# Patient Record
Sex: MALE | Race: WHITE | NOT HISPANIC OR LATINO | Employment: OTHER | URBAN - METROPOLITAN AREA
[De-identification: names, ages, dates, MRNs, and addresses within clinical notes are randomized per-mention and may not be internally consistent; named-entity substitution may affect disease eponyms.]

---

## 2017-01-04 DIAGNOSIS — E03.4 HYPOTHYROIDISM DUE TO ACQUIRED ATROPHY OF THYROID: ICD-10-CM

## 2017-01-04 DIAGNOSIS — E78.5 LIPIDEMIA: ICD-10-CM

## 2017-01-04 DIAGNOSIS — I10 ESSENTIAL HYPERTENSION: ICD-10-CM

## 2017-01-04 DIAGNOSIS — Z00.00 ROUTINE GENERAL MEDICAL EXAMINATION AT A HEALTH CARE FACILITY: ICD-10-CM

## 2017-01-04 RX ORDER — LEVOTHYROXINE SODIUM 300 UG/1
TABLET ORAL
Qty: 30 TABLET | Refills: 0 | Status: SHIPPED | OUTPATIENT
Start: 2017-01-04 | End: 2017-03-22 | Stop reason: SDUPTHER

## 2017-03-22 DIAGNOSIS — Z00.00 ROUTINE GENERAL MEDICAL EXAMINATION AT A HEALTH CARE FACILITY: ICD-10-CM

## 2017-03-22 DIAGNOSIS — E78.5 LIPIDEMIA: ICD-10-CM

## 2017-03-22 DIAGNOSIS — E03.4 HYPOTHYROIDISM DUE TO ACQUIRED ATROPHY OF THYROID: ICD-10-CM

## 2017-03-22 DIAGNOSIS — I10 ESSENTIAL HYPERTENSION: ICD-10-CM

## 2017-03-22 RX ORDER — LEVOTHYROXINE SODIUM 300 UG/1
TABLET ORAL
Qty: 30 TABLET | Refills: 5 | Status: SHIPPED | OUTPATIENT
Start: 2017-03-22 | End: 2017-04-05 | Stop reason: SDUPTHER

## 2017-04-05 DIAGNOSIS — Z00.00 ROUTINE GENERAL MEDICAL EXAMINATION AT A HEALTH CARE FACILITY: ICD-10-CM

## 2017-04-05 DIAGNOSIS — E03.4 HYPOTHYROIDISM DUE TO ACQUIRED ATROPHY OF THYROID: ICD-10-CM

## 2017-04-05 DIAGNOSIS — E78.5 HYPERLIPIDEMIA, UNSPECIFIED HYPERLIPIDEMIA TYPE: ICD-10-CM

## 2017-04-05 DIAGNOSIS — I10 ESSENTIAL HYPERTENSION: ICD-10-CM

## 2017-04-05 RX ORDER — AMLODIPINE BESYLATE 10 MG/1
10 TABLET ORAL DAILY
Qty: 30 TABLET | Refills: 1 | Status: SHIPPED | OUTPATIENT
Start: 2017-04-05 | End: 2017-06-14 | Stop reason: SDUPTHER

## 2017-04-05 RX ORDER — ATORVASTATIN CALCIUM 10 MG/1
10 TABLET, FILM COATED ORAL DAILY
Qty: 30 TABLET | Refills: 1 | Status: SHIPPED | OUTPATIENT
Start: 2017-04-05 | End: 2017-06-14 | Stop reason: SDUPTHER

## 2017-04-05 RX ORDER — LEVOTHYROXINE SODIUM 300 UG/1
300 TABLET ORAL DAILY
Qty: 30 TABLET | Refills: 1 | Status: SHIPPED | OUTPATIENT
Start: 2017-04-05 | End: 2017-06-14 | Stop reason: SDUPTHER

## 2017-04-05 NOTE — TELEPHONE ENCOUNTER
----- Message from Joelle Wade sent at 4/5/2017 10:11 AM CDT -----  Patient is requesting a medication refill. Patient says he just lost his wife & would like ONLY a 30 day supply until he able to make an appt.     RX name: Atorvastatin calcium tab  Strength: 10 mg   Quantity:   Directions: 1xdaily    RX name: Amlodipine besylate  Strength: 10 mg  Quantity:   Directions: 1xdaily    RX name: Levothyroxin  Strength: 300mg   Quantity:   Directions: 1xdaily    Pharmacy name: [facility]

## 2017-04-06 NOTE — TELEPHONE ENCOUNTER
No answer - i mailed the pt a letter advising him to schedule an appt. with dr jackson in the near future

## 2017-06-05 ENCOUNTER — TELEPHONE (OUTPATIENT)
Dept: FAMILY MEDICINE | Facility: CLINIC | Age: 72
End: 2017-06-05

## 2017-06-05 DIAGNOSIS — E03.4 HYPOTHYROIDISM DUE TO ACQUIRED ATROPHY OF THYROID: ICD-10-CM

## 2017-06-05 DIAGNOSIS — E78.5 HYPERLIPIDEMIA, UNSPECIFIED HYPERLIPIDEMIA TYPE: ICD-10-CM

## 2017-06-05 DIAGNOSIS — Z00.00 ROUTINE GENERAL MEDICAL EXAMINATION AT A HEALTH CARE FACILITY: Primary | ICD-10-CM

## 2017-06-05 DIAGNOSIS — R73.9 HYPERGLYCEMIA: ICD-10-CM

## 2017-06-05 DIAGNOSIS — I10 ESSENTIAL HYPERTENSION: ICD-10-CM

## 2017-06-05 NOTE — TELEPHONE ENCOUNTER
----- Message from Veronica Hernandez MA sent at 6/5/2017  8:21 AM CDT -----  Contact: Self / [phone]  Patient requesting to have lab work done at [facility]. Advised patient that it has been almost 2 years since his last visit. Patient is aware and will make an appointment when his schedule clears up. Please advise.

## 2017-06-06 NOTE — TELEPHONE ENCOUNTER
i notified the pt and he will do labs tomorrow and call back to schedule office visit with dr jackson in the near future

## 2017-06-08 LAB
ALBUMIN SERPL-MCNC: 4.3 G/DL (ref 3.6–5.1)
ALBUMIN/GLOB SERPL: 1.7 (CALC) (ref 1–2.5)
ALP SERPL-CCNC: 56 U/L (ref 40–115)
ALT SERPL-CCNC: 13 U/L (ref 9–46)
AST SERPL-CCNC: 14 U/L (ref 10–35)
BASOPHILS # BLD AUTO: 16 CELLS/UL (ref 0–200)
BASOPHILS NFR BLD AUTO: 0.3 %
BILIRUB SERPL-MCNC: 1.1 MG/DL (ref 0.2–1.2)
BUN SERPL-MCNC: 20 MG/DL (ref 7–25)
BUN/CREAT SERPL: ABNORMAL (CALC) (ref 6–22)
CALCIUM SERPL-MCNC: 9.7 MG/DL (ref 8.6–10.3)
CHLORIDE SERPL-SCNC: 101 MMOL/L (ref 98–110)
CHOLEST SERPL-MCNC: 124 MG/DL (ref 125–200)
CHOLEST/HDLC SERPL: 3.1 (CALC)
CO2 SERPL-SCNC: 31 MMOL/L (ref 20–31)
CREAT SERPL-MCNC: 1.1 MG/DL (ref 0.7–1.18)
EOSINOPHIL # BLD AUTO: 88 CELLS/UL (ref 15–500)
EOSINOPHIL NFR BLD AUTO: 1.7 %
ERYTHROCYTE [DISTWIDTH] IN BLOOD BY AUTOMATED COUNT: 14.5 % (ref 11–15)
GFR SERPL CREATININE-BSD FRML MDRD: 66 ML/MIN/1.73M2
GLOBULIN SER CALC-MCNC: 2.6 G/DL (CALC) (ref 1.9–3.7)
GLUCOSE SERPL-MCNC: 126 MG/DL (ref 65–99)
HCT VFR BLD AUTO: 48.1 % (ref 38.5–50)
HDLC SERPL-MCNC: 40 MG/DL
HGB BLD-MCNC: 16.3 G/DL (ref 13.2–17.1)
LDLC SERPL CALC-MCNC: 69 MG/DL (CALC)
LYMPHOCYTES # BLD AUTO: 1071 CELLS/UL (ref 850–3900)
LYMPHOCYTES NFR BLD AUTO: 20.6 %
MCH RBC QN AUTO: 30.4 PG (ref 27–33)
MCHC RBC AUTO-ENTMCNC: 33.8 G/DL (ref 32–36)
MCV RBC AUTO: 90.1 FL (ref 80–100)
MONOCYTES # BLD AUTO: 333 CELLS/UL (ref 200–950)
MONOCYTES NFR BLD AUTO: 6.4 %
NEUTROPHILS # BLD AUTO: 3692 CELLS/UL (ref 1500–7800)
NEUTROPHILS NFR BLD AUTO: 71 %
NONHDLC SERPL-MCNC: 84 MG/DL (CALC)
PLATELET # BLD AUTO: 194 THOUSAND/UL (ref 140–400)
PMV BLD REES-ECKER: 9.7 FL (ref 7.5–12.5)
POTASSIUM SERPL-SCNC: 4 MMOL/L (ref 3.5–5.3)
PROT SERPL-MCNC: 6.9 G/DL (ref 6.1–8.1)
RBC # BLD AUTO: 5.34 MILLION/UL (ref 4.2–5.8)
SODIUM SERPL-SCNC: 141 MMOL/L (ref 135–146)
T4 FREE SERPL-MCNC: 1.4 NG/DL (ref 0.8–1.8)
TRIGL SERPL-MCNC: 77 MG/DL
TSH SERPL-ACNC: 3.69 MIU/L (ref 0.4–4.5)
WBC # BLD AUTO: 5.2 THOUSAND/UL (ref 3.8–10.8)

## 2017-06-09 ENCOUNTER — TELEPHONE (OUTPATIENT)
Dept: FAMILY MEDICINE | Facility: CLINIC | Age: 72
End: 2017-06-09

## 2017-06-09 DIAGNOSIS — R73.9 HYPERGLYCEMIA: Primary | ICD-10-CM

## 2017-06-09 NOTE — TELEPHONE ENCOUNTER
Lab work is great however glucose is still elevated I would like to draw a hemoglobin A1c    You do not have to fast

## 2017-06-14 DIAGNOSIS — E78.5 HYPERLIPIDEMIA, UNSPECIFIED HYPERLIPIDEMIA TYPE: ICD-10-CM

## 2017-06-14 DIAGNOSIS — I10 ESSENTIAL HYPERTENSION: ICD-10-CM

## 2017-06-14 DIAGNOSIS — Z00.00 ROUTINE GENERAL MEDICAL EXAMINATION AT A HEALTH CARE FACILITY: ICD-10-CM

## 2017-06-14 DIAGNOSIS — E03.4 HYPOTHYROIDISM DUE TO ACQUIRED ATROPHY OF THYROID: ICD-10-CM

## 2017-06-14 RX ORDER — HYDROCHLOROTHIAZIDE 25 MG/1
25 TABLET ORAL DAILY
Qty: 90 TABLET | Refills: 3 | Status: SHIPPED | OUTPATIENT
Start: 2017-06-14 | End: 2018-10-17 | Stop reason: SDUPTHER

## 2017-06-14 RX ORDER — ATORVASTATIN CALCIUM 10 MG/1
10 TABLET, FILM COATED ORAL DAILY
Qty: 90 TABLET | Refills: 3 | Status: SHIPPED | OUTPATIENT
Start: 2017-06-14 | End: 2018-10-17 | Stop reason: SDUPTHER

## 2017-06-14 RX ORDER — LEVOTHYROXINE SODIUM 300 UG/1
300 TABLET ORAL DAILY
Qty: 90 TABLET | Refills: 3 | Status: SHIPPED | OUTPATIENT
Start: 2017-06-14 | End: 2018-10-17 | Stop reason: SDUPTHER

## 2017-06-14 RX ORDER — AMLODIPINE BESYLATE 10 MG/1
10 TABLET ORAL DAILY
Qty: 90 TABLET | Refills: 3 | Status: SHIPPED | OUTPATIENT
Start: 2017-06-14 | End: 2018-10-17 | Stop reason: SDUPTHER

## 2017-06-14 NOTE — TELEPHONE ENCOUNTER
----- Message from Joelle Wade sent at 6/14/2017  8:55 AM CDT -----  Patient is requesting a medication refill. Patient has appointment scheduled for next week    RX name: amlodipine (NORVASC) 10 MG tablet  Strength:   Quantity:   Directions: Take 1 tablet (10 mg total) by mouth once daily. - Oral    RX name: atorvastatin (LIPITOR) 10 MG tablet   Strength:   Quantity:   Directions: Take 1 tablet (10 mg total) by mouth once daily. - Oral    RX name: hydrochlorothiazide (HYDRODIURIL) 25 MG tablet  Strength:   Quantity:   Directions: TAKE ONE TABLET BY MOUTH ONCE DAILY     RX name: levothyroxine (SYNTHROID) 300 MCG Tab  Strength:   Quantity:   Directions:  Take 1 tablet (300 mcg total) by mouth once daily. - Oral    Pharmacy name: [facility]      Phone number where patient can be reached:

## 2017-06-17 LAB — HBA1C MFR BLD: 6.1 % OF TOTAL HGB

## 2017-06-19 ENCOUNTER — TELEPHONE (OUTPATIENT)
Dept: INTERNAL MEDICINE | Facility: CLINIC | Age: 72
End: 2017-06-19

## 2017-06-22 ENCOUNTER — OFFICE VISIT (OUTPATIENT)
Dept: FAMILY MEDICINE | Facility: CLINIC | Age: 72
End: 2017-06-22
Payer: MEDICARE

## 2017-06-22 VITALS
BODY MASS INDEX: 34.68 KG/M2 | WEIGHT: 234.13 LBS | SYSTOLIC BLOOD PRESSURE: 126 MMHG | OXYGEN SATURATION: 97 % | HEART RATE: 70 BPM | HEIGHT: 69 IN | DIASTOLIC BLOOD PRESSURE: 80 MMHG | TEMPERATURE: 99 F

## 2017-06-22 DIAGNOSIS — E03.4 HYPOTHYROIDISM DUE TO ACQUIRED ATROPHY OF THYROID: ICD-10-CM

## 2017-06-22 DIAGNOSIS — R73.9 HYPERGLYCEMIA: ICD-10-CM

## 2017-06-22 DIAGNOSIS — Z00.00 ROUTINE HEALTH MAINTENANCE: Primary | ICD-10-CM

## 2017-06-22 DIAGNOSIS — I10 ESSENTIAL HYPERTENSION: ICD-10-CM

## 2017-06-22 DIAGNOSIS — E78.5 HYPERLIPIDEMIA, UNSPECIFIED HYPERLIPIDEMIA TYPE: ICD-10-CM

## 2017-06-22 PROCEDURE — 99499 UNLISTED E&M SERVICE: CPT | Mod: S$GLB,,, | Performed by: FAMILY MEDICINE

## 2017-06-22 PROCEDURE — 99397 PER PM REEVAL EST PAT 65+ YR: CPT | Mod: S$GLB,,, | Performed by: FAMILY MEDICINE

## 2017-06-22 NOTE — PROGRESS NOTES
Patient ID: Bean Salas is a 72 y.o. male.    Chief Complaint: Follow-up (check-up and review bloodwork)    HPI      Bean Salas is a 72 y.o. male with no current complaints follow for hypertension, hyperlipidemia, hypothyroidism.  All of which are stable at this time.  No problems with current medication            Review of Symptoms    Constitutional  Neg activity change, No chills /fever   Resp  Neg hemoptysis, stridor, choking  CVS  Neg chest pain, palpitations  Other review of the 14 systems negative except for history of present illness      Physical Exam    Constitutional:  Oriented to person, place, and time.appears well-developed and well-nourished.  No distress.      HENT  Head: Normocephalic and atraumatic  Right Ear: External ear normal.   Left Ear: External ear normal.   Nose: External nose normal.   Mouth:  Moist mucus membranes.    Eyes:  Conjunctivae are normal. Right eye exhibits no discharge.  Left eye exhibits no discharge. No scleral icterus.  No periorbital edema    Cardiovascular:  Regular rate, Regular rhythm     No extrasystole  Normal S1-S2      Pulmonary/Chest:   Normal effort and breath sounds.  No wheezing, rhonchi or rales.    Musculoskeletal:  No edema. No obvious deformity No waisting       Neurological:  Alert and oriented to person, place, and time.   Coordination normal.     Skin:   Skin is warm and dry.  No diaphoresis.   No rash noted.     Psychiatric: Normal mood and affect. Behavior is normal.  Judgment and thought content normal.       Assessment / Plan:      ICD-10-CM ICD-9-CM   1. Routine health maintenance Z00.00 V70.0   2. Essential hypertension I10 401.9   3. Hyperlipidemia, unspecified hyperlipidemia type E78.5 272.4   4. Hypothyroidism due to acquired atrophy of thyroid E03.4 244.8     246.8   5. Hyperglycemia R73.9 790.29     Routine health maintenance    Essential hypertension    Hyperlipidemia, unspecified hyperlipidemia type    Hypothyroidism due to acquired  atrophy of thyroid    Hyperglycemia  -     Hemoglobin A1c; Future    decline colonoscopy, injections  Discusses aaa  Life screen option

## 2017-06-25 PROBLEM — I10 ESSENTIAL HYPERTENSION: Status: ACTIVE | Noted: 2017-06-25

## 2017-06-25 PROBLEM — E78.5 HYPERLIPIDEMIA: Status: ACTIVE | Noted: 2017-06-25

## 2017-06-25 PROBLEM — E03.4 HYPOTHYROIDISM DUE TO ACQUIRED ATROPHY OF THYROID: Status: ACTIVE | Noted: 2017-06-25

## 2017-06-25 PROBLEM — R73.9 HYPERGLYCEMIA: Status: ACTIVE | Noted: 2017-06-25

## 2018-02-01 LAB — HBA1C MFR BLD: 6 % OF TOTAL HGB

## 2018-02-12 ENCOUNTER — PES CALL (OUTPATIENT)
Dept: ADMINISTRATIVE | Facility: CLINIC | Age: 73
End: 2018-02-12

## 2018-02-22 ENCOUNTER — CLINICAL SUPPORT (OUTPATIENT)
Dept: FAMILY MEDICINE | Facility: CLINIC | Age: 73
End: 2018-02-22

## 2018-02-22 DIAGNOSIS — Z00.00 ROUTINE GENERAL MEDICAL EXAMINATION AT A HEALTH CARE FACILITY: Primary | ICD-10-CM

## 2018-02-22 LAB
BILIRUB SERPL-MCNC: NORMAL MG/DL
BLOOD URINE, POC: NORMAL
COLOR, POC UA: NORMAL
GLUCOSE UR QL STRIP: NORMAL
KETONES UR QL STRIP: NORMAL
LEUKOCYTE ESTERASE URINE, POC: NORMAL
NITRITE, POC UA: NORMAL
PH, POC UA: NORMAL
PROTEIN, POC: NORMAL
SPECIFIC GRAVITY, POC UA: NORMAL
UROBILINOGEN, POC UA: NORMAL

## 2018-02-22 PROCEDURE — 99201 PR OFFICE/OUTPT VISIT,NEW,LEVL I: CPT | Mod: S$GLB,,, | Performed by: FAMILY MEDICINE

## 2018-02-22 PROCEDURE — 81002 URINALYSIS NONAUTO W/O SCOPE: CPT | Mod: S$GLB,,, | Performed by: FAMILY MEDICINE

## 2018-05-04 DIAGNOSIS — Z12.11 COLON CANCER SCREENING: ICD-10-CM

## 2018-10-04 ENCOUNTER — TELEPHONE (OUTPATIENT)
Dept: FAMILY MEDICINE | Facility: CLINIC | Age: 73
End: 2018-10-04

## 2018-10-04 DIAGNOSIS — E78.5 HYPERLIPIDEMIA, UNSPECIFIED HYPERLIPIDEMIA TYPE: ICD-10-CM

## 2018-10-04 DIAGNOSIS — I10 ESSENTIAL HYPERTENSION: ICD-10-CM

## 2018-10-04 DIAGNOSIS — E03.4 HYPOTHYROIDISM DUE TO ACQUIRED ATROPHY OF THYROID: ICD-10-CM

## 2018-10-04 DIAGNOSIS — R73.9 HYPERGLYCEMIA: ICD-10-CM

## 2018-10-04 DIAGNOSIS — Z00.00 ROUTINE GENERAL MEDICAL EXAMINATION AT A HEALTH CARE FACILITY: Primary | ICD-10-CM

## 2018-10-04 NOTE — TELEPHONE ENCOUNTER
----- Message from Obdulia Minaya sent at 10/4/2018  9:59 AM CDT -----  Contact: [phone]/ self   Patient is requesting orders for lab work and he would like them sent to [facility]. Please advise.

## 2018-10-13 LAB
ALBUMIN SERPL-MCNC: 4.1 G/DL (ref 3.6–5.1)
ALBUMIN/GLOB SERPL: 1.5 (CALC) (ref 1–2.5)
ALP SERPL-CCNC: 62 U/L (ref 40–115)
ALT SERPL-CCNC: 17 U/L (ref 9–46)
AST SERPL-CCNC: 16 U/L (ref 10–35)
BASOPHILS # BLD AUTO: 33 CELLS/UL (ref 0–200)
BASOPHILS NFR BLD AUTO: 0.5 %
BILIRUB SERPL-MCNC: 2.2 MG/DL (ref 0.2–1.2)
BUN SERPL-MCNC: 11 MG/DL (ref 7–25)
BUN/CREAT SERPL: ABNORMAL (CALC) (ref 6–22)
CALCIUM SERPL-MCNC: 9.5 MG/DL (ref 8.6–10.3)
CHLORIDE SERPL-SCNC: 103 MMOL/L (ref 98–110)
CHOLEST SERPL-MCNC: 126 MG/DL
CHOLEST/HDLC SERPL: 3.2 (CALC)
CO2 SERPL-SCNC: 29 MMOL/L (ref 20–32)
CREAT SERPL-MCNC: 1.03 MG/DL (ref 0.7–1.18)
EOSINOPHIL # BLD AUTO: 98 CELLS/UL (ref 15–500)
EOSINOPHIL NFR BLD AUTO: 1.5 %
ERYTHROCYTE [DISTWIDTH] IN BLOOD BY AUTOMATED COUNT: 14.1 % (ref 11–15)
GFR SERPL CREATININE-BSD FRML MDRD: 71 ML/MIN/1.73M2
GLOBULIN SER CALC-MCNC: 2.8 G/DL (CALC) (ref 1.9–3.7)
GLUCOSE SERPL-MCNC: 139 MG/DL (ref 65–99)
HBA1C MFR BLD: 6.1 % OF TOTAL HGB
HCT VFR BLD AUTO: 48.8 % (ref 38.5–50)
HDLC SERPL-MCNC: 39 MG/DL
HGB BLD-MCNC: 16.6 G/DL (ref 13.2–17.1)
LDLC SERPL CALC-MCNC: 70 MG/DL (CALC)
LYMPHOCYTES # BLD AUTO: 1326 CELLS/UL (ref 850–3900)
LYMPHOCYTES NFR BLD AUTO: 20.4 %
MCH RBC QN AUTO: 30.6 PG (ref 27–33)
MCHC RBC AUTO-ENTMCNC: 34 G/DL (ref 32–36)
MCV RBC AUTO: 89.9 FL (ref 80–100)
MONOCYTES # BLD AUTO: 442 CELLS/UL (ref 200–950)
MONOCYTES NFR BLD AUTO: 6.8 %
NEUTROPHILS # BLD AUTO: 4602 CELLS/UL (ref 1500–7800)
NEUTROPHILS NFR BLD AUTO: 70.8 %
NONHDLC SERPL-MCNC: 87 MG/DL (CALC)
PLATELET # BLD AUTO: 247 THOUSAND/UL (ref 140–400)
PMV BLD REES-ECKER: 11.7 FL (ref 7.5–12.5)
POTASSIUM SERPL-SCNC: 3.8 MMOL/L (ref 3.5–5.3)
PROT SERPL-MCNC: 6.9 G/DL (ref 6.1–8.1)
RBC # BLD AUTO: 5.43 MILLION/UL (ref 4.2–5.8)
SODIUM SERPL-SCNC: 141 MMOL/L (ref 135–146)
TRIGL SERPL-MCNC: 89 MG/DL
TSH SERPL-ACNC: 6.12 MIU/L (ref 0.4–4.5)
WBC # BLD AUTO: 6.5 THOUSAND/UL (ref 3.8–10.8)

## 2018-10-17 DIAGNOSIS — E78.5 HYPERLIPIDEMIA, UNSPECIFIED HYPERLIPIDEMIA TYPE: ICD-10-CM

## 2018-10-17 DIAGNOSIS — Z00.00 ROUTINE GENERAL MEDICAL EXAMINATION AT A HEALTH CARE FACILITY: ICD-10-CM

## 2018-10-17 DIAGNOSIS — E03.4 HYPOTHYROIDISM DUE TO ACQUIRED ATROPHY OF THYROID: ICD-10-CM

## 2018-10-17 DIAGNOSIS — I10 ESSENTIAL HYPERTENSION: ICD-10-CM

## 2018-10-17 RX ORDER — ATORVASTATIN CALCIUM 10 MG/1
10 TABLET, FILM COATED ORAL DAILY
Qty: 90 TABLET | Refills: 3 | Status: SHIPPED | OUTPATIENT
Start: 2018-10-17 | End: 2020-03-05

## 2018-10-17 RX ORDER — LEVOTHYROXINE SODIUM 300 UG/1
300 TABLET ORAL DAILY
Qty: 90 TABLET | Refills: 3 | Status: SHIPPED | OUTPATIENT
Start: 2018-10-17 | End: 2018-10-17 | Stop reason: SDUPTHER

## 2018-10-17 RX ORDER — HYDROCHLOROTHIAZIDE 25 MG/1
25 TABLET ORAL DAILY
Qty: 90 TABLET | Refills: 3 | Status: SHIPPED | OUTPATIENT
Start: 2018-10-17 | End: 2020-03-05

## 2018-10-17 RX ORDER — HYDROCHLOROTHIAZIDE 25 MG/1
25 TABLET ORAL DAILY
Qty: 90 TABLET | Refills: 3 | Status: SHIPPED | OUTPATIENT
Start: 2018-10-17 | End: 2018-10-17 | Stop reason: SDUPTHER

## 2018-10-17 RX ORDER — AMLODIPINE BESYLATE 10 MG/1
10 TABLET ORAL DAILY
Qty: 90 TABLET | Refills: 3 | Status: SHIPPED | OUTPATIENT
Start: 2018-10-17 | End: 2020-03-05

## 2018-10-17 RX ORDER — ATORVASTATIN CALCIUM 10 MG/1
10 TABLET, FILM COATED ORAL DAILY
Qty: 90 TABLET | Refills: 3 | Status: SHIPPED | OUTPATIENT
Start: 2018-10-17 | End: 2018-10-17 | Stop reason: SDUPTHER

## 2018-10-17 RX ORDER — LEVOTHYROXINE SODIUM 300 UG/1
300 TABLET ORAL DAILY
Qty: 90 TABLET | Refills: 3 | Status: SHIPPED | OUTPATIENT
Start: 2018-10-17 | End: 2020-03-05

## 2018-10-17 NOTE — TELEPHONE ENCOUNTER
Your lab work if very good  NO changes needed at this time    Please come in for a wellness visit sometime soon   defiantly before the end of the year

## 2018-10-17 NOTE — TELEPHONE ENCOUNTER
----- Message from Lisa Verdugo sent at 10/17/2018  9:54 AM CDT -----  No. [phone]    Patient needs a 30 day script on his medications.   [facility] in [place]     Atorvastatin 10mg, 1 daily, Amlodipine 10mg, 1 daily, Hydrochlorothiazide 25mg, 1 daily, and   Levothyroxine 300mg, 1 daily.

## 2018-10-17 NOTE — TELEPHONE ENCOUNTER
I spoke with the pt   He will call back to schedule he is leaving to go out of town  He does need refills with 90 day supply to [facility]

## 2018-11-15 ENCOUNTER — PES CALL (OUTPATIENT)
Dept: ADMINISTRATIVE | Facility: CLINIC | Age: 73
End: 2018-11-15

## 2019-02-06 ENCOUNTER — PES CALL (OUTPATIENT)
Dept: ADMINISTRATIVE | Facility: CLINIC | Age: 74
End: 2019-02-06

## 2019-05-17 DIAGNOSIS — Z12.11 COLON CANCER SCREENING: ICD-10-CM

## 2019-06-26 ENCOUNTER — TELEPHONE (OUTPATIENT)
Dept: FAMILY MEDICINE | Facility: CLINIC | Age: 74
End: 2019-06-26

## 2019-06-26 DIAGNOSIS — R73.9 HYPERGLYCEMIA: ICD-10-CM

## 2019-06-26 DIAGNOSIS — E03.4 HYPOTHYROIDISM DUE TO ACQUIRED ATROPHY OF THYROID: ICD-10-CM

## 2019-06-26 DIAGNOSIS — I10 ESSENTIAL HYPERTENSION: Primary | ICD-10-CM

## 2019-06-26 DIAGNOSIS — E78.5 HYPERLIPIDEMIA, UNSPECIFIED HYPERLIPIDEMIA TYPE: ICD-10-CM

## 2019-06-26 NOTE — TELEPHONE ENCOUNTER
Tried contacting patient no answer on telephone number provided; will try back later. Lab orders have been placed     ----- Message from Leatha Marin sent at 6/26/2019  2:35 PM CDT -----  Contact: pt  Type:  Needs Medical Advice    Who Called: pt  Symptoms (please be specific):  How long has patient had these symptoms:   Pharmacy name and phone #  Would the patient rather a call back or a response via [facility]?  Best Call Back Number:[phone]  Additional Information:  Pt called in need orders for lab

## 2019-07-02 ENCOUNTER — LAB VISIT (OUTPATIENT)
Dept: LAB | Facility: HOSPITAL | Age: 74
End: 2019-07-02
Attending: FAMILY MEDICINE
Payer: MEDICARE

## 2019-07-02 DIAGNOSIS — R73.9 HYPERGLYCEMIA: ICD-10-CM

## 2019-07-02 DIAGNOSIS — E03.4 HYPOTHYROIDISM DUE TO ACQUIRED ATROPHY OF THYROID: ICD-10-CM

## 2019-07-02 DIAGNOSIS — E78.5 HYPERLIPIDEMIA, UNSPECIFIED HYPERLIPIDEMIA TYPE: ICD-10-CM

## 2019-07-02 DIAGNOSIS — I10 ESSENTIAL HYPERTENSION: ICD-10-CM

## 2019-07-02 LAB
ALBUMIN SERPL BCP-MCNC: 3.8 G/DL (ref 3.5–5.2)
ALP SERPL-CCNC: 62 U/L (ref 38–126)
ALT SERPL W/O P-5'-P-CCNC: 21 U/L (ref 10–44)
ANION GAP SERPL CALC-SCNC: 6 MMOL/L (ref 8–16)
AST SERPL-CCNC: 24 U/L (ref 15–46)
BASOPHILS # BLD AUTO: 0.01 K/UL (ref 0–0.2)
BASOPHILS NFR BLD: 0.2 % (ref 0–1.9)
BILIRUB SERPL-MCNC: 1.7 MG/DL (ref 0.1–1)
BUN SERPL-MCNC: 13 MG/DL (ref 2–20)
CALCIUM SERPL-MCNC: 9.3 MG/DL (ref 8.7–10.5)
CHLORIDE SERPL-SCNC: 103 MMOL/L (ref 95–110)
CHOLEST SERPL-MCNC: 119 MG/DL (ref 120–199)
CHOLEST/HDLC SERPL: 3.3 {RATIO} (ref 2–5)
CO2 SERPL-SCNC: 32 MMOL/L (ref 23–29)
CREAT SERPL-MCNC: 0.95 MG/DL (ref 0.5–1.4)
DIFFERENTIAL METHOD: ABNORMAL
EOSINOPHIL # BLD AUTO: 0.1 K/UL (ref 0–0.5)
EOSINOPHIL NFR BLD: 1.5 % (ref 0–8)
ERYTHROCYTE [DISTWIDTH] IN BLOOD BY AUTOMATED COUNT: 14.6 % (ref 11.5–14.5)
EST. GFR  (AFRICAN AMERICAN): >60 ML/MIN/1.73 M^2
EST. GFR  (NON AFRICAN AMERICAN): >60 ML/MIN/1.73 M^2
ESTIMATED AVG GLUCOSE: 143 MG/DL (ref 68–131)
GLUCOSE SERPL-MCNC: 150 MG/DL (ref 70–110)
HBA1C MFR BLD HPLC: 6.6 % (ref 4–5.6)
HCT VFR BLD AUTO: 47.9 % (ref 40–54)
HDLC SERPL-MCNC: 36 MG/DL (ref 40–75)
HDLC SERPL: 30.3 % (ref 20–50)
HGB BLD-MCNC: 15.9 G/DL (ref 14–18)
LDLC SERPL CALC-MCNC: 66.4 MG/DL (ref 63–159)
LYMPHOCYTES # BLD AUTO: 1.1 K/UL (ref 1–4.8)
LYMPHOCYTES NFR BLD: 15.9 % (ref 18–48)
MCH RBC QN AUTO: 30.8 PG (ref 27–31)
MCHC RBC AUTO-ENTMCNC: 33.2 G/DL (ref 32–36)
MCV RBC AUTO: 93 FL (ref 82–98)
MONOCYTES # BLD AUTO: 0.6 K/UL (ref 0.3–1)
MONOCYTES NFR BLD: 9.2 % (ref 4–15)
NEUTROPHILS # BLD AUTO: 4.9 K/UL (ref 1.8–7.7)
NEUTROPHILS NFR BLD: 73.2 % (ref 38–73)
NONHDLC SERPL-MCNC: 83 MG/DL
PLATELET # BLD AUTO: 227 K/UL (ref 150–350)
PMV BLD AUTO: 11.8 FL (ref 9.2–12.9)
POTASSIUM SERPL-SCNC: 4 MMOL/L (ref 3.5–5.1)
PROT SERPL-MCNC: 7.1 G/DL (ref 6–8.4)
RBC # BLD AUTO: 5.16 M/UL (ref 4.6–6.2)
SODIUM SERPL-SCNC: 141 MMOL/L (ref 136–145)
TRIGL SERPL-MCNC: 83 MG/DL (ref 30–150)
TSH SERPL DL<=0.005 MIU/L-ACNC: 0.9 UIU/ML (ref 0.4–4)
WBC # BLD AUTO: 6.65 K/UL (ref 3.9–12.7)

## 2019-07-02 PROCEDURE — 85025 COMPLETE CBC W/AUTO DIFF WBC: CPT | Mod: HCNC,PO

## 2019-07-02 PROCEDURE — 83036 HEMOGLOBIN GLYCOSYLATED A1C: CPT | Mod: HCNC

## 2019-07-02 PROCEDURE — 80061 LIPID PANEL: CPT | Mod: HCNC

## 2019-07-02 PROCEDURE — 80053 COMPREHEN METABOLIC PANEL: CPT | Mod: HCNC,PO

## 2019-07-02 PROCEDURE — 84443 ASSAY THYROID STIM HORMONE: CPT | Mod: HCNC,PO

## 2019-07-02 PROCEDURE — 36415 COLL VENOUS BLD VENIPUNCTURE: CPT | Mod: HCNC,PO

## 2019-07-15 ENCOUNTER — PES CALL (OUTPATIENT)
Dept: ADMINISTRATIVE | Facility: CLINIC | Age: 74
End: 2019-07-15

## 2020-02-12 ENCOUNTER — TELEPHONE (OUTPATIENT)
Dept: FAMILY MEDICINE | Facility: CLINIC | Age: 75
End: 2020-02-12

## 2020-02-12 DIAGNOSIS — E03.4 HYPOTHYROIDISM DUE TO ACQUIRED ATROPHY OF THYROID: ICD-10-CM

## 2020-02-12 DIAGNOSIS — R73.9 HYPERGLYCEMIA: ICD-10-CM

## 2020-02-12 DIAGNOSIS — E78.5 HYPERLIPIDEMIA, UNSPECIFIED HYPERLIPIDEMIA TYPE: ICD-10-CM

## 2020-02-12 DIAGNOSIS — I10 ESSENTIAL HYPERTENSION: Primary | ICD-10-CM

## 2020-02-12 NOTE — TELEPHONE ENCOUNTER
----- Message from Rakesh Minaya sent at 2/12/2020  2:37 PM CST -----  Contact: patient  Patient would like orders placed in the system for routine testing. (labwork)     Please call [phone] to advise when done.

## 2020-02-12 NOTE — TELEPHONE ENCOUNTER
I called and LM with details  for the pt advising lab orders were sent to [facility]   I advised the pt to rtn call with any questions

## 2020-02-21 ENCOUNTER — LAB VISIT (OUTPATIENT)
Dept: LAB | Facility: HOSPITAL | Age: 75
End: 2020-02-21
Attending: FAMILY MEDICINE
Payer: MEDICARE

## 2020-02-21 DIAGNOSIS — I10 ESSENTIAL HYPERTENSION: ICD-10-CM

## 2020-02-21 DIAGNOSIS — E78.5 HYPERLIPIDEMIA, UNSPECIFIED HYPERLIPIDEMIA TYPE: ICD-10-CM

## 2020-02-21 DIAGNOSIS — E03.4 HYPOTHYROIDISM DUE TO ACQUIRED ATROPHY OF THYROID: ICD-10-CM

## 2020-02-21 DIAGNOSIS — R73.9 HYPERGLYCEMIA: ICD-10-CM

## 2020-02-21 LAB
ALBUMIN SERPL BCP-MCNC: 4.2 G/DL (ref 3.5–5.2)
ALP SERPL-CCNC: 70 U/L (ref 38–126)
ALT SERPL W/O P-5'-P-CCNC: 25 U/L (ref 10–44)
ANION GAP SERPL CALC-SCNC: 8 MMOL/L (ref 8–16)
AST SERPL-CCNC: 56 U/L (ref 15–46)
BASOPHILS # BLD AUTO: 0.02 K/UL (ref 0–0.2)
BASOPHILS NFR BLD: 0.3 % (ref 0–1.9)
BILIRUB SERPL-MCNC: 1.4 MG/DL (ref 0.1–1)
BUN SERPL-MCNC: 21 MG/DL (ref 2–20)
CALCIUM SERPL-MCNC: 9.4 MG/DL (ref 8.7–10.5)
CHLORIDE SERPL-SCNC: 99 MMOL/L (ref 95–110)
CHOLEST SERPL-MCNC: 138 MG/DL (ref 120–199)
CHOLEST/HDLC SERPL: 3.5 {RATIO} (ref 2–5)
CO2 SERPL-SCNC: 34 MMOL/L (ref 23–29)
CREAT SERPL-MCNC: 0.96 MG/DL (ref 0.5–1.4)
DIFFERENTIAL METHOD: NORMAL
EOSINOPHIL # BLD AUTO: 0.1 K/UL (ref 0–0.5)
EOSINOPHIL NFR BLD: 1.7 % (ref 0–8)
ERYTHROCYTE [DISTWIDTH] IN BLOOD BY AUTOMATED COUNT: 13.4 % (ref 11.5–14.5)
EST. GFR  (AFRICAN AMERICAN): >60 ML/MIN/1.73 M^2
EST. GFR  (NON AFRICAN AMERICAN): >60 ML/MIN/1.73 M^2
ESTIMATED AVG GLUCOSE: 157 MG/DL (ref 68–131)
GLUCOSE SERPL-MCNC: 167 MG/DL (ref 70–110)
HBA1C MFR BLD HPLC: 7.1 % (ref 4–5.6)
HCT VFR BLD AUTO: 49 % (ref 40–54)
HDLC SERPL-MCNC: 40 MG/DL (ref 40–75)
HDLC SERPL: 29 % (ref 20–50)
HGB BLD-MCNC: 16.5 G/DL (ref 14–18)
IMM GRANULOCYTES # BLD AUTO: 0.02 K/UL (ref 0–0.04)
IMM GRANULOCYTES NFR BLD AUTO: 0.3 % (ref 0–0.5)
LDLC SERPL CALC-MCNC: 78.8 MG/DL (ref 63–159)
LYMPHOCYTES # BLD AUTO: 1.2 K/UL (ref 1–4.8)
LYMPHOCYTES NFR BLD: 19.6 % (ref 18–48)
MCH RBC QN AUTO: 30.8 PG (ref 27–31)
MCHC RBC AUTO-ENTMCNC: 33.7 G/DL (ref 32–36)
MCV RBC AUTO: 92 FL (ref 82–98)
MONOCYTES # BLD AUTO: 0.5 K/UL (ref 0.3–1)
MONOCYTES NFR BLD: 7.6 % (ref 4–15)
NEUTROPHILS # BLD AUTO: 4.2 K/UL (ref 1.8–7.7)
NEUTROPHILS NFR BLD: 70.5 % (ref 38–73)
NONHDLC SERPL-MCNC: 98 MG/DL
NRBC BLD-RTO: 0 /100 WBC
PLATELET # BLD AUTO: 231 K/UL (ref 150–350)
PMV BLD AUTO: 11.8 FL (ref 9.2–12.9)
POTASSIUM SERPL-SCNC: 3.5 MMOL/L (ref 3.5–5.1)
PROT SERPL-MCNC: 7.4 G/DL (ref 6–8.4)
RBC # BLD AUTO: 5.35 M/UL (ref 4.6–6.2)
SODIUM SERPL-SCNC: 141 MMOL/L (ref 136–145)
T4 FREE SERPL-MCNC: 1.41 NG/DL (ref 0.71–1.51)
TRIGL SERPL-MCNC: 96 MG/DL (ref 30–150)
TSH SERPL DL<=0.005 MIU/L-ACNC: 0.59 UIU/ML (ref 0.4–4)
WBC # BLD AUTO: 6.02 K/UL (ref 3.9–12.7)

## 2020-02-21 PROCEDURE — 80061 LIPID PANEL: CPT | Mod: HCNC

## 2020-02-21 PROCEDURE — 84443 ASSAY THYROID STIM HORMONE: CPT | Mod: HCNC,PO

## 2020-02-21 PROCEDURE — 84439 ASSAY OF FREE THYROXINE: CPT | Mod: HCNC

## 2020-02-21 PROCEDURE — 83036 HEMOGLOBIN GLYCOSYLATED A1C: CPT | Mod: HCNC

## 2020-02-21 PROCEDURE — 85025 COMPLETE CBC W/AUTO DIFF WBC: CPT | Mod: HCNC,PO

## 2020-02-21 PROCEDURE — 80053 COMPREHEN METABOLIC PANEL: CPT | Mod: HCNC,PO

## 2020-02-21 PROCEDURE — 36415 COLL VENOUS BLD VENIPUNCTURE: CPT | Mod: HCNC,PO

## 2020-03-05 DIAGNOSIS — I10 ESSENTIAL HYPERTENSION: ICD-10-CM

## 2020-03-05 DIAGNOSIS — E03.4 HYPOTHYROIDISM DUE TO ACQUIRED ATROPHY OF THYROID: ICD-10-CM

## 2020-03-05 DIAGNOSIS — Z00.00 ROUTINE GENERAL MEDICAL EXAMINATION AT A HEALTH CARE FACILITY: ICD-10-CM

## 2020-03-05 DIAGNOSIS — E78.5 HYPERLIPIDEMIA, UNSPECIFIED HYPERLIPIDEMIA TYPE: ICD-10-CM

## 2020-03-05 RX ORDER — AMLODIPINE BESYLATE 10 MG/1
TABLET ORAL
Qty: 90 TABLET | Refills: 3 | Status: SHIPPED | OUTPATIENT
Start: 2020-03-05 | End: 2021-06-02

## 2020-03-05 RX ORDER — HYDROCHLOROTHIAZIDE 25 MG/1
TABLET ORAL
Qty: 90 TABLET | Refills: 3 | Status: SHIPPED | OUTPATIENT
Start: 2020-03-05 | End: 2021-06-02

## 2020-03-05 RX ORDER — LEVOTHYROXINE SODIUM 300 UG/1
TABLET ORAL
Qty: 90 TABLET | Refills: 3 | Status: SHIPPED | OUTPATIENT
Start: 2020-03-05 | End: 2021-06-02

## 2020-03-05 RX ORDER — ATORVASTATIN CALCIUM 10 MG/1
TABLET, FILM COATED ORAL
Qty: 90 TABLET | Refills: 3 | Status: SHIPPED | OUTPATIENT
Start: 2020-03-05 | End: 2021-06-02

## 2020-06-19 ENCOUNTER — PATIENT OUTREACH (OUTPATIENT)
Dept: ADMINISTRATIVE | Facility: HOSPITAL | Age: 75
End: 2020-06-19

## 2021-05-05 ENCOUNTER — HOSPITAL ENCOUNTER (EMERGENCY)
Facility: HOSPITAL | Age: 76
Discharge: HOME | End: 2021-05-05
Attending: EMERGENCY MEDICINE
Payer: MEDICARE

## 2021-05-05 VITALS
TEMPERATURE: 98 F | DIASTOLIC BLOOD PRESSURE: 87 MMHG | WEIGHT: 230 LBS | RESPIRATION RATE: 16 BRPM | HEART RATE: 46 BPM | BODY MASS INDEX: 32.2 KG/M2 | HEIGHT: 71 IN | OXYGEN SATURATION: 96 % | SYSTOLIC BLOOD PRESSURE: 187 MMHG

## 2021-05-05 DIAGNOSIS — S42.201A CLOSED FRACTURE OF PROXIMAL END OF RIGHT HUMERUS, UNSPECIFIED FRACTURE MORPHOLOGY, INITIAL ENCOUNTER: Primary | ICD-10-CM
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
## 2021-05-05 PROCEDURE — 99283 EMERGENCY DEPT VISIT LOW MDM: CPT | Mod: 25,ER

## 2021-05-26 ENCOUNTER — TELEPHONE (OUTPATIENT)
Dept: FAMILY MEDICINE | Facility: CLINIC | Age: 76
End: 2021-05-26

## 2021-05-26 DIAGNOSIS — R73.9 HYPERGLYCEMIA: ICD-10-CM

## 2021-05-26 DIAGNOSIS — E78.5 HYPERLIPIDEMIA, UNSPECIFIED HYPERLIPIDEMIA TYPE: ICD-10-CM

## 2021-05-26 DIAGNOSIS — E03.4 HYPOTHYROIDISM DUE TO ACQUIRED ATROPHY OF THYROID: ICD-10-CM

## 2021-05-26 DIAGNOSIS — I10 ESSENTIAL HYPERTENSION: Primary | ICD-10-CM

## 2021-05-27 ENCOUNTER — LAB VISIT (OUTPATIENT)
Dept: LAB | Facility: HOSPITAL | Age: 76
End: 2021-05-27
Attending: FAMILY MEDICINE
Payer: MEDICARE

## 2021-05-27 ENCOUNTER — TELEPHONE (OUTPATIENT)
Dept: FAMILY MEDICINE | Facility: CLINIC | Age: 76
End: 2021-05-27

## 2021-05-27 DIAGNOSIS — E03.4 HYPOTHYROIDISM DUE TO ACQUIRED ATROPHY OF THYROID: ICD-10-CM

## 2021-05-27 DIAGNOSIS — R73.9 HYPERGLYCEMIA: Primary | ICD-10-CM

## 2021-05-27 DIAGNOSIS — I10 ESSENTIAL HYPERTENSION: ICD-10-CM

## 2021-05-27 DIAGNOSIS — E78.5 HYPERLIPIDEMIA, UNSPECIFIED HYPERLIPIDEMIA TYPE: ICD-10-CM

## 2021-05-27 DIAGNOSIS — R73.9 HYPERGLYCEMIA: ICD-10-CM

## 2021-05-27 LAB
ALBUMIN SERPL BCP-MCNC: 4.2 G/DL (ref 3.5–5.2)
ALP SERPL-CCNC: 85 U/L (ref 38–126)
ALT SERPL W/O P-5'-P-CCNC: 27 U/L (ref 10–44)
ANION GAP SERPL CALC-SCNC: 7 MMOL/L (ref 8–16)
AST SERPL-CCNC: 42 U/L (ref 15–46)
BASOPHILS # BLD AUTO: 0.02 K/UL (ref 0–0.2)
BASOPHILS NFR BLD: 0.4 % (ref 0–1.9)
BILIRUB SERPL-MCNC: 1.8 MG/DL (ref 0.1–1)
CALCIUM SERPL-MCNC: 9.6 MG/DL (ref 8.7–10.5)
CHLORIDE SERPL-SCNC: 98 MMOL/L (ref 95–110)
CHOLEST SERPL-MCNC: 123 MG/DL (ref 120–199)
CHOLEST/HDLC SERPL: 3.7 {RATIO} (ref 2–5)
CO2 SERPL-SCNC: 35 MMOL/L (ref 23–29)
CREAT SERPL-MCNC: 1.04 MG/DL (ref 0.5–1.4)
DIFFERENTIAL METHOD: ABNORMAL
EOSINOPHIL # BLD AUTO: 0.1 K/UL (ref 0–0.5)
EOSINOPHIL NFR BLD: 1.6 % (ref 0–8)
ERYTHROCYTE [DISTWIDTH] IN BLOOD BY AUTOMATED COUNT: 13.4 % (ref 11.5–14.5)
EST. GFR  (AFRICAN AMERICAN): >60 ML/MIN/1.73 M^2
EST. GFR  (NON AFRICAN AMERICAN): >60 ML/MIN/1.73 M^2
ESTIMATED AVG GLUCOSE: 143 MG/DL (ref 68–131)
GLUCOSE SERPL-MCNC: 151 MG/DL (ref 70–110)
HBA1C MFR BLD: 6.6 % (ref 4–5.6)
HCT VFR BLD AUTO: 47.6 % (ref 40–54)
HDLC SERPL-MCNC: 33 MG/DL (ref 40–75)
HDLC SERPL: 26.8 % (ref 20–50)
HGB BLD-MCNC: 16.3 G/DL (ref 14–18)
IMM GRANULOCYTES # BLD AUTO: 0.02 K/UL (ref 0–0.04)
IMM GRANULOCYTES NFR BLD AUTO: 0.4 % (ref 0–0.5)
LDLC SERPL CALC-MCNC: 70.4 MG/DL (ref 63–159)
LYMPHOCYTES # BLD AUTO: 1.3 K/UL (ref 1–4.8)
LYMPHOCYTES NFR BLD: 23.3 % (ref 18–48)
MCH RBC QN AUTO: 32.2 PG (ref 27–31)
MCHC RBC AUTO-ENTMCNC: 34.2 G/DL (ref 32–36)
MCV RBC AUTO: 94 FL (ref 82–98)
MONOCYTES # BLD AUTO: 0.5 K/UL (ref 0.3–1)
MONOCYTES NFR BLD: 8.9 % (ref 4–15)
NEUTROPHILS # BLD AUTO: 3.7 K/UL (ref 1.8–7.7)
NEUTROPHILS NFR BLD: 65.4 % (ref 38–73)
NONHDLC SERPL-MCNC: 90 MG/DL
NRBC BLD-RTO: 0 /100 WBC
PLATELET # BLD AUTO: 174 K/UL (ref 150–450)
PMV BLD AUTO: 12.4 FL (ref 9.2–12.9)
POTASSIUM SERPL-SCNC: 3.6 MMOL/L (ref 3.5–5.1)
PROT SERPL-MCNC: 7.4 G/DL (ref 6–8.4)
RBC # BLD AUTO: 5.06 M/UL (ref 4.6–6.2)
SODIUM SERPL-SCNC: 140 MMOL/L (ref 136–145)
T4 FREE SERPL-MCNC: 1.23 NG/DL (ref 0.71–1.51)
TRIGL SERPL-MCNC: 98 MG/DL (ref 30–150)
TSH SERPL DL<=0.005 MIU/L-ACNC: 5.35 UIU/ML (ref 0.4–4)
UUN UR-MCNC: 17 MG/DL (ref 2–20)
WBC # BLD AUTO: 5.59 K/UL (ref 3.9–12.7)

## 2021-05-27 PROCEDURE — 80061 LIPID PANEL: CPT | Performed by: FAMILY MEDICINE

## 2021-05-27 PROCEDURE — 84439 ASSAY OF FREE THYROXINE: CPT | Performed by: FAMILY MEDICINE

## 2021-05-27 PROCEDURE — 84443 ASSAY THYROID STIM HORMONE: CPT | Mod: PO | Performed by: FAMILY MEDICINE

## 2021-05-27 PROCEDURE — 36415 COLL VENOUS BLD VENIPUNCTURE: CPT | Mod: PO | Performed by: FAMILY MEDICINE

## 2021-05-27 PROCEDURE — 85025 COMPLETE CBC W/AUTO DIFF WBC: CPT | Mod: PO | Performed by: FAMILY MEDICINE

## 2021-05-27 PROCEDURE — 80053 COMPREHEN METABOLIC PANEL: CPT | Mod: PO | Performed by: FAMILY MEDICINE

## 2021-05-27 PROCEDURE — 83036 HEMOGLOBIN GLYCOSYLATED A1C: CPT | Performed by: FAMILY MEDICINE

## 2021-06-02 DIAGNOSIS — Z00.00 ROUTINE GENERAL MEDICAL EXAMINATION AT A HEALTH CARE FACILITY: ICD-10-CM

## 2021-06-02 DIAGNOSIS — I10 ESSENTIAL HYPERTENSION: ICD-10-CM

## 2021-06-02 DIAGNOSIS — E78.5 HYPERLIPIDEMIA, UNSPECIFIED HYPERLIPIDEMIA TYPE: ICD-10-CM

## 2021-06-02 DIAGNOSIS — E03.4 HYPOTHYROIDISM DUE TO ACQUIRED ATROPHY OF THYROID: ICD-10-CM

## 2021-06-02 RX ORDER — HYDROCHLOROTHIAZIDE 25 MG/1
TABLET ORAL
Qty: 90 TABLET | Refills: 0 | Status: SHIPPED | OUTPATIENT
Start: 2021-06-02 | End: 2021-08-06

## 2021-06-02 RX ORDER — AMLODIPINE BESYLATE 10 MG/1
TABLET ORAL
Qty: 90 TABLET | Refills: 0 | Status: SHIPPED | OUTPATIENT
Start: 2021-06-02 | End: 2021-06-21

## 2021-06-02 RX ORDER — LEVOTHYROXINE SODIUM 300 UG/1
TABLET ORAL
Qty: 90 TABLET | Refills: 0 | Status: SHIPPED | OUTPATIENT
Start: 2021-06-02 | End: 2021-12-03 | Stop reason: SDUPTHER

## 2021-06-02 RX ORDER — ATORVASTATIN CALCIUM 10 MG/1
TABLET, FILM COATED ORAL
Qty: 90 TABLET | Refills: 0 | Status: SHIPPED | OUTPATIENT
Start: 2021-06-02 | End: 2021-12-03 | Stop reason: SDUPTHER

## 2021-06-09 ENCOUNTER — PATIENT OUTREACH (OUTPATIENT)
Dept: ADMINISTRATIVE | Facility: HOSPITAL | Age: 76
End: 2021-06-09

## 2021-06-21 ENCOUNTER — OFFICE VISIT (OUTPATIENT)
Dept: CARDIOLOGY | Facility: CLINIC | Age: 76
End: 2021-06-21
Payer: MEDICARE

## 2021-06-21 ENCOUNTER — OFFICE VISIT (OUTPATIENT)
Dept: FAMILY MEDICINE | Facility: CLINIC | Age: 76
End: 2021-06-21
Payer: MEDICARE

## 2021-06-21 VITALS
WEIGHT: 233.88 LBS | BODY MASS INDEX: 32.62 KG/M2 | SYSTOLIC BLOOD PRESSURE: 166 MMHG | HEART RATE: 84 BPM | DIASTOLIC BLOOD PRESSURE: 72 MMHG

## 2021-06-21 VITALS
WEIGHT: 235.25 LBS | TEMPERATURE: 98 F | HEIGHT: 71 IN | HEART RATE: 44 BPM | DIASTOLIC BLOOD PRESSURE: 60 MMHG | BODY MASS INDEX: 32.94 KG/M2 | OXYGEN SATURATION: 94 % | SYSTOLIC BLOOD PRESSURE: 144 MMHG

## 2021-06-21 DIAGNOSIS — R00.1 BRADYCARDIA: ICD-10-CM

## 2021-06-21 DIAGNOSIS — I10 ESSENTIAL HYPERTENSION: ICD-10-CM

## 2021-06-21 DIAGNOSIS — R73.9 HYPERGLYCEMIA: ICD-10-CM

## 2021-06-21 DIAGNOSIS — I10 ESSENTIAL HYPERTENSION: Primary | ICD-10-CM

## 2021-06-21 DIAGNOSIS — Z87.898 HISTORY OF BRADYCARDIA: ICD-10-CM

## 2021-06-21 DIAGNOSIS — I49.8 ATRIAL BIGEMINY: ICD-10-CM

## 2021-06-21 DIAGNOSIS — E78.2 MIXED HYPERLIPIDEMIA: ICD-10-CM

## 2021-06-21 PROCEDURE — 99204 OFFICE O/P NEW MOD 45 MIN: CPT | Mod: S$GLB,,, | Performed by: INTERNAL MEDICINE

## 2021-06-21 PROCEDURE — 99204 PR OFFICE/OUTPT VISIT, NEW, LEVL IV, 45-59 MIN: ICD-10-PCS | Mod: S$GLB,,, | Performed by: FAMILY MEDICINE

## 2021-06-21 PROCEDURE — 99999 PR PBB SHADOW E&M-EST. PATIENT-LVL III: ICD-10-PCS | Mod: PBBFAC,,, | Performed by: INTERNAL MEDICINE

## 2021-06-21 PROCEDURE — 99204 OFFICE O/P NEW MOD 45 MIN: CPT | Mod: S$GLB,,, | Performed by: FAMILY MEDICINE

## 2021-06-21 PROCEDURE — 99204 PR OFFICE/OUTPT VISIT, NEW, LEVL IV, 45-59 MIN: ICD-10-PCS | Mod: S$GLB,,, | Performed by: INTERNAL MEDICINE

## 2021-06-21 PROCEDURE — 99999 PR PBB SHADOW E&M-EST. PATIENT-LVL III: CPT | Mod: PBBFAC,,, | Performed by: INTERNAL MEDICINE

## 2021-06-21 PROCEDURE — 1126F PR PAIN SEVERITY QUANTIFIED, NO PAIN PRESENT: ICD-10-PCS | Mod: S$GLB,,, | Performed by: FAMILY MEDICINE

## 2021-06-21 PROCEDURE — 1159F PR MEDICATION LIST DOCUMENTED IN MEDICAL RECORD: ICD-10-PCS | Mod: S$GLB,,, | Performed by: INTERNAL MEDICINE

## 2021-06-21 PROCEDURE — 93010 EKG 12-LEAD: ICD-10-PCS | Mod: S$GLB,,, | Performed by: INTERNAL MEDICINE

## 2021-06-21 PROCEDURE — 1159F MED LIST DOCD IN RCRD: CPT | Mod: S$GLB,,, | Performed by: INTERNAL MEDICINE

## 2021-06-21 PROCEDURE — 99499 UNLISTED E&M SERVICE: CPT | Mod: S$GLB,,, | Performed by: INTERNAL MEDICINE

## 2021-06-21 PROCEDURE — 99499 RISK ADDL DX/OHS AUDIT: ICD-10-PCS | Mod: S$GLB,,, | Performed by: INTERNAL MEDICINE

## 2021-06-21 PROCEDURE — 3288F PR FALLS RISK ASSESSMENT DOCUMENTED: ICD-10-PCS | Mod: CPTII,S$GLB,, | Performed by: FAMILY MEDICINE

## 2021-06-21 PROCEDURE — 1126F AMNT PAIN NOTED NONE PRSNT: CPT | Mod: S$GLB,,, | Performed by: INTERNAL MEDICINE

## 2021-06-21 PROCEDURE — 93005 EKG 12-LEAD: ICD-10-PCS | Mod: S$GLB,,, | Performed by: FAMILY MEDICINE

## 2021-06-21 PROCEDURE — 3078F PR MOST RECENT DIASTOLIC BLOOD PRESSURE < 80 MM HG: ICD-10-PCS | Mod: CPTII,S$GLB,, | Performed by: INTERNAL MEDICINE

## 2021-06-21 PROCEDURE — 93005 ELECTROCARDIOGRAM TRACING: CPT | Mod: S$GLB,,, | Performed by: FAMILY MEDICINE

## 2021-06-21 PROCEDURE — 3078F DIAST BP <80 MM HG: CPT | Mod: CPTII,S$GLB,, | Performed by: INTERNAL MEDICINE

## 2021-06-21 PROCEDURE — 1126F AMNT PAIN NOTED NONE PRSNT: CPT | Mod: S$GLB,,, | Performed by: FAMILY MEDICINE

## 2021-06-21 PROCEDURE — 3077F SYST BP >= 140 MM HG: CPT | Mod: CPTII,S$GLB,, | Performed by: FAMILY MEDICINE

## 2021-06-21 PROCEDURE — 1159F PR MEDICATION LIST DOCUMENTED IN MEDICAL RECORD: ICD-10-PCS | Mod: S$GLB,,, | Performed by: FAMILY MEDICINE

## 2021-06-21 PROCEDURE — 1101F PT FALLS ASSESS-DOCD LE1/YR: CPT | Mod: CPTII,S$GLB,, | Performed by: FAMILY MEDICINE

## 2021-06-21 PROCEDURE — 3288F FALL RISK ASSESSMENT DOCD: CPT | Mod: CPTII,S$GLB,, | Performed by: FAMILY MEDICINE

## 2021-06-21 PROCEDURE — 3077F PR MOST RECENT SYSTOLIC BLOOD PRESSURE >= 140 MM HG: ICD-10-PCS | Mod: CPTII,S$GLB,, | Performed by: INTERNAL MEDICINE

## 2021-06-21 PROCEDURE — 1126F PR PAIN SEVERITY QUANTIFIED, NO PAIN PRESENT: ICD-10-PCS | Mod: S$GLB,,, | Performed by: INTERNAL MEDICINE

## 2021-06-21 PROCEDURE — 3077F SYST BP >= 140 MM HG: CPT | Mod: CPTII,S$GLB,, | Performed by: INTERNAL MEDICINE

## 2021-06-21 PROCEDURE — 3078F PR MOST RECENT DIASTOLIC BLOOD PRESSURE < 80 MM HG: ICD-10-PCS | Mod: CPTII,S$GLB,, | Performed by: FAMILY MEDICINE

## 2021-06-21 PROCEDURE — 1101F PR PT FALLS ASSESS DOC 0-1 FALLS W/OUT INJ PAST YR: ICD-10-PCS | Mod: CPTII,S$GLB,, | Performed by: FAMILY MEDICINE

## 2021-06-21 PROCEDURE — 1159F MED LIST DOCD IN RCRD: CPT | Mod: S$GLB,,, | Performed by: FAMILY MEDICINE

## 2021-06-21 PROCEDURE — 93010 ELECTROCARDIOGRAM REPORT: CPT | Mod: S$GLB,,, | Performed by: INTERNAL MEDICINE

## 2021-06-21 PROCEDURE — 3078F DIAST BP <80 MM HG: CPT | Mod: CPTII,S$GLB,, | Performed by: FAMILY MEDICINE

## 2021-06-21 PROCEDURE — 3077F PR MOST RECENT SYSTOLIC BLOOD PRESSURE >= 140 MM HG: ICD-10-PCS | Mod: CPTII,S$GLB,, | Performed by: FAMILY MEDICINE

## 2021-06-21 RX ORDER — AMLODIPINE BESYLATE 5 MG/1
5 TABLET ORAL DAILY
Qty: 30 TABLET | Refills: 11 | Status: CANCELLED | OUTPATIENT
Start: 2021-06-21 | End: 2022-06-21

## 2021-06-21 RX ORDER — LOSARTAN POTASSIUM 50 MG/1
50 TABLET ORAL DAILY
Qty: 90 TABLET | Refills: 4 | Status: SHIPPED | OUTPATIENT
Start: 2021-06-21 | End: 2021-08-06

## 2021-06-21 RX ORDER — VALSARTAN 80 MG/1
80 TABLET ORAL DAILY
Qty: 90 TABLET | Refills: 3 | Status: CANCELLED | OUTPATIENT
Start: 2021-06-21 | End: 2022-06-21

## 2021-06-29 ENCOUNTER — HOSPITAL ENCOUNTER (OUTPATIENT)
Dept: CARDIOLOGY | Facility: HOSPITAL | Age: 76
Discharge: HOME | End: 2021-06-29
Attending: INTERNAL MEDICINE
Payer: MEDICARE

## 2021-06-29 VITALS — HEIGHT: 71 IN | WEIGHT: 233 LBS | BODY MASS INDEX: 32.62 KG/M2

## 2021-06-29 DIAGNOSIS — I10 ESSENTIAL HYPERTENSION: ICD-10-CM

## 2021-06-29 DIAGNOSIS — R00.1 BRADYCARDIA: ICD-10-CM

## 2021-06-29 LAB
AORTIC ROOT ANNULUS: 3.24 CM
AV INDEX (PROSTH): 0.65
AV MEAN GRADIENT: 6 MMHG
AV PEAK GRADIENT: 10 MMHG
AV VALVE AREA: 3.53 CM2
AV VELOCITY RATIO: 0.71
BSA FOR ECHO PROCEDURE: 2.3 M2
CV ECHO LV RWT: 0.34 CM
DOP CALC AO PEAK VEL: 1.56 M/S
DOP CALC AO VTI: 30.7 CM
DOP CALC LVOT AREA: 5.4 CM2
DOP CALC LVOT DIAMETER: 2.63 CM
DOP CALC LVOT PEAK VEL: 1.11 M/S
DOP CALC LVOT STROKE VOLUME: 108.27 CM3
DOP CALCLVOT PEAK VEL VTI: 19.94 CM
ECHO LV POSTERIOR WALL: 1.14 CM (ref 0.6–1.1)
EJECTION FRACTION: 45 %
FRACTIONAL SHORTENING: 26 % (ref 28–44)
INTERVENTRICULAR SEPTUM: 0.93 CM (ref 0.6–1.1)
IVRT: 92.27 MSEC
LA MAJOR: 6.87 CM
LA MINOR: 6.9 CM
LA WIDTH: 5.42 CM
LEFT ATRIUM VOLUME INDEX MOD: 60.2 ML/M2
LEFT ATRIUM VOLUME MOD: 135.4 CM3
LEFT INTERNAL DIMENSION IN SYSTOLE: 5.01 CM (ref 2.1–4)
LEFT VENTRICLE DIASTOLIC VOLUME INDEX: 105.05 ML/M2
LEFT VENTRICLE DIASTOLIC VOLUME: 236.37 ML
LEFT VENTRICLE MASS INDEX: 141 G/M2
LEFT VENTRICLE SYSTOLIC VOLUME INDEX: 52.9 ML/M2
LEFT VENTRICLE SYSTOLIC VOLUME: 118.97 ML
LEFT VENTRICULAR INTERNAL DIMENSION IN DIASTOLE: 6.76 CM (ref 3.5–6)
LEFT VENTRICULAR MASS: 316.39 G
PISA MRMAX VEL: 0.06 M/S
PULM VEIN S/D RATIO: 0.9
PV PEAK D VEL: 0.31 M/S
PV PEAK S VEL: 0.28 M/S
PV PEAK VELOCITY: 0.93 CM/S
RA MAJOR: 5.25 CM
RA PRESSURE: 3 MMHG
RA WIDTH: 3.4 CM
SINUS: 2.73 CM

## 2021-06-29 PROCEDURE — 93227 HOLTER MONITOR - 48 HOUR (CUPID ONLY): ICD-10-PCS | Mod: ,,, | Performed by: INTERNAL MEDICINE

## 2021-06-29 PROCEDURE — 93227 XTRNL ECG REC<48 HR R&I: CPT | Mod: ,,, | Performed by: INTERNAL MEDICINE

## 2021-06-29 PROCEDURE — 93306 TTE W/DOPPLER COMPLETE: CPT | Mod: PO

## 2021-06-29 PROCEDURE — 93306 TTE W/DOPPLER COMPLETE: CPT | Mod: 26,,, | Performed by: INTERNAL MEDICINE

## 2021-06-29 PROCEDURE — 93306 ECHO (CUPID ONLY): ICD-10-PCS | Mod: 26,,, | Performed by: INTERNAL MEDICINE

## 2021-06-29 PROCEDURE — 93226 XTRNL ECG REC<48 HR SCAN A/R: CPT | Mod: PO

## 2021-07-02 ENCOUNTER — TELEPHONE (OUTPATIENT)
Dept: CARDIOLOGY | Facility: CLINIC | Age: 76
End: 2021-07-02

## 2021-07-14 LAB
OHS CV EVENT MONITOR DAY: 0
OHS CV HOLTER LENGTH DECIMAL HOURS: 47.98
OHS CV HOLTER LENGTH HOURS: 47
OHS CV HOLTER LENGTH MINUTES: 59

## 2021-07-16 ENCOUNTER — OFFICE VISIT (OUTPATIENT)
Dept: CARDIOLOGY | Facility: CLINIC | Age: 76
End: 2021-07-16
Payer: MEDICARE

## 2021-07-16 VITALS
HEIGHT: 71 IN | OXYGEN SATURATION: 96 % | WEIGHT: 233.38 LBS | HEART RATE: 45 BPM | BODY MASS INDEX: 32.67 KG/M2 | SYSTOLIC BLOOD PRESSURE: 158 MMHG | DIASTOLIC BLOOD PRESSURE: 97 MMHG

## 2021-07-16 DIAGNOSIS — I10 ESSENTIAL HYPERTENSION: ICD-10-CM

## 2021-07-16 DIAGNOSIS — I25.110 ATHEROSCLEROSIS OF NATIVE CORONARY ARTERY OF NATIVE HEART WITH UNSTABLE ANGINA PECTORIS: Primary | ICD-10-CM

## 2021-07-16 DIAGNOSIS — Z87.898 HISTORY OF BRADYCARDIA: ICD-10-CM

## 2021-07-16 DIAGNOSIS — I20.9 ANGINA PECTORIS: ICD-10-CM

## 2021-07-16 DIAGNOSIS — E78.2 MIXED HYPERLIPIDEMIA: ICD-10-CM

## 2021-07-16 DIAGNOSIS — I49.8 ATRIAL BIGEMINY: ICD-10-CM

## 2021-07-16 PROCEDURE — 1126F AMNT PAIN NOTED NONE PRSNT: CPT | Mod: S$GLB,,, | Performed by: INTERNAL MEDICINE

## 2021-07-16 PROCEDURE — 1126F PR PAIN SEVERITY QUANTIFIED, NO PAIN PRESENT: ICD-10-PCS | Mod: S$GLB,,, | Performed by: INTERNAL MEDICINE

## 2021-07-16 PROCEDURE — 99214 OFFICE O/P EST MOD 30 MIN: CPT | Mod: S$GLB,,, | Performed by: INTERNAL MEDICINE

## 2021-07-16 PROCEDURE — 3080F PR MOST RECENT DIASTOLIC BLOOD PRESSURE >= 90 MM HG: ICD-10-PCS | Mod: CPTII,S$GLB,, | Performed by: INTERNAL MEDICINE

## 2021-07-16 PROCEDURE — 93000 EKG 12-LEAD: ICD-10-PCS | Mod: S$GLB,,, | Performed by: INTERNAL MEDICINE

## 2021-07-16 PROCEDURE — 93000 ELECTROCARDIOGRAM COMPLETE: CPT | Mod: S$GLB,,, | Performed by: INTERNAL MEDICINE

## 2021-07-16 PROCEDURE — 1159F MED LIST DOCD IN RCRD: CPT | Mod: S$GLB,,, | Performed by: INTERNAL MEDICINE

## 2021-07-16 PROCEDURE — 99999 PR PBB SHADOW E&M-EST. PATIENT-LVL IV: ICD-10-PCS | Mod: PBBFAC,,, | Performed by: INTERNAL MEDICINE

## 2021-07-16 PROCEDURE — 3077F SYST BP >= 140 MM HG: CPT | Mod: CPTII,S$GLB,, | Performed by: INTERNAL MEDICINE

## 2021-07-16 PROCEDURE — 3077F PR MOST RECENT SYSTOLIC BLOOD PRESSURE >= 140 MM HG: ICD-10-PCS | Mod: CPTII,S$GLB,, | Performed by: INTERNAL MEDICINE

## 2021-07-16 PROCEDURE — 99999 PR PBB SHADOW E&M-EST. PATIENT-LVL IV: CPT | Mod: PBBFAC,,, | Performed by: INTERNAL MEDICINE

## 2021-07-16 PROCEDURE — 1159F PR MEDICATION LIST DOCUMENTED IN MEDICAL RECORD: ICD-10-PCS | Mod: S$GLB,,, | Performed by: INTERNAL MEDICINE

## 2021-07-16 PROCEDURE — 3080F DIAST BP >= 90 MM HG: CPT | Mod: CPTII,S$GLB,, | Performed by: INTERNAL MEDICINE

## 2021-07-16 PROCEDURE — 99214 PR OFFICE/OUTPT VISIT, EST, LEVL IV, 30-39 MIN: ICD-10-PCS | Mod: S$GLB,,, | Performed by: INTERNAL MEDICINE

## 2021-07-16 RX ORDER — NITROGLYCERIN 0.4 MG/1
0.4 TABLET SUBLINGUAL EVERY 5 MIN PRN
Qty: 20 TABLET | Refills: 2 | Status: SHIPPED | OUTPATIENT
Start: 2021-07-16 | End: 2021-09-23

## 2021-07-16 RX ORDER — ASPIRIN 81 MG/1
81 TABLET ORAL DAILY
Qty: 81 TABLET | Status: ON HOLD
Start: 2021-07-16 | End: 2021-09-07 | Stop reason: HOSPADM

## 2021-07-20 ENCOUNTER — LAB VISIT (OUTPATIENT)
Dept: FAMILY MEDICINE | Facility: CLINIC | Age: 76
End: 2021-07-20
Payer: MEDICARE

## 2021-07-20 DIAGNOSIS — Z01.810 PRE-OPERATIVE CARDIOVASCULAR EXAMINATION: ICD-10-CM

## 2021-07-20 PROCEDURE — U0005 INFEC AGEN DETEC AMPLI PROBE: HCPCS | Performed by: INTERNAL MEDICINE

## 2021-07-20 PROCEDURE — U0003 INFECTIOUS AGENT DETECTION BY NUCLEIC ACID (DNA OR RNA); SEVERE ACUTE RESPIRATORY SYNDROME CORONAVIRUS 2 (SARS-COV-2) (CORONAVIRUS DISEASE [COVID-19]), AMPLIFIED PROBE TECHNIQUE, MAKING USE OF HIGH THROUGHPUT TECHNOLOGIES AS DESCRIBED BY CMS-2020-01-R: HCPCS | Performed by: INTERNAL MEDICINE

## 2021-07-21 ENCOUNTER — LAB VISIT (OUTPATIENT)
Dept: LAB | Facility: HOSPITAL | Age: 76
End: 2021-07-21
Attending: INTERNAL MEDICINE
Payer: MEDICARE

## 2021-07-21 DIAGNOSIS — Z01.810 PRE-OPERATIVE CARDIOVASCULAR EXAMINATION: ICD-10-CM

## 2021-07-21 DIAGNOSIS — I10 ESSENTIAL HYPERTENSION: Primary | ICD-10-CM

## 2021-07-21 LAB
ANION GAP SERPL CALC-SCNC: 7 MMOL/L (ref 8–16)
BASOPHILS # BLD AUTO: 0.02 K/UL (ref 0–0.2)
BASOPHILS NFR BLD: 0.4 % (ref 0–1.9)
CALCIUM SERPL-MCNC: 9.8 MG/DL (ref 8.7–10.5)
CHLORIDE SERPL-SCNC: 96 MMOL/L (ref 95–110)
CO2 SERPL-SCNC: 35 MMOL/L (ref 23–29)
CREAT SERPL-MCNC: 1.16 MG/DL (ref 0.5–1.4)
DIFFERENTIAL METHOD: ABNORMAL
EOSINOPHIL # BLD AUTO: 0.1 K/UL (ref 0–0.5)
EOSINOPHIL NFR BLD: 1.3 % (ref 0–8)
ERYTHROCYTE [DISTWIDTH] IN BLOOD BY AUTOMATED COUNT: 13.4 % (ref 11.5–14.5)
EST. GFR  (AFRICAN AMERICAN): >60 ML/MIN/1.73 M^2
EST. GFR  (NON AFRICAN AMERICAN): >60 ML/MIN/1.73 M^2
GLUCOSE SERPL-MCNC: 139 MG/DL (ref 70–110)
HCT VFR BLD AUTO: 46.5 % (ref 40–54)
HGB BLD-MCNC: 15.7 G/DL (ref 14–18)
IMM GRANULOCYTES # BLD AUTO: 0.02 K/UL (ref 0–0.04)
IMM GRANULOCYTES NFR BLD AUTO: 0.4 % (ref 0–0.5)
LYMPHOCYTES # BLD AUTO: 1.3 K/UL (ref 1–4.8)
LYMPHOCYTES NFR BLD: 25.3 % (ref 18–48)
MCH RBC QN AUTO: 32 PG (ref 27–31)
MCHC RBC AUTO-ENTMCNC: 33.8 G/DL (ref 32–36)
MCV RBC AUTO: 95 FL (ref 82–98)
MONOCYTES # BLD AUTO: 0.5 K/UL (ref 0.3–1)
MONOCYTES NFR BLD: 8.8 % (ref 4–15)
NEUTROPHILS # BLD AUTO: 3.3 K/UL (ref 1.8–7.7)
NEUTROPHILS NFR BLD: 63.8 % (ref 38–73)
NRBC BLD-RTO: 0 /100 WBC
PLATELET # BLD AUTO: 189 K/UL (ref 150–450)
PMV BLD AUTO: 11.3 FL (ref 9.2–12.9)
POTASSIUM SERPL-SCNC: 4 MMOL/L (ref 3.5–5.1)
RBC # BLD AUTO: 4.91 M/UL (ref 4.6–6.2)
SARS-COV-2 RNA RESP QL NAA+PROBE: NOT DETECTED
SARS-COV-2- CYCLE NUMBER: -1
SODIUM SERPL-SCNC: 138 MMOL/L (ref 136–145)
UUN UR-MCNC: 17 MG/DL (ref 2–20)
WBC # BLD AUTO: 5.21 K/UL (ref 3.9–12.7)

## 2021-07-21 PROCEDURE — 85025 COMPLETE CBC W/AUTO DIFF WBC: CPT | Mod: PO | Performed by: INTERNAL MEDICINE

## 2021-07-21 PROCEDURE — 36415 COLL VENOUS BLD VENIPUNCTURE: CPT | Mod: PO | Performed by: INTERNAL MEDICINE

## 2021-07-21 PROCEDURE — 80048 BASIC METABOLIC PNL TOTAL CA: CPT | Mod: PO | Performed by: INTERNAL MEDICINE

## 2021-07-22 ENCOUNTER — HOSPITAL ENCOUNTER (OUTPATIENT)
Facility: HOSPITAL | Age: 76
Discharge: HOME | End: 2021-07-22
Attending: INTERNAL MEDICINE | Admitting: INTERNAL MEDICINE
Payer: MEDICARE

## 2021-07-22 VITALS
DIASTOLIC BLOOD PRESSURE: 83 MMHG | OXYGEN SATURATION: 95 % | BODY MASS INDEX: 32.48 KG/M2 | SYSTOLIC BLOOD PRESSURE: 173 MMHG | WEIGHT: 232 LBS | TEMPERATURE: 98 F | RESPIRATION RATE: 20 BRPM | HEART RATE: 73 BPM | HEIGHT: 71 IN

## 2021-07-22 DIAGNOSIS — I25.110 ATHEROSCLEROSIS OF NATIVE CORONARY ARTERY OF NATIVE HEART WITH UNSTABLE ANGINA PECTORIS: ICD-10-CM

## 2021-07-22 DIAGNOSIS — I10 ESSENTIAL HYPERTENSION: ICD-10-CM

## 2021-07-22 DIAGNOSIS — E03.4 HYPOTHYROIDISM DUE TO ACQUIRED ATROPHY OF THYROID: ICD-10-CM

## 2021-07-22 DIAGNOSIS — Z01.810 PRE-OPERATIVE CARDIOVASCULAR EXAMINATION: Primary | ICD-10-CM

## 2021-07-22 DIAGNOSIS — E78.5 HYPERLIPIDEMIA, UNSPECIFIED HYPERLIPIDEMIA TYPE: ICD-10-CM

## 2021-07-22 DIAGNOSIS — Z00.00 ROUTINE GENERAL MEDICAL EXAMINATION AT A HEALTH CARE FACILITY: ICD-10-CM

## 2021-07-22 LAB
ANION GAP SERPL CALC-SCNC: 9 MMOL/L (ref 8–16)
BUN SERPL-MCNC: 13 MG/DL (ref 8–23)
CALCIUM SERPL-MCNC: 9.9 MG/DL (ref 8.7–10.5)
CATH EF ESTIMATED: 53 %
CHLORIDE SERPL-SCNC: 104 MMOL/L (ref 95–110)
CO2 SERPL-SCNC: 26 MMOL/L (ref 23–29)
CREAT SERPL-MCNC: 0.9 MG/DL (ref 0.5–1.4)
EST. GFR  (AFRICAN AMERICAN): >60 ML/MIN/1.73 M^2
EST. GFR  (NON AFRICAN AMERICAN): >60 ML/MIN/1.73 M^2
GLUCOSE SERPL-MCNC: 126 MG/DL (ref 70–110)
POTASSIUM SERPL-SCNC: 4.2 MMOL/L (ref 3.5–5.1)
SODIUM SERPL-SCNC: 139 MMOL/L (ref 136–145)

## 2021-07-22 PROCEDURE — 80048 BASIC METABOLIC PNL TOTAL CA: CPT | Performed by: INTERNAL MEDICINE

## 2021-07-22 PROCEDURE — C1769 GUIDE WIRE: HCPCS | Performed by: INTERNAL MEDICINE

## 2021-07-22 PROCEDURE — 93458 L HRT ARTERY/VENTRICLE ANGIO: CPT | Performed by: INTERNAL MEDICINE

## 2021-07-22 PROCEDURE — 99152 PR MOD CONSCIOUS SEDATION, SAME PHYS, 5+ YRS, FIRST 15 MIN: ICD-10-PCS | Mod: ,,, | Performed by: INTERNAL MEDICINE

## 2021-07-22 PROCEDURE — 99153 MOD SED SAME PHYS/QHP EA: CPT | Performed by: INTERNAL MEDICINE

## 2021-07-22 PROCEDURE — 63600175 PHARM REV CODE 636 W HCPCS: Performed by: INTERNAL MEDICINE

## 2021-07-22 PROCEDURE — 93010 ELECTROCARDIOGRAM REPORT: CPT | Mod: ,,, | Performed by: INTERNAL MEDICINE

## 2021-07-22 PROCEDURE — 25500020 PHARM REV CODE 255: Performed by: INTERNAL MEDICINE

## 2021-07-22 PROCEDURE — 93010 EKG 12-LEAD: ICD-10-PCS | Mod: ,,, | Performed by: INTERNAL MEDICINE

## 2021-07-22 PROCEDURE — C1887 CATHETER, GUIDING: HCPCS | Performed by: INTERNAL MEDICINE

## 2021-07-22 PROCEDURE — 99152 MOD SED SAME PHYS/QHP 5/>YRS: CPT | Performed by: INTERNAL MEDICINE

## 2021-07-22 PROCEDURE — C1894 INTRO/SHEATH, NON-LASER: HCPCS | Performed by: INTERNAL MEDICINE

## 2021-07-22 PROCEDURE — 99152 MOD SED SAME PHYS/QHP 5/>YRS: CPT | Mod: ,,, | Performed by: INTERNAL MEDICINE

## 2021-07-22 PROCEDURE — 93458 PR CATH PLACE/CORON ANGIO, IMG SUPER/INTERP,W LEFT HEART VENTRICULOGRAPHY: ICD-10-PCS | Mod: 26,,, | Performed by: INTERNAL MEDICINE

## 2021-07-22 PROCEDURE — 36415 COLL VENOUS BLD VENIPUNCTURE: CPT | Performed by: INTERNAL MEDICINE

## 2021-07-22 PROCEDURE — 93005 ELECTROCARDIOGRAM TRACING: CPT

## 2021-07-22 PROCEDURE — 25000003 PHARM REV CODE 250: Performed by: INTERNAL MEDICINE

## 2021-07-22 PROCEDURE — 93458 L HRT ARTERY/VENTRICLE ANGIO: CPT | Mod: 26,,, | Performed by: INTERNAL MEDICINE

## 2021-07-22 RX ORDER — VERAPAMIL HYDROCHLORIDE 2.5 MG/ML
INJECTION, SOLUTION INTRAVENOUS
Status: DISCONTINUED | OUTPATIENT
Start: 2021-07-22 | End: 2021-07-22 | Stop reason: HOSPADM

## 2021-07-22 RX ORDER — MIDAZOLAM HYDROCHLORIDE 1 MG/ML
INJECTION, SOLUTION INTRAMUSCULAR; INTRAVENOUS
Status: DISCONTINUED | OUTPATIENT
Start: 2021-07-22 | End: 2021-07-22 | Stop reason: HOSPADM

## 2021-07-22 RX ORDER — FENTANYL CITRATE 50 UG/ML
INJECTION, SOLUTION INTRAMUSCULAR; INTRAVENOUS
Status: DISCONTINUED | OUTPATIENT
Start: 2021-07-22 | End: 2021-07-22 | Stop reason: HOSPADM

## 2021-07-22 RX ORDER — HEPARIN SODIUM 200 [USP'U]/100ML
INJECTION, SOLUTION INTRAVENOUS
Status: DISCONTINUED | OUTPATIENT
Start: 2021-07-22 | End: 2021-07-22 | Stop reason: HOSPADM

## 2021-07-22 RX ORDER — ACETAMINOPHEN 325 MG/1
650 TABLET ORAL EVERY 4 HOURS PRN
Status: CANCELLED | OUTPATIENT
Start: 2021-07-22

## 2021-07-22 RX ORDER — ONDANSETRON 4 MG/1
8 TABLET, ORALLY DISINTEGRATING ORAL EVERY 8 HOURS PRN
Status: CANCELLED | OUTPATIENT
Start: 2021-07-22

## 2021-07-22 RX ORDER — SODIUM CHLORIDE 9 MG/ML
INJECTION, SOLUTION INTRAVENOUS
Status: DISCONTINUED | OUTPATIENT
Start: 2021-07-22 | End: 2021-07-22 | Stop reason: HOSPADM

## 2021-07-22 RX ORDER — LIDOCAINE HYDROCHLORIDE 10 MG/ML
INJECTION INFILTRATION; PERINEURAL
Status: DISCONTINUED | OUTPATIENT
Start: 2021-07-22 | End: 2021-07-22 | Stop reason: HOSPADM

## 2021-07-22 RX ORDER — HEPARIN SODIUM 1000 [USP'U]/ML
INJECTION, SOLUTION INTRAVENOUS; SUBCUTANEOUS
Status: DISCONTINUED | OUTPATIENT
Start: 2021-07-22 | End: 2021-07-22 | Stop reason: HOSPADM

## 2021-07-23 ENCOUNTER — TELEPHONE (OUTPATIENT)
Dept: CARDIOLOGY | Facility: CLINIC | Age: 76
End: 2021-07-23

## 2021-08-06 ENCOUNTER — OFFICE VISIT (OUTPATIENT)
Dept: CARDIOLOGY | Facility: CLINIC | Age: 76
End: 2021-08-06
Payer: MEDICARE

## 2021-08-06 VITALS
HEIGHT: 71 IN | DIASTOLIC BLOOD PRESSURE: 74 MMHG | SYSTOLIC BLOOD PRESSURE: 140 MMHG | BODY MASS INDEX: 32.51 KG/M2 | WEIGHT: 232.19 LBS | OXYGEN SATURATION: 95 % | HEART RATE: 60 BPM

## 2021-08-06 DIAGNOSIS — R00.1 BRADYCARDIA: ICD-10-CM

## 2021-08-06 DIAGNOSIS — R06.02 SOB (SHORTNESS OF BREATH): Primary | ICD-10-CM

## 2021-08-06 DIAGNOSIS — I25.118 CORONARY ARTERY DISEASE OF NATIVE ARTERY OF NATIVE HEART WITH STABLE ANGINA PECTORIS: ICD-10-CM

## 2021-08-06 DIAGNOSIS — I49.8 ATRIAL BIGEMINY: ICD-10-CM

## 2021-08-06 DIAGNOSIS — E78.2 MIXED HYPERLIPIDEMIA: ICD-10-CM

## 2021-08-06 DIAGNOSIS — I10 ESSENTIAL HYPERTENSION: ICD-10-CM

## 2021-08-06 PROBLEM — Z87.898 HISTORY OF BRADYCARDIA: Status: RESOLVED | Noted: 2021-06-21 | Resolved: 2021-08-06

## 2021-08-06 PROCEDURE — 1159F MED LIST DOCD IN RCRD: CPT | Mod: CPTII,S$GLB,, | Performed by: INTERNAL MEDICINE

## 2021-08-06 PROCEDURE — 3078F DIAST BP <80 MM HG: CPT | Mod: CPTII,S$GLB,, | Performed by: INTERNAL MEDICINE

## 2021-08-06 PROCEDURE — 1160F RVW MEDS BY RX/DR IN RCRD: CPT | Mod: CPTII,S$GLB,, | Performed by: INTERNAL MEDICINE

## 2021-08-06 PROCEDURE — 1101F PT FALLS ASSESS-DOCD LE1/YR: CPT | Mod: CPTII,S$GLB,, | Performed by: INTERNAL MEDICINE

## 2021-08-06 PROCEDURE — 1126F AMNT PAIN NOTED NONE PRSNT: CPT | Mod: CPTII,S$GLB,, | Performed by: INTERNAL MEDICINE

## 2021-08-06 PROCEDURE — 99999 PR PBB SHADOW E&M-EST. PATIENT-LVL IV: CPT | Mod: PBBFAC,,, | Performed by: INTERNAL MEDICINE

## 2021-08-06 PROCEDURE — 3288F PR FALLS RISK ASSESSMENT DOCUMENTED: ICD-10-PCS | Mod: CPTII,S$GLB,, | Performed by: INTERNAL MEDICINE

## 2021-08-06 PROCEDURE — 1126F PR PAIN SEVERITY QUANTIFIED, NO PAIN PRESENT: ICD-10-PCS | Mod: CPTII,S$GLB,, | Performed by: INTERNAL MEDICINE

## 2021-08-06 PROCEDURE — 3078F PR MOST RECENT DIASTOLIC BLOOD PRESSURE < 80 MM HG: ICD-10-PCS | Mod: CPTII,S$GLB,, | Performed by: INTERNAL MEDICINE

## 2021-08-06 PROCEDURE — 3077F PR MOST RECENT SYSTOLIC BLOOD PRESSURE >= 140 MM HG: ICD-10-PCS | Mod: CPTII,S$GLB,, | Performed by: INTERNAL MEDICINE

## 2021-08-06 PROCEDURE — 1160F PR REVIEW ALL MEDS BY PRESCRIBER/CLIN PHARMACIST DOCUMENTED: ICD-10-PCS | Mod: CPTII,S$GLB,, | Performed by: INTERNAL MEDICINE

## 2021-08-06 PROCEDURE — 99214 PR OFFICE/OUTPT VISIT, EST, LEVL IV, 30-39 MIN: ICD-10-PCS | Mod: S$GLB,,, | Performed by: INTERNAL MEDICINE

## 2021-08-06 PROCEDURE — 99499 RISK ADDL DX/OHS AUDIT: ICD-10-PCS | Mod: HCNC,S$GLB,, | Performed by: INTERNAL MEDICINE

## 2021-08-06 PROCEDURE — 3077F SYST BP >= 140 MM HG: CPT | Mod: CPTII,S$GLB,, | Performed by: INTERNAL MEDICINE

## 2021-08-06 PROCEDURE — 3288F FALL RISK ASSESSMENT DOCD: CPT | Mod: CPTII,S$GLB,, | Performed by: INTERNAL MEDICINE

## 2021-08-06 PROCEDURE — 99499 UNLISTED E&M SERVICE: CPT | Mod: HCNC,S$GLB,, | Performed by: INTERNAL MEDICINE

## 2021-08-06 PROCEDURE — 99214 OFFICE O/P EST MOD 30 MIN: CPT | Mod: S$GLB,,, | Performed by: INTERNAL MEDICINE

## 2021-08-06 PROCEDURE — 99999 PR PBB SHADOW E&M-EST. PATIENT-LVL IV: ICD-10-PCS | Mod: PBBFAC,,, | Performed by: INTERNAL MEDICINE

## 2021-08-06 PROCEDURE — 1101F PR PT FALLS ASSESS DOC 0-1 FALLS W/OUT INJ PAST YR: ICD-10-PCS | Mod: CPTII,S$GLB,, | Performed by: INTERNAL MEDICINE

## 2021-08-06 PROCEDURE — 1159F PR MEDICATION LIST DOCUMENTED IN MEDICAL RECORD: ICD-10-PCS | Mod: CPTII,S$GLB,, | Performed by: INTERNAL MEDICINE

## 2021-08-06 RX ORDER — LOSARTAN POTASSIUM 100 MG/1
100 TABLET ORAL DAILY
Qty: 90 TABLET | Refills: 4 | Status: SHIPPED | OUTPATIENT
Start: 2021-08-06 | End: 2021-12-03 | Stop reason: SDUPTHER

## 2021-08-06 RX ORDER — AMLODIPINE BESYLATE 2.5 MG/1
2.5 TABLET ORAL DAILY
Qty: 90 TABLET | Refills: 4 | Status: SHIPPED | OUTPATIENT
Start: 2021-08-06 | End: 2021-12-03

## 2021-08-06 RX ORDER — SPIRONOLACTONE 50 MG/1
50 TABLET, FILM COATED ORAL DAILY
Qty: 90 TABLET | Refills: 4 | Status: SHIPPED | OUTPATIENT
Start: 2021-08-06 | End: 2021-09-23

## 2021-08-09 ENCOUNTER — HOSPITAL ENCOUNTER (OUTPATIENT)
Dept: RADIOLOGY | Facility: HOSPITAL | Age: 76
Discharge: HOME | End: 2021-08-09
Attending: INTERNAL MEDICINE
Payer: MEDICARE

## 2021-08-09 ENCOUNTER — TELEPHONE (OUTPATIENT)
Dept: CARDIOLOGY | Facility: CLINIC | Age: 76
End: 2021-08-09

## 2021-08-09 ENCOUNTER — TELEPHONE (OUTPATIENT)
Dept: FAMILY MEDICINE | Facility: CLINIC | Age: 76
End: 2021-08-09

## 2021-08-09 DIAGNOSIS — R93.89 ABNORMAL X-RAY: ICD-10-CM

## 2021-08-09 DIAGNOSIS — Z23 NEED FOR PROPHYLACTIC VACCINATION AGAINST STREPTOCOCCUS PNEUMONIAE (PNEUMOCOCCUS): ICD-10-CM

## 2021-08-09 DIAGNOSIS — I10 ESSENTIAL HYPERTENSION: Primary | ICD-10-CM

## 2021-08-09 DIAGNOSIS — R06.02 SOB (SHORTNESS OF BREATH): ICD-10-CM

## 2021-08-09 DIAGNOSIS — J18.9 COMMUNITY ACQUIRED PNEUMONIA, UNSPECIFIED LATERALITY: ICD-10-CM

## 2021-08-09 DIAGNOSIS — R06.02 SHORTNESS OF BREATH: ICD-10-CM

## 2021-08-09 PROCEDURE — 71046 X-RAY EXAM CHEST 2 VIEWS: CPT | Mod: TC,FY,PO

## 2021-08-09 RX ORDER — AMOXICILLIN AND CLAVULANATE POTASSIUM 875; 125 MG/1; MG/1
1 TABLET, FILM COATED ORAL 2 TIMES DAILY
Qty: 14 TABLET | Refills: 0 | Status: SHIPPED | OUTPATIENT
Start: 2021-08-09 | End: 2021-09-23 | Stop reason: ALTCHOICE

## 2021-08-09 RX ORDER — AZITHROMYCIN 250 MG/1
TABLET, FILM COATED ORAL
Qty: 6 TABLET | Refills: 0 | Status: SHIPPED | OUTPATIENT
Start: 2021-08-09 | End: 2021-08-14

## 2021-08-23 ENCOUNTER — HOSPITAL ENCOUNTER (OUTPATIENT)
Dept: RADIOLOGY | Facility: HOSPITAL | Age: 76
Discharge: HOME | End: 2021-08-23
Attending: FAMILY MEDICINE
Payer: MEDICARE

## 2021-08-23 ENCOUNTER — TELEPHONE (OUTPATIENT)
Dept: FAMILY MEDICINE | Facility: CLINIC | Age: 76
End: 2021-08-23

## 2021-08-23 DIAGNOSIS — R93.89 ABNORMAL X-RAY: ICD-10-CM

## 2021-08-23 DIAGNOSIS — J18.9 PERSISTENT PNEUMONIA: Primary | ICD-10-CM

## 2021-08-23 DIAGNOSIS — J18.9 COMMUNITY ACQUIRED PNEUMONIA, UNSPECIFIED LATERALITY: ICD-10-CM

## 2021-08-23 DIAGNOSIS — R05.9 COUGH: ICD-10-CM

## 2021-08-23 DIAGNOSIS — J18.9 PNEUMONIA, UNSPECIFIED ORGANISM: ICD-10-CM

## 2021-08-23 DIAGNOSIS — R06.02 SHORTNESS OF BREATH: ICD-10-CM

## 2021-08-23 DIAGNOSIS — R06.00 DYSPNEA, UNSPECIFIED TYPE: ICD-10-CM

## 2021-08-23 PROCEDURE — 71046 X-RAY EXAM CHEST 2 VIEWS: CPT | Mod: TC,FY,PO

## 2021-08-27 ENCOUNTER — HOSPITAL ENCOUNTER (OUTPATIENT)
Dept: RADIOLOGY | Facility: HOSPITAL | Age: 76
Discharge: HOME | End: 2021-08-27
Attending: FAMILY MEDICINE
Payer: MEDICARE

## 2021-08-27 DIAGNOSIS — R05.9 COUGH: ICD-10-CM

## 2021-08-27 DIAGNOSIS — J18.9 PNEUMONIA, UNSPECIFIED ORGANISM: ICD-10-CM

## 2021-08-27 DIAGNOSIS — R06.02 SHORTNESS OF BREATH: ICD-10-CM

## 2021-08-27 DIAGNOSIS — R06.00 DYSPNEA, UNSPECIFIED TYPE: ICD-10-CM

## 2021-08-27 DIAGNOSIS — J18.9 PERSISTENT PNEUMONIA: ICD-10-CM

## 2021-08-27 DIAGNOSIS — J18.9 COMMUNITY ACQUIRED PNEUMONIA, UNSPECIFIED LATERALITY: ICD-10-CM

## 2021-08-27 PROCEDURE — 71250 CT THORAX DX C-: CPT | Mod: TC,PO
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
## 2021-09-03 PROBLEM — S72.141A DISPLACED INTERTROCHANTERIC FRACTURE OF RIGHT FEMUR, INITIAL ENCOUNTER FOR CLOSED FRACTURE: Status: ACTIVE | Noted: 2021-09-03

## 2021-09-03 PROBLEM — S72.001A CLOSED FRACTURE OF RIGHT HIP: Status: ACTIVE | Noted: 2021-09-03

## 2021-09-08 ENCOUNTER — TELEPHONE (OUTPATIENT)
Dept: CARDIOLOGY | Facility: CLINIC | Age: 76
End: 2021-09-08

## 2021-09-09 ENCOUNTER — TELEPHONE (OUTPATIENT)
Dept: ORTHOPEDICS | Facility: CLINIC | Age: 76
End: 2021-09-09

## 2021-09-13 DIAGNOSIS — S72.141A DISPLACED INTERTROCHANTERIC FRACTURE OF RIGHT FEMUR, INITIAL ENCOUNTER FOR CLOSED FRACTURE: Primary | ICD-10-CM

## 2021-09-16 ENCOUNTER — TELEPHONE (OUTPATIENT)
Dept: FAMILY MEDICINE | Facility: CLINIC | Age: 76
End: 2021-09-16

## 2021-09-16 DIAGNOSIS — R00.1 BRADYCARDIA: Primary | ICD-10-CM

## 2021-09-17 ENCOUNTER — TELEPHONE (OUTPATIENT)
Dept: FAMILY MEDICINE | Facility: CLINIC | Age: 76
End: 2021-09-17

## 2021-09-20 ENCOUNTER — TELEPHONE (OUTPATIENT)
Dept: ORTHOPEDICS | Facility: CLINIC | Age: 76
End: 2021-09-20

## 2021-09-20 ENCOUNTER — TELEPHONE (OUTPATIENT)
Dept: FAMILY MEDICINE | Facility: CLINIC | Age: 76
End: 2021-09-20

## 2021-09-20 DIAGNOSIS — R60.0 LEG EDEMA, RIGHT: Primary | ICD-10-CM

## 2021-09-20 DIAGNOSIS — J18.9 PNEUMONIA DUE TO INFECTIOUS ORGANISM, UNSPECIFIED LATERALITY, UNSPECIFIED PART OF LUNG: Primary | ICD-10-CM

## 2021-09-21 ENCOUNTER — TELEPHONE (OUTPATIENT)
Dept: ELECTROPHYSIOLOGY | Facility: CLINIC | Age: 76
End: 2021-09-21

## 2021-09-21 ENCOUNTER — HOSPITAL ENCOUNTER (OUTPATIENT)
Dept: RADIOLOGY | Facility: HOSPITAL | Age: 76
Discharge: HOME | End: 2021-09-21
Attending: ORTHOPAEDIC SURGERY
Payer: MEDICARE

## 2021-09-21 ENCOUNTER — TELEPHONE (OUTPATIENT)
Dept: FAMILY MEDICINE | Facility: CLINIC | Age: 76
End: 2021-09-21

## 2021-09-21 ENCOUNTER — OFFICE VISIT (OUTPATIENT)
Dept: ORTHOPEDICS | Facility: CLINIC | Age: 76
End: 2021-09-21
Payer: MEDICARE

## 2021-09-21 VITALS — HEIGHT: 71 IN | WEIGHT: 227 LBS | BODY MASS INDEX: 31.78 KG/M2

## 2021-09-21 DIAGNOSIS — S72.141A DISPLACED INTERTROCHANTERIC FRACTURE OF RIGHT FEMUR, INITIAL ENCOUNTER FOR CLOSED FRACTURE: Primary | ICD-10-CM

## 2021-09-21 DIAGNOSIS — S72.141A DISPLACED INTERTROCHANTERIC FRACTURE OF RIGHT FEMUR, INITIAL ENCOUNTER FOR CLOSED FRACTURE: ICD-10-CM

## 2021-09-21 PROCEDURE — 1101F PT FALLS ASSESS-DOCD LE1/YR: CPT | Mod: HCNC,CPTII,S$GLB, | Performed by: ORTHOPAEDIC SURGERY

## 2021-09-21 PROCEDURE — 99024 PR POST-OP FOLLOW-UP VISIT: ICD-10-PCS | Mod: HCNC,S$GLB,, | Performed by: ORTHOPAEDIC SURGERY

## 2021-09-21 PROCEDURE — 1125F AMNT PAIN NOTED PAIN PRSNT: CPT | Mod: HCNC,CPTII,S$GLB, | Performed by: ORTHOPAEDIC SURGERY

## 2021-09-21 PROCEDURE — 1157F PR ADVANCE CARE PLAN OR EQUIV PRESENT IN MEDICAL RECORD: ICD-10-PCS | Mod: HCNC,CPTII,S$GLB, | Performed by: ORTHOPAEDIC SURGERY

## 2021-09-21 PROCEDURE — 99024 POSTOP FOLLOW-UP VISIT: CPT | Mod: HCNC,S$GLB,, | Performed by: ORTHOPAEDIC SURGERY

## 2021-09-21 PROCEDURE — 1101F PR PT FALLS ASSESS DOC 0-1 FALLS W/OUT INJ PAST YR: ICD-10-PCS | Mod: HCNC,CPTII,S$GLB, | Performed by: ORTHOPAEDIC SURGERY

## 2021-09-21 PROCEDURE — 1160F PR REVIEW ALL MEDS BY PRESCRIBER/CLIN PHARMACIST DOCUMENTED: ICD-10-PCS | Mod: HCNC,CPTII,S$GLB, | Performed by: ORTHOPAEDIC SURGERY

## 2021-09-21 PROCEDURE — 1125F PR PAIN SEVERITY QUANTIFIED, PAIN PRESENT: ICD-10-PCS | Mod: HCNC,CPTII,S$GLB, | Performed by: ORTHOPAEDIC SURGERY

## 2021-09-21 PROCEDURE — 3288F FALL RISK ASSESSMENT DOCD: CPT | Mod: HCNC,CPTII,S$GLB, | Performed by: ORTHOPAEDIC SURGERY

## 2021-09-21 PROCEDURE — 1157F ADVNC CARE PLAN IN RCRD: CPT | Mod: HCNC,CPTII,S$GLB, | Performed by: ORTHOPAEDIC SURGERY

## 2021-09-21 PROCEDURE — 99999 PR PBB SHADOW E&M-EST. PATIENT-LVL III: CPT | Mod: PBBFAC,HCNC,, | Performed by: ORTHOPAEDIC SURGERY

## 2021-09-21 PROCEDURE — 99999 PR PBB SHADOW E&M-EST. PATIENT-LVL III: ICD-10-PCS | Mod: PBBFAC,HCNC,, | Performed by: ORTHOPAEDIC SURGERY

## 2021-09-21 PROCEDURE — 73552 XR FEMUR 2 VIEW RIGHT: ICD-10-PCS | Mod: 26,HCNC,RT, | Performed by: RADIOLOGY

## 2021-09-21 PROCEDURE — 1160F RVW MEDS BY RX/DR IN RCRD: CPT | Mod: HCNC,CPTII,S$GLB, | Performed by: ORTHOPAEDIC SURGERY

## 2021-09-21 PROCEDURE — 3288F PR FALLS RISK ASSESSMENT DOCUMENTED: ICD-10-PCS | Mod: HCNC,CPTII,S$GLB, | Performed by: ORTHOPAEDIC SURGERY

## 2021-09-21 PROCEDURE — 1159F PR MEDICATION LIST DOCUMENTED IN MEDICAL RECORD: ICD-10-PCS | Mod: HCNC,CPTII,S$GLB, | Performed by: ORTHOPAEDIC SURGERY

## 2021-09-21 PROCEDURE — 1159F MED LIST DOCD IN RCRD: CPT | Mod: HCNC,CPTII,S$GLB, | Performed by: ORTHOPAEDIC SURGERY

## 2021-09-21 PROCEDURE — 73552 X-RAY EXAM OF FEMUR 2/>: CPT | Mod: TC,HCNC,PO,RT

## 2021-09-21 PROCEDURE — 73552 X-RAY EXAM OF FEMUR 2/>: CPT | Mod: 26,HCNC,RT, | Performed by: RADIOLOGY

## 2021-09-21 RX ORDER — METHOCARBAMOL 750 MG/1
750 TABLET, FILM COATED ORAL 4 TIMES DAILY PRN
Qty: 44 TABLET | Refills: 0 | Status: SHIPPED | OUTPATIENT
Start: 2021-09-21 | End: 2021-09-23

## 2021-09-21 RX ORDER — OXYCODONE HYDROCHLORIDE 5 MG/1
5 TABLET ORAL EVERY 4 HOURS PRN
Qty: 33 TABLET | Refills: 0 | Status: SHIPPED | OUTPATIENT
Start: 2021-09-21 | End: 2021-09-25

## 2021-09-23 ENCOUNTER — OFFICE VISIT (OUTPATIENT)
Dept: PULMONOLOGY | Facility: CLINIC | Age: 76
End: 2021-09-23
Payer: MEDICARE

## 2021-09-23 ENCOUNTER — OFFICE VISIT (OUTPATIENT)
Dept: FAMILY MEDICINE | Facility: CLINIC | Age: 76
End: 2021-09-23
Payer: MEDICARE

## 2021-09-23 ENCOUNTER — OFFICE VISIT (OUTPATIENT)
Dept: CARDIOLOGY | Facility: CLINIC | Age: 76
End: 2021-09-23
Payer: MEDICARE

## 2021-09-23 VITALS
SYSTOLIC BLOOD PRESSURE: 136 MMHG | DIASTOLIC BLOOD PRESSURE: 78 MMHG | BODY MASS INDEX: 31.5 KG/M2 | HEART RATE: 49 BPM | WEIGHT: 225 LBS | OXYGEN SATURATION: 98 % | HEIGHT: 71 IN

## 2021-09-23 VITALS
WEIGHT: 220 LBS | HEART RATE: 69 BPM | DIASTOLIC BLOOD PRESSURE: 80 MMHG | TEMPERATURE: 99 F | SYSTOLIC BLOOD PRESSURE: 160 MMHG | BODY MASS INDEX: 30.8 KG/M2 | HEIGHT: 71 IN | OXYGEN SATURATION: 95 %

## 2021-09-23 VITALS
HEART RATE: 90 BPM | BODY MASS INDEX: 30.86 KG/M2 | HEIGHT: 71 IN | SYSTOLIC BLOOD PRESSURE: 181 MMHG | DIASTOLIC BLOOD PRESSURE: 67 MMHG | WEIGHT: 220.44 LBS

## 2021-09-23 DIAGNOSIS — I49.3 PVC (PREMATURE VENTRICULAR CONTRACTION): ICD-10-CM

## 2021-09-23 DIAGNOSIS — G47.8 OTHER SLEEP DISORDERS: ICD-10-CM

## 2021-09-23 DIAGNOSIS — J18.9 COMMUNITY ACQUIRED PNEUMONIA, UNSPECIFIED LATERALITY: ICD-10-CM

## 2021-09-23 DIAGNOSIS — R06.02 SHORTNESS OF BREATH: ICD-10-CM

## 2021-09-23 DIAGNOSIS — R06.83 SNORING: ICD-10-CM

## 2021-09-23 DIAGNOSIS — R53.83 FATIGUE, UNSPECIFIED TYPE: Primary | ICD-10-CM

## 2021-09-23 DIAGNOSIS — R93.89 ABNORMAL X-RAY: ICD-10-CM

## 2021-09-23 DIAGNOSIS — I25.118 CORONARY ARTERY DISEASE OF NATIVE ARTERY OF NATIVE HEART WITH STABLE ANGINA PECTORIS: Primary | ICD-10-CM

## 2021-09-23 DIAGNOSIS — J44.1 COPD EXACERBATION: Primary | ICD-10-CM

## 2021-09-23 DIAGNOSIS — I10 ESSENTIAL HYPERTENSION: ICD-10-CM

## 2021-09-23 DIAGNOSIS — R00.1 BRADYCARDIA: ICD-10-CM

## 2021-09-23 DIAGNOSIS — R06.81 WITNESSED APNEIC SPELLS: ICD-10-CM

## 2021-09-23 PROCEDURE — 1111F DSCHRG MED/CURRENT MED MERGE: CPT | Mod: CPTII,S$GLB,, | Performed by: FAMILY MEDICINE

## 2021-09-23 PROCEDURE — 1101F PR PT FALLS ASSESS DOC 0-1 FALLS W/OUT INJ PAST YR: ICD-10-PCS | Mod: HCNC,CPTII,S$GLB, | Performed by: INTERNAL MEDICINE

## 2021-09-23 PROCEDURE — 1160F PR REVIEW ALL MEDS BY PRESCRIBER/CLIN PHARMACIST DOCUMENTED: ICD-10-PCS | Mod: HCNC,CPTII,S$GLB, | Performed by: INTERNAL MEDICINE

## 2021-09-23 PROCEDURE — 3078F PR MOST RECENT DIASTOLIC BLOOD PRESSURE < 80 MM HG: ICD-10-PCS | Mod: HCNC,CPTII,S$GLB, | Performed by: INTERNAL MEDICINE

## 2021-09-23 PROCEDURE — 1159F PR MEDICATION LIST DOCUMENTED IN MEDICAL RECORD: ICD-10-PCS | Mod: HCNC,CPTII,S$GLB, | Performed by: INTERNAL MEDICINE

## 2021-09-23 PROCEDURE — 1101F PR PT FALLS ASSESS DOC 0-1 FALLS W/OUT INJ PAST YR: ICD-10-PCS | Mod: CPTII,S$GLB,, | Performed by: FAMILY MEDICINE

## 2021-09-23 PROCEDURE — 1101F PT FALLS ASSESS-DOCD LE1/YR: CPT | Mod: HCNC,CPTII,S$GLB, | Performed by: INTERNAL MEDICINE

## 2021-09-23 PROCEDURE — 99213 PR OFFICE/OUTPT VISIT, EST, LEVL III, 20-29 MIN: ICD-10-PCS | Mod: S$GLB,,, | Performed by: FAMILY MEDICINE

## 2021-09-23 PROCEDURE — 3075F SYST BP GE 130 - 139MM HG: CPT | Mod: HCNC,CPTII,S$GLB, | Performed by: INTERNAL MEDICINE

## 2021-09-23 PROCEDURE — 99204 PR OFFICE/OUTPT VISIT, NEW, LEVL IV, 45-59 MIN: ICD-10-PCS | Mod: HCNC,S$GLB,, | Performed by: INTERNAL MEDICINE

## 2021-09-23 PROCEDURE — 3288F PR FALLS RISK ASSESSMENT DOCUMENTED: ICD-10-PCS | Mod: CPTII,S$GLB,, | Performed by: FAMILY MEDICINE

## 2021-09-23 PROCEDURE — 1125F AMNT PAIN NOTED PAIN PRSNT: CPT | Mod: HCNC,CPTII,S$GLB, | Performed by: INTERNAL MEDICINE

## 2021-09-23 PROCEDURE — 99213 OFFICE O/P EST LOW 20 MIN: CPT | Mod: S$GLB,,, | Performed by: FAMILY MEDICINE

## 2021-09-23 PROCEDURE — 1111F PR DISCHARGE MEDS RECONCILED W/ CURRENT OUTPATIENT MED LIST: ICD-10-PCS | Mod: HCNC,CPTII,S$GLB, | Performed by: INTERNAL MEDICINE

## 2021-09-23 PROCEDURE — 3288F PR FALLS RISK ASSESSMENT DOCUMENTED: ICD-10-PCS | Mod: HCNC,CPTII,S$GLB, | Performed by: INTERNAL MEDICINE

## 2021-09-23 PROCEDURE — 3077F SYST BP >= 140 MM HG: CPT | Mod: CPTII,S$GLB,, | Performed by: FAMILY MEDICINE

## 2021-09-23 PROCEDURE — 1157F PR ADVANCE CARE PLAN OR EQUIV PRESENT IN MEDICAL RECORD: ICD-10-PCS | Mod: HCNC,CPTII,S$GLB, | Performed by: INTERNAL MEDICINE

## 2021-09-23 PROCEDURE — 1125F AMNT PAIN NOTED PAIN PRSNT: CPT | Mod: CPTII,S$GLB,, | Performed by: FAMILY MEDICINE

## 2021-09-23 PROCEDURE — 1157F ADVNC CARE PLAN IN RCRD: CPT | Mod: CPTII,S$GLB,, | Performed by: FAMILY MEDICINE

## 2021-09-23 PROCEDURE — 1160F RVW MEDS BY RX/DR IN RCRD: CPT | Mod: HCNC,CPTII,S$GLB, | Performed by: INTERNAL MEDICINE

## 2021-09-23 PROCEDURE — 1157F PR ADVANCE CARE PLAN OR EQUIV PRESENT IN MEDICAL RECORD: ICD-10-PCS | Mod: CPTII,S$GLB,, | Performed by: FAMILY MEDICINE

## 2021-09-23 PROCEDURE — 3078F DIAST BP <80 MM HG: CPT | Mod: HCNC,CPTII,S$GLB, | Performed by: INTERNAL MEDICINE

## 2021-09-23 PROCEDURE — 1111F DSCHRG MED/CURRENT MED MERGE: CPT | Mod: HCNC,CPTII,S$GLB, | Performed by: INTERNAL MEDICINE

## 2021-09-23 PROCEDURE — 99204 OFFICE O/P NEW MOD 45 MIN: CPT | Mod: HCNC,S$GLB,, | Performed by: INTERNAL MEDICINE

## 2021-09-23 PROCEDURE — 99999 PR PBB SHADOW E&M-EST. PATIENT-LVL IV: ICD-10-PCS | Mod: PBBFAC,HCNC,, | Performed by: INTERNAL MEDICINE

## 2021-09-23 PROCEDURE — 99999 PR PBB SHADOW E&M-EST. PATIENT-LVL IV: CPT | Mod: PBBFAC,HCNC,, | Performed by: INTERNAL MEDICINE

## 2021-09-23 PROCEDURE — 1157F ADVNC CARE PLAN IN RCRD: CPT | Mod: HCNC,CPTII,S$GLB, | Performed by: INTERNAL MEDICINE

## 2021-09-23 PROCEDURE — 1159F MED LIST DOCD IN RCRD: CPT | Mod: HCNC,CPTII,S$GLB, | Performed by: INTERNAL MEDICINE

## 2021-09-23 PROCEDURE — 3288F FALL RISK ASSESSMENT DOCD: CPT | Mod: HCNC,CPTII,S$GLB, | Performed by: INTERNAL MEDICINE

## 2021-09-23 PROCEDURE — 1125F PR PAIN SEVERITY QUANTIFIED, PAIN PRESENT: ICD-10-PCS | Mod: HCNC,CPTII,S$GLB, | Performed by: INTERNAL MEDICINE

## 2021-09-23 PROCEDURE — 1111F PR DISCHARGE MEDS RECONCILED W/ CURRENT OUTPATIENT MED LIST: ICD-10-PCS | Mod: CPTII,S$GLB,, | Performed by: FAMILY MEDICINE

## 2021-09-23 PROCEDURE — 99499 UNLISTED E&M SERVICE: CPT | Mod: HCNC,S$GLB,, | Performed by: INTERNAL MEDICINE

## 2021-09-23 PROCEDURE — 3288F FALL RISK ASSESSMENT DOCD: CPT | Mod: CPTII,S$GLB,, | Performed by: FAMILY MEDICINE

## 2021-09-23 PROCEDURE — 99205 OFFICE O/P NEW HI 60 MIN: CPT | Mod: HCNC,S$GLB,, | Performed by: INTERNAL MEDICINE

## 2021-09-23 PROCEDURE — 1125F PR PAIN SEVERITY QUANTIFIED, PAIN PRESENT: ICD-10-PCS | Mod: CPTII,S$GLB,, | Performed by: FAMILY MEDICINE

## 2021-09-23 PROCEDURE — 99499 RISK ADDL DX/OHS AUDIT: ICD-10-PCS | Mod: HCNC,S$GLB,, | Performed by: INTERNAL MEDICINE

## 2021-09-23 PROCEDURE — 3079F PR MOST RECENT DIASTOLIC BLOOD PRESSURE 80-89 MM HG: ICD-10-PCS | Mod: CPTII,S$GLB,, | Performed by: FAMILY MEDICINE

## 2021-09-23 PROCEDURE — 3075F PR MOST RECENT SYSTOLIC BLOOD PRESS GE 130-139MM HG: ICD-10-PCS | Mod: HCNC,CPTII,S$GLB, | Performed by: INTERNAL MEDICINE

## 2021-09-23 PROCEDURE — 3077F SYST BP >= 140 MM HG: CPT | Mod: HCNC,CPTII,S$GLB, | Performed by: INTERNAL MEDICINE

## 2021-09-23 PROCEDURE — 99205 PR OFFICE/OUTPT VISIT, NEW, LEVL V, 60-74 MIN: ICD-10-PCS | Mod: HCNC,S$GLB,, | Performed by: INTERNAL MEDICINE

## 2021-09-23 PROCEDURE — 3079F DIAST BP 80-89 MM HG: CPT | Mod: CPTII,S$GLB,, | Performed by: FAMILY MEDICINE

## 2021-09-23 PROCEDURE — 1101F PT FALLS ASSESS-DOCD LE1/YR: CPT | Mod: CPTII,S$GLB,, | Performed by: FAMILY MEDICINE

## 2021-09-23 PROCEDURE — 3077F PR MOST RECENT SYSTOLIC BLOOD PRESSURE >= 140 MM HG: ICD-10-PCS | Mod: CPTII,S$GLB,, | Performed by: FAMILY MEDICINE

## 2021-09-23 PROCEDURE — 3077F PR MOST RECENT SYSTOLIC BLOOD PRESSURE >= 140 MM HG: ICD-10-PCS | Mod: HCNC,CPTII,S$GLB, | Performed by: INTERNAL MEDICINE

## 2021-09-23 RX ORDER — TIZANIDINE 2 MG/1
TABLET ORAL
Qty: 25 TABLET | Refills: 0 | Status: SHIPPED | OUTPATIENT
Start: 2021-09-23 | End: 2021-09-23 | Stop reason: SDUPTHER

## 2021-09-23 RX ORDER — METOPROLOL SUCCINATE 50 MG/1
50 TABLET, EXTENDED RELEASE ORAL DAILY
Qty: 90 TABLET | Refills: 3 | Status: SHIPPED | OUTPATIENT
Start: 2021-09-23 | End: 2021-12-03 | Stop reason: SDUPTHER

## 2021-09-23 RX ORDER — TIZANIDINE 2 MG/1
TABLET ORAL
Qty: 25 TABLET | Refills: 0 | Status: SHIPPED | OUTPATIENT
Start: 2021-09-23 | End: 2021-09-25 | Stop reason: CLARIF

## 2021-09-25 PROBLEM — I50.41 ACUTE COMBINED SYSTOLIC AND DIASTOLIC CONGESTIVE HEART FAILURE: Status: ACTIVE | Noted: 2021-09-25

## 2021-09-25 PROBLEM — R79.89 ELEVATED LFTS: Status: ACTIVE | Noted: 2021-09-25

## 2021-09-25 PROBLEM — Z71.89 ADVANCE CARE PLANNING: Status: ACTIVE | Noted: 2021-09-25

## 2021-09-25 PROBLEM — I50.9 EDEMA DUE TO CONGESTIVE HEART FAILURE: Status: ACTIVE | Noted: 2021-09-25

## 2021-09-25 PROBLEM — I50.9 CHF (CONGESTIVE HEART FAILURE): Status: ACTIVE | Noted: 2021-09-25

## 2021-09-25 PROBLEM — I25.10 CORONARY ARTERY DISEASE INVOLVING NATIVE CORONARY ARTERY: Status: ACTIVE | Noted: 2021-09-25

## 2021-09-26 PROBLEM — I25.10 CORONARY ARTERY DISEASE INVOLVING NATIVE CORONARY ARTERY: Status: RESOLVED | Noted: 2021-09-25 | Resolved: 2021-09-26

## 2021-09-26 PROBLEM — I50.9 EDEMA DUE TO CONGESTIVE HEART FAILURE: Status: RESOLVED | Noted: 2021-09-25 | Resolved: 2021-09-26

## 2021-10-19 ENCOUNTER — PATIENT OUTREACH (OUTPATIENT)
Dept: ADMINISTRATIVE | Facility: OTHER | Age: 76
End: 2021-10-19

## 2021-11-17 DIAGNOSIS — S72.141A DISPLACED INTERTROCHANTERIC FRACTURE OF RIGHT FEMUR, INITIAL ENCOUNTER FOR CLOSED FRACTURE: Primary | ICD-10-CM

## 2021-12-01 ENCOUNTER — TELEPHONE (OUTPATIENT)
Dept: FAMILY MEDICINE | Facility: CLINIC | Age: 76
End: 2021-12-01
Payer: MEDICARE

## 2021-12-03 ENCOUNTER — OFFICE VISIT (OUTPATIENT)
Dept: FAMILY MEDICINE | Facility: CLINIC | Age: 76
End: 2021-12-03
Payer: MEDICARE

## 2021-12-03 VITALS
OXYGEN SATURATION: 97 % | HEART RATE: 58 BPM | DIASTOLIC BLOOD PRESSURE: 60 MMHG | BODY MASS INDEX: 31.87 KG/M2 | WEIGHT: 227.63 LBS | SYSTOLIC BLOOD PRESSURE: 110 MMHG | HEIGHT: 71 IN | TEMPERATURE: 98 F

## 2021-12-03 DIAGNOSIS — I50.9 CONGESTIVE HEART FAILURE, UNSPECIFIED HF CHRONICITY, UNSPECIFIED HEART FAILURE TYPE: ICD-10-CM

## 2021-12-03 DIAGNOSIS — I50.41 ACUTE COMBINED SYSTOLIC AND DIASTOLIC CONGESTIVE HEART FAILURE: Primary | ICD-10-CM

## 2021-12-03 DIAGNOSIS — E78.5 HYPERLIPIDEMIA, UNSPECIFIED HYPERLIPIDEMIA TYPE: ICD-10-CM

## 2021-12-03 DIAGNOSIS — Z00.00 ROUTINE GENERAL MEDICAL EXAMINATION AT A HEALTH CARE FACILITY: ICD-10-CM

## 2021-12-03 DIAGNOSIS — I25.10 CORONARY ARTERY DISEASE INVOLVING NATIVE CORONARY ARTERY OF NATIVE HEART WITHOUT ANGINA PECTORIS: ICD-10-CM

## 2021-12-03 DIAGNOSIS — E03.4 HYPOTHYROIDISM DUE TO ACQUIRED ATROPHY OF THYROID: ICD-10-CM

## 2021-12-03 DIAGNOSIS — I10 ESSENTIAL HYPERTENSION: ICD-10-CM

## 2021-12-03 DIAGNOSIS — E78.2 MIXED HYPERLIPIDEMIA: ICD-10-CM

## 2021-12-03 PROCEDURE — 99214 OFFICE O/P EST MOD 30 MIN: CPT | Mod: S$GLB,,, | Performed by: FAMILY MEDICINE

## 2021-12-03 PROCEDURE — 1157F PR ADVANCE CARE PLAN OR EQUIV PRESENT IN MEDICAL RECORD: ICD-10-PCS | Mod: CPTII,S$GLB,, | Performed by: FAMILY MEDICINE

## 2021-12-03 PROCEDURE — 99499 UNLISTED E&M SERVICE: CPT | Mod: S$GLB,,, | Performed by: FAMILY MEDICINE

## 2021-12-03 PROCEDURE — 99214 PR OFFICE/OUTPT VISIT, EST, LEVL IV, 30-39 MIN: ICD-10-PCS | Mod: S$GLB,,, | Performed by: FAMILY MEDICINE

## 2021-12-03 PROCEDURE — 1157F ADVNC CARE PLAN IN RCRD: CPT | Mod: CPTII,S$GLB,, | Performed by: FAMILY MEDICINE

## 2021-12-03 PROCEDURE — 99499 RISK ADDL DX/OHS AUDIT: ICD-10-PCS | Mod: S$GLB,,, | Performed by: FAMILY MEDICINE

## 2021-12-03 RX ORDER — LEVOTHYROXINE SODIUM 300 UG/1
300 TABLET ORAL DAILY
Qty: 90 TABLET | Refills: 4 | Status: SHIPPED | OUTPATIENT
Start: 2021-12-03 | End: 2022-12-20

## 2021-12-03 RX ORDER — METOPROLOL SUCCINATE 50 MG/1
50 TABLET, EXTENDED RELEASE ORAL DAILY
Qty: 90 TABLET | Refills: 3 | Status: SHIPPED | OUTPATIENT
Start: 2021-12-03 | End: 2022-12-20

## 2021-12-03 RX ORDER — ATORVASTATIN CALCIUM 10 MG/1
10 TABLET, FILM COATED ORAL DAILY
Qty: 90 TABLET | Refills: 4 | Status: SHIPPED | OUTPATIENT
Start: 2021-12-03 | End: 2022-12-20

## 2021-12-03 RX ORDER — METHOCARBAMOL 750 MG/1
500 TABLET, FILM COATED ORAL 4 TIMES DAILY
COMMUNITY
End: 2021-12-03 | Stop reason: SDUPTHER

## 2021-12-03 RX ORDER — LOSARTAN POTASSIUM 100 MG/1
100 TABLET ORAL DAILY
Qty: 90 TABLET | Refills: 4 | Status: SHIPPED | OUTPATIENT
Start: 2021-12-03 | End: 2022-12-20

## 2021-12-03 RX ORDER — METHOCARBAMOL 500 MG/1
500 TABLET, FILM COATED ORAL 4 TIMES DAILY
Qty: 30 TABLET | Refills: 1 | Status: SHIPPED | OUTPATIENT
Start: 2021-12-03 | End: 2022-03-24

## 2021-12-03 RX ORDER — SPIRONOLACTONE 50 MG/1
50 TABLET, FILM COATED ORAL DAILY
COMMUNITY
End: 2021-12-03

## 2021-12-04 PROBLEM — I25.118 CORONARY ARTERY DISEASE OF NATIVE ARTERY WITH STABLE ANGINA PECTORIS: Status: RESOLVED | Noted: 2021-08-06 | Resolved: 2021-12-04

## 2022-02-08 RX ORDER — FUROSEMIDE 40 MG/1
40 TABLET ORAL DAILY
Qty: 60 TABLET | Refills: 2 | Status: SHIPPED | OUTPATIENT
Start: 2022-02-08 | End: 2022-04-18

## 2022-02-08 NOTE — TELEPHONE ENCOUNTER
----- Message from Manju Mohr sent at 2/8/2022 10:23 AM CST -----  Contact: pt  Type:  RX Refill Request    Who Called: Pt   Refill or New Rx: refill   RX Name and Strength:furosemide (LASIX) 40 MG tablet  How is the patient currently taking it? (ex. 1XDay):1  Is this a 30 day or 90 day RX:60  Preferred Pharmacy with phone number:[facility] [address] [place] [address] [facility]  Local or Mail Order:Local   Ordering Provider:Dr Levine   Would the patient rather a call back or a response via [facility]? call  Best Call Back Number:[phone]  Additional Information:          Pt wants daughter to translate

## 2022-02-08 NOTE — TELEPHONE ENCOUNTER
No new care gaps identified.  Powered by [employer]. Reference number: [id number].   2/08/2022 10:46:05 AM CST

## 2022-03-24 RX ORDER — METHOCARBAMOL 500 MG/1
TABLET, FILM COATED ORAL
Qty: 30 TABLET | Refills: 0 | Status: SHIPPED | OUTPATIENT
Start: 2022-03-24 | End: 2022-09-12

## 2022-03-24 NOTE — TELEPHONE ENCOUNTER
No new care gaps identified.  Powered by [employer]. Reference number: [id number].   3/24/2022 11:45:10 AM CDT

## 2022-04-14 ENCOUNTER — PATIENT OUTREACH (OUTPATIENT)
Dept: ADMINISTRATIVE | Facility: OTHER | Age: 77
End: 2022-04-14
Payer: MEDICARE

## 2022-04-14 NOTE — PROGRESS NOTES
Health Maintenance Due   Topic Date Due    Foot Exam  Never done    Eye Exam  Never done    TETANUS VACCINE  Never done    Pneumococcal Vaccines (Age 65+) (1 - PCV) Never done    Influenza Vaccine (1) Never done    Hemoglobin A1c  03/03/2022    COVID-19 Vaccine (3 - Booster for Pfizer series) 03/15/2022    Diabetes Urine Screening  05/27/2022     Updates were requested from care everywhere.  Chart was reviewed for overdue [facility]active [facility] Encounters ([facility]) topics (CRS, Breast Cancer Screening, Eye exam)  Health Maintenance has been updated.  LINKS immunization registry triggered.  Immunizations were reconciled.

## 2022-04-18 ENCOUNTER — OFFICE VISIT (OUTPATIENT)
Dept: CARDIOLOGY | Facility: CLINIC | Age: 77
End: 2022-04-18
Payer: MEDICARE

## 2022-04-18 VITALS
WEIGHT: 218.13 LBS | DIASTOLIC BLOOD PRESSURE: 67 MMHG | BODY MASS INDEX: 30.54 KG/M2 | HEIGHT: 71 IN | SYSTOLIC BLOOD PRESSURE: 124 MMHG | HEART RATE: 61 BPM

## 2022-04-18 DIAGNOSIS — L97.211 VENOUS STASIS ULCER OF RIGHT CALF LIMITED TO BREAKDOWN OF SKIN WITHOUT VARICOSE VEINS: ICD-10-CM

## 2022-04-18 DIAGNOSIS — I10 ESSENTIAL HYPERTENSION: ICD-10-CM

## 2022-04-18 DIAGNOSIS — I49.8 ATRIAL BIGEMINY: ICD-10-CM

## 2022-04-18 DIAGNOSIS — E78.2 MIXED HYPERLIPIDEMIA: ICD-10-CM

## 2022-04-18 DIAGNOSIS — I50.20 SYSTOLIC CONGESTIVE HEART FAILURE, UNSPECIFIED HF CHRONICITY: ICD-10-CM

## 2022-04-18 DIAGNOSIS — I25.10 CORONARY ARTERY DISEASE INVOLVING NATIVE CORONARY ARTERY OF NATIVE HEART WITHOUT ANGINA PECTORIS: ICD-10-CM

## 2022-04-18 DIAGNOSIS — I87.2 VENOUS STASIS ULCER OF RIGHT CALF LIMITED TO BREAKDOWN OF SKIN WITHOUT VARICOSE VEINS: ICD-10-CM

## 2022-04-18 DIAGNOSIS — R06.02 SOB (SHORTNESS OF BREATH): Primary | ICD-10-CM

## 2022-04-18 PROBLEM — L97.219 VENOUS STASIS ULCER OF RIGHT CALF WITHOUT VARICOSE VEINS: Status: ACTIVE | Noted: 2022-04-18

## 2022-04-18 PROCEDURE — 1160F RVW MEDS BY RX/DR IN RCRD: CPT | Mod: CPTII,S$GLB,, | Performed by: INTERNAL MEDICINE

## 2022-04-18 PROCEDURE — 1100F PTFALLS ASSESS-DOCD GE2>/YR: CPT | Mod: CPTII,S$GLB,, | Performed by: INTERNAL MEDICINE

## 2022-04-18 PROCEDURE — 3288F PR FALLS RISK ASSESSMENT DOCUMENTED: ICD-10-PCS | Mod: CPTII,S$GLB,, | Performed by: INTERNAL MEDICINE

## 2022-04-18 PROCEDURE — 3078F PR MOST RECENT DIASTOLIC BLOOD PRESSURE < 80 MM HG: ICD-10-PCS | Mod: CPTII,S$GLB,, | Performed by: INTERNAL MEDICINE

## 2022-04-18 PROCEDURE — 1159F MED LIST DOCD IN RCRD: CPT | Mod: CPTII,S$GLB,, | Performed by: INTERNAL MEDICINE

## 2022-04-18 PROCEDURE — 99999 PR PBB SHADOW E&M-EST. PATIENT-LVL III: CPT | Mod: PBBFAC,,, | Performed by: INTERNAL MEDICINE

## 2022-04-18 PROCEDURE — 1100F PR PT FALLS ASSESS DOC 2+ FALLS/FALL W/INJURY/YR: ICD-10-PCS | Mod: CPTII,S$GLB,, | Performed by: INTERNAL MEDICINE

## 2022-04-18 PROCEDURE — 3078F DIAST BP <80 MM HG: CPT | Mod: CPTII,S$GLB,, | Performed by: INTERNAL MEDICINE

## 2022-04-18 PROCEDURE — 1126F PR PAIN SEVERITY QUANTIFIED, NO PAIN PRESENT: ICD-10-PCS | Mod: CPTII,S$GLB,, | Performed by: INTERNAL MEDICINE

## 2022-04-18 PROCEDURE — 99214 PR OFFICE/OUTPT VISIT, EST, LEVL IV, 30-39 MIN: ICD-10-PCS | Mod: S$GLB,,, | Performed by: INTERNAL MEDICINE

## 2022-04-18 PROCEDURE — 99214 OFFICE O/P EST MOD 30 MIN: CPT | Mod: S$GLB,,, | Performed by: INTERNAL MEDICINE

## 2022-04-18 PROCEDURE — 3074F PR MOST RECENT SYSTOLIC BLOOD PRESSURE < 130 MM HG: ICD-10-PCS | Mod: CPTII,S$GLB,, | Performed by: INTERNAL MEDICINE

## 2022-04-18 PROCEDURE — 99499 UNLISTED E&M SERVICE: CPT | Mod: S$GLB,,, | Performed by: INTERNAL MEDICINE

## 2022-04-18 PROCEDURE — 1157F PR ADVANCE CARE PLAN OR EQUIV PRESENT IN MEDICAL RECORD: ICD-10-PCS | Mod: CPTII,S$GLB,, | Performed by: INTERNAL MEDICINE

## 2022-04-18 PROCEDURE — 99999 PR PBB SHADOW E&M-EST. PATIENT-LVL III: ICD-10-PCS | Mod: PBBFAC,,, | Performed by: INTERNAL MEDICINE

## 2022-04-18 PROCEDURE — 99499 RISK ADDL DX/OHS AUDIT: ICD-10-PCS | Mod: S$GLB,,, | Performed by: INTERNAL MEDICINE

## 2022-04-18 PROCEDURE — 1160F PR REVIEW ALL MEDS BY PRESCRIBER/CLIN PHARMACIST DOCUMENTED: ICD-10-PCS | Mod: CPTII,S$GLB,, | Performed by: INTERNAL MEDICINE

## 2022-04-18 PROCEDURE — 1159F PR MEDICATION LIST DOCUMENTED IN MEDICAL RECORD: ICD-10-PCS | Mod: CPTII,S$GLB,, | Performed by: INTERNAL MEDICINE

## 2022-04-18 PROCEDURE — 1126F AMNT PAIN NOTED NONE PRSNT: CPT | Mod: CPTII,S$GLB,, | Performed by: INTERNAL MEDICINE

## 2022-04-18 PROCEDURE — 3288F FALL RISK ASSESSMENT DOCD: CPT | Mod: CPTII,S$GLB,, | Performed by: INTERNAL MEDICINE

## 2022-04-18 PROCEDURE — 3074F SYST BP LT 130 MM HG: CPT | Mod: CPTII,S$GLB,, | Performed by: INTERNAL MEDICINE

## 2022-04-18 PROCEDURE — 1157F ADVNC CARE PLAN IN RCRD: CPT | Mod: CPTII,S$GLB,, | Performed by: INTERNAL MEDICINE

## 2022-04-18 RX ORDER — FUROSEMIDE 40 MG/1
40 TABLET ORAL
Qty: 60 TABLET | Refills: 2
Start: 2022-04-18 | End: 2022-06-22 | Stop reason: SDUPTHER

## 2022-04-18 NOTE — PROGRESS NOTES
[facility] Cardiology     Subjective:    Patient ID:  Bean Salas is a 77 y.o. male who presents for follow-up of Hypertension, Hyperlipidemia, Carotid Artery Disease, and Palpitations    Review of patient's allergies indicates:  No Known Allergies   The patient is having problems with right leg swelling.  Last fall he was started on furosemide.  He had right femur fracture requiring surgery.  He was discharged on oxygen and furosemide 40 mg per day.  His echo was read as 40%.  Last year he had a heart catheterization showing EF 50-55%.  I read his echo as 45-50% last year.  He is still using nocturnal oxygen.  His son thinks he has sleep apnea but he is not interested in going for a sleep study.    The patient does have coronary disease.  He has no active angina.  His right leg swelling was worked up in September with negative ultrasound for DVT.  He does have venous stasis ulcers right leg.  Some of his skin trauma was related to getting out of his car, he has an open wound on the posterior aspect of his right calf.  He is on aspirin.  His blood pressures have been stable.  He has not felt dizziness and has not had awareness of palpitations.  He has a history of PVCs.  He was placed on metoprolol.  It seems to have helped him.  He has not had syncope or near syncope.      Review of Systems   Constitutional: Negative for chills, decreased appetite, diaphoresis, fever, malaise/fatigue, night sweats, weight gain and weight loss.   HENT: Negative for congestion, ear discharge, ear pain, hearing loss, hoarse voice, nosebleeds, odynophagia, sore throat, stridor and tinnitus.    Eyes: Negative for blurred vision, discharge, double vision, pain, photophobia, redness, vision loss in left eye, vision loss in right eye, visual disturbance and visual halos.   Cardiovascular: Positive for leg swelling. Negative for chest pain, claudication, cyanosis,  dyspnea on exertion, irregular heartbeat, near-syncope, orthopnea, palpitations, paroxysmal nocturnal dyspnea and syncope.   Respiratory: Negative for cough, hemoptysis, shortness of breath, sleep disturbances due to breathing, snoring, sputum production and wheezing.         He uses nocturnal oxygen since September 2021.    Endocrine: Negative for cold intolerance, heat intolerance, polydipsia, polyphagia and polyuria.   Hematologic/Lymphatic: Negative for adenopathy and bleeding problem. Does not bruise/bleed easily.   Skin: Negative for color change, dry skin, flushing, itching, nail changes, poor wound healing, rash, skin cancer, suspicious lesions and unusual hair distribution.   Musculoskeletal: Negative for arthritis, back pain, falls, gout, joint pain, joint swelling, muscle cramps, muscle weakness, myalgias, neck pain and stiffness.   Gastrointestinal: Negative for bloating, abdominal pain, anorexia, change in bowel habit, bowel incontinence, constipation, diarrhea, dysphagia, excessive appetite, flatus, heartburn, hematemesis, hematochezia, hemorrhoids, jaundice, melena, nausea and vomiting.   Genitourinary: Negative for bladder incontinence, decreased libido, dysuria, flank pain, frequency, genital sores, hematuria, hesitancy, incomplete emptying, nocturia and urgency.   Neurological: Negative for aphonia, brief paralysis, difficulty with concentration, disturbances in coordination, excessive daytime sleepiness, dizziness, focal weakness, headaches, light-headedness, loss of balance, numbness, paresthesias, seizures, sensory change, tremors, vertigo and weakness.   Psychiatric/Behavioral: Negative for altered mental status, depression, hallucinations, memory loss, substance abuse, suicidal ideas and thoughts of violence. The patient does not have insomnia and is not nervous/anxious.    Allergic/Immunologic: Negative for hives and persistent infections.        Objective:       Vitals:    04/18/22 1039  "  BP: 124/67   BP Location: Right arm   Patient Position: Sitting   BP Method: Large (Automatic)   Pulse: 61   Weight: 98.9 kg (218 lb 1.6 oz)   Height: 5' 11" (1.803 m)    Physical Exam  Constitutional:       General: He is not in acute distress.     Appearance: He is well-developed. He is not diaphoretic.   HENT:      Head: Normocephalic and atraumatic.      Nose: Nose normal.   Eyes:      General: No scleral icterus.        Right eye: No discharge.      Conjunctiva/sclera: Conjunctivae normal.      Pupils: Pupils are equal, round, and reactive to light.   Neck:      Thyroid: No thyromegaly.      Vascular: No JVD.      Trachea: No tracheal deviation.   Cardiovascular:      Rate and Rhythm: Normal rate and regular rhythm.      Pulses:           Carotid pulses are 2+ on the right side and 2+ on the left side.       Radial pulses are 2+ on the right side and 2+ on the left side.        Dorsalis pedis pulses are 2+ on the right side and 2+ on the left side.        Posterior tibial pulses are 2+ on the right side and 2+ on the left side.      Heart sounds: Normal heart sounds. No murmur heard.    No friction rub. No gallop.   Pulmonary:      Effort: Pulmonary effort is normal. No respiratory distress.      Breath sounds: Normal breath sounds. No stridor. No wheezing or rales.   Chest:      Chest wall: No tenderness.   Abdominal:      General: Bowel sounds are normal. There is no distension.      Palpations: Abdomen is soft. There is no mass.      Tenderness: There is no abdominal tenderness. There is no guarding or rebound.   Musculoskeletal:         General: No tenderness. Normal range of motion.      Cervical back: Normal range of motion and neck supple.      Right lower leg: Edema present.   Lymphadenopathy:      Cervical: No cervical adenopathy.   Skin:     General: Skin is warm and dry.      Coloration: Skin is not pale.      Findings: No erythema or rash.      Comments: Several skin breaks/venous ulcers, they do " not appear infected.   Neurological:      Mental Status: He is alert and oriented to person, place, and time.      Cranial Nerves: No cranial nerve deficit.      Coordination: Coordination normal.   Psychiatric:         Behavior: Behavior normal.         Thought Content: Thought content normal.         Judgment: Judgment normal.           Assessment:       1. SOB (shortness of breath)    2. Coronary artery disease involving native coronary artery of native heart without angina pectoris    3. Essential hypertension    4. Mixed hyperlipidemia    5. Systolic congestive heart failure, unspecified HF chronicity    6. Atrial bigeminy    7. Venous stasis ulcer of right calf limited to breakdown of skin without varicose veins      Results for orders placed or performed during the hospital encounter of 09/25/21   CBC auto differential   Result Value Ref Range    WBC 3.72 (L) 3.90 - 12.70 K/uL    RBC 4.31 (L) 4.60 - 6.20 M/uL    Hemoglobin 13.5 (L) 14.0 - 18.0 g/dL    Hematocrit 40.9 40.0 - 54.0 %    MCV 95 82 - 98 fL    MCH 31.3 (H) 27.0 - 31.0 pg    MCHC 33.0 32.0 - 36.0 g/dL    RDW 14.7 (H) 11.5 - 14.5 %    Platelets 248 150 - 450 K/uL    MPV 10.8 9.2 - 12.9 fL    Immature Granulocytes 0.3 0.0 - 0.5 %    Gran # (ANC) 2.6 1.8 - 7.7 K/uL    Immature Grans (Abs) 0.01 0.00 - 0.04 K/uL    Lymph # 0.7 (L) 1.0 - 4.8 K/uL    Mono # 0.3 0.3 - 1.0 K/uL    Eos # 0.0 0.0 - 0.5 K/uL    Baso # 0.02 0.00 - 0.20 K/uL    nRBC 0 0 /100 WBC    Gran % 71.0 38.0 - 73.0 %    Lymph % 19.6 18.0 - 48.0 %    Mono % 7.8 4.0 - 15.0 %    Eosinophil % 0.8 0.0 - 8.0 %    Basophil % 0.5 0.0 - 1.9 %    Differential Method Automated    Comprehensive metabolic panel (CMP)   Result Value Ref Range    Sodium 138 136 - 145 mmol/L    Potassium 4.1 3.5 - 5.1 mmol/L    Chloride 100 95 - 110 mmol/L    CO2 32 (H) 22 - 31 mmol/L    Glucose 141 (H) 70 - 110 mg/dL    BUN 15 9 - 21 mg/dL    Creatinine 1.01 0.50 - 1.40 mg/dL    Calcium 9.6 8.4 - 10.2 mg/dL    Total  Protein 7.0 6.0 - 8.4 g/dL    Albumin 3.9 3.5 - 5.2 g/dL    Total Bilirubin 1.7 (H) 0.2 - 1.3 mg/dL    Alkaline Phosphatase 247 (H) 38 - 145 U/L     (H) 17 - 59 U/L     (H) 0 - 50 U/L    Anion Gap 6 (L) 8 - 16 mmol/L    eGFR if African American >60 >60 mL/min/1.73 m^2    eGFR if non African American >60 >60 mL/min/1.73 m^2   Troponin   Result Value Ref Range    Troponin I 0.025 0.012 - 0.034 ng/mL   COVID-19 Rapid Screening   Result Value Ref Range    SARS-CoV-2 RNA, Amplification, Qual Negative Negative   NT-Pro Natriuretic Peptide   Result Value Ref Range    NT-proBNP 3260 (H) 5 - 1800 pg/mL   Troponin I   Result Value Ref Range    Troponin I 0.039 (H) 0.012 - 0.034 ng/mL   Procalcitonin   Result Value Ref Range    Procalcitonin 0.12 <0.25 ng/mL   D-Dimer, Quantitative   Result Value Ref Range    D-Dimer 1.51 (H) <0.50 ug/mL FEU   Magnesium - if not done in ED   Result Value Ref Range    Magnesium 2.3 1.6 - 2.6 mg/dL   Lipid panel - fasting   Result Value Ref Range    Cholesterol 100 (L) 120 - 199 mg/dL    Triglycerides 101 30 - 150 mg/dL    HDL 38 (L) 40 - 75 mg/dL    LDL Cholesterol 41.8 (L) 63.0 - 159.0 mg/dL    HDL/Cholesterol Ratio 38.0 20.0 - 50.0 %    Total Cholesterol/HDL Ratio 2.6 2.0 - 5.0    Non-HDL Cholesterol 62 mg/dL   TSH   Result Value Ref Range    TSH 5.010 (H) 0.400 - 4.000 uIU/mL   CBC Auto Differential   Result Value Ref Range    WBC 5.52 3.90 - 12.70 K/uL    RBC 4.15 (L) 4.60 - 6.20 M/uL    Hemoglobin 13.0 (L) 14.0 - 18.0 g/dL    Hematocrit 38.8 (L) 40.0 - 54.0 %    MCV 94 82 - 98 fL    MCH 31.3 (H) 27.0 - 31.0 pg    MCHC 33.5 32.0 - 36.0 g/dL    RDW 15.0 (H) 11.5 - 14.5 %    Platelets 223 150 - 450 K/uL    MPV 11.0 9.2 - 12.9 fL    Immature Granulocytes 0.2 0.0 - 0.5 %    Gran # (ANC) 4.1 1.8 - 7.7 K/uL    Immature Grans (Abs) 0.01 0.00 - 0.04 K/uL    Lymph # 0.9 (L) 1.0 - 4.8 K/uL    Mono # 0.4 0.3 - 1.0 K/uL    Eos # 0.1 0.0 - 0.5 K/uL    Baso # 0.02 0.00 - 0.20 K/uL    nRBC 0  0 /100 WBC    Gran % 74.9 (H) 38.0 - 73.0 %    Lymph % 15.4 (L) 18.0 - 48.0 %    Mono % 7.8 4.0 - 15.0 %    Eosinophil % 1.3 0.0 - 8.0 %    Basophil % 0.4 0.0 - 1.9 %    Differential Method Automated    Comprehensive Metabolic Panel   Result Value Ref Range    Sodium 138 136 - 145 mmol/L    Potassium 3.5 3.5 - 5.1 mmol/L    Chloride 100 95 - 110 mmol/L    CO2 33 (H) 22 - 31 mmol/L    Glucose 102 70 - 110 mg/dL    BUN 14 9 - 21 mg/dL    Creatinine 0.92 0.50 - 1.40 mg/dL    Calcium 9.1 8.4 - 10.2 mg/dL    Total Protein 6.6 6.0 - 8.4 g/dL    Albumin 3.7 3.5 - 5.2 g/dL    Total Bilirubin 1.6 (H) 0.2 - 1.3 mg/dL    Alkaline Phosphatase 247 (H) 38 - 145 U/L     (H) 17 - 59 U/L     (H) 0 - 50 U/L    Anion Gap 5 (L) 8 - 16 mmol/L    eGFR if African American >60 >60 mL/min/1.73 m^2    eGFR if non African American >60 >60 mL/min/1.73 m^2   Hepatitis panel, acute   Result Value Ref Range    Hepatitis B Surface Ag Negative     Hep B C IgM Negative     Hep A IgM Negative     Hepatitis C Ab Negative    Troponin I   Result Value Ref Range    Troponin I 0.046 (H) 0.012 - 0.034 ng/mL   Echo   Result Value Ref Range    LVIDs 3.82 2.1 - 4.0 cm    STJ 2.93 cm    AV mean gradient 3 mmHg    Ao peak hari 1.28 m/s    Ao VTI 22.3 cm    IVS 1.69 (A) 0.6 - 1.1 cm    LA size 4.3 cm    LVIDd 5.29 3.5 - 6.0 cm    LVOT diameter 2.2 cm    LVOT peak VTI 20.00 cm    Posterior Wall 1.71 (A) 0.6 - 1.1 cm    MV Peak A Hari 0.31 m/s    E wave deceleration time 158 msec    RA Major Axis 6.43 cm    RA Width 2.95 cm    TR Max Hari 2.21 m/s    RVDD 2.80 cm    TDI LATERAL 0.08 m/s    TDI SEPTAL 0.07 m/s    LA WIDTH 4.64 cm    PV PEAK VELOCITY 85.5 cm/s    LVOT peak hari 111.00 m/s    BSA 2.26 m2    LV LATERAL E/E' RATIO 9.75 m/s    LV SEPTAL E/E' RATIO 11.14 m/s    FS 28 28 - 44 %    LA volume 133.30 cm3    LV mass 423.42 g    Left Ventricle Relative Wall Thickness 0.65 cm    AV valve area 3.41 cm2    AV Velocity Ratio 86.72     AV index  (prosthetic) 0.90     E/A ratio 2.52     Mean e' 0.08 m/s    LVOT area 3.8 cm2    LVOT stroke volume 75.99 cm3    AV peak gradient 7 mmHg    E/E' ratio 10.40 m/s    MV Peak E Hari 0.78 m/s    LA Volume Index 60.6 mL/m2    LV Mass Index 192 g/m2    Left Atrium Minor Axis 7.67 cm    Left Atrium Major Axis 8.06 cm    Triscuspid Valve Regurgitation Peak Gradient 20 mmHg    Right Atrial Pressure (from IVC) 3 mmHg    EF 40 %    TV rest pulmonary artery pressure 23 mmHg         Current Outpatient Medications:     acetaminophen (TYLENOL) 325 MG tablet, Take 2 tablets (650 mg total) by mouth every 6 (six) hours as needed for Pain., Disp: , Rfl: 0    aspirin 325 MG tablet, Take 1 tablet (325 mg total) by mouth once daily., Disp: 30 tablet, Rfl: 0    atorvastatin (LIPITOR) 10 MG tablet, Take 1 tablet (10 mg total) by mouth once daily., Disp: 90 tablet, Rfl: 4    levothyroxine (SYNTHROID) 300 MCG Tab, Take 1 tablet (300 mcg total) by mouth once daily., Disp: 90 tablet, Rfl: 4    losartan (COZAAR) 100 MG tablet, Take 1 tablet (100 mg total) by mouth once daily., Disp: 90 tablet, Rfl: 4    methocarbamoL (ROBAXIN) 500 MG Tab, Take 1 tablet by mouth 4 times daily, Disp: 30 tablet, Rfl: 0    metoprolol succinate (TOPROL-XL) 50 MG 24 hr tablet, Take 1 tablet (50 mg total) by mouth once daily., Disp: 90 tablet, Rfl: 3    multivit-min-FA-lycopen-lutein (CENTRUM SILVER) 0.4-300-250 mg-mcg-mcg Tab, Take 1 tablet by mouth once daily., Disp: , Rfl:     furosemide (LASIX) 40 MG tablet, Take 1 tablet (40 mg total) by mouth as needed (edema). Take daily for next 3 days, then use as needed. Weigh daily. Afterwards, f weight increases 2 lbs/24h, take dose of lasix daily  until weight is back to baseline, Disp: 60 tablet, Rfl: 2    magnesium hydroxide 400 mg/5 ml (MILK OF MAGNESIA) 400 mg/5 mL Susp, Take 30 mLs by mouth daily as needed (constipation)., Disp: , Rfl:     traMADoL (ULTRAM) 50 mg tablet, Take 1 tablet (50 mg total) by  mouth every 4 (four) hours as needed for Pain. (Patient not taking: Reported on 4/18/2022), Disp: 44 tablet, Rfl: 0     Lab Results   Component Value Date    WBC 5.52 09/26/2021    RBC 4.15 (L) 09/26/2021    HGB 13.0 (L) 09/26/2021    HCT 38.8 (L) 09/26/2021    MCV 94 09/26/2021    MCH 31.3 (H) 09/26/2021    MCHC 33.5 09/26/2021    RDW 15.0 (H) 09/26/2021     09/26/2021    MPV 11.0 09/26/2021    GRAN 4.1 09/26/2021    GRAN 74.9 (H) 09/26/2021    LYMPH 0.9 (L) 09/26/2021    LYMPH 15.4 (L) 09/26/2021    MONO 0.4 09/26/2021    MONO 7.8 09/26/2021    EOS 0.1 09/26/2021    BASO 0.02 09/26/2021    EOSINOPHIL 1.3 09/26/2021    BASOPHIL 0.4 09/26/2021    MG 2.3 09/26/2021        CMP  Lab Results   Component Value Date     09/26/2021    K 3.5 09/26/2021     09/26/2021    CO2 33 (H) 09/26/2021     09/26/2021    BUN 14 09/26/2021    CREATININE 0.92 09/26/2021    CALCIUM 9.1 09/26/2021    PROT 6.6 09/26/2021    ALBUMIN 3.7 09/26/2021    BILITOT 1.6 (H) 09/26/2021    ALKPHOS 247 (H) 09/26/2021     (H) 09/26/2021     (H) 09/26/2021    ANIONGAP 5 (L) 09/26/2021    ESTGFRAFRICA >60 09/26/2021    EGFRNONAA >60 09/26/2021        No results found for: LABBLOO, LABURIN, RESPIRATORYC, GSRESP         Results for orders placed or performed during the hospital encounter of 09/25/21   EKG 12-lead    Collection Time: 09/26/21  8:38 AM    Narrative    Test Reason : R00.0,    Vent. Rate : 082 BPM     Atrial Rate : 082 BPM     P-R Int : 080 ms          QRS Dur : 130 ms      QT Int : 290 ms       P-R-T Axes : -05 -18 178 degrees     QTc Int : 338 ms    Sinus rhythm with short KS with Premature atrial complexes with Aberrant  conduction  Nonspecific intraventricular block  Minimal voltage criteria for LVH, may be normal variant ( R in aVL )  Nonspecific T wave abnormality  Abnormal ECG  When compared with ECG of 25-SEP-2021 18:20,  Sinus rhythm has replaced Junctional rhythm  Nonspecific intraventricular  block has replaced Incomplete right bundle  branch block  QT has shortened  Confirmed by Derrick RODRIGUEZ, Ronald ([id number]) on 9/27/2021 9:19:09 AM    Referred By: [email or web address]   SELF           Confirmed By:Ronald Meza MD                  Plan:       Problem List Items Addressed This Visit        Pulmonary    SOB (shortness of breath) - Primary    Relevant Medications    furosemide (LASIX) 40 MG tablet       Cardiac/Vascular    Essential hypertension     Condition controlled.  Blood pressure today 124/67 mm Hg.  He is tolerating losartan/metoprolol well.           Mixed hyperlipidemia     He is on atorvastatin 10 mg. Most recent LDL 42 mg%, HDL 38, triglycerides 101 labs September 2021.            Atrial bigeminy     There are some ectopic beats on exam today.  Metoprolol has stabilized his degree of ectopy.  He is free of palpitations.           CHF (congestive heart failure)       ·Most recent echo read as 40%.  I am not convinced that his ejection fraction is that low.  This is based on his previous evaluations last year.  I am advising the patient to change his furosemide 40 mg dosing to p.r.n..           Coronary artery disease involving native coronary artery of native heart without angina pectoris     It sounds like he is active at his residence.  Condition stable.  He never had classic anginal complaints but there was shortness of breath complaints.              Orthopedic    Venous stasis ulcer of right calf without varicose veins     He is willing to proceed with compression stocking usage.  If there is worsening swelling of his right leg off furosemide he is advised to resume it.      He never had leg swelling until after his femur fracture right leg September 2021.                       A four-month follow-up was advised.           Hank Barry MD  04/18/2022   11:13 AM

## 2022-04-18 NOTE — ASSESSMENT & PLAN NOTE
Condition controlled.  Blood pressure today 124/67 mm Hg.  He is tolerating losartan/metoprolol well.

## 2022-04-18 NOTE — ASSESSMENT & PLAN NOTE
It sounds like he is active at his residence.  Condition stable.  He never had classic anginal complaints but there was shortness of breath complaints.

## 2022-04-18 NOTE — ASSESSMENT & PLAN NOTE
·Most recent echo read as 40%.  I am not convinced that his ejection fraction is that low.  This is based on his previous evaluations last year.  I am advising the patient to change his furosemide 40 mg dosing to p.r.n..

## 2022-04-18 NOTE — ASSESSMENT & PLAN NOTE
He is willing to proceed with compression stocking usage.  If there is worsening swelling of his right leg off furosemide he is advised to resume it.      He never had leg swelling until after his femur fracture right leg September 2021.

## 2022-04-18 NOTE — ASSESSMENT & PLAN NOTE
There are some ectopic beats on exam today.  Metoprolol has stabilized his degree of ectopy.  He is free of palpitations.

## 2022-04-26 ENCOUNTER — TELEPHONE (OUTPATIENT)
Dept: FAMILY MEDICINE | Facility: CLINIC | Age: 77
End: 2022-04-26
Payer: MEDICARE

## 2022-04-26 DIAGNOSIS — M79.606 PAIN OF LOWER EXTREMITY, UNSPECIFIED LATERALITY: Primary | ICD-10-CM

## 2022-04-26 NOTE — TELEPHONE ENCOUNTER
----- Message from Gabriela Kaiser sent at 4/26/2022  1:50 PM CDT -----  Needs advice from nurse:      Who Called:pt  Regarding:patient needs referral for tomorrow's appt with ortho  Would the patient rather a call back or VIA [facility]?  Best Call Back number:[phone]  Additional Info:    Dx right leg/problems after surgery/2nd opinion

## 2022-04-27 ENCOUNTER — TELEPHONE (OUTPATIENT)
Dept: ORTHOPEDICS | Facility: CLINIC | Age: 77
End: 2022-04-27
Payer: MEDICARE

## 2022-04-27 ENCOUNTER — HOSPITAL ENCOUNTER (OUTPATIENT)
Dept: RADIOLOGY | Facility: HOSPITAL | Age: 77
Discharge: HOME | End: 2022-04-27
Attending: ORTHOPAEDIC SURGERY
Payer: MEDICARE

## 2022-04-27 ENCOUNTER — OFFICE VISIT (OUTPATIENT)
Dept: ORTHOPEDICS | Facility: CLINIC | Age: 77
End: 2022-04-27
Payer: MEDICARE

## 2022-04-27 VITALS — WEIGHT: 218.81 LBS | HEART RATE: 43 BPM | HEIGHT: 71 IN | BODY MASS INDEX: 30.63 KG/M2

## 2022-04-27 DIAGNOSIS — M79.604 LEG PAIN, ANTERIOR, RIGHT: ICD-10-CM

## 2022-04-27 DIAGNOSIS — M25.519 SHOULDER PAIN, UNSPECIFIED CHRONICITY, UNSPECIFIED LATERALITY: ICD-10-CM

## 2022-04-27 DIAGNOSIS — M79.606 PAIN OF LOWER EXTREMITY, UNSPECIFIED LATERALITY: ICD-10-CM

## 2022-04-27 DIAGNOSIS — M75.41 ROTATOR CUFF IMPINGEMENT SYNDROME OF RIGHT SHOULDER: ICD-10-CM

## 2022-04-27 DIAGNOSIS — S72.141A DISPLACED INTERTROCHANTERIC FRACTURE OF RIGHT FEMUR, INITIAL ENCOUNTER FOR CLOSED FRACTURE: Primary | ICD-10-CM

## 2022-04-27 DIAGNOSIS — S72.001D AFTERCARE FOR HEALING TRAUMATIC CLOSED FRACTURE OF HIP, RIGHT: ICD-10-CM

## 2022-04-27 DIAGNOSIS — M25.519 SHOULDER PAIN, UNSPECIFIED CHRONICITY, UNSPECIFIED LATERALITY: Primary | ICD-10-CM

## 2022-04-27 PROCEDURE — 1157F PR ADVANCE CARE PLAN OR EQUIV PRESENT IN MEDICAL RECORD: ICD-10-PCS | Mod: CPTII,S$GLB,, | Performed by: ORTHOPAEDIC SURGERY

## 2022-04-27 PROCEDURE — 73552 X-RAY EXAM OF FEMUR 2/>: CPT | Mod: 26,RT,, | Performed by: RADIOLOGY

## 2022-04-27 PROCEDURE — 99999 PR PBB SHADOW E&M-EST. PATIENT-LVL IV: CPT | Mod: PBBFAC,,, | Performed by: ORTHOPAEDIC SURGERY

## 2022-04-27 PROCEDURE — 99214 PR OFFICE/OUTPT VISIT, EST, LEVL IV, 30-39 MIN: ICD-10-PCS | Mod: S$GLB,,, | Performed by: ORTHOPAEDIC SURGERY

## 2022-04-27 PROCEDURE — 1159F MED LIST DOCD IN RCRD: CPT | Mod: CPTII,S$GLB,, | Performed by: ORTHOPAEDIC SURGERY

## 2022-04-27 PROCEDURE — 3288F PR FALLS RISK ASSESSMENT DOCUMENTED: ICD-10-PCS | Mod: CPTII,S$GLB,, | Performed by: ORTHOPAEDIC SURGERY

## 2022-04-27 PROCEDURE — 73030 X-RAY EXAM OF SHOULDER: CPT | Mod: TC,FY,RT

## 2022-04-27 PROCEDURE — 1159F PR MEDICATION LIST DOCUMENTED IN MEDICAL RECORD: ICD-10-PCS | Mod: CPTII,S$GLB,, | Performed by: ORTHOPAEDIC SURGERY

## 2022-04-27 PROCEDURE — 99999 PR PBB SHADOW E&M-EST. PATIENT-LVL IV: ICD-10-PCS | Mod: PBBFAC,,, | Performed by: ORTHOPAEDIC SURGERY

## 2022-04-27 PROCEDURE — 73030 XR SHOULDER TRAUMA 3 VIEW RIGHT: ICD-10-PCS | Mod: 26,RT,, | Performed by: RADIOLOGY

## 2022-04-27 PROCEDURE — 1125F AMNT PAIN NOTED PAIN PRSNT: CPT | Mod: CPTII,S$GLB,, | Performed by: ORTHOPAEDIC SURGERY

## 2022-04-27 PROCEDURE — 1100F PTFALLS ASSESS-DOCD GE2>/YR: CPT | Mod: CPTII,S$GLB,, | Performed by: ORTHOPAEDIC SURGERY

## 2022-04-27 PROCEDURE — 73552 XR FEMUR 2 VIEW RIGHT: ICD-10-PCS | Mod: 26,RT,, | Performed by: RADIOLOGY

## 2022-04-27 PROCEDURE — 3288F FALL RISK ASSESSMENT DOCD: CPT | Mod: CPTII,S$GLB,, | Performed by: ORTHOPAEDIC SURGERY

## 2022-04-27 PROCEDURE — 1157F ADVNC CARE PLAN IN RCRD: CPT | Mod: CPTII,S$GLB,, | Performed by: ORTHOPAEDIC SURGERY

## 2022-04-27 PROCEDURE — 99214 OFFICE O/P EST MOD 30 MIN: CPT | Mod: S$GLB,,, | Performed by: ORTHOPAEDIC SURGERY

## 2022-04-27 PROCEDURE — 1100F PR PT FALLS ASSESS DOC 2+ FALLS/FALL W/INJURY/YR: ICD-10-PCS | Mod: CPTII,S$GLB,, | Performed by: ORTHOPAEDIC SURGERY

## 2022-04-27 PROCEDURE — 73552 X-RAY EXAM OF FEMUR 2/>: CPT | Mod: TC,FY,RT

## 2022-04-27 PROCEDURE — 1125F PR PAIN SEVERITY QUANTIFIED, PAIN PRESENT: ICD-10-PCS | Mod: CPTII,S$GLB,, | Performed by: ORTHOPAEDIC SURGERY

## 2022-04-27 PROCEDURE — 73030 X-RAY EXAM OF SHOULDER: CPT | Mod: 26,RT,, | Performed by: RADIOLOGY

## 2022-04-27 NOTE — PROGRESS NOTES
[facility], Orthopedics and Sports Medicine  [facility]    New Patient Office Visit  04/27/2022     Subjective:      Bean Salas is a 77 y.o. male referred by Aaareferral Self for evaluation and treatment of right shoulder and right hip pain.  The patient complains of right hip pain since surgery 9/2021, right hip IMN s/p IT fx.  He has a normal postoperative course.  Surgery performed by another surgeon.  He experiences a clicking/shifting sensation at the lateral hip when he rolls on his right side; he has pain when lying on the right hip.  He also has pain at the posterior right hip in the gluteal region.  Of note, is many years s/p bilateral TKA and no residual issues with the knees.    The patient also complains of right shoulder pain for the last 4 months.  No prior shoulder problems.  No shoulder injections or treatment.  Pain worst at night and mild pain with overhead motion.  No radicular pain in the hand or neurologic complaints below elbow.     Outside reports reviewed: historical medical records, office notes and operative reports.    Past Medical History:   Diagnosis Date    Hyperlipidemia     Hypertension     Thyroid disease     hypo    Ventricular tachycardia     frequent PVCs       Patient Active Problem List   Diagnosis    Essential hypertension    Mixed hyperlipidemia    Hypothyroidism due to acquired atrophy of thyroid    Hyperglycemia    Atrial bigeminy    Angina pectoris    Bradycardia    Fall    Displaced intertrochanteric fracture of right femur, initial encounter for closed fracture    Closed fracture of right hip    Acute combined systolic and diastolic congestive heart failure    Elevated LFTs    Advance care planning    CHF (congestive heart failure)    Coronary artery disease involving native coronary artery of native heart without angina pectoris    SOB (shortness of breath)    Venous stasis ulcer of right calf without varicose veins     Aftercare for healing traumatic closed fracture of hip, right    Rotator cuff impingement syndrome of right shoulder       Past Surgical History:   Procedure Laterality Date    COLONOSCOPY  1/1/11    CORONARY ANGIOGRAPHY N/A 7/22/2021    Procedure: ANGIOGRAM, CORONARY ARTERY;  Surgeon: Hank Barry MD;  Location: [facility] CATH LAB/EP;  Service: Cardiology;  Laterality: N/A;    HERNIA REPAIR      HIP SURGERY Right 08/2021    INTRAMEDULLARY RODDING OF TROCHANTER OF FEMUR Right 9/3/2021    Procedure: INSERTION, INTRAMEDULLARY RACQUEL, FEMUR, TROCHANTER;  Surgeon: Darryn Hall MD;  Location: [facility] OR;  Service: Orthopedics;  Laterality: Right;    JOINT REPLACEMENT Bilateral     knee    LEFT HEART CATHETERIZATION Right 7/22/2021    Procedure: Left heart cath;  Surgeon: Hank Barry MD;  Location: [facility] CATH LAB/EP;  Service: Cardiology;  Laterality: Right;        Current Outpatient Medications   Medication Instructions    acetaminophen (TYLENOL) 650 mg, Oral, Every 6 hours PRN    aspirin 325 mg, Oral, Daily    atorvastatin (LIPITOR) 10 mg, Oral, Daily    furosemide (LASIX) 40 mg, Oral, As needed (PRN), Take daily for next 3 days, then use as needed. Weigh daily. Afterwards, f weight increases 2 lbs/24h, take dose of lasix daily  until weight is back to baseline    levothyroxine (SYNTHROID) 300 mcg, Oral, Daily    losartan (COZAAR) 100 mg, Oral, Daily    magnesium hydroxide 400 mg/5 ml (MILK OF MAGNESIA) 400 mg/5 mL Susp 30 mLs, Oral, Daily PRN    methocarbamoL (ROBAXIN) 500 MG Tab Take 1 tablet by mouth 4 times daily    metoprolol succinate (TOPROL-XL) 50 mg, Oral, Daily    multivit-min-FA-lycopen-lutein (CENTRUM SILVER) 0.4-300-250 mg-mcg-mcg Tab 1 tablet, Oral, Daily    traMADoL (ULTRAM) 50 mg, Oral, Every 4 hours PRN        Review of patient's allergies indicates:  No Known Allergies    Social History     Socioeconomic History    Marital status:    Tobacco Use    Smoking status: Former  Smoker     Packs/day: 1.00     Years: 35.00     Pack years: 35.00     Types: Cigarettes     Quit date: 2000     Years since quittin.3    Smokeless tobacco: Never Used   Substance and Sexual Activity    Alcohol use: No       No family history on file.      Review of Systems   Constitutional: Negative for chills and fever.   HENT: Negative for hearing loss.    Eyes: Negative for blurred vision.   Cardiovascular: Negative for chest pain.   Respiratory: Negative for shortness of breath.    Gastrointestinal: Negative for abdominal pain.   Neurological: Negative for light-headedness.          Objective:      General    Nursing note and vitals reviewed.  Constitutional: He is oriented to person, place, and time. He appears well-developed and well-nourished.   HENT:   Head: Normocephalic and atraumatic.   Eyes: EOM are normal. Pupils are equal, round, and reactive to light.   Cardiovascular: Normal rate and regular rhythm.    Pulmonary/Chest: Effort normal.   Abdominal: Soft.   Neurological: He is alert and oriented to person, place, and time.   Psychiatric: He has a normal mood and affect. His behavior is normal.     General Musculoskeletal Exam   Gait: antalgic       Right Knee Exam     Inspection   Erythema: absent  Scars: present  Swelling: absent  Effusion: absent    Tenderness   The patient is experiencing no tenderness.     Range of Motion   Extension: 0   Flexion: 110     Other   Sensation: normal    Left Knee Exam     Inspection   Erythema: absent  Scars: present  Swelling: absent  Effusion: absent    Tenderness   The patient is experiencing no tenderness.     Range of Motion   Extension: 0   Flexion: 110     Other   Sensation: normal    Right Hip Exam     Inspection   Scars: present  Bruising: absent  No deformity of hip.  Quadriceps Atrophy:  Positive  Erythema: absent    Tenderness   The patient tender to palpation of the trochanteric bursa.    Range of Motion   Extension: 0   Flexion: 80     Tests    Pain w/ forced internal rotation (MAIKEL): absent  Pain w/ forced external rotation (FADIR): absent  Snapping Hip (internal): positive    Other   Sensation: normal  Left Hip Exam     Inspection   No deformity of hip.  Quadriceps Atrophy:  negative    Range of Motion   Extension: 0   Flexion: 90     Tests   Pain w/ forced internal rotation (MAIKEL): absent  Pain w/ forced external rotation (FADIR): absent    Other   Sensation: normal          Muscle Strength   Right Lower Extremity   Hip Flexion: 5/5   Quadriceps:  5/5   Hamstrin/5   Ankle Dorsiflexion:  5/5   Left Lower Extremity   Hip Flexion: 5/5   Quadriceps:  5/5   Hamstrin/5   Ankle Dorsiflexion:  5/5     Vascular Exam     Right Pulses  Dorsalis Pedis:      2+  Posterior Tibial:      2+        Left Pulses  Dorsalis Pedis:      2+  Posterior Tibial:      2+        Edema  Right Upper Leg: absent  Left Upper Leg: absent      Imaging:  Radiographs of the right shoulder taken 2022 were personally reviewed from the [facility].  Multiple views of the shoulder are available today for review, including an AP, scapular Y, axillary view.  The glenohumeral joint demonstrates moderate degenerative changes.  The acromioclavicular joint demonstrates severe degenerative changes.  The glenohumeral joint is concentrically reduced.  There is no acute fracture or dislocation.  There is a prominent subacromial spur noted with     Radiographs of the right femur taken 2022 were personally reviewed from the [facility]. Multiple views are available today for review, including an AP and lateral view.  These demonstrate intact intramedullary hardware with no new fracture or dislocation.  Prior intertrochanteric fracture is healed with some heterotopic bone at the femoral neck.  The lag screw is prominent at the lateral femoral cortex, which is increased compared to intraoperative imaging, but otherwise in acceptable position with no femoral head cutout.   Remainder of implant in acceptable position.      Procedures        Assessment:       Bean Salas is a 77 y.o. male seen in the office today. The primary encounter diagnosis was Displaced intertrochanteric fracture of right femur, initial encounter for closed fracture. Diagnoses of Pain of lower extremity, unspecified laterality, Aftercare for healing traumatic closed fracture of hip, right, and Rotator cuff impingement syndrome of right shoulder were also pertinent to this visit.  Non-operative treatment is recommended at this time. I offered CSI for the right shoulder problem, but patient declined stating preference for therapy alone and will consider shot in future if required.  Lateral hip pain likely related to hardware prominence due to mild fracture collapse which is not uncommon.  His symptoms are likely exacerbated by his antalgic gait, so will likely improve with physical therapy.  Additional treatment for this problem is based on symptoms, and if therapy fails to improve hip symptoms will consider hardware removal after minimum of 1 year post op depending on imaging demonstrating complete fracture healing. The natural history and expected course discussed with patient. Various treatment options were discussed, including their risks and benefits. All of the patient's questions were answered.     Plan:      1. Physical therapy and rehabilitation treatment.  2. Tylenol 650mg TID, PRN pain.  3. Follow up in 3 months.          Kristofer Brown IV, MD   of Clinical Orthopedics  Department of Orthopedic Surgery  [facility]  Office: [phone]  Website: [email or web address]      Orders Placed This Encounter    Ambulatory referral/consult to Physical/Occupational Therapy

## 2022-05-06 ENCOUNTER — CLINICAL SUPPORT (OUTPATIENT)
Dept: REHABILITATION | Facility: HOSPITAL | Age: 77
End: 2022-05-06
Attending: ORTHOPAEDIC SURGERY
Payer: MEDICARE

## 2022-05-06 DIAGNOSIS — R53.1 WEAKNESS: ICD-10-CM

## 2022-05-06 DIAGNOSIS — M75.41 ROTATOR CUFF IMPINGEMENT SYNDROME OF RIGHT SHOULDER: ICD-10-CM

## 2022-05-06 DIAGNOSIS — M25.651 IMPAIRED RANGE OF MOTION OF RIGHT HIP: ICD-10-CM

## 2022-05-06 DIAGNOSIS — S72.001D AFTERCARE FOR HEALING TRAUMATIC CLOSED FRACTURE OF HIP, RIGHT: ICD-10-CM

## 2022-05-06 DIAGNOSIS — S72.141A DISPLACED INTERTROCHANTERIC FRACTURE OF RIGHT FEMUR, INITIAL ENCOUNTER FOR CLOSED FRACTURE: ICD-10-CM

## 2022-05-06 DIAGNOSIS — Z78.9 IMPAIRED ACTIVITIES OF DAILY LIVING: ICD-10-CM

## 2022-05-06 DIAGNOSIS — M25.611 DECREASED RIGHT SHOULDER RANGE OF MOTION: ICD-10-CM

## 2022-05-06 PROCEDURE — 97162 PT EVAL MOD COMPLEX 30 MIN: CPT | Mod: PO | Performed by: PHYSICAL THERAPIST

## 2022-05-06 NOTE — PATIENT INSTRUCTIONS
"Access Code: [email or web address]  URL: [email or web address]  Date: 05/06/2022  Prepared by: Vadim Jung    Exercises  Sit to Stand - 1-2 x daily - 5-7 x weekly - 3 sets - 10 reps - 3" hold  Standing Heel Raises - 1-2 x daily - 5-7 x weekly - 3 sets - 10 reps - 3" hold  Standing Hip Abduction with Resistance at Ankles and Counter Support - 1-2 x daily - 5-7 x weekly - 3 sets - 10 reps - 3" hold  Seated Thoracic Lumbar Extension with Pectoralis Stretch - 1-2 x daily - 5-7 x weekly - 3 sets - 10 reps - 3" hold  Shoulder External Rotation and Scapular Retraction with Resistance - 1-2 x daily - 5-7 x weekly - 3 sets - 10 reps - 3" hold    "

## 2022-05-09 PROBLEM — M25.651 IMPAIRED RANGE OF MOTION OF RIGHT HIP: Status: ACTIVE | Noted: 2022-05-09

## 2022-05-09 PROBLEM — Z78.9 IMPAIRED ACTIVITIES OF DAILY LIVING: Status: ACTIVE | Noted: 2022-05-09

## 2022-05-09 PROBLEM — R53.1 WEAKNESS: Status: ACTIVE | Noted: 2022-05-09

## 2022-05-09 PROBLEM — M25.611 DECREASED RIGHT SHOULDER RANGE OF MOTION: Status: ACTIVE | Noted: 2022-05-09

## 2022-05-09 NOTE — PLAN OF CARE
[facility]  Physical Therapy Initial Evaluation    Name: Bean Salas  [facility] Number: [id number]    Therapy Diagnosis:   Encounter Diagnoses   Name Primary?    Displaced intertrochanteric fracture of right femur, initial encounter for closed fracture     Aftercare for healing traumatic closed fracture of hip, right     Rotator cuff impingement syndrome of right shoulder     Impaired range of motion of right hip     Decreased right shoulder range of motion     Weakness     Impaired activities of daily living      Physician: Kristofer Brown IV, MD    Physician Orders: PT Eval and Treat   Hip and knee range of motion; hip, thigh, glute, core strengthening; balance training; gait training; physical modalities for pain relief  Patient is 7 months after hip fracture IM nail right hip; WBAT     Right shoulder rotator cuff impingement protocol  Medical Diagnosis from Referral: : Displaced intertrochanteric fracture of right femur, initial encounter for closed fracture [S72.141A], Aftercare for healing traumatic closed fracture of hip, right [S72.001D], Rotator cuff impingement syndrome of right shoulder [M75.41]  Evaluation Date: 5/6/2022  Authorization Period Expiration: 4/27/23  Plan of Care Expiration: 8/6/22  Visit # / Visits authorized: 1/ 1    Time In: 10:15  Time Out: 10:58  Total Billable Time: 43 minutes    Precautions: Standard    Subjective   Date of onset: September  History of current condition - Bean reports: he would like to strengthen his R leg so he can walk better. He has a bone spur on the R shoulder and would like to try to manage conservatively. Slight pain in the hip area, every now and then slight pain in the knee. He had knee replacements over 15 years ago. He fractured his hip in 9/21. He would also like to work on balance. He denies any recent falls since he is careful, he doesn't use an AD although he does have a cane. He has pain in the shoulder with some  overhead movement and some pain at night when his arm is bent. It feels better if he straightens his arm out.     Medical History:   Past Medical History:   Diagnosis Date    Hyperlipidemia     Hypertension     Thyroid disease     hypo    Ventricular tachycardia     frequent PVCs       Surgical History:   Bean Salas  has a past surgical history that includes Hernia repair; Colonoscopy (1/1/11); Joint replacement (Bilateral); Left heart catheterization (Right, 7/22/2021); Coronary angiography (N/A, 7/22/2021); Intramedullary rodding of trochanter of femur (Right, 9/3/2021); and Hip surgery (Right, 08/2021).    Medications:   Bean has a current medication list which includes the following prescription(s): acetaminophen, aspirin, atorvastatin, furosemide, levothyroxine, losartan, magnesium hydroxide 400 mg/5 ml, methocarbamol, metoprolol succinate, centrum silver, and tramadol.    Allergies:   Review of patient's allergies indicates:  No Known Allergies     Imaging, Xray- shoulder: FINDINGS:  The glenohumeral joint appears normal.     Normal humeral head contour.     Mild osteophyte at the acromioclavicular joint     No fracture, no osseous lesions.     The soft tissues appear normal.     The right upper thoracic region appears normal.    X-ray- hip- Status post open reduction and internal fixation of a femoral neck fracture with a long intramedullary grupreet and a femoral neck screw, it is in good alignment.  No periprosthetic acute fracture seen.  Small dystrophic calcification project inferior to the hip joint possibly of fragment of the avulsed lesser trochanter.  Postoperative changes of total right knee replacement.    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 Was able to do more repairs  before.  Current Level of Function: Not much. He doesn't want to go up ladder. Careful going up/down stairs, can't walk far. Can't run but doesn't want to run. Trouble standing from a low sofa due to quad weakness.    Pain:  Current 0/10, worst 2/10, best 0/10   Location: right hip and shoulder  Description: Dull  Aggravating Factors: Pressure on hardware, certain shoulder positions  Easing Factors: Changing positions. Tylenol as needed    Pts goals: Walk better, walk further distance and walk faster    Objective     Observation:     Posture:     Gait: Decreased stance time on R    Hip Range of Motion (Active / Passive):   Left  Right    Flexion 120 80   Abduction 30 20   Adduction     Extension     ER @ 0     ER @ 90 40 40   IR @ 0     IR @ 90 15 (20) 0     Shoulder range of motion   Flexion : L 125, R 120  Abduction: L 110, R 100  IR: L T7, R L4  ER: L: C7, R: lateral neck    Lower Extremity Strength   Left Right   Knee extension: 4/5 4/5   Knee flexion: 4+/5 4/5   Hip flexion: 4/5 2+/5   Hip internal rotation 4/5 3+/5   Hip external rotation 4/5 4/5   Hip extension:  2/5 2/5   Hip abduction: 3+/5 3/5   Hip adduction: NT/5 NT/5         Flexibility: NT   Ely's test: R =  degrees ; L =  degrees   Popliteal Angle: R =  degrees ; L =  degrees   Alyssia's test: R =  ; L =    Donald test: R =  ; L =     Joint Mobility: Hypomobility noted in hip joint. Significant hypomobility noted in thoracic spine    Palpation: No significant tenderness noted    Sensation: WNL    Edema: None noted    Function:    30s chair stand- 6 reps with use of arms    2MWT- 249 ft, slight-moderate shortness of breath             TREATMENT     Total Treatment time separate from Evaluation: 0 minutes    Bean received therapeutic exercises to develop strength, endurance, ROM, flexibility and posture for 0 minutes including:    Plan for next visit    Nustep    Gait training for endurance- 2 minute intervals    SKTC  Piriformis stretch  Supine hip  flexor stretch    Pulley flexion  Seated thoracic extension mobilization    Sit to stand  Standing hip abduction  Heel raises    Bean received the following manual therapy techniques: Joint mobilizations were applied to the: shoulder, thoracic spine, hip for 0 minutes, including:    Plan for next visit    Seated thoracic mobilization  Shoulder posterior/inferior mobilization  Hip mobilization    Home Exercises and Patient Education Provided    Education provided:   - Role of PT, PT POC    Written Home Exercises Provided: yes.  Exercises were reviewed and Bean was able to demonstrate them prior to the end of the session.  Bean demonstrated good  understanding of the education provided.     See EMR under Patient Instructions for exercises provided 5/6/2022.    Assessment   Bean is a 77 y.o. male referred to outpatient Physical Therapy with a medical diagnosis of s/p R intramedullar gurpreet placement in femur and R shoulder impingement syndrome. Patient noted to have limitations in functional mobility, impaired hip/shoulder strength, impaired shoulder/hip/thoracic joint mobility, impaired hip/shoulder ROM  upon clinical examination. This pt is a good candidate for skilled PT tx and stands to benefit from a combination of manual therapy, therapeutic exercise/activities, neuromuscular re-education, and modalities Prn. The pt has been educated on their dx/POC and consents to further PT tx.      Pt prognosis is Good.   Pt will benefit from skilled outpatient Physical Therapy to address the deficits stated above and in the chart below, provide pt/family education, and to maximize pt's level of independence.     Plan of care discussed with patient: Yes  Pt's spiritual, cultural and educational needs considered and patient is agreeable to the plan of care and goals as stated below:     Anticipated Barriers for therapy: None    Medical Necessity is demonstrated by the following  History  Co-morbidities and personal factors  that may impact the plan of care Co-morbidities:   Past Medical History:   Diagnosis Date    Hyperlipidemia     Hypertension     Thyroid disease     hypo    Ventricular tachycardia     frequent PVCs         Personal Factors:   no deficits     moderate   Examination  Body Structures and Functions, activity limitations and participation restrictions that may impact the plan of care Body Regions:   lower extremities  upper extremities  trunk    Body Systems:    ROM  strength  balance  gait  transfers    Participation Restrictions:   Daily tasks    Activity limitations:   Learning and applying knowledge  no deficits    General Tasks and Commands  no deficits    Communication  no deficits    Mobility  lifting and carrying objects  walking    Self care  toileting    Domestic Life  shopping  cooking  doing house work (cleaning house, washing dishes, laundry)  assisting others    Interactions/Relationships  no deficits    Life Areas  Home care    Community and Social Life  community life  recreation and leisure         moderate   Clinical Presentation evolving clinical presentation with changing clinical characteristics moderate   Decision Making/ Complexity Score: moderate     Goals:  Short-Term Goals: 4 weeks  - The patient will be independent with initial home exercise program.  - The patient will increase strength to at least 3+/5 in muscles tested to indicate improvements in functional strength.  - The patient will increase range of motion grossly by 10 degrees to perform sit to stand without use of arms  - Patient will improve 2 MWT performs to 300 feet to demonstrate improved community ambulation  - Patient will improve 30s chair stand performance to 10 times while using arms to demonstrate reduction in fall risk    Long-Term Goals: 12 weeks  - Pt to achieve <30% limitation on hip and shoulder survey as measured by the FOTO to demonstrate decreased disability.  - The patient will increase strength to at least 4/5  to perform functional mobility including sit to stand, gait  - The patient will increase hip ROM to 100 degrees flexion, 15 degrees internal rotation to enable him to perform recreational tasks without pain.  - Patient will improve shoulder range of motion to equal bilaterally to show reduction in symptom irritability and enable him to sleep without pain  - Patient will improve 2 MWT performs to 500 feet to demonstrate improved community ambulation and get closer to age-group norm of 535 ft  - Patient will improve 30s chair stand performance to 10 times without using arms to demonstrate reduction in fall risk          Plan   Plan of care Certification: 5/6/2022 to 8/6/22.    Outpatient Physical Therapy 2 times weekly for 12 weeks to include the following interventions: Electrical Stimulation PRN, Gait Training, Manual Therapy, Moist Heat/ Ice, Neuromuscular Re-ed, Patient Education, Therapeutic Activities and Therapeutic Exercise.     Constantin Casey, PT

## 2022-05-13 ENCOUNTER — CLINICAL SUPPORT (OUTPATIENT)
Dept: REHABILITATION | Facility: HOSPITAL | Age: 77
End: 2022-05-13
Attending: ORTHOPAEDIC SURGERY
Payer: MEDICARE

## 2022-05-13 DIAGNOSIS — Z78.9 IMPAIRED ACTIVITIES OF DAILY LIVING: ICD-10-CM

## 2022-05-13 DIAGNOSIS — M25.651 IMPAIRED RANGE OF MOTION OF RIGHT HIP: Primary | ICD-10-CM

## 2022-05-13 DIAGNOSIS — R53.1 WEAKNESS: ICD-10-CM

## 2022-05-13 DIAGNOSIS — M25.611 DECREASED RIGHT SHOULDER RANGE OF MOTION: ICD-10-CM

## 2022-05-13 PROCEDURE — 97140 MANUAL THERAPY 1/> REGIONS: CPT | Mod: PO | Performed by: PHYSICAL THERAPIST

## 2022-05-13 PROCEDURE — 97110 THERAPEUTIC EXERCISES: CPT | Mod: PO | Performed by: PHYSICAL THERAPIST

## 2022-05-13 NOTE — PROGRESS NOTES
Physical Therapy Daily Treatment Note     Name: Bean Salas  Clinic Number: [id number]    Therapy Diagnosis:   Encounter Diagnoses   Name Primary?    Impaired range of motion of right hip Yes    Decreased right shoulder range of motion     Weakness     Impaired activities of daily living      Physician: Kristofer Brown IV, MD    Visit Date: 5/13/2022  Physician Orders: PT Eval and Treat   Hip and knee range of motion; hip, thigh, glute, core strengthening; balance training; gait training; physical modalities for pain relief  Patient is 7 months after hip fracture IM nail right hip; WBAT     Right shoulder rotator cuff impingement protocol  Medical Diagnosis from Referral: : Displaced intertrochanteric fracture of right femur, initial encounter for closed fracture [S72.141A], Aftercare for healing traumatic closed fracture of hip, right [S72.001D], Rotator cuff impingement syndrome of right shoulder [M75.41]  Evaluation Date: 5/6/2022  Authorization Period Expiration: 4/27/23  Plan of Care Expiration: 8/6/22  Visit # / Visits authorized: 1/ 25      Progress due: 6/5/22    Time In: 11:00  Time Out: 11:48  Total Billable Time: 48 minutes    Precautions: Standard    Subjective     Pt reports: His knees were a little sore after last time due to the testing that he did. He feels like he would have felt better if we would have warmed up first. Patient would like to work on balance next time.  He was compliant with home exercise program.  Response to previous treatment: Soreness  Functional change: No change    Pain: Not asked/10  Location: right Hip and shoulder     Objective     Primary Impairments: Hip flexion/IR range of motion, hip strength, shoulder range of motion, shoulder mobility, thoracic mobility    FOTO:  -Intake: Complete  -Status: incomplete  -Discharge: incomplete      Treatment     Bean received therapeutic exercises to develop strength, endurance, ROM, flexibility and posture for 20 minutes  "including:   * indicates that exercise was performed, not billed today since patient was not under direct one-on-one supervision     Nustep- L5 x 5 min     Gait training for endurance- 2 minute intervals- NT     SKTC- 10s holds x 10  Piriformis stretch  Supine hip flexor stretch- 30s x 3    Bridge - 2 x 10     Pulley flexion  Seated thoracic extension mobilization     Sit to stand- 2 x 10 from 24" box*  Standing hip abduction- 2 x 10 ea*  Standing hip march- 2 x 10 ea*  Heel raises    Bean participated in neuromuscular re-education activities to improve: Balance for 0 minutes. The following activities were included:    Tandem gait  Slow marching in place for single leg stability  Lyndsey stepping  Rhomberg stance       Bean received the following manual therapy techniques: Joint mobilizations were applied to the: shoulder, thoracic spine, hip for 15 minutes, including:     Seated thoracic mobilization  Shoulder posterior/inferior mobilization  Hip mobilization- lateral and inferior with belt      Home Exercises Provided and Patient Education Provided     Education provided:   - Educated regarding pathoanatomy of his shoulder pain    Written Home Exercises Provided: Patient instructed to cont prior HEP.  Exercises were reviewed and Bean was able to demonstrate them prior to the end of the session.  Bean demonstrated good  understanding of the education provided.     See EMR under Patient Instructions for exercises provided prior visit.    Assessment     Patient had improved hip range of motion with today's treatment. He continues to have significant limitation in hip IR range of motion and had anterior thigh pain while working on hip mobilizations. Some lateral shoulder symptoms with shoulder mobilizations. Overall tolerated exercise program well today.    Bean Is progressing well towards his goals.   Pt prognosis is Good.     Pt will continue to benefit from skilled outpatient physical therapy to address " the deficits listed in the problem list box on initial evaluation, provide pt/family education and to maximize pt's level of independence in the home and community environment.     Pt's spiritual, cultural and educational needs considered and pt agreeable to plan of care and goals.    Anticipated barriers to physical therapy: None    Goals:  Short-Term Goals: 4 weeks  - The patient will be independent with initial home exercise program.  - The patient will increase strength to at least 3+/5 in muscles tested to indicate improvements in functional strength.  - The patient will increase range of motion grossly by 10 degrees to perform sit to stand without use of arms  - Patient will improve 2 MWT performs to 300 feet to demonstrate improved community ambulation  - Patient will improve 30s chair stand performance to 10 times while using arms to demonstrate reduction in fall risk     Long-Term Goals: 12 weeks  - Pt to achieve <30% limitation on hip and shoulder survey as measured by the FOTO to demonstrate decreased disability.  - The patient will increase strength to at least 4/5 to perform functional mobility including sit to stand, gait  - The patient will increase hip ROM to 100 degrees flexion, 15 degrees internal rotation to enable him to perform recreational tasks without pain.  - Patient will improve shoulder range of motion to equal bilaterally to show reduction in symptom irritability and enable him to sleep without pain  - Patient will improve 2 MWT performs to 500 feet to demonstrate improved community ambulation and get closer to age-group norm of 535 ft  - Patient will improve 30s chair stand performance to 10 times without using arms to demonstrate reduction in fall risk         Plan       Add balance exercises  Continue to address primary impairments      Constantin Casey, PT

## 2022-05-20 ENCOUNTER — CLINICAL SUPPORT (OUTPATIENT)
Dept: REHABILITATION | Facility: HOSPITAL | Age: 77
End: 2022-05-20
Attending: ORTHOPAEDIC SURGERY
Payer: MEDICARE

## 2022-05-20 DIAGNOSIS — R53.1 WEAKNESS: ICD-10-CM

## 2022-05-20 DIAGNOSIS — M25.611 DECREASED RIGHT SHOULDER RANGE OF MOTION: ICD-10-CM

## 2022-05-20 DIAGNOSIS — M25.651 IMPAIRED RANGE OF MOTION OF RIGHT HIP: Primary | ICD-10-CM

## 2022-05-20 DIAGNOSIS — Z78.9 IMPAIRED ACTIVITIES OF DAILY LIVING: ICD-10-CM

## 2022-05-20 PROCEDURE — 97112 NEUROMUSCULAR REEDUCATION: CPT | Mod: PO | Performed by: PHYSICAL THERAPIST

## 2022-05-20 PROCEDURE — 97110 THERAPEUTIC EXERCISES: CPT | Mod: PO | Performed by: PHYSICAL THERAPIST

## 2022-05-20 NOTE — PROGRESS NOTES
Physical Therapy Daily Treatment Note     Name: Bean Salas  [facility] Number: [id number]    Therapy Diagnosis:   Encounter Diagnoses   Name Primary?    Impaired range of motion of right hip Yes    Decreased right shoulder range of motion     Weakness     Impaired activities of daily living      Physician: Kristofer Brown IV, MD    Visit Date: 5/20/2022  Physician Orders: PT Eval and Treat   Hip and knee range of motion; hip, thigh, glute, core strengthening; balance training; gait training; physical modalities for pain relief  Patient is 7 months after hip fracture IM nail right hip; WBAT     Right shoulder rotator cuff impingement protocol  Medical Diagnosis from Referral: : Displaced intertrochanteric fracture of right femur, initial encounter for closed fracture [S72.141A], Aftercare for healing traumatic closed fracture of hip, right [S72.001D], Rotator cuff impingement syndrome of right shoulder [M75.41]  Evaluation Date: 5/6/2022  Authorization Period Expiration: 12/31/22  Plan of Care Expiration: 8/6/22  Visit # / Visits authorized: 2/ 25      Progress due: 6/5/22    Time In: 11:00  Time Out: 11:50  Total Billable Time: 50 minutes    Precautions: Standard    Subjective     Pt reports: He felt a little bit of stiffness after last time  He was compliant with home exercise program.  Response to previous treatment: Stiffness  Functional change: No change    Pain: Not asked/10  Location: right Hip and shoulder     Objective     Primary Impairments: Hip flexion/IR range of motion, hip strength, shoulder range of motion, shoulder mobility, thoracic mobility    FOTO:  -Intake: Complete  -Status: incomplete  -Discharge: incomplete      Treatment     Bean received therapeutic exercises to develop strength, endurance, ROM, flexibility and posture for 20 minutes including:   * indicates that exercise was performed, not billed today since patient was not under direct one-on-one supervision     Nustep- L5 x 5 min    "  Gait training for endurance- 2 minute intervals- NT     Supine hip flexor stretch- 30s x 3    Bridge - 2 x 10     Pulley flexion- 2 min  Seated thoracic extension mobilization- 20x     Sit to stand- 2 x 10 from 24" box*  Standing hip abduction- 2 x 10 ea*  Standing hip march- 2 x 10 ea*  Heel raises    Step ups/Stairs- Next    Bean participated in neuromuscular re-education activities to improve: Balance for 10 minutes. The following activities were included:    Tandem gait- 5 laps  Slow marching in place for single leg stability- 2 min  Lyndsey stepping  Rhomberg stance       Bean received the following manual therapy techniques: Joint mobilizations were applied to the: shoulder, thoracic spine, hip for 0 minutes, including:     Seated thoracic mobilization  Shoulder posterior/inferior mobilization  Hip mobilization- lateral and inferior with belt      Home Exercises Provided and Patient Education Provided     Education provided:   -Educated patient regarding balance exercises to work on at home    Written Home Exercises Provided: Patient instructed to cont prior HEP.  Exercises were reviewed and Bean was able to demonstrate them prior to the end of the session.  Bean demonstrated good  understanding of the education provided.     See EMR under Patient Instructions for exercises provided prior visit.    Assessment     Patient tolerated today's treatment well, noted to have impaired balance with narrow TORREY such as tandem stance and single leg stance. Patient would benefit from continued work on LE functional strength. Patient had some shoulder symptom exacerbation with pulleys today, continue to work on improving upper quarter mobility.    Bean Is progressing well towards his goals.   Pt prognosis is Good.     Pt will continue to benefit from skilled outpatient physical therapy to address the deficits listed in the problem list box on initial evaluation, provide pt/family education and to maximize pt's " level of independence in the home and community environment.     Pt's spiritual, cultural and educational needs considered and pt agreeable to plan of care and goals.    Anticipated barriers to physical therapy: None    Goals:  Short-Term Goals: 4 weeks  - The patient will be independent with initial home exercise program.  - The patient will increase strength to at least 3+/5 in muscles tested to indicate improvements in functional strength.  - The patient will increase range of motion grossly by 10 degrees to perform sit to stand without use of arms  - Patient will improve 2 MWT performs to 300 feet to demonstrate improved community ambulation  - Patient will improve 30s chair stand performance to 10 times while using arms to demonstrate reduction in fall risk     Long-Term Goals: 12 weeks  - Pt to achieve <30% limitation on hip and shoulder survey as measured by the FOTO to demonstrate decreased disability.  - The patient will increase strength to at least 4/5 to perform functional mobility including sit to stand, gait  - The patient will increase hip ROM to 100 degrees flexion, 15 degrees internal rotation to enable him to perform recreational tasks without pain.  - Patient will improve shoulder range of motion to equal bilaterally to show reduction in symptom irritability and enable him to sleep without pain  - Patient will improve 2 MWT performs to 500 feet to demonstrate improved community ambulation and get closer to age-group norm of 535 ft  - Patient will improve 30s chair stand performance to 10 times without using arms to demonstrate reduction in fall risk         Plan       Continue to work on upper quarter mobility    Add steps/stairs next visit      Constantin Casey, PT

## 2022-05-24 ENCOUNTER — CLINICAL SUPPORT (OUTPATIENT)
Dept: REHABILITATION | Facility: HOSPITAL | Age: 77
End: 2022-05-24
Attending: ORTHOPAEDIC SURGERY
Payer: MEDICARE

## 2022-05-24 DIAGNOSIS — R53.1 WEAKNESS: ICD-10-CM

## 2022-05-24 DIAGNOSIS — M25.651 IMPAIRED RANGE OF MOTION OF RIGHT HIP: Primary | ICD-10-CM

## 2022-05-24 DIAGNOSIS — M25.611 DECREASED RIGHT SHOULDER RANGE OF MOTION: ICD-10-CM

## 2022-05-24 DIAGNOSIS — Z78.9 IMPAIRED ACTIVITIES OF DAILY LIVING: ICD-10-CM

## 2022-05-24 PROCEDURE — 97110 THERAPEUTIC EXERCISES: CPT | Mod: PO,CQ

## 2022-05-24 PROCEDURE — 97112 NEUROMUSCULAR REEDUCATION: CPT | Mod: PO,CQ

## 2022-05-24 NOTE — PROGRESS NOTES
Physical Therapy Daily Treatment Note     Name: Bean Salas  Clinic Number: [id number]    Therapy Diagnosis:   Encounter Diagnoses   Name Primary?    Impaired range of motion of right hip Yes    Decreased right shoulder range of motion     Weakness     Impaired activities of daily living      Physician: Kristofer Brown IV, MD    Visit Date: 5/24/2022  Physician Orders: PT Eval and Treat   Hip and knee range of motion; hip, thigh, glute, core strengthening; balance training; gait training; physical modalities for pain relief  Patient is 7 months after hip fracture IM nail right hip; WBAT     Right shoulder rotator cuff impingement protocol  Medical Diagnosis from Referral: : Displaced intertrochanteric fracture of right femur, initial encounter for closed fracture [S72.141A], Aftercare for healing traumatic closed fracture of hip, right [S72.001D], Rotator cuff impingement syndrome of right shoulder [M75.41]  Evaluation Date: 5/6/2022  Authorization Period Expiration: 12/31/22  Plan of Care Expiration: 8/6/22  Visit # / Visits authorized: 3/ 25      Progress due: 6/5/22    Time In: 11:50  Time Out: 11:30  Total Billable Time: 40 minutes    Precautions: Standard    Subjective     Pt reports: feel fine, the weather has me moving slow today   He was compliant with home exercise program.  Response to previous treatment: Stiffness  Functional change: No change    Pain: Not asked/10  Location: right Hip and shoulder     Objective     Primary Impairments: Hip flexion/IR range of motion, hip strength, shoulder range of motion, shoulder mobility, thoracic mobility    FOTO:  -Intake: Complete  -Status: incomplete  -Discharge: incomplete      Treatment     Bean received therapeutic exercises to develop strength, endurance, ROM, flexibility and posture for 20 minutes including:   * indicates that exercise was performed, not billed today since patient was not under direct one-on-one supervision     Nustep- L5 x 5 min    "  Gait training for endurance- 2 minute intervals- NT     Supine hip flexor stretch- 30s x 3    Bridge - 2 x 10     Pulley flexion/scaption- 2 min  Seated thoracic extension mobilization- 20x     Sit to stand- 2 x 10 from 24" box  Standing hip abduction- 2 x 10 ea  Standing hip march- 1 min x 2   Heel raises    Step ups/Stairs- 10x B stairs     Bean participated in neuromuscular re-education activities to improve: Balance for 10 minutes. The following activities were included:    Tandem gait- 5 laps  Slow marching in place for single leg stability- 2 min  Lyndsey stepping- 3 rounds by ballet bar   Rhomberg stance       Bean received the following manual therapy techniques: Joint mobilizations were applied to the: shoulder, thoracic spine, hip for 0 minutes, including:     Seated thoracic mobilization  Shoulder posterior/inferior mobilization  Hip mobilization- lateral and inferior with belt      Home Exercises Provided and Patient Education Provided     Education provided:   -Educated patient regarding balance exercises to work on at home    Written Home Exercises Provided: Patient instructed to cont prior HEP.  Exercises were reviewed and Bean was able to demonstrate them prior to the end of the session.  Bean demonstrated good  understanding of the education provided.     See EMR under Patient Instructions for exercises provided prior visit.    Assessment     Patient arrived to session stating he has stiffness in the shoulder and hip. Patient was able to progress through session with no increases in discomfort but note he needs rest breaks in between exercises. Patient able to progress through balance exercises with no LOB. Patient ended session stating he felt fine.    Bean Is progressing well towards his goals.   Pt prognosis is Good.     Pt will continue to benefit from skilled outpatient physical therapy to address the deficits listed in the problem list box on initial evaluation, provide pt/family " education and to maximize pt's level of independence in the home and community environment.     Pt's spiritual, cultural and educational needs considered and pt agreeable to plan of care and goals.    Anticipated barriers to physical therapy: None    Goals:  Short-Term Goals: 4 weeks  - The patient will be independent with initial home exercise program.  - The patient will increase strength to at least 3+/5 in muscles tested to indicate improvements in functional strength.  - The patient will increase range of motion grossly by 10 degrees to perform sit to stand without use of arms  - Patient will improve 2 MWT performs to 300 feet to demonstrate improved community ambulation  - Patient will improve 30s chair stand performance to 10 times while using arms to demonstrate reduction in fall risk     Long-Term Goals: 12 weeks  - Pt to achieve <30% limitation on hip and shoulder survey as measured by the FOTO to demonstrate decreased disability.  - The patient will increase strength to at least 4/5 to perform functional mobility including sit to stand, gait  - The patient will increase hip ROM to 100 degrees flexion, 15 degrees internal rotation to enable him to perform recreational tasks without pain.  - Patient will improve shoulder range of motion to equal bilaterally to show reduction in symptom irritability and enable him to sleep without pain  - Patient will improve 2 MWT performs to 500 feet to demonstrate improved community ambulation and get closer to age-group norm of 535 ft  - Patient will improve 30s chair stand performance to 10 times without using arms to demonstrate reduction in fall risk         Plan       Continue to work on upper quarter mobility    Add steps/stairs next visit      Jesus Pryor, PTA

## 2022-05-31 ENCOUNTER — CLINICAL SUPPORT (OUTPATIENT)
Dept: REHABILITATION | Facility: HOSPITAL | Age: 77
End: 2022-05-31
Attending: ORTHOPAEDIC SURGERY
Payer: MEDICARE

## 2022-05-31 DIAGNOSIS — R53.1 WEAKNESS: ICD-10-CM

## 2022-05-31 DIAGNOSIS — M25.611 DECREASED RIGHT SHOULDER RANGE OF MOTION: ICD-10-CM

## 2022-05-31 DIAGNOSIS — Z78.9 IMPAIRED ACTIVITIES OF DAILY LIVING: ICD-10-CM

## 2022-05-31 DIAGNOSIS — M25.651 IMPAIRED RANGE OF MOTION OF RIGHT HIP: Primary | ICD-10-CM

## 2022-05-31 PROCEDURE — 97110 THERAPEUTIC EXERCISES: CPT | Mod: PO,CQ

## 2022-05-31 NOTE — PROGRESS NOTES
Physical Therapy Daily Treatment Note     Name: Bean Salas  Clinic Number: [id number]    Therapy Diagnosis:   Encounter Diagnoses   Name Primary?    Impaired range of motion of right hip Yes    Decreased right shoulder range of motion     Weakness     Impaired activities of daily living      Physician: Kristofer Brown IV, MD    Visit Date: 5/31/2022  Physician Orders: PT Eval and Treat   Hip and knee range of motion; hip, thigh, glute, core strengthening; balance training; gait training; physical modalities for pain relief  Patient is 7 months after hip fracture IM nail right hip; WBAT     Right shoulder rotator cuff impingement protocol  Medical Diagnosis from Referral: : Displaced intertrochanteric fracture of right femur, initial encounter for closed fracture [S72.141A], Aftercare for healing traumatic closed fracture of hip, right [S72.001D], Rotator cuff impingement syndrome of right shoulder [M75.41]  Evaluation Date: 5/6/2022  Authorization Period Expiration: 12/31/22  Plan of Care Expiration: 8/6/22  Visit # / Visits authorized: 4/ 25      Progress due: 6/5/22    Time In: 11:00  Time Out: 11:35  Total Billable Time: 35 minutes    Precautions: Standard    Subjective     Pt reports: felt fine after last time but the pain did come back   He was compliant with home exercise program.  Response to previous treatment: Stiffness  Functional change: No change    Pain: Not asked/10  Location: right Hip and shoulder     Objective     Primary Impairments: Hip flexion/IR range of motion, hip strength, shoulder range of motion, shoulder mobility, thoracic mobility    FOTO:  -Intake: Complete  -Status: incomplete  -Discharge: incomplete      Treatment     Bean received therapeutic exercises to develop strength, endurance, ROM, flexibility and posture for 30 minutes including:   * indicates that exercise was performed, not billed today since patient was not under direct one-on-one supervision     Nustep- L5 x 5  "min     Gait training for endurance- 2 minute intervals- NT     Supine hip flexor stretch- 30s x 3    Bridge - 2 x 10     Pulley flexion/scaption- 2 min  Seated thoracic extension mobilization- 20x     Sit to stand- 2 x 10 from 24" box  Standing hip abduction- 2 x 10 ea  Standing hip extension- 2 x 10 ea   Standing hip march- 1 min x 2   Heel raises- 2 x 10     Step ups/Stairs- 10x B stairs     Bean participated in neuromuscular re-education activities to improve: Balance for 0 minutes. The following activities were included:    Tandem gait- 5 laps  Slow marching in place for single leg stability- 2 min  Lyndsey stepping- 3 rounds by [employer]   Rhomberg stance       Bean received the following manual therapy techniques: Joint mobilizations were applied to the: shoulder, thoracic spine, hip for 0 minutes, including:     Seated thoracic mobilization  Shoulder posterior/inferior mobilization  Hip mobilization- lateral and inferior with belt      Home Exercises Provided and Patient Education Provided     Education provided:   -Educated patient regarding balance exercises to work on at home    Written Home Exercises Provided: Patient instructed to cont prior HEP.  Exercises were reviewed and Bean was able to demonstrate them prior to the end of the session.  Bean demonstrated good  understanding of the education provided.     See EMR under Patient Instructions for exercises provided prior visit.    Assessment     Patient arrived to session stating he felt better at the end of last session but a few days later overall discomfort returned. Patient was able to progress through exercises. Patient had issues catching his breath and requested to have oxygen saturation tested, levels were at 95%. Patient needed frequent rest breaks in between exercises. Patient requested session to end early, patient stated he is dealing with side effects of medication and needed to return home. Patient ended session stating he felt " fine but that he just wanted to rest at home.      Bean Is progressing well towards his goals.   Pt prognosis is Good.     Pt will continue to benefit from skilled outpatient physical therapy to address the deficits listed in the problem list box on initial evaluation, provide pt/family education and to maximize pt's level of independence in the home and community environment.     Pt's spiritual, cultural and educational needs considered and pt agreeable to plan of care and goals.    Anticipated barriers to physical therapy: None    Goals:  Short-Term Goals: 4 weeks  - The patient will be independent with initial home exercise program.  - The patient will increase strength to at least 3+/5 in muscles tested to indicate improvements in functional strength.  - The patient will increase range of motion grossly by 10 degrees to perform sit to stand without use of arms  - Patient will improve 2 MWT performs to 300 feet to demonstrate improved community ambulation  - Patient will improve 30s chair stand performance to 10 times while using arms to demonstrate reduction in fall risk     Long-Term Goals: 12 weeks  - Pt to achieve <30% limitation on hip and shoulder survey as measured by the FOTO to demonstrate decreased disability.  - The patient will increase strength to at least 4/5 to perform functional mobility including sit to stand, gait  - The patient will increase hip ROM to 100 degrees flexion, 15 degrees internal rotation to enable him to perform recreational tasks without pain.  - Patient will improve shoulder range of motion to equal bilaterally to show reduction in symptom irritability and enable him to sleep without pain  - Patient will improve 2 MWT performs to 500 feet to demonstrate improved community ambulation and get closer to age-group norm of 535 ft  - Patient will improve 30s chair stand performance to 10 times without using arms to demonstrate reduction in fall risk         Plan       Continue to  work on upper quarter mobility    Add steps/stairs next visit      Jesus Pryor, PTA

## 2022-06-01 ENCOUNTER — PATIENT MESSAGE (OUTPATIENT)
Dept: ADMINISTRATIVE | Facility: HOSPITAL | Age: 77
End: 2022-06-01
Payer: MEDICARE

## 2022-06-03 ENCOUNTER — CLINICAL SUPPORT (OUTPATIENT)
Dept: REHABILITATION | Facility: HOSPITAL | Age: 77
End: 2022-06-03
Attending: ORTHOPAEDIC SURGERY
Payer: MEDICARE

## 2022-06-03 DIAGNOSIS — R53.1 WEAKNESS: ICD-10-CM

## 2022-06-03 DIAGNOSIS — M25.611 DECREASED RIGHT SHOULDER RANGE OF MOTION: ICD-10-CM

## 2022-06-03 DIAGNOSIS — Z78.9 IMPAIRED ACTIVITIES OF DAILY LIVING: ICD-10-CM

## 2022-06-03 DIAGNOSIS — M25.651 IMPAIRED RANGE OF MOTION OF RIGHT HIP: Primary | ICD-10-CM

## 2022-06-03 PROCEDURE — 97110 THERAPEUTIC EXERCISES: CPT | Mod: PO | Performed by: PHYSICAL THERAPIST

## 2022-06-03 NOTE — PROGRESS NOTES
Physical Therapy Daily Treatment Note     Name: Bean Salas  Clinic Number: [id number]    Therapy Diagnosis:   Encounter Diagnoses   Name Primary?    Impaired range of motion of right hip Yes    Decreased right shoulder range of motion     Weakness     Impaired activities of daily living      Physician: Kristofer Brown IV, MD    Visit Date: 6/3/2022  Physician Orders: PT Eval and Treat   Hip and knee range of motion; hip, thigh, glute, core strengthening; balance training; gait training; physical modalities for pain relief  Patient is 7 months after hip fracture IM nail right hip; WBAT     Right shoulder rotator cuff impingement protocol  Medical Diagnosis from Referral: : Displaced intertrochanteric fracture of right femur, initial encounter for closed fracture [S72.141A], Aftercare for healing traumatic closed fracture of hip, right [S72.001D], Rotator cuff impingement syndrome of right shoulder [M75.41]  Evaluation Date: 5/6/2022   Authorization Period Expiration: 12/31/22  Plan of Care Expiration: 8/6/22  Visit # / Visits authorized: 4/ 25      Progress due: 6/5/22    Time In: 11:00  Time Out: 11:50  Total Billable Time: 50 minutes    Precautions: Standard    Subjective     Pt reports: he has been feeling good but still feels like he is having pain related to the screw in his hip. He doesn't notice as much pain in the shoulder at night.  He was compliant with home exercise program.  Response to previous treatment: Stiffness  Functional change: No change    Pain: 3/10 (hip),   Location: right Hip and shoulder     Objective     Primary Impairments: Hip flexion/IR range of motion, hip strength, shoulder range of motion, shoulder mobility, thoracic mobility    FOTO:  -Intake: Complete  -Status: incomplete  -Discharge: incomplete      Treatment     Bean received therapeutic exercises to develop strength, endurance, ROM, flexibility and posture for 40 minutes including:     Nustep- L5 x 6 min       Pulley  "flexion/scaption- 2 min ea  Seated thoracic extension mobilization- 20x    TB shoulder extension: GTB 2 x 10  TB shoulder ER: GTB   TB shoulder IR: GTB 2 x 10    Sit to stand- 2 x 10 from 24" box  Standing hip abduction- 2 x 10 ea  Standing hip extension- 2 x 10 ea   Standing hip march- 1 min x 2     Not performed today  Supine hip flexor stretch- 30s x 3  Bridge - 2 x 10  Heel raises- 2 x 10   Step ups/Stairs- 10x B stairs       Bean participated in neuromuscular re-education activities to improve: Balance for 0 minutes (Not performed today). The following activities were included:    Tandem gait- 5 laps  Slow marching in place for single leg stability- 2 min  Lyndsey stepping- 3 rounds by [employer]   Rhomberg stance          Home Exercises Provided and Patient Education Provided     Education provided:   -Educated patient regarding conservative management strategy with his hip condition    Written Home Exercises Provided: Patient instructed to cont prior HEP.  Exercises were reviewed and Bean was able to demonstrate them prior to the end of the session.  Bean demonstrated good  understanding of the education provided.     See EMR under Patient Instructions for exercises provided prior visit.    Assessment     Patient tolerated exercise program well today. He continues to have functional impairments secondary to weakness and impairments in range of motion, but his self reported symptoms have improve. Continue to address impairments and work toward improving function.      Bean Is progressing well towards his goals.   Pt prognosis is Good.     Pt will continue to benefit from skilled outpatient physical therapy to address the deficits listed in the problem list box on initial evaluation, provide pt/family education and to maximize pt's level of independence in the home and community environment.     Pt's spiritual, cultural and educational needs considered and pt agreeable to plan of care and " goals.    Anticipated barriers to physical therapy: None    Goals:  Short-Term Goals: 4 weeks  - The patient will be independent with initial home exercise program.  - The patient will increase strength to at least 3+/5 in muscles tested to indicate improvements in functional strength.  - The patient will increase range of motion grossly by 10 degrees to perform sit to stand without use of arms  - Patient will improve 2 MWT performs to 300 feet to demonstrate improved community ambulation  - Patient will improve 30s chair stand performance to 10 times while using arms to demonstrate reduction in fall risk     Long-Term Goals: 12 weeks  - Pt to achieve <30% limitation on hip and shoulder survey as measured by the FOTO to demonstrate decreased disability.  - The patient will increase strength to at least 4/5 to perform functional mobility including sit to stand, gait  - The patient will increase hip ROM to 100 degrees flexion, 15 degrees internal rotation to enable him to perform recreational tasks without pain.  - Patient will improve shoulder range of motion to equal bilaterally to show reduction in symptom irritability and enable him to sleep without pain  - Patient will improve 2 MWT performs to 500 feet to demonstrate improved community ambulation and get closer to age-group norm of 535 ft  - Patient will improve 30s chair stand performance to 10 times without using arms to demonstrate reduction in fall risk         Plan       Progress next visit      Constantin Casey, PT

## 2022-06-06 ENCOUNTER — CLINICAL SUPPORT (OUTPATIENT)
Dept: REHABILITATION | Facility: HOSPITAL | Age: 77
End: 2022-06-06
Attending: ORTHOPAEDIC SURGERY
Payer: MEDICARE

## 2022-06-06 DIAGNOSIS — Z78.9 IMPAIRED ACTIVITIES OF DAILY LIVING: ICD-10-CM

## 2022-06-06 DIAGNOSIS — M25.611 DECREASED RIGHT SHOULDER RANGE OF MOTION: ICD-10-CM

## 2022-06-06 DIAGNOSIS — R53.1 WEAKNESS: ICD-10-CM

## 2022-06-06 DIAGNOSIS — M25.651 IMPAIRED RANGE OF MOTION OF RIGHT HIP: Primary | ICD-10-CM

## 2022-06-06 PROCEDURE — 97112 NEUROMUSCULAR REEDUCATION: CPT | Mod: PO | Performed by: PHYSICAL THERAPIST

## 2022-06-06 PROCEDURE — 97110 THERAPEUTIC EXERCISES: CPT | Mod: PO | Performed by: PHYSICAL THERAPIST

## 2022-06-06 NOTE — PROGRESS NOTES
Physical Therapy Daily Treatment Note     Name: Bean Salas  [facility] Number: [id number]    Therapy Diagnosis:   Encounter Diagnoses   Name Primary?    Impaired range of motion of right hip Yes    Decreased right shoulder range of motion     Weakness     Impaired activities of daily living      Physician: Kristofer Brown IV, MD    Visit Date: 6/6/2022  Physician Orders: PT Eval and Treat   Hip and knee range of motion; hip, thigh, glute, core strengthening; balance training; gait training; physical modalities for pain relief  Patient is 7 months after hip fracture IM nail right hip; WBAT     Right shoulder rotator cuff impingement protocol  Medical Diagnosis from Referral: : Displaced intertrochanteric fracture of right femur, initial encounter for closed fracture [S72.141A], Aftercare for healing traumatic closed fracture of hip, right [S72.001D], Rotator cuff impingement syndrome of right shoulder [M75.41]  Evaluation Date: 5/6/2022   Authorization Period Expiration: 12/31/22  Plan of Care Expiration: 8/6/22  Visit # / Visits authorized: 6/ 25      Progress due: 6/5/22    Time In: 8:58  Time Out: 9:55  Total Billable Time: 57 minutes    Precautions: Standard    Subjective     Pt reports: He is feeling fine today. A little soreness after last visit. He took a pain pill before coming today.  He was compliant with home exercise program.  Response to previous treatment: A little soreness  Functional change: No change    Pain: 3/10 (hip),   Location: right Hip and shoulder     Objective     Primary Impairments: Hip flexion/IR range of motion, hip strength, shoulder range of motion, shoulder mobility, thoracic mobility    FOTO:  -Intake: Complete  -Status: Complete  -Discharge: incomplete       Gait: Decreased stance time on R     Hip Range of Motion (Active / Passive):    Left  Right    Flexion 120 95   Abduction 30 20   Adduction       Extension       ER @ 0       ER @ 90 40 40   IR @ 0       IR @ 90 15 (20) 0  "     Shoulder range of motion   Flexion : L 125, R 120  Abduction: L 165, R 130  IR: L T9, R L1  ER: L: C7, R: Occiput     Lower Extremity Strength    Left Right   Knee extension: 4+/5 4/5   Knee flexion: 4+/5 4/5   Hip flexion: 4/5 2+/5   Hip internal rotation 4/5 3+/5   Hip external rotation 4/5 4/5   Hip extension:  Unable Unable   Hip abduction: 3+/5 3+/5   Hip adduction: NT/5 NT/5   Shoulder Flexion 4+/5 4/5   Shoulder ER 4+/5 4/5   Shoulder IR 4+/5 4/5   Shoulder abduction 4+/5 4/5             Joint Mobility: Hypomobility noted in hip joint. Significant hypomobility noted in thoracic spine        Function:     30s chair stand- NT today, past: 6 reps with use of arms     2MWT- NT today past: 249 ft, slight-moderate shortness of breath          Treatment     Bean received therapeutic exercises to develop strength, endurance, ROM, flexibility and posture for 45 minutes including:     Progress evaluation x 25 minutes including strength and range of motion assessment.     Nustep- L5 x 6 min     Supine hip flexor stretch- 30s x 3  Bridge - 2 x 10  Supine knee to chest hip flexion stretch- 10 x 10s    Pulley flexion/scaption- 2 min ea    Sit to stand- 2 x 10 from 24" box  Standing hip abduction- 2 x 10 ea    Not performed today  Heel raises- 2 x 10   Step ups/Stairs- 10x B stairs   Seated thoracic extension mobilization- 20x    TB shoulder extension: GTB 2 x 10  TB shoulder ER: GTB   TB shoulder IR: GTB 2 x 10      Standing hip extension- 2 x 10 ea   Standing hip march- 1 min x 2     Bean participated in neuromuscular re-education activities to improve: Balance for 8 minutes. The following activities were included:    Tandem stance- 3 min ea  Slow marching in place for single leg stability- 2 min    Not performed today  Tandem gait- 5 laps  Lyndsey stepping- 3 rounds by ballet bar   Tandem stance    Home Exercises Provided and Patient Education Provided     Education provided:   -Educated patient regarding balance " progression  -    Written Home Exercises Provided: Patient instructed to cont prior HEP.  Exercises were reviewed and Bean was able to demonstrate them prior to the end of the session.  Bean demonstrated good  understanding of the education provided.     See EMR under Patient Instructions for exercises provided prior visit.    Assessment     Patient noted to have some improvement in hip and shoulder range of motion compared to initial evaluation, but he continues to have functional limitations with gait. Night-time shoulder symptoms have improved. Continue to work on addressing patient's deficits to improve symptoms further and maximize function.    Bean Is progressing well towards his goals.   Pt prognosis is Good.     Pt will continue to benefit from skilled outpatient physical therapy to address the deficits listed in the problem list box on initial evaluation, provide pt/family education and to maximize pt's level of independence in the home and community environment.     Pt's spiritual, cultural and educational needs considered and pt agreeable to plan of care and goals.    Anticipated barriers to physical therapy: None    Goals:  Short-Term Goals: 4 weeks  - The patient will be independent with initial home exercise program. In progress  - The patient will increase strength to at least 3+/5 in muscles tested to indicate improvements in functional strength. In progress  - The patient will increase range of motion grossly by 10 degrees to perform sit to stand without use of arms  In progress  - Patient will improve 2 MWT performs to 300 feet to demonstrate improved community ambulation In progress  - Patient will improve 30s chair stand performance to 10 times while using arms to demonstrate reduction in fall risk In progress     Long-Term Goals: 12 weeks - all In progress  - Pt to achieve <30% limitation on hip and shoulder survey as measured by the FOTO to demonstrate decreased disability.  - The patient  will increase strength to at least 4/5 to perform functional mobility including sit to stand, gait  - The patient will increase hip ROM to 100 degrees flexion, 15 degrees internal rotation to enable him to perform recreational tasks without pain.  - Patient will improve shoulder range of motion to equal bilaterally to show reduction in symptom irritability and enable him to sleep without pain  - Patient will improve 2 MWT performs to 500 feet to demonstrate improved community ambulation and get closer to age-group norm of 535 ft  - Patient will improve 30s chair stand performance to 10 times without using arms to demonstrate reduction in fall risk         Plan       Re-test 2 minute walk test and 30s chair stand test next visit      Contsantin Casey, PT

## 2022-06-13 ENCOUNTER — OFFICE VISIT (OUTPATIENT)
Dept: CARDIOLOGY | Facility: CLINIC | Age: 77
End: 2022-06-13
Payer: MEDICARE

## 2022-06-13 VITALS
HEART RATE: 74 BPM | BODY MASS INDEX: 31.12 KG/M2 | HEIGHT: 71 IN | SYSTOLIC BLOOD PRESSURE: 150 MMHG | DIASTOLIC BLOOD PRESSURE: 70 MMHG | OXYGEN SATURATION: 98 % | WEIGHT: 222.31 LBS

## 2022-06-13 DIAGNOSIS — I10 PRIMARY HYPERTENSION: ICD-10-CM

## 2022-06-13 DIAGNOSIS — R06.02 SOB (SHORTNESS OF BREATH): ICD-10-CM

## 2022-06-13 DIAGNOSIS — I10 ESSENTIAL HYPERTENSION: ICD-10-CM

## 2022-06-13 DIAGNOSIS — I25.10 CORONARY ARTERY DISEASE INVOLVING NATIVE CORONARY ARTERY OF NATIVE HEART WITHOUT ANGINA PECTORIS: ICD-10-CM

## 2022-06-13 DIAGNOSIS — I87.2 VENOUS INSUFFICIENCY: Primary | ICD-10-CM

## 2022-06-13 DIAGNOSIS — E78.2 MIXED HYPERLIPIDEMIA: ICD-10-CM

## 2022-06-13 DIAGNOSIS — I49.3 PVC'S (PREMATURE VENTRICULAR CONTRACTIONS): ICD-10-CM

## 2022-06-13 PROCEDURE — 99499 UNLISTED E&M SERVICE: CPT | Mod: S$GLB,,, | Performed by: INTERNAL MEDICINE

## 2022-06-13 PROCEDURE — 1160F PR REVIEW ALL MEDS BY PRESCRIBER/CLIN PHARMACIST DOCUMENTED: ICD-10-PCS | Mod: CPTII,S$GLB,, | Performed by: INTERNAL MEDICINE

## 2022-06-13 PROCEDURE — 99499 RISK ADDL DX/OHS AUDIT: ICD-10-PCS | Mod: S$GLB,,, | Performed by: INTERNAL MEDICINE

## 2022-06-13 PROCEDURE — 99999 PR PBB SHADOW E&M-EST. PATIENT-LVL IV: ICD-10-PCS | Mod: PBBFAC,,, | Performed by: INTERNAL MEDICINE

## 2022-06-13 PROCEDURE — 1126F AMNT PAIN NOTED NONE PRSNT: CPT | Mod: CPTII,S$GLB,, | Performed by: INTERNAL MEDICINE

## 2022-06-13 PROCEDURE — 3077F PR MOST RECENT SYSTOLIC BLOOD PRESSURE >= 140 MM HG: ICD-10-PCS | Mod: CPTII,S$GLB,, | Performed by: INTERNAL MEDICINE

## 2022-06-13 PROCEDURE — 1160F RVW MEDS BY RX/DR IN RCRD: CPT | Mod: CPTII,S$GLB,, | Performed by: INTERNAL MEDICINE

## 2022-06-13 PROCEDURE — 1126F PR PAIN SEVERITY QUANTIFIED, NO PAIN PRESENT: ICD-10-PCS | Mod: CPTII,S$GLB,, | Performed by: INTERNAL MEDICINE

## 2022-06-13 PROCEDURE — 1157F PR ADVANCE CARE PLAN OR EQUIV PRESENT IN MEDICAL RECORD: ICD-10-PCS | Mod: CPTII,S$GLB,, | Performed by: INTERNAL MEDICINE

## 2022-06-13 PROCEDURE — 1157F ADVNC CARE PLAN IN RCRD: CPT | Mod: CPTII,S$GLB,, | Performed by: INTERNAL MEDICINE

## 2022-06-13 PROCEDURE — 1159F MED LIST DOCD IN RCRD: CPT | Mod: CPTII,S$GLB,, | Performed by: INTERNAL MEDICINE

## 2022-06-13 PROCEDURE — 3077F SYST BP >= 140 MM HG: CPT | Mod: CPTII,S$GLB,, | Performed by: INTERNAL MEDICINE

## 2022-06-13 PROCEDURE — 3078F DIAST BP <80 MM HG: CPT | Mod: CPTII,S$GLB,, | Performed by: INTERNAL MEDICINE

## 2022-06-13 PROCEDURE — 1159F PR MEDICATION LIST DOCUMENTED IN MEDICAL RECORD: ICD-10-PCS | Mod: CPTII,S$GLB,, | Performed by: INTERNAL MEDICINE

## 2022-06-13 PROCEDURE — 99214 PR OFFICE/OUTPT VISIT, EST, LEVL IV, 30-39 MIN: ICD-10-PCS | Mod: S$GLB,,, | Performed by: INTERNAL MEDICINE

## 2022-06-13 PROCEDURE — 3078F PR MOST RECENT DIASTOLIC BLOOD PRESSURE < 80 MM HG: ICD-10-PCS | Mod: CPTII,S$GLB,, | Performed by: INTERNAL MEDICINE

## 2022-06-13 PROCEDURE — 99214 OFFICE O/P EST MOD 30 MIN: CPT | Mod: S$GLB,,, | Performed by: INTERNAL MEDICINE

## 2022-06-13 PROCEDURE — 99999 PR PBB SHADOW E&M-EST. PATIENT-LVL IV: CPT | Mod: PBBFAC,,, | Performed by: INTERNAL MEDICINE

## 2022-06-13 RX ORDER — SPIRONOLACTONE 25 MG/1
25 TABLET ORAL DAILY
Qty: 90 TABLET | Refills: 4 | Status: SHIPPED | OUTPATIENT
Start: 2022-06-13 | End: 2023-06-27

## 2022-06-13 RX ORDER — ASPIRIN 81 MG/1
81 TABLET ORAL DAILY
Start: 2022-06-13

## 2022-06-13 NOTE — ASSESSMENT & PLAN NOTE
The only time he is having shortness of breath currently is during night hours.  Sleep apnea evaluation ordered.    Given furosemide usage on a daily basis as well as shortness of breath complaints at times, spironolactone 25 mg resumed.  He has been on Aldactone in the past prescribed by myself.

## 2022-06-13 NOTE — ASSESSMENT & PLAN NOTE
He is on metoprolol.  Intermittently he has bigeminy.  Condition unchanged.  He does feel palpitations on a regular basis.

## 2022-06-13 NOTE — PROGRESS NOTES
[facility] Cardiology     Subjective:    Patient ID:  Bean Salas is a 77 y.o. male who presents for follow-up of Hypertension, Hyperlipidemia, Coronary Artery Disease, and Leg Swelling    Review of patient's allergies indicates:  No Known Allergies   He is followed for coronary artery disease hypertension and hyperlipidemia.  He has developed worsening venous stasis ulcers and dermatitis changes of the lower extremity bilaterally.  He is on furosemide 40 mg per day.  Initially it was right leg only and I agreed for 3 days of Lasix with as needed usage in March.  He has been using it every day.  He does use compression stockings but left them off so that I can see his wounds.    His blood pressure today is 150/70.  He does have PVCs and bigeminy at times.  His measured pulse is 74 beats per minute.  He is on low-dose metoprolol.  He is still using oxygen at night.  He has been advised to have a sleep study but he states it has never been scheduled.  He does have shortness of breath when lying flat and sometimes wakes up out of sleep gasping.  He has been using home oxygen at night on an as-needed basis for over a year.    His hypertension has been 130 systolic range.  He measures on a regular basis.  He is on losartan and metoprolol.  He has had 1 episode of heart failure after hip surgery.  His BNP was elevated and his estimated EF by echo at the time was 40%.  His troponin was mildly elevated.  His previous heart catheterization in 2020 showed mild coronary disease.  He has not been having any chest pain.  During the day he is not short of breath.  He exercises at the gym.  He is on atorvastatin 10 mg , LDL most recent 41.8, HDL 38, triglycerides 101 mg%.      Review of Systems   Constitutional: Negative for chills, decreased appetite, diaphoresis, fever, malaise/fatigue, night sweats, weight gain and weight loss.   HENT: Negative for  congestion, ear discharge, ear pain, hearing loss, hoarse voice, nosebleeds, odynophagia, sore throat, stridor and tinnitus.    Eyes: Negative for blurred vision, discharge, double vision, pain, photophobia, redness, vision loss in left eye, vision loss in right eye, visual disturbance and visual halos.   Cardiovascular: Positive for leg swelling. Negative for chest pain, claudication, cyanosis, dyspnea on exertion, irregular heartbeat, near-syncope, orthopnea, palpitations, paroxysmal nocturnal dyspnea and syncope.   Respiratory: Positive for sleep disturbances due to breathing. Negative for cough, hemoptysis, shortness of breath, snoring, sputum production and wheezing.    Endocrine: Negative for cold intolerance, heat intolerance, polydipsia, polyphagia and polyuria.   Hematologic/Lymphatic: Negative for adenopathy and bleeding problem. Does not bruise/bleed easily.   Skin: Positive for poor wound healing. Negative for color change, dry skin, flushing, itching, nail changes, rash, skin cancer, suspicious lesions and unusual hair distribution.        Venous stasis ulcers and dermatitis.  Active water bleb formation noted on exam today   Musculoskeletal: Positive for joint pain. Negative for arthritis, back pain, falls, gout, joint swelling, muscle cramps, muscle weakness, myalgias, neck pain and stiffness.   Gastrointestinal: Negative for bloating, abdominal pain, anorexia, change in bowel habit, bowel incontinence, constipation, diarrhea, dysphagia, excessive appetite, flatus, heartburn, hematemesis, hematochezia, hemorrhoids, jaundice, melena, nausea and vomiting.   Genitourinary: Negative for bladder incontinence, decreased libido, dysuria, flank pain, frequency, genital sores, hematuria, hesitancy, incomplete emptying, nocturia and urgency.   Neurological: Negative for aphonia, brief paralysis, difficulty with concentration, disturbances in coordination, excessive daytime sleepiness, dizziness, focal weakness,  "headaches, light-headedness, loss of balance, numbness, paresthesias, seizures, sensory change, tremors, vertigo and weakness.   Psychiatric/Behavioral: Negative for altered mental status, depression, hallucinations, memory loss, substance abuse, suicidal ideas and thoughts of violence. The patient does not have insomnia and is not nervous/anxious.    Allergic/Immunologic: Negative for hives and persistent infections.        Objective:       Vitals:    06/13/22 1411   BP: (!) 150/70   Pulse: 74   SpO2: 98%   Weight: 100.8 kg (222 lb 4.8 oz)   Height: 5' 11" (1.803 m)    Physical Exam  Constitutional:       General: He is not in acute distress.     Appearance: He is well-developed. He is not diaphoretic.   HENT:      Head: Normocephalic and atraumatic.      Nose: Nose normal.   Eyes:      General: No scleral icterus.        Right eye: No discharge.      Conjunctiva/sclera: Conjunctivae normal.      Pupils: Pupils are equal, round, and reactive to light.   Neck:      Thyroid: No thyromegaly.      Vascular: No JVD.      Trachea: No tracheal deviation.   Cardiovascular:      Rate and Rhythm: Normal rate and regular rhythm.      Pulses:           Carotid pulses are 2+ on the right side and 2+ on the left side.       Radial pulses are 2+ on the right side and 2+ on the left side.        Dorsalis pedis pulses are 2+ on the right side and 2+ on the left side.        Posterior tibial pulses are 2+ on the right side and 2+ on the left side.      Heart sounds: Normal heart sounds. No murmur heard.    No friction rub. No gallop.   Pulmonary:      Effort: Pulmonary effort is normal. No respiratory distress.      Breath sounds: Normal breath sounds. No stridor. No wheezing or rales.   Chest:      Chest wall: No tenderness.   Abdominal:      General: Bowel sounds are normal. There is no distension.      Palpations: Abdomen is soft. There is no mass.      Tenderness: There is no abdominal tenderness. There is no guarding or " rebound.   Musculoskeletal:         General: No tenderness. Normal range of motion.      Cervical back: Normal range of motion and neck supple.      Right lower leg: Edema present.      Left lower leg: Edema present.   Lymphadenopathy:      Cervical: No cervical adenopathy.   Skin:     General: Skin is warm and dry.      Coloration: Skin is not pale.      Findings: No erythema or rash.   Neurological:      Mental Status: He is alert and oriented to person, place, and time.      Cranial Nerves: No cranial nerve deficit.      Coordination: Coordination normal.   Psychiatric:         Behavior: Behavior normal.         Thought Content: Thought content normal.         Judgment: Judgment normal.           Assessment:       1. Venous insufficiency    2. Primary hypertension    3. Essential hypertension    4. Coronary artery disease involving native coronary artery of native heart without angina pectoris    5. SOB (shortness of breath)    6. Mixed hyperlipidemia    7. PVC's (premature ventricular contractions)      Results for orders placed or performed during the hospital encounter of 09/25/21   CBC auto differential   Result Value Ref Range    WBC 3.72 (L) 3.90 - 12.70 K/uL    RBC 4.31 (L) 4.60 - 6.20 M/uL    Hemoglobin 13.5 (L) 14.0 - 18.0 g/dL    Hematocrit 40.9 40.0 - 54.0 %    MCV 95 82 - 98 fL    MCH 31.3 (H) 27.0 - 31.0 pg    MCHC 33.0 32.0 - 36.0 g/dL    RDW 14.7 (H) 11.5 - 14.5 %    Platelets 248 150 - 450 K/uL    MPV 10.8 9.2 - 12.9 fL    Immature Granulocytes 0.3 0.0 - 0.5 %    Gran # (ANC) 2.6 1.8 - 7.7 K/uL    Immature Grans (Abs) 0.01 0.00 - 0.04 K/uL    Lymph # 0.7 (L) 1.0 - 4.8 K/uL    Mono # 0.3 0.3 - 1.0 K/uL    Eos # 0.0 0.0 - 0.5 K/uL    Baso # 0.02 0.00 - 0.20 K/uL    nRBC 0 0 /100 WBC    Gran % 71.0 38.0 - 73.0 %    Lymph % 19.6 18.0 - 48.0 %    Mono % 7.8 4.0 - 15.0 %    Eosinophil % 0.8 0.0 - 8.0 %    Basophil % 0.5 0.0 - 1.9 %    Differential Method Automated    Comprehensive metabolic panel (CMP)    Result Value Ref Range    Sodium 138 136 - 145 mmol/L    Potassium 4.1 3.5 - 5.1 mmol/L    Chloride 100 95 - 110 mmol/L    CO2 32 (H) 22 - 31 mmol/L    Glucose 141 (H) 70 - 110 mg/dL    BUN 15 9 - 21 mg/dL    Creatinine 1.01 0.50 - 1.40 mg/dL    Calcium 9.6 8.4 - 10.2 mg/dL    Total Protein 7.0 6.0 - 8.4 g/dL    Albumin 3.9 3.5 - 5.2 g/dL    Total Bilirubin 1.7 (H) 0.2 - 1.3 mg/dL    Alkaline Phosphatase 247 (H) 38 - 145 U/L     (H) 17 - 59 U/L     (H) 0 - 50 U/L    Anion Gap 6 (L) 8 - 16 mmol/L    eGFR if African American >60 >60 mL/min/1.73 m^2    eGFR if non African American >60 >60 mL/min/1.73 m^2   Troponin   Result Value Ref Range    Troponin I 0.025 0.012 - 0.034 ng/mL   COVID-19 Rapid Screening   Result Value Ref Range    SARS-CoV-2 RNA, Amplification, Qual Negative Negative   NT-Pro Natriuretic Peptide   Result Value Ref Range    NT-proBNP 3260 (H) 5 - 1800 pg/mL   Troponin I   Result Value Ref Range    Troponin I 0.039 (H) 0.012 - 0.034 ng/mL   Procalcitonin   Result Value Ref Range    Procalcitonin 0.12 <0.25 ng/mL   D-Dimer, Quantitative   Result Value Ref Range    D-Dimer 1.51 (H) <0.50 ug/mL FEU   Magnesium - if not done in ED   Result Value Ref Range    Magnesium 2.3 1.6 - 2.6 mg/dL   Lipid panel - fasting   Result Value Ref Range    Cholesterol 100 (L) 120 - 199 mg/dL    Triglycerides 101 30 - 150 mg/dL    HDL 38 (L) 40 - 75 mg/dL    LDL Cholesterol 41.8 (L) 63.0 - 159.0 mg/dL    HDL/Cholesterol Ratio 38.0 20.0 - 50.0 %    Total Cholesterol/HDL Ratio 2.6 2.0 - 5.0    Non-HDL Cholesterol 62 mg/dL   TSH   Result Value Ref Range    TSH 5.010 (H) 0.400 - 4.000 uIU/mL   CBC Auto Differential   Result Value Ref Range    WBC 5.52 3.90 - 12.70 K/uL    RBC 4.15 (L) 4.60 - 6.20 M/uL    Hemoglobin 13.0 (L) 14.0 - 18.0 g/dL    Hematocrit 38.8 (L) 40.0 - 54.0 %    MCV 94 82 - 98 fL    MCH 31.3 (H) 27.0 - 31.0 pg    MCHC 33.5 32.0 - 36.0 g/dL    RDW 15.0 (H) 11.5 - 14.5 %    Platelets 223 150 - 450  K/uL    MPV 11.0 9.2 - 12.9 fL    Immature Granulocytes 0.2 0.0 - 0.5 %    Gran # (ANC) 4.1 1.8 - 7.7 K/uL    Immature Grans (Abs) 0.01 0.00 - 0.04 K/uL    Lymph # 0.9 (L) 1.0 - 4.8 K/uL    Mono # 0.4 0.3 - 1.0 K/uL    Eos # 0.1 0.0 - 0.5 K/uL    Baso # 0.02 0.00 - 0.20 K/uL    nRBC 0 0 /100 WBC    Gran % 74.9 (H) 38.0 - 73.0 %    Lymph % 15.4 (L) 18.0 - 48.0 %    Mono % 7.8 4.0 - 15.0 %    Eosinophil % 1.3 0.0 - 8.0 %    Basophil % 0.4 0.0 - 1.9 %    Differential Method Automated    Comprehensive Metabolic Panel   Result Value Ref Range    Sodium 138 136 - 145 mmol/L    Potassium 3.5 3.5 - 5.1 mmol/L    Chloride 100 95 - 110 mmol/L    CO2 33 (H) 22 - 31 mmol/L    Glucose 102 70 - 110 mg/dL    BUN 14 9 - 21 mg/dL    Creatinine 0.92 0.50 - 1.40 mg/dL    Calcium 9.1 8.4 - 10.2 mg/dL    Total Protein 6.6 6.0 - 8.4 g/dL    Albumin 3.7 3.5 - 5.2 g/dL    Total Bilirubin 1.6 (H) 0.2 - 1.3 mg/dL    Alkaline Phosphatase 247 (H) 38 - 145 U/L     (H) 17 - 59 U/L     (H) 0 - 50 U/L    Anion Gap 5 (L) 8 - 16 mmol/L    eGFR if African American >60 >60 mL/min/1.73 m^2    eGFR if non African American >60 >60 mL/min/1.73 m^2   Hepatitis panel, acute   Result Value Ref Range    Hepatitis B Surface Ag Negative     Hep B C IgM Negative     Hep A IgM Negative     Hepatitis C Ab Negative    Troponin I   Result Value Ref Range    Troponin I 0.046 (H) 0.012 - 0.034 ng/mL   Echo   Result Value Ref Range    LVIDs 3.82 2.1 - 4.0 cm    STJ 2.93 cm    AV mean gradient 3 mmHg    Ao peak hari 1.28 m/s    Ao VTI 22.3 cm    IVS 1.69 (A) 0.6 - 1.1 cm    LA size 4.3 cm    LVIDd 5.29 3.5 - 6.0 cm    LVOT diameter 2.2 cm    LVOT peak VTI 20.00 cm    Posterior Wall 1.71 (A) 0.6 - 1.1 cm    MV Peak A Hari 0.31 m/s    E wave deceleration time 158 msec    RA Major Axis 6.43 cm    RA Width 2.95 cm    TR Max Hari 2.21 m/s    RVDD 2.80 cm    TDI LATERAL 0.08 m/s    TDI SEPTAL 0.07 m/s    LA WIDTH 4.64 cm    PV PEAK VELOCITY 85.5 cm/s    LVOT peak  hari 111.00 m/s    BSA 2.26 m2    LV LATERAL E/E' RATIO 9.75 m/s    LV SEPTAL E/E' RATIO 11.14 m/s    FS 28 28 - 44 %    LA volume 133.30 cm3    LV mass 423.42 g    Left Ventricle Relative Wall Thickness 0.65 cm    AV valve area 3.41 cm2    AV Velocity Ratio 86.72     AV index (prosthetic) 0.90     E/A ratio 2.52     Mean e' 0.08 m/s    LVOT area 3.8 cm2    LVOT stroke volume 75.99 cm3    AV peak gradient 7 mmHg    E/E' ratio 10.40 m/s    MV Peak E Hari 0.78 m/s    LA Volume Index 60.6 mL/m2    LV Mass Index 192 g/m2    Left Atrium Minor Axis 7.67 cm    Left Atrium Major Axis 8.06 cm    Triscuspid Valve Regurgitation Peak Gradient 20 mmHg    Right Atrial Pressure (from IVC) 3 mmHg    EF 40 %    TV rest pulmonary artery pressure 23 mmHg         Current Outpatient Medications:     acetaminophen (TYLENOL) 325 MG tablet, Take 2 tablets (650 mg total) by mouth every 6 (six) hours as needed for Pain., Disp: , Rfl: 0    aspirin (ECOTRIN) 81 MG EC tablet, Take 1 tablet (81 mg total) by mouth once daily., Disp: , Rfl:     atorvastatin (LIPITOR) 10 MG tablet, Take 1 tablet (10 mg total) by mouth once daily., Disp: 90 tablet, Rfl: 4    furosemide (LASIX) 40 MG tablet, Take 1 tablet (40 mg total) by mouth as needed (edema). Take daily for next 3 days, then use as needed. Weigh daily. Afterwards, f weight increases 2 lbs/24h, take dose of lasix daily  until weight is back to baseline, Disp: 60 tablet, Rfl: 2    levothyroxine (SYNTHROID) 300 MCG Tab, Take 1 tablet (300 mcg total) by mouth once daily., Disp: 90 tablet, Rfl: 4    losartan (COZAAR) 100 MG tablet, Take 1 tablet (100 mg total) by mouth once daily., Disp: 90 tablet, Rfl: 4    magnesium hydroxide 400 mg/5 ml (MILK OF MAGNESIA) 400 mg/5 mL Susp, Take 30 mLs by mouth daily as needed (constipation)., Disp: , Rfl:     methocarbamoL (ROBAXIN) 500 MG Tab, Take 1 tablet by mouth 4 times daily, Disp: 30 tablet, Rfl: 0    metoprolol succinate (TOPROL-XL) 50 MG 24 hr  tablet, Take 1 tablet (50 mg total) by mouth once daily., Disp: 90 tablet, Rfl: 3    multivit-min-FA-lycopen-lutein (CENTRUM SILVER) 0.4-300-250 mg-mcg-mcg Tab, Take 1 tablet by mouth once daily., Disp: , Rfl:     spironolactone (ALDACTONE) 25 MG tablet, Take 1 tablet (25 mg total) by mouth once daily., Disp: 90 tablet, Rfl: 4    traMADoL (ULTRAM) 50 mg tablet, Take 1 tablet (50 mg total) by mouth every 4 (four) hours as needed for Pain., Disp: 44 tablet, Rfl: 0     Lab Results   Component Value Date    WBC 5.52 09/26/2021    RBC 4.15 (L) 09/26/2021    HGB 13.0 (L) 09/26/2021    HCT 38.8 (L) 09/26/2021    MCV 94 09/26/2021    MCH 31.3 (H) 09/26/2021    MCHC 33.5 09/26/2021    RDW 15.0 (H) 09/26/2021     09/26/2021    MPV 11.0 09/26/2021    GRAN 4.1 09/26/2021    GRAN 74.9 (H) 09/26/2021    LYMPH 0.9 (L) 09/26/2021    LYMPH 15.4 (L) 09/26/2021    MONO 0.4 09/26/2021    MONO 7.8 09/26/2021    EOS 0.1 09/26/2021    BASO 0.02 09/26/2021    EOSINOPHIL 1.3 09/26/2021    BASOPHIL 0.4 09/26/2021    MG 2.3 09/26/2021        CMP  Lab Results   Component Value Date     09/26/2021    K 3.5 09/26/2021     09/26/2021    CO2 33 (H) 09/26/2021     09/26/2021    BUN 14 09/26/2021    CREATININE 0.92 09/26/2021    CALCIUM 9.1 09/26/2021    PROT 6.6 09/26/2021    ALBUMIN 3.7 09/26/2021    BILITOT 1.6 (H) 09/26/2021    ALKPHOS 247 (H) 09/26/2021     (H) 09/26/2021     (H) 09/26/2021    ANIONGAP 5 (L) 09/26/2021    ESTGFRAFRICA >60 09/26/2021    EGFRNONAA >60 09/26/2021        No results found for: LABBLOO, LABURIN, RESPIRATORYC, GSRESP         Results for orders placed or performed during the hospital encounter of 09/25/21   EKG 12-lead    Collection Time: 09/26/21  8:38 AM    Narrative    Test Reason : R00.0,    Vent. Rate : 082 BPM     Atrial Rate : 082 BPM     P-R Int : 080 ms          QRS Dur : 130 ms      QT Int : 290 ms       P-R-T Axes : -05 -18 178 degrees     QTc Int : 338 ms    Sinus  rhythm with short IA with Premature atrial complexes with Aberrant  conduction  Nonspecific intraventricular block  Minimal voltage criteria for LVH, may be normal variant ( R in aVL )  Nonspecific T wave abnormality  Abnormal ECG  When compared with ECG of 25-SEP-2021 18:20,  Sinus rhythm has replaced Junctional rhythm  Nonspecific intraventricular block has replaced Incomplete right bundle  branch block  QT has shortened  Confirmed by Ronald Meza MD ([id number]) on 9/27/2021 9:19:09 AM    Referred By: [email or web address]   SELF           Confirmed By:Ronald Meza MD                  Plan:       Problem List Items Addressed This Visit        Pulmonary    SOB (shortness of breath)     The only time he is having shortness of breath currently is during night hours.  Sleep apnea evaluation ordered.    Given furosemide usage on a daily basis as well as shortness of breath complaints at times, spironolactone 25 mg resumed.  He has been on Aldactone in the past prescribed by myself.              Cardiac/Vascular    Essential hypertension     Aldactone 25 mg added for  better blood pressure control.  Blood pressure today 150/70 mm Hg.  He reports blood pressure is 130 systolic at home when he measures.           Mixed hyperlipidemia     LDL well controlled.  No changes advised.           Coronary artery disease involving native coronary artery of native heart without angina pectoris     No active angina.  He will continue aspirin, 81 mg advised.  He is on beta-blockers.           Relevant Medications    aspirin (ECOTRIN) 81 MG EC tablet    PVC's (premature ventricular contractions)     He is on metoprolol.  Intermittently he has bigeminy.  Condition unchanged.  He does feel palpitations on a regular basis.             Other Visit Diagnoses     Venous insufficiency    -  Primary    Relevant Orders    US Lower Extremity Veins Bilateral Insuf    Primary hypertension        Relevant Medications    spironolactone (ALDACTONE) 25 MG tablet     Other Relevant Orders    Ambulatory referral/consult to Sleep Disorders                     Sleep consultation for evaluation of sleep apnea ordered.  Aldactone added for better blood pressure control along with his other BP agents.  Given degree of venous disease of the lower extremities a venous insufficiency ultrasound evaluation ordered.      Hank Barry MD  06/13/2022   2:51 PM

## 2022-06-13 NOTE — ASSESSMENT & PLAN NOTE
Aldactone 25 mg added for  better blood pressure control.  Blood pressure today 150/70 mm Hg.  He reports blood pressure is 130 systolic at home when he measures.

## 2022-06-16 ENCOUNTER — HOSPITAL ENCOUNTER (OUTPATIENT)
Dept: RADIOLOGY | Facility: HOSPITAL | Age: 77
Discharge: HOME | End: 2022-06-16
Attending: INTERNAL MEDICINE
Payer: MEDICARE

## 2022-06-16 DIAGNOSIS — I87.2 VENOUS INSUFFICIENCY: ICD-10-CM

## 2022-06-16 PROCEDURE — 93970 EXTREMITY STUDY: CPT | Mod: TC,PO

## 2022-06-17 DIAGNOSIS — G47.30 SLEEP APNEA, UNSPECIFIED TYPE: Primary | ICD-10-CM

## 2022-06-20 NOTE — PROGRESS NOTES
Physical Therapy Daily Treatment Note     Name: Bean Salas  Clinic Number: [id number]    Therapy Diagnosis:   Encounter Diagnoses   Name Primary?    Impaired range of motion of right hip Yes    Decreased right shoulder range of motion     Weakness     Impaired activities of daily living      Physician: Kristofer Brown IV, MD    Visit Date: 6/21/2022  Physician Orders: PT Eval and Treat   Hip and knee range of motion; hip, thigh, glute, core strengthening; balance training; gait training; physical modalities for pain relief  Patient is 7 months after hip fracture IM nail right hip; WBAT     Right shoulder rotator cuff impingement protocol  Medical Diagnosis from Referral: : Displaced intertrochanteric fracture of right femur, initial encounter for closed fracture [S72.141A], Aftercare for healing traumatic closed fracture of hip, right [S72.001D], Rotator cuff impingement syndrome of right shoulder [M75.41]  Evaluation Date: 5/6/2022   Authorization Period Expiration: 12/31/22  Plan of Care Expiration: 8/6/22  Visit # / Visits authorized: 7/ 25      Progress due: 6/5/22    Time In: 10:00  Time Out: 11:00  Total Billable Time: 57 minutes    Precautions: Standard    Subjective     Pt reports: Missing last week hurt my endurance   He was compliant with home exercise program.  Response to previous treatment: A little soreness  Functional change: No change    Pain: 3/10 (hip),   Location: right Hip and shoulder     Objective     Primary Impairments: Hip flexion/IR range of motion, hip strength, shoulder range of motion, shoulder mobility, thoracic mobility    FOTO:  -Intake: Complete  -Status: Complete  -Discharge: incomplete       Gait: Decreased stance time on R     Hip Range of Motion (Active / Passive):    Left  Right    Flexion 120 95   Abduction 30 20   Adduction       Extension       ER @ 0       ER @ 90 40 40   IR @ 0       IR @ 90 15 (20) 0      Shoulder range of motion   Flexion : L 125, R 120  Abduction:  "L 165, R 130  IR: L T9, R L1  ER: L: C7, R: Occiput     Lower Extremity Strength    Left Right   Knee extension: 4+/5 4/5   Knee flexion: 4+/5 4/5   Hip flexion: 4/5 2+/5   Hip internal rotation 4/5 3+/5   Hip external rotation 4/5 4/5   Hip extension:  Unable Unable   Hip abduction: 3+/5 3+/5   Hip adduction: NT/5 NT/5   Shoulder Flexion 4+/5 4/5   Shoulder ER 4+/5 4/5   Shoulder IR 4+/5 4/5   Shoulder abduction 4+/5 4/5             Joint Mobility: Hypomobility noted in hip joint. Significant hypomobility noted in thoracic spine        Function:     30s chair stand- NT today, past: 6 reps with use of arms     2MWT- NT today past: 249 ft, slight-moderate shortness of breath          Treatment     Bean received therapeutic exercises to develop strength, endurance, ROM, flexibility and posture for 50 minutes including:     Progress evaluation x 25 minutes including strength and range of motion assessment.     Nustep- L5 x 6 min     Supine hip flexor stretch- 30s x 3  Bridge - 2 x 10  Supine knee to chest hip flexion stretch- 10 x 10s    Pulley flexion/scaption- 2 min ea    Sit to stand- 2 x 10 from 24" box  Standing hip abduction- 2 x 10 ea    Heel raises- 2 x 10   Step ups/Stairs- 10x B stairs     Standing hip extension- 2 x 10 ea   Standing hip march- 1 min x 2     Not performed today  Seated thoracic extension mobilization- 20x  TB shoulder extension: GTB 2 x 10  TB shoulder ER: GTB   TB shoulder IR: GTB 2 x 10          Bean participated in neuromuscular re-education activities to improve: Balance for 10 minutes. The following activities were included:    Tandem stance- 3 min ea  Slow marching in place for single leg stability- 2 min  Tandem stance w front foot on step- w/ball raises 10x B     Not performed today  Tandem gait- 5 laps  Lyndsey stepping- 3 rounds by ballet bar       Home Exercises Provided and Patient Education Provided     Education provided:   -Educated patient regarding balance " progression  -    Written Home Exercises Provided: Patient instructed to cont prior HEP.  Exercises were reviewed and Bean was able to demonstrate them prior to the end of the session.  Bean demonstrated good  understanding of the education provided.     See EMR under Patient Instructions for exercises provided prior visit.    Assessment     Patient stated missing last week had an affect on his endurance and requested not to increase too much this session. Patient noted to therapist of exudate in B calves. Patient stated he is seeing an MD tomorrow for this and that he is fine to perform therapy today, exudate negative for blood. Patient able to progress through session with no increases in pain but needed rest breaks. Patient had no LOB during balance exercises but was wobbly. Patient ended session stating he felt fine.     Bean Is progressing well towards his goals.   Pt prognosis is Good.     Pt will continue to benefit from skilled outpatient physical therapy to address the deficits listed in the problem list box on initial evaluation, provide pt/family education and to maximize pt's level of independence in the home and community environment.     Pt's spiritual, cultural and educational needs considered and pt agreeable to plan of care and goals.    Anticipated barriers to physical therapy: None    Goals:  Short-Term Goals: 4 weeks  - The patient will be independent with initial home exercise program. In progress  - The patient will increase strength to at least 3+/5 in muscles tested to indicate improvements in functional strength. In progress  - The patient will increase range of motion grossly by 10 degrees to perform sit to stand without use of arms  In progress  - Patient will improve 2 MWT performs to 300 feet to demonstrate improved community ambulation In progress  - Patient will improve 30s chair stand performance to 10 times while using arms to demonstrate reduction in fall risk In progress      Long-Term Goals: 12 weeks - all In progress  - Pt to achieve <30% limitation on hip and shoulder survey as measured by the FOTO to demonstrate decreased disability.  - The patient will increase strength to at least 4/5 to perform functional mobility including sit to stand, gait  - The patient will increase hip ROM to 100 degrees flexion, 15 degrees internal rotation to enable him to perform recreational tasks without pain.  - Patient will improve shoulder range of motion to equal bilaterally to show reduction in symptom irritability and enable him to sleep without pain  - Patient will improve 2 MWT performs to 500 feet to demonstrate improved community ambulation and get closer to age-group norm of 535 ft  - Patient will improve 30s chair stand performance to 10 times without using arms to demonstrate reduction in fall risk         Plan       Re-test 2 minute walk test and 30s chair stand test next visit      Jesus Pryor, PTA

## 2022-06-21 ENCOUNTER — CLINICAL SUPPORT (OUTPATIENT)
Dept: REHABILITATION | Facility: HOSPITAL | Age: 77
End: 2022-06-21
Attending: ORTHOPAEDIC SURGERY
Payer: MEDICARE

## 2022-06-21 DIAGNOSIS — M25.611 DECREASED RIGHT SHOULDER RANGE OF MOTION: ICD-10-CM

## 2022-06-21 DIAGNOSIS — M25.651 IMPAIRED RANGE OF MOTION OF RIGHT HIP: Primary | ICD-10-CM

## 2022-06-21 DIAGNOSIS — R53.1 WEAKNESS: ICD-10-CM

## 2022-06-21 DIAGNOSIS — Z78.9 IMPAIRED ACTIVITIES OF DAILY LIVING: ICD-10-CM

## 2022-06-21 PROCEDURE — 97110 THERAPEUTIC EXERCISES: CPT | Mod: PO,CQ

## 2022-06-21 NOTE — PROGRESS NOTES
Physical Therapy Daily Treatment Note     Name: Bean Salas  Clinic Number: [id number]    Therapy Diagnosis:   Encounter Diagnoses   Name Primary?    Impaired range of motion of right hip Yes    Decreased right shoulder range of motion     Weakness     Impaired activities of daily living      Physician: Kristofer Brown IV, MD    Visit Date: 6/23/2022  Physician Orders: PT Eval and Treat   Hip and knee range of motion; hip, thigh, glute, core strengthening; balance training; gait training; physical modalities for pain relief  Patient is 7 months after hip fracture IM nail right hip; WBAT     Right shoulder rotator cuff impingement protocol  Medical Diagnosis from Referral: : Displaced intertrochanteric fracture of right femur, initial encounter for closed fracture [S72.141A], Aftercare for healing traumatic closed fracture of hip, right [S72.001D], Rotator cuff impingement syndrome of right shoulder [M75.41]  Evaluation Date: 5/6/2022   Authorization Period Expiration: 12/31/22  Plan of Care Expiration: 8/6/22  Visit # / Visits authorized: 8/ 25      Progress due: 6/5/22    Time In: 10:00  Time Out: 11:00  Total Billable Time: 45 minutes    Precautions: Standard    Subjective     Pt reports: still have trouble raising the arm all the way   He was compliant with home exercise program.  Response to previous treatment: A little soreness  Functional change: No change    Pain: 3/10 (hip),   Location: right Hip and shoulder     Objective     Primary Impairments: Hip flexion/IR range of motion, hip strength, shoulder range of motion, shoulder mobility, thoracic mobility    FOTO:  -Intake: Complete  -Status: Complete  -Discharge: incomplete       Gait: Decreased stance time on R     Hip Range of Motion (Active / Passive):    Left  Right    Flexion 120 95   Abduction 30 20   Adduction       Extension       ER @ 0       ER @ 90 40 40   IR @ 0       IR @ 90 15 (20) 0      Shoulder range of motion   Flexion : L 125, R  "120  Abduction: L 165, R 130  IR: L T9, R L1  ER: L: C7, R: Occiput     Lower Extremity Strength    Left Right   Knee extension: 4+/5 4/5   Knee flexion: 4+/5 4/5   Hip flexion: 4/5 2+/5   Hip internal rotation 4/5 3+/5   Hip external rotation 4/5 4/5   Hip extension:  Unable Unable   Hip abduction: 3+/5 3+/5   Hip adduction: NT/5 NT/5   Shoulder Flexion 4+/5 4/5   Shoulder ER 4+/5 4/5   Shoulder IR 4+/5 4/5   Shoulder abduction 4+/5 4/5             Joint Mobility: Hypomobility noted in hip joint. Significant hypomobility noted in thoracic spine        Function:     30s chair stand- NT today, past: 6 reps with use of arms     2MWT- NT today past: 249 ft, slight-moderate shortness of breath          Treatment     Bean received therapeutic exercises to develop strength, endurance, ROM, flexibility and posture for 50 minutes including:     Progress evaluation x 25 minutes including strength and range of motion assessment.     Nustep- L5 x 6 min     Supine hip flexor stretch- 30s x 3  Bridge - 2 x 10  Supine knee to chest hip flexion stretch- 10 x 10s    Pulley flexion/scaption- 3 min e    TB shoulder extension: GTB 2 x 10  TB shoulder ER: GTB   TB shoulder IR: GTB 2 x 10  Brueggars 2 x 10 RTB   Doorway stretch 3 x 30"     Not performed today  Seated thoracic extension mobilization- 20x  Sit to stand- 2 x 10 from 24" box  Standing hip abduction- 2 x 10 ea    Heel raises- 2 x 10   Step ups/Stairs- 10x B stairs     Standing hip extension- 2 x 10 ea   Standing hip march- 1 min x 2       Bean participated in neuromuscular re-education activities to improve: Balance for 0 minutes. The following activities were included:    Tandem stance- 3 min ea  Slow marching in place for single leg stability- 2 min  Tandem stance w front foot on step- w/ball raises 10x B     Not performed today  Tandem gait- 5 laps  Lyndsey stepping- 3 rounds by ballet bar       Home Exercises Provided and Patient Education Provided     Education " provided:   -Educated patient regarding balance progression  -    Written Home Exercises Provided: Patient instructed to cont prior HEP.  Exercises were reviewed and Bean was able to demonstrate them prior to the end of the session.  Bean demonstrated good  understanding of the education provided.     See EMR under Patient Instructions for exercises provided prior visit.    Assessment     Patient stated he feels better after last session, patient wanted to focus on shoulder this session. Patient able to progress through exercises but needs VCs for postural correction and to stay within pain free range of AROM for the shoulder.    Bean Is progressing well towards his goals.   Pt prognosis is Good.     Pt will continue to benefit from skilled outpatient physical therapy to address the deficits listed in the problem list box on initial evaluation, provide pt/family education and to maximize pt's level of independence in the home and community environment.     Pt's spiritual, cultural and educational needs considered and pt agreeable to plan of care and goals.    Anticipated barriers to physical therapy: None    Goals:  Short-Term Goals: 4 weeks  - The patient will be independent with initial home exercise program. In progress  - The patient will increase strength to at least 3+/5 in muscles tested to indicate improvements in functional strength. In progress  - The patient will increase range of motion grossly by 10 degrees to perform sit to stand without use of arms  In progress  - Patient will improve 2 MWT performs to 300 feet to demonstrate improved community ambulation In progress  - Patient will improve 30s chair stand performance to 10 times while using arms to demonstrate reduction in fall risk In progress     Long-Term Goals: 12 weeks - all In progress  - Pt to achieve <30% limitation on hip and shoulder survey as measured by the FOTO to demonstrate decreased disability.  - The patient will increase  strength to at least 4/5 to perform functional mobility including sit to stand, gait  - The patient will increase hip ROM to 100 degrees flexion, 15 degrees internal rotation to enable him to perform recreational tasks without pain.  - Patient will improve shoulder range of motion to equal bilaterally to show reduction in symptom irritability and enable him to sleep without pain  - Patient will improve 2 MWT performs to 500 feet to demonstrate improved community ambulation and get closer to age-group norm of 535 ft  - Patient will improve 30s chair stand performance to 10 times without using arms to demonstrate reduction in fall risk         Plan       Re-test 2 minute walk test and 30s chair stand test next visit      Jesus Pryor, PTA

## 2022-06-22 ENCOUNTER — TELEPHONE (OUTPATIENT)
Dept: ADMINISTRATIVE | Facility: OTHER | Age: 77
End: 2022-06-22
Payer: MEDICARE

## 2022-06-22 ENCOUNTER — TELEPHONE (OUTPATIENT)
Dept: WOUND CARE | Facility: HOSPITAL | Age: 77
End: 2022-06-22
Payer: MEDICARE

## 2022-06-22 ENCOUNTER — OFFICE VISIT (OUTPATIENT)
Dept: CARDIOLOGY | Facility: CLINIC | Age: 77
End: 2022-06-22
Payer: MEDICARE

## 2022-06-22 VITALS
BODY MASS INDEX: 31.22 KG/M2 | WEIGHT: 223 LBS | DIASTOLIC BLOOD PRESSURE: 72 MMHG | HEART RATE: 50 BPM | SYSTOLIC BLOOD PRESSURE: 142 MMHG | HEIGHT: 71 IN

## 2022-06-22 DIAGNOSIS — I49.8 ATRIAL BIGEMINY: ICD-10-CM

## 2022-06-22 DIAGNOSIS — R06.02 SOB (SHORTNESS OF BREATH): ICD-10-CM

## 2022-06-22 DIAGNOSIS — I50.20 SYSTOLIC CONGESTIVE HEART FAILURE, UNSPECIFIED HF CHRONICITY: ICD-10-CM

## 2022-06-22 DIAGNOSIS — I50.41 ACUTE COMBINED SYSTOLIC AND DIASTOLIC CONGESTIVE HEART FAILURE: ICD-10-CM

## 2022-06-22 DIAGNOSIS — E78.2 MIXED HYPERLIPIDEMIA: ICD-10-CM

## 2022-06-22 DIAGNOSIS — I87.2 VENOUS INSUFFICIENCY OF BOTH LOWER EXTREMITIES: Primary | ICD-10-CM

## 2022-06-22 DIAGNOSIS — I25.10 CORONARY ARTERY DISEASE INVOLVING NATIVE CORONARY ARTERY OF NATIVE HEART WITHOUT ANGINA PECTORIS: ICD-10-CM

## 2022-06-22 DIAGNOSIS — I10 ESSENTIAL HYPERTENSION: ICD-10-CM

## 2022-06-22 DIAGNOSIS — G47.33 OSA (OBSTRUCTIVE SLEEP APNEA): ICD-10-CM

## 2022-06-22 DIAGNOSIS — Z78.9 IMPAIRED ACTIVITIES OF DAILY LIVING: ICD-10-CM

## 2022-06-22 PROCEDURE — 1101F PT FALLS ASSESS-DOCD LE1/YR: CPT | Mod: CPTII,S$GLB,, | Performed by: INTERNAL MEDICINE

## 2022-06-22 PROCEDURE — 1157F PR ADVANCE CARE PLAN OR EQUIV PRESENT IN MEDICAL RECORD: ICD-10-PCS | Mod: CPTII,S$GLB,, | Performed by: INTERNAL MEDICINE

## 2022-06-22 PROCEDURE — 3078F PR MOST RECENT DIASTOLIC BLOOD PRESSURE < 80 MM HG: ICD-10-PCS | Mod: CPTII,S$GLB,, | Performed by: INTERNAL MEDICINE

## 2022-06-22 PROCEDURE — 99999 PR PBB SHADOW E&M-EST. PATIENT-LVL IV: ICD-10-PCS | Mod: PBBFAC,,, | Performed by: INTERNAL MEDICINE

## 2022-06-22 PROCEDURE — 1101F PR PT FALLS ASSESS DOC 0-1 FALLS W/OUT INJ PAST YR: ICD-10-PCS | Mod: CPTII,S$GLB,, | Performed by: INTERNAL MEDICINE

## 2022-06-22 PROCEDURE — 1126F PR PAIN SEVERITY QUANTIFIED, NO PAIN PRESENT: ICD-10-PCS | Mod: CPTII,S$GLB,, | Performed by: INTERNAL MEDICINE

## 2022-06-22 PROCEDURE — 3077F PR MOST RECENT SYSTOLIC BLOOD PRESSURE >= 140 MM HG: ICD-10-PCS | Mod: CPTII,S$GLB,, | Performed by: INTERNAL MEDICINE

## 2022-06-22 PROCEDURE — 3288F FALL RISK ASSESSMENT DOCD: CPT | Mod: CPTII,S$GLB,, | Performed by: INTERNAL MEDICINE

## 2022-06-22 PROCEDURE — 3078F DIAST BP <80 MM HG: CPT | Mod: CPTII,S$GLB,, | Performed by: INTERNAL MEDICINE

## 2022-06-22 PROCEDURE — 3077F SYST BP >= 140 MM HG: CPT | Mod: CPTII,S$GLB,, | Performed by: INTERNAL MEDICINE

## 2022-06-22 PROCEDURE — 1157F ADVNC CARE PLAN IN RCRD: CPT | Mod: CPTII,S$GLB,, | Performed by: INTERNAL MEDICINE

## 2022-06-22 PROCEDURE — 1159F PR MEDICATION LIST DOCUMENTED IN MEDICAL RECORD: ICD-10-PCS | Mod: CPTII,S$GLB,, | Performed by: INTERNAL MEDICINE

## 2022-06-22 PROCEDURE — 3288F PR FALLS RISK ASSESSMENT DOCUMENTED: ICD-10-PCS | Mod: CPTII,S$GLB,, | Performed by: INTERNAL MEDICINE

## 2022-06-22 PROCEDURE — 1126F AMNT PAIN NOTED NONE PRSNT: CPT | Mod: CPTII,S$GLB,, | Performed by: INTERNAL MEDICINE

## 2022-06-22 PROCEDURE — 1159F MED LIST DOCD IN RCRD: CPT | Mod: CPTII,S$GLB,, | Performed by: INTERNAL MEDICINE

## 2022-06-22 PROCEDURE — 99999 PR PBB SHADOW E&M-EST. PATIENT-LVL IV: CPT | Mod: PBBFAC,,, | Performed by: INTERNAL MEDICINE

## 2022-06-22 PROCEDURE — 99215 OFFICE O/P EST HI 40 MIN: CPT | Mod: S$GLB,,, | Performed by: INTERNAL MEDICINE

## 2022-06-22 PROCEDURE — 99215 PR OFFICE/OUTPT VISIT, EST, LEVL V, 40-54 MIN: ICD-10-PCS | Mod: S$GLB,,, | Performed by: INTERNAL MEDICINE

## 2022-06-22 RX ORDER — FUROSEMIDE 40 MG/1
40 TABLET ORAL 2 TIMES DAILY
Qty: 180 TABLET | Refills: 3 | Status: SHIPPED | OUTPATIENT
Start: 2022-06-22 | End: 2023-12-04 | Stop reason: SDUPTHER

## 2022-06-22 NOTE — PROGRESS NOTES
Subjective:   Patient ID:  Bean Salas is a 77 y.o. male who presents for management of Chronic Venous Insufficiency w/ Ulceration, Congestive Heart Failure, Shortness of Breath, Hyperlipidemia, and Valvular Heart Disease      HPI:     He is here by recommendation of his care team for management of lower extremity edema with ulceration and weeping.  He has a past medical history of hypertension, hyperlipidemia, congestive heart failure with ejection fraction 45 50% and diastolic dysfunction severe left atrial enlargement, moderate right atrial enlargement, mild-to-moderate mitral regurgitation, obesity, nonobstructive coronary disease, PVCs, and right hip fracture.      He has edema started after hip fracture.  Both legs are involved.  No previous history of deep venous thrombosis.  He is complaining of shortness of breath and PND.  Wakes up at least once a night because of dyspnea.  Currently taking Aldactone 25 mg daily and Lasix 40 mg daily.  Previous echocardiogram revealed at the most moderate mitral regurgitation however on physical exam he has a systolic murmur concerning for worse mitral regurgitation than detected thus far      CEAP 6  VCSS 12      6/2021     EF 45-50%   DD    Mild RV enlargement   Normal RV fn   Severe LAE   Mod NANI       LHC 7/2021     EF 50-55% by visual estimate.   LVEDP 25 mmHg   Mild LAD and RCA disease   Prox LCX < 50%          9/2021     EF 40%    Grade III DD   Severe LAE + NANI   Mild AR/MR/TR   PASP 23 mmHg                   Patient Active Problem List    Diagnosis Date Noted    PVC's (premature ventricular contractions) 06/13/2022    Impaired range of motion of right hip 05/09/2022    Decreased right shoulder range of motion 05/09/2022    Weakness 05/09/2022    Impaired activities of daily living 05/09/2022    Aftercare for healing traumatic closed fracture of hip, right 04/27/2022    Rotator cuff impingement syndrome of right shoulder 04/27/2022    Venous  insufficiency of both lower extremities 2022    CHF (congestive heart failure)     Coronary artery disease involving native coronary artery of native heart without angina pectoris      · The ejection fraction was 50-55% by visual estimate.  · The post-procedure left ventricular end diastolic pressure was 25.  · The Prox Cx to Dist Cx lesion was 50% stenosed.  · The estimated blood loss was <50 mL.  · Mild LAD, RCA Disease  · No focal lesions to explain angina          SOB (shortness of breath)     Acute combined systolic and diastolic congestive heart failure 2021    Elevated LFTs 2021    Advance care planning 2021    Displaced intertrochanteric fracture of right femur, initial encounter for closed fracture 2021    Closed fracture of right hip 2021    Fall 2021    Bradycardia 2021     Some of the bradycardic rhythms he has experienced by pulse check are related to atrial bigeminy and pseudo bradycardia.  His Holter did show a single pause of greater than 2 seconds.  Electrophysiologic consult ordered to assess benefits of pacemaker for the patient.  He is still having marked fatigue with activity and reduced exercise tolerance and he is very concerned that it is arrhythmia related.     He has not had syncope.  His heart rate today is 60 by pulse evaluation.  There are still ectopic beats on exam.  His Holter also showed at least one ventricular triplet.            Relevant Orders     Ambulatory referral/consult to Cardiac Electrophysiology           Angina pectoris 2021    Atrial bigeminy 2021    Essential hypertension 2017    Mixed hyperlipidemia 2017    Hypothyroidism due to acquired atrophy of thyroid 2017    Hyperglycemia 2017           Right Arm BP - Sittin/78  Left Arm BP - Sittin/72        LABS    LAST HbA1c  Lab Results   Component Value Date    HGBA1C 6.6 (H) 2021       Lipid panel  Lab  Results   Component Value Date    CHOL 100 (L) 09/26/2021    CHOL 123 05/27/2021    CHOL 138 02/21/2020     Lab Results   Component Value Date    HDL 38 (L) 09/26/2021    HDL 33 (L) 05/27/2021    HDL 40 02/21/2020     Lab Results   Component Value Date    LDLCALC 41.8 (L) 09/26/2021    LDLCALC 70.4 05/27/2021    LDLCALC 78.8 02/21/2020     Lab Results   Component Value Date    TRIG 101 09/26/2021    TRIG 98 05/27/2021    TRIG 96 02/21/2020     Lab Results   Component Value Date    CHOLHDL 38.0 09/26/2021    CHOLHDL 26.8 05/27/2021    CHOLHDL 29.0 02/21/2020            Review of Systems   Constitutional: Negative for diaphoresis, night sweats, weight gain and weight loss.   HENT: Negative for congestion.    Eyes: Negative for blurred vision, discharge and double vision.   Cardiovascular: Positive for dyspnea on exertion and leg swelling. Negative for chest pain, claudication, cyanosis, irregular heartbeat, near-syncope, orthopnea, palpitations, paroxysmal nocturnal dyspnea and syncope.   Respiratory: Positive for shortness of breath and sleep disturbances due to breathing. Negative for cough and wheezing.    Endocrine: Negative for cold intolerance, heat intolerance and polyphagia.   Hematologic/Lymphatic: Negative for adenopathy and bleeding problem. Does not bruise/bleed easily.   Skin: Positive for poor wound healing. Negative for dry skin and nail changes.   Musculoskeletal: Negative for arthritis, back pain, falls, joint pain, myalgias and neck pain.   Gastrointestinal: Negative for bloating, abdominal pain, change in bowel habit and constipation.   Genitourinary: Negative for bladder incontinence, dysuria, flank pain, genital sores and missed menses.   Neurological: Negative for aphonia, brief paralysis, difficulty with concentration, dizziness and weakness.   Psychiatric/Behavioral: Negative for altered mental status and memory loss. The patient does not have insomnia.    Allergic/Immunologic: Negative for  environmental allergies.       Objective:   Physical Exam  Constitutional:       Appearance: He is well-developed.      Interventions: He is not intubated.  HENT:      Head: Normocephalic and atraumatic.      Right Ear: External ear normal.      Left Ear: External ear normal.   Eyes:      General: No scleral icterus.        Right eye: No discharge.         Left eye: No discharge.      Conjunctiva/sclera: Conjunctivae normal.      Pupils: Pupils are equal, round, and reactive to light.   Neck:      Thyroid: No thyromegaly.      Vascular: Normal carotid pulses. No carotid bruit, hepatojugular reflux or JVD.      Trachea: No tracheal deviation.   Cardiovascular:      Rate and Rhythm: Normal rate and regular rhythm. Occasional extrasystoles are present.     Chest Wall: PMI is not displaced.      Pulses:           Carotid pulses are 2+ on the right side and 2+ on the left side.       Radial pulses are 2+ on the right side and 2+ on the left side.        Femoral pulses are 2+ on the right side and 2+ on the left side.       Popliteal pulses are 2+ on the right side and 2+ on the left side.        Dorsalis pedis pulses are 1+ on the right side and 1+ on the left side.        Posterior tibial pulses are 1+ on the right side and 1+ on the left side.      Heart sounds: S1 normal and S2 normal. Heart sounds not distant. No midsystolic click. Murmur heard.   High-pitched blowing holosystolic murmur is present with a grade of 3/6 at the apex.    No friction rub. No gallop. No S3 sounds.      Comments:     Triphasic bilateral DP and PT doppler signals         Pulmonary:      Effort: Pulmonary effort is normal. No tachypnea, bradypnea, accessory muscle usage or respiratory distress. He is not intubated.      Breath sounds: Normal breath sounds. No stridor. No decreased breath sounds, wheezing or rales.   Chest:      Chest wall: No tenderness.   Abdominal:      General: There is no distension or abdominal bruit.      Palpations:  There is no mass or pulsatile mass.      Tenderness: There is no abdominal tenderness. There is no guarding or rebound.   Musculoskeletal:         General: No tenderness. Normal range of motion.      Cervical back: Normal range of motion and neck supple.   Lymphadenopathy:      Cervical: No cervical adenopathy.      Comments:   3+ edema of both ankles to knees       Skin:     General: Skin is warm.      Coloration: Skin is not pale.      Findings: No erythema or rash.      Comments:     Ulcers on both shin and calves        Neurological:      Mental Status: He is alert and oriented to person, place, and time.      Cranial Nerves: No cranial nerve deficit.      Coordination: Coordination normal.      Deep Tendon Reflexes: Reflexes are normal and symmetric.   Psychiatric:         Behavior: Behavior normal.         Thought Content: Thought content normal.         Judgment: Judgment normal.           6/2022                      Assessment:     1. Venous insufficiency of both lower extremities    2. SOB (shortness of breath)    3. Coronary artery disease involving native coronary artery of native heart without angina pectoris    4. Systolic congestive heart failure, unspecified HF chronicity    5. Acute combined systolic and diastolic congestive heart failure    6. Essential hypertension    7. Mixed hyperlipidemia    8. Atrial bigeminy    9. Impaired activities of daily living        Plan:       Ablation R GSV  Then return for L GSV  If no improvement he will have deep venous evaluation      Low salt diet  Compression stockings 20-30 mmHg  Wound care referral  Sleep disorder referral to [facility]          Consider DEMETRICE because of MR murmur concerning for MR than noted on 2 echocardiograms          Continue with current medical plan and lifestyle changes.  Return sooner for concerns or questions. If symptoms persist go to the ED  I have reviewed all pertinent data on this patient       I have reviewed the patient's medical  history in detail and updated the computerized patient record.    Orders Placed This Encounter   Procedures    COMPRESSION STOCKINGS     Zipper option  Knee high     Order Specific Question:   Pressure amount:     Answer:   20-30 mmHg    Ambulatory referral/consult to Wound Clinic     Standing Status:   Future     Standing Expiration Date:   7/22/2023     Referral Priority:   Routine     Referral Type:   Consultation     Referral Reason:   Specialty Services Required     Requested Specialty:   Wound Care     Number of Visits Requested:   1    Case Request-Cath Lab: Ablation     Standing Status:   Standing     Number of Occurrences:   1     Order Specific Question:   CPT Code:     Answer:   MD CHEMICAL VEIN ABLATION [[id number]]     Order Specific Question:   Medical Necessity:     Answer:   Medically Urgent [101]     Order Specific Question:   Is an on-site pathologist required for this procedure?     Answer:   N/A       Follow up as scheduled. Return sooner for concerns or questions            He expressed verbal understanding and agreed with the plan        -In today's visit, at least 4 established conditions that pose a risk to life or bodily function have been addressed and the conditions are severe.    -In today's visit, monitoring for drug toxicity was accomplished.          Patient's Medications   New Prescriptions    No medications on file   Previous Medications    ACETAMINOPHEN (TYLENOL) 325 MG TABLET    Take 2 tablets (650 mg total) by mouth every 6 (six) hours as needed for Pain.    ASPIRIN (ECOTRIN) 81 MG EC TABLET    Take 1 tablet (81 mg total) by mouth once daily.    ATORVASTATIN (LIPITOR) 10 MG TABLET    Take 1 tablet (10 mg total) by mouth once daily.    LEVOTHYROXINE (SYNTHROID) 300 MCG TAB    Take 1 tablet (300 mcg total) by mouth once daily.    LOSARTAN (COZAAR) 100 MG TABLET    Take 1 tablet (100 mg total) by mouth once daily.    MAGNESIUM HYDROXIDE 400 MG/5 ML (MILK OF MAGNESIA) 400 MG/5 ML  SUSP    Take 30 mLs by mouth daily as needed (constipation).    METHOCARBAMOL (ROBAXIN) 500 MG TAB    Take 1 tablet by mouth 4 times daily    METOPROLOL SUCCINATE (TOPROL-XL) 50 MG 24 HR TABLET    Take 1 tablet (50 mg total) by mouth once daily.    MULTIVIT-MIN-FA-LYCOPEN-LUTEIN (CENTRUM SILVER) 0.4-300-250 MG-MCG-MCG TAB    Take 1 tablet by mouth once daily.    SPIRONOLACTONE (ALDACTONE) 25 MG TABLET    Take 1 tablet (25 mg total) by mouth once daily.    TRAMADOL (ULTRAM) 50 MG TABLET    Take 1 tablet (50 mg total) by mouth every 4 (four) hours as needed for Pain.   Modified Medications    Modified Medication Previous Medication    FUROSEMIDE (LASIX) 40 MG TABLET furosemide (LASIX) 40 MG tablet       Take 1 tablet (40 mg total) by mouth 2 (two) times a day. Take daily for next 3 days, then use as needed. Weigh daily. Afterwards, f weight increases 2 lbs/24h, take dose of lasix daily  until weight is back to baseline    Take 1 tablet (40 mg total) by mouth as needed (edema). Take daily for next 3 days, then use as needed. Weigh daily. Afterwards, f weight increases 2 lbs/24h, take dose of lasix daily  until weight is back to baseline   Discontinued Medications    No medications on file

## 2022-06-22 NOTE — TELEPHONE ENCOUNTER
Called primary phone number. No answer. Was able to leave voicemail to have patient call [facility] back to be scheduled for new patient wound care appointment.

## 2022-06-23 ENCOUNTER — CLINICAL SUPPORT (OUTPATIENT)
Dept: REHABILITATION | Facility: HOSPITAL | Age: 77
End: 2022-06-23
Attending: ORTHOPAEDIC SURGERY
Payer: MEDICARE

## 2022-06-23 DIAGNOSIS — M25.651 IMPAIRED RANGE OF MOTION OF RIGHT HIP: Primary | ICD-10-CM

## 2022-06-23 DIAGNOSIS — Z78.9 IMPAIRED ACTIVITIES OF DAILY LIVING: ICD-10-CM

## 2022-06-23 DIAGNOSIS — R53.1 WEAKNESS: ICD-10-CM

## 2022-06-23 DIAGNOSIS — M25.611 DECREASED RIGHT SHOULDER RANGE OF MOTION: ICD-10-CM

## 2022-06-23 PROCEDURE — 97110 THERAPEUTIC EXERCISES: CPT | Mod: PO,CQ

## 2022-06-24 ENCOUNTER — TELEPHONE (OUTPATIENT)
Dept: FAMILY MEDICINE | Facility: CLINIC | Age: 77
End: 2022-06-24
Payer: MEDICARE

## 2022-06-25 NOTE — TELEPHONE ENCOUNTER
----- Message from Leno Urias MD sent at 6/22/2022 11:11 AM CDT -----        Phillip Guzmán  I saw him today   He needs a temporary handicap sticker      Thanks      ZN

## 2022-06-27 ENCOUNTER — CLINICAL SUPPORT (OUTPATIENT)
Dept: REHABILITATION | Facility: HOSPITAL | Age: 77
End: 2022-06-27
Attending: ORTHOPAEDIC SURGERY
Payer: MEDICARE

## 2022-06-27 DIAGNOSIS — R53.1 WEAKNESS: ICD-10-CM

## 2022-06-27 DIAGNOSIS — M25.651 IMPAIRED RANGE OF MOTION OF RIGHT HIP: Primary | ICD-10-CM

## 2022-06-27 DIAGNOSIS — M25.611 DECREASED RIGHT SHOULDER RANGE OF MOTION: ICD-10-CM

## 2022-06-27 DIAGNOSIS — Z78.9 IMPAIRED ACTIVITIES OF DAILY LIVING: ICD-10-CM

## 2022-06-27 PROCEDURE — 97112 NEUROMUSCULAR REEDUCATION: CPT | Mod: PO | Performed by: PHYSICAL THERAPIST

## 2022-06-27 NOTE — PROGRESS NOTES
Physical Therapy Daily Treatment Note     Name: Bean Salas  Clinic Number: [id number]    Therapy Diagnosis:   Encounter Diagnoses   Name Primary?    Impaired range of motion of right hip Yes    Decreased right shoulder range of motion     Weakness     Impaired activities of daily living      Physician: Kristofer Brown IV, MD    Visit Date: 6/27/2022  Physician Orders: PT Eval and Treat   Hip and knee range of motion; hip, thigh, glute, core strengthening; balance training; gait training; physical modalities for pain relief  Patient is 7 months after hip fracture IM nail right hip; WBAT     Right shoulder rotator cuff impingement protocol  Medical Diagnosis from Referral: : Displaced intertrochanteric fracture of right femur, initial encounter for closed fracture [S72.141A], Aftercare for healing traumatic closed fracture of hip, right [S72.001D], Rotator cuff impingement syndrome of right shoulder [M75.41]  Evaluation Date: 5/6/2022   Authorization Period Expiration: 12/31/22  Plan of Care Expiration: 8/6/22  Visit # / Visits authorized: 9/ 25      Progress due: 6/5/22    Time In: 10:00  Time Out: 10:50  Total Billable Time: 50 minutes    Precautions: Standard    Subjective     Pt reports: He recently found out that his veins aren't working right which is why he has the blisters on his legs. His doctor told him to stop using the compression sleeves. Shoulder feels like it's moving better, hasn't been having much pain  He was compliant with home exercise program.  Response to previous treatment: A little soreness  Functional change: No change    Pain: 3/10 (hip),   Location: right Hip and shoulder     Objective     Primary Impairments: Hip flexion/IR range of motion, hip strength, shoulder range of motion, shoulder mobility, thoracic mobility    FOTO:  -Intake: Complete  -Status: Complete  -Discharge: incomplete       Treatment     Bean received therapeutic exercises to develop strength, endurance, ROM,  "flexibility and posture for 4 minutes including:   * indicates that exercise was performed, not billed today since patient was not under direct one-on-one supervision      Nustep- L5 x 6 min*     Pulley flexion/scaption- 3 min each *    TB shoulder extension: GTB 2 x 10*  TB shoulder ER: GTB *  TB shoulder IR: GTB 2 x 10*  Brueggars 2 x 10 RTB *    Standing hip abduction- 2 x 10 ea*  Standing hip extension- 2 x 10 ea *  Sit to stand- 2 x 10 from 24" box    Not performed today  Supine hip flexor stretch- 30s x 3  Bridge - 2 x 10  Supine knee to chest hip flexion stretch- 10 x 10s  Seated thoracic extension mobilization- 20x      Doorway stretch 3 x 30" *  Heel raises- 2 x 10   Step ups/Stairs- 10x B stairs       Bean participated in neuromuscular re-education activities to improve: Balance for 15 minutes. The following activities were included:    Slow marching in place for single leg stability- 2 min  Modified SLS - front foot on step    Not performed today  Tandem gait- 5 laps  Lyndsey stepping- 3 rounds by ballet bar   Tandem stance- 3 min ea      Home Exercises Provided and Patient Education Provided     Education provided:   -Educated patient regarding balance progression    Written Home Exercises Provided: Patient instructed to cont prior HEP.  Exercises were reviewed and Bean was able to demonstrate them prior to the end of the session.  Bean demonstrated good  understanding of the education provided.     See EMR under Patient Instructions for exercises provided prior visit.    Assessment     Patient did well with balance exercises today, he was challenge with stability in narrow TORREY but it was an appropriate challenge for him. No issues with upper or lower body exercises today. Continues to have deficits in balance, strength, range of motion and function.    Bean Is progressing well towards his goals.   Pt prognosis is Good.     Pt will continue to benefit from skilled outpatient physical therapy to " address the deficits listed in the problem list box on initial evaluation, provide pt/family education and to maximize pt's level of independence in the home and community environment.     Pt's spiritual, cultural and educational needs considered and pt agreeable to plan of care and goals.    Anticipated barriers to physical therapy: None    Goals:  Short-Term Goals: 4 weeks  - The patient will be independent with initial home exercise program. In progress  - The patient will increase strength to at least 3+/5 in muscles tested to indicate improvements in functional strength. In progress  - The patient will increase range of motion grossly by 10 degrees to perform sit to stand without use of arms  In progress  - Patient will improve 2 MWT performs to 300 feet to demonstrate improved community ambulation In progress  - Patient will improve 30s chair stand performance to 10 times while using arms to demonstrate reduction in fall risk In progress     Long-Term Goals: 12 weeks - all In progress  - Pt to achieve <30% limitation on hip and shoulder survey as measured by the FOTO to demonstrate decreased disability.  - The patient will increase strength to at least 4/5 to perform functional mobility including sit to stand, gait  - The patient will increase hip ROM to 100 degrees flexion, 15 degrees internal rotation to enable him to perform recreational tasks without pain.  - Patient will improve shoulder range of motion to equal bilaterally to show reduction in symptom irritability and enable him to sleep without pain  - Patient will improve 2 MWT performs to 500 feet to demonstrate improved community ambulation and get closer to age-group norm of 535 ft  - Patient will improve 30s chair stand performance to 10 times without using arms to demonstrate reduction in fall risk         Plan     Continue to address deficits      Constantin Casey, PT

## 2022-06-28 NOTE — PROGRESS NOTES
Physical Therapy Daily Treatment Note     Name: Bean Salas  [facility] Number: [id number]    Therapy Diagnosis:   Encounter Diagnoses   Name Primary?    Impaired range of motion of right hip Yes    Decreased right shoulder range of motion     Weakness     Impaired activities of daily living      Physician: Kristofer Brown IV, MD    Visit Date: 6/30/2022  Physician Orders: PT Eval and Treat   Hip and knee range of motion; hip, thigh, glute, core strengthening; balance training; gait training; physical modalities for pain relief  Patient is 7 months after hip fracture IM nail right hip; WBAT     Right shoulder rotator cuff impingement protocol  Medical Diagnosis from Referral: : Displaced intertrochanteric fracture of right femur, initial encounter for closed fracture [S72.141A], Aftercare for healing traumatic closed fracture of hip, right [S72.001D], Rotator cuff impingement syndrome of right shoulder [M75.41]  Evaluation Date: 5/6/2022   Authorization Period Expiration: 12/31/22  Plan of Care Expiration: 8/6/22  Visit # / Visits authorized: 10/ 25      Progress due: 6/5/22    Time In: 10:00  Time Out: 11:00  Total Billable Time: 30 minutes    Precautions: Standard    Subjective     Pt reports: feel alright   He was compliant with home exercise program.  Response to previous treatment: A little soreness  Functional change: No change    Pain: 3/10 (hip),   Location: right Hip and shoulder     Objective     Primary Impairments: Hip flexion/IR range of motion, hip strength, shoulder range of motion, shoulder mobility, thoracic mobility    FOTO:  -Intake: Complete  -Status: Complete  -Discharge: incomplete       Treatment     Bean received therapeutic exercises to develop strength, endurance, ROM, flexibility and posture for 40 minutes including:   * indicates that exercise was performed, not billed today since patient was not under direct one-on-one supervision      Nustep- L5 x 6 min     Pulley flexion/scaption-  "3 min each     TB shoulder extension: GTB 2 x 10*  TB shoulder ER: GTB *  TB shoulder IR: GTB 2 x 10*  Brueggars 2 x 10 RTB *    Standing hip abduction- 2 x 10 ea  Standing hip extension- 2 x 10 ea   Sit to stand- 2 x 10 from 24" box  Heel raises 3 x 10   Toe Raises 3 x 10     Not performed today  Supine hip flexor stretch- 30s x 3  Bridge - 2 x 10  Supine knee to chest hip flexion stretch- 10 x 10s  Seated thoracic extension mobilization- 20x      Doorway stretch 3 x 30" *  Heel raises- 2 x 10   Step ups/Stairs- 10x B stairs       Bean participated in neuromuscular re-education activities to improve: Balance for 15 minutes. The following activities were included:    Slow marching in place for single leg stability- 2 min  Modified SLS - front foot on step  Tandem stance- 3 min ea, DC R knee 1:30       Not performed today  Tandem gait- 5 laps  Lyndsey stepping- 3 rounds by ballet bar     Home Exercises Provided and Patient Education Provided     Education provided:   -Educated patient regarding balance progression    Written Home Exercises Provided: Patient instructed to cont prior HEP.  Exercises were reviewed and Bean was able to demonstrate them prior to the end of the session.  Bean demonstrated good  understanding of the education provided.     See EMR under Patient Instructions for exercises provided prior visit.    Assessment     Patient able to perform balance exercises although he requested to end exercises due to R knee pain. Patient stated heel/toe raises helped to decrease pain. Patient able to perform shoulder exercises which he notes is improving. Patient ended session stating he felt fine.     Bean Is progressing well towards his goals.   Pt prognosis is Good.     Pt will continue to benefit from skilled outpatient physical therapy to address the deficits listed in the problem list box on initial evaluation, provide pt/family education and to maximize pt's level of independence in the home and " community environment.     Pt's spiritual, cultural and educational needs considered and pt agreeable to plan of care and goals.    Anticipated barriers to physical therapy: None    Goals:  Short-Term Goals: 4 weeks  - The patient will be independent with initial home exercise program. In progress  - The patient will increase strength to at least 3+/5 in muscles tested to indicate improvements in functional strength. In progress  - The patient will increase range of motion grossly by 10 degrees to perform sit to stand without use of arms  In progress  - Patient will improve 2 MWT performs to 300 feet to demonstrate improved community ambulation In progress  - Patient will improve 30s chair stand performance to 10 times while using arms to demonstrate reduction in fall risk In progress     Long-Term Goals: 12 weeks - all In progress  - Pt to achieve <30% limitation on hip and shoulder survey as measured by the FOTO to demonstrate decreased disability.  - The patient will increase strength to at least 4/5 to perform functional mobility including sit to stand, gait  - The patient will increase hip ROM to 100 degrees flexion, 15 degrees internal rotation to enable him to perform recreational tasks without pain.  - Patient will improve shoulder range of motion to equal bilaterally to show reduction in symptom irritability and enable him to sleep without pain  - Patient will improve 2 MWT performs to 500 feet to demonstrate improved community ambulation and get closer to age-group norm of 535 ft  - Patient will improve 30s chair stand performance to 10 times without using arms to demonstrate reduction in fall risk         Plan     Continue to address deficits      Jesus Pryor, PTA

## 2022-06-30 ENCOUNTER — CLINICAL SUPPORT (OUTPATIENT)
Dept: REHABILITATION | Facility: HOSPITAL | Age: 77
End: 2022-06-30
Attending: ORTHOPAEDIC SURGERY
Payer: MEDICARE

## 2022-06-30 DIAGNOSIS — M25.651 IMPAIRED RANGE OF MOTION OF RIGHT HIP: Primary | ICD-10-CM

## 2022-06-30 DIAGNOSIS — R53.1 WEAKNESS: ICD-10-CM

## 2022-06-30 DIAGNOSIS — Z78.9 IMPAIRED ACTIVITIES OF DAILY LIVING: ICD-10-CM

## 2022-06-30 DIAGNOSIS — M25.611 DECREASED RIGHT SHOULDER RANGE OF MOTION: ICD-10-CM

## 2022-06-30 PROCEDURE — 97110 THERAPEUTIC EXERCISES: CPT | Mod: PO,CQ

## 2022-07-05 NOTE — PROGRESS NOTES
Physical Therapy Daily Treatment Note     Name: Bean Salas  Clinic Number: [id number]    Therapy Diagnosis:   Encounter Diagnoses   Name Primary?    Impaired range of motion of right hip Yes    Decreased right shoulder range of motion     Weakness     Impaired activities of daily living      Physician: Kristofer Brown IV, MD    Visit Date: 7/6/2022  Physician Orders: PT Eval and Treat   Hip and knee range of motion; hip, thigh, glute, core strengthening; balance training; gait training; physical modalities for pain relief  Patient is 7 months after hip fracture IM nail right hip; WBAT     Right shoulder rotator cuff impingement protocol  Medical Diagnosis from Referral: : Displaced intertrochanteric fracture of right femur, initial encounter for closed fracture [S72.141A], Aftercare for healing traumatic closed fracture of hip, right [S72.001D], Rotator cuff impingement syndrome of right shoulder [M75.41]  Evaluation Date: 5/6/2022   Authorization Period Expiration: 12/31/22  Plan of Care Expiration: 8/6/22  Visit # / Visits authorized: 11/ 25      Progress due: 6/5/22    Time In: 10:00  Time Out: 11:00  Total Billable Time: 50 minutes    Precautions: Standard    Subjective     Pt reports: feel alright just my R knee hurts   He was compliant with home exercise program.  Response to previous treatment: A little soreness  Functional change: No change    Pain: 3/10 (hip),   Location: right Hip and shoulder     Objective     Primary Impairments: Hip flexion/IR range of motion, hip strength, shoulder range of motion, shoulder mobility, thoracic mobility    FOTO:  -Intake: Complete  -Status: Complete  -Discharge: incomplete       Treatment     Bean received therapeutic exercises to develop strength, endurance, ROM, flexibility and posture for 40 minutes including:    indicates that exercise was performed, not billed today since patient was not under direct one-on-one supervision      Nustep- L5 x 6 min     Pulley  "flexion/scaption- 3 min each     TB shoulder extension: GTB 2 x 10  TB shoulder ER: GTB   TB shoulder IR: GTB 2 x 10  Brueggars 2 x 10 RTB     Standing hip abduction- 2 x 10 ea  Standing hip extension- 2 x 10 ea   Sit to stand- 2 x 10 from 24" box  Heel raises 3 x 10   Toe Raises 3 x 10     Not performed today  Supine hip flexor stretch- 30s x 3  Bridge - 2 x 10  Supine knee to chest hip flexion stretch- 10 x 10s  Seated thoracic extension mobilization- 20x      Doorway stretch 3 x 30" *  Heel raises- 2 x 10   Step ups/Stairs- 10x B stairs       Bean participated in neuromuscular re-education activities to improve: Balance for 15 minutes. The following activities were included:    Slow marching in place for single leg stability- 2 min  Modified SLS - front foot on step w ball lifts 10x B   Tandem stance- 3 min ea  Cone Taps- 5 rounds B 3 cones   Lateral steps 3 x 12 ft         Not performed today  Tandem gait- 5 laps  Lyndsey stepping- 3 rounds by ballet bar     Home Exercises Provided and Patient Education Provided     Education provided:   -Educated patient regarding balance progression    Written Home Exercises Provided: Patient instructed to cont prior HEP.  Exercises were reviewed and Bean was able to demonstrate them prior to the end of the session.  Bean demonstrated good  understanding of the education provided.     See EMR under Patient Instructions for exercises provided prior visit.    Assessment     Patient arrives with chief complaints of R knee pain today. Patient able to perform balance exercises which he noted felt better than last session. Patient stated heel/toe raises helped to decrease pain in R knee. Patient able to perform shoulder exercises which he notes is improving. Patient ended session stating he felt fine.     Bean Is progressing well towards his goals.   Pt prognosis is Good.     Pt will continue to benefit from skilled outpatient physical therapy to address the deficits listed " in the problem list box on initial evaluation, provide pt/family education and to maximize pt's level of independence in the home and community environment.     Pt's spiritual, cultural and educational needs considered and pt agreeable to plan of care and goals.    Anticipated barriers to physical therapy: None    Goals:  Short-Term Goals: 4 weeks  - The patient will be independent with initial home exercise program. In progress  - The patient will increase strength to at least 3+/5 in muscles tested to indicate improvements in functional strength. In progress  - The patient will increase range of motion grossly by 10 degrees to perform sit to stand without use of arms  In progress  - Patient will improve 2 MWT performs to 300 feet to demonstrate improved community ambulation In progress  - Patient will improve 30s chair stand performance to 10 times while using arms to demonstrate reduction in fall risk In progress     Long-Term Goals: 12 weeks - all In progress  - Pt to achieve <30% limitation on hip and shoulder survey as measured by the FOTO to demonstrate decreased disability.  - The patient will increase strength to at least 4/5 to perform functional mobility including sit to stand, gait  - The patient will increase hip ROM to 100 degrees flexion, 15 degrees internal rotation to enable him to perform recreational tasks without pain.  - Patient will improve shoulder range of motion to equal bilaterally to show reduction in symptom irritability and enable him to sleep without pain  - Patient will improve 2 MWT performs to 500 feet to demonstrate improved community ambulation and get closer to age-group norm of 535 ft  - Patient will improve 30s chair stand performance to 10 times without using arms to demonstrate reduction in fall risk         Plan     Continue to address deficits      Jesus Pryor, PTA

## 2022-07-06 ENCOUNTER — CLINICAL SUPPORT (OUTPATIENT)
Dept: REHABILITATION | Facility: HOSPITAL | Age: 77
End: 2022-07-06
Attending: ORTHOPAEDIC SURGERY
Payer: MEDICARE

## 2022-07-06 DIAGNOSIS — M25.651 IMPAIRED RANGE OF MOTION OF RIGHT HIP: Primary | ICD-10-CM

## 2022-07-06 DIAGNOSIS — R53.1 WEAKNESS: ICD-10-CM

## 2022-07-06 DIAGNOSIS — M25.611 DECREASED RIGHT SHOULDER RANGE OF MOTION: ICD-10-CM

## 2022-07-06 DIAGNOSIS — Z78.9 IMPAIRED ACTIVITIES OF DAILY LIVING: ICD-10-CM

## 2022-07-06 PROCEDURE — 97110 THERAPEUTIC EXERCISES: CPT | Mod: PO,CQ

## 2022-07-08 NOTE — PROGRESS NOTES
Physical Therapy Daily Treatment Note     Name: Bean Salas  Clinic Number: [id number]    Therapy Diagnosis:   Encounter Diagnoses   Name Primary?    Impaired range of motion of right hip Yes    Decreased right shoulder range of motion     Weakness     Impaired activities of daily living      Physician: Kristofer Brown IV, MD    Visit Date: 7/11/2022  Physician Orders: PT Eval and Treat   Hip and knee range of motion; hip, thigh, glute, core strengthening; balance training; gait training; physical modalities for pain relief  Patient is 7 months after hip fracture IM nail right hip; WBAT     Right shoulder rotator cuff impingement protocol  Medical Diagnosis from Referral: : Displaced intertrochanteric fracture of right femur, initial encounter for closed fracture [S72.141A], Aftercare for healing traumatic closed fracture of hip, right [S72.001D], Rotator cuff impingement syndrome of right shoulder [M75.41]  Evaluation Date: 5/6/2022   Authorization Period Expiration: 12/31/22  Plan of Care Expiration: 8/6/22  Visit # / Visits authorized: 12/ 25      Progress due: 6/5/22    Time In: 10:00  Time Out: 11:00  Total Billable Time: 50 minutes    Precautions: Standard    Subjective     Pt reports: feel alright just my R knee hurts   He was compliant with home exercise program.  Response to previous treatment: A little soreness  Functional change: No change    Pain: 3/10 (hip),   Location: right Hip and shoulder     Objective     Primary Impairments: Hip flexion/IR range of motion, hip strength, shoulder range of motion, shoulder mobility, thoracic mobility    FOTO:  -Intake: Complete  -Status: Complete  -Discharge: incomplete       Treatment     Bean received therapeutic exercises to develop strength, endurance, ROM, flexibility and posture for 40 minutes including:    indicates that exercise was performed, not billed today since patient was not under direct one-on-one supervision      Nustep- L5 x 6 min    "  Pulley flexion/scaption- 3 min each     TB shoulder extension: GTB 2 x 10  TB shoulder ER: GTB   TB shoulder IR: GTB 2 x 10  Brueggars 2 x 10 RTB     Standing hip abduction- 2 x 10 ea  Standing hip extension- 2 x 10 ea   Sit to stand- 2 x 10 from 24" box  Heel raises 3 x 10   Toe Raises 3 x 10     Not performed today  Supine hip flexor stretch- 30s x 3  Bridge - 2 x 10  Supine knee to chest hip flexion stretch- 10 x 10s  Seated thoracic extension mobilization- 20x      Doorway stretch 3 x 30" *  Heel raises- 2 x 10   Step ups/Stairs- 10x B stairs       Bean participated in neuromuscular re-education activities to improve: Balance for 15 minutes. The following activities were included:    Slow marching in place for single leg stability w/L3- 2 min  Modified SLS - front foot on step w ball lifts 10x B   Tandem stance- 3 min ea  Cone Taps- 5 rounds B 3 cones   Lateral steps 3 x 12 ft         Not performed today  Tandem gait- 5 laps  Lyndsey stepping- 3 rounds by ballet bar     Home Exercises Provided and Patient Education Provided     Education provided:   -Educated patient regarding balance progression    Written Home Exercises Provided: Patient instructed to cont prior HEP.  Exercises were reviewed and Bean was able to demonstrate them prior to the end of the session.  Bean demonstrated good  understanding of the education provided.     See EMR under Patient Instructions for exercises provided prior visit.    Assessment     Patient arrives with no change in status. Patient was able to increase balance exercises with no pain to R knee but has trouble with toe taps without UE support. Patient states he feels as if he doesn't  his legs enough when he walks. Patient ended session stating he felt fine.     Bean Is progressing well towards his goals.   Pt prognosis is Good.     Pt will continue to benefit from skilled outpatient physical therapy to address the deficits listed in the problem list box on " initial evaluation, provide pt/family education and to maximize pt's level of independence in the home and community environment.     Pt's spiritual, cultural and educational needs considered and pt agreeable to plan of care and goals.    Anticipated barriers to physical therapy: None    Goals:  Short-Term Goals: 4 weeks  - The patient will be independent with initial home exercise program. In progress  - The patient will increase strength to at least 3+/5 in muscles tested to indicate improvements in functional strength. In progress  - The patient will increase range of motion grossly by 10 degrees to perform sit to stand without use of arms  In progress  - Patient will improve 2 MWT performs to 300 feet to demonstrate improved community ambulation In progress  - Patient will improve 30s chair stand performance to 10 times while using arms to demonstrate reduction in fall risk In progress     Long-Term Goals: 12 weeks - all In progress  - Pt to achieve <30% limitation on hip and shoulder survey as measured by the FOTO to demonstrate decreased disability.  - The patient will increase strength to at least 4/5 to perform functional mobility including sit to stand, gait  - The patient will increase hip ROM to 100 degrees flexion, 15 degrees internal rotation to enable him to perform recreational tasks without pain.  - Patient will improve shoulder range of motion to equal bilaterally to show reduction in symptom irritability and enable him to sleep without pain  - Patient will improve 2 MWT performs to 500 feet to demonstrate improved community ambulation and get closer to age-group norm of 535 ft  - Patient will improve 30s chair stand performance to 10 times without using arms to demonstrate reduction in fall risk         Plan     Continue to address deficits      Jesus Pryor, PTA

## 2022-07-11 ENCOUNTER — CLINICAL SUPPORT (OUTPATIENT)
Dept: REHABILITATION | Facility: HOSPITAL | Age: 77
End: 2022-07-11
Attending: ORTHOPAEDIC SURGERY
Payer: MEDICARE

## 2022-07-11 DIAGNOSIS — Z78.9 IMPAIRED ACTIVITIES OF DAILY LIVING: ICD-10-CM

## 2022-07-11 DIAGNOSIS — M25.651 IMPAIRED RANGE OF MOTION OF RIGHT HIP: Primary | ICD-10-CM

## 2022-07-11 DIAGNOSIS — R53.1 WEAKNESS: ICD-10-CM

## 2022-07-11 DIAGNOSIS — M25.611 DECREASED RIGHT SHOULDER RANGE OF MOTION: ICD-10-CM

## 2022-07-11 PROCEDURE — 97110 THERAPEUTIC EXERCISES: CPT | Mod: PO,CQ

## 2022-07-12 NOTE — PROGRESS NOTES
Physical Therapy Daily Treatment Note     Name: Bean Salas  Clinic Number: [id number]    Therapy Diagnosis:   Encounter Diagnoses   Name Primary?    Impaired range of motion of right hip Yes    Decreased right shoulder range of motion     Weakness     Impaired activities of daily living      Physician: Kristofer Brown IV, MD    Visit Date: 7/13/2022  Physician Orders: PT Eval and Treat   Hip and knee range of motion; hip, thigh, glute, core strengthening; balance training; gait training; physical modalities for pain relief  Patient is 7 months after hip fracture IM nail right hip; WBAT     Right shoulder rotator cuff impingement protocol  Medical Diagnosis from Referral: : Displaced intertrochanteric fracture of right femur, initial encounter for closed fracture [S72.141A], Aftercare for healing traumatic closed fracture of hip, right [S72.001D], Rotator cuff impingement syndrome of right shoulder [M75.41]  Evaluation Date: 5/6/2022   Authorization Period Expiration: 12/31/22  Plan of Care Expiration: 8/6/22  Visit # / Visits authorized: 13/ 25      Progress due: 6/5/22    Time In: 11:00  Time Out: 12:00  Total Billable Time: 55 minutes    Precautions: Standard    Subjective     Pt reports: feel alright just my R knee hurts   He was compliant with home exercise program.  Response to previous treatment: A little soreness  Functional change: No change    Pain: 3/10 (hip),   Location: right Hip and shoulder     Objective     Primary Impairments: Hip flexion/IR range of motion, hip strength, shoulder range of motion, shoulder mobility, thoracic mobility    FOTO:  -Intake: Complete  -Status: Complete  -Discharge: incomplete       Treatment     Bean received therapeutic exercises to develop strength, endurance, ROM, flexibility and posture for 40 minutes including:    indicates that exercise was performed, not billed today since patient was not under direct one-on-one supervision      Nustep- L5 x 6 min    "  Pulley flexion/scaption- 3 min each     TB Rows BTB 2 x 10   TB shoulder extension: BTB 2 x 10  TB shoulder ER: GTB   TB shoulder IR: GTB 2 x 10  Brueggars 2 x 10 RTB     Standing hip abduction- 2 x 10 ea  Standing hip extension- 2 x 10 ea   Sit to stand- 2 x 10 from 24" box  Heel raises 3 x 10   Toe Raises 3 x 10     Not performed today  Supine hip flexor stretch- 30s x 3  Bridge - 2 x 10  Supine knee to chest hip flexion stretch- 10 x 10s  Seated thoracic extension mobilization- 20x      Doorway stretch 3 x 30" *  Heel raises- 2 x 10   Step ups/Stairs- 10x B stairs       Bean participated in neuromuscular re-education activities to improve: Balance for 15 minutes. The following activities were included:    Slow marching in place for single leg stability w/L3- 2 min  Modified SLS - front foot on step w ball lifts 10x B   Tandem stance- 3 min ea  Cone Taps- 5 rounds B 3 cones   Lateral steps 3 x 12 ft         Not performed today  Tandem gait- 5 laps  Lyndsey stepping- 3 rounds by ballet bar     Home Exercises Provided and Patient Education Provided     Education provided:   -Educated patient regarding balance progression    Written Home Exercises Provided: Patient instructed to cont prior HEP.  Exercises were reviewed and Bean was able to demonstrate them prior to the end of the session.  Bean demonstrated good  understanding of the education provided.     See EMR under Patient Instructions for exercises provided prior visit.    Assessment     Patient arrives with no change in status just soreness in the R knee from last session. Patient was able to progress through his exercises with no increases in pain. Patient ended session stating he felt fine.     Bean Is progressing well towards his goals.   Pt prognosis is Good.     Pt will continue to benefit from skilled outpatient physical therapy to address the deficits listed in the problem list box on initial evaluation, provide pt/family education and to " maximize pt's level of independence in the home and community environment.     Pt's spiritual, cultural and educational needs considered and pt agreeable to plan of care and goals.    Anticipated barriers to physical therapy: None    Goals:  Short-Term Goals: 4 weeks  - The patient will be independent with initial home exercise program. In progress  - The patient will increase strength to at least 3+/5 in muscles tested to indicate improvements in functional strength. In progress  - The patient will increase range of motion grossly by 10 degrees to perform sit to stand without use of arms  In progress  - Patient will improve 2 MWT performs to 300 feet to demonstrate improved community ambulation In progress  - Patient will improve 30s chair stand performance to 10 times while using arms to demonstrate reduction in fall risk In progress     Long-Term Goals: 12 weeks - all In progress  - Pt to achieve <30% limitation on hip and shoulder survey as measured by the FOTO to demonstrate decreased disability.  - The patient will increase strength to at least 4/5 to perform functional mobility including sit to stand, gait  - The patient will increase hip ROM to 100 degrees flexion, 15 degrees internal rotation to enable him to perform recreational tasks without pain.  - Patient will improve shoulder range of motion to equal bilaterally to show reduction in symptom irritability and enable him to sleep without pain  - Patient will improve 2 MWT performs to 500 feet to demonstrate improved community ambulation and get closer to age-group norm of 535 ft  - Patient will improve 30s chair stand performance to 10 times without using arms to demonstrate reduction in fall risk         Plan     Continue to address deficits      Jesus Pryor, PTA

## 2022-07-13 ENCOUNTER — CLINICAL SUPPORT (OUTPATIENT)
Dept: REHABILITATION | Facility: HOSPITAL | Age: 77
End: 2022-07-13
Attending: ORTHOPAEDIC SURGERY
Payer: MEDICARE

## 2022-07-13 DIAGNOSIS — R53.1 WEAKNESS: ICD-10-CM

## 2022-07-13 DIAGNOSIS — M25.611 DECREASED RIGHT SHOULDER RANGE OF MOTION: ICD-10-CM

## 2022-07-13 DIAGNOSIS — M25.651 IMPAIRED RANGE OF MOTION OF RIGHT HIP: Primary | ICD-10-CM

## 2022-07-13 DIAGNOSIS — Z78.9 IMPAIRED ACTIVITIES OF DAILY LIVING: ICD-10-CM

## 2022-07-13 PROCEDURE — 97110 THERAPEUTIC EXERCISES: CPT | Mod: PO,CQ

## 2022-07-15 ENCOUNTER — TELEPHONE (OUTPATIENT)
Dept: WOUND CARE | Facility: HOSPITAL | Age: 77
End: 2022-07-15
Payer: MEDICARE

## 2022-07-15 NOTE — TELEPHONE ENCOUNTER
"2nd attempt to call patient to get him scheduled for a wound care appointment. Patient answered, appointment was offered to patient, Patient stated "I do not need the appointment, I've been taking care of myself."  He also stated, "ya'll took too long to call me." Informed patient that he was called on 6/22 and a  Voicemail was left to return call to wound care clinic for scheduling. Patient was rude and said he is changing doctors and proceeded to hang up.  "

## 2022-07-18 ENCOUNTER — CLINICAL SUPPORT (OUTPATIENT)
Dept: REHABILITATION | Facility: HOSPITAL | Age: 77
End: 2022-07-18
Attending: ORTHOPAEDIC SURGERY
Payer: MEDICARE

## 2022-07-18 DIAGNOSIS — M25.651 IMPAIRED RANGE OF MOTION OF RIGHT HIP: Primary | ICD-10-CM

## 2022-07-18 DIAGNOSIS — M25.611 DECREASED RIGHT SHOULDER RANGE OF MOTION: ICD-10-CM

## 2022-07-18 DIAGNOSIS — R53.1 WEAKNESS: ICD-10-CM

## 2022-07-18 DIAGNOSIS — Z78.9 IMPAIRED ACTIVITIES OF DAILY LIVING: ICD-10-CM

## 2022-07-18 PROCEDURE — 97110 THERAPEUTIC EXERCISES: CPT | Mod: PO,CQ

## 2022-07-18 NOTE — PROGRESS NOTES
Physical Therapy Daily Treatment Note     Name: Bean Salas  Clinic Number: [id number]    Therapy Diagnosis:   Encounter Diagnoses   Name Primary?    Impaired range of motion of right hip Yes    Decreased right shoulder range of motion     Weakness     Impaired activities of daily living      Physician: Kristofer Brown IV, MD    Visit Date: 7/18/2022  Physician Orders: PT Eval and Treat   Hip and knee range of motion; hip, thigh, glute, core strengthening; balance training; gait training; physical modalities for pain relief  Patient is 7 months after hip fracture IM nail right hip; WBAT     Right shoulder rotator cuff impingement protocol  Medical Diagnosis from Referral: : Displaced intertrochanteric fracture of right femur, initial encounter for closed fracture [S72.141A], Aftercare for healing traumatic closed fracture of hip, right [S72.001D], Rotator cuff impingement syndrome of right shoulder [M75.41]  Evaluation Date: 5/6/2022   Authorization Period Expiration: 12/31/22  Plan of Care Expiration: 8/6/22  Visit # / Visits authorized: 13/ 25      Progress due: 6/5/22    Time In: 9:00  Time Out: 9:45  Total Billable Time: 45 minutes    Precautions: Standard    Subjective     Pt reports: feel alright just my R knee hurts   He was compliant with home exercise program.  Response to previous treatment: A little soreness  Functional change: No change    Pain: 3/10 (hip),   Location: right Hip and shoulder     Objective     Primary Impairments: Hip flexion/IR range of motion, hip strength, shoulder range of motion, shoulder mobility, thoracic mobility    FOTO:  -Intake: Complete  -Status: Complete  -Discharge: incomplete       Treatment     Bean received therapeutic exercises to develop strength, endurance, ROM, flexibility and posture for 40 minutes including:    indicates that exercise was performed, not billed today since patient was not under direct one-on-one supervision      Nustep- L5 x 6 min     Pulley  "flexion/scaption- 3 min each     TB Rows BTB 2 x 10   TB shoulder extension: BTB 2 x 10  TB shoulder ER: GTB   TB shoulder IR: GTB 2 x 10  Brueggars 2 x 10 RTB     Standing hip abduction- 2 x 10 ea  Standing hip extension- 2 x 10 ea   Sit to stand- 2 x 10 from 24" box  LAQ  2 x 10 RTB   HS curl 2 x 10 RTB   Heel raises 3 x 10   Toe Raises 3 x 10     Not performed today  Supine hip flexor stretch- 30s x 3  Bridge - 2 x 10  Supine knee to chest hip flexion stretch- 10 x 10s  Seated thoracic extension mobilization- 20x      Doorway stretch 3 x 30"   Heel raises- 2 x 10   Step ups/Stairs- 10x B stairs       Bean participated in neuromuscular re-education activities to improve: Balance for 15 minutes. The following activities were included:    Slow marching in place for single leg stability w/L3- 2 min  Modified SLS - front foot on step w ball lifts 10x B   Tandem stance- 3 min ea  Cone Taps- 5 rounds B 3 cones   Lateral steps 3 x 12 ft         Not performed today  Tandem gait- 5 laps  Lyndsey stepping- 3 rounds by ballet bar     Home Exercises Provided and Patient Education Provided     Education provided:   -Educated patient regarding balance progression    Written Home Exercises Provided: Patient instructed to cont prior HEP.  Exercises were reviewed and Bean was able to demonstrate them prior to the end of the session.  Bean demonstrated good  understanding of the education provided.     See EMR under Patient Instructions for exercises provided prior visit.    Assessment     Patient arrives with no change in status just soreness in the R knee from last session. Patient was able to progress through his exercises with no increases in pain. Patient ended session stating he felt fine.   Patient requested session end early due to feeling unwell.    Bean Is progressing well towards his goals.   Pt prognosis is Good.     Pt will continue to benefit from skilled outpatient physical therapy to address the deficits " listed in the problem list box on initial evaluation, provide pt/family education and to maximize pt's level of independence in the home and community environment.     Pt's spiritual, cultural and educational needs considered and pt agreeable to plan of care and goals.    Anticipated barriers to physical therapy: None    Goals:  Short-Term Goals: 4 weeks  - The patient will be independent with initial home exercise program. In progress  - The patient will increase strength to at least 3+/5 in muscles tested to indicate improvements in functional strength. In progress  - The patient will increase range of motion grossly by 10 degrees to perform sit to stand without use of arms  In progress  - Patient will improve 2 MWT performs to 300 feet to demonstrate improved community ambulation In progress  - Patient will improve 30s chair stand performance to 10 times while using arms to demonstrate reduction in fall risk In progress     Long-Term Goals: 12 weeks - all In progress  - Pt to achieve <30% limitation on hip and shoulder survey as measured by the FOTO to demonstrate decreased disability.  - The patient will increase strength to at least 4/5 to perform functional mobility including sit to stand, gait  - The patient will increase hip ROM to 100 degrees flexion, 15 degrees internal rotation to enable him to perform recreational tasks without pain.  - Patient will improve shoulder range of motion to equal bilaterally to show reduction in symptom irritability and enable him to sleep without pain  - Patient will improve 2 MWT performs to 500 feet to demonstrate improved community ambulation and get closer to age-group norm of 535 ft  - Patient will improve 30s chair stand performance to 10 times without using arms to demonstrate reduction in fall risk         Plan     Continue to address deficits      Jesus Pryor, PTA

## 2022-07-19 NOTE — PROGRESS NOTES
Physical Therapy Daily Treatment Note     Name: Bean Salas  Clinic Number: [id number]    Therapy Diagnosis:   No diagnosis found.  Physician: Kristofer Brown IV, MD    Visit Date: 7/21/2022  Physician Orders: PT Eval and Treat   Hip and knee range of motion; hip, thigh, glute, core strengthening; balance training; gait training; physical modalities for pain relief  Patient is 7 months after hip fracture IM nail right hip; WBAT     Right shoulder rotator cuff impingement protocol  Medical Diagnosis from Referral: : Displaced intertrochanteric fracture of right femur, initial encounter for closed fracture [S72.141A], Aftercare for healing traumatic closed fracture of hip, right [S72.001D], Rotator cuff impingement syndrome of right shoulder [M75.41]  Evaluation Date: 5/6/2022   Authorization Period Expiration: 12/31/22  Plan of Care Expiration: 8/6/22  Visit # / Visits authorized: 13/ 25      Progress due: 6/5/22    Time In: 9:00  Time Out: 9:45  Total Billable Time: 45 minutes    Precautions: Standard    Subjective     Pt reports: feel alright just my R knee hurts   He was compliant with home exercise program.  Response to previous treatment: A little soreness  Functional change: No change    Pain: 3/10 (hip),   Location: right Hip and shoulder     Objective     Primary Impairments: Hip flexion/IR range of motion, hip strength, shoulder range of motion, shoulder mobility, thoracic mobility    FOTO:  -Intake: Complete  -Status: Complete  -Discharge: incomplete       Treatment     Bean received therapeutic exercises to develop strength, endurance, ROM, flexibility and posture for 40 minutes including:    indicates that exercise was performed, not billed today since patient was not under direct one-on-one supervision      Nustep- L5 x 6 min     Pulley flexion/scaption- 3 min each     TB Rows BTB 2 x 10   TB shoulder extension: BTB 2 x 10  TB shoulder ER: GTB   TB shoulder IR: GTB 2 x 10  Brueggars 2 x 10 RTB  "    Standing hip abduction- 2 x 10 ea  Standing hip extension- 2 x 10 ea   Sit to stand- 2 x 10 from 24" box  LAQ  2 x 10 RTB   HS curl 2 x 10 RTB   Heel raises 3 x 10   Toe Raises 3 x 10     Not performed today  Supine hip flexor stretch- 30s x 3  Bridge - 2 x 10  Supine knee to chest hip flexion stretch- 10 x 10s  Seated thoracic extension mobilization- 20x      Doorway stretch 3 x 30"   Heel raises- 2 x 10   Step ups/Stairs- 10x B stairs       Bean participated in neuromuscular re-education activities to improve: Balance for 15 minutes. The following activities were included:    Slow marching in place for single leg stability w/L3- 2 min  Modified SLS - front foot on step w ball lifts 10x B   Tandem stance- 3 min ea  Cone Taps- 5 rounds B 3 cones   Lateral steps 3 x 12 ft         Not performed today  Tandem gait- 5 laps  Lyndsey stepping- 3 rounds by ballet bar     Home Exercises Provided and Patient Education Provided     Education provided:   -Educated patient regarding balance progression    Written Home Exercises Provided: Patient instructed to cont prior HEP.  Exercises were reviewed and Bean was able to demonstrate them prior to the end of the session.  Bean demonstrated good  understanding of the education provided.     See EMR under Patient Instructions for exercises provided prior visit.    Assessment     Patient arrives with no change in status just soreness in the R knee from last session. Patient was able to progress through his exercises with no increases in pain. Patient ended session stating he felt fine.   Patient requested session end early due to feeling unwell.    Bean Is progressing well towards his goals.   Pt prognosis is Good.     Pt will continue to benefit from skilled outpatient physical therapy to address the deficits listed in the problem list box on initial evaluation, provide pt/family education and to maximize pt's level of independence in the home and community environment. "     Pt's spiritual, cultural and educational needs considered and pt agreeable to plan of care and goals.    Anticipated barriers to physical therapy: None    Goals:  Short-Term Goals: 4 weeks  - The patient will be independent with initial home exercise program. In progress  - The patient will increase strength to at least 3+/5 in muscles tested to indicate improvements in functional strength. In progress  - The patient will increase range of motion grossly by 10 degrees to perform sit to stand without use of arms  In progress  - Patient will improve 2 MWT performs to 300 feet to demonstrate improved community ambulation In progress  - Patient will improve 30s chair stand performance to 10 times while using arms to demonstrate reduction in fall risk In progress     Long-Term Goals: 12 weeks - all In progress  - Pt to achieve <30% limitation on hip and shoulder survey as measured by the FOTO to demonstrate decreased disability.  - The patient will increase strength to at least 4/5 to perform functional mobility including sit to stand, gait  - The patient will increase hip ROM to 100 degrees flexion, 15 degrees internal rotation to enable him to perform recreational tasks without pain.  - Patient will improve shoulder range of motion to equal bilaterally to show reduction in symptom irritability and enable him to sleep without pain  - Patient will improve 2 MWT performs to 500 feet to demonstrate improved community ambulation and get closer to age-group norm of 535 ft  - Patient will improve 30s chair stand performance to 10 times without using arms to demonstrate reduction in fall risk         Plan     Continue to address deficits      Jesus Pryor, PTA

## 2022-07-21 ENCOUNTER — CLINICAL SUPPORT (OUTPATIENT)
Dept: REHABILITATION | Facility: HOSPITAL | Age: 77
End: 2022-07-21
Attending: ORTHOPAEDIC SURGERY
Payer: MEDICARE

## 2022-07-21 DIAGNOSIS — M25.611 DECREASED RIGHT SHOULDER RANGE OF MOTION: ICD-10-CM

## 2022-07-21 DIAGNOSIS — Z78.9 IMPAIRED ACTIVITIES OF DAILY LIVING: ICD-10-CM

## 2022-07-21 DIAGNOSIS — M25.651 IMPAIRED RANGE OF MOTION OF RIGHT HIP: Primary | ICD-10-CM

## 2022-07-21 DIAGNOSIS — R53.1 WEAKNESS: ICD-10-CM

## 2022-07-21 PROCEDURE — 97110 THERAPEUTIC EXERCISES: CPT | Mod: PO

## 2022-07-21 NOTE — PROGRESS NOTES
Physical Therapy Daily Treatment Note     Name: eBan Salas  [facility] Number: [id number]    Therapy Diagnosis:   Encounter Diagnoses   Name Primary?    Impaired range of motion of right hip Yes    Decreased right shoulder range of motion     Weakness     Impaired activities of daily living      Physician: Kristofer Brown IV, MD    Visit Date: 7/21/2022  Physician Orders: PT Eval and Treat   Hip and knee range of motion; hip, thigh, glute, core strengthening; balance training; gait training; physical modalities for pain relief  Patient is 7 months after hip fracture IM nail right hip; WBAT     Right shoulder rotator cuff impingement protocol  Medical Diagnosis from Referral: : Displaced intertrochanteric fracture of right femur, initial encounter for closed fracture [S72.141A], Aftercare for healing traumatic closed fracture of hip, right [S72.001D], Rotator cuff impingement syndrome of right shoulder [M75.41]  Evaluation Date: 5/6/2022   Authorization Period Expiration: 12/31/22  Plan of Care Expiration: 8/6/22  Visit # / Visits authorized: 14/ 25      Progress due: 6/5/22    Time In: 11:00  Time Out: 12:00  Total Billable Time: 60 minutes total, 30 billable mins as one on one care not provided the entire session    Precautions: Standard    Subjective     Pt reports: feels alright today  He was compliant with home exercise program.  Response to previous treatment: A little soreness  Functional change: No change    Pain: 3/10 (hip),   Location: right Hip and shoulder     Objective     Primary Impairments: Hip flexion/IR range of motion, hip strength, shoulder range of motion, shoulder mobility, thoracic mobility    FOTO:  -Intake: Complete  -Status: Complete  -Discharge: incomplete       Treatment     Bean received therapeutic exercises to develop strength, endurance, ROM, flexibility and posture for 40 minutes including:    indicates that exercise was performed, not billed today since patient was not under  "direct one-on-one supervision      Nustep- L5 x 6 min     Pulley flexion/scaption- 3 min each       Standing hip abduction- 2 x 10 ea  Standing hip extension- 2 x 10 ea   Sit to stand- 2 x 10 from 24" box  LAQ  2 x 10 RTB   HS curl 2 x 10 RTB   Heel raises 3 x 10   Toe Raises 3 x 10   Doorway stretch 3 x 30"   Step ups/Stairs- 20x B stairs     Not performed today  Supine hip flexor stretch- 30s x 3  Bridge - 2 x 10  Supine knee to chest hip flexion stretch- 10 x 10s  Seated thoracic extension mobilization- 20x  TB Rows BTB 2 x 10   TB shoulder extension: BTB 2 x 10  TB shoulder ER: GTB   TB shoulder IR: GTB 2 x 10  Brueggars 2 x 10 RTB  Toe Raises 3 x 10       Bean participated in neuromuscular re-education activities to improve: Balance for 0 minutes. The following activities were included:    Slow marching in place for single leg stability w/L3- 2 min  Modified SLS - front foot on step w ball lifts 10x B   Tandem stance- 3 min ea  Cone Taps- 5 rounds B 3 cones   Lateral steps 3 x 12 ft         Not performed today  Tandem gait- 5 laps  Lyndsey stepping- 3 rounds by ballet bar     Home Exercises Provided and Patient Education Provided     Education provided:   -Educated patient regarding balance progression    Written Home Exercises Provided: Patient instructed to cont prior HEP.  Exercises were reviewed and Bean was able to demonstrate them prior to the end of the session.  Bean demonstrated good  understanding of the education provided.     See EMR under Patient Instructions for exercises provided prior visit.    Assessment     Patient arrives to therapy reporting feeling ok. He tolerated all exercises well with no reports of increased symptoms at the end of today's session. Focused primarily on therapeutic activities like stairs/step ups today. Was noted to have decreased quad strength to pull himself up on the R LE, and decreased hip abductor strength limiting his ability to get his L foot up onto the step. "     Bean Is progressing well towards his goals.   Pt prognosis is Good.     Pt will continue to benefit from skilled outpatient physical therapy to address the deficits listed in the problem list box on initial evaluation, provide pt/family education and to maximize pt's level of independence in the home and community environment.     Pt's spiritual, cultural and educational needs considered and pt agreeable to plan of care and goals.    Anticipated barriers to physical therapy: None    Goals:  Short-Term Goals: 4 weeks  - The patient will be independent with initial home exercise program. In progress  - The patient will increase strength to at least 3+/5 in muscles tested to indicate improvements in functional strength. In progress  - The patient will increase range of motion grossly by 10 degrees to perform sit to stand without use of arms  In progress  - Patient will improve 2 MWT performs to 300 feet to demonstrate improved community ambulation In progress  - Patient will improve 30s chair stand performance to 10 times while using arms to demonstrate reduction in fall risk In progress     Long-Term Goals: 12 weeks - all In progress  - Pt to achieve <30% limitation on hip and shoulder survey as measured by the FOTO to demonstrate decreased disability.  - The patient will increase strength to at least 4/5 to perform functional mobility including sit to stand, gait  - The patient will increase hip ROM to 100 degrees flexion, 15 degrees internal rotation to enable him to perform recreational tasks without pain.  - Patient will improve shoulder range of motion to equal bilaterally to show reduction in symptom irritability and enable him to sleep without pain  - Patient will improve 2 MWT performs to 500 feet to demonstrate improved community ambulation and get closer to age-group norm of 535 ft  - Patient will improve 30s chair stand performance to 10 times without using arms to demonstrate reduction in fall risk          Plan     Continue to address deficits      Ruchi Bennett, PT

## 2022-07-25 ENCOUNTER — TELEPHONE (OUTPATIENT)
Dept: ORTHOPEDICS | Facility: CLINIC | Age: 77
End: 2022-07-25
Payer: MEDICARE

## 2022-07-25 ENCOUNTER — CLINICAL SUPPORT (OUTPATIENT)
Dept: REHABILITATION | Facility: HOSPITAL | Age: 77
End: 2022-07-25
Attending: ORTHOPAEDIC SURGERY
Payer: MEDICARE

## 2022-07-25 DIAGNOSIS — M25.559 HIP PAIN: Primary | ICD-10-CM

## 2022-07-25 DIAGNOSIS — M25.651 IMPAIRED RANGE OF MOTION OF RIGHT HIP: Primary | ICD-10-CM

## 2022-07-25 DIAGNOSIS — Z78.9 IMPAIRED ACTIVITIES OF DAILY LIVING: ICD-10-CM

## 2022-07-25 DIAGNOSIS — R53.1 WEAKNESS: ICD-10-CM

## 2022-07-25 DIAGNOSIS — M25.611 DECREASED RIGHT SHOULDER RANGE OF MOTION: ICD-10-CM

## 2022-07-25 PROCEDURE — 97110 THERAPEUTIC EXERCISES: CPT | Mod: PO | Performed by: PHYSICAL THERAPIST

## 2022-07-25 NOTE — PROGRESS NOTES
Physical Therapy Daily Treatment Note     Name: Bean Salas  [facility] Number: [id number]    Therapy Diagnosis:   Encounter Diagnoses   Name Primary?    Impaired range of motion of right hip Yes    Decreased right shoulder range of motion     Weakness     Impaired activities of daily living      Physician: Kristofer Brown IV, MD    Visit Date: 7/25/2022  Physician Orders: PT Eval and Treat   Hip and knee range of motion; hip, thigh, glute, core strengthening; balance training; gait training; physical modalities for pain relief  Patient is 7 months after hip fracture IM nail right hip; WBAT     Right shoulder rotator cuff impingement protocol  Medical Diagnosis from Referral: : Displaced intertrochanteric fracture of right femur, initial encounter for closed fracture [S72.141A], Aftercare for healing traumatic closed fracture of hip, right [S72.001D], Rotator cuff impingement syndrome of right shoulder [M75.41]  Evaluation Date: 5/6/2022   Authorization Period Expiration: 12/31/22  Plan of Care Expiration: 8/6/22  Visit # / Visits authorized: 16/ 25      Progress due: 6/5/22    Time In: 9:00  Time Out: 10:02  Total Billable Time: 45 minutes    Precautions: Standard    Subjective     Pt reports: Feels like his is making slow progress. He continues to have some issues at night and can only sleep on the R side, but a lot of this is from shortness of breath.  He was compliant with home exercise program.  Response to previous treatment: A little soreness  Functional change: No change    Pain: 3/10 (hip),   Location: right Hip and shoulder     Objective     Primary Impairments: Hip flexion/IR range of motion, hip strength, shoulder range of motion, shoulder mobility, thoracic mobility    FOTO:  -Intake: Complete  -Status: Complete  -Discharge: incomplete    Gait: Decreased stance time on R     Hip Range of Motion (Active / Passive):    Left  Right    Flexion 120 95   Abduction 30 25   Adduction       Extension      "  ER @ 0       ER @ 90 40 40   IR @ 0       IR @ 90 15 (20) -5      Shoulder range of motion - active (passive)  Flexion : L 125, R 120 (135)  Abduction: L 165, R 130 (120) - falls out of frontal plane with AROM  IR: L T9, R L3  ER: L: C7, R: Occiput     Lower Extremity Strength    Left Right   Knee extension: 4+/5 4/5   Knee flexion: 4+/5 4/5   Hip flexion: 4/5 2+/5   Hip internal rotation 4/5 3+/5   Hip external rotation 4/5 4/5   Hip extension:  Unable Unable   Hip abduction: 4/5 3+/5   Shoulder Flexion 4+/5 4/5 pain   Shoulder ER 4+/5 4+/5   Shoulder IR 4+/5 4+/5   Shoulder abduction 4+/5 4/5 pain             Joint Mobility: Hypomobility noted in hip joint. Significant hypomobility noted in thoracic spine        Function:     30s chair stand- NT today, past: 6 reps with use of arms     2MWT-  268 ft       Treatment     Bean received therapeutic exercises to develop strength, endurance, ROM, flexibility and posture for 25 minutes including:   * indicates that exercise was performed, not billed today since patient was not under direct one-on-one supervision    Strength, range of motion, and functional testing x 25 minutes    Nustep- L5 x 6 min*     Pulley flexion/scaption- 3 min each *    TB Rows BTB 2 x 10 *  TB shoulder extension: BTB 2 x 10*  TB shoulder ER: GTB *  TB shoulder IR: GTB 2 x 10*  Brueggars 2 x 10 RTB *    Standing hip abduction- 2 x 10 ea*  Standing hip extension- 2 x 10 ea *      Not performed today  Sit to stand- 2 x 10 from 24" box  LAQ  2 x 10 RTB   HS curl 2 x 10 RTB   Heel raises 3 x 10   Toe Raises 3 x 10   Supine hip flexor stretch- 30s x 3  Bridge - 2 x 10  Supine knee to chest hip flexion stretch- 10 x 10s  Seated thoracic extension mobilization- 20x      Doorway stretch 3 x 30"   Heel raises- 2 x 10   Step ups/Stairs- 10x B stairs       Bean participated in neuromuscular re-education activities to improve: Balance for 0 minutes. The following activities were included: Not performed " today    Slow marching in place for single leg stability w/L3- 2 min  Modified SLS - front foot on step w ball lifts 10x B   Tandem stance- 3 min ea  Cone Taps- 5 rounds B 3 cones   Lateral steps 3 x 12 ft         Not performed today  Tandem gait- 5 laps  Lyndsey stepping- 3 rounds by ballet bar     Home Exercises Provided and Patient Education Provided     Education provided:   -Educated regarding long term management and prognosis    Written Home Exercises Provided: Patient instructed to cont prior HEP.  Exercises were reviewed and Bean was able to demonstrate them prior to the end of the session.  Bean demonstrated good  understanding of the education provided.     See EMR under Patient Instructions for exercises provided prior visit.    Assessment     Patient demonstrates improved performance on 2 minute walk test. Part of this is likley due to improve fluid management with change in medication. Patient continues that have active and passive range of motion deficits in the R shoulder and hip which limited functional use of these extremities. He also demonstrates R LE weakness.     Bean Is progressing well towards his goals.   Pt prognosis is Good.     Pt will continue to benefit from skilled outpatient physical therapy to address the deficits listed in the problem list box on initial evaluation, provide pt/family education and to maximize pt's level of independence in the home and community environment.     Pt's spiritual, cultural and educational needs considered and pt agreeable to plan of care and goals.    Anticipated barriers to physical therapy: None    Goals:  Short-Term Goals: 4 weeks  - The patient will be independent with initial home exercise program. In progress  - The patient will increase strength to at least 3+/5 in muscles tested to indicate improvements in functional strength. In progress  - The patient will increase range of motion grossly by 10 degrees to perform sit to stand without use  of arms  In progress  - Patient will improve 2 MWT performs to 300 feet to demonstrate improved community ambulation In progress  - Patient will improve 30s chair stand performance to 10 times while using arms to demonstrate reduction in fall risk In progress     Long-Term Goals: 12 weeks - all In progress  - Pt to achieve <30% limitation on hip and shoulder survey as measured by the FOTO to demonstrate decreased disability.  - The patient will increase strength to at least 4/5 to perform functional mobility including sit to stand, gait  - The patient will increase hip ROM to 100 degrees flexion, 15 degrees internal rotation to enable him to perform recreational tasks without pain.  - Patient will improve shoulder range of motion to equal bilaterally to show reduction in symptom irritability and enable him to sleep without pain  - Patient will improve 2 MWT performs to 500 feet to demonstrate improved community ambulation and get closer to age-group norm of 535 ft  - Patient will improve 30s chair stand performance to 10 times without using arms to demonstrate reduction in fall risk         Plan     Continue to emphasis shoulder and hip range of motion. Work on functional strength as well as hip abductor and extensor strength.      Constantin Casey, PT

## 2022-07-27 ENCOUNTER — HOSPITAL ENCOUNTER (OUTPATIENT)
Dept: RADIOLOGY | Facility: HOSPITAL | Age: 77
Discharge: HOME | End: 2022-07-27
Attending: ORTHOPAEDIC SURGERY
Payer: MEDICARE

## 2022-07-27 ENCOUNTER — OFFICE VISIT (OUTPATIENT)
Dept: ORTHOPEDICS | Facility: CLINIC | Age: 77
End: 2022-07-27
Payer: MEDICARE

## 2022-07-27 VITALS
BODY MASS INDEX: 29.71 KG/M2 | HEIGHT: 71 IN | HEART RATE: 53 BPM | DIASTOLIC BLOOD PRESSURE: 63 MMHG | SYSTOLIC BLOOD PRESSURE: 137 MMHG | WEIGHT: 212.19 LBS

## 2022-07-27 DIAGNOSIS — T84.84XA PAINFUL ORTHOPAEDIC HARDWARE: Primary | ICD-10-CM

## 2022-07-27 DIAGNOSIS — S72.001D AFTERCARE FOR HEALING TRAUMATIC CLOSED FRACTURE OF HIP, RIGHT: ICD-10-CM

## 2022-07-27 DIAGNOSIS — M25.559 HIP PAIN: ICD-10-CM

## 2022-07-27 PROCEDURE — 3288F PR FALLS RISK ASSESSMENT DOCUMENTED: ICD-10-PCS | Mod: CPTII,S$GLB,, | Performed by: ORTHOPAEDIC SURGERY

## 2022-07-27 PROCEDURE — 1101F PR PT FALLS ASSESS DOC 0-1 FALLS W/OUT INJ PAST YR: ICD-10-PCS | Mod: CPTII,S$GLB,, | Performed by: ORTHOPAEDIC SURGERY

## 2022-07-27 PROCEDURE — 3288F FALL RISK ASSESSMENT DOCD: CPT | Mod: CPTII,S$GLB,, | Performed by: ORTHOPAEDIC SURGERY

## 2022-07-27 PROCEDURE — 1157F PR ADVANCE CARE PLAN OR EQUIV PRESENT IN MEDICAL RECORD: ICD-10-PCS | Mod: CPTII,S$GLB,, | Performed by: ORTHOPAEDIC SURGERY

## 2022-07-27 PROCEDURE — 99213 PR OFFICE/OUTPT VISIT, EST, LEVL III, 20-29 MIN: ICD-10-PCS | Mod: S$GLB,,, | Performed by: ORTHOPAEDIC SURGERY

## 2022-07-27 PROCEDURE — 99999 PR PBB SHADOW E&M-EST. PATIENT-LVL III: ICD-10-PCS | Mod: PBBFAC,,, | Performed by: ORTHOPAEDIC SURGERY

## 2022-07-27 PROCEDURE — 3078F PR MOST RECENT DIASTOLIC BLOOD PRESSURE < 80 MM HG: ICD-10-PCS | Mod: CPTII,S$GLB,, | Performed by: ORTHOPAEDIC SURGERY

## 2022-07-27 PROCEDURE — 73552 XR FEMUR 2 VIEW RIGHT: ICD-10-PCS | Mod: 26,RT,, | Performed by: RADIOLOGY

## 2022-07-27 PROCEDURE — 1101F PT FALLS ASSESS-DOCD LE1/YR: CPT | Mod: CPTII,S$GLB,, | Performed by: ORTHOPAEDIC SURGERY

## 2022-07-27 PROCEDURE — 73552 X-RAY EXAM OF FEMUR 2/>: CPT | Mod: TC,FY,RT

## 2022-07-27 PROCEDURE — 73552 X-RAY EXAM OF FEMUR 2/>: CPT | Mod: 26,RT,, | Performed by: RADIOLOGY

## 2022-07-27 PROCEDURE — 99999 PR PBB SHADOW E&M-EST. PATIENT-LVL III: CPT | Mod: PBBFAC,,, | Performed by: ORTHOPAEDIC SURGERY

## 2022-07-27 PROCEDURE — 3075F SYST BP GE 130 - 139MM HG: CPT | Mod: CPTII,S$GLB,, | Performed by: ORTHOPAEDIC SURGERY

## 2022-07-27 PROCEDURE — 3078F DIAST BP <80 MM HG: CPT | Mod: CPTII,S$GLB,, | Performed by: ORTHOPAEDIC SURGERY

## 2022-07-27 PROCEDURE — 1159F PR MEDICATION LIST DOCUMENTED IN MEDICAL RECORD: ICD-10-PCS | Mod: CPTII,S$GLB,, | Performed by: ORTHOPAEDIC SURGERY

## 2022-07-27 PROCEDURE — 3075F PR MOST RECENT SYSTOLIC BLOOD PRESS GE 130-139MM HG: ICD-10-PCS | Mod: CPTII,S$GLB,, | Performed by: ORTHOPAEDIC SURGERY

## 2022-07-27 PROCEDURE — 1159F MED LIST DOCD IN RCRD: CPT | Mod: CPTII,S$GLB,, | Performed by: ORTHOPAEDIC SURGERY

## 2022-07-27 PROCEDURE — 1157F ADVNC CARE PLAN IN RCRD: CPT | Mod: CPTII,S$GLB,, | Performed by: ORTHOPAEDIC SURGERY

## 2022-07-27 PROCEDURE — 99213 OFFICE O/P EST LOW 20 MIN: CPT | Mod: S$GLB,,, | Performed by: ORTHOPAEDIC SURGERY

## 2022-07-27 RX ORDER — AMLODIPINE BESYLATE 2.5 MG/1
2.5 TABLET ORAL DAILY
COMMUNITY
Start: 2022-06-29 | End: 2022-09-12

## 2022-07-27 NOTE — PROGRESS NOTES
[facility], Orthopedics and Sports Medicine  [facility]    Established Patient Office Visit  07/27/2022     Diagnosis:  Status post right hip fracture  Painful orthopedic hardware     Procedure:   (9/2021), right hip IMN s/p IT fx    Date of Injury:   09/2021    Subjective:      Bean Salas is a 77 y.o. male who presents for follow up treatment of the above mentioned diagnosis.  Overall the patient's symptoms are worsening in the R hip and improving in the R shoulder.  The patient is currently in physical therapy, which is not improving the overall condition in the R hip.    PT for R shoulder has somewhat improved R shoulder pain. R hip still with pain on lateral hip when he places pressure on lateral side when laying down and when sitting in chair and car. Taking tylenol 2 tablets, PRN, q3-4H for R hip pain. Tylenol helps somewhat with R hip pain however states it is progressively worsening.    Outside reports reviewed: office notes and radiology reports.    Past Medical History:   Diagnosis Date    Hyperlipidemia     Hypertension     Thyroid disease     hypo    Ventricular tachycardia     frequent PVCs       Patient Active Problem List   Diagnosis    Essential hypertension    Mixed hyperlipidemia    Hypothyroidism due to acquired atrophy of thyroid    Hyperglycemia    Atrial bigeminy    Angina pectoris    Bradycardia    Fall    Displaced intertrochanteric fracture of right femur, initial encounter for closed fracture    Closed fracture of right hip    Acute combined systolic and diastolic congestive heart failure    Elevated LFTs    Advance care planning    CHF (congestive heart failure)    Coronary artery disease involving native coronary artery of native heart without angina pectoris    SOB (shortness of breath)    Venous insufficiency of both lower extremities    Aftercare for healing traumatic closed fracture of hip, right    Rotator cuff impingement syndrome  of right shoulder    Impaired range of motion of right hip    Decreased right shoulder range of motion    Weakness    Impaired activities of daily living    PVC's (premature ventricular contractions)    Painful orthopaedic hardware       Past Surgical History:   Procedure Laterality Date    COLONOSCOPY  1/1/11    CORONARY ANGIOGRAPHY N/A 7/22/2021    Procedure: ANGIOGRAM, CORONARY ARTERY;  Surgeon: Hank Barry MD;  Location: [facility] CATH LAB/EP;  Service: Cardiology;  Laterality: N/A;    HERNIA REPAIR      HIP SURGERY Right 08/2021    INTRAMEDULLARY RODDING OF TROCHANTER OF FEMUR Right 9/3/2021    Procedure: INSERTION, INTRAMEDULLARY RACQUEL, FEMUR, TROCHANTER;  Surgeon: Darryn Hall MD;  Location: [facility] OR;  Service: Orthopedics;  Laterality: Right;    JOINT REPLACEMENT Bilateral     knee    LEFT HEART CATHETERIZATION Right 7/22/2021    Procedure: Left heart cath;  Surgeon: Hank Barry MD;  Location: [facility] CATH LAB/EP;  Service: Cardiology;  Laterality: Right;        Current Outpatient Medications   Medication Instructions    acetaminophen (TYLENOL) 650 mg, Oral, Every 6 hours PRN    amLODIPine (NORVASC) 2.5 mg, Oral, Daily    aspirin (ECOTRIN) 81 mg, Oral, Daily    atorvastatin (LIPITOR) 10 mg, Oral, Daily    furosemide (LASIX) 40 mg, Oral, 2 times daily, Take daily for next 3 days, then use as needed. Weigh daily. Afterwards, f weight increases 2 lbs/24h, take dose of lasix daily  until weight is back to baseline    levothyroxine (SYNTHROID) 300 mcg, Oral, Daily    losartan (COZAAR) 100 mg, Oral, Daily    magnesium hydroxide 400 mg/5 ml (MILK OF MAGNESIA) 400 mg/5 mL Susp 30 mLs, Oral, Daily PRN    methocarbamoL (ROBAXIN) 500 MG Tab Take 1 tablet by mouth 4 times daily    metoprolol succinate (TOPROL-XL) 50 mg, Oral, Daily    multivit-min-FA-lycopen-lutein (CENTRUM SILVER) 0.4-300-250 mg-mcg-mcg Tab 1 tablet, Oral, Daily    spironolactone (ALDACTONE) 25 mg, Oral, Daily    traMADoL  (ULTRAM) 50 mg, Oral, Every 4 hours PRN        Review of patient's allergies indicates:  No Known Allergies    Social History     Socioeconomic History    Marital status:    Tobacco Use    Smoking status: Former Smoker     Packs/day: 1.00     Years: 35.00     Pack years: 35.00     Types: Cigarettes     Quit date: 2000     Years since quittin.5    Smokeless tobacco: Never Used   Substance and Sexual Activity    Alcohol use: No       No family history on file.      Review of Systems   Constitutional: Negative for chills and fever.   HENT: Negative for congestion.    Cardiovascular: Negative for chest pain.   Respiratory: Negative for cough and shortness of breath.    Gastrointestinal: Negative for nausea and vomiting.   Neurological: Negative for numbness.          Objective:          General Musculoskeletal Exam   Gait: normal         Right Hip Exam     Inspection   Scars: present  Bruising: absent  No deformity of hip.    Tenderness   The patient tender to palpation of the trochanteric bursa.    Range of Motion   Flexion: normal     Comments:  Tenderness to palpation of R lateral hip.      Muscle Strength   Right Lower Extremity   Hip Flexion: 5/5       Imaging:  Radiographs of the right femur taken 2022 were personally reviewed from the [facility] EMR.  These include AP and lateral femur.  These images demonstrate mild to moderate degenerative changes.  The IM hardware remains intact with no fractures or dislocation seen. The lag screw remains prominent at the lateral femoral cortex, but in stable position compared to previous imaging from 2022. The intramedullary device is stable in the femur.       Assessment:       Bean Salas is a 77 y.o. male seen in the office today. The primary encounter diagnosis was Painful orthopaedic hardware. A diagnosis of Aftercare for healing traumatic closed fracture of hip, right was also pertinent to this visit.  Further work-up is recommended  at this time with CT R hip without contrast to assess the status of healing of the R IT fracture of hip and to assess timeline of possible removal of lag screw.  Typical time frame to remove hardware from hip based on complete healing of bone on CT scan, or approximately 1 year if imaging amenable.  The natural history and expected course discussed with patient. Various treatment options were discussed, including their risks and benefits. All of the patient's questions were answered.     Plan:      1. Continue physical therapy as currently ordered.   2. Tylenol 650mg TID, PRN pain.  3. Follow up in 2 weeks.  4. CT R hip w/out contrast ordered today       Kristofer Brown IV, MD   of Clinical Orthopedics  Department of Orthopedic Surgery  [facility]  Office: [phone]  Website: [email or web address]    ---------------------------------------  Orders Placed This Encounter    CT Hip Without Contrast Right

## 2022-07-29 ENCOUNTER — HOSPITAL ENCOUNTER (OUTPATIENT)
Dept: RADIOLOGY | Facility: HOSPITAL | Age: 77
Discharge: HOME | End: 2022-07-29
Attending: STUDENT IN AN ORGANIZED HEALTH CARE EDUCATION/TRAINING PROGRAM
Payer: MEDICARE

## 2022-07-29 DIAGNOSIS — S72.001D AFTERCARE FOR HEALING TRAUMATIC CLOSED FRACTURE OF HIP, RIGHT: ICD-10-CM

## 2022-07-29 PROCEDURE — 73700 CT LOWER EXTREMITY W/O DYE: CPT | Mod: TC,PO,RT

## 2022-08-04 ENCOUNTER — OFFICE VISIT (OUTPATIENT)
Dept: ORTHOPEDICS | Facility: CLINIC | Age: 77
End: 2022-08-04
Payer: MEDICARE

## 2022-08-04 ENCOUNTER — TELEPHONE (OUTPATIENT)
Dept: FAMILY MEDICINE | Facility: CLINIC | Age: 77
End: 2022-08-04
Payer: MEDICARE

## 2022-08-04 DIAGNOSIS — S72.001D AFTERCARE FOR HEALING TRAUMATIC CLOSED FRACTURE OF HIP, RIGHT: ICD-10-CM

## 2022-08-04 DIAGNOSIS — Z12.5 SCREENING PSA (PROSTATE SPECIFIC ANTIGEN): Primary | ICD-10-CM

## 2022-08-04 DIAGNOSIS — T84.84XA PAINFUL ORTHOPAEDIC HARDWARE: Primary | ICD-10-CM

## 2022-08-04 DIAGNOSIS — Q64.79 STRUCTURAL ABNORMALITY OF BLADDER: ICD-10-CM

## 2022-08-04 PROCEDURE — 99442 PR PHYSICIAN TELEPHONE EVALUATION 11-20 MIN: ICD-10-PCS | Mod: 95,,, | Performed by: ORTHOPAEDIC SURGERY

## 2022-08-04 PROCEDURE — 1157F ADVNC CARE PLAN IN RCRD: CPT | Mod: CPTII,95,, | Performed by: ORTHOPAEDIC SURGERY

## 2022-08-04 PROCEDURE — 99442 PR PHYSICIAN TELEPHONE EVALUATION 11-20 MIN: CPT | Mod: 95,,, | Performed by: ORTHOPAEDIC SURGERY

## 2022-08-04 PROCEDURE — 1157F PR ADVANCE CARE PLAN OR EQUIV PRESENT IN MEDICAL RECORD: ICD-10-PCS | Mod: CPTII,95,, | Performed by: ORTHOPAEDIC SURGERY

## 2022-08-04 NOTE — PROGRESS NOTES
[facility], Orthopedics and Sports Medicine  [facility]    Audio Only Telehealth Visit  08/04/2022     Diagnosis: right hip painful orthopedic hardware    The patient location is: [facility]   The chief complaint leading to consultation is: right hip pain   Visit type: Virtual visit with audio only (telephone)  Total time spent with patient: 18 minutes     The reason for the audio only service rather than synchronous audio and video virtual visit was related to technical difficulties or patient preference/necessity.     Each patient to whom I provide medical services by telemedicine is:  (1) informed of the relationship between the physician and patient and the respective role of any other health care provider with respect to management of the patient; and (2) notified that they may decline to receive medical services by telemedicine and may withdraw from such care at any time. Patient verbally consented to receive this service via voice-only telephone call.    Subjective:      Bean Salas is a 77 y.o. male who follows up for right hip pain.  He continues with the lateral sided right hip pain directly at the site of the lag screw from his CMN placed nearly a year ago for intertrochanteric hip fracture.  He continues to have lateral sided hip pain related to the implant.  He otherwise has no complaints.        Objective:      Imaging:  CT of the right hip taken taken 7/29/2022 was personally reviewed from the [facility] EMR.  Multiple T1 and T2 sequences including axial, coronal, and sagittal views were reviewed.  No acute fractures or dislocations are noted in these images.  There is a healed fracture at the intertrochanteric region.  Hardware intact noted in proximal femur.         Assessment:       Bean Salas is a 77 y.o. male seen today via a telemedicine visit. The primary encounter diagnosis was Painful orthopaedic hardware. A diagnosis of Aftercare for healing traumatic  closed fracture of hip, right was also pertinent to this visit.  I offered a removal of hardware of the prominent screw, but also discussed the 10-20% chance of femoral neck fracture after lag screw removal.  This would necessitate further surgery should this occur.  The patient will discuss with his family and call us with his decision to proceed or to defer removal of hardware.  All of the patient's questions were answered.     Plan:      1. Tylenol 650mg TID, PRN pain.  2. Follow up as needed if symptoms worsen.         Kristofer Brown IV, MD   of Clinical Orthopedics  Department of Orthopedic Surgery  [facility]  Office: [phone]  Website: [email or web address]    ---------------------------------------  No orders of the defined types were placed in this encounter.       This service was not originating from a related E/M service provided within the previous 7 days nor will  to an E/M service or procedure within the next 24 hours or my soonest available appointment.  Prevailing standard of care was able to be met in this audio-only visit.

## 2022-08-05 NOTE — TELEPHONE ENCOUNTER
Pt notified of CT results. Scheduled pt for PSA lab and US for Monday. Informed pt based on those results will determine if MD will refer pt to urology

## 2022-08-05 NOTE — TELEPHONE ENCOUNTER
Your CT scan of your hip show that there may be an abnormality within your bladder in your prostate is enlarged.  The orthopedic surgeon who read the report alerted me to this.    I suggest doing a PSA as well as an ultrasound of your bladder and kidneys.    Then from there we will see if you may need to see the urologist.

## 2022-08-08 ENCOUNTER — HOSPITAL ENCOUNTER (OUTPATIENT)
Dept: RADIOLOGY | Facility: HOSPITAL | Age: 77
Discharge: HOME | End: 2022-08-08
Attending: FAMILY MEDICINE
Payer: MEDICARE

## 2022-08-08 DIAGNOSIS — Q64.79 STRUCTURAL ABNORMALITY OF BLADDER: ICD-10-CM

## 2022-08-08 PROCEDURE — 76770 US EXAM ABDO BACK WALL COMP: CPT | Mod: TC,PO

## 2022-08-11 ENCOUNTER — TELEPHONE (OUTPATIENT)
Dept: SLEEP MEDICINE | Facility: CLINIC | Age: 77
End: 2022-08-11
Payer: MEDICARE

## 2022-08-12 ENCOUNTER — OFFICE VISIT (OUTPATIENT)
Dept: SLEEP MEDICINE | Facility: CLINIC | Age: 77
End: 2022-08-12
Payer: MEDICARE

## 2022-08-12 VITALS
HEART RATE: 51 BPM | DIASTOLIC BLOOD PRESSURE: 75 MMHG | BODY MASS INDEX: 28.73 KG/M2 | WEIGHT: 206 LBS | SYSTOLIC BLOOD PRESSURE: 167 MMHG

## 2022-08-12 DIAGNOSIS — I10 PRIMARY HYPERTENSION: ICD-10-CM

## 2022-08-12 DIAGNOSIS — I50.40 COMBINED SYSTOLIC AND DIASTOLIC CONGESTIVE HEART FAILURE, UNSPECIFIED HF CHRONICITY: ICD-10-CM

## 2022-08-12 DIAGNOSIS — G47.30 SLEEP APNEA, UNSPECIFIED TYPE: ICD-10-CM

## 2022-08-12 DIAGNOSIS — G47.33 OSA (OBSTRUCTIVE SLEEP APNEA): ICD-10-CM

## 2022-08-12 DIAGNOSIS — I10 ESSENTIAL HYPERTENSION: Primary | ICD-10-CM

## 2022-08-12 PROCEDURE — 3077F PR MOST RECENT SYSTOLIC BLOOD PRESSURE >= 140 MM HG: ICD-10-PCS | Mod: CPTII,S$GLB,, | Performed by: NURSE PRACTITIONER

## 2022-08-12 PROCEDURE — 1157F PR ADVANCE CARE PLAN OR EQUIV PRESENT IN MEDICAL RECORD: ICD-10-PCS | Mod: CPTII,S$GLB,, | Performed by: NURSE PRACTITIONER

## 2022-08-12 PROCEDURE — 1159F MED LIST DOCD IN RCRD: CPT | Mod: CPTII,S$GLB,, | Performed by: NURSE PRACTITIONER

## 2022-08-12 PROCEDURE — 99204 OFFICE O/P NEW MOD 45 MIN: CPT | Mod: S$GLB,,, | Performed by: NURSE PRACTITIONER

## 2022-08-12 PROCEDURE — 99999 PR PBB SHADOW E&M-EST. PATIENT-LVL III: CPT | Mod: PBBFAC,,, | Performed by: NURSE PRACTITIONER

## 2022-08-12 PROCEDURE — 3078F PR MOST RECENT DIASTOLIC BLOOD PRESSURE < 80 MM HG: ICD-10-PCS | Mod: CPTII,S$GLB,, | Performed by: NURSE PRACTITIONER

## 2022-08-12 PROCEDURE — 99999 PR PBB SHADOW E&M-EST. PATIENT-LVL III: ICD-10-PCS | Mod: PBBFAC,,, | Performed by: NURSE PRACTITIONER

## 2022-08-12 PROCEDURE — 3077F SYST BP >= 140 MM HG: CPT | Mod: CPTII,S$GLB,, | Performed by: NURSE PRACTITIONER

## 2022-08-12 PROCEDURE — 1159F PR MEDICATION LIST DOCUMENTED IN MEDICAL RECORD: ICD-10-PCS | Mod: CPTII,S$GLB,, | Performed by: NURSE PRACTITIONER

## 2022-08-12 PROCEDURE — 3288F FALL RISK ASSESSMENT DOCD: CPT | Mod: CPTII,S$GLB,, | Performed by: NURSE PRACTITIONER

## 2022-08-12 PROCEDURE — 99204 PR OFFICE/OUTPT VISIT, NEW, LEVL IV, 45-59 MIN: ICD-10-PCS | Mod: S$GLB,,, | Performed by: NURSE PRACTITIONER

## 2022-08-12 PROCEDURE — 1100F PTFALLS ASSESS-DOCD GE2>/YR: CPT | Mod: CPTII,S$GLB,, | Performed by: NURSE PRACTITIONER

## 2022-08-12 PROCEDURE — 1126F AMNT PAIN NOTED NONE PRSNT: CPT | Mod: CPTII,S$GLB,, | Performed by: NURSE PRACTITIONER

## 2022-08-12 PROCEDURE — 1126F PR PAIN SEVERITY QUANTIFIED, NO PAIN PRESENT: ICD-10-PCS | Mod: CPTII,S$GLB,, | Performed by: NURSE PRACTITIONER

## 2022-08-12 PROCEDURE — 3078F DIAST BP <80 MM HG: CPT | Mod: CPTII,S$GLB,, | Performed by: NURSE PRACTITIONER

## 2022-08-12 PROCEDURE — 3288F PR FALLS RISK ASSESSMENT DOCUMENTED: ICD-10-PCS | Mod: CPTII,S$GLB,, | Performed by: NURSE PRACTITIONER

## 2022-08-12 PROCEDURE — 1100F PR PT FALLS ASSESS DOC 2+ FALLS/FALL W/INJURY/YR: ICD-10-PCS | Mod: CPTII,S$GLB,, | Performed by: NURSE PRACTITIONER

## 2022-08-12 PROCEDURE — 1157F ADVNC CARE PLAN IN RCRD: CPT | Mod: CPTII,S$GLB,, | Performed by: NURSE PRACTITIONER

## 2022-08-12 NOTE — PROGRESS NOTES
"Referred by Dr. Levine  CHIEF COMPLAINT: Snoring, tired    HISTORY OF PRESENT ILLNESS: He has never had a sleep study. Wife used to tell him he snored and stopped breathing while asleep. He is  5yrs. Having shortness of breath when lying down to go to sleep ongoing ~ 1-1.5h yrs. Hard to stay asleep, frequent disruptions. Takes 2nd dose lasix before 6pm. He is "always tired". Denies driving sleepy.  Denies am headache  Sinus drip, takes qd sudafed  Denies symptoms of restless legs  During admission for R femur/hip surgery 9/ 2021 he was dc'd home on nocturnal prn O2. He uses this currently  Echo 9/2021 EF 40%    On todays [place] Sleepiness Scale the patient scores a 9/24.     FAMILY HISTORY: No known sleep disorders.   SOCIAL HISTORY:       PHYSICAL EXAM:   BP (!) 167/75   Pulse (!) 51   Wt 93.4 kg (206 lb)   BMI 28.73 kg/m²       ASSESSMENT:   Unspecified Sleep Apnea, with symptoms of previously reported snoring, witnessed apneic pauses, un-refreshing disrupted sleep and daytime sleepiness, with medical comorbidities of combined CHF, hypertension. Warrants further investigation for untreated sleep apnea.     PLAN:   1. Polysomnogram, discussed plan of care (off O2)  2. Discussed etiology of AKHIL and potential ramifications of untreated AKHIL, including stroke, heart disease, HTN.  We discussed potential treatment options, which could include continuous positive airway pressure (CPAP-definitive)  3.see cards as advised /continue meds             Thank you for allowing me the opportunity to participate in the care of your patient      "

## 2022-08-17 ENCOUNTER — TELEPHONE (OUTPATIENT)
Dept: CARDIOLOGY | Facility: CLINIC | Age: 77
End: 2022-08-17
Payer: MEDICARE

## 2022-08-17 NOTE — TELEPHONE ENCOUNTER
Received call from Zeina with the Pre/Post Team [facility] who reports per Dr Urias the pt has cancelled the vein ablation procedure, the swelling has improved since taking Lasix.    He has f/u appt arranged with Dr Barry on 8/22.

## 2022-08-18 ENCOUNTER — TELEPHONE (OUTPATIENT)
Dept: SLEEP MEDICINE | Facility: OTHER | Age: 77
End: 2022-08-18
Payer: MEDICARE

## 2022-08-20 ENCOUNTER — TELEPHONE (OUTPATIENT)
Dept: FAMILY MEDICINE | Facility: CLINIC | Age: 77
End: 2022-08-20
Payer: MEDICARE

## 2022-08-31 ENCOUNTER — PATIENT MESSAGE (OUTPATIENT)
Dept: ADMINISTRATIVE | Facility: HOSPITAL | Age: 77
End: 2022-08-31
Payer: MEDICARE

## 2022-09-01 ENCOUNTER — TELEPHONE (OUTPATIENT)
Dept: SLEEP MEDICINE | Facility: OTHER | Age: 77
End: 2022-09-01
Payer: MEDICARE

## 2022-09-19 ENCOUNTER — OFFICE VISIT (OUTPATIENT)
Dept: CARDIOLOGY | Facility: CLINIC | Age: 77
End: 2022-09-19
Payer: MEDICARE

## 2022-09-19 VITALS
BODY MASS INDEX: 29.57 KG/M2 | SYSTOLIC BLOOD PRESSURE: 140 MMHG | HEART RATE: 73 BPM | OXYGEN SATURATION: 94 % | DIASTOLIC BLOOD PRESSURE: 78 MMHG | WEIGHT: 212 LBS

## 2022-09-19 DIAGNOSIS — I10 ESSENTIAL HYPERTENSION: ICD-10-CM

## 2022-09-19 DIAGNOSIS — E78.2 MIXED HYPERLIPIDEMIA: ICD-10-CM

## 2022-09-19 DIAGNOSIS — I25.10 CORONARY ARTERY DISEASE INVOLVING NATIVE CORONARY ARTERY OF NATIVE HEART WITHOUT ANGINA PECTORIS: ICD-10-CM

## 2022-09-19 DIAGNOSIS — I87.2 VENOUS INSUFFICIENCY OF BOTH LOWER EXTREMITIES: ICD-10-CM

## 2022-09-19 DIAGNOSIS — I49.3 PVC'S (PREMATURE VENTRICULAR CONTRACTIONS): ICD-10-CM

## 2022-09-19 DIAGNOSIS — I50.41 ACUTE COMBINED SYSTOLIC AND DIASTOLIC CONGESTIVE HEART FAILURE: ICD-10-CM

## 2022-09-19 PROCEDURE — 99999 PR PBB SHADOW E&M-EST. PATIENT-LVL III: CPT | Mod: PBBFAC,,, | Performed by: INTERNAL MEDICINE

## 2022-09-19 PROCEDURE — 1126F AMNT PAIN NOTED NONE PRSNT: CPT | Mod: CPTII,S$GLB,, | Performed by: INTERNAL MEDICINE

## 2022-09-19 PROCEDURE — 99214 OFFICE O/P EST MOD 30 MIN: CPT | Mod: S$GLB,,, | Performed by: INTERNAL MEDICINE

## 2022-09-19 PROCEDURE — 3078F PR MOST RECENT DIASTOLIC BLOOD PRESSURE < 80 MM HG: ICD-10-PCS | Mod: CPTII,S$GLB,, | Performed by: INTERNAL MEDICINE

## 2022-09-19 PROCEDURE — 1157F ADVNC CARE PLAN IN RCRD: CPT | Mod: CPTII,S$GLB,, | Performed by: INTERNAL MEDICINE

## 2022-09-19 PROCEDURE — 99214 PR OFFICE/OUTPT VISIT, EST, LEVL IV, 30-39 MIN: ICD-10-PCS | Mod: S$GLB,,, | Performed by: INTERNAL MEDICINE

## 2022-09-19 PROCEDURE — 3078F DIAST BP <80 MM HG: CPT | Mod: CPTII,S$GLB,, | Performed by: INTERNAL MEDICINE

## 2022-09-19 PROCEDURE — 1101F PR PT FALLS ASSESS DOC 0-1 FALLS W/OUT INJ PAST YR: ICD-10-PCS | Mod: CPTII,S$GLB,, | Performed by: INTERNAL MEDICINE

## 2022-09-19 PROCEDURE — 3077F PR MOST RECENT SYSTOLIC BLOOD PRESSURE >= 140 MM HG: ICD-10-PCS | Mod: CPTII,S$GLB,, | Performed by: INTERNAL MEDICINE

## 2022-09-19 PROCEDURE — 1101F PT FALLS ASSESS-DOCD LE1/YR: CPT | Mod: CPTII,S$GLB,, | Performed by: INTERNAL MEDICINE

## 2022-09-19 PROCEDURE — 1126F PR PAIN SEVERITY QUANTIFIED, NO PAIN PRESENT: ICD-10-PCS | Mod: CPTII,S$GLB,, | Performed by: INTERNAL MEDICINE

## 2022-09-19 PROCEDURE — 3288F PR FALLS RISK ASSESSMENT DOCUMENTED: ICD-10-PCS | Mod: CPTII,S$GLB,, | Performed by: INTERNAL MEDICINE

## 2022-09-19 PROCEDURE — 3077F SYST BP >= 140 MM HG: CPT | Mod: CPTII,S$GLB,, | Performed by: INTERNAL MEDICINE

## 2022-09-19 PROCEDURE — 3288F FALL RISK ASSESSMENT DOCD: CPT | Mod: CPTII,S$GLB,, | Performed by: INTERNAL MEDICINE

## 2022-09-19 PROCEDURE — 1160F PR REVIEW ALL MEDS BY PRESCRIBER/CLIN PHARMACIST DOCUMENTED: ICD-10-PCS | Mod: CPTII,S$GLB,, | Performed by: INTERNAL MEDICINE

## 2022-09-19 PROCEDURE — 1159F PR MEDICATION LIST DOCUMENTED IN MEDICAL RECORD: ICD-10-PCS | Mod: CPTII,S$GLB,, | Performed by: INTERNAL MEDICINE

## 2022-09-19 PROCEDURE — 1159F MED LIST DOCD IN RCRD: CPT | Mod: CPTII,S$GLB,, | Performed by: INTERNAL MEDICINE

## 2022-09-19 PROCEDURE — 99999 PR PBB SHADOW E&M-EST. PATIENT-LVL III: ICD-10-PCS | Mod: PBBFAC,,, | Performed by: INTERNAL MEDICINE

## 2022-09-19 PROCEDURE — 1160F RVW MEDS BY RX/DR IN RCRD: CPT | Mod: CPTII,S$GLB,, | Performed by: INTERNAL MEDICINE

## 2022-09-19 PROCEDURE — 1157F PR ADVANCE CARE PLAN OR EQUIV PRESENT IN MEDICAL RECORD: ICD-10-PCS | Mod: CPTII,S$GLB,, | Performed by: INTERNAL MEDICINE

## 2022-09-19 NOTE — ASSESSMENT & PLAN NOTE
I believe the patient has ejection fraction 50%, lower limits normal.  Some of his echoes have been dictated at 40% or 45%.  He has no heart failure symptoms.

## 2022-09-19 NOTE — PROGRESS NOTES
[facility]     Subjective:    Patient ID:  Bean Salas is a 77 y.o. male who presents for follow-up of Hypertension, Leg Swelling, Hyperlipidemia, and Coronary Artery Disease    Review of patient's allergies indicates:  No Known Allergies   He is still planning for right leg venous ablation therapy.  He states that his leg swelling has improved significantly on 40 mg of Lasix 2 times daily as well as Aldactone 25 mg daily.  His blood pressures he states have been okay.  Blood pressure today 140/78 mm Hg.  He does have a skin break on his right leg with some oozing.  There is mild leg swelling.  He has not been dizzy.  He does have frequent PVCs in bigeminal pattern.  He is on statin therapy with LDL 40s.  He has mild coronary disease, history of angina in the past.  He is on aspirin.      Review of Systems   Constitutional: Negative for chills, decreased appetite, diaphoresis, fever, malaise/fatigue, night sweats, weight gain and weight loss.   HENT:  Positive for congestion. Negative for ear discharge, ear pain, hearing loss, hoarse voice, nosebleeds, odynophagia, sore throat, stridor and tinnitus.    Eyes:  Negative for blurred vision, discharge, double vision, pain, photophobia, redness, vision loss in left eye, vision loss in right eye, visual disturbance and visual halos.   Cardiovascular:  Positive for leg swelling. Negative for chest pain, claudication, cyanosis, dyspnea on exertion, irregular heartbeat, near-syncope, orthopnea, palpitations, paroxysmal nocturnal dyspnea and syncope.   Respiratory:  Positive for sleep disturbances due to breathing. Negative for cough, hemoptysis, shortness of breath, snoring, sputum production and wheezing.    Endocrine: Negative for cold intolerance, heat intolerance, polydipsia, polyphagia and polyuria.   Hematologic/Lymphatic: Negative for adenopathy and bleeding problem. Does not  bruise/bleed easily.   Skin:  Negative for color change, dry skin, flushing, itching, nail changes, poor wound healing, rash, skin cancer, suspicious lesions and unusual hair distribution.   Musculoskeletal:  Positive for joint pain. Negative for arthritis, back pain, falls, gout, joint swelling, muscle cramps, muscle weakness, myalgias, neck pain and stiffness.   Gastrointestinal:  Negative for bloating, abdominal pain, anorexia, change in bowel habit, bowel incontinence, constipation, diarrhea, dysphagia, excessive appetite, flatus, heartburn, hematemesis, hematochezia, hemorrhoids, jaundice, melena, nausea and vomiting.   Genitourinary:  Negative for bladder incontinence, decreased libido, dysuria, flank pain, frequency, genital sores, hematuria, hesitancy, incomplete emptying, nocturia and urgency.   Neurological:  Negative for aphonia, brief paralysis, difficulty with concentration, disturbances in coordination, excessive daytime sleepiness, dizziness, focal weakness, headaches, light-headedness, loss of balance, numbness, paresthesias, seizures, sensory change, tremors, vertigo and weakness.   Psychiatric/Behavioral:  Negative for altered mental status, depression, hallucinations, memory loss, substance abuse, suicidal ideas and thoughts of violence. The patient does not have insomnia and is not nervous/anxious.    Allergic/Immunologic: Negative for hives and persistent infections.      Objective:       Vitals:    09/19/22 0959   BP: (!) 140/78   Pulse: 73   SpO2: (!) 94%   Weight: 96.2 kg (212 lb)    Physical Exam  Constitutional:       General: He is not in acute distress.     Appearance: He is well-developed. He is not diaphoretic.   HENT:      Head: Normocephalic and atraumatic.      Nose: Nose normal.   Eyes:      General: No scleral icterus.        Right eye: No discharge.      Conjunctiva/sclera: Conjunctivae normal.      Pupils: Pupils are equal, round, and reactive to light.   Neck:      Thyroid: No  thyromegaly.      Vascular: No JVD.      Trachea: No tracheal deviation.   Cardiovascular:      Rate and Rhythm: Normal rate and regular rhythm.      Pulses:           Carotid pulses are 2+ on the right side and 2+ on the left side.       Radial pulses are 2+ on the right side and 2+ on the left side.        Dorsalis pedis pulses are 2+ on the right side and 2+ on the left side.        Posterior tibial pulses are 2+ on the right side and 2+ on the left side.      Heart sounds: Normal heart sounds. No murmur heard.    No friction rub. No gallop.   Pulmonary:      Effort: Pulmonary effort is normal. No respiratory distress.      Breath sounds: Normal breath sounds. No stridor. No wheezing or rales.   Chest:      Chest wall: No tenderness.   Abdominal:      General: Bowel sounds are normal. There is no distension.      Palpations: Abdomen is soft. There is no mass.      Tenderness: There is no abdominal tenderness. There is no guarding or rebound.   Musculoskeletal:         General: No tenderness. Normal range of motion.      Cervical back: Normal range of motion and neck supple.      Right lower leg: Edema present.      Left lower leg: Edema present.   Lymphadenopathy:      Cervical: No cervical adenopathy.   Skin:     General: Skin is warm and dry.      Coloration: Skin is not pale.      Findings: No erythema or rash.   Neurological:      Mental Status: He is alert and oriented to person, place, and time.      Cranial Nerves: No cranial nerve deficit.      Coordination: Coordination normal.   Psychiatric:         Behavior: Behavior normal.         Thought Content: Thought content normal.         Judgment: Judgment normal.         Assessment:       1. PVC's (premature ventricular contractions)    2. Venous insufficiency of both lower extremities    3. Essential hypertension    4. Mixed hyperlipidemia    5. Coronary artery disease involving native coronary artery of native heart without angina pectoris    6. Acute  combined systolic and diastolic congestive heart failure      Results for orders placed or performed in visit on 08/08/22   PSA, Screening   Result Value Ref Range    PSA, Screen 0.83 0.00 - 4.00 ng/mL         Current Outpatient Medications:     acetaminophen (TYLENOL) 325 MG tablet, Take 2 tablets (650 mg total) by mouth every 6 (six) hours as needed for Pain., Disp: , Rfl: 0    amLODIPine (NORVASC) 2.5 MG tablet, Take 1 tablet by mouth once daily, Disp: 90 tablet, Rfl: 0    aspirin (ECOTRIN) 81 MG EC tablet, Take 1 tablet (81 mg total) by mouth once daily., Disp: , Rfl:     atorvastatin (LIPITOR) 10 MG tablet, Take 1 tablet (10 mg total) by mouth once daily., Disp: 90 tablet, Rfl: 4    furosemide (LASIX) 40 MG tablet, Take 1 tablet (40 mg total) by mouth 2 (two) times a day. Take daily for next 3 days, then use as needed. Weigh daily. Afterwards, f weight increases 2 lbs/24h, take dose of lasix daily  until weight is back to baseline, Disp: 180 tablet, Rfl: 3    levothyroxine (SYNTHROID) 300 MCG Tab, Take 1 tablet (300 mcg total) by mouth once daily., Disp: 90 tablet, Rfl: 4    losartan (COZAAR) 100 MG tablet, Take 1 tablet (100 mg total) by mouth once daily., Disp: 90 tablet, Rfl: 4    magnesium hydroxide 400 mg/5 ml (MILK OF MAGNESIA) 400 mg/5 mL Susp, Take 30 mLs by mouth daily as needed (constipation)., Disp: , Rfl:     methocarbamoL (ROBAXIN) 500 MG Tab, Take 1 tablet by mouth 4 times daily, Disp: 30 tablet, Rfl: 3    metoprolol succinate (TOPROL-XL) 50 MG 24 hr tablet, Take 1 tablet (50 mg total) by mouth once daily., Disp: 90 tablet, Rfl: 3    multivit-min-FA-lycopen-lutein (CENTRUM SILVER) 0.4-300-250 mg-mcg-mcg Tab, Take 1 tablet by mouth once daily., Disp: , Rfl:     spironolactone (ALDACTONE) 25 MG tablet, Take 1 tablet (25 mg total) by mouth once daily., Disp: 90 tablet, Rfl: 4    traMADoL (ULTRAM) 50 mg tablet, Take 1 tablet (50 mg total) by mouth every 4 (four) hours as needed for Pain., Disp: 44  tablet, Rfl: 0     Lab Results   Component Value Date    WBC 5.52 09/26/2021    RBC 4.15 (L) 09/26/2021    HGB 13.0 (L) 09/26/2021    HCT 38.8 (L) 09/26/2021    MCV 94 09/26/2021    MCH 31.3 (H) 09/26/2021    MCHC 33.5 09/26/2021    RDW 15.0 (H) 09/26/2021     09/26/2021    MPV 11.0 09/26/2021    GRAN 4.1 09/26/2021    GRAN 74.9 (H) 09/26/2021    LYMPH 0.9 (L) 09/26/2021    LYMPH 15.4 (L) 09/26/2021    MONO 0.4 09/26/2021    MONO 7.8 09/26/2021    EOS 0.1 09/26/2021    BASO 0.02 09/26/2021    EOSINOPHIL 1.3 09/26/2021    BASOPHIL 0.4 09/26/2021    MG 2.3 09/26/2021        CMP  Lab Results   Component Value Date     09/26/2021    K 3.5 09/26/2021     09/26/2021    CO2 33 (H) 09/26/2021     09/26/2021    BUN 14 09/26/2021    CREATININE 0.92 09/26/2021    CALCIUM 9.1 09/26/2021    PROT 6.6 09/26/2021    ALBUMIN 3.7 09/26/2021    BILITOT 1.6 (H) 09/26/2021    ALKPHOS 247 (H) 09/26/2021     (H) 09/26/2021     (H) 09/26/2021    ANIONGAP 5 (L) 09/26/2021    ESTGFRAFRICA >60 09/26/2021    EGFRNONAA >60 09/26/2021        No results found for: LABBLOO, LABURIN, RESPIRATORYC, GSRESP         Results for orders placed or performed during the hospital encounter of 09/25/21   EKG 12-lead    Collection Time: 09/26/21  8:38 AM    Narrative    Test Reason : R00.0,    Vent. Rate : 082 BPM     Atrial Rate : 082 BPM     P-R Int : 080 ms          QRS Dur : 130 ms      QT Int : 290 ms       P-R-T Axes : -05 -18 178 degrees     QTc Int : 338 ms    Sinus rhythm with short DC with Premature atrial complexes with Aberrant  conduction  Nonspecific intraventricular block  Minimal voltage criteria for LVH, may be normal variant ( R in aVL )  Nonspecific T wave abnormality  Abnormal ECG  When compared with ECG of 25-SEP-2021 18:20,  Sinus rhythm has replaced Junctional rhythm  Nonspecific intraventricular block has replaced Incomplete right bundle  branch block  QT has shortened  Confirmed by Derrick RODRIGUEZ,  Ronald ([id number]) on 9/27/2021 9:19:09 AM    Referred By: IVAN   SELF           Confirmed By:Ronald Meza MD                  Plan:       Problem List Items Addressed This Visit          Cardiac/Vascular    Essential hypertension     His current therapy will continue.  Blood pressure today 140/78 mm Hg.         Mixed hyperlipidemia     Condition well controlled with atorvastatin.  LDL 42 mg%.         Acute combined systolic and diastolic congestive heart failure     I believe the patient has ejection fraction 50%, lower limits normal.  Some of his echoes have been dictated at 40% or 45%.  He has no heart failure symptoms.             Coronary artery disease involving native coronary artery of native heart without angina pectoris     No active angina.  He has had chest pain in the past.  Current therapy to continue.         Venous insufficiency of both lower extremities     Pending venous ablation therapy.  Loop diuretics to continue.         PVC's (premature ventricular contractions)     Noted on exam today.  He is asymptomatic.  Metoprolol to continue.  He is still organizing his sleep evaluation.  Electrophysiology thought that his PVCs would improve likely if he had control of sleep apnea.  His workup is about to initiate for sleep apnea.             The patient will be seen in 4 months.  I do not advise change in management today.  I did urge him to follow through with ablation therapy when scheduled.  Looks like it had to be rescheduled.  Utilizing his high-dose diuretics is controlling his venous insufficiency condition but I told him it is not the best way to handle it long-term.               Hank Barry MD  09/19/2022   10:44 AM

## 2022-09-19 NOTE — ASSESSMENT & PLAN NOTE
Noted on exam today.  He is asymptomatic.  Metoprolol to continue.  He is still organizing his sleep evaluation.  Electrophysiology thought that his PVCs would improve likely if he had control of sleep apnea.  His workup is about to initiate for sleep apnea.

## 2022-10-04 ENCOUNTER — HOSPITAL ENCOUNTER (OUTPATIENT)
Dept: SLEEP MEDICINE | Facility: HOSPITAL | Age: 77
Discharge: HOME | End: 2022-10-04
Attending: FAMILY MEDICINE
Payer: MEDICARE

## 2022-10-04 ENCOUNTER — TELEPHONE (OUTPATIENT)
Dept: SLEEP MEDICINE | Facility: OTHER | Age: 77
End: 2022-10-04
Payer: MEDICARE

## 2022-10-04 DIAGNOSIS — G47.30 SLEEP APNEA, UNSPECIFIED TYPE: ICD-10-CM

## 2022-10-04 DIAGNOSIS — G47.33 OSA (OBSTRUCTIVE SLEEP APNEA): Primary | ICD-10-CM

## 2022-10-04 PROCEDURE — 95810 PR POLYSOMNOGRAPHY, 4 OR MORE: ICD-10-PCS | Mod: 26,,, | Performed by: INTERNAL MEDICINE

## 2022-10-04 PROCEDURE — 95810 POLYSOM 6/> YRS 4/> PARAM: CPT | Mod: 26,,, | Performed by: INTERNAL MEDICINE

## 2022-10-04 PROCEDURE — 95810 POLYSOM 6/> YRS 4/> PARAM: CPT

## 2022-10-04 NOTE — LETTER
[facility]  [address] [facility] [place] [address]  Phone: [phone]  Fax: [phone] October 19, 2022     Monica Lazaro, ACACIA  [address]  [facility] [address]    Patient: Bean Salas   MR Number: [id number]   YOB: 1945   Date of Visit: 10/4/2022       Dear Dr. Lazaro:    Thank you for referring Bean Salas to me for evaluation.    Polysomnogram completed and interpreted.  Report is available under media tab.    If you have questions, please do not hesitate to call me. I look forward to following Bean along with you.    Sincerely,        Glenn Merino MD

## 2022-10-05 PROBLEM — G47.30 SLEEP APNEA: Status: ACTIVE | Noted: 2022-10-05

## 2022-10-05 NOTE — PROGRESS NOTES
Patient was educated about the purpose of the wires and what data was being collected.  Patient was informed that the technician may need to enter the room during the night to fix leads or make adjustments to equipment.

## 2022-10-19 ENCOUNTER — PATIENT MESSAGE (OUTPATIENT)
Dept: SLEEP MEDICINE | Facility: CLINIC | Age: 77
End: 2022-10-19
Payer: MEDICARE

## 2022-10-19 DIAGNOSIS — G47.33 OSA (OBSTRUCTIVE SLEEP APNEA): Primary | ICD-10-CM

## 2022-10-20 DIAGNOSIS — G47.33 OSA (OBSTRUCTIVE SLEEP APNEA): Primary | ICD-10-CM

## 2022-10-21 ENCOUNTER — PES CALL (OUTPATIENT)
Dept: ADMINISTRATIVE | Facility: CLINIC | Age: 77
End: 2022-10-21
Payer: MEDICARE

## 2022-11-02 ENCOUNTER — TELEPHONE (OUTPATIENT)
Dept: ADMINISTRATIVE | Facility: CLINIC | Age: 77
End: 2022-11-02
Payer: MEDICARE

## 2022-11-02 NOTE — TELEPHONE ENCOUNTER
Called pt, informed pt I was calling to remind pt of his in office EAWV on 11/4/22; clinic location provided to patient; pt confirmed appointment

## 2022-11-04 ENCOUNTER — OFFICE VISIT (OUTPATIENT)
Dept: INTERNAL MEDICINE | Facility: CLINIC | Age: 77
End: 2022-11-04
Payer: MEDICARE

## 2022-11-04 VITALS
SYSTOLIC BLOOD PRESSURE: 161 MMHG | DIASTOLIC BLOOD PRESSURE: 67 MMHG | HEART RATE: 46 BPM | WEIGHT: 213.88 LBS | RESPIRATION RATE: 18 BRPM | HEIGHT: 71 IN | BODY MASS INDEX: 29.94 KG/M2

## 2022-11-04 DIAGNOSIS — R00.1 BRADYCARDIA: ICD-10-CM

## 2022-11-04 DIAGNOSIS — I25.10 CORONARY ARTERY DISEASE INVOLVING NATIVE CORONARY ARTERY OF NATIVE HEART WITHOUT ANGINA PECTORIS: ICD-10-CM

## 2022-11-04 DIAGNOSIS — I49.8 ATRIAL BIGEMINY: ICD-10-CM

## 2022-11-04 DIAGNOSIS — E78.2 MIXED HYPERLIPIDEMIA: ICD-10-CM

## 2022-11-04 DIAGNOSIS — I20.9 ANGINA PECTORIS: ICD-10-CM

## 2022-11-04 DIAGNOSIS — I50.41 ACUTE COMBINED SYSTOLIC AND DIASTOLIC CONGESTIVE HEART FAILURE: ICD-10-CM

## 2022-11-04 DIAGNOSIS — E03.4 HYPOTHYROIDISM DUE TO ACQUIRED ATROPHY OF THYROID: ICD-10-CM

## 2022-11-04 DIAGNOSIS — I50.40 COMBINED SYSTOLIC AND DIASTOLIC CONGESTIVE HEART FAILURE, UNSPECIFIED HF CHRONICITY: ICD-10-CM

## 2022-11-04 DIAGNOSIS — I49.3 PVC'S (PREMATURE VENTRICULAR CONTRACTIONS): ICD-10-CM

## 2022-11-04 DIAGNOSIS — M25.611 DECREASED RIGHT SHOULDER RANGE OF MOTION: ICD-10-CM

## 2022-11-04 DIAGNOSIS — R53.1 WEAKNESS: ICD-10-CM

## 2022-11-04 DIAGNOSIS — M75.41 ROTATOR CUFF IMPINGEMENT SYNDROME OF RIGHT SHOULDER: ICD-10-CM

## 2022-11-04 DIAGNOSIS — T84.84XA PAINFUL ORTHOPAEDIC HARDWARE: ICD-10-CM

## 2022-11-04 DIAGNOSIS — Z00.00 ENCOUNTER FOR PREVENTIVE HEALTH EXAMINATION: Primary | ICD-10-CM

## 2022-11-04 DIAGNOSIS — I87.2 VENOUS INSUFFICIENCY OF BOTH LOWER EXTREMITIES: ICD-10-CM

## 2022-11-04 DIAGNOSIS — I10 ESSENTIAL HYPERTENSION: ICD-10-CM

## 2022-11-04 DIAGNOSIS — G47.33 OSA (OBSTRUCTIVE SLEEP APNEA): ICD-10-CM

## 2022-11-04 DIAGNOSIS — Z78.9 IMPAIRED ACTIVITIES OF DAILY LIVING: ICD-10-CM

## 2022-11-04 DIAGNOSIS — M25.651 IMPAIRED RANGE OF MOTION OF RIGHT HIP: ICD-10-CM

## 2022-11-04 PROCEDURE — G0439 PR MEDICARE ANNUAL WELLNESS SUBSEQUENT VISIT: ICD-10-PCS | Mod: S$GLB,,, | Performed by: NURSE PRACTITIONER

## 2022-11-04 PROCEDURE — 1101F PR PT FALLS ASSESS DOC 0-1 FALLS W/OUT INJ PAST YR: ICD-10-PCS | Mod: CPTII,S$GLB,, | Performed by: NURSE PRACTITIONER

## 2022-11-04 PROCEDURE — 1159F PR MEDICATION LIST DOCUMENTED IN MEDICAL RECORD: ICD-10-PCS | Mod: CPTII,S$GLB,, | Performed by: NURSE PRACTITIONER

## 2022-11-04 PROCEDURE — 3288F FALL RISK ASSESSMENT DOCD: CPT | Mod: CPTII,S$GLB,, | Performed by: NURSE PRACTITIONER

## 2022-11-04 PROCEDURE — 1157F ADVNC CARE PLAN IN RCRD: CPT | Mod: CPTII,S$GLB,, | Performed by: NURSE PRACTITIONER

## 2022-11-04 PROCEDURE — 3078F DIAST BP <80 MM HG: CPT | Mod: CPTII,S$GLB,, | Performed by: NURSE PRACTITIONER

## 2022-11-04 PROCEDURE — G0439 PPPS, SUBSEQ VISIT: HCPCS | Mod: S$GLB,,, | Performed by: NURSE PRACTITIONER

## 2022-11-04 PROCEDURE — 1157F PR ADVANCE CARE PLAN OR EQUIV PRESENT IN MEDICAL RECORD: ICD-10-PCS | Mod: CPTII,S$GLB,, | Performed by: NURSE PRACTITIONER

## 2022-11-04 PROCEDURE — 1160F RVW MEDS BY RX/DR IN RCRD: CPT | Mod: CPTII,S$GLB,, | Performed by: NURSE PRACTITIONER

## 2022-11-04 PROCEDURE — 99499 UNLISTED E&M SERVICE: CPT | Mod: HCNC,S$GLB,, | Performed by: NURSE PRACTITIONER

## 2022-11-04 PROCEDURE — 3077F SYST BP >= 140 MM HG: CPT | Mod: CPTII,S$GLB,, | Performed by: NURSE PRACTITIONER

## 2022-11-04 PROCEDURE — 1160F PR REVIEW ALL MEDS BY PRESCRIBER/CLIN PHARMACIST DOCUMENTED: ICD-10-PCS | Mod: CPTII,S$GLB,, | Performed by: NURSE PRACTITIONER

## 2022-11-04 PROCEDURE — 3078F PR MOST RECENT DIASTOLIC BLOOD PRESSURE < 80 MM HG: ICD-10-PCS | Mod: CPTII,S$GLB,, | Performed by: NURSE PRACTITIONER

## 2022-11-04 PROCEDURE — 99999 PR PBB SHADOW E&M-EST. PATIENT-LVL IV: ICD-10-PCS | Mod: PBBFAC,,, | Performed by: NURSE PRACTITIONER

## 2022-11-04 PROCEDURE — 3288F PR FALLS RISK ASSESSMENT DOCUMENTED: ICD-10-PCS | Mod: CPTII,S$GLB,, | Performed by: NURSE PRACTITIONER

## 2022-11-04 PROCEDURE — 1101F PT FALLS ASSESS-DOCD LE1/YR: CPT | Mod: CPTII,S$GLB,, | Performed by: NURSE PRACTITIONER

## 2022-11-04 PROCEDURE — 99999 PR PBB SHADOW E&M-EST. PATIENT-LVL IV: CPT | Mod: PBBFAC,,, | Performed by: NURSE PRACTITIONER

## 2022-11-04 PROCEDURE — 1159F MED LIST DOCD IN RCRD: CPT | Mod: CPTII,S$GLB,, | Performed by: NURSE PRACTITIONER

## 2022-11-04 PROCEDURE — 3077F PR MOST RECENT SYSTOLIC BLOOD PRESSURE >= 140 MM HG: ICD-10-PCS | Mod: CPTII,S$GLB,, | Performed by: NURSE PRACTITIONER

## 2022-11-04 NOTE — PROGRESS NOTES
"  Bean Salas presented for a  Medicare AWV and comprehensive Health Risk Assessment today. The following components were reviewed and updated:    Medical history  Family History  Social history  Allergies and Current Medications  Health Risk Assessment  Health Maintenance  Care Team         ** See Completed Assessments for Annual Wellness Visit within the encounter summary.**         The following assessments were completed:  Living Situation  CAGE  Depression Screening  Timed Get Up and Go  Whisper Test  Cognitive Function Screening  Nutrition Screening  ADL Screening  PAQ Screening        Vitals:    11/04/22 1354   BP: (!) 161/67   BP Location: Right arm   Patient Position: Sitting   BP Method: X-Large (Automatic)   Pulse: (!) 46   Resp: 18   Weight: 97 kg (213 lb 13.5 oz)   Height: 5' 11" (1.803 m)     Body mass index is 29.83 kg/m².  Physical Exam  Vitals and nursing note reviewed.   Constitutional:       General: He is not in acute distress.     Appearance: He is well-developed. He is not ill-appearing.   HENT:      Head: Normocephalic and atraumatic.   Eyes:      Pupils: Pupils are equal, round, and reactive to light.   Cardiovascular:      Rate and Rhythm: Bradycardia present. Rhythm irregular.      Heart sounds: Normal heart sounds.   Pulmonary:      Effort: Pulmonary effort is normal. No respiratory distress.      Breath sounds: Normal breath sounds.   Musculoskeletal:      Cervical back: Normal range of motion.   Skin:     General: Skin is warm and dry.   Neurological:      Mental Status: He is alert and oriented to person, place, and time.      Coordination: Coordination normal.   Psychiatric:         Mood and Affect: Mood normal.         Behavior: Behavior normal.         Thought Content: Thought content normal.         Judgment: Judgment normal.           Diagnoses and health risks identified today and associated recommendations/orders:    1. Encounter for preventive health examination    2. Acute " combined systolic and diastolic congestive heart failure  Chronic; stable. Continue current treatment plan as previously prescribed by Cardiology.    3. Combined systolic and diastolic congestive heart failure, unspecified HF chronicity  Chronic; stable. Continue current treatment plan as previously prescribed by Cardiology.    4. Angina pectoris  Chronic; stable. Continue current treatment plan as previously prescribed by Cardiology.    5. Atrial bigeminy  Chronic; stable. Continue current treatment plan as previously prescribed by Cardiology.    6. PVC's (premature ventricular contractions)  Chronic; stable. Continue current treatment plan as previously prescribed by Cardiology.    7. Bradycardia  Chronic; stable. Continue current treatment plan as previously prescribed by Cardiology.    8. Essential hypertension  Chronic; stable. Continue current treatment plan as previously prescribed by PCP.     9. Mixed hyperlipidemia  Chronic; stable. Continue current treatment plan as previously prescribed by PCP.     10. Coronary artery disease involving native coronary artery of native heart without angina pectoris  Chronic; stable. Continue current treatment plan as previously prescribed by Cardiology.    11. Venous insufficiency of both lower extremities  Chronic; stable. Continue current treatment plan as previously prescribed by Cardiology.    12. Painful orthopaedic hardware  Chronic; stable. Continue current treatment plan as previously prescribed by Ortho (Dr. Hall).     13. Impaired range of motion of right hip  Chronic; stable. Continue current treatment plan as previously prescribed by Ortho.    14. Rotator cuff impingement syndrome of right shoulder  Chronic; stable. Continue current treatment plan as previously prescribed by PCP.     15. Decreased right shoulder range of motion  Chronic; stable. Continue current treatment plan as previously prescribed by PCP.     16. Weakness  Chronic; stable. Continue  current treatment plan as previously prescribed by PCP.     17. Impaired activities of daily living  Chronic; stable. Continue current treatment plan as previously prescribed by PCP.     18. Hypothyroidism due to acquired atrophy of thyroid  Chronic; stable. Continue current treatment plan as previously prescribed by PCP.     19. AKHIL (obstructive sleep apnea)  Chronic; stable. Continue current treatment plan as previously prescribed by PCP.         Provided Bean with a 5-10 year written screening schedule and personal prevention plan. Recommendations were developed using the USPSTF age appropriate recommendations. Education, counseling, and referrals were provided as needed. After Visit Summary printed and given to patient which includes a list of additional screenings\tests needed.    Review for Opioid Screening: Patient does not have rx for Opioids.  Review for Substance Use Disorders: Patient does not use substance.    Follow up for HRA visit in 1 year.    Eric Lamb NP    I offered to discuss advanced care planning, including how to pick a person who would make decisions for you if you were unable to make them for yourself, called a health care power of , and what kind of decisions you might make such as use of life sustaining treatments such as ventilators and tube feeding when faced with a life limiting illness recorded on a living will that they will need to know. (How you want to be cared for as you near the end of your natural life)     X Patient is interested in learning more about how to make advanced directives.  I provided them paperwork and offered to discuss this with them.

## 2022-11-04 NOTE — PATIENT INSTRUCTIONS
Counseling and Referral of Other Preventative  (Italic type indicates deductible and co-insurance are waived)    Patient Name: Bean Salas  Today's Date: 11/4/2022    Health Maintenance       Date Due Completion Date    Pneumococcal Vaccines (Age 65+) (1 - PCV) Never done ---    Eye Exam Never done ---    TETANUS VACCINE Never done ---    Shingles Vaccine (1 of 2) Never done ---    COVID-19 Vaccine (3 - Booster for Pfizer series) 12/10/2021 10/15/2021    Hemoglobin A1c 03/03/2022 9/3/2021    Diabetes Urine Screening 05/27/2022 5/27/2021    Influenza Vaccine (1) Never done ---    Lipid Panel 09/26/2022 9/26/2021    Aspirin/Antiplatelet Therapy 07/27/2023 7/27/2022        No orders of the defined types were placed in this encounter.      The following information is provided to all patients.  This information is to help you find resources for any of the problems found today that may be affecting your health:                Living healthy guide: www.[facility].[place].gov      Understanding Diabetes: www.diabetes.org      Eating healthy: www.cdc.gov/healthyweight      CDC home safety checklist: www.cdc.gov/steadi/patient.html      Agency on Aging: www.goea.[place].gov      Alcoholics anonymous (AA): www.aa.org      Physical Activity: www.abilio.nih.gov/mx8mfaw      Tobacco use: www.quitwithusla.org

## 2022-11-29 ENCOUNTER — TELEPHONE (OUTPATIENT)
Dept: SLEEP MEDICINE | Facility: CLINIC | Age: 77
End: 2022-11-29
Payer: MEDICARE

## 2022-11-29 NOTE — TELEPHONE ENCOUNTER
----- Message from Shruti Watson sent at 11/29/2022  1:31 PM CST -----  Regarding: Update  Contact: BEAN RUBIO [[id number]]  Name of Who is Calling: Bean Rubio            What is the request in detail:  Pt states he hasn't received a call back or f/u for the next steps , I reached out to DME and advised pt that they have the order for CPAP as of 10/19 and they advised they will reach out before end of day to get him schedule to come in .    Please advise           Can the clinic reply by [facility]: Call back           What Number to Call Back if not in [facility]: [phone]

## 2022-11-29 NOTE — TELEPHONE ENCOUNTER
Spoke to patient in regards to the message we received. He stated he is waiting for a call from [facility] about his machine. I told him that the order was put in 10/19 so he should be hearing from them soon. I provided the number for him to contact them to check on the status

## 2022-11-30 ENCOUNTER — PATIENT MESSAGE (OUTPATIENT)
Dept: ADMINISTRATIVE | Facility: HOSPITAL | Age: 77
End: 2022-11-30
Payer: MEDICARE

## 2022-12-15 ENCOUNTER — PATIENT OUTREACH (OUTPATIENT)
Dept: ADMINISTRATIVE | Facility: HOSPITAL | Age: 77
End: 2022-12-15
Payer: MEDICARE

## 2022-12-20 DIAGNOSIS — E78.5 HYPERLIPIDEMIA, UNSPECIFIED HYPERLIPIDEMIA TYPE: ICD-10-CM

## 2022-12-20 DIAGNOSIS — I10 ESSENTIAL HYPERTENSION: Primary | ICD-10-CM

## 2022-12-20 DIAGNOSIS — I25.10 CORONARY ARTERY DISEASE INVOLVING NATIVE CORONARY ARTERY OF NATIVE HEART WITHOUT ANGINA PECTORIS: ICD-10-CM

## 2022-12-20 DIAGNOSIS — E03.4 HYPOTHYROIDISM DUE TO ACQUIRED ATROPHY OF THYROID: ICD-10-CM

## 2022-12-20 RX ORDER — LOSARTAN POTASSIUM 100 MG/1
TABLET ORAL
Qty: 90 TABLET | Refills: 0 | Status: SHIPPED | OUTPATIENT
Start: 2022-12-20 | End: 2023-04-03

## 2022-12-20 RX ORDER — METOPROLOL SUCCINATE 50 MG/1
TABLET, EXTENDED RELEASE ORAL
Qty: 90 TABLET | Refills: 0 | Status: SHIPPED | OUTPATIENT
Start: 2022-12-20 | End: 2023-04-03

## 2022-12-20 RX ORDER — LEVOTHYROXINE SODIUM 300 UG/1
TABLET ORAL
Qty: 90 TABLET | Refills: 0 | Status: SHIPPED | OUTPATIENT
Start: 2022-12-20 | End: 2023-02-07

## 2022-12-20 RX ORDER — ATORVASTATIN CALCIUM 10 MG/1
TABLET, FILM COATED ORAL
Qty: 90 TABLET | Refills: 0 | Status: SHIPPED | OUTPATIENT
Start: 2022-12-20 | End: 2023-04-03

## 2022-12-20 NOTE — TELEPHONE ENCOUNTER
Refill Routing Note   Medication(s) are not appropriate for processing by [facility] for the following reason(s):      - Required laboratory values are outdated  - Required vitals are abnormal    [facility] action(s):  Defer Medication-related problems identified:   Requires labs  Requires appointment        Medication reconciliation completed: No     Appointments  past 12m or future 3m with PCP    Date Provider   Last Visit   12/3/2021 Ramirez Levine MD   Next Visit   Visit date not found Ramirez Levine MD   ED visits in past 90 days: 0        Note composed:5:36 PM 12/20/2022

## 2022-12-20 NOTE — TELEPHONE ENCOUNTER
Sent in prescriptions-needs lab work and visit with MD.  Also blood pressure check.  What has been blood pressure readings?

## 2022-12-20 NOTE — TELEPHONE ENCOUNTER
Care Due:                  Date            Visit Type   Department     Provider  --------------------------------------------------------------------------------                                EP -                              PRIMARY      [facility]  Last Visit: 12-      CARE ([facility])   MEDICINE       Ramirez Levine  Next Visit: None Scheduled  None         None Found                                                            Last  Test          Frequency    Reason                     Performed    Due Date  --------------------------------------------------------------------------------    Office Visit  12 months..  atorvastatin,              12- 11-                             levothyroxine, losartan,                             metoprolol...............    CMP.........  12 months..  atorvastatin, losartan...  09- 09-    Lipid Panel.  12 months..  atorvastatin.............  09- 09-    TSH.........  12 months..  levothyroxine............  09- 09-    [facility] Embedded Care Gaps. Reference number: [id number]. 12/20/2022   12:07:34 PM CST

## 2022-12-21 VITALS — SYSTOLIC BLOOD PRESSURE: 133 MMHG | DIASTOLIC BLOOD PRESSURE: 75 MMHG

## 2022-12-21 NOTE — TELEPHONE ENCOUNTER
I scheduled the pt to see dr jackson feb 7  He will do lab work next week - please put in lab orders  ( he will go - no appt needs to be scheduled - just close the message after    130'/70's - bp updated

## 2023-02-02 ENCOUNTER — LAB VISIT (OUTPATIENT)
Dept: LAB | Facility: HOSPITAL | Age: 78
End: 2023-02-02
Attending: FAMILY MEDICINE
Payer: MEDICARE

## 2023-02-02 DIAGNOSIS — E78.5 HYPERLIPIDEMIA, UNSPECIFIED HYPERLIPIDEMIA TYPE: ICD-10-CM

## 2023-02-02 DIAGNOSIS — E03.4 HYPOTHYROIDISM DUE TO ACQUIRED ATROPHY OF THYROID: ICD-10-CM

## 2023-02-02 DIAGNOSIS — I25.10 CORONARY ARTERY DISEASE INVOLVING NATIVE CORONARY ARTERY OF NATIVE HEART WITHOUT ANGINA PECTORIS: ICD-10-CM

## 2023-02-02 DIAGNOSIS — I10 ESSENTIAL HYPERTENSION: ICD-10-CM

## 2023-02-02 LAB
ALBUMIN SERPL BCP-MCNC: 4.5 G/DL (ref 3.5–5.2)
ALP SERPL-CCNC: 73 U/L (ref 38–126)
ALT SERPL W/O P-5'-P-CCNC: 21 U/L (ref 10–44)
ANION GAP SERPL CALC-SCNC: 7 MMOL/L (ref 8–16)
AST SERPL-CCNC: 28 U/L (ref 15–46)
BASOPHILS # BLD AUTO: 0.04 K/UL (ref 0–0.2)
BASOPHILS NFR BLD: 0.8 % (ref 0–1.9)
BILIRUB SERPL-MCNC: 2.1 MG/DL (ref 0.1–1)
CALCIUM SERPL-MCNC: 9.6 MG/DL (ref 8.7–10.5)
CHLORIDE SERPL-SCNC: 104 MMOL/L (ref 95–110)
CHOLEST SERPL-MCNC: 117 MG/DL (ref 120–199)
CHOLEST/HDLC SERPL: 2.9 {RATIO} (ref 2–5)
CO2 SERPL-SCNC: 31 MMOL/L (ref 23–29)
CREAT SERPL-MCNC: 0.96 MG/DL (ref 0.5–1.4)
DIFFERENTIAL METHOD: ABNORMAL
EOSINOPHIL # BLD AUTO: 0.1 K/UL (ref 0–0.5)
EOSINOPHIL NFR BLD: 1.4 % (ref 0–8)
ERYTHROCYTE [DISTWIDTH] IN BLOOD BY AUTOMATED COUNT: 13.3 % (ref 11.5–14.5)
EST. GFR  (NO RACE VARIABLE): >60 ML/MIN/1.73 M^2
GLUCOSE SERPL-MCNC: 139 MG/DL (ref 70–110)
HCT VFR BLD AUTO: 44 % (ref 40–54)
HDLC SERPL-MCNC: 41 MG/DL (ref 40–75)
HDLC SERPL: 35 % (ref 20–50)
HGB BLD-MCNC: 14.4 G/DL (ref 14–18)
IMM GRANULOCYTES # BLD AUTO: 0.01 K/UL (ref 0–0.04)
IMM GRANULOCYTES NFR BLD AUTO: 0.2 % (ref 0–0.5)
LDLC SERPL CALC-MCNC: 61.4 MG/DL (ref 63–159)
LYMPHOCYTES # BLD AUTO: 1.2 K/UL (ref 1–4.8)
LYMPHOCYTES NFR BLD: 23 % (ref 18–48)
MCH RBC QN AUTO: 31.8 PG (ref 27–31)
MCHC RBC AUTO-ENTMCNC: 32.7 G/DL (ref 32–36)
MCV RBC AUTO: 97 FL (ref 82–98)
MONOCYTES # BLD AUTO: 0.4 K/UL (ref 0.3–1)
MONOCYTES NFR BLD: 7.9 % (ref 4–15)
NEUTROPHILS # BLD AUTO: 3.4 K/UL (ref 1.8–7.7)
NEUTROPHILS NFR BLD: 66.7 % (ref 38–73)
NONHDLC SERPL-MCNC: 76 MG/DL
NRBC BLD-RTO: 0 /100 WBC
PLATELET # BLD AUTO: 204 K/UL (ref 150–450)
PMV BLD AUTO: 12.1 FL (ref 9.2–12.9)
POTASSIUM SERPL-SCNC: 4.3 MMOL/L (ref 3.5–5.1)
PROT SERPL-MCNC: 7.6 G/DL (ref 6–8.4)
RBC # BLD AUTO: 4.53 M/UL (ref 4.6–6.2)
SODIUM SERPL-SCNC: 142 MMOL/L (ref 136–145)
T4 FREE SERPL-MCNC: 1.55 NG/DL (ref 0.71–1.51)
TRIGL SERPL-MCNC: 73 MG/DL (ref 30–150)
TSH SERPL DL<=0.005 MIU/L-ACNC: <0.026 UIU/ML (ref 0.4–4)
UUN UR-MCNC: 18 MG/DL (ref 2–20)
WBC # BLD AUTO: 5.08 K/UL (ref 3.9–12.7)

## 2023-02-02 PROCEDURE — 80061 LIPID PANEL: CPT | Mod: HCNC | Performed by: FAMILY MEDICINE

## 2023-02-02 PROCEDURE — 36415 COLL VENOUS BLD VENIPUNCTURE: CPT | Mod: HCNC,PO | Performed by: FAMILY MEDICINE

## 2023-02-02 PROCEDURE — 84443 ASSAY THYROID STIM HORMONE: CPT | Mod: HCNC,PO | Performed by: FAMILY MEDICINE

## 2023-02-02 PROCEDURE — 84439 ASSAY OF FREE THYROXINE: CPT | Mod: HCNC | Performed by: FAMILY MEDICINE

## 2023-02-02 PROCEDURE — 80053 COMPREHEN METABOLIC PANEL: CPT | Mod: HCNC,PO | Performed by: FAMILY MEDICINE

## 2023-02-02 PROCEDURE — 85025 COMPLETE CBC W/AUTO DIFF WBC: CPT | Mod: HCNC,PO | Performed by: FAMILY MEDICINE

## 2023-02-07 ENCOUNTER — OFFICE VISIT (OUTPATIENT)
Dept: FAMILY MEDICINE | Facility: CLINIC | Age: 78
End: 2023-02-07
Payer: MEDICARE

## 2023-02-07 VITALS
SYSTOLIC BLOOD PRESSURE: 125 MMHG | HEART RATE: 75 BPM | OXYGEN SATURATION: 98 % | WEIGHT: 221.44 LBS | DIASTOLIC BLOOD PRESSURE: 71 MMHG | TEMPERATURE: 98 F | HEIGHT: 71 IN | BODY MASS INDEX: 31 KG/M2

## 2023-02-07 DIAGNOSIS — R73.9 HYPERGLYCEMIA: ICD-10-CM

## 2023-02-07 DIAGNOSIS — I10 ESSENTIAL HYPERTENSION: ICD-10-CM

## 2023-02-07 DIAGNOSIS — Z00.00 ENCOUNTER FOR MEDICARE ANNUAL WELLNESS EXAM: ICD-10-CM

## 2023-02-07 DIAGNOSIS — E03.4 HYPOTHYROIDISM DUE TO ACQUIRED ATROPHY OF THYROID: Primary | ICD-10-CM

## 2023-02-07 PROCEDURE — 3074F SYST BP LT 130 MM HG: CPT | Mod: CPTII,S$GLB,, | Performed by: FAMILY MEDICINE

## 2023-02-07 PROCEDURE — 3288F PR FALLS RISK ASSESSMENT DOCUMENTED: ICD-10-PCS | Mod: CPTII,S$GLB,, | Performed by: FAMILY MEDICINE

## 2023-02-07 PROCEDURE — 3074F PR MOST RECENT SYSTOLIC BLOOD PRESSURE < 130 MM HG: ICD-10-PCS | Mod: CPTII,S$GLB,, | Performed by: FAMILY MEDICINE

## 2023-02-07 PROCEDURE — 3078F PR MOST RECENT DIASTOLIC BLOOD PRESSURE < 80 MM HG: ICD-10-PCS | Mod: CPTII,S$GLB,, | Performed by: FAMILY MEDICINE

## 2023-02-07 PROCEDURE — 1157F PR ADVANCE CARE PLAN OR EQUIV PRESENT IN MEDICAL RECORD: ICD-10-PCS | Mod: CPTII,S$GLB,, | Performed by: FAMILY MEDICINE

## 2023-02-07 PROCEDURE — 1101F PR PT FALLS ASSESS DOC 0-1 FALLS W/OUT INJ PAST YR: ICD-10-PCS | Mod: CPTII,S$GLB,, | Performed by: FAMILY MEDICINE

## 2023-02-07 PROCEDURE — 3288F FALL RISK ASSESSMENT DOCD: CPT | Mod: CPTII,S$GLB,, | Performed by: FAMILY MEDICINE

## 2023-02-07 PROCEDURE — 99213 OFFICE O/P EST LOW 20 MIN: CPT | Mod: S$GLB,,, | Performed by: FAMILY MEDICINE

## 2023-02-07 PROCEDURE — 1125F PR PAIN SEVERITY QUANTIFIED, PAIN PRESENT: ICD-10-PCS | Mod: CPTII,S$GLB,, | Performed by: FAMILY MEDICINE

## 2023-02-07 PROCEDURE — 1159F PR MEDICATION LIST DOCUMENTED IN MEDICAL RECORD: ICD-10-PCS | Mod: CPTII,S$GLB,, | Performed by: FAMILY MEDICINE

## 2023-02-07 PROCEDURE — 1159F MED LIST DOCD IN RCRD: CPT | Mod: CPTII,S$GLB,, | Performed by: FAMILY MEDICINE

## 2023-02-07 PROCEDURE — 3078F DIAST BP <80 MM HG: CPT | Mod: CPTII,S$GLB,, | Performed by: FAMILY MEDICINE

## 2023-02-07 PROCEDURE — 1157F ADVNC CARE PLAN IN RCRD: CPT | Mod: CPTII,S$GLB,, | Performed by: FAMILY MEDICINE

## 2023-02-07 PROCEDURE — 99213 PR OFFICE/OUTPT VISIT, EST, LEVL III, 20-29 MIN: ICD-10-PCS | Mod: S$GLB,,, | Performed by: FAMILY MEDICINE

## 2023-02-07 PROCEDURE — 1101F PT FALLS ASSESS-DOCD LE1/YR: CPT | Mod: CPTII,S$GLB,, | Performed by: FAMILY MEDICINE

## 2023-02-07 PROCEDURE — 1125F AMNT PAIN NOTED PAIN PRSNT: CPT | Mod: CPTII,S$GLB,, | Performed by: FAMILY MEDICINE

## 2023-02-07 RX ORDER — LEVOTHYROXINE SODIUM 200 UG/1
200 TABLET ORAL DAILY
Qty: 90 TABLET | Refills: 3 | Status: SHIPPED | OUTPATIENT
Start: 2023-02-07 | End: 2024-03-12

## 2023-02-09 DIAGNOSIS — Z00.00 ENCOUNTER FOR MEDICARE ANNUAL WELLNESS EXAM: ICD-10-CM

## 2023-02-13 NOTE — PROGRESS NOTES
" Patient ID: Bean Salas is a 78 y.o. male.    Chief Complaint: Annual Exam    HPI      Bean Salas is a 78 y.o. male with history of have hypertension hypothyroidism hyperglycemia.  Patient also with obstructive sleep apnea.  No chest pain or palpitations.  No shortness of breath.  No increased fluid of his lower extremity.    Vitals:    02/07/23 1522 02/07/23 1546   BP: (!) 160/82 125/71   Pulse: 75    Temp: 98.2 °F (36.8 °C)    SpO2: 98%    Weight: 100.4 kg (221 lb 7.2 oz)    Height: 5' 11" (1.803 m)             Review of Symptoms      Physical Exam    Constitutional:  Oriented to person, place, and time.appears well-developed and well-nourished.  No distress.      HENT  Head: Normocephalic and atraumatic  Right Ear: External ear normal.   Left Ear: External ear normal.   Nose: External nose normal.   Mouth:  Moist mucus membranes.    Eyes:  Conjunctivae are normal. Right eye exhibits no discharge.  Left eye exhibits no discharge. No scleral icterus.  No periorbital edema    Cardiovascular:  Regular rate and rhythm with normal S1 and S2     Pulmonary/Chest:   Clear to auscultation bilaterally without wheezes, rhonchi or rales      Musculoskeletal:  No edema. No obvious deformity No wasting       Neurological:  Alert and oriented to person, place, and time.   Coordination normal.     Skin:   Skin is warm and dry.  No diaphoresis.   No rash noted.     Psychiatric: Normal mood and affect. Behavior is normal.  Judgment and thought content normal.     Complete Blood Count  Lab Results   Component Value Date    RBC 4.53 (L) 02/02/2023    HGB 14.4 02/02/2023    HCT 44.0 02/02/2023    MCV 97 02/02/2023    MCH 31.8 (H) 02/02/2023    MCHC 32.7 02/02/2023    RDW 13.3 02/02/2023     02/02/2023    MPV 12.1 02/02/2023    GRAN 3.4 02/02/2023    GRAN 66.7 02/02/2023    LYMPH 1.2 02/02/2023    LYMPH 23.0 02/02/2023    MONO 0.4 02/02/2023    MONO 7.9 02/02/2023    EOS 0.1 02/02/2023    BASO 0.04 02/02/2023    " EOSINOPHIL 1.4 02/02/2023    BASOPHIL 0.8 02/02/2023    DIFFMETHOD Automated 02/02/2023       Comprehensive Metabolic Panel  Lab Results   Component Value Date     (H) 02/02/2023    BUN 18 02/02/2023    CREATININE 0.96 02/02/2023     02/02/2023    K 4.3 02/02/2023     02/02/2023    PROT 7.6 02/02/2023    ALBUMIN 4.5 02/02/2023    BILITOT 2.1 (H) 02/02/2023    AST 28 02/02/2023    ALKPHOS 73 02/02/2023    CO2 31 (H) 02/02/2023    ALT 21 02/02/2023    ANIONGAP 7 (L) 02/02/2023       TSH  Lab Results   Component Value Date    TSH <0.026 (L) 02/02/2023       Assessment / Plan:      ICD-10-CM ICD-9-CM   1. Hypothyroidism due to acquired atrophy of thyroid  E03.4 244.8     246.8   2. Essential hypertension  I10 401.9   3. Hyperglycemia  R73.9 790.29     Hypothyroidism due to acquired atrophy of thyroid  -     Hemoglobin A1C; Standing  -     TSH; Standing  -     T4, Free; Standing    Essential hypertension  -     Hemoglobin A1C; Standing  -     TSH; Standing  -     T4, Free; Standing    Hyperglycemia  -     Hemoglobin A1C; Standing  -     TSH; Standing  -     T4, Free; Standing  -     Microalbumin/Creatinine Ratio, Urine; Future; Expected date: 02/07/2023    Other orders  -     levothyroxine (SYNTHROID) 200 MCG tablet; Take 1 tablet (200 mcg total) by mouth once daily.  Dispense: 90 tablet; Refill: 3

## 2023-02-14 ENCOUNTER — TELEPHONE (OUTPATIENT)
Dept: FAMILY MEDICINE | Facility: CLINIC | Age: 78
End: 2023-02-14
Payer: MEDICARE

## 2023-02-14 NOTE — TELEPHONE ENCOUNTER
----- Message from Ramirez Levine MD sent at 2/4/2023 10:11 AM CST -----  Results are normal -except your thyroid hormone levels are little out of balance.  We can discuss this at our upcoming visit.  
Pt inform that Thyroid testing is of balance and dr will discuss in upcomung appt.Pt agrees verbalize and [id number].  
No

## 2023-02-16 ENCOUNTER — LAB VISIT (OUTPATIENT)
Dept: LAB | Facility: HOSPITAL | Age: 78
End: 2023-02-16
Attending: FAMILY MEDICINE
Payer: MEDICARE

## 2023-02-16 DIAGNOSIS — R73.9 HYPERGLYCEMIA: ICD-10-CM

## 2023-02-16 DIAGNOSIS — I10 ESSENTIAL HYPERTENSION: ICD-10-CM

## 2023-02-16 DIAGNOSIS — E03.4 HYPOTHYROIDISM DUE TO ACQUIRED ATROPHY OF THYROID: ICD-10-CM

## 2023-02-16 LAB
ESTIMATED AVG GLUCOSE: 131 MG/DL (ref 68–131)
HBA1C MFR BLD: 6.2 % (ref 4–5.6)
T4 FREE SERPL-MCNC: 1.26 NG/DL (ref 0.71–1.51)
TSH SERPL DL<=0.005 MIU/L-ACNC: 0.03 UIU/ML (ref 0.4–4)

## 2023-02-16 PROCEDURE — 84443 ASSAY THYROID STIM HORMONE: CPT | Mod: HCNC,PO | Performed by: FAMILY MEDICINE

## 2023-02-16 PROCEDURE — 84439 ASSAY OF FREE THYROXINE: CPT | Mod: HCNC | Performed by: FAMILY MEDICINE

## 2023-02-16 PROCEDURE — 83036 HEMOGLOBIN GLYCOSYLATED A1C: CPT | Mod: HCNC | Performed by: FAMILY MEDICINE

## 2023-02-16 PROCEDURE — 36415 COLL VENOUS BLD VENIPUNCTURE: CPT | Mod: HCNC,PO | Performed by: FAMILY MEDICINE

## 2023-05-03 ENCOUNTER — PES CALL (OUTPATIENT)
Dept: ADMINISTRATIVE | Facility: CLINIC | Age: 78
End: 2023-05-03
Payer: MEDICARE

## 2023-05-08 ENCOUNTER — LAB VISIT (OUTPATIENT)
Dept: LAB | Facility: HOSPITAL | Age: 78
End: 2023-05-08
Attending: FAMILY MEDICINE
Payer: MEDICARE

## 2023-05-08 DIAGNOSIS — E03.4 HYPOTHYROIDISM DUE TO ACQUIRED ATROPHY OF THYROID: ICD-10-CM

## 2023-05-08 DIAGNOSIS — R73.9 HYPERGLYCEMIA: ICD-10-CM

## 2023-05-08 DIAGNOSIS — I10 ESSENTIAL HYPERTENSION: ICD-10-CM

## 2023-05-08 LAB
T4 FREE SERPL-MCNC: 1.26 NG/DL (ref 0.71–1.51)
TSH SERPL DL<=0.005 MIU/L-ACNC: 0.29 UIU/ML (ref 0.4–4)

## 2023-05-08 PROCEDURE — 84439 ASSAY OF FREE THYROXINE: CPT | Mod: HCNC | Performed by: FAMILY MEDICINE

## 2023-05-08 PROCEDURE — 84443 ASSAY THYROID STIM HORMONE: CPT | Mod: HCNC,PO | Performed by: FAMILY MEDICINE

## 2023-05-08 PROCEDURE — 36415 COLL VENOUS BLD VENIPUNCTURE: CPT | Mod: HCNC,PO | Performed by: FAMILY MEDICINE

## 2023-06-12 ENCOUNTER — HOSPITAL ENCOUNTER (EMERGENCY)
Facility: HOSPITAL | Age: 78
Discharge: HOME | End: 2023-06-12
Attending: EMERGENCY MEDICINE
Payer: MEDICARE

## 2023-06-12 VITALS
DIASTOLIC BLOOD PRESSURE: 84 MMHG | SYSTOLIC BLOOD PRESSURE: 169 MMHG | WEIGHT: 220 LBS | BODY MASS INDEX: 30.8 KG/M2 | HEART RATE: 59 BPM | HEIGHT: 71 IN | TEMPERATURE: 98 F | OXYGEN SATURATION: 96 % | RESPIRATION RATE: 20 BRPM
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
## 2023-06-12 DIAGNOSIS — M54.50 ACUTE LEFT-SIDED LOW BACK PAIN WITHOUT SCIATICA: Primary | ICD-10-CM

## 2023-06-12 PROCEDURE — 99284 EMERGENCY DEPT VISIT MOD MDM: CPT | Mod: ER

## 2023-06-12 RX ORDER — PREDNISONE 20 MG/1
40 TABLET ORAL DAILY
Qty: 6 TABLET | Refills: 0 | Status: SHIPPED | OUTPATIENT
Start: 2023-06-12 | End: 2023-06-15

## 2023-06-12 RX ORDER — TRAMADOL HYDROCHLORIDE 50 MG/1
50 TABLET ORAL EVERY 6 HOURS PRN
Qty: 12 TABLET | Refills: 0 | Status: SHIPPED | OUTPATIENT
Start: 2023-06-12 | End: 2023-09-06

## 2023-06-12 RX ORDER — LIDOCAINE 50 MG/G
1 PATCH TOPICAL DAILY
Qty: 15 PATCH | Refills: 0 | Status: SHIPPED | OUTPATIENT
Start: 2023-06-12 | End: 2023-09-06

## 2023-06-12 NOTE — DISCHARGE INSTRUCTIONS
You have been prescribed  Tramadol (Ultram) . Please do not take this medication while working, drinking alcohol, swimming, or while driving/operating heavy machinery. This medication may cause drowsiness, dizziness, impair judgment, and reduce physical capabilities.You should not drive, operate heavy machinery, or make life changing decisions while taking this medication.

## 2023-06-12 NOTE — FIRST PROVIDER EVALUATION
Emergency Department TeleTriage Encounter Note      CHIEF COMPLAINT    Chief Complaint   Patient presents with    Fall     PT reports in the last week, his left knee gave out on him and he fell on his butt x 2. Pt denies LOC. PT reports lower back pain.       VITAL SIGNS   Initial Vitals [06/12/23 1015]   BP Pulse Resp Temp SpO2   (!) 180/88 (!) 59 20 98.2 °F (36.8 °C) 96 %      MAP       --            ALLERGIES    Review of patient's allergies indicates:  No Known Allergies    PROVIDER TRIAGE NOTE  Patient presents with pain in low back after 2 falls onto buttocks after knee gave out.       ORDERS  Labs Reviewed - No data to display    ED Orders (720h ago, onward)      None              Virtual Visit Note: The provider triage portion of this emergency department evaluation and documentation was performed via [employer], a HIPAA-compliant telemedicine application, in concert with a tele-presenter in the room. A face to face patient evaluation with one of my colleagues will occur once the patient is placed in an emergency department room.      DISCLAIMER: This note was prepared with [employer] voice recognition transcription software. Garbled syntax, mangled pronouns, and other bizarre constructions may be attributed to that software system.

## 2023-06-12 NOTE — ED PROVIDER NOTES
Encounter Date: 6/12/2023       History     Chief Complaint   Patient presents with    Fall     PT reports in the last week, his left knee gave out on him and he fell on his butt x 2. Pt denies LOC. PT reports lower back pain.     HPI: Bean Salas, a 78 y.o. male  has a past medical history of Hyperlipidemia, Hypertension, Thyroid disease, and Ventricular tachycardia.     He presents to the ED for evaluation of right sided lower back pain after fall x 2 after knee gave out first time 3 weeks ago, second time one week ago.  States that he landed to buttocks.   States that the pain is only with walking and is described as electricity.  Attests to improvement of pain with tylenol. No loss of bowel or bladder.        The history is provided by the patient.   Review of patient's allergies indicates:  No Known Allergies  Past Medical History:   Diagnosis Date    Hyperlipidemia     Hypertension     Thyroid disease     hypo    Ventricular tachycardia     frequent PVCs     Past Surgical History:   Procedure Laterality Date    COLONOSCOPY  01/01/2011    CORONARY ANGIOGRAPHY N/A 07/22/2021    Procedure: ANGIOGRAM, CORONARY ARTERY;  Surgeon: Hank Barry MD;  Location: [facility] CATH LAB/EP;  Service: Cardiology;  Laterality: N/A;    EYE SURGERY Bilateral     cataracts extraction    FRACTURE SURGERY Right 09/2021    femur    HERNIA REPAIR      HIP SURGERY Right 08/2021    INTRAMEDULLARY RODDING OF TROCHANTER OF FEMUR Right 09/03/2021    Procedure: INSERTION, INTRAMEDULLARY RACQUEL, FEMUR, TROCHANTER;  Surgeon: Darryn Hall MD;  Location: [facility] OR;  Service: Orthopedics;  Laterality: Right;    JOINT REPLACEMENT Bilateral     knee    LEFT HEART CATHETERIZATION Right 07/22/2021    Procedure: Left heart cath;  Surgeon: Hank Barry MD;  Location: [facility] CATH LAB/EP;  Service: Cardiology;  Laterality: Right;    VASECTOMY       Family History   Problem Relation Age of Onset    Crohn's disease Mother     Dementia Mother     Heart  attack Father     No Known Problems Sister     No Known Problems Brother     No Known Problems Son      Social History     Tobacco Use    Smoking status: Former     Packs/day: 1.00     Years: 35.00     Pack years: 35.00     Types: Cigarettes     Quit date: 2000     Years since quittin.4    Smokeless tobacco: Never   Substance Use Topics    Alcohol use: No    Drug use: Never     Review of Systems   Constitutional:  Negative for fever.   Gastrointestinal:  Negative for nausea and vomiting.   Genitourinary:  Negative for difficulty urinating.   Musculoskeletal:  Positive for back pain.   Skin:  Negative for color change.   Allergic/Immunologic: Negative for immunocompromised state.   Neurological:  Negative for weakness.   Hematological:  Negative for adenopathy.   Psychiatric/Behavioral:  Negative for agitation and confusion.    All other systems reviewed and are negative.    Physical Exam     Initial Vitals [23 1015]   BP Pulse Resp Temp SpO2   (!) 180/88 (!) 59 20 98.2 °F (36.8 °C) 96 %      MAP       --         Physical Exam    Nursing note and vitals reviewed.  Constitutional: He appears well-developed and well-nourished. He is not diaphoretic. No distress.   HENT:   Head: Normocephalic and atraumatic.   Right Ear: External ear normal.   Left Ear: External ear normal.   Eyes: Conjunctivae are normal.   Cardiovascular:  Normal rate and regular rhythm.           Pulmonary/Chest: No respiratory distress.   Musculoskeletal:         General: Normal range of motion.      Cervical back: Normal.      Thoracic back: Normal.      Lumbar back: Normal.     Neurological: He is alert and oriented to person, place, and time.   Skin: Skin is warm. Capillary refill takes less than 2 seconds.   Psychiatric: He has a normal mood and affect. Thought content normal.       ED Course   Procedures  Labs Reviewed - No data to display       Imaging Results              X-Ray Lumbar Spine Ap And Lateral (Final result)  Result  time 06/12/23 11:31:28      Final result by PAVAN Virk Sr., MD (06/12/23 11:31:28)                   Impression:      1. There are moderate degenerative changes between T11 and L5.  2. There is partial visualization of surgical hardware in the right femur.  3. There is a moderate amount of atherosclerosis.      Electronically signed by: Remington Virk MD  Date:    06/12/2023  Time:    11:31               Narrative:    EXAMINATION:  XR LUMBAR SPINE AP AND LATERAL    CLINICAL HISTORY:  low back pain;    COMPARISON:  None    FINDINGS:  There are 5 lumbar type vertebral bodies.  There is no acute fracture visualized.  There is no spondylolisthesis or scoliosis. There is normal lumbar lordosis.  There are moderate degenerative changes between T11 and L5.  There is a moderate amount of atherosclerosis.  There is partial visualization of surgical hardware in the right femur.                                       Medications - No data to display  Medical Decision Making:   Initial Assessment:   Lower right sided back pain after fall x 2   Differential Diagnosis:   Fracture, contusion, sprain   Clinical Tests:   Radiological Study: Ordered and Reviewed  ED Management:  Pt presents to ED for evaluation of lower back pain after fall.  No spinous process tenderness or bruising.  X-rays shows There are 5 lumbar type vertebral bodies.  There is no acute fracture visualized.  There is no spondylolisthesis or scoliosis. Pt will be given a course of steroids as well a lidoderm patch with a short course of tramadol.  Encouraged f/u with PCP if no improvement.                          Clinical Impression:   Final diagnoses:  [M54.50] Acute left-sided low back pain without sciatica (Primary)  [W19.XXXA] Fall, initial encounter        ED Disposition Condition    Discharge Stable          ED Prescriptions       Medication Sig Dispense Start Date End Date Auth. Provider    LIDOcaine (LIDODERM) 5 % Place 1 patch onto the skin once  daily. Remove & Discard patch within 12 hours or as directed by MD 15 patch 6/12/2023 -- Anel Clemente PA-C    predniSONE (DELTASONE) 20 MG tablet Take 2 tablets (40 mg total) by mouth once daily. for 3 days 6 tablet 6/12/2023 6/15/2023 Anel Clemente PA-C    traMADoL (ULTRAM) 50 mg tablet Take 1 tablet (50 mg total) by mouth every 6 (six) hours as needed for Pain. 12 tablet 6/12/2023 -- Anel Clemente PA-C          Follow-up Information       Follow up With Specialties Details Why Contact Info    Ramirez Levine MD Family Medicine   [address] [place] [id number] [phone]               Anel Clemente PA-C  06/12/23 8016

## 2023-06-13 RX ORDER — AMLODIPINE BESYLATE 2.5 MG/1
TABLET ORAL
Qty: 90 TABLET | Refills: 0 | Status: SHIPPED | OUTPATIENT
Start: 2023-06-13 | End: 2023-10-04

## 2023-06-27 DIAGNOSIS — I10 PRIMARY HYPERTENSION: ICD-10-CM

## 2023-06-27 RX ORDER — SPIRONOLACTONE 25 MG/1
TABLET ORAL
Qty: 90 TABLET | Refills: 0 | Status: SHIPPED | OUTPATIENT
Start: 2023-06-27 | End: 2023-09-06

## 2023-07-13 ENCOUNTER — PES CALL (OUTPATIENT)
Dept: ADMINISTRATIVE | Facility: CLINIC | Age: 78
End: 2023-07-13
Payer: MEDICARE

## 2023-07-28 ENCOUNTER — PATIENT MESSAGE (OUTPATIENT)
Dept: CARDIOLOGY | Facility: CLINIC | Age: 78
End: 2023-07-28
Payer: MEDICARE

## 2023-08-09 ENCOUNTER — TELEPHONE (OUTPATIENT)
Dept: FAMILY MEDICINE | Facility: CLINIC | Age: 78
End: 2023-08-09
Payer: MEDICARE

## 2023-08-09 DIAGNOSIS — Z12.5 SCREENING PSA (PROSTATE SPECIFIC ANTIGEN): ICD-10-CM

## 2023-08-09 DIAGNOSIS — R73.9 HYPERGLYCEMIA: ICD-10-CM

## 2023-08-09 DIAGNOSIS — E03.4 HYPOTHYROIDISM DUE TO ACQUIRED ATROPHY OF THYROID: Primary | ICD-10-CM

## 2023-08-09 DIAGNOSIS — I10 ESSENTIAL HYPERTENSION: ICD-10-CM

## 2023-08-13 NOTE — TELEPHONE ENCOUNTER
Ordered labs including general chemistry profile-CBC-thyroid test-PSA-cholesterol profile-hemoglobin A1c-use for glucose averages.

## 2023-08-15 ENCOUNTER — LAB VISIT (OUTPATIENT)
Dept: LAB | Facility: HOSPITAL | Age: 78
End: 2023-08-15
Attending: FAMILY MEDICINE
Payer: MEDICARE

## 2023-08-15 DIAGNOSIS — R73.9 HYPERGLYCEMIA: ICD-10-CM

## 2023-08-15 DIAGNOSIS — I10 ESSENTIAL HYPERTENSION: ICD-10-CM

## 2023-08-15 DIAGNOSIS — E03.4 HYPOTHYROIDISM DUE TO ACQUIRED ATROPHY OF THYROID: ICD-10-CM

## 2023-08-15 DIAGNOSIS — Z12.5 SCREENING PSA (PROSTATE SPECIFIC ANTIGEN): ICD-10-CM

## 2023-08-15 LAB
ALBUMIN SERPL BCP-MCNC: 4.2 G/DL (ref 3.5–5.2)
ALP SERPL-CCNC: 58 U/L (ref 38–126)
ALT SERPL W/O P-5'-P-CCNC: 17 U/L (ref 10–44)
ANION GAP SERPL CALC-SCNC: 10 MMOL/L (ref 8–16)
AST SERPL-CCNC: 26 U/L (ref 15–46)
BASOPHILS # BLD AUTO: 0.03 K/UL (ref 0–0.2)
BASOPHILS NFR BLD: 0.5 % (ref 0–1.9)
BILIRUB SERPL-MCNC: 1.5 MG/DL (ref 0.1–1)
CALCIUM SERPL-MCNC: 9.4 MG/DL (ref 8.7–10.5)
CHLORIDE SERPL-SCNC: 104 MMOL/L (ref 95–110)
CHOLEST SERPL-MCNC: 114 MG/DL (ref 120–199)
CHOLEST/HDLC SERPL: 2.7 {RATIO} (ref 2–5)
CO2 SERPL-SCNC: 29 MMOL/L (ref 23–29)
COMPLEXED PSA SERPL-MCNC: 0.51 NG/ML (ref 0–4)
CREAT SERPL-MCNC: 1.04 MG/DL (ref 0.5–1.4)
DIFFERENTIAL METHOD: ABNORMAL
EOSINOPHIL # BLD AUTO: 0.1 K/UL (ref 0–0.5)
EOSINOPHIL NFR BLD: 1.6 % (ref 0–8)
ERYTHROCYTE [DISTWIDTH] IN BLOOD BY AUTOMATED COUNT: 14.1 % (ref 11.5–14.5)
EST. GFR  (NO RACE VARIABLE): >60 ML/MIN/1.73 M^2
ESTIMATED AVG GLUCOSE: 131 MG/DL (ref 68–131)
GLUCOSE SERPL-MCNC: 127 MG/DL (ref 70–110)
HBA1C MFR BLD: 6.2 % (ref 4–5.6)
HCT VFR BLD AUTO: 42.8 % (ref 40–54)
HDLC SERPL-MCNC: 42 MG/DL (ref 40–75)
HDLC SERPL: 36.8 % (ref 20–50)
HGB BLD-MCNC: 14.1 G/DL (ref 14–18)
IMM GRANULOCYTES # BLD AUTO: 0.01 K/UL (ref 0–0.04)
IMM GRANULOCYTES NFR BLD AUTO: 0.2 % (ref 0–0.5)
LDLC SERPL CALC-MCNC: 58.8 MG/DL (ref 63–159)
LYMPHOCYTES # BLD AUTO: 1.3 K/UL (ref 1–4.8)
LYMPHOCYTES NFR BLD: 24.2 % (ref 18–48)
MCH RBC QN AUTO: 32.8 PG (ref 27–31)
MCHC RBC AUTO-ENTMCNC: 32.9 G/DL (ref 32–36)
MCV RBC AUTO: 100 FL (ref 82–98)
MONOCYTES # BLD AUTO: 0.4 K/UL (ref 0.3–1)
MONOCYTES NFR BLD: 8.1 % (ref 4–15)
NEUTROPHILS # BLD AUTO: 3.6 K/UL (ref 1.8–7.7)
NEUTROPHILS NFR BLD: 65.4 % (ref 38–73)
NONHDLC SERPL-MCNC: 72 MG/DL
NRBC BLD-RTO: 0 /100 WBC
PLATELET # BLD AUTO: 171 K/UL (ref 150–450)
PMV BLD AUTO: 12.2 FL (ref 9.2–12.9)
POTASSIUM SERPL-SCNC: 4 MMOL/L (ref 3.5–5.1)
PROT SERPL-MCNC: 7.6 G/DL (ref 6–8.4)
RBC # BLD AUTO: 4.3 M/UL (ref 4.6–6.2)
SODIUM SERPL-SCNC: 143 MMOL/L (ref 136–145)
T4 FREE SERPL-MCNC: 1.32 NG/DL (ref 0.71–1.51)
TRIGL SERPL-MCNC: 66 MG/DL (ref 30–150)
TSH SERPL DL<=0.005 MIU/L-ACNC: 0.41 UIU/ML (ref 0.4–4)
UUN UR-MCNC: 18 MG/DL (ref 2–20)
WBC # BLD AUTO: 5.46 K/UL (ref 3.9–12.7)

## 2023-08-15 PROCEDURE — 84443 ASSAY THYROID STIM HORMONE: CPT | Mod: HCNC,PO | Performed by: FAMILY MEDICINE

## 2023-08-15 PROCEDURE — 84439 ASSAY OF FREE THYROXINE: CPT | Mod: HCNC | Performed by: FAMILY MEDICINE

## 2023-08-15 PROCEDURE — 80061 LIPID PANEL: CPT | Mod: HCNC | Performed by: FAMILY MEDICINE

## 2023-08-15 PROCEDURE — 84153 ASSAY OF PSA TOTAL: CPT | Mod: HCNC | Performed by: FAMILY MEDICINE

## 2023-08-15 PROCEDURE — 83036 HEMOGLOBIN GLYCOSYLATED A1C: CPT | Mod: HCNC | Performed by: FAMILY MEDICINE

## 2023-08-15 PROCEDURE — 80053 COMPREHEN METABOLIC PANEL: CPT | Mod: HCNC,PO | Performed by: FAMILY MEDICINE

## 2023-08-15 PROCEDURE — 36415 COLL VENOUS BLD VENIPUNCTURE: CPT | Mod: HCNC,PO | Performed by: FAMILY MEDICINE

## 2023-08-15 PROCEDURE — 85025 COMPLETE CBC W/AUTO DIFF WBC: CPT | Mod: HCNC,PO | Performed by: FAMILY MEDICINE

## 2023-08-25 ENCOUNTER — HOSPITAL ENCOUNTER (EMERGENCY)
Facility: HOSPITAL | Age: 78
Discharge: HOME | End: 2023-08-25
Attending: FAMILY MEDICINE
Payer: MEDICARE

## 2023-08-25 VITALS
TEMPERATURE: 98 F | BODY MASS INDEX: 30.8 KG/M2 | OXYGEN SATURATION: 94 % | SYSTOLIC BLOOD PRESSURE: 198 MMHG | HEIGHT: 71 IN | WEIGHT: 220 LBS | RESPIRATION RATE: 19 BRPM | DIASTOLIC BLOOD PRESSURE: 88 MMHG | HEART RATE: 80 BPM

## 2023-08-25 DIAGNOSIS — I10 PRIMARY HYPERTENSION: ICD-10-CM

## 2023-08-25 DIAGNOSIS — S42.032A CLOSED DISPLACED FRACTURE OF ACROMIAL END OF LEFT CLAVICLE, INITIAL ENCOUNTER: Primary | ICD-10-CM

## 2023-08-25 PROCEDURE — 99283 EMERGENCY DEPT VISIT LOW MDM: CPT | Mod: HCNC,ER

## 2023-08-25 PROCEDURE — 25000003 PHARM REV CODE 250: Mod: HCNC,ER | Performed by: FAMILY MEDICINE

## 2023-08-25 RX ORDER — HYDROCODONE BITARTRATE AND ACETAMINOPHEN 5; 325 MG/1; MG/1
1 TABLET ORAL EVERY 8 HOURS PRN
Qty: 9 TABLET | Refills: 0 | Status: SHIPPED | OUTPATIENT
Start: 2023-08-25 | End: 2023-08-29

## 2023-08-25 RX ORDER — HYDROCODONE BITARTRATE AND ACETAMINOPHEN 5; 325 MG/1; MG/1
1 TABLET ORAL EVERY 8 HOURS PRN
Qty: 9 TABLET | Refills: 0 | Status: SHIPPED | OUTPATIENT
Start: 2023-08-25 | End: 2023-08-25 | Stop reason: SDUPTHER

## 2023-08-25 RX ORDER — HYDROCODONE BITARTRATE AND ACETAMINOPHEN 10; 325 MG/1; MG/1
1 TABLET ORAL
Status: COMPLETED | OUTPATIENT
Start: 2023-08-25 | End: 2023-08-25

## 2023-08-25 RX ADMIN — HYDROCODONE BITARTRATE AND ACETAMINOPHEN 1 TABLET: 10; 325 TABLET ORAL at 04:08

## 2023-08-25 NOTE — ED PROVIDER NOTES
Encounter Date: 8/25/2023       History     Chief Complaint   Patient presents with    Fall     Patient reports he tripped on a curb while trying to get into a vehicle. He reports left shoulder pain. No LOC and no blood thinners.      78-year-old male slipped and tripped on curbside with injury to his left shoulder and left chest wall.  Pain and tenderness with movement of his left shoulder and left chest wall.  No respiratory distress.  He took aspirin at home.  History of hypertension, hyperlipidemia, VT.  Hypothyroidism.    The history is provided by the patient.     Review of patient's allergies indicates:  No Known Allergies  Past Medical History:   Diagnosis Date    Hyperlipidemia     Hypertension     Thyroid disease     hypo    Ventricular tachycardia     frequent PVCs     Past Surgical History:   Procedure Laterality Date    COLONOSCOPY  01/01/2011    CORONARY ANGIOGRAPHY N/A 07/22/2021    Procedure: ANGIOGRAM, CORONARY ARTERY;  Surgeon: Hank Barry MD;  Location: [facility] CATH LAB/EP;  Service: Cardiology;  Laterality: N/A;    EYE SURGERY Bilateral     cataracts extraction    FRACTURE SURGERY Right 09/2021    femur    HERNIA REPAIR      HIP SURGERY Right 08/2021    INTRAMEDULLARY RODDING OF TROCHANTER OF FEMUR Right 09/03/2021    Procedure: INSERTION, INTRAMEDULLARY RACQUEL, FEMUR, TROCHANTER;  Surgeon: Darryn Hall MD;  Location: [facility] OR;  Service: Orthopedics;  Laterality: Right;    JOINT REPLACEMENT Bilateral     knee    LEFT HEART CATHETERIZATION Right 07/22/2021    Procedure: Left heart cath;  Surgeon: Hank Barry MD;  Location: [facility] CATH LAB/EP;  Service: Cardiology;  Laterality: Right;    VASECTOMY       Family History   Problem Relation Age of Onset    Crohn's disease Mother     Dementia Mother     Heart attack Father     No Known Problems Sister     No Known Problems Brother     No Known Problems Son      Social History     Tobacco Use    Smoking status: Former     Current packs/day: 0.00      Average packs/day: 1 pack/day for 35.0 years (35.0 ttl pk-yrs)     Types: Cigarettes     Start date: 1965     Quit date: 2000     Years since quittin.6    Smokeless tobacco: Never   Substance Use Topics    Alcohol use: No    Drug use: Never     Review of Systems   Constitutional:  Negative for fever.   HENT:  Negative for sore throat.    Respiratory:  Negative for shortness of breath.    Cardiovascular:  Negative for chest pain.   Gastrointestinal:  Negative for nausea.   Genitourinary:  Negative for dysuria.   Musculoskeletal:  Negative for back pain.   Skin:  Negative for rash.   Neurological:  Negative for weakness.   Hematological:  Does not bruise/bleed easily.   All other systems reviewed and are negative.      Physical Exam     Initial Vitals [23 1603]   BP Pulse Resp Temp SpO2   128/78 62 20 98.1 °F (36.7 °C) 95 %      MAP       --         Physical Exam    Nursing note and vitals reviewed.  Constitutional: Vital signs are normal. He appears well-developed and well-nourished. He is active. No distress.   HENT:   Head: Normocephalic.   Nose: Nose normal.   Mouth/Throat: Oropharynx is clear and moist and mucous membranes are normal.   Eyes: Conjunctivae, EOM and lids are normal.   Neck: Neck supple.   Normal range of motion.  Cardiovascular:  Normal rate, regular rhythm, S1 normal, S2 normal and normal heart sounds.           Pulmonary/Chest: Breath sounds normal. No respiratory distress. He has no wheezes. He has no rhonchi. He has no rales.   Abdominal: Abdomen is soft. Bowel sounds are normal. He exhibits no distension and no mass. There is no abdominal tenderness. There is no rebound and no guarding.   Musculoskeletal:         General: Edema present.      Left shoulder: Swelling and tenderness present. Decreased range of motion. Normal strength. Normal pulse.      Right upper arm: Normal.      Left upper arm: Normal.      Cervical back: Normal range of motion and neck supple.      Right  lower leg: Normal.      Left lower leg: Normal.     Neurological: He is alert and oriented to person, place, and time. He has normal strength. GCS score is 15. GCS eye subscore is 4. GCS verbal subscore is 5. GCS motor subscore is 6.   Skin: Skin is warm. Capillary refill takes less than 2 seconds.   Psychiatric: He has a normal mood and affect. His speech is normal and behavior is normal. Thought content normal. Cognition and memory are normal.         ED Course   Procedures  Labs Reviewed - No data to display       Imaging Results              X-Ray Shoulder 2 or More Views Left (Final result)  Result time 08/25/23 16:51:01      Final result by Amado Rebolledoformerly [facility]), MD (08/25/23 16:51:01)                   Impression:      Acute oblique fracture of the distal left clavicle.      Electronically signed by: Amado Rebolledo MD  Date:    08/25/2023  Time:    16:51               Narrative:    EXAMINATION:  XR SHOULDER COMPLETE 2 OR MORE VIEWS LEFT    CLINICAL HISTORY:  injury;    TECHNIQUE:  Standard radiography performed.  Three views    COMPARISON:  None    FINDINGS:  There is a acute oblique fracture of the distal left clavicle.  No definite extension to the acromioclavicular joint.  Moderate degenerative changes of the acromioclavicular joint are present.  Glenohumeral joint is intact.  Proximal humerus is intact.                                       Medications   HYDROcodone-acetaminophen  mg per tablet 1 tablet (1 tablet Oral Given 8/25/23 1647)     Medical Decision Making  Slip and fall with pain and tenderness to left shoulder and left chest wall.  Differential diagnosis includes left shoulder contusion, fracture, dislocation.  Left rib fracture, contusion, pneumothorax.  Patient is given [facility] in ED along with prescription and sling.  Advised to follow-up Orthopedics.  ED with any worsening symptoms.    Amount and/or Complexity of Data Reviewed  Radiology: ordered and independent interpretation  performed.     Details: Distal clavicular fracture.  No rib fractures identified.  No pneumothorax.    Risk  Prescription drug management.                               Clinical Impression:   Final diagnoses:  [S42.032A] Closed displaced fracture of acromial end of left clavicle, initial encounter (Primary)        ED Disposition Condition    Discharge Stable          ED Prescriptions       Medication Sig Dispense Start Date End Date Auth. Provider    HYDROcodone-acetaminophen (NORCO) 5-325 mg per tablet Take 1 tablet by mouth every 8 (eight) hours as needed for Pain. 9 tablet 8/25/2023 -- Joby Syed MD          Follow-up Information       Follow up With Specialties Details Why Contact Info    Ramirez Levine MD Family Medicine Schedule an appointment as soon as possible for a visit in 2 days For  re-check [address] [place] [address] [id number] [phone]               Joby Syed MD  08/25/23 1102

## 2023-08-28 ENCOUNTER — TELEPHONE (OUTPATIENT)
Dept: FAMILY MEDICINE | Facility: CLINIC | Age: 78
End: 2023-08-28
Payer: MEDICARE

## 2023-08-28 NOTE — TELEPHONE ENCOUNTER
----- Message from Yodit Johnson sent at 8/28/2023  2:15 PM CDT -----  Type:  Same Day Appointment Request    Caller is requesting a same day appointment.  Caller declined first available appointment listed below.    Name of Caller:Pt   When is the first available appointment?Nov  Symptoms:pain in collar bone and minor depression   Best Call Back Number:[phone]  Additional Information:

## 2023-08-28 NOTE — TELEPHONE ENCOUNTER
Patient states he does not interact with many people, procrastinates a lot and stays indoors by himself most days, son checks on him once per week. Feels he may have mild depression but is not sure. Does not want to be referred, wants to speak to you about it. Possibly be prescribed something to help.     Patient was prescribed HYDROcodone-acetaminophen (NORCO) 5-325 mg per tablet by ER,pt has 2 left. Wants to know if you can send in prescription for this medication because it works well for him.     Pt states if he cannot get Norco 5-325 he will take the pain medication you prescribed in the past to him.     Pt reports pain and bruising in ribs, requests you look at the xray in chart.

## 2023-08-29 RX ORDER — HYDROCODONE BITARTRATE AND ACETAMINOPHEN 5; 325 MG/1; MG/1
1 TABLET ORAL EVERY 6 HOURS PRN
Qty: 20 TABLET | Refills: 0 | Status: SHIPPED | OUTPATIENT
Start: 2023-08-29 | End: 2024-01-22

## 2023-09-06 ENCOUNTER — PATIENT MESSAGE (OUTPATIENT)
Dept: FAMILY MEDICINE | Facility: CLINIC | Age: 78
End: 2023-09-06

## 2023-09-06 ENCOUNTER — OFFICE VISIT (OUTPATIENT)
Dept: FAMILY MEDICINE | Facility: CLINIC | Age: 78
End: 2023-09-06
Payer: MEDICARE

## 2023-09-06 VITALS
TEMPERATURE: 98 F | OXYGEN SATURATION: 96 % | WEIGHT: 221 LBS | HEART RATE: 60 BPM | HEIGHT: 71 IN | SYSTOLIC BLOOD PRESSURE: 138 MMHG | BODY MASS INDEX: 30.94 KG/M2 | DIASTOLIC BLOOD PRESSURE: 64 MMHG

## 2023-09-06 DIAGNOSIS — I70.0 AORTIC ATHEROSCLEROSIS: ICD-10-CM

## 2023-09-06 DIAGNOSIS — I48.91 ATRIAL FIBRILLATION, UNSPECIFIED TYPE: Primary | ICD-10-CM

## 2023-09-06 DIAGNOSIS — F32.A DEPRESSION, UNSPECIFIED DEPRESSION TYPE: ICD-10-CM

## 2023-09-06 DIAGNOSIS — I87.2 VENOUS STASIS ULCER OF RIGHT CALF LIMITED TO BREAKDOWN OF SKIN WITHOUT VARICOSE VEINS: ICD-10-CM

## 2023-09-06 DIAGNOSIS — L97.211 VENOUS STASIS ULCER OF RIGHT CALF LIMITED TO BREAKDOWN OF SKIN WITHOUT VARICOSE VEINS: ICD-10-CM

## 2023-09-06 DIAGNOSIS — J44.1 COPD EXACERBATION: ICD-10-CM

## 2023-09-06 PROBLEM — I50.41 ACUTE COMBINED SYSTOLIC AND DIASTOLIC CONGESTIVE HEART FAILURE: Status: RESOLVED | Noted: 2021-09-25 | Resolved: 2023-09-06

## 2023-09-06 PROBLEM — I20.9 ANGINA PECTORIS: Status: RESOLVED | Noted: 2021-07-16 | Resolved: 2023-09-06

## 2023-09-06 PROCEDURE — 1160F RVW MEDS BY RX/DR IN RCRD: CPT | Mod: CPTII,S$GLB,, | Performed by: FAMILY MEDICINE

## 2023-09-06 PROCEDURE — 99214 OFFICE O/P EST MOD 30 MIN: CPT | Mod: S$GLB,,, | Performed by: FAMILY MEDICINE

## 2023-09-06 PROCEDURE — 3078F PR MOST RECENT DIASTOLIC BLOOD PRESSURE < 80 MM HG: ICD-10-PCS | Mod: CPTII,S$GLB,, | Performed by: FAMILY MEDICINE

## 2023-09-06 PROCEDURE — 3075F SYST BP GE 130 - 139MM HG: CPT | Mod: CPTII,S$GLB,, | Performed by: FAMILY MEDICINE

## 2023-09-06 PROCEDURE — 1157F PR ADVANCE CARE PLAN OR EQUIV PRESENT IN MEDICAL RECORD: ICD-10-PCS | Mod: CPTII,S$GLB,, | Performed by: FAMILY MEDICINE

## 2023-09-06 PROCEDURE — 3288F PR FALLS RISK ASSESSMENT DOCUMENTED: ICD-10-PCS | Mod: CPTII,S$GLB,, | Performed by: FAMILY MEDICINE

## 2023-09-06 PROCEDURE — 1126F AMNT PAIN NOTED NONE PRSNT: CPT | Mod: CPTII,S$GLB,, | Performed by: FAMILY MEDICINE

## 2023-09-06 PROCEDURE — 1159F MED LIST DOCD IN RCRD: CPT | Mod: CPTII,S$GLB,, | Performed by: FAMILY MEDICINE

## 2023-09-06 PROCEDURE — 3288F FALL RISK ASSESSMENT DOCD: CPT | Mod: CPTII,S$GLB,, | Performed by: FAMILY MEDICINE

## 2023-09-06 PROCEDURE — 1100F PTFALLS ASSESS-DOCD GE2>/YR: CPT | Mod: CPTII,S$GLB,, | Performed by: FAMILY MEDICINE

## 2023-09-06 PROCEDURE — 1160F PR REVIEW ALL MEDS BY PRESCRIBER/CLIN PHARMACIST DOCUMENTED: ICD-10-PCS | Mod: CPTII,S$GLB,, | Performed by: FAMILY MEDICINE

## 2023-09-06 PROCEDURE — 99214 PR OFFICE/OUTPT VISIT, EST, LEVL IV, 30-39 MIN: ICD-10-PCS | Mod: S$GLB,,, | Performed by: FAMILY MEDICINE

## 2023-09-06 PROCEDURE — 1159F PR MEDICATION LIST DOCUMENTED IN MEDICAL RECORD: ICD-10-PCS | Mod: CPTII,S$GLB,, | Performed by: FAMILY MEDICINE

## 2023-09-06 PROCEDURE — 1157F ADVNC CARE PLAN IN RCRD: CPT | Mod: CPTII,S$GLB,, | Performed by: FAMILY MEDICINE

## 2023-09-06 PROCEDURE — 1100F PR PT FALLS ASSESS DOC 2+ FALLS/FALL W/INJURY/YR: ICD-10-PCS | Mod: CPTII,S$GLB,, | Performed by: FAMILY MEDICINE

## 2023-09-06 PROCEDURE — 3075F PR MOST RECENT SYSTOLIC BLOOD PRESS GE 130-139MM HG: ICD-10-PCS | Mod: CPTII,S$GLB,, | Performed by: FAMILY MEDICINE

## 2023-09-06 PROCEDURE — 3078F DIAST BP <80 MM HG: CPT | Mod: CPTII,S$GLB,, | Performed by: FAMILY MEDICINE

## 2023-09-06 PROCEDURE — 1126F PR PAIN SEVERITY QUANTIFIED, NO PAIN PRESENT: ICD-10-PCS | Mod: CPTII,S$GLB,, | Performed by: FAMILY MEDICINE

## 2023-09-06 RX ORDER — DOXYCYCLINE HYCLATE 100 MG
100 TABLET ORAL 2 TIMES DAILY
Qty: 30 TABLET | Refills: 0 | Status: SHIPPED | OUTPATIENT
Start: 2023-09-06 | End: 2023-10-13

## 2023-09-06 RX ORDER — FLUCONAZOLE 100 MG/1
100 TABLET ORAL DAILY
Qty: 14 TABLET | Refills: 0 | Status: SHIPPED | OUTPATIENT
Start: 2023-09-06 | End: 2023-10-06

## 2023-09-06 RX ORDER — FLUOXETINE HYDROCHLORIDE 20 MG/1
20 CAPSULE ORAL DAILY
Qty: 90 CAPSULE | Refills: 3 | Status: SHIPPED | OUTPATIENT
Start: 2023-09-06 | End: 2024-01-22

## 2023-09-06 NOTE — PATIENT INSTRUCTIONS
Continue to take your fluid medication 2 times a day.  Continue to weigh yourself.  If your weight goes up 3 pounds within a day or two days you should increase the food pills by increasing your a.m. dose.  Take two tablets instead of one tablet for 3-5 days to get your weight back to baseline.    Regarding your lower extremity edema-remember to eat a 2 gram/2000 milligram sodium diet or less    I sent in antibiotics and a pill for what I believe is a fungal infection.  Sent to [facility].      For your depression I sent in a medication called Prozac fluoxetine.  20 milligram dosing.  You may be slightly nauseated the 1st few days you take this medication.  It should go away.  If not please let me know.  It may take 3 weeks or so for the medication to make a significant difference.      Regarding lower extremity.  Please elevate your legs whenever possible.  Use withdrawals but pace/zinc oxide then wrap with the Setopress bandage-use the least compressive square.  Change every three days.

## 2023-09-06 NOTE — PROGRESS NOTES
" Patient ID: Bean Salas is a 78 y.o. male.    Chief Complaint: Depression, Leg Swelling, and Scar    HPI      Bean Salas is a 78 y.o. male here today because of edema and wounds of his bilateral lower extremities and depression.  Patient with bilateral lower leg edema wounds breaking out not using any specific creams to help.  Taking diuretics b.i.d. on a daily basis however still has edema.  Eating a lot of frozen foods because he lives by himself.  This is one reason why he is depressed.  Living in his home by himself not having a lot of interaction with anyone else.  Family members live of the nor sure.  CHF no complaints of shortness of breath PND orthopnea.    Chronic lung disease-stable this time no new problems.  Venous stasis ulcers-has ulcers on his lower leg which will have to address.    Atrial fibrillation no increased heart rate stable at this time.        Vitals:    09/06/23 1605   BP: 138/64   BP Location: Right arm   Patient Position: Sitting   Pulse: 60   Temp: 98.1 °F (36.7 °C)   TempSrc: Oral   SpO2: 96%   Weight: 100.3 kg (221 lb 0.2 oz)   Height: 5' 11" (1.803 m)            Review of Symptoms      Physical Exam    Constitutional:  Oriented to person, place, and time.appears well-developed and well-nourished.  No distress.      HENT  Head: Normocephalic and atraumatic  Right Ear: External ear normal.   Left Ear: External ear normal.   Nose: External nose normal.   Mouth:  Moist mucus membranes.    Eyes:  Conjunctivae are normal. Right eye exhibits no discharge.  Left eye exhibits no discharge. No scleral icterus.  No periorbital edema    Cardiovascular:  Regular rate and rhythm with normal S1 and S2     Pulmonary/Chest:   Clear to auscultation bilaterally without wheezes, rhonchi or rales      Musculoskeletal:  No edema. No obvious deformity No wasting       Neurological:  Alert and oriented to person, place, and time.   Coordination normal.     Skin:   Skin is warm and dry.  No " diaphoresis.   No rash noted.     Psychiatric: Normal mood and affect. Behavior is normal.  Judgment and thought content normal.     Complete Blood Count  Lab Results   Component Value Date    RBC 4.30 (L) 08/15/2023    HGB 14.1 08/15/2023    HCT 42.8 08/15/2023     (H) 08/15/2023    MCH 32.8 (H) 08/15/2023    MCHC 32.9 08/15/2023    RDW 14.1 08/15/2023     08/15/2023    MPV 12.2 08/15/2023    GRAN 3.6 08/15/2023    GRAN 65.4 08/15/2023    LYMPH 1.3 08/15/2023    LYMPH 24.2 08/15/2023    MONO 0.4 08/15/2023    MONO 8.1 08/15/2023    EOS 0.1 08/15/2023    BASO 0.03 08/15/2023    EOSINOPHIL 1.6 08/15/2023    BASOPHIL 0.5 08/15/2023    DIFFMETHOD Automated 08/15/2023       Comprehensive Metabolic Panel  Lab Results   Component Value Date     (H) 08/15/2023    BUN 18 08/15/2023    CREATININE 1.04 08/15/2023     08/15/2023    K 4.0 08/15/2023     08/15/2023    PROT 7.6 08/15/2023    ALBUMIN 4.2 08/15/2023    BILITOT 1.5 (H) 08/15/2023    AST 26 08/15/2023    ALKPHOS 58 08/15/2023    CO2 29 08/15/2023    ALT 17 08/15/2023    ANIONGAP 10 08/15/2023       TSH  Lab Results   Component Value Date    TSH 0.409 08/15/2023       Assessment / Plan:      ICD-10-CM ICD-9-CM   1. Atrial fibrillation, unspecified type  I48.91 427.31   2. Aortic atherosclerosis  I70.0 440.0   3. COPD exacerbation  J44.1 491.21   4. Venous stasis ulcer of right calf limited to breakdown of skin without varicose veins  I87.2 459.81    L97.211 707.12     Atrial fibrillation, unspecified type    Aortic atherosclerosis    COPD exacerbation    Venous stasis ulcer of right calf limited to breakdown of skin without varicose veins    Other orders  -     FLUoxetine (PROZAC) 20 MG capsule; Take 1 capsule (20 mg total) by mouth once daily.  Dispense: 90 capsule; Refill: 3  -     fluconazole (DIFLUCAN) 100 MG tablet; Take 1 tablet (100 mg total) by mouth once daily.  Dispense: 14 tablet; Refill: 0  -     doxycycline (VIBRA-TABS)  100 MG tablet; Take 1 tablet (100 mg total) by mouth 2 (two) times daily. Any generic brand capsule or tablet  Dispense: 30 tablet; Refill: 0    Patient without suicidal ideation homicidal ideation-mild depression-will use Prozac on a daily basis   Lower extremity with ulcerations edema excoriations and what appeared may fungal infection-treat with see compression wraps wraps and Stephie Butt paste  Also will treat with doxycycline for infection and fluconazole what appears to be yeast infection on the periphery.

## 2023-10-03 DIAGNOSIS — I10 PRIMARY HYPERTENSION: ICD-10-CM

## 2023-10-04 ENCOUNTER — HOSPITAL ENCOUNTER (OUTPATIENT)
Facility: HOSPITAL | Age: 78
Discharge: HOME | End: 2023-10-05
Attending: STUDENT IN AN ORGANIZED HEALTH CARE EDUCATION/TRAINING PROGRAM | Admitting: HOSPITALIST
Payer: MEDICARE

## 2023-10-04 DIAGNOSIS — I50.9 ACUTE ON CHRONIC CONGESTIVE HEART FAILURE, UNSPECIFIED HEART FAILURE TYPE: Primary | ICD-10-CM

## 2023-10-04 DIAGNOSIS — I50.33 ACUTE ON CHRONIC DIASTOLIC HEART FAILURE: ICD-10-CM

## 2023-10-04 DIAGNOSIS — S49.91XA SHOULDER INJURY, RIGHT, INITIAL ENCOUNTER: ICD-10-CM
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
## 2023-10-04 DIAGNOSIS — G47.33 OSA (OBSTRUCTIVE SLEEP APNEA): ICD-10-CM

## 2023-10-04 DIAGNOSIS — I10 ESSENTIAL HYPERTENSION: ICD-10-CM

## 2023-10-04 DIAGNOSIS — S79.911A HIP INJURY, RIGHT, INITIAL ENCOUNTER: ICD-10-CM

## 2023-10-04 DIAGNOSIS — L97.211 VENOUS STASIS ULCER OF RIGHT CALF LIMITED TO BREAKDOWN OF SKIN WITHOUT VARICOSE VEINS: ICD-10-CM

## 2023-10-04 DIAGNOSIS — I87.2 VENOUS STASIS ULCER OF RIGHT CALF LIMITED TO BREAKDOWN OF SKIN WITHOUT VARICOSE VEINS: ICD-10-CM

## 2023-10-04 DIAGNOSIS — R00.1 BRADYCARDIA: ICD-10-CM

## 2023-10-04 LAB
ALBUMIN SERPL BCP-MCNC: 4.2 G/DL (ref 3.5–5.2)
ALP SERPL-CCNC: 90 U/L (ref 38–126)
ALT SERPL W/O P-5'-P-CCNC: 28 U/L (ref 10–44)
ANION GAP SERPL CALC-SCNC: 11 MMOL/L (ref 8–16)
AST SERPL-CCNC: 44 U/L (ref 15–46)
BASOPHILS # BLD AUTO: 0.02 K/UL (ref 0–0.2)
BASOPHILS NFR BLD: 0.2 % (ref 0–1.9)
BILIRUB SERPL-MCNC: 2 MG/DL (ref 0.1–1)
BILIRUB UR QL STRIP: NEGATIVE
CALCIUM SERPL-MCNC: 9.7 MG/DL (ref 8.7–10.5)
CHLORIDE SERPL-SCNC: 103 MMOL/L (ref 95–110)
CLARITY UR REFRACT.AUTO: CLEAR
CO2 SERPL-SCNC: 25 MMOL/L (ref 23–29)
COLOR UR AUTO: YELLOW
CREAT SERPL-MCNC: 0.92 MG/DL (ref 0.5–1.4)
DIFFERENTIAL METHOD: ABNORMAL
EOSINOPHIL # BLD AUTO: 0 K/UL (ref 0–0.5)
EOSINOPHIL NFR BLD: 0 % (ref 0–8)
ERYTHROCYTE [DISTWIDTH] IN BLOOD BY AUTOMATED COUNT: 13.6 % (ref 11.5–14.5)
EST. GFR  (NO RACE VARIABLE): >60 ML/MIN/1.73 M^2
GLUCOSE SERPL-MCNC: 149 MG/DL (ref 70–110)
GLUCOSE UR QL STRIP: NEGATIVE
HCT VFR BLD AUTO: 42 % (ref 40–54)
HGB BLD-MCNC: 13.8 G/DL (ref 14–18)
HGB UR QL STRIP: NEGATIVE
IMM GRANULOCYTES # BLD AUTO: 0.04 K/UL (ref 0–0.04)
IMM GRANULOCYTES NFR BLD AUTO: 0.4 % (ref 0–0.5)
KETONES UR QL STRIP: ABNORMAL
LEUKOCYTE ESTERASE UR QL STRIP: NEGATIVE
LYMPHOCYTES # BLD AUTO: 0.8 K/UL (ref 1–4.8)
LYMPHOCYTES NFR BLD: 7 % (ref 18–48)
MCH RBC QN AUTO: 32.5 PG (ref 27–31)
MCHC RBC AUTO-ENTMCNC: 32.9 G/DL (ref 32–36)
MCV RBC AUTO: 99 FL (ref 82–98)
MONOCYTES # BLD AUTO: 0.6 K/UL (ref 0.3–1)
MONOCYTES NFR BLD: 5.9 % (ref 4–15)
NEUTROPHILS # BLD AUTO: 9.2 K/UL (ref 1.8–7.7)
NEUTROPHILS NFR BLD: 86.5 % (ref 38–73)
NITRITE UR QL STRIP: NEGATIVE
NRBC BLD-RTO: 0 /100 WBC
NT-PROBNP SERPL-MCNC: 2670 PG/ML (ref 5–1800)
PH UR STRIP: 7 [PH] (ref 5–8)
PLATELET # BLD AUTO: 180 K/UL (ref 150–450)
PMV BLD AUTO: 11.6 FL (ref 9.2–12.9)
POTASSIUM SERPL-SCNC: 4.1 MMOL/L (ref 3.5–5.1)
PROT SERPL-MCNC: 7.5 G/DL (ref 6–8.4)
PROT UR QL STRIP: ABNORMAL
RBC # BLD AUTO: 4.24 M/UL (ref 4.6–6.2)
SODIUM SERPL-SCNC: 139 MMOL/L (ref 136–145)
SP GR UR STRIP: 1.02 (ref 1–1.03)
TROPONIN I SERPL-MCNC: 0.03 NG/ML (ref 0.01–0.03)
URN SPEC COLLECT METH UR: ABNORMAL
UROBILINOGEN UR STRIP-ACNC: NEGATIVE EU/DL
UUN UR-MCNC: 18 MG/DL (ref 2–20)
WBC # BLD AUTO: 10.64 K/UL (ref 3.9–12.7)

## 2023-10-04 PROCEDURE — 84484 ASSAY OF TROPONIN QUANT: CPT | Mod: HCNC,ER

## 2023-10-04 PROCEDURE — G0378 HOSPITAL OBSERVATION PER HR: HCPCS | Mod: HCNC

## 2023-10-04 PROCEDURE — 93010 EKG 12-LEAD: ICD-10-PCS | Mod: HCNC,,, | Performed by: INTERNAL MEDICINE

## 2023-10-04 PROCEDURE — 63600175 PHARM REV CODE 636 W HCPCS: Mod: HCNC

## 2023-10-04 PROCEDURE — 80053 COMPREHEN METABOLIC PANEL: CPT | Mod: HCNC,ER

## 2023-10-04 PROCEDURE — 99285 EMERGENCY DEPT VISIT HI MDM: CPT | Mod: 25,HCNC,ER

## 2023-10-04 PROCEDURE — 25000003 PHARM REV CODE 250: Mod: HCNC

## 2023-10-04 PROCEDURE — 99900035 HC TECH TIME PER 15 MIN (STAT): Mod: HCNC,ER

## 2023-10-04 PROCEDURE — 85025 COMPLETE CBC W/AUTO DIFF WBC: CPT | Mod: HCNC,ER

## 2023-10-04 PROCEDURE — 81003 URINALYSIS AUTO W/O SCOPE: CPT | Mod: HCNC,ER

## 2023-10-04 PROCEDURE — 93010 ELECTROCARDIOGRAM REPORT: CPT | Mod: HCNC,,, | Performed by: INTERNAL MEDICINE

## 2023-10-04 PROCEDURE — 96372 THER/PROPH/DIAG INJ SC/IM: CPT

## 2023-10-04 PROCEDURE — 96374 THER/PROPH/DIAG INJ IV PUSH: CPT

## 2023-10-04 PROCEDURE — 93005 ELECTROCARDIOGRAM TRACING: CPT | Mod: HCNC,ER

## 2023-10-04 PROCEDURE — 25000003 PHARM REV CODE 250: Mod: HCNC,ER

## 2023-10-04 PROCEDURE — 83880 ASSAY OF NATRIURETIC PEPTIDE: CPT | Mod: HCNC,ER

## 2023-10-04 RX ORDER — AMLODIPINE BESYLATE 2.5 MG/1
TABLET ORAL
Qty: 90 TABLET | Refills: 3 | Status: SHIPPED | OUTPATIENT
Start: 2023-10-04

## 2023-10-04 RX ORDER — ATORVASTATIN CALCIUM 10 MG/1
10 TABLET, FILM COATED ORAL DAILY
Status: DISCONTINUED | OUTPATIENT
Start: 2023-10-05 | End: 2023-10-05 | Stop reason: HOSPADM

## 2023-10-04 RX ORDER — FLUOXETINE HYDROCHLORIDE 20 MG/1
20 CAPSULE ORAL DAILY
Status: DISCONTINUED | OUTPATIENT
Start: 2023-10-05 | End: 2023-10-05 | Stop reason: HOSPADM

## 2023-10-04 RX ORDER — ACETAMINOPHEN 325 MG/1
650 TABLET ORAL EVERY 8 HOURS PRN
Status: DISCONTINUED | OUTPATIENT
Start: 2023-10-04 | End: 2023-10-04

## 2023-10-04 RX ORDER — SPIRONOLACTONE 25 MG/1
25 TABLET ORAL DAILY
Status: DISCONTINUED | OUTPATIENT
Start: 2023-10-04 | End: 2023-10-05 | Stop reason: HOSPADM

## 2023-10-04 RX ORDER — TALC
9 POWDER (GRAM) TOPICAL NIGHTLY PRN
Status: DISCONTINUED | OUTPATIENT
Start: 2023-10-04 | End: 2023-10-05 | Stop reason: HOSPADM

## 2023-10-04 RX ORDER — ACETAMINOPHEN 500 MG
1000 TABLET ORAL
Status: COMPLETED | OUTPATIENT
Start: 2023-10-04 | End: 2023-10-04

## 2023-10-04 RX ORDER — LOSARTAN POTASSIUM 50 MG/1
100 TABLET ORAL DAILY
Status: DISCONTINUED | OUTPATIENT
Start: 2023-10-04 | End: 2023-10-05 | Stop reason: HOSPADM

## 2023-10-04 RX ORDER — NALOXONE HCL 0.4 MG/ML
0.2 VIAL (ML) INJECTION
Status: DISCONTINUED | OUTPATIENT
Start: 2023-10-04 | End: 2023-10-05 | Stop reason: HOSPADM

## 2023-10-04 RX ORDER — ENOXAPARIN SODIUM 100 MG/ML
40 INJECTION SUBCUTANEOUS EVERY 24 HOURS
Status: DISCONTINUED | OUTPATIENT
Start: 2023-10-04 | End: 2023-10-05 | Stop reason: HOSPADM

## 2023-10-04 RX ORDER — ONDANSETRON 2 MG/ML
4 INJECTION INTRAMUSCULAR; INTRAVENOUS EVERY 8 HOURS PRN
Status: DISCONTINUED | OUTPATIENT
Start: 2023-10-04 | End: 2023-10-05 | Stop reason: HOSPADM

## 2023-10-04 RX ORDER — FUROSEMIDE 10 MG/ML
40 INJECTION INTRAMUSCULAR; INTRAVENOUS ONCE
Status: COMPLETED | OUTPATIENT
Start: 2023-10-04 | End: 2023-10-04

## 2023-10-04 RX ORDER — LEVOTHYROXINE SODIUM 100 UG/1
200 TABLET ORAL DAILY
Status: DISCONTINUED | OUTPATIENT
Start: 2023-10-05 | End: 2023-10-05 | Stop reason: HOSPADM

## 2023-10-04 RX ORDER — HYDROCODONE BITARTRATE AND ACETAMINOPHEN 5; 325 MG/1; MG/1
1 TABLET ORAL EVERY 8 HOURS PRN
Status: DISCONTINUED | OUTPATIENT
Start: 2023-10-04 | End: 2023-10-05 | Stop reason: HOSPADM

## 2023-10-04 RX ORDER — HYDRALAZINE HYDROCHLORIDE 20 MG/ML
10 INJECTION INTRAMUSCULAR; INTRAVENOUS EVERY 6 HOURS PRN
Status: DISCONTINUED | OUTPATIENT
Start: 2023-10-04 | End: 2023-10-05 | Stop reason: HOSPADM

## 2023-10-04 RX ORDER — SODIUM CHLORIDE 0.9 % (FLUSH) 0.9 %
5 SYRINGE (ML) INJECTION
Status: DISCONTINUED | OUTPATIENT
Start: 2023-10-04 | End: 2023-10-05 | Stop reason: HOSPADM

## 2023-10-04 RX ORDER — ASPIRIN 81 MG/1
81 TABLET ORAL DAILY
Status: DISCONTINUED | OUTPATIENT
Start: 2023-10-05 | End: 2023-10-05 | Stop reason: HOSPADM

## 2023-10-04 RX ORDER — SPIRONOLACTONE 25 MG/1
25 TABLET ORAL
Qty: 90 TABLET | Refills: 0 | Status: SHIPPED | OUTPATIENT
Start: 2023-10-04 | End: 2023-12-05

## 2023-10-04 RX ORDER — ACETAMINOPHEN 325 MG/1
650 TABLET ORAL EVERY 8 HOURS PRN
Status: DISCONTINUED | OUTPATIENT
Start: 2023-10-04 | End: 2023-10-05 | Stop reason: HOSPADM

## 2023-10-04 RX ORDER — AMOXICILLIN 250 MG
1 CAPSULE ORAL 2 TIMES DAILY PRN
Status: DISCONTINUED | OUTPATIENT
Start: 2023-10-04 | End: 2023-10-05 | Stop reason: HOSPADM

## 2023-10-04 RX ORDER — AMLODIPINE BESYLATE 2.5 MG/1
2.5 TABLET ORAL DAILY
Status: DISCONTINUED | OUTPATIENT
Start: 2023-10-04 | End: 2023-10-05 | Stop reason: HOSPADM

## 2023-10-04 RX ADMIN — SPIRONOLACTONE 25 MG: 25 TABLET ORAL at 08:10

## 2023-10-04 RX ADMIN — FUROSEMIDE 40 MG: 10 INJECTION, SOLUTION INTRAMUSCULAR; INTRAVENOUS at 08:10

## 2023-10-04 RX ADMIN — LOSARTAN POTASSIUM 100 MG: 50 TABLET, FILM COATED ORAL at 08:10

## 2023-10-04 RX ADMIN — ACETAMINOPHEN 1000 MG: 500 TABLET ORAL at 03:10

## 2023-10-04 RX ADMIN — ENOXAPARIN SODIUM 40 MG: 40 INJECTION SUBCUTANEOUS at 08:10

## 2023-10-04 RX ADMIN — AMLODIPINE BESYLATE 2.5 MG: 2.5 TABLET ORAL at 08:10

## 2023-10-04 NOTE — ED NOTES
Pt left via [facility] stretcher AAOx3 denies complaints. valuables with pt. No change in status. Pain 4/10 to rt shoulder and hip.

## 2023-10-04 NOTE — ED PROVIDER NOTES
Encounter Date: 10/4/2023       History     Chief Complaint   Patient presents with    Fall     PT reports dizziness while walking to the restroom. Pt denies hitting head or LOC. Pt now reports pain to right shoulder and right lower back. Pt has skin tear to left elbow.      Patient is a 78-year-old male who presents via EMS for evaluation after a fall that occurred just PTA.  Patient has a PMH of atrial fibrillation, COPD, hyperlipidemia, hypertension, hypothyroidism, frequent PVCs, bradycardia. Patient states that he has not been seen by a cardiologist in since 2021.      Patient states that he got up from bed and on his way to the bathroom he fell.  Patient is unsure why he fell.  The patient lives alone in the fall was unwitnessed.  Patient complains of right shoulder pain and right hip pain. States that he had a right hip replacement 2 years ago. He denies head injury.  He denies loss of consciousness.  Patient states that he takes a daily aspirin, he denies use of any other blood thinners.    Patient also complains of shortness of breath and bilateral lower leg edema.  Patient reports that he takes Lasix b.i.d., which mildly improves his lower extremity edema.  Patient reports chronic wounds on his lower extremities. Patient was seen by his PCP on 9/6/23, and was given doxycycline and fluconazole for the wounds on his legs. Patient reports compliance with these medications.     He denies chest pain, nausea, vomiting, diarrhea, abdominal pain, urinary frequency, urgency, or any other complaints at this time.     The history is provided by the patient.     Review of patient's allergies indicates:  No Known Allergies  Past Medical History:   Diagnosis Date    Atrial fibrillation, unspecified type 9/6/2023    COPD exacerbation 9/6/2023    Hyperlipidemia     Hypertension     Thyroid disease     hypo    Ventricular tachycardia     frequent PVCs     Past Surgical History:   Procedure Laterality Date    COLONOSCOPY   2011    CORONARY ANGIOGRAPHY N/A 2021    Procedure: ANGIOGRAM, CORONARY ARTERY;  Surgeon: Hank Barry MD;  Location: [facility] CATH LAB/EP;  Service: Cardiology;  Laterality: N/A;    EYE SURGERY Bilateral     cataracts extraction    FRACTURE SURGERY Right 2021    femur    HERNIA REPAIR      HIP SURGERY Right 2021    INTRAMEDULLARY RODDING OF TROCHANTER OF FEMUR Right 2021    Procedure: INSERTION, INTRAMEDULLARY RACQUEL, FEMUR, TROCHANTER;  Surgeon: Darryn Hall MD;  Location: [facility] OR;  Service: Orthopedics;  Laterality: Right;    JOINT REPLACEMENT Bilateral     knee    LEFT HEART CATHETERIZATION Right 2021    Procedure: Left heart cath;  Surgeon: Hank Barry MD;  Location: [facility] CATH LAB/EP;  Service: Cardiology;  Laterality: Right;    VASECTOMY       Family History   Problem Relation Age of Onset    Crohn's disease Mother     Dementia Mother     Heart attack Father     No Known Problems Sister     No Known Problems Brother     No Known Problems Son      Social History     Tobacco Use    Smoking status: Former     Current packs/day: 0.00     Average packs/day: 1 pack/day for 35.0 years (35.0 ttl pk-yrs)     Types: Cigarettes     Start date: 1965     Quit date: 2000     Years since quittin.7     Passive exposure: Past    Smokeless tobacco: Never   Substance Use Topics    Alcohol use: No    Drug use: Never     Review of Systems   Constitutional:  Negative for chills and fever.   HENT:  Negative for congestion and sore throat.    Respiratory:  Positive for shortness of breath. Negative for cough and wheezing.    Cardiovascular:  Positive for palpitations and leg swelling. Negative for chest pain.   Gastrointestinal:  Positive for abdominal distention. Negative for abdominal pain, constipation, diarrhea, nausea and vomiting.   Genitourinary: Negative.  Negative for dysuria.   Musculoskeletal:  Positive for arthralgias (right hip, right shoulder). Negative for back pain, neck  pain and neck stiffness.   Skin:  Positive for wound (wounds noted to bilateral lower extremities. Skin tear noted to right elbow). Negative for rash.   Neurological:  Positive for syncope. Negative for dizziness, facial asymmetry, speech difficulty, weakness, light-headedness, numbness and headaches.   Hematological:  Does not bruise/bleed easily.   Psychiatric/Behavioral:  Negative for confusion.    All other systems reviewed and are negative.      Physical Exam     Initial Vitals [10/04/23 1007]   BP Pulse Resp Temp SpO2   136/84 64 18 98.3 °F (36.8 °C) (!) 93 %      MAP       --         Physical Exam    Constitutional: He appears well-developed and well-nourished.   HENT:   Head: Normocephalic and atraumatic.   Eyes: EOM are normal. Pupils are equal, round, and reactive to light.   Neck:   Normal range of motion.  Cardiovascular:  Normal rate, regular rhythm and normal heart sounds.           Pulmonary/Chest: Breath sounds normal. No respiratory distress.   Abdominal: He exhibits distension. There is no abdominal tenderness. There is no rebound and no guarding.   Musculoskeletal:      Left shoulder: Tenderness present. No swelling or deformity. Decreased range of motion.      Cervical back: Normal and normal range of motion.      Thoracic back: Normal.      Lumbar back: Normal.      Right hip: Normal.      Left hip: Tenderness present. No deformity. Normal range of motion.        Legs:      Neurological: He is alert and oriented to person, place, and time. GCS eye subscore is 4. GCS verbal subscore is 5. GCS motor subscore is 6.   Skin:   Several wounds and skin tears noted to the BLE with flaky skin. See photos attached.                ED Course   Procedures  Labs Reviewed   CBC W/ AUTO DIFFERENTIAL - Abnormal; Notable for the following components:       Result Value    RBC 4.24 (*)     Hemoglobin 13.8 (*)     MCV 99 (*)     MCH 32.5 (*)     Gran # (ANC) 9.2 (*)     Lymph # 0.8 (*)     Gran % 86.5 (*)     Lymph  % 7.0 (*)     All other components within normal limits   TROPONIN I   URINALYSIS, REFLEX TO URINE CULTURE   COMPREHENSIVE METABOLIC PANEL   NT-PRO NATRIURETIC PEPTIDE          Imaging Results              CT Cervical Spine Without Contrast (Final result)  Result time 10/04/23 11:10:28      Final result by Amado RebolledoSheldon), MD (10/04/23 11:10:28)                   Impression:      No fractures    All CT scans at this facility use dose modulation, iterative reconstructions, and/or weight base dosing when appropriate to reduce radiation dose to as low as reasonably achievable.      Electronically signed by: Amado Rebolledo MD  Date:    10/04/2023  Time:    11:10               Narrative:    EXAMINATION:  CT CERVICAL SPINE WITHOUT CONTRAST    CLINICAL HISTORY:  Neck trauma (Age >= 65y);    TECHNIQUE:  Thin section axial images were acquired. Reformatted images were generated in the sagittal and coronal planes.    COMPARISON:  None    FINDINGS:  No fracture, precervical soft tissue swelling, or subluxation identified.  Multilevel cervical spondylotic changes are present.  Multilevel facet arthropathy.  No CT evidence of central canal stenosis or traumatic disc herniation.                                       CT Head Without Contrast (Final result)  Result time 10/04/23 10:58:00      Final result by Manuel Snow MD (10/04/23 10:58:00)                   Impression:      No acute intracranial abnormalities.    All CT scans at this facility use dose modulation, iterative reconstruction, and/or weight based dosing when appropriate to reduce radiation dose to as low as reasonable achievable.      Electronically signed by: Manuel Snow MD  Date:    10/04/2023  Time:    10:58               Narrative:    EXAMINATION:  CT HEAD WITHOUT CONTRAST    CLINICAL HISTORY:  Head trauma, minor (Age >= 65y);    TECHNIQUE:  Low dose axial CT images obtained throughout the head without intravenous contrast. Sagittal and coronal  reconstructions were performed.    COMPARISON:  None.    FINDINGS:  Intracranial compartment: Motion limited    The brain parenchyma demonstrates areas of decreased attenuation with mild to moderate periventricular white matter consistent with chronic microvascular ischemic changes..  No parenchymal mass, hemorrhage, edema or major vascular distribution infarct.  Vascular calcifications are noted.    Moderate prominence of the sulci and ventricles are consistent with age-related involutional changes.    No extra-axial blood or fluid collections.    Skull/extracranial contents (limited evaluation): No fracture. Mastoid air cells and paranasal sinuses are essentially clear.                                       X-Ray Shoulder Complete 2 View Right (Final result)  Result time 10/04/23 11:03:55      Final result by Amado Rebolledo MD (Timothy) (10/04/23 11:03:55)                   Impression:      Degenerative spurring of the right AC joint.  No fractures.      Electronically signed by: Amado Rebolledo MD  Date:    10/04/2023  Time:    11:03               Narrative:    EXAMINATION:  XR SHOULDER COMPLETE 2 OR MORE VIEWS RIGHT    CLINICAL HISTORY:  Unspecified injury of right shoulder and upper arm, initial encounter    TECHNIQUE:  Standard radiography performed.  Two views    COMPARISON:  None    FINDINGS:  No fractures or dislocations.  There is mild degenerative spurring of the right AC joint.                                       X-Ray Hip 2 or 3 views Right (with Pelvis when performed) (Final result)  Result time 10/04/23 11:05:26      Final result by Amado Rebolledo MD (Timothy) (10/04/23 11:05:26)                   Impression:      No acute fracture or dislocation.      Electronically signed by: Amado Rebolledo MD  Date:    10/04/2023  Time:    11:05               Narrative:    EXAMINATION:  XR HIP WITH PELVIS WHEN PERFORMED, 2 OR 3  VIEWS RIGHT    CLINICAL HISTORY:  Unspecified injury of right hip, initial  encounter    TECHNIQUE:  Standard radiography performed.  Three views.    COMPARISON:  09/02/2021.    FINDINGS:  Interval ORIF of right femoral neck fracture.  No acute fracture or dislocation.                                       X-Ray Chest 1 View (Final result)  Result time 10/04/23 11:02:48   Procedure changed from X-Ray Chest PA And Lateral     Final result by Amado Rebolledo MD (Timothy) (10/04/23 11:02:48)                   Impression:      No acute findings.      Electronically signed by: Amado Rebolledo MD  Date:    10/04/2023  Time:    11:02               Narrative:    EXAMINATION:  XR CHEST 1 VIEW    CLINICAL HISTORY:  Chest pain status post fall, fall;    COMPARISON:  Chest, 09/25/2021.    FINDINGS:  Stable cardiomegaly.  Lungs appear clear.  There appears to be a chronic/subacute fracture of the distal left clavicle.                                       Medications - No data to display  Medical Decision Making  Patient is an afebrile, nontoxic appearing 78 y.o. male who presents for evaluation following an witnessed fall. The patient's GCS is 15. There are no focal neurological deficits, there was not no nausea/vomiting after the incident. There is not occipital, parietal or temporal scalp hematoma; there was not LOC; Patient acting appropriately. There was not was not a severe mechanism or fall from height greater than 3 feet. There is not a palpable skull fracture. There are are not signs of AMS, patient is without agitation, somnolence, repetitive questioning, or slow response to verbal communication. Patient is on blood thinners. Patient does not have a history of bleed disorders.     Differential Diagnosis includes but not limited to:  Scalp contusion, concussion, skull fracture, intracranial hemorrhage such as subdural hematoma, subarachnoid hemorrhage or epidural hematoma, soft tissue injury, paraspinal or spinal injury, arrhythmia, CHF exacerbation, syncope    CT head fails to demonstrate  evidence of closed head injury. CT cervical spine without any bony abnormalities. Chest xray with enlarged cardiac silhouette from most recent chest CT.  Patient received xrays to of the shoulder and hip evaluate for dislocation/fracture/other injuries. Xrays showed no acute abnormalities.     Patient in afib throughout ED stay, with frequent PVC and PACs. Troponins WNL    After review of the patients physical exam, ED testing, and history/symptoms, the patient will be admitted for further evaluation and cardiac workup.[facility] was called. Dr. Chandler will accept the admission to the Hospital Medicine Service with recommendations for cardiac workup.  Admit orders will be completed. The diagnosis, treatment and plan for admission were discussed with the patient and understanding was verbalized. All questions or concerns have been addressed.     This case was discussed with and the patient has been examined by Dr. Foster who is in agreement with my assessment and plan.     Amount and/or Complexity of Data Reviewed  Labs: ordered. Decision-making details documented in ED Course.  Radiology: ordered and independent interpretation performed. Decision-making details documented in ED Course.  ECG/medicine tests: ordered and independent interpretation performed. Decision-making details documented in ED Course.               ED Course as of 10/04/23 1353   Wed Oct 04, 2023   1010 Patient examined and assessed. Reports that patient fell just PTA. Complains of right shoulder and right hip pain. Patient takes aspirin. Patient answering questions appropriately, speaking in complete sentences, respirations are even and unlabored.  AAO x 4.  [OB]   1107 Xray shoulder reviewed: Degenerative spurring of the right AC joint.  No fractures. [OB]   1108 Chest xray reviewed: Stable cardiomegaly.  Lungs appear clear.  There appears to be a chronic/subacute fracture of the distal left clavicle.     Impression:      No acute findings. [OB]   1110 Hip xray reviewed: No acute fracture or dislocation. [OB]   1110 CT head reviewed: No acute intracranial abnormalities. [OB]   1116 CT cervical spine reviewed: No fracture, precervical soft tissue swelling, or subluxation identified. [OB]   1122 EKG 12-lead  Atrial fibrillation at a rate of 73.  Occasional PVCs.  No ST elevation or depression.   [HL]   1128 WBC: 10.64 [OB]   1128 Hemoglobin(!): 13.8 [OB]   1128 Hematocrit: 42.0 [OB]   1157 NT-proBNP(!): 2670 [OB]   1220 Troponin I: 0.030 [OB]   1300 Spoke with Dr. Foster, recommends admission for CHF exacerbation. Discussed admission with patient, who is agreeable.  [OB]   1333 Spoke with [facility], who accepts admission. Admit orders placed.  [OB]   1353 Patient assessed together with Dr. Foster. Agrees with current plan.  [OB]      ED Course User Index  [HL] Maral Foster,   [OB] Suad Kim PA-C                    Clinical Impression:   Final diagnoses:  [S49.91XA] Shoulder injury, right, initial encounter  [S79.911A] Hip injury, right, initial encounter  [W19.XXXA] Fall               Suad Kim PA-C  10/04/23 1806

## 2023-10-04 NOTE — NURSING
Patient arrived to unit from [facility] via [facility].   Patient in room 510.  Transferred into bed with minimal assist.   Telephone report given.  Charge nurse advised of patient arrival.   VS currently stable.   Tele monitor applied.   Patient oriented to room, unit, and call bell.   Bed in lowest position, call light in reach.  Encouraged to notify of all needs.   Will continue to monitor.

## 2023-10-04 NOTE — PROGRESS NOTES
10/04/23 1836   Admission   Initial VN Admission Questions Complete   Communication Issues? None   Shift   Virtual Nurse - Rounding Complete   Virtual Nurse - Patient Verbalized Approval Of Camera Use;VN Rounding   Type of Frequent Check   Type Patient Rounds   Safety/Activity   Patient Rounds bed in low position;call light in patient/parent reach;visualized patient   Positioning   Body Position supine   Head of Bed (HOB) Positioning HOB elevated   Positioning/Transfer Devices pillows;in use   Pain/Comfort/Sleep   Preferred Pain Scale number (Numeric Rating Pain Scale)   Pain Rating (0-10): Rest 8

## 2023-10-04 NOTE — HPI
78-year-old man with PMHx of HTN, Afib (not on AC), hypothyroidism (on Synthroid), HLD, AKHIL, bradycardia presents via EMS to [facility] ER for evaluation after a fall that occurred just PTA. Patient reports that he got up from bed and on his way to the bathroom he fell.  Patient is unsure why he fell but thinks the towel got caught in his walker. He scraped his left arm on the way down trying to break his fall. The patient lives alone and the fall was unwitnessed. Patient complains of right shoulder pain and right hip pain. States that he had a right hip replacement 2 years ago at [facility]. He denies head injury.  He denies loss of consciousness.  Patient states that he takes a daily aspirin, no other blood thinners. Patient also complains of SOB and bilateral lower leg edema.  Patient reports that he takes Lasix BID, which mildly improves his lower extremity edema.  Patient also reports chronic wounds on his lower extremities and follows wound care. Patient was seen by his PCP Dr. Cloud on 9/6/23, and was given doxycycline and fluconazole for the wounds on his legs. Patient states that he has not been seen by a cardiologist in several years. He denies chest pain,  nausea, vomiting, diarrhea, abdominal pain, urinary frequency, urgency, or any other complaints at this time.    In the ED, patient's vitals were Temp 98.3, HR 64, /84. Labs were significant for BNP 2670, Trop 0.030. CT head and neck were unremarkable. XR Interval ORIF of right femoral neck fracture. No acute fracture or dislocation. CXR Stable cardiomegaly. No other acute findings. Patient was admitted to [facility] for CHF exacerbation.

## 2023-10-04 NOTE — ED NOTES
Pt had BM x 1 on bedside commode without difficulty. Pt transferred himself from bed to commode and back to bed.

## 2023-10-04 NOTE — PHARMACY MED REC
"    [facility]           Pharmacy  Admission Medication History     The home medication history was taken by Kath Lanier.      Medication history obtained from Medications listed below were obtained from: mth sense software- iSSimple. Unable to assess patient     Based on information gathered for medication list, you may go to "Admission" then "Reconcile Home Medications" tabs to review and/or act upon those items.     The home medication list has been updated by the Pharmacy department.   Please read ALL comments highlighted in yellow.   Please address this information as you see fit.    Feel free to contact us if you have any questions or require assistance.        No current facility-administered medications on file prior to encounter.     Current Outpatient Medications on File Prior to Encounter   Medication Sig Dispense Refill    amLODIPine (NORVASC) 2.5 MG tablet Take 1 tablet by mouth once daily (Patient taking differently: Take 2.5 mg by mouth once daily.) 90 tablet 3    atorvastatin (LIPITOR) 10 MG tablet Take 1 tablet (10 mg total) by mouth once daily. 90 tablet 3    FLUoxetine (PROZAC) 20 MG capsule Take 1 capsule (20 mg total) by mouth once daily. 90 capsule 3    levothyroxine (SYNTHROID) 200 MCG tablet Take 1 tablet (200 mcg total) by mouth once daily. 90 tablet 3    losartan (COZAAR) 100 MG tablet Take 1 tablet (100 mg total) by mouth once daily. 90 tablet 3    metoprolol succinate (TOPROL-XL) 50 MG 24 hr tablet Take 1 tablet (50 mg total) by mouth once daily. 90 tablet 3    spironolactone (ALDACTONE) 25 MG tablet Take 1 tablet by mouth once daily 90 tablet 0    acetaminophen (TYLENOL) 325 MG tablet Take 2 tablets (650 mg total) by mouth every 6 (six) hours as needed for Pain.  0    aspirin (ECOTRIN) 81 MG EC tablet Take 1 tablet (81 mg total) by mouth once daily.      doxycycline (VIBRA-TABS) 100 MG tablet Take 1 tablet (100 mg total) by mouth 2 (two) times daily. Any generic " brand capsule or tablet 30 tablet 0    fluconazole (DIFLUCAN) 100 MG tablet Take 1 tablet (100 mg total) by mouth once daily. 14 tablet 0    furosemide (LASIX) 40 MG tablet Take 1 tablet (40 mg total) by mouth 2 (two) times a day. Take daily for next 3 days, then use as needed. Weigh daily. Afterwards, f weight increases 2 lbs/24h, take dose of lasix daily  until weight is back to baseline 180 tablet 3    HYDROcodone-acetaminophen (NORCO) 5-325 mg per tablet Take 1 tablet by mouth every 6 (six) hours as needed for Pain. 20 tablet 0    magnesium hydroxide 400 mg/5 ml (MILK OF MAGNESIA) 400 mg/5 mL Susp Take 30 mLs by mouth daily as needed (constipation).      methocarbamoL (ROBAXIN) 500 MG Tab Take 1 tablet by mouth 4 times daily 30 tablet 3    multivit-min-FA-lycopen-lutein (CENTRUM SILVER) 0.4-300-250 mg-mcg-mcg Tab Take 1 tablet by mouth once daily.         Please address this information as you see fit.  Feel free to contact us if you have any questions or require assistance.    Kath Lanier  [phone]              .

## 2023-10-05 ENCOUNTER — TELEPHONE (OUTPATIENT)
Dept: FAMILY MEDICINE | Facility: CLINIC | Age: 78
End: 2023-10-05
Payer: MEDICARE

## 2023-10-05 VITALS
WEIGHT: 215 LBS | OXYGEN SATURATION: 95 % | DIASTOLIC BLOOD PRESSURE: 82 MMHG | SYSTOLIC BLOOD PRESSURE: 146 MMHG | BODY MASS INDEX: 30.1 KG/M2 | HEART RATE: 68 BPM | TEMPERATURE: 98 F | RESPIRATION RATE: 18 BRPM | HEIGHT: 71 IN

## 2023-10-05 LAB
ALBUMIN SERPL BCP-MCNC: 3.7 G/DL (ref 3.5–5.2)
ALP SERPL-CCNC: 69 U/L (ref 55–135)
ALT SERPL W/O P-5'-P-CCNC: 20 U/L (ref 10–44)
ANION GAP SERPL CALC-SCNC: 10 MMOL/L (ref 8–16)
ASCENDING AORTA: 3.49 CM
AST SERPL-CCNC: 29 U/L (ref 10–40)
AV INDEX (PROSTH): 0.48
AV MEAN GRADIENT: 6 MMHG
AV PEAK GRADIENT: 11 MMHG
AV VALVE AREA BY VELOCITY RATIO: 1.79 CM²
AV VALVE AREA: 1.87 CM²
AV VELOCITY RATIO: 0.46
BASOPHILS # BLD AUTO: 0.02 K/UL (ref 0–0.2)
BASOPHILS NFR BLD: 0.3 % (ref 0–1.9)
BILIRUB SERPL-MCNC: 2.8 MG/DL (ref 0.1–1)
BNP SERPL-MCNC: 918 PG/ML (ref 0–99)
BSA FOR ECHO PROCEDURE: 2.21 M2
BUN SERPL-MCNC: 16 MG/DL (ref 8–23)
CALCIUM SERPL-MCNC: 9.7 MG/DL (ref 8.7–10.5)
CHLORIDE SERPL-SCNC: 101 MMOL/L (ref 95–110)
CO2 SERPL-SCNC: 29 MMOL/L (ref 23–29)
CREAT SERPL-MCNC: 1 MG/DL (ref 0.5–1.4)
CV ECHO LV RWT: 0.48 CM
DIFFERENTIAL METHOD: ABNORMAL
DOP CALC AO PEAK VEL: 1.66 M/S
DOP CALC AO VTI: 34.3 CM
DOP CALC LVOT AREA: 3.9 CM2
DOP CALC LVOT DIAMETER: 2.23 CM
DOP CALC LVOT PEAK VEL: 0.76 M/S
DOP CALC LVOT STROKE VOLUME: 64.02 CM3
DOP CALC MV VTI: 23.2 CM
DOP CALCLVOT PEAK VEL VTI: 16.4 CM
E WAVE DECELERATION TIME: 185.72 MSEC
E/A RATIO: 1.71
E/E' RATIO: 6.09 M/S
ECHO LV POSTERIOR WALL: 1.2 CM (ref 0.6–1.1)
EOSINOPHIL # BLD AUTO: 0 K/UL (ref 0–0.5)
EOSINOPHIL NFR BLD: 0.1 % (ref 0–8)
ERYTHROCYTE [DISTWIDTH] IN BLOOD BY AUTOMATED COUNT: 13.8 % (ref 11.5–14.5)
EST. GFR  (NO RACE VARIABLE): >60 ML/MIN/1.73 M^2
ESTIMATED AVG GLUCOSE: 128 MG/DL (ref 68–131)
FRACTIONAL SHORTENING: 28 % (ref 28–44)
GLUCOSE SERPL-MCNC: 124 MG/DL (ref 70–110)
HBA1C MFR BLD: 6.1 % (ref 4–5.6)
HCT VFR BLD AUTO: 42.3 % (ref 40–54)
HGB BLD-MCNC: 14.1 G/DL (ref 14–18)
IMM GRANULOCYTES # BLD AUTO: 0.02 K/UL (ref 0–0.04)
IMM GRANULOCYTES NFR BLD AUTO: 0.3 % (ref 0–0.5)
INTERVENTRICULAR SEPTUM: 1.34 CM (ref 0.6–1.1)
IVC DIAMETER: 1.4 CM
LA MAJOR: 7.44 CM
LA WIDTH: 5.6 CM
LEFT ATRIUM SIZE: 4.73 CM
LEFT ATRIUM VOLUME INDEX MOD: 58.7 ML/M2
LEFT ATRIUM VOLUME MOD: 127.29 CM3
LEFT INTERNAL DIMENSION IN SYSTOLE: 3.57 CM (ref 2.1–4)
LEFT VENTRICLE DIASTOLIC VOLUME INDEX: 53.46 ML/M2
LEFT VENTRICLE DIASTOLIC VOLUME: 116 ML
LEFT VENTRICLE MASS INDEX: 115 G/M2
LEFT VENTRICLE SYSTOLIC VOLUME INDEX: 24.6 ML/M2
LEFT VENTRICLE SYSTOLIC VOLUME: 53.47 ML
LEFT VENTRICULAR INTERNAL DIMENSION IN DIASTOLE: 4.96 CM (ref 3.5–6)
LEFT VENTRICULAR MASS: 250.08 G
LV LATERAL E/E' RATIO: 4.67 M/S
LV SEPTAL E/E' RATIO: 8.75 M/S
LVOT MG: 1.16 MMHG
LVOT MV: 0.51 CM/S
LYMPHOCYTES # BLD AUTO: 1.2 K/UL (ref 1–4.8)
LYMPHOCYTES NFR BLD: 15.9 % (ref 18–48)
MAGNESIUM SERPL-MCNC: 2.1 MG/DL (ref 1.6–2.6)
MCH RBC QN AUTO: 32.7 PG (ref 27–31)
MCHC RBC AUTO-ENTMCNC: 33.3 G/DL (ref 32–36)
MCV RBC AUTO: 98 FL (ref 82–98)
MONOCYTES # BLD AUTO: 0.7 K/UL (ref 0.3–1)
MONOCYTES NFR BLD: 9 % (ref 4–15)
MV MEAN GRADIENT: 1 MMHG
MV PEAK A VEL: 0.41 M/S
MV PEAK E VEL: 0.7 M/S
MV PEAK GRADIENT: 2 MMHG
MV STENOSIS PRESSURE HALF TIME: 53.86 MS
MV VALVE AREA BY CONTINUITY EQUATION: 2.76 CM2
MV VALVE AREA P 1/2 METHOD: 4.08 CM2
NEUTROPHILS # BLD AUTO: 5.4 K/UL (ref 1.8–7.7)
NEUTROPHILS NFR BLD: 74.4 % (ref 38–73)
NRBC BLD-RTO: 0 /100 WBC
OHS LV EJECTION FRACTION SIMPSONS BIPLANE MOD: 52 %
PHOSPHATE SERPL-MCNC: 2.8 MG/DL (ref 2.7–4.5)
PISA MRMAX VEL: 5.77 M/S
PISA TR MAX VEL: 3.21 M/S
PLATELET # BLD AUTO: 175 K/UL (ref 150–450)
PMV BLD AUTO: 11.5 FL (ref 9.2–12.9)
POTASSIUM SERPL-SCNC: 4 MMOL/L (ref 3.5–5.1)
PROT SERPL-MCNC: 7 G/DL (ref 6–8.4)
PULM VEIN S/D RATIO: 1.31
PV MV: 0.6 M/S
PV PEAK D VEL: 0.49 M/S
PV PEAK GRADIENT: 3 MMHG
PV PEAK S VEL: 0.64 M/S
PV PEAK VELOCITY: 0.81 M/S
RA MAJOR: 6.8 CM
RA PRESSURE ESTIMATED: 3 MMHG
RA WIDTH: 3.92 CM
RBC # BLD AUTO: 4.31 M/UL (ref 4.6–6.2)
RIGHT VENTRICULAR END-DIASTOLIC DIMENSION: 3.96 CM
RV TB RVSP: 6 MMHG
RV TISSUE DOPPLER FREE WALL SYSTOLIC VELOCITY 1 (APICAL 4 CHAMBER VIEW): 11.85 CM/S
SINUS: 3.6 CM
SODIUM SERPL-SCNC: 140 MMOL/L (ref 136–145)
STJ: 3.1 CM
TDI LATERAL: 0.15 M/S
TDI SEPTAL: 0.08 M/S
TDI: 0.12 M/S
TR MAX PG: 41 MMHG
TRICUSPID ANNULAR PLANE SYSTOLIC EXCURSION: 1.74 CM
TSH SERPL DL<=0.005 MIU/L-ACNC: 1.58 UIU/ML (ref 0.4–4)
TV REST PULMONARY ARTERY PRESSURE: 44 MMHG
WBC # BLD AUTO: 7.25 K/UL (ref 3.9–12.7)
Z-SCORE OF LEFT VENTRICULAR DIMENSION IN END DIASTOLE: -3.7
Z-SCORE OF LEFT VENTRICULAR DIMENSION IN END SYSTOLE: -1.6

## 2023-10-05 PROCEDURE — 83880 ASSAY OF NATRIURETIC PEPTIDE: CPT | Mod: HCNC

## 2023-10-05 PROCEDURE — 97165 OT EVAL LOW COMPLEX 30 MIN: CPT | Mod: HCNC

## 2023-10-05 PROCEDURE — 97530 THERAPEUTIC ACTIVITIES: CPT | Mod: HCNC

## 2023-10-05 PROCEDURE — 25000003 PHARM REV CODE 250: Mod: HCNC

## 2023-10-05 PROCEDURE — 36415 COLL VENOUS BLD VENIPUNCTURE: CPT | Mod: HCNC

## 2023-10-05 PROCEDURE — 97116 GAIT TRAINING THERAPY: CPT | Mod: HCNC

## 2023-10-05 PROCEDURE — 93010 EKG 12-LEAD: ICD-10-PCS | Mod: HCNC,,, | Performed by: INTERNAL MEDICINE

## 2023-10-05 PROCEDURE — 96375 TX/PRO/DX INJ NEW DRUG ADDON: CPT

## 2023-10-05 PROCEDURE — 83735 ASSAY OF MAGNESIUM: CPT | Mod: HCNC

## 2023-10-05 PROCEDURE — 80053 COMPREHEN METABOLIC PANEL: CPT | Mod: HCNC

## 2023-10-05 PROCEDURE — 63600175 PHARM REV CODE 636 W HCPCS: Mod: HCNC

## 2023-10-05 PROCEDURE — 99900035 HC TECH TIME PER 15 MIN (STAT): Mod: HCNC

## 2023-10-05 PROCEDURE — 84100 ASSAY OF PHOSPHORUS: CPT | Mod: HCNC

## 2023-10-05 PROCEDURE — 93010 ELECTROCARDIOGRAM REPORT: CPT | Mod: HCNC,,, | Performed by: INTERNAL MEDICINE

## 2023-10-05 PROCEDURE — 97161 PT EVAL LOW COMPLEX 20 MIN: CPT | Mod: HCNC

## 2023-10-05 PROCEDURE — 85025 COMPLETE CBC W/AUTO DIFF WBC: CPT | Mod: HCNC

## 2023-10-05 PROCEDURE — 83036 HEMOGLOBIN GLYCOSYLATED A1C: CPT | Mod: HCNC

## 2023-10-05 PROCEDURE — G0378 HOSPITAL OBSERVATION PER HR: HCPCS | Mod: HCNC

## 2023-10-05 PROCEDURE — 93005 ELECTROCARDIOGRAM TRACING: CPT | Mod: HCNC

## 2023-10-05 PROCEDURE — 84443 ASSAY THYROID STIM HORMONE: CPT | Mod: HCNC

## 2023-10-05 RX ORDER — FUROSEMIDE 40 MG/1
40 TABLET ORAL 2 TIMES DAILY
Status: DISCONTINUED | OUTPATIENT
Start: 2023-10-05 | End: 2023-10-05 | Stop reason: HOSPADM

## 2023-10-05 RX ADMIN — LOSARTAN POTASSIUM 100 MG: 50 TABLET, FILM COATED ORAL at 09:10

## 2023-10-05 RX ADMIN — ACETAMINOPHEN 650 MG: 325 TABLET ORAL at 02:10

## 2023-10-05 RX ADMIN — HYDRALAZINE HYDROCHLORIDE 10 MG: 20 INJECTION, SOLUTION INTRAMUSCULAR; INTRAVENOUS at 01:10

## 2023-10-05 RX ADMIN — LEVOTHYROXINE SODIUM 200 MCG: 100 TABLET ORAL at 09:10

## 2023-10-05 RX ADMIN — AMLODIPINE BESYLATE 2.5 MG: 2.5 TABLET ORAL at 09:10

## 2023-10-05 RX ADMIN — FUROSEMIDE 40 MG: 40 TABLET ORAL at 11:10

## 2023-10-05 RX ADMIN — FLUOXETINE 20 MG: 20 CAPSULE ORAL at 09:10

## 2023-10-05 RX ADMIN — ASPIRIN 81 MG: 81 TABLET, COATED ORAL at 09:10

## 2023-10-05 RX ADMIN — ATORVASTATIN CALCIUM 10 MG: 10 TABLET, FILM COATED ORAL at 09:10

## 2023-10-05 RX ADMIN — SPIRONOLACTONE 25 MG: 25 TABLET ORAL at 09:10

## 2023-10-05 NOTE — PLAN OF CARE
SW met with pt at bedside to complete assessment. Pt is AxOx3 and able to verbally answer assessment questions. Pt confirmed demographics. Pt reports to live alone after losing his wife of 49yrs Misty 7yrs ago. Pt was able to talk about his late spouse without tears but with pleasant memories. Pt reports while at home being safe and feeling supported. Pt reports independent of ADL's. Pt confirmed DME to be shower bench in his walk in shower, raised toilet seat, and what pt described as a roberta walker from past surgery. Pt reports able to drive and attend follow ups. Pt emergency contact is his adult son who lives in [place]. Pt son to transport home at time of discharge. Pt reports not wanting to stay overnight, pt reports walking with therapy and having no pain. SW to request home health for further home support. SW updated whiteboard with [facility] name and contact information. SW confirmed pt understanding of Observation unit and expected discharge plan. SW will continue to follow pt throughout care and assist with any discharge needs.         10/05/23 1201   Discharge Planning   Assessment Type Discharge Planning Brief Assessment   Resource/Environmental Concerns none   Support Systems Family members;Children   Equipment Currently Used at Home raised toilet;walker, rolling;shower chair;walker, roberta   Current Living Arrangements home   Patient/Family Anticipates Transition to home   Patient/Family Anticipated Services at Transition home health care   DME Needed Upon Discharge  none   Discharge Plan A Home     No future appointments.    [facility] Case Management  [phone]

## 2023-10-05 NOTE — PLAN OF CARE
PT/ot Notes entered no recs as pt has declined outpatient therapy.      SW requested PCP follow up with Dr. Levine. Pt confirmed no cardiologist established for dx of CHF. SW requested cards follow up. Pt reports open to returning to [facility] for cards follow up if needed.        10/05/23 1219   Post-Acute Status   Post-Acute Authorization Home Health;Other   Home Health Status Pending medical clearance/testing   Other Status Awaiting f/u Appts   Discharge Delays None known at this time   Discharge Plan   Discharge Plan A Home;Home Health     No future appointments.    [facility] Case Management  [phone]

## 2023-10-05 NOTE — PLAN OF CARE
D/C recs noted. Pt to have cariology and PCP follow up. Pharmacist will go over home medications and reasons for medications. VN and bedside nurse to reiterate final discharge instructions.       At time of discharge pt will be transported home by family.    DME at discharge: none new -already owned at home  Home Health: none -pt declined outpatient therapy.     Pt has follow up appointments added to AVS.         10/05/23 1504   Final Note   Assessment Type Final Discharge Note   Anticipated Discharge Disposition Home   What phone number can be called within the next 1-3 days to see how you are doing after discharge? [phone]   Hospital Resources/Appts/Education Provided Appointments scheduled and added to AVS   Post-Acute Status   Home Health Status Patient declined/refused   Discharge Delays None known at this time       Future Appointments   Date Time Provider Department Center   10/13/2023  9:40 AM Ramirez Levine MD [facility] MED [place] [facility]   11/14/2023 10:20 AM Hank Barry MD [facility] CARDIO [facility] Case Management  [phone]

## 2023-10-05 NOTE — PLAN OF CARE
AAOx4.1500mL fluid restriction. Medications administered per MAR. Tele monitored. BP monitored. Safety maintained. Call bell within reach.

## 2023-10-05 NOTE — PLAN OF CARE
"OT venkat performed this date with PT, pt agreeable to therapy. Pt reports being at his baseline at this time apart from being "sore" from fall. Pt performing functional mobility with SBA & use of RW. Pt reports he has all necessary DME at this time. Will recommend OP PT for higher level balance ax. No further acute OT needs at this time, d/c OT.     Problem: Occupational Therapy  Goal: Occupational Therapy Goal  Description:     Outcome: Adequate for Care Transition     "

## 2023-10-05 NOTE — PLAN OF CARE
VN Note: Notes, orders, labs, care plan review will continue to monitor and be available.   Problem: Adult Inpatient Plan of Care  Goal: Plan of Care Review  Outcome: Ongoing, Progressing

## 2023-10-05 NOTE — PT/OT/SLP EVAL
Physical Therapy Evaluation and Treatment    Patient Name:  Bean Salas   MRN:  [id number]    Recommendations:     Discharge Recommendations:  (low intensity therapy)   Discharge Equipment Recommendations: none   Barriers to discharge: Decreased caregiver support    Assessment:     Bean Salas is a 78 y.o. male admitted with a medical diagnosis of Acute on chronic congestive heart failure.  He presents with the following impairments/functional limitations: weakness, gait instability, impaired balance, impaired self care skills, impaired functional mobility, decreased lower extremity function, decreased ROM, edema, impaired skin .Pt ambulated ~180 ft with RW and SBA. No LOB noted. Pt reports soreness in R hip s/p fall. Discussed benefits of low intensity therapy such as OP PT to address balance - pt declined. Pt owns recommended DME.     Rehab Prognosis: Good; patient would benefit from acute skilled PT services to address these deficits and reach maximum level of function.    Recent Surgery: * No surgery found *      Plan:     During this hospitalization, patient to be seen 3 x/week to address the identified rehab impairments via gait training, therapeutic activities, therapeutic exercises, neuromuscular re-education and progress toward the following goals:    Plan of Care Expires:  11/05/23    Subjective     Chief Complaint: soreness R hip  Patient/Family Comments/goals: return home  Pain/Comfort:  Pain Rating 1: 0/10    Patients cultural, spiritual, Episcopal conflicts given the current situation: no    Living Environment:  Pt lives alone in a H, 0 ABDI, and WIS with bench and BIS  Prior to admission, patients level of function was Mod I with use of RW majority of the time; pt drives and reports 1 recent fall but has had other falls >8 months ago.  Equipment used at home: bedside commode, walker, rolling, bath bench, raised toilet.  Upon discharge, patient will have assistance from limited, son lives in  [place].    Objective:     Communicated with nsg prior to session.  Patient found HOB elevated with oxygen  upon PT entry to room.    General Precautions: Standard, fall  Orthopedic Precautions:N/A   Braces: N/A  Respiratory Status: Nasal cannula, flow 3 L/min    Exams:  Cognitive Exam:  Patient is oriented to Person, Place, Time, and Situation  Postural Exam:  Patient presented with the following abnormalities:    -       Rounded shoulders  -       Forward head  -       Kyphosis  Sensation:    -       Intact  light/touch BLE  Skin Integrity/Edema:      -       Skin integrity: venous stasis ulcers BLE and R elbow skin tear  -       Edema: Moderate BLE  RLE ROM: WFL except R hip limited by past hip surgeries per pt  RLE Strength: WFL except R hip grossly 2+/5  LLE ROM: WFL  LLE Strength: WFL except hip flexion 4/5    Functional Mobility:  Bed Mobility:     Scooting: stand by assistance  Supine to Sit: stand by assistance  Sit to Supine: stand by assistance  Transfers:     Sit to Stand:  stand by assistance with rolling walker  Gait: Pt ambulated ~180 ft with RW and SBA. No LOB noted. Pt demonstrating increased forward flexion and decreased proximity to RW - VC's to maintain within TORREY of RW and for upright posture      AM-PAC 6 CLICK MOBILITY  Total Score:19       Treatment & Education:  Pt educated on role of PT/POC and benefits of OP PT for balance training due to hx of falls  Pt ambulated as reported above   Pt declined sitting up in chair - request return to bed  Ice packs provided for R shoulder and hip soreness and educated on use and continued AROM    Patient left HOB elevated with all lines intact, call button in reach, bed alarm on, and nsg notified.    GOALS:   Multidisciplinary Problems       Physical Therapy Goals          Problem: Physical Therapy    Goal Priority Disciplines Outcome Goal Variances Interventions   Physical Therapy Goal     PT, PT/OT Ongoing, Progressing     Description: Goals to be met  by: 23     Patient will increase functional independence with mobility by performin. Sit to stand transfer with Modified Reeves  2. Bed to chair transfer with Modified Reeves using Rolling Walker  3. Gait  x 300 feet with Modified Reeves using Rolling Walker.                          History:     Past Medical History:   Diagnosis Date    Atrial fibrillation, unspecified type 2023    COPD exacerbation 2023    Hyperlipidemia     Hypertension     Thyroid disease     hypo    Ventricular tachycardia     frequent PVCs       Past Surgical History:   Procedure Laterality Date    COLONOSCOPY  2011    CORONARY ANGIOGRAPHY N/A 2021    Procedure: ANGIOGRAM, CORONARY ARTERY;  Surgeon: Hank Barry MD;  Location: [facility] CATH LAB/EP;  Service: Cardiology;  Laterality: N/A;    EYE SURGERY Bilateral     cataracts extraction    FRACTURE SURGERY Right 2021    femur    HERNIA REPAIR      HIP SURGERY Right 2021    INTRAMEDULLARY RODDING OF TROCHANTER OF FEMUR Right 2021    Procedure: INSERTION, INTRAMEDULLARY RACQUEL, FEMUR, TROCHANTER;  Surgeon: Darryn Hall MD;  Location: [facility];  Service: Orthopedics;  Laterality: Right;    JOINT REPLACEMENT Bilateral     knee    LEFT HEART CATHETERIZATION Right 2021    Procedure: Left heart cath;  Surgeon: Hank Barry MD;  Location: [facility] CATH LAB/EP;  Service: Cardiology;  Laterality: Right;    VASECTOMY         Time Tracking:     PT Received On: 10/05/23  PT Start Time: 1112     PT Stop Time: 1137  PT Total Time (min): 25 min cotx with OT    Billable Minutes: Evaluation 10 and Gait Training 15      10/05/2023

## 2023-10-05 NOTE — ASSESSMENT & PLAN NOTE
Afib on EKG on admit, unsure why patient not on AC (possibly due to age?)  Per chart review has had afib since 2021

## 2023-10-05 NOTE — PT/OT/SLP EVAL
"Occupational Therapy   Evaluation & D/C    Name: Bean Salas  MRN: [id number]  Admitting Diagnosis: Acute on chronic congestive heart failure  Recent Surgery: * No surgery found *      Recommendations:     Discharge Recommendations: other (see comments), outpatient PT (low intensity therapy) See PT notes for ongoing recommendations  Discharge Equipment Recommendations:  none  Barriers to discharge:  None    Assessment:     Bean Salas is a 78 y.o. male with a medical diagnosis of Acute on chronic congestive heart failure.  He presents with The primary encounter diagnosis was Acute on chronic congestive heart failure, unspecified heart failure type. Diagnoses of Shoulder injury, right, initial encounter, Hip injury, right, initial encounter, Fall, Bradycardia, and Acute on chronic diastolic heart failure were also pertinent to this visit. Performance deficits affecting function: pain, gait instability.      OT eval performed this date with PT, pt agreeable to therapy. Pt reports being at his baseline at this time apart from being "sore" from fall. Pt performing functional mobility with SBA & use of RW. Pt reports he has all necessary DME at this time. Will recommend OP PT for higher level balance ax. No further acute OT needs at this time, d/c OT.     Plan:     Plan of Care Expires: 10/05/23  Plan of Care Reviewed with: patient    Subjective     Chief Complaint: Soreness after fall  Patient/Family Comments/goals: Return home    Occupational Profile:  Living Environment: Pt lives alone, SSH, 0STE, WIS w/SC  Previous level of function: Mayi with use of RW PRN  Roles and Routines: Drives, endorses no other recent falls in the past ~8 months  Equipment Used at Home: bedside commode, walker, rolling, bath bench  Assistance upon Discharge: Son who lives in [facility]    Pain/Comfort:  Pain Rating 1: 0/10    Patients cultural, spiritual, Scientologist conflicts given the current situation: no    Objective: " "    Communicated with: carissa prior to session.  Patient found HOB elevated with oxygen upon OT entry to room.    General Precautions: Standard, fall  Orthopedic Precautions: N/A  Braces: N/A    Occupational Performance:    Bed Mobility:    Patient completed Scooting/Bridging with stand by assistance  Patient completed Supine to Sit with stand by assistance  Patient completed Sit to Supine with stand by assistance    Functional Mobility/Transfers:  Patient completed Sit <> Stand Transfer with stand by assistance  with  rolling walker   Functional Mobility: Pt performing functional mobility in room/hallway with SBA & use of RW.     Activities of Daily Living:  Lower Body Dressing: stand by assistance to aga B socks EOB    Cognitive/Visual Perceptual:  Cognitive/Psychosocial Skills:     -       Oriented to: Person, Place, Time, and Situation   -       Follows Commands/attention:Follows multistep  commands  -       Memory: No Deficits noted  -       Mood/Affect/Coping skills/emotional control: Cooperative and Pleasant    Physical Exam:  Sensation:    -       Intact  Upper Extremity Range of Motion:     -       Right Upper Extremity: Deficits: shoulder flex ~130 degrees  -       Left Upper Extremity: Deficits: shoulder flex ~130 degrees  Upper Extremity Strength:    -       Right Upper Extremity: 3+ to 4/5  -       Left Upper Extremity: 3+ to 4/5   Strength:    -       Right Upper Extremity: WFL  -       Left Upper Extremity: WFL    AMPAC 6 Click ADL:  AMPAC Total Score: 23    Treatment & Education:  OT venkat performed this date with PT, pt agreeable to therapy.   Pt reports being at his baseline at this time apart from being "sore" from fall.   Pt performing functional mobility with SBA & use of RW.   Pt reports he has all necessary DME at this time.   Will recommend OP PT for higher level balance ax.   No further acute OT needs at this time, d/c OT.     At rest: 98%, 53 HR  EOB: 98% 52 HR  During mobility: 95% 68 " HR    Patient left HOB elevated with all lines intact, call button in reach, bed alarm on, and nsg notified    GOALS:   Multidisciplinary Problems       Occupational Therapy Goals          Problem: Occupational Therapy    Goal Priority Disciplines Outcome Interventions   Occupational Therapy Goal     OT, PT/OT Adequate for Care Transition    Description:                          History:     Past Medical History:   Diagnosis Date    Atrial fibrillation, unspecified type 9/6/2023    COPD exacerbation 9/6/2023    Hyperlipidemia     Hypertension     Thyroid disease     hypo    Ventricular tachycardia     frequent PVCs         Past Surgical History:   Procedure Laterality Date    COLONOSCOPY  01/01/2011    CORONARY ANGIOGRAPHY N/A 07/22/2021    Procedure: ANGIOGRAM, CORONARY ARTERY;  Surgeon: Hank Barry MD;  Location: [facility] CATH LAB/EP;  Service: Cardiology;  Laterality: N/A;    EYE SURGERY Bilateral     cataracts extraction    FRACTURE SURGERY Right 09/2021    femur    HERNIA REPAIR      HIP SURGERY Right 08/2021    INTRAMEDULLARY RODDING OF TROCHANTER OF FEMUR Right 09/03/2021    Procedure: INSERTION, INTRAMEDULLARY RACQUEL, FEMUR, TROCHANTER;  Surgeon: Darryn Hall MD;  Location: [facility];  Service: Orthopedics;  Laterality: Right;    JOINT REPLACEMENT Bilateral     knee    LEFT HEART CATHETERIZATION Right 07/22/2021    Procedure: Left heart cath;  Surgeon: Hank Barry MD;  Location: [facility] CATH LAB/EP;  Service: Cardiology;  Laterality: Right;    VASECTOMY         Time Tracking:     OT Date of Treatment: 10/05/23  OT Start Time: 1111  OT Stop Time: 1136  OT Total Time (min): 25 min    Billable Minutes:Evaluation 10  Therapeutic Activity 15    10/5/2023

## 2023-10-05 NOTE — H&P
[facility] Medicine  History & Physical    Patient Name: Bean Salas  MRN: [id number]  Patient Class: OP- Observation  Admission Date: 10/4/2023  Attending Physician: Era Durand MD   Primary Care Provider: Ramirez Levine MD         Patient information was obtained from patient, past medical records and ER records.     Subjective:     Principal Problem:Acute on chronic congestive heart failure    Chief Complaint:   Chief Complaint   Patient presents with    Fall     PT reports dizziness while walking to the restroom. Pt denies hitting head or LOC. Pt now reports pain to right shoulder and right lower back. Pt has skin tear to left elbow.         HPI: 78-year-old man with PMHx of HTN, Afib (not on AC), hypothyroidism (on Synthroid), HLD, COPD, bradycardia presents via EMS to [facility] ER for evaluation after a fall that occurred just PTA. Patient reports that he got up from bed and on his way to the bathroom he fell.  Patient is unsure why he fell but thinks the towel got caught in his walker. He scraped his left arm on the way down trying to break his fall. The patient lives alone and the fall was unwitnessed. Patient complains of right shoulder pain and right hip pain. States that he had a right hip replacement 2 years ago at [facility]. He denies head injury.  He denies loss of consciousness.  Patient states that he takes a daily aspirin, no other blood thinners. Patient also complains of SOB and bilateral lower leg edema.  Patient reports that he takes Lasix BID, which mildly improves his lower extremity edema.  Patient also reports chronic wounds on his lower extremities and follows wound care. Patient was seen by his PCP Dr. Cloud on 9/6/23, and was given doxycycline and fluconazole for the wounds on his legs. Patient states that he has not been seen by a cardiologist in several years. He denies chest pain,  nausea, vomiting, diarrhea, abdominal pain, urinary  frequency, urgency, or any other complaints at this time.    In the ED, patient's vitals were Temp 98.3, HR 64, /84. Labs were significant for BNP 2670, Trop 0.030. CT head and neck were unremarkable. XR Interval ORIF of right femoral neck fracture. No acute fracture or dislocation. CXR Stable cardiomegaly. No other acute findings. Patient was admitted to [facility] for CHF exacerbation.      Past Medical History:   Diagnosis Date    Atrial fibrillation, unspecified type 9/6/2023    COPD exacerbation 9/6/2023    Hyperlipidemia     Hypertension     Thyroid disease     hypo    Ventricular tachycardia     frequent PVCs       Past Surgical History:   Procedure Laterality Date    COLONOSCOPY  01/01/2011    CORONARY ANGIOGRAPHY N/A 07/22/2021    Procedure: ANGIOGRAM, CORONARY ARTERY;  Surgeon: Hank Barry MD;  Location: [facility] CATH LAB/EP;  Service: Cardiology;  Laterality: N/A;    EYE SURGERY Bilateral     cataracts extraction    FRACTURE SURGERY Right 09/2021    femur    HERNIA REPAIR      HIP SURGERY Right 08/2021    INTRAMEDULLARY RODDING OF TROCHANTER OF FEMUR Right 09/03/2021    Procedure: INSERTION, INTRAMEDULLARY RACQUEL, FEMUR, TROCHANTER;  Surgeon: Darryn Hall MD;  Location: [facility] OR;  Service: Orthopedics;  Laterality: Right;    JOINT REPLACEMENT Bilateral     knee    LEFT HEART CATHETERIZATION Right 07/22/2021    Procedure: Left heart cath;  Surgeon: Hank Barry MD;  Location: [facility] CATH LAB/EP;  Service: Cardiology;  Laterality: Right;    VASECTOMY         Review of patient's allergies indicates:  No Known Allergies    No current facility-administered medications on file prior to encounter.     Current Outpatient Medications on File Prior to Encounter   Medication Sig    amLODIPine (NORVASC) 2.5 MG tablet Take 1 tablet by mouth once daily (Patient taking differently: Take 2.5 mg by mouth once daily.)    atorvastatin (LIPITOR) 10 MG tablet Take 1 tablet (10 mg total) by mouth  once daily.    FLUoxetine (PROZAC) 20 MG capsule Take 1 capsule (20 mg total) by mouth once daily.    levothyroxine (SYNTHROID) 200 MCG tablet Take 1 tablet (200 mcg total) by mouth once daily.    losartan (COZAAR) 100 MG tablet Take 1 tablet (100 mg total) by mouth once daily.    metoprolol succinate (TOPROL-XL) 50 MG 24 hr tablet Take 1 tablet (50 mg total) by mouth once daily.    spironolactone (ALDACTONE) 25 MG tablet Take 1 tablet by mouth once daily    acetaminophen (TYLENOL) 325 MG tablet Take 2 tablets (650 mg total) by mouth every 6 (six) hours as needed for Pain.    aspirin (ECOTRIN) 81 MG EC tablet Take 1 tablet (81 mg total) by mouth once daily.    doxycycline (VIBRA-TABS) 100 MG tablet Take 1 tablet (100 mg total) by mouth 2 (two) times daily. Any generic brand capsule or tablet    fluconazole (DIFLUCAN) 100 MG tablet Take 1 tablet (100 mg total) by mouth once daily.    furosemide (LASIX) 40 MG tablet Take 1 tablet (40 mg total) by mouth 2 (two) times a day. Take daily for next 3 days, then use as needed. Weigh daily. Afterwards, f weight increases 2 lbs/24h, take dose of lasix daily  until weight is back to baseline    HYDROcodone-acetaminophen (NORCO) 5-325 mg per tablet Take 1 tablet by mouth every 6 (six) hours as needed for Pain.    magnesium hydroxide 400 mg/5 ml (MILK OF MAGNESIA) 400 mg/5 mL Susp Take 30 mLs by mouth daily as needed (constipation).    methocarbamoL (ROBAXIN) 500 MG Tab Take 1 tablet by mouth 4 times daily    multivit-min-FA-lycopen-lutein (CENTRUM SILVER) 0.4-300-250 mg-mcg-mcg Tab Take 1 tablet by mouth once daily.    [DISCONTINUED] amLODIPine (NORVASC) 2.5 MG tablet Take 1 tablet by mouth once daily     Family History       Problem Relation (Age of Onset)    Crohn's disease Mother    Dementia Mother    Heart attack Father    No Known Problems Sister, Brother, Son          Tobacco Use    Smoking status: Former     Current packs/day: 0.00     Average packs/day: 1  pack/day for 35.0 years (35.0 ttl pk-yrs)     Types: Cigarettes     Start date: 1965     Quit date: 2000     Years since quittin.7     Passive exposure: Past    Smokeless tobacco: Never   Substance and Sexual Activity    Alcohol use: No    Drug use: Never    Sexual activity: Not Currently     Review of Systems   Constitutional:  Negative for activity change, appetite change, chills, diaphoresis and fever.   Respiratory:  Negative for shortness of breath.    Cardiovascular:  Negative for chest pain, palpitations and leg swelling.   Gastrointestinal:  Negative for abdominal distention, abdominal pain, constipation, diarrhea, nausea and vomiting.   Genitourinary:  Negative for difficulty urinating and dysuria.   Musculoskeletal:  Positive for myalgias.        R hip pain   Neurological:  Negative for dizziness, syncope, weakness, light-headedness and headaches.     Objective:     Vital Signs (Most Recent):  Temp: 98 °F (36.7 °C) (10/04/23 1826)  Pulse: (!) 43 (10/04/23 1826)  Resp: 18 (10/04/23 1826)  BP: (!) 183/86 (10/04/23 1826)  SpO2: (!) 94 % (10/04/23 1826) Vital Signs (24h Range):  Temp:  [98 °F (36.7 °C)-98.3 °F (36.8 °C)] 98 °F (36.7 °C)  Pulse:  [38-64] 43  Resp:  [13-21] 18  SpO2:  [93 %-96 %] 94 %  BP: (136-183)/(70-90) 183/86     Weight: 97.5 kg (215 lb)  Body mass index is 29.99 kg/m².     Physical Exam  Vitals reviewed.   Constitutional:       General: He is not in acute distress.     Appearance: He is not toxic-appearing or diaphoretic.   Eyes:      Extraocular Movements: Extraocular movements intact.   Cardiovascular:      Rate and Rhythm: Bradycardia present. Rhythm irregular.   Pulmonary:      Effort: Pulmonary effort is normal. No respiratory distress.      Breath sounds: No wheezing, rhonchi or rales.   Abdominal:      General: Abdomen is flat.      Palpations: Abdomen is soft.   Musculoskeletal:         General: Normal range of motion.      Cervical back: Normal range of motion.     "  Right lower leg: Edema present.      Left lower leg: Edema present.      Comments: Venous statis dermatitis bilaterally  Trace pitting edema bilaterally   Skin:     General: Skin is warm.   Neurological:      Mental Status: He is alert. Mental status is at baseline.   Psychiatric:         Mood and Affect: Mood normal.         Behavior: Behavior normal.         Thought Content: Thought content normal.                Significant Labs: All pertinent labs within the past 24 hours have been reviewed.    Significant Imaging: I have reviewed all pertinent imaging results/findings within the past 24 hours.              Assessment/Plan:     * Acute on chronic congestive heart failure  proBNP 2670 (3260 2 years ago)  CXR stable cardiomegaly   No significant signs of fluid overload on admit  09/2021: EF 40%, grade III diastolic dysfunction    Plan:  Will give IV Lasix 40 mg  Likely can resume home dose of PO Lasix 40 BID  Strict I/O  Monitor UOP  Repeat ECHO ordered    Atrial fibrillation, unspecified type  Afib on EKG on admit, unsure why patient not on AC (possibly due to age?)  Per chart review has had afib since 2021    AKHIL (obstructive sleep apnea)  CPAP QHS       Venous stasis ulcer of right calf limited to breakdown of skin without varicose veins  Consult Wound Care. Appreciate recs      Fall  Here s/p fall on 10/4    C/o R hip pain, has hx of rods and screws 2 years ago  Imaging unremarkable     Plan:  Patient declined PT/OT  [place] and Tylenol PRN for pain      Bradycardia  Has hx of atrial bigeminy and "pseudobradycardia" for chart review  Saw Cardiac Electrophysiology in the past    Hypothyroidism due to acquired atrophy of thyroid  Continue home Synthroid 200 mcg    Mixed hyperlipidemia  Continue Atorvastatin 10 mg    Essential hypertension  Continue home Amlodipine 2.5 mg, spironolactone 25 mg, and losartan 100 mg  Hydralazine PRN for SBP >180      VTE Risk Mitigation (From admission, onward)         Ordered     " enoxaparin injection 40 mg  Daily         10/04/23 1501     IP VTE HIGH RISK PATIENT  Once         10/04/23 1501     Place sequential compression device  Until discontinued         10/04/23 1501                       On 10/04/2023, patient should be placed in hospital observation services under my care in collaboration with Dr. Durand.    Jazmin Huntley MD  [facility], PGY-2  10/04/2023

## 2023-10-05 NOTE — SUBJECTIVE & OBJECTIVE
Past Medical History:   Diagnosis Date    Atrial fibrillation, unspecified type 9/6/2023    COPD exacerbation 9/6/2023    Hyperlipidemia     Hypertension     Thyroid disease     hypo    Ventricular tachycardia     frequent PVCs       Past Surgical History:   Procedure Laterality Date    COLONOSCOPY  01/01/2011    CORONARY ANGIOGRAPHY N/A 07/22/2021    Procedure: ANGIOGRAM, CORONARY ARTERY;  Surgeon: Hank Barry MD;  Location: [facility] CATH LAB/EP;  Service: Cardiology;  Laterality: N/A;    EYE SURGERY Bilateral     cataracts extraction    FRACTURE SURGERY Right 09/2021    femur    HERNIA REPAIR      HIP SURGERY Right 08/2021    INTRAMEDULLARY RODDING OF TROCHANTER OF FEMUR Right 09/03/2021    Procedure: INSERTION, INTRAMEDULLARY RACQUEL, FEMUR, TROCHANTER;  Surgeon: Darryn Hall MD;  Location: [facility] OR;  Service: Orthopedics;  Laterality: Right;    JOINT REPLACEMENT Bilateral     knee    LEFT HEART CATHETERIZATION Right 07/22/2021    Procedure: Left heart cath;  Surgeon: Hank Barry MD;  Location: [facility] CATH LAB/EP;  Service: Cardiology;  Laterality: Right;    VASECTOMY         Review of patient's allergies indicates:  No Known Allergies    No current facility-administered medications on file prior to encounter.     Current Outpatient Medications on File Prior to Encounter   Medication Sig    amLODIPine (NORVASC) 2.5 MG tablet Take 1 tablet by mouth once daily (Patient taking differently: Take 2.5 mg by mouth once daily.)    atorvastatin (LIPITOR) 10 MG tablet Take 1 tablet (10 mg total) by mouth once daily.    FLUoxetine (PROZAC) 20 MG capsule Take 1 capsule (20 mg total) by mouth once daily.    levothyroxine (SYNTHROID) 200 MCG tablet Take 1 tablet (200 mcg total) by mouth once daily.    losartan (COZAAR) 100 MG tablet Take 1 tablet (100 mg total) by mouth once daily.    metoprolol succinate (TOPROL-XL) 50 MG 24 hr tablet Take 1 tablet (50 mg total) by mouth once daily.    spironolactone (ALDACTONE) 25 MG  tablet Take 1 tablet by mouth once daily    acetaminophen (TYLENOL) 325 MG tablet Take 2 tablets (650 mg total) by mouth every 6 (six) hours as needed for Pain.    aspirin (ECOTRIN) 81 MG EC tablet Take 1 tablet (81 mg total) by mouth once daily.    doxycycline (VIBRA-TABS) 100 MG tablet Take 1 tablet (100 mg total) by mouth 2 (two) times daily. Any generic brand capsule or tablet    fluconazole (DIFLUCAN) 100 MG tablet Take 1 tablet (100 mg total) by mouth once daily.    furosemide (LASIX) 40 MG tablet Take 1 tablet (40 mg total) by mouth 2 (two) times a day. Take daily for next 3 days, then use as needed. Weigh daily. Afterwards, f weight increases 2 lbs/24h, take dose of lasix daily  until weight is back to baseline    HYDROcodone-acetaminophen (NORCO) 5-325 mg per tablet Take 1 tablet by mouth every 6 (six) hours as needed for Pain.    magnesium hydroxide 400 mg/5 ml (MILK OF MAGNESIA) 400 mg/5 mL Susp Take 30 mLs by mouth daily as needed (constipation).    methocarbamoL (ROBAXIN) 500 MG Tab Take 1 tablet by mouth 4 times daily    multivit-min-FA-lycopen-lutein (CENTRUM SILVER) 0.4-300-250 mg-mcg-mcg Tab Take 1 tablet by mouth once daily.    [DISCONTINUED] amLODIPine (NORVASC) 2.5 MG tablet Take 1 tablet by mouth once daily     Family History       Problem Relation (Age of Onset)    Crohn's disease Mother    Dementia Mother    Heart attack Father    No Known Problems Sister, Brother, Son          Tobacco Use    Smoking status: Former     Current packs/day: 0.00     Average packs/day: 1 pack/day for 35.0 years (35.0 ttl pk-yrs)     Types: Cigarettes     Start date: 1965     Quit date: 2000     Years since quittin.7     Passive exposure: Past    Smokeless tobacco: Never   Substance and Sexual Activity    Alcohol use: No    Drug use: Never    Sexual activity: Not Currently     Review of Systems   Constitutional:  Negative for activity change, appetite change, chills, diaphoresis and fever.    Respiratory:  Negative for shortness of breath.    Cardiovascular:  Negative for chest pain, palpitations and leg swelling.   Gastrointestinal:  Negative for abdominal distention, abdominal pain, constipation, diarrhea, nausea and vomiting.   Genitourinary:  Negative for difficulty urinating and dysuria.   Musculoskeletal:  Positive for myalgias.        R hip pain   Neurological:  Negative for dizziness, syncope, weakness, light-headedness and headaches.     Objective:     Vital Signs (Most Recent):  Temp: 98 °F (36.7 °C) (10/04/23 1826)  Pulse: (!) 43 (10/04/23 1826)  Resp: 18 (10/04/23 1826)  BP: (!) 183/86 (10/04/23 1826)  SpO2: (!) 94 % (10/04/23 1826) Vital Signs (24h Range):  Temp:  [98 °F (36.7 °C)-98.3 °F (36.8 °C)] 98 °F (36.7 °C)  Pulse:  [38-64] 43  Resp:  [13-21] 18  SpO2:  [93 %-96 %] 94 %  BP: (136-183)/(70-90) 183/86     Weight: 97.5 kg (215 lb)  Body mass index is 29.99 kg/m².     Physical Exam  Vitals reviewed.   Constitutional:       General: He is not in acute distress.     Appearance: He is not toxic-appearing or diaphoretic.   Eyes:      Extraocular Movements: Extraocular movements intact.   Cardiovascular:      Rate and Rhythm: Bradycardia present. Rhythm irregular.   Pulmonary:      Effort: Pulmonary effort is normal. No respiratory distress.      Breath sounds: No wheezing, rhonchi or rales.   Abdominal:      General: Abdomen is flat.      Palpations: Abdomen is soft.   Musculoskeletal:         General: Normal range of motion.      Cervical back: Normal range of motion.      Right lower leg: Edema present.      Left lower leg: Edema present.      Comments: Venous statis dermatitis bilaterally  Trace pitting edema bilaterally   Skin:     General: Skin is warm.   Neurological:      Mental Status: He is alert. Mental status is at baseline.   Psychiatric:         Mood and Affect: Mood normal.         Behavior: Behavior normal.         Thought Content: Thought content normal.                 Significant Labs: All pertinent labs within the past 24 hours have been reviewed.    Significant Imaging: I have reviewed all pertinent imaging results/findings within the past 24 hours.

## 2023-10-05 NOTE — TELEPHONE ENCOUNTER
----- Message from Brook Alexis sent at 10/5/2023  1:18 PM CDT -----  Regarding: HFU  Patient is being discharged from [facility] and is requiring a hospital follow up appointment with their Primary Care Provider in 7 days. Patient is established. I am unable to schedule an appointment in that time frame. Please schedule patient a sooner appointment and message me back so Discharge Nurse can advise patient prior to discharge.    DX:Acute on chronic congestive heart failure      Thank you, Safia  Physician Referral Specialist/Discharge

## 2023-10-05 NOTE — ASSESSMENT & PLAN NOTE
Here s/p fall on 10/4    C/o R hip pain, has hx of rods and screws 2 years ago  Imaging unremarkable     Plan:  Patient declined PT/OT  [place] and Tylenol PRN for pain     Closure 3 Information: This tab is for additional flaps and grafts above and beyond our usual structured repairs.  Please note if you enter information here it will not currently bill and you will need to add the billing information manually.

## 2023-10-05 NOTE — ASSESSMENT & PLAN NOTE
Continue home Amlodipine 2.5 mg, spironolactone 25 mg, and losartan 100 mg  Hydralazine PRN for SBP >180

## 2023-10-05 NOTE — ASSESSMENT & PLAN NOTE
proBNP 2670 (3260 2 years ago)  CXR stable cardiomegaly   No significant signs of fluid overload on admit  09/2021: EF 40%, grade III diastolic dysfunction    Plan:  Will give IV Lasix 40 mg  Likely can resume home dose of PO Lasix 40 BID  Strict I/O  Monitor UOP  Repeat ECHO ordered

## 2023-10-05 NOTE — PLAN OF CARE
Problem: Physical Therapy  Goal: Physical Therapy Goal  Description: Goals to be met by: 23     Patient will increase functional independence with mobility by performin. Sit to stand transfer with Modified [place]  2. Bed to chair transfer with Modified [place] using Rolling Walker  3. Gait  x 300 feet with Modified [place] using Rolling Walker.     Outcome: Ongoing, Progressing    PT Eval completed, note to follow. Pt ambulated ~180 ft with RW and SBA. No LOB noted. Pt reports soreness in R hip s/p fall. Discussed benefits of low intensity therapy such as OP PT to address balance - pt declined. Pt owns recommended DME.

## 2023-10-05 NOTE — DISCHARGE SUMMARY
[facility] Medicine  Discharge Summary      Patient Name: Bean Salas  MRN: [id number]  LIVAN: [id number]  Patient Class: OP- Observation  Admission Date: 10/4/2023  Hospital Length of Stay: 0 days  Discharge Date and Time:  10/05/2023 2:46 PM  Attending Physician: Era Durand MD   Discharging Provider: Jazmin Huntley MD  Primary Care Provider: Ramirez Levine MD    Primary Care Team: Networked reference to record PCT     HPI:   78-year-old man with PMHx of HTN, Afib (not on AC), hypothyroidism (on Synthroid), HLD, AKHIL, bradycardia presents via EMS to [facility] ER for evaluation after a fall that occurred just PTA. Patient reports that he got up from bed and on his way to the bathroom he fell.  Patient is unsure why he fell but thinks the towel got caught in his walker. He scraped his left arm on the way down trying to break his fall. The patient lives alone and the fall was unwitnessed. Patient complains of right shoulder pain and right hip pain. States that he had a right hip replacement 2 years ago at [facility]. He denies head injury.  He denies loss of consciousness.  Patient states that he takes a daily aspirin, no other blood thinners. Patient also complains of SOB and bilateral lower leg edema.  Patient reports that he takes Lasix BID, which mildly improves his lower extremity edema.  Patient also reports chronic wounds on his lower extremities and follows wound care. Patient was seen by his PCP Dr. Cloud on 9/6/23, and was given doxycycline and fluconazole for the wounds on his legs. Patient states that he has not been seen by a cardiologist in several years. He denies chest pain,  nausea, vomiting, diarrhea, abdominal pain, urinary frequency, urgency, or any other complaints at this time.    In the ED, patient's vitals were Temp 98.3, HR 64, /84. Labs were significant for BNP 2670, Trop 0.030. CT head and neck were unremarkable. XR Interval ORIF of right  femoral neck fracture. No acute fracture or dislocation. CXR Stable cardiomegaly. No other acute findings. Patient was admitted to [facility] for CHF exacerbation.      * No surgery found *      Hospital Course:   Patient given IV Lasix while inpatient for CHF exacerbation. Patient remained stable on room air during entire admission. ECHO ordered which showed EF 52% and indeterminate diastolic dysfunction due to atrial fibrillation. His R hip pain was controlled with Tylenol. PT/OT consulted and patient was able to ambulate without any pain or difficulty. Patient tolerating PO intake. Patient declined wanting Home Health at this time. Patient requesting to be discharged as he is back to his baseline. Patient to follow up with PCP and Cardiology on discharge for on-going management of his afib and heart failure. Patient medically stable for discharge at this time. Return precautions discussed. All questions answered. Patient verbalized understanding.     Physical Exam  Vitals reviewed.   Constitutional:       General: He is not in acute distress.     Appearance: He is not toxic-appearing or diaphoretic.   Eyes:      Extraocular Movements: Extraocular movements intact.   Cardiovascular:      Rate and Rhythm: Bradycardia present. Rhythm irregular.   Pulmonary:      Effort: Pulmonary effort is normal. No respiratory distress.      Breath sounds: No wheezing, rhonchi or rales.   Abdominal:      General: Abdomen is flat.      Palpations: Abdomen is soft.   Musculoskeletal:         General: Normal range of motion.      Cervical back: Normal range of motion.      Right lower leg: Edema present.      Left lower leg: Edema present.      Comments: Venous statis dermatitis bilaterally  Skin:     General: Skin is warm.   Neurological:      Mental Status: He is alert. Mental status is at baseline.   Psychiatric:         Mood and Affect: Mood normal.         Behavior: Behavior normal.         Thought Content: Thought content  normal.        Goals of Care Treatment Preferences:  Code Status: DNR       LaPOST: Yes           Consults:     No new Assessment & Plan notes have been filed under this hospital service since the last note was generated.  Service: Hospital Medicine    Final Active Diagnoses:    Diagnosis Date Noted POA    PRINCIPAL PROBLEM:  Acute on chronic congestive heart failure [I50.9]  Unknown    Atrial fibrillation, unspecified type [I48.91] 09/06/2023 Yes    AKHIL (obstructive sleep apnea) [G47.33] 10/05/2022 Yes    Venous stasis ulcer of right calf limited to breakdown of skin without varicose veins [I87.2, L97.211]  Yes    Fall [W19.XXXA] 09/02/2021 Yes    Bradycardia [R00.1] 08/06/2021 Yes    Essential hypertension [I10] 06/25/2017 Yes    Mixed hyperlipidemia [E78.2] 06/25/2017 Yes    Hypothyroidism due to acquired atrophy of thyroid [E03.4] 06/25/2017 Yes      Problems Resolved During this Admission:       Discharged Condition: stable    Disposition: Home or Self Care    Follow Up:    Patient Instructions:      Ambulatory referral/consult to Cardiology   Standing Status: Future   Referral Priority: Routine Referral Type: Consultation   Referral Reason: Specialty Services Required   Requested Specialty: Cardiology   Number of Visits Requested: 1     Diet Cardiac     Diet diabetic     Notify your health care provider if you experience any of the following:  temperature >100.4     Notify your health care provider if you experience any of the following:  persistent nausea and vomiting or diarrhea     Notify your health care provider if you experience any of the following:  difficulty breathing or increased cough     Notify your health care provider if you experience any of the following:  persistent dizziness, light-headedness, or visual disturbances     Notify your health care provider if you experience any of the following:  increased confusion or weakness     Activity as tolerated       Significant Diagnostic  Studies: N/A    Pending Diagnostic Studies:     None         Medications:  Reconciled Home Medications:      Medication List      CHANGE how you take these medications    amLODIPine 2.5 MG tablet  Commonly known as: NORVASC  Take 1 tablet by mouth once daily  What changed: when to take this        CONTINUE taking these medications    acetaminophen 325 MG tablet  Commonly known as: TYLENOL  Take 2 tablets (650 mg total) by mouth every 6 (six) hours as needed for Pain.     aspirin 81 MG EC tablet  Commonly known as: ECOTRIN  Take 1 tablet (81 mg total) by mouth once daily.     atorvastatin 10 MG tablet  Commonly known as: LIPITOR  Take 1 tablet (10 mg total) by mouth once daily.     CENTRUM SILVER 0.4 mg-300 mcg- 250 mcg Tab  Generic drug: multivit-min-FA-lycopen-lutein  Take 1 tablet by mouth once daily.     doxycycline 100 MG tablet  Commonly known as: VIBRA-TABS  Take 1 tablet (100 mg total) by mouth 2 (two) times daily. Any generic brand capsule or tablet     fluconazole 100 MG tablet  Commonly known as: DIFLUCAN  Take 1 tablet (100 mg total) by mouth once daily.     FLUoxetine 20 MG capsule  Commonly known as: PROzac  Take 1 capsule (20 mg total) by mouth once daily.     furosemide 40 MG tablet  Commonly known as: LASIX  Take 1 tablet (40 mg total) by mouth 2 (two) times a day. Take daily for next 3 days, then use as needed. Weigh daily. Afterwards, f weight increases 2 lbs/24h, take dose of lasix daily  until weight is back to baseline     HYDROcodone-acetaminophen 5-325 mg per tablet  Commonly known as: NORCO  Take 1 tablet by mouth every 6 (six) hours as needed for Pain.     levothyroxine 200 MCG tablet  Commonly known as: SYNTHROID  Take 1 tablet (200 mcg total) by mouth once daily.     losartan 100 MG tablet  Commonly known as: COZAAR  Take 1 tablet (100 mg total) by mouth once daily.     magnesium hydroxide 400 mg/5 ml 400 mg/5 mL Susp  Commonly known as: MILK OF MAGNESIA  Take 30 mLs by mouth daily as  needed (constipation).     methocarbamoL 500 MG Tab  Commonly known as: ROBAXIN  Take 1 tablet by mouth 4 times daily     metoprolol succinate 50 MG 24 hr tablet  Commonly known as: TOPROL-XL  Take 1 tablet (50 mg total) by mouth once daily.     spironolactone 25 MG tablet  Commonly known as: ALDACTONE  Take 1 tablet by mouth once daily            Indwelling Lines/Drains at time of discharge:   Lines/Drains/Airways     None                 Time spent on the discharge of patient: 30 minutes    Jazmin Huntley MD  [facility], PGY-2  10/05/2023

## 2023-10-05 NOTE — PROGRESS NOTES
Virtual Nurse:Discharge orders noted; additional clinical references attached.  and pharmacy tech notified.  Patient's discharge instruction packet given by bedside RN.    Cued into patient's room.  Permission received per patient to turn camera to view patient.  Introduced as VN that will be instructing on discharge instructions.  Son at bedside.  Educated patient on reason for admission; medications to hold, continue, and start, appointment to follow-up with doctor, and when to return to ED. Teach back method used. Verbalized understanding  Number given for 24/7 Nurse Line. Opportunity given for questions and questions answered.  Bedside nurse updated. Transport to [facility] requested.        10/05/23 1602   Shift   Virtual Nurse - Rounding Complete   Virtual Nurse - Patient Verbalized Approval Of Camera Use;VN Rounding   Type of Frequent Check   Type Patient Rounds   Safety/Activity   Patient Rounds visualized patient   Safety Promotion/Fall Prevention Fall Risk reviewed with patient/family

## 2023-10-05 NOTE — HOSPITAL COURSE
Patient given IV Lasix while inpatient for CHF exacerbation. Patient remained stable on room air during entire admission. ECHO ordered which showed EF 52% and indeterminate diastolic dysfunction due to atrial fibrillation. His R hip pain was controlled with Tylenol. PT/OT consulted and patient was able to ambulate without any pain or difficulty. Patient tolerating PO intake. Patient declined wanting Home Health at this time. Patient requesting to be discharged as he is back to his baseline. Patient to follow up with PCP and Cardiology on discharge for on-going management of his afib and heart failure. Patient medically stable for discharge at this time. Return precautions discussed. All questions answered. Patient verbalized understanding.     Physical Exam  Vitals reviewed.   Constitutional:       General: He is not in acute distress.     Appearance: He is not toxic-appearing or diaphoretic.   Eyes:      Extraocular Movements: Extraocular movements intact.   Cardiovascular:      Rate and Rhythm: Bradycardia present. Rhythm irregular.   Pulmonary:      Effort: Pulmonary effort is normal. No respiratory distress.      Breath sounds: No wheezing, rhonchi or rales.   Abdominal:      General: Abdomen is flat.      Palpations: Abdomen is soft.   Musculoskeletal:         General: Normal range of motion.      Cervical back: Normal range of motion.      Right lower leg: Edema present.      Left lower leg: Edema present.      Comments: Venous statis dermatitis bilaterally  Skin:     General: Skin is warm.   Neurological:      Mental Status: He is alert. Mental status is at baseline.   Psychiatric:         Mood and Affect: Mood normal.         Behavior: Behavior normal.         Thought Content: Thought content normal.

## 2023-10-05 NOTE — CONSULTS
[facility] Surg  Wound Care    Patient Name:  Bean Salas   MRN:  [id number]  Date: 10/5/2023  Diagnosis: Acute on chronic congestive heart failure    History:     Past Medical History:   Diagnosis Date    Atrial fibrillation, unspecified type 2023    COPD exacerbation 2023    Hyperlipidemia     Hypertension     Thyroid disease     hypo    Ventricular tachycardia     frequent PVCs       Social History     Socioeconomic History    Marital status:    Tobacco Use    Smoking status: Former     Current packs/day: 0.00     Average packs/day: 1 pack/day for 35.0 years (35.0 ttl pk-yrs)     Types: Cigarettes     Start date: 1965     Quit date: 2000     Years since quittin.7     Passive exposure: Past    Smokeless tobacco: Never   Substance and Sexual Activity    Alcohol use: No    Drug use: Never    Sexual activity: Not Currently     Social Determinants of Health     Financial Resource Strain: Low Risk  (2022)    Overall Financial Resource Strain (CARDIA)     Difficulty of Paying Living Expenses: Not hard at all   Food Insecurity: No Food Insecurity (2022)    Hunger Vital Sign     Worried About Running Out of Food in the Last Year: Never true     Ran Out of Food in the Last Year: Never true   Transportation Needs: No Transportation Needs (2022)    PRAPARE - Transportation     Lack of Transportation (Medical): No     Lack of Transportation (Non-Medical): No   Physical Activity: Inactive (2022)    Exercise Vital Sign     Days of Exercise per Week: 0 days     Minutes of Exercise per Session: 0 min   Stress: No Stress Concern Present (2022)    [place] [employer] - Occupational Stress Questionnaire     Feeling of Stress : Not at all   Social Connections: Socially Isolated (2022)    Social Connection and Isolation Panel [NHANES]     Frequency of Communication with Friends and Family: Once a week     Frequency of Social Gatherings with Friends and  Family: Once a week     Attends Latter day Services: Never     Active Member of Clubs or Organizations: No     Attends Club or Organization Meetings: Never     Marital Status:    Housing Stability: Low Risk  (11/4/2022)    Housing Stability Vital Sign     Unable to Pay for Housing in the Last Year: No     Number of Places Lived in the Last Year: 1     Unstable Housing in the Last Year: No       Precautions:     Allergies as of 10/04/2023    (No Known Allergies)       WOC Assessment Details/Treatment     L lower arm- partial thickness skin tear        LLE- thickened intact scab- dry skin  RLE- scattered dried scabs with healing partial thickness ulceration medially with scant clear drainage- dry skin- no erythema      Recommendation discussed with pt, nurse, and Dr. Huntley:  - Pressure injury prevention interventions   - Mepilex border - silicone foam to L lower arm and RLE- change 2x/week until healed  - Betadine to dry scabs to BLE daily- open to air  - Moisturizer to BLE    10/05/2023

## 2023-10-05 NOTE — ASSESSMENT & PLAN NOTE
"Has hx of atrial bigeminy and "pseudobradycardia" for chart review  Saw Cardiac Electrophysiology in the past  "

## 2023-10-13 ENCOUNTER — OFFICE VISIT (OUTPATIENT)
Dept: FAMILY MEDICINE | Facility: CLINIC | Age: 78
End: 2023-10-13
Payer: MEDICARE

## 2023-10-13 VITALS
BODY MASS INDEX: 29.58 KG/M2 | DIASTOLIC BLOOD PRESSURE: 68 MMHG | SYSTOLIC BLOOD PRESSURE: 132 MMHG | TEMPERATURE: 98 F | HEART RATE: 48 BPM | OXYGEN SATURATION: 97 % | WEIGHT: 211.31 LBS | HEIGHT: 71 IN

## 2023-10-13 DIAGNOSIS — E03.4 HYPOTHYROIDISM DUE TO ACQUIRED ATROPHY OF THYROID: ICD-10-CM

## 2023-10-13 DIAGNOSIS — I10 ESSENTIAL HYPERTENSION: Primary | ICD-10-CM

## 2023-10-13 DIAGNOSIS — I48.91 ATRIAL FIBRILLATION, UNSPECIFIED TYPE: ICD-10-CM

## 2023-10-13 DIAGNOSIS — R73.9 HYPERGLYCEMIA: ICD-10-CM

## 2023-10-13 PROCEDURE — 3288F PR FALLS RISK ASSESSMENT DOCUMENTED: ICD-10-PCS | Mod: CPTII,S$GLB,, | Performed by: FAMILY MEDICINE

## 2023-10-13 PROCEDURE — 3075F PR MOST RECENT SYSTOLIC BLOOD PRESS GE 130-139MM HG: ICD-10-PCS | Mod: CPTII,S$GLB,, | Performed by: FAMILY MEDICINE

## 2023-10-13 PROCEDURE — 1126F PR PAIN SEVERITY QUANTIFIED, NO PAIN PRESENT: ICD-10-PCS | Mod: CPTII,S$GLB,, | Performed by: FAMILY MEDICINE

## 2023-10-13 PROCEDURE — 1159F MED LIST DOCD IN RCRD: CPT | Mod: CPTII,S$GLB,, | Performed by: FAMILY MEDICINE

## 2023-10-13 PROCEDURE — 1126F AMNT PAIN NOTED NONE PRSNT: CPT | Mod: CPTII,S$GLB,, | Performed by: FAMILY MEDICINE

## 2023-10-13 PROCEDURE — 3078F DIAST BP <80 MM HG: CPT | Mod: CPTII,S$GLB,, | Performed by: FAMILY MEDICINE

## 2023-10-13 PROCEDURE — 3075F SYST BP GE 130 - 139MM HG: CPT | Mod: CPTII,S$GLB,, | Performed by: FAMILY MEDICINE

## 2023-10-13 PROCEDURE — 1160F RVW MEDS BY RX/DR IN RCRD: CPT | Mod: CPTII,S$GLB,, | Performed by: FAMILY MEDICINE

## 2023-10-13 PROCEDURE — 1101F PT FALLS ASSESS-DOCD LE1/YR: CPT | Mod: CPTII,S$GLB,, | Performed by: FAMILY MEDICINE

## 2023-10-13 PROCEDURE — 99214 OFFICE O/P EST MOD 30 MIN: CPT | Mod: S$GLB,,, | Performed by: FAMILY MEDICINE

## 2023-10-13 PROCEDURE — 1101F PR PT FALLS ASSESS DOC 0-1 FALLS W/OUT INJ PAST YR: ICD-10-PCS | Mod: CPTII,S$GLB,, | Performed by: FAMILY MEDICINE

## 2023-10-13 PROCEDURE — 3288F FALL RISK ASSESSMENT DOCD: CPT | Mod: CPTII,S$GLB,, | Performed by: FAMILY MEDICINE

## 2023-10-13 PROCEDURE — 1157F PR ADVANCE CARE PLAN OR EQUIV PRESENT IN MEDICAL RECORD: ICD-10-PCS | Mod: CPTII,S$GLB,, | Performed by: FAMILY MEDICINE

## 2023-10-13 PROCEDURE — 99214 PR OFFICE/OUTPT VISIT, EST, LEVL IV, 30-39 MIN: ICD-10-PCS | Mod: S$GLB,,, | Performed by: FAMILY MEDICINE

## 2023-10-13 PROCEDURE — 1160F PR REVIEW ALL MEDS BY PRESCRIBER/CLIN PHARMACIST DOCUMENTED: ICD-10-PCS | Mod: CPTII,S$GLB,, | Performed by: FAMILY MEDICINE

## 2023-10-13 PROCEDURE — 1159F PR MEDICATION LIST DOCUMENTED IN MEDICAL RECORD: ICD-10-PCS | Mod: CPTII,S$GLB,, | Performed by: FAMILY MEDICINE

## 2023-10-13 PROCEDURE — 3078F PR MOST RECENT DIASTOLIC BLOOD PRESSURE < 80 MM HG: ICD-10-PCS | Mod: CPTII,S$GLB,, | Performed by: FAMILY MEDICINE

## 2023-10-13 PROCEDURE — 1157F ADVNC CARE PLAN IN RCRD: CPT | Mod: CPTII,S$GLB,, | Performed by: FAMILY MEDICINE

## 2023-10-13 RX ORDER — DOXYCYCLINE HYCLATE 100 MG
100 TABLET ORAL 2 TIMES DAILY
Qty: 28 TABLET | Refills: 0 | Status: SHIPPED | OUTPATIENT
Start: 2023-10-13 | End: 2024-01-22

## 2023-10-22 NOTE — PROGRESS NOTES
" Patient ID: Bean Salas is a 78 y.o. male.    Chief Complaint: Hospital Follow Up    HPI      Bean Salas is a 78 y.o. male patient follow-up for acute exacerbation heart failure.  Patient with acute shortness of breath and fluid buildup.  When in the hospital was given medication diuresis Lasix.  Feeling much better at this time.  Maintaining current weight and fluid level.  No complaints of PND orthopnea or increased dyspnea on exertion.    Vitals:    10/13/23 0927   BP: 132/68   BP Location: Left arm   Patient Position: Sitting   Pulse: (!) 48   Temp: 98.4 °F (36.9 °C)   TempSrc: Oral   SpO2: 97%   Weight: 95.8 kg (211 lb 5 oz)   Height: 5' 11" (1.803 m)            Review of Symptoms      Physical Exam    Constitutional:  Oriented to person, place, and time.appears well-developed and well-nourished.  No distress.      HENT  Head: Normocephalic and atraumatic  Right Ear: External ear normal.   Left Ear: External ear normal.   Nose: External nose normal.   Mouth:  Moist mucus membranes.    Eyes:  Conjunctivae are normal. Right eye exhibits no discharge.  Left eye exhibits no discharge. No scleral icterus.  No periorbital edema    Cardiovascular:  Regular rate and rhythm with normal S1 and S2     Pulmonary/Chest:   Clear to auscultation bilaterally without wheezes, rhonchi or rales      Musculoskeletal:  No edema. No obvious deformity No wasting       Neurological:  Alert and oriented to person, place, and time.   Coordination normal.     Skin:   Healing lower extremity cellulitis with dry flaky skin and healing ulcerations.    Psychiatric: Normal mood and affect. Behavior is normal.  Judgment and thought content normal.     Complete Blood Count  Lab Results   Component Value Date    RBC 4.31 (L) 10/05/2023    HGB 14.1 10/05/2023    HCT 42.3 10/05/2023    MCV 98 10/05/2023    MCH 32.7 (H) 10/05/2023    MCHC 33.3 10/05/2023    RDW 13.8 10/05/2023     10/05/2023    MPV 11.5 10/05/2023    GRAN 5.4 " 10/05/2023    GRAN 74.4 (H) 10/05/2023    LYMPH 1.2 10/05/2023    LYMPH 15.9 (L) 10/05/2023    MONO 0.7 10/05/2023    MONO 9.0 10/05/2023    EOS 0.0 10/05/2023    BASO 0.02 10/05/2023    EOSINOPHIL 0.1 10/05/2023    BASOPHIL 0.3 10/05/2023    DIFFMETHOD Automated 10/05/2023       Comprehensive Metabolic Panel  Lab Results   Component Value Date     (H) 10/05/2023    BUN 16 10/05/2023    CREATININE 1.0 10/05/2023     10/05/2023    K 4.0 10/05/2023     10/05/2023    PROT 7.0 10/05/2023    ALBUMIN 3.7 10/05/2023    BILITOT 2.8 (H) 10/05/2023    AST 29 10/05/2023    ALKPHOS 69 10/05/2023    CO2 29 10/05/2023    ALT 20 10/05/2023    ANIONGAP 10 10/05/2023       TSH  Lab Results   Component Value Date    TSH 1.584 10/05/2023       Assessment / Plan:      ICD-10-CM ICD-9-CM   1. Essential hypertension  I10 401.9   2. Atrial fibrillation, unspecified type  I48.91 427.31   3. Hypothyroidism due to acquired atrophy of thyroid  E03.4 244.8     246.8   4. Hyperglycemia  R73.9 790.29     Essential hypertension  -     Comprehensive Metabolic Panel; Future  -     CBC Auto Differential; Future  -     Lipid Panel; Future  -     TSH; Future  -     NT-PRO BNP, [place]; Future; Expected date: 10/13/2023  -     T4, Free; Future; Expected date: 10/13/2023    Atrial fibrillation, unspecified type    Hypothyroidism due to acquired atrophy of thyroid  -     Comprehensive Metabolic Panel; Future  -     CBC Auto Differential; Future  -     Lipid Panel; Future  -     TSH; Future  -     NT-PRO BNP, [place]; Future; Expected date: 10/13/2023  -     T4, Free; Future; Expected date: 10/13/2023    Hyperglycemia  -     Comprehensive Metabolic Panel; Future  -     CBC Auto Differential; Future  -     Lipid Panel; Future  -     TSH; Future  -     NT-PRO BNP, [place]; Future; Expected date: 10/13/2023  -     T4, Free; Future; Expected date: 10/13/2023    Other orders  -     doxycycline (VIBRA-TABS) 100 MG tablet; Take 1 tablet (100 mg total)  by mouth 2 (two) times daily. Any generic brand capsule or tablet  Dispense: 28 tablet; Refill: 0    Lower limb ulcerations-continues have some erythema-additional two weeks antibiotics

## 2023-11-02 ENCOUNTER — PATIENT MESSAGE (OUTPATIENT)
Dept: FAMILY MEDICINE | Facility: CLINIC | Age: 78
End: 2023-11-02
Payer: MEDICARE

## 2023-11-14 ENCOUNTER — OFFICE VISIT (OUTPATIENT)
Dept: CARDIOLOGY | Facility: CLINIC | Age: 78
End: 2023-11-14
Payer: MEDICARE

## 2023-11-14 VITALS — OXYGEN SATURATION: 98 % | BODY MASS INDEX: 30.43 KG/M2 | HEART RATE: 47 BPM | HEIGHT: 71 IN | WEIGHT: 217.38 LBS

## 2023-11-14 DIAGNOSIS — E78.49 OTHER HYPERLIPIDEMIA: ICD-10-CM

## 2023-11-14 DIAGNOSIS — I50.9 ACUTE ON CHRONIC CONGESTIVE HEART FAILURE, UNSPECIFIED HEART FAILURE TYPE: ICD-10-CM

## 2023-11-14 DIAGNOSIS — I25.10 CORONARY ARTERY DISEASE INVOLVING NATIVE CORONARY ARTERY OF NATIVE HEART WITHOUT ANGINA PECTORIS: ICD-10-CM

## 2023-11-14 DIAGNOSIS — I27.20 PULMONARY HYPERTENSION: ICD-10-CM

## 2023-11-14 DIAGNOSIS — I49.3 PVC'S (PREMATURE VENTRICULAR CONTRACTIONS): ICD-10-CM

## 2023-11-14 DIAGNOSIS — I49.8 ATRIAL BIGEMINY: ICD-10-CM

## 2023-11-14 DIAGNOSIS — I70.0 AORTIC ATHEROSCLEROSIS: ICD-10-CM

## 2023-11-14 DIAGNOSIS — J44.1 COPD EXACERBATION: ICD-10-CM

## 2023-11-14 DIAGNOSIS — I50.33 ACUTE ON CHRONIC DIASTOLIC CONGESTIVE HEART FAILURE: ICD-10-CM

## 2023-11-14 DIAGNOSIS — I10 ESSENTIAL HYPERTENSION: ICD-10-CM

## 2023-11-14 DIAGNOSIS — I48.91 ATRIAL FIBRILLATION, UNSPECIFIED TYPE: Primary | ICD-10-CM

## 2023-11-14 PROCEDURE — 1157F PR ADVANCE CARE PLAN OR EQUIV PRESENT IN MEDICAL RECORD: ICD-10-PCS | Mod: HCNC,CPTII,S$GLB, | Performed by: INTERNAL MEDICINE

## 2023-11-14 PROCEDURE — 1160F RVW MEDS BY RX/DR IN RCRD: CPT | Mod: HCNC,CPTII,S$GLB, | Performed by: INTERNAL MEDICINE

## 2023-11-14 PROCEDURE — 1126F PR PAIN SEVERITY QUANTIFIED, NO PAIN PRESENT: ICD-10-PCS | Mod: HCNC,CPTII,S$GLB, | Performed by: INTERNAL MEDICINE

## 2023-11-14 PROCEDURE — 1159F PR MEDICATION LIST DOCUMENTED IN MEDICAL RECORD: ICD-10-PCS | Mod: HCNC,CPTII,S$GLB, | Performed by: INTERNAL MEDICINE

## 2023-11-14 PROCEDURE — 1101F PR PT FALLS ASSESS DOC 0-1 FALLS W/OUT INJ PAST YR: ICD-10-PCS | Mod: HCNC,CPTII,S$GLB, | Performed by: INTERNAL MEDICINE

## 2023-11-14 PROCEDURE — 1157F ADVNC CARE PLAN IN RCRD: CPT | Mod: HCNC,CPTII,S$GLB, | Performed by: INTERNAL MEDICINE

## 2023-11-14 PROCEDURE — 99214 OFFICE O/P EST MOD 30 MIN: CPT | Mod: HCNC,S$GLB,, | Performed by: INTERNAL MEDICINE

## 2023-11-14 PROCEDURE — 99999 PR PBB SHADOW E&M-EST. PATIENT-LVL IV: ICD-10-PCS | Mod: PBBFAC,HCNC,, | Performed by: INTERNAL MEDICINE

## 2023-11-14 PROCEDURE — 3288F PR FALLS RISK ASSESSMENT DOCUMENTED: ICD-10-PCS | Mod: HCNC,CPTII,S$GLB, | Performed by: INTERNAL MEDICINE

## 2023-11-14 PROCEDURE — 1126F AMNT PAIN NOTED NONE PRSNT: CPT | Mod: HCNC,CPTII,S$GLB, | Performed by: INTERNAL MEDICINE

## 2023-11-14 PROCEDURE — 1159F MED LIST DOCD IN RCRD: CPT | Mod: HCNC,CPTII,S$GLB, | Performed by: INTERNAL MEDICINE

## 2023-11-14 PROCEDURE — 99214 PR OFFICE/OUTPT VISIT, EST, LEVL IV, 30-39 MIN: ICD-10-PCS | Mod: HCNC,S$GLB,, | Performed by: INTERNAL MEDICINE

## 2023-11-14 PROCEDURE — 99999 PR PBB SHADOW E&M-EST. PATIENT-LVL IV: CPT | Mod: PBBFAC,HCNC,, | Performed by: INTERNAL MEDICINE

## 2023-11-14 PROCEDURE — 1101F PT FALLS ASSESS-DOCD LE1/YR: CPT | Mod: HCNC,CPTII,S$GLB, | Performed by: INTERNAL MEDICINE

## 2023-11-14 PROCEDURE — 3288F FALL RISK ASSESSMENT DOCD: CPT | Mod: HCNC,CPTII,S$GLB, | Performed by: INTERNAL MEDICINE

## 2023-11-14 PROCEDURE — 1160F PR REVIEW ALL MEDS BY PRESCRIBER/CLIN PHARMACIST DOCUMENTED: ICD-10-PCS | Mod: HCNC,CPTII,S$GLB, | Performed by: INTERNAL MEDICINE

## 2023-11-14 NOTE — PROGRESS NOTES
[facility] Cardiology     Subjective:    Patient ID:  Bean Salas is a 78 y.o. male who presents for follow-up of Bradycardia, PVCs, Hypertension, Coronary Artery Disease, Hyperlipidemia, and Irregular Heart Beat    Review of patient's allergies indicates:  No Known Allergies   The patient has a history of frequent PVCs, hypertension and hyperlipidemia.  In 2021 he had a heart catheterization showing 50% circumflex disease, mild RCA, left anterior descending artery disease.  He has normal ejection fraction.  He states his blood pressures have been stable.  He monitors on a regular basis.  He does not get chest pains.  He walks with a walker now.  He is self functioning.  He lives alone.  He still drives.    He went to the [facility] ED in October because of a fall.  He ultimately was admitted at [facility].  The patient's telemetry was interpreted as AFib.  There are 2 Electrocardiograms in the chart 1 sinus in the following Electrocardiogram shows AFib.  He has a history of irregular heartbeats due to frequent PVCs.  My interpretation of the Electrocardiogram is is that he has sinus rhythm with PACs and PVCs.  He was not started on anticoagulation.  He has no history of AFib in the past.  He thinks he had a fall not related to syncope.  He went there for orthopedic complaints.  He thought he was going to be released.    Since hospital discharge he has not had any new problems.  He denies palpitations.  He lives in the [facility].  His LDL is well controlled with atorvastatin.        Review of Systems   Constitutional: Negative for chills, decreased appetite, diaphoresis, fever, malaise/fatigue, night sweats, weight gain and weight loss.   HENT:  Negative for congestion, ear discharge, ear pain, hearing loss, hoarse voice, nosebleeds, odynophagia, sore throat, stridor and tinnitus.    Eyes:  Negative for blurred vision, discharge, double  vision, pain, photophobia, redness, vision loss in left eye, vision loss in right eye, visual disturbance and visual halos.   Cardiovascular:  Positive for dyspnea on exertion and leg swelling. Negative for chest pain, claudication, cyanosis, irregular heartbeat, near-syncope, orthopnea, palpitations, paroxysmal nocturnal dyspnea and syncope.   Respiratory:  Negative for cough, hemoptysis, shortness of breath, sleep disturbances due to breathing, snoring, sputum production and wheezing.    Endocrine: Negative for cold intolerance, heat intolerance, polydipsia, polyphagia and polyuria.   Hematologic/Lymphatic: Negative for adenopathy and bleeding problem. Does not bruise/bleed easily.   Skin:  Negative for color change, dry skin, flushing, itching, nail changes, poor wound healing, rash, skin cancer, suspicious lesions and unusual hair distribution.   Musculoskeletal:  Positive for joint pain. Negative for arthritis, back pain, falls, gout, joint swelling, muscle cramps, muscle weakness, myalgias, neck pain and stiffness.   Gastrointestinal:  Negative for bloating, abdominal pain, anorexia, change in bowel habit, bowel incontinence, constipation, diarrhea, dysphagia, excessive appetite, flatus, heartburn, hematemesis, hematochezia, hemorrhoids, jaundice, melena, nausea and vomiting.   Genitourinary:  Negative for bladder incontinence, decreased libido, dysuria, flank pain, frequency, genital sores, hematuria, hesitancy, incomplete emptying, nocturia and urgency.   Neurological:  Negative for aphonia, brief paralysis, difficulty with concentration, disturbances in coordination, excessive daytime sleepiness, dizziness, focal weakness, headaches, light-headedness, loss of balance, numbness, paresthesias, seizures, sensory change, tremors, vertigo and weakness.   Psychiatric/Behavioral:  Negative for altered mental status, depression, hallucinations, memory loss, substance abuse, suicidal ideas and thoughts of violence.  "The patient does not have insomnia and is not nervous/anxious.    Allergic/Immunologic: Negative for hives and persistent infections.        Objective:       Vitals:    11/14/23 1021   BP: (!) (P) 153/85   Pulse: (!) 47   SpO2: 98%   Weight: 98.6 kg (217 lb 6 oz)   Height: 5' 11" (1.803 m)    Physical Exam  Constitutional:       General: He is not in acute distress.     Appearance: He is well-developed. He is not diaphoretic.   HENT:      Head: Normocephalic and atraumatic.      Nose: Nose normal.   Eyes:      General: No scleral icterus.        Right eye: No discharge.      Conjunctiva/sclera: Conjunctivae normal.      Pupils: Pupils are equal, round, and reactive to light.   Neck:      Thyroid: No thyromegaly.      Vascular: No JVD.      Trachea: No tracheal deviation.   Cardiovascular:      Rate and Rhythm: Regular rhythm. Bradycardia present.      Pulses:           Carotid pulses are 2+ on the right side and 2+ on the left side.       Radial pulses are 2+ on the right side and 2+ on the left side.        Dorsalis pedis pulses are 2+ on the right side and 2+ on the left side.        Posterior tibial pulses are 2+ on the right side and 2+ on the left side.      Heart sounds: Normal heart sounds. No murmur heard.     No friction rub. No gallop.   Pulmonary:      Effort: Pulmonary effort is normal. No respiratory distress.      Breath sounds: Normal breath sounds. No stridor. No wheezing or rales.   Chest:      Chest wall: No tenderness.   Abdominal:      General: Bowel sounds are normal. There is no distension.      Palpations: Abdomen is soft. There is no mass.      Tenderness: There is no abdominal tenderness. There is no guarding or rebound.   Musculoskeletal:         General: No tenderness. Normal range of motion.      Cervical back: Normal range of motion and neck supple.   Lymphadenopathy:      Cervical: No cervical adenopathy.   Skin:     General: Skin is warm and dry.      Coloration: Skin is not pale.      " Findings: No erythema or rash.   Neurological:      Mental Status: He is alert and oriented to person, place, and time.      Cranial Nerves: No cranial nerve deficit.      Coordination: Coordination normal.   Psychiatric:         Behavior: Behavior normal.         Thought Content: Thought content normal.         Judgment: Judgment normal.           Assessment:       1. Atrial fibrillation, unspecified type    2. Acute on chronic congestive heart failure, unspecified heart failure type    3. Atrial bigeminy    4. Aortic atherosclerosis    5. Coronary artery disease involving native coronary artery of native heart without angina pectoris    6. Essential hypertension    7. Other hyperlipidemia    8. PVC's (premature ventricular contractions)    9. COPD exacerbation    10. Acute on chronic diastolic congestive heart failure    11. Pulmonary hypertension      Results for orders placed or performed during the hospital encounter of 10/04/23   Urinalysis, Reflex to Urine Culture Urine, Clean Catch    Specimen: Urine   Result Value Ref Range    Specimen UA Urine, Clean Catch     Color, UA Yellow Yellow, Straw, Nikia    Appearance, UA Clear Clear    pH, UA 7.0 5.0 - 8.0    Specific Gravity, UA 1.020 1.005 - 1.030    Protein, UA Trace (A) Negative    Glucose, UA Negative Negative    Ketones, UA Trace (A) Negative    Bilirubin (UA) Negative Negative    Occult Blood UA Negative Negative    Nitrite, UA Negative Negative    Urobilinogen, UA Negative <2.0 EU/dL    Leukocytes, UA Negative Negative   CBC auto differential   Result Value Ref Range    WBC 10.64 3.90 - 12.70 K/uL    RBC 4.24 (L) 4.60 - 6.20 M/uL    Hemoglobin 13.8 (L) 14.0 - 18.0 g/dL    Hematocrit 42.0 40.0 - 54.0 %    MCV 99 (H) 82 - 98 fL    MCH 32.5 (H) 27.0 - 31.0 pg    MCHC 32.9 32.0 - 36.0 g/dL    RDW 13.6 11.5 - 14.5 %    Platelets 180 150 - 450 K/uL    MPV 11.6 9.2 - 12.9 fL    Immature Granulocytes 0.4 0.0 - 0.5 %    Gran # (ANC) 9.2 (H) 1.8 - 7.7 K/uL     Immature Grans (Abs) 0.04 0.00 - 0.04 K/uL    Lymph # 0.8 (L) 1.0 - 4.8 K/uL    Mono # 0.6 0.3 - 1.0 K/uL    Eos # 0.0 0.0 - 0.5 K/uL    Baso # 0.02 0.00 - 0.20 K/uL    nRBC 0 0 /100 WBC    Gran % 86.5 (H) 38.0 - 73.0 %    Lymph % 7.0 (L) 18.0 - 48.0 %    Mono % 5.9 4.0 - 15.0 %    Eosinophil % 0.0 0.0 - 8.0 %    Basophil % 0.2 0.0 - 1.9 %    Differential Method Automated    Comprehensive metabolic panel   Result Value Ref Range    Sodium 139 136 - 145 mmol/L    Potassium 4.1 3.5 - 5.1 mmol/L    Chloride 103 95 - 110 mmol/L    CO2 25 23 - 29 mmol/L    Glucose 149 (H) 70 - 110 mg/dL    BUN 18 2 - 20 mg/dL    Creatinine 0.92 0.50 - 1.40 mg/dL    Calcium 9.7 8.7 - 10.5 mg/dL    Total Protein 7.5 6.0 - 8.4 g/dL    Albumin 4.2 3.5 - 5.2 g/dL    Total Bilirubin 2.0 (H) 0.1 - 1.0 mg/dL    Alkaline Phosphatase 90 38 - 126 U/L    AST 44 15 - 46 U/L    ALT 28 10 - 44 U/L    Anion Gap 11 8 - 16 mmol/L    eGFR >60.0 >60 mL/min/1.73 m^2   NT-Pro Natriuretic Peptide   Result Value Ref Range    NT-proBNP 2670 (H) 5 - 1800 pg/mL   Troponin I   Result Value Ref Range    Troponin I 0.030 0.012 - 0.034 ng/mL   Comprehensive Metabolic Panel (CMP)   Result Value Ref Range    Sodium 140 136 - 145 mmol/L    Potassium 4.0 3.5 - 5.1 mmol/L    Chloride 101 95 - 110 mmol/L    CO2 29 23 - 29 mmol/L    Glucose 124 (H) 70 - 110 mg/dL    BUN 16 8 - 23 mg/dL    Creatinine 1.0 0.5 - 1.4 mg/dL    Calcium 9.7 8.7 - 10.5 mg/dL    Total Protein 7.0 6.0 - 8.4 g/dL    Albumin 3.7 3.5 - 5.2 g/dL    Total Bilirubin 2.8 (H) 0.1 - 1.0 mg/dL    Alkaline Phosphatase 69 55 - 135 U/L    AST 29 10 - 40 U/L    ALT 20 10 - 44 U/L    eGFR >60 >60 mL/min/1.73 m^2    Anion Gap 10 8 - 16 mmol/L   Magnesium   Result Value Ref Range    Magnesium 2.1 1.6 - 2.6 mg/dL   Phosphorus   Result Value Ref Range    Phosphorus 2.8 2.7 - 4.5 mg/dL   CBC with Automated Differential   Result Value Ref Range    WBC 7.25 3.90 - 12.70 K/uL    RBC 4.31 (L) 4.60 - 6.20 M/uL    Hemoglobin  14.1 14.0 - 18.0 g/dL    Hematocrit 42.3 40.0 - 54.0 %    MCV 98 82 - 98 fL    MCH 32.7 (H) 27.0 - 31.0 pg    MCHC 33.3 32.0 - 36.0 g/dL    RDW 13.8 11.5 - 14.5 %    Platelets 175 150 - 450 K/uL    MPV 11.5 9.2 - 12.9 fL    Immature Granulocytes 0.3 0.0 - 0.5 %    Gran # (ANC) 5.4 1.8 - 7.7 K/uL    Immature Grans (Abs) 0.02 0.00 - 0.04 K/uL    Lymph # 1.2 1.0 - 4.8 K/uL    Mono # 0.7 0.3 - 1.0 K/uL    Eos # 0.0 0.0 - 0.5 K/uL    Baso # 0.02 0.00 - 0.20 K/uL    nRBC 0 0 /100 WBC    Gran % 74.4 (H) 38.0 - 73.0 %    Lymph % 15.9 (L) 18.0 - 48.0 %    Mono % 9.0 4.0 - 15.0 %    Eosinophil % 0.1 0.0 - 8.0 %    Basophil % 0.3 0.0 - 1.9 %    Differential Method Automated    Brain natriuretic peptide   Result Value Ref Range     (H) 0 - 99 pg/mL   TSH   Result Value Ref Range    TSH 1.584 0.400 - 4.000 uIU/mL   Hemoglobin A1c   Result Value Ref Range    Hemoglobin A1C 6.1 (H) 4.0 - 5.6 %    Estimated Avg Glucose 128 68 - 131 mg/dL   Echo Saline Bubble? No   Result Value Ref Range    LA volume (mod) 127.29 cm3    TDI LATERAL 0.15 m/s    Left Ventricular Outflow Tract Mean Gradient 1.16 mmHg    Left Ventricular Outflow Tract Mean Velocity 0.51 cm/s    MV peak gradient 2 mmHg    MV mean gradient 1 mmHg    MV stenosis pressure 1/2 time 53.86 ms    Mr max hari 5.77 m/s    MV VTI 23.2 cm    TR Max Hari 3.21 m/s    Ao peak hari 1.66 m/s    Posterior Wall 1.20 (A) 0.6 - 1.1 cm    LVOT diameter 2.23 cm    LVOT peak hari 0.76 m/s    LVOT peak VTI 16.40 cm    E wave deceleration time 185.72 msec    MV Peak A Hari 0.41 m/s    MV Peak E Hari 0.70 m/s    PV Peak D Hari 0.49 m/s    PV Peak S Hari 0.64 m/s    RA Major Axis 6.80 cm    RA Width 3.92 cm    TAPSE 1.74 cm    IVS 1.34 (A) 0.6 - 1.1 cm    Ao VTI 34.30 cm    AV mean gradient 6 mmHg    LVIDd 4.96 3.5 - 6.0 cm    LVIDs 3.57 2.1 - 4.0 cm    Left Atrium Major Axis 7.44 cm    PV PEAK VELOCITY 0.81 m/s    Pulmonary Valve Mean Velocity 0.60 m/s    STJ 3.10 cm    Ascending aorta 3.49 cm     Sinus 3.60 cm    LV Systolic Volume 53.47 mL    LV Diastolic Volume 116.00 mL    TDI SEPTAL 0.08 m/s    RVDD 3.96 cm    LA size 4.73 cm    RV S' 11.85 cm/s    IVC diameter 1.40 cm    Pedroza's Biplane MOD Ejection Fraction 52 %    LV LATERAL E/E' RATIO 4.67 m/s    LV SEPTAL E/E' RATIO 8.75 m/s    FS 28 28 - 44 %    LV mass 250.08 g    ZLVIDD -3.70     ZLVIDS -1.60     Left Ventricle Relative Wall Thickness 0.48 cm    AV valve area 1.87 cm²    AV Velocity Ratio 0.46     AV index (prosthetic) 0.48     MV valve area p 1/2 method 4.08 cm2    MV valve area by continuity eq 2.76 cm2    PV peak gradient 3 mmHg    E/A ratio 1.71     Mean e' 0.12 m/s    Pulm vein S/D ratio 1.31     LVOT area 3.9 cm2    LVOT stroke volume 64.02 cm3    AV peak gradient 11 mmHg    E/E' ratio 6.09 m/s    LV Systolic Volume Index 24.6 mL/m2    LV Diastolic Volume Index 53.46 mL/m2    LV Mass Index 115 g/m2    Triscuspid Valve Regurgitation Peak Gradient 41 mmHg    LA Volume Index (Mod) 58.7 mL/m2    BRAYAN by Velocity Ratio 1.79 cm²    BSA 2.21 m2    LA WIDTH 5.6 cm    TV resting pulmonary artery pressure 44 mmHg    RV TB RVSP 6 mmHg    Est. RA pres 3 mmHg         Current Outpatient Medications:     acetaminophen (TYLENOL) 325 MG tablet, Take 2 tablets (650 mg total) by mouth every 6 (six) hours as needed for Pain., Disp: , Rfl: 0    amLODIPine (NORVASC) 2.5 MG tablet, Take 1 tablet by mouth once daily (Patient taking differently: Take 2.5 mg by mouth once daily.), Disp: 90 tablet, Rfl: 3    aspirin (ECOTRIN) 81 MG EC tablet, Take 1 tablet (81 mg total) by mouth once daily., Disp: , Rfl:     atorvastatin (LIPITOR) 10 MG tablet, Take 1 tablet (10 mg total) by mouth once daily., Disp: 90 tablet, Rfl: 3    doxycycline (VIBRA-TABS) 100 MG tablet, Take 1 tablet (100 mg total) by mouth 2 (two) times daily. Any generic brand capsule or tablet, Disp: 28 tablet, Rfl: 0    FLUoxetine (PROZAC) 20 MG capsule, Take 1 capsule (20 mg total) by mouth once daily.,  Disp: 90 capsule, Rfl: 3    furosemide (LASIX) 40 MG tablet, Take 1 tablet (40 mg total) by mouth 2 (two) times a day. Take daily for next 3 days, then use as needed. Weigh daily. Afterwards, f weight increases 2 lbs/24h, take dose of lasix daily  until weight is back to baseline, Disp: 180 tablet, Rfl: 3    HYDROcodone-acetaminophen (NORCO) 5-325 mg per tablet, Take 1 tablet by mouth every 6 (six) hours as needed for Pain., Disp: 20 tablet, Rfl: 0    levothyroxine (SYNTHROID) 200 MCG tablet, Take 1 tablet (200 mcg total) by mouth once daily., Disp: 90 tablet, Rfl: 3    losartan (COZAAR) 100 MG tablet, Take 1 tablet (100 mg total) by mouth once daily., Disp: 90 tablet, Rfl: 3    methocarbamoL (ROBAXIN) 500 MG Tab, Take 1 tablet by mouth 4 times daily, Disp: 30 tablet, Rfl: 3    metoprolol succinate (TOPROL-XL) 50 MG 24 hr tablet, Take 1 tablet (50 mg total) by mouth once daily., Disp: 90 tablet, Rfl: 3    multivit-min-FA-lycopen-lutein (CENTRUM SILVER) 0.4-300-250 mg-mcg-mcg Tab, Take 1 tablet by mouth once daily., Disp: , Rfl:     spironolactone (ALDACTONE) 25 MG tablet, Take 1 tablet by mouth once daily, Disp: 90 tablet, Rfl: 0     Lab Results   Component Value Date    WBC 7.25 10/05/2023    RBC 4.31 (L) 10/05/2023    HGB 14.1 10/05/2023    HCT 42.3 10/05/2023    MCV 98 10/05/2023    MCH 32.7 (H) 10/05/2023    MCHC 33.3 10/05/2023    RDW 13.8 10/05/2023     10/05/2023    MPV 11.5 10/05/2023    GRAN 5.4 10/05/2023    GRAN 74.4 (H) 10/05/2023    LYMPH 1.2 10/05/2023    LYMPH 15.9 (L) 10/05/2023    MONO 0.7 10/05/2023    MONO 9.0 10/05/2023    EOS 0.0 10/05/2023    BASO 0.02 10/05/2023    EOSINOPHIL 0.1 10/05/2023    BASOPHIL 0.3 10/05/2023    MG 2.1 10/05/2023        CMP  Lab Results   Component Value Date     10/05/2023    K 4.0 10/05/2023     10/05/2023    CO2 29 10/05/2023     (H) 10/05/2023    BUN 16 10/05/2023    CREATININE 1.0 10/05/2023    CALCIUM 9.7 10/05/2023    PROT 7.0  "10/05/2023    ALBUMIN 3.7 10/05/2023    BILITOT 2.8 (H) 10/05/2023    ALKPHOS 69 10/05/2023    AST 29 10/05/2023    ALT 20 10/05/2023    ANIONGAP 10 10/05/2023    ESTGFRAFRICA >60 09/26/2021    EGFRNONAA >60 09/26/2021        No results found for: "LABBLOO", "LABURIN", "RESPIRATORYC", "GSRESP"         Results for orders placed or performed during the hospital encounter of 10/04/23   EKG 12-lead    Collection Time: 10/05/23  5:44 AM    Narrative    Test Reason : W19.XXXA,    Vent. Rate : 051 BPM     Atrial Rate : 051 BPM     P-R Int : 210 ms          QRS Dur : 120 ms      QT Int : 500 ms       P-R-T Axes : 004 003 029 degrees     QTc Int : 460 ms    Sinus bradycardia with 1st degree A-V block  Nonspecific intraventricular conduction delay  Nonspecific ST and T wave abnormality  Abnormal ECG  When compared with ECG of 04-OCT-2023 11:02,  Sinus rhythm is now present  PVC's no longer present   Confirmed by Maximo Littlejohn MD ([id number]) on 10/9/2023 7:47:37 PM    Referred By: [email or web address]   SELF           Confirmed By:Maximo Littlejohn MD        Echo Saline Bubble? No 10/05/2023 [id number] Final   CT Cervical Spine Without Contrast 10/04/2023 [id number] Final   CT Head Without Contrast 10/04/2023 [id number] Final            Plan:       Problem List Items Addressed This Visit          Pulmonary    COPD exacerbation     Condition stable.            Cardiac/Vascular    Essential hypertension     He is on losartan, metoprolol, Aldactone.  It will be continued.  Condition controlled.         Other hyperlipidemia     He is taking atorvastatin 10 mg daily.  Current LDL 58 mg% by labs 08/2023.  Condition controlled.         Atrial bigeminy     He has had frequent PACs in the past.         Acute on chronic diastolic congestive heart failure     He is on Aldactone.  He does have reduced exercise tolerance.  Treatment changes not advise today.  He is on furosemide 40 mg daily.           Coronary artery disease involving native coronary " artery of native heart without angina pectoris     Coronary angiogram no intervention carried out.  He had 50% disease with mild RCA, left anterior descending artery disease only.  Nonobstructive coronary disease confirmed.         PVC's (premature ventricular contractions)     Noted on Holter monitor.  He has no awareness.         Atrial fibrillation, unspecified type - Primary     No confirmation definitively by electrocardiograms on most recent hospitalization.  One of 2 Electrocardiogram was read as AFib but I suspect sinus rhythm.    Anticoagulation not initiated.  Clearly, the patient is in sinus rhythm today.         Aortic atherosclerosis     Condition stable.         Pulmonary hypertension     Mild elevation by recent echo with PA systolic pressure 44 mm Hg.  Furosemide to continue.               I advised a four-month follow-up.  He is not interested in Holter monitoring.  My conclusion is that he had sinus rhythm rather than AFib on his most recent hospitalization.  Anticoagulation not initiated.  He has regular rhythm on exam today.             Hank Barry MD  11/15/2023   11:12 AM

## 2023-11-15 PROBLEM — E78.49 OTHER HYPERLIPIDEMIA: Status: ACTIVE | Noted: 2017-06-25

## 2023-11-15 PROBLEM — I27.20 PULMONARY HYPERTENSION: Status: ACTIVE | Noted: 2023-11-15

## 2023-11-15 NOTE — ASSESSMENT & PLAN NOTE
Coronary angiogram no intervention carried out.  He had 50% disease with mild RCA, left anterior descending artery disease only.  Nonobstructive coronary disease confirmed.

## 2023-11-15 NOTE — ASSESSMENT & PLAN NOTE
He is on Aldactone.  He does have reduced exercise tolerance.  Treatment changes not advise today.  He is on furosemide 40 mg daily.

## 2023-11-15 NOTE — ASSESSMENT & PLAN NOTE
No confirmation definitively by electrocardiograms on most recent hospitalization.  One of 2 Electrocardiogram was read as AFib but I suspect sinus rhythm.    Anticoagulation not initiated.  Clearly, the patient is in sinus rhythm today.

## 2023-11-15 NOTE — ASSESSMENT & PLAN NOTE
He is taking atorvastatin 10 mg daily.  Current LDL 58 mg% by labs 08/2023.  Condition controlled.

## 2023-12-04 DIAGNOSIS — I10 PRIMARY HYPERTENSION: ICD-10-CM

## 2023-12-04 DIAGNOSIS — R06.02 SOB (SHORTNESS OF BREATH): ICD-10-CM

## 2023-12-04 RX ORDER — FUROSEMIDE 40 MG/1
40 TABLET ORAL 2 TIMES DAILY
Qty: 180 TABLET | Refills: 3 | Status: SHIPPED | OUTPATIENT
Start: 2023-12-04

## 2023-12-04 NOTE — TELEPHONE ENCOUNTER
No care due was identified.  [facility] Embedded Care Due Messages. Reference number: [id number].   12/04/2023 10:32:55 AM CST

## 2023-12-04 NOTE — TELEPHONE ENCOUNTER
----- Message from Angelita Arambula sent at 12/4/2023 10:22 AM CST -----  Type:  RX Refill Request    Who Called: pt  Refill or New Rx:refill  RX Name and Strength:furosemide (LASIX) 40 MG tablet  How is the patient currently taking it? (ex. 1XDay):Route: Take 1 tablet (40 mg total) by mouth 2 (two) times a day. Take daily for next 3 days, then use as needed. Weigh  Is this a 30 day or 90 day RX:180  Preferred Pharmacy with phone number:[facility] [address] [place] [address] [address] [facility]   Phone: [phone]  Fax: [phone]  Local or Mail Order:local  Ordering Provider: Leno Urias MD  Would the patient rather a call back or a response via [facility]?   Best Call Back Number:  Additional Information:

## 2023-12-05 RX ORDER — SPIRONOLACTONE 25 MG/1
25 TABLET ORAL
Qty: 90 TABLET | Refills: 3 | Status: SHIPPED | OUTPATIENT
Start: 2023-12-05

## 2023-12-13 ENCOUNTER — TELEPHONE (OUTPATIENT)
Dept: FAMILY MEDICINE | Facility: CLINIC | Age: 78
End: 2023-12-13
Payer: MEDICARE

## 2023-12-13 NOTE — TELEPHONE ENCOUNTER
When weaning this medication take every other day for three doses then every two days for three doses then discontinue.

## 2023-12-13 NOTE — TELEPHONE ENCOUNTER
"Pt states he has been using FLUoxetine (PROZAC) 20 MG  for 3 monts.    Pt is complaining of Dry mouth, "grafic Dreams" and very fatigue all the time    Pt wants to stop taking medication but is worried about withdrawals.    What do you recommend?  "

## 2023-12-13 NOTE — TELEPHONE ENCOUNTER
----- Message from Bradley Engle sent at 12/13/2023  1:50 PM CST -----  Contact: Pt  .Type:  Needs Medical Advice    Who Called: pt    Would the patient rather a call back or a response via [facility]?  Call back  Best Call Back Number: [phone]  Additional Information: Pt. Is requesting a call back from the provider because he would like to stop taking his  FLUoxetine (PROZAC) 20 MG capsule he is experiencing to many side effects.

## 2024-01-16 ENCOUNTER — TELEPHONE (OUTPATIENT)
Dept: FAMILY MEDICINE | Facility: CLINIC | Age: 79
End: 2024-01-16
Payer: MEDICARE

## 2024-01-16 DIAGNOSIS — I48.91 ATRIAL FIBRILLATION, UNSPECIFIED TYPE: ICD-10-CM

## 2024-01-16 DIAGNOSIS — I10 ESSENTIAL HYPERTENSION: Primary | ICD-10-CM

## 2024-01-16 DIAGNOSIS — R73.9 HYPERGLYCEMIA: ICD-10-CM

## 2024-01-16 DIAGNOSIS — E03.4 HYPOTHYROIDISM DUE TO ACQUIRED ATROPHY OF THYROID: ICD-10-CM

## 2024-01-16 NOTE — TELEPHONE ENCOUNTER
----- Message from Trinidad Javed sent at 1/16/2024  2:12 PM CST -----  Caller is requesting to schedule their Lab appointment prior to annual appointment.  Order is not listed in EPIC.  Please enter order and contact patient to schedule.    Name of Caller: Bean Thornton    Preferred Date and Time of Labs: 1/17/24    Date of [facility] Appointment: 1/22/24    Where would they like the lab performed? [facility]    Would the patient rather a call back or a response via [email or web address]?  call    Best Call Back Number:  [phone]    Additional Information:  Pt is also requesting a thyroid test.

## 2024-01-18 ENCOUNTER — LAB VISIT (OUTPATIENT)
Dept: LAB | Facility: HOSPITAL | Age: 79
End: 2024-01-18
Attending: FAMILY MEDICINE
Payer: MEDICARE

## 2024-01-18 DIAGNOSIS — R73.9 HYPERGLYCEMIA: ICD-10-CM

## 2024-01-18 DIAGNOSIS — E03.4 HYPOTHYROIDISM DUE TO ACQUIRED ATROPHY OF THYROID: ICD-10-CM

## 2024-01-18 DIAGNOSIS — I10 ESSENTIAL HYPERTENSION: ICD-10-CM

## 2024-01-18 LAB
BASOPHILS # BLD AUTO: 0.03 K/UL (ref 0–0.2)
BASOPHILS NFR BLD: 0.5 % (ref 0–1.9)
CHOLEST SERPL-MCNC: 118 MG/DL (ref 120–199)
CHOLEST/HDLC SERPL: 3.2 {RATIO} (ref 2–5)
DIFFERENTIAL METHOD BLD: ABNORMAL
EOSINOPHIL # BLD AUTO: 0.1 K/UL (ref 0–0.5)
EOSINOPHIL NFR BLD: 1 % (ref 0–8)
ERYTHROCYTE [DISTWIDTH] IN BLOOD BY AUTOMATED COUNT: 13.1 % (ref 11.5–14.5)
HCT VFR BLD AUTO: 42.8 % (ref 40–54)
HDLC SERPL-MCNC: 37 MG/DL (ref 40–75)
HDLC SERPL: 31.4 % (ref 20–50)
HGB BLD-MCNC: 14.2 G/DL (ref 14–18)
IMM GRANULOCYTES # BLD AUTO: 0.01 K/UL (ref 0–0.04)
IMM GRANULOCYTES NFR BLD AUTO: 0.2 % (ref 0–0.5)
LDLC SERPL CALC-MCNC: 65 MG/DL (ref 63–159)
LYMPHOCYTES # BLD AUTO: 1.1 K/UL (ref 1–4.8)
LYMPHOCYTES NFR BLD: 18.6 % (ref 18–48)
MCH RBC QN AUTO: 33.5 PG (ref 27–31)
MCHC RBC AUTO-ENTMCNC: 33.2 G/DL (ref 32–36)
MCV RBC AUTO: 101 FL (ref 82–98)
MONOCYTES # BLD AUTO: 0.5 K/UL (ref 0.3–1)
MONOCYTES NFR BLD: 7.8 % (ref 4–15)
NEUTROPHILS # BLD AUTO: 4.3 K/UL (ref 1.8–7.7)
NEUTROPHILS NFR BLD: 71.9 % (ref 38–73)
NONHDLC SERPL-MCNC: 81 MG/DL
NRBC BLD-RTO: 0 /100 WBC
PLATELET # BLD AUTO: 174 K/UL (ref 150–450)
PMV BLD AUTO: 10.9 FL (ref 9.2–12.9)
RBC # BLD AUTO: 4.24 M/UL (ref 4.6–6.2)
TRIGL SERPL-MCNC: 80 MG/DL (ref 30–150)
TSH SERPL DL<=0.005 MIU/L-ACNC: 3.67 UIU/ML (ref 0.4–4)
WBC # BLD AUTO: 5.91 K/UL (ref 3.9–12.7)

## 2024-01-18 PROCEDURE — 36415 COLL VENOUS BLD VENIPUNCTURE: CPT | Mod: HCNC,PN | Performed by: FAMILY MEDICINE

## 2024-01-18 PROCEDURE — 84443 ASSAY THYROID STIM HORMONE: CPT | Mod: HCNC,PN | Performed by: FAMILY MEDICINE

## 2024-01-18 PROCEDURE — 85025 COMPLETE CBC W/AUTO DIFF WBC: CPT | Mod: HCNC,PN | Performed by: FAMILY MEDICINE

## 2024-01-18 PROCEDURE — 80061 LIPID PANEL: CPT | Mod: HCNC | Performed by: FAMILY MEDICINE

## 2024-01-22 ENCOUNTER — TELEPHONE (OUTPATIENT)
Dept: FAMILY MEDICINE | Facility: CLINIC | Age: 79
End: 2024-01-22

## 2024-01-22 ENCOUNTER — OFFICE VISIT (OUTPATIENT)
Dept: FAMILY MEDICINE | Facility: CLINIC | Age: 79
End: 2024-01-22
Payer: MEDICARE

## 2024-01-22 VITALS
HEIGHT: 71 IN | SYSTOLIC BLOOD PRESSURE: 152 MMHG | WEIGHT: 226.63 LBS | OXYGEN SATURATION: 94 % | BODY MASS INDEX: 31.73 KG/M2 | TEMPERATURE: 99 F | HEART RATE: 60 BPM | DIASTOLIC BLOOD PRESSURE: 74 MMHG

## 2024-01-22 DIAGNOSIS — I48.91 ATRIAL FIBRILLATION, UNSPECIFIED TYPE: ICD-10-CM

## 2024-01-22 DIAGNOSIS — I27.20 PULMONARY HYPERTENSION: ICD-10-CM

## 2024-01-22 DIAGNOSIS — I50.33 ACUTE ON CHRONIC DIASTOLIC CONGESTIVE HEART FAILURE: ICD-10-CM

## 2024-01-22 DIAGNOSIS — I87.2 VENOUS STASIS ULCER OF RIGHT CALF LIMITED TO BREAKDOWN OF SKIN WITHOUT VARICOSE VEINS: ICD-10-CM

## 2024-01-22 DIAGNOSIS — E03.4 HYPOTHYROIDISM DUE TO ACQUIRED ATROPHY OF THYROID: ICD-10-CM

## 2024-01-22 DIAGNOSIS — I70.0 AORTIC ATHEROSCLEROSIS: ICD-10-CM

## 2024-01-22 DIAGNOSIS — L97.211 VENOUS STASIS ULCER OF RIGHT CALF LIMITED TO BREAKDOWN OF SKIN WITHOUT VARICOSE VEINS: ICD-10-CM

## 2024-01-22 DIAGNOSIS — R73.9 HYPERGLYCEMIA: ICD-10-CM

## 2024-01-22 DIAGNOSIS — I10 ESSENTIAL HYPERTENSION: Primary | ICD-10-CM

## 2024-01-22 PROBLEM — J44.1 COPD EXACERBATION: Status: RESOLVED | Noted: 2023-09-06 | Resolved: 2024-01-22

## 2024-01-22 PROCEDURE — 3288F FALL RISK ASSESSMENT DOCD: CPT | Mod: CPTII,S$GLB,, | Performed by: FAMILY MEDICINE

## 2024-01-22 PROCEDURE — 3077F SYST BP >= 140 MM HG: CPT | Mod: CPTII,S$GLB,, | Performed by: FAMILY MEDICINE

## 2024-01-22 PROCEDURE — 99213 OFFICE O/P EST LOW 20 MIN: CPT | Mod: S$GLB,,, | Performed by: FAMILY MEDICINE

## 2024-01-22 PROCEDURE — 1101F PT FALLS ASSESS-DOCD LE1/YR: CPT | Mod: CPTII,S$GLB,, | Performed by: FAMILY MEDICINE

## 2024-01-22 PROCEDURE — 1157F ADVNC CARE PLAN IN RCRD: CPT | Mod: CPTII,S$GLB,, | Performed by: FAMILY MEDICINE

## 2024-01-22 PROCEDURE — 1125F AMNT PAIN NOTED PAIN PRSNT: CPT | Mod: CPTII,S$GLB,, | Performed by: FAMILY MEDICINE

## 2024-01-22 PROCEDURE — 3078F DIAST BP <80 MM HG: CPT | Mod: CPTII,S$GLB,, | Performed by: FAMILY MEDICINE

## 2024-01-22 PROCEDURE — 1159F MED LIST DOCD IN RCRD: CPT | Mod: CPTII,S$GLB,, | Performed by: FAMILY MEDICINE

## 2024-01-22 NOTE — PROGRESS NOTES
Pt will call in 2 weeks with bp readings, per Dr. Cloud.     Pt states he will walk into lab rather than schedule and appt

## 2024-01-22 NOTE — PROGRESS NOTES
" Patient ID: Bean Salas is a 79 y.o. male.    Chief Complaint: Follow-up and Knee Pain    HPI      Bean Salas is a 79 y.o. male here for follow-up.  Patient with hypertension atrial fibrillation areas versus hypothyroidism and history of venous stasis ulcer.  Doing well in regards to CHF fluid overload.  Okay to lay down only use one pillow.  No other complaints.  BP he does not take often at home.  Feet swelling has reduced significantly      Vitals:    01/22/24 1405 01/22/24 1431   BP: (!) 152/70 (!) 152/74   BP Location: Left arm Left arm   Patient Position: Sitting Sitting   Pulse: (!) 46 60   Temp: 98.5 °F (36.9 °C)    TempSrc: Oral    SpO2: (!) 94%    Weight: 102.8 kg (226 lb 10.1 oz)    Height: 5' 11" (1.803 m)             Review of Symptoms      Physical Exam    Constitutional:  Oriented to person, place, and time.appears well-developed and well-nourished.  No distress.      HENT  Head: Normocephalic and atraumatic  Right Ear: External ear normal.   Left Ear: External ear normal.   Nose: External nose normal.   Mouth:  Moist mucus membranes.    Eyes:  Conjunctivae are normal. Right eye exhibits no discharge.  Left eye exhibits no discharge. No scleral icterus.  No periorbital edema    Cardiovascular:    Irregular-chronic in nature    Pulmonary/Chest:   Clear to auscultation bilaterally without wheezes, rhonchi or rales      Musculoskeletal:  No edema. No obvious deformity No wasting       Neurological:  Alert and oriented to person, place, and time.   Coordination normal.     Skin:   Skin is warm and dry.  No diaphoresis.   No rash noted.     Psychiatric: Normal mood and affect. Behavior is normal.  Judgment and thought content normal.     Complete Blood Count  Lab Results   Component Value Date    RBC 4.24 (L) 01/18/2024    HGB 14.2 01/18/2024    HCT 42.8 01/18/2024     (H) 01/18/2024    MCH 33.5 (H) 01/18/2024    MCHC 33.2 01/18/2024    RDW 13.1 01/18/2024     01/18/2024    MPV 10.9 " 01/18/2024    GRAN 4.3 01/18/2024    GRAN 71.9 01/18/2024    LYMPH 1.1 01/18/2024    LYMPH 18.6 01/18/2024    MONO 0.5 01/18/2024    MONO 7.8 01/18/2024    EOS 0.1 01/18/2024    BASO 0.03 01/18/2024    EOSINOPHIL 1.0 01/18/2024    BASOPHIL 0.5 01/18/2024    DIFFMETHOD Automated 01/18/2024       Comprehensive Metabolic Panel  Lab Results   Component Value Date     (H) 10/05/2023    BUN 16 10/05/2023    CREATININE 1.0 10/05/2023     10/05/2023    K 4.0 10/05/2023     10/05/2023    PROT 7.0 10/05/2023    ALBUMIN 3.7 10/05/2023    BILITOT 2.8 (H) 10/05/2023    AST 29 10/05/2023    ALKPHOS 69 10/05/2023    CO2 29 10/05/2023    ALT 20 10/05/2023    ANIONGAP 10 10/05/2023       TSH  Lab Results   Component Value Date    TSH 3.670 01/18/2024       Assessment / Plan:      ICD-10-CM ICD-9-CM   1. Essential hypertension  I10 401.9   2. Hypothyroidism due to acquired atrophy of thyroid  E03.4 244.8     246.8   3. Hyperglycemia  R73.9 790.29   4. Acute on chronic diastolic congestive heart failure  I50.33 428.33     428.0   5. Atrial fibrillation, unspecified type  I48.91 427.31   6. Venous stasis ulcer of right calf limited to breakdown of skin without varicose veins  I87.2 459.81    L97.211 707.12   7. Pulmonary hypertension  I27.20 416.8   8. Aortic atherosclerosis  I70.0 440.0     Essential hypertension  -     TSH; Standing  -     T4, Free; Standing  -     Hemoglobin A1C; Standing  -     Comprehensive Metabolic Panel; Future; Expected date: 01/22/2024  -     CBC Auto Differential; Future; Expected date: 01/22/2024    Hypothyroidism due to acquired atrophy of thyroid  -     TSH; Standing  -     T4, Free; Standing  -     Hemoglobin A1C; Standing    Hyperglycemia  -     TSH; Standing  -     T4, Free; Standing  -     Hemoglobin A1C; Standing  -     Comprehensive Metabolic Panel; Future; Expected date: 01/22/2024  -     CBC Auto Differential; Future; Expected date: 01/22/2024    Acute on chronic diastolic  congestive heart failure    Atrial fibrillation, unspecified type    Venous stasis ulcer of right calf limited to breakdown of skin without varicose veins    Pulmonary hypertension  -     Comprehensive Metabolic Panel; Future; Expected date: 01/22/2024  -     CBC Auto Differential; Future; Expected date: 01/22/2024    Aortic atherosclerosis  -     Comprehensive Metabolic Panel; Future; Expected date: 01/22/2024  -     CBC Auto Differential; Future; Expected date: 01/22/2024    Continue current medication  Continue to observe fluid retention-diuretics if needed

## 2024-02-05 ENCOUNTER — TELEPHONE (OUTPATIENT)
Dept: FAMILY MEDICINE | Facility: CLINIC | Age: 79
End: 2024-02-05
Payer: MEDICARE

## 2024-02-06 VITALS — DIASTOLIC BLOOD PRESSURE: 69 MMHG | SYSTOLIC BLOOD PRESSURE: 132 MMHG

## 2024-02-06 NOTE — TELEPHONE ENCOUNTER
Call him an tell him - continue current treatment plan    Hank  Bp  02/02    135/70   02/04   132/69

## 2024-02-06 NOTE — TELEPHONE ENCOUNTER
----- Message from Yodit Johnson sent at 2/5/2024  2:24 PM CST -----  Type:  Patient Returning Call    Who Called:Pt   Would the patient rather a call back or a response via [facility]? Call back   Best Call Back Number:[phone]  Additional Information:     01/28   142/71  01/30    140/71  02/02    135/70  02/04   132/69

## 2024-02-19 ENCOUNTER — TELEPHONE (OUTPATIENT)
Dept: FAMILY MEDICINE | Facility: CLINIC | Age: 79
End: 2024-02-19
Payer: MEDICARE

## 2024-02-19 NOTE — TELEPHONE ENCOUNTER
----- Message from Rosa Isela Green sent at 2/19/2024  9:20 AM CST -----  Regarding: Call back  Contact: [phone]  Type:  Patient Returning Call    Who Called: PT   Who Left Message for Patient: Nurse   Does the patient know what this is regarding?: Yes   Would the patient rather a call back or a response via [facility]? Call Back   Best Call Back Number: [phone]   Additional Information:

## 2024-03-12 RX ORDER — LOSARTAN POTASSIUM 100 MG/1
100 TABLET ORAL
Qty: 90 TABLET | Refills: 2 | Status: SHIPPED | OUTPATIENT
Start: 2024-03-12

## 2024-03-12 RX ORDER — METOPROLOL SUCCINATE 50 MG/1
50 TABLET, EXTENDED RELEASE ORAL
Qty: 90 TABLET | Refills: 3 | Status: SHIPPED | OUTPATIENT
Start: 2024-03-12

## 2024-03-12 RX ORDER — LEVOTHYROXINE SODIUM 200 UG/1
200 TABLET ORAL
Qty: 90 TABLET | Refills: 3 | Status: SHIPPED | OUTPATIENT
Start: 2024-03-12

## 2024-03-12 NOTE — TELEPHONE ENCOUNTER
No care due was identified.  [facility] Embedded Care Due Messages. Reference number: [id number].   3/12/2024 12:53:15 PM CDT

## 2024-03-13 NOTE — TELEPHONE ENCOUNTER
Refill Decision Note   Bean Josue  is requesting a refill authorization.  Brief Assessment and Rationale for Refill:  Approve     Medication Therapy Plan:        Comments:     Note composed:7:58 PM 03/12/2024

## 2024-06-10 RX ORDER — ATORVASTATIN CALCIUM 10 MG/1
10 TABLET, FILM COATED ORAL
Qty: 90 TABLET | Refills: 1 | Status: SHIPPED | OUTPATIENT
Start: 2024-06-10

## 2024-06-10 NOTE — TELEPHONE ENCOUNTER
No care due was identified.  [facility] Embedded Care Due Messages. Reference number: [id number].   6/10/2024 9:46:17 AM CDT

## 2024-06-10 NOTE — TELEPHONE ENCOUNTER
Refill Decision Note   Bean Foyinn  is requesting a refill authorization.  Brief Assessment and Rationale for Refill:  Approve     Medication Therapy Plan:         Comments:     Note composed:1:47 PM 06/10/2024

## 2024-07-23 ENCOUNTER — OFFICE VISIT (OUTPATIENT)
Dept: FAMILY MEDICINE | Facility: CLINIC | Age: 79
End: 2024-07-23
Payer: MEDICARE

## 2024-07-23 VITALS
OXYGEN SATURATION: 98 % | SYSTOLIC BLOOD PRESSURE: 130 MMHG | HEART RATE: 60 BPM | WEIGHT: 225.06 LBS | BODY MASS INDEX: 31.51 KG/M2 | TEMPERATURE: 99 F | HEIGHT: 71 IN | DIASTOLIC BLOOD PRESSURE: 70 MMHG

## 2024-07-23 DIAGNOSIS — I48.91 ATRIAL FIBRILLATION, UNSPECIFIED TYPE: ICD-10-CM

## 2024-07-23 DIAGNOSIS — R73.9 HYPERGLYCEMIA: ICD-10-CM

## 2024-07-23 DIAGNOSIS — I87.2 VENOUS STASIS ULCER OF RIGHT CALF LIMITED TO BREAKDOWN OF SKIN WITHOUT VARICOSE VEINS: ICD-10-CM

## 2024-07-23 DIAGNOSIS — I70.0 AORTIC ATHEROSCLEROSIS: ICD-10-CM

## 2024-07-23 DIAGNOSIS — E03.4 HYPOTHYROIDISM DUE TO ACQUIRED ATROPHY OF THYROID: ICD-10-CM

## 2024-07-23 DIAGNOSIS — R53.81 PHYSICAL DECONDITIONING: ICD-10-CM

## 2024-07-23 DIAGNOSIS — I10 ESSENTIAL HYPERTENSION: Primary | ICD-10-CM

## 2024-07-23 DIAGNOSIS — L97.211 VENOUS STASIS ULCER OF RIGHT CALF LIMITED TO BREAKDOWN OF SKIN WITHOUT VARICOSE VEINS: ICD-10-CM

## 2024-07-23 DIAGNOSIS — I50.33 ACUTE ON CHRONIC DIASTOLIC CONGESTIVE HEART FAILURE: ICD-10-CM

## 2024-07-23 PROCEDURE — 1157F ADVNC CARE PLAN IN RCRD: CPT | Mod: CPTII,S$GLB,, | Performed by: FAMILY MEDICINE

## 2024-07-23 PROCEDURE — 99214 OFFICE O/P EST MOD 30 MIN: CPT | Mod: S$GLB,,, | Performed by: FAMILY MEDICINE

## 2024-07-23 PROCEDURE — 3075F SYST BP GE 130 - 139MM HG: CPT | Mod: CPTII,S$GLB,, | Performed by: FAMILY MEDICINE

## 2024-07-23 PROCEDURE — 1159F MED LIST DOCD IN RCRD: CPT | Mod: CPTII,S$GLB,, | Performed by: FAMILY MEDICINE

## 2024-07-23 PROCEDURE — 3078F DIAST BP <80 MM HG: CPT | Mod: CPTII,S$GLB,, | Performed by: FAMILY MEDICINE

## 2024-07-23 PROCEDURE — 1125F AMNT PAIN NOTED PAIN PRSNT: CPT | Mod: CPTII,S$GLB,, | Performed by: FAMILY MEDICINE

## 2024-07-23 PROCEDURE — 1160F RVW MEDS BY RX/DR IN RCRD: CPT | Mod: CPTII,S$GLB,, | Performed by: FAMILY MEDICINE

## 2024-07-23 PROCEDURE — 3288F FALL RISK ASSESSMENT DOCD: CPT | Mod: CPTII,S$GLB,, | Performed by: FAMILY MEDICINE

## 2024-07-23 PROCEDURE — 1101F PT FALLS ASSESS-DOCD LE1/YR: CPT | Mod: CPTII,S$GLB,, | Performed by: FAMILY MEDICINE

## 2024-07-23 RX ORDER — MIRTAZAPINE 7.5 MG/1
TABLET, FILM COATED ORAL
Qty: 30 TABLET | Refills: 3 | Status: SHIPPED | OUTPATIENT
Start: 2024-07-23

## 2024-07-23 NOTE — PROGRESS NOTES
" Patient ID: Bean Salas is a 79 y.o. male.    Chief Complaint: Follow-up, Insomnia, and Generalized Body Aches    HPI      Bean Salas is a 79 y.o. male  here for insomnia-not able to sleep very well would like medication help sleep-understands that is an older gentleman and there is a fear of falling with sedation.    Complains of generalized muscle aches and pains in lack of endurance.    History of CHF-no recent exacerbations or excessive fluid.  Using Lasix/diuretics on daily basis-gets up once or twice a night because of urination-however takes last fluid pill around 1:00 p.m..    History of hypothyroidism-not having any  weight gain weight loss tachycardia bradycardia no excessive fatigue    Vitals:    07/23/24 1027 07/23/24 1034   BP: (!) 146/70 130/70   BP Location: Left arm Right arm   Patient Position: Sitting    Pulse: 60    Temp: 98.8 °F (37.1 °C)    TempSrc: Oral    SpO2: 98%    Weight: 102.1 kg (225 lb 1.4 oz)    Height: 5' 11" (1.803 m)             Review of Symptoms      Physical Exam    Constitutional:  Oriented to person, place, and time.appears well-developed and well-nourished.  No distress.      HENT  Head: Normocephalic and atraumatic  Right Ear: External ear normal.   Left Ear: External ear normal.   Nose: External nose normal.   Mouth:  Moist mucus membranes.    Eyes:  Conjunctivae are normal. Right eye exhibits no discharge.  Left eye exhibits no discharge. No scleral icterus.  No periorbital edema    Cardiovascular:  Regular rate and rhythm with normal S1 and S2     Pulmonary/Chest:   Clear to auscultation bilaterally without wheezes, rhonchi or rales      Musculoskeletal:  No edema. No obvious deformity No wasting       Neurological:  Alert and oriented to person, place, and time.   Coordination normal.     Skin:   Skin is warm and dry.  No diaphoresis.   No rash noted.     Psychiatric: Normal mood and affect. Behavior is normal.  Judgment and thought content normal.     Complete " "Blood Count  Lab Results   Component Value Date    RBC 4.24 (L) 01/18/2024    HGB 14.2 01/18/2024    HCT 42.8 01/18/2024     (H) 01/18/2024    MCH 33.5 (H) 01/18/2024    MCHC 33.2 01/18/2024    RDW 13.1 01/18/2024     01/18/2024    MPV 10.9 01/18/2024    GRAN 4.3 01/18/2024    GRAN 71.9 01/18/2024    LYMPH 1.1 01/18/2024    LYMPH 18.6 01/18/2024    MONO 0.5 01/18/2024    MONO 7.8 01/18/2024    EOS 0.1 01/18/2024    BASO 0.03 01/18/2024    EOSINOPHIL 1.0 01/18/2024    BASOPHIL 0.5 01/18/2024    DIFFMETHOD Automated 01/18/2024       Comprehensive Metabolic Panel  No results found for: "GLU", "BUN", "CREATININE", "NA", "K", "CL", "PROT", "ALBUMIN", "BILITOT", "AST", "ALKPHOS", "CO2", "ALT", "ANIONGAP", "EGFRNONAA", "ESTGFRAFRICA"    TSH  No results found for: "TSH"    Assessment / Plan:      ICD-10-CM ICD-9-CM   1. Essential hypertension  I10 401.9   2. Hypothyroidism due to acquired atrophy of thyroid  E03.4 244.8     246.8   3. Hyperglycemia  R73.9 790.29   4. Acute on chronic diastolic congestive heart failure  I50.33 428.33     428.0   5. Venous stasis ulcer of right calf limited to breakdown of skin without varicose veins  I87.2 459.81    L97.211 707.12   6. Aortic atherosclerosis  I70.0 440.0   7. Atrial fibrillation, unspecified type  I48.91 427.31   8. Physical deconditioning  R53.81 799.3     Essential hypertension  -     Comprehensive Metabolic Panel; Future  -     CBC Auto Differential; Future  -     Lipid Panel; Future  -     TSH; Future  -     T4, Free; Future; Expected date: 07/23/2024  -     Hemoglobin A1C; Future; Expected date: 07/23/2024  -     Ambulatory referral/consult to Cardiology; Future; Expected date: 07/30/2024    Hypothyroidism due to acquired atrophy of thyroid  -     Comprehensive Metabolic Panel; Future  -     CBC Auto Differential; Future  -     Lipid Panel; Future  -     TSH; Future  -     T4, Free; Future; Expected date: 07/23/2024  -     Hemoglobin A1C; Future; Expected " date: 07/23/2024    Hyperglycemia  -     Comprehensive Metabolic Panel; Future  -     CBC Auto Differential; Future  -     Lipid Panel; Future  -     TSH; Future  -     T4, Free; Future; Expected date: 07/23/2024  -     Hemoglobin A1C; Future; Expected date: 07/23/2024    Acute on chronic diastolic congestive heart failure    Venous stasis ulcer of right calf limited to breakdown of skin without varicose veins    Aortic atherosclerosis    Atrial fibrillation, unspecified type  -     Ambulatory referral/consult to Cardiology; Future; Expected date: 07/30/2024    Physical deconditioning    Other orders  -     mirtazapine (REMERON) 7.5 MG Tab; One tablet po each night for sleep  Dispense: 30 tablet; Refill: 3     Insomnia-Remeron-discussed pros and cons   CHF chronic-continue diuretic see cardiologist   Venous stasis -ulceration has healed still has 3+ edema  in his lower extremity  Poor endurance-deconditioned-suggested walking program Pilates for  79-year-old/in chairs

## 2024-09-04 ENCOUNTER — TELEPHONE (OUTPATIENT)
Dept: CARDIOLOGY | Facility: CLINIC | Age: 79
End: 2024-09-04
Payer: MEDICARE

## 2024-09-04 NOTE — TELEPHONE ENCOUNTER
Spoke with patient and rescheduled 9/19 appointment due to the providers schedule changing. Offered 9/26 at 10:20 am. Patient accepted appointment.

## 2024-09-15 ENCOUNTER — HOSPITAL ENCOUNTER (INPATIENT)
Facility: HOSPITAL | Age: 79
LOS: 8 days | Discharge: NURSING, REHAB OR LONG-TERM CARE | DRG: 515 | End: 2024-09-24
Attending: EMERGENCY MEDICINE | Admitting: HOSPITALIST
Payer: MEDICARE

## 2024-09-15 DIAGNOSIS — M25.551 RIGHT HIP PAIN: ICD-10-CM

## 2024-09-15 DIAGNOSIS — R94.31 ST SEGMENT DEPRESSION: ICD-10-CM

## 2024-09-15 DIAGNOSIS — S32.810D CLOSED PELVIC RING FRACTURE WITH ROUTINE HEALING, SUBSEQUENT ENCOUNTER: Primary | ICD-10-CM
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
## 2024-09-15 DIAGNOSIS — I25.10 CAD (CORONARY ARTERY DISEASE): ICD-10-CM

## 2024-09-15 DIAGNOSIS — I50.43 ACUTE ON CHRONIC COMBINED SYSTOLIC AND DIASTOLIC CONGESTIVE HEART FAILURE: ICD-10-CM

## 2024-09-15 DIAGNOSIS — R79.89 ELEVATED TROPONIN: ICD-10-CM

## 2024-09-15 DIAGNOSIS — I26.99 PULMONARY EMBOLISM WITHOUT ACUTE COR PULMONALE: ICD-10-CM

## 2024-09-15 DIAGNOSIS — R07.9 CHEST PAIN: ICD-10-CM

## 2024-09-15 DIAGNOSIS — I48.91 ATRIAL FIBRILLATION: ICD-10-CM

## 2024-09-15 DIAGNOSIS — I48.92 PAROXYSMAL ATRIAL FLUTTER: ICD-10-CM

## 2024-09-15 DIAGNOSIS — I21.4 NSTEMI (NON-ST ELEVATED MYOCARDIAL INFARCTION): ICD-10-CM

## 2024-09-15 DIAGNOSIS — S32.810A CLOSED PELVIC RING FRACTURE, INITIAL ENCOUNTER: ICD-10-CM

## 2024-09-15 DIAGNOSIS — R79.89 TROPONIN LEVEL ELEVATED: ICD-10-CM

## 2024-09-15 DIAGNOSIS — I26.99 PULMONARY EMBOLISM, UNSPECIFIED CHRONICITY, UNSPECIFIED PULMONARY EMBOLISM TYPE, UNSPECIFIED WHETHER ACUTE COR PULMONALE PRESENT: ICD-10-CM

## 2024-09-15 PROCEDURE — 99285 EMERGENCY DEPT VISIT HI MDM: CPT | Mod: HCNC

## 2024-09-15 NOTE — Clinical Note
Diagnosis: Fall, initial encounter [[id number]]   Future Attending Provider: NETTIE EDMONDS [[id number]]   Reason for IP Medical Treatment  (Clinical interventions that can only be accomplished in the IP setting? ) :: Pubic rami fx & PE

## 2024-09-16 PROBLEM — I26.99 PULMONARY EMBOLISM WITHOUT ACUTE COR PULMONALE: Status: ACTIVE | Noted: 2024-09-16

## 2024-09-16 PROBLEM — R94.31 ABNORMAL EKG: Status: ACTIVE | Noted: 2024-09-16

## 2024-09-16 PROBLEM — K59.00 CONSTIPATION: Status: ACTIVE | Noted: 2024-09-16

## 2024-09-16 PROBLEM — R93.89 ABNORMAL VENTRICULAR WALL MOTION: Status: ACTIVE | Noted: 2024-09-16

## 2024-09-16 PROBLEM — R79.89 ELEVATED TROPONIN: Status: ACTIVE | Noted: 2024-09-16

## 2024-09-16 PROBLEM — J96.11 CHRONIC HYPOXEMIC RESPIRATORY FAILURE: Status: ACTIVE | Noted: 2024-09-16

## 2024-09-16 PROBLEM — S32.431A: Status: ACTIVE | Noted: 2024-09-16

## 2024-09-16 PROBLEM — I49.1 PAC (PREMATURE ATRIAL CONTRACTION): Status: ACTIVE | Noted: 2024-09-16

## 2024-09-16 PROBLEM — G47.00 INSOMNIA: Status: ACTIVE | Noted: 2024-09-16
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
## 2024-09-16 PROBLEM — S32.591A FRACTURE OF MULTIPLE PUBIC RAMI, RIGHT, CLOSED, INITIAL ENCOUNTER: Status: ACTIVE | Noted: 2024-09-16

## 2024-09-16 LAB
25(OH)D3+25(OH)D2 SERPL-MCNC: 36 NG/ML (ref 30–96)
ALBUMIN SERPL BCP-MCNC: 3.7 G/DL (ref 3.5–5.2)
ALP SERPL-CCNC: 49 U/L (ref 55–135)
ALT SERPL W/O P-5'-P-CCNC: 18 U/L (ref 10–44)
ANION GAP SERPL CALC-SCNC: 12 MMOL/L (ref 8–16)
APTT PPP: 29.1 SEC (ref 21–32)
APTT PPP: 29.1 SEC (ref 21–32)
APTT PPP: 59.2 SEC (ref 21–32)
APTT PPP: 90.5 SEC (ref 21–32)
AST SERPL-CCNC: 36 U/L (ref 10–40)
AV MEAN GRADIENT: 6 MMHG
AV PEAK GRADIENT: 13 MMHG
BASOPHILS # BLD AUTO: 0.01 K/UL (ref 0–0.2)
BASOPHILS # BLD AUTO: 0.02 K/UL (ref 0–0.2)
BASOPHILS NFR BLD: 0.1 % (ref 0–1.9)
BASOPHILS NFR BLD: 0.1 % (ref 0–1.9)
BILIRUB SERPL-MCNC: 3.3 MG/DL (ref 0.1–1)
BNP SERPL-MCNC: 949 PG/ML (ref 0–99)
BSA FOR ECHO PROCEDURE: 2.26 M2
BUN SERPL-MCNC: 20 MG/DL (ref 8–23)
CALCIUM SERPL-MCNC: 9.5 MG/DL (ref 8.7–10.5)
CHLORIDE SERPL-SCNC: 103 MMOL/L (ref 95–110)
CO2 SERPL-SCNC: 25 MMOL/L (ref 23–29)
CREAT SERPL-MCNC: 1.2 MG/DL (ref 0.5–1.4)
CV ECHO LV RWT: 0.37 CM
DIFFERENTIAL METHOD BLD: ABNORMAL
DIFFERENTIAL METHOD BLD: ABNORMAL
DOP CALC AO PEAK VEL: 1.8 M/S
DOP CALC AO VTI: 28.92 CM
DOP CALC LVOT AREA: 4.4 CM2
DOP CALC LVOT DIAMETER: 2.36 CM
E WAVE DECELERATION TIME: 128.65 MSEC
E/A RATIO: 2.06
E/E' RATIO: 13.6 M/S
ECHO LV POSTERIOR WALL: 1.03 CM (ref 0.6–1.1)
EOSINOPHIL # BLD AUTO: 0 K/UL (ref 0–0.5)
EOSINOPHIL # BLD AUTO: 0 K/UL (ref 0–0.5)
EOSINOPHIL NFR BLD: 0 % (ref 0–8)
EOSINOPHIL NFR BLD: 0.1 % (ref 0–8)
ERYTHROCYTE [DISTWIDTH] IN BLOOD BY AUTOMATED COUNT: 14.1 % (ref 11.5–14.5)
ERYTHROCYTE [DISTWIDTH] IN BLOOD BY AUTOMATED COUNT: 14.3 % (ref 11.5–14.5)
EST. GFR  (NO RACE VARIABLE): >60 ML/MIN/1.73 M^2
ESTIMATED AVG GLUCOSE: 134 MG/DL (ref 68–131)
FRACTIONAL SHORTENING: 12 % (ref 28–44)
GLUCOSE SERPL-MCNC: 168 MG/DL (ref 70–110)
HBA1C MFR BLD: 6.3 % (ref 4–5.6)
HCT VFR BLD AUTO: 40.1 % (ref 40–54)
HCT VFR BLD AUTO: 41.3 % (ref 40–54)
HGB BLD-MCNC: 13.2 G/DL (ref 14–18)
HGB BLD-MCNC: 13.5 G/DL (ref 14–18)
IMM GRANULOCYTES # BLD AUTO: 0.04 K/UL (ref 0–0.04)
IMM GRANULOCYTES # BLD AUTO: 0.05 K/UL (ref 0–0.04)
IMM GRANULOCYTES NFR BLD AUTO: 0.4 % (ref 0–0.5)
IMM GRANULOCYTES NFR BLD AUTO: 0.4 % (ref 0–0.5)
INR PPP: 1.1 (ref 0.8–1.2)
INTERVENTRICULAR SEPTUM: 1.02 CM (ref 0.6–1.1)
LA MAJOR: 7.85 CM
LA MINOR: 8.6 CM
LA WIDTH: 5.72 CM
LEFT ATRIUM SIZE: 6.94 CM
LEFT ATRIUM VOLUME INDEX MOD: 82.1 ML/M2
LEFT ATRIUM VOLUME INDEX: 124.8 ML/M2
LEFT ATRIUM VOLUME MOD: 182.24 CM3
LEFT ATRIUM VOLUME: 276.95 CM3
LEFT INTERNAL DIMENSION IN SYSTOLE: 4.93 CM (ref 2.1–4)
LEFT VENTRICLE DIASTOLIC VOLUME INDEX: 69.92 ML/M2
LEFT VENTRICLE DIASTOLIC VOLUME: 155.22 ML
LEFT VENTRICLE MASS INDEX: 103 G/M2
LEFT VENTRICLE SYSTOLIC VOLUME INDEX: 51.6 ML/M2
LEFT VENTRICLE SYSTOLIC VOLUME: 114.56 ML
LEFT VENTRICULAR INTERNAL DIMENSION IN DIASTOLE: 5.62 CM (ref 3.5–6)
LEFT VENTRICULAR MASS: 228.33 G
LV LATERAL E/E' RATIO: 17 M/S
LV SEPTAL E/E' RATIO: 11.33 M/S
LYMPHOCYTES # BLD AUTO: 0.8 K/UL (ref 1–4.8)
LYMPHOCYTES # BLD AUTO: 0.9 K/UL (ref 1–4.8)
LYMPHOCYTES NFR BLD: 5.9 % (ref 18–48)
LYMPHOCYTES NFR BLD: 7.9 % (ref 18–48)
MAGNESIUM SERPL-MCNC: 2.1 MG/DL (ref 1.6–2.6)
MCH RBC QN AUTO: 31.8 PG (ref 27–31)
MCH RBC QN AUTO: 32.4 PG (ref 27–31)
MCHC RBC AUTO-ENTMCNC: 32.7 G/DL (ref 32–36)
MCHC RBC AUTO-ENTMCNC: 32.9 G/DL (ref 32–36)
MCV RBC AUTO: 97 FL (ref 82–98)
MCV RBC AUTO: 98 FL (ref 82–98)
MONOCYTES # BLD AUTO: 0.7 K/UL (ref 0.3–1)
MONOCYTES # BLD AUTO: 0.8 K/UL (ref 0.3–1)
MONOCYTES NFR BLD: 4.8 % (ref 4–15)
MONOCYTES NFR BLD: 6.8 % (ref 4–15)
MV PEAK A VEL: 0.33 M/S
MV PEAK E VEL: 0.68 M/S
MV STENOSIS PRESSURE HALF TIME: 37.31 MS
MV VALVE AREA P 1/2 METHOD: 5.9 CM2
NEUTROPHILS # BLD AUTO: 12.6 K/UL (ref 1.8–7.7)
NEUTROPHILS # BLD AUTO: 9.5 K/UL (ref 1.8–7.7)
NEUTROPHILS NFR BLD: 84.8 % (ref 38–73)
NEUTROPHILS NFR BLD: 88.7 % (ref 38–73)
NRBC BLD-RTO: 0 /100 WBC
NRBC BLD-RTO: 0 /100 WBC
OHS QRS DURATION: 100 MS
OHS QRS DURATION: 110 MS
OHS QTC CALCULATION: 534 MS
OHS QTC CALCULATION: 623 MS
PISA MRMAX VEL: 0.04 M/S
PISA TR MAX VEL: 3.21 M/S
PLATELET # BLD AUTO: 154 K/UL (ref 150–450)
PLATELET # BLD AUTO: 159 K/UL (ref 150–450)
PMV BLD AUTO: 12 FL (ref 9.2–12.9)
PMV BLD AUTO: 12.4 FL (ref 9.2–12.9)
POTASSIUM SERPL-SCNC: 3.8 MMOL/L (ref 3.5–5.1)
PROT SERPL-MCNC: 6.9 G/DL (ref 6–8.4)
PROTHROMBIN TIME: 12.1 SEC (ref 9–12.5)
RA MAJOR: 7.55 CM
RA PRESSURE ESTIMATED: 8 MMHG
RA WIDTH: 4.41 CM
RBC # BLD AUTO: 4.08 M/UL (ref 4.6–6.2)
RBC # BLD AUTO: 4.25 M/UL (ref 4.6–6.2)
RIGHT VENTRICLE DIASTOLIC BASEL DIMENSION: 4.2 CM
RV TB RVSP: 11 MMHG
SINUS: 3.09 CM
SODIUM SERPL-SCNC: 140 MMOL/L (ref 136–145)
STJ: 3.17 CM
TDI LATERAL: 0.04 M/S
TDI SEPTAL: 0.06 M/S
TDI: 0.05 M/S
TR MAX PG: 41 MMHG
TRICUSPID ANNULAR PLANE SYSTOLIC EXCURSION: 1.49 CM
TROPONIN I SERPL DL<=0.01 NG/ML-MCNC: 0.81 NG/ML (ref 0–0.03)
TROPONIN I SERPL DL<=0.01 NG/ML-MCNC: 0.82 NG/ML (ref 0–0.03)
TROPONIN I SERPL DL<=0.01 NG/ML-MCNC: 0.84 NG/ML (ref 0–0.03)
TROPONIN I SERPL DL<=0.01 NG/ML-MCNC: 0.87 NG/ML (ref 0–0.03)
TROPONIN I SERPL DL<=0.01 NG/ML-MCNC: 1.04 NG/ML (ref 0–0.03)
TV REST PULMONARY ARTERY PRESSURE: 49 MMHG
WBC # BLD AUTO: 11.17 K/UL (ref 3.9–12.7)
WBC # BLD AUTO: 14.23 K/UL (ref 3.9–12.7)
Z-SCORE OF LEFT VENTRICULAR DIMENSION IN END DIASTOLE: -3.22
Z-SCORE OF LEFT VENTRICULAR DIMENSION IN END SYSTOLE: 0.37

## 2024-09-16 PROCEDURE — 84484 ASSAY OF TROPONIN QUANT: CPT | Mod: 91,HCNC | Performed by: HOSPITALIST

## 2024-09-16 PROCEDURE — 25500020 PHARM REV CODE 255: Mod: HCNC | Performed by: EMERGENCY MEDICINE

## 2024-09-16 PROCEDURE — 63600175 PHARM REV CODE 636 W HCPCS: Mod: HCNC

## 2024-09-16 PROCEDURE — 85730 THROMBOPLASTIN TIME PARTIAL: CPT | Mod: 91,HCNC | Performed by: HOSPITALIST

## 2024-09-16 PROCEDURE — 63600175 PHARM REV CODE 636 W HCPCS: Mod: HCNC | Performed by: EMERGENCY MEDICINE

## 2024-09-16 PROCEDURE — 25500020 PHARM REV CODE 255: Mod: HCNC | Performed by: HOSPITALIST

## 2024-09-16 PROCEDURE — 36415 COLL VENOUS BLD VENIPUNCTURE: CPT | Mod: HCNC | Performed by: HOSPITALIST

## 2024-09-16 PROCEDURE — 93005 ELECTROCARDIOGRAM TRACING: CPT | Mod: HCNC

## 2024-09-16 PROCEDURE — 30200315 PPD INTRADERMAL TEST REV CODE 302: Mod: HCNC | Performed by: HOSPITALIST

## 2024-09-16 PROCEDURE — 25000003 PHARM REV CODE 250: Mod: HCNC

## 2024-09-16 PROCEDURE — 25000003 PHARM REV CODE 250: Mod: HCNC | Performed by: HOSPITALIST

## 2024-09-16 PROCEDURE — 83036 HEMOGLOBIN GLYCOSYLATED A1C: CPT | Mod: HCNC

## 2024-09-16 PROCEDURE — 83880 ASSAY OF NATRIURETIC PEPTIDE: CPT | Mod: HCNC

## 2024-09-16 PROCEDURE — 83735 ASSAY OF MAGNESIUM: CPT | Mod: HCNC

## 2024-09-16 PROCEDURE — 80053 COMPREHEN METABOLIC PANEL: CPT | Mod: HCNC

## 2024-09-16 PROCEDURE — 93010 ELECTROCARDIOGRAM REPORT: CPT | Mod: HCNC,76,, | Performed by: INTERNAL MEDICINE

## 2024-09-16 PROCEDURE — 63600175 PHARM REV CODE 636 W HCPCS: Mod: HCNC | Performed by: HOSPITALIST

## 2024-09-16 PROCEDURE — 86580 TB INTRADERMAL TEST: CPT | Mod: HCNC | Performed by: HOSPITALIST

## 2024-09-16 PROCEDURE — 85730 THROMBOPLASTIN TIME PARTIAL: CPT | Mod: HCNC

## 2024-09-16 PROCEDURE — 85610 PROTHROMBIN TIME: CPT | Mod: HCNC

## 2024-09-16 PROCEDURE — 21400001 HC TELEMETRY ROOM: Mod: HCNC

## 2024-09-16 PROCEDURE — 36415 COLL VENOUS BLD VENIPUNCTURE: CPT | Mod: HCNC

## 2024-09-16 PROCEDURE — 84484 ASSAY OF TROPONIN QUANT: CPT | Mod: HCNC

## 2024-09-16 PROCEDURE — 99223 1ST HOSP IP/OBS HIGH 75: CPT | Mod: 25,HCNC,, | Performed by: INTERNAL MEDICINE

## 2024-09-16 PROCEDURE — 82306 VITAMIN D 25 HYDROXY: CPT | Mod: HCNC

## 2024-09-16 PROCEDURE — 85025 COMPLETE CBC W/AUTO DIFF WBC: CPT | Mod: 91,HCNC

## 2024-09-16 PROCEDURE — 84484 ASSAY OF TROPONIN QUANT: CPT | Mod: 91,HCNC

## 2024-09-16 PROCEDURE — 99283 EMERGENCY DEPT VISIT LOW MDM: CPT | Mod: HCNC,,, | Performed by: ORTHOPAEDIC SURGERY

## 2024-09-16 PROCEDURE — 93010 ELECTROCARDIOGRAM REPORT: CPT | Mod: HCNC,,, | Performed by: INTERNAL MEDICINE

## 2024-09-16 RX ORDER — POLYETHYLENE GLYCOL 3350 17 G/17G
17 POWDER, FOR SOLUTION ORAL 2 TIMES DAILY PRN
Status: DISCONTINUED | OUTPATIENT
Start: 2024-09-16 | End: 2024-09-16

## 2024-09-16 RX ORDER — HEPARIN SODIUM,PORCINE/D5W 25000/250
0-40 INTRAVENOUS SOLUTION INTRAVENOUS CONTINUOUS
Status: DISCONTINUED | OUTPATIENT
Start: 2024-09-16 | End: 2024-09-20

## 2024-09-16 RX ORDER — ACETAMINOPHEN 500 MG
1000 TABLET ORAL EVERY 8 HOURS PRN
Status: DISCONTINUED | OUTPATIENT
Start: 2024-09-16 | End: 2024-09-16

## 2024-09-16 RX ORDER — IBUPROFEN 200 MG
24 TABLET ORAL
Status: DISCONTINUED | OUTPATIENT
Start: 2024-09-16 | End: 2024-09-24 | Stop reason: HOSPADM

## 2024-09-16 RX ORDER — LOSARTAN POTASSIUM 50 MG/1
100 TABLET ORAL DAILY
Status: DISCONTINUED | OUTPATIENT
Start: 2024-09-16 | End: 2024-09-16

## 2024-09-16 RX ORDER — ASPIRIN 81 MG/1
81 TABLET ORAL DAILY
Status: DISCONTINUED | OUTPATIENT
Start: 2024-09-16 | End: 2024-09-24 | Stop reason: HOSPADM

## 2024-09-16 RX ORDER — IPRATROPIUM BROMIDE AND ALBUTEROL SULFATE 2.5; .5 MG/3ML; MG/3ML
3 SOLUTION RESPIRATORY (INHALATION) EVERY 4 HOURS PRN
Status: DISCONTINUED | OUTPATIENT
Start: 2024-09-16 | End: 2024-09-24 | Stop reason: HOSPADM

## 2024-09-16 RX ORDER — ONDANSETRON 4 MG/1
8 TABLET, ORALLY DISINTEGRATING ORAL EVERY 8 HOURS PRN
Status: DISCONTINUED | OUTPATIENT
Start: 2024-09-16 | End: 2024-09-24 | Stop reason: HOSPADM

## 2024-09-16 RX ORDER — MAGNESIUM SULFATE HEPTAHYDRATE 40 MG/ML
2 INJECTION, SOLUTION INTRAVENOUS
Status: COMPLETED | OUTPATIENT
Start: 2024-09-16 | End: 2024-09-16

## 2024-09-16 RX ORDER — MIRTAZAPINE 7.5 MG/1
7.5 TABLET, FILM COATED ORAL NIGHTLY
Status: DISCONTINUED | OUTPATIENT
Start: 2024-09-16 | End: 2024-09-24 | Stop reason: HOSPADM

## 2024-09-16 RX ORDER — GLUCAGON 1 MG
1 KIT INJECTION
Status: DISCONTINUED | OUTPATIENT
Start: 2024-09-16 | End: 2024-09-24 | Stop reason: HOSPADM

## 2024-09-16 RX ORDER — FUROSEMIDE 10 MG/ML
40 INJECTION INTRAMUSCULAR; INTRAVENOUS DAILY
Status: DISCONTINUED | OUTPATIENT
Start: 2024-09-17 | End: 2024-09-16

## 2024-09-16 RX ORDER — IBUPROFEN 200 MG
16 TABLET ORAL
Status: DISCONTINUED | OUTPATIENT
Start: 2024-09-16 | End: 2024-09-24 | Stop reason: HOSPADM

## 2024-09-16 RX ORDER — ACETAMINOPHEN 325 MG/1
650 TABLET ORAL EVERY 4 HOURS PRN
Status: DISCONTINUED | OUTPATIENT
Start: 2024-09-16 | End: 2024-09-16

## 2024-09-16 RX ORDER — POLYETHYLENE GLYCOL 3350 17 G/17G
17 POWDER, FOR SOLUTION ORAL DAILY
Status: DISCONTINUED | OUTPATIENT
Start: 2024-09-17 | End: 2024-09-24 | Stop reason: HOSPADM

## 2024-09-16 RX ORDER — BISACODYL 10 MG/1
10 SUPPOSITORY RECTAL DAILY PRN
Status: DISCONTINUED | OUTPATIENT
Start: 2024-09-16 | End: 2024-09-24 | Stop reason: HOSPADM

## 2024-09-16 RX ORDER — ACETAMINOPHEN 500 MG
1000 TABLET ORAL
Status: COMPLETED | OUTPATIENT
Start: 2024-09-16 | End: 2024-09-16

## 2024-09-16 RX ORDER — FUROSEMIDE 10 MG/ML
40 INJECTION INTRAMUSCULAR; INTRAVENOUS ONCE
Status: COMPLETED | OUTPATIENT
Start: 2024-09-16 | End: 2024-09-16

## 2024-09-16 RX ORDER — METHOCARBAMOL 500 MG/1
500 TABLET, FILM COATED ORAL 4 TIMES DAILY PRN
Status: DISCONTINUED | OUTPATIENT
Start: 2024-09-16 | End: 2024-09-19

## 2024-09-16 RX ORDER — SODIUM CHLORIDE 0.9 % (FLUSH) 0.9 %
5 SYRINGE (ML) INJECTION
Status: DISCONTINUED | OUTPATIENT
Start: 2024-09-16 | End: 2024-09-24 | Stop reason: HOSPADM

## 2024-09-16 RX ORDER — OXYCODONE HYDROCHLORIDE 5 MG/1
5 TABLET ORAL EVERY 6 HOURS PRN
Status: DISCONTINUED | OUTPATIENT
Start: 2024-09-16 | End: 2024-09-22

## 2024-09-16 RX ORDER — SPIRONOLACTONE 25 MG/1
25 TABLET ORAL DAILY
Status: DISCONTINUED | OUTPATIENT
Start: 2024-09-16 | End: 2024-09-24 | Stop reason: HOSPADM

## 2024-09-16 RX ORDER — ATORVASTATIN CALCIUM 10 MG/1
10 TABLET, FILM COATED ORAL DAILY
Status: DISCONTINUED | OUTPATIENT
Start: 2024-09-16 | End: 2024-09-24 | Stop reason: HOSPADM

## 2024-09-16 RX ORDER — ACETAMINOPHEN 500 MG
1000 TABLET ORAL EVERY 8 HOURS
Status: DISCONTINUED | OUTPATIENT
Start: 2024-09-16 | End: 2024-09-24 | Stop reason: HOSPADM

## 2024-09-16 RX ORDER — FUROSEMIDE 10 MG/ML
80 INJECTION INTRAMUSCULAR; INTRAVENOUS EVERY 12 HOURS
Status: DISCONTINUED | OUTPATIENT
Start: 2024-09-16 | End: 2024-09-19

## 2024-09-16 RX ORDER — LEVOTHYROXINE SODIUM 100 UG/1
200 TABLET ORAL
Status: DISCONTINUED | OUTPATIENT
Start: 2024-09-16 | End: 2024-09-24 | Stop reason: HOSPADM

## 2024-09-16 RX ORDER — AMOXICILLIN 250 MG
1 CAPSULE ORAL DAILY
Status: DISCONTINUED | OUTPATIENT
Start: 2024-09-16 | End: 2024-09-24 | Stop reason: HOSPADM

## 2024-09-16 RX ORDER — NALOXONE HCL 0.4 MG/ML
0.02 VIAL (ML) INJECTION
Status: DISCONTINUED | OUTPATIENT
Start: 2024-09-16 | End: 2024-09-24 | Stop reason: HOSPADM

## 2024-09-16 RX ORDER — TALC
6 POWDER (GRAM) TOPICAL NIGHTLY PRN
Status: DISCONTINUED | OUTPATIENT
Start: 2024-09-16 | End: 2024-09-24 | Stop reason: HOSPADM

## 2024-09-16 RX ORDER — POTASSIUM CHLORIDE 20 MEQ/1
20 TABLET, EXTENDED RELEASE ORAL ONCE
Status: COMPLETED | OUTPATIENT
Start: 2024-09-16 | End: 2024-09-16

## 2024-09-16 RX ORDER — AMLODIPINE BESYLATE 2.5 MG/1
2.5 TABLET ORAL DAILY
Status: DISCONTINUED | OUTPATIENT
Start: 2024-09-16 | End: 2024-09-16

## 2024-09-16 RX ADMIN — HEPARIN SODIUM AND DEXTROSE 18 UNITS/KG/HR: 10000; 5 INJECTION INTRAVENOUS at 05:09

## 2024-09-16 RX ADMIN — OXYCODONE 5 MG: 5 TABLET ORAL at 02:09

## 2024-09-16 RX ADMIN — ACETAMINOPHEN 1000 MG: 500 TABLET ORAL at 12:09

## 2024-09-16 RX ADMIN — FUROSEMIDE 80 MG: 10 INJECTION, SOLUTION INTRAVENOUS at 10:09

## 2024-09-16 RX ADMIN — LEVOTHYROXINE SODIUM 200 MCG: 125 TABLET ORAL at 10:09

## 2024-09-16 RX ADMIN — HUMAN ALBUMIN MICROSPHERES AND PERFLUTREN 0.11 MG: 10; .22 INJECTION, SOLUTION INTRAVENOUS at 12:09

## 2024-09-16 RX ADMIN — MIRTAZAPINE 7.5 MG: 7.5 TABLET, FILM COATED ORAL at 10:09

## 2024-09-16 RX ADMIN — ASPIRIN 81 MG: 81 TABLET, COATED ORAL at 10:09

## 2024-09-16 RX ADMIN — AMLODIPINE BESYLATE 2.5 MG: 2.5 TABLET ORAL at 10:09

## 2024-09-16 RX ADMIN — POTASSIUM CHLORIDE 20 MEQ: 1500 TABLET, EXTENDED RELEASE ORAL at 05:09

## 2024-09-16 RX ADMIN — MAGNESIUM SULFATE HEPTAHYDRATE 2 G: 40 INJECTION, SOLUTION INTRAVENOUS at 05:09

## 2024-09-16 RX ADMIN — SACUBITRIL AND VALSARTAN 1 TABLET: 49; 51 TABLET, FILM COATED ORAL at 10:09

## 2024-09-16 RX ADMIN — FUROSEMIDE 40 MG: 10 INJECTION, SOLUTION INTRAVENOUS at 05:09

## 2024-09-16 RX ADMIN — ACETAMINOPHEN 1000 MG: 500 TABLET ORAL at 02:09

## 2024-09-16 RX ADMIN — TUBERCULIN PURIFIED PROTEIN DERIVATIVE 5 UNITS: 5 INJECTION, SOLUTION INTRADERMAL at 02:09

## 2024-09-16 RX ADMIN — LOSARTAN POTASSIUM 100 MG: 50 TABLET, FILM COATED ORAL at 10:09

## 2024-09-16 RX ADMIN — ACETAMINOPHEN 1000 MG: 500 TABLET ORAL at 10:09

## 2024-09-16 RX ADMIN — SENNOSIDES AND DOCUSATE SODIUM 1 TABLET: 50; 8.6 TABLET ORAL at 10:09

## 2024-09-16 RX ADMIN — SPIRONOLACTONE 25 MG: 25 TABLET ORAL at 10:09

## 2024-09-16 RX ADMIN — HEPARIN SODIUM AND DEXTROSE 14.05 UNITS/KG/HR: 10000; 5 INJECTION INTRAVENOUS at 10:09

## 2024-09-16 RX ADMIN — ATORVASTATIN CALCIUM 10 MG: 10 TABLET, FILM COATED ORAL at 10:09

## 2024-09-16 RX ADMIN — IOHEXOL 100 ML: 350 INJECTION, SOLUTION INTRAVENOUS at 02:09

## 2024-09-16 RX ADMIN — POTASSIUM CHLORIDE 20 MEQ: 1500 TABLET, EXTENDED RELEASE ORAL at 02:09

## 2024-09-16 NOTE — SUBJECTIVE & OBJECTIVE
Past Medical History:   Diagnosis Date    Atrial fibrillation, unspecified type 09/06/2023    COPD exacerbation 09/06/2023    Thyroid disease     hypo       Past Surgical History:   Procedure Laterality Date    COLONOSCOPY  01/01/2011    CORONARY ANGIOGRAPHY N/A 07/22/2021    Procedure: ANGIOGRAM, CORONARY ARTERY;  Surgeon: Hank Barry MD;  Location: [facility] CATH LAB/EP;  Service: Cardiology;  Laterality: N/A;    EYE SURGERY Bilateral     cataracts extraction    FRACTURE SURGERY Right 09/2021    femur    HERNIA REPAIR      HIP SURGERY Right 08/2021    INTRAMEDULLARY RODDING OF TROCHANTER OF FEMUR Right 09/03/2021    Procedure: INSERTION, INTRAMEDULLARY RACQUEL, FEMUR, TROCHANTER;  Surgeon: Darryn Hall MD;  Location: [facility] OR;  Service: Orthopedics;  Laterality: Right;    JOINT REPLACEMENT Bilateral     knee    LEFT HEART CATHETERIZATION Right 07/22/2021    Procedure: Left heart cath;  Surgeon: Hank Barry MD;  Location: [facility] CATH LAB/EP;  Service: Cardiology;  Laterality: Right;    VASECTOMY         Review of patient's allergies indicates:  No Known Allergies    No current facility-administered medications on file prior to encounter.     Current Outpatient Medications on File Prior to Encounter   Medication Sig    acetaminophen (TYLENOL) 325 MG tablet Take 2 tablets (650 mg total) by mouth every 6 (six) hours as needed for Pain.    amLODIPine (NORVASC) 2.5 MG tablet Take 1 tablet by mouth once daily (Patient taking differently: Take 2.5 mg by mouth once daily.)    aspirin (ECOTRIN) 81 MG EC tablet Take 1 tablet (81 mg total) by mouth once daily.    atorvastatin (LIPITOR) 10 MG tablet Take 1 tablet by mouth once daily    furosemide (LASIX) 40 MG tablet Take 1 tablet (40 mg total) by mouth 2 (two) times a day. Take daily for next 3 days, then use as needed. Weigh daily. Afterwards, f weight increases 2 lbs/24h, take dose of lasix daily  until weight is back to baseline    levothyroxine (SYNTHROID) 200 MCG tablet  Take 1 tablet by mouth once daily    losartan (COZAAR) 100 MG tablet Take 1 tablet by mouth once daily    metoprolol succinate (TOPROL-XL) 50 MG 24 hr tablet Take 1 tablet by mouth once daily    mirtazapine (REMERON) 7.5 MG Tab One tablet po each night for sleep    multivit-min-FA-lycopen-lutein (CENTRUM SILVER) 0.4-300-250 mg-mcg-mcg Tab Take 1 tablet by mouth once daily.    spironolactone (ALDACTONE) 25 MG tablet Take 1 tablet by mouth once daily     Family History       Problem Relation (Age of Onset)    Crohn's disease Mother    Dementia Mother    Heart attack Father    No Known Problems Sister, Brother, Son          Tobacco Use    Smoking status: Former     Current packs/day: 0.00     Average packs/day: 1 pack/day for 35.0 years (35.0 ttl pk-yrs)     Types: Cigarettes     Start date: 1965     Quit date: 2000     Years since quittin.7     Passive exposure: Past    Smokeless tobacco: Never   Substance and Sexual Activity    Alcohol use: No    Drug use: Never    Sexual activity: Not Currently     Review of Systems   Constitutional:  Negative for chills and fever.   HENT:  Negative for trouble swallowing.    Eyes:  Negative for visual disturbance.   Respiratory:  Positive for shortness of breath (SLADE). Negative for cough.    Cardiovascular:  Positive for leg swelling. Negative for chest pain and palpitations.   Gastrointestinal:  Negative for abdominal pain, nausea and vomiting.   Genitourinary:  Negative for dysuria and frequency.   Musculoskeletal:  Positive for arthralgias and gait problem.   Skin:  Positive for rash.   Neurological:  Negative for light-headedness and headaches.   Psychiatric/Behavioral:  Negative for agitation and confusion.      Objective:     Vital Signs (Most Recent):  Temp: 98 °F (36.7 °C) (24)  Pulse: 72 (24)  Resp: (!) 22 (09/15/24 2051)  BP: 130/69 (243)  SpO2: (!) 93 % (24) Vital Signs (24h Range):  Temp:  [98 °F (36.7 °C)] 98 °F  (36.7 °C)  Pulse:  [47-72] 72  Resp:  [22-23] 22  SpO2:  [93 %-99 %] 93 %  BP: (110-160)/(44-69) 130/69     Weight: 102.4 kg (225 lb 12 oz)  Body mass index is 31.49 kg/m².     Physical Exam  Vitals and nursing note reviewed.   Constitutional:       General: He is not in acute distress.     Interventions: Nasal cannula in place.   HENT:      Head: Normocephalic and atraumatic.      Nose: Nose normal.      Mouth/Throat:      Pharynx: No oropharyngeal exudate.   Eyes:      Extraocular Movements: Extraocular movements intact.      Conjunctiva/sclera: Conjunctivae normal.   Cardiovascular:      Rate and Rhythm: Normal rate. Rhythm irregular.      Heart sounds: Normal heart sounds.   Pulmonary:      Effort: Pulmonary effort is normal. No respiratory distress.      Comments: Anterior lung sounds clear. Painful repositioning limits full lung ausculation   Saturating >94% on 2L NC  Abdominal:      General: Bowel sounds are normal.      Palpations: Abdomen is soft.      Tenderness: There is no abdominal tenderness.   Musculoskeletal:      Cervical back: Normal range of motion. No tenderness.      Right hip: No bony tenderness.      Left hip: No bony tenderness.      Right lower le+ Pitting Edema present.      Left lower le+ Pitting Edema present.      Right foot: Swelling present.      Left foot: Swelling present.   Skin:     General: Skin is warm and dry.      Comments: Multiple 1-2 mm erythematous non pruritic, non blanching lesions scattered over his arms, chest, abdomen, and legs. No pustules seen.   Chronic venous stasis dermatitis appearance to BLE    Neurological:      General: No focal deficit present.      Mental Status: He is alert and oriented to person, place, and time.   Psychiatric:         Mood and Affect: Mood normal.         Behavior: Behavior normal.         Thought Content: Thought content normal.         Judgment: Judgment normal.                Significant Labs: All pertinent labs within the past  24 hours have been reviewed.  CBC:   Recent Labs   Lab 09/16/24 0037 09/16/24 0423   WBC 14.23* 11.17   HGB 13.2* 13.5*   HCT 40.1 41.3    159     CMP:   Recent Labs   Lab 09/16/24 0037      K 3.8      CO2 25   *   BUN 20   CREATININE 1.2   CALCIUM 9.5   PROT 6.9   ALBUMIN 3.7   BILITOT 3.3*   ALKPHOS 49*   AST 36   ALT 18   ANIONGAP 12     Cardiac Markers:   Recent Labs   Lab 09/16/24 0037   *     Coagulation:   Recent Labs   Lab 09/16/24 0423   INR 1.1   APTT 29.1  29.1     Magnesium:   Recent Labs   Lab 09/16/24 0423   MG 2.1     Troponin:   Recent Labs   Lab 09/16/24 0037 09/16/24 0423   TROPONINI 0.811* 1.041*       Significant Imaging: I have reviewed all pertinent imaging results/findings within the past 24 hours.  CT Chest Abdomen Pelvis With IV Contrast (XPD) NO Oral Contrast  Narrative: EXAMINATION:  CT CHEST ABDOMEN PELVIS WITH IV CONTRAST (XPD)    CLINICAL HISTORY:  Polytrauma, blunt;Trauma;    TECHNIQUE:  Low dose axial images, sagittal and coronal reformations were obtained from the thoracic inlet to the pubic symphysis following the IV administration of 100 mL of Omnipaque 350.  Oral contrast was not administered.    COMPARISON:  X-ray 09/16/2024, CT 09/25/2021.    FINDINGS:  THORACIC SOFT TISSUES: Bilateral gynecomastia.  No axillary or subpectoral lymphadenopathy. The visualized thyroid gland is unremarkable.    HEART & MEDIASTINUM: Right-sided pulmonary embolism which is occlusive subsegmentally (axial series 2, image 80) and nonocclusive segmentally (axial series 2, image 69) and centrally (axial series 2, image 63).  Peripheral configuration of more proximal right-sided pulmonary emboli raise the question of chronic pulmonary embolus, though this is indeterminate in the absence of prior imaging.  No left-sided pulmonary emboli.  No saddle pulmonary embolus.  No right heart strain.  Cardiomegaly with left atrial enlargement.  Multivessel coronary arterial  calcific plaque.  No mediastinal or hilar lymphadenopathy.    PLEURA:  No pleural effusion or pneumothorax.    LUNGS & AIRWAYS:  Retained secretions in the trachea.  Bibasilar streak like opacities, subsegmental atelectasis and left basilar predominant reticulations.  Chronic subsegmental opacity in the right lower lobe as seen on prior abdominal CT in 2021.  No airspace consolidation.    HEPATOBILIARY: 1.8 cm simple cyst in the right hepatic lobe.  Few scattered subcentimeter hypodensities, too small to characterize.  No biliary ductal dilatation. Large gallstones in the gallbladder.  No overt pericholecystic inflammatory change.    SPLEEN:  No splenomegaly.    PANCREAS:  No focal masses or ductal dilatation.    ADRENALS:  Small nodules again noted in the right adrenal gland.  Left adrenal gland is unremarkable.    KIDNEYS/URETERS: Kidneys enhance symmetrically.  Bilateral simple renal cysts.  No hydronephrosis, stones or solid mass lesions. Ureters are unremarkable.    PELVIC ORGANS/BLADDER:  Unremarkable.    PERITONEUM / RETROPERITONEUM:  No free air. No free fluid.    LYMPH NODES:  No lymphadenopathy.    VESSELS:  Advanced aortoiliac atherosclerosis.    GI TRACT:  No distension or wall thickening.  Appendix is not definitively visualized however there is no right lower quadrant inflammation.    BONES AND ABDOMINAL SOFT TISSUES:  Soft tissues are unremarkable.  Few old rib fractures.  Right hip arthroplasty.  Acute fracture of the right superior and inferior pubic rami, more fully characterized on dedicated right hip CT performed same day.  Diffuse osseous demineralization.  Degenerative changes of the spine.  No aggressive osseous lesions.  Impression: Right-sided pulmonary embolism, detailed above, with nonocclusive thrombus in the main right pulmonary artery and right lower lobe branch vessels, and occlusive thrombus in a subsegmental branch of the right pulmonary artery.  Note comparison images are not  available to determine chronicity.  No right sided heart strain.    Acute fracture of the right superior and inferior pubic rami, more fully characterized on dedicated right hip CT performed same day.    Cholelithiasis.    Additional findings as above.    This report was flagged in Epic as abnormal.    Finding identified at approximately 02:49 on 09/16/2024 and relayed to Dr. Mac Beavers by Dr. Carbajal via phone at 02:55 on 09/16/2024.    Electronically signed by resident: Michael Carbajal  Date:    09/16/2024  Time:    02:49    Electronically signed by: Rony West MD  Date:    09/16/2024  Time:    03:41  CT 3D Rendering WO Independent Workstation  Narrative: EXAMINATION:  CT HIP WITHOUT CONTRAST RIGHT; CT 3D RENDERING WO INDEPENDENT WORKSTATION    CLINICAL HISTORY:  Fracture, hip;; Fracture, hip;trauma;    TECHNIQUE:  CT images of the right hip obtained without IV contrast.  Axial, coronal, and sagittal reconstructions were created from the source data.    3D reconstructed images were acquired by post processing on an independent workstation with concurrent supervision and archived for permanent record. 3D imaging of the right hip was obtained for further evaluation and for operative planning if needed.    COMPARISON:  Pelvic radiograph, 10/04/2023.    FINDINGS:  Mildly displaced acute fracture of the right superior pubic ramus with questionable minimal extension into the anterosuperior acetabulum.  Minimally displaced acute fracture of the right inferior acetabulum.  Operative changes of right proximal femur ORIF with intramedullary gurpreet.  No additional acute fracture or dislocation.  No sizeable pelvic hematoma in the field of view.  Small right inguinal hernia.  Mild soft tissue edema.  Atherosclerosis in the regional arteries.  Impression: Acute fractures of the right superior and inferior pubic rami with possible involvement of the anterosuperior acetabulum.    Additional findings discussed in the  body of the report.    Electronically signed by: Rony West MD  Date:    09/16/2024  Time:    03:29  CT Hip Without Contrast Right  Narrative: EXAMINATION:  CT HIP WITHOUT CONTRAST RIGHT; CT 3D RENDERING WO INDEPENDENT WORKSTATION    CLINICAL HISTORY:  Fracture, hip;; Fracture, hip;trauma;    TECHNIQUE:  CT images of the right hip obtained without IV contrast.  Axial, coronal, and sagittal reconstructions were created from the source data.    3D reconstructed images were acquired by post processing on an independent workstation with concurrent supervision and archived for permanent record. 3D imaging of the right hip was obtained for further evaluation and for operative planning if needed.    COMPARISON:  Pelvic radiograph, 10/04/2023.    FINDINGS:  Mildly displaced acute fracture of the right superior pubic ramus with questionable minimal extension into the anterosuperior acetabulum.  Minimally displaced acute fracture of the right inferior acetabulum.  Operative changes of right proximal femur ORIF with intramedullary gurpreet.  No additional acute fracture or dislocation.  No sizeable pelvic hematoma in the field of view.  Small right inguinal hernia.  Mild soft tissue edema.  Atherosclerosis in the regional arteries.  Impression: Acute fractures of the right superior and inferior pubic rami with possible involvement of the anterosuperior acetabulum.    Additional findings discussed in the body of the report.    Electronically signed by: Rony West MD  Date:    09/16/2024  Time:    03:29  CT Cervical Spine Without Contrast  Narrative: EXAMINATION:  CT HEAD WITHOUT CONTRAST; CT CERVICAL SPINE WITHOUT CONTRAST    CLINICAL HISTORY:  Trauma;    TECHNIQUE:  Low dose axial CT images obtained throughout the head without the use of intravenous contrast.  Axial, sagittal and coronal reconstructions were performed.    Low dose axial images, sagittal and coronal reformations were performed though the cervical spine.   Contrast was not administered.    COMPARISON:  CT 10/04/2023    FINDINGS:  INTRACRANIAL COMPARTMENT:    Prominence of the ventricles and sulci compatible with generalized cerebral volume loss.  No hydrocephalus.    The brain parenchyma appears within normal limits.  No parenchymal mass, hemorrhage, edema or major vascular distribution infarct.    No extra-axial blood or fluid collections.    SKULL/EXTRACRANIAL CONTENTS (limited evaluation):    No fracture. Mastoid air cells and paranasal sinuses are essentially clear.  Few scattered dental caries.    Skull Base and Craniocervical Junction (partially imaged): Normal.    CERVICAL SPINE:    Spinal Alignment: Straightening of the cervical lordosis.    Vertebrae: No fracture.  Multilevel degenerative endplate change most pronounced at C4-C5.    Discs: Disc height loss most pronounced C4-C5.    Degenerative findings: Multilevel facet arthropathy notable for significant neural foraminal narrowing at left C3-C4 and right C4-C5.  No severe spinal canal stenosis at any level.    Paraspinal muscles & soft tissues: Normal.  Impression: No acute intracranial abnormality.    No acute cervical fracture.    Electronically signed by resident: Michael Carbajal  Date:    09/16/2024  Time:    02:17    Electronically signed by: Rony West MD  Date:    09/16/2024  Time:    02:54  CT Head Without Contrast  Narrative: EXAMINATION:  CT HEAD WITHOUT CONTRAST; CT CERVICAL SPINE WITHOUT CONTRAST    CLINICAL HISTORY:  Trauma;    TECHNIQUE:  Low dose axial CT images obtained throughout the head without the use of intravenous contrast.  Axial, sagittal and coronal reconstructions were performed.    Low dose axial images, sagittal and coronal reformations were performed though the cervical spine.  Contrast was not administered.    COMPARISON:  CT 10/04/2023    FINDINGS:  INTRACRANIAL COMPARTMENT:    Prominence of the ventricles and sulci compatible with generalized cerebral volume loss.  No  hydrocephalus.    The brain parenchyma appears within normal limits.  No parenchymal mass, hemorrhage, edema or major vascular distribution infarct.    No extra-axial blood or fluid collections.    SKULL/EXTRACRANIAL CONTENTS (limited evaluation):    No fracture. Mastoid air cells and paranasal sinuses are essentially clear.  Few scattered dental caries.    Skull Base and Craniocervical Junction (partially imaged): Normal.    CERVICAL SPINE:    Spinal Alignment: Straightening of the cervical lordosis.    Vertebrae: No fracture.  Multilevel degenerative endplate change most pronounced at C4-C5.    Discs: Disc height loss most pronounced C4-C5.    Degenerative findings: Multilevel facet arthropathy notable for significant neural foraminal narrowing at left C3-C4 and right C4-C5.  No severe spinal canal stenosis at any level.    Paraspinal muscles & soft tissues: Normal.  Impression: No acute intracranial abnormality.    No acute cervical fracture.    Electronically signed by resident: Michael Carbajal  Date:    09/16/2024  Time:    02:17    Electronically signed by: Rony West MD  Date:    09/16/2024  Time:    02:54  X-Ray Pelvis Routine AP  Narrative: EXAMINATION:  XR PELVIS ROUTINE AP    CLINICAL HISTORY:  r/o bleeding or hemorrhage;    TECHNIQUE:  AP view of the pelvis was performed.    COMPARISON:  None.    FINDINGS:  Operative changes of ORIF involving the right proximal femur.  No obvious acute displaced fracture in the right femur allowing for absence of additional views.  There is a displaced acute fracture involving the right superior pubic ramus with possible involvement of the acetabulum.  Suspect nondisplaced fracture of the right inferior pubic ramus.  No additional acute displaced fracture.  Degenerative changes in both hips.  No unexpected radiopaque foreign body.  Impression: Probable acute fractures of the right superior and inferior pubic rami with possible involvement of the acetabulum.   "Consider follow-up CT pelvis as clinically warranted.    Operative changes in the right proximal femur.    Electronically signed by: Rony West MD  Date:    09/16/2024  Time:    01:01  X-Ray Chest 1 View  Narrative: EXAMINATION:  XR CHEST 1 VIEW    CLINICAL HISTORY:  Provided history is "r/o bleeding or hemorrhage;  ".    TECHNIQUE:  One view of the chest.    COMPARISON:  10/04/2023.    FINDINGS:  Patient is rotated.  Cardiomediastinal silhouette is enlarged and similar to the prior study.  Coarse interstitial lung markings and possible small right greater than left pleural effusions.  Evaluation of the right lung base limited by cardiac silhouette and soft tissue attenuation of the x-ray beam.  Consolidation or airspace disease in the right lung base not excluded.  Upper lungs are relatively clear.  No distinct pneumothorax.  Impression: Enlarged cardiac silhouette with increased density overlying the right lung base.  Airspace disease or consolidation not excluded in the right lung base.  Further evaluation/follow-up as warranted clinically in this patient with provided history of bleeding or hemorrhage.    Electronically signed by: Rony West MD  Date:    09/16/2024  Time:    00:59     "

## 2024-09-16 NOTE — HPI
Bean Salas is a 79 y.o. male with PMHx of  Bradycardia, PVCs, Hypertension, Coronary Artery Disease, Hyperlipidemia, HFpEF, Pulmonary HTN, AKHIL on CPAP, Chronic respiratory failure w/ home oxygen and hypothyroidism. He presents with right hip pain after ground level fall outside yesterday. He was doing some yard work and reaching for some piles of wood when a bunch of ants started crawling all over him and biting him. He tried to get away but forgot there was a step up from the grass to the concrete/carport next to him causing him to fall over onto his bottom/right side and then went more slowly back hitting his head as well.  Did not lose consciousness. He denies any headache or confusion. He did not have any pain right away. He had trouble pulling himself up off the ground and his cell phone was inside his house where he lives alone. He called out for a neighbor and a few of them were able to get him into a chair however when he tried to stand he had immediate sharp pain in his right hip and then called for an ambulance. He normally ambulates with a walker and is independent with ADLs. He does not have pain at rest currently.     He otherwise felt in his normal state of health prior to this. He denies chest pain. Denies acute SOB, fever, cough. However, reports somewhat chronic/gradually worsening SLADE and SOB after activities such as eating requiring him to sit and use a CPAP for 2-3 hours every afternoon and lower extremity swelling right > left not much improved with his home PO lasix and spironolactone. He also reports requiring 2-3L supplemental oxygen while sleeping. He has some little red marks all over his obody from the ant bites but is mostly un bothered by them. He denies any history of PE/DVT. While he is not super active he does go to the store for 2-3 hours weekly to shop and ambulates between 4 rooms in his house and completes work around the house. No recent surgery or prolonged immobilization.      ED course: Afebrile. RR 22-23. /44. HR . Saturating >94% on 2-3L NC. Labs notable for troponin 0.811>1.041. . XR/CT Imaging with acute fractures of the right superior and inferior pubic rami with possible involvement of the anterosuperior acetabulum. CT chest/abdomen/pelvis also notable for right-sided pulmonary embolism with nonocclusive thrombus in the main right pulmonary artery and right lower lobe branch vessels, and occlusive thrombus in a subsegmental branch of the right pulmonary artery, no right sided heart strain, unclear chronicity. CT head and cervical spine without acute findings.  Orthopedic surgery consulted for pelvic fracture. Started on heparin gtt for PE. Admitted to  for further manement.

## 2024-09-16 NOTE — ASSESSMENT & PLAN NOTE
79M presenting after ground level fall with right hip pain on standing. CT shows acute fractures of the right superior and inferior pubic rami with possible involvement of the anterosuperior acetabulum.   - Orthopedic surgery consulted by ED, appreciate recommendations  - PT/OT consult  - Pain control   - Fall precautions

## 2024-09-16 NOTE — ASSESSMENT & PLAN NOTE
Patient is identified as having Diastolic (HFpEF) heart failure that is Acute on chronic. CHF is currently uncontrolled due to Continued edema of extremities. Latest ECHO performed and demonstrates- Results for orders placed during the hospital encounter of 10/04/23    Echo Saline Bubble? No    Interpretation Summary    Left Ventricle: The left ventricle is normal in size. Normal wall thickness. There is concentric remodeling. Normal wall motion. There is normal systolic function. Biplane (2D) method of discs ejection fraction is 52%. Unable to assess diastolic function due to atrial fibrillation.    Left Atrium: Left atrium is severely dilated.    Right Ventricle: Normal right ventricular cavity size. Wall thickness is normal. Right ventricle wall motion  is normal. Systolic function is normal.    Right Atrium: Right atrium is severely dilated.    Aortic Valve: There is mild stenosis. Aortic valve area by VTI is 1.87 cm². Aortic valve peak velocity is 1.66 m/s. Mean gradient is 6 mmHg. The dimensionless index is 0.48.    Mitral Valve: There is mild regurgitation.    Tricuspid Valve: There is mild regurgitation.    Pulmonary Artery: The estimated pulmonary artery systolic pressure is 44 mmHg.    IVC/SVC: Normal venous pressure at 3 mmHg.  . Continue Beta Blocker, ACE/ARB, Furosemide, and Aldactone and monitor clinical status closely. Monitor on telemetry. Patient is off CHF pathway.  Monitor strict Is&Os and daily weights.  Place on fluid restriction of 1.5 L. Cardiology has not been consulted. Continue to stress to patient importance of self efficacy and  on diet for CHF. Last BNP reviewed- and noted below   Recent Labs   Lab 09/16/24  0037   *   He denies acute SOB but reports SLADE, SOB after activities such as eating requiring him to sit and use a CPAP for 2-3 hours, and lower extremity swelling right > left not much improved with his home PO lasix and spironolactone. He also reports requiring 2-3L  supplemental oxygen while sleeping as well. He also reports eating bags of salty chips to help with his leg swelling.,.  - Continue home beta blocker, losartan, spironolactone.   - IV lasix 40 mg daily for now  - Repeat TTE ordered insetting of PE of unclear chronicity and elevated troponin  - may benefit from continued dietary counseling as well

## 2024-09-16 NOTE — ASSESSMENT & PLAN NOTE
Bean Salas is a 79 y.o. male Bean Salas is a 79 y.o. male with PMH significant for CAD, CHF, pulmonary HTN, AKHIL on CPAP, AFib, and R IT fx s/p IMN 9/3/21 with Dr. Hall presenting with a nondisplaced fracture of the anterior, of the right acetabulum.  He was closed, NVI.  CT chest abdomen and pelvis demonstrated right-sided PE with nonocclusive thrombus in the right pulmonary artery with occlusive thrombus in his subsegmental branch of the right pulmonary artery.  DVT ultrasound negative.  Patient was started on heparin GTT by hospital medicine.  Given the relatively nondisplaced in nature of the patient's fracture and numerous medical comorbidities, we will plan to treat him nonoperatively with a trial of physical therapy.    Admitted to    Weightbearing as tolerated right lower extremity on rolling walker.   PT/OT  DVT ppx: heparin gtt per    Multimodal pain control   NPO midnight as precaution     Dispo:  Pending progress with PT

## 2024-09-16 NOTE — ED PROVIDER NOTES
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 heart strain on CT.  Patient was a mildly elevated troponin but no chest pain or dyspnea.  Plan for admission for troponin trend and PT/OT/orthopedic consultation in the setting of pubic rami fracture limiting ambulation.  Patient may require rehabilitation stay as well.  Will plan to initiate heparin once formal reads of CTs return [AH]   0427 EKGs shows AFib with slow ventricular response versus junctional escape rhythm.  PAC and PVC noted.  Morphology of PA-C beats as consistent with previous.  QT prolongation is unchanged from previous.  Significant IA interval increase is unchanged from previous. Inpatient order changed to telemetry [DS]      ED Course User Index  [AH] Piper Yap MD  [DS] Mac Beavers MD                             Clinical Impression:  Final diagnoses:  [W19.XXXA] Fall, initial encounter (Primary)  [M25.551] Right hip pain  [I25.10] CAD (coronary artery disease)          ED Disposition Condition    Admit                 Piper Yap MD  Resident  09/16/24 0603

## 2024-09-16 NOTE — PLAN OF CARE
Problem: Adult Inpatient Plan of Care  Goal: Plan of Care Review  Outcome: Progressing  Goal: Patient-Specific Goal (Individualized)  Outcome: Progressing  Goal: Absence of Hospital-Acquired Illness or Injury  Outcome: Progressing  Goal: Optimal Comfort and Wellbeing  Outcome: Progressing  Goal: Readiness for Transition of Care  Outcome: Progressing     Problem: Infection  Goal: Absence of Infection Signs and Symptoms  Outcome: Progressing     Problem: Wound  Goal: Optimal Coping  Outcome: Progressing  Goal: Optimal Functional Ability  Outcome: Progressing  Goal: Absence of Infection Signs and Symptoms  Outcome: Progressing  Goal: Improved Oral Intake  Outcome: Progressing  Goal: Optimal Pain Control and Function  Outcome: Progressing  Goal: Skin Health and Integrity  Outcome: Progressing  Goal: Optimal Wound Healing  Outcome: Progressing     Problem: Skin Injury Risk Increased  Goal: Skin Health and Integrity  Outcome: Progressing    Diet ordered and fluid restriction in progress, heparin drip infusing per orders, see mar , last PTT 90.5, next ptt due at 22:10, family at bedside , pt remains on tele/SB 52, pt cleaned and repositioned ,yellow urine draining per purewick , VSS , no distress, Dr here today and reviewing labs/tests and new orders written , IV sites wnl , pt npo after mn tonight, heparing hand off done

## 2024-09-16 NOTE — ASSESSMENT & PLAN NOTE
Hx of HFrEF, NICM with normalization of LVEF now with new depressed LVEF 20-25%, mod RV dysfunction, elevated PASP.     Recs.  - Patient continues to be hypervolemic on exam. Rec to increase his IV lasix to 80mg BID and aim for 1-2L net negative.   - He is currently on some GDMT and his home toprol was discontinued due to bradycardia. Recommend switching losartan to entresto mod-highest dose can be started tonight. Can add low dose toprol 12.5mg if patient does not have symptomatic bradycardia. Continue spironolactone and discontinue amlodipine as we will need to uptitrate his GDMT.   - Once euvolemic, if he continues to be in atrial flutter would need to plan for a DEMETRICE/DCCV and he will need to continue OAC as he has a CHADSVASC of 5.  - Will need an ischemic work up as an outpatient, recommend a cardiac PET stress test and lifevest at discharge.   - Will need close outpatient follow up with cardiology.  - Discussed the importance of a heart healthy no salt diet, unfortunately patient does not have any desire to stop his salt intake but this can be work up more as an outpatient.

## 2024-09-16 NOTE — SUBJECTIVE & OBJECTIVE
Past Medical History:   Diagnosis Date    Atrial fibrillation, unspecified type 09/06/2023    COPD exacerbation 09/06/2023    Thyroid disease     hypo       Past Surgical History:   Procedure Laterality Date    COLONOSCOPY  01/01/2011    CORONARY ANGIOGRAPHY N/A 07/22/2021    Procedure: ANGIOGRAM, CORONARY ARTERY;  Surgeon: Hank Barry MD;  Location: [facility] CATH LAB/EP;  Service: Cardiology;  Laterality: N/A;    EYE SURGERY Bilateral     cataracts extraction    FRACTURE SURGERY Right 09/2021    femur    HERNIA REPAIR      HIP SURGERY Right 08/2021    INTRAMEDULLARY RODDING OF TROCHANTER OF FEMUR Right 09/03/2021    Procedure: INSERTION, INTRAMEDULLARY RACQUEL, FEMUR, TROCHANTER;  Surgeon: Darryn Hall MD;  Location: [facility] OR;  Service: Orthopedics;  Laterality: Right;    JOINT REPLACEMENT Bilateral     knee    LEFT HEART CATHETERIZATION Right 07/22/2021    Procedure: Left heart cath;  Surgeon: Hank Barry MD;  Location: [facility] CATH LAB/EP;  Service: Cardiology;  Laterality: Right;    VASECTOMY         Review of patient's allergies indicates:  No Known Allergies    No current facility-administered medications on file prior to encounter.     Current Outpatient Medications on File Prior to Encounter   Medication Sig    acetaminophen (TYLENOL) 325 MG tablet Take 2 tablets (650 mg total) by mouth every 6 (six) hours as needed for Pain.    amLODIPine (NORVASC) 2.5 MG tablet Take 1 tablet by mouth once daily (Patient taking differently: Take 2.5 mg by mouth once daily.)    aspirin (ECOTRIN) 81 MG EC tablet Take 1 tablet (81 mg total) by mouth once daily.    atorvastatin (LIPITOR) 10 MG tablet Take 1 tablet by mouth once daily    furosemide (LASIX) 40 MG tablet Take 1 tablet (40 mg total) by mouth 2 (two) times a day. Take daily for next 3 days, then use as needed. Weigh daily. Afterwards, f weight increases 2 lbs/24h, take dose of lasix daily  until weight is back to baseline    levothyroxine (SYNTHROID) 200 MCG tablet  Take 1 tablet by mouth once daily    losartan (COZAAR) 100 MG tablet Take 1 tablet by mouth once daily    metoprolol succinate (TOPROL-XL) 50 MG 24 hr tablet Take 1 tablet by mouth once daily    mirtazapine (REMERON) 7.5 MG Tab One tablet po each night for sleep    multivit-min-FA-lycopen-lutein (CENTRUM SILVER) 0.4-300-250 mg-mcg-mcg Tab Take 1 tablet by mouth once daily.    spironolactone (ALDACTONE) 25 MG tablet Take 1 tablet by mouth once daily     Family History       Problem Relation (Age of Onset)    Crohn's disease Mother    Dementia Mother    Heart attack Father    No Known Problems Sister, Brother, Son          Tobacco Use    Smoking status: Former     Current packs/day: 0.00     Average packs/day: 1 pack/day for 35.0 years (35.0 ttl pk-yrs)     Types: Cigarettes     Start date: 1965     Quit date: 2000     Years since quittin.7     Passive exposure: Past    Smokeless tobacco: Never   Substance and Sexual Activity    Alcohol use: No    Drug use: Never    Sexual activity: Not Currently     Review of Systems   All other systems reviewed and are negative.    Objective:     Vital Signs (Most Recent):  Temp: 98.9 °F (37.2 °C) (24)  Pulse: (!) 50 (24)  Resp: 18 (24)  BP: (!) 146/63 (24)  SpO2: 97 % (24) Vital Signs (24h Range):  Temp:  [97.4 °F (36.3 °C)-98.9 °F (37.2 °C)] 98.9 °F (37.2 °C)  Pulse:  [40-80] 50  Resp:  [16-23] 18  SpO2:  [93 %-100 %] 97 %  BP: (110-184)/(44-78) 146/63     Weight: 102 kg (224 lb 13.9 oz)  Body mass index is 31.36 kg/m².    SpO2: 97 %       No intake or output data in the 24 hours ending 24 165    Lines/Drains/Airways       Drain  Duration             Male External Urinary Catheter 24 0425 <1 day              Peripheral Intravenous Line  Duration                  Peripheral IV - Single Lumen 24 0454 18 G Anterior;Distal;Left Forearm <1 day         Peripheral IV - Single Lumen 24 1112 20 G  No Anterior;Right Forearm <1 day                     Physical Exam  Vitals and nursing note reviewed.   Constitutional:       General: He is not in acute distress.     Appearance: He is obese. He is not ill-appearing.   HENT:      Head: Normocephalic and atraumatic.      Nose: No congestion.      Mouth/Throat:      Mouth: Mucous membranes are moist.      Pharynx: Oropharynx is clear.   Eyes:      General: No scleral icterus.     Extraocular Movements: Extraocular movements intact.   Neck:      Vascular: No carotid bruit.   Cardiovascular:      Rate and Rhythm: Bradycardia present. Rhythm regularly irregular. Frequent Extrasystoles are present.     Pulses:           Carotid pulses are 2+ on the right side and 2+ on the left side.       Radial pulses are 2+ on the right side and 2+ on the left side.        Dorsalis pedis pulses are 1+ on the right side and 1+ on the left side.        Posterior tibial pulses are 1+ on the right side and 1+ on the left side.      Heart sounds: Murmur heard.      High-pitched blowing holosystolic murmur is present at the apex.      Gallop present. S3 sounds present.   Pulmonary:      Effort: No respiratory distress.      Breath sounds: Rhonchi present.      Comments: On 2L NC  Abdominal:      General: There is distension.      Tenderness: There is no abdominal tenderness. There is no guarding or rebound.   Musculoskeletal:      Cervical back: Normal range of motion and neck supple.      Right lower leg: Edema present.      Left lower leg: Edema present.      Comments: +2 pitting edema   Skin:     Capillary Refill: Capillary refill takes less than 2 seconds.   Neurological:      General: No focal deficit present.      Mental Status: He is alert and oriented to person, place, and time.   Psychiatric:         Mood and Affect: Mood normal.         Thought Content: Thought content normal.          Significant Labs:   Recent Lab Results  (Last 5 results in the past 24 hours)         09/16/24  1316   09/16/24  1239   09/16/24  1148   09/16/24  1146   09/16/24  1015        Ao peak hari       1.80         Ao VTI       28.92         PTT 90.5  Comment: Refer to local heparin nomogram for intensity/dose specific   therapeutic   range.                 AV mean gradient       6         AV peak gradient       13         BSA       2.26         Left Ventricle Relative Wall Thickness       0.37         E/A ratio       2.06         E/E' ratio       13.60         E wave deceleration time       128.65         FS       12         IVSd       1.02         LA WIDTH       5.72         Left Atrium Major Axis       7.85         Left Atrium Minor Axis       8.60         LA size       6.94         LA volume       276.95                182.24         LA vol index       124.8         LA Volume Index (Mod)       82.1         LVOT area       4.4         LV LATERAL E/E' RATIO       17.00         LV SEPTAL E/E' RATIO       11.33         LV EDV BP       155.22         LV Diastolic Volume Index       69.92         LVIDd       5.62         LVIDs       4.93         LV mass       228.33         LV Mass Index       103         LVOT diameter       2.36         LV ESV BP       114.56         LV Systolic Volume Index       51.6         Mean e'       0.05         Mr max hari       0.04         MV valve area p 1/2 method       5.90         MV Peak A Hari       0.33         MV Peak E Hari       0.68         MV stenosis pressure 1/2 time       37.31         QRS Duration         110       OHS QTC Calculation         623       Posterior Wall       1.03         RA Major Axis       7.55         Est. RA pres       8         RA Width       4.41         RV TB RVSP       11         RV- wilson basal diam       4.2         Sinus       3.09         STJ       3.17         TAPSE       1.49         TDI SEPTAL       0.06         TDI LATERAL       0.04         Triscuspid Valve Regurgitation Peak Gradient       41         TR Max Hari       3.21         Troponin  I   0.817  Comment: The reference interval for Troponin I represents the 99th percentile   cutoff   for our facility and is consistent with 3rd generation assay   performance.     0.869  Comment: The reference interval for Troponin I represents the 99th percentile   cutoff   for our facility and is consistent with 3rd generation assay   performance.                  0.841  Comment: The reference interval for Troponin I represents the 99th percentile   cutoff   for our facility and is consistent with 3rd generation assay   performance.             TV resting pulmonary artery pressure       49         ZLVIDD       -3.22         ZLVIDS       0.37

## 2024-09-16 NOTE — PLAN OF CARE
Seen at bedside this morning as admitted overnight. Admitted for pelvic fracture, was doing work in yard when felll sustaining pelvic fracture. Pain around hip area mostly. While was no ground sustained ant bites on arms and extremities and seen on exam. (Benadryl cream ordered if needs if start itching). Came to ER with fx found. Ortho consulted who rec WBAT to RLE with RW. PT/OT consulted and attempted to work with him but stopped due to varying HRS 40s-80s with ectopy on tele with PACS, PVCs on telemetry and will retry later this afternoon. Multimodal pain control in interim. He reports severe pain going from stretcher to bed after his LE US this AM. Vit D 36    Other active medical issues going on:    Found to have PE on admit- with non occlusive PE in main PA< RLL vessels, and occlusive PE in subsegmental PA, no right heart strain on CT, and echo pending.  Trop plateau of 1.   LE US without DVT.   On heparin drip.    Patient has abnormal EKG with T wave inversions in II, III, V1-V3.  He denies chest pain. He had a LHC in 2021 with non obstrucive CAD. He had some dyspnea with PT/OT when session started briefly but resolved per nursing. He is on 2-3L baseline PRN and with sleep with known AKHIL. He really denied any resp symptoms prior to admit that were different than baseline.    Previous echo: Echo Saline Bubble? No    Result Date: 10/5/2023    Left Ventricle: The left ventricle is normal in size. Normal wall   thickness. There is concentric remodeling. Normal wall motion. There is   normal systolic function. Biplane (2D) method of discs ejection fraction   is 52%. Unable to assess diastolic function due to atrial fibrillation.    Left Atrium: Left atrium is severely dilated.    Right Ventricle: Normal right ventricular cavity size. Wall thickness   is normal. Right ventricle wall motion  is normal. Systolic function is   normal.    Right Atrium: Right atrium is severely dilated.    Aortic Valve: There is mild  stenosis. Aortic valve area by VTI is 1.87   cm². Aortic valve peak velocity is 1.66 m/s. Mean gradient is 6 mmHg. The   dimensionless index is 0.48.    Mitral Valve: There is mild regurgitation.    Tricuspid Valve: There is mild regurgitation.    Pulmonary Artery: The estimated pulmonary artery systolic pressure is   44 mmHg.    IVC/SVC: Normal venous pressure at 3 mmHg.       and given IV lasix 40 in ER with plan for daily now, does not appear floridly overloaded on exam.     Tele with pvcs, pacs seen.     Night team talked to cards who reported trop likely from PE. Unsure if EKG was discussed, not similar to old EKGS. Will talk to cards fellow on now given this + ectopy on telemetry  Goal K 4, Mag 2. Mag at goal today, K 3.8 and will give 20 meQ to get to goal today.  Bradycardia at times with HR 40s-80s so hold toprol.  Low Na diet ordered and good appetite.

## 2024-09-16 NOTE — HPI
Mr. Salas is a 80 y/o M with a PMHx of HFrEF w/ recovered EF (40%--9/2021), NICM, PAF, frequent PVCs, hypertension and hyperlipidemia, AKHIL not using CPAP, PVD, chronic respiratory failure w/ home oxygen and hypothyroidism who presented to [facility] on 9/15 after a GLF at home resulting in a nondisplaced fracture of the anterior right acetabulum. Patient also found to have a non occlusive PE by CT in main PA< RLL vessels, and occlusive PE in subsegmental PA, no right heart strain. LE US was negative for DVT. He was started on a heparin drip. ECG with AFL rate controlled with PVCs. Echo shows a severely depressed LVEF 20-25%, RV depressed systolic function, PASP 49 with an IVC 8. He has been started on IV Lasix for diuresis and GDMT for systolic CHF. Cardiology consulted to perform DEMETRICE/DCCV.     TTE 9/16/24    Technically difficult study    Left Ventricle: The left ventricle is normal in size. Normal wall thickness. Severe global hypokinesis present. Septal motion is abnormal. There is severely reduced systolic function with a visually estimated ejection fraction of 20 - 25%. Unable to assess diastolic function due to atrial flutter.    Right Ventricle: Mild right ventricular enlargement. Wall thickness is normal. Systolic function is moderately reduced.    Severe biatrial enlargement    Mitral Valve: There is mild regurgitation.    Pulmonary Artery: There is mild to moderate pulmonary hypertension. The estimated pulmonary artery systolic pressure is 49 mmHg.    IVC/SVC: Intermediate venous pressure at 8 mmHg.    Anticoagulant/antiplatelets: Heparin drip, high intensity   ECG: Atrial flutter with PVCs    Dysphagia or odynophagia:  No  Liver Disease, esophageal disease, or known varices:  No  Upper GI Bleeding: No  Snoring:  Yes  Sleep Apnea:  Yes  Prior neck surgery or radiation:  No  Able to move neck in all directions:  Yes  History of anesthetic difficulties:  No  Family history of anesthetic difficulties:  No  Last  oral intake: yesterday before midnight  GLP-1 use: None    Platelet count: 152k  INR: 1.1

## 2024-09-16 NOTE — ASSESSMENT & PLAN NOTE
Chronic, controlled. Latest blood pressure and vitals reviewed-     Temp:  [97.8 °F (36.6 °C)-98 °F (36.7 °C)]   Pulse:  [40-72]   Resp:  [18-23]   BP: (110-184)/(44-77)   SpO2:  [93 %-99 %] .   Home meds for hypertension were reviewed and noted below.   Hypertension Medications               amLODIPine (NORVASC) 2.5 MG tablet Take 1 tablet by mouth once daily    furosemide (LASIX) 40 MG tablet Take 1 tablet (40 mg total) by mouth 2 (two) times a day. Take daily for next 3 days, then use as needed. Weigh daily. Afterwards, f weight increases 2 lbs/24h, take dose of lasix daily  until weight is back to baseline    losartan (COZAAR) 100 MG tablet Take 1 tablet by mouth once daily    metoprolol succinate (TOPROL-XL) 50 MG 24 hr tablet Take 1 tablet by mouth once daily    spironolactone (ALDACTONE) 25 MG tablet Take 1 tablet by mouth once daily   While in the hospital, will manage blood pressure as follows; Continue home antihypertensive regimen  Will utilize p.r.n. blood pressure medication only if patient's blood pressure greater than 180/110 and he develops symptoms such as worsening chest pain or shortness of breath.

## 2024-09-16 NOTE — ASSESSMENT & PLAN NOTE
- Per chart review, his cardiologist Dr. Hank Barry feels EKGs that were read as afib previously are actually sinus rhythm and this patient is not on AC.  - Monitor telemtry

## 2024-09-16 NOTE — ED TRIAGE NOTES
Bean Salas, a 79 y.o. male presents to the ED w/ complaint of right hip pain after fall while working in backyard around 3pm. Pt denies LOC, hitting head, blood thinners. Distal pulses palpable. Arrives on 2L NC at baseline for hx of COPD.     Triage note:  Chief Complaint   Patient presents with    Fall     Patient fell while working in his backyard. -Blood thinners -LOC  Denies hitting his head. Complaints of R hip pain at this time.  Hx of  COPD on 2 L at baseline.     Review of patient's allergies indicates:  No Known Allergies  Past Medical History:   Diagnosis Date    Atrial fibrillation, unspecified type 09/06/2023    COPD exacerbation 09/06/2023    Thyroid disease     hypo

## 2024-09-16 NOTE — PT/OT/SLP PROGRESS
Occupational Therapy      Patient Name:  Bean Salas   MRN:  [id number]    Patient not seen today secondary to low HR 40s-80s and SOB at bed level when attempted prior to lunch (Team and Charge nurse aware). Post lunch attempt a nursing hold was place 2/2 cardiac issues.   Will follow-up when medically appropriate.    9/16/2024

## 2024-09-16 NOTE — ASSESSMENT & PLAN NOTE
Had a mechanical fall outside at home when trying to get away from ants that were biting him and found to have cute fractures of the right superior and inferior pubic rami. Normally ambulates with walker and had it with him outside prior to falling yesterday. No other trauma or pain currently. CT head and c spine no acute process.   - Fall precautions  - PT/OT consult pending ortho recs regarding fracture

## 2024-09-16 NOTE — H&P
[facility]  History & Physical    Patient Name: Bean Salas  MRN: [id number]  Patient Class: IP- Inpatient  Admission Date: 9/15/2024  Attending Physician: Monica Patterson MD   Primary Care Provider: Ramirez Levine MD         Patient information was obtained from patient, past medical records, and ER records.     Subjective:     Principal Problem:Fracture of multiple pubic rami, right, closed, initial encounter    Chief Complaint:   Chief Complaint   Patient presents with    Fall     Patient fell while working in his backyard. -Blood thinners -LOC  Denies hitting his head. Complaints of R hip pain at this time.  Hx of  COPD on 2 L at baseline.        HPI: Bean Salas is a 79 y.o. male with PMHx of  Bradycardia, PVCs, Hypertension, Coronary Artery Disease, Hyperlipidemia, HFpEF, Pulmonary HTN, AKHIL on CPAP, Chronic respiratory failure w/ home oxygen and hypothyroidism. He presents with right hip pain after ground level fall outside yesterday. He was doing some yard work and reaching for some piles of wood when a bunch of ants started crawling all over him and biting him. He tried to get away but forgot there was a step up from the grass to the concrete/carport next to him causing him to fall over onto his bottom/right side and then went more slowly back hitting his head as well.  Did not lose consciousness. He denies any headache or confusion. He did not have any pain right away. He had trouble pulling himself up off the ground and his cell phone was inside his house where he lives alone. He called out for a neighbor and a few of them were able to get him into a chair however when he tried to stand he had immediate sharp pain in his right hip and then called for an ambulance. He normally ambulates with a walker and is independent with ADLs. He does not have pain at rest currently.     He otherwise felt in his normal state of health prior to this. He denies chest pain.  Denies acute SOB, fever, cough. However, reports somewhat chronic/gradually worsening SLADE and SOB after activities such as eating requiring him to sit and use a CPAP for 2-3 hours every afternoon and lower extremity swelling right > left not much improved with his home PO lasix and spironolactone. He also reports requiring 2-3L supplemental oxygen while sleeping. He has some little red marks all over his obody from the ant bites but is mostly un bothered by them. He denies any history of PE/DVT. While he is not super active he does go to the store for 2-3 hours weekly to shop and ambulates between 4 rooms in his house and completes work around the house. No recent surgery or prolonged immobilization.     ED course: Afebrile. RR 22-23. /44. HR . Saturating >94% on 2-3L NC. Labs notable for troponin 0.811>1.041. . XR/CT Imaging with acute fractures of the right superior and inferior pubic rami with possible involvement of the anterosuperior acetabulum. CT chest/abdomen/pelvis also notable for right-sided pulmonary embolism with nonocclusive thrombus in the main right pulmonary artery and right lower lobe branch vessels, and occlusive thrombus in a subsegmental branch of the right pulmonary artery, no right sided heart strain, unclear chronicity. CT head and cervical spine without acute findings.  Orthopedic surgery consulted for pelvic fracture. Started on heparin gtt for PE. Admitted to  for further manement.     Past Medical History:   Diagnosis Date    Atrial fibrillation, unspecified type 09/06/2023    COPD exacerbation 09/06/2023    Thyroid disease     hypo       Past Surgical History:   Procedure Laterality Date    COLONOSCOPY  01/01/2011    CORONARY ANGIOGRAPHY N/A 07/22/2021    Procedure: ANGIOGRAM, CORONARY ARTERY;  Surgeon: Hank Barry MD;  Location: [facility] CATH LAB/EP;  Service: Cardiology;  Laterality: N/A;    EYE SURGERY Bilateral     cataracts extraction    FRACTURE SURGERY Right  09/2021    femur    HERNIA REPAIR      HIP SURGERY Right 08/2021    INTRAMEDULLARY RODDING OF TROCHANTER OF FEMUR Right 09/03/2021    Procedure: INSERTION, INTRAMEDULLARY RACQUEL, FEMUR, TROCHANTER;  Surgeon: Darryn Hall MD;  Location: [facility] OR;  Service: Orthopedics;  Laterality: Right;    JOINT REPLACEMENT Bilateral     knee    LEFT HEART CATHETERIZATION Right 07/22/2021    Procedure: Left heart cath;  Surgeon: Hank Barry MD;  Location: [facility] CATH LAB/EP;  Service: Cardiology;  Laterality: Right;    VASECTOMY         Review of patient's allergies indicates:  No Known Allergies    No current facility-administered medications on file prior to encounter.     Current Outpatient Medications on File Prior to Encounter   Medication Sig    acetaminophen (TYLENOL) 325 MG tablet Take 2 tablets (650 mg total) by mouth every 6 (six) hours as needed for Pain.    amLODIPine (NORVASC) 2.5 MG tablet Take 1 tablet by mouth once daily (Patient taking differently: Take 2.5 mg by mouth once daily.)    aspirin (ECOTRIN) 81 MG EC tablet Take 1 tablet (81 mg total) by mouth once daily.    atorvastatin (LIPITOR) 10 MG tablet Take 1 tablet by mouth once daily    furosemide (LASIX) 40 MG tablet Take 1 tablet (40 mg total) by mouth 2 (two) times a day. Take daily for next 3 days, then use as needed. Weigh daily. Afterwards, f weight increases 2 lbs/24h, take dose of lasix daily  until weight is back to baseline    levothyroxine (SYNTHROID) 200 MCG tablet Take 1 tablet by mouth once daily    losartan (COZAAR) 100 MG tablet Take 1 tablet by mouth once daily    metoprolol succinate (TOPROL-XL) 50 MG 24 hr tablet Take 1 tablet by mouth once daily    mirtazapine (REMERON) 7.5 MG Tab One tablet po each night for sleep    multivit-min-FA-lycopen-lutein (CENTRUM SILVER) 0.4-300-250 mg-mcg-mcg Tab Take 1 tablet by mouth once daily.    spironolactone (ALDACTONE) 25 MG tablet Take 1 tablet by mouth once daily     Family History       Problem  Relation (Age of Onset)    Crohn's disease Mother    Dementia Mother    Heart attack Father    No Known Problems Sister, Brother, Son          Tobacco Use    Smoking status: Former     Current packs/day: 0.00     Average packs/day: 1 pack/day for 35.0 years (35.0 ttl pk-yrs)     Types: Cigarettes     Start date: 1965     Quit date: 2000     Years since quittin.7     Passive exposure: Past    Smokeless tobacco: Never   Substance and Sexual Activity    Alcohol use: No    Drug use: Never    Sexual activity: Not Currently     Review of Systems   Constitutional:  Negative for chills and fever.   HENT:  Negative for trouble swallowing.    Eyes:  Negative for visual disturbance.   Respiratory:  Positive for shortness of breath (SLADE). Negative for cough.    Cardiovascular:  Positive for leg swelling. Negative for chest pain and palpitations.   Gastrointestinal:  Negative for abdominal pain, nausea and vomiting.   Genitourinary:  Negative for dysuria and frequency.   Musculoskeletal:  Positive for arthralgias and gait problem.   Skin:  Positive for rash.   Neurological:  Negative for light-headedness and headaches.   Psychiatric/Behavioral:  Negative for agitation and confusion.      Objective:     Vital Signs (Most Recent):  Temp: 98 °F (36.7 °C) (24 0424)  Pulse: 72 (24 0525)  Resp: (!) 22 (09/15/24 2051)  BP: 130/69 (24 0103)  SpO2: (!) 93 % (24 05) Vital Signs (24h Range):  Temp:  [98 °F (36.7 °C)] 98 °F (36.7 °C)  Pulse:  [47-72] 72  Resp:  [22-23] 22  SpO2:  [93 %-99 %] 93 %  BP: (110-160)/(44-69) 130/69     Weight: 102.4 kg (225 lb 12 oz)  Body mass index is 31.49 kg/m².     Physical Exam  Vitals and nursing note reviewed.   Constitutional:       General: He is not in acute distress.     Interventions: Nasal cannula in place.   HENT:      Head: Normocephalic and atraumatic.      Nose: Nose normal.      Mouth/Throat:      Pharynx: No oropharyngeal exudate.   Eyes:      Extraocular  Movements: Extraocular movements intact.      Conjunctiva/sclera: Conjunctivae normal.   Cardiovascular:      Rate and Rhythm: Normal rate. Rhythm irregular.      Heart sounds: Normal heart sounds.   Pulmonary:      Effort: Pulmonary effort is normal. No respiratory distress.      Comments: Anterior lung sounds clear. Painful repositioning limits full lung ausculation   Saturating >94% on 2L NC  Abdominal:      General: Bowel sounds are normal.      Palpations: Abdomen is soft.      Tenderness: There is no abdominal tenderness.   Musculoskeletal:      Cervical back: Normal range of motion. No tenderness.      Right hip: No bony tenderness.      Left hip: No bony tenderness.      Right lower le+ Pitting Edema present.      Left lower le+ Pitting Edema present.      Right foot: Swelling present.      Left foot: Swelling present.   Skin:     General: Skin is warm and dry.      Comments: Multiple 1-2 mm erythematous non pruritic, non blanching lesions scattered over his arms, chest, abdomen, and legs. No pustules seen.   Chronic venous stasis dermatitis appearance to BLE    Neurological:      General: No focal deficit present.      Mental Status: He is alert and oriented to person, place, and time.   Psychiatric:         Mood and Affect: Mood normal.         Behavior: Behavior normal.         Thought Content: Thought content normal.         Judgment: Judgment normal.                Significant Labs: All pertinent labs within the past 24 hours have been reviewed.  CBC:   Recent Labs   Lab 24   WBC 14.23* 11.17   HGB 13.2* 13.5*   HCT 40.1 41.3    159     CMP:   Recent Labs   Lab 24      K 3.8      CO2 25   *   BUN 20   CREATININE 1.2   CALCIUM 9.5   PROT 6.9   ALBUMIN 3.7   BILITOT 3.3*   ALKPHOS 49*   AST 36   ALT 18   ANIONGAP 12     Cardiac Markers:   Recent Labs   Lab 24   *     Coagulation:   Recent Labs   Lab 24    INR 1.1   APTT 29.1  29.1     Magnesium:   Recent Labs   Lab 09/16/24  0423   MG 2.1     Troponin:   Recent Labs   Lab 09/16/24  0037 09/16/24  0423   TROPONINI 0.811* 1.041*       Significant Imaging: I have reviewed all pertinent imaging results/findings within the past 24 hours.  CT Chest Abdomen Pelvis With IV Contrast (XPD) NO Oral Contrast  Narrative: EXAMINATION:  CT CHEST ABDOMEN PELVIS WITH IV CONTRAST (XPD)    CLINICAL HISTORY:  Polytrauma, blunt;Trauma;    TECHNIQUE:  Low dose axial images, sagittal and coronal reformations were obtained from the thoracic inlet to the pubic symphysis following the IV administration of 100 mL of Omnipaque 350.  Oral contrast was not administered.    COMPARISON:  X-ray 09/16/2024, CT 09/25/2021.    FINDINGS:  THORACIC SOFT TISSUES: Bilateral gynecomastia.  No axillary or subpectoral lymphadenopathy. The visualized thyroid gland is unremarkable.    HEART & MEDIASTINUM: Right-sided pulmonary embolism which is occlusive subsegmentally (axial series 2, image 80) and nonocclusive segmentally (axial series 2, image 69) and centrally (axial series 2, image 63).  Peripheral configuration of more proximal right-sided pulmonary emboli raise the question of chronic pulmonary embolus, though this is indeterminate in the absence of prior imaging.  No left-sided pulmonary emboli.  No saddle pulmonary embolus.  No right heart strain.  Cardiomegaly with left atrial enlargement.  Multivessel coronary arterial calcific plaque.  No mediastinal or hilar lymphadenopathy.    PLEURA:  No pleural effusion or pneumothorax.    LUNGS & AIRWAYS:  Retained secretions in the trachea.  Bibasilar streak like opacities, subsegmental atelectasis and left basilar predominant reticulations.  Chronic subsegmental opacity in the right lower lobe as seen on prior abdominal CT in 2021.  No airspace consolidation.    HEPATOBILIARY: 1.8 cm simple cyst in the right hepatic lobe.  Few scattered subcentimeter  hypodensities, too small to characterize.  No biliary ductal dilatation. Large gallstones in the gallbladder.  No overt pericholecystic inflammatory change.    SPLEEN:  No splenomegaly.    PANCREAS:  No focal masses or ductal dilatation.    ADRENALS:  Small nodules again noted in the right adrenal gland.  Left adrenal gland is unremarkable.    KIDNEYS/URETERS: Kidneys enhance symmetrically.  Bilateral simple renal cysts.  No hydronephrosis, stones or solid mass lesions. Ureters are unremarkable.    PELVIC ORGANS/BLADDER:  Unremarkable.    PERITONEUM / RETROPERITONEUM:  No free air. No free fluid.    LYMPH NODES:  No lymphadenopathy.    VESSELS:  Advanced aortoiliac atherosclerosis.    GI TRACT:  No distension or wall thickening.  Appendix is not definitively visualized however there is no right lower quadrant inflammation.    BONES AND ABDOMINAL SOFT TISSUES:  Soft tissues are unremarkable.  Few old rib fractures.  Right hip arthroplasty.  Acute fracture of the right superior and inferior pubic rami, more fully characterized on dedicated right hip CT performed same day.  Diffuse osseous demineralization.  Degenerative changes of the spine.  No aggressive osseous lesions.  Impression: Right-sided pulmonary embolism, detailed above, with nonocclusive thrombus in the main right pulmonary artery and right lower lobe branch vessels, and occlusive thrombus in a subsegmental branch of the right pulmonary artery.  Note comparison images are not available to determine chronicity.  No right sided heart strain.    Acute fracture of the right superior and inferior pubic rami, more fully characterized on dedicated right hip CT performed same day.    Cholelithiasis.    Additional findings as above.    This report was flagged in Epic as abnormal.    Finding identified at approximately 02:49 on 09/16/2024 and relayed to Dr. Mac Beavers by Dr. Carbajal via phone at 02:55 on 09/16/2024.    Electronically signed by resident:  Michael Carbajal  Date:    09/16/2024  Time:    02:49    Electronically signed by: Rony West MD  Date:    09/16/2024  Time:    03:41  CT 3D Rendering WO Independent Workstation  Narrative: EXAMINATION:  CT HIP WITHOUT CONTRAST RIGHT; CT 3D RENDERING WO INDEPENDENT WORKSTATION    CLINICAL HISTORY:  Fracture, hip;; Fracture, hip;trauma;    TECHNIQUE:  CT images of the right hip obtained without IV contrast.  Axial, coronal, and sagittal reconstructions were created from the source data.    3D reconstructed images were acquired by post processing on an independent workstation with concurrent supervision and archived for permanent record. 3D imaging of the right hip was obtained for further evaluation and for operative planning if needed.    COMPARISON:  Pelvic radiograph, 10/04/2023.    FINDINGS:  Mildly displaced acute fracture of the right superior pubic ramus with questionable minimal extension into the anterosuperior acetabulum.  Minimally displaced acute fracture of the right inferior acetabulum.  Operative changes of right proximal femur ORIF with intramedullary gurpreet.  No additional acute fracture or dislocation.  No sizeable pelvic hematoma in the field of view.  Small right inguinal hernia.  Mild soft tissue edema.  Atherosclerosis in the regional arteries.  Impression: Acute fractures of the right superior and inferior pubic rami with possible involvement of the anterosuperior acetabulum.    Additional findings discussed in the body of the report.    Electronically signed by: Rony West MD  Date:    09/16/2024  Time:    03:29  CT Hip Without Contrast Right  Narrative: EXAMINATION:  CT HIP WITHOUT CONTRAST RIGHT; CT 3D RENDERING WO INDEPENDENT WORKSTATION    CLINICAL HISTORY:  Fracture, hip;; Fracture, hip;trauma;    TECHNIQUE:  CT images of the right hip obtained without IV contrast.  Axial, coronal, and sagittal reconstructions were created from the source data.    3D reconstructed images were  acquired by post processing on an independent workstation with concurrent supervision and archived for permanent record. 3D imaging of the right hip was obtained for further evaluation and for operative planning if needed.    COMPARISON:  Pelvic radiograph, 10/04/2023.    FINDINGS:  Mildly displaced acute fracture of the right superior pubic ramus with questionable minimal extension into the anterosuperior acetabulum.  Minimally displaced acute fracture of the right inferior acetabulum.  Operative changes of right proximal femur ORIF with intramedullary gurpreet.  No additional acute fracture or dislocation.  No sizeable pelvic hematoma in the field of view.  Small right inguinal hernia.  Mild soft tissue edema.  Atherosclerosis in the regional arteries.  Impression: Acute fractures of the right superior and inferior pubic rami with possible involvement of the anterosuperior acetabulum.    Additional findings discussed in the body of the report.    Electronically signed by: Rony West MD  Date:    09/16/2024  Time:    03:29  CT Cervical Spine Without Contrast  Narrative: EXAMINATION:  CT HEAD WITHOUT CONTRAST; CT CERVICAL SPINE WITHOUT CONTRAST    CLINICAL HISTORY:  Trauma;    TECHNIQUE:  Low dose axial CT images obtained throughout the head without the use of intravenous contrast.  Axial, sagittal and coronal reconstructions were performed.    Low dose axial images, sagittal and coronal reformations were performed though the cervical spine.  Contrast was not administered.    COMPARISON:  CT 10/04/2023    FINDINGS:  INTRACRANIAL COMPARTMENT:    Prominence of the ventricles and sulci compatible with generalized cerebral volume loss.  No hydrocephalus.    The brain parenchyma appears within normal limits.  No parenchymal mass, hemorrhage, edema or major vascular distribution infarct.    No extra-axial blood or fluid collections.    SKULL/EXTRACRANIAL CONTENTS (limited evaluation):    No fracture. Mastoid air cells and  paranasal sinuses are essentially clear.  Few scattered dental caries.    Skull Base and Craniocervical Junction (partially imaged): Normal.    CERVICAL SPINE:    Spinal Alignment: Straightening of the cervical lordosis.    Vertebrae: No fracture.  Multilevel degenerative endplate change most pronounced at C4-C5.    Discs: Disc height loss most pronounced C4-C5.    Degenerative findings: Multilevel facet arthropathy notable for significant neural foraminal narrowing at left C3-C4 and right C4-C5.  No severe spinal canal stenosis at any level.    Paraspinal muscles & soft tissues: Normal.  Impression: No acute intracranial abnormality.    No acute cervical fracture.    Electronically signed by resident: Michael Carbajal  Date:    09/16/2024  Time:    02:17    Electronically signed by: Rony West MD  Date:    09/16/2024  Time:    02:54  CT Head Without Contrast  Narrative: EXAMINATION:  CT HEAD WITHOUT CONTRAST; CT CERVICAL SPINE WITHOUT CONTRAST    CLINICAL HISTORY:  Trauma;    TECHNIQUE:  Low dose axial CT images obtained throughout the head without the use of intravenous contrast.  Axial, sagittal and coronal reconstructions were performed.    Low dose axial images, sagittal and coronal reformations were performed though the cervical spine.  Contrast was not administered.    COMPARISON:  CT 10/04/2023    FINDINGS:  INTRACRANIAL COMPARTMENT:    Prominence of the ventricles and sulci compatible with generalized cerebral volume loss.  No hydrocephalus.    The brain parenchyma appears within normal limits.  No parenchymal mass, hemorrhage, edema or major vascular distribution infarct.    No extra-axial blood or fluid collections.    SKULL/EXTRACRANIAL CONTENTS (limited evaluation):    No fracture. Mastoid air cells and paranasal sinuses are essentially clear.  Few scattered dental caries.    Skull Base and Craniocervical Junction (partially imaged): Normal.    CERVICAL SPINE:    Spinal Alignment: Straightening of  "the cervical lordosis.    Vertebrae: No fracture.  Multilevel degenerative endplate change most pronounced at C4-C5.    Discs: Disc height loss most pronounced C4-C5.    Degenerative findings: Multilevel facet arthropathy notable for significant neural foraminal narrowing at left C3-C4 and right C4-C5.  No severe spinal canal stenosis at any level.    Paraspinal muscles & soft tissues: Normal.  Impression: No acute intracranial abnormality.    No acute cervical fracture.    Electronically signed by resident: Michael Carbajal  Date:    09/16/2024  Time:    02:17    Electronically signed by: Rony West MD  Date:    09/16/2024  Time:    02:54  X-Ray Pelvis Routine AP  Narrative: EXAMINATION:  XR PELVIS ROUTINE AP    CLINICAL HISTORY:  r/o bleeding or hemorrhage;    TECHNIQUE:  AP view of the pelvis was performed.    COMPARISON:  None.    FINDINGS:  Operative changes of ORIF involving the right proximal femur.  No obvious acute displaced fracture in the right femur allowing for absence of additional views.  There is a displaced acute fracture involving the right superior pubic ramus with possible involvement of the acetabulum.  Suspect nondisplaced fracture of the right inferior pubic ramus.  No additional acute displaced fracture.  Degenerative changes in both hips.  No unexpected radiopaque foreign body.  Impression: Probable acute fractures of the right superior and inferior pubic rami with possible involvement of the acetabulum.  Consider follow-up CT pelvis as clinically warranted.    Operative changes in the right proximal femur.    Electronically signed by: Rony West MD  Date:    09/16/2024  Time:    01:01  X-Ray Chest 1 View  Narrative: EXAMINATION:  XR CHEST 1 VIEW    CLINICAL HISTORY:  Provided history is "r/o bleeding or hemorrhage;  ".    TECHNIQUE:  One view of the chest.    COMPARISON:  10/04/2023.    FINDINGS:  Patient is rotated.  Cardiomediastinal silhouette is enlarged and similar to the " "prior study.  Coarse interstitial lung markings and possible small right greater than left pleural effusions.  Evaluation of the right lung base limited by cardiac silhouette and soft tissue attenuation of the x-ray beam.  Consolidation or airspace disease in the right lung base not excluded.  Upper lungs are relatively clear.  No distinct pneumothorax.  Impression: Enlarged cardiac silhouette with increased density overlying the right lung base.  Airspace disease or consolidation not excluded in the right lung base.  Further evaluation/follow-up as warranted clinically in this patient with provided history of bleeding or hemorrhage.    Electronically signed by: Rony West MD  Date:    09/16/2024  Time:    00:59     Assessment/Plan:     * Fracture of multiple pubic rami, right, closed, initial encounter  79M presenting after ground level fall with right hip pain on standing. CT shows acute fractures of the right superior and inferior pubic rami with possible involvement of the anterosuperior acetabulum.   - Orthopedic surgery consulted by ED, appreciate recommendations  - PT/OT consult  - Pain control   - Fall precautions    Pulmonary embolism without acute cor pulmonale  Elevated troponin   CT chest/abdomen/pelvis as part of trauma work up in ED showed "Right-sided pulmonary embolism, detailed above, with nonocclusive thrombus in the main right pulmonary artery and right lower lobe branch vessels, and occlusive thrombus in a subsegmental branch of the right pulmonary artery. Note comparison images are not available to determine chronicity. No right sided heart strain."   - Denies previous diagnosis or PE/DVT>   - Denies chest pain or SOB.  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 He requires 2-3L NC of oxygen at home/sleeping and uses a CPAP after meals (exertional to pt) and for naps.  - Currently stable on 2-3L NC oxygen. No hypotension.   - Labs with elevated troponin 0.811>1.041 and .   - Initialed on " heparin gtt for PE  - Checking TTE  - Monitor telemetry  - Discussed previously with on call cardiology. In setting of no acute chest pain and PE/hypervolemia suspect elevated troponin likely 2/2 demand ischemic rather than acute ACS currently, will continue trend troponin, EKGs    Bug bites  Reportedly bitten by multiple small ants outside yesterday  He has small red lesions as described above, no pustules, no pain or itching currently   Supportive care,  monitor     Insomnia  - Continue home mirtazapine     Pulmonary hypertension  - Noted. Continue lasix     Atrial fibrillation, unspecified type  - Per chart review, his cardiologist Dr. Hank Barry feels EKGs that were read as afib previously are actually sinus rhythm and this patient is not on AC.  - Monitor telemtry     AKHIL (obstructive sleep apnea)  - continue CPAP qhs    PVC's (premature ventricular contractions)  Noted history. Continue BB, cpap, keep Mag >2, K >4. Monitor telemetry    Coronary artery disease involving native coronary artery of native heart without angina pectoris  Patient with known CAD which is controlled Will continue ASA and Statin and monitor for S/Sx of angina/ACS. Continue to monitor on telemetry. Previous angiogram with nonobstructive CAD 2021.   - suspect trop elevated in setting of PE/hypervolemia rather than ACS but will monitor closely.   - trop trend  - tele  - daily and PRN EKG     Acute on chronic diastolic congestive heart failure  Patient is identified as having Diastolic (HFpEF) heart failure that is Acute on chronic. CHF is currently uncontrolled due to Continued edema of extremities. Latest ECHO performed and demonstrates- Results for orders placed during the hospital encounter of 10/04/23    Echo Saline Bubble? No    Interpretation Summary    Left Ventricle: The left ventricle is normal in size. Normal wall thickness. There is concentric remodeling. Normal wall motion. There is normal systolic function. Biplane (2D)  method of discs ejection fraction is 52%. Unable to assess diastolic function due to atrial fibrillation.    Left Atrium: Left atrium is severely dilated.    Right Ventricle: Normal right ventricular cavity size. Wall thickness is normal. Right ventricle wall motion  is normal. Systolic function is normal.    Right Atrium: Right atrium is severely dilated.    Aortic Valve: There is mild stenosis. Aortic valve area by VTI is 1.87 cm². Aortic valve peak velocity is 1.66 m/s. Mean gradient is 6 mmHg. The dimensionless index is 0.48.    Mitral Valve: There is mild regurgitation.    Tricuspid Valve: There is mild regurgitation.    Pulmonary Artery: The estimated pulmonary artery systolic pressure is 44 mmHg.    IVC/SVC: Normal venous pressure at 3 mmHg.  . Continue Beta Blocker, ACE/ARB, Furosemide, and Aldactone and monitor clinical status closely. Monitor on telemetry. Patient is off CHF pathway.  Monitor strict Is&Os and daily weights.  Place on fluid restriction of 1.5 L. Cardiology has not been consulted. Continue to stress to patient importance of self efficacy and  on diet for CHF. Last BNP reviewed- and noted below   Recent Labs   Lab 09/16/24  0037   *   He denies acute SOB but reports SLADE, SOB after activities such as eating requiring him to sit and use a CPAP for 2-3 hours, and lower extremity swelling right > left not much improved with his home PO lasix and spironolactone. He also reports requiring 2-3L supplemental oxygen while sleeping as well. He also reports eating bags of salty chips to help with his leg swelling.,.  - Continue home beta blocker, losartan, spironolactone.   - IV lasix 40 mg daily for now  - Repeat TTE ordered insetting of PE of unclear chronicity and elevated troponin  - may benefit from continued dietary counseling as well      Advance care planning  - [facility] on file reviewed, DNR order placed    Fall  Had a mechanical fall outside at home when trying to get away from  ants that were biting him and found to have cute fractures of the right superior and inferior pubic rami. Normally ambulates with walker and had it with him outside prior to falling yesterday. No other trauma or pain currently. CT head and c spine no acute process.   - Fall precautions  - PT/OT consult pending ortho recs regarding fracture    Bradycardia  History noted. Has history of atrial bigeminy and pseudo bradycardia per chart. Has followed with EP.  - monitor telemetry     Hypothyroidism due to acquired atrophy of thyroid  - Continue home synthroid     Other hyperlipidemia  - Continue home statin     Essential hypertension  Chronic, controlled. Latest blood pressure and vitals reviewed-     Temp:  [97.8 °F (36.6 °C)-98 °F (36.7 °C)]   Pulse:  [40-72]   Resp:  [18-23]   BP: (110-184)/(44-77)   SpO2:  [93 %-99 %] .   Home meds for hypertension were reviewed and noted below.   Hypertension Medications               amLODIPine (NORVASC) 2.5 MG tablet Take 1 tablet by mouth once daily    furosemide (LASIX) 40 MG tablet Take 1 tablet (40 mg total) by mouth 2 (two) times a day. Take daily for next 3 days, then use as needed. Weigh daily. Afterwards, f weight increases 2 lbs/24h, take dose of lasix daily  until weight is back to baseline    losartan (COZAAR) 100 MG tablet Take 1 tablet by mouth once daily    metoprolol succinate (TOPROL-XL) 50 MG 24 hr tablet Take 1 tablet by mouth once daily    spironolactone (ALDACTONE) 25 MG tablet Take 1 tablet by mouth once daily   While in the hospital, will manage blood pressure as follows; Continue home antihypertensive regimen  Will utilize p.r.n. blood pressure medication only if patient's blood pressure greater than 180/110 and he develops symptoms such as worsening chest pain or shortness of breath.      VTE Risk Mitigation (From admission, onward)           Ordered     heparin 25,000 units in dextrose 5% (100 units/ml) IV bolus from bag HIGH INTENSITY nomogram - [facility]  As  needed (PRN)        Question:  Heparin Infusion Adjustment (DO NOT MODIFY ANSWER)  Answer:  [email or web address]\epic\Images\Pharmacy\HeparinInfusions\heparin HIGH INTENSITY nomogram for [facility] [id number]    09/16/24 0351     heparin 25,000 units in dextrose 5% (100 units/ml) IV bolus from bag HIGH INTENSITY nomogram - [facility]  As needed (PRN)        Question:  Heparin Infusion Adjustment (DO NOT MODIFY ANSWER)  Answer:  [email or web address]\epic\Images\Pharmacy\HeparinInfusions\heparin HIGH INTENSITY nomogram for [facility] [id number].pdf    09/16/24 0351     Place sequential compression device  Until discontinued         09/16/24 0428     Reason for No Pharmacological VTE Prophylaxis  Once        Comments: Heparin gtt for PE   Question:  Reasons:  Answer:  Physician Provided (leave comment)    09/16/24 0428     IP VTE HIGH RISK PATIENT  Once         09/16/24 0428     heparin 25,000 units in dextrose 5% 250 mL (100 units/mL) infusion HIGH INTENSITY nomogram - [facility]  Continuous        Question:  Begin at (units/kg/hr)  Answer:  18    09/16/24 0351                                    Indira Darling PA-C  Department of Hospital Medicine  [facility] - Surgery

## 2024-09-16 NOTE — ASSESSMENT & PLAN NOTE
Reportedly bitten by multiple small ants outside yesterday  He has small red lesions as described above, no pustules, no pain or itching currently   Supportive care,  monitor

## 2024-09-16 NOTE — ASSESSMENT & PLAN NOTE
"Elevated troponin   CT chest/abdomen/pelvis as part of trauma work up in ED showed "Right-sided pulmonary embolism, detailed above, with nonocclusive thrombus in the main right pulmonary artery and right lower lobe branch vessels, and occlusive thrombus in a subsegmental branch of the right pulmonary artery. Note comparison images are not available to determine chronicity. No right sided heart strain."   - Denies previous diagnosis or PE/DVT>   - Denies chest pain or SOB.  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 He requires 2-3L NC of oxygen at home/sleeping and uses a CPAP after meals (exertional to pt) and for naps.  - Currently stable on 2-3L NC oxygen. No hypotension.   - Labs with elevated troponin 0.811>1.041 and .   - Initialed on heparin gtt for PE  - Checking TTE  - Monitor telemetry  - Discussed previously with on call cardiology. In setting of no acute chest pain and PE/hypervolemia suspect elevated troponin likely 2/2 demand ischemic rather than acute ACS currently, will continue trend troponin, EKGs  "

## 2024-09-16 NOTE — SUBJECTIVE & OBJECTIVE
Past Medical History:   Diagnosis Date    Atrial fibrillation, unspecified type 09/06/2023    COPD exacerbation 09/06/2023    Thyroid disease     hypo       Past Surgical History:   Procedure Laterality Date    COLONOSCOPY  01/01/2011    CORONARY ANGIOGRAPHY N/A 07/22/2021    Procedure: ANGIOGRAM, CORONARY ARTERY;  Surgeon: Hank Barry MD;  Location: [facility] CATH LAB/EP;  Service: Cardiology;  Laterality: N/A;    EYE SURGERY Bilateral     cataracts extraction    FRACTURE SURGERY Right 09/2021    femur    HERNIA REPAIR      HIP SURGERY Right 08/2021    INTRAMEDULLARY RODDING OF TROCHANTER OF FEMUR Right 09/03/2021    Procedure: INSERTION, INTRAMEDULLARY RACQUEL, FEMUR, TROCHANTER;  Surgeon: Darryn Hall MD;  Location: [facility] OR;  Service: Orthopedics;  Laterality: Right;    JOINT REPLACEMENT Bilateral     knee    LEFT HEART CATHETERIZATION Right 07/22/2021    Procedure: Left heart cath;  Surgeon: Hank Barry MD;  Location: [facility] CATH LAB/EP;  Service: Cardiology;  Laterality: Right;    VASECTOMY         Review of patient's allergies indicates:  No Known Allergies    Current Facility-Administered Medications   Medication    acetaminophen tablet 1,000 mg    albuterol-ipratropium 2.5 mg-0.5 mg/3 mL nebulizer solution 3 mL    amLODIPine tablet 2.5 mg    aspirin EC tablet 81 mg    atorvastatin tablet 10 mg    bisacodyL suppository 10 mg    dextrose 10% bolus 125 mL 125 mL    dextrose 10% bolus 250 mL 250 mL    [START ON 9/17/2024] furosemide injection 40 mg    glucagon (human recombinant) injection 1 mg    glucose chewable tablet 16 g    glucose chewable tablet 24 g    heparin 25,000 units in dextrose 5% (100 units/ml) IV bolus from bag HIGH INTENSITY nomogram - [facility]    heparin 25,000 units in dextrose 5% (100 units/ml) IV bolus from bag HIGH INTENSITY nomogram - [facility]    heparin 25,000 units in dextrose 5% 250 mL (100 units/mL) infusion HIGH INTENSITY nomogram - [facility]    levothyroxine tablet 200 mcg    losartan tablet 100  "mg    melatonin tablet 6 mg    methocarbamoL tablet 500 mg    metoprolol succinate (TOPROL-XL) 24 hr split tablet 12.5 mg    mirtazapine tablet 7.5 mg    naloxone 0.4 mg/mL injection 0.02 mg    ondansetron disintegrating tablet 8 mg    oxyCODONE immediate release tablet 5 mg    polyethylene glycol packet 17 g    senna-docusate 8.6-50 mg per tablet 1 tablet    sodium chloride 0.9% flush 5 mL    spironolactone tablet 25 mg     Family History       Problem Relation (Age of Onset)    Crohn's disease Mother    Dementia Mother    Heart attack Father    No Known Problems Sister, Brother, Son          Tobacco Use    Smoking status: Former     Current packs/day: 0.00     Average packs/day: 1 pack/day for 35.0 years (35.0 ttl pk-yrs)     Types: Cigarettes     Start date: 1965     Quit date: 2000     Years since quittin.7     Passive exposure: Past    Smokeless tobacco: Never   Substance and Sexual Activity    Alcohol use: No    Drug use: Never    Sexual activity: Not Currently     ROS  Constitutional: negative for fevers or chills  Eyes: negative visual changes or eye discharge  ENT: negative for ear pain or sore throat  Respiratory: negative for shortness of breath or cough  Cardiovascular: negative for chest pain or palpitations  Gastrointestinal: negative for abdominal pain, nausea, or vomiting  Genitourinary: negative for dysuria and flank pain  Neurological: negative for headaches or dizziness  Musculoskeletal: positive for right hip pain      Objective:     Vital Signs (Most Recent):  Temp: 97.8 °F (36.6 °C) (24)  Pulse: (!) 40 (24)  Resp: 18 (24)  BP: (!) 148/70 (24 06)  SpO2: (!) 94 % (24) Vital Signs (24h Range):  Temp:  [97.8 °F (36.6 °C)-98 °F (36.7 °C)] 97.8 °F (36.6 °C)  Pulse:  [40-72] 40  Resp:  [18-23] 18  SpO2:  [93 %-99 %] 94 %  BP: (110-184)/(44-77) 148/70     Weight: 102.4 kg (225 lb 12 oz)  Height: 5' 11" (180.3 cm)  Body mass index is 31.49 " kg/m².    No intake or output data in the 24 hours ending 09/16/24 0734     Ortho/SPM Exam  General:  no acute distress, appears stated age   Neuro: alert and oriented x3  Psych: normal mood  Head: normocephalic, atraumatic.  Eyes: no scleral icterus  Mouth: moist mucous membranes  CV: extremities warm and well perfused  Pulm: breathing comfortably, equal chest rise bilat  Skin: clean, dry, intact (any exceptions noted in below musculoskeletal exam)    MSK:    RUE:  - Skin intact throughout, no open wounds.  Multiple ant bites scattered along the forearm  - No swelling  - No ecchymosis, erythema, or signs of cellulitis  - NonTTP throughout  - AROM and PROM of the shoulder, elbow, wrist, and hand intact without pain  - Axillary/AIN/PIN/Radial/Median/Ulnar Nerves assessed in isolation without deficit  - SILT throughout  - Compartments soft  - Radial artery palpated   - Capillary Refill <3s    LUE:  - Skin intact throughout, no open wounds  - No swelling  - No ecchymosis, erythema, or signs of cellulitis  - NonTTP throughout  - AROM and PROM of the shoulder, elbow, wrist, and hand intact without pain  - Axillary/AIN/PIN/Radial/Median/Ulnar Nerves assessed in isolation without deficit  - SILT throughout  - Compartments soft  - Radial artery palpated   - Capillary Refill <3s    RLE:  - Venous stasis changes distal to the knee with scaly appearing rash  - Area of desquamation over the lateral, posterior aspect of the calcaneus  - Well healed surgical incision of the anterior knee from prior TKA  - 1+ pitting edema from the mid tibia to the foot  - No ecchymosis, erythema, or signs of cellulitis  - Minimally TTP of the hemipelvis with compression over the superior pubic ramus and ASIS.  Otherwise, nontender to palpation throughout  - minimal pain with PROM of the hip  - AROM and PROM of the knee, ankle, and foot intact without pain  - TA/EHL/Gastroc/FHL assessed in isolation without deficit  - SILT throughout  -  Compartments soft  - DP palpated  - Capillary Refill <3s  - Negative Log roll  - No pain with axial loading     LLE:  - Venous stasis changes distal to the knee with scaly appearing rash.  He also has a scaly appearing rash of the medial aspect of the thigh.  - Area of desquamation along the lateral aspect of the calcaneus  - Well healed surgical incision of the anterior knee from prior TKA  - 1+ pitting edema from the mid tibia to foot  - No ecchymosis, erythema, or signs of cellulitis  - NonTTP throughout  - AROM and PROM of the hip, knee, ankle, and foot intact without pain  - TA/EHL/Gastroc/FHL assessed in isolation without deficit  - SILT throughout  - Compartments soft  - DP palpated  - Capillary Refill <3s  - Negative Log roll  - No pain with axial loading     Spine/pelvis/axial body:  No tenderness to palpation of cervical, thoracic, or lumbar spine  No pain with palpation of the sacrum  Minimal pain with compression of the right hemipelvis over the superior pubic ramus and ASIS.  There is no tenderness to palpation of the left hemipelvis.       Significant Labs: A1C:   Recent Labs   Lab 09/16/24 0423   HGBA1C 6.3*     CBC:   Recent Labs   Lab 09/16/24 0037 09/16/24 0423   WBC 14.23* 11.17   HGB 13.2* 13.5*   HCT 40.1 41.3    159     CMP:   Recent Labs   Lab 09/16/24 0037      K 3.8      CO2 25   *   BUN 20   CREATININE 1.2   CALCIUM 9.5   PROT 6.9   ALBUMIN 3.7   BILITOT 3.3*   ALKPHOS 49*   AST 36   ALT 18   ANIONGAP 12     Coagulation:   Recent Labs   Lab 09/16/24 0423   LABPROT 12.1   INR 1.1   APTT 29.1  29.1     Troponin:   Recent Labs   Lab 09/16/24 0037 09/16/24 0423   TROPONINI 0.811* 1.041*     Vitamin-D: 36    All pertinent labs within the past 24 hours have been reviewed.    Significant Imaging: CT: I have reviewed all pertinent results/findings and my personal findings are:  CT pelvis demonstrates a minimally displaced anterior column fracture of the right  acetabulum.  There is also a nondisplaced fracture of the right inferior pubic ramus.  X-Ray: I have reviewed all pertinent results/findings and my personal findings are:  X-rays of the pelvis demonstrate right superior pubic ramus fracture of the level of the pubic root as well as nondisplaced right inferior pubic ramus fracture.    CT chest abdomen pelvis: Right-sided pulmonary embolism which is occlusive subsegmentally (axial series 2, image 80) and nonocclusive segmentally (axial series 2, image 69) and centrally (axial series 2, image 63). Peripheral configuration of more proximal right-sided pulmonary emboli raise the question of chronic pulmonary embolus, though this is indeterminate in the absence of prior imaging. No left-sided pulmonary emboli. No saddle pulmonary embolus. No right heart strain. Cardiomegaly with left atrial enlargement. Multivessel coronary arterial calcific plaque. No mediastinal or hilar lymphadenopathy.

## 2024-09-16 NOTE — PT/OT/SLP PROGRESS
Physical Therapy      Patient Name:  Baen Salas   MRN:  [id number]    Patient not seen today secondary to low HR 40s-80s when attempted this AM (Charge RN notified) and Nurse/ ALEKSANDR hold this PM due to cardiac issues. Will follow-up when medically appropriate.    9/16/2024

## 2024-09-16 NOTE — CONSULTS
"[facility] - Surgery  Cardiology  Consult Note    Patient Name: Bean Salas  MRN: [id number]  Admission Date: 9/15/2024  Hospital Length of Stay: 0 days  Code Status: DNR   Attending Provider: Monica Patterson MD   Consulting Provider: Chantal Rowell MD  Primary Care Physician: Ramirez Levine MD  Principal Problem:Closed fracture of anterior column of right acetabulum    Patient information was obtained from patient, ER records, and primary team.     Inpatient consult to Cardiology  Consult performed by: Chantal Fernández MD  Consult ordered by: Monica Patterson MD        Subjective:     Chief Complaint:  SOB    HPI:   Cardiology consult: PE with elevated troponin and BNP      Mr. Salas is a 80y/o M HfrEF w/ recovered EF (40%--9/2021), NICM, PAF, frequent PVCs, hypertension and hyperlipidemia, AKHIL not using CPAP, PVD, chronic respiratory failure w/ home oxygen and hypothyroidism who presented to [facility] on 9/15 after a GLF at home resulting in a nondisplaced fracture of the anterior right acetabulum. Patient also found to have a non occlusive PE by CT in main PA< RLL vessels, and occlusive PE in subsegmental PA, no right heart strain. LE US was negative for DVT. He was started on a heparin drip. ECG with AFL rate controlled with PVCs. Labs significant for a peaked trop 1.041 0.817, ---918 (11mo), A1c 6.3, BUN/Cr 20/1.2. Echo done today showed a severely depressed LVEF 20-25%, RV depressed systolic function, PASP 49 with an IVC 8.     Mr. Salas admits he is very sedentary at home as he gets sob easily. Denies recent travel or prior history of PE. No cancer hx that he is aware off. Patient denies any chest pain, PND, orthopnea, syncope, palpitations. Admits chronic LE BL edema which he takes lasix for and treats by himself with baby "butt paste". He loves to eat potato chips and does not follow a low salt diet or any heart healthy diet.        TTE 9/16/24:     Left Ventricle: The " left ventricle is normal in size. Normal wall thickness. Severe global hypokinesis present. Septal motion is abnormal. There is severely reduced systolic function with a visually estimated ejection fraction of 20 - 25%. Unable to assess diastolic function due to atrial flutter.    Right Ventricle: Mild right ventricular enlargement. Wall thickness is normal. Systolic function is moderately reduced.    Severe biatrial enlargement    Mitral Valve: There is mild regurgitation.    Pulmonary Artery: There is mild to moderate pulmonary hypertension. The estimated pulmonary artery systolic pressure is 49 mmHg.    IVC/SVC: Intermediate venous pressure at 8 mmHg.    Past Medical History:   Diagnosis Date    Atrial fibrillation, unspecified type 09/06/2023    COPD exacerbation 09/06/2023    Thyroid disease     hypo       Past Surgical History:   Procedure Laterality Date    COLONOSCOPY  01/01/2011    CORONARY ANGIOGRAPHY N/A 07/22/2021    Procedure: ANGIOGRAM, CORONARY ARTERY;  Surgeon: Hank Barry MD;  Location: [facility] CATH LAB/EP;  Service: Cardiology;  Laterality: N/A;    EYE SURGERY Bilateral     cataracts extraction    FRACTURE SURGERY Right 09/2021    femur    HERNIA REPAIR      HIP SURGERY Right 08/2021    INTRAMEDULLARY RODDING OF TROCHANTER OF FEMUR Right 09/03/2021    Procedure: INSERTION, INTRAMEDULLARY RACQUEL, FEMUR, TROCHANTER;  Surgeon: Darryn Hall MD;  Location: [facility] OR;  Service: Orthopedics;  Laterality: Right;    JOINT REPLACEMENT Bilateral     knee    LEFT HEART CATHETERIZATION Right 07/22/2021    Procedure: Left heart cath;  Surgeon: Hank Barry MD;  Location: [facility] CATH LAB/EP;  Service: Cardiology;  Laterality: Right;    VASECTOMY         Review of patient's allergies indicates:  No Known Allergies    No current facility-administered medications on file prior to encounter.     Current Outpatient Medications on File Prior to Encounter   Medication Sig    acetaminophen (TYLENOL) 325 MG tablet Take  2 tablets (650 mg total) by mouth every 6 (six) hours as needed for Pain.    amLODIPine (NORVASC) 2.5 MG tablet Take 1 tablet by mouth once daily (Patient taking differently: Take 2.5 mg by mouth once daily.)    aspirin (ECOTRIN) 81 MG EC tablet Take 1 tablet (81 mg total) by mouth once daily.    atorvastatin (LIPITOR) 10 MG tablet Take 1 tablet by mouth once daily    furosemide (LASIX) 40 MG tablet Take 1 tablet (40 mg total) by mouth 2 (two) times a day. Take daily for next 3 days, then use as needed. Weigh daily. Afterwards, f weight increases 2 lbs/24h, take dose of lasix daily  until weight is back to baseline    levothyroxine (SYNTHROID) 200 MCG tablet Take 1 tablet by mouth once daily    losartan (COZAAR) 100 MG tablet Take 1 tablet by mouth once daily    metoprolol succinate (TOPROL-XL) 50 MG 24 hr tablet Take 1 tablet by mouth once daily    mirtazapine (REMERON) 7.5 MG Tab One tablet po each night for sleep    multivit-min-FA-lycopen-lutein (CENTRUM SILVER) 0.4-300-250 mg-mcg-mcg Tab Take 1 tablet by mouth once daily.    spironolactone (ALDACTONE) 25 MG tablet Take 1 tablet by mouth once daily     Family History       Problem Relation (Age of Onset)    Crohn's disease Mother    Dementia Mother    Heart attack Father    No Known Problems Sister, Brother, Son          Tobacco Use    Smoking status: Former     Current packs/day: 0.00     Average packs/day: 1 pack/day for 35.0 years (35.0 ttl pk-yrs)     Types: Cigarettes     Start date: 1965     Quit date: 2000     Years since quittin.7     Passive exposure: Past    Smokeless tobacco: Never   Substance and Sexual Activity    Alcohol use: No    Drug use: Never    Sexual activity: Not Currently     Review of Systems   All other systems reviewed and are negative.    Objective:     Vital Signs (Most Recent):  Temp: 98.9 °F (37.2 °C) (24)  Pulse: (!) 50 (24)  Resp: 18 (24)  BP: (!) 146/63 (24)  SpO2: 97 %  (09/16/24 1649) Vital Signs (24h Range):  Temp:  [97.4 °F (36.3 °C)-98.9 °F (37.2 °C)] 98.9 °F (37.2 °C)  Pulse:  [40-80] 50  Resp:  [16-23] 18  SpO2:  [93 %-100 %] 97 %  BP: (110-184)/(44-78) 146/63     Weight: 102 kg (224 lb 13.9 oz)  Body mass index is 31.36 kg/m².    SpO2: 97 %       No intake or output data in the 24 hours ending 09/16/24 1655    Lines/Drains/Airways       Drain  Duration             Male External Urinary Catheter 09/16/24 0425 <1 day              Peripheral Intravenous Line  Duration                  Peripheral IV - Single Lumen 09/16/24 0454 18 G Anterior;Distal;Left Forearm <1 day         Peripheral IV - Single Lumen 09/16/24 1112 20 G No Anterior;Right Forearm <1 day                     Physical Exam  Vitals and nursing note reviewed.   Constitutional:       General: He is not in acute distress.     Appearance: He is obese. He is not ill-appearing.   HENT:      Head: Normocephalic and atraumatic.      Nose: No congestion.      Mouth/Throat:      Mouth: Mucous membranes are moist.      Pharynx: Oropharynx is clear.   Eyes:      General: No scleral icterus.     Extraocular Movements: Extraocular movements intact.   Neck:      Vascular: No carotid bruit.   Cardiovascular:      Rate and Rhythm: Bradycardia present. Rhythm regularly irregular. Frequent Extrasystoles are present.     Pulses:           Carotid pulses are 2+ on the right side and 2+ on the left side.       Radial pulses are 2+ on the right side and 2+ on the left side.        Dorsalis pedis pulses are 1+ on the right side and 1+ on the left side.        Posterior tibial pulses are 1+ on the right side and 1+ on the left side.      Heart sounds: Murmur heard.      High-pitched blowing holosystolic murmur is present at the apex.      Gallop present. S3 sounds present.   Pulmonary:      Effort: No respiratory distress.      Breath sounds: Rhonchi present.      Comments: On 2L NC  Abdominal:      General: There is distension.       Tenderness: There is no abdominal tenderness. There is no guarding or rebound.   Musculoskeletal:      Cervical back: Normal range of motion and neck supple.      Right lower leg: Edema present.      Left lower leg: Edema present.      Comments: +2 pitting edema   Skin:     Capillary Refill: Capillary refill takes less than 2 seconds.   Neurological:      General: No focal deficit present.      Mental Status: He is alert and oriented to person, place, and time.   Psychiatric:         Mood and Affect: Mood normal.         Thought Content: Thought content normal.          Significant Labs:   Recent Lab Results  (Last 5 results in the past 24 hours)        09/16/24  1316   09/16/24  1239   09/16/24  1148   09/16/24  1146   09/16/24  1015        Ao peak doc       1.80         Ao VTI       28.92         PTT 90.5  Comment: Refer to local heparin nomogram for intensity/dose specific   therapeutic   range.                 AV mean gradient       6         AV peak gradient       13         BSA       2.26         Left Ventricle Relative Wall Thickness       0.37         E/A ratio       2.06         E/E' ratio       13.60         E wave deceleration time       128.65         FS       12         IVSd       1.02         LA WIDTH       5.72         Left Atrium Major Axis       7.85         Left Atrium Minor Axis       8.60         LA size       6.94         LA volume       276.95                182.24         LA vol index       124.8         LA Volume Index (Mod)       82.1         LVOT area       4.4         LV LATERAL E/E' RATIO       17.00         LV SEPTAL E/E' RATIO       11.33         LV EDV BP       155.22         LV Diastolic Volume Index       69.92         LVIDd       5.62         LVIDs       4.93         LV mass       228.33         LV Mass Index       103         LVOT diameter       2.36         LV ESV BP       114.56         LV Systolic Volume Index       51.6         Mean e'       0.05         Mr max doc       0.04          MV valve area p 1/2 method       5.90         MV Peak A Hari       0.33         MV Peak E Hari       0.68         MV stenosis pressure 1/2 time       37.31         QRS Duration         110       [facility] QTC Calculation         623       Posterior Wall       1.03         RA Major Axis       7.55         Est. RA pres       8         RA Width       4.41         RV TB RVSP       11         RV- wilson basal diam       4.2         Sinus       3.09         STJ       3.17         TAPSE       1.49         TDI SEPTAL       0.06         TDI LATERAL       0.04         Triscuspid Valve Regurgitation Peak Gradient       41         TR Max Hari       3.21         Troponin I   0.817  Comment: The reference interval for Troponin I represents the 99th percentile   cutoff   for our facility and is consistent with 3rd generation assay   performance.     0.869  Comment: The reference interval for Troponin I represents the 99th percentile   cutoff   for our facility and is consistent with 3rd generation assay   performance.                  0.841  Comment: The reference interval for Troponin I represents the 99th percentile   cutoff   for our facility and is consistent with 3rd generation assay   performance.             TV resting pulmonary artery pressure       49         ZLVIDD       -3.22         ZLVIDS       0.37                                Assessment and Plan:     Pulmonary embolism without acute cor pulmonale  Mr. Salas is a 80y/o M HfrEF w/ recovered EF (40%--9/2021), NICM, PAF, frequent PVCs, hypertension and hyperlipidemia, AKHIL not using CPAP, PVD, chronic respiratory failure w/ home oxygen and hypothyroidism who presented to [facility] on 9/15 after a GLF at home resulting in a nondisplaced fracture of the anterior right acetabulum. Patient also found to have a non occlusive PE by CT in main PA< RLL vessels, and occlusive PE in subsegmental PA, no right heart strain. LE US was negative for DVT. He was started on a heparin drip. Labs  significant for a peaked trop 1.041 0.817, ---918 (11mo), Echo done today showed a severely depressed LVEF 20-25%, RV depressed systolic function, PASP 49 with an IVC 8. PESI score 110, Class IV high risk.     Recs:   - Elevated PESI score mostly driven by his history of HF and chronic long disease. Continue on high intensity heparin drip and can be later transitioned to eliquis. No  right heart strain on echo.   - will need hypercoagulable work up    Acute on chronic combined systolic and diastolic congestive heart failure  Hx of HFrEF, NICM with normalization of LVEF now with new depressed LVEF 20-25%, mod RV dysfunction, elevated PASP.     Recs.  - Patient continues to be hypervolemic on exam. Rec to increase his IV lasix to 80mg BID and aim for 1-2L net negative.   - He is currently on some GDMT and his home toprol was discontinued due to bradycardia. Recommend switching losartan to entresto mod-highest dose can be started tonight. Can add low dose toprol 12.5mg if patient does not have symptomatic bradycardia. Continue spironolactone and discontinue amlodipine as we will need to uptitrate his GDMT.   - Once euvolemic, if he continues to be in atrial flutter would need to plan for a DEMETRICE/DCCV and he will need to continue OAC as he has a CHADSVASC of 5.  - Will need an ischemic work up as an outpatient, recommend a cardiac PET stress test and lifevest at discharge.   - Will need close outpatient follow up with cardiology.  - Discussed the importance of a heart healthy no salt diet, unfortunately patient does not have any desire to stop his salt intake but this can be work up more as an outpatient.   - Patient can continue working with PT/OT.           VTE Risk Mitigation (From admission, onward)           Ordered     heparin 25,000 units in dextrose 5% (100 units/ml) IV bolus from bag HIGH INTENSITY nomogram - [facility]  As needed (PRN)        Question:  Heparin Infusion Adjustment (DO NOT MODIFY ANSWER)   Answer:  [email or web address]\Images\Pharmacy\HeparinInfusions\heparin HIGH INTENSITY nomogram for [facility] [id number]    09/16/24 0351     heparin 25,000 units in dextrose 5% (100 units/ml) IV bolus from bag HIGH INTENSITY nomogram - [facility]  As needed (PRN)        Question:  Heparin Infusion Adjustment (DO NOT MODIFY ANSWER)  Answer:  [email or web address]\Images\Pharmacy\HeparinInfusions\heparin HIGH INTENSITY nomogram for [facility] [id number]    09/16/24 0351     Place sequential compression device  Until discontinued         09/16/24 0428     Reason for No Pharmacological VTE Prophylaxis  Once        Comments: Heparin gtt for PE   Question:  Reasons:  Answer:  Physician Provided (leave comment)    09/16/24 0428     IP VTE HIGH RISK PATIENT  Once         09/16/24 0428     heparin 25,000 units in dextrose 5% 250 mL (100 units/mL) infusion HIGH INTENSITY nomogram - [facility]  Continuous        Question:  Begin at (units/kg/hr)  Answer:  18    09/16/24 0351                    Discussed with staff Dr. Sainz.   Thank you for your consult. I will follow-up with patient. Please contact us if you have any additional questions.    Chantal Rowell MD  Cardiology   [facility] - Surgery    I have personally taken the history and examined the patient and agree with the resident's note as stated above.Needs Diuresis, Entresto, Spironolactove, SGLT2I, and later DEMETRICE Cardioversion for the A Flutter and Eliqiuis for the PE and AFlutter

## 2024-09-16 NOTE — CONSULTS
[facility]  Orthopedics  Consult Note    Patient Name: Bean Salas  MRN: [id number]  Admission Date: 9/15/2024  Hospital Length of Stay: 0 days  Attending Provider: Monica Patterson MD  Primary Care Provider: Ramirez Levine MD    Inpatient consult to Orthopedic Surgery  Consult performed by: LATRICIA Walsh MD  Consult ordered by: Piper Yap MD        Subjective:     Principal Problem:Closed fracture of anterior column of right acetabulum    Chief Complaint:   Chief Complaint   Patient presents with    Fall     Patient fell while working in his backyard. -Blood thinners -LOC  Denies hitting his head. Complaints of R hip pain at this time.  Hx of  COPD on 2 L at baseline.        HPI: Bean Salas is a 79 y.o. male with PMH significant for CAD, CHF, pulmonary HTN, AKHIL on CPAP, AFib, and R IT fx s/p IMN 9/3/21 with Dr. Hall presenting with right hip pain after a ground level fall.  The patient reports that he was working in his yd yesterday when he suddenly was attacked by a bunch of ants.  In the process of trying to get away from the hands, he tripped and fell onto the right hip.  He did not have immediate pain, but later had progressive pain that caused him and inability to ambulate.. Patient denies any head trauma or LOC. The patient denies prior hx of falls. Patient denies numbness and tingling. Denies any other musculoskeletal pain or injuries.    Ambulates with a walker@ baseline   Tobacco Use:  35 pack year history.  Quit smoking January 1, 2000  Denies LAILA   Lives by himself in [facility].  He was help from his son who lives in [facility], as well as multiple neighbors.  There is a 4 inch step prior to entry of his home    Anticoagulation:  ASA 81mg Qd   Immunosuppressive medications:  None at baseline.  Occupation:  Retired.  No hx of MI, CVA   No hx of cancer, chemotherapy, or radiation      Past Medical History:   Diagnosis Date    Atrial fibrillation, unspecified type  09/06/2023    COPD exacerbation 09/06/2023    Thyroid disease     hypo       Past Surgical History:   Procedure Laterality Date    COLONOSCOPY  01/01/2011    CORONARY ANGIOGRAPHY N/A 07/22/2021    Procedure: ANGIOGRAM, CORONARY ARTERY;  Surgeon: Hank Barry MD;  Location: [facility] CATH LAB/EP;  Service: Cardiology;  Laterality: N/A;    EYE SURGERY Bilateral     cataracts extraction    FRACTURE SURGERY Right 09/2021    femur    HERNIA REPAIR      HIP SURGERY Right 08/2021    INTRAMEDULLARY RODDING OF TROCHANTER OF FEMUR Right 09/03/2021    Procedure: INSERTION, INTRAMEDULLARY RACQUEL, FEMUR, TROCHANTER;  Surgeon: Darryn Hall MD;  Location: [facility] OR;  Service: Orthopedics;  Laterality: Right;    JOINT REPLACEMENT Bilateral     knee    LEFT HEART CATHETERIZATION Right 07/22/2021    Procedure: Left heart cath;  Surgeon: Hank Barry MD;  Location: [facility] CATH LAB/EP;  Service: Cardiology;  Laterality: Right;    VASECTOMY         Review of patient's allergies indicates:  No Known Allergies    Current Facility-Administered Medications   Medication    acetaminophen tablet 1,000 mg    albuterol-ipratropium 2.5 mg-0.5 mg/3 mL nebulizer solution 3 mL    amLODIPine tablet 2.5 mg    aspirin EC tablet 81 mg    atorvastatin tablet 10 mg    bisacodyL suppository 10 mg    dextrose 10% bolus 125 mL 125 mL    dextrose 10% bolus 250 mL 250 mL    [START ON 9/17/2024] furosemide injection 40 mg    glucagon (human recombinant) injection 1 mg    glucose chewable tablet 16 g    glucose chewable tablet 24 g    heparin 25,000 units in dextrose 5% (100 units/ml) IV bolus from bag HIGH INTENSITY nomogram - [facility]    heparin 25,000 units in dextrose 5% (100 units/ml) IV bolus from bag HIGH INTENSITY nomogram - [facility]    heparin 25,000 units in dextrose 5% 250 mL (100 units/mL) infusion HIGH INTENSITY nomogram - [facility]    levothyroxine tablet 200 mcg    losartan tablet 100 mg    melatonin tablet 6 mg    methocarbamoL tablet 500 mg    metoprolol  "succinate (TOPROL-XL) 24 hr split tablet 12.5 mg    mirtazapine tablet 7.5 mg    naloxone 0.4 mg/mL injection 0.02 mg    ondansetron disintegrating tablet 8 mg    oxyCODONE immediate release tablet 5 mg    polyethylene glycol packet 17 g    senna-docusate 8.6-50 mg per tablet 1 tablet    sodium chloride 0.9% flush 5 mL    spironolactone tablet 25 mg     Family History       Problem Relation (Age of Onset)    Crohn's disease Mother    Dementia Mother    Heart attack Father    No Known Problems Sister, Brother, Son          Tobacco Use    Smoking status: Former     Current packs/day: 0.00     Average packs/day: 1 pack/day for 35.0 years (35.0 ttl pk-yrs)     Types: Cigarettes     Start date: 1965     Quit date: 2000     Years since quittin.7     Passive exposure: Past    Smokeless tobacco: Never   Substance and Sexual Activity    Alcohol use: No    Drug use: Never    Sexual activity: Not Currently     ROS  Constitutional: negative for fevers or chills  Eyes: negative visual changes or eye discharge  ENT: negative for ear pain or sore throat  Respiratory: negative for shortness of breath or cough  Cardiovascular: negative for chest pain or palpitations  Gastrointestinal: negative for abdominal pain, nausea, or vomiting  Genitourinary: negative for dysuria and flank pain  Neurological: negative for headaches or dizziness  Musculoskeletal: positive for right hip pain      Objective:     Vital Signs (Most Recent):  Temp: 97.8 °F (36.6 °C) (24 0559)  Pulse: (!) 40 (24)  Resp: 18 (24)  BP: (!) 148/70 (24 0604)  SpO2: (!) 94 % (24 05) Vital Signs (24h Range):  Temp:  [97.8 °F (36.6 °C)-98 °F (36.7 °C)] 97.8 °F (36.6 °C)  Pulse:  [40-72] 40  Resp:  [18-23] 18  SpO2:  [93 %-99 %] 94 %  BP: (110-184)/(44-77) 148/70     Weight: 102.4 kg (225 lb 12 oz)  Height: 5' 11" (180.3 cm)  Body mass index is 31.49 kg/m².    No intake or output data in the 24 hours ending 24 " 0734     Ortho/SPM Exam  General:  no acute distress, appears stated age   Neuro: alert and oriented x3  Psych: normal mood  Head: normocephalic, atraumatic.  Eyes: no scleral icterus  Mouth: moist mucous membranes  CV: extremities warm and well perfused  Pulm: breathing comfortably, equal chest rise bilat  Skin: clean, dry, intact (any exceptions noted in below musculoskeletal exam)    MSK:    RUE:  - Skin intact throughout, no open wounds.  Multiple ant bites scattered along the forearm  - No swelling  - No ecchymosis, erythema, or signs of cellulitis  - NonTTP throughout  - AROM and PROM of the shoulder, elbow, wrist, and hand intact without pain  - Axillary/AIN/PIN/Radial/Median/Ulnar Nerves assessed in isolation without deficit  - SILT throughout  - Compartments soft  - Radial artery palpated   - Capillary Refill <3s    LUE:  - Skin intact throughout, no open wounds  - No swelling  - No ecchymosis, erythema, or signs of cellulitis  - NonTTP throughout  - AROM and PROM of the shoulder, elbow, wrist, and hand intact without pain  - Axillary/AIN/PIN/Radial/Median/Ulnar Nerves assessed in isolation without deficit  - SILT throughout  - Compartments soft  - Radial artery palpated   - Capillary Refill <3s    RLE:  - Venous stasis changes distal to the knee with scaly appearing rash  - Area of desquamation over the lateral, posterior aspect of the calcaneus  - Well healed surgical incision of the anterior knee from prior TKA  - 1+ pitting edema from the mid tibia to the foot  - No ecchymosis, erythema, or signs of cellulitis  - Minimally TTP of the hemipelvis with compression over the superior pubic ramus and ASIS.  Otherwise, nontender to palpation throughout  - minimal pain with PROM of the hip  - AROM and PROM of the knee, ankle, and foot intact without pain  - TA/EHL/Gastroc/FHL assessed in isolation without deficit  - SILT throughout  - Compartments soft  - DP palpated  - Capillary Refill <3s  - Negative Log  roll  - No pain with axial loading     LLE:  - Venous stasis changes distal to the knee with scaly appearing rash.  He also has a scaly appearing rash of the medial aspect of the thigh.  - Area of desquamation along the lateral aspect of the calcaneus  - Well healed surgical incision of the anterior knee from prior TKA  - 1+ pitting edema from the mid tibia to foot  - No ecchymosis, erythema, or signs of cellulitis  - NonTTP throughout  - AROM and PROM of the hip, knee, ankle, and foot intact without pain  - TA/EHL/Gastroc/FHL assessed in isolation without deficit  - SILT throughout  - Compartments soft  - DP palpated  - Capillary Refill <3s  - Negative Log roll  - No pain with axial loading     Spine/pelvis/axial body:  No tenderness to palpation of cervical, thoracic, or lumbar spine  No pain with palpation of the sacrum  Minimal pain with compression of the right hemipelvis over the superior pubic ramus and ASIS.  There is no tenderness to palpation of the left hemipelvis.       Significant Labs: A1C:   Recent Labs   Lab 09/16/24 0423   HGBA1C 6.3*     CBC:   Recent Labs   Lab 09/16/24 0037 09/16/24 0423   WBC 14.23* 11.17   HGB 13.2* 13.5*   HCT 40.1 41.3    159     CMP:   Recent Labs   Lab 09/16/24 0037      K 3.8      CO2 25   *   BUN 20   CREATININE 1.2   CALCIUM 9.5   PROT 6.9   ALBUMIN 3.7   BILITOT 3.3*   ALKPHOS 49*   AST 36   ALT 18   ANIONGAP 12     Coagulation:   Recent Labs   Lab 09/16/24 0423   LABPROT 12.1   INR 1.1   APTT 29.1  29.1     Troponin:   Recent Labs   Lab 09/16/24 0037 09/16/24 0423   TROPONINI 0.811* 1.041*     Vitamin-D: 36    All pertinent labs within the past 24 hours have been reviewed.    Significant Imaging: CT: I have reviewed all pertinent results/findings and my personal findings are:  CT pelvis demonstrates a minimally displaced anterior column fracture of the right acetabulum.  There is also a nondisplaced fracture of the right inferior pubic  ramus.  X-Ray: I have reviewed all pertinent results/findings and my personal findings are:  X-rays of the pelvis demonstrate right superior pubic ramus fracture of the level of the pubic root as well as nondisplaced right inferior pubic ramus fracture.    CT chest abdomen pelvis: Right-sided pulmonary embolism which is occlusive subsegmentally (axial series 2, image 80) and nonocclusive segmentally (axial series 2, image 69) and centrally (axial series 2, image 63). Peripheral configuration of more proximal right-sided pulmonary emboli raise the question of chronic pulmonary embolus, though this is indeterminate in the absence of prior imaging. No left-sided pulmonary emboli. No saddle pulmonary embolus. No right heart strain. Cardiomegaly with left atrial enlargement. Multivessel coronary arterial calcific plaque. No mediastinal or hilar lymphadenopathy.  Assessment/Plan:     * Closed fracture of anterior column of right acetabulum  Bean Salas is a 79 y.o. male Bean Salas is a 79 y.o. male with PMH significant for CAD, CHF, pulmonary HTN, AKHIL on CPAP, AFib, and R IT fx s/p IMN 9/3/21 with Dr. Hall presenting with a nondisplaced fracture of the anterior, of the right acetabulum.  He was closed, NVI.  CT chest abdomen and pelvis demonstrated right-sided PE with nonocclusive thrombus in the right pulmonary artery with occlusive thrombus in his subsegmental branch of the right pulmonary artery.  DVT ultrasound negative.  Patient was started on heparin GTT by hospital medicine.  Given the relatively nondisplaced in nature of the patient's fracture and numerous medical comorbidities, we will plan to treat him nonoperatively with a trial of physical therapy.    Admitted to    Weightbearing as tolerated right lower extremity on rolling walker.   PT/OT  DVT ppx: heparin gtt per    Multimodal pain control   NPO midnight as precaution     Dispo:  Pending progress with PT      KIERSTEN Walsh  MD  Orthopedics  [facility]

## 2024-09-16 NOTE — ASSESSMENT & PLAN NOTE
Patient with known CAD which is controlled Will continue ASA and Statin and monitor for S/Sx of angina/ACS. Continue to monitor on telemetry. Previous angiogram with nonobstructive CAD 2021.   - suspect trop elevated in setting of PE/hypervolemia rather than ACS but will monitor closely.   - trop trend  - tele  - daily and PRN EKG

## 2024-09-16 NOTE — ED NOTES
Telemetry Verification   Patient placed on Telemetry Box  Verified with War Room  Box #    Monitor Tech War room   Rate 72   Rhythm afib

## 2024-09-16 NOTE — ASSESSMENT & PLAN NOTE
History noted. Has history of atrial bigeminy and pseudo bradycardia per chart. Has followed with EP.  - monitor telemetry

## 2024-09-16 NOTE — NURSING
Nurses Note -- 4 Eyes      9/16/2024   7:10 AM      Skin assessed during: Admit      [x] No Altered Skin Integrity Present    [x]Prevention Measures Documented      [] Yes- Altered Skin Integrity Present or Discovered   [] LDA Added if Not in Epic (Describe Wound)   [] New Altered Skin Integrity was Present on Admit and Documented in LDA   [] Wound Image Taken    Wound Care Consulted? No    Attending Nurse:  HUMPHREY Mullen    Second RN/Staff Member:  HUMPHREY Bowie

## 2024-09-16 NOTE — PLAN OF CARE
[facility]  Initial Discharge Assessment       Primary Care Provider: Ramirez Levine MD    Admission Diagnosis: CAD (coronary artery disease) [I25.10]  Elevated troponin [R79.89]  Right hip pain [M25.551]  Chest pain [R07.9]  Fall, initial encounter [W19.XXXA]  Pulmonary embolism without acute cor pulmonale [I26.99]    Admission Date: 9/15/2024  Expected Discharge Date: 9/19/2024         Payor: [employer] MEDICARE / Plan: [employer] HMO PPO SPECIAL NEEDS / Product Type: Medicare Advantage /     Extended Emergency Contact Information  Primary Emergency Contact: Efren Salas  Mobile Phone: [phone]  Relation: Son    Discharge Plan A: Skilled Nursing Facility         [facility] [place] [facility], [place] - [address] [facility]  [address] [facility] [address]  Phone: [phone] Fax: [phone]      Initial Assessment (most recent)       Adult Discharge Assessment - 09/16/24 1332          Discharge Assessment    Assessment Type Discharge Planning Assessment     Confirmed/corrected address, phone number and insurance Yes     Confirmed Demographics Correct on Facesheet     Source of Information patient     Does patient/caregiver understand observation status Yes     Communicated LATOSHA with patient/caregiver Yes     Reason For Admission Fall at home     People in Home alone     Facility Arrived From: Home     Do you expect to return to your current living situation? No     Do you have help at home or someone to help you manage your care at home? No     Prior to hospitilization cognitive status: Alert/Oriented     Current cognitive status: Alert/Oriented     Equipment Currently Used at Home walker, rolling;raised toilet     Readmission within 30 days? No     Patient currently being followed by outpatient case management? No     Do you currently have service(s) that help you manage your care at home? No     Do you take prescription medications? Yes     Do you have prescription coverage? Yes     Do  you have any problems affording any of your prescribed medications? No     Is the patient taking medications as prescribed? yes     Who is going to help you get home at discharge? SNF anticipated     How do you get to doctors appointments? family or friend will provide     Are you on dialysis? No     Do you take coumadin? No     Discharge Plan A Skilled Nursing Facility        Physical Activity    On average, how many days per week do you engage in moderate to strenuous exercise (like a brisk walk)? 0 days     On average, how many minutes do you engage in exercise at this level? 0 min        Financial Resource Strain    How hard is it for you to pay for the very basics like food, housing, medical care, and heating? Not hard at all        Housing Stability    In the last 12 months, was there a time when you were not able to pay the mortgage or rent on time? No     At any time in the past 12 months, were you homeless or living in a shelter (including now)? No        Transportation Needs    Has the lack of transportation kept you from medical appointments, meetings, work or from getting things needed for daily living? No        Food Insecurity    Within the past 12 months, you worried that your food would run out before you got the money to buy more. Never true     Within the past 12 months, the food you bought just didn't last and you didn't have money to get more. Never true        Stress    Do you feel stress - tense, restless, nervous, or anxious, or unable to sleep at night because your mind is troubled all the time - these days? Not at all        Social Isolation    How often do you feel lonely or isolated from those around you?  Never        Alcohol Use    Q1: How often do you have a drink containing alcohol? Never     Q2: How many drinks containing alcohol do you have on a typical day when you are drinking? Patient does not drink     Q3: How often do you have six or more drinks on one occasion? Never         Utilities    In the past 12 months has the electric, gas, oil, or water company threatened to shut off services in your home? No        Health Literacy    How often do you need to have someone help you when you read instructions, pamphlets, or other written material from your doctor or pharmacy? Never                   Spoke with patient at bedside to complete d/c planning assessment. Patient lives alone in a single story home without steps. Independent with ADL's. Home DME includes a walker and elevated toilet seat. Verified PCP, Pharmacy and health insurance. PT/OT eval pending. Anticipate SNF admission at d/c. Met with patient to review discharge recommendation of Skilled Nursing and is agreeable to plan    Patient/family provided list of facilities in-network with patient's payor plan. Providers that are owned, operated, or affiliated with [facility] are included on the list.     Notified that referral sent to below listed facilities from in-network list based on proximity to home/family support:   1.[facility]  2.[facility]  3.[facility]  4.[facility]  5.[facility]  6.[facility]  7 [facility]    Patient/family instructed to identify preference.    Preferred Facility: (if more than 1, listed in order of descending preference)  1.[facility]    If an additional preferred facility not listed above is identified, additional referral to be sent. If above facilities unable to accept, will send additional referrals to in-network providers. LATOSHA 9/19/24. Discharge Plan A and Plan B have been determined by review of patient's clinical status, future medical and therapeutic needs, and coverage/benefits for post-acute care in coordination with multidisciplinary team members.        Jolie Dick RNCM  Case Management  [facility]  [phone]

## 2024-09-16 NOTE — HPI
Bean Salas is a 79 y.o. male with PMH significant for CAD, CHF, pulmonary HTN, AKHIL on CPAP, AFib, and R IT fx s/p IMN 9/3/21 with Dr. Hall presenting with right hip pain after a ground level fall.  The patient reports that he was working in his yd yesterday when he suddenly was attacked by a bunch of ants.  In the process of trying to get away from the hands, he tripped and fell onto the right hip.  He did not have immediate pain, but later had progressive pain that caused him and inability to ambulate.. Patient denies any head trauma or LOC. The patient denies prior hx of falls. Patient denies numbness and tingling. Denies any other musculoskeletal pain or injuries.    Ambulates with a walker@ baseline   Tobacco Use:  35 pack year history.  Quit smoking January 1, 2000  Denies LAILA   Lives by himself in [facility].  He was help from his son who lives in [facility], as well as multiple neighbors.  There is a 4 inch step prior to entry of his home    Anticoagulation:  ASA 81mg Qd   Immunosuppressive medications:  None at baseline.  Occupation:  Retired.  No hx of MI, CVA   No hx of cancer, chemotherapy, or radiation

## 2024-09-16 NOTE — ASSESSMENT & PLAN NOTE
Mr. Salas is a 80y/o M HfrEF w/ recovered EF (40%--9/2021), NICM, PAF, frequent PVCs, hypertension and hyperlipidemia, AKHIL not using CPAP, PVD, chronic respiratory failure w/ home oxygen and hypothyroidism who presented to [facility] on 9/15 after a GLF at home resulting in a nondisplaced fracture of the anterior right acetabulum. Patient also found to have a non occlusive PE by CT in main PA< RLL vessels, and occlusive PE in subsegmental PA, no right heart strain. LE US was negative for DVT. He was started on a heparin drip. Labs significant for a peaked trop 1.041 0.817, ---918 (11mo), Echo done today showed a severely depressed LVEF 20-25%, RV depressed systolic function, PASP 49 with an IVC 8. PESI score 110, Class IV high risk.     Recs:   - Elevated PESI score mostly driven by his history of HF and chronic long disease. Continue on high intensity heparin drip and can be later transitioned to eliquis. No  right heart strain on echo.   - will need hypercoagulable work up

## 2024-09-16 NOTE — ED NOTES
Nurses Note -- 4 Eyes      9/16/2024   4:25 AM      Skin assessed during: Admit      [] No Altered Skin Integrity Present    []Prevention Measures Documented      [x] Yes- Altered Skin Integrity Present or Discovered   [x] LDA Added if Not in Epic (Describe Wound)   [x] New Altered Skin Integrity was Present on Admit and Documented in LDA   [x] Wound Image Taken    Wound Care Consulted? No    Attending Nurse:  Shereen Solorzano RN     Second RN/Staff Member:  bishop

## 2024-09-17 PROBLEM — I48.92 PAROXYSMAL ATRIAL FLUTTER: Status: ACTIVE | Noted: 2023-09-06

## 2024-09-17 LAB
ANION GAP SERPL CALC-SCNC: 12 MMOL/L (ref 8–16)
APTT PPP: 51.3 SEC (ref 21–32)
APTT PPP: 54.8 SEC (ref 21–32)
BASOPHILS # BLD AUTO: 0.03 K/UL (ref 0–0.2)
BASOPHILS NFR BLD: 0.3 % (ref 0–1.9)
BUN SERPL-MCNC: 25 MG/DL (ref 8–23)
CALCIUM SERPL-MCNC: 9.2 MG/DL (ref 8.7–10.5)
CHLORIDE SERPL-SCNC: 101 MMOL/L (ref 95–110)
CO2 SERPL-SCNC: 27 MMOL/L (ref 23–29)
CREAT SERPL-MCNC: 1 MG/DL (ref 0.5–1.4)
DIFFERENTIAL METHOD BLD: ABNORMAL
EOSINOPHIL # BLD AUTO: 0 K/UL (ref 0–0.5)
EOSINOPHIL NFR BLD: 0.4 % (ref 0–8)
ERYTHROCYTE [DISTWIDTH] IN BLOOD BY AUTOMATED COUNT: 14.6 % (ref 11.5–14.5)
EST. GFR  (NO RACE VARIABLE): >60 ML/MIN/1.73 M^2
GLUCOSE SERPL-MCNC: 138 MG/DL (ref 70–110)
HCT VFR BLD AUTO: 40 % (ref 40–54)
HGB BLD-MCNC: 12.8 G/DL (ref 14–18)
IMM GRANULOCYTES # BLD AUTO: 0.03 K/UL (ref 0–0.04)
IMM GRANULOCYTES NFR BLD AUTO: 0.3 % (ref 0–0.5)
LYMPHOCYTES # BLD AUTO: 1 K/UL (ref 1–4.8)
LYMPHOCYTES NFR BLD: 10.1 % (ref 18–48)
MAGNESIUM SERPL-MCNC: 2.4 MG/DL (ref 1.6–2.6)
MCH RBC QN AUTO: 32.1 PG (ref 27–31)
MCHC RBC AUTO-ENTMCNC: 32 G/DL (ref 32–36)
MCV RBC AUTO: 100 FL (ref 82–98)
MONOCYTES # BLD AUTO: 1 K/UL (ref 0.3–1)
MONOCYTES NFR BLD: 10.6 % (ref 4–15)
NEUTROPHILS # BLD AUTO: 7.6 K/UL (ref 1.8–7.7)
NEUTROPHILS NFR BLD: 78.3 % (ref 38–73)
NRBC BLD-RTO: 0 /100 WBC
OHS QRS DURATION: 120 MS
OHS QTC CALCULATION: 629 MS
PLATELET # BLD AUTO: 142 K/UL (ref 150–450)
PMV BLD AUTO: 12.4 FL (ref 9.2–12.9)
POTASSIUM SERPL-SCNC: 3.7 MMOL/L (ref 3.5–5.1)
RBC # BLD AUTO: 3.99 M/UL (ref 4.6–6.2)
SODIUM SERPL-SCNC: 140 MMOL/L (ref 136–145)
WBC # BLD AUTO: 9.75 K/UL (ref 3.9–12.7)

## 2024-09-17 PROCEDURE — 36415 COLL VENOUS BLD VENIPUNCTURE: CPT | Mod: HCNC | Performed by: HOSPITALIST

## 2024-09-17 PROCEDURE — 25000003 PHARM REV CODE 250: Mod: HCNC

## 2024-09-17 PROCEDURE — 85025 COMPLETE CBC W/AUTO DIFF WBC: CPT | Mod: HCNC | Performed by: HOSPITALIST

## 2024-09-17 PROCEDURE — 97161 PT EVAL LOW COMPLEX 20 MIN: CPT | Mod: HCNC

## 2024-09-17 PROCEDURE — 97165 OT EVAL LOW COMPLEX 30 MIN: CPT | Mod: HCNC

## 2024-09-17 PROCEDURE — 25000003 PHARM REV CODE 250: Mod: HCNC | Performed by: HOSPITALIST

## 2024-09-17 PROCEDURE — 63600175 PHARM REV CODE 636 W HCPCS: Mod: HCNC

## 2024-09-17 PROCEDURE — 83735 ASSAY OF MAGNESIUM: CPT | Mod: HCNC | Performed by: HOSPITALIST

## 2024-09-17 PROCEDURE — 93010 ELECTROCARDIOGRAM REPORT: CPT | Mod: HCNC,,, | Performed by: INTERNAL MEDICINE

## 2024-09-17 PROCEDURE — 97530 THERAPEUTIC ACTIVITIES: CPT | Mod: HCNC

## 2024-09-17 PROCEDURE — 97116 GAIT TRAINING THERAPY: CPT | Mod: HCNC

## 2024-09-17 PROCEDURE — 80048 BASIC METABOLIC PNL TOTAL CA: CPT | Mod: HCNC | Performed by: HOSPITALIST

## 2024-09-17 PROCEDURE — 99232 SBSQ HOSP IP/OBS MODERATE 35: CPT | Mod: HCNC,,, | Performed by: INTERNAL MEDICINE

## 2024-09-17 PROCEDURE — 21400001 HC TELEMETRY ROOM: Mod: HCNC

## 2024-09-17 PROCEDURE — 85730 THROMBOPLASTIN TIME PARTIAL: CPT | Mod: HCNC | Performed by: HOSPITALIST

## 2024-09-17 PROCEDURE — 63600175 PHARM REV CODE 636 W HCPCS: Mod: HCNC | Performed by: HOSPITALIST

## 2024-09-17 PROCEDURE — 93005 ELECTROCARDIOGRAM TRACING: CPT | Mod: HCNC

## 2024-09-17 RX ADMIN — SENNOSIDES AND DOCUSATE SODIUM 1 TABLET: 50; 8.6 TABLET ORAL at 10:09

## 2024-09-17 RX ADMIN — METOPROLOL SUCCINATE 12.5 MG: 25 TABLET, EXTENDED RELEASE ORAL at 10:09

## 2024-09-17 RX ADMIN — POLYETHYLENE GLYCOL 3350 17 G: 17 POWDER, FOR SOLUTION ORAL at 10:09

## 2024-09-17 RX ADMIN — FUROSEMIDE 80 MG: 10 INJECTION, SOLUTION INTRAVENOUS at 09:09

## 2024-09-17 RX ADMIN — MIRTAZAPINE 7.5 MG: 7.5 TABLET, FILM COATED ORAL at 10:09

## 2024-09-17 RX ADMIN — SPIRONOLACTONE 25 MG: 25 TABLET ORAL at 10:09

## 2024-09-17 RX ADMIN — FUROSEMIDE 80 MG: 10 INJECTION, SOLUTION INTRAVENOUS at 10:09

## 2024-09-17 RX ADMIN — HEPARIN SODIUM AND DEXTROSE 14.05 UNITS/KG/HR: 10000; 5 INJECTION INTRAVENOUS at 07:09

## 2024-09-17 RX ADMIN — ACETAMINOPHEN 1000 MG: 500 TABLET ORAL at 06:09

## 2024-09-17 RX ADMIN — LEVOTHYROXINE SODIUM 200 MCG: 125 TABLET ORAL at 06:09

## 2024-09-17 RX ADMIN — SACUBITRIL AND VALSARTAN 1 TABLET: 49; 51 TABLET, FILM COATED ORAL at 10:09

## 2024-09-17 RX ADMIN — ATORVASTATIN CALCIUM 10 MG: 10 TABLET, FILM COATED ORAL at 10:09

## 2024-09-17 RX ADMIN — ACETAMINOPHEN 1000 MG: 500 TABLET ORAL at 10:09

## 2024-09-17 RX ADMIN — ASPIRIN 81 MG: 81 TABLET, COATED ORAL at 10:09

## 2024-09-17 RX ADMIN — OXYCODONE 5 MG: 5 TABLET ORAL at 01:09

## 2024-09-17 RX ADMIN — SACUBITRIL AND VALSARTAN 1 TABLET: 97; 103 TABLET, FILM COATED ORAL at 10:09

## 2024-09-17 RX ADMIN — ACETAMINOPHEN 1000 MG: 500 TABLET ORAL at 01:09

## 2024-09-17 NOTE — ASSESSMENT & PLAN NOTE
History noted. Has history of atrial bigeminy and pseudo bradycardia per chart. Has followed with EP.  - monitor telemetry   -HR ranges 40s-80s at times. Hold metoprolol if sustains <60

## 2024-09-17 NOTE — ASSESSMENT & PLAN NOTE
Patient is identified as having Diastolic (HFpEF) heart failure that is Acute on chronic. CHF is currently uncontrolled due to Continued edema of extremities. Latest ECHO performed and demonstrates- Results for orders placed during the hospital encounter of 10/04/23    Echo    Result Date: 9/16/2024    Technically difficult study    Left Ventricle: The left ventricle is normal in size. Normal wall   thickness. Severe global hypokinesis present. Septal motion is abnormal.   There is severely reduced systolic function with a visually estimated   ejection fraction of 20 - 25%. Unable to assess diastolic function due to   atrial flutter.    Right Ventricle: Mild right ventricular enlargement. Wall thickness is   normal. Systolic function is moderately reduced.    Severe biatrial enlargement    Mitral Valve: There is mild regurgitation.    Pulmonary Artery: There is mild to moderate pulmonary hypertension. The   estimated pulmonary artery systolic pressure is 49 mmHg.    IVC/SVC: Intermediate venous pressure at 8 mmHg.        Echo Saline Bubble? No    Result Date: 10/5/2023    Left Ventricle: The left ventricle is normal in size. Normal wall   thickness. There is concentric remodeling. Normal wall motion. There is   normal systolic function. Biplane (2D) method of discs ejection fraction   is 52%. Unable to assess diastolic function due to atrial fibrillation.    Left Atrium: Left atrium is severely dilated.    Right Ventricle: Normal right ventricular cavity size. Wall thickness   is normal. Right ventricle wall motion  is normal. Systolic function is   normal.    Right Atrium: Right atrium is severely dilated.    Aortic Valve: There is mild stenosis. Aortic valve area by VTI is 1.87   cm². Aortic valve peak velocity is 1.66 m/s. Mean gradient is 6 mmHg. The   dimensionless index is 0.48.    Mitral Valve: There is mild regurgitation.    Tricuspid Valve: There is mild regurgitation.    Pulmonary Artery: The estimated  pulmonary artery systolic pressure is   44 mmHg.    IVC/SVC: Normal venous pressure at 3 mmHg.         Recent Labs   Lab 09/16/24  0037   *     He denies acute SOB but reports SLADE, SOB after activities such as eating requiring him to sit and use a CPAP for 2-3 hours, and lower extremity swelling right > left not much improved with his home PO lasix and spironolactone. He also reports requiring 2-3L supplemental oxygen while sleeping as well. He also reports eating bags of salty chips to help with his leg swelling.,.  - given new reduced EF with global wall motion and septal wall motion abnormaltities, cardiology saw patient for multipel things. They rec stopping losartan, continue aldactone, start entresto. Cont metoprolol if HR tolerates.  Will need lifevest on discharge. Will need cardiac pet stress as outpatient.  -rec inc lasix to 80 BID. May need DEMETRICE/CV once more euvolemic.  -cont 2 g/2L diet. Strict I/O

## 2024-09-17 NOTE — PT/OT/SLP EVAL
Physical Therapy Co-Evaluation and Treatment     OT present for coeval due to pt's multiple medical comorbidities and functional/cognition deficits requiring two skilled therapists to appropriately progress pt's musculoskeletal strength, neuromuscular control, and endurance while taking into consideration medical acuity and pt safety.    Patient Name:  Bean Salas   MRN:  [id number]    Recommendations:     Discharge Recommendations: Moderate Intensity Therapy   Discharge Equipment Recommendations: bedside commode, walker, rolling   Barriers to discharge:     Assessment:     Bean Salas is a 79 y.o. male admitted with a medical diagnosis of Closed fracture of anterior column of right acetabulum.  He presents with the following impairments/functional limitations: weakness, impaired endurance, impaired self care skills, impaired functional mobility, gait instability, impaired balance, decreased coordination, orthopedic precautions, decreased lower extremity function, impaired cardiopulmonary response to activity, pain     Pt receptive and tolerated PT co-eval with OT fairly well. Pt educated on WBAT: bilateral lower extremity precautions prior to start of treatment with pt verbalizing understanding. Pt needed max A of 2 persons to complete mobility this session. Pt able to take ~3 shuffling steps at EOB with RW and max A of 2 persons but c/o increased pain and demo increased fatigue and dyspnea with mobility. Vital signs listed below. Patient currently demonstrates a need for moderate intensity therapy on a daily basis post acute secondary to a decline in functional status due to injury     Rehab Prognosis: Good; patient would benefit from acute skilled PT services to address these deficits and reach maximum level of function.    Recent Surgery: * No surgery found *      Plan:     During this hospitalization, patient to be seen 4 x/week to address the identified rehab impairments via gait training, therapeutic  "activities, therapeutic exercises, neuromuscular re-education and progress toward the following goals:    Plan of Care Expires:  10/17/24    Subjective     Chief Complaint: R hip/pelvic pain with movement  Patient/Family Comments/goals: "I'm just a little nervous"  Pain/Comfort:  Pain Rating 1: 0/10 (at rest)  Location - Side 1: Right  Location - Orientation 1: generalized  Location 1: hip  Pain Addressed 1: Reposition, Distraction  Pain Rating Post-Intervention 1: 9/10    Patients cultural, spiritual, Yarsani conflicts given the current situation: no    Patient History:     Living Environment: Pt lives alone in [facility] with no ABDI. Bathroom: walk-in shower with bench   Prior Level of Function: Mod I using RW  DME owned: RW, toilet riser  Caregiver Assistance: Son available to assist but works during the day    Objective:     Communicated with RN prior to session.  Patient found HOB elevated with pulse ox (continuous), telemetry, oxygen, PureWick, peripheral IV  upon PT entry to room.    General Precautions: Standard, fall  Orthopedic Precautions:LLE weight bearing as tolerated, RLE weight bearing as tolerated   Braces: N/A  Respiratory Status: Nasal cannula, flow 2.5 L/min    Exams:  Gross Motor Coordination:  WFL  Sensation:    -       Impaired  Pt reported numbness to R hip area  RLE ROM: WFL  RLE Strength: grossly 3+/5 due to pain  LLE ROM: WFL  LLE Strength: grossly 4-/5    Functional Mobility:    Bed Mobility:   Supine > Sit: maximal assistance and of 2 persons  Sit > Supine: maximal assistance and of 2 persons    Transfers:   Sit <> Stand Transfer: maximal assistance and of 2 persons from EOB using rolling walker  x2 Trials    Balance:   Sitting balance: FAIR+: Maintains balance through MINIMAL excursions of active trunk motion  Standing balance:   POOR: Needs MODERATE assist to maintain  0: N/A                 Gait:  Distance: ~3 shuffling steps the R at EOB after 1st sit <> stand trial  Assistive Device: " RW  Assistance Level: maximal assistance and of 2 persons  Gait Assessment: Pt amb with small shuffling steps, decreased foot clearance, decreased RLE weight bearing with forward flexed neck and downward gaze  Verbal cues to increase WB through BUEs on walker when stepping with LLE  Gait distance limited due to increased fatigue and decreased SPO2 stats with amb    Vitals    HR: pt HR ranged from 39-64 bpm throughout the session   SpO2: pt oxygen averaged around 93%, dropping to 73% during gait trial prompted cessation of activity- pt able to recover to normal limits after ~1 min of sitting EOB         AM-PAC 6 CLICK MOBILITY  Total Score:9       Treatment & Education:  Pt educated on tip to reduce fall risk and safety with mobility and using call button for assistance from nursing staff with bed mobility.  All questions answered within the scope of PT.  White board updated accordingly.      Patient left HOB elevated with all lines intact, call button in reach, RN notified, and Cardiac Team present.    GOALS:   Multidisciplinary Problems       Physical Therapy Goals          Problem: Physical Therapy    Goal Priority Disciplines Outcome Goal Variances Interventions   Physical Therapy Goal     PT, PT/OT Progressing     Description: Goals to be met by: 10/17/24     Patient will increase functional independence with mobility by performin. Supine to sit with Contact Guard Assistance  2. Sit to stand transfer with Contact Guard Assistance  3. Bed to chair transfer with Minimal Assistance using Rolling Walker  4. Gait  x 20 feet with Minimal Assistance using Rolling Walker.   5. Stand for 5 minutes with Stand-by Assistance using Rolling Walker    Patient has a mobility limitation that significantly impairs their ability to participate in one or more mobility related activities of daily living, including toileting. This deficit can be resolved by using a bedside commode. Patient demonstrates mobility limitations  that will cause them to be confined to one room at home without bathroom access for up to 30 days. Using a bedside commode will greatly improve the patient's ability to participate in MRADLs.    Patient demonstrates a mobility limitation that significantly impairs their ability to participate in one or more mobility related activities of daily living. Patient's mobility limitation cannot be sufficiently resolved with the use of a cane, but can be sufficiently resolved with the use of a rolling walker.The use of a rolling walker will considerably improve their ability to participate in MRADLs. Patient will use the walker on a regular basis at home.                            History:     Past Medical History:   Diagnosis Date    Atrial fibrillation, unspecified type 09/06/2023    COPD exacerbation 09/06/2023    Thyroid disease     hypo       Past Surgical History:   Procedure Laterality Date    COLONOSCOPY  01/01/2011    CORONARY ANGIOGRAPHY N/A 07/22/2021    Procedure: ANGIOGRAM, CORONARY ARTERY;  Surgeon: Hank Barry MD;  Location: [facility] CATH LAB/EP;  Service: Cardiology;  Laterality: N/A;    EYE SURGERY Bilateral     cataracts extraction    FRACTURE SURGERY Right 09/2021    femur    HERNIA REPAIR      HIP SURGERY Right 08/2021    INTRAMEDULLARY RODDING OF TROCHANTER OF FEMUR Right 09/03/2021    Procedure: INSERTION, INTRAMEDULLARY RACQUEL, FEMUR, TROCHANTER;  Surgeon: Darryn Hall MD;  Location: [facility];  Service: Orthopedics;  Laterality: Right;    JOINT REPLACEMENT Bilateral     knee    LEFT HEART CATHETERIZATION Right 07/22/2021    Procedure: Left heart cath;  Surgeon: Hank Barry MD;  Location: [facility] CATH LAB/EP;  Service: Cardiology;  Laterality: Right;    VASECTOMY         Time Tracking:     PT Received On: 09/17/24  PT Start Time: 0856     PT Stop Time: 0924  PT Total Time (min): 28 min     Billable Minutes: Evaluation 12 and Gait Training 16      09/17/2024

## 2024-09-17 NOTE — PLAN OF CARE
Problem: Adult Inpatient Plan of Care  Goal: Plan of Care Review  Outcome: Progressing  Goal: Patient-Specific Goal (Individualized)  Outcome: Progressing  Goal: Absence of Hospital-Acquired Illness or Injury  Outcome: Progressing  Goal: Optimal Comfort and Wellbeing  Outcome: Progressing  Goal: Readiness for Transition of Care  Outcome: Progressing     Problem: Infection  Goal: Absence of Infection Signs and Symptoms  Outcome: Progressing     Problem: Wound  Goal: Optimal Coping  Outcome: Progressing  Goal: Optimal Functional Ability  Outcome: Progressing  Goal: Absence of Infection Signs and Symptoms  Outcome: Progressing  Goal: Improved Oral Intake  Outcome: Progressing  Goal: Optimal Pain Control and Function  Outcome: Progressing  Goal: Skin Health and Integrity  Outcome: Progressing  Goal: Optimal Wound Healing  Outcome: Progressing     Problem: Skin Injury Risk Increased  Goal: Skin Health and Integrity  Outcome: Progressing      Pt resting in bed, pt tolerating meds and meals ,  heparin drip infusing per orders, see mar , last PTT 51.3 next ptt due in am per nomogram, family at bedside , pt remains on tele/SB pt cleaned and repositioned ,yellow urine draining per purewick , VSS , no distress, Dr here today,IV sites wnl ,heparing hand off done at change of shift , HR 42 manually, Dr notified instructed for metoprolol to be held if HR<60, vss , no distress, see mar for pain med admin

## 2024-09-17 NOTE — ASSESSMENT & PLAN NOTE
79M presenting after ground level fall with right hip pain on standing. CT shows acute fractures of the right superior and inferior pubic rami with possible involvement of the anterosuperior acetabulum.   - Orthopedic surgery consulted by ED, WBAT. Per PT today: he took a Max of 3 shuffling step at EOB with increase pain and needing Max A of 2 people to complete and for safety. Pt demo increase fatigue and dyspnea with Mobility   - PT/OT consult- recs SNF  - Pain control   - Fall precautions  -given session was not amazing, will see ortho thoughts on if continue to pursue non op management. Not fully euvolemic optimized for OR as cards resc to diurese further when discussed 9/16 before OR. They report does not need the PET stress/ischemic work up prior to a pelvic fx surgery as that can be outpatient

## 2024-09-17 NOTE — PT/OT/SLP EVAL
Occupational Therapy   Co-Evaluation    Name: Bean Salas  MRN: [id number]  Admitting Diagnosis: Closed fracture of anterior column of right acetabulum  Recent Surgery: * No surgery found *      Recommendations:     Discharge Recommendations: Moderate Intensity Therapy   Discharge Equipment Recommendations:  bedside commode, walker, rolling   Barriers to discharge:  Other (Comment) increase (A) with fxl mob and ADLs     Assessment:     Bean Salas is a 79 y.o. male with a medical diagnosis of Closed fracture of anterior column of right acetabulum.  He presents with bradycardia throughout the session ranging between 39-64 with his SPO2 statting as low as 73 while standing at EOB, however pt. Stable and returned back to bed after completed ~2-3 shuffling steps. Performance deficits affecting function: impaired endurance, weakness, impaired functional mobility, impaired self care skills, gait instability, impaired balance, decreased lower extremity function, decreased safety awareness, pain, impaired cardiopulmonary response to activity, orthopedic precautions.      Rehab Prognosis: Good; patient would benefit from acute skilled OT services to address these deficits and reach maximum level of function.       Plan:     Patient to be seen 4 x/week to address the above listed problems via self-care/home management, neuromuscular re-education, therapeutic activities, therapeutic exercises  Plan of Care Expires: 10/18/24  Plan of Care Reviewed with: patient    Subjective     Chief Complaint: R hip pain   Patient/Family Comments/goals: Pt.report he is not in pain at rest    Occupational Profile:  Living Environment: Pt lives alone in [facility] with no ABDI. Bathroom: walk-in shower with bench   Previous level of function: Mod I using RW and Mod Ind- Ind in all ADLs   Equipment Used at Home: walker, rolling, raised toilet  Pt. Reports falling before~ 1 yr ago  Assistance upon Discharge: Limited assistance from son (due to work  schedule)    Pain/Comfort:  Pain Rating 1: 0/10 (at rest)  Location - Side 1: Right  Location - Orientation 1: generalized  Location 1: hip  Pain Addressed 1: Reposition, Distraction  Pain Rating Post-Intervention 1: 9/10    Patients cultural, spiritual, Muslim conflicts given the current situation: no    Objective:     Communicated with: Nurse prior to session.  Patient found HOB elevated with pulse ox (continuous), oxygen, telemetry, peripheral IV, PureWick upon OT entry to room.    General Precautions: Standard, fall  Orthopedic Precautions:  (WBAT BLE)  Braces: N/A  Respiratory Status: Room air    Occupational Performance:    Bed Mobility:    Patient completed Supine to Sit with maximal assistance and 2 persons  Patient completed Supine to Sit with maximal assistance and 2 persons    Functional Mobility/Transfers:  Sit <> Stand Transfer: maximal assistance and of 2 persons from EOB using rolling walker  x2 Trials    Functional Mobility: Pt demonstrated functional mobility training to simulate attempting ~2-3 shuffling steps  with RW with maximal assistance and of 2 persons and no LOB and fair visual search and navigation strategies.     Activities of Daily Living:  Lower Body Dressing: total assistance donning and doffing of sock at bed level    Cognitive/Visual Perceptual:  Cognitive/Psychosocial Skills:     -       Oriented to: Person, Place, and Situation   -       Follows Commands/attention:Follows multistep  commands  -       Communication: clear/fluent  -       Memory: No Deficits noted  -       Safety awareness/insight to disability: impaired   -       Mood/Affect/Coping skills/emotional control: Appropriate to situation    Physical Exam:  Balance:   Static Sitting:  SBA  Dynamic Sitting: CGA  Static Standing: Mod A  Upper Extremity Range of Motion:     -       Right Upper Extremity: WFL  -       Left Upper Extremity: WFL  Upper Extremity Strength:    -       Right Upper Extremity: WFL  -       Left  Upper Extremity: WFL   Strength:    -       Right Upper Extremity: WFL  -       Left Upper Extremity: WFL    AMPAC 6 Click ADL:  AMPAC Total Score: 16    Treatment & Education:  Pt educated on role of occupational therapy, POC, and safety during ADLs and functional mobility. Pt and OT discussed importance of safe, continued mobility to optimize daily living skills. Pt verbalized understanding. Pt given instruction to call for medical staff/nurse for assistance.   PT present for coeval due to pt's multiple medical comorbidities and functional/cognition deficits requiring two skilled therapists to appropriately progress pt's musculoskeletal strength, neuromuscular control, and endurance while taking into consideration medical acuity and pt safety.     Patient left HOB elevated with all lines intact and call button in reach    GOALS:   Multidisciplinary Problems       Occupational Therapy Goals          Problem: Occupational Therapy    Goal Priority Disciplines Outcome Interventions   Occupational Therapy Goal     OT, PT/OT Progressing    Description: Goals to be met by: 10/17/24     Patient will increase functional independence with ADLs by performing:    LE Dressing with Contact Guard Assistance.  Supine to sit with Ind  Grooming while standing at sink with Contact Guard Assistance.  Toileting from toilet with Contact Guard Assistance for hygiene and clothing management.   Toilet transfer to toilet with Contact Guard Assistance.                         History:     Past Medical History:   Diagnosis Date    Atrial fibrillation, unspecified type 09/06/2023    COPD exacerbation 09/06/2023    Thyroid disease     hypo         Past Surgical History:   Procedure Laterality Date    COLONOSCOPY  01/01/2011    CORONARY ANGIOGRAPHY N/A 07/22/2021    Procedure: ANGIOGRAM, CORONARY ARTERY;  Surgeon: Hank Barry MD;  Location: [facility] CATH LAB/EP;  Service: Cardiology;  Laterality: N/A;    EYE SURGERY Bilateral     cataracts  extraction    FRACTURE SURGERY Right 09/2021    femur    HERNIA REPAIR      HIP SURGERY Right 08/2021    INTRAMEDULLARY RODDING OF TROCHANTER OF FEMUR Right 09/03/2021    Procedure: INSERTION, INTRAMEDULLARY RACQUEL, FEMUR, TROCHANTER;  Surgeon: Darryn Hall MD;  Location: [facility];  Service: Orthopedics;  Laterality: Right;    JOINT REPLACEMENT Bilateral     knee    LEFT HEART CATHETERIZATION Right 07/22/2021    Procedure: Left heart cath;  Surgeon: Hank Barry MD;  Location: [facility] CATH LAB/EP;  Service: Cardiology;  Laterality: Right;    VASECTOMY         Time Tracking:     OT Date of Treatment: 09/17/24  OT Start Time: 0856  OT Stop Time: 0924  OT Total Time (min): 28 min    Billable Minutes:Evaluation 12  Therapeutic Activity 16    9/18/2024

## 2024-09-17 NOTE — ASSESSMENT & PLAN NOTE
Chronic, controlled. Latest blood pressure and vitals reviewed-     Temp:  [97 °F (36.1 °C)-98.9 °F (37.2 °C)]   Pulse:  [33-84]   Resp:  [16-18]   BP: (130-179)/(61-78)   SpO2:  [90 %-98 %] .   Home meds for hypertension were reviewed and noted below.   Hypertension Medications               amLODIPine (NORVASC) 2.5 MG tablet Take 1 tablet by mouth once daily    furosemide (LASIX) 40 MG tablet Take 1 tablet (40 mg total) by mouth 2 (two) times a day. Take daily for next 3 days, then use as needed. Weigh daily. Afterwards, f weight increases 2 lbs/24h, take dose of lasix daily  until weight is back to baseline    losartan (COZAAR) 100 MG tablet Take 1 tablet by mouth once daily    metoprolol succinate (TOPROL-XL) 50 MG 24 hr tablet Take 1 tablet by mouth once daily    spironolactone (ALDACTONE) 25 MG tablet Take 1 tablet by mouth once daily   While in the hospital, will manage blood pressure as follows; Continue home antihypertensive regimen  -stop norvasc and losartan and start  entrestro per cards recs. Continue metoprolol if HR tolerates (hold if under 60)

## 2024-09-17 NOTE — SUBJECTIVE & OBJECTIVE
Principal Problem:Closed fracture of anterior column of right acetabulum    Principal Orthopedic Problem: As above    Interval History: Patient seen and examined at bedside. Patient had some ectopty on telemetry with PVCs yesterday and was found to have atrial flutter. He continues to be fluid overload with decompensated HF. Cardiology following. Patient states that he has no pain while in bed but had pain while working with PT. He states he feels like he improved greatly with PT today compared to yesterday and would like to continue to try non-operative treatment. He wants to avoid surgery at all costs currently although states he may change his mind later in the week pending progress with PT.       Review of patient's allergies indicates:  No Known Allergies    Current Facility-Administered Medications   Medication    acetaminophen tablet 1,000 mg    albuterol-ipratropium 2.5 mg-0.5 mg/3 mL nebulizer solution 3 mL    aspirin EC tablet 81 mg    atorvastatin tablet 10 mg    bisacodyL suppository 10 mg    dextrose 10% bolus 125 mL 125 mL    dextrose 10% bolus 250 mL 250 mL    diphenhydrAMINE-zinc acetate 2-0.1% cream    furosemide injection 80 mg    glucagon (human recombinant) injection 1 mg    glucose chewable tablet 16 g    glucose chewable tablet 24 g    heparin 25,000 units in dextrose 5% (100 units/ml) IV bolus from bag HIGH INTENSITY nomogram - [facility]    heparin 25,000 units in dextrose 5% (100 units/ml) IV bolus from bag HIGH INTENSITY nomogram - [facility]    heparin 25,000 units in dextrose 5% 250 mL (100 units/mL) infusion HIGH INTENSITY nomogram - [facility]    levothyroxine tablet 200 mcg    melatonin tablet 6 mg    methocarbamoL tablet 500 mg    metoprolol succinate (TOPROL-XL) 24 hr split tablet 12.5 mg    mirtazapine tablet 7.5 mg    naloxone 0.4 mg/mL injection 0.02 mg    ondansetron disintegrating tablet 8 mg    oxyCODONE immediate release tablet 5 mg    polyethylene glycol packet 17 g    sacubitriL-valsartan   "mg per tablet 1 tablet    senna-docusate 8.6-50 mg per tablet 1 tablet    sodium chloride 0.9% flush 5 mL    spironolactone tablet 25 mg     Objective:     Vital Signs (Most Recent):  Temp: 98.1 °F (36.7 °C) (09/17/24 0800)  Pulse: 81 (09/17/24 1108)  Resp: 16 (09/17/24 1317)  BP: 130/61 (09/17/24 0800)  SpO2: 95 % (09/17/24 0800) Vital Signs (24h Range):  Temp:  [97 °F (36.1 °C)-98.9 °F (37.2 °C)] 98.1 °F (36.7 °C)  Pulse:  [33-84] 81  Resp:  [16-18] 16  SpO2:  [90 %-98 %] 95 %  BP: (130-146)/(61-78) 130/61     Weight: 102 kg (224 lb 13.9 oz)  Height: 5' 11" (180.3 cm)  Body mass index is 31.36 kg/m².      Intake/Output Summary (Last 24 hours) at 9/17/2024 1412  Last data filed at 9/17/2024 0521  Gross per 24 hour   Intake --   Output 1400 ml   Net -1400 ml        Ortho/SPM Exam  A&O x 3  Regular Rate  Non-Labored Respirations    RLE:   Fires Quad/TA/EHL/GSC  SILT  Compartments soft  Brisk cap refill  Swelling to dorsal foot    LLE:   Fires Quad/TA/EHL/GSC  SILT  Compartments soft  Brisk cap refill  Swelling dorsal foot         Significant Labs: CBC:   Recent Labs   Lab 09/16/24  0037 09/16/24  0423 09/17/24 0357   WBC 14.23* 11.17 9.75   HGB 13.2* 13.5* 12.8*   HCT 40.1 41.3 40.0    159 142*     CMP:   Recent Labs   Lab 09/16/24  0037 09/17/24 0357    140   K 3.8 3.7    101   CO2 25 27   * 138*   BUN 20 25*   CREATININE 1.2 1.0   CALCIUM 9.5 9.2   PROT 6.9  --    ALBUMIN 3.7  --    BILITOT 3.3*  --    ALKPHOS 49*  --    AST 36  --    ALT 18  --    ANIONGAP 12 12     All pertinent labs within the past 24 hours have been reviewed.    Significant Imaging: I have reviewed and interpreted all pertinent imaging results/findings.  "

## 2024-09-17 NOTE — PLAN OF CARE
Problem: Occupational Therapy  Goal: Occupational Therapy Goal  Description: Goals to be met by: 10/17/24     Patient will increase functional independence with ADLs by performing:    LE Dressing with Contact Guard Assistance.  Supine to sit with Ind  Grooming while standing at sink with Contact Guard Assistance.  Toileting from toilet with Contact Guard Assistance for hygiene and clothing management.   Toilet transfer to toilet with Contact Guard Assistance.    Outcome: Progressing

## 2024-09-17 NOTE — PROGRESS NOTES
[facility] Medicine  Progress Note    Patient Name: Bean Salas  MRN: [id number]  Patient Class: IP- Inpatient   Admission Date: 9/15/2024  Length of Stay: 1 days  Attending Physician: Monica Patterson MD  Primary Care Provider: Ramirez Levine MD        Subjective:     Principal Problem:Closed fracture of anterior column of right acetabulum        HPI:  Bean Salas is a 79 y.o. male with PMHx of  Bradycardia, PVCs, Hypertension, Coronary Artery Disease, Hyperlipidemia, HFpEF, Pulmonary HTN, AKHIL on CPAP, Chronic respiratory failure w/ home oxygen and hypothyroidism. He presents with right hip pain after ground level fall outside yesterday. He was doing some yard work and reaching for some piles of wood when a bunch of ants started crawling all over him and biting him. He tried to get away but forgot there was a step up from the grass to the concrete/carport next to him causing him to fall over onto his bottom/right side and then went more slowly back hitting his head as well.  Did not lose consciousness. He denies any headache or confusion. He did not have any pain right away. He had trouble pulling himself up off the ground and his cell phone was inside his house where he lives alone. He called out for a neighbor and a few of them were able to get him into a chair however when he tried to stand he had immediate sharp pain in his right hip and then called for an ambulance. He normally ambulates with a walker and is independent with ADLs. He does not have pain at rest currently.     He otherwise felt in his normal state of health prior to this. He denies chest pain. Denies acute SOB, fever, cough. However, reports somewhat chronic/gradually worsening SLADE and SOB after activities such as eating requiring him to sit and use a CPAP for 2-3 hours every afternoon and lower extremity swelling right > left not much improved with his home PO lasix and spironolactone. He also reports requiring  2-3L supplemental oxygen while sleeping. He has some little red marks all over his obody from the ant bites but is mostly un bothered by them. He denies any history of PE/DVT. While he is not super active he does go to the store for 2-3 hours weekly to shop and ambulates between 4 rooms in his house and completes work around the house. No recent surgery or prolonged immobilization.     ED course: Afebrile. RR 22-23. /44. HR . Saturating >94% on 2-3L NC. Labs notable for troponin 0.811>1.041. . XR/CT Imaging with acute fractures of the right superior and inferior pubic rami with possible involvement of the anterosuperior acetabulum. CT chest/abdomen/pelvis also notable for right-sided pulmonary embolism with nonocclusive thrombus in the main right pulmonary artery and right lower lobe branch vessels, and occlusive thrombus in a subsegmental branch of the right pulmonary artery, no right sided heart strain, unclear chronicity. CT head and cervical spine without acute findings.  Orthopedic surgery consulted for pelvic fracture. Started on heparin gtt for PE. Admitted to  for further manement.     Overview/Hospital Course:  No notes on file    Interval history- yesterday patient had some ectopy on telemetry with pvcs/ and found to have paroxymal atrial flutter by cardiology who was consulted, as in the past was felt to have PACS over atrial fibrillation per previous outpatient cardiology note. HR ranged from 40s-80s yesterday at times and has been variable. He has some dyspnea when turning to side of bed and PT/OT reported some when mobilizing and felt to still be in volume overload state given BNP 900s as well as with the PE of unclear chronicity and diuresis increased per cards recs to 80 BID yesterday with 1.4L output reported, has condom cath in place into Grid Net. Meds adjsuted per cards recs given now with decreased EF at 20-25% with septal and global WM abnormalities. Rec to stop norvasc,  start entresto (stop losartan). Metoprolol 12.5 if HR tolerate (caveat placed to hold if HR  <60). And may consider DEMETRICE/CV this admit if stays in atrial flutter. Keeping on heparin drip for now as ortho still has to determine if would need any IP intervention pending progress with therapy.  Will need outpatient ischemic work up with cardiac PET stress and lifevest on discharge. Updated CM on need for lifevest when closer to discharge.  Discussed above with him and nursing has naty as well as cards of the new developments which he was skeptical of at times. He denies chest pain.  His EKG had abnormalites including st depressions and t wave inversions but no signs of nstemi right now acutely, trop peak at 1 likely from PE. No LE DVT on US    Worked with therapy today who report he took a Max of 3 shuffling step at EOB with increase pain and needing Max A of 2 people to complete and for safety and rec SNF for post acute stay. Given minimal steps with therapy, will f/u ortho recs further to see if  for pelvic fx.  If no plans for surgery then will plan to change to eliquis 10 once more definitive plans made.      Review of patient's allergies indicates:  No Known Allergies    No current facility-administered medications on file prior to encounter.     Current Outpatient Medications on File Prior to Encounter   Medication Sig    acetaminophen (TYLENOL) 325 MG tablet Take 2 tablets (650 mg total) by mouth every 6 (six) hours as needed for Pain.    amLODIPine (NORVASC) 2.5 MG tablet Take 1 tablet by mouth once daily (Patient taking differently: Take 2.5 mg by mouth once daily.)    aspirin (ECOTRIN) 81 MG EC tablet Take 1 tablet (81 mg total) by mouth once daily.    atorvastatin (LIPITOR) 10 MG tablet Take 1 tablet by mouth once daily    furosemide (LASIX) 40 MG tablet Take 1 tablet (40 mg total) by mouth 2 (two) times a day. Take daily for next 3 days, then use as needed. Weigh daily. Afterwards, f weight  increases 2 lbs/24h, take dose of lasix daily  until weight is back to baseline    levothyroxine (SYNTHROID) 200 MCG tablet Take 1 tablet by mouth once daily    losartan (COZAAR) 100 MG tablet Take 1 tablet by mouth once daily    metoprolol succinate (TOPROL-XL) 50 MG 24 hr tablet Take 1 tablet by mouth once daily    mirtazapine (REMERON) 7.5 MG Tab One tablet po each night for sleep    multivit-min-FA-lycopen-lutein (CENTRUM SILVER) 0.4-300-250 mg-mcg-mcg Tab Take 1 tablet by mouth once daily.    spironolactone (ALDACTONE) 25 MG tablet Take 1 tablet by mouth once daily     Family History       Problem Relation (Age of Onset)    Crohn's disease Mother    Dementia Mother    Heart attack Father    No Known Problems Sister, Brother, Son          Tobacco Use    Smoking status: Former     Current packs/day: 0.00     Average packs/day: 1 pack/day for 35.0 years (35.0 ttl pk-yrs)     Types: Cigarettes     Start date: 1965     Quit date: 2000     Years since quittin.7     Passive exposure: Past    Smokeless tobacco: Never   Substance and Sexual Activity    Alcohol use: No    Drug use: Never    Sexual activity: Not Currently     Review of Systems   Constitutional:  Negative for chills and fever.   HENT:  Negative for trouble swallowing.    Eyes:  Negative for visual disturbance.   Respiratory:  Positive for shortness of breath (SLADE). Negative for cough.    Cardiovascular:  Positive for leg swelling. Negative for chest pain and palpitations.   Gastrointestinal:  Negative for abdominal pain, nausea and vomiting.   Genitourinary:  Negative for dysuria and frequency.   Musculoskeletal:  Positive for arthralgias and gait problem.   Skin:  Positive for rash.   Neurological:  Negative for light-headedness and headaches.   Psychiatric/Behavioral:  Negative for agitation and confusion.      Objective:     Vital Signs (Most Recent):  Temp: 98.1 °F (36.7 °C) (24 0800)  Pulse: 81 (24 1108)  Resp: 18 (24  0400)  BP: 130/61 (24 0800)  SpO2: 95 % (24 0800) Vital Signs (24h Range):  Temp:  [97 °F (36.1 °C)-98.9 °F (37.2 °C)] 98.1 °F (36.7 °C)  Pulse:  [33-84] 81  Resp:  [16-18] 18  SpO2:  [90 %-98 %] 95 %  BP: (130-179)/(61-78) 130/61     Weight: 102 kg (224 lb 13.9 oz)  Body mass index is 31.36 kg/m².     Physical Exam  Vitals and nursing note reviewed.   Constitutional:       General: He is not in acute distress.     Interventions: Nasal cannula in place.      Comments: On oxygen. Not in distress   HENT:      Head: Normocephalic and atraumatic.      Nose: Nose normal.      Mouth/Throat:      Pharynx: No oropharyngeal exudate.   Eyes:      Extraocular Movements: Extraocular movements intact.      Conjunctiva/sclera: Conjunctivae normal.   Cardiovascular:      Rate and Rhythm: Normal rate. Rhythm irregular.      Heart sounds: Normal heart sounds.   Pulmonary:      Effort: Pulmonary effort is normal. No respiratory distress.      Comments: Anterior lung sounds clear. Painful repositioning limits full lung ausculation   Saturating >94% on 2L NC  Abdominal:      General: Bowel sounds are normal.      Palpations: Abdomen is soft.      Tenderness: There is no abdominal tenderness.   Musculoskeletal:      Cervical back: Normal range of motion. No tenderness.      Right hip: No bony tenderness.      Left hip: No bony tenderness.      Right lower le+ Pitting Edema present.      Left lower le+ Pitting Edema present.      Right foot: Swelling present.      Left foot: Swelling present.   Skin:     General: Skin is warm and dry.      Comments: Multiple 1-2 mm erythematous non pruritic, non blanching lesions scattered over his arms, chest, abdomen, and legs. No pustules seen.   Chronic venous stasis dermatitis appearance to BLE    Neurological:      General: No focal deficit present.      Mental Status: He is alert and oriented to person, place, and time.   Psychiatric:         Mood and Affect: Mood normal.          Behavior: Behavior normal.         Thought Content: Thought content normal.         Judgment: Judgment normal.                Significant Labs: All pertinent labs within the past 24 hours have been reviewed.  CBC:   Recent Labs   Lab 09/16/24 0037 09/16/24 0423 09/17/24 0357   WBC 14.23* 11.17 9.75   HGB 13.2* 13.5* 12.8*   HCT 40.1 41.3 40.0    159 142*     CMP:   Recent Labs   Lab 09/16/24 0037 09/17/24 0357    140   K 3.8 3.7    101   CO2 25 27   * 138*   BUN 20 25*   CREATININE 1.2 1.0   CALCIUM 9.5 9.2   PROT 6.9  --    ALBUMIN 3.7  --    BILITOT 3.3*  --    ALKPHOS 49*  --    AST 36  --    ALT 18  --    ANIONGAP 12 12     Cardiac Markers:   Recent Labs   Lab 09/16/24 0037   *     Coagulation:   Recent Labs   Lab 09/16/24 0423 09/16/24  1316 09/17/24  0745   INR 1.1  --   --    APTT 29.1  29.1   < > 51.3*    < > = values in this interval not displayed.     Magnesium:   Recent Labs   Lab 09/16/24 0423 09/17/24  0357   MG 2.1 2.4     Troponin:   Recent Labs   Lab 09/16/24 0423 09/16/24  1148 09/16/24  1239   TROPONINI 1.041* 0.869*  0.841* 0.817*       Significant Imaging: I have reviewed all pertinent imaging results/findings within the past 24 hours.  X-Ray Chest AP Portable  Narrative: EXAMINATION:  XR CHEST AP PORTABLE    CLINICAL HISTORY:  pulmonary congestion;    TECHNIQUE:  Single frontal view of the chest was performed.    COMPARISON:  September 16, 2024    FINDINGS:  Cardiac size is enlarged as before.  Patient is rotated.  No large volume of pleural fluid is present.  No definite focal consolidation is visualized radiographically.  Mild prominence of interstitial markings similar to prior.  Impression: As above    Electronically signed by: Margo Scanlon MD  Date:    09/17/2024  Time:    09:20       Assessment/Plan:      * Closed fracture of anterior column of right acetabulum  79M presenting after ground level fall with right hip pain on standing. CT shows  acute fractures of the right superior and inferior pubic rami with possible involvement of the anterosuperior acetabulum.   - Orthopedic surgery consulted by ED, WBAT. Per PT today: he took a Max of 3 shuffling step at EOB with increase pain and needing Max A of 2 people to complete and for safety. Pt demo increase fatigue and dyspnea with Mobility   - PT/OT consult- recs SNF  - Pain control   - Fall precautions  -given session was not amazing, will see ortho thoughts on if continue to pursue non op management. Not fully euvolemic optimized for OR as cards resc to diurese further when discussed 9/16 before OR. They report does not need the PET stress/ischemic work up prior to a pelvic fx surgery as that can be outpatient    Constipation  Baseline, on bowel regimen. Wants to get to comomode as bedpan hindering BM he reports      Abnormal EKG  See wall motion      Chronic hypoxemic respiratory failure  Patient with Hypoxic Respiratory failure which is Chronic.  he is on home oxygen at 2-3 LPM. Supplemental oxygen was provided and noted-      .   Signs/symptoms of respiratory failure include- tachypnea and increased work of breathing. Contributing diagnoses includes - CHF Labs and images were reviewed. Patient Has not had a recent ABG. Will treat underlying causes and adjust management of respiratory failure as follows-     Abnormal ventricular wall motion  + abnormal EKG new, cards consulted, trop peak at 1. No active nstemi now. But needs outpatient ischemic work up with cardiac pet stress once out of hospital  Life vst consideration but he is DNR      Bug bites  Reportedly bitten by multiple small ants outside yesterday  He has small red lesions as described above, no pustules, no pain or itching currently . Benadryl cream prn but he says aren't itchy  Supportive care,  monitor     Insomnia  - Continue home mirtazapine     Pulmonary embolism without acute cor pulmonale  Elevated troponin   CT chest/abdomen/pelvis as  "part of trauma work up in ED showed "Right-sided pulmonary embolism, detailed above, with nonocclusive thrombus in the main right pulmonary artery and right lower lobe branch vessels, and occlusive thrombus in a subsegmental branch of the right pulmonary artery. Note comparison images are not available to determine chronicity. No right sided heart strain."   - Denies previous diagnosis or PE/DVT>   - Denies chest pain or SOB.  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 He requires 2-3L NC of oxygen at home/sleeping and uses a CPAP after meals (exertional to pt) and for naps.  - Currently stable on 2-3L NC oxygen. No hypotension.. has some dyspnea when mobilizing sideways in bed and with PE reported..   - Labs with elevated troponin 0.811>1.041 and .   - Initialed on heparin gtt for PE  - Ccards consulted, continue current therapies. No ijdication to activate PE response team at this time.   - no signs of right heart strain  No LE DVT on US  Plan for eliquis once ortho plans decided as unclear if would want to go to the OR if does poorly with further therapy sessions.    Pulmonary hypertension  - Noted. Continue lasix     Paroxysmal atrial flutter  - Per chart review, his cardiologist Dr. Hank Barry feels EKGs that were read as afib previously are actually sinus rhythm and this patient is not on AC.  - cards consulted here who report current rhythm is atrial flutter and report if does not self convert may need DEMETRICE/CV once euvolemic. On anticoag for PE, and would require this for a potential CV, but will be on either way for pppx pelvis + PE    AKHIL (obstructive sleep apnea)  - continue CPAP qhs    PVC's (premature ventricular contractions)  Noted history and seen on telemetry on floor. Continue BB, cpap, keep Mag >2, K >4. Monitor telemetry    Coronary artery disease involving native coronary artery of native heart without angina pectoris  Patient with known CAD which is controlled Will continue ASA and Statin and " monitor for S/Sx of angina/ACS. Continue to monitor on telemetry. Previous angiogram with nonobstructive CAD 2021.   -trop plateau of 1 likely from PE but EKG with ST depressions, T wave inversions that are new and cards consulted. Echo with new septal WM abnormaliteis and dec EF at 20-25%. Rec for outpatient PET cardiac stress and life vest on discharge for ischemic work up. He is DNR so will discuss with him further re life vest.    Acute on chronic combined systolic and diastolic congestive heart failure  Patient is identified as having Diastolic (HFpEF) heart failure that is Acute on chronic. CHF is currently uncontrolled due to Continued edema of extremities. Latest ECHO performed and demonstrates- Results for orders placed during the hospital encounter of 10/04/23    Echo    Result Date: 9/16/2024    Technically difficult study    Left Ventricle: The left ventricle is normal in size. Normal wall   thickness. Severe global hypokinesis present. Septal motion is abnormal.   There is severely reduced systolic function with a visually estimated   ejection fraction of 20 - 25%. Unable to assess diastolic function due to   atrial flutter.    Right Ventricle: Mild right ventricular enlargement. Wall thickness is   normal. Systolic function is moderately reduced.    Severe biatrial enlargement    Mitral Valve: There is mild regurgitation.    Pulmonary Artery: There is mild to moderate pulmonary hypertension. The   estimated pulmonary artery systolic pressure is 49 mmHg.    IVC/SVC: Intermediate venous pressure at 8 mmHg.        Echo Saline Bubble? No    Result Date: 10/5/2023    Left Ventricle: The left ventricle is normal in size. Normal wall   thickness. There is concentric remodeling. Normal wall motion. There is   normal systolic function. Biplane (2D) method of discs ejection fraction   is 52%. Unable to assess diastolic function due to atrial fibrillation.    Left Atrium: Left atrium is severely dilated.    Right  Ventricle: Normal right ventricular cavity size. Wall thickness   is normal. Right ventricle wall motion  is normal. Systolic function is   normal.    Right Atrium: Right atrium is severely dilated.    Aortic Valve: There is mild stenosis. Aortic valve area by VTI is 1.87   cm². Aortic valve peak velocity is 1.66 m/s. Mean gradient is 6 mmHg. The   dimensionless index is 0.48.    Mitral Valve: There is mild regurgitation.    Tricuspid Valve: There is mild regurgitation.    Pulmonary Artery: The estimated pulmonary artery systolic pressure is   44 mmHg.    IVC/SVC: Normal venous pressure at 3 mmHg.         Recent Labs   Lab 09/16/24  0037   *     He denies acute SOB but reports SLADE, SOB after activities such as eating requiring him to sit and use a CPAP for 2-3 hours, and lower extremity swelling right > left not much improved with his home PO lasix and spironolactone. He also reports requiring 2-3L supplemental oxygen while sleeping as well. He also reports eating bags of salty chips to help with his leg swelling.,.  - given new reduced EF with global wall motion and septal wall motion abnormaltities, cardiology saw patient for multipel things. They rec stopping losartan, continue aldactone, start entresto. Cont metoprolol if HR tolerates.  Will need lifevest on discharge. Will need cardiac pet stress as outpatient.  -rec inc lasix to 80 BID. May need DEMETRICE/CV once more euvolemic.  -cont 2 g/2L diet. Strict I/O      Advance care planning  - [facility] on file reviewed, DNR order placed    Fall  Had a mechanical fall outside at home when trying to get away from ants that were biting him and found to have cute fractures of the right superior and inferior pubic rami. Normally ambulates with walker and had it with him outside prior to falling yesterday. No other trauma or pain currently. CT head and c spine no acute process.   - Fall precautions  - PT/OT recs SNF    Bradycardia  History noted. Has history of atrial  bigeminy and pseudo bradycardia per chart. Has followed with EP.  - monitor telemetry   -HR ranges 40s-80s at times. Hold metoprolol if sustains <60    Hypothyroidism due to acquired atrophy of thyroid  - Continue home synthroid     Other hyperlipidemia  - Continue home statin     Essential hypertension  Chronic, controlled. Latest blood pressure and vitals reviewed-     Temp:  [97 °F (36.1 °C)-98.9 °F (37.2 °C)]   Pulse:  [33-84]   Resp:  [16-18]   BP: (130-179)/(61-78)   SpO2:  [90 %-98 %] .   Home meds for hypertension were reviewed and noted below.   Hypertension Medications               amLODIPine (NORVASC) 2.5 MG tablet Take 1 tablet by mouth once daily    furosemide (LASIX) 40 MG tablet Take 1 tablet (40 mg total) by mouth 2 (two) times a day. Take daily for next 3 days, then use as needed. Weigh daily. Afterwards, f weight increases 2 lbs/24h, take dose of lasix daily  until weight is back to baseline    losartan (COZAAR) 100 MG tablet Take 1 tablet by mouth once daily    metoprolol succinate (TOPROL-XL) 50 MG 24 hr tablet Take 1 tablet by mouth once daily    spironolactone (ALDACTONE) 25 MG tablet Take 1 tablet by mouth once daily   While in the hospital, will manage blood pressure as follows; Continue home antihypertensive regimen  -stop norvasc and losartan and start  entrestro per cards recs. Continue metoprolol if HR tolerates (hold if under 60)      VTE Risk Mitigation (From admission, onward)           Ordered     heparin 25,000 units in dextrose 5% (100 units/ml) IV bolus from bag HIGH INTENSITY nomogram - [facility]  As needed (PRN)        Question:  Heparin Infusion Adjustment (DO NOT MODIFY ANSWER)  Answer:  [email or web address]\Images\Pharmacy\HeparinInfusions\heparin HIGH INTENSITY nomogram for [facility] [id number]    09/16/24 0351     heparin 25,000 units in dextrose 5% (100 units/ml) IV bolus from bag HIGH INTENSITY nomogram - [facility]  As needed (PRN)        Question:  Heparin Infusion Adjustment (DO NOT MODIFY  ANSWER)  Answer:  [email or web address]\Images\Pharmacy\HeparinInfusions\heparin HIGH INTENSITY nomogram for [facility] [id number]    09/16/24 0351     Place sequential compression device  Until discontinued         09/16/24 0428     Reason for No Pharmacological VTE Prophylaxis  Once        Comments: Heparin gtt for PE   Question:  Reasons:  Answer:  Physician Provided (leave comment)    09/16/24 0428     IP VTE HIGH RISK PATIENT  Once         09/16/24 0428     heparin 25,000 units in dextrose 5% 250 mL (100 units/mL) infusion HIGH INTENSITY nomogram - [facility]  Continuous        Question:  Begin at (units/kg/hr)  Answer:  18    09/16/24 0351                    Discharge Planning   LATOSHA: 9/20/2024     Code Status: DNR   Is the patient medically ready for discharge?:     Reason for patient still in hospital (select all that apply): Patient trending condition  Discharge Plan A: Skilled Nursing Facility                  Monica Patterson MD  Department of Hospital Medicine   [facility] - Surgery

## 2024-09-17 NOTE — NURSING
Nurses Note -- 4 Eyes      9/16/2024  7:10 PM      Skin assessed during: Q Shift Change      [x] No Altered Skin Integrity Present    []Prevention Measures Documented      [] Yes- Altered Skin Integrity Present or Discovered   [] LDA Added if Not in Epic (Describe Wound)   [] New Altered Skin Integrity was Present on Admit and Documented in LDA   [] Wound Image Taken    Wound Care Consulted? No    Attending Nurse: Tatum MERCER     Second RN/Staff Member:  Alison YIN

## 2024-09-17 NOTE — ASSESSMENT & PLAN NOTE
"Elevated troponin   CT chest/abdomen/pelvis as part of trauma work up in ED showed "Right-sided pulmonary embolism, detailed above, with nonocclusive thrombus in the main right pulmonary artery and right lower lobe branch vessels, and occlusive thrombus in a subsegmental branch of the right pulmonary artery. Note comparison images are not available to determine chronicity. No right sided heart strain."   - Denies previous diagnosis or PE/DVT>   - Denies chest pain or SOB.  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 He requires 2-3L NC of oxygen at home/sleeping and uses a CPAP after meals (exertional to pt) and for naps.  - Currently stable on 2-3L NC oxygen. No hypotension.. has some dyspnea when mobilizing sideways in bed and with PE reported..   - Labs with elevated troponin 0.811>1.041 and .   - Initialed on heparin gtt for PE  - Ccards consulted, continue current therapies. No ijdication to activate PE response team at this time.   - no signs of right heart strain  No LE DVT on US  Plan for eliquis once ortho plans decided as unclear if would want to go to the OR if does poorly with further therapy sessions.  "

## 2024-09-17 NOTE — PROGRESS NOTES
[facility] - [facility]  Orthopedics  Progress Note    Patient Name: Bean Salas  MRN: [id number]  Admission Date: 9/15/2024  Hospital Length of Stay: 1 days  Attending Provider: Monica Patterson MD  Primary Care Provider: Ramirez Levine MD    Subjective:     Principal Problem:Closed fracture of anterior column of right acetabulum    Principal Orthopedic Problem: As above    Interval History: Patient seen and examined at bedside. Patient had some ectopty on telemetry with PVCs yesterday and was found to have atrial flutter. He continues to be fluid overload with decompensated HF. Cardiology following. Patient states that he has no pain while in bed but had pain while working with PT. He states he feels like he improved greatly with PT today compared to yesterday and would like to continue to try non-operative treatment. He wants to avoid surgery at all costs currently although states he may change his mind later in the week pending progress with PT.       Review of patient's allergies indicates:  No Known Allergies    Current Facility-Administered Medications   Medication    acetaminophen tablet 1,000 mg    albuterol-ipratropium 2.5 mg-0.5 mg/3 mL nebulizer solution 3 mL    aspirin EC tablet 81 mg    atorvastatin tablet 10 mg    bisacodyL suppository 10 mg    dextrose 10% bolus 125 mL 125 mL    dextrose 10% bolus 250 mL 250 mL    diphenhydrAMINE-zinc acetate 2-0.1% cream    furosemide injection 80 mg    glucagon (human recombinant) injection 1 mg    glucose chewable tablet 16 g    glucose chewable tablet 24 g    heparin 25,000 units in dextrose 5% (100 units/ml) IV bolus from bag HIGH INTENSITY nomogram - [facility]    heparin 25,000 units in dextrose 5% (100 units/ml) IV bolus from bag HIGH INTENSITY nomogram - [facility]    heparin 25,000 units in dextrose 5% 250 mL (100 units/mL) infusion HIGH INTENSITY nomogram - [facility]    levothyroxine tablet 200 mcg    melatonin tablet 6 mg    methocarbamoL tablet 500 mg    metoprolol  "succinate (TOPROL-XL) 24 hr split tablet 12.5 mg    mirtazapine tablet 7.5 mg    naloxone 0.4 mg/mL injection 0.02 mg    ondansetron disintegrating tablet 8 mg    oxyCODONE immediate release tablet 5 mg    polyethylene glycol packet 17 g    sacubitriL-valsartan  mg per tablet 1 tablet    senna-docusate 8.6-50 mg per tablet 1 tablet    sodium chloride 0.9% flush 5 mL    spironolactone tablet 25 mg     Objective:     Vital Signs (Most Recent):  Temp: 98.1 °F (36.7 °C) (09/17/24 0800)  Pulse: 81 (09/17/24 1108)  Resp: 16 (09/17/24 1317)  BP: 130/61 (09/17/24 0800)  SpO2: 95 % (09/17/24 0800) Vital Signs (24h Range):  Temp:  [97 °F (36.1 °C)-98.9 °F (37.2 °C)] 98.1 °F (36.7 °C)  Pulse:  [33-84] 81  Resp:  [16-18] 16  SpO2:  [90 %-98 %] 95 %  BP: (130-146)/(61-78) 130/61     Weight: 102 kg (224 lb 13.9 oz)  Height: 5' 11" (180.3 cm)  Body mass index is 31.36 kg/m².      Intake/Output Summary (Last 24 hours) at 9/17/2024 1412  Last data filed at 9/17/2024 0521  Gross per 24 hour   Intake --   Output 1400 ml   Net -1400 ml        Ortho/SPM Exam  A&O x 3  Regular Rate  Non-Labored Respirations    RLE:   Fires Quad/TA/EHL/GSC  SILT  Compartments soft  Brisk cap refill  Swelling to dorsal foot    LLE:   Fires Quad/TA/EHL/GSC  SILT  Compartments soft  Brisk cap refill  Swelling dorsal foot         Significant Labs: CBC:   Recent Labs   Lab 09/16/24  0037 09/16/24  0423 09/17/24  0357   WBC 14.23* 11.17 9.75   HGB 13.2* 13.5* 12.8*   HCT 40.1 41.3 40.0    159 142*     CMP:   Recent Labs   Lab 09/16/24  0037 09/17/24  0357    140   K 3.8 3.7    101   CO2 25 27   * 138*   BUN 20 25*   CREATININE 1.2 1.0   CALCIUM 9.5 9.2   PROT 6.9  --    ALBUMIN 3.7  --    BILITOT 3.3*  --    ALKPHOS 49*  --    AST 36  --    ALT 18  --    ANIONGAP 12 12     All pertinent labs within the past 24 hours have been reviewed.    Significant Imaging: I have reviewed and interpreted all pertinent imaging " results/findings.  Assessment/Plan:     * Closed fracture of anterior column of right acetabulum  Bean Salas is a 79 y.o. male Bean Salas is a 79 y.o. male with PMH significant for CAD, CHF, pulmonary HTN, AKHIL on CPAP, AFib, and R IT fx s/p IMN 9/3/21 with Dr. Hall presenting with a nondisplaced fracture of the anterior, of the right acetabulum.  He was closed, NVI.  CT chest abdomen and pelvis demonstrated right-sided PE with nonocclusive thrombus in the right pulmonary artery with occlusive thrombus in his subsegmental branch of the right pulmonary artery.  DVT ultrasound negative.  Patient was started on heparin GTT by hospital medicine.  Given the relatively nondisplaced in nature of the patient's fracture and numerous medical comorbidities, we will plan to treat him nonoperatively with a trial of physical therapy.    Admitted to    Weightbearing as tolerated right lower extremity on rolling walker.   PT/OT  DVT ppx: heparin gtt per    Multimodal pain control       Dispo:  Pending progress with PT          AYLEEN Hurd MD  Orthopedics  [facility] - Surgery

## 2024-09-17 NOTE — ASSESSMENT & PLAN NOTE
Had a mechanical fall outside at home when trying to get away from ants that were biting him and found to have cute fractures of the right superior and inferior pubic rami. Normally ambulates with walker and had it with him outside prior to falling yesterday. No other trauma or pain currently. CT head and c spine no acute process.   - Fall precautions  - PT/OT recs SNF

## 2024-09-17 NOTE — SUBJECTIVE & OBJECTIVE
Interval history- yesterday patient had some ectopy on telemetry with pvcs/ and found to have paroxymal atrial flutter by cardiology who was consulted, as in the past was felt to have PACS over atrial fibrillation per previous outpatient cardiology note. HR ranged from 40s-80s yesterday at times and has been variable. He has some dyspnea when turning to side of bed and PT/OT reported some when mobilizing and felt to still be in volume overload state given BNP 900s as well as with the PE of unclear chronicity and diuresis increased per cards recs to 80 BID yesterday with 1.4L output reported, has condom cath in place into [employer]. Meds adjsuted per cards recs given now with decreased EF at 20-25% with septal and global WM abnormalities. Rec to stop norvasc, start entresto (stop losartan). Metoprolol 12.5 if HR tolerate (caveat placed to hold if HR  <60). And may consider DEMETRICE/CV this admit if stays in atrial flutter. Keeping on heparin drip for now as ortho still has to determine if would need any IP intervention pending progress with therapy.  Will need outpatient ischemic work up with cardiac PET stress and lifevest on discharge. Updated CM on need for lifevest when closer to discharge.  Discussed above with him and nursing has naty as well as cards of the new developments which he was skeptical of at times. He denies chest pain.  His EKG had abnormalites including st depressions and t wave inversions but no signs of nstemi right now acutely, trop peak at 1 likely from PE. No LE DVT on US    Worked with therapy today who report he took a Max of 3 shuffling step at EOB with increase pain and needing Max A of 2 people to complete and for safety and rec SNF for post acute stay. Given minimal steps with therapy, will f/u ortho recs further to see if  for pelvic fx.  If no plans for surgery then will plan to change to eliquis 10 once more definitive plans made.      Review of patient's allergies  indicates:  No Known Allergies    No current facility-administered medications on file prior to encounter.     Current Outpatient Medications on File Prior to Encounter   Medication Sig    acetaminophen (TYLENOL) 325 MG tablet Take 2 tablets (650 mg total) by mouth every 6 (six) hours as needed for Pain.    amLODIPine (NORVASC) 2.5 MG tablet Take 1 tablet by mouth once daily (Patient taking differently: Take 2.5 mg by mouth once daily.)    aspirin (ECOTRIN) 81 MG EC tablet Take 1 tablet (81 mg total) by mouth once daily.    atorvastatin (LIPITOR) 10 MG tablet Take 1 tablet by mouth once daily    furosemide (LASIX) 40 MG tablet Take 1 tablet (40 mg total) by mouth 2 (two) times a day. Take daily for next 3 days, then use as needed. Weigh daily. Afterwards, f weight increases 2 lbs/24h, take dose of lasix daily  until weight is back to baseline    levothyroxine (SYNTHROID) 200 MCG tablet Take 1 tablet by mouth once daily    losartan (COZAAR) 100 MG tablet Take 1 tablet by mouth once daily    metoprolol succinate (TOPROL-XL) 50 MG 24 hr tablet Take 1 tablet by mouth once daily    mirtazapine (REMERON) 7.5 MG Tab One tablet po each night for sleep    multivit-min-FA-lycopen-lutein (CENTRUM SILVER) 0.4-300-250 mg-mcg-mcg Tab Take 1 tablet by mouth once daily.    spironolactone (ALDACTONE) 25 MG tablet Take 1 tablet by mouth once daily     Family History       Problem Relation (Age of Onset)    Crohn's disease Mother    Dementia Mother    Heart attack Father    No Known Problems Sister, Brother, Son          Tobacco Use    Smoking status: Former     Current packs/day: 0.00     Average packs/day: 1 pack/day for 35.0 years (35.0 ttl pk-yrs)     Types: Cigarettes     Start date: 1965     Quit date: 2000     Years since quittin.7     Passive exposure: Past    Smokeless tobacco: Never   Substance and Sexual Activity    Alcohol use: No    Drug use: Never    Sexual activity: Not Currently     Review of Systems    Constitutional:  Negative for chills and fever.   HENT:  Negative for trouble swallowing.    Eyes:  Negative for visual disturbance.   Respiratory:  Positive for shortness of breath (SLADE). Negative for cough.    Cardiovascular:  Positive for leg swelling. Negative for chest pain and palpitations.   Gastrointestinal:  Negative for abdominal pain, nausea and vomiting.   Genitourinary:  Negative for dysuria and frequency.   Musculoskeletal:  Positive for arthralgias and gait problem.   Skin:  Positive for rash.   Neurological:  Negative for light-headedness and headaches.   Psychiatric/Behavioral:  Negative for agitation and confusion.      Objective:     Vital Signs (Most Recent):  Temp: 98.1 °F (36.7 °C) (09/17/24 0800)  Pulse: 81 (09/17/24 1108)  Resp: 18 (09/17/24 0400)  BP: 130/61 (09/17/24 0800)  SpO2: 95 % (09/17/24 0800) Vital Signs (24h Range):  Temp:  [97 °F (36.1 °C)-98.9 °F (37.2 °C)] 98.1 °F (36.7 °C)  Pulse:  [33-84] 81  Resp:  [16-18] 18  SpO2:  [90 %-98 %] 95 %  BP: (130-179)/(61-78) 130/61     Weight: 102 kg (224 lb 13.9 oz)  Body mass index is 31.36 kg/m².     Physical Exam  Vitals and nursing note reviewed.   Constitutional:       General: He is not in acute distress.     Interventions: Nasal cannula in place.      Comments: On oxygen. Not in distress   HENT:      Head: Normocephalic and atraumatic.      Nose: Nose normal.      Mouth/Throat:      Pharynx: No oropharyngeal exudate.   Eyes:      Extraocular Movements: Extraocular movements intact.      Conjunctiva/sclera: Conjunctivae normal.   Cardiovascular:      Rate and Rhythm: Normal rate. Rhythm irregular.      Heart sounds: Normal heart sounds.   Pulmonary:      Effort: Pulmonary effort is normal. No respiratory distress.      Comments: Anterior lung sounds clear. Painful repositioning limits full lung ausculation   Saturating >94% on 2L NC  Abdominal:      General: Bowel sounds are normal.      Palpations: Abdomen is soft.      Tenderness:  There is no abdominal tenderness.   Musculoskeletal:      Cervical back: Normal range of motion. No tenderness.      Right hip: No bony tenderness.      Left hip: No bony tenderness.      Right lower le+ Pitting Edema present.      Left lower le+ Pitting Edema present.      Right foot: Swelling present.      Left foot: Swelling present.   Skin:     General: Skin is warm and dry.      Comments: Multiple 1-2 mm erythematous non pruritic, non blanching lesions scattered over his arms, chest, abdomen, and legs. No pustules seen.   Chronic venous stasis dermatitis appearance to BLE    Neurological:      General: No focal deficit present.      Mental Status: He is alert and oriented to person, place, and time.   Psychiatric:         Mood and Affect: Mood normal.         Behavior: Behavior normal.         Thought Content: Thought content normal.         Judgment: Judgment normal.                Significant Labs: All pertinent labs within the past 24 hours have been reviewed.  CBC:   Recent Labs   Lab 24  0357   WBC 14.23* 11.17 9.75   HGB 13.2* 13.5* 12.8*   HCT 40.1 41.3 40.0    159 142*     CMP:   Recent Labs   Lab 24  0357    140   K 3.8 3.7    101   CO2 25 27   * 138*   BUN 20 25*   CREATININE 1.2 1.0   CALCIUM 9.5 9.2   PROT 6.9  --    ALBUMIN 3.7  --    BILITOT 3.3*  --    ALKPHOS 49*  --    AST 36  --    ALT 18  --    ANIONGAP 12 12     Cardiac Markers:   Recent Labs   Lab 24   *     Coagulation:   Recent Labs   Lab 24  1316 24  0745   INR 1.1  --   --    APTT 29.1  29.1   < > 51.3*    < > = values in this interval not displayed.     Magnesium:   Recent Labs   Lab 24  035   MG 2.1 2.4     Troponin:   Recent Labs   Lab 24  1148 24  1239   TROPONINI 1.041* 0.869*  0.841* 0.817*       Significant Imaging: I have reviewed all pertinent  imaging results/findings within the past 24 hours.  X-Ray Chest AP Portable  Narrative: EXAMINATION:  XR CHEST AP PORTABLE    CLINICAL HISTORY:  pulmonary congestion;    TECHNIQUE:  Single frontal view of the chest was performed.    COMPARISON:  September 16, 2024    FINDINGS:  Cardiac size is enlarged as before.  Patient is rotated.  No large volume of pleural fluid is present.  No definite focal consolidation is visualized radiographically.  Mild prominence of interstitial markings similar to prior.  Impression: As above    Electronically signed by: Margo Scanlon MD  Date:    09/17/2024  Time:    09:20

## 2024-09-17 NOTE — SUBJECTIVE & OBJECTIVE
Interval History: No acute events overnight. Pt has no complaints this morning. Net negative 1.4L yesterday. HR still ranging from 33 to low/mid 80's.     ROS  Objective:     Vital Signs (Most Recent):  Temp: 98.1 °F (36.7 °C) (09/17/24 0800)  Pulse: 81 (09/17/24 1108)  Resp: 18 (09/17/24 0400)  BP: 130/61 (09/17/24 0800)  SpO2: 95 % (09/17/24 0800) Vital Signs (24h Range):  Temp:  [97 °F (36.1 °C)-98.9 °F (37.2 °C)] 98.1 °F (36.7 °C)  Pulse:  [33-84] 81  Resp:  [16-18] 18  SpO2:  [90 %-98 %] 95 %  BP: (130-146)/(61-78) 130/61     Weight: 102 kg (224 lb 13.9 oz)  Body mass index is 31.36 kg/m².     SpO2: 95 %         Intake/Output Summary (Last 24 hours) at 9/17/2024 1309  Last data filed at 9/17/2024 0521  Gross per 24 hour   Intake --   Output 1400 ml   Net -1400 ml       Lines/Drains/Airways       Drain  Duration             Male External Urinary Catheter 09/16/24 0425 1 day              Peripheral Intravenous Line  Duration                  Peripheral IV - Single Lumen 09/16/24 0454 18 G Anterior;Distal;Left Forearm 1 day         Peripheral IV - Single Lumen 09/16/24 1112 20 G No Anterior;Right Forearm 1 day                       Physical Exam  Vitals and nursing note reviewed.   Constitutional:       General: He is not in acute distress.     Appearance: He is obese. He is not ill-appearing.   HENT:      Head: Normocephalic and atraumatic.      Nose: No congestion.      Mouth/Throat:      Mouth: Mucous membranes are moist.      Pharynx: Oropharynx is clear.   Eyes:      General: No scleral icterus.     Extraocular Movements: Extraocular movements intact.   Neck:      Vascular: No carotid bruit.   Cardiovascular:      Rate and Rhythm: Normal rate. Rhythm irregular.   Pulmonary:      Effort: No respiratory distress.      Breath sounds: Rales present.      Comments: On 2L NC  Abdominal:      General: There is distension.      Tenderness: There is no abdominal tenderness. There is no guarding or rebound.    Musculoskeletal:      Cervical back: Normal range of motion and neck supple.      Right lower leg: Edema present.      Left lower leg: Edema present.      Comments: Venous stasis dermatitis bilaterally    Skin:     Capillary Refill: Capillary refill takes less than 2 seconds.   Neurological:      General: No focal deficit present.      Mental Status: He is alert and oriented to person, place, and time.   Psychiatric:         Mood and Affect: Mood normal.         Thought Content: Thought content normal.            Significant Labs: All pertinent lab results from the last 24 hours have been reviewed. and   Recent Lab Results  (Last 5 results in the past 24 hours)        09/17/24  0745   09/17/24  0357   09/17/24  0057   09/16/24  2232   09/16/24  1316        Anion Gap   12             PTT 51.3  Comment: Refer to local heparin nomogram for intensity/dose specific   therapeutic   range.     54.8  Comment: Refer to local heparin nomogram for intensity/dose specific   therapeutic   range.       59.2  Comment: Refer to local heparin nomogram for intensity/dose specific   therapeutic   range.     90.5  Comment: Refer to local heparin nomogram for intensity/dose specific   therapeutic   range.         Baso #   0.03             Basophil %   0.3             BUN   25             Calcium   9.2             Chloride   101             CO2   27             Creatinine   1.0             Differential Method   Automated             eGFR   >60.0             Eos #   0.0             Eos %   0.4             Glucose   138             Gran # (ANC)   7.6             Gran %   78.3             Hematocrit   40.0             Hemoglobin   12.8             Immature Grans (Abs)   0.03  Comment: Mild elevation in immature granulocytes is non specific and   can be seen in a variety of conditions including stress response,   acute inflammation, trauma and pregnancy. Correlation with other   laboratory and clinical findings is essential.                Immature Granulocytes   0.3             Lymph #   1.0             Lymph %   10.1             Magnesium    2.4             MCH   32.1             MCHC   32.0             MCV   100             Mono #   1.0             Mono %   10.6             MPV   12.4             nRBC   0             QRS Duration     120           [facility] QTC Calculation     629           Platelet Count   142             Potassium   3.7             RBC   3.99             RDW   14.6             Sodium   140             WBC   9.75

## 2024-09-17 NOTE — ASSESSMENT & PLAN NOTE
+ abnormal EKG new, cards consulted, trop peak at 1. No active nstemi now. But needs outpatient ischemic work up with cardiac pet stress once out of hospital  Life vst consideration but he is DNR

## 2024-09-17 NOTE — PLAN OF CARE
PT venkat completed- see note for details, goals and POC established.     Problem: Physical Therapy  Goal: Physical Therapy Goal  Description: Goals to be met by: 10/17/24     Patient will increase functional independence with mobility by performin. Supine to sit with Contact Guard Assistance  2. Sit to stand transfer with Contact Guard Assistance  3. Bed to chair transfer with Minimal Assistance using Rolling Walker  4. Gait  x 20 feet with Minimal Assistance using Rolling Walker.   5. Stand for 5 minutes with Stand-by Assistance using Rolling Walker    Patient has a mobility limitation that significantly impairs their ability to participate in one or more mobility related activities of daily living, including toileting. This deficit can be resolved by using a bedside commode. Patient demonstrates mobility limitations that will cause them to be confined to one room at home without bathroom access for up to 30 days. Using a bedside commode will greatly improve the patient's ability to participate in MRADLs.    Patient demonstrates a mobility limitation that significantly impairs their ability to participate in one or more mobility related activities of daily living. Patient's mobility limitation cannot be sufficiently resolved with the use of a cane, but can be sufficiently resolved with the use of a rolling walker.The use of a rolling walker will considerably improve their ability to participate in MRADLs. Patient will use the walker on a regular basis at home.     Outcome: Progressing   2024

## 2024-09-17 NOTE — CARE UPDATE
"RAPID RESPONSE NURSE CHART REVIEW        Chart Reviewed: 09/17/2024, 3:48 AM    MRN: [id number]  Bed: 524/524 A    Dx: Closed fracture of anterior column of right acetabulum    Bean Salas has a past medical history of Atrial fibrillation, unspecified type, COPD exacerbation, and Thyroid disease.    Last VS: /65 (Patient Position: Lying)   Pulse 73   Temp 97 °F (36.1 °C) (Oral)   Resp 18   Ht 5' 11" (1.803 m)   Wt 102 kg (224 lb 13.9 oz)   SpO2 95%   BMI 31.36 kg/m²     24H Vital Sign Range:  Temp:  [97 °F (36.1 °C)-98.9 °F (37.2 °C)]   Pulse:  [40-82]   Resp:  [16-18]   BP: (131-184)/(61-78)   SpO2:  [90 %-100 %]     Level of Consciousness (AVPU): alert    Recent Labs     09/16/24  0037 09/16/24  0423   WBC 14.23* 11.17   HGB 13.2* 13.5*   HCT 40.1 41.3    159       Recent Labs     09/16/24  0037 09/16/24  0423     --    K 3.8  --      --    CO2 25  --    BUN 20  --    CREATININE 1.2  --    *  --    MG  --  2.1        No results for input(s): "PH", "PCO2", "PO2", "HCO3", "POCSATURATED", "BE" in the last 72 hours.     OXYGEN:  Flow (L/min) (Oxygen Therapy): 2  Oxygen Concentration (%): 94       MEWS score: 1    Charge RNPaz. In a-fib, jaya at times with BP stable. Current PE with no right heart strain, EF 20-25% and on heparin gtt. SpO2 > 95% on 2L NC.  No additional concerns verbalized at this time. Instructed to call [facility] for further concerns or assistance.    Regina Jane RN       "

## 2024-09-17 NOTE — ASSESSMENT & PLAN NOTE
Hx of HFrEF, NICM with normalization of LVEF now with new depressed LVEF 20-25%, mod RV dysfunction, elevated PASP.     Recs.  - Patient continues to be hypervolemic on exam. Rec to increase his IV lasix to 80mg BID and aim for 1-2L net negative.   - He is currently on some GDMT and his home toprol was discontinued due to bradycardia. Recommend switching losartan. Can add low dose toprol 12.5mg if patient does not have symptomatic bradycardia. Continue spironolactone and discontinue amlodipine as we will need to uptitrate his GDMT.   - Given his BP levels, have room to move up on entresto. Can increase to full dose  - Once euvolemic, if he continues to be in atrial flutter would need to plan for a DEMETRICE/DCCV and he will need to continue OAC as he has a CHADSVASC of 5.  - Will need an ischemic work up as an outpatient, recommend a cardiac PET stress test and lifevest at discharge.   - Will need close outpatient follow up with cardiology.  - Discussed the importance of a heart healthy no salt diet, unfortunately patient does not have any desire to stop his salt intake but this can be work up more as an outpatient.

## 2024-09-17 NOTE — ASSESSMENT & PLAN NOTE
Patient with known CAD which is controlled Will continue ASA and Statin and monitor for S/Sx of angina/ACS. Continue to monitor on telemetry. Previous angiogram with nonobstructive CAD 2021.   -trop plateau of 1 likely from PE but EKG with ST depressions, T wave inversions that are new and cards consulted. Echo with new septal WM abnormaliteis and dec EF at 20-25%. Rec for outpatient PET cardiac stress and life vest on discharge for ischemic work up. He is DNR so will discuss with him further re life vest.

## 2024-09-17 NOTE — ASSESSMENT & PLAN NOTE
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  he is on home oxygen at 2-3 LPM. Supplemental oxygen was provided and noted-      .   Signs/symptoms of respiratory failure include- tachypnea and increased work of breathing. Contributing diagnoses includes - CHF Labs and images were reviewed. Patient Has not had a recent ABG. Will treat underlying causes and adjust management of respiratory failure as follows-

## 2024-09-17 NOTE — ASSESSMENT & PLAN NOTE
Noted history and seen on telemetry on floor. Continue BB, cpap, keep Mag >2, K >4. Monitor telemetry

## 2024-09-17 NOTE — PROGRESS NOTES
[facility]  Cardiology  Progress Note    Patient Name: Bean Salas  MRN: [id number]  Admission Date: 9/15/2024  Hospital Length of Stay: 1 days  Code Status: DNR   Attending Physician: Monica Patterson MD   Primary Care Physician: Ramirez Levine MD  Expected Discharge Date: 9/20/2024  Principal Problem:Closed fracture of anterior column of right acetabulum    Subjective:     Interval History: No acute events overnight. Pt has no complaints this morning. Net negative 1.4L yesterday. HR still ranging from 33 to low/mid 80's.     ROS  Objective:     Vital Signs (Most Recent):  Temp: 98.1 °F (36.7 °C) (09/17/24 0800)  Pulse: 81 (09/17/24 1108)  Resp: 18 (09/17/24 0400)  BP: 130/61 (09/17/24 0800)  SpO2: 95 % (09/17/24 0800) Vital Signs (24h Range):  Temp:  [97 °F (36.1 °C)-98.9 °F (37.2 °C)] 98.1 °F (36.7 °C)  Pulse:  [33-84] 81  Resp:  [16-18] 18  SpO2:  [90 %-98 %] 95 %  BP: (130-146)/(61-78) 130/61     Weight: 102 kg (224 lb 13.9 oz)  Body mass index is 31.36 kg/m².     SpO2: 95 %         Intake/Output Summary (Last 24 hours) at 9/17/2024 1309  Last data filed at 9/17/2024 0521  Gross per 24 hour   Intake --   Output 1400 ml   Net -1400 ml       Lines/Drains/Airways       Drain  Duration             Male External Urinary Catheter 09/16/24 0425 1 day              Peripheral Intravenous Line  Duration                  Peripheral IV - Single Lumen 09/16/24 0454 18 G Anterior;Distal;Left Forearm 1 day         Peripheral IV - Single Lumen 09/16/24 1112 20 G No Anterior;Right Forearm 1 day                       Physical Exam  Vitals and nursing note reviewed.   Constitutional:       General: He is not in acute distress.     Appearance: He is obese. He is not ill-appearing.   HENT:      Head: Normocephalic and atraumatic.      Nose: No congestion.      Mouth/Throat:      Mouth: Mucous membranes are moist.      Pharynx: Oropharynx is clear.   Eyes:      General: No scleral icterus.     Extraocular  Movements: Extraocular movements intact.   Neck:      Vascular: No carotid bruit.   Cardiovascular:      Rate and Rhythm: Normal rate. Rhythm irregular.   Pulmonary:      Effort: No respiratory distress.      Breath sounds: Rales present.      Comments: On 2L NC  Abdominal:      General: There is distension.      Tenderness: There is no abdominal tenderness. There is no guarding or rebound.   Musculoskeletal:      Cervical back: Normal range of motion and neck supple.      Right lower leg: Edema present.      Left lower leg: Edema present.      Comments: Venous stasis dermatitis bilaterally    Skin:     Capillary Refill: Capillary refill takes less than 2 seconds.   Neurological:      General: No focal deficit present.      Mental Status: He is alert and oriented to person, place, and time.   Psychiatric:         Mood and Affect: Mood normal.         Thought Content: Thought content normal.            Significant Labs: All pertinent lab results from the last 24 hours have been reviewed. and   Recent Lab Results  (Last 5 results in the past 24 hours)        09/17/24  0745   09/17/24  0357   09/17/24  0057   09/16/24  2232   09/16/24  1316        Anion Gap   12             PTT 51.3  Comment: Refer to local heparin nomogram for intensity/dose specific   therapeutic   range.     54.8  Comment: Refer to local heparin nomogram for intensity/dose specific   therapeutic   range.       59.2  Comment: Refer to local heparin nomogram for intensity/dose specific   therapeutic   range.     90.5  Comment: Refer to local heparin nomogram for intensity/dose specific   therapeutic   range.         Baso #   0.03             Basophil %   0.3             BUN   25             Calcium   9.2             Chloride   101             CO2   27             Creatinine   1.0             Differential Method   Automated             eGFR   >60.0             Eos #   0.0             Eos %   0.4             Glucose   138             Gran # (ANC)    7.6             Gran %   78.3             Hematocrit   40.0             Hemoglobin   12.8             Immature Grans (Abs)   0.03  Comment: Mild elevation in immature granulocytes is non specific and   can be seen in a variety of conditions including stress response,   acute inflammation, trauma and pregnancy. Correlation with other   laboratory and clinical findings is essential.               Immature Granulocytes   0.3             Lymph #   1.0             Lymph %   10.1             Magnesium    2.4             MCH   32.1             MCHC   32.0             MCV   100             Mono #   1.0             Mono %   10.6             MPV   12.4             nRBC   0             QRS Duration     120           [facility] QTC Calculation     629           Platelet Count   142             Potassium   3.7             RBC   3.99             RDW   14.6             Sodium   140             WBC   9.75                                    Assessment and Plan:     Pulmonary embolism without acute cor pulmonale  Mr. Salas is a 80y/o M HfrEF w/ recovered EF (40%--9/2021), NICM, PAF, frequent PVCs, hypertension and hyperlipidemia, AKHIL not using CPAP, PVD, chronic respiratory failure w/ home oxygen and hypothyroidism who presented to [facility] on 9/15 after a GLF at home resulting in a nondisplaced fracture of the anterior right acetabulum. Patient also found to have a non occlusive PE by CT in main PA< RLL vessels, and occlusive PE in subsegmental PA, no right heart strain. LE US was negative for DVT. He was started on a heparin drip. Labs significant for a peaked trop 1.041 0.817, ---918 (11mo), Echo done today showed a severely depressed LVEF 20-25%, RV depressed systolic function, PASP 49 with an IVC 8. PESI score 110, Class IV high risk.     Recs:   - Elevated PESI score mostly driven by his history of HF and chronic long disease. Continue on high intensity heparin drip and can be later transitioned to eliquis. No  right heart strain  on echo.   - will need hypercoagulable work up    Acute on chronic combined systolic and diastolic congestive heart failure  Hx of HFrEF, NICM with normalization of LVEF now with new depressed LVEF 20-25%, mod RV dysfunction, elevated PASP.     Recs.  - Patient continues to be hypervolemic on exam. Rec to increase his IV lasix to 80mg BID and aim for 1-2L net negative.   - He is currently on some GDMT and his home toprol was discontinued due to bradycardia. Recommend switching losartan. Can add low dose toprol 12.5mg if patient does not have symptomatic bradycardia. Continue spironolactone and discontinue amlodipine as we will need to uptitrate his GDMT.   - Given his BP levels, have room to move up on entresto. Can increase to full dose  - Once euvolemic, if he continues to be in atrial flutter would need to plan for a DEMETRICE/DCCV and he will need to continue OAC as he has a CHADSVASC of 5.  - Will need an ischemic work up as an outpatient, recommend a cardiac PET stress test and lifevest at discharge.   - Will need close outpatient follow up with cardiology.  - Discussed the importance of a heart healthy no salt diet, unfortunately patient does not have any desire to stop his salt intake but this can be work up more as an outpatient.           VTE Risk Mitigation (From admission, onward)           Ordered     heparin 25,000 units in dextrose 5% (100 units/ml) IV bolus from bag HIGH INTENSITY nomogram - [facility]  As needed (PRN)        Question:  Heparin Infusion Adjustment (DO NOT MODIFY ANSWER)  Answer:  [email or web address]\[email or web address] HIGH INTENSITY nomogram for [facility] [id number]    09/16/24 0351     heparin 25,000 units in dextrose 5% (100 units/ml) IV bolus from bag HIGH INTENSITY nomogram - [facility]  As needed (PRN)        Question:  Heparin Infusion Adjustment (DO NOT MODIFY ANSWER)  Answer:  \[email or web address]\[email or web address]\heparin HIGH INTENSITY nomogram for [facility] [id number]     09/16/24 0351     Place sequential compression device  Until discontinued         09/16/24 0428     Reason for No Pharmacological VTE Prophylaxis  Once        Comments: Heparin gtt for PE   Question:  Reasons:  Answer:  Physician Provided (leave comment)    09/16/24 0428     IP VTE HIGH RISK PATIENT  Once         09/16/24 0428     heparin 25,000 units in dextrose 5% 250 mL (100 units/mL) infusion HIGH INTENSITY nomogram - [facility]  Continuous        Question:  Begin at (units/kg/hr)  Answer:  18    09/16/24 0351                    Dl Méndez, DO  Internal Medicine Intern   Cardiology  [facility] - Surgery  As above -can increase Entresto to full strength

## 2024-09-17 NOTE — ASSESSMENT & PLAN NOTE
Reportedly bitten by multiple small ants outside yesterday  He has small red lesions as described above, no pustules, no pain or itching currently . Benadryl cream prn but he says aren't itchy  Supportive care,  monitor

## 2024-09-18 ENCOUNTER — ANESTHESIA EVENT (OUTPATIENT)
Dept: MEDSURG UNIT | Facility: HOSPITAL | Age: 79
End: 2024-09-18
Payer: MEDICARE

## 2024-09-18 PROBLEM — E87.6 HYPOKALEMIA: Status: ACTIVE | Noted: 2024-09-18

## 2024-09-18 PROBLEM — S32.810A CLOSED PELVIC RING FRACTURE: Status: ACTIVE | Noted: 2021-09-03

## 2024-09-18 PROBLEM — I49.1 PAC (PREMATURE ATRIAL CONTRACTION): Status: RESOLVED | Noted: 2024-09-16 | Resolved: 2024-09-18

## 2024-09-18 PROBLEM — S32.431A: Status: RESOLVED | Noted: 2024-09-16 | Resolved: 2024-09-18
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
## 2024-09-18 PROBLEM — S32.434D: Status: ACTIVE | Noted: 2021-09-03

## 2024-09-18 LAB
ANION GAP SERPL CALC-SCNC: 9 MMOL/L (ref 8–16)
APTT PPP: 43.9 SEC (ref 21–32)
BASOPHILS # BLD AUTO: 0.04 K/UL (ref 0–0.2)
BASOPHILS NFR BLD: 0.4 % (ref 0–1.9)
BUN SERPL-MCNC: 27 MG/DL (ref 8–23)
CALCIUM SERPL-MCNC: 8.8 MG/DL (ref 8.7–10.5)
CHLORIDE SERPL-SCNC: 100 MMOL/L (ref 95–110)
CO2 SERPL-SCNC: 27 MMOL/L (ref 23–29)
CREAT SERPL-MCNC: 1.1 MG/DL (ref 0.5–1.4)
DIFFERENTIAL METHOD BLD: ABNORMAL
EOSINOPHIL # BLD AUTO: 0.2 K/UL (ref 0–0.5)
EOSINOPHIL NFR BLD: 2.3 % (ref 0–8)
ERYTHROCYTE [DISTWIDTH] IN BLOOD BY AUTOMATED COUNT: 14.3 % (ref 11.5–14.5)
EST. GFR  (NO RACE VARIABLE): >60 ML/MIN/1.73 M^2
GLUCOSE SERPL-MCNC: 136 MG/DL (ref 70–110)
HCT VFR BLD AUTO: 42.2 % (ref 40–54)
HGB BLD-MCNC: 14 G/DL (ref 14–18)
IMM GRANULOCYTES # BLD AUTO: 0.03 K/UL (ref 0–0.04)
IMM GRANULOCYTES NFR BLD AUTO: 0.3 % (ref 0–0.5)
LYMPHOCYTES # BLD AUTO: 1.1 K/UL (ref 1–4.8)
LYMPHOCYTES NFR BLD: 11.6 % (ref 18–48)
MAGNESIUM SERPL-MCNC: 2.3 MG/DL (ref 1.6–2.6)
MCH RBC QN AUTO: 32.3 PG (ref 27–31)
MCHC RBC AUTO-ENTMCNC: 33.2 G/DL (ref 32–36)
MCV RBC AUTO: 97 FL (ref 82–98)
MONOCYTES # BLD AUTO: 0.9 K/UL (ref 0.3–1)
MONOCYTES NFR BLD: 9.7 % (ref 4–15)
NEUTROPHILS # BLD AUTO: 7.2 K/UL (ref 1.8–7.7)
NEUTROPHILS NFR BLD: 75.7 % (ref 38–73)
NRBC BLD-RTO: 0 /100 WBC
OHS QRS DURATION: 122 MS
OHS QTC CALCULATION: 572 MS
PLATELET # BLD AUTO: 148 K/UL (ref 150–450)
PMV BLD AUTO: 12.5 FL (ref 9.2–12.9)
POTASSIUM SERPL-SCNC: 3.5 MMOL/L (ref 3.5–5.1)
RBC # BLD AUTO: 4.34 M/UL (ref 4.6–6.2)
SODIUM SERPL-SCNC: 136 MMOL/L (ref 136–145)
TB INDURATION 48 - 72 HR READ: NORMAL
TB SKIN TEST 48 - 72 HR READ: NORMAL
WBC # BLD AUTO: 9.48 K/UL (ref 3.9–12.7)

## 2024-09-18 PROCEDURE — 85025 COMPLETE CBC W/AUTO DIFF WBC: CPT | Mod: HCNC | Performed by: HOSPITALIST

## 2024-09-18 PROCEDURE — 63600175 PHARM REV CODE 636 W HCPCS: Mod: HCNC | Performed by: HOSPITALIST

## 2024-09-18 PROCEDURE — 99232 SBSQ HOSP IP/OBS MODERATE 35: CPT | Mod: HCNC,,, | Performed by: INTERNAL MEDICINE

## 2024-09-18 PROCEDURE — 36415 COLL VENOUS BLD VENIPUNCTURE: CPT | Mod: HCNC | Performed by: HOSPITALIST

## 2024-09-18 PROCEDURE — 93010 ELECTROCARDIOGRAM REPORT: CPT | Mod: HCNC,,, | Performed by: INTERNAL MEDICINE

## 2024-09-18 PROCEDURE — 83735 ASSAY OF MAGNESIUM: CPT | Mod: HCNC | Performed by: HOSPITALIST

## 2024-09-18 PROCEDURE — 97530 THERAPEUTIC ACTIVITIES: CPT | Mod: HCNC,CO

## 2024-09-18 PROCEDURE — 93005 ELECTROCARDIOGRAM TRACING: CPT | Mod: HCNC

## 2024-09-18 PROCEDURE — 85730 THROMBOPLASTIN TIME PARTIAL: CPT | Mod: HCNC | Performed by: HOSPITALIST

## 2024-09-18 PROCEDURE — 25000003 PHARM REV CODE 250: Mod: HCNC | Performed by: HOSPITALIST

## 2024-09-18 PROCEDURE — 25000003 PHARM REV CODE 250: Mod: HCNC

## 2024-09-18 PROCEDURE — 97112 NEUROMUSCULAR REEDUCATION: CPT | Mod: HCNC

## 2024-09-18 PROCEDURE — 80048 BASIC METABOLIC PNL TOTAL CA: CPT | Mod: HCNC | Performed by: HOSPITALIST

## 2024-09-18 PROCEDURE — 21400001 HC TELEMETRY ROOM: Mod: HCNC

## 2024-09-18 RX ORDER — BISACODYL 10 MG/1
10 SUPPOSITORY RECTAL ONCE
Status: COMPLETED | OUTPATIENT
Start: 2024-09-18 | End: 2024-09-18

## 2024-09-18 RX ORDER — POTASSIUM CHLORIDE 750 MG/1
30 CAPSULE, EXTENDED RELEASE ORAL ONCE
Status: COMPLETED | OUTPATIENT
Start: 2024-09-18 | End: 2024-09-18

## 2024-09-18 RX ORDER — SYRING-NEEDL,DISP,INSUL,0.3 ML 29 G X1/2"
296 SYRINGE, EMPTY DISPOSABLE MISCELLANEOUS ONCE
Status: DISCONTINUED | OUTPATIENT
Start: 2024-09-18 | End: 2024-09-18

## 2024-09-18 RX ADMIN — FUROSEMIDE 80 MG: 10 INJECTION, SOLUTION INTRAVENOUS at 10:09

## 2024-09-18 RX ADMIN — METHOCARBAMOL 500 MG: 500 TABLET ORAL at 01:09

## 2024-09-18 RX ADMIN — SACUBITRIL AND VALSARTAN 1 TABLET: 97; 103 TABLET, FILM COATED ORAL at 10:09

## 2024-09-18 RX ADMIN — POLYETHYLENE GLYCOL 3350 17 G: 17 POWDER, FOR SOLUTION ORAL at 09:09

## 2024-09-18 RX ADMIN — METOPROLOL SUCCINATE 12.5 MG: 25 TABLET, EXTENDED RELEASE ORAL at 09:09

## 2024-09-18 RX ADMIN — MIRTAZAPINE 7.5 MG: 7.5 TABLET, FILM COATED ORAL at 10:09

## 2024-09-18 RX ADMIN — ACETAMINOPHEN 1000 MG: 500 TABLET ORAL at 10:09

## 2024-09-18 RX ADMIN — ACETAMINOPHEN 1000 MG: 500 TABLET ORAL at 06:09

## 2024-09-18 RX ADMIN — POTASSIUM CHLORIDE 30 MEQ: 750 CAPSULE, EXTENDED RELEASE ORAL at 09:09

## 2024-09-18 RX ADMIN — ATORVASTATIN CALCIUM 10 MG: 10 TABLET, FILM COATED ORAL at 09:09

## 2024-09-18 RX ADMIN — ASPIRIN 81 MG: 81 TABLET, COATED ORAL at 09:09

## 2024-09-18 RX ADMIN — BISACODYL 10 MG: 10 SUPPOSITORY RECTAL at 09:09

## 2024-09-18 RX ADMIN — ACETAMINOPHEN 1000 MG: 500 TABLET ORAL at 01:09

## 2024-09-18 RX ADMIN — SPIRONOLACTONE 25 MG: 25 TABLET ORAL at 09:09

## 2024-09-18 RX ADMIN — LEVOTHYROXINE SODIUM 200 MCG: 125 TABLET ORAL at 06:09

## 2024-09-18 RX ADMIN — SENNOSIDES AND DOCUSATE SODIUM 1 TABLET: 50; 8.6 TABLET ORAL at 09:09

## 2024-09-18 RX ADMIN — SACUBITRIL AND VALSARTAN 1 TABLET: 97; 103 TABLET, FILM COATED ORAL at 09:09

## 2024-09-18 RX ADMIN — FUROSEMIDE 80 MG: 10 INJECTION, SOLUTION INTRAVENOUS at 09:09

## 2024-09-18 NOTE — PROGRESS NOTES
[facility], An Acute Care Hospital Medicine  Progress Note    Patient Name: Bean Salas  MRN: [id number]  Patient Class: IP- Inpatient   Admission Date: 9/15/2024  Length of Stay: 2 days  Attending Physician: Monica Patterson MD  Primary Care Provider: Ramirez Levine MD        Subjective:     Principal Problem:Closed fracture of anterior column of right acetabulum        HPI:  Bean Salas is a 79 y.o. male with PMHx of  Bradycardia, PVCs, Hypertension, Coronary Artery Disease, Hyperlipidemia, HFpEF, Pulmonary HTN, AKHIL on CPAP, Chronic respiratory failure w/ home oxygen and hypothyroidism. He presents with right hip pain after ground level fall outside yesterday. He was doing some yard work and reaching for some piles of wood when a bunch of ants started crawling all over him and biting him. He tried to get away but forgot there was a step up from the grass to the concrete/carport next to him causing him to fall over onto his bottom/right side and then went more slowly back hitting his head as well.  Did not lose consciousness. He denies any headache or confusion. He did not have any pain right away. He had trouble pulling himself up off the ground and his cell phone was inside his house where he lives alone. He called out for a neighbor and a few of them were able to get him into a chair however when he tried to stand he had immediate sharp pain in his right hip and then called for an ambulance. He normally ambulates with a walker and is independent with ADLs. He does not have pain at rest currently.     He otherwise felt in his normal state of health prior to this. He denies chest pain. Denies acute SOB, fever, cough. However, reports somewhat chronic/gradually worsening SLADE and SOB after activities such as eating requiring him to sit and use a CPAP for 2-3 hours every afternoon and lower extremity swelling right > left not much improved with his home PO lasix and spironolactone. He also reports requiring  2-3L supplemental oxygen while sleeping. He has some little red marks all over his obody from the ant bites but is mostly un bothered by them. He denies any history of PE/DVT. While he is not super active he does go to the store for 2-3 hours weekly to shop and ambulates between 4 rooms in his house and completes work around the house. No recent surgery or prolonged immobilization.     ED course: Afebrile. RR 22-23. /44. HR . Saturating >94% on 2-3L NC. Labs notable for troponin 0.811>1.041. . XR/CT Imaging with acute fractures of the right superior and inferior pubic rami with possible involvement of the anterosuperior acetabulum. CT chest/abdomen/pelvis also notable for right-sided pulmonary embolism with nonocclusive thrombus in the main right pulmonary artery and right lower lobe branch vessels, and occlusive thrombus in a subsegmental branch of the right pulmonary artery, no right sided heart strain, unclear chronicity. CT head and cervical spine without acute findings.  Orthopedic surgery consulted for pelvic fracture. Started on heparin gtt for PE. Admitted to  for further manement.     Overview/Hospital Course:  No notes on file    Interval history- he reports feeling well so far this morning. Pain doing okay right now at rest but was increased limiting his thearpy session yseterday. He felt like having premedicatino before session would help a lot if could coordinate today and so messaged thearpy and nursing to try todo this before session for him today as well as seeing if can get him on commode after sesssion instead of chair and use suppository while on commode to promte BM as had been trying to have BM x 2 days without success, he reports feeling like BM is close and needs extra help and some time on the commode to try to promote it and that that would help if can put that in while he's on the commode so cristina try to coordinate above to do it the best way for success and apprecaite  nursing and therapy assistance. Bp 110s-120s on current regimen rec by cards as entresto increased yesterday on lasix 80 BID. He reports he still has dyspnea when moving but he feels liek its more from pain that's short winding him and not necessarily from his lungs so will see how it goes with breathign with pain control hopefuly better today. He is on oxygen at home and on here, not too dyspneic during exam and more comfortable at rest than he was in previous day. Hep drips till on as waiting to see ortho plans as if therapy sessions better than likely can avoid surgery so if ultimately no surg plans then will convert to eliquis 10 for PE and long term indefinitely for atrial flutter. May need hailey/cv this admit per cards if persists and cards following and appreciate recs. Long term will need pet stress outpatient. Recs for life vst also with dec EF on admit but pinky Is DNR so will need to ask him if that is part of his goals of care given the 2 are not necessarily congruent.      Review of patient's allergies indicates:  No Known Allergies    No current facility-administered medications on file prior to encounter.     Current Outpatient Medications on File Prior to Encounter   Medication Sig    acetaminophen (TYLENOL) 325 MG tablet Take 2 tablets (650 mg total) by mouth every 6 (six) hours as needed for Pain.    amLODIPine (NORVASC) 2.5 MG tablet Take 1 tablet by mouth once daily (Patient taking differently: Take 2.5 mg by mouth once daily.)    aspirin (ECOTRIN) 81 MG EC tablet Take 1 tablet (81 mg total) by mouth once daily.    atorvastatin (LIPITOR) 10 MG tablet Take 1 tablet by mouth once daily    furosemide (LASIX) 40 MG tablet Take 1 tablet (40 mg total) by mouth 2 (two) times a day. Take daily for next 3 days, then use as needed. Weigh daily. Afterwards, f weight increases 2 lbs/24h, take dose of lasix daily  until weight is back to baseline    levothyroxine (SYNTHROID) 200 MCG tablet Take 1 tablet by  mouth once daily    losartan (COZAAR) 100 MG tablet Take 1 tablet by mouth once daily    metoprolol succinate (TOPROL-XL) 50 MG 24 hr tablet Take 1 tablet by mouth once daily    mirtazapine (REMERON) 7.5 MG Tab One tablet po each night for sleep    multivit-min-FA-lycopen-lutein (CENTRUM SILVER) 0.4-300-250 mg-mcg-mcg Tab Take 1 tablet by mouth once daily.    spironolactone (ALDACTONE) 25 MG tablet Take 1 tablet by mouth once daily     Family History       Problem Relation (Age of Onset)    Crohn's disease Mother    Dementia Mother    Heart attack Father    No Known Problems Sister, Brother, Son          Tobacco Use    Smoking status: Former     Current packs/day: 0.00     Average packs/day: 1 pack/day for 35.0 years (35.0 ttl pk-yrs)     Types: Cigarettes     Start date: 1965     Quit date: 2000     Years since quittin.7     Passive exposure: Past    Smokeless tobacco: Never   Substance and Sexual Activity    Alcohol use: No    Drug use: Never    Sexual activity: Not Currently     Review of Systems   Constitutional:  Negative for chills and fever.   HENT:  Negative for trouble swallowing.    Eyes:  Negative for visual disturbance.   Respiratory:  Positive for shortness of breath (SLADE). Negative for cough.    Cardiovascular:  Positive for leg swelling. Negative for chest pain and palpitations.   Gastrointestinal:  Negative for abdominal pain, nausea and vomiting.   Genitourinary:  Negative for dysuria and frequency.   Musculoskeletal:  Positive for arthralgias and gait problem.   Skin:  Positive for rash.   Neurological:  Negative for light-headedness and headaches.   Psychiatric/Behavioral:  Negative for agitation and confusion.      Objective:     Vital Signs (Most Recent):  Temp: 97.4 °F (36.3 °C) (24)  Pulse: 60 (24)  Resp: 20 (24)  BP: (!) 123/57 (24)  SpO2: 95 % (24) Vital Signs (24h Range):  Temp:  [97.4 °F (36.3 °C)-97.9 °F (36.6 °C)] 97.4  °F (36.3 °C)  Pulse:  [] 60  Resp:  [16-20] 20  SpO2:  [93 %-96 %] 95 %  BP: (114-140)/(56-73) 123/57     Weight: 102 kg (224 lb 13.9 oz)  Body mass index is 31.36 kg/m².     Physical Exam  Vitals and nursing note reviewed.   Constitutional:       General: He is not in acute distress.     Interventions: Nasal cannula in place.      Comments: On oxygen. Not in distress   HENT:      Head: Normocephalic and atraumatic.      Nose: Nose normal.      Mouth/Throat:      Pharynx: No oropharyngeal exudate.   Eyes:      Extraocular Movements: Extraocular movements intact.      Conjunctiva/sclera: Conjunctivae normal.   Cardiovascular:      Rate and Rhythm: Normal rate. Rhythm irregular.      Heart sounds: Normal heart sounds.   Pulmonary:      Effort: Pulmonary effort is normal. No respiratory distress.      Comments: Anterior lung sounds clear. Painful repositioning limits full lung ausculation   Saturating >94% on 2L NC  Abdominal:      General: Bowel sounds are normal.      Palpations: Abdomen is soft.      Tenderness: There is no abdominal tenderness.   Musculoskeletal:      Cervical back: Normal range of motion. No tenderness.      Right hip: No bony tenderness.      Left hip: No bony tenderness.      Right lower le+ Pitting Edema present.      Left lower le+ Pitting Edema present.      Right foot: Swelling present.      Left foot: Swelling present.   Skin:     General: Skin is warm and dry.      Comments: Multiple 1-2 mm erythematous non pruritic, non blanching lesions scattered over his arms, chest, abdomen, and legs. No pustules seen.   Chronic venous stasis dermatitis appearance to BLE    Neurological:      General: No focal deficit present.      Mental Status: He is alert and oriented to person, place, and time.   Psychiatric:         Mood and Affect: Mood normal.         Behavior: Behavior normal.         Thought Content: Thought content normal.         Judgment: Judgment normal.               "  Significant Labs: All pertinent labs within the past 24 hours have been reviewed.  CBC:   Recent Labs   Lab 09/17/24  0357 09/18/24  0248   WBC 9.75 9.48   HGB 12.8* 14.0   HCT 40.0 42.2   * 148*     CMP:   Recent Labs   Lab 09/17/24  0357 09/18/24  0248    136   K 3.7 3.5    100   CO2 27 27   * 136*   BUN 25* 27*   CREATININE 1.0 1.1   CALCIUM 9.2 8.8   ANIONGAP 12 9     Cardiac Markers:   No results for input(s): "CKMB", "MYOGLOBIN", "BNP", "TROPISTAT" in the last 48 hours.    Coagulation:   Recent Labs   Lab 09/18/24  0248   APTT 43.9*     Magnesium:   Recent Labs   Lab 09/17/24  0357 09/18/24  0248   MG 2.4 2.3     Troponin:   Recent Labs   Lab 09/16/24  1148 09/16/24  1239   TROPONINI 0.869*  0.841* 0.817*       Significant Imaging: I have reviewed all pertinent imaging results/findings within the past 24 hours.  X-Ray Chest AP Portable  Narrative: EXAMINATION:  XR CHEST AP PORTABLE    CLINICAL HISTORY:  pulmonary congestion;    TECHNIQUE:  Single frontal view of the chest was performed.    COMPARISON:  September 16, 2024    FINDINGS:  Cardiac size is enlarged as before.  Patient is rotated.  No large volume of pleural fluid is present.  No definite focal consolidation is visualized radiographically.  Mild prominence of interstitial markings similar to prior.  Impression: As above    Electronically signed by: Margo Scanlon MD  Date:    09/17/2024  Time:    09:20       Assessment/Plan:      * Closed fracture of anterior column of right acetabulum  79M presenting after ground level fall with right hip pain on standing. CT shows acute fractures of the right superior and inferior pubic rami with possible involvement of the anterosuperior acetabulum.   - Orthopedic surgery consulted by ED, WBAT. Per PT today: he took a Max of 3 shuffling step at EOB with increase pain and needing Max A of 2 people to complete and for safety. Pt demo increase fatigue and dyspnea with Mobility   - PT/OT " consult- recs SNF  - Pain control   - Fall precautions  -given session was not amazing, will see ortho thoughts on if continue to pursue non op management. Not fully euvolemic optimized for OR as cards resc to diurese further when discussed 9/16 before OR. They report does not need the PET stress/ischemic work up prior to a pelvic fx surgery as that can be outpatient  -premedicate before session 9/18 per discsusion with him as he feels that that limited his ability to do session and nursing/pt aware and will coordinate today    Hypokalemia  Patient's most recent potassium results are listed below.   Recent Labs     09/16/24  0037 09/17/24  0357 09/18/24  0248   K 3.8 3.7 3.5     Plan  - Replete potassium per protocol  - Monitor potassium Daily  - Patient's hypokalemia is  mildly under baseline 9/18 with goal 4 with PVCs so will replace  -     Constipation  Baseline, on bowel regimen. Wants to get to comomode as bedpan hindering BM he reports and none yet 9/18 so nursing and therapy to coordinate today to give suppository and place on commdoe after thearpy session and let him sit for 15-20 min if possible to promote       Abnormal EKG  See wall motion      Chronic hypoxemic respiratory failure  Patient with Hypoxic Respiratory failure which is Chronic.  he is on home oxygen at 2-3 LPM. Supplemental oxygen was provided and noted-      .   Signs/symptoms of respiratory failure include- tachypnea and increased work of breathing. Contributing diagnoses includes - CHF Labs and images were reviewed. Patient Has not had a recent ABG. Will treat underlying causes and adjust management of respiratory failure as follows-     Abnormal ventricular wall motion  + abnormal EKG new, cards consulted, trop peak at 1. No active nstemi now. But needs outpatient ischemic work up with cardiac pet stress once out of hospital  Life vst consideration but he is DNR      Bug bites  Reportedly bitten by multiple small ants outside  "yesterday  He has small red lesions as described above, no pustules, no pain or itching currently . Benadryl cream prn but he says aren't itchy  Supportive care,  monitor     Insomnia  - Continue home mirtazapine     Pulmonary embolism without acute cor pulmonale  Elevated troponin   CT chest/abdomen/pelvis as part of trauma work up in ED showed "Right-sided pulmonary embolism, detailed above, with nonocclusive thrombus in the main right pulmonary artery and right lower lobe branch vessels, and occlusive thrombus in a subsegmental branch of the right pulmonary artery. Note comparison images are not available to determine chronicity. No right sided heart strain."   - Denies previous diagnosis or PE/DVT>   - Denies chest pain or SOB.  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 He requires 2-3L NC of oxygen at home/sleeping and uses a CPAP after meals (exertional to pt) and for naps.  - Currently stable on 2-3L NC oxygen. No hypotension.. has some dyspnea when mobilizing sideways in bed and with PE reported..   - Labs with elevated troponin 0.811>1.041 and .   - Initialed on heparin gtt for PE  - Ccards consulted, continue current therapies. No ijdication to activate PE response team at this time.   - no signs of right heart strain  No LE DVT on US  Plan for eliquis once ortho plans decided as unclear if would want to go to the OR if does poorly with further therapy sessions.    Pulmonary hypertension  - Noted. Continue lasix     Paroxysmal atrial flutter  - Per chart review, his cardiologist Dr. Hank Barry feels EKGs that were read as afib previously are actually sinus rhythm and this patient is not on AC.  - cards consulted here who report current rhythm is atrial flutter and report if does not self convert may need DEMETRICE/CV once euvolemic. On anticoag for PE, and would require this for a potential CV, but will be on either way for pppx pelvis + PE    AKHIL (obstructive sleep apnea)  - continue CPAP qhs    PVC's " (premature ventricular contractions)  Noted history and seen on telemetry on floor. Continue BB, cpap, keep Mag >2, K >4. Monitor telemetry    Coronary artery disease involving native coronary artery of native heart without angina pectoris  Patient with known CAD which is controlled Will continue ASA and Statin and monitor for S/Sx of angina/ACS. Continue to monitor on telemetry. Previous angiogram with nonobstructive CAD 2021.   -trop plateau of 1 likely from PE but EKG with ST depressions, T wave inversions that are new and cards consulted. Echo with new septal WM abnormaliteis and dec EF at 20-25%. Rec for outpatient PET cardiac stress and life vest on discharge for ischemic work up. He is DNR so will discuss with him further re life vest.    Acute on chronic combined systolic and diastolic congestive heart failure  Patient is identified as having Diastolic (HFpEF) heart failure that is Acute on chronic. CHF is currently uncontrolled due to Continued edema of extremities. Latest ECHO performed and demonstrates- Results for orders placed during the hospital encounter of 10/04/23    Echo    Result Date: 9/16/2024    Technically difficult study    Left Ventricle: The left ventricle is normal in size. Normal wall   thickness. Severe global hypokinesis present. Septal motion is abnormal.   There is severely reduced systolic function with a visually estimated   ejection fraction of 20 - 25%. Unable to assess diastolic function due to   atrial flutter.    Right Ventricle: Mild right ventricular enlargement. Wall thickness is   normal. Systolic function is moderately reduced.    Severe biatrial enlargement    Mitral Valve: There is mild regurgitation.    Pulmonary Artery: There is mild to moderate pulmonary hypertension. The   estimated pulmonary artery systolic pressure is 49 mmHg.    IVC/SVC: Intermediate venous pressure at 8 mmHg.        Echo Saline Bubble? No    Result Date: 10/5/2023    Left Ventricle: The left  ventricle is normal in size. Normal wall   thickness. There is concentric remodeling. Normal wall motion. There is   normal systolic function. Biplane (2D) method of discs ejection fraction   is 52%. Unable to assess diastolic function due to atrial fibrillation.    Left Atrium: Left atrium is severely dilated.    Right Ventricle: Normal right ventricular cavity size. Wall thickness   is normal. Right ventricle wall motion  is normal. Systolic function is   normal.    Right Atrium: Right atrium is severely dilated.    Aortic Valve: There is mild stenosis. Aortic valve area by VTI is 1.87   cm². Aortic valve peak velocity is 1.66 m/s. Mean gradient is 6 mmHg. The   dimensionless index is 0.48.    Mitral Valve: There is mild regurgitation.    Tricuspid Valve: There is mild regurgitation.    Pulmonary Artery: The estimated pulmonary artery systolic pressure is   44 mmHg.    IVC/SVC: Normal venous pressure at 3 mmHg.         Recent Labs   Lab 09/16/24  0037   *     He denies acute SOB but reports SLADE, SOB after activities such as eating requiring him to sit and use a CPAP for 2-3 hours, and lower extremity swelling right > left not much improved with his home PO lasix and spironolactone. He also reports requiring 2-3L supplemental oxygen while sleeping as well. He also reports eating bags of salty chips to help with his leg swelling.,.  - given new reduced EF with global wall motion and septal wall motion abnormaltities, cardiology saw patient for multipel things. They rec stopping losartan, continue aldactone, start entresto and titrated up 9/17. Cont metoprolol if HR tolerates.  Will need lifevest on discharge if patient wishes, as is DNR baseline. Will need cardiac pet stress as outpatient.  -rec inc lasix to 80 BID. May need DEMETRICE/CV once more euvolemic.  -cont 2 g/2L diet. Strict I/O      Advance care planning  - [facility]POP[facility] on file reviewed, DNR order placed  Will discuss further closer to dc if patient wants to  pursue life vest cards recs as not congruent with dnr status    Fall  Had a mechanical fall outside at home when trying to get away from ants that were biting him and found to have cute fractures of the right superior and inferior pubic rami. Normally ambulates with walker and had it with him outside prior to falling yesterday. No other trauma or pain currently. CT head and c spine no acute process.   - Fall precautions  - PT/OT recs SNF    Bradycardia  History noted. Has history of atrial bigeminy and pseudo bradycardia per chart. Has followed with EP.  - monitor telemetry   -HR ranges 40s-80s at times. Hold metoprolol if sustains <60    Hypothyroidism due to acquired atrophy of thyroid  - Continue home synthroid     Other hyperlipidemia  - Continue home statin     Essential hypertension  Chronic, controlled. Latest blood pressure and vitals reviewed-     Temp:  [97.4 °F (36.3 °C)-97.9 °F (36.6 °C)]   Pulse:  []   Resp:  [16-20]   BP: (114-140)/(56-73)   SpO2:  [93 %-96 %] .   Home meds for hypertension were reviewed and noted below.   Hypertension Medications               amLODIPine (NORVASC) 2.5 MG tablet Take 1 tablet by mouth once daily    furosemide (LASIX) 40 MG tablet Take 1 tablet (40 mg total) by mouth 2 (two) times a day. Take daily for next 3 days, then use as needed. Weigh daily. Afterwards, f weight increases 2 lbs/24h, take dose of lasix daily  until weight is back to baseline    losartan (COZAAR) 100 MG tablet Take 1 tablet by mouth once daily    metoprolol succinate (TOPROL-XL) 50 MG 24 hr tablet Take 1 tablet by mouth once daily    spironolactone (ALDACTONE) 25 MG tablet Take 1 tablet by mouth once daily   While in the hospital, will manage blood pressure as follows; Continue home antihypertensive regimen  -stop norvasc and losartan and start  entrestro per cards recs. Continue metoprolol if HR tolerates (hold if under 60). Entresto increased on 9/17 per cards recs and BP 110s-120s      VTE  Risk Mitigation (From admission, onward)           Ordered     heparin 25,000 units in dextrose 5% (100 units/ml) IV bolus from bag HIGH INTENSITY nomogram - [facility]  As needed (PRN)        Question:  Heparin Infusion Adjustment (DO NOT MODIFY ANSWER)  Answer:  [email or web address]\Images\Pharmacy\HeparinInfusions\heparin HIGH INTENSITY nomogram for [facility] [id number]    09/16/24 0351     heparin 25,000 units in dextrose 5% (100 units/ml) IV bolus from bag HIGH INTENSITY nomogram - [facility]  As needed (PRN)        Question:  Heparin Infusion Adjustment (DO NOT MODIFY ANSWER)  Answer:  [email or web address]\Images\Pharmacy\HeparinInfusions\heparin HIGH INTENSITY nomogram for [facility] [id number]    09/16/24 0351     Place sequential compression device  Until discontinued         09/16/24 0428     Reason for No Pharmacological VTE Prophylaxis  Once        Comments: Heparin gtt for PE   Question:  Reasons:  Answer:  Physician Provided (leave comment)    09/16/24 0428     IP VTE HIGH RISK PATIENT  Once         09/16/24 0428     heparin 25,000 units in dextrose 5% 250 mL (100 units/mL) infusion HIGH INTENSITY nomogram - [facility]  Continuous        Question:  Begin at (units/kg/hr)  Answer:  18    09/16/24 0351                    Discharge Planning   LATOSHA: 9/20/2024     Code Status: DNR   Is the patient medically ready for discharge?:     Reason for patient still in hospital (select all that apply): Patient trending condition  Discharge Plan A: Skilled Nursing Facility                  Monica Patterson MD  Department of [facility] Medicine   [facility] - Surgery

## 2024-09-18 NOTE — ASSESSMENT & PLAN NOTE
Baseline, on bowel regimen. Wants to get to comomode as bedpan hindering BM he reports and none yet 9/18 so nursing and therapy to coordinate today to give suppository and place on commdoe after thearpy session and let him sit for 15-20 min if possible to promote

## 2024-09-18 NOTE — ANESTHESIA PREPROCEDURE EVALUATION
[facility]  Anesthesia Pre-Operative Evaluation         Patient Name: Bean Salas  YOB: 1945  MRN: [id number]    SUBJECTIVE:     Pre-operative Evaluation for Procedure(s) (LRB):  Cardioversion or Defibrillation (N/A)  ECHOCARDIOGRAM, TRANSESOPHAGEAL (N/A)     09/18/2024    Bean Salas is a 79 y.o. male with a PMHx significant for Bradycardia, PVCs, Hypertension, Coronary Artery Disease, Hyperlipidemia, HFpEF, NICM, PAF, Pulmonary HTN, AKHIL on CPAP, Chronic respiratory failure w/ home oxygen and hypothyroidism now admitted for Right acetabulum fracture and subsegmental PE and acute on chronic heart failure.     Pt is currently A&Ox3 with L 20G PIV x1, on 2L NC.     Previous Airway: None documented     LDA:        Peripheral IV - Single Lumen 09/16/24 0454 18 G Anterior;Distal;Left Forearm (Active)   Site Assessment Clean;Dry;Intact 09/17/24 2202   Extremity Assessment Distal to IV No warmth;No swelling;No redness 09/17/24 2202   Line Status Flushed;Saline locked 09/17/24 2202   Dressing Status Clean;Intact;Dry 09/17/24 2202   Dressing Intervention Integrity maintained 09/17/24 2202   Number of days: 2            Peripheral IV - Single Lumen 09/16/24 1112 20 G No Anterior;Right Forearm (Active)   Site Assessment Clean;Dry;Intact 09/17/24 2202   Extremity Assessment Distal to IV No warmth;No swelling;No redness 09/17/24 2202   Line Status Infusing 09/17/24 2202   Dressing Status Clean 09/17/24 2202   Dressing Intervention Integrity maintained 09/17/24 2202   Number of days: 2       Male External Urinary Catheter 09/16/24 0425 (Active)   Number of days: 2       Drips:    heparin (porcine) in D5W  0-40 Units/kg/hr (Adjusted) Intravenous Continuous 12.1 mL/hr at 09/17/24 1922 14.05 Units/kg/hr at 09/17/24 1922       Patient Active Problem List   Diagnosis    Essential hypertension    Other hyperlipidemia    Hypothyroidism due to acquired atrophy of thyroid    Hyperglycemia    Atrial bigeminy     Bradycardia    Fall    Displaced intertrochanteric fracture of right femur, initial encounter for closed fracture    Closed fracture of right hip    Elevated LFTs    Advance care planning    Acute on chronic combined systolic and diastolic congestive heart failure    Coronary artery disease involving native coronary artery of native heart without angina pectoris    SOB (shortness of breath)    Venous stasis ulcer of right calf limited to breakdown of skin without varicose veins    Aftercare for healing traumatic closed fracture of hip, right    Rotator cuff impingement syndrome of right shoulder    Impaired range of motion of right hip    Decreased right shoulder range of motion    Weakness    Impaired activities of daily living    PVC's (premature ventricular contractions)    Painful orthopaedic hardware    AKHIL (obstructive sleep apnea)    Paroxysmal atrial flutter    Aortic atherosclerosis    Pulmonary hypertension    Pulmonary embolism without acute cor pulmonale    Elevated troponin    Closed fracture of anterior column of right acetabulum    Insomnia    Bug bites    Abnormal ventricular wall motion    PAC (premature atrial contraction)    Chronic hypoxemic respiratory failure    Abnormal EKG    Constipation    Hypokalemia       Review of patient's allergies indicates:  No Known Allergies    Current Inpatient Medications:   acetaminophen  1,000 mg Oral Q8H    aspirin  81 mg Oral Daily    atorvastatin  10 mg Oral Daily    furosemide (LASIX) injection  80 mg Intravenous Q12H    levothyroxine  200 mcg Oral Before breakfast    metoprolol succinate  12.5 mg Oral Daily    mirtazapine  7.5 mg Oral Nightly    polyethylene glycol  17 g Oral Daily    sacubitriL-valsartan  1 tablet Oral BID    senna-docusate 8.6-50 mg  1 tablet Oral Daily    spironolactone  25 mg Oral Daily       Past Surgical History:   Procedure Laterality Date    COLONOSCOPY  01/01/2011    CORONARY ANGIOGRAPHY N/A 07/22/2021    Procedure: ANGIOGRAM,  CORONARY ARTERY;  Surgeon: Hank Barry MD;  Location: [facility] CATH LAB/EP;  Service: Cardiology;  Laterality: N/A;    EYE SURGERY Bilateral     cataracts extraction    FRACTURE SURGERY Right 09/2021    femur    HERNIA REPAIR      HIP SURGERY Right 08/2021    INTRAMEDULLARY RODDING OF TROCHANTER OF FEMUR Right 09/03/2021    Procedure: INSERTION, INTRAMEDULLARY RACQUEL, FEMUR, TROCHANTER;  Surgeon: Darryn Hall MD;  Location: [facility] OR;  Service: Orthopedics;  Laterality: Right;    JOINT REPLACEMENT Bilateral     knee    LEFT HEART CATHETERIZATION Right 07/22/2021    Procedure: Left heart cath;  Surgeon: Hank Barry MD;  Location: [facility] CATH LAB/EP;  Service: Cardiology;  Laterality: Right;    VASECTOMY         Social History     Substance and Sexual Activity   Drug Use Never     Tobacco Use: Medium Risk (9/16/2024)    Patient History     Smoking Tobacco Use: Former     Smokeless Tobacco Use: Never     Passive Exposure: Past     Alcohol Use: Not At Risk (9/16/2024)    AUDIT-C     Frequency of Alcohol Consumption: Never     Average Number of Drinks: Patient does not drink     Frequency of Binge Drinking: Never       OBJECTIVE:     Vital Signs Range (Last 24H):  Temp:  [36.1 °C (97 °F)-36.8 °C (98.3 °F)]   Pulse:  []   Resp:  [17-20]   BP: ()/(51-73)   SpO2:  [93 %-96 %]       Significant Labs    Heme Profile  Lab Results   Component Value Date    WBC 9.48 09/18/2024    HGB 14.0 09/18/2024    HCT 42.2 09/18/2024     (L) 09/18/2024       Coagulation Studies  Lab Results   Component Value Date    LABPROT 12.1 09/16/2024    INR 1.1 09/16/2024    APTT 43.9 (H) 09/18/2024       Metabolic Profile  Lab Results   Component Value Date     09/18/2024    K 3.5 09/18/2024     09/18/2024    CO2 27 09/18/2024    BUN 27 (H) 09/18/2024    CREATININE 1.1 09/18/2024    MG 2.3 09/18/2024    PHOS 2.8 10/05/2023       Liver Function Tests  Lab Results   Component Value Date    AST 36 09/16/2024    ALT 18  09/16/2024    ALKPHOS 49 (L) 09/16/2024    BILITOT 3.3 (H) 09/16/2024    PROT 6.9 09/16/2024    ALBUMIN 3.7 09/16/2024       Lipid Profile  Lab Results   Component Value Date    CHOL 118 (L) 01/18/2024    HDL 37 (L) 01/18/2024    TRIG 80 01/18/2024       Endocrine Profile  Lab Results   Component Value Date    HGBA1C 6.3 (H) 09/16/2024    TSH 3.670 01/18/2024       Diagnostic Studies      EKG:   Results for orders placed or performed during the hospital encounter of 09/15/24   EKG 12-lead    Collection Time: 09/18/24  5:33 AM   Result Value Ref Range    QRS Duration 122 ms    [facility] QTC Calculation 572 ms    Narrative    Test Reason : R79.89,    Vent. Rate : 060 BPM     Atrial Rate : 060 BPM     P-R Int : 170 ms          QRS Dur : 122 ms      QT Int : 572 ms       P-R-T Axes : 000 -06 257 degrees     QTc Int : 572 ms    A flutter with PVCs  Nonspecific intraventricular conduction delay  ST and Marked T wave abnormality, consider anterolateral ischemia  Abnormal ECG  When compared with ECG of 17-SEP-2024 00:57,  QT has shortened  Confirmed by Zhao SCOTT MD ([id number]) on 9/18/2024 9:33:03 AM    Referred By: [email or web address]   SELF           Confirmed By:Zhao SCOTT MD       TTE   Results for orders placed during the hospital encounter of 09/15/24    Echo    Interpretation Summary    Technically difficult study    Left Ventricle: The left ventricle is normal in size. Normal wall thickness. Severe global hypokinesis present. Septal motion is abnormal. There is severely reduced systolic function with a visually estimated ejection fraction of 20 - 25%. Unable to assess diastolic function due to atrial flutter.    Right Ventricle: Mild right ventricular enlargement. Wall thickness is normal. Systolic function is moderately reduced.    Severe biatrial enlargement    Mitral Valve: There is mild regurgitation.    Pulmonary Artery: There is mild to moderate pulmonary hypertension. The estimated pulmonary artery systolic pressure is 49  mmHg.    IVC/SVC: Intermediate venous pressure at 8 mmHg.      Cardiac Catheterization   Results for orders placed during the hospital encounter of 07/22/21    Cardiac catheterization    Conclusion  · The ejection fraction was 50-55% by visual estimate.  · The post-procedure left ventricular end diastolic pressure was 25.  · The Prox Cx to Dist Cx lesion was 50% stenosed.  · The estimated blood loss was <50 mL.  · Mild LAD, RCA Disease  · No focal lesions to explain angina    The procedure log was documented by No documenter listed and verified by Hank Barry MD.    Date: 7/22/2021  Time: 1:04 PM      ASSESSMENT/PLAN:     Bean Salas is a 79 y.o. male with PMHx significant for Bradycardia, PVCs, Hypertension, Coronary Artery Disease, Hyperlipidemia, HFpEF, NICM, PAF, Pulmonary HTN, AKHIL on CPAP, Chronic respiratory failure w/ home oxygen and hypothyroidism now admitted for Right acetabulum fracture and subsegmental PE and acute on chronic heart failure    Pre-op Assessment    I have reviewed the Patient Summary Reports.     I have reviewed the Nursing Notes. I have reviewed the NPO Status.   I have reviewed the Medications.     Review of Systems  Anesthesia Hx:  No problems with previous Anesthesia   History of prior surgery of interest to airway management or planning:             Cardiovascular:     Hypertension   CAD       CHF                                 Pulmonary:   COPD     Sleep Apnea                    Physical Exam  General: Well nourished, Cooperative, Alert and Oriented    Airway:  Mallampati: III / III  Mouth Opening: Normal  TM Distance: Normal  Tongue: Normal  Neck ROM: Normal ROM    Dental:  Intact        Anesthesia Plan  Type of Anesthesia, risks & benefits discussed:    Anesthesia Type: Gen ETT, Gen Natural Airway, MAC  Intra-op Monitoring Plan: Standard ASA Monitors  Induction:  IV  Airway Plan: Direct and Video  Informed Consent: Informed consent signed with the Patient and all parties  understand the risks and agree with anesthesia plan.  All questions answered.   ASA Score: 4  Day of Surgery Review of History & Physical: H&P Update referred to the surgeon/provider.    Ready For Surgery From Anesthesia Perspective.     .

## 2024-09-18 NOTE — PLAN OF CARE
Problem: Adult Inpatient Plan of Care  Goal: Plan of Care Review  Outcome: Progressing  Goal: Patient-Specific Goal (Individualized)  Outcome: Progressing  Goal: Optimal Comfort and Wellbeing  Outcome: Progressing  Goal: Readiness for Transition of Care  Outcome: Progressing     Pt is progressing towards plan of care goals. Pt does not report any pain throughout shift; pain is only with movement. Heparin drip infusing. VSS, sinus jaya on tele monitor. Explained plan of care, pt verbalized understanding. No injury during shift, Side rails up x 3, call light by bedside.

## 2024-09-18 NOTE — SUBJECTIVE & OBJECTIVE
Principal Problem:Closed pelvic ring fracture    Principal Orthopedic Problem: As above    Interval History: Patient seen and examined at bedside. NAEON. Bradycardic and tachycardic overnight. Pelvic pain well controlled while in bed. Patient had significant pain when attempting to ambulate with PT today. He again states that he wants to continue trying physical therapy until Friday before making any decisions about potential surgery. Plan for DEMETRICE/DCCV tomorrow with cardiology.       Review of patient's allergies indicates:  No Known Allergies    Current Facility-Administered Medications   Medication    acetaminophen tablet 1,000 mg    albuterol-ipratropium 2.5 mg-0.5 mg/3 mL nebulizer solution 3 mL    aspirin EC tablet 81 mg    atorvastatin tablet 10 mg    bisacodyL suppository 10 mg    dextrose 10% bolus 125 mL 125 mL    dextrose 10% bolus 250 mL 250 mL    diphenhydrAMINE-zinc acetate 2-0.1% cream    furosemide injection 80 mg    glucagon (human recombinant) injection 1 mg    glucose chewable tablet 16 g    glucose chewable tablet 24 g    heparin 25,000 units in dextrose 5% (100 units/ml) IV bolus from bag HIGH INTENSITY nomogram - [facility]    heparin 25,000 units in dextrose 5% (100 units/ml) IV bolus from bag HIGH INTENSITY nomogram - [facility]    heparin 25,000 units in dextrose 5% 250 mL (100 units/mL) infusion HIGH INTENSITY nomogram - [facility]    levothyroxine tablet 200 mcg    melatonin tablet 6 mg    methocarbamoL tablet 500 mg    metoprolol succinate (TOPROL-XL) 24 hr split tablet 12.5 mg    mirtazapine tablet 7.5 mg    naloxone 0.4 mg/mL injection 0.02 mg    ondansetron disintegrating tablet 8 mg    oxyCODONE immediate release tablet 5 mg    polyethylene glycol packet 17 g    sacubitriL-valsartan  mg per tablet 1 tablet    senna-docusate 8.6-50 mg per tablet 1 tablet    sodium chloride 0.9% flush 5 mL    spironolactone tablet 25 mg     Objective:     Vital Signs (Most Recent):  Temp: 98.3 °F (36.8 °C) (09/18/24  "1555)  Pulse: 94 (09/18/24 1634)  Resp: 20 (09/18/24 1555)  BP: (!) 120/57 (09/18/24 1555)  SpO2: 95 % (09/18/24 1555) Vital Signs (24h Range):  Temp:  [97 °F (36.1 °C)-98.3 °F (36.8 °C)] 98.3 °F (36.8 °C)  Pulse:  [] 94  Resp:  [17-20] 20  SpO2:  [93 %-96 %] 95 %  BP: ()/(51-73) 120/57     Weight: 102 kg (224 lb 13.9 oz)  Height: 5' 11" (180.3 cm)  Body mass index is 31.36 kg/m².      Intake/Output Summary (Last 24 hours) at 9/18/2024 1703  Last data filed at 9/17/2024 2358  Gross per 24 hour   Intake --   Output 850 ml   Net -850 ml        Ortho/SPM Exam  A&O x 3  Regular Rate  Non-Labored Respirations    RLE:   Fires Quad/TA/EHL/GSC  SILT  Compartments soft  Brisk cap refill  Swelling to dorsal foot    LLE:   Fires Quad/TA/EHL/GSC  SILT  Compartments soft  Brisk cap refill  Swelling dorsal foot         Significant Labs: CBC:   Recent Labs   Lab 09/17/24  0357 09/18/24  0248   WBC 9.75 9.48   HGB 12.8* 14.0   HCT 40.0 42.2   * 148*     CMP:   Recent Labs   Lab 09/17/24  0357 09/18/24  0248    136   K 3.7 3.5    100   CO2 27 27   * 136*   BUN 25* 27*   CREATININE 1.0 1.1   CALCIUM 9.2 8.8   ANIONGAP 12 9     All pertinent labs within the past 24 hours have been reviewed.    Significant Imaging: I have reviewed and interpreted all pertinent imaging results/findings.  "

## 2024-09-18 NOTE — ASSESSMENT & PLAN NOTE
Patient's most recent potassium results are listed below.   Recent Labs     09/16/24  0037 09/17/24  0357 09/18/24  0248   K 3.8 3.7 3.5     Plan  - Replete potassium per protocol  - Monitor potassium Daily  - Patient's hypokalemia is  mildly under baseline 9/18 with goal 4 with PVCs so will replace  -

## 2024-09-18 NOTE — SUBJECTIVE & OBJECTIVE
Interval History: Met at bedside this morning. No acute events overnight. No chest pain, SOB, palpitations, worsening edema.     ROS  Objective:     Vital Signs (Most Recent):  Temp: 98.3 °F (36.8 °C) (09/18/24 1555)  Pulse: 94 (09/18/24 1634)  Resp: 20 (09/18/24 1555)  BP: (!) 120/57 (09/18/24 1555)  SpO2: 95 % (09/18/24 1555) Vital Signs (24h Range):  Temp:  [97 °F (36.1 °C)-98.3 °F (36.8 °C)] 98.3 °F (36.8 °C)  Pulse:  [] 94  Resp:  [17-20] 20  SpO2:  [93 %-96 %] 95 %  BP: ()/(51-73) 120/57     Weight: 102 kg (224 lb 13.9 oz)  Body mass index is 31.36 kg/m².     SpO2: 95 %         Intake/Output Summary (Last 24 hours) at 9/18/2024 1727  Last data filed at 9/17/2024 2358  Gross per 24 hour   Intake --   Output 850 ml   Net -850 ml       Lines/Drains/Airways       Drain  Duration             Male External Urinary Catheter 09/16/24 0425 2 days              Peripheral Intravenous Line  Duration                  Peripheral IV - Single Lumen 09/16/24 0454 18 G Anterior;Distal;Left Forearm 2 days         Peripheral IV - Single Lumen 09/16/24 1112 20 G No Anterior;Right Forearm 2 days                       Physical Exam  Vitals and nursing note reviewed.   Constitutional:       General: He is not in acute distress.     Appearance: He is obese. He is not ill-appearing.   HENT:      Head: Normocephalic and atraumatic.      Nose: No congestion.      Mouth/Throat:      Mouth: Mucous membranes are moist.      Pharynx: Oropharynx is clear.   Eyes:      General: No scleral icterus.     Extraocular Movements: Extraocular movements intact.   Neck:      Vascular: No carotid bruit.   Cardiovascular:      Rate and Rhythm: Normal rate. Rhythm irregular.   Pulmonary:      Effort: No respiratory distress.      Breath sounds: Rales present.      Comments: On 2L NC  Abdominal:      General: There is distension.      Tenderness: There is no abdominal tenderness. There is no guarding or rebound.   Musculoskeletal:      Cervical  back: Normal range of motion and neck supple.      Right lower leg: Edema present.      Left lower leg: Edema present.      Comments: Venous stasis dermatitis bilaterally    Skin:     Capillary Refill: Capillary refill takes less than 2 seconds.   Neurological:      General: No focal deficit present.      Mental Status: He is alert and oriented to person, place, and time.   Psychiatric:         Mood and Affect: Mood normal.         Thought Content: Thought content normal.            Significant Labs: All pertinent lab results from the last 24 hours have been reviewed. and   Recent Lab Results         09/18/24  0533   09/18/24  0248        Anion Gap   9       PTT   43.9  Comment: Refer to local heparin nomogram for intensity/dose specific   therapeutic   range.         Baso #   0.04       Basophil %   0.4       BUN   27       Calcium   8.8       Chloride   100       CO2   27       Creatinine   1.1       Differential Method   Automated       eGFR   >60.0       Eos #   0.2       Eos %   2.3       Glucose   136       Gran # (ANC)   7.2       Gran %   75.7       Hematocrit   42.2       Hemoglobin   14.0       Immature Grans (Abs)   0.03  Comment: Mild elevation in immature granulocytes is non specific and   can be seen in a variety of conditions including stress response,   acute inflammation, trauma and pregnancy. Correlation with other   laboratory and clinical findings is essential.         Immature Granulocytes   0.3       Lymph #   1.1       Lymph %   11.6       Magnesium    2.3       MCH   32.3       MCHC   33.2       MCV   97       Mono #   0.9       Mono %   9.7       MPV   12.5       nRBC   0       QRS Duration 122         [facility] QTC Calculation 572         Platelet Count   148       Potassium   3.5       RBC   4.34       RDW   14.3       Sodium   136       WBC   9.48

## 2024-09-18 NOTE — ASSESSMENT & PLAN NOTE
79M presenting after ground level fall with right hip pain on standing. CT shows acute fractures of the right superior and inferior pubic rami with possible involvement of the anterosuperior acetabulum.   - Orthopedic surgery consulted by ED, WBAT. Per PT today: he took a Max of 3 shuffling step at EOB with increase pain and needing Max A of 2 people to complete and for safety. Pt demo increase fatigue and dyspnea with Mobility   - PT/OT consult- recs SNF  - Pain control   - Fall precautions  -given session was not amazing, will see ortho thoughts on if continue to pursue non op management. Not fully euvolemic optimized for OR as cards resc to diurese further when discussed 9/16 before OR. They report does not need the PET stress/ischemic work up prior to a pelvic fx surgery as that can be outpatient  -premedicate before session 9/18 per discsusion with him as he feels that that limited his ability to do session and nursing/pt aware and will coordinate today

## 2024-09-18 NOTE — PROGRESS NOTES
[facility] - Surgery  Cardiology  Progress Note    Patient Name: Bean Salas  MRN: [id number]  Admission Date: 9/15/2024  Hospital Length of Stay: 2 days  Code Status: Full Code   Attending Physician: Monica Patterson MD   Primary Care Physician: Ramirez Levine MD  Expected Discharge Date: 9/20/2024  Principal Problem:Closed pelvic ring fracture    Subjective:     Interval History: Met at bedside this morning. No acute events overnight. No chest pain, SOB, palpitations, worsening edema.     ROS  Objective:     Vital Signs (Most Recent):  Temp: 98.3 °F (36.8 °C) (09/18/24 1555)  Pulse: 94 (09/18/24 1634)  Resp: 20 (09/18/24 1555)  BP: (!) 120/57 (09/18/24 1555)  SpO2: 95 % (09/18/24 1555) Vital Signs (24h Range):  Temp:  [97 °F (36.1 °C)-98.3 °F (36.8 °C)] 98.3 °F (36.8 °C)  Pulse:  [] 94  Resp:  [17-20] 20  SpO2:  [93 %-96 %] 95 %  BP: ()/(51-73) 120/57     Weight: 102 kg (224 lb 13.9 oz)  Body mass index is 31.36 kg/m².     SpO2: 95 %         Intake/Output Summary (Last 24 hours) at 9/18/2024 1727  Last data filed at 9/17/2024 2358  Gross per 24 hour   Intake --   Output 850 ml   Net -850 ml       Lines/Drains/Airways       Drain  Duration             Male External Urinary Catheter 09/16/24 0425 2 days              Peripheral Intravenous Line  Duration                  Peripheral IV - Single Lumen 09/16/24 0454 18 G Anterior;Distal;Left Forearm 2 days         Peripheral IV - Single Lumen 09/16/24 1112 20 G No Anterior;Right Forearm 2 days                       Physical Exam  Vitals and nursing note reviewed.   Constitutional:       General: He is not in acute distress.     Appearance: He is obese. He is not ill-appearing.   HENT:      Head: Normocephalic and atraumatic.      Nose: No congestion.      Mouth/Throat:      Mouth: Mucous membranes are moist.      Pharynx: Oropharynx is clear.   Eyes:      General: No scleral icterus.     Extraocular Movements: Extraocular movements intact.   Neck:       Vascular: No carotid bruit.   Cardiovascular:      Rate and Rhythm: Normal rate. Rhythm irregular.   Pulmonary:      Effort: No respiratory distress.      Breath sounds: Rales present.      Comments: On 2L NC  Abdominal:      General: There is distension.      Tenderness: There is no abdominal tenderness. There is no guarding or rebound.   Musculoskeletal:      Cervical back: Normal range of motion and neck supple.      Right lower leg: Edema present.      Left lower leg: Edema present.      Comments: Venous stasis dermatitis bilaterally    Skin:     Capillary Refill: Capillary refill takes less than 2 seconds.   Neurological:      General: No focal deficit present.      Mental Status: He is alert and oriented to person, place, and time.   Psychiatric:         Mood and Affect: Mood normal.         Thought Content: Thought content normal.            Significant Labs: All pertinent lab results from the last 24 hours have been reviewed. and   Recent Lab Results         09/18/24  0533   09/18/24  0248        Anion Gap   9       PTT   43.9  Comment: Refer to local heparin nomogram for intensity/dose specific   therapeutic   range.         Baso #   0.04       Basophil %   0.4       BUN   27       Calcium   8.8       Chloride   100       CO2   27       Creatinine   1.1       Differential Method   Automated       eGFR   >60.0       Eos #   0.2       Eos %   2.3       Glucose   136       Gran # (ANC)   7.2       Gran %   75.7       Hematocrit   42.2       Hemoglobin   14.0       Immature Grans (Abs)   0.03  Comment: Mild elevation in immature granulocytes is non specific and   can be seen in a variety of conditions including stress response,   acute inflammation, trauma and pregnancy. Correlation with other   laboratory and clinical findings is essential.         Immature Granulocytes   0.3       Lymph #   1.1       Lymph %   11.6       Magnesium    2.3       MCH   32.3       MCHC   33.2       MCV   97       Mono #    0.9       Mono %   9.7       MPV   12.5       nRBC   0       QRS Duration 122         [facility] QTC Calculation 572         Platelet Count   148       Potassium   3.5       RBC   4.34       RDW   14.3       Sodium   136       WBC   9.48                 Assessment and Plan:     Pulmonary embolism without acute cor pulmonale  Mr. Salas is a 80y/o M HfrEF w/ recovered EF (40%--9/2021), NICM, PAF, frequent PVCs, hypertension and hyperlipidemia, AKHIL not using CPAP, PVD, chronic respiratory failure w/ home oxygen and hypothyroidism who presented to [facility] on 9/15 after a GLF at home resulting in a nondisplaced fracture of the anterior right acetabulum. Patient also found to have a non occlusive PE by CT in main PA< RLL vessels, and occlusive PE in subsegmental PA, no right heart strain. LE US was negative for DVT. He was started on a heparin drip. Labs significant for a peaked trop 1.041 0.817, ---918 (11mo), Echo done today showed a severely depressed LVEF 20-25%, RV depressed systolic function, PASP 49 with an IVC 8. PESI score 110, Class IV high risk.     Recs:   - Elevated PESI score mostly driven by his history of HF and chronic long disease. Continue on high intensity heparin drip and can be later transitioned to eliquis. No  right heart strain on echo.   - will need hypercoagulable work up    Acute on chronic combined systolic and diastolic congestive heart failure  Hx of HFrEF, NICM with normalization of LVEF now with new depressed LVEF 20-25%, mod RV dysfunction, elevated PASP.     Recs.  - Patient continues to be hypervolemic on exam. Rec to increase his IV lasix to 80mg BID and aim for 1-2L net negative.   - He is currently on some GDMT and his home toprol was discontinued due to bradycardia. Recommend switching losartan. Can add low dose toprol 12.5mg if patient does not have symptomatic bradycardia. Continue spironolactone and discontinue amlodipine as we will need to uptitrate his GDMT.   - Continue full  dose entresto, consider adding on jardiance   - Once euvolemic, if he continues to be in atrial flutter would need to plan for a DEMETRICE/DCCV and he will need to continue OAC as he has a CHADSVASC of 5. Plans for DEMETRICE/DCCV tomorrow, needs to be NPO at midnight   - Will need an ischemic work up as an outpatient, recommend a cardiac PET stress test and lifevest at discharge.   - Will need close outpatient follow up with cardiology.  - Discussed the importance of a heart healthy no salt diet, unfortunately patient does not have any desire to stop his salt intake but this can be work up more as an outpatient.           VTE Risk Mitigation (From admission, onward)           Ordered     heparin 25,000 units in dextrose 5% (100 units/ml) IV bolus from bag HIGH INTENSITY nomogram - [facility]  As needed (PRN)        Question:  Heparin Infusion Adjustment (DO NOT MODIFY ANSWER)  Answer:  [email or web address]\epic\Images\Pharmacy\HeparinInfusions\heparin HIGH INTENSITY nomogram for [facility] [id number].pdf    09/16/24 0351     heparin 25,000 units in dextrose 5% (100 units/ml) IV bolus from bag HIGH INTENSITY nomogram - [facility]  As needed (PRN)        Question:  Heparin Infusion Adjustment (DO NOT MODIFY ANSWER)  Answer:  [email or web address]\epic\Images\Pharmacy\HeparinInfusions\heparin HIGH INTENSITY nomogram for [facility] [id number].pdf    09/16/24 0351     Place sequential compression device  Until discontinued         09/16/24 0428     Reason for No Pharmacological VTE Prophylaxis  Once        Comments: Heparin gtt for PE   Question:  Reasons:  Answer:  Physician Provided (leave comment)    09/16/24 0428     IP VTE HIGH RISK PATIENT  Once         09/16/24 0428     heparin 25,000 units in dextrose 5% 250 mL (100 units/mL) infusion HIGH INTENSITY nomogram - [facility]  Continuous        Question:  Begin at (units/kg/hr)  Answer:  18    09/16/24 0351                    Dl Méndez, DO  Internal Medicine Intern   Cardiology  [facility] - Surgery  As above -DEMETRICE/Cardioversionand  Kinzao -he is DNR but not during this procedure

## 2024-09-18 NOTE — ASSESSMENT & PLAN NOTE
Chronic, controlled. Latest blood pressure and vitals reviewed-     Temp:  [97.4 °F (36.3 °C)-97.9 °F (36.6 °C)]   Pulse:  []   Resp:  [16-20]   BP: (114-140)/(56-73)   SpO2:  [93 %-96 %] .   Home meds for hypertension were reviewed and noted below.   Hypertension Medications               amLODIPine (NORVASC) 2.5 MG tablet Take 1 tablet by mouth once daily    furosemide (LASIX) 40 MG tablet Take 1 tablet (40 mg total) by mouth 2 (two) times a day. Take daily for next 3 days, then use as needed. Weigh daily. Afterwards, f weight increases 2 lbs/24h, take dose of lasix daily  until weight is back to baseline    losartan (COZAAR) 100 MG tablet Take 1 tablet by mouth once daily    metoprolol succinate (TOPROL-XL) 50 MG 24 hr tablet Take 1 tablet by mouth once daily    spironolactone (ALDACTONE) 25 MG tablet Take 1 tablet by mouth once daily   While in the hospital, will manage blood pressure as follows; Continue home antihypertensive regimen  -stop norvasc and losartan and start  entrestro per cards recs. Continue metoprolol if HR tolerates (hold if under 60). Entresto increased on 9/17 per cards recs and BP 110s-120s

## 2024-09-18 NOTE — CARE UPDATE
Discussed with patient and his son (Efren, over the phone) regarding DEMETRICE/DCCV for tomorrow. Patient states he will like to proceed with the procedure. They both reported they will like for DNR to be rescinded for the purpose of the DEMETRICE/DCCV. DNR will be re-instated after the procedure.    All questions and concerns were addressed.    Elvira Akins MD  Cardiovascular Disease PGY6  [facility]

## 2024-09-18 NOTE — PROGRESS NOTES
[facility] - Surgery  Orthopedics  Progress Note    Patient Name: Bean Salas  MRN: [id number]  Admission Date: 9/15/2024  Hospital Length of Stay: 2 days  Attending Provider: Monica Patterson MD  Primary Care Provider: Ramirez Levine MD  Follow-up For: Procedure(s) (LRB):  Cardioversion or Defibrillation (N/A)  ECHOCARDIOGRAM, TRANSESOPHAGEAL (N/A)    Post-Operative Day:    Subjective:     Principal Problem:Closed pelvic ring fracture    Principal Orthopedic Problem: As above    Interval History: Patient seen and examined at bedside. NAEON. Bradycardic and tachycardic overnight. Pelvic pain well controlled while in bed. Patient had significant pain when attempting to ambulate with PT today. He again states that he wants to continue trying physical therapy until Friday before making any decisions about potential surgery. Plan for DEMETRICE/DCCV tomorrow with cardiology.       Review of patient's allergies indicates:  No Known Allergies    Current Facility-Administered Medications   Medication    acetaminophen tablet 1,000 mg    albuterol-ipratropium 2.5 mg-0.5 mg/3 mL nebulizer solution 3 mL    aspirin EC tablet 81 mg    atorvastatin tablet 10 mg    bisacodyL suppository 10 mg    dextrose 10% bolus 125 mL 125 mL    dextrose 10% bolus 250 mL 250 mL    diphenhydrAMINE-zinc acetate 2-0.1% cream    furosemide injection 80 mg    glucagon (human recombinant) injection 1 mg    glucose chewable tablet 16 g    glucose chewable tablet 24 g    heparin 25,000 units in dextrose 5% (100 units/ml) IV bolus from bag HIGH INTENSITY nomogram - [facility]    heparin 25,000 units in dextrose 5% (100 units/ml) IV bolus from bag HIGH INTENSITY nomogram - [facility]    heparin 25,000 units in dextrose 5% 250 mL (100 units/mL) infusion HIGH INTENSITY nomogram - [facility]    levothyroxine tablet 200 mcg    melatonin tablet 6 mg    methocarbamoL tablet 500 mg    metoprolol succinate (TOPROL-XL) 24 hr split tablet 12.5 mg    mirtazapine tablet 7.5 mg     "naloxone 0.4 mg/mL injection 0.02 mg    ondansetron disintegrating tablet 8 mg    oxyCODONE immediate release tablet 5 mg    polyethylene glycol packet 17 g    sacubitriL-valsartan  mg per tablet 1 tablet    senna-docusate 8.6-50 mg per tablet 1 tablet    sodium chloride 0.9% flush 5 mL    spironolactone tablet 25 mg     Objective:     Vital Signs (Most Recent):  Temp: 98.3 °F (36.8 °C) (09/18/24 1555)  Pulse: 94 (09/18/24 1634)  Resp: 20 (09/18/24 1555)  BP: (!) 120/57 (09/18/24 1555)  SpO2: 95 % (09/18/24 1555) Vital Signs (24h Range):  Temp:  [97 °F (36.1 °C)-98.3 °F (36.8 °C)] 98.3 °F (36.8 °C)  Pulse:  [] 94  Resp:  [17-20] 20  SpO2:  [93 %-96 %] 95 %  BP: ()/(51-73) 120/57     Weight: 102 kg (224 lb 13.9 oz)  Height: 5' 11" (180.3 cm)  Body mass index is 31.36 kg/m².      Intake/Output Summary (Last 24 hours) at 9/18/2024 1703  Last data filed at 9/17/2024 2358  Gross per 24 hour   Intake --   Output 850 ml   Net -850 ml        Ortho/SPM Exam  A&O x 3  Regular Rate  Non-Labored Respirations    RLE:   Fires Quad/TA/EHL/GSC  SILT  Compartments soft  Brisk cap refill  Swelling to dorsal foot    LLE:   Fires Quad/TA/EHL/GSC  SILT  Compartments soft  Brisk cap refill  Swelling dorsal foot         Significant Labs: CBC:   Recent Labs   Lab 09/17/24 0357 09/18/24  0248   WBC 9.75 9.48   HGB 12.8* 14.0   HCT 40.0 42.2   * 148*     CMP:   Recent Labs   Lab 09/17/24 0357 09/18/24  0248    136   K 3.7 3.5    100   CO2 27 27   * 136*   BUN 25* 27*   CREATININE 1.0 1.1   CALCIUM 9.2 8.8   ANIONGAP 12 9     All pertinent labs within the past 24 hours have been reviewed.    Significant Imaging: I have reviewed and interpreted all pertinent imaging results/findings.  Assessment/Plan:     * Closed pelvic ring fracture  Bean Salas is a 79 y.o. male Bean Salas is a 79 y.o. male with PMH significant for CAD, CHF, pulmonary HTN, AKHIL on CPAP, AFib, and R IT fx s/p IMN 9/3/21 " with Dr. Hall presenting with a nondisplaced fracture of the anterior, of the right acetabulum.  He was closed, NVI.  CT chest abdomen and pelvis demonstrated right-sided PE with nonocclusive thrombus in the right pulmonary artery with occlusive thrombus in his subsegmental branch of the right pulmonary artery.  DVT ultrasound negative.  Patient was started on heparin GTT by [facility] medicine.  Given the relatively nondisplaced in nature of the patient's fracture and numerous medical comorbidities, we will plan to treat him nonoperatively with a trial of physical therapy.    Continue trial of PT throughout the remainder of the week. Patient does not want to consider surgery until after PT session Friday. Discussed that he likely would not be able to have surgery until Monday in that case and patient understands.     Admitted to    Weightbearing as tolerated right lower extremity on rolling walker.   PT/OT  DVT ppx: heparin gtt per    Multimodal pain control                   AYLEEN Hurd MD  Orthopedics  [facility] - Surgery

## 2024-09-18 NOTE — ASSESSMENT & PLAN NOTE
Hx of HFrEF, NICM with normalization of LVEF now with new depressed LVEF 20-25%, mod RV dysfunction, elevated PASP.     Recs.  - Patient continues to be hypervolemic on exam. Rec to increase his IV lasix to 80mg BID and aim for 1-2L net negative.   - He is currently on some GDMT and his home toprol was discontinued due to bradycardia. Recommend switching losartan. Can add low dose toprol 12.5mg if patient does not have symptomatic bradycardia. Continue spironolactone and discontinue amlodipine as we will need to uptitrate his GDMT.   - Continue full dose entresto, consider adding on jardiance   - Once euvolemic, if he continues to be in atrial flutter would need to plan for a DEMETRICE/DCCV and he will need to continue OAC as he has a CHADSVASC of 5. Plans for DEMETRICE/DCCV tomorrow, needs to be NPO at midnight   - Will need an ischemic work up as an outpatient, recommend a cardiac PET stress test and lifevest at discharge.   - Will need close outpatient follow up with cardiology.  - Discussed the importance of a heart healthy no salt diet, unfortunately patient does not have any desire to stop his salt intake but this can be work up more as an outpatient.

## 2024-09-18 NOTE — ASSESSMENT & PLAN NOTE
Bean Salas is a 79 y.o. male Bean Salas is a 79 y.o. male with PMH significant for CAD, CHF, pulmonary HTN, AKHIL on CPAP, AFib, and R IT fx s/p IMN 9/3/21 with Dr. Hall presenting with a nondisplaced fracture of the anterior, of the right acetabulum.  He was closed, NVI.  CT chest abdomen and pelvis demonstrated right-sided PE with nonocclusive thrombus in the right pulmonary artery with occlusive thrombus in his subsegmental branch of the right pulmonary artery.  DVT ultrasound negative.  Patient was started on heparin GTT by [facility] medicine.  Given the relatively nondisplaced in nature of the patient's fracture and numerous medical comorbidities, we will plan to treat him nonoperatively with a trial of physical therapy.    Continue trial of PT throughout the remainder of the week. Patient does not want to consider surgery until after PT session Friday. Discussed that he likely would not be able to have surgery until Monday in that case and patient understands.     Admitted to    Weightbearing as tolerated right lower extremity on rolling walker.   PT/OT  DVT ppx: heparin gtt per    Multimodal pain control

## 2024-09-18 NOTE — PT/OT/SLP PROGRESS
Occupational Therapy   Co-Treatment    Co-treatment performed due to patient's multiple deficits requiring two skilled therapists to appropriately and safely assess patient's strength, endurance, functional mobility, and ADL performance while facilitating functional tasks in addition to accommodating for patient's activity tolerance and medical acuity.       Name: Bean Salas  MRN: [id number]  Admitting Diagnosis:  Closed fracture of anterior column of right acetabulum       Recommendations:     Discharge Recommendations: Moderate Intensity Therapy  Discharge Equipment Recommendations:  bedside commode, walker, rolling  Barriers to discharge:       Assessment:     Bean Salas is a 79 y.o. male with a medical diagnosis of Closed fracture of anterior column of right acetabulum. In today's session, patient required max encouragement to engage with therapy. However, patient tolerated sitting EOB and completed x1 sit to stand from EOB. Patient educated to continue working with therapy to build tolerance. Performance deficits affecting function are weakness, impaired balance, decreased safety awareness, impaired endurance, pain, impaired cardiopulmonary response to activity, impaired self care skills, impaired functional mobility, gait instability, decreased lower extremity function, orthopedic precautions.     Rehab Prognosis:  Good; patient would benefit from acute skilled OT services to address these deficits and reach maximum level of function.       Plan:     Patient to be seen 4 x/week to address the above listed problems via self-care/home management, therapeutic activities, therapeutic exercises, neuromuscular re-education  Plan of Care Expires:    Plan of Care Reviewed with: patient    Subjective     Chief Complaint: RLE pain  Patient/Family Comments/goals: Patient reports he is scared of falling.   Pain/Comfort:  Pain Rating 1: 2/10 (at rest)  Location - Side 1: Right  Location - Orientation 1:  generalized  Location 1: hip  Pain Addressed 1: Reposition, Distraction  Pain Rating Post-Intervention 1: 8/10 (after sit to stand)  Location - Side 2: Right  Location - Orientation 2: generalized  Location 2: hip    Objective:     Communicated with: Nurse prior to session.  Patient found HOB elevated with telemetry, pulse ox (continuous), PureWick, peripheral IV, oxygen upon OT entry to room.    General Precautions: Standard, fall    Orthopedic Precautions:RLE weight bearing as tolerated, LLE weight bearing as tolerated  Braces: N/A  Respiratory Status: Nasal cannula, flow 2 L/min     Occupational Performance:     Bed Mobility:    Patient completed Scooting EOB towards the HOB with moderate assistance   Patient completed Supine to Sit with moderate assistance of 2 persons for trunk elevation and to guide legs off the bed  Patient completed Sit to Supine with moderate assistance of 2 persons to guide trunk and legs on the bed  Total assistance of 2 persons to scoot towards the HOB.     Functional Mobility/Transfers:  Patient completed x1 Sit <> Stand Transfer from EOB with maximal assistance and of 2 persons with rolling walker   Patient required vc's for upright posture and hand placement on RW     [facility] 6 Click ADL: 16    Treatment & Education:  - Patient educated on OOB mobility to improve overall activity tolerance and promote recovery.  - Patient educated on pursed lip breathing secondary to SOB   - Patient educated on role of occupational therapy, POC, and safety during ADLs and functional mobility. Patient and OT discussed importance of safe, continued mobility to optimize daily living skills. Patient verbalized understanding. Patient given instruction to call for medical staff/nurse for assistance.       Patient left HOB elevated with all lines intact and call button in reach    GOALS:   Multidisciplinary Problems       Occupational Therapy Goals          Problem: Occupational Therapy    Goal Priority  Disciplines Outcome Interventions   Occupational Therapy Goal     OT, PT/OT Progressing    Description: Goals to be met by: 10/17/24     Patient will increase functional independence with ADLs by performing:    LE Dressing with Contact Guard Assistance.  Supine to sit with Ind  Grooming while standing at sink with Contact Guard Assistance.  Toileting from toilet with Contact Guard Assistance for hygiene and clothing management.   Toilet transfer to toilet with Contact Guard Assistance.                         Time Tracking:     OT Date of Treatment: 09/18/24  OT Start Time: 1315  OT Stop Time: 1340  OT Total Time (min): 25 min    Billable Minutes:Therapeutic Activity 25    OT/COURTNEY: COURTNEY     Number of COURTNEY visits since last OT visit: 1    9/18/2024

## 2024-09-18 NOTE — ASSESSMENT & PLAN NOTE
- Anjelica on file reviewed, DNR order placed  Will discuss further closer to dc if patient wants to pursue life vest cards recs as not congruent with dnr status

## 2024-09-18 NOTE — SUBJECTIVE & OBJECTIVE
Interval history- he reports feeling well so far this morning. Pain doing okay right now at rest but was increased limiting his thearpy session yseterday. He felt like having premedicatino before session would help a lot if could coordinate today and so messaged thearpy and nursing to try todo this before session for him today as well as seeing if can get him on commode after sesssion instead of chair and use suppository while on commode to promte BM as had been trying to have BM x 2 days without success, he reports feeling like BM is close and needs extra help and some time on the commode to try to promote it and that that would help if can put that in while he's on the commode so cristina try to coordinate above to do it the best way for success and apprecaite nursing and therapy assistance. Bp 110s-120s on current regimen rec by cards as entresto increased yesterday on lasix 80 BID. He reports he still has dyspnea when moving but he feels liek its more from pain that's short winding him and not necessarily from his lungs so will see how it goes with breathign with pain control hopefuly better today. He is on oxygen at home and on here, not too dyspneic during exam and more comfortable at rest than he was in previous day. Hep drips till on as waiting to see ortho plans as if therapy sessions better than likely can avoid surgery so if ultimately no surg plans then will convert to eliquis 10 for PE and long term indefinitely for atrial flutter. May need hailey/cv this admit per cards if persists and cards following and appreciate recs. Long term will need pet stress outpatient. Recs for life vst also with dec EF on admit but patietn Is DNR so will need to ask him if that is part of his goals of care given the 2 are not necessarily congruent.      Review of patient's allergies indicates:  No Known Allergies    No current facility-administered medications on file prior to encounter.     Current Outpatient Medications on File  Prior to Encounter   Medication Sig    acetaminophen (TYLENOL) 325 MG tablet Take 2 tablets (650 mg total) by mouth every 6 (six) hours as needed for Pain.    amLODIPine (NORVASC) 2.5 MG tablet Take 1 tablet by mouth once daily (Patient taking differently: Take 2.5 mg by mouth once daily.)    aspirin (ECOTRIN) 81 MG EC tablet Take 1 tablet (81 mg total) by mouth once daily.    atorvastatin (LIPITOR) 10 MG tablet Take 1 tablet by mouth once daily    furosemide (LASIX) 40 MG tablet Take 1 tablet (40 mg total) by mouth 2 (two) times a day. Take daily for next 3 days, then use as needed. Weigh daily. Afterwards, f weight increases 2 lbs/24h, take dose of lasix daily  until weight is back to baseline    levothyroxine (SYNTHROID) 200 MCG tablet Take 1 tablet by mouth once daily    losartan (COZAAR) 100 MG tablet Take 1 tablet by mouth once daily    metoprolol succinate (TOPROL-XL) 50 MG 24 hr tablet Take 1 tablet by mouth once daily    mirtazapine (REMERON) 7.5 MG Tab One tablet po each night for sleep    multivit-min-FA-lycopen-lutein (CENTRUM SILVER) 0.4-300-250 mg-mcg-mcg Tab Take 1 tablet by mouth once daily.    spironolactone (ALDACTONE) 25 MG tablet Take 1 tablet by mouth once daily     Family History       Problem Relation (Age of Onset)    Crohn's disease Mother    Dementia Mother    Heart attack Father    No Known Problems Sister, Brother, Son          Tobacco Use    Smoking status: Former     Current packs/day: 0.00     Average packs/day: 1 pack/day for 35.0 years (35.0 ttl pk-yrs)     Types: Cigarettes     Start date: 1965     Quit date: 2000     Years since quittin.7     Passive exposure: Past    Smokeless tobacco: Never   Substance and Sexual Activity    Alcohol use: No    Drug use: Never    Sexual activity: Not Currently     Review of Systems   Constitutional:  Negative for chills and fever.   HENT:  Negative for trouble swallowing.    Eyes:  Negative for visual disturbance.   Respiratory:   Positive for shortness of breath (SLADE). Negative for cough.    Cardiovascular:  Positive for leg swelling. Negative for chest pain and palpitations.   Gastrointestinal:  Negative for abdominal pain, nausea and vomiting.   Genitourinary:  Negative for dysuria and frequency.   Musculoskeletal:  Positive for arthralgias and gait problem.   Skin:  Positive for rash.   Neurological:  Negative for light-headedness and headaches.   Psychiatric/Behavioral:  Negative for agitation and confusion.      Objective:     Vital Signs (Most Recent):  Temp: 97.4 °F (36.3 °C) (09/18/24 0744)  Pulse: 60 (09/18/24 0924)  Resp: 20 (09/18/24 0744)  BP: (!) 123/57 (09/18/24 0952)  SpO2: 95 % (09/18/24 0744) Vital Signs (24h Range):  Temp:  [97.4 °F (36.3 °C)-97.9 °F (36.6 °C)] 97.4 °F (36.3 °C)  Pulse:  [] 60  Resp:  [16-20] 20  SpO2:  [93 %-96 %] 95 %  BP: (114-140)/(56-73) 123/57     Weight: 102 kg (224 lb 13.9 oz)  Body mass index is 31.36 kg/m².     Physical Exam  Vitals and nursing note reviewed.   Constitutional:       General: He is not in acute distress.     Interventions: Nasal cannula in place.      Comments: On oxygen. Not in distress   HENT:      Head: Normocephalic and atraumatic.      Nose: Nose normal.      Mouth/Throat:      Pharynx: No oropharyngeal exudate.   Eyes:      Extraocular Movements: Extraocular movements intact.      Conjunctiva/sclera: Conjunctivae normal.   Cardiovascular:      Rate and Rhythm: Normal rate. Rhythm irregular.      Heart sounds: Normal heart sounds.   Pulmonary:      Effort: Pulmonary effort is normal. No respiratory distress.      Comments: Anterior lung sounds clear. Painful repositioning limits full lung ausculation   Saturating >94% on 2L NC  Abdominal:      General: Bowel sounds are normal.      Palpations: Abdomen is soft.      Tenderness: There is no abdominal tenderness.   Musculoskeletal:      Cervical back: Normal range of motion. No tenderness.      Right hip: No bony  "tenderness.      Left hip: No bony tenderness.      Right lower le+ Pitting Edema present.      Left lower le+ Pitting Edema present.      Right foot: Swelling present.      Left foot: Swelling present.   Skin:     General: Skin is warm and dry.      Comments: Multiple 1-2 mm erythematous non pruritic, non blanching lesions scattered over his arms, chest, abdomen, and legs. No pustules seen.   Chronic venous stasis dermatitis appearance to BLE    Neurological:      General: No focal deficit present.      Mental Status: He is alert and oriented to person, place, and time.   Psychiatric:         Mood and Affect: Mood normal.         Behavior: Behavior normal.         Thought Content: Thought content normal.         Judgment: Judgment normal.                Significant Labs: All pertinent labs within the past 24 hours have been reviewed.  CBC:   Recent Labs   Lab 24  0357 24  0248   WBC 9.75 9.48   HGB 12.8* 14.0   HCT 40.0 42.2   * 148*     CMP:   Recent Labs   Lab 24  0357 24  0248    136   K 3.7 3.5    100   CO2 27 27   * 136*   BUN 25* 27*   CREATININE 1.0 1.1   CALCIUM 9.2 8.8   ANIONGAP 12 9     Cardiac Markers:   No results for input(s): "CKMB", "MYOGLOBIN", "BNP", "TROPISTAT" in the last 48 hours.    Coagulation:   Recent Labs   Lab 24  0248   APTT 43.9*     Magnesium:   Recent Labs   Lab 24  0357 24  0248   MG 2.4 2.3     Troponin:   Recent Labs   Lab 24  1148 24  1239   TROPONINI 0.869*  0.841* 0.817*       Significant Imaging: I have reviewed all pertinent imaging results/findings within the past 24 hours.  X-Ray Chest AP Portable  Narrative: EXAMINATION:  XR CHEST AP PORTABLE    CLINICAL HISTORY:  pulmonary congestion;    TECHNIQUE:  Single frontal view of the chest was performed.    COMPARISON:  2024    FINDINGS:  Cardiac size is enlarged as before.  Patient is rotated.  No large volume of pleural fluid " is present.  No definite focal consolidation is visualized radiographically.  Mild prominence of interstitial markings similar to prior.  Impression: As above    Electronically signed by: Margo Scanlon MD  Date:    09/17/2024  Time:    09:20

## 2024-09-18 NOTE — PT/OT/SLP PROGRESS
Physical Therapy Co-Treatment    OT present for cotreat due to pt's multiple medical comorbidities and functional/cognition deficits requiring two skilled therapists to appropriately progress pt's musculoskeletal strength, neuromuscular control, and endurance while taking into consideration medical acuity and pt safety.    Patient Name:  Bean Salas   MRN:  [id number]    Recommendations:     Discharge Recommendations: Moderate Intensity Therapy  Discharge Equipment Recommendations: bedside commode, walker, rolling  Barriers to discharge: None    Assessment:     Bean Salas is a 79 y.o. male admitted with a medical diagnosis of Closed fracture of anterior column of right acetabulum.  He presents with the following impairments/functional limitations: weakness, impaired self care skills, impaired functional mobility, gait instability, impaired balance, impaired endurance, decreased coordination, decreased lower extremity function, pain, impaired cardiopulmonary response to activity, orthopedic precautions, decreased safety awareness     Pt receptive and tolerated PT co-treatment with OT fairly. Pt needed maximal encouragement from therapists and RN to participate in therapy this session due to c/o pain. Pt needed maximal assistance from 2 people to complete sit <> stand from EOB using RW this session. Pt unable to take steps due to c/o RLE weakness. Patient continues to demonstrate the need for moderate intensity therapy on a daily basis post acute exhibited by decreased independence with self-care and functional mobility    Rehab Prognosis: Good; patient would benefit from acute skilled PT services to address these deficits and reach maximum level of function.    Recent Surgery: * No surgery found *      Plan:     During this hospitalization, patient to be seen 4 x/week to address the identified rehab impairments via gait training, therapeutic activities, therapeutic exercises, neuromuscular re-education,  "wheelchair management/training and progress toward the following goals:    Plan of Care Expires:  10/17/24    Subjective     Chief Complaint: R hip pain  Patient/Family Comments/goals: "I need the big pain pill"  Pain/Comfort:  Pain Rating 1: 3/10  Location - Side 1: Right  Location - Orientation 1: generalized  Location 1: hip  Pain Addressed 1: Reposition, Distraction  Pain Rating Post-Intervention 1:  (did not rate)      Objective:     Communicated with RN prior to session.  Patient found HOB elevated with telemetry, pulse ox (continuous), PureWick, peripheral IV, oxygen upon PT entry to room.     General Precautions: Standard, fall  Orthopedic Precautions: RLE weight bearing as tolerated, LLE weight bearing as tolerated  Braces: N/A  Respiratory Status: Nasal cannula, flow 2 L/min     Functional Mobility:    Bed Mobility:   Supine > Sit: maximal assistance and of 2 persons  Sit > Supine: maximal assistance and of 2 persons  Scooting to EOB: moderate assistance    Transfers:   Sit <> Stand Transfer: maximal assistance and of 2 persons from EOB using rolling walker   PT blocking R knee  Verbal and manual for hand placement  Pt needed increased time and verbal and manual cues for upright posture this session    Balance:   Sitting balance: FAIR+: Maintains balance through MINIMAL excursions of active trunk motion  Standing balance:   0: Needs MAXIMAL assist to maintain                  Gait:  Pt unable to take steps this session due to fatigue       AM-PAC 6 CLICK MOBILITY  Turning over in bed (including adjusting bedclothes, sheets and blankets)?: 2  Sitting down on and standing up from a chair with arms (e.g., wheelchair, bedside commode, etc.): 2  Moving from lying on back to sitting on the side of the bed?: 2  Moving to and from a bed to a chair (including a wheelchair)?: 1  Need to walk in hospital room?: 1  Climbing 3-5 steps with a railing?: 1  Basic Mobility Total Score: 9       Treatment & Education:  Pt " educated on tip to reduce fall risk and safety with mobility and using call button for assistance from nursing staff with bed mobility.  All questions answered within the scope of PT.  White board updated accordingly.    Patient left HOB elevated with all lines intact and call button in reach..    GOALS:   Multidisciplinary Problems       Physical Therapy Goals          Problem: Physical Therapy    Goal Priority Disciplines Outcome Goal Variances Interventions   Physical Therapy Goal     PT, PT/OT Progressing     Description: Goals to be met by: 10/17/24     Patient will increase functional independence with mobility by performin. Supine to sit with Contact Guard Assistance  2. Sit to stand transfer with Contact Guard Assistance  3. Bed to chair transfer with Minimal Assistance using Rolling Walker  4. Gait  x 20 feet with Minimal Assistance using Rolling Walker.   5. Stand for 5 minutes with Stand-by Assistance using Rolling Walker    Patient has a mobility limitation that significantly impairs their ability to participate in one or more mobility related activities of daily living, including toileting. This deficit can be resolved by using a bedside commode. Patient demonstrates mobility limitations that will cause them to be confined to one room at home without bathroom access for up to 30 days. Using a bedside commode will greatly improve the patient's ability to participate in MRADLs.    Patient demonstrates a mobility limitation that significantly impairs their ability to participate in one or more mobility related activities of daily living. Patient's mobility limitation cannot be sufficiently resolved with the use of a cane, but can be sufficiently resolved with the use of a rolling walker.The use of a rolling walker will considerably improve their ability to participate in MRADLs. Patient will use the walker on a regular basis at home.                            Time Tracking:     PT Received On:  09/18/24  PT Start Time: 1315     PT Stop Time: 1340  PT Total Time (min): 25 min     Billable Minutes: Neuromuscular Re-education 23    Treatment Type: Treatment  PT/PTA: PT           09/18/2024

## 2024-09-18 NOTE — NURSING
Nurses Note -- 4 Eyes      9/17/2024   7:35 PM      Skin assessed during: Q Shift Change      [x] No Altered Skin Integrity Present    []Prevention Measures Documented      [] Yes- Altered Skin Integrity Present or Discovered   [] LDA Added if Not in Epic (Describe Wound)   [] New Altered Skin Integrity was Present on Admit and Documented in LDA   [] Wound Image Taken    Wound Care Consulted? No    Attending Nurse:  Tatum MERCER     Second RN/Staff Member:  Alison YIN

## 2024-09-18 NOTE — ASSESSMENT & PLAN NOTE
Patient is identified as having Diastolic (HFpEF) heart failure that is Acute on chronic. CHF is currently uncontrolled due to Continued edema of extremities. Latest ECHO performed and demonstrates- Results for orders placed during the hospital encounter of 10/04/23    Echo    Result Date: 9/16/2024    Technically difficult study    Left Ventricle: The left ventricle is normal in size. Normal wall   thickness. Severe global hypokinesis present. Septal motion is abnormal.   There is severely reduced systolic function with a visually estimated   ejection fraction of 20 - 25%. Unable to assess diastolic function due to   atrial flutter.    Right Ventricle: Mild right ventricular enlargement. Wall thickness is   normal. Systolic function is moderately reduced.    Severe biatrial enlargement    Mitral Valve: There is mild regurgitation.    Pulmonary Artery: There is mild to moderate pulmonary hypertension. The   estimated pulmonary artery systolic pressure is 49 mmHg.    IVC/SVC: Intermediate venous pressure at 8 mmHg.        Echo Saline Bubble? No    Result Date: 10/5/2023    Left Ventricle: The left ventricle is normal in size. Normal wall   thickness. There is concentric remodeling. Normal wall motion. There is   normal systolic function. Biplane (2D) method of discs ejection fraction   is 52%. Unable to assess diastolic function due to atrial fibrillation.    Left Atrium: Left atrium is severely dilated.    Right Ventricle: Normal right ventricular cavity size. Wall thickness   is normal. Right ventricle wall motion  is normal. Systolic function is   normal.    Right Atrium: Right atrium is severely dilated.    Aortic Valve: There is mild stenosis. Aortic valve area by VTI is 1.87   cm². Aortic valve peak velocity is 1.66 m/s. Mean gradient is 6 mmHg. The   dimensionless index is 0.48.    Mitral Valve: There is mild regurgitation.    Tricuspid Valve: There is mild regurgitation.    Pulmonary Artery: The estimated  pulmonary artery systolic pressure is   44 mmHg.    IVC/SVC: Normal venous pressure at 3 mmHg.         Recent Labs   Lab 09/16/24  0037   *     He denies acute SOB but reports SLADE, SOB after activities such as eating requiring him to sit and use a CPAP for 2-3 hours, and lower extremity swelling right > left not much improved with his home PO lasix and spironolactone. He also reports requiring 2-3L supplemental oxygen while sleeping as well. He also reports eating bags of salty chips to help with his leg swelling.,.  - given new reduced EF with global wall motion and septal wall motion abnormaltities, cardiology saw patient for multipel things. They rec stopping losartan, continue aldactone, start entresto and titrated up 9/17. Cont metoprolol if HR tolerates.  Will need lifevest on discharge if patient wishes, as is DNR baseline. Will need cardiac pet stress as outpatient.  -rec inc lasix to 80 BID. May need DEMETRICE/CV once more euvolemic.  -cont 2 g/2L diet. Strict I/O

## 2024-09-19 ENCOUNTER — ANESTHESIA (OUTPATIENT)
Dept: MEDSURG UNIT | Facility: HOSPITAL | Age: 79
End: 2024-09-19
Payer: MEDICARE

## 2024-09-19 PROBLEM — E87.6 HYPOKALEMIA: Status: RESOLVED | Noted: 2024-09-18 | Resolved: 2024-09-19

## 2024-09-19 PROBLEM — T84.84XA PAINFUL ORTHOPAEDIC HARDWARE: Status: RESOLVED | Noted: 2022-07-27 | Resolved: 2024-09-19

## 2024-09-19 PROBLEM — K59.01 SLOW TRANSIT CONSTIPATION: Status: ACTIVE | Noted: 2024-09-16

## 2024-09-19 PROBLEM — S72.001D: Status: RESOLVED | Noted: 2022-04-27 | Resolved: 2024-09-19

## 2024-09-19 PROBLEM — R94.31 ABNORMAL EKG: Status: RESOLVED | Noted: 2024-09-16 | Resolved: 2024-09-19

## 2024-09-19 PROBLEM — R73.9 HYPERGLYCEMIA: Status: RESOLVED | Noted: 2017-06-25 | Resolved: 2024-09-19

## 2024-09-19 PROBLEM — E78.00 PURE HYPERCHOLESTEROLEMIA: Status: ACTIVE | Noted: 2017-06-25

## 2024-09-19 LAB
ANION GAP SERPL CALC-SCNC: 11 MMOL/L (ref 8–16)
APTT PPP: 36.2 SEC (ref 21–32)
APTT PPP: 36.7 SEC (ref 21–32)
APTT PPP: 46.8 SEC (ref 21–32)
BASOPHILS # BLD AUTO: 0.02 K/UL (ref 0–0.2)
BASOPHILS NFR BLD: 0.2 % (ref 0–1.9)
BSA FOR ECHO PROCEDURE: 2.26 M2
BUN SERPL-MCNC: 29 MG/DL (ref 8–23)
CALCIUM SERPL-MCNC: 8.6 MG/DL (ref 8.7–10.5)
CHLORIDE SERPL-SCNC: 101 MMOL/L (ref 95–110)
CO2 SERPL-SCNC: 26 MMOL/L (ref 23–29)
CREAT SERPL-MCNC: 1 MG/DL (ref 0.5–1.4)
DIFFERENTIAL METHOD BLD: ABNORMAL
EOSINOPHIL # BLD AUTO: 0 K/UL (ref 0–0.5)
EOSINOPHIL NFR BLD: 0.1 % (ref 0–8)
ERYTHROCYTE [DISTWIDTH] IN BLOOD BY AUTOMATED COUNT: 14.6 % (ref 11.5–14.5)
EST. GFR  (NO RACE VARIABLE): >60 ML/MIN/1.73 M^2
GLUCOSE SERPL-MCNC: 146 MG/DL (ref 70–110)
HCT VFR BLD AUTO: 42.7 % (ref 40–54)
HGB BLD-MCNC: 14.7 G/DL (ref 14–18)
IMM GRANULOCYTES # BLD AUTO: 0.04 K/UL (ref 0–0.04)
IMM GRANULOCYTES NFR BLD AUTO: 0.4 % (ref 0–0.5)
LYMPHOCYTES # BLD AUTO: 0.9 K/UL (ref 1–4.8)
LYMPHOCYTES NFR BLD: 9.3 % (ref 18–48)
MAGNESIUM SERPL-MCNC: 2.2 MG/DL (ref 1.6–2.6)
MCH RBC QN AUTO: 33.6 PG (ref 27–31)
MCHC RBC AUTO-ENTMCNC: 34.4 G/DL (ref 32–36)
MCV RBC AUTO: 98 FL (ref 82–98)
MONOCYTES # BLD AUTO: 1.1 K/UL (ref 0.3–1)
MONOCYTES NFR BLD: 11.2 % (ref 4–15)
NEUTROPHILS # BLD AUTO: 7.8 K/UL (ref 1.8–7.7)
NEUTROPHILS NFR BLD: 78.8 % (ref 38–73)
NRBC BLD-RTO: 0 /100 WBC
OHS QRS DURATION: 126 MS
OHS QRS DURATION: 134 MS
OHS QTC CALCULATION: 560 MS
OHS QTC CALCULATION: 602 MS
PLATELET # BLD AUTO: 152 K/UL (ref 150–450)
PMV BLD AUTO: 12.5 FL (ref 9.2–12.9)
POTASSIUM SERPL-SCNC: 3.9 MMOL/L (ref 3.5–5.1)
RBC # BLD AUTO: 4.38 M/UL (ref 4.6–6.2)
SINUS: 4.1 CM
SODIUM SERPL-SCNC: 138 MMOL/L (ref 136–145)
STJ: 3.3 CM
WBC # BLD AUTO: 9.86 K/UL (ref 3.9–12.7)

## 2024-09-19 PROCEDURE — 83735 ASSAY OF MAGNESIUM: CPT | Mod: HCNC | Performed by: HOSPITALIST

## 2024-09-19 PROCEDURE — 25000003 PHARM REV CODE 250: Mod: HCNC | Performed by: NURSE ANESTHETIST, CERTIFIED REGISTERED

## 2024-09-19 PROCEDURE — 25000003 PHARM REV CODE 250: Mod: HCNC

## 2024-09-19 PROCEDURE — 25000003 PHARM REV CODE 250: Mod: HCNC | Performed by: HOSPITALIST

## 2024-09-19 PROCEDURE — 93320 DOPPLER ECHO COMPLETE: CPT | Mod: 26,HCNC,, | Performed by: INTERNAL MEDICINE

## 2024-09-19 PROCEDURE — 5A2204Z RESTORATION OF CARDIAC RHYTHM, SINGLE: ICD-10-PCS | Performed by: STUDENT IN AN ORGANIZED HEALTH CARE EDUCATION/TRAINING PROGRAM

## 2024-09-19 PROCEDURE — 37000009 HC ANESTHESIA EA ADD 15 MINS: Mod: HCNC | Performed by: STUDENT IN AN ORGANIZED HEALTH CARE EDUCATION/TRAINING PROGRAM

## 2024-09-19 PROCEDURE — 63600175 PHARM REV CODE 636 W HCPCS: Mod: HCNC | Performed by: NURSE ANESTHETIST, CERTIFIED REGISTERED

## 2024-09-19 PROCEDURE — 63600175 PHARM REV CODE 636 W HCPCS: Mod: HCNC | Performed by: INTERNAL MEDICINE

## 2024-09-19 PROCEDURE — 80048 BASIC METABOLIC PNL TOTAL CA: CPT | Mod: HCNC | Performed by: HOSPITALIST

## 2024-09-19 PROCEDURE — 99232 SBSQ HOSP IP/OBS MODERATE 35: CPT | Mod: HCNC,,, | Performed by: ORTHOPAEDIC SURGERY

## 2024-09-19 PROCEDURE — 93005 ELECTROCARDIOGRAM TRACING: CPT | Mod: HCNC

## 2024-09-19 PROCEDURE — 93010 ELECTROCARDIOGRAM REPORT: CPT | Mod: HCNC,,, | Performed by: INTERNAL MEDICINE

## 2024-09-19 PROCEDURE — 37000008 HC ANESTHESIA 1ST 15 MINUTES: Mod: HCNC | Performed by: STUDENT IN AN ORGANIZED HEALTH CARE EDUCATION/TRAINING PROGRAM

## 2024-09-19 PROCEDURE — 85730 THROMBOPLASTIN TIME PARTIAL: CPT | Mod: 91,HCNC | Performed by: INTERNAL MEDICINE

## 2024-09-19 PROCEDURE — 99232 SBSQ HOSP IP/OBS MODERATE 35: CPT | Mod: HCNC,,, | Performed by: INTERNAL MEDICINE

## 2024-09-19 PROCEDURE — 93325 DOPPLER ECHO COLOR FLOW MAPG: CPT | Mod: 26,HCNC,, | Performed by: INTERNAL MEDICINE

## 2024-09-19 PROCEDURE — 25000003 PHARM REV CODE 250: Mod: HCNC | Performed by: INTERNAL MEDICINE

## 2024-09-19 PROCEDURE — 92960 CARDIOVERSION ELECTRIC EXT: CPT | Mod: HCNC | Performed by: STUDENT IN AN ORGANIZED HEALTH CARE EDUCATION/TRAINING PROGRAM

## 2024-09-19 PROCEDURE — 93312 ECHO TRANSESOPHAGEAL: CPT | Mod: 26,HCNC,, | Performed by: INTERNAL MEDICINE

## 2024-09-19 PROCEDURE — 85025 COMPLETE CBC W/AUTO DIFF WBC: CPT | Mod: HCNC | Performed by: HOSPITALIST

## 2024-09-19 PROCEDURE — 85730 THROMBOPLASTIN TIME PARTIAL: CPT | Mod: 91,HCNC | Performed by: HOSPITALIST

## 2024-09-19 PROCEDURE — 63600175 PHARM REV CODE 636 W HCPCS: Mod: HCNC | Performed by: STUDENT IN AN ORGANIZED HEALTH CARE EDUCATION/TRAINING PROGRAM

## 2024-09-19 PROCEDURE — 36415 COLL VENOUS BLD VENIPUNCTURE: CPT | Mod: HCNC,XB | Performed by: INTERNAL MEDICINE

## 2024-09-19 PROCEDURE — 36415 COLL VENOUS BLD VENIPUNCTURE: CPT | Mod: HCNC | Performed by: HOSPITALIST

## 2024-09-19 PROCEDURE — 63600175 PHARM REV CODE 636 W HCPCS: Mod: HCNC | Performed by: HOSPITALIST

## 2024-09-19 PROCEDURE — 92960 CARDIOVERSION ELECTRIC EXT: CPT | Mod: HCNC,,, | Performed by: STUDENT IN AN ORGANIZED HEALTH CARE EDUCATION/TRAINING PROGRAM

## 2024-09-19 PROCEDURE — 20600001 HC STEP DOWN PRIVATE ROOM: Mod: HCNC

## 2024-09-19 RX ORDER — DIPHENHYDRAMINE HYDROCHLORIDE 50 MG/ML
25 INJECTION INTRAMUSCULAR; INTRAVENOUS EVERY 6 HOURS PRN
Status: DISCONTINUED | OUTPATIENT
Start: 2024-09-19 | End: 2024-09-21

## 2024-09-19 RX ORDER — AMIODARONE HYDROCHLORIDE 200 MG/1
200 TABLET ORAL DAILY
Status: CANCELLED | OUTPATIENT
Start: 2024-10-04

## 2024-09-19 RX ORDER — PHENYLEPHRINE HCL IN 0.9% NACL 1 MG/10 ML
SYRINGE (ML) INTRAVENOUS
Status: DISCONTINUED | OUTPATIENT
Start: 2024-09-19 | End: 2024-09-19

## 2024-09-19 RX ORDER — AMIODARONE HYDROCHLORIDE 200 MG/1
400 TABLET ORAL DAILY
Status: CANCELLED | OUTPATIENT
Start: 2024-09-20 | End: 2024-10-04

## 2024-09-19 RX ORDER — AMIODARONE HYDROCHLORIDE 200 MG/1
400 TABLET ORAL 2 TIMES DAILY
Status: CANCELLED | OUTPATIENT
Start: 2024-09-19

## 2024-09-19 RX ORDER — GLUCAGON 1 MG
1 KIT INJECTION
Status: DISCONTINUED | OUTPATIENT
Start: 2024-09-19 | End: 2024-09-21

## 2024-09-19 RX ORDER — SODIUM CHLORIDE 0.9 % (FLUSH) 0.9 %
3 SYRINGE (ML) INJECTION
Status: DISCONTINUED | OUTPATIENT
Start: 2024-09-19 | End: 2024-09-21

## 2024-09-19 RX ORDER — AMIODARONE HYDROCHLORIDE 200 MG/1
200 TABLET ORAL DAILY
Status: DISCONTINUED | OUTPATIENT
Start: 2024-10-03 | End: 2024-09-19

## 2024-09-19 RX ORDER — HYDROMORPHONE HYDROCHLORIDE 1 MG/ML
0.2 INJECTION, SOLUTION INTRAMUSCULAR; INTRAVENOUS; SUBCUTANEOUS EVERY 5 MIN PRN
Status: DISCONTINUED | OUTPATIENT
Start: 2024-09-19 | End: 2024-09-21

## 2024-09-19 RX ORDER — FENTANYL CITRATE 50 UG/ML
25 INJECTION, SOLUTION INTRAMUSCULAR; INTRAVENOUS EVERY 5 MIN PRN
Status: DISCONTINUED | OUTPATIENT
Start: 2024-09-19 | End: 2024-09-19

## 2024-09-19 RX ORDER — AMIODARONE HYDROCHLORIDE 200 MG/1
200 TABLET ORAL DAILY
Status: DISCONTINUED | OUTPATIENT
Start: 2024-10-04 | End: 2024-09-19

## 2024-09-19 RX ORDER — METHOCARBAMOL 500 MG/1
500 TABLET, FILM COATED ORAL 4 TIMES DAILY
Status: DISCONTINUED | OUTPATIENT
Start: 2024-09-19 | End: 2024-09-22

## 2024-09-19 RX ORDER — PROCHLORPERAZINE EDISYLATE 5 MG/ML
5 INJECTION INTRAMUSCULAR; INTRAVENOUS EVERY 30 MIN PRN
Status: DISCONTINUED | OUTPATIENT
Start: 2024-09-19 | End: 2024-09-21

## 2024-09-19 RX ORDER — AMIODARONE HYDROCHLORIDE 200 MG/1
400 TABLET ORAL DAILY
Status: DISCONTINUED | OUTPATIENT
Start: 2024-09-19 | End: 2024-09-19

## 2024-09-19 RX ORDER — FUROSEMIDE 40 MG/1
40 TABLET ORAL 2 TIMES DAILY
Status: DISCONTINUED | OUTPATIENT
Start: 2024-09-20 | End: 2024-09-24 | Stop reason: HOSPADM

## 2024-09-19 RX ORDER — AMIODARONE HYDROCHLORIDE 200 MG/1
400 TABLET ORAL DAILY
Status: DISCONTINUED | OUTPATIENT
Start: 2024-09-20 | End: 2024-09-19

## 2024-09-19 RX ORDER — EPHEDRINE SULFATE 50 MG/ML
INJECTION, SOLUTION INTRAVENOUS
Status: DISCONTINUED | OUTPATIENT
Start: 2024-09-19 | End: 2024-09-19

## 2024-09-19 RX ORDER — LIDOCAINE HYDROCHLORIDE 40 MG/ML
SOLUTION TOPICAL
Status: DISCONTINUED | OUTPATIENT
Start: 2024-09-19 | End: 2024-09-19

## 2024-09-19 RX ORDER — ETOMIDATE 2 MG/ML
INJECTION INTRAVENOUS
Status: DISCONTINUED | OUTPATIENT
Start: 2024-09-19 | End: 2024-09-19

## 2024-09-19 RX ORDER — LIDOCAINE HYDROCHLORIDE 20 MG/ML
INJECTION INTRAVENOUS
Status: DISCONTINUED | OUTPATIENT
Start: 2024-09-19 | End: 2024-09-19

## 2024-09-19 RX ORDER — PROPOFOL 10 MG/ML
VIAL (ML) INTRAVENOUS CONTINUOUS PRN
Status: DISCONTINUED | OUTPATIENT
Start: 2024-09-19 | End: 2024-09-19

## 2024-09-19 RX ADMIN — AMIODARONE HYDROCHLORIDE 150 MG: 1.5 INJECTION, SOLUTION INTRAVENOUS at 09:09

## 2024-09-19 RX ADMIN — Medication 200 MCG: at 02:09

## 2024-09-19 RX ADMIN — Medication 100 MCG: at 02:09

## 2024-09-19 RX ADMIN — METHOCARBAMOL 500 MG: 500 TABLET ORAL at 09:09

## 2024-09-19 RX ADMIN — LIDOCAINE HYDROCHLORIDE 9 ML: 40 SOLUTION TOPICAL at 02:09

## 2024-09-19 RX ADMIN — GLYCOPYRROLATE 0.1 MG: 0.2 INJECTION, SOLUTION INTRAMUSCULAR; INTRAVENOUS at 02:09

## 2024-09-19 RX ADMIN — OXYCODONE 5 MG: 5 TABLET ORAL at 09:09

## 2024-09-19 RX ADMIN — LIDOCAINE HYDROCHLORIDE 100 MG: 20 INJECTION INTRAVENOUS at 02:09

## 2024-09-19 RX ADMIN — MIRTAZAPINE 7.5 MG: 7.5 TABLET, FILM COATED ORAL at 09:09

## 2024-09-19 RX ADMIN — SENNOSIDES AND DOCUSATE SODIUM 1 TABLET: 50; 8.6 TABLET ORAL at 09:09

## 2024-09-19 RX ADMIN — ETOMIDATE 10 MG: 2 INJECTION, SOLUTION INTRAVENOUS at 02:09

## 2024-09-19 RX ADMIN — ACETAMINOPHEN 1000 MG: 500 TABLET ORAL at 09:09

## 2024-09-19 RX ADMIN — METHOCARBAMOL 500 MG: 500 TABLET ORAL at 06:09

## 2024-09-19 RX ADMIN — SACUBITRIL AND VALSARTAN 1 TABLET: 97; 103 TABLET, FILM COATED ORAL at 09:09

## 2024-09-19 RX ADMIN — EPHEDRINE SULFATE 10 MG: 50 INJECTION INTRAVENOUS at 03:09

## 2024-09-19 RX ADMIN — AMIODARONE HYDROCHLORIDE 1 MG/MIN: 1.8 INJECTION, SOLUTION INTRAVENOUS at 09:09

## 2024-09-19 RX ADMIN — OXYCODONE 5 MG: 5 TABLET ORAL at 06:09

## 2024-09-19 RX ADMIN — ATORVASTATIN CALCIUM 10 MG: 10 TABLET, FILM COATED ORAL at 09:09

## 2024-09-19 RX ADMIN — PROPOFOL 100 MCG/KG/MIN: 10 INJECTION, EMULSION INTRAVENOUS at 02:09

## 2024-09-19 RX ADMIN — ASPIRIN 81 MG: 81 TABLET, COATED ORAL at 09:09

## 2024-09-19 RX ADMIN — POLYETHYLENE GLYCOL 3350 17 G: 17 POWDER, FOR SOLUTION ORAL at 09:09

## 2024-09-19 RX ADMIN — LEVOTHYROXINE SODIUM 200 MCG: 125 TABLET ORAL at 05:09

## 2024-09-19 RX ADMIN — ACETAMINOPHEN 1000 MG: 500 TABLET ORAL at 05:09

## 2024-09-19 RX ADMIN — SODIUM CHLORIDE: 0.9 INJECTION, SOLUTION INTRAVENOUS at 02:09

## 2024-09-19 RX ADMIN — Medication 100 MCG: at 03:09

## 2024-09-19 RX ADMIN — SPIRONOLACTONE 25 MG: 25 TABLET ORAL at 09:09

## 2024-09-19 RX ADMIN — METOPROLOL SUCCINATE 12.5 MG: 25 TABLET, EXTENDED RELEASE ORAL at 09:09

## 2024-09-19 RX ADMIN — EPHEDRINE SULFATE 10 MG: 50 INJECTION INTRAVENOUS at 02:09

## 2024-09-19 RX ADMIN — FUROSEMIDE 80 MG: 10 INJECTION, SOLUTION INTRAVENOUS at 09:09

## 2024-09-19 NOTE — PT/OT/SLP PROGRESS
Physical Therapy      Patient Name:  Bean Salas   MRN:  [id number]    Patient not seen today secondary to Off the floor for procedure/surgery (cardioversion). Will need new PT/OT orders to perform re-assessment and continue plan of care. Will follow-up when medically appropriate.    9/19/2024

## 2024-09-19 NOTE — PROGRESS NOTES
[facility] Medicine  Progress Note    Patient Name: Bean Salas  MRN: [id number]  Patient Class: IP- Inpatient   Admission Date: 9/15/2024  Length of Stay: 3 days  Attending Physician: Annelise Bray MD  Primary Care Provider: Ramirez Levine MD        Subjective:     Principal Problem:Closed nondisplaced fracture of anterior column of right acetabulum with routine healing        HPI:  Bean Salas is a 79 y.o. male with PMHx of  Bradycardia, PVCs, Hypertension, Coronary Artery Disease, Hyperlipidemia, HFpEF, Pulmonary HTN, AKHIL on CPAP, Chronic respiratory failure w/ home oxygen and hypothyroidism. He presents with right hip pain after ground level fall outside yesterday. He was doing some yard work and reaching for some piles of wood when a bunch of ants started crawling all over him and biting him. He tried to get away but forgot there was a step up from the grass to the concrete/carport next to him causing him to fall over onto his bottom/right side and then went more slowly back hitting his head as well.  Did not lose consciousness. He denies any headache or confusion. He did not have any pain right away. He had trouble pulling himself up off the ground and his cell phone was inside his house where he lives alone. He called out for a neighbor and a few of them were able to get him into a chair however when he tried to stand he had immediate sharp pain in his right hip and then called for an ambulance. He normally ambulates with a walker and is independent with ADLs. He does not have pain at rest currently.     He otherwise felt in his normal state of health prior to this. He denies chest pain. Denies acute SOB, fever, cough. However, reports somewhat chronic/gradually worsening SLADE and SOB after activities such as eating requiring him to sit and use a CPAP for 2-3 hours every afternoon and lower extremity swelling right > left not much improved with his home PO lasix and  "spironolactone. He also reports requiring 2-3L supplemental oxygen while sleeping. He has some little red marks all over his obody from the ant bites but is mostly un bothered by them. He denies any history of PE/DVT. While he is not super active he does go to the store for 2-3 hours weekly to shop and ambulates between 4 rooms in his house and completes work around the house. No recent surgery or prolonged immobilization.     ED course: Afebrile. RR 22-23. /44. HR . Saturating >94% on 2-3L NC. Labs notable for troponin 0.811>1.041. . XR/CT Imaging with acute fractures of the right superior and inferior pubic rami with possible involvement of the anterosuperior acetabulum. CT chest/abdomen/pelvis also notable for right-sided pulmonary embolism with nonocclusive thrombus in the main right pulmonary artery and right lower lobe branch vessels, and occlusive thrombus in a subsegmental branch of the right pulmonary artery, no right sided heart strain, unclear chronicity. CT head and cervical spine without acute findings.  Orthopedic surgery consulted for pelvic fracture. Started on heparin gtt for PE. Admitted to  for further manement.     Overview/Hospital Course:  Patient found down outside his home and unable to bear weight to right leg due to right hip pain. Patient brought to [facility] ED and on CT scan and X-rays found to have  and then eventually helped up found to have "Mildly displaced acute fracture of the right superior pubic ramus with questionable minimal extension into the anterosuperior acetabulum." CT scan of chest and abdomen and pelvis as per trauma protocol was done and noted "Right-sided pulmonary embolism, detailed above, with nonocclusive thrombus in the main right pulmonary artery and right lower lobe branch vessels, and occlusive thrombus in a subsegmental branch of the right pulmonary artery." Patient started on IV Heparin drip to treat PE. Orthopedics consulted and recommended " trial of PT/OT and pain management and weight bear as tolerated to right lower extremity. Patient on admit also noted to have elevated troponin 1 and BNP 900s. EKG showing some concerning changes with T wave inversions and ST depressions. Cardiology consulted and echo done and showed:    Left Ventricle: The left ventricle is normal in size. Normal wall thickness. Severe global hypokinesis present. Septal motion is abnormal. There is severely reduced systolic function with a visually estimated ejection fraction of 20 - 25%. Unable to assess diastolic function due to atrial flutter.    Right Ventricle: Mild right ventricular enlargement. Wall thickness is normal. Systolic function is moderately reduced.    Severe biatrial enlargement    Mitral Valve: There is mild regurgitation.    Pulmonary Artery: There is mild to moderate pulmonary hypertension. The estimated pulmonary artery systolic pressure is 49 mmHg.    IVC/SVC: Intermediate venous pressure at 8 mmHg.    Cardiology recommended to start to diuresis patient with Lasix 80 mg IV BID per Cards recs. Patient started on Entresto for depressed EF and titrated as per Cardiology recs and continue on Aldactone.  Cardiology recommending ischemic work-up as outpatient with PET stress and consideration of Life Vest on discharge. Patient expressed he wished to be DNR and not sure about life vest on discharge. Patient also having a number of PVCs and PACs in hospital and reviewed by Cardiology who noted patient in atrial flutter. After discussion with Cardiology they recommended DEMETRICE and cardioversion for his atrial flutter as patient symptomatic and felt could be contributing to his heart failure and low EF. Patient to go for DEMETRICE and cardioversion on 9/19. Patient not progressing with PT/OT as limited by dyspnea and pain to right hip despite multimodal pain medications. Discussed with Dr. Stapleton from Orthopedics on 9/19 and he stated he spoke with patent and his son and  after discussion plan to proceed with percutaneous screw fixation of right acetabular fracture and right sided pelvic fractures for 9/20.     Interval History: Patient to go for DEMETRICE and cardioversion today with Cardiology and Cardiology states likely can transition to oral Lasix after cardioversion and will need to be placed on Amiodarone after cardioversion with Amiodarone 400 mg po BID x 14 days for loading dosing followed by 200 mg po daily for maintenance dosing. Patient not progressing with PT/OT as limited by dyspnea and pain to right hip despite multimodal pain medications. Discussed with Dr. Stapleton from Orthopedics this am and he stated he spoke with patent and his son and after discussion plan to proceed with percutaneous screw fixation of right acetabular fracture and right sided pelvic fractures for tomorrow. Continue on IV Heparin drip for now to treat PEs and main to transition to therapeutic Apixiban to treat PEs post-op. NPO for DEMETRICE and cardioversion today and will be NPO after midnight tonight for surgery with Ortho tomorrow. Labs reviewed. Hgb stable at 14.7. Creatinine stable at 1.0. Magnesium normal at 2.2.    Review of Systems   Constitutional:  Negative for fever.   Respiratory:  Positive for shortness of breath (SLADE). Negative for cough.    Cardiovascular:  Positive for leg swelling. Negative for chest pain.   Gastrointestinal:  Negative for abdominal pain, nausea and vomiting.   Musculoskeletal:  Positive for arthralgias (Right hip) and gait problem.   Skin:  Positive for rash.   Psychiatric/Behavioral:  Negative for agitation and confusion.      Objective:     Vital Signs (Most Recent):  Temp: 97.3 °F (36.3 °C) (09/19/24 1118)  Pulse: 72 (09/19/24 1153)  Resp: 17 (09/19/24 1118)  BP: 148/76 (09/19/24 1118)  SpO2: 98 % (09/19/24 1118) on 2 liters of oxygen Vital Signs (24h Range):  Temp:  [97.3 °F (36.3 °C)-98.3 °F (36.8 °C)] 97.3 °F (36.3 °C)  Pulse:  [46-98] 72  Resp:  [17-20] 17  SpO2:   [94 %-98 %] 98 %  BP: (120-152)/(57-76) 148/76     Weight: 102 kg (224 lb 13.9 oz)  Body mass index is 31.36 kg/m².    Intake/Output Summary (Last 24 hours) at 9/19/2024 1327  Last data filed at 9/19/2024 0619  Gross per 24 hour   Intake --   Output 1650 ml   Net -1650 ml         Physical Exam  Vitals and nursing note reviewed.   Constitutional:       General: He is awake. He is not in acute distress.     Appearance: Normal appearance. He is well-developed. He is obese. He is not ill-appearing.      Comments: Patient sitting up in bed in no apparent distress.    Eyes:      Conjunctiva/sclera: Conjunctivae normal.   Cardiovascular:      Rate and Rhythm: Normal rate and regular rhythm.      Heart sounds: Normal heart sounds. No murmur heard.  Pulmonary:      Effort: Pulmonary effort is normal. No respiratory distress.      Breath sounds: Normal breath sounds. No wheezing.   Abdominal:      General: Abdomen is flat. Bowel sounds are normal. There is no distension.      Palpations: Abdomen is soft.      Tenderness: There is no abdominal tenderness.   Musculoskeletal:      Right lower leg: Edema present.      Left lower leg: Edema present.   Skin:     Findings: No erythema or rash.   Neurological:      Mental Status: He is alert and oriented to person, place, and time.   Psychiatric:         Mood and Affect: Mood normal.         Behavior: Behavior normal. Behavior is cooperative.         Thought Content: Thought content normal.             Significant Labs: CBC:   Recent Labs   Lab 09/18/24 0248 09/19/24  0524   WBC 9.48 9.86   HGB 14.0 14.7   HCT 42.2 42.7   * 152     CMP:   Recent Labs   Lab 09/18/24 0248 09/19/24  0524    138   K 3.5 3.9    101   CO2 27 26   * 146*   BUN 27* 29*   CREATININE 1.1 1.0   CALCIUM 8.8 8.6*   ANIONGAP 9 11     Magnesium:   Recent Labs   Lab 09/18/24 0248 09/19/24  0524   MG 2.3 2.2       Significant Imaging: I have reviewed all pertinent imaging results/findings  "within the past 24 hours.    Assessment/Plan:      * Closed nondisplaced fracture of anterior column of right acetabulum with routine healing  80 y/o male presenting after ground level fall with right hip pain on standing. CT scan showed acute closed fractures of the right superior and inferior pubic rami with involvement of the anterosuperior acetabulum.   - Orthopedic surgery consulted by ED and recommended weight bear as tolerated to right lower extremity and trial of PT/OT and pain management.   - PT/OT consulted and patient not progressing and only able to take a few shuffling steps and total assist x 2 people. Patient limited by dyspnea and pain. Orthopedics discussed with patient and his son and plan to proceed with operative intervention on pelvic fractures with percutaneous screw fixation on 9/20. Patient to be NPO after midnight tonight.   - Continue pain control with scheduled Tylenol and Robaxin with Oxycodone IR 5 mg po every 4 hours prn for pain management.   - Fall precautions.  - Cardiology reports does not need PET stress/ischemic work up prior to a pelvic fracture surgery as that can be done as outpatient. Patient close to euvolemic at this time and plan to switch to po Lasix on 9/19 so okay to proceed with ORIF of pelvis in OR with Ortho on 9/20 with no further intervention as optimized for surgery. Patient going for cardioversion today with Cardiology.   - Patient on IV Heparin drip to treat PE so no DVT prophylaxis needed as fully anticoagulated.     Pulmonary embolism without acute cor pulmonale  CT chest/abdomen/pelvis as part of trauma work up in ED showed "Right-sided pulmonary embolism, detailed above, with nonocclusive thrombus in the main right pulmonary artery and right lower lobe branch vessels, and occlusive thrombus in a subsegmental branch of the right pulmonary artery. Note comparison images are not available to determine chronicity. No right sided heart strain."   - Denies previous " diagnosis or PE/DVT.  - Denies chest pain or SOB.  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 He requires 2-3L NC of oxygen at home/sleeping and uses a CPAP after meals (exertional to patient) and for naps.  - Currently stable on 2-3L NC oxygen. No hypotension. Patient has baseline dyspnea when mobilizing sideways in bed even prior to this admit.   - Labs with elevated Troponin 0.811>1.041 and .   - Initialed on Heparin drip IV to treat PE and will continue and plan to transition to therapeutic oral Apixiban to treat once all procedures complete with Apixiban 10 mg po BID x 7 days followed by indefinite treatment of Apixiban 5 mg po BID.   - Cards consulted, continue current therapies. No indication to activate PE response team at this time.   - No signs of right heart strain  - US of lower extremities on this admit negative for DVTs so IVC filter not indicated.     Acute on chronic combined systolic and diastolic congestive heart failure  Pulmonary hypertension   Patient is identified as having Combined Systolic and Diastolic heart failure that is Acute on chronic. CHF is currently uncontrolled due to Continued edema of extremities and dyspnea. Latest ECHO performed and demonstrates- Results for orders placed during the hospital encounter of 10/04/23  Echo    Result Date: 9/16/2024    Technically difficult study    Left Ventricle: The left ventricle is normal in size. Normal wall   thickness. Severe global hypokinesis present. Septal motion is abnormal.   There is severely reduced systolic function with a visually estimated   ejection fraction of 20 - 25%. Unable to assess diastolic function due to   atrial flutter.    Right Ventricle: Mild right ventricular enlargement. Wall thickness is   normal. Systolic function is moderately reduced.    Severe biatrial enlargement    Mitral Valve: There is mild regurgitation.    Pulmonary Artery: There is mild to moderate pulmonary hypertension. The   estimated pulmonary artery  systolic pressure is 49 mmHg.    IVC/SVC: Intermediate venous pressure at 8 mmHg.       Recent Labs   Lab 09/16/24  0037   *     - He denies acute SOB but reports SLADE, SOB after activities such as eating requiring him to sit and use a CPAP for 2-3 hours, and lower extremity swelling right > left not much improved with his home PO Lasix and Spironolactone. He also reports requiring 2-3 liters supplemental oxygen while sleeping as well. He also reports eating bags of salty chips to help with his leg swelling.  - Cardiology consulted on this admit given new reduced EF with global wall motion and septal wall motion abnormalities Patient taken off Losartan and started on Entresto to treat his reduced EF and heart failure. Patient continued on his home Aldactone. Patient placed on Lasix 80 mg IV BID to diuresis patient in hospital and patient diuresising well and net negative 4.1 liters since admit on 9/19.   - Toprol XL 12.5 mg po daily for his chronic heart failure.   - Will need life vest on discharge if patient wishes, as is DNR baseline. Will need Cardiac PET stress as outpatient for ischemic evaluation.  - Fluid restriction 1.5 liters a day and low salt diet.   - Monitor response to treatment.     Paroxysmal atrial flutter  - Per chart review, his cardiologist Dr. Hank Barry feels EKGs that were read as atrial fibrillation previously are actually sinus rhythm and thus not on long term anticoagulation on admit.   - Cardiology consulted here who report current rhythm is atrial flutter and report if does not self convert may need DEMETRICE/DCCV once euvolemic. Patient now euvolemic on 9/19 and remains in atrial flutter and rate is controlled at 45-65 on Toprol XL. Cardiology recommending DEMETRICE and cardioversion for today and patient currently NPO awaiting procedure.   - Patient on IV Heparin drip for anticoagulation for now and will continue but transition to oral Apixiban once all surgeries and procedures complete  for long term anticoagulation.     Essential hypertension  Chronic, controlled. Latest blood pressure and vitals reviewed-     Temp:  [97.3 °F (36.3 °C)-98.3 °F (36.8 °C)]   Pulse:  [46-98]   Resp:  [17-20]   BP: (120-152)/(57-76)   SpO2:  [94 %-98 %] .   Home meds for hypertension were reviewed and noted below.   Hypertension Medications               amLODIPine (NORVASC) 2.5 MG tablet Take 1 tablet by mouth once daily    furosemide (LASIX) 40 MG tablet Take 1 tablet (40 mg total) by mouth 2 (two) times a day. Take daily for next 3 days, then use as needed. Weigh daily. Afterwards, f weight increases 2 lbs/24h, take dose of lasix daily  until weight is back to baseline    losartan (COZAAR) 100 MG tablet Take 1 tablet by mouth once daily    metoprolol succinate (TOPROL-XL) 50 MG 24 hr tablet Take 1 tablet by mouth once daily    spironolactone (ALDACTONE) 25 MG tablet Take 1 tablet by mouth once daily     While in the hospital, will manage blood pressure as follows; Adjust home antihypertensive regimen as follows- Stopped Norvasc and Losartan by Cardiology and started on Entresto for new onset HFrEF . Continue Lasix and Aldactone.       Chronic hypoxemic respiratory failure  Patient with Hypoxic Respiratory failure which is Chronic. He is on home oxygen at 2-3 LPM. Supplemental oxygen was provided and noted-at 2 liters of oxygen.      Signs/symptoms of respiratory failure include- tachypnea and increased work of breathing. Contributing diagnoses includes - CHF and pulmonary embolus. Labs and images were reviewed. Patient Has not had a recent ABG. Will treat underlying causes and adjust management of respiratory failure as follows- IV Lasix for heart failure and IV Heparin for PE.     Bradycardia  - Patient has history of atrial bigeminy and bradycardia per chart. Has followed with EP.  - Monitor telemetry.  - HR ranges 40s-80s at times. Hold Metoprolol if HR sustains < 60.    Advance care planning  - LaPOPST on file  reviewed, DNR order placed. DNR on hold for cardioversion today and for Ortho surgery tomorrow and resume post-op and patient okay with holding DNR for procedures and surgery.   - Will discuss further closer to discharge if patient wants to pursue life vest as per Cardiology recs as not congruent with DNR status.    PVC's (premature ventricular contractions)  Patient noted in history and seen on telemetry on floor for PVC's. Keep Magnesium > 2, Potassium >4. Monitor telemetry. Continue Toprol XL to treat.     AKHIL (obstructive sleep apnea)  Chronic and controlled. Patient on CPAP at home at night. Continue CPAP nightly to treat.      Coronary artery disease involving native coronary artery of native heart without angina pectoris  Elevated troponin  Abnormal ventricular wall motion   - Patient with known CAD which is controlled. Will continue home Aspirin, Toprol XL and Lipitor daily to treat and monitor for S/Sx of angina/ACS. Continue to monitor on telemetry. Previous angiogram with non obstructive CAD 2021.   - Patient with elevated troponin on admit likely from PE but EKG with ST depressions, T wave inversions that are new so Cardiology consulted and appreciate recs. Echo done with new septal wall motion abnormaliteis and decreased EF at 20-25%. Cardiology recommended for outpatient PET cardiac stress and life vest on discharge for ischemic work up.    Bug bites  - Reportedly bitten by multiple small ants outside prior to admit.   - He has small red lesions on skin consistent with ant bites but no pustules, no pain or itching currently .   - Benadryl cream prn.  - Supportive care, monitor.     Insomnia  Chronic and controlled. Continue home Mirtazapine to treat.     Hypothyroidism due to acquired atrophy of thyroid  Chronic and controlled. Continue home Levothyroxine to treat.      Pure hypercholesterolemia  Chronic and controlled. Continue home Lipitor to treat.      Slow transit constipation  Baseline, on bowel  regimen to treat with scheduled Senakot and Miralax with Doculax suppository prn.     VTE Risk Mitigation (From admission, onward)           Ordered     heparin 25,000 units in dextrose 5% (100 units/ml) IV bolus from bag HIGH INTENSITY nomogram - [facility]  As needed (PRN)        Question:  Heparin Infusion Adjustment (DO NOT MODIFY ANSWER)  Answer:  [email or web address]\Images\Pharmacy\HeparinInfusions\heparin HIGH INTENSITY nomogram for [facility] [id number]    09/16/24 0351     heparin 25,000 units in dextrose 5% (100 units/ml) IV bolus from bag HIGH INTENSITY nomogram - [facility]  As needed (PRN)        Question:  Heparin Infusion Adjustment (DO NOT MODIFY ANSWER)  Answer:  [email or web address]\Images\Pharmacy\HeparinInfusions\heparin HIGH INTENSITY nomogram for [facility] [id number]    09/16/24 0351     Place sequential compression device  Until discontinued         09/16/24 0428     Reason for No Pharmacological VTE Prophylaxis  Once        Comments: Heparin gtt for PE   Question:  Reasons:  Answer:  Physician Provided (leave comment)    09/16/24 0428     IP VTE HIGH RISK PATIENT  Once         09/16/24 0428     heparin 25,000 units in dextrose 5% 250 mL (100 units/mL) infusion HIGH INTENSITY nomogram - [facility]  Continuous        Question:  Begin at (units/kg/hr)  Answer:  18    09/16/24 0351                    Discharge Planning   LATOSHA: 9/23/2024     Code Status: Full Code   Is the patient medically ready for discharge?:     Reason for patient still in hospital (select all that apply): Patient trending condition  Discharge Plan A: Skilled Nursing Facility          Annelise Bray MD  Department of [facility]   [facility] - Surgery

## 2024-09-19 NOTE — ASSESSMENT & PLAN NOTE
- [facility]OP[facility] on file reviewed, DNR order placed. DNR on hold for cardioversion today and for Ortho surgery tomorrow and resume post-op and patient okay with holding DNR for procedures and surgery.   - Will discuss further closer to discharge if patient wants to pursue life vest as per Cardiology recs as not congruent with DNR status.

## 2024-09-19 NOTE — SUBJECTIVE & OBJECTIVE
Past Medical History:   Diagnosis Date    Atrial fibrillation, unspecified type 09/06/2023    COPD exacerbation 09/06/2023    Thyroid disease     hypo       Past Surgical History:   Procedure Laterality Date    COLONOSCOPY  01/01/2011    CORONARY ANGIOGRAPHY N/A 07/22/2021    Procedure: ANGIOGRAM, CORONARY ARTERY;  Surgeon: Hank Barry MD;  Location: [facility] CATH LAB/EP;  Service: Cardiology;  Laterality: N/A;    EYE SURGERY Bilateral     cataracts extraction    FRACTURE SURGERY Right 09/2021    femur    HERNIA REPAIR      HIP SURGERY Right 08/2021    INTRAMEDULLARY RODDING OF TROCHANTER OF FEMUR Right 09/03/2021    Procedure: INSERTION, INTRAMEDULLARY RACQUEL, FEMUR, TROCHANTER;  Surgeon: Darryn Hall MD;  Location: [facility] OR;  Service: Orthopedics;  Laterality: Right;    JOINT REPLACEMENT Bilateral     knee    LEFT HEART CATHETERIZATION Right 07/22/2021    Procedure: Left heart cath;  Surgeon: Hank Barry MD;  Location: [facility] CATH LAB/EP;  Service: Cardiology;  Laterality: Right;    VASECTOMY         Review of patient's allergies indicates:  No Known Allergies    No current facility-administered medications on file prior to encounter.     Current Outpatient Medications on File Prior to Encounter   Medication Sig    acetaminophen (TYLENOL) 325 MG tablet Take 2 tablets (650 mg total) by mouth every 6 (six) hours as needed for Pain.    amLODIPine (NORVASC) 2.5 MG tablet Take 1 tablet by mouth once daily (Patient taking differently: Take 2.5 mg by mouth once daily.)    aspirin (ECOTRIN) 81 MG EC tablet Take 1 tablet (81 mg total) by mouth once daily.    atorvastatin (LIPITOR) 10 MG tablet Take 1 tablet by mouth once daily    furosemide (LASIX) 40 MG tablet Take 1 tablet (40 mg total) by mouth 2 (two) times a day. Take daily for next 3 days, then use as needed. Weigh daily. Afterwards, f weight increases 2 lbs/24h, take dose of lasix daily  until weight is back to baseline    levothyroxine (SYNTHROID) 200 MCG tablet  Take 1 tablet by mouth once daily    losartan (COZAAR) 100 MG tablet Take 1 tablet by mouth once daily    metoprolol succinate (TOPROL-XL) 50 MG 24 hr tablet Take 1 tablet by mouth once daily    mirtazapine (REMERON) 7.5 MG Tab One tablet po each night for sleep    multivit-min-FA-lycopen-lutein (CENTRUM SILVER) 0.4-300-250 mg-mcg-mcg Tab Take 1 tablet by mouth once daily.    spironolactone (ALDACTONE) 25 MG tablet Take 1 tablet by mouth once daily     Family History       Problem Relation (Age of Onset)    Crohn's disease Mother    Dementia Mother    Heart attack Father    No Known Problems Sister, Brother, Son          Tobacco Use    Smoking status: Former     Current packs/day: 0.00     Average packs/day: 1 pack/day for 35.0 years (35.0 ttl pk-yrs)     Types: Cigarettes     Start date: 1965     Quit date: 2000     Years since quittin.7     Passive exposure: Past    Smokeless tobacco: Never   Substance and Sexual Activity    Alcohol use: No    Drug use: Never    Sexual activity: Not Currently     Review of Systems   Cardiovascular:  Negative for chest pain, irregular heartbeat, leg swelling, palpitations and syncope.   Respiratory:  Negative for shortness of breath.      Objective:     Vital Signs (Most Recent):  Temp: 97.5 °F (36.4 °C) (24 0756)  Pulse: (!) 46 (24 0756)  Resp: 18 (24 0900)  BP: 129/73 (24 0756)  SpO2: 96 % (24 075) Vital Signs (24h Range):  Temp:  [97 °F (36.1 °C)-98.3 °F (36.8 °C)] 97.5 °F (36.4 °C)  Pulse:  [40-98] 46  Resp:  [17-20] 18  SpO2:  [93 %-97 %] 96 %  BP: ()/(51-73) 129/73     Weight: 102 kg (224 lb 13.9 oz)  Body mass index is 31.36 kg/m².    SpO2: 96 %         Intake/Output Summary (Last 24 hours) at 2024 0906  Last data filed at 2024 0619  Gross per 24 hour   Intake --   Output 1650 ml   Net -1650 ml       Lines/Drains/Airways       Drain  Duration             Male External Urinary Catheter 24 0425 3 days               Peripheral Intravenous Line  Duration                  Peripheral IV - Single Lumen 09/16/24 0454 18 G Anterior;Distal;Left Forearm 3 days         Peripheral IV - Single Lumen 09/16/24 1112 20 G No Anterior;Right Forearm 2 days                     Physical Exam  Vitals reviewed.   Constitutional:       Appearance: He is well-developed. He is obese. He is not diaphoretic.   HENT:      Head: Normocephalic and atraumatic.      Right Ear: External ear normal.      Left Ear: External ear normal.   Eyes:      Extraocular Movements: Extraocular movements intact.   Neck:      Vascular: No carotid bruit or JVD.   Cardiovascular:      Rate and Rhythm: Normal rate. Rhythm irregular.      Pulses: Normal pulses and intact distal pulses.      Heart sounds:      No gallop.   Pulmonary:      Effort: Pulmonary effort is normal.      Breath sounds: Normal breath sounds. No rales.   Chest:      Chest wall: No tenderness.   Abdominal:      Palpations: Abdomen is soft.      Tenderness: There is no abdominal tenderness.   Musculoskeletal:      Right lower leg: No edema.      Left lower leg: No edema.   Skin:     General: Skin is warm and dry.      Coloration: Skin is not pale.   Neurological:      Mental Status: He is oriented to person, place, and time.          Significant Labs:   Recent Labs   Lab 09/18/24  0248 09/19/24  0524    138   K 3.5 3.9    101   CO2 27 26   * 146*   BUN 27* 29*   CREATININE 1.1 1.0   CALCIUM 8.8 8.6*   ANIONGAP 9 11     Recent Labs   Lab 09/18/24  0248 09/19/24  0524   WBC 9.48 9.86   HGB 14.0 14.7   HCT 42.2 42.7   * 152     All pertinent lab results from the last 24 hours have been reviewed.

## 2024-09-19 NOTE — HOSPITAL COURSE
"Patient found down outside his home and unable to bear weight to right leg due to right hip pain. Patient brought to [facility] ED and on CT scan and X-rays found to have  and then eventually helped up found to have "Mildly displaced acute fracture of the right superior pubic ramus with questionable minimal extension into the anterosuperior acetabulum." CT scan of chest and abdomen and pelvis as per trauma protocol was done and noted "Right-sided pulmonary embolism, detailed above, with nonocclusive thrombus in the main right pulmonary artery and right lower lobe branch vessels, and occlusive thrombus in a subsegmental branch of the right pulmonary artery." Patient started on IV Heparin drip to treat PE. Orthopedics consulted and recommended trial of PT/OT and pain management and weight bear as tolerated to right lower extremity. Patient on admit also noted to have elevated troponin 1 and BNP 900s. EKG showing some concerning changes with T wave inversions and ST depressions. Cardiology consulted and echo done and showed:    Left Ventricle: The left ventricle is normal in size. Normal wall thickness. Severe global hypokinesis present. Septal motion is abnormal. There is severely reduced systolic function with a visually estimated ejection fraction of 20 - 25%. Unable to assess diastolic function due to atrial flutter.    Right Ventricle: Mild right ventricular enlargement. Wall thickness is normal. Systolic function is moderately reduced.    Severe biatrial enlargement    Mitral Valve: There is mild regurgitation.    Pulmonary Artery: There is mild to moderate pulmonary hypertension. The estimated pulmonary artery systolic pressure is 49 mmHg.    IVC/SVC: Intermediate venous pressure at 8 mmHg.    Cardiology recommended to start to diuresis patient with Lasix 80 mg IV BID per Cards recs. Patient started on Entresto for depressed EF and titrated as per Cardiology recs and continue on Aldactone. Cardiology recommending " ischemic work-up as outpatient with PET stress and consideration of Life Vest on discharge which patient declining at this time but will discuss with Cardiology as outpatient. Patient also having a number of PVCs and PACs in hospital and reviewed by Cardiology who noted patient in atrial flutter. After discussion with Cardiology they recommended DEMETRICE and cardioversion for his atrial flutter as patient symptomatic and felt could be contributing to his heart failure and low EF. Patient to go for DEMETRICE and cardioversion on 9/19. Patient not progressing with PT/OT as limited by dyspnea and pain to right hip despite multimodal pain medications. Discussed with Dr. Stapleton from Orthopedics on 9/19 and he stated he spoke with patient and his son and after discussion plan to proceed with percutaneous screw fixation of right acetabular fracture and right sided pelvic fractures for 9/20. Patient had DEMETRICE and successful DCCV by Cardiology on 9/19 with return back to normal sinus rhythm. Patient started on IV Amiodarone infusion after cardioversion and plan for 24 hours then switch to oral Amiodarone as per Cardiology recommendations. Patient switched from IV to po Lasix on 9/19 after cardioversion to Lasix 40 mg po BID as now euvolemic. Patient taken to OR on 9/20/2024 and underwent ORIF of pelvis by Dr. Negrito Stapleton. Post-op, patient is weight bear as tolerated and range of motion as tolerate to bilateral lower extremities. PT/OT re consulted post-op. Patient completed Amiodarone infusion on evening of 9/20. Patient started on Amiodarone 400 mg po BID for 14 days on am of 9/21 followed by Amiodarone 200 mg po daily as per Cardiology recs. Patient remains in sinus rhythm on cardioversion. Patient reports pain controlled to right hip post-op. Therapy recommending high intensity therapy. SNF referrals sent and patient accepted to [facility] for 9/23. Patient approved on 9/23 by insurance but do not have BM and needs BM  prior to discharge to [facility] so to be given Lactulose and Magnesium citrate to have BM. Once has BM will be able to go to [facility] and hopeful discharge on 9//24. Patient had several bowel movements on am of 9/24. Patient reports right hip pain controlled. Vitals signs stable. Patient discharged in good condition to [facility] on 9/24. Surgical bandages to remain in place until Orthopedic clinic follow-up. Patient expressed he wished to be DNR and patient made DNR once all his surgeries and procedures complete and DNR on discharge. Patient has LaPost in place. Pain to right hip controlled on discharge.

## 2024-09-19 NOTE — ASSESSMENT & PLAN NOTE
- Patient has history of atrial bigeminy and bradycardia per chart. Has followed with EP.  - Monitor telemetry.  - HR ranges 40s-80s at times. Hold Metoprolol if HR sustains < 60.

## 2024-09-19 NOTE — CARE UPDATE
"RAPID RESPONSE NURSE CHART REVIEW        Chart Reviewed: 09/19/2024, 1:49 AM    MRN: [id number]  Bed: 524/524 A    Dx: Closed nondisplaced fracture of anterior column of right acetabulum with routine healing    Bean Salas has a past medical history of Atrial fibrillation, unspecified type, COPD exacerbation, and Thyroid disease.    Last VS: BP (!) 152/70 (BP Location: Right arm, Patient Position: Lying)   Pulse 98   Temp 98.1 °F (36.7 °C) (Oral)   Resp 18   Ht 5' 11" (1.803 m)   Wt 102 kg (224 lb 13.9 oz)   SpO2 (!) 94%   BMI 31.36 kg/m²     24H Vital Sign Range:  Temp:  [97 °F (36.1 °C)-98.3 °F (36.8 °C)]   Pulse:  [32-98]   Resp:  [17-20]   BP: ()/(51-70)   SpO2:  [93 %-97 %]     Level of Consciousness (AVPU): alert    Recent Labs     09/16/24  0423 09/17/24  0357 09/18/24  0248   WBC 11.17 9.75 9.48   HGB 13.5* 12.8* 14.0   HCT 41.3 40.0 42.2    142* 148*       Recent Labs     09/16/24  0423 09/17/24  0357 09/18/24  0248   NA  --  140 136   K  --  3.7 3.5   CL  --  101 100   CO2  --  27 27   BUN  --  25* 27*   CREATININE  --  1.0 1.1   GLU  --  138* 136*   MG 2.1 2.4 2.3        No results for input(s): "PH", "PCO2", "PO2", "HCO3", "POCSATURATED", "BE" in the last 72 hours.     OXYGEN:  Flow (L/min) (Oxygen Therapy): 2  Oxygen Concentration (%): 94       MEWS score: 1    Rounding completed with charge HUMPHREY Bravo reports Ptstill on bigeminy, DEMETRICE/DCCV in am . No additional concerns verbalized at this time. Instructed to call [phone] for further concerns or assistance.    Gloria Singh RN        "

## 2024-09-19 NOTE — SUBJECTIVE & OBJECTIVE
Past Medical History:   Diagnosis Date    Atrial fibrillation, unspecified type 09/06/2023    COPD exacerbation 09/06/2023    Thyroid disease     hypo       Past Surgical History:   Procedure Laterality Date    COLONOSCOPY  01/01/2011    CORONARY ANGIOGRAPHY N/A 07/22/2021    Procedure: ANGIOGRAM, CORONARY ARTERY;  Surgeon: Hank Barry MD;  Location: [facility] CATH LAB/EP;  Service: Cardiology;  Laterality: N/A;    EYE SURGERY Bilateral     cataracts extraction    FRACTURE SURGERY Right 09/2021    femur    HERNIA REPAIR      HIP SURGERY Right 08/2021    INTRAMEDULLARY RODDING OF TROCHANTER OF FEMUR Right 09/03/2021    Procedure: INSERTION, INTRAMEDULLARY RACQUEL, FEMUR, TROCHANTER;  Surgeon: Darryn Hall MD;  Location: [facility] OR;  Service: Orthopedics;  Laterality: Right;    JOINT REPLACEMENT Bilateral     knee    LEFT HEART CATHETERIZATION Right 07/22/2021    Procedure: Left heart cath;  Surgeon: Hank Barry MD;  Location: [facility] CATH LAB/EP;  Service: Cardiology;  Laterality: Right;    VASECTOMY         Review of patient's allergies indicates:  No Known Allergies    No current facility-administered medications on file prior to encounter.     Current Outpatient Medications on File Prior to Encounter   Medication Sig    acetaminophen (TYLENOL) 325 MG tablet Take 2 tablets (650 mg total) by mouth every 6 (six) hours as needed for Pain.    amLODIPine (NORVASC) 2.5 MG tablet Take 1 tablet by mouth once daily (Patient taking differently: Take 2.5 mg by mouth once daily.)    aspirin (ECOTRIN) 81 MG EC tablet Take 1 tablet (81 mg total) by mouth once daily.    atorvastatin (LIPITOR) 10 MG tablet Take 1 tablet by mouth once daily    furosemide (LASIX) 40 MG tablet Take 1 tablet (40 mg total) by mouth 2 (two) times a day. Take daily for next 3 days, then use as needed. Weigh daily. Afterwards, f weight increases 2 lbs/24h, take dose of lasix daily  until weight is back to baseline    levothyroxine (SYNTHROID) 200 MCG tablet  Take 1 tablet by mouth once daily    losartan (COZAAR) 100 MG tablet Take 1 tablet by mouth once daily    metoprolol succinate (TOPROL-XL) 50 MG 24 hr tablet Take 1 tablet by mouth once daily    mirtazapine (REMERON) 7.5 MG Tab One tablet po each night for sleep    multivit-min-FA-lycopen-lutein (CENTRUM SILVER) 0.4-300-250 mg-mcg-mcg Tab Take 1 tablet by mouth once daily.    spironolactone (ALDACTONE) 25 MG tablet Take 1 tablet by mouth once daily     Family History       Problem Relation (Age of Onset)    Crohn's disease Mother    Dementia Mother    Heart attack Father    No Known Problems Sister, Brother, Son          Tobacco Use    Smoking status: Former     Current packs/day: 0.00     Average packs/day: 1 pack/day for 35.0 years (35.0 ttl pk-yrs)     Types: Cigarettes     Start date: 1965     Quit date: 2000     Years since quittin.7     Passive exposure: Past    Smokeless tobacco: Never   Substance and Sexual Activity    Alcohol use: No    Drug use: Never    Sexual activity: Not Currently     Review of Systems   Constitutional: Negative for diaphoresis, malaise/fatigue, weight gain and weight loss.   HENT:  Negative for nosebleeds.    Eyes:  Negative for vision loss in left eye, vision loss in right eye and visual disturbance.   Cardiovascular:  Negative for chest pain, claudication, dyspnea on exertion, irregular heartbeat, leg swelling, near-syncope, orthopnea, palpitations, paroxysmal nocturnal dyspnea and syncope.   Respiratory:  Negative for cough, shortness of breath, sleep disturbances due to breathing, snoring and wheezing.    Hematologic/Lymphatic: Negative for bleeding problem. Does not bruise/bleed easily.   Skin:  Negative for poor wound healing and rash.   Musculoskeletal:  Negative for muscle cramps and myalgias.   Gastrointestinal:  Negative for bloating, abdominal pain, diarrhea, heartburn, melena, nausea and vomiting.   Genitourinary:  Negative for hematuria and nocturia.    Neurological:  Negative for brief paralysis, dizziness, headaches, light-headedness, numbness and weakness.   Psychiatric/Behavioral:  Negative for depression.    Allergic/Immunologic: Negative for hives.     Objective:     Vital Signs (Most Recent):  Temp: 97.3 °F (36.3 °C) (09/19/24 1118)  Pulse: 72 (09/19/24 1153)  Resp: 17 (09/19/24 1118)  BP: (!) 148/76 (09/19/24 1118)  SpO2: 98 % (09/19/24 1118) Vital Signs (24h Range):  Temp:  [97.3 °F (36.3 °C)-98.3 °F (36.8 °C)] 97.3 °F (36.3 °C)  Pulse:  [46-98] 72  Resp:  [17-20] 17  SpO2:  [94 %-98 %] 98 %  BP: (120-152)/(57-76) 148/76     Weight: 102 kg (224 lb 13.9 oz)  Body mass index is 31.36 kg/m².    SpO2: 98 %         Intake/Output Summary (Last 24 hours) at 9/19/2024 1251  Last data filed at 9/19/2024 0619  Gross per 24 hour   Intake --   Output 1650 ml   Net -1650 ml       Lines/Drains/Airways       Drain  Duration             Male External Urinary Catheter 09/16/24 0425 3 days              Peripheral Intravenous Line  Duration                  Peripheral IV - Single Lumen 09/16/24 0454 18 G Anterior;Distal;Left Forearm 3 days         Peripheral IV - Single Lumen 09/16/24 1112 20 G No Anterior;Right Forearm 3 days                     Physical Exam  Vitals and nursing note reviewed.   Constitutional:       Appearance: He is well-developed. He is not diaphoretic.   HENT:      Head: Normocephalic and atraumatic.   Eyes:      Conjunctiva/sclera: Conjunctivae normal.   Neck:      Vascular: No carotid bruit or JVD.   Cardiovascular:      Rate and Rhythm: Normal rate. Rhythm irregular.      Pulses: Normal pulses and intact distal pulses.      Heart sounds: Normal heart sounds. No murmur heard.     No friction rub. No gallop.   Pulmonary:      Effort: Pulmonary effort is normal.      Breath sounds: Normal breath sounds. No rales.   Chest:      Chest wall: No tenderness.   Abdominal:      General: There is no distension.      Palpations: Abdomen is soft. There is no  mass.      Tenderness: There is no abdominal tenderness.   Skin:     General: Skin is warm and dry.      Coloration: Skin is not pale.   Neurological:      Mental Status: He is alert and oriented to person, place, and time.          Significant Labs: CMP   Recent Labs   Lab 09/18/24 0248 09/19/24 0524    138   K 3.5 3.9    101   CO2 27 26   * 146*   BUN 27* 29*   CREATININE 1.1 1.0   CALCIUM 8.8 8.6*   ANIONGAP 9 11   , CBC   Recent Labs   Lab 09/18/24  0248 09/19/24 0524   WBC 9.48 9.86   HGB 14.0 14.7   HCT 42.2 42.7   * 152     All pertinent lab results from the last 24 hours have been reviewed.

## 2024-09-19 NOTE — NURSING TRANSFER
Nursing Transfer Note      Reason patient is being transferred: post procedure    Transfer To: 524    Transfer via stretcher    Transfer with cardiac monitoring via centralized telemetry, continuous pulse oximetry, 3L O2 per NC, IV pump with heparin infusing (no changes made to gtt while in PACU)    Transported by patient transport    Medicines sent: none    Any special needs or follow-up needed: routine    Chart send with patient: Yes    Notified: family via surgical texting system     Patient reassessed at: 9/19/2024 4:15 PM

## 2024-09-19 NOTE — ANESTHESIA POSTPROCEDURE EVALUATION
Anesthesia Post Evaluation    Patient: Bean Salas    Procedure(s) Performed: Procedure(s) (LRB):  Cardioversion or Defibrillation (N/A)  ECHOCARDIOGRAM, TRANSESOPHAGEAL (N/A)    Final Anesthesia Type: general      Patient location during evaluation: PACU  Patient participation: Yes- Able to Participate  Level of consciousness: awake and alert  Post-procedure vital signs: reviewed and stable  Pain management: adequate  Airway patency: patent    PONV status at discharge: No PONV  Anesthetic complications: no      Cardiovascular status: blood pressure returned to baseline  Respiratory status: unassisted  Hydration status: euvolemic  Follow-up not needed.              Vitals Value Taken Time   /56 09/19/24 1647   Temp 36.7 °C (98 °F) 09/19/24 1615   Pulse 48 09/19/24 1650   Resp 19 09/19/24 1650   SpO2 98 % 09/19/24 1650   Vitals shown include unfiled device data.      Event Time   Out of Recovery 09/19/2024 16:51:03         Pain/Tej Score: Pain Rating Prior to Med Admin: 7 (9/19/2024  9:00 AM)  Pain Rating Post Med Admin: 0 (9/18/2024  6:51 AM)  Tej Score: 9 (9/19/2024  3:45 PM)

## 2024-09-19 NOTE — TRANSFER OF CARE
"Anesthesia Transfer of Care Note    Patient: Bean Salas    Procedure(s) Performed: Procedure(s) (LRB):  Cardioversion or Defibrillation (N/A)  ECHOCARDIOGRAM, TRANSESOPHAGEAL (N/A)    Patient location: PACU    Anesthesia Type: general    Transport from OR: Transported from OR on 2-3 L/min O2 by NC with adequate spontaneous ventilation    Post pain: adequate analgesia    Post assessment: no apparent anesthetic complications    Post vital signs: stable    Level of consciousness: awake    Nausea/Vomiting: no nausea/vomiting    Complications: none    Transfer of care protocol was followed      Last vitals: Visit Vitals  BP 98/71   Pulse 79   Temp 36.7 °C (98.1 °F) (Temporal)   Resp 13   Ht 5' 11" (1.803 m)   Wt 101.6 kg (224 lb)   SpO2 97%   BMI 31.24 kg/m²     "

## 2024-09-19 NOTE — SUBJECTIVE & OBJECTIVE
Interval History: Patient to go for DEMETRICE and cardioversion today with Cardiology and Cardiology states likely can transition to oral Lasix after cardioversion and will need to be placed on Amiodarone after cardioversion with Amiodarone 400 mg po BID x 14 days for loading dosing followed by 200 mg po daily for maintenance dosing. Patient not progressing with PT/OT as limited by dyspnea and pain to right hip despite multimodal pain medications. Discussed with Dr. Stapleton from Orthopedics this am and he stated he spoke with patent and his son and after discussion plan to proceed with percutaneous screw fixation of right acetabular fracture and right sided pelvic fractures for tomorrow. Continue on IV Heparin drip for now to treat PEs and main to transition to therapeutic Apixiban to treat PEs post-op. NPO for DEMETRICE and cardioversion today and will be NPO after midnight tonight for surgery with Ortho tomorrow. Labs reviewed. Hgb stable at 14.7. Creatinine stable at 1.0. Magnesium normal at 2.2.    Review of Systems   Constitutional:  Negative for fever.   Respiratory:  Positive for shortness of breath (SLADE). Negative for cough.    Cardiovascular:  Positive for leg swelling. Negative for chest pain.   Gastrointestinal:  Negative for abdominal pain, nausea and vomiting.   Musculoskeletal:  Positive for arthralgias (Right hip) and gait problem.   Skin:  Positive for rash.   Psychiatric/Behavioral:  Negative for agitation and confusion.      Objective:     Vital Signs (Most Recent):  Temp: 97.3 °F (36.3 °C) (09/19/24 1118)  Pulse: 72 (09/19/24 1153)  Resp: 17 (09/19/24 1118)  BP: 148/76 (09/19/24 1118)  SpO2: 98 % (09/19/24 1118) on 2 liters of oxygen Vital Signs (24h Range):  Temp:  [97.3 °F (36.3 °C)-98.3 °F (36.8 °C)] 97.3 °F (36.3 °C)  Pulse:  [46-98] 72  Resp:  [17-20] 17  SpO2:  [94 %-98 %] 98 %  BP: (120-152)/(57-76) 148/76     Weight: 102 kg (224 lb 13.9 oz)  Body mass index is 31.36 kg/m².    Intake/Output Summary  (Last 24 hours) at 9/19/2024 1327  Last data filed at 9/19/2024 0619  Gross per 24 hour   Intake --   Output 1650 ml   Net -1650 ml         Physical Exam  Vitals and nursing note reviewed.   Constitutional:       General: He is awake. He is not in acute distress.     Appearance: Normal appearance. He is well-developed. He is obese. He is not ill-appearing.      Comments: Patient sitting up in bed in no apparent distress.    Eyes:      Conjunctiva/sclera: Conjunctivae normal.   Cardiovascular:      Rate and Rhythm: Normal rate and regular rhythm.      Heart sounds: Normal heart sounds. No murmur heard.  Pulmonary:      Effort: Pulmonary effort is normal. No respiratory distress.      Breath sounds: Normal breath sounds. No wheezing.   Abdominal:      General: Abdomen is flat. Bowel sounds are normal. There is no distension.      Palpations: Abdomen is soft.      Tenderness: There is no abdominal tenderness.   Musculoskeletal:      Right lower leg: Edema present.      Left lower leg: Edema present.   Skin:     Findings: No erythema or rash.   Neurological:      Mental Status: He is alert and oriented to person, place, and time.   Psychiatric:         Mood and Affect: Mood normal.         Behavior: Behavior normal. Behavior is cooperative.         Thought Content: Thought content normal.             Significant Labs: CBC:   Recent Labs   Lab 09/18/24  0248 09/19/24  0524   WBC 9.48 9.86   HGB 14.0 14.7   HCT 42.2 42.7   * 152     CMP:   Recent Labs   Lab 09/18/24  0248 09/19/24  0524    138   K 3.5 3.9    101   CO2 27 26   * 146*   BUN 27* 29*   CREATININE 1.1 1.0   CALCIUM 8.8 8.6*   ANIONGAP 9 11     Magnesium:   Recent Labs   Lab 09/18/24  0248 09/19/24  0524   MG 2.3 2.2       Significant Imaging: I have reviewed all pertinent imaging results/findings within the past 24 hours.

## 2024-09-19 NOTE — PT/OT/SLP PROGRESS
Occupational Therapy      Patient Name:  Bean Salas   MRN:  [id number]    Patient not seen today secondary to being off the floor for a procedure/surgery (cardioversion). Pt.will need new OT orders to reassessment to continue plan of care. Will follow-up when medically appropriate new and orders placed.    9/19/2024

## 2024-09-19 NOTE — ASSESSMENT & PLAN NOTE
Baseline, on bowel regimen to treat with scheduled Senakot and Miralax with Doculax suppository prn.

## 2024-09-19 NOTE — ASSESSMENT & PLAN NOTE
Patient noted in history and seen on telemetry on floor for PVC's. Keep Magnesium > 2, Potassium >4. Monitor telemetry. Continue Toprol XL to treat.

## 2024-09-19 NOTE — PROGRESS NOTES
[facility] - [facility]  Cardiology  Progress Note    Patient Name: Bean Salas  MRN: [id number]  Admission Date: 9/15/2024  Hospital Length of Stay: 3 days  Code Status: Full Code   Attending Physician: Annelise Bray MD   Primary Care Physician: Ramirez Levine MD  Expected Discharge Date: 9/23/2024  Principal Problem:Closed nondisplaced fracture of anterior column of right acetabulum with routine healing    Subjective:   Interval History: Pt to undergo DEMETRICE/DCCV today. Pt is very upset that he has not eaten since yesterday around 5PM.     Past Medical History:   Diagnosis Date    Atrial fibrillation, unspecified type 09/06/2023    COPD exacerbation 09/06/2023    Thyroid disease     hypo       Past Surgical History:   Procedure Laterality Date    COLONOSCOPY  01/01/2011    CORONARY ANGIOGRAPHY N/A 07/22/2021    Procedure: ANGIOGRAM, CORONARY ARTERY;  Surgeon: Hank Barry MD;  Location: [facility] CATH LAB/EP;  Service: Cardiology;  Laterality: N/A;    EYE SURGERY Bilateral     cataracts extraction    FRACTURE SURGERY Right 09/2021    femur    HERNIA REPAIR      HIP SURGERY Right 08/2021    INTRAMEDULLARY RODDING OF TROCHANTER OF FEMUR Right 09/03/2021    Procedure: INSERTION, INTRAMEDULLARY RACQUEL, FEMUR, TROCHANTER;  Surgeon: Darryn Hall MD;  Location: [facility];  Service: Orthopedics;  Laterality: Right;    JOINT REPLACEMENT Bilateral     knee    LEFT HEART CATHETERIZATION Right 07/22/2021    Procedure: Left heart cath;  Surgeon: Hank Barry MD;  Location: [facility] CATH LAB/EP;  Service: Cardiology;  Laterality: Right;    VASECTOMY         Review of patient's allergies indicates:  No Known Allergies    No current facility-administered medications on file prior to encounter.     Current Outpatient Medications on File Prior to Encounter   Medication Sig    acetaminophen (TYLENOL) 325 MG tablet Take 2 tablets (650 mg total) by mouth every 6 (six) hours as needed for Pain.    amLODIPine (NORVASC) 2.5 MG tablet  Take 1 tablet by mouth once daily (Patient taking differently: Take 2.5 mg by mouth once daily.)    aspirin (ECOTRIN) 81 MG EC tablet Take 1 tablet (81 mg total) by mouth once daily.    atorvastatin (LIPITOR) 10 MG tablet Take 1 tablet by mouth once daily    furosemide (LASIX) 40 MG tablet Take 1 tablet (40 mg total) by mouth 2 (two) times a day. Take daily for next 3 days, then use as needed. Weigh daily. Afterwards, f weight increases 2 lbs/24h, take dose of lasix daily  until weight is back to baseline    levothyroxine (SYNTHROID) 200 MCG tablet Take 1 tablet by mouth once daily    losartan (COZAAR) 100 MG tablet Take 1 tablet by mouth once daily    metoprolol succinate (TOPROL-XL) 50 MG 24 hr tablet Take 1 tablet by mouth once daily    mirtazapine (REMERON) 7.5 MG Tab One tablet po each night for sleep    multivit-min-FA-lycopen-lutein (CENTRUM SILVER) 0.4-300-250 mg-mcg-mcg Tab Take 1 tablet by mouth once daily.    spironolactone (ALDACTONE) 25 MG tablet Take 1 tablet by mouth once daily     Family History       Problem Relation (Age of Onset)    Crohn's disease Mother    Dementia Mother    Heart attack Father    No Known Problems Sister, Brother, Son          Tobacco Use    Smoking status: Former     Current packs/day: 0.00     Average packs/day: 1 pack/day for 35.0 years (35.0 ttl pk-yrs)     Types: Cigarettes     Start date: 1965     Quit date: 2000     Years since quittin.7     Passive exposure: Past    Smokeless tobacco: Never   Substance and Sexual Activity    Alcohol use: No    Drug use: Never    Sexual activity: Not Currently     Review of Systems   Cardiovascular:  Negative for chest pain, irregular heartbeat, leg swelling, palpitations and syncope.   Respiratory:  Negative for shortness of breath.      Objective:     Vital Signs (Most Recent):  Temp: 97.5 °F (36.4 °C) (24 0756)  Pulse: (!) 46 (24 0756)  Resp: 18 (24 0900)  BP: 129/73 (24 0756)  SpO2: 96 % (24  0756) Vital Signs (24h Range):  Temp:  [97 °F (36.1 °C)-98.3 °F (36.8 °C)] 97.5 °F (36.4 °C)  Pulse:  [40-98] 46  Resp:  [17-20] 18  SpO2:  [93 %-97 %] 96 %  BP: ()/(51-73) 129/73     Weight: 102 kg (224 lb 13.9 oz)  Body mass index is 31.36 kg/m².    SpO2: 96 %         Intake/Output Summary (Last 24 hours) at 9/19/2024 0906  Last data filed at 9/19/2024 0619  Gross per 24 hour   Intake --   Output 1650 ml   Net -1650 ml       Lines/Drains/Airways       Drain  Duration             Male External Urinary Catheter 09/16/24 0425 3 days              Peripheral Intravenous Line  Duration                  Peripheral IV - Single Lumen 09/16/24 0454 18 G Anterior;Distal;Left Forearm 3 days         Peripheral IV - Single Lumen 09/16/24 1112 20 G No Anterior;Right Forearm 2 days                     Physical Exam  Vitals reviewed.   Constitutional:       Appearance: He is well-developed. He is obese. He is not diaphoretic.   HENT:      Head: Normocephalic and atraumatic.      Right Ear: External ear normal.      Left Ear: External ear normal.   Eyes:      Extraocular Movements: Extraocular movements intact.   Neck:      Vascular: No carotid bruit or JVD.   Cardiovascular:      Rate and Rhythm: Normal rate. Rhythm irregular.      Pulses: Normal pulses and intact distal pulses.      Heart sounds:      No gallop.   Pulmonary:      Effort: Pulmonary effort is normal.      Breath sounds: Normal breath sounds. No rales.   Chest:      Chest wall: No tenderness.   Abdominal:      Palpations: Abdomen is soft.      Tenderness: There is no abdominal tenderness.   Musculoskeletal:      Right lower leg: No edema.      Left lower leg: No edema.   Skin:     General: Skin is warm and dry.      Coloration: Skin is not pale.   Neurological:      Mental Status: He is oriented to person, place, and time.          Significant Labs:   Recent Labs   Lab 09/18/24  0248 09/19/24  0524    138   K 3.5 3.9    101   CO2 27 26   *  146*   BUN 27* 29*   CREATININE 1.1 1.0   CALCIUM 8.8 8.6*   ANIONGAP 9 11     Recent Labs   Lab 09/18/24  0248 09/19/24  0524   WBC 9.48 9.86   HGB 14.0 14.7   HCT 42.2 42.7   * 152     All pertinent lab results from the last 24 hours have been reviewed.  Assessment and Plan:     Pulmonary embolism without acute cor pulmonale  Mr. Salas is a 80y/o M HfrEF w/ recovered EF (40%--9/2021), NICM, PAF, frequent PVCs, hypertension and hyperlipidemia, AKHIL not using CPAP, PVD, chronic respiratory failure w/ home oxygen and hypothyroidism who [place] [facility] [place] a GLF at home resulting in a nondisplaced fracture of the anterior right acetabulum. Patient also found to have a non occlusive PE by CT in main PA< RLL vessels, and occlusive PE in subsegmental PA, no right heart strain. LE US was negative for DVT. He was started on a heparin drip. Labs significant for a peaked trop 1.041 0.817, ---918 (11mo), Echo done today showed a severely depressed LVEF 20-25%, RV depressed systolic function, PASP 49 with an IVC 8. PESI score 110, Class IV high risk.     Recs:   - Elevated PESI score mostly driven by his history of HF and chronic long disease. Continue on high intensity heparin drip and can be later transitioned to eliquis. No  right heart strain on echo.   - will need hypercoagulable work up      Acute on chronic combined systolic and diastolic congestive heart failure  Hx of HFrEF, NICM with normalization of LVEF now with new depressed LVEF 20-25%, mod RV dysfunction, elevated PASP.     Recs.  - Patient continues to be hypervolemic on exam. Rec to increase his IV lasix to 80mg BID and aim for 1-2L net negative.   - He is currently on some GDMT and his home toprol was discontinued due to bradycardia. Recommend switching losartan. Can add low dose toprol 12.5mg if patient does not have symptomatic bradycardia. Continue spironolactone and discontinue amlodipine as we will need to uptitrate his GDMT.    - Continue full dose entresto, consider adding on jardiance   - Once euvolemic, if he continues to be in atrial flutter would need to plan for a DEMETRICE/DCCV and he will need to continue OAC as he has a CHADSVASC of 5. Plans for DEMETRICE/DCCV today  - After procedure will need amiodarone 400mg BID x 14 days followed by 200mg daily   - Can start lasix 40mg BID tonight   - Will need an ischemic work up as an outpatient, recommend a cardiac PET stress test and lifevest at discharge.   - Will need close outpatient follow up with cardiology.  - Discussed the importance of a heart healthy no salt diet, unfortunately patient does not have any desire to stop his salt intake but this can be work up more as an outpatient.     Paroxysmal atrial flutter       See acute on chronic combined systolic and diastolic congestive heart failure      Cardiology will sign off. Thank you for this consult.     VTE Risk Mitigation (From admission, onward)           Ordered     heparin 25,000 units in dextrose 5% (100 units/ml) IV bolus from bag HIGH INTENSITY nomogram - [facility]  As needed (PRN)        Question:  Heparin Infusion Adjustment (DO NOT MODIFY ANSWER)  Answer:  [email or web address]\Images\Pharmacy\HeparinInfusions\heparin HIGH INTENSITY nomogram for [facility] [id number]    09/16/24 0351     heparin 25,000 units in dextrose 5% (100 units/ml) IV bolus from bag HIGH INTENSITY nomogram - [facility]  As needed (PRN)        Question:  Heparin Infusion Adjustment (DO NOT MODIFY ANSWER)  Answer:  [email or web address]\epic\Images\Pharmacy\HeparinInfusions\heparin HIGH INTENSITY nomogram for [facility] [id number]    09/16/24 0351     Place sequential compression device  Until discontinued         09/16/24 0428     Reason for No Pharmacological VTE Prophylaxis  Once        Comments: Heparin gtt for PE   Question:  Reasons:  Answer:  Physician Provided (leave comment)    09/16/24 0428     IP VTE HIGH RISK PATIENT  Once         09/16/24 0428     heparin 25,000 units in dextrose 5%  250 mL (100 units/mL) infusion HIGH INTENSITY nomogram - [facility]  Continuous        Question:  Begin at (units/kg/hr)  Answer:  18 09/16/24 0351                    Dl Méndez, DO  Internal Medicine Intern   Cardiology  [facility] - Surgery  As above -if Cardioversion is effective , start Amio as above . Re-consult Cards if help needed

## 2024-09-19 NOTE — CARE UPDATE
Patient is post cardioversion into NSR. Will start 24hr IV amiodarone load then po amiodarone afterwards.    Evlira Akins MD  Cardiovascular Disease PGY6  [facility]

## 2024-09-19 NOTE — ASSESSMENT & PLAN NOTE
Elevated troponin  Abnormal ventricular wall motion   - Patient with known CAD which is controlled. Will continue home Aspirin, Toprol XL and Lipitor daily to treat and monitor for S/Sx of angina/ACS. Continue to monitor on telemetry. Previous angiogram with non obstructive CAD 2021.   - Patient with elevated troponin on admit likely from PE but EKG with ST depressions, T wave inversions that are new so Cardiology consulted and appreciate recs. Echo done with new septal wall motion abnormaliteis and decreased EF at 20-25%. Cardiology recommended for outpatient PET cardiac stress and life vest on discharge for ischemic work up.

## 2024-09-19 NOTE — ASSESSMENT & PLAN NOTE
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 He is on home oxygen at 2-3 LPM. Supplemental oxygen was provided and noted-at 2 liters of oxygen.      Signs/symptoms of respiratory failure include- tachypnea and increased work of breathing. Contributing diagnoses includes - CHF and pulmonary embolus. Labs and images were reviewed. Patient Has not had a recent ABG. Will treat underlying causes and adjust management of respiratory failure as follows- IV Lasix for heart failure and IV Heparin for PE.

## 2024-09-19 NOTE — PLAN OF CARE
09/19/24 0855   Post-Acute Status   Post-Acute Authorization Placement   Post-Acute Placement Status Pending medical clearance/testing   Discharge Plan   Discharge Plan A Skilled Nursing Facility     Patient accepted to the following ([facility], [facility], [facility], [facility], and [facility]). SW to follow.    Yodit Nicole LMSW  Case Management   [facility]   Ext. [phone]

## 2024-09-19 NOTE — ASSESSMENT & PLAN NOTE
Patient's most recent potassium results are listed below.   Recent Labs     09/17/24  0357 09/18/24  0248 09/19/24  0524   K 3.7 3.5 3.9       Plan  - Replete potassium per protocol  - Monitor potassium Daily  - Patient's hypokalemia is  mildly under baseline 9/18 with goal 4 with PVCs so will replace  -

## 2024-09-19 NOTE — ASSESSMENT & PLAN NOTE
Chronic, controlled. Latest blood pressure and vitals reviewed-     Temp:  [97.3 °F (36.3 °C)-98.3 °F (36.8 °C)]   Pulse:  [46-98]   Resp:  [17-20]   BP: (120-152)/(57-76)   SpO2:  [94 %-98 %] .   Home meds for hypertension were reviewed and noted below.   Hypertension Medications               amLODIPine (NORVASC) 2.5 MG tablet Take 1 tablet by mouth once daily    furosemide (LASIX) 40 MG tablet Take 1 tablet (40 mg total) by mouth 2 (two) times a day. Take daily for next 3 days, then use as needed. Weigh daily. Afterwards, f weight increases 2 lbs/24h, take dose of lasix daily  until weight is back to baseline    losartan (COZAAR) 100 MG tablet Take 1 tablet by mouth once daily    metoprolol succinate (TOPROL-XL) 50 MG 24 hr tablet Take 1 tablet by mouth once daily    spironolactone (ALDACTONE) 25 MG tablet Take 1 tablet by mouth once daily     While in the hospital, will manage blood pressure as follows; Adjust home antihypertensive regimen as follows- Stopped Norvasc and Losartan by Cardiology and started on Entresto for new onset HFrEF . Continue Lasix and Aldactone.

## 2024-09-19 NOTE — CONSULTS
[facility]  Cardiology  Consult Note    Patient Name: Bean Salas  MRN: [id number]  Admission Date: 9/15/2024  Hospital Length of Stay: 3 days  Code Status: Full Code   Attending Provider: Annelise Bray MD   Consulting Provider: Mere Mckoy PA-C  Primary Care Physician: Ramirez Levine MD  Principal Problem:Closed nondisplaced fracture of anterior column of right acetabulum with routine healing    Patient information was obtained from patient and past medical records.     Consults  Subjective:     Chief Complaint:  Fall     HPI:   Mr. Salas is a 80 y/o M with a PMHx of HFrEF w/ recovered EF (40%--9/2021), NICM, PAF, frequent PVCs, hypertension and hyperlipidemia, AKHIL not using CPAP, PVD, chronic respiratory failure w/ home oxygen and hypothyroidism who [place] [facility] [place] after a GLF at home resulting in a nondisplaced fracture of the anterior right acetabulum. Patient also found to have a non occlusive PE by CT in main PA< RLL vessels, and occlusive PE in subsegmental PA, no right heart strain. LE US was negative for DVT. He was started on a heparin drip. ECG with AFL rate controlled with PVCs. Echo shows a severely depressed LVEF 20-25%, RV depressed systolic function, PASP 49 with an IVC 8. He has been started on IV Lasix for diuresis and GDMT for systolic CHF. Cardiology consulted to perform DEMETRICE/DCCV.     TTE 9/16/24    Technically difficult study    Left Ventricle: The left ventricle is normal in size. Normal wall thickness. Severe global hypokinesis present. Septal motion is abnormal. There is severely reduced systolic function with a visually estimated ejection fraction of 20 - 25%. Unable to assess diastolic function due to atrial flutter.    Right Ventricle: Mild right ventricular enlargement. Wall thickness is normal. Systolic function is moderately reduced.    Severe biatrial enlargement    Mitral Valve: There is mild regurgitation.    Pulmonary Artery: There is mild  to moderate pulmonary hypertension. The estimated pulmonary artery systolic pressure is 49 mmHg.    IVC/SVC: Intermediate venous pressure at 8 mmHg.    Anticoagulant/antiplatelets: Heparin drip, high intensity   ECG: Atrial flutter with PVCs    Dysphagia or odynophagia:  No  Liver Disease, esophageal disease, or known varices:  No  Upper GI Bleeding: No  Snoring:  Yes  Sleep Apnea:  Yes  Prior neck surgery or radiation:  No  Able to move neck in all directions:  Yes  History of anesthetic difficulties:  No  Family history of anesthetic difficulties:  No  Last oral intake: yesterday before midnight  GLP-1 use: None    Platelet count: 152k  INR: 1.1      Past Medical History:   Diagnosis Date    Atrial fibrillation, unspecified type 09/06/2023    COPD exacerbation 09/06/2023    Thyroid disease     hypo       Past Surgical History:   Procedure Laterality Date    COLONOSCOPY  01/01/2011    CORONARY ANGIOGRAPHY N/A 07/22/2021    Procedure: ANGIOGRAM, CORONARY ARTERY;  Surgeon: Hank Barry MD;  Location: [facility] CATH LAB/EP;  Service: Cardiology;  Laterality: N/A;    EYE SURGERY Bilateral     cataracts extraction    FRACTURE SURGERY Right 09/2021    femur    HERNIA REPAIR      HIP SURGERY Right 08/2021    INTRAMEDULLARY RODDING OF TROCHANTER OF FEMUR Right 09/03/2021    Procedure: INSERTION, INTRAMEDULLARY RACQUEL, FEMUR, TROCHANTER;  Surgeon: Darryn Hall MD;  Location: [facility] OR;  Service: Orthopedics;  Laterality: Right;    JOINT REPLACEMENT Bilateral     knee    LEFT HEART CATHETERIZATION Right 07/22/2021    Procedure: Left heart cath;  Surgeon: Hank Barry MD;  Location: [facility] CATH LAB/EP;  Service: Cardiology;  Laterality: Right;    VASECTOMY         Review of patient's allergies indicates:  No Known Allergies    No current facility-administered medications on file prior to encounter.     Current Outpatient Medications on File Prior to Encounter   Medication Sig    acetaminophen (TYLENOL) 325 MG tablet Take 2  tablets (650 mg total) by mouth every 6 (six) hours as needed for Pain.    amLODIPine (NORVASC) 2.5 MG tablet Take 1 tablet by mouth once daily (Patient taking differently: Take 2.5 mg by mouth once daily.)    aspirin (ECOTRIN) 81 MG EC tablet Take 1 tablet (81 mg total) by mouth once daily.    atorvastatin (LIPITOR) 10 MG tablet Take 1 tablet by mouth once daily    furosemide (LASIX) 40 MG tablet Take 1 tablet (40 mg total) by mouth 2 (two) times a day. Take daily for next 3 days, then use as needed. Weigh daily. Afterwards, f weight increases 2 lbs/24h, take dose of lasix daily  until weight is back to baseline    levothyroxine (SYNTHROID) 200 MCG tablet Take 1 tablet by mouth once daily    losartan (COZAAR) 100 MG tablet Take 1 tablet by mouth once daily    metoprolol succinate (TOPROL-XL) 50 MG 24 hr tablet Take 1 tablet by mouth once daily    mirtazapine (REMERON) 7.5 MG Tab One tablet po each night for sleep    multivit-min-FA-lycopen-lutein (CENTRUM SILVER) 0.4-300-250 mg-mcg-mcg Tab Take 1 tablet by mouth once daily.    spironolactone (ALDACTONE) 25 MG tablet Take 1 tablet by mouth once daily     Family History       Problem Relation (Age of Onset)    Crohn's disease Mother    Dementia Mother    Heart attack Father    No Known Problems Sister, Brother, Son          Tobacco Use    Smoking status: Former     Current packs/day: 0.00     Average packs/day: 1 pack/day for 35.0 years (35.0 ttl pk-yrs)     Types: Cigarettes     Start date: 1965     Quit date: 2000     Years since quittin.7     Passive exposure: Past    Smokeless tobacco: Never   Substance and Sexual Activity    Alcohol use: No    Drug use: Never    Sexual activity: Not Currently     Review of Systems   Constitutional: Negative for diaphoresis, malaise/fatigue, weight gain and weight loss.   HENT:  Negative for nosebleeds.    Eyes:  Negative for vision loss in left eye, vision loss in right eye and visual disturbance.   Cardiovascular:   Negative for chest pain, claudication, dyspnea on exertion, irregular heartbeat, leg swelling, near-syncope, orthopnea, palpitations, paroxysmal nocturnal dyspnea and syncope.   Respiratory:  Negative for cough, shortness of breath, sleep disturbances due to breathing, snoring and wheezing.    Hematologic/Lymphatic: Negative for bleeding problem. Does not bruise/bleed easily.   Skin:  Negative for poor wound healing and rash.   Musculoskeletal:  Negative for muscle cramps and myalgias.   Gastrointestinal:  Negative for bloating, abdominal pain, diarrhea, heartburn, melena, nausea and vomiting.   Genitourinary:  Negative for hematuria and nocturia.   Neurological:  Negative for brief paralysis, dizziness, headaches, light-headedness, numbness and weakness.   Psychiatric/Behavioral:  Negative for depression.    Allergic/Immunologic: Negative for hives.     Objective:     Vital Signs (Most Recent):  Temp: 97.3 °F (36.3 °C) (09/19/24 1118)  Pulse: 72 (09/19/24 1153)  Resp: 17 (09/19/24 1118)  BP: (!) 148/76 (09/19/24 1118)  SpO2: 98 % (09/19/24 1118) Vital Signs (24h Range):  Temp:  [97.3 °F (36.3 °C)-98.3 °F (36.8 °C)] 97.3 °F (36.3 °C)  Pulse:  [46-98] 72  Resp:  [17-20] 17  SpO2:  [94 %-98 %] 98 %  BP: (120-152)/(57-76) 148/76     Weight: 102 kg (224 lb 13.9 oz)  Body mass index is 31.36 kg/m².    SpO2: 98 %         Intake/Output Summary (Last 24 hours) at 9/19/2024 1251  Last data filed at 9/19/2024 0619  Gross per 24 hour   Intake --   Output 1650 ml   Net -1650 ml       Lines/Drains/Airways       Drain  Duration             Male External Urinary Catheter 09/16/24 0425 3 days              Peripheral Intravenous Line  Duration                  Peripheral IV - Single Lumen 09/16/24 0454 18 G Anterior;Distal;Left Forearm 3 days         Peripheral IV - Single Lumen 09/16/24 1112 20 G No Anterior;Right Forearm 3 days                     Physical Exam  Vitals and nursing note reviewed.   Constitutional:       Appearance:  He is well-developed. He is not diaphoretic.   HENT:      Head: Normocephalic and atraumatic.   Eyes:      Conjunctiva/sclera: Conjunctivae normal.   Neck:      Vascular: No carotid bruit or JVD.   Cardiovascular:      Rate and Rhythm: Normal rate. Rhythm irregular.      Pulses: Normal pulses and intact distal pulses.      Heart sounds: Normal heart sounds. No murmur heard.     No friction rub. No gallop.   Pulmonary:      Effort: Pulmonary effort is normal.      Breath sounds: Normal breath sounds. No rales.   Chest:      Chest wall: No tenderness.   Abdominal:      General: There is no distension.      Palpations: Abdomen is soft. There is no mass.      Tenderness: There is no abdominal tenderness.   Skin:     General: Skin is warm and dry.      Coloration: Skin is not pale.   Neurological:      Mental Status: He is alert and oriented to person, place, and time.          Significant Labs: CMP   Recent Labs   Lab 09/18/24  0248 09/19/24  0524    138   K 3.5 3.9    101   CO2 27 26   * 146*   BUN 27* 29*   CREATININE 1.1 1.0   CALCIUM 8.8 8.6*   ANIONGAP 9 11   , CBC   Recent Labs   Lab 09/18/24  0248 09/19/24  0524   WBC 9.48 9.86   HGB 14.0 14.7   HCT 42.2 42.7   * 152     All pertinent lab results from the last 24 hours have been reviewed.    Assessment and Plan:     Paroxysmal atrial flutter  80 y/o with HFrEF, NICM, PAF, frequent PVCs, hypertension, hyperlipidemia, AKHIL not using CPAP, PVD, chronic respiratory failure w/ home oxygen who is admitted following a fall and found to have R hip fracture, pulmonary embolism, ADHF with severely reduced EF, and atrial flutter. Plan for DEMETRICE/DCCV today.     -The risks, benefits & alternatives of the procedure were explained to the patient.    -The risks of transesophageal echo include but are not limited to:  Dental trauma, esophageal trauma/perforation, bleeding, laryngospasm/brochospasm, aspiration, sore throat/hoarseness, & dislodgement of the  endotracheal tube/nasogastric tube (where applicable).    -The risks of moderate sedation include hypotension, respiratory depression, arrhythmias, bronchospasm, & death.    -Prior to procedure, extensive discussion with patient regarding risks and benefits of DCCV at bedside today.   -The patient voices understanding, all questions have been answered, and patient would like to proceed.   -Informed consent was obtained & the patient is agreeable to proceed with the procedure.      Further recommendations per attending addendum            VTE Risk Mitigation (From admission, onward)           Ordered     heparin 25,000 units in dextrose 5% (100 units/ml) IV bolus from bag HIGH INTENSITY nomogram - [facility]  As needed (PRN)        Question:  Heparin Infusion Adjustment (DO NOT MODIFY ANSWER)  Answer:  [email or web address]\Images\Pharmacy\HeparinInfusions\heparin HIGH INTENSITY nomogram for [facility] [email or web address]    09/16/24 0351     heparin 25,000 units in dextrose 5% (100 units/ml) IV bolus from bag HIGH INTENSITY nomogram - [facility]  As needed (PRN)        Question:  Heparin Infusion Adjustment (DO NOT MODIFY ANSWER)  Answer:  [email or web address]\epic\Images\Pharmacy\HeparinInfusions\heparin HIGH INTENSITY nomogram for [facility] [id number].pdf    09/16/24 0351     Place sequential compression device  Until discontinued         09/16/24 0428     Reason for No Pharmacological VTE Prophylaxis  Once        Comments: Heparin gtt for PE   Question:  Reasons:  Answer:  Physician Provided (leave comment)    09/16/24 0428     IP VTE HIGH RISK PATIENT  Once         09/16/24 0428     heparin 25,000 units in dextrose 5% 250 mL (100 units/mL) infusion HIGH INTENSITY nomogram - [facility]  Continuous        Question:  Begin at (units/kg/hr)  Answer:  18    09/16/24 0351                    Thank you for your consult. I will sign off. Please contact us if you have any additional questions.    Mere Mckoy PA-C  Cardiology   [facility]

## 2024-09-19 NOTE — ASSESSMENT & PLAN NOTE
Pulmonary hypertension   Patient is identified as having Combined Systolic and Diastolic heart failure that is Acute on chronic. CHF is currently uncontrolled due to Continued edema of extremities and dyspnea. Latest ECHO performed and demonstrates- Results for orders placed during the hospital encounter of 10/04/23  Echo    Result Date: 9/16/2024    Technically difficult study    Left Ventricle: The left ventricle is normal in size. Normal wall   thickness. Severe global hypokinesis present. Septal motion is abnormal.   There is severely reduced systolic function with a visually estimated   ejection fraction of 20 - 25%. Unable to assess diastolic function due to   atrial flutter.    Right Ventricle: Mild right ventricular enlargement. Wall thickness is   normal. Systolic function is moderately reduced.    Severe biatrial enlargement    Mitral Valve: There is mild regurgitation.    Pulmonary Artery: There is mild to moderate pulmonary hypertension. The   estimated pulmonary artery systolic pressure is 49 mmHg.    IVC/SVC: Intermediate venous pressure at 8 mmHg.       Recent Labs   Lab 09/16/24  0037   *     - He denies acute SOB but reports SLADE, SOB after activities such as eating requiring him to sit and use a CPAP for 2-3 hours, and lower extremity swelling right > left not much improved with his home PO Lasix and Spironolactone. He also reports requiring 2-3 liters supplemental oxygen while sleeping as well. He also reports eating bags of salty chips to help with his leg swelling.  - Cardiology consulted on this admit given new reduced EF with global wall motion and septal wall motion abnormalities Patient taken off Losartan and started on Entresto to treat his reduced EF and heart failure. Patient continued on his home Aldactone. Patient placed on Lasix 80 mg IV BID to diuresis patient in hospital and patient diuresising well and net negative 4.1 liters since admit on 9/19.   - Toprol XL 12.5 mg po daily  for his chronic heart failure.   - Will need life vest on discharge if patient wishes, as is DNR baseline. Will need Cardiac PET stress as outpatient for ischemic evaluation.  - Fluid restriction 1.5 liters a day and low salt diet.   - Monitor response to treatment.

## 2024-09-19 NOTE — ASSESSMENT & PLAN NOTE
78 y/o with HFrEF, NICM, PAF, frequent PVCs, hypertension, hyperlipidemia, AKHIL not using CPAP, PVD, chronic respiratory failure w/ home oxygen who is admitted following a fall and found to have R hip fracture, pulmonary embolism, ADHF with severely reduced EF, and atrial flutter. Plan for DEMETRICE/DCCV today.     -The risks, benefits & alternatives of the procedure were explained to the patient.    -The risks of transesophageal echo include but are not limited to:  Dental trauma, esophageal trauma/perforation, bleeding, laryngospasm/brochospasm, aspiration, sore throat/hoarseness, & dislodgement of the endotracheal tube/nasogastric tube (where applicable).    -The risks of moderate sedation include hypotension, respiratory depression, arrhythmias, bronchospasm, & death.    -Prior to procedure, extensive discussion with patient regarding risks and benefits of DCCV at bedside today.   -The patient voices understanding, all questions have been answered, and patient would like to proceed.   -Informed consent was obtained & the patient is agreeable to proceed with the procedure.      Further recommendations per attending addendum

## 2024-09-19 NOTE — ASSESSMENT & PLAN NOTE
"CT chest/abdomen/pelvis as part of trauma work up in ED showed "Right-sided pulmonary embolism, detailed above, with nonocclusive thrombus in the main right pulmonary artery and right lower lobe branch vessels, and occlusive thrombus in a subsegmental branch of the right pulmonary artery. Note comparison images are not available to determine chronicity. No right sided heart strain."   - Denies previous diagnosis or PE/DVT.  - Denies chest pain or SOB.  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 He requires 2-3L NC of oxygen at home/sleeping and uses a CPAP after meals (exertional to patient) and for naps.  - Currently stable on 2-3L NC oxygen. No hypotension. Patient has baseline dyspnea when mobilizing sideways in bed even prior to this admit.   - Labs with elevated Troponin 0.811>1.041 and .   - Initialed on Heparin drip IV to treat PE and will continue and plan to transition to therapeutic oral Apixiban to treat once all procedures complete with Apixiban 10 mg po BID x 7 days followed by indefinite treatment of Apixiban 5 mg po BID.   - Cards consulted, continue current therapies. No indication to activate PE response team at this time.   - No signs of right heart strain  - US of lower extremities on this admit negative for DVTs so IVC filter not indicated.   "

## 2024-09-19 NOTE — ASSESSMENT & PLAN NOTE
78 y/o male presenting after ground level fall with right hip pain on standing. CT scan showed acute closed fractures of the right superior and inferior pubic rami with involvement of the anterosuperior acetabulum.   - Orthopedic surgery consulted by ED and recommended weight bear as tolerated to right lower extremity and trial of PT/OT and pain management.   - PT/OT consulted and patient not progressing and only able to take a few shuffling steps and total assist x 2 people. Patient limited by dyspnea and pain. Orthopedics discussed with patient and his son and plan to proceed with operative intervention on pelvic fractures with percutaneous screw fixation on 9/20. Patient to be NPO after midnight tonight.   - Continue pain control with scheduled Tylenol and Robaxin with Oxycodone IR 5 mg po every 4 hours prn for pain management.   - Fall precautions.  - Cardiology reports does not need PET stress/ischemic work up prior to a pelvic fracture surgery as that can be done as outpatient. Patient close to euvolemic at this time and plan to switch to po Lasix on 9/19 so okay to proceed with ORIF of pelvis in OR with Ortho on 9/20 with no further intervention as optimized for surgery. Patient going for cardioversion today with Cardiology.   - Patient on IV Heparin drip to treat PE so no DVT prophylaxis needed as fully anticoagulated.

## 2024-09-19 NOTE — PROGRESS NOTES
Post procedure EKG showing a return to a-fib. Dr. Pichardo notified of the above and responded to bedside for patient assessment. Repeat EKG ordered and completed. Dr. Pichardo notified. States patient to be managed by primary team - plans to start PO amiodarone. No additional orders given at this time. Patient to return to his inpatient room.

## 2024-09-19 NOTE — ASSESSMENT & PLAN NOTE
- Reportedly bitten by multiple small ants outside prior to admit.   - He has small red lesions on skin consistent with ant bites but no pustules, no pain or itching currently .   - Benadryl cream prn.  - Supportive care, monitor.

## 2024-09-19 NOTE — ASSESSMENT & PLAN NOTE
80 y/o with HFrEF, NICM, PAF, frequent PVCs, hypertension, hyperlipidemia, AKHIL not using CPAP, PVD, chronic respiratory failure w/ home oxygen who is admitted following a fall and found to have R hip fracture, pulmonary embolism, ADHF with severely reduced EF, and atrial flutter. Plan for DEMETRICE/DCCV today.     -The risks, benefits & alternatives of the procedure were explained to the patient.    -The risks of transesophageal echo include but are not limited to:  Dental trauma, esophageal trauma/perforation, bleeding, laryngospasm/brochospasm, aspiration, sore throat/hoarseness, & dislodgement of the endotracheal tube/nasogastric tube (where applicable).    -The risks of moderate sedation include hypotension, respiratory depression, arrhythmias, bronchospasm, & death.    -Prior to procedure, extensive discussion with patient regarding risks and benefits of DCCV at bedside today.   -The patient voices understanding, all questions have been answered, and patient would like to proceed.   -Informed consent was obtained & the patient is agreeable to proceed with the procedure.      Further recommendations per attending addendum

## 2024-09-19 NOTE — ASSESSMENT & PLAN NOTE
- Per chart review, his cardiologist Dr. Hank Barry feels EKGs that were read as atrial fibrillation previously are actually sinus rhythm and thus not on long term anticoagulation on admit.   - Cardiology consulted here who report current rhythm is atrial flutter and report if does not self convert may need DEMETRICE/DCCV once euvolemic. Patient now euvolemic on 9/19 and remains in atrial flutter and rate is controlled at 45-65 on Toprol XL. Cardiology recommending DEMETRICE and cardioversion for today and patient currently NPO awaiting procedure.   - Patient on IV Heparin drip for anticoagulation for now and will continue but transition to oral Apixiban once all surgeries and procedures complete for long term anticoagulation.

## 2024-09-19 NOTE — ASSESSMENT & PLAN NOTE
Hx of HFrEF, NICM with normalization of LVEF now with new depressed LVEF 20-25%, mod RV dysfunction, elevated PASP.     Recs.  - Patient continues to be hypervolemic on exam. Rec to increase his IV lasix to 80mg BID and aim for 1-2L net negative.   - He is currently on some GDMT and his home toprol was discontinued due to bradycardia. Recommend switching losartan. Can add low dose toprol 12.5mg if patient does not have symptomatic bradycardia. Continue spironolactone and discontinue amlodipine as we will need to uptitrate his GDMT.   - Continue full dose entresto, consider adding on jardiance   - Once euvolemic, if he continues to be in atrial flutter would need to plan for a DEMETRICE/DCCV and he will need to continue OAC as he has a CHADSVASC of 5. Plans for DEMETRICE/DCCV today  - Will need an ischemic work up as an outpatient, recommend a cardiac PET stress test and lifevest at discharge.   - Will need close outpatient follow up with cardiology.  - Discussed the importance of a heart healthy no salt diet, unfortunately patient does not have any desire to stop his salt intake but this can be work up more as an outpatient.

## 2024-09-20 ENCOUNTER — ANESTHESIA EVENT (OUTPATIENT)
Dept: SURGERY | Facility: HOSPITAL | Age: 79
End: 2024-09-20
Payer: MEDICARE

## 2024-09-20 ENCOUNTER — ANESTHESIA (OUTPATIENT)
Dept: SURGERY | Facility: HOSPITAL | Age: 79
End: 2024-09-20
Payer: MEDICARE

## 2024-09-20 LAB
ABO + RH BLD: NORMAL
ANION GAP SERPL CALC-SCNC: 10 MMOL/L (ref 8–16)
APTT PPP: 57.2 SEC (ref 21–32)
APTT PPP: 60.1 SEC (ref 21–32)
BASOPHILS # BLD AUTO: 0.02 K/UL (ref 0–0.2)
BASOPHILS NFR BLD: 0.3 % (ref 0–1.9)
BLD GP AB SCN CELLS X3 SERPL QL: NORMAL
BUN SERPL-MCNC: 32 MG/DL (ref 8–23)
CALCIUM SERPL-MCNC: 8.8 MG/DL (ref 8.7–10.5)
CHLORIDE SERPL-SCNC: 99 MMOL/L (ref 95–110)
CO2 SERPL-SCNC: 30 MMOL/L (ref 23–29)
CREAT SERPL-MCNC: 1.2 MG/DL (ref 0.5–1.4)
DIFFERENTIAL METHOD BLD: ABNORMAL
EOSINOPHIL # BLD AUTO: 0.2 K/UL (ref 0–0.5)
EOSINOPHIL NFR BLD: 2.4 % (ref 0–8)
ERYTHROCYTE [DISTWIDTH] IN BLOOD BY AUTOMATED COUNT: 15.1 % (ref 11.5–14.5)
EST. GFR  (NO RACE VARIABLE): >60 ML/MIN/1.73 M^2
GLUCOSE SERPL-MCNC: 129 MG/DL (ref 70–110)
HCT VFR BLD AUTO: 39.8 % (ref 40–54)
HGB BLD-MCNC: 13 G/DL (ref 14–18)
IMM GRANULOCYTES # BLD AUTO: 0.03 K/UL (ref 0–0.04)
IMM GRANULOCYTES NFR BLD AUTO: 0.4 % (ref 0–0.5)
LYMPHOCYTES # BLD AUTO: 1 K/UL (ref 1–4.8)
LYMPHOCYTES NFR BLD: 14.3 % (ref 18–48)
MAGNESIUM SERPL-MCNC: 2.4 MG/DL (ref 1.6–2.6)
MCH RBC QN AUTO: 32.5 PG (ref 27–31)
MCHC RBC AUTO-ENTMCNC: 32.7 G/DL (ref 32–36)
MCV RBC AUTO: 100 FL (ref 82–98)
MONOCYTES # BLD AUTO: 1 K/UL (ref 0.3–1)
MONOCYTES NFR BLD: 14.7 % (ref 4–15)
NEUTROPHILS # BLD AUTO: 4.8 K/UL (ref 1.8–7.7)
NEUTROPHILS NFR BLD: 67.9 % (ref 38–73)
NRBC BLD-RTO: 0 /100 WBC
OHS QRS DURATION: 120 MS
OHS QRS DURATION: 134 MS
OHS QTC CALCULATION: 541 MS
OHS QTC CALCULATION: 576 MS
PLATELET # BLD AUTO: 162 K/UL (ref 150–450)
PMV BLD AUTO: 12.6 FL (ref 9.2–12.9)
POTASSIUM SERPL-SCNC: 4.1 MMOL/L (ref 3.5–5.1)
RBC # BLD AUTO: 4 M/UL (ref 4.6–6.2)
SODIUM SERPL-SCNC: 139 MMOL/L (ref 136–145)
WBC # BLD AUTO: 7.07 K/UL (ref 3.9–12.7)

## 2024-09-20 PROCEDURE — 37000008 HC ANESTHESIA 1ST 15 MINUTES: Mod: HCNC | Performed by: ORTHOPAEDIC SURGERY

## 2024-09-20 PROCEDURE — 25000003 PHARM REV CODE 250: Mod: HCNC

## 2024-09-20 PROCEDURE — 25000003 PHARM REV CODE 250: Mod: HCNC | Performed by: STUDENT IN AN ORGANIZED HEALTH CARE EDUCATION/TRAINING PROGRAM

## 2024-09-20 PROCEDURE — 25000003 PHARM REV CODE 250: Mod: HCNC | Performed by: INTERNAL MEDICINE

## 2024-09-20 PROCEDURE — 63600175 PHARM REV CODE 636 W HCPCS: Mod: HCNC | Performed by: INTERNAL MEDICINE

## 2024-09-20 PROCEDURE — 25000003 PHARM REV CODE 250: Mod: HCNC | Performed by: NURSE ANESTHETIST, CERTIFIED REGISTERED

## 2024-09-20 PROCEDURE — 86850 RBC ANTIBODY SCREEN: CPT | Mod: HCNC | Performed by: STUDENT IN AN ORGANIZED HEALTH CARE EDUCATION/TRAINING PROGRAM

## 2024-09-20 PROCEDURE — 71000033 HC RECOVERY, INTIAL HOUR: Mod: HCNC | Performed by: ORTHOPAEDIC SURGERY

## 2024-09-20 PROCEDURE — 63600175 PHARM REV CODE 636 W HCPCS: Mod: HCNC | Performed by: ANESTHESIOLOGY

## 2024-09-20 PROCEDURE — C1769 GUIDE WIRE: HCPCS | Mod: HCNC | Performed by: ORTHOPAEDIC SURGERY

## 2024-09-20 PROCEDURE — 27218 TREAT PELVIC RING FRACTURE: CPT | Mod: HCNC,RT,, | Performed by: ORTHOPAEDIC SURGERY

## 2024-09-20 PROCEDURE — C1713 ANCHOR/SCREW BN/BN,TIS/BN: HCPCS | Mod: HCNC | Performed by: ORTHOPAEDIC SURGERY

## 2024-09-20 PROCEDURE — 36000710: Mod: HCNC | Performed by: ORTHOPAEDIC SURGERY

## 2024-09-20 PROCEDURE — 86901 BLOOD TYPING SEROLOGIC RH(D): CPT | Mod: HCNC | Performed by: STUDENT IN AN ORGANIZED HEALTH CARE EDUCATION/TRAINING PROGRAM

## 2024-09-20 PROCEDURE — 71000015 HC POSTOP RECOV 1ST HR: Mod: HCNC | Performed by: ORTHOPAEDIC SURGERY

## 2024-09-20 PROCEDURE — D9220A PRA ANESTHESIA: Mod: HCNC,ANES,, | Performed by: STUDENT IN AN ORGANIZED HEALTH CARE EDUCATION/TRAINING PROGRAM

## 2024-09-20 PROCEDURE — 85025 COMPLETE CBC W/AUTO DIFF WBC: CPT | Mod: HCNC | Performed by: HOSPITALIST

## 2024-09-20 PROCEDURE — 0QH134Z INSERTION OF INTERNAL FIXATION DEVICE INTO SACRUM, PERCUTANEOUS APPROACH: ICD-10-PCS | Performed by: ORTHOPAEDIC SURGERY

## 2024-09-20 PROCEDURE — 36415 COLL VENOUS BLD VENIPUNCTURE: CPT | Mod: HCNC | Performed by: HOSPITALIST

## 2024-09-20 PROCEDURE — 99900035 HC TECH TIME PER 15 MIN (STAT): Mod: HCNC

## 2024-09-20 PROCEDURE — D9220A PRA ANESTHESIA: Mod: HCNC,CRNA,, | Performed by: STUDENT IN AN ORGANIZED HEALTH CARE EDUCATION/TRAINING PROGRAM

## 2024-09-20 PROCEDURE — 63600175 PHARM REV CODE 636 W HCPCS: Mod: HCNC | Performed by: NURSE ANESTHETIST, CERTIFIED REGISTERED

## 2024-09-20 PROCEDURE — 94761 N-INVAS EAR/PLS OXIMETRY MLT: CPT | Mod: HCNC

## 2024-09-20 PROCEDURE — 37000009 HC ANESTHESIA EA ADD 15 MINS: Mod: HCNC | Performed by: ORTHOPAEDIC SURGERY

## 2024-09-20 PROCEDURE — 63600175 PHARM REV CODE 636 W HCPCS: Mod: JZ,JG,HCNC | Performed by: ORTHOPAEDIC SURGERY

## 2024-09-20 PROCEDURE — 36000711: Mod: HCNC | Performed by: ORTHOPAEDIC SURGERY

## 2024-09-20 PROCEDURE — 63600175 PHARM REV CODE 636 W HCPCS: Mod: HCNC

## 2024-09-20 PROCEDURE — 86900 BLOOD TYPING SEROLOGIC ABO: CPT | Mod: HCNC | Performed by: STUDENT IN AN ORGANIZED HEALTH CARE EDUCATION/TRAINING PROGRAM

## 2024-09-20 PROCEDURE — 0QS244Z REPOSITION RIGHT PELVIC BONE WITH INTERNAL FIXATION DEVICE, PERCUTANEOUS ENDOSCOPIC APPROACH: ICD-10-PCS | Performed by: ORTHOPAEDIC SURGERY

## 2024-09-20 PROCEDURE — 83735 ASSAY OF MAGNESIUM: CPT | Mod: HCNC | Performed by: HOSPITALIST

## 2024-09-20 PROCEDURE — 80048 BASIC METABOLIC PNL TOTAL CA: CPT | Mod: HCNC | Performed by: HOSPITALIST

## 2024-09-20 PROCEDURE — 85730 THROMBOPLASTIN TIME PARTIAL: CPT | Mod: HCNC | Performed by: HOSPITALIST

## 2024-09-20 PROCEDURE — 63600175 PHARM REV CODE 636 W HCPCS: Mod: HCNC | Performed by: STUDENT IN AN ORGANIZED HEALTH CARE EDUCATION/TRAINING PROGRAM

## 2024-09-20 PROCEDURE — 36415 COLL VENOUS BLD VENIPUNCTURE: CPT | Mod: HCNC | Performed by: STUDENT IN AN ORGANIZED HEALTH CARE EDUCATION/TRAINING PROGRAM

## 2024-09-20 PROCEDURE — 25000003 PHARM REV CODE 250: Mod: HCNC | Performed by: HOSPITALIST

## 2024-09-20 PROCEDURE — 20600001 HC STEP DOWN PRIVATE ROOM: Mod: HCNC

## 2024-09-20 PROCEDURE — 93010 ELECTROCARDIOGRAM REPORT: CPT | Mod: HCNC,,, | Performed by: INTERNAL MEDICINE

## 2024-09-20 PROCEDURE — 93005 ELECTROCARDIOGRAM TRACING: CPT | Mod: HCNC

## 2024-09-20 DEVICE — 7.5MM TI CANN SCREW + FT - 160MM
Type: IMPLANTABLE DEVICE | Site: PELVIS | Status: FUNCTIONAL
Brand: CANNULATED SCREW SYSTEM +

## 2024-09-20 RX ORDER — ONDANSETRON HYDROCHLORIDE 2 MG/ML
INJECTION, SOLUTION INTRAVENOUS
Status: DISCONTINUED | OUTPATIENT
Start: 2024-09-20 | End: 2024-09-20

## 2024-09-20 RX ORDER — MIDAZOLAM HYDROCHLORIDE 1 MG/ML
.5-4 INJECTION, SOLUTION INTRAMUSCULAR; INTRAVENOUS
Status: CANCELLED | OUTPATIENT
Start: 2024-09-20

## 2024-09-20 RX ORDER — DEXAMETHASONE SODIUM PHOSPHATE 4 MG/ML
INJECTION, SOLUTION INTRA-ARTICULAR; INTRALESIONAL; INTRAMUSCULAR; INTRAVENOUS; SOFT TISSUE
Status: DISCONTINUED | OUTPATIENT
Start: 2024-09-20 | End: 2024-09-20

## 2024-09-20 RX ORDER — PROPOFOL 10 MG/ML
VIAL (ML) INTRAVENOUS
Status: DISCONTINUED | OUTPATIENT
Start: 2024-09-20 | End: 2024-09-20

## 2024-09-20 RX ORDER — GLUCAGON 1 MG
1 KIT INJECTION
Status: DISCONTINUED | OUTPATIENT
Start: 2024-09-20 | End: 2024-09-21

## 2024-09-20 RX ORDER — SODIUM CHLORIDE 0.9 % (FLUSH) 0.9 %
3 SYRINGE (ML) INJECTION
Status: DISCONTINUED | OUTPATIENT
Start: 2024-09-20 | End: 2024-09-21

## 2024-09-20 RX ORDER — PHENYLEPHRINE HCL IN 0.9% NACL 1 MG/10 ML
SYRINGE (ML) INTRAVENOUS
Status: DISCONTINUED | OUTPATIENT
Start: 2024-09-20 | End: 2024-09-20

## 2024-09-20 RX ORDER — EPHEDRINE SULFATE 50 MG/ML
INJECTION, SOLUTION INTRAVENOUS
Status: DISCONTINUED | OUTPATIENT
Start: 2024-09-20 | End: 2024-09-20

## 2024-09-20 RX ORDER — GUAIFENESIN 600 MG/1
600 TABLET, EXTENDED RELEASE ORAL 2 TIMES DAILY
Status: DISCONTINUED | OUTPATIENT
Start: 2024-09-20 | End: 2024-09-24 | Stop reason: HOSPADM

## 2024-09-20 RX ORDER — FENTANYL CITRATE 50 UG/ML
25-200 INJECTION, SOLUTION INTRAMUSCULAR; INTRAVENOUS
Status: CANCELLED | OUTPATIENT
Start: 2024-09-20

## 2024-09-20 RX ORDER — CLINDAMYCIN PHOSPHATE 900 MG/50ML
INJECTION, SOLUTION INTRAVENOUS
Status: DISCONTINUED | OUTPATIENT
Start: 2024-09-20 | End: 2024-09-20

## 2024-09-20 RX ORDER — LIDOCAINE HYDROCHLORIDE 20 MG/ML
INJECTION INTRAVENOUS
Status: DISCONTINUED | OUTPATIENT
Start: 2024-09-20 | End: 2024-09-20

## 2024-09-20 RX ORDER — CEFAZOLIN SODIUM 1 G/3ML
INJECTION, POWDER, FOR SOLUTION INTRAMUSCULAR; INTRAVENOUS
Status: DISCONTINUED | OUTPATIENT
Start: 2024-09-20 | End: 2024-09-20

## 2024-09-20 RX ORDER — ROCURONIUM BROMIDE 10 MG/ML
INJECTION, SOLUTION INTRAVENOUS
Status: DISCONTINUED | OUTPATIENT
Start: 2024-09-20 | End: 2024-09-20

## 2024-09-20 RX ORDER — BUPIVACAINE HYDROCHLORIDE 2.5 MG/ML
INJECTION, SOLUTION EPIDURAL; INFILTRATION; INTRACAUDAL
Status: DISCONTINUED | OUTPATIENT
Start: 2024-09-20 | End: 2024-09-20 | Stop reason: HOSPADM

## 2024-09-20 RX ORDER — FENTANYL CITRATE 50 UG/ML
INJECTION, SOLUTION INTRAMUSCULAR; INTRAVENOUS
Status: DISCONTINUED | OUTPATIENT
Start: 2024-09-20 | End: 2024-09-20

## 2024-09-20 RX ADMIN — SENNOSIDES AND DOCUSATE SODIUM 1 TABLET: 50; 8.6 TABLET ORAL at 08:09

## 2024-09-20 RX ADMIN — POLYETHYLENE GLYCOL 3350 17 G: 17 POWDER, FOR SOLUTION ORAL at 08:09

## 2024-09-20 RX ADMIN — LIDOCAINE HYDROCHLORIDE 75 MG: 20 INJECTION INTRAVENOUS at 01:09

## 2024-09-20 RX ADMIN — SACUBITRIL AND VALSARTAN 1 TABLET: 97; 103 TABLET, FILM COATED ORAL at 08:09

## 2024-09-20 RX ADMIN — SODIUM CHLORIDE: 9 INJECTION, SOLUTION INTRAVENOUS at 01:09

## 2024-09-20 RX ADMIN — METHOCARBAMOL 500 MG: 500 TABLET ORAL at 05:09

## 2024-09-20 RX ADMIN — AMIODARONE HYDROCHLORIDE 0.5 MG/MIN: 1.8 INJECTION, SOLUTION INTRAVENOUS at 04:09

## 2024-09-20 RX ADMIN — ACETAMINOPHEN 1000 MG: 500 TABLET ORAL at 01:09

## 2024-09-20 RX ADMIN — EPHEDRINE SULFATE 5 MG: 50 INJECTION INTRAVENOUS at 03:09

## 2024-09-20 RX ADMIN — CLINDAMYCIN IN 5 PERCENT DEXTROSE 900 MG: 18 INJECTION, SOLUTION INTRAVENOUS at 01:09

## 2024-09-20 RX ADMIN — FENTANYL CITRATE 50 MCG: 50 INJECTION, SOLUTION INTRAMUSCULAR; INTRAVENOUS at 01:09

## 2024-09-20 RX ADMIN — HEPARIN SODIUM AND DEXTROSE 16 UNITS/KG/HR: 10000; 5 INJECTION INTRAVENOUS at 07:09

## 2024-09-20 RX ADMIN — EPHEDRINE SULFATE 10 MG: 50 INJECTION INTRAVENOUS at 01:09

## 2024-09-20 RX ADMIN — METHOCARBAMOL 500 MG: 500 TABLET ORAL at 08:09

## 2024-09-20 RX ADMIN — HYDROMORPHONE HYDROCHLORIDE 0.2 MG: 1 INJECTION, SOLUTION INTRAMUSCULAR; INTRAVENOUS; SUBCUTANEOUS at 04:09

## 2024-09-20 RX ADMIN — PROPOFOL 100 MG: 10 INJECTION, EMULSION INTRAVENOUS at 01:09

## 2024-09-20 RX ADMIN — ASPIRIN 81 MG: 81 TABLET, COATED ORAL at 08:09

## 2024-09-20 RX ADMIN — GUAIFENESIN 600 MG: 600 TABLET, EXTENDED RELEASE ORAL at 01:09

## 2024-09-20 RX ADMIN — LEVOTHYROXINE SODIUM 200 MCG: 125 TABLET ORAL at 06:09

## 2024-09-20 RX ADMIN — FUROSEMIDE 40 MG: 40 TABLET ORAL at 08:09

## 2024-09-20 RX ADMIN — ROCURONIUM BROMIDE 50 MG: 10 INJECTION, SOLUTION INTRAVENOUS at 01:09

## 2024-09-20 RX ADMIN — DEXAMETHASONE SODIUM PHOSPHATE 4 MG: 4 INJECTION, SOLUTION INTRAMUSCULAR; INTRAVENOUS at 02:09

## 2024-09-20 RX ADMIN — OXYCODONE 5 MG: 5 TABLET ORAL at 04:09

## 2024-09-20 RX ADMIN — METHOCARBAMOL 500 MG: 500 TABLET ORAL at 01:09

## 2024-09-20 RX ADMIN — ONDANSETRON 4 MG: 2 INJECTION INTRAMUSCULAR; INTRAVENOUS at 03:09

## 2024-09-20 RX ADMIN — MIRTAZAPINE 7.5 MG: 7.5 TABLET, FILM COATED ORAL at 08:09

## 2024-09-20 RX ADMIN — ACETAMINOPHEN 1000 MG: 500 TABLET ORAL at 06:09

## 2024-09-20 RX ADMIN — GUAIFENESIN 600 MG: 600 TABLET, EXTENDED RELEASE ORAL at 08:09

## 2024-09-20 RX ADMIN — ATORVASTATIN CALCIUM 10 MG: 10 TABLET, FILM COATED ORAL at 08:09

## 2024-09-20 RX ADMIN — Medication 200 MCG: at 01:09

## 2024-09-20 RX ADMIN — ROCURONIUM BROMIDE 20 MG: 10 INJECTION, SOLUTION INTRAVENOUS at 02:09

## 2024-09-20 RX ADMIN — SUGAMMADEX 200 MG: 100 INJECTION, SOLUTION INTRAVENOUS at 03:09

## 2024-09-20 RX ADMIN — CEFAZOLIN 2 G: 330 INJECTION, POWDER, FOR SOLUTION INTRAMUSCULAR; INTRAVENOUS at 01:09

## 2024-09-20 RX ADMIN — FUROSEMIDE 40 MG: 40 TABLET ORAL at 05:09

## 2024-09-20 RX ADMIN — APIXABAN 10 MG: 5 TABLET, FILM COATED ORAL at 08:09

## 2024-09-20 RX ADMIN — SPIRONOLACTONE 25 MG: 25 TABLET ORAL at 08:09

## 2024-09-20 NOTE — PLAN OF CARE
Patient remains in NSR. Starting tomorrow AM, we recommend po amiodarone 400mg BID for two weeks then 200mg qd. The amiodarone infusion should end tonight. SGLT2-I can be added prior to discharge if able, assuming renal function remains stable. BMP in one week. He will require cardiology and electrophysiology referall as well.     We will sign off.    Elvira Akins MD  Cardiovascular Disease PGY6  [facility]

## 2024-09-20 NOTE — NURSING
Call placed to CSU unit, Report given to Miguel YIN. Patient AAOX4, VSS. Prepared for transfer with 2PA and  personal belongings.

## 2024-09-20 NOTE — PT/OT/SLP PROGRESS
Occupational Therapy      Patient Name:  Bean Salas   MRN:  [id number]    Patient not seen today secondary to scheduled orthopedic surgery. Will await new order prior to continuing therapy.     9/20/2024    TITA [place]

## 2024-09-20 NOTE — PT/OT/SLP PROGRESS
Physical Therapy Discharge      Patient Name:  Bean Salas   MRN:  [id number]    Patient not seen today. Pt planned to go to OR for R hip ORIF on 9/20. Please re-consult when pt is medically appropriate for therapy services.

## 2024-09-20 NOTE — TRANSFER OF CARE
"Anesthesia Transfer of Care Note    Patient: Bean Salas    Procedure(s) Performed: Procedure(s) (LRB):  ORIF,PELVIS (Right)    Patient location: PACU    Anesthesia Type: general    Transport from OR: Transported from OR on 6-10 L/min O2 by face mask with adequate spontaneous ventilation    Post pain: adequate analgesia    Post assessment: no apparent anesthetic complications and tolerated procedure well    Post vital signs: stable    Level of consciousness: awake    Nausea/Vomiting: no nausea/vomiting    Complications: none    Transfer of care protocol was followed      Last vitals: Visit Vitals  /65 (BP Location: Right arm, Patient Position: Lying)   Pulse 65   Temp 35.7 °C (96.3 °F) (Oral)   Resp 18   Ht 5' 11" (1.803 m)   Wt 101.6 kg (224 lb)   SpO2 98%   BMI 31.24 kg/m²     "

## 2024-09-20 NOTE — ASSESSMENT & PLAN NOTE
78 y/o male presenting after ground level fall with right hip pain on standing. CT scan showed acute closed fractures of the right superior and inferior pubic rami with involvement of the anterosuperior acetabulum.   - Orthopedic surgery consulted by ED and recommended weight bear as tolerated to right lower extremity and trial of PT/OT and pain management.   - PT/OT consulted and patient not progressing and only able to take a few shuffling steps and total assist x 2 people. Patient limited by dyspnea and pain. Orthopedics discussed with patient and his son and plan to proceed with operative intervention on pelvic fractures with percutaneous screw fixation on 9/20. Patient currently NPO for surgery today.   - Continue pain control with scheduled Tylenol and Robaxin with Oxycodone IR 5 mg po every 4 hours prn for pain management.   - Fall precautions.  - Cardiology reports does not need PET stress/ischemic work up prior to a pelvic fracture surgery as that can be done as outpatient. Patient euvolemic at this time so okay to proceed with ORIF of pelvis in OR with Ortho on 9/20 with no further intervention as optimized for surgery.   - Patient on IV Heparin drip to treat PE so no DVT prophylaxis needed as fully anticoagulated. Holding Heparin for surgery today and plan to place on Apixiban post-op to treat PE so no other DVT prophylaxis will be needed post-op.

## 2024-09-20 NOTE — ANESTHESIA PREPROCEDURE EVALUATION
[facility]  Anesthesia Pre-Operative Evaluation     Patient Name: Bean Salas  YOB: 1945  MRN: [id number]  CSN: [id number]       Admit Date: 9/15/2024   Admit Team: [facility]  Hospital Day: 6  Date of Procedure: 9/20/2024  Anesthesia: General Procedure: Procedure(s) (LRB):  ORIF,PELVIS (perc screws pelvic ring) - trios, supine, pelvic bone foam. Vimal 8mm cannulated screws. 0.25% marcaine (Right)  Pre-Operative Diagnosis: Closed pelvic ring fracture, initial encounter [S32.810A]  Proceduralist:Surgeons and Role:     * Negrito Stapleton MD - Primary  Code Status: Full Code   Advanced Directive: <no information>  Isolation Precautions: No active isolations  Capacity: Full capacity     SUBJECTIVE:   Bean Salas is a 79 y.o. male who  has a past medical history of Atrial fibrillation, unspecified type (09/06/2023), COPD exacerbation (09/06/2023), and Thyroid disease.  Mr. Salas is a 80 y/o M with a PMHx of HFrEF w/ recovered EF (40%--9/2021), NICM, PAF, frequent PVCs, hypertension and hyperlipidemia, AKHIL not using CPAP, PVD, chronic respiratory failure w/ home oxygen and hypothyroidism who presented to [facility] on 9/15 after a GLF at home resulting in a nondisplaced fracture of the anterior right acetabulum. Patient also found to have a non occlusive PE by CT in main PA< RLL vessels, and occlusive PE in subsegmental PA, no right heart strain. LE US was negative for DVT. He was started on a heparin drip. ECG with AFL rate controlled with PVCs. Echo shows a severely depressed LVEF 20-25%, RV depressed systolic function, PASP 49 with an IVC 8. He has been started on IV Lasix for diuresis and GDMT for systolic CHF. Cardiology consulted to perform DEMETRICE/DCCV.     TTE 9/16/24    Technically difficult study    Left Ventricle: The left ventricle is normal in size. Normal wall thickness. Severe global hypokinesis present. Septal motion is abnormal. There is severely reduced systolic  function with a visually estimated ejection fraction of 20 - 25%. Unable to assess diastolic function due to atrial flutter.    Right Ventricle: Mild right ventricular enlargement. Wall thickness is normal. Systolic function is moderately reduced.    Severe biatrial enlargement    Mitral Valve: There is mild regurgitation.    Pulmonary Artery: There is mild to moderate pulmonary hypertension. The estimated pulmonary artery systolic pressure is 49 mmHg.    IVC/SVC: Intermediate venous pressure at 8 mmHg.    Anticoagulant/antiplatelets: Heparin drip, high intensity   ECG: Atrial flutter with PVCs    Dysphagia or odynophagia:  No  Liver Disease, esophageal disease, or known varices:  No  Upper GI Bleeding: No  Snoring:  Yes  Sleep Apnea:  Yes  Prior neck surgery or radiation:  No  Able to move neck in all directions:  Yes  History of anesthetic difficulties:  No  Family history of anesthetic difficulties:  No  Last oral intake: yesterday before midnight  GLP-1 use: None    Platelet count: 152k  INR: 1.1     amiodarone in dextrose 5%  0.5 mg/min Intravenous Continuous 16.7 mL/hr at 09/20/24 0408 0.5 mg/min at 09/20/24 0408     Hospital LOS: 4 days  ICU LOS: Patient does not have an ICU stay during this admission.    he has a current medication list which includes the following long-term medication(s): amlodipine, aspirin, atorvastatin, furosemide, levothyroxine, losartan, metoprolol succinate, mirtazapine, and spironolactone.   Current Outpatient Medications   Medication Instructions    acetaminophen (TYLENOL) 650 mg, Oral, Every 6 hours PRN    amLODIPine (NORVASC) 2.5 MG tablet Take 1 tablet by mouth once daily    aspirin (ECOTRIN) 81 mg, Oral, Daily    atorvastatin (LIPITOR) 10 mg, Oral    furosemide (LASIX) 40 mg, Oral, 2 times daily, Take daily for next 3 days, then use as needed. Weigh daily. Afterwards, f weight increases 2 lbs/24h, take dose of lasix daily  until weight is back to baseline    levothyroxine  (SYNTHROID) 200 mcg, Oral    losartan (COZAAR) 100 mg, Oral    metoprolol succinate (TOPROL-XL) 50 mg, Oral    mirtazapine (REMERON) 7.5 MG Tab One tablet po each night for sleep    multivit-min-FA-lycopen-lutein (CENTRUM SILVER) 0.4-300-250 mg-mcg-mcg Tab 1 tablet, Oral, Daily    spironolactone (ALDACTONE) 25 mg, Oral     ALLERGIES:   Review of patient's allergies indicates:  No Known Allergies  LDA:   AIRWAY:         [unfilled]     Lines/Drains/Airways       Drain  Duration             Male External Urinary Catheter 09/16/24 0425 4 days              Peripheral Intravenous Line  Duration                  Peripheral IV - Single Lumen 09/16/24 0454 18 G Anterior;Distal;Left Forearm 4 days         Peripheral IV - Single Lumen 09/19/24 2230 20 G Anterior;Proximal;Right Forearm <1 day                   Anesthesia Evaluation      Airway   Mallampati: III  TM distance: Normal  Neck ROM: Normal ROM  Dental    (+) Intact    Pulmonary    (+) COPD (Home oxygen concentrator) moderate, asthma, shortness of breath, sleep apnea on CPAP  Cardiovascular   Exercise tolerance: poor  (+) hypertension well controlled, CAD, CHF    Neuro/Psych    (+) neuromuscular disease    GI/Hepatic/Renal      Endo/Other    (+) hypothyroidism  Abdominal                    MEDICATIONS:     Current Outpatient Medications on File Prior to Encounter   Medication Sig Dispense Refill Last Dose    acetaminophen (TYLENOL) 325 MG tablet Take 2 tablets (650 mg total) by mouth every 6 (six) hours as needed for Pain.  0     amLODIPine (NORVASC) 2.5 MG tablet Take 1 tablet by mouth once daily (Patient taking differently: Take 2.5 mg by mouth once daily.) 90 tablet 3     aspirin (ECOTRIN) 81 MG EC tablet Take 1 tablet (81 mg total) by mouth once daily.       atorvastatin (LIPITOR) 10 MG tablet Take 1 tablet by mouth once daily 90 tablet 1     furosemide (LASIX) 40 MG tablet Take 1 tablet (40 mg total) by mouth 2 (two) times a day. Take daily for next 3 days,  then use as needed. Weigh daily. Afterwards, f weight increases 2 lbs/24h, take dose of lasix daily  until weight is back to baseline 180 tablet 3     levothyroxine (SYNTHROID) 200 MCG tablet Take 1 tablet by mouth once daily 90 tablet 3     losartan (COZAAR) 100 MG tablet Take 1 tablet by mouth once daily 90 tablet 2     metoprolol succinate (TOPROL-XL) 50 MG 24 hr tablet Take 1 tablet by mouth once daily 90 tablet 3     mirtazapine (REMERON) 7.5 MG Tab One tablet po each night for sleep 30 tablet 3     multivit-min-FA-lycopen-lutein (CENTRUM SILVER) 0.4-300-250 mg-mcg-mcg Tab Take 1 tablet by mouth once daily.       spironolactone (ALDACTONE) 25 MG tablet Take 1 tablet by mouth once daily 90 tablet 3       Inpatient Medications:  Antibiotics (From admission, onward)      None          VTE Risk Mitigation (From admission, onward)           Ordered     Place sequential compression device  Until discontinued         09/16/24 0428     Reason for No Pharmacological VTE Prophylaxis  Once        Comments: Heparin gtt for PE   Question:  Reasons:  Answer:  Physician Provided (leave comment)    09/16/24 0428     IP VTE HIGH RISK PATIENT  Once         09/16/24 0428                   acetaminophen  1,000 mg Oral Q8H    aspirin  81 mg Oral Daily    atorvastatin  10 mg Oral Daily    furosemide  40 mg Oral BID    guaiFENesin  600 mg Oral BID    levothyroxine  200 mcg Oral Before breakfast    methocarbamoL  500 mg Oral QID    metoprolol succinate  12.5 mg Oral Daily    mirtazapine  7.5 mg Oral Nightly    polyethylene glycol  17 g Oral Daily    sacubitriL-valsartan  1 tablet Oral BID    senna-docusate 8.6-50 mg  1 tablet Oral Daily    spironolactone  25 mg Oral Daily       Current Facility-Administered Medications   Medication Dose Route Frequency Provider Last Rate Last Admin    acetaminophen tablet 1,000 mg  1,000 mg Oral Q8H Monica Patterson MD   1,000 mg at 09/20/24 0626    albuterol-ipratropium 2.5 mg-0.5 mg/3 mL  nebulizer solution 3 mL  3 mL Nebulization Q4H PRN Indira Darling PA-C        amiodarone 360 mg/200 mL (1.8 mg/mL) infusion  0.5 mg/min Intravenous Continuous Annelise Bray MD 16.7 mL/hr at 09/20/24 0408 0.5 mg/min at 09/20/24 0408    aspirin EC tablet 81 mg  81 mg Oral Daily Indira Darling PA-C   81 mg at 09/20/24 0823    atorvastatin tablet 10 mg  10 mg Oral Daily Indira Darling PA-ROBI   10 mg at 09/20/24 0822    bisacodyL suppository 10 mg  10 mg Rectal Daily PRN Indira Darling PA-C        dextrose 10% bolus 125 mL 125 mL  12.5 g Intravenous PRN Indira Darling PA-C        dextrose 10% bolus 125 mL 125 mL  12.5 g Intravenous PRN Ruben Alcaraz MD        dextrose 10% bolus 125 mL 125 mL  12.5 g Intravenous PRN Jason Abarca MD        dextrose 10% bolus 250 mL 250 mL  25 g Intravenous PRN Indira Darling PA-C        dextrose 10% bolus 250 mL 250 mL  25 g Intravenous PRN Ruben Alcaraz MD        dextrose 10% bolus 250 mL 250 mL  25 g Intravenous PRN Jason Abarca MD        diphenhydrAMINE injection 25 mg  25 mg Intravenous Q6H PRN Ruben Alcaraz MD        diphenhydrAMINE-zinc acetate 2-0.1% cream   Topical (Top) TID PRN Monica Patterson MD        furosemide tablet 40 mg  40 mg Oral BID Elvira Akins MD   40 mg at 09/20/24 0822    glucagon (human recombinant) injection 1 mg  1 mg Intramuscular PRN Indira Darling PA-C        glucagon (human recombinant) injection 1 mg  1 mg Intramuscular PRN Ruben Alcaraz MD        glucose chewable tablet 16 g  16 g Oral PRN Indira Darling PA-C        glucose chewable tablet 24 g  24 g Oral PRN Indira Darling PA-C        guaiFENesin 12 hr tablet 600 mg  600 mg Oral BID Annelise Bray MD        HYDROmorphone injection 0.2 mg  0.2 mg Intravenous Q5 Min PRN Ruben Alcaraz MD        levothyroxine tablet 200 mcg  200 mcg Oral Before breakfast Indira Darling PA-C   200 mcg at 09/20/24 0626    melatonin  tablet 6 mg  6 mg Oral Nightly PRN Indira Darling PA-C        methocarbamoL tablet 500 mg  500 mg Oral QID Annelise Bray MD   500 mg at 09/20/24 0822    metoprolol succinate (TOPROL-XL) 24 hr split tablet 12.5 mg  12.5 mg Oral Daily Monica Patterson MD   12.5 mg at 09/19/24 0900    mirtazapine tablet 7.5 mg  7.5 mg Oral Nightly Indira Darling PA-C   7.5 mg at 09/19/24 2134    naloxone 0.4 mg/mL injection 0.02 mg  0.02 mg Intravenous PRN Indira Darling PA-C        ondansetron disintegrating tablet 8 mg  8 mg Oral Q8H PRN Indira Darling PA-C        oxyCODONE immediate release tablet 5 mg  5 mg Oral Q6H PRN Monica Patterson MD   5 mg at 09/19/24 1801    polyethylene glycol packet 17 g  17 g Oral Daily Monica Patterson MD   17 g at 09/20/24 0822    prochlorperazine injection Soln 5 mg  5 mg Intravenous Q30 Min PRN Ruben Alcaraz MD        sacubitriL-valsartan  mg per tablet 1 tablet  1 tablet Oral BID Monica Patterson MD   1 tablet at 09/20/24 0821    senna-docusate 8.6-50 mg per tablet 1 tablet  1 tablet Oral Daily Monica Patterson MD   1 tablet at 09/20/24 0822    sodium chloride 0.9% flush 3 mL  3 mL Intravenous PRN Ruben Alcaraz MD        sodium chloride 0.9% flush 5 mL  5 mL Intravenous PRN Indira Darling PA-C        spironolactone tablet 25 mg  25 mg Oral Daily Indira Darling PA-C   25 mg at 09/20/24 0821          History:     Active Hospital Problems    Diagnosis  POA    *Closed nondisplaced fracture of anterior column of right acetabulum with routine healing [S32.434D]  Not Applicable    Pulmonary embolism without acute cor pulmonale [I26.99]  Yes    Elevated troponin [R79.89]  Yes    Insomnia [G47.00]  Yes    Bug bites [W57.XXXA]  Yes    Abnormal ventricular wall motion [R93.89]  Yes    Chronic hypoxemic respiratory failure [J96.11]  Yes    Slow transit constipation [K59.01]  Yes    Pulmonary hypertension [I27.20]  Yes    Paroxysmal atrial  flutter [I48.92]  Yes    AKHIL (obstructive sleep apnea) [G47.33]  Yes    PVC's (premature ventricular contractions) [I49.3]  Yes    Acute on chronic combined systolic and diastolic congestive heart failure [I50.43]  Yes    Coronary artery disease involving native coronary artery of native heart without angina pectoris [I25.10]  Yes     The ejection fraction was 50-55% by visual estimate.  The post-procedure left ventricular end diastolic pressure was 25.  The Prox Cx to Dist Cx lesion was 50% stenosed.  The estimated blood loss was <50 mL.  Mild LAD, RCA Disease  No focal lesions to explain angina          Advance care planning [Z71.89]  Not Applicable    Bradycardia [R00.1]  Yes     Some of the bradycardic rhythms he has experienced by pulse check are related to atrial bigeminy and pseudo bradycardia.  His Holter did show a single pause of greater than 2 seconds.  Electrophysiologic consult ordered to assess benefits of pacemaker for the patient.  He is still having marked fatigue with activity and reduced exercise tolerance and he is very concerned that it is arrhythmia related.     He has not had syncope.  His heart rate today is 60 by pulse evaluation.  There are still ectopic beats on exam.  His Holter also showed at least one ventricular triplet.            Relevant Orders     Ambulatory referral/consult to Cardiac Electrophysiology           Essential hypertension [I10]  Yes    Hypothyroidism due to acquired atrophy of thyroid [E03.4]  Yes    Pure hypercholesterolemia [E78.00]  Yes      Resolved Hospital Problems    Diagnosis Date Resolved POA    Hypokalemia [E87.6] 09/19/2024 No    Closed fracture of anterior column of right acetabulum [S32.431A] 09/18/2024 Yes    PAC (premature atrial contraction) [I49.1] 09/18/2024 Yes    Abnormal EKG [R94.31] 09/19/2024 Yes    Fall [W19.XXXA] 09/18/2024 Yes     Surgical History:    has a past surgical history that includes Hernia repair; Colonoscopy (01/01/2011); Joint  replacement (Bilateral); Left heart catheterization (Right, 07/22/2021); Coronary angiography (N/A, 07/22/2021); Intramedullary rodding of trochanter of femur (Right, 09/03/2021); Hip surgery (Right, 08/2021); Eye surgery (Bilateral); Fracture surgery (Right, 09/2021); Vasectomy; Treatment of cardiac arrhythmia (N/A, 9/19/2024); and Transesophageal echocardiography (N/A, 9/19/2024).   Social History:    reports that he is not currently sexually active.  reports that he quit smoking about 24 years ago. His smoking use included cigarettes. He started smoking about 59 years ago. He has a 35 pack-year smoking history. He has been exposed to tobacco smoke. He has never used smokeless tobacco. He reports that he does not drink alcohol and does not use drugs.    Vitals:    09/20/24 0759 09/20/24 0800 09/20/24 1100 09/20/24 1149   BP:   123/65    BP Location:   Right arm    Patient Position:   Lying    Pulse: (!) 55 (!) 55 61 (!) 59   Resp:   18    Temp:   35.7 °C (96.3 °F)    TempSrc:   Oral    SpO2:   98%    Weight:       Height:         Vital Signs Range (Last 24H):  Temp:  [35.7 °C (96.2 °F)-36.7 °C (98.1 °F)]   Pulse:  [37-79]   Resp:  [13-20]   BP: ()/(40-85)   SpO2:  [95 %-99 %]     Body mass index is 31.24 kg/m².  Wt Readings from Last 4 Encounters:   09/19/24 101.6 kg (224 lb)   07/23/24 102.1 kg (225 lb 1.4 oz)   01/22/24 102.8 kg (226 lb 10.1 oz)   11/14/23 98.6 kg (217 lb 6 oz)        Intake/Output - Last 3 Shifts         09/18 0700 09/19 0659 09/19 0700 09/20 0659 09/20 0700 09/21 0659    P.O.   0    IV Piggyback  400     Total Intake(mL/kg)  400 (3.9) 0 (0)    Urine (mL/kg/hr) 1650 (0.7)      Stool 0      Total Output 1650      Net -1650 +400 0           Stool Occurrence 1 x            Lab Results   Component Value Date    WBC 7.07 09/20/2024    HGB 13.0 (L) 09/20/2024    HCT 39.8 (L) 09/20/2024     09/20/2024     09/20/2024    K 4.1 09/20/2024    CL 99 09/20/2024    CREATININE 1.2  09/20/2024    BUN 32 (H) 09/20/2024    CO2 30 (H) 09/20/2024     (H) 09/20/2024    CALCIUM 8.8 09/20/2024    MG 2.4 09/20/2024    PHOS 2.8 10/05/2023    ALKPHOS 49 (L) 09/16/2024    ALT 18 09/16/2024    AST 36 09/16/2024    ALBUMIN 3.7 09/16/2024    INR 1.1 09/16/2024    APTT 57.2 (H) 09/20/2024    HGBA1C 6.3 (H) 09/16/2024    TROPONINI 0.817 (H) 09/16/2024     (H) 09/16/2024    NTPROBNP 2670 (H) 10/04/2023     Recent Results (from the past 12 hour(s))   EKG 12-lead    Collection Time: 09/20/24 12:55 AM   Result Value Ref Range    QRS Duration 134 ms    [facility]HS QTC Calculation 576 ms   Basic Metabolic Panel (BMP)    Collection Time: 09/20/24  5:31 AM   Result Value Ref Range    Sodium 139 136 - 145 mmol/L    Potassium 4.1 3.5 - 5.1 mmol/L    Chloride 99 95 - 110 mmol/L    CO2 30 (H) 23 - 29 mmol/L    Glucose 129 (H) 70 - 110 mg/dL    BUN 32 (H) 8 - 23 mg/dL    Creatinine 1.2 0.5 - 1.4 mg/dL    Calcium 8.8 8.7 - 10.5 mg/dL    Anion Gap 10 8 - 16 mmol/L    eGFR >60.0 >60 mL/min/1.73 m^2   CBC auto differential    Collection Time: 09/20/24  5:31 AM   Result Value Ref Range    WBC 7.07 3.90 - 12.70 K/uL    RBC 4.00 (L) 4.60 - 6.20 M/uL    Hemoglobin 13.0 (L) 14.0 - 18.0 g/dL    Hematocrit 39.8 (L) 40.0 - 54.0 %     (H) 82 - 98 fL    MCH 32.5 (H) 27.0 - 31.0 pg    MCHC 32.7 32.0 - 36.0 g/dL    RDW 15.1 (H) 11.5 - 14.5 %    Platelets 162 150 - 450 K/uL    MPV 12.6 9.2 - 12.9 fL    Immature Granulocytes 0.4 0.0 - 0.5 %    Gran # (ANC) 4.8 1.8 - 7.7 K/uL    Immature Grans (Abs) 0.03 0.00 - 0.04 K/uL    Lymph # 1.0 1.0 - 4.8 K/uL    Mono # 1.0 0.3 - 1.0 K/uL    Eos # 0.2 0.0 - 0.5 K/uL    Baso # 0.02 0.00 - 0.20 K/uL    nRBC 0 0 /100 WBC    Gran % 67.9 38.0 - 73.0 %    Lymph % 14.3 (L) 18.0 - 48.0 %    Mono % 14.7 4.0 - 15.0 %    Eosinophil % 2.4 0.0 - 8.0 %    Basophil % 0.3 0.0 - 1.9 %    Differential Method Automated    Magnesium    Collection Time: 09/20/24  5:31 AM   Result Value Ref Range    Magnesium  2.4 1.6 - 2.6 mg/dL   APTT    Collection Time: 09/20/24  5:31 AM   Result Value Ref Range    aPTT 57.2 (H) 21.0 - 32.0 sec   Type And Screen Preop    Collection Time: 09/20/24 10:02 AM   Result Value Ref Range    Group & Rh O POS     Indirect Bonnie NEG      Recent Labs   Lab 09/16/24  0423 09/17/24  0357 09/18/24  0248 09/19/24  0524 09/20/24  0531   WBC 11.17   < > 9.48 9.86 7.07   HGB 13.5*   < > 14.0 14.7 13.0*   HCT 41.3   < > 42.2 42.7 39.8*      < > 148* 152 162   NA  --    < > 136 138 139   K  --    < > 3.5 3.9 4.1   CREATININE  --    < > 1.1 1.0 1.2   GLU  --    < > 136* 146* 129*   INR 1.1  --   --   --   --     < > = values in this interval not displayed.     No LMP for male patient.    EKG:   Results for orders placed or performed during the hospital encounter of 09/15/24   EKG 12-lead    Collection Time: 09/20/24 12:55 AM   Result Value Ref Range    QRS Duration 134 ms    OHS QTC Calculation 576 ms    Narrative    Test Reason : R79.89,    Vent. Rate : 068 BPM     Atrial Rate : 068 BPM     P-R Int : 128 ms          QRS Dur : 134 ms      QT Int : 542 ms       P-R-T Axes : -06 003 234 degrees     QTc Int : 576 ms    Sinus rhythm with frequent Premature ventricular complexes  Nonspecific intraventricular block  T wave abnormality, consider inferior ischemia  T wave abnormality, consider anterolateral ischemia  Prolonged QT interval  Abnormal ECG  When compared with ECG of 19-SEP-2024 16:05,  No significant change was found  Confirmed by Gabriella Mcdaniel MD ([id number]) on 9/20/2024 8:37:58 AM    Referred By: [email or web address]   SELF           Confirmed By:Gabriella Mcdaniel MD     TTE:  Results for orders placed during the hospital encounter of 09/15/24    Echo    Interpretation Summary    Technically difficult study    Left Ventricle: The left ventricle is normal in size. Normal wall thickness. Severe global hypokinesis present. Septal motion is abnormal. There is severely reduced systolic function with a visually  estimated ejection fraction of 20 - 25%. Unable to assess diastolic function due to atrial flutter.    Right Ventricle: Mild right ventricular enlargement. Wall thickness is normal. Systolic function is moderately reduced.    Severe biatrial enlargement    Mitral Valve: There is mild regurgitation.    Pulmonary Artery: There is mild to moderate pulmonary hypertension. The estimated pulmonary artery systolic pressure is 49 mmHg.    IVC/SVC: Intermediate venous pressure at 8 mmHg.    DEMETRICE:  No results found for this or any previous visit.    Stress Test:  No results found for this or any previous visit.    No results found for this or any previous visit.    C:  Results for orders placed during the hospital encounter of 07/22/21    Cardiac catheterization    Conclusion  · The ejection fraction was 50-55% by visual estimate.  · The post-procedure left ventricular end diastolic pressure was 25.  · The Prox Cx to Dist Cx lesion was 50% stenosed.  · The estimated blood loss was <50 mL.  · Mild LAD, RCA Disease  · No focal lesions to explain angina    The procedure log was documented by No documenter listed and verified by Hank Barry MD.    Date: 7/22/2021  Time: 1:04 PM    Cardiac Device Check   No results found for this or any previous visit.    No results found for this or any previous visit.                                                                                                               09/20/2024  Bean Salas is a 79 y.o., male.      Pre-op Assessment    I have reviewed the Patient Summary Reports.     I have reviewed the Nursing Notes. I have reviewed the NPO Status.   I have reviewed the Medications.     Review of Systems  Anesthesia Hx:  No problems with previous Anesthesia   History of prior surgery of interest to airway management or planning:  Previous anesthesia: General           Social:  Former Smoker, No Alcohol Use 35 years      Cardiovascular:  Exercise tolerance: poor    Hypertension, well controlled   CAD  asymptomatic     CHF        S/p cardioversion 9/19/2024.       Shortness of Breath    Coronary Artery Disease:               Congestive Heart Failure (CHF)                Hypertension     Atrial Fibrillation     Pulmonary:   COPD (Home oxygen concentrator), moderate Asthma mild Shortness of breath  Sleep Apnea, CPAP + cough, denies increased sputum production.   Asthma:   Chronic Obstructive Pulmonary Disease (COPD):           Obstructive Sleep Apnea (AKHIL).      Education provided regarding risk of obstructive sleep apnea            Neurological:    Neuromuscular Disease,                                 Neuromuscular Disease   Endocrine:   Hypothyroidism        Obesity / BMI > 30      Physical Exam  General: Well nourished, Cooperative, Alert and Oriented    Airway:  Mallampati: III / II  Mouth Opening: Small, but > 3cm  TM Distance: Normal  Tongue: Normal  Neck ROM: Normal ROM    Dental:  Intact      Anesthesia Plan  Type of Anesthesia, risks & benefits discussed:    Anesthesia Type: Gen ETT  Intra-op Monitoring Plan: Art Line and Standard ASA Monitors  Post Op Pain Control Plan: IV/PO Opioids PRN and multimodal analgesia  Induction:  IV  Airway Plan: Video, Post-Induction  Informed Consent: Informed consent signed with the Patient and all parties understand the risks and agree with anesthesia plan.  All questions answered. Patient consented to blood products? Yes  ASA Score: 3  Day of Surgery Review of History & Physical: H&P Update referred to the surgeon/provider.    Ready For Surgery From Anesthesia Perspective.     .

## 2024-09-20 NOTE — ASSESSMENT & PLAN NOTE
"CT chest/abdomen/pelvis as part of trauma work up in ED showed "Right-sided pulmonary embolism, detailed above, with nonocclusive thrombus in the main right pulmonary artery and right lower lobe branch vessels, and occlusive thrombus in a subsegmental branch of the right pulmonary artery. Note comparison images are not available to determine chronicity. No right sided heart strain."   - Denies previous diagnosis or PE/DVT.  - Denies chest pain or SOB.  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 He requires 2-3L NC of oxygen at home/sleeping and uses a CPAP after meals (exertional to patient) and for naps.  - Currently stable on 2-3L NC oxygen. No hypotension. Patient has baseline dyspnea when mobilizing sideways in bed even prior to this admit.   - Labs with elevated Troponin 0.811>1.041 and .   - Initialed on Heparin drip IV to treat PE and will continue and plan to transition to therapeutic oral Apixiban post-op to treat with Apixiban 10 mg po BID x 7 days followed by indefinite treatment of Apixiban 5 mg po BID.   - Cards consulted, continue current therapies. No indication to activate PE response team at this time.   - No signs of right heart strain  - US of lower extremities on this admit negative for DVTs so IVC filter not indicated.   "

## 2024-09-20 NOTE — NURSING
Nurses Note -- 4 Eyes      9/20/2024   5:31 AM      Skin assessed during: Transfer      [] No Altered Skin Integrity Present    []Prevention Measures Documented      [x] Yes- Altered Skin Integrity Present or Discovered   [x] LDA Added if Not in Epic (Describe Wound)   [x] New Altered Skin Integrity was Present on Admit and Documented in LDA   [x] Wound Image Taken    Wound Care Consulted? Yes    Attending Nurse:  Miguel Sanders RN/Staff Member:  HUMPHREY Gauthier

## 2024-09-20 NOTE — ASSESSMENT & PLAN NOTE
Pulmonary hypertension   Much Improved. Patient is identified as having Combined Systolic and Diastolic heart failure that is Acute on chronic. CHF is currently controlled. Latest ECHO performed and demonstrates- Results for orders placed during the hospital encounter of 10/04/23  Echo    Result Date: 9/16/2024    Technically difficult study    Left Ventricle: The left ventricle is normal in size. Normal wall   thickness. Severe global hypokinesis present. Septal motion is abnormal.   There is severely reduced systolic function with a visually estimated   ejection fraction of 20 - 25%. Unable to assess diastolic function due to   atrial flutter.    Right Ventricle: Mild right ventricular enlargement. Wall thickness is   normal. Systolic function is moderately reduced.    Severe biatrial enlargement    Mitral Valve: There is mild regurgitation.    Pulmonary Artery: There is mild to moderate pulmonary hypertension. The   estimated pulmonary artery systolic pressure is 49 mmHg.    IVC/SVC: Intermediate venous pressure at 8 mmHg.       Recent Labs   Lab 09/16/24  0037   *     - He denies acute SOB but reports SLADE, SOB after activities such as eating requiring him to sit and use a CPAP for 2-3 hours, and lower extremity swelling right > left not much improved with his home PO Lasix and Spironolactone. He also reports requiring 2-3 liters supplemental oxygen while sleeping as well. He also reports eating bags of salty chips to help with his leg swelling.  - Cardiology consulted on this admit given new reduced EF with global wall motion and septal wall motion abnormalities Patient taken off Losartan and started on Entresto to treat his reduced EF and heart failure. Patient continued on his home Aldactone. Patient placed on Lasix 80 mg IV BID to diuresis patient in hospital and patient diuresising well and net negative 4.5 liters since admit. Patient now euvolemic on 9/20 so switched to po Lasix 40 mg po BID by  Cardiology on 9/19.   - Toprol XL 12.5 mg po daily for his chronic heart failure.   - Will need life vest on discharge if patient wishes, as is DNR baseline. Will need Cardiac PET stress as outpatient for ischemic evaluation.  - Fluid restriction 1.5 liters a day and low salt diet.   - Monitor response to treatment.

## 2024-09-20 NOTE — ASSESSMENT & PLAN NOTE
- Per chart review, his cardiologist Dr. Hank Barry feels EKGs that were read as atrial fibrillation previously are actually sinus rhythm and thus not on long term anticoagulation on admit.   - Cardiology consulted here who report current rhythm is atrial flutter and report if does not self convert may need DEMETRICE/DCCV once euvolemic. Patient now euvolemic on 9/19 and remains in atrial flutter and rate is controlled at 45-65 on Toprol XL. Patient taken for DEMETRICE and underwent successful cardioversion by Cardiology on 9/19 and back in sinus rhythm. Patient on IV Amiodarone infusion as per Cardiology and needs for 24 hours and scheduled to end tonight and plan to switch to po Amiodarone on 9/21.   - Patient on IV Heparin drip for anticoagulation for now and will continue but transition to oral Apixiban post-op for long term anticoagulation.

## 2024-09-20 NOTE — PROGRESS NOTES
[facility]  Cardiology  Progress Note    Patient Name: Bean Salas  MRN: [id number]  Admission Date: 9/15/2024  Hospital Length of Stay: 4 days  Code Status: Full Code   Attending Physician: Annelise Bray MD   Primary Care Physician: Ramirez Levine MD  Expected Discharge Date: 9/23/2024  Principal Problem:Closed nondisplaced fracture of anterior column of right acetabulum with routine healing    Subjective:     Interval History: No acute events overnight. Pt has no complaints today. He is scheduled to go to the OR for ORIF pelvis. Remains in normal sinus rhythm w/ occasional PVC's on telemetry.     ROS  Objective:     Vital Signs (Most Recent):  Temp: 96.2 °F (35.7 °C) (09/20/24 0729)  Pulse: (!) 55 (09/20/24 0800)  Resp: 19 (09/20/24 0729)  BP: 123/66 (09/20/24 0729)  SpO2: 98 % (09/20/24 0729) Vital Signs (24h Range):  Temp:  [96.2 °F (35.7 °C)-98.1 °F (36.7 °C)] 96.2 °F (35.7 °C)  Pulse:  [37-79] 55  Resp:  [13-20] 19  SpO2:  [95 %-99 %] 98 %  BP: ()/(40-85) 123/66     Weight: 101.6 kg (224 lb)  Body mass index is 31.24 kg/m².     SpO2: 98 %         Intake/Output Summary (Last 24 hours) at 9/20/2024 1049  Last data filed at 9/20/2024 0931  Gross per 24 hour   Intake 400 ml   Output --   Net 400 ml       Lines/Drains/Airways       Drain  Duration             Male External Urinary Catheter 09/16/24 0425 4 days              Peripheral Intravenous Line  Duration                  Peripheral IV - Single Lumen 09/16/24 0454 18 G Anterior;Distal;Left Forearm 4 days         Peripheral IV - Single Lumen 09/19/24 2230 20 G Anterior;Proximal;Right Forearm <1 day                       Physical Exam  Vitals reviewed.   Constitutional:       Appearance: He is well-developed. He is obese. He is not diaphoretic.   HENT:      Head: Normocephalic and atraumatic.      Right Ear: External ear normal.      Left Ear: External ear normal.   Eyes:      Extraocular Movements: Extraocular movements  intact.   Neck:      Vascular: No carotid bruit or JVD.   Cardiovascular:      Rate and Rhythm: Normal rate and regular rhythm.      Pulses: Normal pulses and intact distal pulses.      Heart sounds:      No gallop.   Pulmonary:      Effort: Pulmonary effort is normal.      Breath sounds: Normal breath sounds. No rales.   Chest:      Chest wall: No tenderness.   Abdominal:      Palpations: Abdomen is soft.      Tenderness: There is no abdominal tenderness.   Musculoskeletal:      Right lower leg: No edema.      Left lower leg: No edema.   Skin:     General: Skin is warm and dry.      Coloration: Skin is not pale.   Neurological:      Mental Status: He is oriented to person, place, and time.            Significant Labs: All pertinent lab results from the last 24 hours have been reviewed. and   Recent Lab Results  (Last 5 results in the past 24 hours)        09/20/24  0531   09/20/24  0055   09/19/24  2338   09/19/24  1727   09/19/24  1605        Anion Gap 10               PTT 57.2  Comment: Refer to local heparin nomogram for intensity/dose specific   therapeutic   range.       60.1  Comment: Refer to local heparin nomogram for intensity/dose specific   therapeutic   range.     46.8  Comment: Refer to local heparin nomogram for intensity/dose specific   therapeutic   range.           Baso # 0.02               Basophil % 0.3               BUN 32               Calcium 8.8               Chloride 99               CO2 30               Creatinine 1.2               Differential Method Automated               eGFR >60.0               Eos # 0.2               Eos % 2.4               Glucose 129               Gran # (ANC) 4.8               Gran % 67.9               Hematocrit 39.8               Hemoglobin 13.0               Immature Grans (Abs) 0.03  Comment: Mild elevation in immature granulocytes is non specific and   can be seen in a variety of conditions including stress response,   acute inflammation, trauma and pregnancy.  Correlation with other   laboratory and clinical findings is essential.                 Immature Granulocytes 0.4               Lymph # 1.0               Lymph % 14.3               Magnesium  2.4               MCH 32.5               MCHC 32.7                              Mono # 1.0               Mono % 14.7               MPV 12.6               nRBC 0               QRS Duration   134       120       OHS QTC Calculation   576       541       Platelet Count 162               Potassium 4.1               RBC 4.00               RDW 15.1               Sodium 139               WBC 7.07                                        Assessment and Plan:     Pulmonary embolism without acute cor pulmonale  Mr. Salas is a 78y/o M HfrEF w/ recovered EF (40%--9/2021), NICM, PAF, frequent PVCs, hypertension and hyperlipidemia, AKHIL not using CPAP, PVD, chronic respiratory failure w/ home oxygen and hypothyroidism who presented to [facility] on 9/15 after a GLF at home resulting in a nondisplaced fracture of the anterior right acetabulum. Patient also found to have a non occlusive PE by CT in main PA< RLL vessels, and occlusive PE in subsegmental PA, no right heart strain. LE US was negative for DVT. He was started on a heparin drip. Labs significant for a peaked trop 1.041 0.817, ---918 (11mo), Echo done today showed a severely depressed LVEF 20-25%, RV depressed systolic function, PASP 49 with an IVC 8. PESI score 110, Class IV high risk.     Recs:   - Elevated PESI score mostly driven by his history of HF and chronic long disease. Continue on high intensity heparin drip and can be later transitioned to eliquis. No  right heart strain on echo.   - will need hypercoagulable work up    Paroxysmal atrial flutter  - Cardioverted yesterday. Remains in NSR on telemetry   - Eliquis 10mg BID x1 week then 5mg BID  - 24 hour amiodarone drip started yesterday. When finished will transition to PO amiodarone 400mg BID x2 weeks, then 200mg  daily   - will need EP follow up as outpatient        Acute on chronic combined systolic and diastolic congestive heart failure  Hx of HFrEF, NICM with normalization of LVEF now with new depressed LVEF 20-25%, mod RV dysfunction, elevated PASP.     Recs.   - He is currently on some GDMT and his home toprol was discontinued due to bradycardia. Recommend switching losartan. Can add low dose toprol 12.5mg if patient does not have symptomatic bradycardia. Continue spironolactone and discontinue amlodipine as we will need to uptitrate his GDMT.   - Continue full dose entresto, consider adding on jardiance prior to discharge   - Will need an ischemic work up as an outpatient, recommend a cardiac PET stress test and lifevest at discharge.   - Will need close outpatient follow up with cardiology.  - Discussed the importance of a heart healthy no salt diet, unfortunately patient does not have any desire to stop his salt intake but this can be work up more as an outpatient.       Cardiology will sign off. Thank you for this consult.     VTE Risk Mitigation (From admission, onward)           Ordered     Place sequential compression device  Until discontinued         09/16/24 0428     Reason for No Pharmacological VTE Prophylaxis  Once        Comments: Heparin gtt for PE   Question:  Reasons:  Answer:  Physician Provided (leave comment)    09/16/24 0428     IP VTE HIGH RISK PATIENT  Once         09/16/24 0428                    Dl Méndez,   Internal Medicine Intern   Cardiology  [facility] - Card[facility] Step[facility]

## 2024-09-20 NOTE — SUBJECTIVE & OBJECTIVE
Interval History: No acute events overnight. Pt has no complaints today. He is scheduled to go to the OR for ORIF pelvis. Remains in normal sinus rhythm w/ occasional PVC's on telemetry.     ROS  Objective:     Vital Signs (Most Recent):  Temp: 96.2 °F (35.7 °C) (09/20/24 0729)  Pulse: (!) 55 (09/20/24 0800)  Resp: 19 (09/20/24 0729)  BP: 123/66 (09/20/24 0729)  SpO2: 98 % (09/20/24 0729) Vital Signs (24h Range):  Temp:  [96.2 °F (35.7 °C)-98.1 °F (36.7 °C)] 96.2 °F (35.7 °C)  Pulse:  [37-79] 55  Resp:  [13-20] 19  SpO2:  [95 %-99 %] 98 %  BP: ()/(40-85) 123/66     Weight: 101.6 kg (224 lb)  Body mass index is 31.24 kg/m².     SpO2: 98 %         Intake/Output Summary (Last 24 hours) at 9/20/2024 1049  Last data filed at 9/20/2024 0931  Gross per 24 hour   Intake 400 ml   Output --   Net 400 ml       Lines/Drains/Airways       Drain  Duration             Male External Urinary Catheter 09/16/24 0425 4 days              Peripheral Intravenous Line  Duration                  Peripheral IV - Single Lumen 09/16/24 0454 18 G Anterior;Distal;Left Forearm 4 days         Peripheral IV - Single Lumen 09/19/24 2230 20 G Anterior;Proximal;Right Forearm <1 day                       Physical Exam  Vitals reviewed.   Constitutional:       Appearance: He is well-developed. He is obese. He is not diaphoretic.   HENT:      Head: Normocephalic and atraumatic.      Right Ear: External ear normal.      Left Ear: External ear normal.   Eyes:      Extraocular Movements: Extraocular movements intact.   Neck:      Vascular: No carotid bruit or JVD.   Cardiovascular:      Rate and Rhythm: Normal rate and regular rhythm.      Pulses: Normal pulses and intact distal pulses.      Heart sounds:      No gallop.   Pulmonary:      Effort: Pulmonary effort is normal.      Breath sounds: Normal breath sounds. No rales.   Chest:      Chest wall: No tenderness.   Abdominal:      Palpations: Abdomen is soft.      Tenderness: There is no abdominal  tenderness.   Musculoskeletal:      Right lower leg: No edema.      Left lower leg: No edema.   Skin:     General: Skin is warm and dry.      Coloration: Skin is not pale.   Neurological:      Mental Status: He is oriented to person, place, and time.            Significant Labs: All pertinent lab results from the last 24 hours have been reviewed. and   Recent Lab Results  (Last 5 results in the past 24 hours)        09/20/24  0531   09/20/24  0055   09/19/24  2338   09/19/24  1727   09/19/24  1605        Anion Gap 10               PTT 57.2  Comment: Refer to local heparin nomogram for intensity/dose specific   therapeutic   range.       60.1  Comment: Refer to local heparin nomogram for intensity/dose specific   therapeutic   range.     46.8  Comment: Refer to local heparin nomogram for intensity/dose specific   therapeutic   range.           Baso # 0.02               Basophil % 0.3               BUN 32               Calcium 8.8               Chloride 99               CO2 30               Creatinine 1.2               Differential Method Automated               eGFR >60.0               Eos # 0.2               Eos % 2.4               Glucose 129               Gran # (ANC) 4.8               Gran % 67.9               Hematocrit 39.8               Hemoglobin 13.0               Immature Grans (Abs) 0.03  Comment: Mild elevation in immature granulocytes is non specific and   can be seen in a variety of conditions including stress response,   acute inflammation, trauma and pregnancy. Correlation with other   laboratory and clinical findings is essential.                 Immature Granulocytes 0.4               Lymph # 1.0               Lymph % 14.3               Magnesium  2.4               MCH 32.5               MCHC 32.7                              Mono # 1.0               Mono % 14.7               MPV 12.6               nRBC 0               QRS Duration   134       120       OHS QTC Calculation   576       541        Platelet Count 162               Potassium 4.1               RBC 4.00               RDW 15.1               Sodium 139               WBC 7.07

## 2024-09-20 NOTE — PLAN OF CARE
Pt IV flushed. Pt reports pain relief following administration of medication. Amiodarine gtt infusing. Anterior pelvic incision dressing CDI, R hip dressing CDI.

## 2024-09-20 NOTE — ANESTHESIA PROCEDURE NOTES
Intubation    Date/Time: 9/20/2024 1:29 PM    Performed by: Severiano Bloom CRNA  Authorized by: Abrahan Munoz MD    Intubation:     Induction:  Intravenous    Intubated:  Postinduction    Mask Ventilation:  Easy mask    Attempts:  1    Attempted By:  CRNA and student    Method of Intubation:  Video laryngoscopy    Blade:  Aguilar 3    Laryngeal View Grade: Grade I - full view of cords      Difficult Airway Encountered?: No      Complications:  None    Airway Device:  Oral endotracheal tube    Airway Device Size:  7.5    Style/Cuff Inflation:  Cuffed    Inflation Amount (mL):  10    Tube secured:  23    Secured at:  The teeth    Placement Verified By:  Capnometry    Complicating Factors:  None    Findings Post-Intubation:  BS equal bilateral and atraumatic/condition of teeth unchanged

## 2024-09-20 NOTE — ASSESSMENT & PLAN NOTE
Bean Salas is a 79 y.o. male Bean Salas is a 79 y.o. male with PMH significant for CAD, CHF, pulmonary HTN, AKHIL on CPAP, AFib, and R IT fx s/p IMN 9/3/21 with Dr. Hall presenting with a nondisplaced fracture of the anterior, of the right acetabulum.  He was closed, NVI.  CT chest abdomen and pelvis demonstrated right-sided PE with nonocclusive thrombus in the right pulmonary artery with occlusive thrombus in his subsegmental branch of the right pulmonary artery.  DVT ultrasound negative.  Patient was started on heparin GTT by hospital medicine.  Given the relatively nondisplaced in nature of the patient's fracture and numerous medical comorbidities, we will plan to treat him nonoperatively with a trial of physical therapy.    Patient has failed PT multiple days at this point. After further discussion, patient would like to proceed with surgery.      Admitted to    Plan for percutaneous screw fixation today  Marked, booked and consented for surgery  NPO   Hgb 14.7  Weightbearing as tolerated right lower extremity on rolling walker  PT/OT  DVT ppx: per    Multimodal pain control

## 2024-09-20 NOTE — OP NOTE
OPERATIVE NOTE    DATE OF PROCEDURE:  09/20/2024    PREOPERATIVE DIAGNOSIS:   Pelvic ring injury, LC1, right  Right sacral ala fracture  Right superior pubic ramus fracture  Ground level fall    POSTOPERATIVE DIAGNOSIS:   Pelvic ring injury, LC1, right  Right sacral ala fracture  Right superior pubic ramus fracture  Ground level fall    PROCEDURE:   Percutaneous fixation posterior pelvic ring, right - 27216  Open reduction internal fixation right anterior pelvic ring fracture - 27217    SURGEON:   Negrito Stapleton MD    ASSISTANT:    Antoine Obrien MD    ANESTHESIA:   Generla    EBL:    50mL    COMPLICATIONS:  none    IMPLANTS:   Synthes  Posterior pelvic ring  7.5mm fully threaded cannulated screw + washer  Anterior pelvic ring  7.5mm fully threaded cannulated screw    SPECIMENS:   None    INDICATIONS FOR PROCEDURE:  79M, multiple medical comorbidities - coronary artery disease, CHF, pulmonary hypertension, AKHIL, AFib  Fall 9.15.24  R LC1 pelvic ring injury  Difficulty w/ ambulation, mobility  9/10 pain   Unable to perform SLR or heal slide  +pain w/ pelvic compression    Ambulates with a walker@ baseline   Tobacco Use:  35 pack year history.  Quit smoking January 1, 2000  [place] ULLOA   Lives by himself in [facility].  He was help from his son who lives in [facility], as well as multiple neighbors.  There is a 4 inch step prior to entry of his home    Anticoagulation:  ASA 81mg Qd   Immunosuppressive medications:  None at baseline.  Occupation:  Retired.  No hx of MI, CVA   No hx of cancer, chemotherapy, or radiation      On admission he was noted to have a PE and to be in a flutter.  Subsequently was anticoagulated and had a cardioversion performed during this admission.     Discussed injury  Nonop vs operative management  Risk/benefit of both  Goal of percutaneous screw fixation of pelvis would be to improve stability, pain, mobility, prevent displacement, and to improve both early and long term functional  outcome     The risks, benefits, and alternatives to surgery were discussed with the patient and/or family.    Specific risks discussed included, but were not limited to: continued pain, damage to nearby structures, including neurovascular structures leading to loss of function or loss of limb, bleeding, need for blood transfusion, pain, stiffness, scarring, numbness, tingling, weakness, compartment syndrome, malunion/nonunion, hardware failure, hardware prominence, infection, need for multiple staged procedures, prolonged antibiotics, iatrogenic fracture, heterotopic ossification, arthritis, a variety of medical complications including but not limited to heart attack, stroke, deep venous thrombosis, pulmonary embolism, prolonged hospitalization, prolonged intubation, and death.   Patient and/or family expressed an understanding and desires to proceed with surgery.   All questions were answered.  No guarantees were implied or stated.  Informed consent was obtained.      Pt would like to pursue operative management of pelvic fx  Plan for small incisions and percutaneous screw fixation pelvic ring fx following medical optimization from hospitalist and cardiac teams    OPERATIVE PROCEDURE:  Patient met in the preoperative hold area and the correct site and side of surgery being the right pelvis were marked and verified.  Patient brought back to the operative suite.  General anesthesia smoothly induced.  Patient transferred over to operative table.  Placed in supine position. All bony prominences were appropriately padded.  Patient received 2g ancef for preoperative antibiotics.  The pelvis was then prepped and draped in normal sterile fashion.    Time-out was performed verifying the correct patient, site/side of surgery, surgical consent, radiographs as applicable, preop antibiotics, necessary equipment, anticipated blood loss, length of procedure, postoperative disposition.    We started with percutaneous posterior  pelvic ring fixation.  Marked the S1 corridor and a lateral sacral view on the patient's right buttock.  Made an incision.  Inserted a guidewire under multi planer fluoroscopy safely in the S1 corridor through the ilium, across the SI joint, to the body of the sacrum.  Stopped the midline we reassessed on a sacral lateral were satisfied with our wire trajectory.  Advanced this out to the far iliac cortex.  Wire was measured.  We then placed a fully-threaded 7.5 mm cannulated screw and washer over the wire controlling the right posterior pelvic ring fracture.      We then turned our attention open reduction internal fixation of the right anterior pelvic ring fracture.  This was a superior pubic ramus fracture.  We planned for intramedullary screw fixation.  Made a small incision over the pubic symphysis.  Bluntly dissected down.  Placed a guidewire to the superior pubic ramus, used a ball-tipped planer fluoroscopy to transverse safely in the anterior column/anterior pelvic ring, across the fracture.  Maintain that the wire was outside of the hip joint.  Advanced it to the far iliac cortex.  Wire was measured.  We then placed a fully-threaded cannulated 7.5 mm screw over the wire controlling the anterior pelvic ring fracture.    Multi planer fluoroscopy indicated appropriate hardware placement.    Wounds irrigated with saline.  Hemostasis achieved as needed with electrocautery.  Deep tissue closed with 2-0 Vicryl.  Subcutaneous tissue closed with 2-0 Vicryl.  Skin closed with 3-0 Monocryl.  Dermabond, Aquacel, gauze, Tegaderm dressing applied.    At the conclusion of procedure the patient had soft and compressible compartments, brisk cap refill, palpable DP/PT pulses in the bilateral lower extremities.    Prior to final closure all counts were confirmed to be correct.  Patient tolerated the procedure well without any complications, was awoken from anesthesia, transferred PACU for further recovery.    POSTOPERATIVE  PLAN:  79-year-old male, multiple medical comorbidities   Ground level fall 09/05/2024   Right LC 1 pelvic ring fracture   Admitted to the hospital medically optimized due to coexisting pulmonary embolism and a flutter requiring cardioversion    09/20/2024 - percutaneous screw fixation Right pelvic ring fx (S1, rAC)    Abx x 24 hrs  Cont anticoagulation per cards  Medical management per primary team  WBAT BLE  ROMAT BLE    Ca, Vit D, fragility fx clinic referral    X-rays at subsequent followups:  Pelvis inlet/outlet    Follow-up postop 2 weeks, 6 weeks, 3 months, 6 months, 1 year    =====================  Negrito Stapleton MD  Orthopaedic Surgery

## 2024-09-20 NOTE — ASSESSMENT & PLAN NOTE
Bean Salas is a 79 y.o. male Bean Salas is a 79 y.o. male with PMH significant for CAD, CHF, pulmonary HTN, AKHIL on CPAP, AFib, and R IT fx s/p IMN 9/3/21 with Dr. Hall presenting with a nondisplaced fracture of the anterior, of the right acetabulum.  He was closed, NVI.  CT chest abdomen and pelvis demonstrated right-sided PE with nonocclusive thrombus in the right pulmonary artery with occlusive thrombus in his subsegmental branch of the right pulmonary artery.  DVT ultrasound negative.  Patient was started on heparin GTT by hospital medicine.  Given the relatively nondisplaced in nature of the patient's fracture and numerous medical comorbidities, we will plan to treat him nonoperatively with a trial of physical therapy.    Patient has failed PT multiple days at this point. After further discussion, patient would like to proceed with surgery.      Admitted to    Plan for percutaneous screw fixation tomorrow  Marked, booked and consented for surgery  NPO midnight  Hgb 14.7  Weightbearing as tolerated right lower extremity on rolling walker  PT/OT  DVT ppx: per    Multimodal pain control

## 2024-09-20 NOTE — ASSESSMENT & PLAN NOTE
Chronic, controlled. Latest blood pressure and vitals reviewed-     Temp:  [96.2 °F (35.7 °C)-98.1 °F (36.7 °C)]   Pulse:  [37-79]   Resp:  [13-20]   BP: ()/(40-85)   SpO2:  [95 %-99 %] .   Home meds for hypertension were reviewed and noted below.   Hypertension Medications               amLODIPine (NORVASC) 2.5 MG tablet Take 1 tablet by mouth once daily    furosemide (LASIX) 40 MG tablet Take 1 tablet (40 mg total) by mouth 2 (two) times a day. Take daily for next 3 days, then use as needed. Weigh daily. Afterwards, f weight increases 2 lbs/24h, take dose of lasix daily  until weight is back to baseline    losartan (COZAAR) 100 MG tablet Take 1 tablet by mouth once daily    metoprolol succinate (TOPROL-XL) 50 MG 24 hr tablet Take 1 tablet by mouth once daily    spironolactone (ALDACTONE) 25 MG tablet Take 1 tablet by mouth once daily     While in the hospital, will manage blood pressure as follows; Adjust home antihypertensive regimen as follows- Stopped Norvasc and Losartan by Cardiology and started on Entresto for new onset HFrEF . Continue Lasix and Aldactone.

## 2024-09-20 NOTE — PROGRESS NOTES
[facility]  Orthopedics  Progress Note    Patient Name: Bean Salas  MRN: [id number]  Admission Date: 9/15/2024  Hospital Length of Stay: 3 days  Attending Provider: Annelise Bray MD  Primary Care Provider: Ramirez Levine MD  Follow-up For: Procedure(s) (LRB):  Cardioversion or Defibrillation (N/A)  ECHOCARDIOGRAM, TRANSESOPHAGEAL (N/A)    Post-Operative Day: Day of Surgery  Subjective:     Principal Problem:Closed nondisplaced fracture of anterior column of right acetabulum with routine healing    Principal Orthopedic Problem: As above    Interval History: Patient seen and examined at bedside. NAEON. Patient went for DEMETRICE and cardioversion today with cardiology and converted to NSR. Patient continues to have significant pain with PT and is unable to ambulate 2/2 pain. After further discussion with patient, we will plan for surgery. Cleared for surgery by cards/primary team.       Review of patient's allergies indicates:  No Known Allergies    Current Facility-Administered Medications   Medication    acetaminophen tablet 1,000 mg    albuterol-ipratropium 2.5 mg-0.5 mg/3 mL nebulizer solution 3 mL    amiodarone 360 mg/200 mL (1.8 mg/mL) infusion    aspirin EC tablet 81 mg    atorvastatin tablet 10 mg    bisacodyL suppository 10 mg    dextrose 10% bolus 125 mL 125 mL    dextrose 10% bolus 125 mL 125 mL    dextrose 10% bolus 250 mL 250 mL    dextrose 10% bolus 250 mL 250 mL    diphenhydrAMINE injection 25 mg    diphenhydrAMINE-zinc acetate 2-0.1% cream    [START ON 9/20/2024] furosemide tablet 40 mg    glucagon (human recombinant) injection 1 mg    glucagon (human recombinant) injection 1 mg    glucose chewable tablet 16 g    glucose chewable tablet 24 g    heparin 25,000 units in dextrose 5% (100 units/ml) IV bolus from bag HIGH INTENSITY nomogram - [facility]    heparin 25,000 units in dextrose 5% (100 units/ml) IV bolus from bag HIGH INTENSITY nomogram - [facility]    heparin 25,000 units in  "dextrose 5% 250 mL (100 units/mL) infusion HIGH INTENSITY nomogram - [facility]    HYDROmorphone injection 0.2 mg    levothyroxine tablet 200 mcg    melatonin tablet 6 mg    methocarbamoL tablet 500 mg    metoprolol succinate (TOPROL-XL) 24 hr split tablet 12.5 mg    mirtazapine tablet 7.5 mg    naloxone 0.4 mg/mL injection 0.02 mg    ondansetron disintegrating tablet 8 mg    oxyCODONE immediate release tablet 5 mg    polyethylene glycol packet 17 g    prochlorperazine injection Soln 5 mg    sacubitriL-valsartan  mg per tablet 1 tablet    senna-docusate 8.6-50 mg per tablet 1 tablet    sodium chloride 0.9% flush 3 mL    sodium chloride 0.9% flush 5 mL    spironolactone tablet 25 mg     Objective:     Vital Signs (Most Recent):  Temp: 97.9 °F (36.6 °C) (09/19/24 2048)  Pulse: (!) 37 (09/19/24 2215)  Resp: 18 (09/19/24 2048)  BP: 122/60 (09/19/24 2215)  SpO2: 96 % (09/19/24 2130) Vital Signs (24h Range):  Temp:  [97.3 °F (36.3 °C)-98.1 °F (36.7 °C)] 97.9 °F (36.6 °C)  Pulse:  [37-98] 37  Resp:  [13-20] 18  SpO2:  [94 %-99 %] 96 %  BP: ()/(40-85) 122/60     Weight: 101.6 kg (224 lb)  Height: 5' 11" (180.3 cm)  Body mass index is 31.24 kg/m².      Intake/Output Summary (Last 24 hours) at 9/19/2024 2218  Last data filed at 9/19/2024 1512  Gross per 24 hour   Intake 400 ml   Output 1350 ml   Net -950 ml        Ortho/SPM Exam  A&O x 3  Regular Rate  Non-Labored Respirations    RLE:   Fires Quad/TA/EHL/GSC  SILT  Compartments soft  Brisk cap refill  Swelling to dorsal foot    LLE:   Fires Quad/TA/EHL/GSC  SILT  Compartments soft  Brisk cap refill  Swelling dorsal foot         Significant Labs: CBC:   Recent Labs   Lab 09/18/24  0248 09/19/24 0524   WBC 9.48 9.86   HGB 14.0 14.7   HCT 42.2 42.7   * 152     CMP:   Recent Labs   Lab 09/18/24  0248 09/19/24 0524    138   K 3.5 3.9    101   CO2 27 26   * 146*   BUN 27* 29*   CREATININE 1.1 1.0   CALCIUM 8.8 8.6*   ANIONGAP 9 11     All pertinent " labs within the past 24 hours have been reviewed.    Significant Imaging: I have reviewed and interpreted all pertinent imaging results/findings.  Assessment/Plan:     * Closed nondisplaced fracture of anterior column of right acetabulum with routine healing  Bean Salas is a 79 y.o. male Bean Salas is a 79 y.o. male with PMH significant for CAD, CHF, pulmonary HTN, AKHIL on CPAP, AFib, and R IT fx s/p IMN 9/3/21 with Dr. Hall presenting with a nondisplaced fracture of the anterior, of the right acetabulum.  He was closed, NVI.  CT chest abdomen and pelvis demonstrated right-sided PE with nonocclusive thrombus in the right pulmonary artery with occlusive thrombus in his subsegmental branch of the right pulmonary artery.  DVT ultrasound negative.  Patient was started on heparin GTT by hospital medicine.  Given the relatively nondisplaced in nature of the patient's fracture and numerous medical comorbidities, we will plan to treat him nonoperatively with a trial of physical therapy.    Patient has failed PT multiple days at this point. After further discussion, patient would like to proceed with surgery.      Admitted to    Plan for percutaneous screw fixation tomorrow  Marked, booked and consented for surgery  NPO midnight  Hgb 14.7  Weightbearing as tolerated right lower extremity on rolling walker  PT/OT  DVT ppx: per    Multimodal pain control                   AYLEEN Hurd MD  Orthopedics  [facility] - [facility] Step[facility]

## 2024-09-20 NOTE — SUBJECTIVE & OBJECTIVE
Interval History: Patient had DEMETRICE and successful DCCV by Cardiology yesterday with return back to normal sinus rhythm. Patient started on IV Amiodarone infusion after cardioversion and plan for 24 hours then switch to oral Amiodarone as per Cardiology recommendations. Cardiology states can start with po Amiodarone in the am as infusion will be completed tonight. Cardiology to place final recommendations on medications for his atrial flutter and new onset heart failure. Patient transferred from 5th floor to 3rd floor last night as needed IV Amiodarone infusion and could not be done on 5th floor. Patient switched from IV to po Lasix yesterday after cardioversion to Lasix 40 mg po BID as now euvolemic. Patient to go to OR today, 9/20 for ORIF of right pelvis by Ortho. Patient reports pain to right hip controlled this am and he states he is feeling well. Patient denies any SOB. Patient complaining of mild cough and chest congestion this am and will order Mucinex to help. Patient anxiously awaiting surgery today. Labs reviewed. Hgb stable at 13. Blood sugars stable. Creatinine stable at 1.2.     Review of Systems   Constitutional:  Negative for fever.   Respiratory:  Positive for cough and shortness of breath (SLADE).    Cardiovascular:  Positive for leg swelling. Negative for chest pain.   Gastrointestinal:  Negative for abdominal pain, nausea and vomiting.   Musculoskeletal:  Positive for arthralgias (Right hip).   Psychiatric/Behavioral:  Negative for agitation and confusion.      Objective:     Vital Signs (Most Recent):  Temp: 96.3 °F (35.7 °C) (09/20/24 1100)  Pulse: 61 (09/20/24 1100)  Resp: 18 (09/20/24 1100)  BP: 123/65 (09/20/24 1100)  SpO2: 98 % (09/20/24 1100) on 2 liters of oxygen Vital Signs (24h Range):  Temp:  [96.2 °F (35.7 °C)-98.1 °F (36.7 °C)] 96.3 °F (35.7 °C)  Pulse:  [37-79] 61  Resp:  [13-20] 18  SpO2:  [95 %-99 %] 98 %  BP: ()/(40-85) 123/65     Weight: 101.6 kg (224 lb)  Body mass index is  31.24 kg/m².    Intake/Output Summary (Last 24 hours) at 9/20/2024 1149  Last data filed at 9/20/2024 0931  Gross per 24 hour   Intake 400 ml   Output --   Net 400 ml         Physical Exam  Vitals and nursing note reviewed.   Constitutional:       General: He is awake. He is not in acute distress.     Appearance: Normal appearance. He is well-developed. He is obese. He is not ill-appearing.      Comments: Patient sitting up in bed in no apparent distress.    Eyes:      Conjunctiva/sclera: Conjunctivae normal.   Neck:      Vascular: No JVD.   Cardiovascular:      Rate and Rhythm: Normal rate and regular rhythm.      Heart sounds: Normal heart sounds. No murmur heard.  Pulmonary:      Effort: Pulmonary effort is normal. No respiratory distress.      Breath sounds: Normal breath sounds. No wheezing.   Abdominal:      General: Abdomen is flat. Bowel sounds are normal. There is no distension.      Palpations: Abdomen is soft.      Tenderness: There is no abdominal tenderness.   Musculoskeletal:      Right lower leg: No edema.      Left lower leg: No edema.   Skin:     Findings: No erythema or rash.   Neurological:      Mental Status: He is alert and oriented to person, place, and time.   Psychiatric:         Mood and Affect: Mood normal.         Behavior: Behavior normal. Behavior is cooperative.         Thought Content: Thought content normal.         Judgment: Judgment normal.             Significant Labs: CBC:   Recent Labs   Lab 09/19/24  0524 09/20/24  0531   WBC 9.86 7.07   HGB 14.7 13.0*   HCT 42.7 39.8*    162     CMP:   Recent Labs   Lab 09/19/24  0524 09/20/24  0531    139   K 3.9 4.1    99   CO2 26 30*   * 129*   BUN 29* 32*   CREATININE 1.0 1.2   CALCIUM 8.6* 8.8   ANIONGAP 11 10     Magnesium:   Recent Labs   Lab 09/19/24  0524 09/20/24  0531   MG 2.2 2.4       Significant Imaging: I have reviewed all pertinent imaging results/findings within the past 24 hours.

## 2024-09-20 NOTE — PROGRESS NOTES
[facility] - Cardiology [facility] Medicine  Progress Note    Patient Name: Bean Salas  MRN: [id number]  Patient Class: IP- Inpatient   Admission Date: 9/15/2024  Length of Stay: 4 days  Attending Physician: Annelise Bray MD  Primary Care Provider: Ramirez Levine MD        Subjective:     Principal Problem:Closed nondisplaced fracture of anterior column of right acetabulum with routine healing        HPI:  Bean Salas is a 79 y.o. male with PMHx of  Bradycardia, PVCs, Hypertension, Coronary Artery Disease, Hyperlipidemia, HFpEF, Pulmonary HTN, AKHIL on CPAP, Chronic respiratory failure w/ home oxygen and hypothyroidism. He presents with right hip pain after ground level fall outside yesterday. He was doing some yard work and reaching for some piles of wood when a bunch of ants started crawling all over him and biting him. He tried to get away but forgot there was a step up from the grass to the concrete/carport next to him causing him to fall over onto his bottom/right side and then went more slowly back hitting his head as well.  Did not lose consciousness. He denies any headache or confusion. He did not have any pain right away. He had trouble pulling himself up off the ground and his cell phone was inside his house where he lives alone. He called out for a neighbor and a few of them were able to get him into a chair however when he tried to stand he had immediate sharp pain in his right hip and then called for an ambulance. He normally ambulates with a walker and is independent with ADLs. He does not have pain at rest currently.     He otherwise felt in his normal state of health prior to this. He denies chest pain. Denies acute SOB, fever, cough. However, reports somewhat chronic/gradually worsening SLADE and SOB after activities such as eating requiring him to sit and use a CPAP for 2-3 hours every afternoon and lower extremity swelling right > left not much improved with his home PO lasix  "and spironolactone. He also reports requiring 2-3L supplemental oxygen while sleeping. He has some little red marks all over his obody from the ant bites but is mostly un bothered by them. He denies any history of PE/DVT. While he is not super active he does go to the store for 2-3 hours weekly to shop and ambulates between 4 rooms in his house and completes work around the house. No recent surgery or prolonged immobilization.     ED course: Afebrile. RR 22-23. /44. HR . Saturating >94% on 2-3L NC. Labs notable for troponin 0.811>1.041. . XR/CT Imaging with acute fractures of the right superior and inferior pubic rami with possible involvement of the anterosuperior acetabulum. CT chest/abdomen/pelvis also notable for right-sided pulmonary embolism with nonocclusive thrombus in the main right pulmonary artery and right lower lobe branch vessels, and occlusive thrombus in a subsegmental branch of the right pulmonary artery, no right sided heart strain, unclear chronicity. CT head and cervical spine without acute findings.  Orthopedic surgery consulted for pelvic fracture. Started on heparin gtt for PE. Admitted to  for further manement.     Overview/Hospital Course:  Patient found down outside his home and unable to bear weight to right leg due to right hip pain. Patient brought to [facility] ED and on CT scan and X-rays found to have  and then eventually helped up found to have "Mildly displaced acute fracture of the right superior pubic ramus with questionable minimal extension into the anterosuperior acetabulum." CT scan of chest and abdomen and pelvis as per trauma protocol was done and noted "Right-sided pulmonary embolism, detailed above, with nonocclusive thrombus in the main right pulmonary artery and right lower lobe branch vessels, and occlusive thrombus in a subsegmental branch of the right pulmonary artery." Patient started on IV Heparin drip to treat PE. Orthopedics consulted and recommended " trial of PT/OT and pain management and weight bear as tolerated to right lower extremity. Patient on admit also noted to have elevated troponin 1 and BNP 900s. EKG showing some concerning changes with T wave inversions and ST depressions. Cardiology consulted and echo done and showed:    Left Ventricle: The left ventricle is normal in size. Normal wall thickness. Severe global hypokinesis present. Septal motion is abnormal. There is severely reduced systolic function with a visually estimated ejection fraction of 20 - 25%. Unable to assess diastolic function due to atrial flutter.    Right Ventricle: Mild right ventricular enlargement. Wall thickness is normal. Systolic function is moderately reduced.    Severe biatrial enlargement    Mitral Valve: There is mild regurgitation.    Pulmonary Artery: There is mild to moderate pulmonary hypertension. The estimated pulmonary artery systolic pressure is 49 mmHg.    IVC/SVC: Intermediate venous pressure at 8 mmHg.    Cardiology recommended to start to diuresis patient with Lasix 80 mg IV BID per Cards recs. Patient started on Entresto for depressed EF and titrated as per Cardiology recs and continue on Aldactone.  Cardiology recommending ischemic work-up as outpatient with PET stress and consideration of Life Vest on discharge. Patient expressed he wished to be DNR and not sure about life vest on discharge. Patient also having a number of PVCs and PACs in hospital and reviewed by Cardiology who noted patient in atrial flutter. After discussion with Cardiology they recommended DEMETRICE and cardioversion for his atrial flutter as patient symptomatic and felt could be contributing to his heart failure and low EF. Patient to go for DEMETRICE and cardioversion on 9/19. Patient not progressing with PT/OT as limited by dyspnea and pain to right hip despite multimodal pain medications. Discussed with Dr. Stapleton from Orthopedics on 9/19 and he stated he spoke with patient and his son and  after discussion plan to proceed with percutaneous screw fixation of right acetabular fracture and right sided pelvic fractures for 9/20. Patient had DEMETRICE and successful DCCV by Cardiology on 9/19 with return back to normal sinus rhythm. Patient started opn IV Amiodarone infusion after cardioversion and plan for 24 hours then switch to oral Amiodarone as per Cardiology recommendations. Patient switched from IV to po Lasix on 9/19 after cardioversion to Lasix 40 mg po BID as now euvolemic. Patient to go to OR today, 9/20 for ORIF of right pelvis.     Interval History: Patient had DEMETRICE and successful DCCV by Cardiology yesterday with return back to normal sinus rhythm. Patient started on IV Amiodarone infusion after cardioversion and plan for 24 hours then switch to oral Amiodarone as per Cardiology recommendations. Cardiology states can start with po Amiodarone in the am as infusion will be completed tonight. Cardiology to place final recommendations on medications for his atrial flutter and new onset heart failure. Patient transferred from 5th floor to 3rd floor last night as needed IV Amiodarone infusion and could not be done on 5th floor. Patient switched from IV to po Lasix yesterday after cardioversion to Lasix 40 mg po BID as now euvolemic. Patient to go to OR today, 9/20 for ORIF of right pelvis by Ortho. Patient reports pain to right hip controlled this am and he states he is feeling well. Patient denies any SOB. Patient complaining of mild cough and chest congestion this am and will order Mucinex to help. Patient anxiously awaiting surgery today. Labs reviewed. Hgb stable at 13. Blood sugars stable. Creatinine stable at 1.2.     Review of Systems   Constitutional:  Negative for fever.   Respiratory:  Positive for cough and shortness of breath (SLADE).    Cardiovascular:  Positive for leg swelling. Negative for chest pain.   Gastrointestinal:  Negative for abdominal pain, nausea and vomiting.   Musculoskeletal:   Positive for arthralgias (Right hip).   Psychiatric/Behavioral:  Negative for agitation and confusion.      Objective:     Vital Signs (Most Recent):  Temp: 96.3 °F (35.7 °C) (09/20/24 1100)  Pulse: 61 (09/20/24 1100)  Resp: 18 (09/20/24 1100)  BP: 123/65 (09/20/24 1100)  SpO2: 98 % (09/20/24 1100) on 2 liters of oxygen Vital Signs (24h Range):  Temp:  [96.2 °F (35.7 °C)-98.1 °F (36.7 °C)] 96.3 °F (35.7 °C)  Pulse:  [37-79] 61  Resp:  [13-20] 18  SpO2:  [95 %-99 %] 98 %  BP: ()/(40-85) 123/65     Weight: 101.6 kg (224 lb)  Body mass index is 31.24 kg/m².    Intake/Output Summary (Last 24 hours) at 9/20/2024 1149  Last data filed at 9/20/2024 0931  Gross per 24 hour   Intake 400 ml   Output --   Net 400 ml         Physical Exam  Vitals and nursing note reviewed.   Constitutional:       General: He is awake. He is not in acute distress.     Appearance: Normal appearance. He is well-developed. He is obese. He is not ill-appearing.      Comments: Patient sitting up in bed in no apparent distress.    Eyes:      Conjunctiva/sclera: Conjunctivae normal.   Neck:      Vascular: No JVD.   Cardiovascular:      Rate and Rhythm: Normal rate and regular rhythm.      Heart sounds: Normal heart sounds. No murmur heard.  Pulmonary:      Effort: Pulmonary effort is normal. No respiratory distress.      Breath sounds: Normal breath sounds. No wheezing.   Abdominal:      General: Abdomen is flat. Bowel sounds are normal. There is no distension.      Palpations: Abdomen is soft.      Tenderness: There is no abdominal tenderness.   Musculoskeletal:      Right lower leg: No edema.      Left lower leg: No edema.   Skin:     Findings: No erythema or rash.   Neurological:      Mental Status: He is alert and oriented to person, place, and time.   Psychiatric:         Mood and Affect: Mood normal.         Behavior: Behavior normal. Behavior is cooperative.         Thought Content: Thought content normal.         Judgment: Judgment  normal.             Significant Labs: CBC:   Recent Labs   Lab 09/19/24  0524 09/20/24  0531   WBC 9.86 7.07   HGB 14.7 13.0*   HCT 42.7 39.8*    162     CMP:   Recent Labs   Lab 09/19/24  0524 09/20/24  0531    139   K 3.9 4.1    99   CO2 26 30*   * 129*   BUN 29* 32*   CREATININE 1.0 1.2   CALCIUM 8.6* 8.8   ANIONGAP 11 10     Magnesium:   Recent Labs   Lab 09/19/24  0524 09/20/24  0531   MG 2.2 2.4       Significant Imaging: I have reviewed all pertinent imaging results/findings within the past 24 hours.    Assessment/Plan:      * Closed nondisplaced fracture of anterior column of right acetabulum with routine healing  80 y/o male presenting after ground level fall with right hip pain on standing. CT scan showed acute closed fractures of the right superior and inferior pubic rami with involvement of the anterosuperior acetabulum.   - Orthopedic surgery consulted by ED and recommended weight bear as tolerated to right lower extremity and trial of PT/OT and pain management.   - PT/OT consulted and patient not progressing and only able to take a few shuffling steps and total assist x 2 people. Patient limited by dyspnea and pain. Orthopedics discussed with patient and his son and plan to proceed with operative intervention on pelvic fractures with percutaneous screw fixation on 9/20. Patient currently NPO for surgery today.   - Continue pain control with scheduled Tylenol and Robaxin with Oxycodone IR 5 mg po every 4 hours prn for pain management.   - Fall precautions.  - Cardiology reports does not need PET stress/ischemic work up prior to a pelvic fracture surgery as that can be done as outpatient. Patient euvolemic at this time so okay to proceed with ORIF of pelvis in OR with Ortho on 9/20 with no further intervention as optimized for surgery.   - Patient on IV Heparin drip to treat PE so no DVT prophylaxis needed as fully anticoagulated. Holding Heparin for surgery today and plan to  "place on Apixiban post-op to treat PE so no other DVT prophylaxis will be needed post-op.     Pulmonary embolism without acute cor pulmonale  CT chest/abdomen/pelvis as part of trauma work up in ED showed "Right-sided pulmonary embolism, detailed above, with nonocclusive thrombus in the main right pulmonary artery and right lower lobe branch vessels, and occlusive thrombus in a subsegmental branch of the right pulmonary artery. Note comparison images are not available to determine chronicity. No right sided heart strain."   - Denies previous diagnosis or PE/DVT.  - Denies chest pain or SOB.  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 He requires 2-3L NC of oxygen at home/sleeping and uses a CPAP after meals (exertional to patient) and for naps.  - Currently stable on 2-3L NC oxygen. No hypotension. Patient has baseline dyspnea when mobilizing sideways in bed even prior to this admit.   - Labs with elevated Troponin 0.811>1.041 and .   - Initialed on Heparin drip IV to treat PE and will continue and plan to transition to therapeutic oral Apixiban post-op to treat with Apixiban 10 mg po BID x 7 days followed by indefinite treatment of Apixiban 5 mg po BID.   - Cards consulted, continue current therapies. No indication to activate PE response team at this time.   - No signs of right heart strain  - US of lower extremities on this admit negative for DVTs so IVC filter not indicated.     Acute on chronic combined systolic and diastolic congestive heart failure  Pulmonary hypertension   Much Improved. Patient is identified as having Combined Systolic and Diastolic heart failure that is Acute on chronic. CHF is currently controlled. Latest ECHO performed and demonstrates- Results for orders placed during the hospital encounter of 10/04/23  Echo    Result Date: 9/16/2024    Technically difficult study    Left Ventricle: The left ventricle is normal in size. Normal wall   thickness. Severe global hypokinesis present. Septal " motion is abnormal.   There is severely reduced systolic function with a visually estimated   ejection fraction of 20 - 25%. Unable to assess diastolic function due to   atrial flutter.    Right Ventricle: Mild right ventricular enlargement. Wall thickness is   normal. Systolic function is moderately reduced.    Severe biatrial enlargement    Mitral Valve: There is mild regurgitation.    Pulmonary Artery: There is mild to moderate pulmonary hypertension. The   estimated pulmonary artery systolic pressure is 49 mmHg.    IVC/SVC: Intermediate venous pressure at 8 mmHg.       Recent Labs   Lab 09/16/24  0037   *     - He denies acute SOB but reports SLADE, SOB after activities such as eating requiring him to sit and use a CPAP for 2-3 hours, and lower extremity swelling right > left not much improved with his home PO Lasix and Spironolactone. He also reports requiring 2-3 liters supplemental oxygen while sleeping as well. He also reports eating bags of salty chips to help with his leg swelling.  - Cardiology consulted on this admit given new reduced EF with global wall motion and septal wall motion abnormalities Patient taken off Losartan and started on Entresto to treat his reduced EF and heart failure. Patient continued on his home Aldactone. Patient placed on Lasix 80 mg IV BID to diuresis patient in hospital and patient diuresising well and net negative 4.5 liters since admit. Patient now euvolemic on 9/20 so switched to po Lasix 40 mg po BID by Cardiology on 9/19.   - Toprol XL 12.5 mg po daily for his chronic heart failure.   - Will need life vest on discharge if patient wishes, as is DNR baseline. Will need Cardiac PET stress as outpatient for ischemic evaluation.  - Fluid restriction 1.5 liters a day and low salt diet.   - Monitor response to treatment.     Paroxysmal atrial flutter  - Per chart review, his cardiologist Dr. Hank Barry feels EKGs that were read as atrial fibrillation previously are  actually sinus rhythm and thus not on long term anticoagulation on admit.   - Cardiology consulted here who report current rhythm is atrial flutter and report if does not self convert may need DEMETRICE/DCCV once euvolemic. Patient now euvolemic on 9/19 and remains in atrial flutter and rate is controlled at 45-65 on Toprol XL. Patient taken for DEMETRICE and underwent successful cardioversion by Cardiology on 9/19 and back in sinus rhythm. Patient on IV Amiodarone infusion as per Cardiology and needs for 24 hours and scheduled to end tonight and plan to switch to po Amiodarone on 9/21.   - Patient on IV Heparin drip for anticoagulation for now and will continue but transition to oral Apixiban post-op for long term anticoagulation.     Essential hypertension  Chronic, controlled. Latest blood pressure and vitals reviewed-     Temp:  [96.2 °F (35.7 °C)-98.1 °F (36.7 °C)]   Pulse:  [37-79]   Resp:  [13-20]   BP: ()/(40-85)   SpO2:  [95 %-99 %] .   Home meds for hypertension were reviewed and noted below.   Hypertension Medications               amLODIPine (NORVASC) 2.5 MG tablet Take 1 tablet by mouth once daily    furosemide (LASIX) 40 MG tablet Take 1 tablet (40 mg total) by mouth 2 (two) times a day. Take daily for next 3 days, then use as needed. Weigh daily. Afterwards, f weight increases 2 lbs/24h, take dose of lasix daily  until weight is back to baseline    losartan (COZAAR) 100 MG tablet Take 1 tablet by mouth once daily    metoprolol succinate (TOPROL-XL) 50 MG 24 hr tablet Take 1 tablet by mouth once daily    spironolactone (ALDACTONE) 25 MG tablet Take 1 tablet by mouth once daily     While in the hospital, will manage blood pressure as follows; Adjust home antihypertensive regimen as follows- Stopped Norvasc and Losartan by Cardiology and started on Entresto for new onset HFrEF . Continue Lasix and Aldactone.       Chronic hypoxemic respiratory failure  Patient with Hypoxic Respiratory failure which is Chronic.  He is on home oxygen at 2-3 LPM. Supplemental oxygen was provided and noted-at 2 liters of oxygen.      Signs/symptoms of respiratory failure include- tachypnea and increased work of breathing. Contributing diagnoses includes - CHF and pulmonary embolus. Labs and images were reviewed. Patient Has not had a recent ABG. Will treat underlying causes and adjust management of respiratory failure as follows- IV Lasix for heart failure and IV Heparin for PE.     Bradycardia  - Patient has history of atrial bigeminy and bradycardia per chart. Has followed with EP.  - Monitor telemetry.  - HR ranges 40s-80s at times. Hold Metoprolol if HR sustains < 60.    Advance care planning  - [facility] on file reviewed, DNR order placed. DNR on hold for cardioversion today and for Ortho surgery tomorrow and resume post-op and patient okay with holding DNR for procedures and surgery.   - Will discuss further closer to discharge if patient wants to pursue life vest as per Cardiology recs as not congruent with DNR status.    PVC's (premature ventricular contractions)  Patient noted in history and seen on telemetry on floor for PVC's. Keep Magnesium > 2, Potassium >4. Monitor telemetry. Continue Toprol XL to treat.     AKHIL (obstructive sleep apnea)  Chronic and controlled. Patient on CPAP at home at night. Continue CPAP nightly to treat.      Coronary artery disease involving native coronary artery of native heart without angina pectoris  Elevated troponin  Abnormal ventricular wall motion   - Patient with known CAD which is controlled. Will continue home Aspirin, Toprol XL and Lipitor daily to treat and monitor for S/Sx of angina/ACS. Continue to monitor on telemetry. Previous angiogram with non obstructive CAD 2021.   - Patient with elevated troponin on admit likely from PE but EKG with ST depressions, T wave inversions that are new so Cardiology consulted and appreciate recs. Echo done with new septal wall motion abnormaliteis and  decreased EF at 20-25%. Cardiology recommended for outpatient PET cardiac stress and life vest on discharge for ischemic work up.    Bug bites  - Reportedly bitten by multiple small ants outside prior to admit.   - He has small red lesions on skin consistent with ant bites but no pustules, no pain or itching currently .   - Benadryl cream prn.  - Supportive care, monitor.     Insomnia  Chronic and controlled. Continue home Mirtazapine to treat.     Hypothyroidism due to acquired atrophy of thyroid  Chronic and controlled. Continue home Levothyroxine to treat.      Pure hypercholesterolemia  Chronic and controlled. Continue home Lipitor to treat.      Slow transit constipation  Baseline, on bowel regimen to treat with scheduled Senakot and Miralax with Doculax suppository prn.       VTE Risk Mitigation (From admission, onward)           Ordered     Place sequential compression device  Until discontinued         09/16/24 0428     Reason for No Pharmacological VTE Prophylaxis  Once        Comments: Heparin gtt for PE   Question:  Reasons:  Answer:  Physician Provided (leave comment)    09/16/24 0428     IP VTE HIGH RISK PATIENT  Once         09/16/24 0428                    Discharge Planning   LATOSHA: 9/23/2024     Code Status: Full Code   Is the patient medically ready for discharge?:     Reason for patient still in hospital (select all that apply): Patient trending condition  Discharge Plan A: Skilled Nursing Facility          Annelise Bray MD  Department of Hospital Medicine   [facility] - Cardiology Stepdown

## 2024-09-20 NOTE — NURSING TRANSFER
Nursing Transfer Note      9/20/2024   4:53 PM    Nurse giving handoff:Antoine BRUCE RN  Nurse receiving handoff:Aurea CSU    Reason patient is being transferred: meets criteria    Transfer To: [id number]    Transfer via bed    Transfer with cardiac monitoring, 3L NC    Transported by transport    Transfer Vital Signs:  Blood Pressure:111/56  Heart Rate:60  O2:99  Temperature:97.7  Respirations:10    Telemetry: Rhythm SR PVSs  Order for Tele Monitor? Yes    Additional Lines: Mckeon Catheter    4eyes on Skin: yes    Medicines sent: amiodarone gtt    Any special needs or follow-up needed: no    Patient belongings transferred with patient: No    Chart send with patient: Yes    Notified: no family on file    Patient reassessed at: 9/20

## 2024-09-20 NOTE — SUBJECTIVE & OBJECTIVE
Principal Problem:Closed nondisplaced fracture of anterior column of right acetabulum with routine healing    Principal Orthopedic Problem: As above    Interval History: Patient seen and examined at bedside. NAEON. Intermittently bradycardic overnight. NPO since midnight. Plan for surgery today. Hgb pending.       Review of patient's allergies indicates:  No Known Allergies    Current Facility-Administered Medications   Medication    acetaminophen tablet 1,000 mg    albuterol-ipratropium 2.5 mg-0.5 mg/3 mL nebulizer solution 3 mL    amiodarone 360 mg/200 mL (1.8 mg/mL) infusion    aspirin EC tablet 81 mg    atorvastatin tablet 10 mg    bisacodyL suppository 10 mg    dextrose 10% bolus 125 mL 125 mL    dextrose 10% bolus 125 mL 125 mL    dextrose 10% bolus 125 mL 125 mL    dextrose 10% bolus 250 mL 250 mL    dextrose 10% bolus 250 mL 250 mL    dextrose 10% bolus 250 mL 250 mL    diphenhydrAMINE injection 25 mg    diphenhydrAMINE-zinc acetate 2-0.1% cream    furosemide tablet 40 mg    glucagon (human recombinant) injection 1 mg    glucagon (human recombinant) injection 1 mg    glucose chewable tablet 16 g    glucose chewable tablet 24 g    heparin 25,000 units in dextrose 5% (100 units/ml) IV bolus from bag HIGH INTENSITY nomogram - [facility]    heparin 25,000 units in dextrose 5% (100 units/ml) IV bolus from bag HIGH INTENSITY nomogram - [facility]    heparin 25,000 units in dextrose 5% 250 mL (100 units/mL) infusion HIGH INTENSITY nomogram - [facility]    HYDROmorphone injection 0.2 mg    levothyroxine tablet 200 mcg    melatonin tablet 6 mg    methocarbamoL tablet 500 mg    metoprolol succinate (TOPROL-XL) 24 hr split tablet 12.5 mg    mirtazapine tablet 7.5 mg    naloxone 0.4 mg/mL injection 0.02 mg    ondansetron disintegrating tablet 8 mg    oxyCODONE immediate release tablet 5 mg    polyethylene glycol packet 17 g    prochlorperazine injection Soln 5 mg    sacubitriL-valsartan  mg per tablet 1 tablet    senna-docusate 8.6-50  "mg per tablet 1 tablet    sodium chloride 0.9% flush 3 mL    sodium chloride 0.9% flush 5 mL    spironolactone tablet 25 mg     Objective:     Vital Signs (Most Recent):  Temp: 98 °F (36.7 °C) (09/20/24 0421)  Pulse: (!) 47 (09/20/24 0421)  Resp: 20 (09/20/24 0421)  BP: (!) 108/52 (09/20/24 0421)  SpO2: 97 % (09/20/24 0421) Vital Signs (24h Range):  Temp:  [97.3 °F (36.3 °C)-98.1 °F (36.7 °C)] 98 °F (36.7 °C)  Pulse:  [37-79] 47  Resp:  [13-20] 20  SpO2:  [95 %-99 %] 97 %  BP: ()/(40-85) 108/52     Weight: 101.6 kg (224 lb)  Height: 5' 11" (180.3 cm)  Body mass index is 31.24 kg/m².      Intake/Output Summary (Last 24 hours) at 9/20/2024 0715  Last data filed at 9/19/2024 1512  Gross per 24 hour   Intake 400 ml   Output --   Net 400 ml        Ortho/SPM Exam  A&O x 3  Regular Rate  Non-Labored Respirations    RLE:   Fires Quad/TA/EHL/GSC  SILT  Compartments soft  Brisk cap refill  Swelling to dorsal foot    LLE:   Fires Quad/TA/EHL/GSC  SILT  Compartments soft  Brisk cap refill  Swelling dorsal foot         Significant Labs: CBC:   Recent Labs   Lab 09/19/24  0524   WBC 9.86   HGB 14.7   HCT 42.7        CMP:   Recent Labs   Lab 09/19/24  0524 09/20/24  0531    139   K 3.9 4.1    99   CO2 26 30*   * 129*   BUN 29* 32*   CREATININE 1.0 1.2   CALCIUM 8.6* 8.8   ANIONGAP 11 10     All pertinent labs within the past 24 hours have been reviewed.    Significant Imaging: I have reviewed and interpreted all pertinent imaging results/findings.  "

## 2024-09-20 NOTE — SUBJECTIVE & OBJECTIVE
Principal Problem:Closed nondisplaced fracture of anterior column of right acetabulum with routine healing    Principal Orthopedic Problem: As above    Interval History: Patient seen and examined at bedside. MOSES. Patient went for DEMETRICE and cardioversion today with cardiology and converted to NSR. Patient continues to have significant pain with PT and is unable to ambulate 2/2 pain. After further discussion with patient, we will plan for surgery. Cleared for surgery by cards/primary team.       Review of patient's allergies indicates:  No Known Allergies    Current Facility-Administered Medications   Medication    acetaminophen tablet 1,000 mg    albuterol-ipratropium 2.5 mg-0.5 mg/3 mL nebulizer solution 3 mL    amiodarone 360 mg/200 mL (1.8 mg/mL) infusion    aspirin EC tablet 81 mg    atorvastatin tablet 10 mg    bisacodyL suppository 10 mg    dextrose 10% bolus 125 mL 125 mL    dextrose 10% bolus 125 mL 125 mL    dextrose 10% bolus 250 mL 250 mL    dextrose 10% bolus 250 mL 250 mL    diphenhydrAMINE injection 25 mg    diphenhydrAMINE-zinc acetate 2-0.1% cream    [START ON 9/20/2024] furosemide tablet 40 mg    glucagon (human recombinant) injection 1 mg    glucagon (human recombinant) injection 1 mg    glucose chewable tablet 16 g    glucose chewable tablet 24 g    heparin 25,000 units in dextrose 5% (100 units/ml) IV bolus from bag HIGH INTENSITY nomogram - [facility]    heparin 25,000 units in dextrose 5% (100 units/ml) IV bolus from bag HIGH INTENSITY nomogram - [facility]    heparin 25,000 units in dextrose 5% 250 mL (100 units/mL) infusion HIGH INTENSITY nomogram - [facility]    HYDROmorphone injection 0.2 mg    levothyroxine tablet 200 mcg    melatonin tablet 6 mg    methocarbamoL tablet 500 mg    metoprolol succinate (TOPROL-XL) 24 hr split tablet 12.5 mg    mirtazapine tablet 7.5 mg    naloxone 0.4 mg/mL injection 0.02 mg    ondansetron disintegrating tablet 8 mg    oxyCODONE immediate release tablet 5 mg    polyethylene  "glycol packet 17 g    prochlorperazine injection Soln 5 mg    sacubitriL-valsartan  mg per tablet 1 tablet    senna-docusate 8.6-50 mg per tablet 1 tablet    sodium chloride 0.9% flush 3 mL    sodium chloride 0.9% flush 5 mL    spironolactone tablet 25 mg     Objective:     Vital Signs (Most Recent):  Temp: 97.9 °F (36.6 °C) (09/19/24 2048)  Pulse: (!) 37 (09/19/24 2215)  Resp: 18 (09/19/24 2048)  BP: 122/60 (09/19/24 2215)  SpO2: 96 % (09/19/24 2130) Vital Signs (24h Range):  Temp:  [97.3 °F (36.3 °C)-98.1 °F (36.7 °C)] 97.9 °F (36.6 °C)  Pulse:  [37-98] 37  Resp:  [13-20] 18  SpO2:  [94 %-99 %] 96 %  BP: ()/(40-85) 122/60     Weight: 101.6 kg (224 lb)  Height: 5' 11" (180.3 cm)  Body mass index is 31.24 kg/m².      Intake/Output Summary (Last 24 hours) at 9/19/2024 2218  Last data filed at 9/19/2024 1512  Gross per 24 hour   Intake 400 ml   Output 1350 ml   Net -950 ml        Ortho/SPM Exam  A&O x 3  Regular Rate  Non-Labored Respirations    RLE:   Fires Quad/TA/EHL/GSC  SILT  Compartments soft  Brisk cap refill  Swelling to dorsal foot    LLE:   Fires Quad/TA/EHL/GSC  SILT  Compartments soft  Brisk cap refill  Swelling dorsal foot         Significant Labs: CBC:   Recent Labs   Lab 09/18/24 0248 09/19/24 0524   WBC 9.48 9.86   HGB 14.0 14.7   HCT 42.2 42.7   * 152     CMP:   Recent Labs   Lab 09/18/24 0248 09/19/24 0524    138   K 3.5 3.9    101   CO2 27 26   * 146*   BUN 27* 29*   CREATININE 1.1 1.0   CALCIUM 8.8 8.6*   ANIONGAP 9 11     All pertinent labs within the past 24 hours have been reviewed.    Significant Imaging: I have reviewed and interpreted all pertinent imaging results/findings.  "

## 2024-09-20 NOTE — PLAN OF CARE
Problem: Adult Inpatient Plan of Care  Goal: Patient-Specific Goal (Individualized)  Outcome: Progressing  Flowsheets (Taken 9/20/2024 1531)  Individualized Care Needs: updated patient and family on POC  Anxieties, Fears or Concerns: none stated     Problem: Wound  Goal: Skin Health and Integrity  Outcome: Progressing  Intervention: Optimize Skin Protection  Flowsheets (Taken 9/20/2024 1531)  Pressure Reduction Techniques: frequent weight shift encouraged  Pressure Reduction Devices: positioning supports utilized  Skin Protection: incontinence pads utilized  Activity Management: Rolling - L1  Head of Bed (HOB) Positioning: HOB elevated     Problem: Fall Injury Risk  Goal: Absence of Fall and Fall-Related Injury  9/20/2024 1533 by Aurea Bah RN  Outcome: Progressing  9/20/2024 1533 by Aurea Bah RN  Outcome: Progressing  Intervention: Identify and Manage Contributors  Flowsheets (Taken 9/20/2024 1533)  Self-Care Promotion:   independence encouraged   BADL personal objects within reach   BADL personal routines maintained   meal set-up provided  Medication Review/Management: medications reviewed  Intervention: Promote Injury-Free Environment  Flowsheets (Taken 9/20/2024 1533)  Safety Promotion/Fall Prevention:   assistive device/personal item within reach   Fall Risk reviewed with patient/family   Fall Risk signage in place   instructed to call staff for mobility   medications reviewed   nonskid shoes/socks when out of bed   side rails raised x 2   room near unit station   patient expresses understanding of fall risk and prevention   AAOX4,VSS, O2 sats > 90% on 3L NC. Plan of care discussed with patient. Patient has no complaints of chest pain/SOB/palpitations. Pt ambulating  with assist x 1, fall precautions in place,no falls/injuries through the shift.Discussed medications and care.Patient has no questions at this time.Pt resting comfortably with no acute distress.Call light within reach,bed at lowest  position. Amiodarone gtt infusing per MAR.

## 2024-09-20 NOTE — NURSING
Pt taken to OR. Pt AAOx4. Pt stable. No complaints of pain or signs of distress. Left via bed with transport.

## 2024-09-20 NOTE — PROGRESS NOTES
[facility] - [facility] Step[facility]  Orthopedics  Progress Note    Patient Name: Bean Salas  MRN: [id number]  Admission Date: 9/15/2024  Hospital Length of Stay: 4 days  Attending Provider: Annelise Bray MD  Primary Care Provider: Ramirez Levine MD  Follow-up For: Procedure(s) (LRB):  Cardioversion or Defibrillation (N/A)  ECHOCARDIOGRAM, TRANSESOPHAGEAL (N/A)    Post-Operative Day: 1 Day Post-Op  Subjective:     Principal Problem:Closed nondisplaced fracture of anterior column of right acetabulum with routine healing    Principal Orthopedic Problem: As above    Interval History: Patient seen and examined at bedside. NAEON. Intermittently bradycardic overnight. NPO since midnight. Plan for surgery today. Hgb pending.       Review of patient's allergies indicates:  No Known Allergies    Current Facility-Administered Medications   Medication    acetaminophen tablet 1,000 mg    albuterol-ipratropium 2.5 mg-0.5 mg/3 mL nebulizer solution 3 mL    amiodarone 360 mg/200 mL (1.8 mg/mL) infusion    aspirin EC tablet 81 mg    atorvastatin tablet 10 mg    bisacodyL suppository 10 mg    dextrose 10% bolus 125 mL 125 mL    dextrose 10% bolus 125 mL 125 mL    dextrose 10% bolus 125 mL 125 mL    dextrose 10% bolus 250 mL 250 mL    dextrose 10% bolus 250 mL 250 mL    dextrose 10% bolus 250 mL 250 mL    diphenhydrAMINE injection 25 mg    diphenhydrAMINE-zinc acetate 2-0.1% cream    furosemide tablet 40 mg    glucagon (human recombinant) injection 1 mg    glucagon (human recombinant) injection 1 mg    glucose chewable tablet 16 g    glucose chewable tablet 24 g    heparin 25,000 units in dextrose 5% (100 units/ml) IV bolus from bag HIGH INTENSITY nomogram - [facility]    heparin 25,000 units in dextrose 5% (100 units/ml) IV bolus from bag HIGH INTENSITY nomogram - [facility]    heparin 25,000 units in dextrose 5% 250 mL (100 units/mL) infusion HIGH INTENSITY nomogram - [facility]    HYDROmorphone injection 0.2 mg    levothyroxine tablet 200  "mcg    melatonin tablet 6 mg    methocarbamoL tablet 500 mg    metoprolol succinate (TOPROL-XL) 24 hr split tablet 12.5 mg    mirtazapine tablet 7.5 mg    naloxone 0.4 mg/mL injection 0.02 mg    ondansetron disintegrating tablet 8 mg    oxyCODONE immediate release tablet 5 mg    polyethylene glycol packet 17 g    prochlorperazine injection Soln 5 mg    sacubitriL-valsartan  mg per tablet 1 tablet    senna-docusate 8.6-50 mg per tablet 1 tablet    sodium chloride 0.9% flush 3 mL    sodium chloride 0.9% flush 5 mL    spironolactone tablet 25 mg     Objective:     Vital Signs (Most Recent):  Temp: 98 °F (36.7 °C) (09/20/24 0421)  Pulse: (!) 47 (09/20/24 0421)  Resp: 20 (09/20/24 0421)  BP: (!) 108/52 (09/20/24 0421)  SpO2: 97 % (09/20/24 0421) Vital Signs (24h Range):  Temp:  [97.3 °F (36.3 °C)-98.1 °F (36.7 °C)] 98 °F (36.7 °C)  Pulse:  [37-79] 47  Resp:  [13-20] 20  SpO2:  [95 %-99 %] 97 %  BP: ()/(40-85) 108/52     Weight: 101.6 kg (224 lb)  Height: 5' 11" (180.3 cm)  Body mass index is 31.24 kg/m².      Intake/Output Summary (Last 24 hours) at 9/20/2024 0715  Last data filed at 9/19/2024 1512  Gross per 24 hour   Intake 400 ml   Output --   Net 400 ml        Ortho/SPM Exam  A&O x 3  Regular Rate  Non-Labored Respirations    RLE:   Fires Quad/TA/EHL/GSC  SILT  Compartments soft  Brisk cap refill  Swelling to dorsal foot    LLE:   Fires Quad/TA/EHL/GSC  SILT  Compartments soft  Brisk cap refill  Swelling dorsal foot         Significant Labs: CBC:   Recent Labs   Lab 09/19/24 0524   WBC 9.86   HGB 14.7   HCT 42.7        CMP:   Recent Labs   Lab 09/19/24 0524 09/20/24  0531    139   K 3.9 4.1    99   CO2 26 30*   * 129*   BUN 29* 32*   CREATININE 1.0 1.2   CALCIUM 8.6* 8.8   ANIONGAP 11 10     All pertinent labs within the past 24 hours have been reviewed.    Significant Imaging: I have reviewed and interpreted all pertinent imaging results/findings.  Assessment/Plan:     * " Closed nondisplaced fracture of anterior column of right acetabulum with routine healing  Bean Salas is a 79 y.o. male Bean Salas is a 79 y.o. male with PMH significant for CAD, CHF, pulmonary HTN, AKHIL on CPAP, AFib, and R IT fx s/p IMN 9/3/21 with Dr. Hall presenting with a nondisplaced fracture of the anterior, of the right acetabulum.  He was closed, NVI.  CT chest abdomen and pelvis demonstrated right-sided PE with nonocclusive thrombus in the right pulmonary artery with occlusive thrombus in his subsegmental branch of the right pulmonary artery.  DVT ultrasound negative.  Patient was started on heparin GTT by hospital medicine.  Given the relatively nondisplaced in nature of the patient's fracture and numerous medical comorbidities, we will plan to treat him nonoperatively with a trial of physical therapy.    Patient has failed PT multiple days at this point. After further discussion, patient would like to proceed with surgery.      Admitted to    Plan for percutaneous screw fixation today  Marked, booked and consented for surgery  NPO   Hgb 14.7  Weightbearing as tolerated right lower extremity on rolling walker  PT/OT  DVT ppx: per    Multimodal pain control                   AYLEEN Hurd MD  Orthopedics  [facility] - Cardiology Step[facility]
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
## 2024-09-21 PROBLEM — S32.810A CLOSED PELVIC RING FRACTURE: Status: ACTIVE | Noted: 2024-09-21

## 2024-09-21 LAB
ANION GAP SERPL CALC-SCNC: 10 MMOL/L (ref 8–16)
APTT PPP: 35.6 SEC (ref 21–32)
BASOPHILS # BLD AUTO: 0.01 K/UL (ref 0–0.2)
BASOPHILS NFR BLD: 0.1 % (ref 0–1.9)
BUN SERPL-MCNC: 35 MG/DL (ref 8–23)
CALCIUM SERPL-MCNC: 8.6 MG/DL (ref 8.7–10.5)
CHLORIDE SERPL-SCNC: 97 MMOL/L (ref 95–110)
CO2 SERPL-SCNC: 30 MMOL/L (ref 23–29)
CREAT SERPL-MCNC: 1.4 MG/DL (ref 0.5–1.4)
DIFFERENTIAL METHOD BLD: ABNORMAL
EOSINOPHIL # BLD AUTO: 0 K/UL (ref 0–0.5)
EOSINOPHIL NFR BLD: 0 % (ref 0–8)
ERYTHROCYTE [DISTWIDTH] IN BLOOD BY AUTOMATED COUNT: 14.9 % (ref 11.5–14.5)
EST. GFR  (NO RACE VARIABLE): 50.8 ML/MIN/1.73 M^2
GLUCOSE SERPL-MCNC: 184 MG/DL (ref 70–110)
HCT VFR BLD AUTO: 39.7 % (ref 40–54)
HGB BLD-MCNC: 13.3 G/DL (ref 14–18)
IMM GRANULOCYTES # BLD AUTO: 0.05 K/UL (ref 0–0.04)
IMM GRANULOCYTES NFR BLD AUTO: 0.6 % (ref 0–0.5)
LYMPHOCYTES # BLD AUTO: 0.4 K/UL (ref 1–4.8)
LYMPHOCYTES NFR BLD: 4.9 % (ref 18–48)
MAGNESIUM SERPL-MCNC: 2.4 MG/DL (ref 1.6–2.6)
MCH RBC QN AUTO: 33.8 PG (ref 27–31)
MCHC RBC AUTO-ENTMCNC: 33.5 G/DL (ref 32–36)
MCV RBC AUTO: 101 FL (ref 82–98)
MONOCYTES # BLD AUTO: 0.5 K/UL (ref 0.3–1)
MONOCYTES NFR BLD: 6.4 % (ref 4–15)
NEUTROPHILS # BLD AUTO: 7.3 K/UL (ref 1.8–7.7)
NEUTROPHILS NFR BLD: 88 % (ref 38–73)
NRBC BLD-RTO: 0 /100 WBC
OHS QRS DURATION: 130 MS
OHS QTC CALCULATION: 560 MS
PLATELET # BLD AUTO: 168 K/UL (ref 150–450)
PMV BLD AUTO: 12.4 FL (ref 9.2–12.9)
POTASSIUM SERPL-SCNC: 5.1 MMOL/L (ref 3.5–5.1)
RBC # BLD AUTO: 3.94 M/UL (ref 4.6–6.2)
SODIUM SERPL-SCNC: 137 MMOL/L (ref 136–145)
WBC # BLD AUTO: 8.29 K/UL (ref 3.9–12.7)

## 2024-09-21 PROCEDURE — 97116 GAIT TRAINING THERAPY: CPT | Mod: HCNC

## 2024-09-21 PROCEDURE — 25000003 PHARM REV CODE 250: Mod: HCNC

## 2024-09-21 PROCEDURE — 20600001 HC STEP DOWN PRIVATE ROOM: Mod: HCNC

## 2024-09-21 PROCEDURE — 93010 ELECTROCARDIOGRAM REPORT: CPT | Mod: HCNC,,, | Performed by: INTERNAL MEDICINE

## 2024-09-21 PROCEDURE — 97164 PT RE-EVAL EST PLAN CARE: CPT | Mod: HCNC

## 2024-09-21 PROCEDURE — 85730 THROMBOPLASTIN TIME PARTIAL: CPT | Mod: HCNC | Performed by: HOSPITALIST

## 2024-09-21 PROCEDURE — 83735 ASSAY OF MAGNESIUM: CPT | Mod: HCNC | Performed by: HOSPITALIST

## 2024-09-21 PROCEDURE — 97530 THERAPEUTIC ACTIVITIES: CPT | Mod: HCNC

## 2024-09-21 PROCEDURE — 36415 COLL VENOUS BLD VENIPUNCTURE: CPT | Mod: HCNC | Performed by: HOSPITALIST

## 2024-09-21 PROCEDURE — 25000003 PHARM REV CODE 250: Mod: HCNC | Performed by: STUDENT IN AN ORGANIZED HEALTH CARE EDUCATION/TRAINING PROGRAM

## 2024-09-21 PROCEDURE — 25000003 PHARM REV CODE 250: Mod: HCNC | Performed by: HOSPITALIST

## 2024-09-21 PROCEDURE — 93005 ELECTROCARDIOGRAM TRACING: CPT | Mod: HCNC

## 2024-09-21 PROCEDURE — 25000003 PHARM REV CODE 250: Mod: HCNC | Performed by: INTERNAL MEDICINE

## 2024-09-21 PROCEDURE — 85025 COMPLETE CBC W/AUTO DIFF WBC: CPT | Mod: HCNC | Performed by: INTERNAL MEDICINE

## 2024-09-21 PROCEDURE — 80048 BASIC METABOLIC PNL TOTAL CA: CPT | Mod: HCNC | Performed by: HOSPITALIST

## 2024-09-21 RX ORDER — AMIODARONE HYDROCHLORIDE 200 MG/1
400 TABLET ORAL 2 TIMES DAILY
Status: DISCONTINUED | OUTPATIENT
Start: 2024-09-21 | End: 2024-09-24 | Stop reason: HOSPADM

## 2024-09-21 RX ORDER — AMIODARONE HYDROCHLORIDE 200 MG/1
200 TABLET ORAL DAILY
Status: DISCONTINUED | OUTPATIENT
Start: 2024-10-05 | End: 2024-09-24 | Stop reason: HOSPADM

## 2024-09-21 RX ADMIN — GUAIFENESIN 600 MG: 600 TABLET, EXTENDED RELEASE ORAL at 08:09

## 2024-09-21 RX ADMIN — LEVOTHYROXINE SODIUM 200 MCG: 125 TABLET ORAL at 05:09

## 2024-09-21 RX ADMIN — AMIODARONE HYDROCHLORIDE 400 MG: 200 TABLET ORAL at 09:09

## 2024-09-21 RX ADMIN — FUROSEMIDE 40 MG: 40 TABLET ORAL at 08:09

## 2024-09-21 RX ADMIN — GUAIFENESIN 600 MG: 600 TABLET, EXTENDED RELEASE ORAL at 09:09

## 2024-09-21 RX ADMIN — ACETAMINOPHEN 1000 MG: 500 TABLET ORAL at 09:09

## 2024-09-21 RX ADMIN — POLYETHYLENE GLYCOL 3350 17 G: 17 POWDER, FOR SOLUTION ORAL at 09:09

## 2024-09-21 RX ADMIN — SPIRONOLACTONE 25 MG: 25 TABLET ORAL at 09:09

## 2024-09-21 RX ADMIN — SACUBITRIL AND VALSARTAN 1 TABLET: 97; 103 TABLET, FILM COATED ORAL at 09:09

## 2024-09-21 RX ADMIN — SENNOSIDES AND DOCUSATE SODIUM 1 TABLET: 50; 8.6 TABLET ORAL at 08:09

## 2024-09-21 RX ADMIN — ACETAMINOPHEN 1000 MG: 500 TABLET ORAL at 01:09

## 2024-09-21 RX ADMIN — APIXABAN 10 MG: 5 TABLET, FILM COATED ORAL at 09:09

## 2024-09-21 RX ADMIN — ATORVASTATIN CALCIUM 10 MG: 10 TABLET, FILM COATED ORAL at 09:09

## 2024-09-21 RX ADMIN — APIXABAN 10 MG: 5 TABLET, FILM COATED ORAL at 08:09

## 2024-09-21 RX ADMIN — METHOCARBAMOL 500 MG: 500 TABLET ORAL at 09:09

## 2024-09-21 RX ADMIN — METOPROLOL SUCCINATE 12.5 MG: 25 TABLET, EXTENDED RELEASE ORAL at 08:09

## 2024-09-21 RX ADMIN — OXYCODONE 5 MG: 5 TABLET ORAL at 08:09

## 2024-09-21 RX ADMIN — MIRTAZAPINE 7.5 MG: 7.5 TABLET, FILM COATED ORAL at 09:09

## 2024-09-21 RX ADMIN — AMIODARONE HYDROCHLORIDE 400 MG: 200 TABLET ORAL at 08:09

## 2024-09-21 RX ADMIN — SACUBITRIL AND VALSARTAN 1 TABLET: 97; 103 TABLET, FILM COATED ORAL at 08:09

## 2024-09-21 RX ADMIN — ASPIRIN 81 MG: 81 TABLET, COATED ORAL at 08:09

## 2024-09-21 RX ADMIN — METHOCARBAMOL 500 MG: 500 TABLET ORAL at 08:09

## 2024-09-21 RX ADMIN — ACETAMINOPHEN 1000 MG: 500 TABLET ORAL at 05:09

## 2024-09-21 RX ADMIN — FUROSEMIDE 40 MG: 40 TABLET ORAL at 05:09

## 2024-09-21 RX ADMIN — METHOCARBAMOL 500 MG: 500 TABLET ORAL at 01:09

## 2024-09-21 RX ADMIN — METHOCARBAMOL 500 MG: 500 TABLET ORAL at 05:09

## 2024-09-21 NOTE — PT/OT/SLP RE-EVAL
Physical Therapy [id number]     Patient Name:  Bean Salas   MRN:  [id number]    *co-treatment with OT  2/2 pt with potential impaired ability to tolerate 2 evaluations 2/2 medical status   Recommendations:     Discharge Recommendations: High Intensity Therapy  Discharge Equipment Recommendations: bedside commode, hip kit   Barriers to discharge: None    Assessment:     Bean Salas is a 79 y.o. male admitted with a medical diagnosis of Closed nondisplaced fracture of anterior column of right acetabulum with routine healing.  He presents with the following impairments/functional limitations: weakness, impaired endurance, impaired self care skills, impaired functional mobility, gait instability, pain, impaired balance, decreased lower extremity function, impaired cardiopulmonary response to activity. Pt re-evaluated following R hip ORIF 9/20/2024. Pt demo's significant weakness and deconditioning, presents well below reported functional baseline. Patient presents with good participation and motivation to return to prior level of function with high intensity therapy.  The patient demonstrates appropriate endurance to participate in up to 3 hours or 15hrs of combined therapy post acute. . Pt would continue to benefit from acute skilled therapy intervention to address deficits and progress toward prior level of function.       Rehab Prognosis:  good; patient would benefit from acute skilled PT services to address these deficits and reach maximum level of function.      Recent Surgery: Procedure(s) (LRB):  ORIF,PELVIS (Right) 1 Day Post-Op    Plan:     During this hospitalization, patient to be seen 4 x/week to address the above listed problems via gait training, therapeutic activities, therapeutic exercises, neuromuscular re-education, wheelchair management/training  Plan of Care Expires:  10/17/24  Plan of Care Reviewed with: patient    Subjective     Communicated with RN prior to session.   Patient found supine with bed alarm, telemetry, harrison catheter, oxygen upon PT entry to room, agreeable to evaluation.      Chief Complaint: pain, weakness, posterior LOB   Patient comments/goals: to get better   Pain/Comfort:  Pain Rating 1:  (0/10 pain at rest, intermittent pain reported at R LE with weight bearing)  Pain Addressed 1: Pre-medicate for activity, Reposition, Distraction, Cessation of Activity, Nurse notified  Pain Rating Post-Intervention 1:  (did not rate)    Patients cultural, spiritual, Sikh conflicts given the current situation: no      Objective:     SESSION 1:     Patient found with: bed alarm, telemetry, harrison catheter, oxygen     General Precautions: Standard, fall  Orthopedic Precautions: RLE weight bearing as tolerated, LLE weight bearing as tolerated  Braces: N/A  Respiratory Status: Nasal cannula, flow 3 L/min    Exams:  Cognitive Exam:  Patient is AAOx4, followed all commands, communicates clearly and fluently  Gross Motor Coordination:  WFL  RLE ROM: WFL  RLE Strength: grossly 3/5   LLE ROM: WFL  LLE Strength: grossly 3/5     Functional Mobility:  Bed Mobility:     Supine to Sit: maximal assistance of 2 therapists   Transfers:     Sit to Stand: 2x from EOB with moderate assistance of 2 therapists with use of a RW. Facilitation provided to promote anterior weight shift and at hips to promote hip extension   Gait: Pt ambulated 4 ft from bed to chair with maximum assistance with RW. Pt demo'd small step size, decreased foot clearance, narrow TORREY, excessive posterior lean, excessive hip flexion. Facilitation provided for lateral weight shift to promote step initiation. Cuing provided for forward gaze and upright posture. Pt with increasing posterior lean and flexion at trunk, eventually was unable to advance steps and was assisted in shifting hips laterally to reach the seat of the chair.     AM-PAC 6 CLICK MOBILITY  Total Score:10       Treatment and Education:   Pt sat EOB with  contact guard assistance for static sitting balance. Pt require B UE support on bed. When removing UE support, pt demo'd posterior LOB at trunk.   Pt performed 3x repetitions of anterior trunk translation to promote anterior weight shift. Pt required rest between each repetition.   Pt educated on role of PT/POC. Pt verbalized understanding.   Pt encouraged to only perform OOB mobility with assistance from nursing/therapy. Pt agreeable.   Pt educated on seated exercises to perform 10x, 3x/day. Exercises included bilateral LAQ, marching, ankle DF/PF      Patient left up in chair with all lines intact, call button in reach, and RN notified.      SESSION 2:     Patient found sitting up in chair with telemetry, harrison catheter, oxygen       Functional Mobility:  Bed Mobility:     Sit to Supine: maximal assistance of 2 people   Transfers:   sit to stand from recliner with maximum assistance of 2 people. Pt with 2 failed attempts, unable to reach full upright standing position   Bed to chair: pt unable to advance steps, required maximum assistance to perform squat pivot transfer from chair to EOB.     AM-PAC 6 CLICK MOBILITY  Total Score:10       Treatment and Education:   Pt educated on role of PT/POC. Pt verbalized understanding.       Patient left HOB elevated with all lines intact, call button in reach, and RN present.    GOALS:   Multidisciplinary Problems       Physical Therapy Goals          Problem: Physical Therapy    Goal Priority Disciplines Outcome Goal Variances Interventions   Physical Therapy Goal     PT, PT/OT Progressing     Description: Goals to be met by: 10/5/24     Patient will increase functional independence with mobility by performin. Supine to sit with minimum assistance   2. Sit to stand transfer with minimum assistance   3. Bed to chair transfer with Minimal Assistance using Rolling Walker  4. Gait  x 20 feet with Minimal Assistance using Rolling Walker.   5. Stand for 5 minutes with  Stand-by Assistance using Rolling Walker    Patient has a mobility limitation that significantly impairs their ability to participate in one or more mobility related activities of daily living, including toileting. This deficit can be resolved by using a bedside commode. Patient demonstrates mobility limitations that will cause them to be confined to one room at home without bathroom access for up to 30 days. Using a bedside commode will greatly improve the patient's ability to participate in MRADLs.    Patient demonstrates a mobility limitation that significantly impairs their ability to participate in one or more mobility related activities of daily living. Patient's mobility limitation cannot be sufficiently resolved with the use of a cane, but can be sufficiently resolved with the use of a rolling walker.The use of a rolling walker will considerably improve their ability to participate in MRADLs. Patient will use the walker on a regular basis at home.                            History:     Past Medical History:   Diagnosis Date    Atrial fibrillation, unspecified type 09/06/2023    COPD exacerbation 09/06/2023    Thyroid disease     hypo       Past Surgical History:   Procedure Laterality Date    COLONOSCOPY  01/01/2011    CORONARY ANGIOGRAPHY N/A 07/22/2021    Procedure: ANGIOGRAM, CORONARY ARTERY;  Surgeon: Hank Barry MD;  Location: [facility] CATH LAB/EP;  Service: Cardiology;  Laterality: N/A;    EYE SURGERY Bilateral     cataracts extraction    FRACTURE SURGERY Right 09/2021    femur    HERNIA REPAIR      HIP SURGERY Right 08/2021    INTRAMEDULLARY RODDING OF TROCHANTER OF FEMUR Right 09/03/2021    Procedure: INSERTION, INTRAMEDULLARY RACQUEL, FEMUR, TROCHANTER;  Surgeon: Darryn Hall MD;  Location: [facility] OR;  Service: Orthopedics;  Laterality: Right;    JOINT REPLACEMENT Bilateral     knee    LEFT HEART CATHETERIZATION Right 07/22/2021    Procedure: Left heart cath;  Surgeon: Hank Barry MD;  Location:  [facility] CATH LAB/EP;  Service: Cardiology;  Laterality: Right;    TRANSESOPHAGEAL ECHOCARDIOGRAPHY N/A 9/19/2024    Procedure: ECHOCARDIOGRAM, TRANSESOPHAGEAL;  Surgeon: Leonora Butt MD;  Location: [facility] EP LAB;  Service: Cardiology;  Laterality: N/A;    TREATMENT OF CARDIAC ARRHYTHMIA N/A 9/19/2024    Procedure: Cardioversion or Defibrillation;  Surgeon: ANA Steiner MD;  Location: [facility] EP LAB;  Service: Cardiology;  Laterality: N/A;  AF, DEMETRICE/DCCV, ANES, GP, 524    VASECTOMY         Time Tracking:     PT Received On: 09/21/24  PT Start Time 1: 0802     PT Stop Time 1: 0822  PT Start Time 2:0940   PT Stop Time 2:0951  PT Total Time (min): 31 mins     Billable Minutes: Re-eval 8 mins, Gait Training 8 mins , and Therapeutic Activity 15 mins       09/21/2024

## 2024-09-21 NOTE — ASSESSMENT & PLAN NOTE
Closed pelvic ring fracture s/p ORIF of pelvis on 9/20/2024   78 y/o male presenting after ground level fall with right hip pain on standing. CT scan showed acute closed fractures of the right superior and inferior pubic rami with involvement of the anterosuperior acetabulum.   - Orthopedic surgery consulted by ED and recommended weight bear as tolerated to right lower extremity and trial of PT/OT and pain management.   - PT/OT consulted and patient not progressing and only able to take a few shuffling steps and total assist x 2 people. Patient limited by dyspnea and pain. Orthopedics discussed with patient and his son and plan to proceed with operative intervention on pelvic fractures with percutaneous screw fixation. Patient taken to OR on 9/20/2024 with Dr. Negrito Stapleton and underwent percutaneous screw fixation of pelvic ring and right acetabular fractures.  - Post-op, patient is weight bear as tolerated and range of motion as tolerate to bilateral lower extremities. PT/OT re consulted post-op and worked with patient this am and recommending high intensity therapy. Patient agreeable to post acute placement on discharge and will need SNF placement on discharge and he agrees and states he does not want any facility in [place] area where he is from.   - Pain controlled to right hip. Continue pain control with scheduled Tylenol 1000 mg po every 8 hours and Robaxin 500 mg po 4 times daily with Oxycodone IR 5 mg po every 4 hours prn for pain management.   - Fall precautions.  - Cardiology reports does not need PET stress/ischemic work up prior to a pelvic fracture surgery as that can be done as outpatient.   - Patient on therapeutic Apixiban post-op for treatment of PE and also for his atrial flutter so fully anticoagulated so no chemical DVT prophylaxis needed post-op.  - Surgical bandages to remain in place post-op until Orthopedic clinic follow-up.    No

## 2024-09-21 NOTE — PLAN OF CARE
Pt maintained free from falls/trauma/injuries. Pt denied pain or discomfort. Plan of care reviewed. Pt verbalized understanding. All questions and concerns addressed. VSS with call light and belongings within reach. Amio gtt stopped no PO Amio ordered for AM. MD aware, no orders currently placed.    Problem: Adult Inpatient Plan of Care  Goal: Plan of Care Review  Outcome: Progressing  Goal: Patient-Specific Goal (Individualized)  Outcome: Progressing  Goal: Absence of Hospital-Acquired Illness or Injury  Outcome: Progressing     Problem: Infection  Goal: Absence of Infection Signs and Symptoms  Outcome: Progressing     Problem: Wound  Goal: Optimal Coping  Outcome: Progressing  Goal: Optimal Functional Ability  Outcome: Progressing  Goal: Absence of Infection Signs and Symptoms  Outcome: Progressing

## 2024-09-21 NOTE — ASSESSMENT & PLAN NOTE
"Present on admit. CT chest/abdomen/pelvis as part of trauma work up in ED showed "Right-sided pulmonary embolism, detailed above, with nonocclusive thrombus in the main right pulmonary artery and right lower lobe branch vessels, and occlusive thrombus in a subsegmental branch of the right pulmonary artery. Note comparison images are not available to determine chronicity. No right sided heart strain."   - Denies previous diagnosis or PE/DVT.  - Denies chest pain or SOB.  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 He requires 2-3L NC of oxygen at home/sleeping and uses a CPAP after meals (exertional to patient) and for naps.  - Currently stable on 2-3L NC oxygen. No hypotension. Patient has baseline dyspnea when mobilizing sideways in bed even prior to this admit.   - Labs with elevated Troponin 0.811>1.041 and .   - Initialed on Heparin drip IV to treat PE and transitioned to therapeutic oral Apixiban post-op to treat with Apixiban 10 mg po BID x 7 days (started on evening of 9/20) followed by indefinite treatment of Apixiban 5 mg po BID.   - Cards consulted, continue current therapies. No indication to activate PE response team at this time.   - No signs of right heart strain  - US of lower extremities on this admit negative for DVTs so IVC filter not indicated.   "

## 2024-09-21 NOTE — ASSESSMENT & PLAN NOTE
Resolved.   - Reportedly bitten by multiple small ants outside prior to admit.   - He has small red lesions on skin consistent with ant bites but no pustules, no pain or itching currently .   - Benadryl cream prn.  - Supportive care, monitor.

## 2024-09-21 NOTE — PROGRESS NOTES
Alli Ahumada - Cardiology [facility] Medicine  Progress Note    Patient Name: Bean Salas  MRN: [id number]  Patient Class: IP- Inpatient   Admission Date: 9/15/2024  Length of Stay: 5 days  Attending Physician: Annelise Bray MD  Primary Care Provider: Ramirez Levine MD        Subjective:     Principal Problem:Closed nondisplaced fracture of anterior column of right acetabulum with routine healing        HPI:  Bean Salas is a 79 y.o. male with PMHx of  Bradycardia, PVCs, Hypertension, Coronary Artery Disease, Hyperlipidemia, HFpEF, Pulmonary HTN, AKHIL on CPAP, Chronic respiratory failure w/ home oxygen and hypothyroidism. He presents with right hip pain after ground level fall outside yesterday. He was doing some yard work and reaching for some piles of wood when a bunch of ants started crawling all over him and biting him. He tried to get away but forgot there was a step up from the grass to the concrete/carport next to him causing him to fall over onto his bottom/right side and then went more slowly back hitting his head as well.  Did not lose consciousness. He denies any headache or confusion. He did not have any pain right away. He had trouble pulling himself up off the ground and his cell phone was inside his house where he lives alone. He called out for a neighbor and a few of them were able to get him into a chair however when he tried to stand he had immediate sharp pain in his right hip and then called for an ambulance. He normally ambulates with a walker and is independent with ADLs. He does not have pain at rest currently.     He otherwise felt in his normal state of health prior to this. He denies chest pain. Denies acute SOB, fever, cough. However, reports somewhat chronic/gradually worsening SLADE and SOB after activities such as eating requiring him to sit and use a CPAP for 2-3 hours every afternoon and lower extremity swelling right > left not much improved with his home PO lasix  "and spironolactone. He also reports requiring 2-3L supplemental oxygen while sleeping. He has some little red marks all over his obody from the ant bites but is mostly un bothered by them. He denies any history of PE/DVT. While he is not super active he does go to the store for 2-3 hours weekly to shop and ambulates between 4 rooms in his house and completes work around the house. No recent surgery or prolonged immobilization.     ED course: Afebrile. RR 22-23. /44. HR . Saturating >94% on 2-3L NC. Labs notable for troponin 0.811>1.041. . XR/CT Imaging with acute fractures of the right superior and inferior pubic rami with possible involvement of the anterosuperior acetabulum. CT chest/abdomen/pelvis also notable for right-sided pulmonary embolism with nonocclusive thrombus in the main right pulmonary artery and right lower lobe branch vessels, and occlusive thrombus in a subsegmental branch of the right pulmonary artery, no right sided heart strain, unclear chronicity. CT head and cervical spine without acute findings.  Orthopedic surgery consulted for pelvic fracture. Started on heparin gtt for PE. Admitted to  for further manement.     Overview/Hospital Course:  Patient found down outside his home and unable to bear weight to right leg due to right hip pain. Patient brought to [facility] ED and on CT scan and X-rays found to have  and then eventually helped up found to have "Mildly displaced acute fracture of the right superior pubic ramus with questionable minimal extension into the anterosuperior acetabulum." CT scan of chest and abdomen and pelvis as per trauma protocol was done and noted "Right-sided pulmonary embolism, detailed above, with nonocclusive thrombus in the main right pulmonary artery and right lower lobe branch vessels, and occlusive thrombus in a subsegmental branch of the right pulmonary artery." Patient started on IV Heparin drip to treat PE. Orthopedics consulted and recommended " trial of PT/OT and pain management and weight bear as tolerated to right lower extremity. Patient on admit also noted to have elevated troponin 1 and BNP 900s. EKG showing some concerning changes with T wave inversions and ST depressions. Cardiology consulted and echo done and showed:    Left Ventricle: The left ventricle is normal in size. Normal wall thickness. Severe global hypokinesis present. Septal motion is abnormal. There is severely reduced systolic function with a visually estimated ejection fraction of 20 - 25%. Unable to assess diastolic function due to atrial flutter.    Right Ventricle: Mild right ventricular enlargement. Wall thickness is normal. Systolic function is moderately reduced.    Severe biatrial enlargement    Mitral Valve: There is mild regurgitation.    Pulmonary Artery: There is mild to moderate pulmonary hypertension. The estimated pulmonary artery systolic pressure is 49 mmHg.    IVC/SVC: Intermediate venous pressure at 8 mmHg.    Cardiology recommended to start to diuresis patient with Lasix 80 mg IV BID per Cards recs. Patient started on Entresto for depressed EF and titrated as per Cardiology recs and continue on Aldactone.  Cardiology recommending ischemic work-up as outpatient with PET stress and consideration of Life Vest on discharge. Patient expressed he wished to be DNR and not sure about life vest on discharge. Patient also having a number of PVCs and PACs in hospital and reviewed by Cardiology who noted patient in atrial flutter. After discussion with Cardiology they recommended DEMETRICE and cardioversion for his atrial flutter as patient symptomatic and felt could be contributing to his heart failure and low EF. Patient to go for DEMETRICE and cardioversion on 9/19. Patient not progressing with PT/OT as limited by dyspnea and pain to right hip despite multimodal pain medications. Discussed with Dr. Stapleton from Orthopedics on 9/19 and he stated he spoke with patient and his son and  after discussion plan to proceed with percutaneous screw fixation of right acetabular fracture and right sided pelvic fractures for 9/20. Patient had DEMETRICE and successful DCCV by Cardiology on 9/19 with return back to normal sinus rhythm. Patient started opn IV Amiodarone infusion after cardioversion and plan for 24 hours then switch to oral Amiodarone as per Cardiology recommendations. Patient switched from IV to po Lasix on 9/19 after cardioversion to Lasix 40 mg po BID as now euvolemic. Patient taken to OR on 9/20/2024 and underwent ORIF of pelvis by Dr. Negrito Stapleton. Post-op, patient is weight bear as tolerated and range of motion as tolerate to bilateral lower extremities. PT/OT re consulted post-op. Patient completed Amiodarone infusion on evening of 9/20. Patient started on Amiodarone 400 mg po BID for 14 days on am of 9/21 followed by Amiodarone 200 mg po daily as per Cardiology recs. Patient remains in sinus rhythm on 9/21.      Interval History: Patient taken to OR yesterday and underwent ORIF of pelvis with percutaneous screw fixation by Dr. Negrito Stapleton. Post-op, patient is weight bear as tolerated and range of motion as tolerate to bilateral lower extremities. PT/OT re consulted post-op and worked with patient this am. Patient expressed disappointment as he felt he would do better with therapy this am after surgery but still limited by pain and leg weakness. I explained to patient will take time for pain and leg weakness to improve but will improve. Patient has recommendations for high intensity therapy. I discussed with patient will likely need SNF on discharge and he agrees. He requests he does not want a facility in [facility] where he is from. Patient completed his Amiodarone infusion last night as per Cardiology. Patient started on Amiodarone 400 mg po BID for 14 days this am followed by Amiodarone 200 mg po daily as per Cardiology recs. Patient remains in sinus rhythm this am. Labs  reviewed. Hgb stable at 13.1. Creatinine stable at 1.4. Magnesium normal at 2.4.     Review of Systems   Constitutional:  Negative for fever.   Respiratory:  Positive for shortness of breath (SLADE). Negative for cough.    Cardiovascular:  Negative for chest pain and leg swelling.   Gastrointestinal:  Negative for abdominal pain, nausea and vomiting.   Musculoskeletal:  Positive for arthralgias (Right hip).   Psychiatric/Behavioral:  Negative for agitation and confusion.      Objective:     Vital Signs (Most Recent):  Temp: 98 °F (36.7 °C) (09/21/24 0706)  Pulse: 66 (09/21/24 1200)  Resp: 19 (09/21/24 1135)  BP: 115/56 (09/21/24 1135)  SpO2: 97 % (09/21/24 1135) on 2 liters of oxygen Vital Signs (24h Range):  Temp:  [97.5 °F (36.4 °C)-98.1 °F (36.7 °C)] 98 °F (36.7 °C)  Pulse:  [50-80] 66  Resp:  [10-19] 19  SpO2:  [95 %-100 %] 97 %  BP: ()/(46-66) 115/56     Weight: 101.6 kg (224 lb)  Body mass index is 31.24 kg/m².    Intake/Output Summary (Last 24 hours) at 9/21/2024 1426  Last data filed at 9/21/2024 1330  Gross per 24 hour   Intake 1502 ml   Output 1035 ml   Net 467 ml         Physical Exam  Vitals and nursing note reviewed.   Constitutional:       General: He is awake. He is not in acute distress.     Appearance: Normal appearance. He is well-developed. He is obese. He is not ill-appearing.      Comments: Patient sitting up in bed in no apparent distress. Patient watching [employer] football on TV. Patient in good spirits and comfortable on exam.   Eyes:      Conjunctiva/sclera: Conjunctivae normal.   Neck:      Vascular: No JVD.   Cardiovascular:      Rate and Rhythm: Normal rate and regular rhythm.      Heart sounds: Normal heart sounds. No murmur heard.  Pulmonary:      Effort: Pulmonary effort is normal. No respiratory distress.      Breath sounds: Normal breath sounds. No wheezing.   Abdominal:      General: Abdomen is flat. Bowel sounds are normal. There is no distension.      Palpations: Abdomen is  soft.      Tenderness: There is no abdominal tenderness.   Musculoskeletal:      Right lower leg: No edema.      Left lower leg: No edema.   Skin:     Findings: No erythema.   Neurological:      Mental Status: He is alert and oriented to person, place, and time.   Psychiatric:         Mood and Affect: Mood normal.         Behavior: Behavior normal. Behavior is cooperative.         Thought Content: Thought content normal.         Judgment: Judgment normal.             Significant Labs: CBC:   Recent Labs   Lab 09/20/24  0531 09/21/24  0309   WBC 7.07 8.29   HGB 13.0* 13.3*   HCT 39.8* 39.7*    168     CMP:   Recent Labs   Lab 09/20/24  0531 09/21/24  0309    137   K 4.1 5.1   CL 99 97   CO2 30* 30*   * 184*   BUN 32* 35*   CREATININE 1.2 1.4   CALCIUM 8.8 8.6*   ANIONGAP 10 10     Magnesium:   Recent Labs   Lab 09/20/24  0531 09/21/24  0309   MG 2.4 2.4     Significant Imaging: I have reviewed all pertinent imaging results/findings within the past 24 hours.    Assessment/Plan:      * Closed nondisplaced fracture of anterior column of right acetabulum with routine healing s/p ORIF of pelvis on 9/20/2024  Closed pelvic ring fracture s/p ORIF of pelvis on 9/20/2024   80 y/o male presenting after ground level fall with right hip pain on standing. CT scan showed acute closed fractures of the right superior and inferior pubic rami with involvement of the anterosuperior acetabulum.   - Orthopedic surgery consulted by ED and recommended weight bear as tolerated to right lower extremity and trial of PT/OT and pain management.   - PT/OT consulted and patient not progressing and only able to take a few shuffling steps and total assist x 2 people. Patient limited by dyspnea and pain. Orthopedics discussed with patient and his son and plan to proceed with operative intervention on pelvic fractures with percutaneous screw fixation. Patient taken to OR on 9/20/2024 with Dr. Negrito Stapleton and underwent  "percutaneous screw fixation of pelvic ring and right acetabular fractures.  - Post-op, patient is weight bear as tolerated and range of motion as tolerate to bilateral lower extremities. PT/OT re consulted post-op and worked with patient this am and recommending high intensity therapy. Patient agreeable to post acute placement on discharge and will need SNF placement on discharge and he agrees and states he does not want any facility in [place] area where he is from.   - Pain controlled to right hip. Continue pain control with scheduled Tylenol 1000 mg po every 8 hours and Robaxin 500 mg po 4 times daily with Oxycodone IR 5 mg po every 4 hours prn for pain management.   - Fall precautions.  - Cardiology reports does not need PET stress/ischemic work up prior to a pelvic fracture surgery as that can be done as outpatient.   - Patient on therapeutic Apixiban post-op for treatment of PE and also for his atrial flutter so fully anticoagulated so no chemical DVT prophylaxis needed post-op.  - Surgical bandages to remain in place post-op until Orthopedic clinic follow-up.     Pulmonary embolism without acute cor pulmonale  Present on admit. CT chest/abdomen/pelvis as part of trauma work up in ED showed "Right-sided pulmonary embolism, detailed above, with nonocclusive thrombus in the main right pulmonary artery and right lower lobe branch vessels, and occlusive thrombus in a subsegmental branch of the right pulmonary artery. Note comparison images are not available to determine chronicity. No right sided heart strain."   - Denies previous diagnosis or PE/DVT.  - Denies chest pain or SOB.  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 He requires 2-3L NC of oxygen at home/sleeping and uses a CPAP after meals (exertional to patient) and for naps.  - Currently stable on 2-3L NC oxygen. No hypotension. Patient has baseline dyspnea when mobilizing sideways in bed even prior to this admit.   - Labs with elevated Troponin 0.811>1.041 and BNP " 949.   - Initialed on Heparin drip IV to treat PE and transitioned to therapeutic oral Apixiban post-op to treat with Apixiban 10 mg po BID x 7 days (started on evening of 9/20) followed by indefinite treatment of Apixiban 5 mg po BID.   - Cards consulted, continue current therapies. No indication to activate PE response team at this time.   - No signs of right heart strain  - US of lower extremities on this admit negative for DVTs so IVC filter not indicated.     Acute on chronic combined systolic and diastolic congestive heart failure  Pulmonary hypertension   Much Improved. Patient is identified as having Combined Systolic and Diastolic heart failure that is Acute on chronic. CHF is currently controlled. Latest ECHO performed and demonstrates- Results for orders placed during the hospital encounter of 10/04/23  Echo    Result Date: 9/16/2024    Technically difficult study    Left Ventricle: The left ventricle is normal in size. Normal wall   thickness. Severe global hypokinesis present. Septal motion is abnormal.   There is severely reduced systolic function with a visually estimated   ejection fraction of 20 - 25%. Unable to assess diastolic function due to   atrial flutter.    Right Ventricle: Mild right ventricular enlargement. Wall thickness is   normal. Systolic function is moderately reduced.    Severe biatrial enlargement    Mitral Valve: There is mild regurgitation.    Pulmonary Artery: There is mild to moderate pulmonary hypertension. The   estimated pulmonary artery systolic pressure is 49 mmHg.    IVC/SVC: Intermediate venous pressure at 8 mmHg.       Recent Labs   Lab 09/16/24  0037   *     - He denies acute SOB but reports SLADE, SOB after activities such as eating requiring him to sit and use a CPAP for 2-3 hours, and lower extremity swelling right > left not much improved with his home PO Lasix and Spironolactone. He also reports requiring 2-3 liters supplemental oxygen while sleeping as well.  He also reports eating bags of salty chips to help with his leg swelling.  - Cardiology consulted on this admit given new reduced EF with global wall motion and septal wall motion abnormalities Patient taken off Losartan and started on Entresto to treat his reduced EF and heart failure. Patient continued on his home Aldactone. Patient placed on Lasix 80 mg IV BID to diuresis patient in hospital and patient diuresising well and net negative 4.5 liters since admit. Patient now euvolemic on 9/20 so switched to po Lasix 40 mg po BID by Cardiology on 9/19 and will continue.   - Toprol XL 12.5 mg po daily for his chronic heart failure.   - Will need life vest on discharge if patient wishes, as is DNR baseline. Will need Cardiac PET stress as outpatient for ischemic evaluation.  - Fluid restriction 1.5 liters a day and low salt diet.   - Monitor response to treatment.     Paroxysmal atrial flutter  - Resolved after cardioversion on 9/19. Patient remains in sinus rhythm. Patient completed 24 hour IV infusion of Amiodarone post cardioversion and started on Amiodarone 400 mg po BID x 14 days followed b y Amiodarone 200 mg po daily for indefinite treatment. Continue Toprol XL for rate control. Continue Apixiban for long term anticoagulation.   - Per chart review, his cardiologist Dr. Hank Barry feels EKGs that were read as atrial fibrillation previously are actually sinus rhythm and thus not on long term anticoagulation on admit.   - Cardiology consulted here who report current rhythm is atrial flutter and report if does not self convert may need DEMETRICE/DCCV once euvolemic. Patient now euvolemic on 9/19 and remains in atrial flutter and rate is controlled at 45-65 on Toprol XL. Patient taken for DEMETRICE and underwent successful cardioversion by Cardiology on 9/19 and back in sinus rhythm. Patient on IV Amiodarone infusion as per Cardiology and needs for 24 hours and scheduled to end tonight and plan to switch to po Amiodarone on  9/21.   - Patient transitioned to oral Apixiban post-op for long term anticoagulation for elevated CHADsVasc score.     Essential hypertension  Chronic, controlled. Latest blood pressure and vitals reviewed-     Temp:  [97.5 °F (36.4 °C)-98.1 °F (36.7 °C)]   Pulse:  [50-80]   Resp:  [10-19]   BP: ()/(46-66)   SpO2:  [95 %-100 %] .   Home meds for hypertension were reviewed and noted below.   Hypertension Medications               amLODIPine (NORVASC) 2.5 MG tablet Take 1 tablet by mouth once daily    furosemide (LASIX) 40 MG tablet Take 1 tablet (40 mg total) by mouth 2 (two) times a day. Take daily for next 3 days, then use as needed. Weigh daily. Afterwards, f weight increases 2 lbs/24h, take dose of lasix daily  until weight is back to baseline    losartan (COZAAR) 100 MG tablet Take 1 tablet by mouth once daily    metoprolol succinate (TOPROL-XL) 50 MG 24 hr tablet Take 1 tablet by mouth once daily    spironolactone (ALDACTONE) 25 MG tablet Take 1 tablet by mouth once daily     While in the hospital, will manage blood pressure as follows; Adjust home antihypertensive regimen as follows- Stopped Norvasc and Losartan by Cardiology and started on Entresto for new onset HFrEF . Continue Lasix and Aldactone.       Chronic hypoxemic respiratory failure  Controlled. 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 He is on home oxygen at 2-3 LPM. Supplemental oxygen was provided and noted-at 2 liters of oxygen.      Signs/symptoms of respiratory failure include- tachypnea and increased work of breathing. Contributing diagnoses includes - CHF and pulmonary embolus. Labs and images were reviewed. Patient Has not had a recent ABG. Will treat underlying causes and adjust management of respiratory failure as follows- IV Lasix for heart failure and IV Heparin for PE.     Bradycardia  - Patient has history of atrial bigeminy and bradycardia per chart. Has followed with EP.  - Monitor telemetry.  - HR ranges  40s-80s at times. Hold Metoprolol if HR sustains < 60.    Advance care planning  - [facility] on file reviewed, DNR order placed. DNR on hold for cardioversion today and for Ortho surgery tomorrow and resume post-op and patient okay with holding DNR for procedures and surgery.   - Will discuss further closer to discharge if patient wants to pursue life vest as per Cardiology recs as not congruent with DNR status.    PVC's (premature ventricular contractions)  Patient noted in history and seen on telemetry on floor for PVC's. Keep Magnesium > 2, Potassium >4. Monitor telemetry. Continue Toprol XL to treat.     AKHIL (obstructive sleep apnea)  Chronic and controlled. Patient on CPAP at home at night. Continue CPAP nightly to treat.      Coronary artery disease involving native coronary artery of native heart without angina pectoris  Elevated troponin  Abnormal ventricular wall motion   - Patient with known CAD which is controlled. Will continue home Aspirin, Toprol XL and Lipitor daily to treat and monitor for S/Sx of angina/ACS. Continue to monitor on telemetry. Previous angiogram with non obstructive CAD 2021.   - Patient with elevated troponin on admit likely from PE but EKG with ST depressions, T wave inversions that are new so Cardiology consulted and appreciate recs. Echo done with new septal wall motion abnormaliteis and decreased EF at 20-25%. Cardiology recommended for outpatient PET cardiac stress and life vest on discharge for ischemic work up.    Insomnia  Chronic and controlled. Continue home Mirtazapine to treat.     Hypothyroidism due to acquired atrophy of thyroid  Chronic and controlled. Continue home Levothyroxine to treat.      Pure hypercholesterolemia  Chronic and controlled. Continue home Lipitor to treat.      Slow transit constipation  Baseline, on bowel regimen to treat with scheduled Senakot and Miralax with Doculax suppository prn.       VTE Risk Mitigation (From admission, onward)            Ordered     apixaban tablet 10 mg  2 times daily         09/20/24 1613     Place sequential compression device  Until discontinued         09/16/24 0428     Reason for No Pharmacological VTE Prophylaxis  Once        Comments: Heparin gtt for PE   Question:  Reasons:  Answer:  Physician Provided (leave comment)    09/16/24 0428     IP VTE HIGH RISK PATIENT  Once         09/16/24 0428                    Discharge Planning   LATOSHA: 9/23/2024     Code Status: Full Code   Is the patient medically ready for discharge?:     Reason for patient still in hospital (select all that apply): Patient trending condition  Discharge Plan A: Skilled Nursing Facility        Annelise Bray MD  Department of Hospital Medicine   [facility] - Cardiology Stepdown

## 2024-09-21 NOTE — ASSESSMENT & PLAN NOTE
Pulmonary hypertension   Much Improved. Patient is identified as having Combined Systolic and Diastolic heart failure that is Acute on chronic. CHF is currently controlled. Latest ECHO performed and demonstrates- Results for orders placed during the hospital encounter of 10/04/23  Echo    Result Date: 9/16/2024    Technically difficult study    Left Ventricle: The left ventricle is normal in size. Normal wall   thickness. Severe global hypokinesis present. Septal motion is abnormal.   There is severely reduced systolic function with a visually estimated   ejection fraction of 20 - 25%. Unable to assess diastolic function due to   atrial flutter.    Right Ventricle: Mild right ventricular enlargement. Wall thickness is   normal. Systolic function is moderately reduced.    Severe biatrial enlargement    Mitral Valve: There is mild regurgitation.    Pulmonary Artery: There is mild to moderate pulmonary hypertension. The   estimated pulmonary artery systolic pressure is 49 mmHg.    IVC/SVC: Intermediate venous pressure at 8 mmHg.       Recent Labs   Lab 09/16/24  0037   *     - He denies acute SOB but reports SLADE, SOB after activities such as eating requiring him to sit and use a CPAP for 2-3 hours, and lower extremity swelling right > left not much improved with his home PO Lasix and Spironolactone. He also reports requiring 2-3 liters supplemental oxygen while sleeping as well. He also reports eating bags of salty chips to help with his leg swelling.  - Cardiology consulted on this admit given new reduced EF with global wall motion and septal wall motion abnormalities Patient taken off Losartan and started on Entresto to treat his reduced EF and heart failure. Patient continued on his home Aldactone. Patient placed on Lasix 80 mg IV BID to diuresis patient in hospital and patient diuresising well and net negative 4.5 liters since admit. Patient now euvolemic on 9/20 so switched to po Lasix 40 mg po BID by  Cardiology on 9/19 and will continue.   - Toprol XL 12.5 mg po daily for his chronic heart failure.   - Will need life vest on discharge if patient wishes, as is DNR baseline. Will need Cardiac PET stress as outpatient for ischemic evaluation.  - Fluid restriction 1.5 liters a day and low salt diet.   - Monitor response to treatment.

## 2024-09-21 NOTE — PROGRESS NOTES
[facility] - [facility] Step[facility]  Orthopedics  Progress Note    Patient Name: Baen Salas  MRN: [id number]  Admission Date: 9/15/2024  Hospital Length of Stay: 5 days  Attending Provider: Annelise Bray MD  Primary Care Provider: Ramirez Levine MD  Follow-up For: Procedure(s) (LRB):  ORIF,PELVIS (Right)    Post-Operative Day: 1 Day Post-Op  Subjective:     Principal Problem:Closed nondisplaced fracture of anterior column of right acetabulum with routine healing    Principal Orthopedic Problem: As above    Interval History: Patient seen and examined at bedside. NAEON. Intermittently hypotensive and bradycardic overnight. Report improved pain at rest.      Review of patient's allergies indicates:  No Known Allergies    Current Facility-Administered Medications   Medication    acetaminophen tablet 1,000 mg    albuterol-ipratropium 2.5 mg-0.5 mg/3 mL nebulizer solution 3 mL    apixaban tablet 10 mg    aspirin EC tablet 81 mg    atorvastatin tablet 10 mg    bisacodyL suppository 10 mg    dextrose 10% bolus 125 mL 125 mL    dextrose 10% bolus 125 mL 125 mL    dextrose 10% bolus 125 mL 125 mL    dextrose 10% bolus 250 mL 250 mL    dextrose 10% bolus 250 mL 250 mL    dextrose 10% bolus 250 mL 250 mL    diphenhydrAMINE injection 25 mg    diphenhydrAMINE-zinc acetate 2-0.1% cream    furosemide tablet 40 mg    glucagon (human recombinant) injection 1 mg    glucagon (human recombinant) injection 1 mg    glucagon (human recombinant) injection 1 mg    glucose chewable tablet 16 g    glucose chewable tablet 24 g    guaiFENesin 12 hr tablet 600 mg    HYDROmorphone injection 0.2 mg    levothyroxine tablet 200 mcg    melatonin tablet 6 mg    methocarbamoL tablet 500 mg    metoprolol succinate (TOPROL-XL) 24 hr split tablet 12.5 mg    mirtazapine tablet 7.5 mg    naloxone 0.4 mg/mL injection 0.02 mg    ondansetron disintegrating tablet 8 mg    oxyCODONE immediate release tablet 5 mg    polyethylene glycol packet 17 g     "prochlorperazine injection Soln 5 mg    sacubitriL-valsartan  mg per tablet 1 tablet    senna-docusate 8.6-50 mg per tablet 1 tablet    sodium chloride 0.9% flush 3 mL    sodium chloride 0.9% flush 3 mL    sodium chloride 0.9% flush 5 mL    spironolactone tablet 25 mg     Objective:     Vital Signs (Most Recent):  Temp: 97.5 °F (36.4 °C) (09/21/24 0425)  Pulse: (!) 55 (09/21/24 0600)  Resp: 18 (09/21/24 0425)  BP: (!) 101/53 (09/21/24 0440)  SpO2: 97 % (09/21/24 0425) Vital Signs (24h Range):  Temp:  [96.2 °F (35.7 °C)-98.1 °F (36.7 °C)] 97.5 °F (36.4 °C)  Pulse:  [50-69] 55  Resp:  [10-19] 18  SpO2:  [95 %-100 %] 97 %  BP: ()/(46-66) 101/53     Weight: 101.6 kg (224 lb)  Height: 5' 11" (180.3 cm)  Body mass index is 31.24 kg/m².      Intake/Output Summary (Last 24 hours) at 9/21/2024 0641  Last data filed at 9/21/2024 0600  Gross per 24 hour   Intake 970 ml   Output 1285 ml   Net -315 ml        Ortho/SPM Exam  A&O x 3  Regular Rate  Non-Labored Respirations    RLE:   Dressings clean, dry and intact  Fires Quad/TA/EHL/GSC  SILT  Compartments soft  Brisk cap refill  Swelling to dorsal foot    LLE:   Fires Quad/TA/EHL/GSC  SILT  Compartments soft  Brisk cap refill  Swelling dorsal foot         Significant Labs: CBC:   Recent Labs   Lab 09/20/24  0531 09/21/24  0309   WBC 7.07 8.29   HGB 13.0* 13.3*   HCT 39.8* 39.7*    168     CMP:   Recent Labs   Lab 09/20/24  0531 09/21/24  0309    137   K 4.1 5.1   CL 99 97   CO2 30* 30*   * 184*   BUN 32* 35*   CREATININE 1.2 1.4   CALCIUM 8.8 8.6*   ANIONGAP 10 10     All pertinent labs within the past 24 hours have been reviewed.    Significant Imaging: I have reviewed and interpreted all pertinent imaging results/findings.  Assessment/Plan:     * Closed nondisplaced fracture of anterior column of right acetabulum with routine healing  Bean Salas is a 79 y.o. male Bean Salas is a 79 y.o. male with PMH significant for CAD, CHF, pulmonary " HTN, AKHIL on CPAP, AFib, and R IT fx s/p IMN 9/3/21 with Dr. Hall presenting with a nondisplaced fracture of the anterior, of the right acetabulum.  He was closed, NVI.  CT chest abdomen and pelvis demonstrated right-sided PE with nonocclusive thrombus in the right pulmonary artery with occlusive thrombus in his subsegmental branch of the right pulmonary artery.  DVT ultrasound negative.  Patient was started on heparin GTT by hospital medicine.  Given the relatively nondisplaced in nature of the patient's fracture and numerous medical comorbidities, we will plan to treat him nonoperatively with a trial of physical therapy. S/p Perc screws of pelvis on 09/20     Admitted to    Weightbearing as tolerated right lower extremity on rolling walker  PT/OT  DVT ppx: per    Multimodal pain control     Dispo: Pending PT course              Jermaine Adame MD  Orthopedics  [facility] - Cardiology Step[facility]

## 2024-09-21 NOTE — SUBJECTIVE & OBJECTIVE
Principal Problem:Closed nondisplaced fracture of anterior column of right acetabulum with routine healing    Principal Orthopedic Problem: As above    Interval History: Patient seen and examined at bedside. NAEON. Intermittently hypotensive and bradycardic overnight. Report improved pain at rest.      Review of patient's allergies indicates:  No Known Allergies    Current Facility-Administered Medications   Medication    acetaminophen tablet 1,000 mg    albuterol-ipratropium 2.5 mg-0.5 mg/3 mL nebulizer solution 3 mL    apixaban tablet 10 mg    aspirin EC tablet 81 mg    atorvastatin tablet 10 mg    bisacodyL suppository 10 mg    dextrose 10% bolus 125 mL 125 mL    dextrose 10% bolus 125 mL 125 mL    dextrose 10% bolus 125 mL 125 mL    dextrose 10% bolus 250 mL 250 mL    dextrose 10% bolus 250 mL 250 mL    dextrose 10% bolus 250 mL 250 mL    diphenhydrAMINE injection 25 mg    diphenhydrAMINE-zinc acetate 2-0.1% cream    furosemide tablet 40 mg    glucagon (human recombinant) injection 1 mg    glucagon (human recombinant) injection 1 mg    glucagon (human recombinant) injection 1 mg    glucose chewable tablet 16 g    glucose chewable tablet 24 g    guaiFENesin 12 hr tablet 600 mg    HYDROmorphone injection 0.2 mg    levothyroxine tablet 200 mcg    melatonin tablet 6 mg    methocarbamoL tablet 500 mg    metoprolol succinate (TOPROL-XL) 24 hr split tablet 12.5 mg    mirtazapine tablet 7.5 mg    naloxone 0.4 mg/mL injection 0.02 mg    ondansetron disintegrating tablet 8 mg    oxyCODONE immediate release tablet 5 mg    polyethylene glycol packet 17 g    prochlorperazine injection Soln 5 mg    sacubitriL-valsartan  mg per tablet 1 tablet    senna-docusate 8.6-50 mg per tablet 1 tablet    sodium chloride 0.9% flush 3 mL    sodium chloride 0.9% flush 3 mL    sodium chloride 0.9% flush 5 mL    spironolactone tablet 25 mg     Objective:     Vital Signs (Most Recent):  Temp: 97.5 °F (36.4 °C) (09/21/24 0425)  Pulse: (!)  "55 (09/21/24 0600)  Resp: 18 (09/21/24 0425)  BP: (!) 101/53 (09/21/24 0440)  SpO2: 97 % (09/21/24 0425) Vital Signs (24h Range):  Temp:  [96.2 °F (35.7 °C)-98.1 °F (36.7 °C)] 97.5 °F (36.4 °C)  Pulse:  [50-69] 55  Resp:  [10-19] 18  SpO2:  [95 %-100 %] 97 %  BP: ()/(46-66) 101/53     Weight: 101.6 kg (224 lb)  Height: 5' 11" (180.3 cm)  Body mass index is 31.24 kg/m².      Intake/Output Summary (Last 24 hours) at 9/21/2024 0641  Last data filed at 9/21/2024 0600  Gross per 24 hour   Intake 970 ml   Output 1285 ml   Net -315 ml        Ortho/SPM Exam  A&O x 3  Regular Rate  Non-Labored Respirations    RLE:   Dressings clean, dry and intact  Fires Quad/TA/EHL/GSC  SILT  Compartments soft  Brisk cap refill  Swelling to dorsal foot    LLE:   Fires Quad/TA/EHL/GSC  SILT  Compartments soft  Brisk cap refill  Swelling dorsal foot         Significant Labs: CBC:   Recent Labs   Lab 09/20/24  0531 09/21/24  0309   WBC 7.07 8.29   HGB 13.0* 13.3*   HCT 39.8* 39.7*    168     CMP:   Recent Labs   Lab 09/20/24  0531 09/21/24  0309    137   K 4.1 5.1   CL 99 97   CO2 30* 30*   * 184*   BUN 32* 35*   CREATININE 1.2 1.4   CALCIUM 8.8 8.6*   ANIONGAP 10 10     All pertinent labs within the past 24 hours have been reviewed.    Significant Imaging: I have reviewed and interpreted all pertinent imaging results/findings.  "

## 2024-09-21 NOTE — ANESTHESIA POSTPROCEDURE EVALUATION
Anesthesia Post Evaluation    Patient: Bean Salas    Procedure(s) Performed: Procedure(s) (LRB):  ORIF,PELVIS (Right)    Final Anesthesia Type: general      Patient location during evaluation: PACU  Patient participation: Yes- Able to Participate  Level of consciousness: awake  Post-procedure vital signs: reviewed and stable  Pain management: adequate  Airway patency: patent    PONV status at discharge: No PONV  Anesthetic complications: no      Cardiovascular status: blood pressure returned to baseline  Respiratory status: unassisted  Hydration status: euvolemic  Follow-up not needed.              Vitals Value Taken Time   /56 09/21/24 1135   Temp 36.7 °C (98 °F) 09/21/24 0706   Pulse 60 09/21/24 1505   Resp 19 09/21/24 1135   SpO2 97 % 09/21/24 1135         Event Time   Out of Recovery 09/20/2024 16:45:00         Pain/Tej Score: Pain Rating Prior to Med Admin: 0 (9/21/2024  1:26 PM)  Pain Rating Post Med Admin: 0 (pt sleeping) (9/21/2024  6:04 AM)  Tej Score: 9 (9/20/2024  4:45 PM)

## 2024-09-21 NOTE — PT/OT/SLP RE-EVAL
Occupational Therapy   Re-evaluation and Co-Treatment  Co-treatment performed due to patient's multiple deficits requiring two skilled therapists to appropriately and safely assess patient's strength and endurance while facilitating functional tasks in addition to accommodating for patient's activity tolerance.      Name: Bean Salas  MRN: [id number]  Admitting Diagnosis:  Closed nondisplaced fracture of anterior column of right acetabulum with routine healing  Recent Surgery: Procedure(s) (LRB):  ORIF,PELVIS (Right) 1 Day Post-Op    Recommendations:     Discharge Recommendations: High Intensity Therapy  Discharge Equipment Recommendations: bedside commode, walker, rolling, hip kit  Barriers to discharge:  Other (Comment) (increased skilled(A) required)    Assessment:     Bean Salas is a 79 y.o. male with a medical diagnosis of Closed nondisplaced fracture of anterior column of right acetabulum with routine healing.  He presents with the following performance deficits affecting function are weakness, impaired endurance, impaired self care skills, impaired functional mobility, gait instability, impaired balance, decreased coordination, decreased upper extremity function, decreased lower extremity function, decreased safety awareness, pain, decreased ROM, impaired coordination, orthopedic precautions, impaired cardiopulmonary response to activity.  Pt presents s/p ORIF (R) pelvis POD1. Pt required significant assistance for all functional transfers and activity completed this day without incident. Pt demo'd poor safety awareness requiring ongoing cues for safety with transfers and for attention to functional task. Pt remains limited in ADLs, functional mobility, and functional transfers and is currently not performing tasks at OF. Pt would continue to benefit from skilled OT services to maximize functional independence with ADLs and functional mobility, reduce caregiver burden, and facilitate safe discharge in  the least restrictive environment.  Patient has demonstrated sufficient progression to warrant high intensity therapy evidenced by objectives noted below.     Rehab Prognosis:  Good; patient would benefit from acute skilled OT services to address these deficits and reach maximum level of function.       Plan:     Patient to be seen 4 x/week to address the above listed problems via self-care/home management, therapeutic activities, therapeutic exercises, neuromuscular re-education  Plan of Care Expires: 10/21/24  Plan of Care Reviewed with: patient    Subjective     Chief Complaint: weakness  Patient/Family stated goals: to return to PLOF  Communicated with: Raghu YIN prior to session.  Pain/Comfort:  Pain Rating 1: 0/10 (at rest)  Location - Side 1: Right  Location - Orientation 1: generalized  Location 1: hip  Pain Addressed 1: Pre-medicate for activity, Reposition, Distraction  Pain Rating Post-Intervention 1: other (see comments) (unrated)    Objective:   Additional staff present:  Izzy PT    Communicated with: Raghu YIN prior to session.  Patient found HOB elevated with: bed alarm, telemetry, oxygen, harrison catheter upon OT entry to room.    General Precautions: Standard, fall  Orthopedic Precautions: RLE weight bearing as tolerated, LLE weight bearing as tolerated  Braces: N/A  Respiratory Status: Nasal cannula, flow 2 L/min    Occupational Performance:    Bed Mobility:    Patient completed Scooting/Bridging with maximal assistance and 2 persons  Patient completed Supine to Sit with maximal assistance and 2 persons    Functional Mobility/Transfers:  Patient completed Sit <> Stand Transfer with moderate assistance and of 2 persons  with  rolling walker   From elevated bed  Cues for hand placement  Patient completed Bed <> Chair Transfer using Stand Pivot technique with maximal assistance and of 2 persons with rolling walker  Functional Mobility: Pt engaged in functional mobility and completed ~3-4 small steps to  the right with Max x2 and RW  No overt LOB, unsteadiness noted t/o  No SOB  Max verbal cueing required for sequencing, upright posture, and sustained attention  Manual (A) for RW management    Activities of Daily Living:  Upper Body Dressing: moderate assistance to don hospital gown over back  Lower Body Dressing: total assistance required to don socks at bed level     Cognitive/Visual Perceptual:  Cognitive/Psychosocial Skills:     -       Oriented to: Person, Place, Time, and Situation   -       Follows Commands/attention:Follows two-step commands  -       Safety awareness/insight to disability: impaired   -       Mood/Affect/Coping skills/emotional control: Cooperative    Physical Exam:  Sensation:    -       Intact  Upper Extremity Range of Motion:     -       Right Upper Extremity: WFL  -       Left Upper Extremity: WFL except shoulder AROM mildly decreased  Upper Extremity Strength:    -       Right Upper Extremity: WFL  -       Left Upper Extremity: WFL    AMPAC 6 Click:  AMPAC Total Score: 14    Treatment & Education:  -Education on weightbearing precautions  -Education on energy conservation and task modification to maximize safety and (I) during ADLs and mobility  -Education on importance of OOB activity to improve overall activity tolerance and promote recovery  -Pt educated to call for assistance and to transfer with hospital staff only  -Provided education regarding role of OT, POC, & discharge recommendations with pt verbalizing understanding.  Pt had no further questions & when asked whether there were any concerns pt reported none.     Patient left up in chair with all lines intact, call button in reach, and RN notified    GOALS:   Multidisciplinary Problems       Occupational Therapy Goals          Problem: Occupational Therapy    Goal Priority Disciplines Outcome Interventions   Occupational Therapy Goal     OT, PT/OT Progressing    Description: Goals to be met by: 10/21/24     Patient will  increase functional independence with ADLs by performing:    UE Dressing with Modified [place].  LE Dressing with Stand-by Assistance and AE prn.  Grooming while standing with Contact Guard Assistance.  Toileting from toilet/bedside commode with Stand-by Assistance for hygiene and clothing management.   Supine to sit with Contact Guard Assistance.  Toilet transfer to toilet/bedside commode with Minimal Assistance and LRAD.    Patient demonstrates a mobility limitation that significantly impairs their ability to participate in one or more mobility related activities of daily living. Patient's mobility limitation cannot be sufficiently resolved with the use of a cane, but can be sufficiently resolved with the use of a rolling walker.The use of a rolling walker will considerably improve their ability to participate in MRADLs. Patient will use the walker on a regular basis at home.      Patient has a mobility limitation that significantly impairs their ability to participate in one or more mobility related activities of daily living, including toileting. This deficit can be resolved by using a bedside commode. Patient demonstrates mobility limitations that will cause them to be confined to one room at home without bathroom access for up to 30 days. Using a bedside commode will greatly improve the patient's ability to participate in MRADLs.                          History:     Past Medical History:   Diagnosis Date    Atrial fibrillation, unspecified type 09/06/2023    COPD exacerbation 09/06/2023    Thyroid disease     hypo         Past Surgical History:   Procedure Laterality Date    COLONOSCOPY  01/01/2011    CORONARY ANGIOGRAPHY N/A 07/22/2021    Procedure: ANGIOGRAM, CORONARY ARTERY;  Surgeon: Hank Barry MD;  Location: [facility] CATH LAB/EP;  Service: Cardiology;  Laterality: N/A;    EYE SURGERY Bilateral     cataracts extraction    FRACTURE SURGERY Right 09/2021    femur    HERNIA REPAIR      HIP SURGERY  Right 08/2021    INTRAMEDULLARY RODDING OF TROCHANTER OF FEMUR Right 09/03/2021    Procedure: INSERTION, INTRAMEDULLARY RACQUEL, FEMUR, TROCHANTER;  Surgeon: Darryn Hall MD;  Location: [facility] OR;  Service: Orthopedics;  Laterality: Right;    JOINT REPLACEMENT Bilateral     knee    LEFT HEART CATHETERIZATION Right 07/22/2021    Procedure: Left heart cath;  Surgeon: Hank Barry MD;  Location: [facility] CATH LAB/EP;  Service: Cardiology;  Laterality: Right;    TRANSESOPHAGEAL ECHOCARDIOGRAPHY N/A 9/19/2024    Procedure: ECHOCARDIOGRAM, TRANSESOPHAGEAL;  Surgeon: Leonora Butt MD;  Location: [facility] EP LAB;  Service: Cardiology;  Laterality: N/A;    TREATMENT OF CARDIAC ARRHYTHMIA N/A 9/19/2024    Procedure: Cardioversion or Defibrillation;  Surgeon: AAN Steiner MD;  Location: [facility] EP LAB;  Service: Cardiology;  Laterality: N/A;  AF, DEMETRICE/DCCV, ANES, GP, 524    VASECTOMY         Time Tracking:     OT Date of Treatment: 09/21/24  OT Start Time: 0802  OT Stop Time: 0822  OT Total Time (min): 20 min    Billable Minutes:Re-eval 10  Self Care/Home Management 10    9/21/2024

## 2024-09-21 NOTE — PLAN OF CARE
Problem: Adult Inpatient Plan of Care  Goal: Patient-Specific Goal (Individualized)  Outcome: Progressing  Flowsheets (Taken 9/21/2024 1446)  Individualized Care Needs: updated patient on POC  Anxieties, Fears or Concerns: none stated     Problem: Wound  Goal: Skin Health and Integrity  Outcome: Progressing  Intervention: Optimize Skin Protection  Flowsheets (Taken 9/21/2024 1446)  Pressure Reduction Techniques: frequent weight shift encouraged  Pressure Reduction Devices: positioning supports utilized  Skin Protection: incontinence pads utilized  Activity Management:   Rolling - L1   Up in chair - L3  Head of Bed (HOB) Positioning: HOB elevated     Problem: Fall Injury Risk  Goal: Absence of Fall and Fall-Related Injury  Outcome: Progressing  Intervention: Identify and Manage Contributors  Flowsheets (Taken 9/21/2024 1446)  Self-Care Promotion:   independence encouraged   BADL personal objects within reach   BADL personal routines maintained   meal set-up provided  Medication Review/Management: medications reviewed  Intervention: Promote Injury-Free Environment  Flowsheets (Taken 9/21/2024 1446)  Safety Promotion/Fall Prevention:   assistive device/personal item within reach   Fall Risk reviewed with patient/family   Fall Risk signage in place   medications reviewed   instructed to call staff for mobility   nonskid shoes/socks when out of bed   patient expresses understanding of fall risk and prevention   room near unit station   side rails raised x 2   AAOX4,VSS,O2 sats > 90% on 3L NC. Plan of care discussed with patient. Patient has no complaints of chest pain/SOB/palpitations. Fall precautions in place,no falls/injuries through the shift. Discussed medications and care. Patient has no questions at this time. Pt resting comfortably with no acute distress. Call light within reach,bed at lowest position. Scheduled robaxin, tylenol given per MAR for incisional pain. PRN oxy given for pain per MAR.

## 2024-09-21 NOTE — PLAN OF CARE
Goals updated.  Problem: Occupational Therapy  Goal: Occupational Therapy Goal  Description: Goals to be met by: 10/21/24     Patient will increase functional independence with ADLs by performing:    UE Dressing with Modified [place].  LE Dressing with Stand-by Assistance and AE prn.  Grooming while standing with Contact Guard Assistance.  Toileting from toilet/bedside commode with Stand-by Assistance for hygiene and clothing management.   Supine to sit with Contact Guard Assistance.  Toilet transfer to toilet/bedside commode with Minimal Assistance and LRAD.    Outcome: Progressing     Patient demonstrates a mobility limitation that significantly impairs their ability to participate in one or more mobility related activities of daily living. Patient's mobility limitation cannot be sufficiently resolved with the use of a cane, but can be sufficiently resolved with the use of a rolling walker.The use of a rolling walker will considerably improve their ability to participate in MRADLs. Patient will use the walker on a regular basis at home.      Patient has a mobility limitation that significantly impairs their ability to participate in one or more mobility related activities of daily living, including toileting. This deficit can be resolved by using a bedside commode. Patient demonstrates mobility limitations that will cause them to be confined to one room at home without bathroom access for up to 30 days. Using a bedside commode will greatly improve the patient's ability to participate in MRADLs.

## 2024-09-21 NOTE — ASSESSMENT & PLAN NOTE
- Resolved after cardioversion on 9/19. Patient remains in sinus rhythm. Patient completed 24 hour IV infusion of Amiodarone post cardioversion and started on Amiodarone 400 mg po BID x 14 days followed b y Amiodarone 200 mg po daily for indefinite treatment. Continue Toprol XL for rate control. Continue Apixiban for long term anticoagulation.   - Per chart review, his cardiologist Dr. Hank Barry feels EKGs that were read as atrial fibrillation previously are actually sinus rhythm and thus not on long term anticoagulation on admit.   - Cardiology consulted here who report current rhythm is atrial flutter and report if does not self convert may need DEMETRICE/DCCV once euvolemic. Patient now euvolemic on 9/19 and remains in atrial flutter and rate is controlled at 45-65 on Toprol XL. Patient taken for DEMETRICE and underwent successful cardioversion by Cardiology on 9/19 and back in sinus rhythm. Patient on IV Amiodarone infusion as per Cardiology and needs for 24 hours and scheduled to end tonight and plan to switch to po Amiodarone on 9/21.   - Patient transitioned to oral Apixiban post-op for long term anticoagulation for elevated CHADsVasc score.

## 2024-09-21 NOTE — SUBJECTIVE & OBJECTIVE
Interval History: Patient taken to OR yesterday and underwent ORIF of pelvis with percutaneous screw fixation by Dr. Negrito Stapleton. Post-op, patient is weight bear as tolerated and range of motion as tolerate to bilateral lower extremities. PT/OT re consulted post-op and worked with patient this am. Patient expressed disappointment as he felt he would do better with therapy this am after surgery but still limited by pain and leg weakness. I explained to patient will take time for pain and leg weakness to improve but will improve. Patient has recommendations for high intensity therapy. I discussed with patient will likely need SNF on discharge and he agrees. He requests he does not want a facility in [facility] where he is from. Patient completed his Amiodarone infusion last night as per Cardiology. Patient started on Amiodarone 400 mg po BID for 14 days this am followed by Amiodarone 200 mg po daily as per Cardiology recs. Patient remains in sinus rhythm this am. Labs reviewed. Hgb stable at 13.1. Creatinine stable at 1.4. Magnesium normal at 2.4.     Review of Systems   Constitutional:  Negative for fever.   Respiratory:  Positive for shortness of breath (SLADE). Negative for cough.    Cardiovascular:  Negative for chest pain and leg swelling.   Gastrointestinal:  Negative for abdominal pain, nausea and vomiting.   Musculoskeletal:  Positive for arthralgias (Right hip).   Psychiatric/Behavioral:  Negative for agitation and confusion.      Objective:     Vital Signs (Most Recent):  Temp: 98 °F (36.7 °C) (09/21/24 0706)  Pulse: 66 (09/21/24 1200)  Resp: 19 (09/21/24 1135)  BP: 115/56 (09/21/24 1135)  SpO2: 97 % (09/21/24 1135) on 2 liters of oxygen Vital Signs (24h Range):  Temp:  [97.5 °F (36.4 °C)-98.1 °F (36.7 °C)] 98 °F (36.7 °C)  Pulse:  [50-80] 66  Resp:  [10-19] 19  SpO2:  [95 %-100 %] 97 %  BP: ()/(46-66) 115/56     Weight: 101.6 kg (224 lb)  Body mass index is 31.24 kg/m².    Intake/Output  Summary (Last 24 hours) at 9/21/2024 1426  Last data filed at 9/21/2024 1330  Gross per 24 hour   Intake 1502 ml   Output 1035 ml   Net 467 ml         Physical Exam  Vitals and nursing note reviewed.   Constitutional:       General: He is awake. He is not in acute distress.     Appearance: Normal appearance. He is well-developed. He is obese. He is not ill-appearing.      Comments: Patient sitting up in bed in no apparent distress. Patient watching [place] football on TV. Patient in good spirits and comfortable on exam.   Eyes:      Conjunctiva/sclera: Conjunctivae normal.   Neck:      Vascular: No JVD.   Cardiovascular:      Rate and Rhythm: Normal rate and regular rhythm.      Heart sounds: Normal heart sounds. No murmur heard.  Pulmonary:      Effort: Pulmonary effort is normal. No respiratory distress.      Breath sounds: Normal breath sounds. No wheezing.   Abdominal:      General: Abdomen is flat. Bowel sounds are normal. There is no distension.      Palpations: Abdomen is soft.      Tenderness: There is no abdominal tenderness.   Musculoskeletal:      Right lower leg: No edema.      Left lower leg: No edema.   Skin:     Findings: No erythema.   Neurological:      Mental Status: He is alert and oriented to person, place, and time.   Psychiatric:         Mood and Affect: Mood normal.         Behavior: Behavior normal. Behavior is cooperative.         Thought Content: Thought content normal.         Judgment: Judgment normal.             Significant Labs: CBC:   Recent Labs   Lab 09/20/24  0531 09/21/24  0309   WBC 7.07 8.29   HGB 13.0* 13.3*   HCT 39.8* 39.7*    168     CMP:   Recent Labs   Lab 09/20/24  0531 09/21/24  0309    137   K 4.1 5.1   CL 99 97   CO2 30* 30*   * 184*   BUN 32* 35*   CREATININE 1.2 1.4   CALCIUM 8.8 8.6*   ANIONGAP 10 10     Magnesium:   Recent Labs   Lab 09/20/24  0531 09/21/24  0309   MG 2.4 2.4     Significant Imaging: I have reviewed all pertinent imaging  results/findings within the past 24 hours.

## 2024-09-21 NOTE — ASSESSMENT & PLAN NOTE
Controlled. 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 He is on home oxygen at 2-3 LPM. Supplemental oxygen was provided and noted-at 2 liters of oxygen.      Signs/symptoms of respiratory failure include- tachypnea and increased work of breathing. Contributing diagnoses includes - CHF and pulmonary embolus. Labs and images were reviewed. Patient Has not had a recent ABG. Will treat underlying causes and adjust management of respiratory failure as follows- IV Lasix for heart failure and IV Heparin for PE.

## 2024-09-21 NOTE — ASSESSMENT & PLAN NOTE
Bean Salas is a 79 y.o. male Bean Salas is a 79 y.o. male with PMH significant for CAD, CHF, pulmonary HTN, AKHIL on CPAP, AFib, and R IT fx s/p IMN 9/3/21 with Dr. Hall presenting with a nondisplaced fracture of the anterior, of the right acetabulum.  He was closed, NVI.  CT chest abdomen and pelvis demonstrated right-sided PE with nonocclusive thrombus in the right pulmonary artery with occlusive thrombus in his subsegmental branch of the right pulmonary artery.  DVT ultrasound negative.  Patient was started on heparin GTT by hospital medicine.  Given the relatively nondisplaced in nature of the patient's fracture and numerous medical comorbidities, we will plan to treat him nonoperatively with a trial of physical therapy. S/p Perc screws of pelvis on 09/20     Admitted to    Weightbearing as tolerated right lower extremity on rolling walker  PT/OT  DVT ppx: per    Multimodal pain control     Dispo: Pending PT course

## 2024-09-21 NOTE — PLAN OF CARE
Problem: Physical Therapy  Goal: Physical Therapy Goal  Description: Goals to be met by: 10/5/24     Patient will increase functional independence with mobility by performin. Supine to sit with minimum assistance   2. Sit to stand transfer with minimum assistance   3. Bed to chair transfer with Minimal Assistance using Rolling Walker  4. Gait  x 20 feet with Minimal Assistance using Rolling Walker.   5. Stand for 5 minutes with Stand-by Assistance using Rolling Walker    Patient has a mobility limitation that significantly impairs their ability to participate in one or more mobility related activities of daily living, including toileting. This deficit can be resolved by using a bedside commode. Patient demonstrates mobility limitations that will cause them to be confined to one room at home without bathroom access for up to 30 days. Using a bedside commode will greatly improve the patient's ability to participate in MRADLs.    Patient demonstrates a mobility limitation that significantly impairs their ability to participate in one or more mobility related activities of daily living. Patient's mobility limitation cannot be sufficiently resolved with the use of a cane, but can be sufficiently resolved with the use of a rolling walker.The use of a rolling walker will considerably improve their ability to participate in MRADLs. Patient will use the walker on a regular basis at home.       Outcome: Progressing       Pt re-evaluated and appropriate goals established.

## 2024-09-21 NOTE — ASSESSMENT & PLAN NOTE
Chronic, controlled. Latest blood pressure and vitals reviewed-     Temp:  [97.5 °F (36.4 °C)-98.1 °F (36.7 °C)]   Pulse:  [50-80]   Resp:  [10-19]   BP: ()/(46-66)   SpO2:  [95 %-100 %] .   Home meds for hypertension were reviewed and noted below.   Hypertension Medications               amLODIPine (NORVASC) 2.5 MG tablet Take 1 tablet by mouth once daily    furosemide (LASIX) 40 MG tablet Take 1 tablet (40 mg total) by mouth 2 (two) times a day. Take daily for next 3 days, then use as needed. Weigh daily. Afterwards, f weight increases 2 lbs/24h, take dose of lasix daily  until weight is back to baseline    losartan (COZAAR) 100 MG tablet Take 1 tablet by mouth once daily    metoprolol succinate (TOPROL-XL) 50 MG 24 hr tablet Take 1 tablet by mouth once daily    spironolactone (ALDACTONE) 25 MG tablet Take 1 tablet by mouth once daily     While in the hospital, will manage blood pressure as follows; Adjust home antihypertensive regimen as follows- Stopped Norvasc and Losartan by Cardiology and started on Entresto for new onset HFrEF . Continue Lasix and Aldactone.

## 2024-09-22 LAB
ANION GAP SERPL CALC-SCNC: 8 MMOL/L (ref 8–16)
BASOPHILS # BLD AUTO: 0.01 K/UL (ref 0–0.2)
BASOPHILS NFR BLD: 0.1 % (ref 0–1.9)
BUN SERPL-MCNC: 36 MG/DL (ref 8–23)
CALCIUM SERPL-MCNC: 8.7 MG/DL (ref 8.7–10.5)
CHLORIDE SERPL-SCNC: 100 MMOL/L (ref 95–110)
CO2 SERPL-SCNC: 30 MMOL/L (ref 23–29)
CREAT SERPL-MCNC: 1.2 MG/DL (ref 0.5–1.4)
DIFFERENTIAL METHOD BLD: ABNORMAL
EOSINOPHIL # BLD AUTO: 0.1 K/UL (ref 0–0.5)
EOSINOPHIL NFR BLD: 1.1 % (ref 0–8)
ERYTHROCYTE [DISTWIDTH] IN BLOOD BY AUTOMATED COUNT: 15 % (ref 11.5–14.5)
EST. GFR  (NO RACE VARIABLE): >60 ML/MIN/1.73 M^2
GLUCOSE SERPL-MCNC: 132 MG/DL (ref 70–110)
HCT VFR BLD AUTO: 39.7 % (ref 40–54)
HGB BLD-MCNC: 12.5 G/DL (ref 14–18)
IMM GRANULOCYTES # BLD AUTO: 0.03 K/UL (ref 0–0.04)
IMM GRANULOCYTES NFR BLD AUTO: 0.4 % (ref 0–0.5)
LYMPHOCYTES # BLD AUTO: 0.8 K/UL (ref 1–4.8)
LYMPHOCYTES NFR BLD: 10.3 % (ref 18–48)
MAGNESIUM SERPL-MCNC: 2.3 MG/DL (ref 1.6–2.6)
MCH RBC QN AUTO: 32.2 PG (ref 27–31)
MCHC RBC AUTO-ENTMCNC: 31.5 G/DL (ref 32–36)
MCV RBC AUTO: 102 FL (ref 82–98)
MONOCYTES # BLD AUTO: 0.8 K/UL (ref 0.3–1)
MONOCYTES NFR BLD: 11 % (ref 4–15)
NEUTROPHILS # BLD AUTO: 5.6 K/UL (ref 1.8–7.7)
NEUTROPHILS NFR BLD: 77.1 % (ref 38–73)
NRBC BLD-RTO: 0 /100 WBC
OHS QRS DURATION: 134 MS
OHS QTC CALCULATION: 541 MS
PLATELET # BLD AUTO: 178 K/UL (ref 150–450)
PMV BLD AUTO: 12.3 FL (ref 9.2–12.9)
POTASSIUM SERPL-SCNC: 4 MMOL/L (ref 3.5–5.1)
RBC # BLD AUTO: 3.88 M/UL (ref 4.6–6.2)
SODIUM SERPL-SCNC: 138 MMOL/L (ref 136–145)
WBC # BLD AUTO: 7.25 K/UL (ref 3.9–12.7)

## 2024-09-22 PROCEDURE — 83735 ASSAY OF MAGNESIUM: CPT | Mod: HCNC | Performed by: HOSPITALIST

## 2024-09-22 PROCEDURE — 20600001 HC STEP DOWN PRIVATE ROOM: Mod: HCNC

## 2024-09-22 PROCEDURE — 85025 COMPLETE CBC W/AUTO DIFF WBC: CPT | Mod: HCNC | Performed by: INTERNAL MEDICINE

## 2024-09-22 PROCEDURE — 80048 BASIC METABOLIC PNL TOTAL CA: CPT | Mod: HCNC | Performed by: HOSPITALIST

## 2024-09-22 PROCEDURE — 93010 ELECTROCARDIOGRAM REPORT: CPT | Mod: HCNC,,, | Performed by: INTERNAL MEDICINE

## 2024-09-22 PROCEDURE — 36415 COLL VENOUS BLD VENIPUNCTURE: CPT | Mod: HCNC | Performed by: HOSPITALIST

## 2024-09-22 PROCEDURE — 25000003 PHARM REV CODE 250: Mod: HCNC | Performed by: INTERNAL MEDICINE

## 2024-09-22 PROCEDURE — 93005 ELECTROCARDIOGRAM TRACING: CPT | Mod: HCNC

## 2024-09-22 PROCEDURE — 25000003 PHARM REV CODE 250: Mod: HCNC | Performed by: STUDENT IN AN ORGANIZED HEALTH CARE EDUCATION/TRAINING PROGRAM

## 2024-09-22 PROCEDURE — 25000003 PHARM REV CODE 250: Mod: HCNC | Performed by: HOSPITALIST

## 2024-09-22 PROCEDURE — 25000003 PHARM REV CODE 250: Mod: HCNC

## 2024-09-22 RX ORDER — OXYCODONE HYDROCHLORIDE 5 MG/1
5 TABLET ORAL EVERY 4 HOURS PRN
Status: DISCONTINUED | OUTPATIENT
Start: 2024-09-22 | End: 2024-09-24 | Stop reason: HOSPADM

## 2024-09-22 RX ORDER — METHOCARBAMOL 750 MG/1
750 TABLET, FILM COATED ORAL 4 TIMES DAILY
Status: DISCONTINUED | OUTPATIENT
Start: 2024-09-22 | End: 2024-09-24 | Stop reason: HOSPADM

## 2024-09-22 RX ADMIN — GUAIFENESIN 600 MG: 600 TABLET, EXTENDED RELEASE ORAL at 09:09

## 2024-09-22 RX ADMIN — ACETAMINOPHEN 1000 MG: 500 TABLET ORAL at 05:09

## 2024-09-22 RX ADMIN — FUROSEMIDE 40 MG: 40 TABLET ORAL at 08:09

## 2024-09-22 RX ADMIN — ATORVASTATIN CALCIUM 10 MG: 10 TABLET, FILM COATED ORAL at 08:09

## 2024-09-22 RX ADMIN — APIXABAN 10 MG: 5 TABLET, FILM COATED ORAL at 09:09

## 2024-09-22 RX ADMIN — OXYCODONE 5 MG: 5 TABLET ORAL at 10:09

## 2024-09-22 RX ADMIN — ACETAMINOPHEN 1000 MG: 500 TABLET ORAL at 01:09

## 2024-09-22 RX ADMIN — METHOCARBAMOL 750 MG: 750 TABLET ORAL at 05:09

## 2024-09-22 RX ADMIN — ACETAMINOPHEN 1000 MG: 500 TABLET ORAL at 09:09

## 2024-09-22 RX ADMIN — SACUBITRIL AND VALSARTAN 1 TABLET: 97; 103 TABLET, FILM COATED ORAL at 09:09

## 2024-09-22 RX ADMIN — AMIODARONE HYDROCHLORIDE 400 MG: 200 TABLET ORAL at 09:09

## 2024-09-22 RX ADMIN — OXYCODONE HYDROCHLORIDE 5 MG: 5 TABLET ORAL at 09:09

## 2024-09-22 RX ADMIN — METHOCARBAMOL 750 MG: 750 TABLET ORAL at 01:09

## 2024-09-22 RX ADMIN — LEVOTHYROXINE SODIUM 200 MCG: 125 TABLET ORAL at 05:09

## 2024-09-22 RX ADMIN — POLYETHYLENE GLYCOL 3350 17 G: 17 POWDER, FOR SOLUTION ORAL at 08:09

## 2024-09-22 RX ADMIN — ASPIRIN 81 MG: 81 TABLET, COATED ORAL at 08:09

## 2024-09-22 RX ADMIN — AMIODARONE HYDROCHLORIDE 400 MG: 200 TABLET ORAL at 08:09

## 2024-09-22 RX ADMIN — METHOCARBAMOL 500 MG: 500 TABLET ORAL at 08:09

## 2024-09-22 RX ADMIN — FUROSEMIDE 40 MG: 40 TABLET ORAL at 05:09

## 2024-09-22 RX ADMIN — METHOCARBAMOL 750 MG: 750 TABLET ORAL at 09:09

## 2024-09-22 RX ADMIN — OXYCODONE 5 MG: 5 TABLET ORAL at 05:09

## 2024-09-22 RX ADMIN — SACUBITRIL AND VALSARTAN 1 TABLET: 97; 103 TABLET, FILM COATED ORAL at 08:09

## 2024-09-22 RX ADMIN — MIRTAZAPINE 7.5 MG: 7.5 TABLET, FILM COATED ORAL at 09:09

## 2024-09-22 RX ADMIN — SENNOSIDES AND DOCUSATE SODIUM 1 TABLET: 50; 8.6 TABLET ORAL at 08:09

## 2024-09-22 RX ADMIN — SPIRONOLACTONE 25 MG: 25 TABLET ORAL at 08:09

## 2024-09-22 RX ADMIN — APIXABAN 10 MG: 5 TABLET, FILM COATED ORAL at 08:09

## 2024-09-22 RX ADMIN — GUAIFENESIN 600 MG: 600 TABLET, EXTENDED RELEASE ORAL at 08:09

## 2024-09-22 NOTE — ASSESSMENT & PLAN NOTE
Bassam from [facility] called today with regards to the following request:    Reason and/or symptom caller is requesting the order: in home labs  They need clarification    For additional information, or if you need to contact the patient, please call patient at the following [phone] Pulmonary hypertension   Patient now euvolemic on exam. Acute heart failure resolved. Continue Toprol XL, Entresto, Lasix for heart failure. Start Jardiance on discharge for his heart failure.   Patient is identified as having Combined Systolic and Diastolic heart failure that is Acute on chronic. CHF is currently controlled. Latest ECHO performed and demonstrates- Results for orders placed during the hospital encounter of 10/04/23  Echo    Result Date: 9/16/2024    Technically difficult study    Left Ventricle: The left ventricle is normal in size. Normal wall   thickness. Severe global hypokinesis present. Septal motion is abnormal.   There is severely reduced systolic function with a visually estimated   ejection fraction of 20 - 25%. Unable to assess diastolic function due to   atrial flutter.    Right Ventricle: Mild right ventricular enlargement. Wall thickness is   normal. Systolic function is moderately reduced.    Severe biatrial enlargement    Mitral Valve: There is mild regurgitation.    Pulmonary Artery: There is mild to moderate pulmonary hypertension. The   estimated pulmonary artery systolic pressure is 49 mmHg.    IVC/SVC: Intermediate venous pressure at 8 mmHg.       Recent Labs   Lab 09/16/24  0037   *     - He denies acute SOB but reports SLADE, SOB after activities such as eating requiring him to sit and use a CPAP for 2-3 hours, and lower extremity swelling right > left not much improved with his home PO Lasix and Spironolactone. He also reports requiring 2-3 liters supplemental oxygen while sleeping as well. He also reports eating bags of salty chips to help with his leg swelling.  - Cardiology consulted on this admit given new reduced EF with global wall motion and septal wall motion abnormalities Patient taken off Losartan and started on Entresto to treat his reduced EF and heart failure. Patient continued on his home Aldactone. Patient placed on Lasix 80 mg IV BID to diuresis patient in  hospital and patient diuresising well and net negative 4.5 liters since admit. Patient now euvolemic on 9/20 so switched to po Lasix 40 mg po BID by Cardiology on 9/19 and will continue.   - Toprol XL 12.5 mg po daily for his chronic heart failure.   - Will need life vest on discharge if patient wishes, as is DNR baseline. Will need Cardiac PET stress as outpatient for ischemic evaluation.  - Fluid restriction 1.5 liters a day and low salt diet.   - Monitor response to treatment.

## 2024-09-22 NOTE — ASSESSMENT & PLAN NOTE
Bean Salas is a 79 y.o. male Bean Salas is a 79 y.o. male with PMH significant for CAD, CHF, pulmonary HTN, AKHIL on CPAP, AFib, and R IT fx s/p IMN 9/3/21 with Dr. Hall presenting with a nondisplaced fracture of the anterior, of the right acetabulum.  He was closed, NVI.  CT chest abdomen and pelvis demonstrated right-sided PE with nonocclusive thrombus in the right pulmonary artery with occlusive thrombus in his subsegmental branch of the right pulmonary artery.  DVT ultrasound negative.  Patient was started on heparin GTT by hospital medicine.  Given the relatively nondisplaced in nature of the patient's fracture and numerous medical comorbidities, we will plan to treat him nonoperatively with a trial of physical therapy. S/p Perc screws of pelvis on 09/20     Admitted to    Weightbearing as tolerated right lower extremity on rolling walker  PT/OT  DVT ppx: per    Multimodal pain control     Dispo:  PT recommends high-intensity therapy

## 2024-09-22 NOTE — SUBJECTIVE & OBJECTIVE
Principal Problem:Closed nondisplaced fracture of anterior column of right acetabulum with routine healing    Principal Orthopedic Problem: As above    Interval History: Patient seen and examined at bedside. KAMERONEON. Intermittently hypotensive and bradycardic. Report improved pain at rest.  He ambulated 4 ft with max assist and rolling walker during physical therapy yesterday      Review of patient's allergies indicates:  No Known Allergies    Current Facility-Administered Medications   Medication    acetaminophen tablet 1,000 mg    albuterol-ipratropium 2.5 mg-0.5 mg/3 mL nebulizer solution 3 mL    amiodarone tablet 400 mg    Followed by    [START ON 10/5/2024] amiodarone tablet 200 mg    apixaban tablet 10 mg    aspirin EC tablet 81 mg    atorvastatin tablet 10 mg    bisacodyL suppository 10 mg    dextrose 10% bolus 125 mL 125 mL    dextrose 10% bolus 250 mL 250 mL    diphenhydrAMINE-zinc acetate 2-0.1% cream    furosemide tablet 40 mg    glucagon (human recombinant) injection 1 mg    glucose chewable tablet 16 g    glucose chewable tablet 24 g    guaiFENesin 12 hr tablet 600 mg    levothyroxine tablet 200 mcg    melatonin tablet 6 mg    methocarbamoL tablet 500 mg    metoprolol succinate (TOPROL-XL) 24 hr split tablet 12.5 mg    mirtazapine tablet 7.5 mg    naloxone 0.4 mg/mL injection 0.02 mg    ondansetron disintegrating tablet 8 mg    oxyCODONE immediate release tablet 5 mg    polyethylene glycol packet 17 g    sacubitriL-valsartan  mg per tablet 1 tablet    senna-docusate 8.6-50 mg per tablet 1 tablet    sodium chloride 0.9% flush 5 mL    spironolactone tablet 25 mg     Objective:     Vital Signs (Most Recent):  Temp: 97.3 °F (36.3 °C) (09/22/24 0433)  Pulse: (!) 50 (09/22/24 0433)  Resp: 18 (09/22/24 0524)  BP: (!) 97/55 (09/22/24 0433)  SpO2: 97 % (09/22/24 0433) Vital Signs (24h Range):  Temp:  [96.4 °F (35.8 °C)-98 °F (36.7 °C)] 97.3 °F (36.3 °C)  Pulse:  [50-80] 50  Resp:  [16-19] 18  SpO2:  [96  "%-100 %] 97 %  BP: ()/(50-58) 97/55     Weight: 101.6 kg (224 lb)  Height: 5' 11" (180.3 cm)  Body mass index is 31.24 kg/m².      Intake/Output Summary (Last 24 hours) at 9/22/2024 0633  Last data filed at 9/22/2024 0527  Gross per 24 hour   Intake 582 ml   Output 2100 ml   Net -1518 ml        Ortho/SPM Exam  A&O x 3  Non-Labored Respirations    RLE:   Dressings clean, dry and intact  Fires Quad/TA/EHL/GSC  SILT  Compartments soft  Brisk cap refill  Swelling to dorsal foot    LLE:   Fires Quad/TA/EHL/GSC  SILT  Compartments soft  Brisk cap refill  Swelling dorsal foot         Significant Labs: CBC:   Recent Labs   Lab 09/21/24  0309   WBC 8.29   HGB 13.3*   HCT 39.7*        CMP:   Recent Labs   Lab 09/21/24  0309      K 5.1   CL 97   CO2 30*   *   BUN 35*   CREATININE 1.4   CALCIUM 8.6*   ANIONGAP 10     All pertinent labs within the past 24 hours have been reviewed.    Significant Imaging: I have reviewed and interpreted all pertinent imaging results/findings.  "

## 2024-09-22 NOTE — ASSESSMENT & PLAN NOTE
Chronic, controlled. Latest blood pressure and vitals reviewed-     Temp:  [96.4 °F (35.8 °C)-98.1 °F (36.7 °C)]   Pulse:  [46-69]   Resp:  [16-19]   BP: ()/(50-78)   SpO2:  [93 %-100 %] .   Home meds for hypertension were reviewed and noted below.   Hypertension Medications               amLODIPine (NORVASC) 2.5 MG tablet Take 1 tablet by mouth once daily    furosemide (LASIX) 40 MG tablet Take 1 tablet (40 mg total) by mouth 2 (two) times a day. Take daily for next 3 days, then use as needed. Weigh daily. Afterwards, f weight increases 2 lbs/24h, take dose of lasix daily  until weight is back to baseline    losartan (COZAAR) 100 MG tablet Take 1 tablet by mouth once daily    metoprolol succinate (TOPROL-XL) 50 MG 24 hr tablet Take 1 tablet by mouth once daily    spironolactone (ALDACTONE) 25 MG tablet Take 1 tablet by mouth once daily     While in the hospital, will manage blood pressure as follows; Adjust home antihypertensive regimen as follows- Stopped Norvasc and Losartan by Cardiology and started on Entresto for new onset HFrEF . Continue Lasix and Aldactone.

## 2024-09-22 NOTE — PROGRESS NOTES
Alli Ahumada - Cardiology [facility] Medicine  Progress Note    Patient Name: Bean Salas  MRN: [id number]  Patient Class: IP- Inpatient   Admission Date: 9/15/2024  Length of Stay: 6 days  Attending Physician: Annelise Bray MD  Primary Care Provider: Ramirez Levine MD        Subjective:     Principal Problem:Closed nondisplaced fracture of anterior column of right acetabulum with routine healing        HPI:  Bean Salas is a 79 y.o. male with PMHx of  Bradycardia, PVCs, Hypertension, Coronary Artery Disease, Hyperlipidemia, HFpEF, Pulmonary HTN, AKHIL on CPAP, Chronic respiratory failure w/ home oxygen and hypothyroidism. He presents with right hip pain after ground level fall outside yesterday. He was doing some yard work and reaching for some piles of wood when a bunch of ants started crawling all over him and biting him. He tried to get away but forgot there was a step up from the grass to the concrete/carport next to him causing him to fall over onto his bottom/right side and then went more slowly back hitting his head as well.  Did not lose consciousness. He denies any headache or confusion. He did not have any pain right away. He had trouble pulling himself up off the ground and his cell phone was inside his house where he lives alone. He called out for a neighbor and a few of them were able to get him into a chair however when he tried to stand he had immediate sharp pain in his right hip and then called for an ambulance. He normally ambulates with a walker and is independent with ADLs. He does not have pain at rest currently.     He otherwise felt in his normal state of health prior to this. He denies chest pain. Denies acute SOB, fever, cough. However, reports somewhat chronic/gradually worsening SLADE and SOB after activities such as eating requiring him to sit and use a CPAP for 2-3 hours every afternoon and lower extremity swelling right > left not much improved with his home PO lasix  "and spironolactone. He also reports requiring 2-3L supplemental oxygen while sleeping. He has some little red marks all over his obody from the ant bites but is mostly un bothered by them. He denies any history of PE/DVT. While he is not super active he does go to the store for 2-3 hours weekly to shop and ambulates between 4 rooms in his house and completes work around the house. No recent surgery or prolonged immobilization.     ED course: Afebrile. RR 22-23. /44. HR . Saturating >94% on 2-3L NC. Labs notable for troponin 0.811>1.041. . XR/CT Imaging with acute fractures of the right superior and inferior pubic rami with possible involvement of the anterosuperior acetabulum. CT chest/abdomen/pelvis also notable for right-sided pulmonary embolism with nonocclusive thrombus in the main right pulmonary artery and right lower lobe branch vessels, and occlusive thrombus in a subsegmental branch of the right pulmonary artery, no right sided heart strain, unclear chronicity. CT head and cervical spine without acute findings.  Orthopedic surgery consulted for pelvic fracture. Started on heparin gtt for PE. Admitted to  for further manement.     Overview/Hospital Course:  Patient found down outside his home and unable to bear weight to right leg due to right hip pain. Patient brought to [facility] ED and on CT scan and X-rays found to have  and then eventually helped up found to have "Mildly displaced acute fracture of the right superior pubic ramus with questionable minimal extension into the anterosuperior acetabulum." CT scan of chest and abdomen and pelvis as per trauma protocol was done and noted "Right-sided pulmonary embolism, detailed above, with nonocclusive thrombus in the main right pulmonary artery and right lower lobe branch vessels, and occlusive thrombus in a subsegmental branch of the right pulmonary artery." Patient started on IV Heparin drip to treat PE. Orthopedics consulted and recommended " trial of PT/OT and pain management and weight bear as tolerated to right lower extremity. Patient on admit also noted to have elevated troponin 1 and BNP 900s. EKG showing some concerning changes with T wave inversions and ST depressions. Cardiology consulted and echo done and showed:    Left Ventricle: The left ventricle is normal in size. Normal wall thickness. Severe global hypokinesis present. Septal motion is abnormal. There is severely reduced systolic function with a visually estimated ejection fraction of 20 - 25%. Unable to assess diastolic function due to atrial flutter.    Right Ventricle: Mild right ventricular enlargement. Wall thickness is normal. Systolic function is moderately reduced.    Severe biatrial enlargement    Mitral Valve: There is mild regurgitation.    Pulmonary Artery: There is mild to moderate pulmonary hypertension. The estimated pulmonary artery systolic pressure is 49 mmHg.    IVC/SVC: Intermediate venous pressure at 8 mmHg.    Cardiology recommended to start to diuresis patient with Lasix 80 mg IV BID per Cards recs. Patient started on Entresto for depressed EF and titrated as per Cardiology recs and continue on Aldactone.  Cardiology recommending ischemic work-up as outpatient with PET stress and consideration of Life Vest on discharge. Patient expressed he wished to be DNR and not sure about life vest on discharge. Patient also having a number of PVCs and PACs in hospital and reviewed by Cardiology who noted patient in atrial flutter. After discussion with Cardiology they recommended DEMETRICE and cardioversion for his atrial flutter as patient symptomatic and felt could be contributing to his heart failure and low EF. Patient to go for DEMETRICE and cardioversion on 9/19. Patient not progressing with PT/OT as limited by dyspnea and pain to right hip despite multimodal pain medications. Discussed with Dr. Stapleton from Orthopedics on 9/19 and he stated he spoke with patient and his son and  after discussion plan to proceed with percutaneous screw fixation of right acetabular fracture and right sided pelvic fractures for 9/20. Patient had DEMETRICE and successful DCCV by Cardiology on 9/19 with return back to normal sinus rhythm. Patient started opn IV Amiodarone infusion after cardioversion and plan for 24 hours then switch to oral Amiodarone as per Cardiology recommendations. Patient switched from IV to po Lasix on 9/19 after cardioversion to Lasix 40 mg po BID as now euvolemic. Patient taken to OR on 9/20/2024 and underwent ORIF of pelvis by Dr. Negrito Stapleton. Post-op, patient is weight bear as tolerated and range of motion as tolerate to bilateral lower extremities. PT/OT re consulted post-op. Patient completed Amiodarone infusion on evening of 9/20. Patient started on Amiodarone 400 mg po BID for 14 days on am of 9/21 followed by Amiodarone 200 mg po daily as per Cardiology recs. Patient remains in sinus rhythm on 9/21. Patient reports pain controlled to right hip post-op.     Interval History: Patient reports he is uncomfortable currently as has been sitting up in chair for about 2 hours and now his right hip is getting sore from sitting in chair and would like to get back into bed. I spoke with patient's bedside nurse and lunch is coming so plan is for patient to eat lunch in chair and will get him back into bed after lunch. Patient reports 6/10 pain to right hip. Will increase Robaxin from 500 to 750 mg po 4 times daily to help with pain and continue scheduled Tylenol and Oxycodone IR po prn for breakthrough pain. Patient will need post acute placement on discharge and medically ready for discharge. Labs reviewed. Hgb stable at 12.5. Creatinine stable at 1.2. Patient remains in normal sinus rhythm om telemetry.     Review of Systems   Constitutional:  Negative for fever.   Respiratory:  Negative for cough and shortness of breath.    Cardiovascular:  Negative for chest pain and leg swelling.    Gastrointestinal:  Negative for abdominal pain, nausea and vomiting.   Musculoskeletal:  Positive for arthralgias (Right hip).   Psychiatric/Behavioral:  Negative for agitation and confusion.      Objective:     Vital Signs (Most Recent):  Temp: 98.1 °F (36.7 °C) (09/22/24 0739)  P: 58 (09/22/24 1107)  Resp: 18 (09/22/24 1046)  BP: 126/61 (09/22/24 0739)  SpO2: 96 % (09/22/24 0739) on room air Vital Signs (24h Range):  Temp:  [96.4 °F (35.8 °C)-98.1 °F (36.7 °C)] 98.1 °F (36.7 °C)  Pulse:  [47-69] 58  Resp:  [16-19] 18  SpO2:  [96 %-100 %] 96 %  BP: ()/(50-61) 126/61     Weight: 101.6 kg (224 lb)  Body mass index is 31.24 kg/m².    Intake/Output Summary (Last 24 hours) at 9/22/2024 1156  Last data filed at 9/22/2024 0850  Gross per 24 hour   Intake 462 ml   Output 2100 ml   Net -1638 ml         Physical Exam  Vitals and nursing note reviewed.   Constitutional:       General: He is awake. He is not in acute distress.     Appearance: Normal appearance. He is well-developed. He is obese. He is not ill-appearing.      Comments: Patient sitting up in bedside chair and states he is uncomfortable and would like to get back into bed. Nursing to get back into bed after lunch. Patient in no apparent distress.    Eyes:      Conjunctiva/sclera: Conjunctivae normal.   Neck:      Vascular: No JVD.   Cardiovascular:      Rate and Rhythm: Normal rate and regular rhythm.      Heart sounds: Normal heart sounds. No murmur heard.  Pulmonary:      Effort: Pulmonary effort is normal. No respiratory distress.      Breath sounds: Normal breath sounds. No wheezing.   Abdominal:      General: Abdomen is flat. Bowel sounds are normal. There is no distension.      Palpations: Abdomen is soft.      Tenderness: There is no abdominal tenderness.   Musculoskeletal:      Right lower leg: No edema.      Left lower leg: No edema.   Skin:     Findings: No erythema.   Neurological:      Mental Status: He is alert and oriented to person, place,  and time.   Psychiatric:         Mood and Affect: Mood normal.         Behavior: Behavior normal. Behavior is cooperative.         Thought Content: Thought content normal.         Judgment: Judgment normal.             Significant Labs: CBC:   Recent Labs   Lab 09/21/24 0309 09/22/24  0544   WBC 8.29 7.25   HGB 13.3* 12.5*   HCT 39.7* 39.7*    178     CMP:   Recent Labs   Lab 09/21/24 0309 09/22/24  0544    138   K 5.1 4.0   CL 97 100   CO2 30* 30*   * 132*   BUN 35* 36*   CREATININE 1.4 1.2   CALCIUM 8.6* 8.7   ANIONGAP 10 8     Magnesium:   Recent Labs   Lab 09/21/24 0309 09/22/24  0544   MG 2.4 2.3       Significant Imaging: I have reviewed all pertinent imaging results/findings within the past 24 hours.    Assessment/Plan:      * Closed nondisplaced fracture of anterior column of right acetabulum with routine healing s/p ORIF of pelvis on 9/20/2024  Closed pelvic ring fracture s/p ORIF of pelvis on 9/20/2024   80 y/o male presenting after ground level fall with right hip pain on standing. CT scan showed acute closed fractures of the right superior and inferior pubic rami with involvement of the anterosuperior acetabulum.   - Orthopedic surgery consulted by ED and recommended weight bear as tolerated to right lower extremity and trial of PT/OT and pain management.   - PT/OT consulted and patient not progressing and only able to take a few shuffling steps and total assist x 2 people. Patient limited by dyspnea and pain. Orthopedics discussed with patient and his son and plan to proceed with operative intervention on pelvic fractures with percutaneous screw fixation. Patient taken to OR on 9/20/2024 with Dr. Negrito Stapleton and underwent percutaneous screw fixation of pelvic ring and right acetabular fractures.  - Post-op, patient is weight bear as tolerated and range of motion as tolerate to bilateral lower extremities. PT/OT re consulted post-op and worked with patient this am and  "recommending high intensity therapy. Patient agreeable to post acute placement on discharge and will need SNF placement on discharge and he agrees and states he does not want any facility in [place] area where he is from.   - Continue pain control with scheduled Tylenol 1000 mg po every 8 hours but increase Robaxin from 500 to 750 mg po 4 times daily on 9/22 to help with pain management with Oxycodone IR 5 mg po every 4 hours prn for pain management.   - Fall precautions.  - Cardiology reports does not need PET stress/ischemic work up prior to a pelvic fracture surgery as that can be done as outpatient.   - Patient on therapeutic Apixiban post-op for treatment of PE and also for his atrial flutter so fully anticoagulated so no chemical DVT prophylaxis needed post-op.  - Surgical bandages to remain in place post-op until Orthopedic clinic follow-up.     Pulmonary embolism without acute cor pulmonale  Present on admit. CT chest/abdomen/pelvis as part of trauma work up in ED showed "Right-sided pulmonary embolism, detailed above, with nonocclusive thrombus in the main right pulmonary artery and right lower lobe branch vessels, and occlusive thrombus in a subsegmental branch of the right pulmonary artery. Note comparison images are not available to determine chronicity. No right sided heart strain."   - Denies previous diagnosis or PE/DVT.  - Denies chest pain or SOB.  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 He requires 2-3L NC of oxygen at home/sleeping and uses a CPAP after meals (exertional to patient) and for naps.  - Currently stable on 2-3L NC oxygen. No hypotension. Patient has baseline dyspnea when mobilizing sideways in bed even prior to this admit.   - Labs with elevated Troponin 0.811>1.041 and .   - Initialed on Heparin drip IV to treat PE and transitioned to therapeutic oral Apixiban post-op to treat with Apixiban 10 mg po BID x 7 days (started on evening of 9/20) followed by indefinite treatment of " Apixiban 5 mg po BID.   - Cards consulted, continue current therapies. No indication to activate PE response team at this time.   - No signs of right heart strain  - US of lower extremities on this admit negative for DVTs so IVC filter not indicated.     Acute on chronic combined systolic and diastolic congestive heart failure  Pulmonary hypertension   Patient now euvolemic on exam. Acute heart failure resolved. Continue Toprol XL, Entresto, Lasix for heart failure. Start Jardiance on discharge for his heart failure.   Patient is identified as having Combined Systolic and Diastolic heart failure that is Acute on chronic. CHF is currently controlled. Latest ECHO performed and demonstrates- Results for orders placed during the hospital encounter of 10/04/23  Echo    Result Date: 9/16/2024    Technically difficult study    Left Ventricle: The left ventricle is normal in size. Normal wall   thickness. Severe global hypokinesis present. Septal motion is abnormal.   There is severely reduced systolic function with a visually estimated   ejection fraction of 20 - 25%. Unable to assess diastolic function due to   atrial flutter.    Right Ventricle: Mild right ventricular enlargement. Wall thickness is   normal. Systolic function is moderately reduced.    Severe biatrial enlargement    Mitral Valve: There is mild regurgitation.    Pulmonary Artery: There is mild to moderate pulmonary hypertension. The   estimated pulmonary artery systolic pressure is 49 mmHg.    IVC/SVC: Intermediate venous pressure at 8 mmHg.       Recent Labs   Lab 09/16/24  0037   *     - He denies acute SOB but reports SLADE, SOB after activities such as eating requiring him to sit and use a CPAP for 2-3 hours, and lower extremity swelling right > left not much improved with his home PO Lasix and Spironolactone. He also reports requiring 2-3 liters supplemental oxygen while sleeping as well. He also reports eating bags of salty chips to help with  his leg swelling.  - Cardiology consulted on this admit given new reduced EF with global wall motion and septal wall motion abnormalities Patient taken off Losartan and started on Entresto to treat his reduced EF and heart failure. Patient continued on his home Aldactone. Patient placed on Lasix 80 mg IV BID to diuresis patient in hospital and patient diuresising well and net negative 4.5 liters since admit. Patient now euvolemic on 9/20 so switched to po Lasix 40 mg po BID by Cardiology on 9/19 and will continue.   - Toprol XL 12.5 mg po daily for his chronic heart failure.   - Will need life vest on discharge if patient wishes, as is DNR baseline. Will need Cardiac PET stress as outpatient for ischemic evaluation.  - Fluid restriction 1.5 liters a day and low salt diet.   - Monitor response to treatment.     Paroxysmal atrial flutter  - Resolved after cardioversion on 9/19. Patient remains in sinus rhythm. Patient completed 24 hour IV infusion of Amiodarone post cardioversion and started on Amiodarone 400 mg po BID x 14 days followed b y Amiodarone 200 mg po daily for indefinite treatment. Continue Toprol XL for rate control. Continue Apixiban for long term anticoagulation.   - Per chart review, his cardiologist Dr. Hank Barry feels EKGs that were read as atrial fibrillation previously are actually sinus rhythm and thus not on long term anticoagulation on admit.   - Cardiology consulted here who report current rhythm is atrial flutter and report if does not self convert may need DEMETRICE/DCCV once euvolemic. Patient now euvolemic on 9/19 and remains in atrial flutter and rate is controlled at 45-65 on Toprol XL. Patient taken for DEMETRICE and underwent successful cardioversion by Cardiology on 9/19 and back in sinus rhythm. Patient on IV Amiodarone infusion as per Cardiology and needs for 24 hours and scheduled to end tonight and plan to switch to po Amiodarone on 9/21.   - Patient transitioned to oral Apixiban post-op  for long term anticoagulation for elevated CHADsVasc score.     Essential hypertension  Chronic, controlled. Latest blood pressure and vitals reviewed-     Temp:  [96.4 °F (35.8 °C)-98.1 °F (36.7 °C)]   Pulse:  [46-69]   Resp:  [16-19]   BP: ()/(50-78)   SpO2:  [93 %-100 %] .   Home meds for hypertension were reviewed and noted below.   Hypertension Medications               amLODIPine (NORVASC) 2.5 MG tablet Take 1 tablet by mouth once daily    furosemide (LASIX) 40 MG tablet Take 1 tablet (40 mg total) by mouth 2 (two) times a day. Take daily for next 3 days, then use as needed. Weigh daily. Afterwards, f weight increases 2 lbs/24h, take dose of lasix daily  until weight is back to baseline    losartan (COZAAR) 100 MG tablet Take 1 tablet by mouth once daily    metoprolol succinate (TOPROL-XL) 50 MG 24 hr tablet Take 1 tablet by mouth once daily    spironolactone (ALDACTONE) 25 MG tablet Take 1 tablet by mouth once daily     While in the hospital, will manage blood pressure as follows; Adjust home antihypertensive regimen as follows- Stopped Norvasc and Losartan by Cardiology and started on Entresto for new onset HFrEF . Continue Lasix and Aldactone.       Chronic hypoxemic respiratory failure  Controlled. 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 He is on home oxygen at 2-3 LPM. Supplemental oxygen was provided and noted-at 2 liters of oxygen.      Signs/symptoms of respiratory failure include- tachypnea and increased work of breathing. Contributing diagnoses includes - CHF and pulmonary embolus. Labs and images were reviewed. Patient Has not had a recent ABG. Will treat underlying causes and adjust management of respiratory failure as follows- IV Lasix for heart failure and IV Heparin for PE.     Bradycardia  - Patient has history of atrial bigeminy and bradycardia per chart. Has followed with EP.  - Monitor telemetry.  - HR ranges 40s-80s at times. Hold Metoprolol if HR sustains <  60.    Advance care planning  - [facility] on file reviewed, DNR order placed. DNR on hold for cardioversion today and for Ortho surgery tomorrow and resume post-op and patient okay with holding DNR for procedures and surgery.   - Will discuss further closer to discharge if patient wants to pursue life vest as per Cardiology recs as not congruent with DNR status.    PVC's (premature ventricular contractions)  Patient noted in history and seen on telemetry on floor for PVC's. Keep Magnesium > 2, Potassium >4. Monitor telemetry. Continue Toprol XL to treat.     AKHIL (obstructive sleep apnea)  Chronic and controlled. Patient on CPAP at home at night. Continue CPAP nightly to treat.      Coronary artery disease involving native coronary artery of native heart without angina pectoris  Elevated troponin  Abnormal ventricular wall motion   - Patient with known CAD which is controlled. Will continue home Aspirin, Toprol XL and Lipitor daily to treat and monitor for S/Sx of angina/ACS. Continue to monitor on telemetry. Previous angiogram with non obstructive CAD 2021.   - Patient with elevated troponin on admit likely from PE but EKG with ST depressions, T wave inversions that are new so Cardiology consulted and appreciate recs. Echo done with new septal wall motion abnormaliteis and decreased EF at 20-25%. Cardiology recommended for outpatient PET cardiac stress and life vest on discharge for ischemic work up.    Insomnia  Chronic and controlled. Continue home Mirtazapine to treat.     Hypothyroidism due to acquired atrophy of thyroid  Chronic and controlled. Continue home Levothyroxine to treat.      Pure hypercholesterolemia  Chronic and controlled. Continue home Lipitor to treat.      Slow transit constipation  Baseline, on bowel regimen to treat with scheduled Senakot and Miralax with Doculax suppository prn.       VTE Risk Mitigation (From admission, onward)           Ordered     apixaban tablet 10 mg  2 times daily          09/20/24 1613     Place sequential compression device  Until discontinued         09/16/24 0428     Reason for No Pharmacological VTE Prophylaxis  Once        Comments: Heparin gtt for PE   Question:  Reasons:  Answer:  Physician Provided (leave comment)    09/16/24 0428     IP VTE HIGH RISK PATIENT  Once         09/16/24 0428                    Discharge Planning   LATOSHA: 9/23/2024     Code Status: Full Code   Is the patient medically ready for discharge?:     Reason for patient still in hospital (select all that apply): Patient trending condition  Discharge Plan A: Skilled Nursing Facility          Annelise Bray MD  Department of Hospital Medicine   [facility] - Cardiology Stepdown

## 2024-09-22 NOTE — ASSESSMENT & PLAN NOTE
Closed pelvic ring fracture s/p ORIF of pelvis on 9/20/2024   80 y/o male presenting after ground level fall with right hip pain on standing. CT scan showed acute closed fractures of the right superior and inferior pubic rami with involvement of the anterosuperior acetabulum.   - Orthopedic surgery consulted by ED and recommended weight bear as tolerated to right lower extremity and trial of PT/OT and pain management.   - PT/OT consulted and patient not progressing and only able to take a few shuffling steps and total assist x 2 people. Patient limited by dyspnea and pain. Orthopedics discussed with patient and his son and plan to proceed with operative intervention on pelvic fractures with percutaneous screw fixation. Patient taken to OR on 9/20/2024 with Dr. Negrito Stapleton and underwent percutaneous screw fixation of pelvic ring and right acetabular fractures.  - Post-op, patient is weight bear as tolerated and range of motion as tolerate to bilateral lower extremities. PT/OT re consulted post-op and worked with patient this am and recommending high intensity therapy. Patient agreeable to post acute placement on discharge and will need SNF placement on discharge and he agrees and states he does not want any facility in [place] area where he is from.   - Continue pain control with scheduled Tylenol 1000 mg po every 8 hours but increase Robaxin from 500 to 750 mg po 4 times daily on 9/22 to help with pain management with Oxycodone IR 5 mg po every 4 hours prn for pain management.   - Fall precautions.  - Cardiology reports does not need PET stress/ischemic work up prior to a pelvic fracture surgery as that can be done as outpatient.   - Patient on therapeutic Apixiban post-op for treatment of PE and also for his atrial flutter so fully anticoagulated so no chemical DVT prophylaxis needed post-op.  - Surgical bandages to remain in place post-op until Orthopedic clinic follow-up.

## 2024-09-22 NOTE — PLAN OF CARE
Problem: Adult Inpatient Plan of Care  Goal: Patient-Specific Goal (Individualized)  Outcome: Progressing  Flowsheets (Taken 9/22/2024 1540)  Individualized Care Needs: updated patient on POC  Anxieties, Fears or Concerns: none stated     Problem: Skin Injury Risk Increased  Goal: Skin Health and Integrity  Outcome: Progressing  Intervention: Optimize Skin Protection  Flowsheets (Taken 9/22/2024 1540)  Pressure Reduction Techniques: frequent weight shift encouraged  Pressure Reduction Devices: positioning supports utilized  Skin Protection: incontinence pads utilized  Activity Management: Up in chair - L3  Head of Bed (HOB) Positioning: HOB elevated     Problem: Fall Injury Risk  Goal: Absence of Fall and Fall-Related Injury  Outcome: Progressing  Intervention: Identify and Manage Contributors  Flowsheets (Taken 9/22/2024 1540)  Self-Care Promotion:   independence encouraged   BADL personal objects within reach   BADL personal routines maintained   meal set-up provided  Medication Review/Management: medications reviewed  Intervention: Promote Injury-Free Environment  Flowsheets (Taken 9/22/2024 1540)  Safety Promotion/Fall Prevention:   assistive device/personal item within reach   commode/urinal/bedpan at bedside   Fall Risk reviewed with patient/family   Fall Risk signage in place   instructed to call staff for mobility   medications reviewed   nonskid shoes/socks when out of bed   patient expresses understanding of fall risk and prevention   side rails raised x 2   room near unit station   AAOX4,VSS,O2 sats > 90% on 3L NC. Plan of care discussed with patient. Patient has no complaints of chest pain/SOB/palpitations. Patient max 2-3 assist. Patient up in chair today. Patient struggled to stand on own and get back in bed, fall precautions in place,no falls/injuries through the shift.Discussed medications and care.Patient has no questions at this time.Pt resting comfortably with no acute distress.Call light within  reach,bed at lowest position.

## 2024-09-22 NOTE — PROGRESS NOTES
[facility] - Cardiology Stepdown  Orthopedics  Progress Note    Patient Name: Bean Salas  MRN: [id number]  Admission Date: 9/15/2024  Hospital Length of Stay: 6 days  Attending Provider: Annelise Bray MD  Primary Care Provider: Ramirez Levine MD  Follow-up For: Procedure(s) (LRB):  ORIF,PELVIS (Right)    Post-Operative Day: 2 Days Post-Op  Subjective:     Principal Problem:Closed nondisplaced fracture of anterior column of right acetabulum with routine healing    Principal Orthopedic Problem: As above    Interval History: Patient seen and examined at bedside. NAEON. Intermittently hypotensive and bradycardic. Report improved pain at rest.  He ambulated 4 ft with max assist and rolling walker during physical therapy yesterday      Review of patient's allergies indicates:  No Known Allergies    Current Facility-Administered Medications   Medication    acetaminophen tablet 1,000 mg    albuterol-ipratropium 2.5 mg-0.5 mg/3 mL nebulizer solution 3 mL    amiodarone tablet 400 mg    Followed by    [START ON 10/5/2024] amiodarone tablet 200 mg    apixaban tablet 10 mg    aspirin EC tablet 81 mg    atorvastatin tablet 10 mg    bisacodyL suppository 10 mg    dextrose 10% bolus 125 mL 125 mL    dextrose 10% bolus 250 mL 250 mL    diphenhydrAMINE-zinc acetate 2-0.1% cream    furosemide tablet 40 mg    glucagon (human recombinant) injection 1 mg    glucose chewable tablet 16 g    glucose chewable tablet 24 g    guaiFENesin 12 hr tablet 600 mg    levothyroxine tablet 200 mcg    melatonin tablet 6 mg    methocarbamoL tablet 500 mg    metoprolol succinate (TOPROL-XL) 24 hr split tablet 12.5 mg    mirtazapine tablet 7.5 mg    naloxone 0.4 mg/mL injection 0.02 mg    ondansetron disintegrating tablet 8 mg    oxyCODONE immediate release tablet 5 mg    polyethylene glycol packet 17 g    sacubitriL-valsartan  mg per tablet 1 tablet    senna-docusate 8.6-50 mg per tablet 1 tablet    sodium chloride 0.9% flush 5 mL     "spironolactone tablet 25 mg     Objective:     Vital Signs (Most Recent):  Temp: 97.3 °F (36.3 °C) (09/22/24 0433)  Pulse: (!) 50 (09/22/24 0433)  Resp: 18 (09/22/24 0524)  BP: (!) 97/55 (09/22/24 0433)  SpO2: 97 % (09/22/24 0433) Vital Signs (24h Range):  Temp:  [96.4 °F (35.8 °C)-98 °F (36.7 °C)] 97.3 °F (36.3 °C)  Pulse:  [50-80] 50  Resp:  [16-19] 18  SpO2:  [96 %-100 %] 97 %  BP: ()/(50-58) 97/55     Weight: 101.6 kg (224 lb)  Height: 5' 11" (180.3 cm)  Body mass index is 31.24 kg/m².      Intake/Output Summary (Last 24 hours) at 9/22/2024 0633  Last data filed at 9/22/2024 0527  Gross per 24 hour   Intake 582 ml   Output 2100 ml   Net -1518 ml        Ortho/SPM Exam  A&O x 3  Non-Labored Respirations    RLE:   Dressings clean, dry and intact  Fires Quad/TA/EHL/GSC  SILT  Compartments soft  Brisk cap refill  Swelling to dorsal foot    LLE:   Fires Quad/TA/EHL/GSC  SILT  Compartments soft  Brisk cap refill  Swelling dorsal foot         Significant Labs: CBC:   Recent Labs   Lab 09/21/24  0309   WBC 8.29   HGB 13.3*   HCT 39.7*        CMP:   Recent Labs   Lab 09/21/24  0309      K 5.1   CL 97   CO2 30*   *   BUN 35*   CREATININE 1.4   CALCIUM 8.6*   ANIONGAP 10     All pertinent labs within the past 24 hours have been reviewed.    Significant Imaging: I have reviewed and interpreted all pertinent imaging results/findings.  Assessment/Plan:     * Closed nondisplaced fracture of anterior column of right acetabulum with routine healing s/p ORIF of pelvis on 9/20/2024  Bean Riggsn is a 79 y.o. male Bean Salas is a 79 y.o. male with PMH significant for CAD, CHF, pulmonary HTN, AKHIL on CPAP, AFib, and R IT fx s/p IMN 9/3/21 with Dr. Hall presenting with a nondisplaced fracture of the anterior, of the right acetabulum.  He was closed, NVI.  CT chest abdomen and pelvis demonstrated right-sided PE with nonocclusive thrombus in the right pulmonary artery with occlusive thrombus in his " subsegmental branch of the right pulmonary artery.  DVT ultrasound negative.  Patient was started on heparin GTT by hospital medicine.  Given the relatively nondisplaced in nature of the patient's fracture and numerous medical comorbidities, we will plan to treat him nonoperatively with a trial of physical therapy. S/p Perc screws of pelvis on 09/20     Admitted to    Weightbearing as tolerated right lower extremity on rolling walker  PT/OT  DVT ppx: per    Multimodal pain control     Dispo:  PT recommends high-intensity therapy                  Jermaine Adame MD  Orthopedics  [facility] - Cardiology Stepdown

## 2024-09-22 NOTE — SUBJECTIVE & OBJECTIVE
Interval History: Patient reports he is uncomfortable currently as has been sitting up in chair for about 2 hours and now his right hip is getting sore from sitting in chair and would like to get back into bed. I spoke with patient's bedside nurse and lunch is coming so plan is for patient to eat lunch in chair and will get him back into bed after lunch. Patient reports 6/10 pain to right hip. Will increase Robaxin from 500 to 750 mg po 4 times daily to help with pain and continue scheduled Tylenol and Oxycodone IR po prn for breakthrough pain. Patient will need post acute placement on discharge and medically ready for discharge. Labs reviewed. Hgb stable at 12.5. Creatinine stable at 1.2. Patient remains in normal sinus rhythm om telemetry.     Review of Systems   Constitutional:  Negative for fever.   Respiratory:  Negative for cough and shortness of breath.    Cardiovascular:  Negative for chest pain and leg swelling.   Gastrointestinal:  Negative for abdominal pain, nausea and vomiting.   Musculoskeletal:  Positive for arthralgias (Right hip).   Psychiatric/Behavioral:  Negative for agitation and confusion.      Objective:     Vital Signs (Most Recent):  Temp: 98.1 °F (36.7 °C) (09/22/24 0739)  P: 58 (09/22/24 1107)  Resp: 18 (09/22/24 1046)  BP: 126/61 (09/22/24 0739)  SpO2: 96 % (09/22/24 0739) on room air Vital Signs (24h Range):  Temp:  [96.4 °F (35.8 °C)-98.1 °F (36.7 °C)] 98.1 °F (36.7 °C)  Pulse:  [47-69] 58  Resp:  [16-19] 18  SpO2:  [96 %-100 %] 96 %  BP: ()/(50-61) 126/61     Weight: 101.6 kg (224 lb)  Body mass index is 31.24 kg/m².    Intake/Output Summary (Last 24 hours) at 9/22/2024 1156  Last data filed at 9/22/2024 0850  Gross per 24 hour   Intake 462 ml   Output 2100 ml   Net -1638 ml         Physical Exam  Vitals and nursing note reviewed.   Constitutional:       General: He is awake. He is not in acute distress.     Appearance: Normal appearance. He is well-developed. He is obese. He is  not ill-appearing.      Comments: Patient sitting up in bedside chair and states he is uncomfortable and would like to get back into bed. Nursing to get back into bed after lunch. Patient in no apparent distress.    Eyes:      Conjunctiva/sclera: Conjunctivae normal.   Neck:      Vascular: No JVD.   Cardiovascular:      Rate and Rhythm: Normal rate and regular rhythm.      Heart sounds: Normal heart sounds. No murmur heard.  Pulmonary:      Effort: Pulmonary effort is normal. No respiratory distress.      Breath sounds: Normal breath sounds. No wheezing.   Abdominal:      General: Abdomen is flat. Bowel sounds are normal. There is no distension.      Palpations: Abdomen is soft.      Tenderness: There is no abdominal tenderness.   Musculoskeletal:      Right lower leg: No edema.      Left lower leg: No edema.   Skin:     Findings: No erythema.   Neurological:      Mental Status: He is alert and oriented to person, place, and time.   Psychiatric:         Mood and Affect: Mood normal.         Behavior: Behavior normal. Behavior is cooperative.         Thought Content: Thought content normal.         Judgment: Judgment normal.             Significant Labs: CBC:   Recent Labs   Lab 09/21/24  0309 09/22/24  0544   WBC 8.29 7.25   HGB 13.3* 12.5*   HCT 39.7* 39.7*    178     CMP:   Recent Labs   Lab 09/21/24  0309 09/22/24  0544    138   K 5.1 4.0   CL 97 100   CO2 30* 30*   * 132*   BUN 35* 36*   CREATININE 1.4 1.2   CALCIUM 8.6* 8.7   ANIONGAP 10 8     Magnesium:   Recent Labs   Lab 09/21/24  0309 09/22/24  0544   MG 2.4 2.3       Significant Imaging: I have reviewed all pertinent imaging results/findings within the past 24 hours.

## 2024-09-23 LAB
ANION GAP SERPL CALC-SCNC: 8 MMOL/L (ref 8–16)
BASOPHILS # BLD AUTO: 0.03 K/UL (ref 0–0.2)
BASOPHILS NFR BLD: 0.4 % (ref 0–1.9)
BUN SERPL-MCNC: 33 MG/DL (ref 8–23)
CALCIUM SERPL-MCNC: 8.8 MG/DL (ref 8.7–10.5)
CHLORIDE SERPL-SCNC: 99 MMOL/L (ref 95–110)
CO2 SERPL-SCNC: 32 MMOL/L (ref 23–29)
CREAT SERPL-MCNC: 1.3 MG/DL (ref 0.5–1.4)
DIFFERENTIAL METHOD BLD: ABNORMAL
EOSINOPHIL # BLD AUTO: 0.1 K/UL (ref 0–0.5)
EOSINOPHIL NFR BLD: 1.2 % (ref 0–8)
ERYTHROCYTE [DISTWIDTH] IN BLOOD BY AUTOMATED COUNT: 14.8 % (ref 11.5–14.5)
EST. GFR  (NO RACE VARIABLE): 55.5 ML/MIN/1.73 M^2
GLUCOSE SERPL-MCNC: 164 MG/DL (ref 70–110)
HCT VFR BLD AUTO: 39.2 % (ref 40–54)
HGB BLD-MCNC: 12.6 G/DL (ref 14–18)
IMM GRANULOCYTES # BLD AUTO: 0.04 K/UL (ref 0–0.04)
IMM GRANULOCYTES NFR BLD AUTO: 0.5 % (ref 0–0.5)
LYMPHOCYTES # BLD AUTO: 0.9 K/UL (ref 1–4.8)
LYMPHOCYTES NFR BLD: 11.6 % (ref 18–48)
MAGNESIUM SERPL-MCNC: 2.3 MG/DL (ref 1.6–2.6)
MCH RBC QN AUTO: 31.9 PG (ref 27–31)
MCHC RBC AUTO-ENTMCNC: 32.1 G/DL (ref 32–36)
MCV RBC AUTO: 99 FL (ref 82–98)
MONOCYTES # BLD AUTO: 1 K/UL (ref 0.3–1)
MONOCYTES NFR BLD: 12.9 % (ref 4–15)
NEUTROPHILS # BLD AUTO: 5.6 K/UL (ref 1.8–7.7)
NEUTROPHILS NFR BLD: 73.4 % (ref 38–73)
NRBC BLD-RTO: 0 /100 WBC
OHS QRS DURATION: 124 MS
OHS QTC CALCULATION: 622 MS
PLATELET # BLD AUTO: 179 K/UL (ref 150–450)
PMV BLD AUTO: 12 FL (ref 9.2–12.9)
POTASSIUM SERPL-SCNC: 4.1 MMOL/L (ref 3.5–5.1)
RBC # BLD AUTO: 3.95 M/UL (ref 4.6–6.2)
SARS-COV-2 RNA RESP QL NAA+PROBE: NOT DETECTED
SODIUM SERPL-SCNC: 139 MMOL/L (ref 136–145)
WBC # BLD AUTO: 7.65 K/UL (ref 3.9–12.7)

## 2024-09-23 PROCEDURE — 85025 COMPLETE CBC W/AUTO DIFF WBC: CPT | Mod: HCNC | Performed by: INTERNAL MEDICINE

## 2024-09-23 PROCEDURE — 25000003 PHARM REV CODE 250: Mod: HCNC | Performed by: HOSPITALIST

## 2024-09-23 PROCEDURE — 25000003 PHARM REV CODE 250: Mod: HCNC | Performed by: STUDENT IN AN ORGANIZED HEALTH CARE EDUCATION/TRAINING PROGRAM

## 2024-09-23 PROCEDURE — 25000003 PHARM REV CODE 250: Mod: HCNC | Performed by: INTERNAL MEDICINE

## 2024-09-23 PROCEDURE — 97110 THERAPEUTIC EXERCISES: CPT | Mod: HCNC

## 2024-09-23 PROCEDURE — 25000003 PHARM REV CODE 250: Mod: HCNC

## 2024-09-23 PROCEDURE — 20600001 HC STEP DOWN PRIVATE ROOM: Mod: HCNC

## 2024-09-23 PROCEDURE — 83735 ASSAY OF MAGNESIUM: CPT | Mod: HCNC | Performed by: HOSPITALIST

## 2024-09-23 PROCEDURE — 93010 ELECTROCARDIOGRAM REPORT: CPT | Mod: HCNC,,, | Performed by: INTERNAL MEDICINE

## 2024-09-23 PROCEDURE — 97530 THERAPEUTIC ACTIVITIES: CPT | Mod: HCNC

## 2024-09-23 PROCEDURE — 80048 BASIC METABOLIC PNL TOTAL CA: CPT | Mod: HCNC | Performed by: HOSPITALIST

## 2024-09-23 PROCEDURE — 97116 GAIT TRAINING THERAPY: CPT | Mod: HCNC

## 2024-09-23 PROCEDURE — 93005 ELECTROCARDIOGRAM TRACING: CPT | Mod: HCNC

## 2024-09-23 PROCEDURE — 87635 SARS-COV-2 COVID-19 AMP PRB: CPT | Mod: HCNC | Performed by: INTERNAL MEDICINE

## 2024-09-23 PROCEDURE — 36415 COLL VENOUS BLD VENIPUNCTURE: CPT | Mod: HCNC | Performed by: HOSPITALIST

## 2024-09-23 RX ORDER — ACETAMINOPHEN 500 MG
1000 TABLET ORAL EVERY 8 HOURS
Status: ON HOLD | COMMUNITY
Start: 2024-09-23

## 2024-09-23 RX ORDER — AMOXICILLIN 250 MG
1 CAPSULE ORAL DAILY
Status: ON HOLD | COMMUNITY
Start: 2024-09-23

## 2024-09-23 RX ORDER — OXYCODONE HYDROCHLORIDE 5 MG/1
5 TABLET ORAL EVERY 4 HOURS PRN
Qty: 30 TABLET | Refills: 0 | Status: ON HOLD | OUTPATIENT
Start: 2024-09-23

## 2024-09-23 RX ORDER — MIRTAZAPINE 7.5 MG/1
7.5 TABLET, FILM COATED ORAL NIGHTLY
Status: ON HOLD
Start: 2024-09-23

## 2024-09-23 RX ORDER — TALC
6 POWDER (GRAM) TOPICAL NIGHTLY PRN
Status: ON HOLD
Start: 2024-09-23

## 2024-09-23 RX ORDER — LACTULOSE 10 G/15ML
30 SOLUTION ORAL ONCE
Status: COMPLETED | OUTPATIENT
Start: 2024-09-23 | End: 2024-09-23

## 2024-09-23 RX ORDER — SYRING-NEEDL,DISP,INSUL,0.3 ML 29 G X1/2"
296 SYRINGE, EMPTY DISPOSABLE MISCELLANEOUS ONCE
Status: COMPLETED | OUTPATIENT
Start: 2024-09-23 | End: 2024-09-23

## 2024-09-23 RX ORDER — AMIODARONE HYDROCHLORIDE 200 MG/1
TABLET ORAL
Status: ON HOLD
Start: 2024-09-23 | End: 2025-09-27

## 2024-09-23 RX ORDER — METHOCARBAMOL 750 MG/1
750 TABLET, FILM COATED ORAL 4 TIMES DAILY
Status: ON HOLD
Start: 2024-09-23

## 2024-09-23 RX ORDER — METOPROLOL SUCCINATE 25 MG/1
12.5 TABLET, EXTENDED RELEASE ORAL DAILY
Status: ON HOLD
Start: 2024-09-23

## 2024-09-23 RX ADMIN — SACUBITRIL AND VALSARTAN 1 TABLET: 97; 103 TABLET, FILM COATED ORAL at 08:09

## 2024-09-23 RX ADMIN — ASPIRIN 81 MG: 81 TABLET, COATED ORAL at 08:09

## 2024-09-23 RX ADMIN — GUAIFENESIN 600 MG: 600 TABLET, EXTENDED RELEASE ORAL at 08:09

## 2024-09-23 RX ADMIN — OXYCODONE HYDROCHLORIDE 5 MG: 5 TABLET ORAL at 08:09

## 2024-09-23 RX ADMIN — METHOCARBAMOL 750 MG: 750 TABLET ORAL at 01:09

## 2024-09-23 RX ADMIN — SENNOSIDES AND DOCUSATE SODIUM 1 TABLET: 50; 8.6 TABLET ORAL at 08:09

## 2024-09-23 RX ADMIN — ATORVASTATIN CALCIUM 10 MG: 10 TABLET, FILM COATED ORAL at 08:09

## 2024-09-23 RX ADMIN — AMIODARONE HYDROCHLORIDE 400 MG: 200 TABLET ORAL at 08:09

## 2024-09-23 RX ADMIN — METHOCARBAMOL 750 MG: 750 TABLET ORAL at 08:09

## 2024-09-23 RX ADMIN — METHOCARBAMOL 750 MG: 750 TABLET ORAL at 04:09

## 2024-09-23 RX ADMIN — MIRTAZAPINE 7.5 MG: 7.5 TABLET, FILM COATED ORAL at 08:09

## 2024-09-23 RX ADMIN — ACETAMINOPHEN 1000 MG: 500 TABLET ORAL at 05:09

## 2024-09-23 RX ADMIN — ACETAMINOPHEN 1000 MG: 500 TABLET ORAL at 11:09

## 2024-09-23 RX ADMIN — SPIRONOLACTONE 25 MG: 25 TABLET ORAL at 08:09

## 2024-09-23 RX ADMIN — APIXABAN 10 MG: 5 TABLET, FILM COATED ORAL at 08:09

## 2024-09-23 RX ADMIN — OXYCODONE HYDROCHLORIDE 5 MG: 5 TABLET ORAL at 11:09

## 2024-09-23 RX ADMIN — LEVOTHYROXINE SODIUM 200 MCG: 125 TABLET ORAL at 05:09

## 2024-09-23 RX ADMIN — LACTULOSE 30 G: 20 SOLUTION ORAL at 04:09

## 2024-09-23 RX ADMIN — OXYCODONE HYDROCHLORIDE 5 MG: 5 TABLET ORAL at 04:09

## 2024-09-23 RX ADMIN — ACETAMINOPHEN 1000 MG: 500 TABLET ORAL at 01:09

## 2024-09-23 RX ADMIN — FUROSEMIDE 40 MG: 40 TABLET ORAL at 08:09

## 2024-09-23 RX ADMIN — POLYETHYLENE GLYCOL 3350 17 G: 17 POWDER, FOR SOLUTION ORAL at 08:09

## 2024-09-23 RX ADMIN — FUROSEMIDE 40 MG: 40 TABLET ORAL at 05:09

## 2024-09-23 RX ADMIN — MAGNESIUM CITRATE 296 ML: 1.75 LIQUID ORAL at 08:09

## 2024-09-23 RX ADMIN — METOPROLOL SUCCINATE 12.5 MG: 25 TABLET, EXTENDED RELEASE ORAL at 08:09

## 2024-09-23 NOTE — PT/OT/SLP PROGRESS
Physical Therapy Treatment    Patient Name:  Bean Salas   MRN:  [id number]    Recommendations:     Discharge Recommendations: High Intensity Therapy  Discharge Equipment Recommendations: bedside commode, hip kit  Barriers to discharge:  requires increased assistance for functional mobility    Assessment:     Bean Salas is a 79 y.o. male admitted with a medical diagnosis of Closed nondisplaced fracture of anterior column of right acetabulum with routine healing.  He presents with the following impairments/functional limitations: weakness, impaired endurance, impaired functional mobility, gait instability, impaired balance, decreased lower extremity function, pain, impaired cardiopulmonary response to activity, orthopedic precautions, decreased ROM. Patient sat on edge of bed ~10 minutes with maximal assistance due to right hip pain and posterior lean. He performed 3 standing trials with maximal assistance x2 people with RW from elevated bed, but he was barely able to clear hips from edge of bed today. He stated that the pain was too high and he could not perform hip extension in order to get to a full upright stand.     Rehab Prognosis: Good; patient would benefit from acute skilled PT services to address these deficits and reach maximum level of function.    Recent Surgery: Procedure(s) (LRB):  ORIF,PELVIS (Right) 3 Days Post-Op    Plan:     During this hospitalization, patient to be seen 4 x/week to address the identified rehab impairments via gait training, therapeutic exercises, neuromuscular re-education, therapeutic activities, wheelchair management/training and progress toward the following goals:    Plan of Care Expires:  10/17/24    Subjective     Chief Complaint: Right hip pain with sitting and standing.   Patient Comments: The pain in right hip is so high when standing that he has to lean over and cannot stand up straight.   Pain/Comfort:  Pain Rating 1: 2/10  Location - Side 1: Right  Location 1:  hip  Pain Addressed 1: Pre-medicate for activity, Reposition, Distraction, Cessation of Activity, Nurse notified  Pain Rating Post-Intervention 1: 9/10 (with standing trials)      Objective:     Communicated with RN prior to session.  Patient found supine with bed alarm, telemetry, PureWick, oxygen upon PT entry to room.     General Precautions: Standard, fall  Orthopedic Precautions: RLE weight bearing as tolerated, LLE weight bearing as tolerated  Braces: N/A  Respiratory Status: Nasal cannula, flow 4 L/min     Functional Mobility:  Bed Mobility:     Rolling Right: maximal assistance  Scooting: maximal assistance of 2 persons  Supine to Sit: maximal assistance of 2 persons  Sit to Supine: maximal assistance of 3 persons  Transfers:     Sit to Stand:  maximal assistance of 2 persons with rolling walker x3 trials from elevated bed. He was able to barely clear bottom from edge of bed today due to right hip pain when attempting to extend hips to achieve upright position.     AM-PAC 6 CLICK MOBILITY  Turning over in bed (including adjusting bedclothes, sheets and blankets)?: 2  Sitting down on and standing up from a chair with arms (e.g., wheelchair, bedside commode, etc.): 2  Moving from lying on back to sitting on the side of the bed?: 2  Moving to and from a bed to a chair (including a wheelchair)?: 2  Need to walk in hospital room?: 1  Climbing 3-5 steps with a railing?: 1  Basic Mobility Total Score: 10       Treatment & Education:  Patient performed B LE supine exercises x10 reps for ankle pumps, quad set, glute set, and SAQ over pillow.     Patient educated in:  -PT role and POC  -safety with transfers including hand placement  -B LE supine exercises    Patient left supine with all lines intact, call button in reach, bed alarm on, and RN notified.    GOALS:   Multidisciplinary Problems       Physical Therapy Goals          Problem: Physical Therapy    Goal Priority Disciplines Outcome Goal Variances  Interventions   Physical Therapy Goal     PT, PT/OT Progressing     Description: Goals to be met by: 10/5/24     Patient will increase functional independence with mobility by performin. Supine to sit with minimum assistance   2. Sit to stand transfer with minimum assistance   3. Bed to chair transfer with Minimal Assistance using Rolling Walker  4. Gait  x 20 feet with Minimal Assistance using Rolling Walker.   5. Stand for 5 minutes with Stand-by Assistance using Rolling Walker    Patient has a mobility limitation that significantly impairs their ability to participate in one or more mobility related activities of daily living, including toileting. This deficit can be resolved by using a bedside commode. Patient demonstrates mobility limitations that will cause them to be confined to one room at home without bathroom access for up to 30 days. Using a bedside commode will greatly improve the patient's ability to participate in MRADLs.    Patient demonstrates a mobility limitation that significantly impairs their ability to participate in one or more mobility related activities of daily living. Patient's mobility limitation cannot be sufficiently resolved with the use of a cane, but can be sufficiently resolved with the use of a rolling walker.The use of a rolling walker will considerably improve their ability to participate in MRADLs. Patient will use the walker on a regular basis at home.                            Time Tracking:     PT Received On: 24  PT Start Time: 946     PT Stop Time: 1019  PT Total Time (min): 33 min     Billable Minutes: Gait Training 13 and Therapeutic Exercise 20    Treatment Type: Treatment  PT/PTA: PT     Number of PTA visits since last PT visit: 0     Co-treatment performed for this visit due to patient need for two skilled therapists to ensure patient and staff safety and to accommodate for patient activity tolerance/pain management.    2024

## 2024-09-23 NOTE — PLAN OF CARE
[facility]     Department of Hospital Medicine     [address] [place] [address]     [phone] after hours  [phone] fax       NURSING HOME ORDERS    09/24/2024    Admit to [facility]:  Skilled Bed                                            Diagnoses:  Active Hospital Problems    Diagnosis  POA    *Closed nondisplaced fracture of anterior column of right acetabulum with routine healing s/p ORIF of pelvis on 9/20/2024 [S32.434D]  Not Applicable     Priority: 1 - High    Pulmonary embolism without acute cor pulmonale [I26.99]  Yes     Priority: 2     Acute on chronic combined systolic and diastolic congestive heart failure [I50.43]  Yes     Priority: 3     Paroxysmal atrial flutter [I48.92]  Yes     Priority: 4     Essential hypertension [I10]  Yes     Priority: 5     Chronic hypoxemic respiratory failure [J96.11]  Yes     Priority: 6     Bradycardia [R00.1]  Yes     Priority: 6      Some of the bradycardic rhythms he has experienced by pulse check are related to atrial bigeminy and pseudo bradycardia.  His Holter did show a single pause of greater than 2 seconds.  Electrophysiologic consult ordered to assess benefits of pacemaker for the patient.  He is still having marked fatigue with activity and reduced exercise tolerance and he is very concerned that it is arrhythmia related.     He has not had syncope.  His heart rate today is 60 by pulse evaluation.  There are still ectopic beats on exam.  His Holter also showed at least one ventricular triplet.            Relevant Orders     Ambulatory referral/consult to Cardiac Electrophysiology           Advance care planning [Z71.89]  Not Applicable     Priority: 7     PVC's (premature ventricular contractions) [I49.3]  Yes     Priority: 8     AKHIL (obstructive sleep apnea) [G47.33]  Yes     Priority: 9     Coronary artery disease involving native coronary artery of native heart without angina pectoris  [I25.10]  Yes     Priority: 10      The ejection fraction was 50-55% by visual estimate.  The post-procedure left ventricular end diastolic pressure was 25.  The Prox Cx to Dist Cx lesion was 50% stenosed.  The estimated blood loss was <50 mL.  Mild LAD, RCA Disease  No focal lesions to explain angina          Insomnia [G47.00]  Yes     Priority: 13     Hypothyroidism due to acquired atrophy of thyroid [E03.4]  Yes     Priority: 16     Pure hypercholesterolemia [E78.00]  Yes     Priority: 17     Slow transit constipation [K59.01]  Yes     Priority: 18     Closed pelvic ring fracture s/p ORIF of pelvis on 9/20/2024 [S32.810A]  Yes    Elevated troponin [R79.89]  Yes    Abnormal ventricular wall motion [R93.89]  Yes    Pulmonary hypertension [I27.20]  Yes      Resolved Hospital Problems    Diagnosis Date Resolved POA    Hypokalemia [E87.6] 09/19/2024 No     Priority: 6     Bug bites [W57.XXXA] 09/21/2024 Yes     Priority: 11     Abnormal EKG [R94.31] 09/19/2024 Yes     Priority: 18     Closed fracture of anterior column of right acetabulum [S32.431A] 09/18/2024 Yes    PAC (premature atrial contraction) [I49.1] 09/18/2024 Yes    Fall [W19.XXXA] 09/18/2024 Yes       Allergies:Review of patient's allergies indicates:  No Known Allergies    Vitals: Every shift (Skilled Nursing patients)        Code Status: DNR     Diet: cardiac diet      Supplement: House supplement one can by mouth with meals                             Activities:   - Up in a chair each morning as tolerated   - Ambulate with assistance to bathroom   - May use walker, cane, or self-propelled wheelchair   - Weight bearing: Weight bear as tolerated and range of motion as tolerated to bilateral lower extremities    LABS: Per facility protocol       Nursing: Out of bed BID, Up with assistance    Nursing Precautions:           - Fall precautions per nursing home protocol      CONSULTS:       Physical Therapy to evaluate and treat 5 times a  week     Occupational Therapy to evaluate and treat 5 times a week        MISCELLANEOUS CARE:  Routine Skin for Bedridden Patients: Instruct patient/caregiver to apply moisture barrier cream to all skin folds and wet areas in perineal area daily and after baths and all bowel movements.    Oxygen:  Oxygen at 2 L/min nasal canula to be used:  Continuously., Assess oxygen saturation via pulse oximeter as needed for increase in SOB., and Notify physician if oxygen saturation less than 88%      WOUND CARE ORDERS  Keep surgical dressings in place that was applied post-op and do not remove until Orthopedic clinic follow-up. Assess surgical dressing with each treatment. Call MD if any drainage reaches border to border of dressing horizontally, signs or symptoms of infection, temp >101 F, induration, swelling or redness.                Medications:        Medication List        START taking these medications      amiodarone 200 MG Tab  Commonly known as: PACERONE  Take 2 tablets (400 mg total) by mouth 2 (two) times daily until 9/28/2024, THEN 1 tablet (200 mg total) once daily starting on 9/29/2024 and indefinite treatment.       apixaban 5 mg Tab  Commonly known as: ELIQUIS  Take 2 tablets (10 mg total) by mouth 2 (two) times daily until 9/28/2024 THEN 1 tablet (5 mg total) 2 (two) times daily starting on 9/29/2024 and indefinite treatment for treatment of pulmonary embolus and for long term anticoagulation for atrial flutter.       melatonin 3 mg tablet  Commonly known as: MELATIN  Take 2 tablets (6 mg total) by mouth nightly as needed for Insomnia.     methocarbamoL 750 MG Tab  Commonly known as: ROBAXIN  Take 1 tablet (750 mg total) by mouth 4 (four) times daily.     oxyCODONE 5 MG immediate release tablet  Commonly known as: ROXICODONE  Take 1 tablet (5 mg total) by mouth every 4 (four) hours as needed for Pain.     sacubitriL-valsartan  mg per tablet  Commonly known as: ENTRESTO  Take 1 tablet by mouth 2 (two)  times daily.     senna-docusate 8.6-50 mg 8.6-50 mg per tablet  Commonly known as: PERICOLACE  Take 1 tablet by mouth once daily.     empagliflozin 10 mg tablet  Commonly known as: Jardiance  Take 1 tablet (10 mg total) by mouth once daily for heart failure.             CHANGE how you take these medications      acetaminophen 500 MG tablet  Commonly known as: TYLENOL  Take 2 tablets (1,000 mg total) by mouth every 8 (eight) hours.       metoprolol succinate 25 MG 24 hr tablet  Commonly known as: TOPROL-XL  Take 0.5 tablets (12.5 mg total) by mouth once daily.       mirtazapine 7.5 MG Tab  Commonly known as: REMERON  Take 1 tablet (7.5 mg total) by mouth nightly. One tablet po each night for sleep              CONTINUE taking these medications      aspirin 81 MG EC tablet  Commonly known as: ECOTRIN  Take 1 tablet (81 mg total) by mouth once daily.     atorvastatin 10 MG tablet  Commonly known as: LIPITOR  Take 1 tablet by mouth once daily     CENTRUM SILVER 0.4 mg-300 mcg- 250 mcg Tab  Generic drug: multivit-min-FA-lycopen-lutein  Take 1 tablet by mouth once daily.     furosemide 40 MG tablet  Commonly known as: LASIX  Take 1 tablet (40 mg total) by mouth 2 (two) times a day.      levothyroxine 200 MCG tablet  Commonly known as: SYNTHROID  Take 1 tablet by mouth once daily.     spironolactone 25 MG tablet  Commonly known as: ALDACTONE  Take 1 tablet by mouth once daily.            STOP taking these medications      amLODIPine 2.5 MG tablet  Commonly known as: NORVASC     losartan 100 MG tablet  Commonly known as: COZAAR                Follow-up:   Future Appointments   Date Time Provider Department Center   9/26/2024 10:20 AM Elio Ackerman MD [facility] CARDIO [place]   10/4/2024  8:45 AM Red Ken NP [facility] ORTHO [address]   10/28/2024  1:30 PM CARDIAC, PET IMAGING [facility]   11/1/2024 10:45 AM Red Ken NP [facility] ORTHO [address]   1/27/2025 10:40 AM Ramirez Levine MD  [facility] FAM MED [place] [facility]       _________________________________  Annelise Bray, MD  09/24/2024

## 2024-09-23 NOTE — PLAN OF CARE
Updated therapy notes sent via [facility] to [facility] for review and submission to [facility]. LOCET completed on 09/18/24. DUNG contacting [phone] for Form#142. DUNG collaboratio with Dr. Bray who met with pt at bedside this afternoon.    Dee Dee Small LMSW

## 2024-09-23 NOTE — SUBJECTIVE & OBJECTIVE
Principal Problem:Closed nondisplaced fracture of anterior column of right acetabulum with routine healing    Principal Orthopedic Problem: As above, s/p percutaneous screws 9/20    Interval History: Patient seen and examined at bedside. MOSES. Patient doing well. No complaints. Pain well controlled in bed but still having significant pain with ambulation. Encouraged to continue working with therapy. Plan for [facility].        Review of patient's allergies indicates:  No Known Allergies    Current Facility-Administered Medications   Medication    acetaminophen tablet 1,000 mg    albuterol-ipratropium 2.5 mg-0.5 mg/3 mL nebulizer solution 3 mL    amiodarone tablet 400 mg    Followed by    [START ON 10/5/2024] amiodarone tablet 200 mg    apixaban tablet 10 mg    aspirin EC tablet 81 mg    atorvastatin tablet 10 mg    bisacodyL suppository 10 mg    dextrose 10% bolus 125 mL 125 mL    dextrose 10% bolus 250 mL 250 mL    diphenhydrAMINE-zinc acetate 2-0.1% cream    furosemide tablet 40 mg    glucagon (human recombinant) injection 1 mg    glucose chewable tablet 16 g    glucose chewable tablet 24 g    guaiFENesin 12 hr tablet 600 mg    levothyroxine tablet 200 mcg    melatonin tablet 6 mg    methocarbamoL tablet 750 mg    metoprolol succinate (TOPROL-XL) 24 hr split tablet 12.5 mg    mirtazapine tablet 7.5 mg    naloxone 0.4 mg/mL injection 0.02 mg    ondansetron disintegrating tablet 8 mg    oxyCODONE immediate release tablet 5 mg    polyethylene glycol packet 17 g    sacubitriL-valsartan  mg per tablet 1 tablet    senna-docusate 8.6-50 mg per tablet 1 tablet    sodium chloride 0.9% flush 5 mL    spironolactone tablet 25 mg     Objective:     Vital Signs (Most Recent):  Temp: 97.9 °F (36.6 °C) (09/23/24 1527)  Pulse: 68 (09/23/24 1600)  Resp: 18 (09/23/24 1527)  BP: (!) 152/66 (09/23/24 1715)  SpO2: 99 % (09/23/24 1527) Vital Signs (24h Range):  Temp:  [97.6 °F (36.4 °C)-98.3 °F (36.8 °C)] 97.9 °F (36.6  "°C)  Pulse:  [54-73] 68  Resp:  [16-18] 18  SpO2:  [96 %-99 %] 99 %  BP: (111-152)/(58-70) 152/66     Weight: 101.6 kg (224 lb)  Height: 5' 11" (180.3 cm)  Body mass index is 31.24 kg/m².      Intake/Output Summary (Last 24 hours) at 9/23/2024 1752  Last data filed at 9/23/2024 1723  Gross per 24 hour   Intake 840 ml   Output 1750 ml   Net -910 ml        Ortho/SPM Exam  A&O x 3  Non-Labored Respirations    RLE:   Dressings clean, dry and intact  Fires Quad/TA/EHL/GSC  SILT  Compartments soft  Brisk cap refill  Swelling to dorsal foot    LLE:   Fires Quad/TA/EHL/GSC  SILT  Compartments soft  Brisk cap refill  Swelling dorsal foot         Significant Labs: CBC:   Recent Labs   Lab 09/22/24  0544 09/23/24  0157   WBC 7.25 7.65   HGB 12.5* 12.6*   HCT 39.7* 39.2*    179     CMP:   Recent Labs   Lab 09/22/24  0544 09/23/24  0157    139   K 4.0 4.1    99   CO2 30* 32*   * 164*   BUN 36* 33*   CREATININE 1.2 1.3   CALCIUM 8.7 8.8   ANIONGAP 8 8     All pertinent labs within the past 24 hours have been reviewed.    Significant Imaging: I have reviewed and interpreted all pertinent imaging results/findings.  "

## 2024-09-23 NOTE — ASSESSMENT & PLAN NOTE
Bean Salas is a 79 y.o. male Bean Salas is a 79 y.o. male with PMH significant for CAD, CHF, pulmonary HTN, AKHIL on CPAP, AFib, and R IT fx s/p IMN 9/3/21 with Dr. Hall presenting with a nondisplaced fracture of the anterior, of the right acetabulum.  He was closed, NVI.  CT chest abdomen and pelvis demonstrated right-sided PE with nonocclusive thrombus in the right pulmonary artery with occlusive thrombus in his subsegmental branch of the right pulmonary artery.  DVT ultrasound negative.  Patient was started on heparin GTT by hospital medicine.  Given the relatively nondisplaced in nature of the patient's fracture and numerous medical comorbidities, we will plan to treat him nonoperatively with a trial of physical therapy. S/p Perc screws of pelvis on 09/20     Admitted to    Weightbearing as tolerated right lower extremity on rolling walker  PT/OT  DVT ppx: eliquis 10mg BID x 7 days then eliquis 5mg BID  Multimodal pain control     Dispo:  PT recommends high-intensity therapy, plan for [facility]

## 2024-09-23 NOTE — PROGRESS NOTES
[facility] - Cardiology Stepdown  Orthopedics  Progress Note    Patient Name: Bean Salas  MRN: [id number]  Admission Date: 9/15/2024  Hospital Length of Stay: 7 days  Attending Provider: Annelise Bray MD  Primary Care Provider: Ramirez Levine MD  Follow-up For: Procedure(s) (LRB):  ORIF,PELVIS (Right)    Post-Operative Day: 3 Days Post-Op  Subjective:     Principal Problem:Closed nondisplaced fracture of anterior column of right acetabulum with routine healing    Principal Orthopedic Problem: As above, s/p percutaneous screws 9/20    Interval History: Patient seen and examined at bedside. NAEON. Patient doing well. No complaints. Pain well controlled in bed but still having significant pain with ambulation. Encouraged to continue working with therapy. Plan [facility].        Review of patient's allergies indicates:  No Known Allergies    Current Facility-Administered Medications   Medication    acetaminophen tablet 1,000 mg    albuterol-ipratropium 2.5 mg-0.5 mg/3 mL nebulizer solution 3 mL    amiodarone tablet 400 mg    Followed by    [START ON 10/5/2024] amiodarone tablet 200 mg    apixaban tablet 10 mg    aspirin EC tablet 81 mg    atorvastatin tablet 10 mg    bisacodyL suppository 10 mg    dextrose 10% bolus 125 mL 125 mL    dextrose 10% bolus 250 mL 250 mL    diphenhydrAMINE-zinc acetate 2-0.1% cream    furosemide tablet 40 mg    glucagon (human recombinant) injection 1 mg    glucose chewable tablet 16 g    glucose chewable tablet 24 g    guaiFENesin 12 hr tablet 600 mg    levothyroxine tablet 200 mcg    melatonin tablet 6 mg    methocarbamoL tablet 750 mg    metoprolol succinate (TOPROL-XL) 24 hr split tablet 12.5 mg    mirtazapine tablet 7.5 mg    naloxone 0.4 mg/mL injection 0.02 mg    ondansetron disintegrating tablet 8 mg    oxyCODONE immediate release tablet 5 mg    polyethylene glycol packet 17 g    sacubitriL-valsartan  mg per tablet 1 tablet    senna-docusate 8.6-50 mg per  "tablet 1 tablet    sodium chloride 0.9% flush 5 mL    spironolactone tablet 25 mg     Objective:     Vital Signs (Most Recent):  Temp: 97.9 °F (36.6 °C) (09/23/24 1527)  Pulse: 68 (09/23/24 1600)  Resp: 18 (09/23/24 1527)  BP: (!) 152/66 (09/23/24 1715)  SpO2: 99 % (09/23/24 1527) Vital Signs (24h Range):  Temp:  [97.6 °F (36.4 °C)-98.3 °F (36.8 °C)] 97.9 °F (36.6 °C)  Pulse:  [54-73] 68  Resp:  [16-18] 18  SpO2:  [96 %-99 %] 99 %  BP: (111-152)/(58-70) 152/66     Weight: 101.6 kg (224 lb)  Height: 5' 11" (180.3 cm)  Body mass index is 31.24 kg/m².      Intake/Output Summary (Last 24 hours) at 9/23/2024 1752  Last data filed at 9/23/2024 1723  Gross per 24 hour   Intake 840 ml   Output 1750 ml   Net -910 ml        Ortho/SPM Exam  A&O x 3  Non-Labored Respirations    RLE:   Dressings clean, dry and intact  Fires Quad/TA/EHL/GSC  SILT  Compartments soft  Brisk cap refill  Swelling to dorsal foot    LLE:   Fires Quad/TA/EHL/GSC  SILT  Compartments soft  Brisk cap refill  Swelling dorsal foot         Significant Labs: CBC:   Recent Labs   Lab 09/22/24  0544 09/23/24  0157   WBC 7.25 7.65   HGB 12.5* 12.6*   HCT 39.7* 39.2*    179     CMP:   Recent Labs   Lab 09/22/24  0544 09/23/24  0157    139   K 4.0 4.1    99   CO2 30* 32*   * 164*   BUN 36* 33*   CREATININE 1.2 1.3   CALCIUM 8.7 8.8   ANIONGAP 8 8     All pertinent labs within the past 24 hours have been reviewed.    Significant Imaging: I have reviewed and interpreted all pertinent imaging results/findings.  Assessment/Plan:     * Closed nondisplaced fracture of anterior column of right acetabulum with routine healing s/p ORIF of pelvis on 9/20/2024  Bean Salas is a 79 y.o. male Bean Salas is a 79 y.o. male with PMH significant for CAD, CHF, pulmonary HTN, AKHIL on CPAP, AFib, and R IT fx s/p IMN 9/3/21 with Dr. Hall presenting with a nondisplaced fracture of the anterior, of the right acetabulum.  He was closed, NVI.  CT chest " abdomen and pelvis demonstrated right-sided PE with nonocclusive thrombus in the right pulmonary artery with occlusive thrombus in his subsegmental branch of the right pulmonary artery.  DVT ultrasound negative.  Patient was started on heparin GTT by hospital medicine.  Given the relatively nondisplaced in nature of the patient's fracture and numerous medical comorbidities, we will plan to treat him nonoperatively with a trial of physical therapy. S/p Perc screws of pelvis on 09/20     Admitted to    Weightbearing as tolerated right lower extremity on rolling walker  PT/OT  DVT ppx: eliquis 10mg BID x 7 days then eliquis 5mg BID  Multimodal pain control     Dispo:  PT recommends high-intensity therapy, plan for [facility]ner[facility] Hurd MD  Orthopedics  [facility] - Cardiology Stepdown

## 2024-09-23 NOTE — PT/OT/SLP PROGRESS
Occupational Therapy  Co Treatment    Name: Bean Salas  MRN: [id number]  Admitting Diagnosis:  Closed nondisplaced fracture of anterior column of right acetabulum with routine healing  3 Days Post-Op    Recommendations:     Discharge Recommendations: High Intensity Therapy  Discharge Equipment Recommendations:  bedside commode, walker, rolling, hip kit      Assessment:     Bean Salas is a 79 y.o. male with a medical diagnosis of Closed nondisplaced fracture of anterior column of right acetabulum with routine healing.  Performance deficits affecting function are weakness, impaired endurance, impaired self care skills, impaired functional mobility, gait instability, impaired balance, decreased safety awareness, pain.   Pt tolerated session fairly well as he is limited by pain with sitting EOB. Posterior lean and poor tolerance for upright sitting.   Rehab Prognosis:  Fair; patient would benefit from acute skilled OT services to address these deficits and reach maximum level of function.       Plan:     Patient to be seen 4 x/week to address the above listed problems via self-care/home management, therapeutic activities, therapeutic exercises  Plan of Care Expires: 10/21/24  Plan of Care Reviewed with: patient    Subjective     Pt agreeable to therapy   Pain/Comfort:   Pt initially reported 3/10 pain and had pain meds approx 45-60 min prior to session,. Solitario trell, with sitting EOB, pt with significant posterior lean due to pain. Pt unable to rate pain. Repositioning provided.       Objective:     Communicated with: nsarden prior to session.  Patient found in bed with tele and 3 LPM oxygen via NC.   Cotx completed this date to optimize functional performance and safety given impaired tolerance for activity  General Precautions: Standard, fall    Orthopedic Precautions:RLE weight bearing as tolerated, LLE weight bearing as tolerated  Braces: N/A    Occupational Performance:     Bed Mobility:    TOTAL A x 2  supine>sit.  TOTAL A x 3 sit>supine.     Functional Mobility/Transfers:  3 partial stands completed with MAX A x 2. Pt unable to achieve full upright standing due to c/o of pain.     Activities of Daily Living:  Pt able to feed self and complete g/h skills bed level only with set-up. Pt washed face very quickly with SBA seated EOB. Pt requiring B UE support on bed to maintain static sitting. Sitting balance limited by pain and him trying to off load pain in pelvis by using UE's for support.     UE/LE dressing: TOTAL A   Toileting; TOTAL A     AMPAC 6 Click ADL: 10    Treatment & Education:  Pt awake, alert and following commands.   Pt tolerated sitting EOB approx 10 min. Pt requiring constant UE support on the bed to offload pelvis due to pain.   Education provided and pt demo understanding of UE AROM exs to perform 4 x daily to increase functional ROM/strength. Exs written on board in room as visual reminder.   Education provided re: role of OT and safety with functional mobility/ADL skills.     Patient left HOB elevated with all lines intact, call button in reach, and PT present    GOALS:   Multidisciplinary Problems       Occupational Therapy Goals          Problem: Occupational Therapy    Goal Priority Disciplines Outcome Interventions   Occupational Therapy Goal     OT, PT/OT Progressing    Description: Goals to be met by: 10/21/24     Patient will increase functional independence with ADLs by performing:    UE Dressing with [place].  LE Dressing with Stand-by Assistance and AE prn.  Grooming while standing with Contact Guard Assistance.  Toileting from toilet/bedside commode with Stand-by Assistance for hygiene and clothing management.   Supine to sit with Contact Guard Assistance.  Toilet transfer to toilet/bedside commode with Minimal Assistance and LRAD.    Patient demonstrates a mobility limitation that significantly impairs their ability to participate in one or more mobility related  activities of daily living. Patient's mobility limitation cannot be sufficiently resolved with the use of a cane, but can be sufficiently resolved with the use of a rolling walker.The use of a rolling walker will considerably improve their ability to participate in MRADLs. Patient will use the walker on a regular basis at home.      Patient has a mobility limitation that significantly impairs their ability to participate in one or more mobility related activities of daily living, including toileting. This deficit can be resolved by using a bedside commode. Patient demonstrates mobility limitations that will cause them to be confined to one room at home without bathroom access for up to 30 days. Using a bedside commode will greatly improve the patient's ability to participate in MRADLs.                          Time Tracking:     OT Date of Treatment: 09/23/24  OT Start Time: 0946  OT Stop Time: 1006  OT Total Time (min): 20 min    Billable Minutes:Therapeutic Activity 20    OT/COURTNEY: OT     Number of COURTNEY visits since last OT visit: 1    9/23/2024

## 2024-09-24 ENCOUNTER — HOSPITAL ENCOUNTER (INPATIENT)
Facility: HOSPITAL | Age: 79
LOS: 4 days | Discharge: HOME | DRG: 559 | End: 2024-09-28
Attending: HOSPITALIST | Admitting: HOSPITALIST
Payer: MEDICARE

## 2024-09-24 ENCOUNTER — PATIENT MESSAGE (OUTPATIENT)
Dept: FAMILY MEDICINE | Facility: CLINIC | Age: 79
End: 2024-09-24
Payer: MEDICARE

## 2024-09-24 VITALS
BODY MASS INDEX: 31.36 KG/M2 | SYSTOLIC BLOOD PRESSURE: 111 MMHG | RESPIRATION RATE: 18 BRPM | HEART RATE: 75 BPM | TEMPERATURE: 98 F | OXYGEN SATURATION: 94 % | HEIGHT: 71 IN | WEIGHT: 224 LBS | DIASTOLIC BLOOD PRESSURE: 51 MMHG

## 2024-09-24 DIAGNOSIS — I10 ESSENTIAL HYPERTENSION: ICD-10-CM

## 2024-09-24 DIAGNOSIS — I48.92 PAROXYSMAL ATRIAL FLUTTER: ICD-10-CM

## 2024-09-24 DIAGNOSIS — Z78.9 IMPAIRED ACTIVITIES OF DAILY LIVING: ICD-10-CM

## 2024-09-24 DIAGNOSIS — I26.99 OTHER PULMONARY EMBOLISM WITHOUT ACUTE COR PULMONALE, UNSPECIFIED CHRONICITY: ICD-10-CM

## 2024-09-24 DIAGNOSIS — S32.434D CLOSED NONDISPLACED FRACTURE OF ANTERIOR COLUMN OF RIGHT ACETABULUM WITH ROUTINE HEALING: ICD-10-CM

## 2024-09-24 DIAGNOSIS — I25.10 CORONARY ARTERY DISEASE INVOLVING NATIVE CORONARY ARTERY OF NATIVE HEART WITHOUT ANGINA PECTORIS: ICD-10-CM

## 2024-09-24 DIAGNOSIS — E03.4 HYPOTHYROIDISM DUE TO ACQUIRED ATROPHY OF THYROID: Primary | ICD-10-CM

## 2024-09-24 DIAGNOSIS — G47.33 OSA (OBSTRUCTIVE SLEEP APNEA): ICD-10-CM

## 2024-09-24 LAB
OHS QRS DURATION: 126 MS
OHS QTC CALCULATION: 543 MS

## 2024-09-24 PROCEDURE — 97112 NEUROMUSCULAR REEDUCATION: CPT | Mod: HCNC

## 2024-09-24 PROCEDURE — 25000003 PHARM REV CODE 250: Mod: HCNC | Performed by: STUDENT IN AN ORGANIZED HEALTH CARE EDUCATION/TRAINING PROGRAM

## 2024-09-24 PROCEDURE — 11000004 HC SNF PRIVATE: Mod: HCNC

## 2024-09-24 PROCEDURE — 25000003 PHARM REV CODE 250: Mod: HCNC | Performed by: INTERNAL MEDICINE

## 2024-09-24 PROCEDURE — 97530 THERAPEUTIC ACTIVITIES: CPT | Mod: HCNC

## 2024-09-24 PROCEDURE — 25000003 PHARM REV CODE 250: Mod: HCNC | Performed by: HOSPITALIST

## 2024-09-24 PROCEDURE — 25000003 PHARM REV CODE 250: Mod: HCNC

## 2024-09-24 PROCEDURE — 93005 ELECTROCARDIOGRAM TRACING: CPT | Mod: HCNC

## 2024-09-24 PROCEDURE — 93010 ELECTROCARDIOGRAM REPORT: CPT | Mod: HCNC,,, | Performed by: INTERNAL MEDICINE

## 2024-09-24 PROCEDURE — 97535 SELF CARE MNGMENT TRAINING: CPT | Mod: HCNC

## 2024-09-24 RX ORDER — AMIODARONE HYDROCHLORIDE 200 MG/1
200 TABLET ORAL DAILY
Status: DISCONTINUED | OUTPATIENT
Start: 2024-09-29 | End: 2024-09-25

## 2024-09-24 RX ORDER — ACETAMINOPHEN 500 MG
1000 TABLET ORAL EVERY 8 HOURS
Status: DISCONTINUED | OUTPATIENT
Start: 2024-09-24 | End: 2024-10-01 | Stop reason: HOSPADM

## 2024-09-24 RX ORDER — FUROSEMIDE 40 MG/1
40 TABLET ORAL 2 TIMES DAILY
Status: DISCONTINUED | OUTPATIENT
Start: 2024-09-24 | End: 2024-09-26

## 2024-09-24 RX ORDER — AMOXICILLIN 250 MG
1 CAPSULE ORAL 2 TIMES DAILY
Status: DISCONTINUED | OUTPATIENT
Start: 2024-09-24 | End: 2024-10-01 | Stop reason: HOSPADM

## 2024-09-24 RX ORDER — MIRTAZAPINE 7.5 MG/1
7.5 TABLET, FILM COATED ORAL NIGHTLY
Status: DISCONTINUED | OUTPATIENT
Start: 2024-09-24 | End: 2024-10-01 | Stop reason: HOSPADM

## 2024-09-24 RX ORDER — OXYCODONE HYDROCHLORIDE 5 MG/1
5 TABLET ORAL EVERY 4 HOURS PRN
Status: DISCONTINUED | OUTPATIENT
Start: 2024-09-24 | End: 2024-09-25

## 2024-09-24 RX ORDER — LEVOTHYROXINE SODIUM 100 UG/1
200 TABLET ORAL
Status: DISCONTINUED | OUTPATIENT
Start: 2024-09-25 | End: 2024-10-01 | Stop reason: HOSPADM

## 2024-09-24 RX ORDER — ATORVASTATIN CALCIUM 10 MG/1
10 TABLET, FILM COATED ORAL DAILY
Status: DISCONTINUED | OUTPATIENT
Start: 2024-09-24 | End: 2024-10-01 | Stop reason: HOSPADM

## 2024-09-24 RX ORDER — AMIODARONE HYDROCHLORIDE 200 MG/1
400 TABLET ORAL 2 TIMES DAILY
Status: DISCONTINUED | OUTPATIENT
Start: 2024-09-24 | End: 2024-09-27

## 2024-09-24 RX ORDER — METHOCARBAMOL 750 MG/1
750 TABLET, FILM COATED ORAL 4 TIMES DAILY
Status: DISCONTINUED | OUTPATIENT
Start: 2024-09-24 | End: 2024-09-27

## 2024-09-24 RX ORDER — TALC
6 POWDER (GRAM) TOPICAL NIGHTLY PRN
Status: DISCONTINUED | OUTPATIENT
Start: 2024-09-24 | End: 2024-10-01 | Stop reason: HOSPADM

## 2024-09-24 RX ORDER — CALCIUM CARBONATE 200(500)MG
500 TABLET,CHEWABLE ORAL 2 TIMES DAILY PRN
Status: DISCONTINUED | OUTPATIENT
Start: 2024-09-24 | End: 2024-10-01 | Stop reason: HOSPADM

## 2024-09-24 RX ORDER — SPIRONOLACTONE 25 MG/1
25 TABLET ORAL DAILY
Status: DISCONTINUED | OUTPATIENT
Start: 2024-09-24 | End: 2024-09-26

## 2024-09-24 RX ORDER — ASPIRIN 81 MG/1
81 TABLET ORAL DAILY
Status: DISCONTINUED | OUTPATIENT
Start: 2024-09-24 | End: 2024-10-01 | Stop reason: HOSPADM

## 2024-09-24 RX ORDER — ACETAMINOPHEN 325 MG/1
650 TABLET ORAL EVERY 6 HOURS PRN
Status: DISCONTINUED | OUTPATIENT
Start: 2024-09-24 | End: 2024-10-01 | Stop reason: HOSPADM

## 2024-09-24 RX ADMIN — SPIRONOLACTONE 25 MG: 25 TABLET ORAL at 08:09

## 2024-09-24 RX ADMIN — OXYCODONE HYDROCHLORIDE 5 MG: 5 TABLET ORAL at 05:09

## 2024-09-24 RX ADMIN — OXYCODONE HYDROCHLORIDE 5 MG: 5 TABLET ORAL at 04:09

## 2024-09-24 RX ADMIN — AMIODARONE HYDROCHLORIDE 400 MG: 200 TABLET ORAL at 09:09

## 2024-09-24 RX ADMIN — FUROSEMIDE 40 MG: 40 TABLET ORAL at 08:09

## 2024-09-24 RX ADMIN — METHOCARBAMOL 750 MG: 750 TABLET ORAL at 09:09

## 2024-09-24 RX ADMIN — LEVOTHYROXINE SODIUM 200 MCG: 125 TABLET ORAL at 05:09

## 2024-09-24 RX ADMIN — ATORVASTATIN CALCIUM 10 MG: 10 TABLET, FILM COATED ORAL at 08:09

## 2024-09-24 RX ADMIN — SACUBITRIL AND VALSARTAN 1 TABLET: 97; 103 TABLET, FILM COATED ORAL at 09:09

## 2024-09-24 RX ADMIN — Medication 6 MG: at 11:09

## 2024-09-24 RX ADMIN — SENNOSIDES AND DOCUSATE SODIUM 1 TABLET: 50; 8.6 TABLET ORAL at 09:09

## 2024-09-24 RX ADMIN — APIXABAN 10 MG: 5 TABLET, FILM COATED ORAL at 08:09

## 2024-09-24 RX ADMIN — ACETAMINOPHEN 1000 MG: 500 TABLET ORAL at 05:09

## 2024-09-24 RX ADMIN — POLYETHYLENE GLYCOL 3350 17 G: 17 POWDER, FOR SOLUTION ORAL at 08:09

## 2024-09-24 RX ADMIN — ASPIRIN 81 MG: 81 TABLET, COATED ORAL at 08:09

## 2024-09-24 RX ADMIN — APIXABAN 10 MG: 2.5 TABLET, FILM COATED ORAL at 09:09

## 2024-09-24 RX ADMIN — MIRTAZAPINE 7.5 MG: 7.5 TABLET, FILM COATED ORAL at 09:09

## 2024-09-24 RX ADMIN — SACUBITRIL AND VALSARTAN 1 TABLET: 97; 103 TABLET, FILM COATED ORAL at 08:09

## 2024-09-24 RX ADMIN — METHOCARBAMOL 750 MG: 750 TABLET ORAL at 04:09

## 2024-09-24 RX ADMIN — AMIODARONE HYDROCHLORIDE 400 MG: 200 TABLET ORAL at 08:09

## 2024-09-24 RX ADMIN — SENNOSIDES AND DOCUSATE SODIUM 1 TABLET: 50; 8.6 TABLET ORAL at 08:09

## 2024-09-24 RX ADMIN — METOPROLOL SUCCINATE 12.5 MG: 25 TABLET, EXTENDED RELEASE ORAL at 08:09

## 2024-09-24 RX ADMIN — ACETAMINOPHEN 1000 MG: 500 TABLET ORAL at 09:09

## 2024-09-24 RX ADMIN — GUAIFENESIN 600 MG: 600 TABLET, EXTENDED RELEASE ORAL at 08:09

## 2024-09-24 RX ADMIN — FUROSEMIDE 40 MG: 40 TABLET ORAL at 05:09

## 2024-09-24 NOTE — NURSING
.Nurses Note -- 4 Eyes      9/24/2024   6:01 PM      Skin assessed during: Admit      [] No Altered Skin Integrity Present    []Prevention Measures Documented      [x] Yes- Altered Skin Integrity Present or Discovered   [] LDA Added if Not in Epic (Describe Wound)   [] New Altered Skin Integrity was Present on Admit and Documented in LDA   [x] Wound Image Taken    Wound Care Consulted? Yes    Attending Nurse: WALESKA URBINA LPN      Second RN/Staff Member:  JOHN PAUL POLLOCK RN

## 2024-09-24 NOTE — ASSESSMENT & PLAN NOTE
Resolved on discharge. Patient's most recent potassium results are listed below.   Recent Labs     09/22/24  0544 09/23/24  0157   K 4.0 4.1       Plan  - Replete potassium per protocol  - Monitor potassium Daily  - Patient's hypokalemia is  mildly under baseline 9/18 with goal 4 with PVCs so will replace  -

## 2024-09-24 NOTE — SUBJECTIVE & OBJECTIVE
Interval History: Patient reports pain controlled to right hip post-op. SNF referrals sent today and patient accepted to [facility]. Patient agreeable to [facility]. Patient approved by insurance but do not have BM and needs BM prior to discharge to [facility] so to be given Lactulose and Magnesium citrate tonight to have BM. Once has BM will be able to go to [facility] and hopeful discharge tomorrow. Patient states he would like to avoid anything rectal if possible. Patient making progress with therapy but limited but weakness to right leg that is expected post-op and patient states he is trying as very motivated to getting better and walking again. Patient states he has been working on bed exercises to help with muscle strengthening and moving his ankles an knee in bed to try and get some strength back. Patient remains in normal sinus rhythm. Labs reviewed and stable. Hgb stable at 12.6. Creatinine stable at 1.3 and was 1.2 yesterday.     Review of Systems   Constitutional:  Negative for fever.   Respiratory:  Negative for cough and shortness of breath.    Cardiovascular:  Negative for chest pain and leg swelling.   Gastrointestinal:  Negative for abdominal pain, nausea and vomiting.   Musculoskeletal:  Positive for arthralgias (Right hip).   Psychiatric/Behavioral:  Negative for agitation and confusion.      Objective:     Vital Signs (Most Recent):  Temp: 97.9 °F (36.6 °C) (09/23/24 1527)  Pulse: 63 (09/23/24 1800)  Resp: 18 (09/23/24 1527)  BP: 152/66 (09/23/24 1715)  SpO2: 99 % (09/23/24 1527) on 2 liters of oxygen Vital Signs (24h Range):  Temp:  [97.6 °F (36.4 °C)-98.3 °F (36.8 °C)] 97.9 °F (36.6 °C)  Pulse:  [54-73] 63  Resp:  [16-18] 18  SpO2:  [96 %-99 %] 99 %  BP: (111-152)/(58-70) 152/66     Weight: 101.6 kg (224 lb)  Body mass index is 31.24 kg/m².    Intake/Output Summary (Last 24 hours) at 9/23/2024 1914  Last data filed at 9/23/2024 1723  Gross per 24 hour   Intake 840 ml   Output 1750 ml   Net  -910 ml         Physical Exam  Vitals and nursing note reviewed.   Constitutional:       General: He is awake. He is not in acute distress.     Appearance: Normal appearance. He is well-developed. He is obese. He is not ill-appearing.      Comments: Patient sitting up in bed. Patient in no apparent distress. Patient awake and alert.    Eyes:      Conjunctiva/sclera: Conjunctivae normal.   Cardiovascular:      Rate and Rhythm: Normal rate and regular rhythm.      Heart sounds: Normal heart sounds. No murmur heard.  Pulmonary:      Effort: Pulmonary effort is normal. No respiratory distress.      Breath sounds: Normal breath sounds. No wheezing.   Abdominal:      General: Abdomen is flat. Bowel sounds are normal. There is no distension.      Palpations: Abdomen is soft.      Tenderness: There is no abdominal tenderness.   Musculoskeletal:      Right lower leg: No edema.      Left lower leg: No edema.   Skin:     Findings: No erythema.   Neurological:      Mental Status: He is alert and oriented to person, place, and time.   Psychiatric:         Mood and Affect: Mood normal.         Behavior: Behavior normal. Behavior is cooperative.         Thought Content: Thought content normal.         Judgment: Judgment normal.             Significant Labs: CBC:   Recent Labs   Lab 09/22/24  0544 09/23/24  0157   WBC 7.25 7.65   HGB 12.5* 12.6*   HCT 39.7* 39.2*    179     CMP:   Recent Labs   Lab 09/22/24  0544 09/23/24  0157    139   K 4.0 4.1    99   CO2 30* 32*   * 164*   BUN 36* 33*   CREATININE 1.2 1.3   CALCIUM 8.7 8.8   ANIONGAP 8 8       Significant Imaging: I have reviewed all pertinent imaging results/findings within the past 24 hours.

## 2024-09-24 NOTE — ASSESSMENT & PLAN NOTE
Closed pelvic ring fracture s/p ORIF of pelvis on 9/20/2024   80 y/o male presenting after ground level fall with right hip pain on standing. CT scan showed acute closed fractures of the right superior and inferior pubic rami with involvement of the anterosuperior acetabulum.   - Orthopedic surgery consulted by ED and recommended weight bear as tolerated to right lower extremity and trial of PT/OT and pain management.   - PT/OT consulted and patient not progressing and only able to take a few shuffling steps and total assist x 2 people. Patient limited by dyspnea and pain. Orthopedics discussed with patient and his son and plan to proceed with operative intervention on pelvic fractures with percutaneous screw fixation. Patient taken to OR on 9/20/2024 with Dr. Negrito Stapleton and underwent percutaneous screw fixation of pelvic ring and right acetabular fractures.  - Post-op, patient is weight bear as tolerated and range of motion as tolerate to bilateral lower extremities. PT/OT re consulted post-op and worked with patient this am and recommending high intensity therapy. Patient agreeable to post acute placement on discharge and will need SNF placement on discharge and he agrees and states he does not want any facility in [facility] he is from. Patient accepted to [facility] for admission pending bowel movement. Patient agreeable to [facility] on discharge.   - Continue pain control with scheduled Tylenol 1000 mg po every 8 hours but increase Robaxin 750 mg po 4 times daily for pain management with Oxycodone IR 5 mg po every 4 hours prn for pain management.   - Fall precautions.  - Cardiology reports does not need PET stress/ischemic work up prior to a pelvic fracture surgery as that can be done as outpatient.   - Patient on therapeutic Apixiban post-op for treatment of PE and also for his atrial flutter so fully anticoagulated so no chemical DVT prophylaxis needed post-op.  - Surgical bandages to  remain in place post-op until Orthopedic clinic follow-up.

## 2024-09-24 NOTE — PLAN OF CARE
[facility] Stepdown  Discharge Final Note    Primary Care Provider: Ramirez Levine MD    Expected Discharge Date: 9/24/2024    Final Discharge Note (most recent)       Final Note - 09/24/24 1050          Final Note    Assessment Type Final Discharge Note     Anticipated Discharge Disposition Skilled Nursing Facility     What phone number can be called within the next 1-3 days to see how you are doing after discharge? [phone]     Hospital Resources/Appts/Education Provided Provided patient/caregiver with written discharge plan information;Appointments scheduled and added to AVS        Post-Acute Status    Post-Acute Authorization Placement     Post-Acute Placement Status Set-up Complete/Auth obtained     Patient choice form signed by patient/caregiver List with quality metrics by geographic area provided     Discharge Delays None known at this time                     Important Message from Medicare  Important Message from Medicare regarding Discharge Appeal Rights: Given to patient/caregiver, Explained to patient/caregiver, Signed/date by patient/caregiver     Date IMM was signed: 09/24/24  Time IMM was signed: 1024    patient to discharge to [facility] via [facility].     Future Appointments   Date Time Provider Department Center   9/26/2024 10:20 AM Elio Ackerman MD [facility] CARDIO [place]   10/4/2024  8:45 AM Red Ken, NP [facility] [place]   10/28/2024  1:30 PM [facility], PET IMAGING [facility]   11/1/2024 10:45 AM Red Ken NP [facility] [place]   1/27/2025 10:40 AM Ramirez Levine MD [facility] MED [facility]     CARMINA Wiley  [facility]   Ext: [phone]

## 2024-09-24 NOTE — ASSESSMENT & PLAN NOTE
- [facility] on file reviewed, DNR order placed. DNR on hold for cardioversion today and for Ortho surgery tomorrow and resume post-op and patient okay with holding DNR for procedures and surgery.   - Patient back to DNR now that all surgeries and procedures complete.

## 2024-09-24 NOTE — DISCHARGE SUMMARY
Alli Ahumada - Cardiology [facility] Medicine  Discharge Summary      Patient Name: Bean Salas  MRN: [id number]  [facility]: [id number]  Patient Class: IP- Inpatient  Admission Date: 9/15/2024  Hospital Length of Stay: 8 days  Discharge Date and Time: 9/24/2024 12:49 PM  Attending Physician: Annelise Bray MD   Discharging Provider: Annelise Bray MD  Primary Care Provider: Ramirez Levine MD  [facility] Medicine Team: [facility] MED  Annelise Bray MD  Primary Care Team: [facility]    HPI:   Bean Salas is a 79 y.o. male with PMHx of  Bradycardia, PVCs, Hypertension, Coronary Artery Disease, Hyperlipidemia, HFpEF, Pulmonary HTN, AKHIL on CPAP, Chronic respiratory failure w/ home oxygen and hypothyroidism. He presents with right hip pain after ground level fall outside yesterday. He was doing some yard work and reaching for some piles of wood when a bunch of ants started crawling all over him and biting him. He tried to get away but forgot there was a step up from the grass to the concrete/carport next to him causing him to fall over onto his bottom/right side and then went more slowly back hitting his head as well.  Did not lose consciousness. He denies any headache or confusion. He did not have any pain right away. He had trouble pulling himself up off the ground and his cell phone was inside his house where he lives alone. He called out for a neighbor and a few of them were able to get him into a chair however when he tried to stand he had immediate sharp pain in his right hip and then called for an ambulance. He normally ambulates with a walker and is independent with ADLs. He does not have pain at rest currently.     He otherwise felt in his normal state of health prior to this. He denies chest pain. Denies acute SOB, fever, cough. However, reports somewhat chronic/gradually worsening SLADE and SOB after activities such as eating requiring him to sit and use a CPAP for 2-3 hours every  "afternoon and lower extremity swelling right > left not much improved with his home PO lasix and spironolactone. He also reports requiring 2-3L supplemental oxygen while sleeping. He has some little red marks all over his obody from the ant bites but is mostly un bothered by them. He denies any history of PE/DVT. While he is not super active he does go to the store for 2-3 hours weekly to shop and ambulates between 4 rooms in his house and completes work around the house. No recent surgery or prolonged immobilization.     ED course: Afebrile. RR 22-23. /44. HR . Saturating >94% on 2-3L NC. Labs notable for troponin 0.811>1.041. . XR/CT Imaging with acute fractures of the right superior and inferior pubic rami with possible involvement of the anterosuperior acetabulum. CT chest/abdomen/pelvis also notable for right-sided pulmonary embolism with nonocclusive thrombus in the main right pulmonary artery and right lower lobe branch vessels, and occlusive thrombus in a subsegmental branch of the right pulmonary artery, no right sided heart strain, unclear chronicity. CT head and cervical spine without acute findings.  Orthopedic surgery consulted for pelvic fracture. Started on heparin gtt for PE. Admitted to  for further manement.       9/20/2024  Procedure(s) (LRB):  ORIF,PELVIS (Right)    Surgeon(s):  Negrtio Stapleton MD Callier, Matthew, MD      Hospital Course:   Patient found down outside his home and unable to bear weight to right leg due to right hip pain. Patient brought to [facility] ED and on CT scan and X-rays found to have  and then eventually helped up found to have "Mildly displaced acute fracture of the right superior pubic ramus with questionable minimal extension into the anterosuperior acetabulum." CT scan of chest and abdomen and pelvis as per trauma protocol was done and noted "Right-sided pulmonary embolism, detailed above, with nonocclusive thrombus in the main right " "pulmonary artery and right lower lobe branch vessels, and occlusive thrombus in a subsegmental branch of the right pulmonary artery." Patient started on IV Heparin drip to treat PE. Orthopedics consulted and recommended trial of PT/OT and pain management and weight bear as tolerated to right lower extremity. Patient on admit also noted to have elevated troponin 1 and BNP 900s. EKG showing some concerning changes with T wave inversions and ST depressions. Cardiology consulted and echo done and showed:    Left Ventricle: The left ventricle is normal in size. Normal wall thickness. Severe global hypokinesis present. Septal motion is abnormal. There is severely reduced systolic function with a visually estimated ejection fraction of 20 - 25%. Unable to assess diastolic function due to atrial flutter.    Right Ventricle: Mild right ventricular enlargement. Wall thickness is normal. Systolic function is moderately reduced.    Severe biatrial enlargement    Mitral Valve: There is mild regurgitation.    Pulmonary Artery: There is mild to moderate pulmonary hypertension. The estimated pulmonary artery systolic pressure is 49 mmHg.    IVC/SVC: Intermediate venous pressure at 8 mmHg.    Cardiology recommended to start to diuresis patient with Lasix 80 mg IV BID per Cards recs. Patient started on Entresto for depressed EF and titrated as per Cardiology recs and continue on Aldactone. Cardiology recommending ischemic work-up as outpatient with PET stress and consideration of Life Vest on discharge which patient declining at this time but will discuss with Cardiology as outpatient. Patient also having a number of PVCs and PACs in hospital and reviewed by Cardiology who noted patient in atrial flutter. After discussion with Cardiology they recommended DEMETRICE and cardioversion for his atrial flutter as patient symptomatic and felt could be contributing to his heart failure and low EF. Patient to go for DEMETRICE and cardioversion on 9/19. " Patient not progressing with PT/OT as limited by dyspnea and pain to right hip despite multimodal pain medications. Discussed with Dr. Stapleton from Orthopedics on 9/19 and he stated he spoke with patient and his son and after discussion plan to proceed with percutaneous screw fixation of right acetabular fracture and right sided pelvic fractures for 9/20. Patient had DEMETRICE and successful DCCV by Cardiology on 9/19 with return back to normal sinus rhythm. Patient started on IV Amiodarone infusion after cardioversion and plan for 24 hours then switch to oral Amiodarone as per Cardiology recommendations. Patient switched from IV to po Lasix on 9/19 after cardioversion to Lasix 40 mg po BID as now euvolemic. Patient taken to OR on 9/20/2024 and underwent ORIF of pelvis by Dr. Negrito Stapleton. Post-op, patient is weight bear as tolerated and range of motion as tolerate to bilateral lower extremities. PT/OT re consulted post-op. Patient completed Amiodarone infusion on evening of 9/20. Patient started on Amiodarone 400 mg po BID for 14 days on am of 9/21 followed by Amiodarone 200 mg po daily as per Cardiology recs. Patient remains in sinus rhythm on cardioversion. Patient reports pain controlled to right hip post-op. Therapy recommending high intensity therapy. SNF referrals sent and patient accepted to [facility] for 9/23. Patient approved on 9/23 by insurance but do not have BM and needs BM prior to discharge to [facility] so to be given Lactulose and Magnesium citrate to have BM. Once has BM will be able to go to [facility] and hopeful discharge on 9//24. Patient had several bowel movements on am of 9/24. Patient reports right hip pain controlled. Vitals signs stable. Patient discharged in good condition to [facility] on 9/24. Surgical bandages to remain in place until Orthopedic clinic follow-up. Patient expressed he wished to be DNR and patient made DNR once all his surgeries and procedures complete and DNR  on discharge. Patient has LaPost in place. Pain to right hip controlled on discharge.     Goals of Care Treatment Preferences:  Code Status: Full Code       LaPOST: Yes           SDOH Screening:  The patient was screened for utility difficulties, food insecurity, transport difficulties, housing insecurity, and interpersonal safety and there were no concerns identified this admission.     Consults:   Consults (From admission, onward)          Status Ordering Provider     Inpatient consult to Cardiology  Once        Provider:  (Not yet assigned)    Completed NETTIE EDMONDS     Inpatient consult to Orthopedic Surgery  Once        Provider:  (Not yet assigned)    Completed COURTNEY ZAMBRANO            Pulmonary  Chronic hypoxemic respiratory failure  Controlled. 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 He is on home oxygen at 2-3 LPM. Supplemental oxygen was provided and noted-at 2 liters of oxygen.      Signs/symptoms of respiratory failure include- tachypnea and increased work of breathing. Contributing diagnoses includes - CHF and pulmonary embolus. Labs and images were reviewed. Patient Has not had a recent ABG. Will treat underlying causes and adjust management of respiratory failure as follows- IV Lasix for heart failure and IV Heparin for PE. Dischareg on 2 liters of oxygen. Patient stable at his baseline level.     Cardiac/Vascular  Pure hypercholesterolemia  Chronic and controlled. Continue home Lipitor to treat on discharge.      Coronary artery disease involving native coronary artery of native heart without angina pectoris  Elevated troponin  Abnormal ventricular wall motion   - Patient with known CAD which is controlled. Will continue home Aspirin, Toprol XL and Lipitor daily to treat and monitor for S/Sx of angina/ACS. Continue to monitor on telemetry. Previous angiogram with non obstructive CAD 2021.   - Patient with elevated troponin on admit likely from PE but EKG with ST  depressions, T wave inversions that are new so Cardiology consulted and appreciate recs. Echo done with new septal wall motion abnormaliteis and decreased EF at 20-25%. Cardiology recommended for outpatient PET cardiac stress for ischemic work up. PET stress ordered and to be done as outpatient.     PVC's (premature ventricular contractions)  Patient noted in history and seen on telemetry on floor for PVC's. Keep Magnesium > 2, Potassium >4. Monitor telemetry. Continue Toprol XL to treat on discharge.     Bradycardia  - Patient has history of atrial bigeminy and bradycardia per chart. Has followed with EP.  - Monitor telemetry.  - HR ranges 40s-80s at times. Hold Metoprolol if HR sustains < 60.    Essential hypertension  Chronic, controlled. Latest blood pressure and vitals reviewed-     Temp:  [97.3 °F (36.3 °C)-99.3 °F (37.4 °C)]   Pulse:  [48-83]   Resp:  [17-18]   BP: (100-152)/(51-82)   SpO2:  [94 %-98 %] .   Home meds for hypertension were reviewed and noted below.   Hypertension Medications               amLODIPine (NORVASC) 2.5 MG tablet Take 1 tablet by mouth once daily    furosemide (LASIX) 40 MG tablet Take 1 tablet (40 mg total) by mouth 2 (two) times a day. Take daily for next 3 days, then use as needed. Weigh daily. Afterwards, f weight increases 2 lbs/24h, take dose of lasix daily  until weight is back to baseline    losartan (COZAAR) 100 MG tablet Take 1 tablet by mouth once daily    metoprolol succinate (TOPROL-XL) 50 MG 24 hr tablet Take 1 tablet by mouth once daily    spironolactone (ALDACTONE) 25 MG tablet Take 1 tablet by mouth once daily     While in the hospital, will manage blood pressure as follows; Adjust home antihypertensive regimen as follows- Stopped Norvasc and Losartan by Cardiology and started on Entresto for new onset HFrEF and contineu Entresto on dischareg and Norvasc and Losartan stopped. Continue Lasix and Aldactone on discharge.       Paroxysmal atrial flutter  - Resolved after  cardioversion on 9/19. Patient remains in sinus rhythm. Patient completed 24 hour IV infusion of Amiodarone post cardioversion and started on Amiodarone 400 mg po BID x 14 days followed by Amiodarone 200 mg po daily for indefinite treatment. Continue Toprol XL for rate control. Continue Apixiban for long term anticoagulation.   - Per chart review, his cardiologist Dr. Hank Barry feels EKGs that were read as atrial fibrillation previously are actually sinus rhythm and thus not on long term anticoagulation on admit.   - Cardiology consulted here who report current rhythm is atrial flutter and report if does not self convert may need DEMETRICE/DCCV once euvolemic. Patient now euvolemic on 9/19 and remains in atrial flutter and rate is controlled at 45-65 on Toprol XL. Patient taken for DEMETRICE and underwent successful cardioversion by Cardiology on 9/19 and back in sinus rhythm. Patient on IV Amiodarone infusion as per Cardiology and needs for 24 hours and scheduled to end tonight and plan to switch to po Amiodarone on 9/21.   - Patient transitioned to oral Apixiban post-op for long term anticoagulation for elevated CHADsVasc score.     Acute on chronic combined systolic and diastolic congestive heart failure  Pulmonary hypertension   Patient now euvolemic on exam. Acute heart failure resolved. Continue Toprol XL, Entresto, Lasix for heart failure on discharge. Start Jardiance on discharge for his heart failure. Follow-up with Cardiology as outpatient on discharge for ischemic evaluation and monitoring of his cardiomyopathy.   Patient is identified as having Combined Systolic and Diastolic heart failure that is Acute on chronic. CHF is currently controlled. Latest ECHO performed and demonstrates- Results for orders placed during the hospital encounter of 10/04/23  Echo    Result Date: 9/16/2024    Technically difficult study    Left Ventricle: The left ventricle is normal in size. Normal wall   thickness. Severe global  hypokinesis present. Septal motion is abnormal.   There is severely reduced systolic function with a visually estimated   ejection fraction of 20 - 25%. Unable to assess diastolic function due to   atrial flutter.    Right Ventricle: Mild right ventricular enlargement. Wall thickness is   normal. Systolic function is moderately reduced.    Severe biatrial enlargement    Mitral Valve: There is mild regurgitation.    Pulmonary Artery: There is mild to moderate pulmonary hypertension. The   estimated pulmonary artery systolic pressure is 49 mmHg.    IVC/SVC: Intermediate venous pressure at 8 mmHg.    - He denies acute SOB but reports SLADE, SOB after activities such as eating requiring him to sit and use a CPAP for 2-3 hours, and lower extremity swelling right > left not much improved with his home PO Lasix and Spironolactone. He also reports requiring 2-3 liters supplemental oxygen while sleeping as well. He also reports eating bags of salty chips to help with his leg swelling.  - Cardiology consulted on this admit given new reduced EF with global wall motion and septal wall motion abnormalities Patient taken off Losartan and started on Entresto to treat his reduced EF and heart failure. Patient continued on his home Aldactone. Patient placed on Lasix 80 mg IV BID to diuresis patient in hospital and patient diuresising well and net negative 4.5 liters since admit. Patient now euvolemic on 9/20 so switched to po Lasix 40 mg po BID by Cardiology on 9/19 and will continue.   - Toprol XL 12.5 mg po daily for his chronic heart failure.   - Will need life vest on discharge if patient wishes, as is DNR baseline. Patient declining life vest at this time but willing to discuss with Cardiology as outpatient.   - Will need Cardiac PET stress as outpatient for ischemic evaluation.  - Fluid restriction 1.5 liters a day and low salt diet.       Renal/  Hypokalemia-resolved as of 9/19/2024  Resolved on discharge. Patient's most  "recent potassium results are listed below.   Recent Labs     09/22/24  0544 09/23/24  0157   K 4.0 4.1       Plan  - Replete potassium per protocol  - Monitor potassium Daily  - Patient's hypokalemia is  mildly under baseline 9/18 with goal 4 with PVCs so will replace  -     Hematology  Pulmonary embolism without acute cor pulmonale  Present on admit. CT chest/abdomen/pelvis as part of trauma work up in ED showed "Right-sided pulmonary embolism, detailed above, with nonocclusive thrombus in the main right pulmonary artery and right lower lobe branch vessels, and occlusive thrombus in a subsegmental branch of the right pulmonary artery. Note comparison images are not available to determine chronicity. No right sided heart strain."   - Denies previous diagnosis or PE/DVT.  - Denies chest pain or SOB.  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 He requires 2-3L NC of oxygen at home/sleeping and uses a CPAP after meals (exertional to patient) and for naps.  - Currently stable on 2-3L NC oxygen. No hypotension. Patient has baseline dyspnea when mobilizing sideways in bed even prior to this admit.   - Labs with elevated Troponin 0.811>1.041 and .   - Initialed on Heparin drip IV to treat PE and transitioned to therapeutic oral Apixiban post-op to treat with Apixiban 10 mg po BID x 7 days (started on evening of 9/20) and to complete on 9/27 followed by indefinite treatment of Apixiban 5 mg po BID starting on 9/28.   - Cards consulted, continue current therapies. No indication to activate PE response team at this time.   - No signs of right heart strain  - US of lower extremities on this admit negative for DVTs so IVC filter not indicated.     Endocrine  Hypothyroidism due to acquired atrophy of thyroid  Chronic and controlled. Continue home Levothyroxine to treat on discharge.      GI  Slow transit constipation  Resolved. Patient with multiple bowel movements on am of 9/24. Baseline, on bowel regimen to treat with scheduled " Senakot and Miralax with Doculax suppository prn.       Orthopedic  * Closed nondisplaced fracture of anterior column of right acetabulum with routine healing s/p ORIF of pelvis on 9/20/2024  Closed pelvic ring fracture s/p ORIF of pelvis on 9/20/2024   78 y/o male presenting after ground level fall with right hip pain on standing. CT scan showed acute closed fractures of the right superior and inferior pubic rami with involvement of the anterosuperior acetabulum.   - Orthopedic surgery consulted by ED and recommended weight bear as tolerated to right lower extremity and trial of PT/OT and pain management.   - PT/OT consulted and patient not progressing and only able to take a few shuffling steps and total assist x 2 people. Patient limited by dyspnea and pain. Orthopedics discussed with patient and his son and plan to proceed with operative intervention on pelvic fractures with percutaneous screw fixation. Patient taken to OR on 9/20/2024 with Dr. Negrito Stapleton and underwent percutaneous screw fixation of pelvic ring and right acetabular fractures.  - Post-op, patient is weight bear as tolerated and range of motion as tolerate to bilateral lower extremities. PT/OT re consulted post-op and worked with patient this am and recommending high intensity therapy. Patient agreeable to post acute placement on discharge and will need SNF placement on discharge and he agrees and states he does not want any facility in [place] area where he is from. Patient accepted to [facility] for admission pending bowel movement. Patient had BM on am of 9/24 and discharged to [facility] in good condition.   - Continue pain control with scheduled Tylenol 1000 mg po every 8 hours but increase Robaxin 750 mg po 4 times daily for pain management with Oxycodone IR 5 mg po every 4 hours prn for pain management on discharge.   - Fall precautions on discharge.  - Patient on therapeutic Apixiban post-op for treatment of PE and also for his  atrial flutter so fully anticoagulated so no chemical DVT prophylaxis needed post-op and continue Apixiban long term on discharge.   - Surgical bandages to remain in place post-op until Orthopedic clinic follow-up.     Bug bites-resolved as of 9/21/2024  Resolved.   - Reportedly bitten by multiple small ants outside prior to admit.   - He has small red lesions on skin consistent with ant bites but no pustules, no pain or itching currently .   - Benadryl cream prn.  - Supportive care, monitor.     Palliative Care  Advance care planning  - LaPOST on file reviewed, DNR order placed. DNR on hold for cardioversion today and for Ortho surgery tomorrow and resume post-op and patient okay with holding DNR for procedures and surgery.   - Patient back to DNR now that all surgeries and procedures complete.       Other  Insomnia  Chronic and controlled. Continue home Mirtazapine to treat on discharge.     AKHIL (obstructive sleep apnea)  Chronic and controlled. Patient on CPAP at home at night.      Final Active Diagnoses:    Diagnosis Date Noted POA    PRINCIPAL PROBLEM:  Closed nondisplaced fracture of anterior column of right acetabulum with routine healing s/p ORIF of pelvis on 9/20/2024 [S32.434D] 09/03/2021 Not Applicable    Pulmonary embolism without acute cor pulmonale [I26.99] 09/16/2024 Yes    Acute on chronic combined systolic and diastolic congestive heart failure [I50.43]  Yes    Paroxysmal atrial flutter [I48.92] 09/06/2023 Yes    Essential hypertension [I10] 06/25/2017 Yes    Chronic hypoxemic respiratory failure [J96.11] 09/16/2024 Yes    Bradycardia [R00.1] 08/06/2021 Yes    Advance care planning [Z71.89] 09/25/2021 Not Applicable    PVC's (premature ventricular contractions) [I49.3] 06/13/2022 Yes    AKHIL (obstructive sleep apnea) [G47.33] 10/05/2022 Yes    Coronary artery disease involving native coronary artery of native heart without angina pectoris [I25.10]  Yes    Insomnia [G47.00] 09/16/2024 Yes     Hypothyroidism due to acquired atrophy of thyroid [E03.4] 06/25/2017 Yes    Pure hypercholesterolemia [E78.00] 06/25/2017 Yes    Slow transit constipation [K59.01] 09/16/2024 Yes    Closed pelvic ring fracture s/p ORIF of pelvis on 9/20/2024 [S32.810A] 09/21/2024 Yes    Elevated troponin [R79.89] 09/16/2024 Yes    Abnormal ventricular wall motion [R93.89] 09/16/2024 Yes    Pulmonary hypertension [I27.20] 11/15/2023 Yes      Problems Resolved During this Admission:    Diagnosis Date Noted Date Resolved POA    Hypokalemia [E87.6] 09/18/2024 09/19/2024 No    Bug bites [W57.XXXA] 09/16/2024 09/21/2024 Yes    Abnormal EKG [R94.31] 09/16/2024 09/19/2024 Yes    Closed fracture of anterior column of right acetabulum [S32.431A] 09/16/2024 09/18/2024 Yes    PAC (premature atrial contraction) [I49.1] 09/16/2024 09/18/2024 Yes    Fall [W19.XXXA] 09/02/2021 09/18/2024 Yes       Discharged Condition: good    Disposition: Skilled Nursing Facility ([facility])    Follow Up:    Future Appointments   Date Time Provider Department Center   9/26/2024 10:20 AM Elio Ackerman MD [facility] CARDIO [place]   10/4/2024  8:45 AM Red Ken NP [facility] ORTHO [facility]   10/28/2024  1:30 PM CARDIAC, PET IMAGING [facility] SOPHIE [place] [facility]   11/1/2024 10:45 AM Red Ken NP [facility] ORTHO [facility] [facility]   1/27/2025 10:40 AM Ramirez Levine MD [facility] MED [facility]       Patient Instructions:      Diet Cardiac     Leave dressing on - Keep it clean, dry, and intact until clinic visit     Notify your health care provider if you experience any of the following:  temperature >100.4     Notify your health care provider if you experience any of the following:  persistent nausea and vomiting or diarrhea     Notify your health care provider if you experience any of the following:  severe uncontrolled pain     Notify your health care provider if you experience any of the following:  redness, tenderness, or signs of infection (pain,  swelling, redness, odor or green/yellow discharge around incision site)     Notify your health care provider if you experience any of the following:  difficulty breathing or increased cough     Notify your health care provider if you experience any of the following:  severe persistent headache     Notify your health care provider if you experience any of the following:  worsening rash     Notify your health care provider if you experience any of the following:  persistent dizziness, light-headedness, or visual disturbances     Notify your health care provider if you experience any of the following:  increased confusion or weakness     Cardiac PET Scan Stress   Standing Status: Future Standing Exp. Date: 09/18/25     Order Specific Question Answer Comments   Which medicaton for the stress procedure? Regadenoson    Release to patient Immediate      Weight bearing restrictions (specify):   Order Comments: Weight bear as tolerated and range of motion as tolerated to bilateral lower extremities       Significant Diagnostic Studies: Labs: CMP   Recent Labs   Lab 09/23/24 0157      K 4.1   CL 99   CO2 32*   *   BUN 33*   CREATININE 1.3   CALCIUM 8.8   ANIONGAP 8   , CBC   Recent Labs   Lab 09/23/24 0157   WBC 7.65   HGB 12.6*   HCT 39.2*          Pending Diagnostic Studies:       None           Medications:  Reconciled Home Medications:      Medication List        START taking these medications      amiodarone 200 MG Tab  Commonly known as: PACERONE  Take 2 tablets (400 mg total) by mouth 2 (two) times daily for 4 days, THEN 1 tablet (200 mg total) once daily.  Start taking on: September 23, 2024     apixaban 5 mg Tab  Commonly known as: ELIQUIS  Take 2 tablets (10 mg total) by mouth 2 (two) times daily for 4 days, THEN 1 tablet (5 mg total) 2 (two) times daily.  Start taking on: September 23, 2024     empagliflozin 10 mg tablet  Commonly known as: Jardiance  Take 1 tablet (10 mg total) by mouth once  daily.     melatonin 3 mg tablet  Commonly known as: MELATIN  Take 2 tablets (6 mg total) by mouth nightly as needed for Insomnia.     methocarbamoL 750 MG Tab  Commonly known as: ROBAXIN  Take 1 tablet (750 mg total) by mouth 4 (four) times daily.     oxyCODONE 5 MG immediate release tablet  Commonly known as: ROXICODONE  Take 1 tablet (5 mg total) by mouth every 4 (four) hours as needed for Pain.     sacubitriL-valsartan  mg per tablet  Commonly known as: ENTRESTO  Take 1 tablet by mouth 2 (two) times daily.     senna-docusate 8.6-50 mg 8.6-50 mg per tablet  Commonly known as: PERICOLACE  Take 1 tablet by mouth once daily.            CHANGE how you take these medications      acetaminophen 500 MG tablet  Commonly known as: TYLENOL  Take 2 tablets (1,000 mg total) by mouth every 8 (eight) hours.  What changed:   medication strength  how much to take  when to take this  reasons to take this     metoprolol succinate 25 MG 24 hr tablet  Commonly known as: TOPROL-XL  Take 0.5 tablets (12.5 mg total) by mouth once daily.  What changed:   medication strength  how much to take  when to take this     mirtazapine 7.5 MG Tab  Commonly known as: REMERON  Take 1 tablet (7.5 mg total) by mouth nightly. One tablet po each night for sleep  What changed:   how much to take  how to take this  when to take this            CONTINUE taking these medications      aspirin 81 MG EC tablet  Commonly known as: ECOTRIN  Take 1 tablet (81 mg total) by mouth once daily.     atorvastatin 10 MG tablet  Commonly known as: LIPITOR  Take 1 tablet by mouth once daily     CENTRUM SILVER 0.4 mg-300 mcg- 250 mcg Tab  Generic drug: multivit-min-FA-lycopen-lutein  Take 1 tablet by mouth once daily.     furosemide 40 MG tablet  Commonly known as: LASIX  Take 1 tablet (40 mg total) by mouth 2 (two) times a day. Take daily for next 3 days, then use as needed. Weigh daily. Afterwards, f weight increases 2 lbs/24h, take dose of lasix daily  until  weight is back to baseline     levothyroxine 200 MCG tablet  Commonly known as: SYNTHROID  Take 1 tablet by mouth once daily     spironolactone 25 MG tablet  Commonly known as: ALDACTONE  Take 1 tablet by mouth once daily            STOP taking these medications      amLODIPine 2.5 MG tablet  Commonly known as: NORVASC     losartan 100 MG tablet  Commonly known as: COZAAR              Indwelling Lines/Drains at time of discharge:   Lines/Drains/Airways       None                   33 minutes of time spent on discharge, including examining the patient, providing discharge instructions, arranging follow-up and documentation.         Annelise Bray MD  Department of Hospital Medicine  [facility] - Cardiology Stepdown

## 2024-09-24 NOTE — ASSESSMENT & PLAN NOTE
"Pulmonary hypertension   Patient now euvolemic on exam. Acute heart failure resolved. Continue Toprol XL, Entresto, Lasix for heart failure. Start Jardiance on discharge for his heart failure.   Patient is identified as having Combined Systolic and Diastolic heart failure that is Acute on chronic. CHF is currently controlled. Latest ECHO performed and demonstrates- Results for orders placed during the hospital encounter of 10/04/23  Echo    Result Date: 9/16/2024    Technically difficult study    Left Ventricle: The left ventricle is normal in size. Normal wall   thickness. Severe global hypokinesis present. Septal motion is abnormal.   There is severely reduced systolic function with a visually estimated   ejection fraction of 20 - 25%. Unable to assess diastolic function due to   atrial flutter.    Right Ventricle: Mild right ventricular enlargement. Wall thickness is   normal. Systolic function is moderately reduced.    Severe biatrial enlargement    Mitral Valve: There is mild regurgitation.    Pulmonary Artery: There is mild to moderate pulmonary hypertension. The   estimated pulmonary artery systolic pressure is 49 mmHg.    IVC/SVC: Intermediate venous pressure at 8 mmHg.       No results for input(s): "BNP", "BNPTRIAGEBLO" in the last 168 hours.  - He denies acute SOB but reports SLADE, SOB after activities such as eating requiring him to sit and use a CPAP for 2-3 hours, and lower extremity swelling right > left not much improved with his home PO Lasix and Spironolactone. He also reports requiring 2-3 liters supplemental oxygen while sleeping as well. He also reports eating bags of salty chips to help with his leg swelling.  - Cardiology consulted on this admit given new reduced EF with global wall motion and septal wall motion abnormalities Patient taken off Losartan and started on Entresto to treat his reduced EF and heart failure. Patient continued on his home Aldactone. Patient placed on Lasix 80 mg IV BID " to diuresis patient in hospital and patient diuresising well and net negative 4.5 liters since admit. Patient now euvolemic on 9/20 so switched to po Lasix 40 mg po BID by Cardiology on 9/19 and will continue.   - Toprol XL 12.5 mg po daily for his chronic heart failure.   - Will need life vest on discharge if patient wishes, as is DNR baseline. Patient declining life vest at this time but willing to discuss with Cardiology as outpatient.   - Will need Cardiac PET stress as outpatient for ischemic evaluation.  - Fluid restriction 1.5 liters a day and low salt diet.   - Monitor response to treatment.

## 2024-09-24 NOTE — ASSESSMENT & PLAN NOTE
"Present on admit. CT chest/abdomen/pelvis as part of trauma work up in ED showed "Right-sided pulmonary embolism, detailed above, with nonocclusive thrombus in the main right pulmonary artery and right lower lobe branch vessels, and occlusive thrombus in a subsegmental branch of the right pulmonary artery. Note comparison images are not available to determine chronicity. No right sided heart strain."   - Denies previous diagnosis or PE/DVT.  - Denies chest pain or SOB.  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 He requires 2-3L NC of oxygen at home/sleeping and uses a CPAP after meals (exertional to patient) and for naps.  - Currently stable on 2-3L NC oxygen. No hypotension. Patient has baseline dyspnea when mobilizing sideways in bed even prior to this admit.   - Labs with elevated Troponin 0.811>1.041 and .   - Initialed on Heparin drip IV to treat PE and transitioned to therapeutic oral Apixiban post-op to treat with Apixiban 10 mg po BID x 7 days (started on evening of 9/20) and to complete on 9/27 followed by indefinite treatment of Apixiban 5 mg po BID starting on 9/28.   - Cards consulted, continue current therapies. No indication to activate PE response team at this time.   - No signs of right heart strain  - US of lower extremities on this admit negative for DVTs so IVC filter not indicated.   "

## 2024-09-24 NOTE — PLAN OF CARE
Plan of care discussed with patient,fall precautions in place.Patient has no complaints of pain. Discussed medications and care. Patient has no questions at this time. Will continue to monitor.     Problem: Adult Inpatient Plan of Care  Goal: Plan of Care Review  9/24/2024 1300 by Gualberto Shultz RN  Outcome: Progressing  9/24/2024 1259 by Gualberto Shultz RN  Outcome: Progressing  Goal: Patient-Specific Goal (Individualized)  9/24/2024 1300 by Gualberto Shultz RN  Outcome: Progressing  9/24/2024 1259 by Gualberto Shultz RN  Outcome: Progressing  Goal: Absence of Hospital-Acquired Illness or Injury  9/24/2024 1300 by Gualberto Shultz RN  Outcome: Progressing  9/24/2024 1259 by Gualberto Shultz RN  Outcome: Progressing  Goal: Optimal Comfort and Wellbeing  9/24/2024 1300 by Gualberto Shultz RN  Outcome: Progressing  9/24/2024 1259 by Gualberto Shultz RN  Outcome: Progressing  Goal: Readiness for Transition of Care  9/24/2024 1300 by Gualberto Shultz RN  Outcome: Progressing  9/24/2024 1259 by Gualberto Shultz RN  Outcome: Progressing     Problem: Infection  Goal: Absence of Infection Signs and Symptoms  9/24/2024 1300 by Gualberto Shultz RN  Outcome: Progressing  9/24/2024 1259 by Gualberto Shultz RN  Outcome: Progressing     Problem: Wound  Goal: Optimal Coping  9/24/2024 1300 by Gualberto Shultz RN  Outcome: Progressing  9/24/2024 1259 by Gualberto Shultz RN  Outcome: Progressing  Goal: Optimal Functional Ability  9/24/2024 1300 by Gualberto Shultz RN  Outcome: Progressing  9/24/2024 1259 by Gualberto Shultz RN  Outcome: Progressing  Goal: Absence of Infection Signs and Symptoms  9/24/2024 1300 by Gualberto Shultz RN  Outcome: Progressing  9/24/2024 1259 by Gualberto Shultz RN  Outcome: Progressing  Goal: Improved Oral Intake  9/24/2024 1300 by Gualberto Shultz RN  Outcome: Progressing  9/24/2024 1259 by Lexii, Labresha, RN  Outcome: Progressing  Goal: Optimal Pain Control  and Function  9/24/2024 1300 by Gualberto Shultz RN  Outcome: Progressing  9/24/2024 1259 by Gualberto Shultz RN  Outcome: Progressing  Goal: Skin Health and Integrity  9/24/2024 1300 by Gualberto Shultz RN  Outcome: Progressing  9/24/2024 1259 by Gualberto Shultz RN  Outcome: Progressing  Goal: Optimal Wound Healing  9/24/2024 1300 by Gualberto Shultz RN  Outcome: Progressing  9/24/2024 1259 by Gualberto Shultz RN  Outcome: Progressing     Problem: Skin Injury Risk Increased  Goal: Skin Health and Integrity  9/24/2024 1300 by Gualberto Shultz RN  Outcome: Progressing  9/24/2024 1259 by Gualberto Shultz RN  Outcome: Progressing     Problem: Fall Injury Risk  Goal: Absence of Fall and Fall-Related Injury  9/24/2024 1300 by Gualberto Shultz RN  Outcome: Progressing  9/24/2024 1259 by Gualberto Shultz RN  Outcome: Progressing

## 2024-09-24 NOTE — ASSESSMENT & PLAN NOTE
Chronic, controlled. Latest blood pressure and vitals reviewed-     Temp:  [97.3 °F (36.3 °C)-99.3 °F (37.4 °C)]   Pulse:  [48-83]   Resp:  [17-18]   BP: (100-152)/(51-82)   SpO2:  [94 %-98 %] .   Home meds for hypertension were reviewed and noted below.   Hypertension Medications               amLODIPine (NORVASC) 2.5 MG tablet Take 1 tablet by mouth once daily    furosemide (LASIX) 40 MG tablet Take 1 tablet (40 mg total) by mouth 2 (two) times a day. Take daily for next 3 days, then use as needed. Weigh daily. Afterwards, f weight increases 2 lbs/24h, take dose of lasix daily  until weight is back to baseline    losartan (COZAAR) 100 MG tablet Take 1 tablet by mouth once daily    metoprolol succinate (TOPROL-XL) 50 MG 24 hr tablet Take 1 tablet by mouth once daily    spironolactone (ALDACTONE) 25 MG tablet Take 1 tablet by mouth once daily     While in the hospital, will manage blood pressure as follows; Adjust home antihypertensive regimen as follows- Stopped Norvasc and Losartan by Cardiology and started on Entresto for new onset HFrEF and contineu Entresto on dischareg and Norvasc and Losartan stopped. Continue Lasix and Aldactone on discharge.

## 2024-09-24 NOTE — NURSING
Patient is ready for discharge. Patient stable alert and oriented. IVs removed. No complaints of pain. Discussed discharge plan. Reviewed medications and side effects, appointments, and answered questions with patient. RX will be handled at [facility]

## 2024-09-24 NOTE — PROGRESS NOTES
Alli Ahumada - Cardiology [facility] Medicine  Progress Note    Patient Name: Bean Salas  MRN: [id number]  Patient Class: IP- Inpatient   Admission Date: 9/15/2024  Length of Stay: 7 days  Attending Physician: Annelise Bray MD  Primary Care Provider: Ramirez Levine MD        Subjective:     Principal Problem:Closed nondisplaced fracture of anterior column of right acetabulum with routine healing        HPI:  Bean Salas is a 79 y.o. male with PMHx of  Bradycardia, PVCs, Hypertension, Coronary Artery Disease, Hyperlipidemia, HFpEF, Pulmonary HTN, AKHIL on CPAP, Chronic respiratory failure w/ home oxygen and hypothyroidism. He presents with right hip pain after ground level fall outside yesterday. He was doing some yard work and reaching for some piles of wood when a bunch of ants started crawling all over him and biting him. He tried to get away but forgot there was a step up from the grass to the concrete/carport next to him causing him to fall over onto his bottom/right side and then went more slowly back hitting his head as well.  Did not lose consciousness. He denies any headache or confusion. He did not have any pain right away. He had trouble pulling himself up off the ground and his cell phone was inside his house where he lives alone. He called out for a neighbor and a few of them were able to get him into a chair however when he tried to stand he had immediate sharp pain in his right hip and then called for an ambulance. He normally ambulates with a walker and is independent with ADLs. He does not have pain at rest currently.     He otherwise felt in his normal state of health prior to this. He denies chest pain. Denies acute SOB, fever, cough. However, reports somewhat chronic/gradually worsening SLADE and SOB after activities such as eating requiring him to sit and use a CPAP for 2-3 hours every afternoon and lower extremity swelling right > left not much improved with his home PO lasix  "and spironolactone. He also reports requiring 2-3L supplemental oxygen while sleeping. He has some little red marks all over his obody from the ant bites but is mostly un bothered by them. He denies any history of PE/DVT. While he is not super active he does go to the store for 2-3 hours weekly to shop and ambulates between 4 rooms in his house and completes work around the house. No recent surgery or prolonged immobilization.     ED course: Afebrile. RR 22-23. /44. HR . Saturating >94% on 2-3L NC. Labs notable for troponin 0.811>1.041. . XR/CT Imaging with acute fractures of the right superior and inferior pubic rami with possible involvement of the anterosuperior acetabulum. CT chest/abdomen/pelvis also notable for right-sided pulmonary embolism with nonocclusive thrombus in the main right pulmonary artery and right lower lobe branch vessels, and occlusive thrombus in a subsegmental branch of the right pulmonary artery, no right sided heart strain, unclear chronicity. CT head and cervical spine without acute findings.  Orthopedic surgery consulted for pelvic fracture. Started on heparin gtt for PE. Admitted to  for further manement.     Overview/Hospital Course:  Patient found down outside his home and unable to bear weight to right leg due to right hip pain. Patient brought to [facility] ED and on CT scan and X-rays found to have  and then eventually helped up found to have "Mildly displaced acute fracture of the right superior pubic ramus with questionable minimal extension into the anterosuperior acetabulum." CT scan of chest and abdomen and pelvis as per trauma protocol was done and noted "Right-sided pulmonary embolism, detailed above, with nonocclusive thrombus in the main right pulmonary artery and right lower lobe branch vessels, and occlusive thrombus in a subsegmental branch of the right pulmonary artery." Patient started on IV Heparin drip to treat PE. Orthopedics consulted and recommended " trial of PT/OT and pain management and weight bear as tolerated to right lower extremity. Patient on admit also noted to have elevated troponin 1 and BNP 900s. EKG showing some concerning changes with T wave inversions and ST depressions. Cardiology consulted and echo done and showed:    Left Ventricle: The left ventricle is normal in size. Normal wall thickness. Severe global hypokinesis present. Septal motion is abnormal. There is severely reduced systolic function with a visually estimated ejection fraction of 20 - 25%. Unable to assess diastolic function due to atrial flutter.    Right Ventricle: Mild right ventricular enlargement. Wall thickness is normal. Systolic function is moderately reduced.    Severe biatrial enlargement    Mitral Valve: There is mild regurgitation.    Pulmonary Artery: There is mild to moderate pulmonary hypertension. The estimated pulmonary artery systolic pressure is 49 mmHg.    IVC/SVC: Intermediate venous pressure at 8 mmHg.    Cardiology recommended to start to diuresis patient with Lasix 80 mg IV BID per Cards recs. Patient started on Entresto for depressed EF and titrated as per Cardiology recs and continue on Aldactone.  Cardiology recommending ischemic work-up as outpatient with PET stress and consideration of Life Vest on discharge. Patient expressed he wished to be DNR and not sure about life vest on discharge. Patient also having a number of PVCs and PACs in hospital and reviewed by Cardiology who noted patient in atrial flutter. After discussion with Cardiology they recommended DEMETRICE and cardioversion for his atrial flutter as patient symptomatic and felt could be contributing to his heart failure and low EF. Patient to go for DEMETRICE and cardioversion on 9/19. Patient not progressing with PT/OT as limited by dyspnea and pain to right hip despite multimodal pain medications. Discussed with Dr. Stapleton from Orthopedics on 9/19 and he stated he spoke with patient and his son and  after discussion plan to proceed with percutaneous screw fixation of right acetabular fracture and right sided pelvic fractures for 9/20. Patient had DEMETRICE and successful DCCV by Cardiology on 9/19 with return back to normal sinus rhythm. Patient started opn IV Amiodarone infusion after cardioversion and plan for 24 hours then switch to oral Amiodarone as per Cardiology recommendations. Patient switched from IV to po Lasix on 9/19 after cardioversion to Lasix 40 mg po BID as now euvolemic. Patient taken to OR on 9/20/2024 and underwent ORIF of pelvis by Dr. Negrito Stapleton. Post-op, patient is weight bear as tolerated and range of motion as tolerate to bilateral lower extremities. PT/OT re consulted post-op. Patient completed Amiodarone infusion on evening of 9/20. Patient started on Amiodarone 400 mg po BID for 14 days on am of 9/21 followed by Amiodarone 200 mg po daily as per Cardiology recs. Patient remains in sinus rhythm on 9/21. Patient reports pain controlled to right hip post-op. SNF referrals sent and patient accepted to [facility]. Patient approved but do not have BM and needs BM prior to discharge to [facility] so to be given Lactulose and Magnesium citrate to have BM. Once has BM will be able to go to [facility] and hopeful discharge on 9//24.     Interval History: Patient reports pain controlled to right hip post-op. SNF referrals sent today and patient accepted to [facility]. Patient agreeable to [facility]. Patient approved by insurance but do not have BM and needs BM prior to discharge to [facility] so to be given Lactulose and Magnesium citrate tonight to have BM. Once has BM will be able to go to [facility] and hopeful discharge tomorrow. Patient states he would like to avoid anything rectal if possible. Patient making progress with therapy but limited but weakness to right leg that is expected post-op and patient states he is trying as very motivated to getting better and walking again.  Patient states he has been working on bed exercises to help with muscle strengthening and moving his ankles an knee in bed to try and get some strength back. Patient remains in normal sinus rhythm. Labs reviewed and stable. Hgb stable at 12.6. Creatinine stable at 1.3 and was 1.2 yesterday.     Review of Systems   Constitutional:  Negative for fever.   Respiratory:  Negative for cough and shortness of breath.    Cardiovascular:  Negative for chest pain and leg swelling.   Gastrointestinal:  Negative for abdominal pain, nausea and vomiting.   Musculoskeletal:  Positive for arthralgias (Right hip).   Psychiatric/Behavioral:  Negative for agitation and confusion.      Objective:     Vital Signs (Most Recent):  Temp: 97.9 °F (36.6 °C) (09/23/24 1527)  Pulse: 63 (09/23/24 1800)  Resp: 18 (09/23/24 1527)  BP: 152/66 (09/23/24 1715)  SpO2: 99 % (09/23/24 1527) on 2 liters of oxygen Vital Signs (24h Range):  Temp:  [97.6 °F (36.4 °C)-98.3 °F (36.8 °C)] 97.9 °F (36.6 °C)  Pulse:  [54-73] 63  Resp:  [16-18] 18  SpO2:  [96 %-99 %] 99 %  BP: (111-152)/(58-70) 152/66     Weight: 101.6 kg (224 lb)  Body mass index is 31.24 kg/m².    Intake/Output Summary (Last 24 hours) at 9/23/2024 1914  Last data filed at 9/23/2024 1723  Gross per 24 hour   Intake 840 ml   Output 1750 ml   Net -910 ml         Physical Exam  Vitals and nursing note reviewed.   Constitutional:       General: He is awake. He is not in acute distress.     Appearance: Normal appearance. He is well-developed. He is obese. He is not ill-appearing.      Comments: Patient sitting up in bed. Patient in no apparent distress. Patient awake and alert.    Eyes:      Conjunctiva/sclera: Conjunctivae normal.   Cardiovascular:      Rate and Rhythm: Normal rate and regular rhythm.      Heart sounds: Normal heart sounds. No murmur heard.  Pulmonary:      Effort: Pulmonary effort is normal. No respiratory distress.      Breath sounds: Normal breath sounds. No wheezing.    Abdominal:      General: Abdomen is flat. Bowel sounds are normal. There is no distension.      Palpations: Abdomen is soft.      Tenderness: There is no abdominal tenderness.   Musculoskeletal:      Right lower leg: No edema.      Left lower leg: No edema.   Skin:     Findings: No erythema.   Neurological:      Mental Status: He is alert and oriented to person, place, and time.   Psychiatric:         Mood and Affect: Mood normal.         Behavior: Behavior normal. Behavior is cooperative.         Thought Content: Thought content normal.         Judgment: Judgment normal.             Significant Labs: CBC:   Recent Labs   Lab 09/22/24  0544 09/23/24  0157   WBC 7.25 7.65   HGB 12.5* 12.6*   HCT 39.7* 39.2*    179     CMP:   Recent Labs   Lab 09/22/24 0544 09/23/24  0157    139   K 4.0 4.1    99   CO2 30* 32*   * 164*   BUN 36* 33*   CREATININE 1.2 1.3   CALCIUM 8.7 8.8   ANIONGAP 8 8       Significant Imaging: I have reviewed all pertinent imaging results/findings within the past 24 hours.    Assessment/Plan:      * Closed nondisplaced fracture of anterior column of right acetabulum with routine healing s/p ORIF of pelvis on 9/20/2024  Closed pelvic ring fracture s/p ORIF of pelvis on 9/20/2024   78 y/o male presenting after ground level fall with right hip pain on standing. CT scan showed acute closed fractures of the right superior and inferior pubic rami with involvement of the anterosuperior acetabulum.   - Orthopedic surgery consulted by ED and recommended weight bear as tolerated to right lower extremity and trial of PT/OT and pain management.   - PT/OT consulted and patient not progressing and only able to take a few shuffling steps and total assist x 2 people. Patient limited by dyspnea and pain. Orthopedics discussed with patient and his son and plan to proceed with operative intervention on pelvic fractures with percutaneous screw fixation. Patient taken to OR on 9/20/2024 with  "Dr. Negrito Stapleton and underwent percutaneous screw fixation of pelvic ring and right acetabular fractures.  - Post-op, patient is weight bear as tolerated and range of motion as tolerate to bilateral lower extremities. PT/OT re consulted post-op and worked with patient this am and recommending high intensity therapy. Patient agreeable to post acute placement on discharge and will need SNF placement on discharge and he agrees and states he does not want any facility in [place] area where he is from. Patient accepted to [facility] for admission pending bowel movement. Patient agreeable to [facility] on discharge.   - Continue pain control with scheduled Tylenol 1000 mg po every 8 hours but increase Robaxin 750 mg po 4 times daily for pain management with Oxycodone IR 5 mg po every 4 hours prn for pain management.   - Fall precautions.  - Cardiology reports does not need PET stress/ischemic work up prior to a pelvic fracture surgery as that can be done as outpatient.   - Patient on therapeutic Apixiban post-op for treatment of PE and also for his atrial flutter so fully anticoagulated so no chemical DVT prophylaxis needed post-op.  - Surgical bandages to remain in place post-op until Orthopedic clinic follow-up.     Pulmonary embolism without acute cor pulmonale  Present on admit. CT chest/abdomen/pelvis as part of trauma work up in ED showed "Right-sided pulmonary embolism, detailed above, with nonocclusive thrombus in the main right pulmonary artery and right lower lobe branch vessels, and occlusive thrombus in a subsegmental branch of the right pulmonary artery. Note comparison images are not available to determine chronicity. No right sided heart strain."   - Denies previous diagnosis or PE/DVT.  - Denies chest pain or SOB.  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 He requires 2-3L NC of oxygen at home/sleeping and uses a CPAP after meals (exertional to patient) and for naps.  - Currently stable on 2-3L NC oxygen. " "No hypotension. Patient has baseline dyspnea when mobilizing sideways in bed even prior to this admit.   - Labs with elevated Troponin 0.811>1.041 and .   - Initialed on Heparin drip IV to treat PE and transitioned to therapeutic oral Apixiban post-op to treat with Apixiban 10 mg po BID x 7 days (started on evening of 9/20) followed by indefinite treatment of Apixiban 5 mg po BID.   - Cards consulted, continue current therapies. No indication to activate PE response team at this time.   - No signs of right heart strain  - US of lower extremities on this admit negative for DVTs so IVC filter not indicated.     Acute on chronic combined systolic and diastolic congestive heart failure  Pulmonary hypertension   Patient now euvolemic on exam. Acute heart failure resolved. Continue Toprol XL, Entresto, Lasix for heart failure. Start Jardiance on discharge for his heart failure.   Patient is identified as having Combined Systolic and Diastolic heart failure that is Acute on chronic. CHF is currently controlled. Latest ECHO performed and demonstrates- Results for orders placed during the hospital encounter of 10/04/23  Echo    Result Date: 9/16/2024    Technically difficult study    Left Ventricle: The left ventricle is normal in size. Normal wall   thickness. Severe global hypokinesis present. Septal motion is abnormal.   There is severely reduced systolic function with a visually estimated   ejection fraction of 20 - 25%. Unable to assess diastolic function due to   atrial flutter.    Right Ventricle: Mild right ventricular enlargement. Wall thickness is   normal. Systolic function is moderately reduced.    Severe biatrial enlargement    Mitral Valve: There is mild regurgitation.    Pulmonary Artery: There is mild to moderate pulmonary hypertension. The   estimated pulmonary artery systolic pressure is 49 mmHg.    IVC/SVC: Intermediate venous pressure at 8 mmHg.       No results for input(s): "BNP", "BNPTRIAGEBLO" " in the last 168 hours.  - He denies acute SOB but reports SLADE, SOB after activities such as eating requiring him to sit and use a CPAP for 2-3 hours, and lower extremity swelling right > left not much improved with his home PO Lasix and Spironolactone. He also reports requiring 2-3 liters supplemental oxygen while sleeping as well. He also reports eating bags of salty chips to help with his leg swelling.  - Cardiology consulted on this admit given new reduced EF with global wall motion and septal wall motion abnormalities Patient taken off Losartan and started on Entresto to treat his reduced EF and heart failure. Patient continued on his home Aldactone. Patient placed on Lasix 80 mg IV BID to diuresis patient in hospital and patient diuresising well and net negative 4.5 liters since admit. Patient now euvolemic on 9/20 so switched to po Lasix 40 mg po BID by Cardiology on 9/19 and will continue.   - Toprol XL 12.5 mg po daily for his chronic heart failure.   - Will need life vest on discharge if patient wishes, as is DNR baseline. Patient declining life vest at this time but willing to discuss with Cardiology as outpatient.   - Will need Cardiac PET stress as outpatient for ischemic evaluation.  - Fluid restriction 1.5 liters a day and low salt diet.   - Monitor response to treatment.     Paroxysmal atrial flutter  - Resolved after cardioversion on 9/19. Patient remains in sinus rhythm. Patient completed 24 hour IV infusion of Amiodarone post cardioversion and started on Amiodarone 400 mg po BID x 14 days followed b y Amiodarone 200 mg po daily for indefinite treatment. Continue Toprol XL for rate control. Continue Apixiban for long term anticoagulation.   - Per chart review, his cardiologist Dr. Hank Barry feels EKGs that were read as atrial fibrillation previously are actually sinus rhythm and thus not on long term anticoagulation on admit.   - Cardiology consulted here who report current rhythm is atrial  flutter and report if does not self convert may need DEMETRICE/DCCV once euvolemic. Patient now euvolemic on 9/19 and remains in atrial flutter and rate is controlled at 45-65 on Toprol XL. Patient taken for DEMETRICE and underwent successful cardioversion by Cardiology on 9/19 and back in sinus rhythm. Patient on IV Amiodarone infusion as per Cardiology and needs for 24 hours and scheduled to end tonight and plan to switch to po Amiodarone on 9/21.   - Patient transitioned to oral Apixiban post-op for long term anticoagulation for elevated CHADsVasc score.     Essential hypertension  Chronic, controlled. Latest blood pressure and vitals reviewed-     Temp:  [97.6 °F (36.4 °C)-98.3 °F (36.8 °C)]   Pulse:  [54-73]   Resp:  [16-18]   BP: (111-152)/(58-70)   SpO2:  [96 %-99 %] .   Home meds for hypertension were reviewed and noted below.   Hypertension Medications               amLODIPine (NORVASC) 2.5 MG tablet Take 1 tablet by mouth once daily    furosemide (LASIX) 40 MG tablet Take 1 tablet (40 mg total) by mouth 2 (two) times a day. Take daily for next 3 days, then use as needed. Weigh daily. Afterwards, f weight increases 2 lbs/24h, take dose of lasix daily  until weight is back to baseline    losartan (COZAAR) 100 MG tablet Take 1 tablet by mouth once daily    metoprolol succinate (TOPROL-XL) 50 MG 24 hr tablet Take 1 tablet by mouth once daily    spironolactone (ALDACTONE) 25 MG tablet Take 1 tablet by mouth once daily     While in the hospital, will manage blood pressure as follows; Adjust home antihypertensive regimen as follows- Stopped Norvasc and Losartan by Cardiology and started on Entresto for new onset HFrEF . Continue Lasix and Aldactone.       Chronic hypoxemic respiratory failure  Controlled. 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 He is on home oxygen at 2-3 LPM. Supplemental oxygen was provided and noted-at 2 liters of oxygen.      Signs/symptoms of respiratory failure include- tachypnea and  increased work of breathing. Contributing diagnoses includes - CHF and pulmonary embolus. Labs and images were reviewed. Patient Has not had a recent ABG. Will treat underlying causes and adjust management of respiratory failure as follows- IV Lasix for heart failure and IV Heparin for PE.     Bradycardia  - Patient has history of atrial bigeminy and bradycardia per chart. Has followed with EP.  - Monitor telemetry.  - HR ranges 40s-80s at times. Hold Metoprolol if HR sustains < 60.    Advance care planning  - [facility] on file reviewed, DNR order placed. DNR on hold for cardioversion today and for Ortho surgery tomorrow and resume post-op and patient okay with holding DNR for procedures and surgery.   - Patient back to DNR now that all surgeries and procedures complete.       PVC's (premature ventricular contractions)  Patient noted in history and seen on telemetry on floor for PVC's. Keep Magnesium > 2, Potassium >4. Monitor telemetry. Continue Toprol XL to treat.     AKHIL (obstructive sleep apnea)  Chronic and controlled. Patient on CPAP at home at night. Continue CPAP nightly to treat.      Coronary artery disease involving native coronary artery of native heart without angina pectoris  Elevated troponin  Abnormal ventricular wall motion   - Patient with known CAD which is controlled. Will continue home Aspirin, Toprol XL and Lipitor daily to treat and monitor for S/Sx of angina/ACS. Continue to monitor on telemetry. Previous angiogram with non obstructive CAD 2021.   - Patient with elevated troponin on admit likely from PE but EKG with ST depressions, T wave inversions that are new so Cardiology consulted and appreciate recs. Echo done with new septal wall motion abnormaliteis and decreased EF at 20-25%. Cardiology recommended for outpatient PET cardiac stress for ischemic work up.    Insomnia  Chronic and controlled. Continue home Mirtazapine to treat.     Hypothyroidism due to acquired atrophy of  thyroid  Chronic and controlled. Continue home Levothyroxine to treat.      Pure hypercholesterolemia  Chronic and controlled. Continue home Lipitor to treat.      Slow transit constipation  Baseline, on bowel regimen to treat with scheduled Senakot and Miralax with Doculax suppository prn. Giving Lactulose and Magnesium citrate on 9/23 to help with BM.       VTE Risk Mitigation (From admission, onward)           Ordered     apixaban tablet 10 mg  2 times daily         09/20/24 1613     Place sequential compression device  Until discontinued         09/16/24 0428     Reason for No Pharmacological VTE Prophylaxis  Once        Comments: Heparin gtt for PE   Question:  Reasons:  Answer:  Physician Provided (leave comment)    09/16/24 0428     IP VTE HIGH RISK PATIENT  Once         09/16/24 0428                    Discharge Planning   LATOSHA: 9/24/2024     Code Status: DNR   Is the patient medically ready for discharge?:     Reason for patient still in hospital (select all that apply): Patient trending condition  Discharge Plan A: Skilled Nursing Facility ([facility]) pending bowel movement.          Annelise Bray MD  Department of Hospital Medicine   [facility] - Cardiology Stepdown

## 2024-09-24 NOTE — PLAN OF CARE
CHW met with patient/family at bedside. Patient experience rounding completed and reviewed the following.     Do you know your discharge plan? Yes or No,    If yes, what is the plan? (Home, Home Health, Rehab, SNF, LTAC, or Other) Yes SNF    Have you discussed your needs and preferences with your SW/CM? Yes or No  Yes SNF    If you are discharging home, do you have help at home? Yes or No Yes SNF Staff    Do you think you will need help additional at home at discharge? Yes or No  No    Do you currently have difficulty keeping up with bills, affording medicine or buying food? Yes or No No    Assigned SW/CM notified of any patient/family needs or concerns. Appropriate resources provided to address patient's needs.  JOSE M Kelley  Case Management  [phone]

## 2024-09-24 NOTE — ASSESSMENT & PLAN NOTE
Pulmonary hypertension   Patient now euvolemic on exam. Acute heart failure resolved. Continue Toprol XL, Entresto, Lasix for heart failure on discharge. Start Jardiance on discharge for his heart failure. Follow-up with Cardiology as outpatient on discharge for ischemic evaluation and monitoring of his cardiomyopathy.   Patient is identified as having Combined Systolic and Diastolic heart failure that is Acute on chronic. CHF is currently controlled. Latest ECHO performed and demonstrates- Results for orders placed during the hospital encounter of 10/04/23  Echo    Result Date: 9/16/2024    Technically difficult study    Left Ventricle: The left ventricle is normal in size. Normal wall   thickness. Severe global hypokinesis present. Septal motion is abnormal.   There is severely reduced systolic function with a visually estimated   ejection fraction of 20 - 25%. Unable to assess diastolic function due to   atrial flutter.    Right Ventricle: Mild right ventricular enlargement. Wall thickness is   normal. Systolic function is moderately reduced.    Severe biatrial enlargement    Mitral Valve: There is mild regurgitation.    Pulmonary Artery: There is mild to moderate pulmonary hypertension. The   estimated pulmonary artery systolic pressure is 49 mmHg.    IVC/SVC: Intermediate venous pressure at 8 mmHg.    - He denies acute SOB but reports SLADE, SOB after activities such as eating requiring him to sit and use a CPAP for 2-3 hours, and lower extremity swelling right > left not much improved with his home PO Lasix and Spironolactone. He also reports requiring 2-3 liters supplemental oxygen while sleeping as well. He also reports eating bags of salty chips to help with his leg swelling.  - Cardiology consulted on this admit given new reduced EF with global wall motion and septal wall motion abnormalities Patient taken off Losartan and started on Entresto to treat his reduced EF and heart failure. Patient continued on  his home Aldactone. Patient placed on Lasix 80 mg IV BID to diuresis patient in hospital and patient diuresising well and net negative 4.5 liters since admit. Patient now euvolemic on 9/20 so switched to po Lasix 40 mg po BID by Cardiology on 9/19 and will continue.   - Toprol XL 12.5 mg po daily for his chronic heart failure.   - Will need life vest on discharge if patient wishes, as is DNR baseline. Patient declining life vest at this time but willing to discuss with Cardiology as outpatient.   - Will need Cardiac PET stress as outpatient for ischemic evaluation.  - Fluid restriction 1.5 liters a day and low salt diet.

## 2024-09-24 NOTE — ASSESSMENT & PLAN NOTE
Resolved. Patient with multiple bowel movements on am of 9/24. Baseline, on bowel regimen to treat with scheduled Senakot and Miralax with Doculax suppository prn.

## 2024-09-24 NOTE — ASSESSMENT & PLAN NOTE
Patient noted in history and seen on telemetry on floor for PVC's. Keep Magnesium > 2, Potassium >4. Monitor telemetry. Continue Toprol XL to treat on discharge.

## 2024-09-24 NOTE — ASSESSMENT & PLAN NOTE
- [facility] on file reviewed, DNR order placed. DNR on hold for cardioversion today and for Ortho surgery tomorrow and resume post-op and patient okay with holding DNR for procedures and surgery.   - Patient back to DNR now that all surgeries and procedures complete.

## 2024-09-24 NOTE — ASSESSMENT & PLAN NOTE
Elevated troponin  Abnormal ventricular wall motion   - Patient with known CAD which is controlled. Will continue home Aspirin, Toprol XL and Lipitor daily to treat and monitor for S/Sx of angina/ACS. Continue to monitor on telemetry. Previous angiogram with non obstructive CAD 2021.   - Patient with elevated troponin on admit likely from PE but EKG with ST depressions, T wave inversions that are new so Cardiology consulted and appreciate recs. Echo done with new septal wall motion abnormaliteis and decreased EF at 20-25%. Cardiology recommended for outpatient PET cardiac stress for ischemic work up. PET stress ordered and to be done as outpatient.

## 2024-09-24 NOTE — ASSESSMENT & PLAN NOTE
- Resolved after cardioversion on 9/19. Patient remains in sinus rhythm. Patient completed 24 hour IV infusion of Amiodarone post cardioversion and started on Amiodarone 400 mg po BID x 14 days followed by Amiodarone 200 mg po daily for indefinite treatment. Continue Toprol XL for rate control. Continue Apixiban for long term anticoagulation.   - Per chart review, his cardiologist Dr. Hank Barry feels EKGs that were read as atrial fibrillation previously are actually sinus rhythm and thus not on long term anticoagulation on admit.   - Cardiology consulted here who report current rhythm is atrial flutter and report if does not self convert may need DEMETRICE/DCCV once euvolemic. Patient now euvolemic on 9/19 and remains in atrial flutter and rate is controlled at 45-65 on Toprol XL. Patient taken for DEMETRICE and underwent successful cardioversion by Cardiology on 9/19 and back in sinus rhythm. Patient on IV Amiodarone infusion as per Cardiology and needs for 24 hours and scheduled to end tonight and plan to switch to po Amiodarone on 9/21.   - Patient transitioned to oral Apixiban post-op for long term anticoagulation for elevated CHADsVasc score.

## 2024-09-24 NOTE — ASSESSMENT & PLAN NOTE
Closed pelvic ring fracture s/p ORIF of pelvis on 9/20/2024   80 y/o male presenting after ground level fall with right hip pain on standing. CT scan showed acute closed fractures of the right superior and inferior pubic rami with involvement of the anterosuperior acetabulum.   - Orthopedic surgery consulted by ED and recommended weight bear as tolerated to right lower extremity and trial of PT/OT and pain management.   - PT/OT consulted and patient not progressing and only able to take a few shuffling steps and total assist x 2 people. Patient limited by dyspnea and pain. Orthopedics discussed with patient and his son and plan to proceed with operative intervention on pelvic fractures with percutaneous screw fixation. Patient taken to OR on 9/20/2024 with Dr. Negrito Stapleton and underwent percutaneous screw fixation of pelvic ring and right acetabular fractures.  - Post-op, patient is weight bear as tolerated and range of motion as tolerate to bilateral lower extremities. PT/OT re consulted post-op and worked with patient this am and recommending high intensity therapy. Patient agreeable to post acute placement on discharge and will need SNF placement on discharge and he agrees and states he does not want any facility in [place] area where he is from. Patient accepted to [facility] for admission pending bowel movement. Patient had BM on am of 9/24 and discharged to [facility] in good condition.   - Continue pain control with scheduled Tylenol 1000 mg po every 8 hours but increase Robaxin 750 mg po 4 times daily for pain management with Oxycodone IR 5 mg po every 4 hours prn for pain management on discharge.   - Fall precautions on discharge.  - Patient on therapeutic Apixiban post-op for treatment of PE and also for his atrial flutter so fully anticoagulated so no chemical DVT prophylaxis needed post-op and continue Apixiban long term on discharge.   - Surgical bandages to remain in place post-op until  Orthopedic clinic follow-up.

## 2024-09-24 NOTE — NURSING
Nurses Note -- 4 Eyes      9/23/2024   7:30 PM      Skin assessed during: Q Shift Change      [] No Altered Skin Integrity Present    []Prevention Measures Documented      [x] Yes- Altered Skin Integrity Present or Discovered   [x] LDA Added if Not in Epic (Describe Wound)   [x] New Altered Skin Integrity was Present on Admit and Documented in LDA   [] Wound Image Taken    Wound Care Consulted? Yes    Attending Nurse:  Miguel Sanders RN/Staff Member: HUMPHREY Jett

## 2024-09-24 NOTE — ASSESSMENT & PLAN NOTE
Chronic, controlled. Latest blood pressure and vitals reviewed-     Temp:  [97.6 °F (36.4 °C)-98.3 °F (36.8 °C)]   Pulse:  [54-73]   Resp:  [16-18]   BP: (111-152)/(58-70)   SpO2:  [96 %-99 %] .   Home meds for hypertension were reviewed and noted below.   Hypertension Medications               amLODIPine (NORVASC) 2.5 MG tablet Take 1 tablet by mouth once daily    furosemide (LASIX) 40 MG tablet Take 1 tablet (40 mg total) by mouth 2 (two) times a day. Take daily for next 3 days, then use as needed. Weigh daily. Afterwards, f weight increases 2 lbs/24h, take dose of lasix daily  until weight is back to baseline    losartan (COZAAR) 100 MG tablet Take 1 tablet by mouth once daily    metoprolol succinate (TOPROL-XL) 50 MG 24 hr tablet Take 1 tablet by mouth once daily    spironolactone (ALDACTONE) 25 MG tablet Take 1 tablet by mouth once daily     While in the hospital, will manage blood pressure as follows; Adjust home antihypertensive regimen as follows- Stopped Norvasc and Losartan by Cardiology and started on Entresto for new onset HFrEF . Continue Lasix and Aldactone.

## 2024-09-24 NOTE — PT/OT/SLP PROGRESS
Physical Therapy   Progress Note    Patient Name:  Bean Salas  MRN: [id number]    Admit Date: 9/15/2024  Admitting Diagnosis:  Closed nondisplaced fracture of anterior column of right acetabulum with routine healing  Length of Stay: 8 days  Recent Surgery: Procedure(s) (LRB):  ORIF,PELVIS (Right) 4 Days Post-Op    Recommendations:     Discharge Recommendations: moderate intensity therapy  Equipment recommendations: bedside commode and hip kit  Barriers to discharge: Increased level of skilled assistance required and Fall risk     Assessment:     Bean Salas is a 79 y.o. male admitted to [facility] on 9/15/2024 with medical diagnosis of Closed nondisplaced fracture of anterior column of right acetabulum with routine healing. Pt presents with weakness, impaired endurance, impaired self care skills, impaired functional mobility, impaired balance, decreased coordination, decreased lower extremity function, pain, impaired coordination, impaired fine motor. Pt is progressing towards goals, but has not yet reached prior level of function.     Pt agreeable to therapy session. Continues to require significant 2 person assist for all mobility. Pt requires constant v/c to keep sajan UE on handrails vs keeping hands behind him while sitting up. Pt able to stand with significant 2 person assist with ~75% hip clearance. In standing, pt demonstrates varus leg posturing. Returned to supine at end of session. Anticipated d/c to SNF today.     Bean Salas would benefit from continued acute PT intervention to improve quality of life, focus on recovery of impairments, provide patient/caregiver education, reduce fall risk, and maximize (I) and safety with functional mobility. Once medically stable, recommending pt discharge to moderate intensity therapy. Patient continues to demonstrate the need for moderate intensity therapy on a daily basis post acute exhibited by decreased independence with functional mobility .      Rehab  "Prognosis: Good    Plan:     During this hospitalization, patient to be seen 4 x/week to address the identified rehab impairments via gait training, therapeutic activities, therapeutic exercises, neuromuscular re-education and progress towards stated goals.     Plan of Care Expires:  10/17/24  Plan of Care reviewed with: patient    This plan of care has been discussed with the patient/caregiver, who was included in its development and is in agreement with the identified goals and treatment plan.     Subjective     Communicated with RN prior to session.  Patient found supine upon PT entry to room, agreeable to therapy session. Pt alone during session.    Patient/Family Comments/goals: "I am supposed to leave today"    Pain/Comfort:  Pain Rating 1: 8/10  Location - Side 1: Right  Location 1: hip  Pain Addressed 1: Distraction, Reposition, Cessation of Activity    Patients cultural, spiritual, Religion conflicts given the current situation: None identified     Objective:   OT present for cotreat due to pt's multiple medical comorbidities and functional/cognition deficits requiring two skilled therapists to appropriately progress pt's musculoskeletal strength, neuromuscular control, and endurance while taking into consideration medical acuity and pt safety.    Patient found with: oxygen, telemetry    General Precautions: Standard, fall   Orthopedic Precautions: (BLE WBAT)   Braces:     Oxygen Device: nasal cannula    Cognition:  Pt is Alert during session.    Therapist provided skilled verbal and tactile cueing to facilitate the following functional mobility tasks. Listed tasks are focused on recovery of impairments and improving pt's independence and efficiency with bed mobility, transfers and ambulation as able.     Bed Mobility:  Supine > Sit: Maximum Assistancex2  Sit > Supine: Maximum Assistancex2    Transfers:   Sit <> Stand Transfer: Maximum Assistancex2 from eob with RW AD  75% upright posture achieved  "     Balance:  Dynamic Sitting:   Sitting with hands pushing into bed: Max  Sitting with unilateral UE on railing: Max  Sitting with bilateral UE on railing: Mod  Standing:  Static: 0: Needs MAXIMAL assist to maintain      Outcome Measure: AM-PAC 6 CLICK MOBILITY  Total Score:10     Patient/Caregiver Education and Additional Therapeutic Activities/Exercises     Provided pt/caregiver education regarding:   PT POC and goals for therapy   Safety with mobility and fall risk   Safe management of AD as needed   Energy conservation techniques   Instruction on use of call button and importance of calling nursing staff for assistance with mobility     Patient/caregiver able to verbalize understanding; will follow-up with pt/caregiver during current admit for additional questions/concerns within scope of practice.     White board updated.     Patient left HOB elevated with all lines intact, call button in reach, bed alarm on, and nsg notified.    Goals:     Multidisciplinary Problems       Physical Therapy Goals          Problem: Physical Therapy    Goal Priority Disciplines Outcome Goal Variances Interventions   Physical Therapy Goal     PT, PT/OT Progressing     Description: Goals to be met by: 10/5/24     Patient will increase functional independence with mobility by performin. Supine to sit with minimum assistance   2. Sit to stand transfer with minimum assistance   3. Bed to chair transfer with Minimal Assistance using Rolling Walker  4. Gait  x 20 feet with Minimal Assistance using Rolling Walker.   5. Stand for 5 minutes with Stand-by Assistance using Rolling Walker    Patient has a mobility limitation that significantly impairs their ability to participate in one or more mobility related activities of daily living, including toileting. This deficit can be resolved by using a bedside commode. Patient demonstrates mobility limitations that will cause them to be confined to one room at home without bathroom access  for up to 30 days. Using a bedside commode will greatly improve the patient's ability to participate in MRADLs.    Patient demonstrates a mobility limitation that significantly impairs their ability to participate in one or more mobility related activities of daily living. Patient's mobility limitation cannot be sufficiently resolved with the use of a cane, but can be sufficiently resolved with the use of a rolling walker.The use of a rolling walker will considerably improve their ability to participate in MRADLs. Patient will use the walker on a regular basis at home.                            Time Tracking:       PT Received On: 09/24/24  PT Start Time: 0949     PT Stop Time: 1015  PT Total Time (min): 26 min     Billable Minutes: Therapeutic Activity 13 and Neuromuscular Re-education 13    09/24/2024

## 2024-09-24 NOTE — ASSESSMENT & PLAN NOTE
Elevated troponin  Abnormal ventricular wall motion   - Patient with known CAD which is controlled. Will continue home Aspirin, Toprol XL and Lipitor daily to treat and monitor for S/Sx of angina/ACS. Continue to monitor on telemetry. Previous angiogram with non obstructive CAD 2021.   - Patient with elevated troponin on admit likely from PE but EKG with ST depressions, T wave inversions that are new so Cardiology consulted and appreciate recs. Echo done with new septal wall motion abnormaliteis and decreased EF at 20-25%. Cardiology recommended for outpatient PET cardiac stress for ischemic work up.

## 2024-09-24 NOTE — PT/OT/SLP PROGRESS
"Occupational Therapy  CO Treatment    Name: Bean Salas  MRN: [id number]  Admitting Diagnosis:  Closed nondisplaced fracture of anterior column of right acetabulum with routine healing  4 Days Post-Op    Recommendations:     Discharge Recommendations: High Intensity Therapy  Discharge Equipment Recommendations:  bedside commode, walker, rolling, hip kit  Barriers to discharge:       Assessment:     Bean Salas is a 79 y.o. male with a medical diagnosis of Closed nondisplaced fracture of anterior column of right acetabulum with routine healing. Performance deficits affecting function are weakness, impaired endurance, impaired self care skills, impaired functional mobility, gait instability, impaired balance, decreased safety awareness, pain. Pt with good participation in therapy, but requires lots of encouragement throughout session.     Rehab Prognosis:  Good; patient would benefit from acute skilled OT services to address these deficits and reach maximum level of function.       Plan:     Patient to be seen 4 x/week to address the above listed problems via self-care/home management, therapeutic activities, therapeutic exercises  Plan of Care Expires: 10/21/24  Plan of Care Reviewed with: patient    Subjective     Chief Complaint: "I did not sleep at all last night and y'all just woke me up again"  Patient/Family Comments/goals: Pt going to SNF  Pain/Comfort:  Pain Rating 1: 8/10  Location - Side 1: Right  Location 1: hip  Pain Addressed 1: Reposition, Distraction    Objective:     Communicated with: nsg prior to session.  Patient found supine with oxygen, PureWick, telemetry upon OT entry to room.  Co treatment to optimize pt safety and functional performance.   General Precautions: Standard, fall    Orthopedic Precautions: (BLE WBAT)  Braces: N/A  Respiratory Status: Nasal cannula, flow 2 L/min    Bed Mobility:    Pt required MAX A x2 for supine>sit     Functional Mobility/Transfers:  Pt required MAX A " sitting EOB with using BUE to maintain balance and support for 15mins. Pt alternating between unilateral support holding onto bed railings and resting UE posteriorly.   Pt required MAX A x2 for sit>stand from EOB with RW   Pt did not progress with any steps. Pt required multiple verbal cues to maintain upright posture throughout standing.     Activities of Daily Living:  Pt required MOD A with UE dressing   Pt required MIN A for performing grooming and hygiene with UE sponge bathing while seated EOB with MAX A to maintain sitting balance.     [facility] 6 Click ADL: 15    Treatment & Education:  Pt educated on UE exercises, shoulder flexion and chest press exercises 4x/day while laying supine in bed.   Pt educated on importance of therapy to maintain progress in getting back to PLOF.     Patient left supine with all lines intact and call button in reach    GOALS:   Multidisciplinary Problems       Occupational Therapy Goals          Problem: Occupational Therapy    Goal Priority Disciplines Outcome Interventions   Occupational Therapy Goal     OT, PT/OT Progressing    Description: Goals to be met by: 10/21/24     Patient will increase functional independence with ADLs by performing:    UE Dressing with Modified [place].  LE Dressing with Stand-by Assistance and AE prn.  Grooming while standing with Contact Guard Assistance.  Toileting from toilet/bedside commode with Stand-by Assistance for hygiene and clothing management.   Supine to sit with Contact Guard Assistance.  Toilet transfer to toilet/bedside commode with Minimal Assistance and LRAD.    Patient demonstrates a mobility limitation that significantly impairs their ability to participate in one or more mobility related activities of daily living. Patient's mobility limitation cannot be sufficiently resolved with the use of a cane, but can be sufficiently resolved with the use of a rolling walker.The use of a rolling walker will considerably improve their  ability to participate in MRADLs. Patient will use the walker on a regular basis at home.      Patient has a mobility limitation that significantly impairs their ability to participate in one or more mobility related activities of daily living, including toileting. This deficit can be resolved by using a bedside commode. Patient demonstrates mobility limitations that will cause them to be confined to one room at home without bathroom access for up to 30 days. Using a bedside commode will greatly improve the patient's ability to participate in MRADLs.                          Time Tracking:     OT Date of Treatment: 09/24/24  OT Start Time: 0947  OT Stop Time: 1015  OT Total Time (min): 28 min    Billable Minutes:Self Care/Home Management 14  Therapeutic Activity 14    OT/COURTNEY: OT     Number of COURTNEY visits since last OT visit: 1 9/24/2024

## 2024-09-24 NOTE — NURSING
Pt arrived to floor for skilled services with admitting diagnosis of Nondisplaced fracture of anterior * [facility] by EMT X2. Transported to bed with assistance from stretcher  to bed. Pt is AAOx4  incontinent of B/B. Pt is on a cardiac diet. Skin assessment done: (detailed). Family informed of arrival. POC reviewed with patient. Pt denies pain at this time and is educated to use call light and not get OOB w/o assistance

## 2024-09-24 NOTE — ASSESSMENT & PLAN NOTE
Controlled. 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 He is on home oxygen at 2-3 LPM. Supplemental oxygen was provided and noted-at 2 liters of oxygen.      Signs/symptoms of respiratory failure include- tachypnea and increased work of breathing. Contributing diagnoses includes - CHF and pulmonary embolus. Labs and images were reviewed. Patient Has not had a recent ABG. Will treat underlying causes and adjust management of respiratory failure as follows- IV Lasix for heart failure and IV Heparin for PE. Dischareg on 2 liters of oxygen. Patient stable at his baseline level.

## 2024-09-25 PROCEDURE — 25000003 PHARM REV CODE 250: Mod: HCNC | Performed by: NURSE PRACTITIONER

## 2024-09-25 PROCEDURE — 99900035 HC TECH TIME PER 15 MIN (STAT): Mod: HCNC

## 2024-09-25 PROCEDURE — 97530 THERAPEUTIC ACTIVITIES: CPT | Mod: HCNC

## 2024-09-25 PROCEDURE — 97166 OT EVAL MOD COMPLEX 45 MIN: CPT | Mod: HCNC

## 2024-09-25 PROCEDURE — 97535 SELF CARE MNGMENT TRAINING: CPT | Mod: HCNC

## 2024-09-25 PROCEDURE — 25000242 PHARM REV CODE 250 ALT 637 W/ HCPCS: Mod: HCNC | Performed by: NURSE PRACTITIONER

## 2024-09-25 PROCEDURE — 94660 CPAP INITIATION&MGMT: CPT | Mod: HCNC

## 2024-09-25 PROCEDURE — 27000190 HC CPAP FULL FACE MASK W/VALVE: Mod: HCNC

## 2024-09-25 PROCEDURE — 11000004 HC SNF PRIVATE: Mod: HCNC

## 2024-09-25 PROCEDURE — 25000003 PHARM REV CODE 250: Mod: HCNC | Performed by: HOSPITALIST

## 2024-09-25 PROCEDURE — 97162 PT EVAL MOD COMPLEX 30 MIN: CPT | Mod: HCNC

## 2024-09-25 RX ORDER — SIMETHICONE 80 MG
1 TABLET,CHEWABLE ORAL
Status: COMPLETED | OUTPATIENT
Start: 2024-09-25 | End: 2024-09-26

## 2024-09-25 RX ORDER — FLUTICASONE PROPIONATE 50 MCG
2 SPRAY, SUSPENSION (ML) NASAL DAILY
Status: DISCONTINUED | OUTPATIENT
Start: 2024-09-25 | End: 2024-10-01 | Stop reason: HOSPADM

## 2024-09-25 RX ORDER — OXYCODONE HYDROCHLORIDE 10 MG/1
10 TABLET ORAL EVERY 4 HOURS PRN
Status: DISCONTINUED | OUTPATIENT
Start: 2024-09-25 | End: 2024-10-01 | Stop reason: HOSPADM

## 2024-09-25 RX ORDER — POLYETHYLENE GLYCOL 3350 17 G/17G
17 POWDER, FOR SOLUTION ORAL DAILY
Status: DISCONTINUED | OUTPATIENT
Start: 2024-09-25 | End: 2024-10-01 | Stop reason: HOSPADM

## 2024-09-25 RX ORDER — OXYCODONE HYDROCHLORIDE 5 MG/1
5 TABLET ORAL EVERY 4 HOURS PRN
Status: DISCONTINUED | OUTPATIENT
Start: 2024-09-25 | End: 2024-10-01 | Stop reason: HOSPADM

## 2024-09-25 RX ORDER — AMIODARONE HYDROCHLORIDE 200 MG/1
200 TABLET ORAL DAILY
Status: DISCONTINUED | OUTPATIENT
Start: 2024-10-05 | End: 2024-10-01 | Stop reason: HOSPADM

## 2024-09-25 RX ORDER — AMMONIUM LACTATE 12 G/100G
LOTION TOPICAL 2 TIMES DAILY
Status: DISCONTINUED | OUTPATIENT
Start: 2024-09-25 | End: 2024-10-01 | Stop reason: HOSPADM

## 2024-09-25 RX ADMIN — METHOCARBAMOL 750 MG: 750 TABLET ORAL at 04:09

## 2024-09-25 RX ADMIN — SIMETHICONE 80 MG: 80 TABLET, CHEWABLE ORAL at 09:09

## 2024-09-25 RX ADMIN — METHOCARBAMOL 750 MG: 750 TABLET ORAL at 01:09

## 2024-09-25 RX ADMIN — ATORVASTATIN CALCIUM 10 MG: 10 TABLET, FILM COATED ORAL at 09:09

## 2024-09-25 RX ADMIN — SACUBITRIL AND VALSARTAN 1 TABLET: 97; 103 TABLET, FILM COATED ORAL at 09:09

## 2024-09-25 RX ADMIN — THERA TABS 1 TABLET: TAB at 09:09

## 2024-09-25 RX ADMIN — LEVOTHYROXINE SODIUM 200 MCG: 100 TABLET ORAL at 05:09

## 2024-09-25 RX ADMIN — ACETAMINOPHEN 1000 MG: 500 TABLET ORAL at 01:09

## 2024-09-25 RX ADMIN — SENNOSIDES AND DOCUSATE SODIUM 1 TABLET: 50; 8.6 TABLET ORAL at 09:09

## 2024-09-25 RX ADMIN — MIRTAZAPINE 7.5 MG: 7.5 TABLET, FILM COATED ORAL at 09:09

## 2024-09-25 RX ADMIN — FUROSEMIDE 40 MG: 40 TABLET ORAL at 05:09

## 2024-09-25 RX ADMIN — OXYCODONE HYDROCHLORIDE 5 MG: 5 TABLET ORAL at 02:09

## 2024-09-25 RX ADMIN — ACETAMINOPHEN 1000 MG: 500 TABLET ORAL at 05:09

## 2024-09-25 RX ADMIN — AMIODARONE HYDROCHLORIDE 400 MG: 200 TABLET ORAL at 09:09

## 2024-09-25 RX ADMIN — METHOCARBAMOL 750 MG: 750 TABLET ORAL at 09:09

## 2024-09-25 RX ADMIN — ACETAMINOPHEN 1000 MG: 500 TABLET ORAL at 09:09

## 2024-09-25 RX ADMIN — SIMETHICONE 80 MG: 80 TABLET, CHEWABLE ORAL at 06:09

## 2024-09-25 RX ADMIN — ASPIRIN 81 MG: 81 TABLET, COATED ORAL at 09:09

## 2024-09-25 RX ADMIN — FUROSEMIDE 40 MG: 40 TABLET ORAL at 09:09

## 2024-09-25 RX ADMIN — OXYCODONE HYDROCHLORIDE 5 MG: 5 TABLET ORAL at 09:09

## 2024-09-25 RX ADMIN — Medication 6 MG: at 09:09

## 2024-09-25 RX ADMIN — METOPROLOL SUCCINATE 12.5 MG: 25 TABLET, EXTENDED RELEASE ORAL at 09:09

## 2024-09-25 RX ADMIN — FLUTICASONE PROPIONATE 100 MCG: 50 SPRAY, METERED NASAL at 04:09

## 2024-09-25 RX ADMIN — SPIRONOLACTONE 25 MG: 25 TABLET ORAL at 09:09

## 2024-09-25 RX ADMIN — AMMONIUM LACTATE: 12 LOTION TOPICAL at 09:09

## 2024-09-25 RX ADMIN — POLYETHYLENE GLYCOL 3350 17 G: 17 POWDER, FOR SOLUTION ORAL at 02:09

## 2024-09-25 RX ADMIN — APIXABAN 10 MG: 2.5 TABLET, FILM COATED ORAL at 09:09

## 2024-09-25 RX ADMIN — OXYCODONE HYDROCHLORIDE 5 MG: 5 TABLET ORAL at 05:09

## 2024-09-25 NOTE — PT/OT/SLP EVAL
"Occupational Therapy   Co-Evaluation/Treatment  Co tx performed with OT due to patient need for 2 skilled therapist to ensure patient and staff safety and to accommondate for activity tolerance.      Name: Bean Salas  MRN: [id number]  Admit Date: 9/24/2024  Recent Surgeries: s/p ORIF R pelvis 9/20/2024     General Precautions: Standard, fall  Orthopedic Precautions:LLE weight bearing as tolerated, RLE weight bearing as tolerated (ROMAT)   Braces: N/A    Recommendations:     Discharge Recommendations: home health OT  Level of Assistance Recommended: 24 hours significant assistance  Discharge Equipment Recommendations:  wheelchair, 3-in-1 commode, hip kit  Barriers to discharge:  Decreased caregiver support (current functional level)    Assessment:     Bean Salas is a 79 y.o. male with a medical diagnosis of Closed nondisplaced fracture of anterior column of right acetabulum with routine healing. Evaluation/treatment limited 2/2 pain hindering patient performance of functional mobility and ADLs. Pt noted to have minimal to no pain while in supine, but pain dramatically increasing to 8-10/10 /c activity. Pt would benefit from OT in SNF setting to improve safety and independence /c functional tasks and mobility.   Performance deficits affecting function: weakness, impaired endurance, impaired self care skills, impaired functional mobility, gait instability, impaired balance, pain, decreased lower extremity function, impaired cardiopulmonary response to activity.      Rehab Potential is good    Activity Tolerance: Fair    Plan:     Patient to be seen 5 x/week to address the above listed problems via self-care/home management, community/work re-entry, therapeutic activities, therapeutic exercises, neuromuscular re-education  Plan of Care Expires: 09/26/24  Plan of Care Reviewed with: patient    Subjective     Chief Complaint: Pain in R pelvis with all movement  Patient/Family Comments/goals: "I think just sitting " "right here is an accomplishment today." Re: sitting at EOB    Occupational Profile:  Living Environment: Pt lives alone in SSH /c 0 ABDI /c WIS and bench. Pt has BSC over toilet.   Previous level of function: Mod I /c RW fo ADLs/IADLs/FM  Roles and Routines: Pt cares for self and home, drives, and is a father.  Equipment Used at Home: walker, rolling, raised toilet  Assistance upon Discharge: Limited (A) from son who works and lives in [place] (~60 miles away from pt)    Pain/Comfort:  Pain Rating 1: 8/10  Location - Side 1: Right  Location - Orientation 1: generalized  Location 1: hip  Pain Addressed 1: Pre-medicate for activity, Reposition, Distraction, Cessation of Activity  Pain Rating Post-Intervention 1: 10/10    Patients cultural, spiritual, Anabaptist conflicts given the current situation: no    Objective:     Communicated with: NSTEO prior to session.  Patient found supine with PureWick, oxygen upon OT entry to room.    Occupational Performance:   Eating   Was the activity attempted? Yes   Was the activity done independently? Yes   Was adaptive equipment used? No   CARE Score - Eating 6   Oral Hygiene   Was the activity attempted? Yes   Was the activity done independently? No   Assistance Needed Setup / clean-up  (Set-up at bed level)   Was adaptive equipment used? Yes   CARE Score - Oral Hygiene 5   Toileting Hygiene   Was the activity attempted? Yes   Was the activity done independently? No   Assistance Needed Physical assistance   Physical Assistance Level Total assistance  (Total A in supine)   Was adaptive equipment used? Yes   CARE Score - Toileting Hygiene 1   Shower/Bathe Self   Was the activity attempted? No   Reason if not Attempted Safety concerns   CARE Score - Shower/Bathe Self 88   Upper Body Dressing   Was the activity attempted? Yes   Was the activity done independently? No   Assistance Needed Physical assistance   Physical Assistance Level Total assistance  (Pt attempted to complete seated " at EOB, however unable to move BUE for task 2/2 pain increasing /c offloading from BUE onto sacrum/pelvis)   Was adaptive equipment used? No   CARE Score - Upper Body Dressing 1   Lower Body Dressing   Was the activity attempted? Yes   Was the activity done independently? No   Assistance Needed Physical assistance   Physical Assistance Level 2 or more helpers  (Max A x2 at bedlevel)   Was adaptive equipment used? Yes   CARE Score - Lower Body Dressing 1   Putting On/Taking Off Footwear   Was the activity attempted? Yes   Was the activity done independently? No   Assistance Needed Physical assistance   Physical Assistance Level Total assistance  (Total A to doff/don socks)   Was adaptive equipment used? No   CARE Score - Putting On/Taking Off Footwear 1   Personal Hygiene   Was the activity attempted? Yes   Was the activity done independently? No   Assistance Needed Setup / clean-up  (set-up (A) to wash face at bed level)   Was adaptive equipment used? Yes   CARE Score - Personal Hygiene 5   Roll Left and Right   Was the activity attempted? Yes   Was the activity done independently? No   Assistance Needed Physical assistance   Physical Assistance Level Less than half  (MIn A /c HOB flat and bed rail)   Was adaptive equipment used? Yes   CARE Score - Roll Left and Right 3   Sit to Lying   Was the activity attempted? Yes   Was the activity done independently? No   Assistance Needed Physical assistance   Physical Assistance Level 2 or more helpers  (Max A x2)   Was adaptive equipment used? No   CARE Score - Sit to Lying 1   Lying to Sitting on Side of Bed   Was the activity attempted? Yes   Was the activity done independently? No   Assistance Needed Physical assistance   Physical Assistance Level 2 or more helpers  (Max A x2)   Was adaptive equipment used? No   CARE Score - Lying to Sitting on Side of Bed 1   Toilet Transfer   Was the activity attempted? No   Reason if not Attempted Safety concerns   CARE Score - Toilet  Transfer 88   Tub/Shower Transfer   Was the activity attempted? No   Reason if not Attempted Safety concerns   CARE Score - Tub/Shower Transfer 88       Cognitive/Visual Perceptual:  Cognitive/Psychosocial Skills:  -       Oriented to: Person, Place, Time, and Situation   -       Follows Commands/attention:Follows multistep  commands  -       Communication: clear/fluent  -       Memory: No Deficits noted  -       Safety awareness/insight to disability: intact   -       Mood/Affect/Coping skills/emotional control: Appropriate to situation    Physical Exam:  Balance: -       Static sitting balance- Mod A x2/Max A /c use of BUE for support- limited by pain in sacrum/pelvis when offloading from BUE  Postural examination/scapula alignment: -       Mild posterior lean, primarily 2/2 pain   Motor Planning: -       WFL  Dominant hand: -       R  Upper Extremity Range of Motion:  -       Right Upper Extremity: WFL except limited use when seated without trunk support 2/2 use of BUE for offloading from sacrum/pelvis  -       Left Upper Extremity: WFL except imited use when seated without trunk support 2/2 use of BUE for offloading from sacrum/pelvis  Upper Extremity Strength: -       Right Upper Extremity: 5/5 shoulder flexion/extension and elbow flexion/extension in supine  -       Left Upper Extremity: 5/5 shoulder flexion/extension and elbow flexion/extension in supine   Strength: -       Right Upper Extremity: WFL  -       Left Upper Extremity: WFL  Fine Motor Coordination: -       Intact  Left hand, finger to nose, Right hand, finger to nose, Left hand thumb/finger opposition skills, Right hand thumb/finger opposition skills, Left hand, manipulation of objects, and Right hand, manipulation of objects  Gross motor coordination: WFL  Functional endurance- Fair-  AMPAC 6 Click ADL:  AMPAC Total Score: 13    Treatment & Education:  Pt educated on POC, role of OT, and safety. Pt performed tasks to improve safety and  independence in functional tasks and ADLs as mentioned above.       Patient left supine with all lines intact, call button in reach, and all needs met    GOALS:   Multidisciplinary Problems       Occupational Therapy Goals          Problem: Occupational Therapy    Goal Priority Disciplines Outcome Interventions   Occupational Therapy Goal     OT, PT/OT Progressing    Description: Goals to be met by: 10/24/2024     Patient will increase functional independence with ADLs by performing:    UE Dressing with Minimal Assistance.  LE Dressing with Moderate Assistance.  Grooming while seated at sink with Supervision.  Toileting from toilet /c raised toilet seat with Moderate Assistance for hygiene and clothing management.   Bathing from  shower chair/bench with Moderate Assistance.  Sitting at edge of bed x10 minutes with Contact Guard Assistance.  Rolling to Bilateral with Stand-by Assistance.   Stand pivot transfers with Minimal Assistance and LRAD as needed  Toilet transfer to raised toilet with Minimal Assistance and LRAD as needed.  SC transfer /c Minimal Assistance and LRAD as needed                         History:     Past Medical History:   Diagnosis Date    Atrial fibrillation, unspecified type 09/06/2023    COPD exacerbation 09/06/2023    Thyroid disease     hypo         Past Surgical History:   Procedure Laterality Date    COLONOSCOPY  01/01/2011    CORONARY ANGIOGRAPHY N/A 07/22/2021    Procedure: ANGIOGRAM, CORONARY ARTERY;  Surgeon: Hank Barry MD;  Location: [facility] CATH LAB/EP;  Service: Cardiology;  Laterality: N/A;    EYE SURGERY Bilateral     cataracts extraction    FRACTURE SURGERY Right 09/2021    femur    HERNIA REPAIR      HIP SURGERY Right 08/2021    INTRAMEDULLARY RODDING OF TROCHANTER OF FEMUR Right 09/03/2021    Procedure: INSERTION, INTRAMEDULLARY RACQUEL, FEMUR, TROCHANTER;  Surgeon: Darryn Hall MD;  Location: [facility] OR;  Service: Orthopedics;  Laterality: Right;    JOINT REPLACEMENT Bilateral      knee    LEFT HEART CATHETERIZATION Right 07/22/2021    Procedure: Left heart cath;  Surgeon: Hank Barry MD;  Location: [facility] CATH LAB/EP;  Service: Cardiology;  Laterality: Right;    OPEN REDUCTION AND INTERNAL FIXATION (ORIF) OF INJURY OF HIP Right 9/20/2024    Procedure: ORIF,PELVIS;  Surgeon: Negrito Stapleton MD;  Location: [facility] OR [address] [place] [address];  Service: Orthopedics;  Laterality: Right;  +local    TRANSESOPHAGEAL ECHOCARDIOGRAPHY N/A 9/19/2024    Procedure: ECHOCARDIOGRAM, TRANSESOPHAGEAL;  Surgeon: Leonora Butt MD;  Location: [facility] EP LAB;  Service: Cardiology;  Laterality: N/A;    TREATMENT OF CARDIAC ARRHYTHMIA N/A 9/19/2024    Procedure: Cardioversion or Defibrillation;  Surgeon: ANA Steiner MD;  Location: [facility] EP LAB;  Service: Cardiology;  Laterality: N/A;  AF, DEMETRICE/DCCV, ANES, GP, 524    VASECTOMY         Time Tracking:     OT Date of Treatment: 09/25/24  OT Start Time: 0806  OT Stop Time: 0846  OT Total Time (min): 40 min    Billable Minutes:Evaluation 15  Self Care/Home Management 11  Therapeutic Activity 16    9/25/2024

## 2024-09-25 NOTE — NURSING
Safety maintained throughout shift, bed locked and in lowest position, call light in reach. Pt remained free of fall/trauma. VSS, afebrile this shift.

## 2024-09-25 NOTE — CONSULTS
[facility] - Skilled Nursing  Adult Nutrition  Consult Note    SUMMARY     Recommendation/Intervention:   1) Continue current regular diet as tolerated, encourage PO intake, honor food preferences as able  2) Continue Boost Plus TID to promote adequate kcal/protein consumption   - Consider changing to Boost Glucose Control if glucose remains elevated  3) RD to monitor and follow-up as needed    Goals: Meet % of EEN/EPN by next RD follow-up  Nutrition Goal Status: new  Communication of RD Recs: other (comment) (POC)    Assessment and Plan    Nutrition Problem  Increased nutrient needs (protein)    Related to (etiology):   Wound healing    Signs and Symptoms (as evidenced by):   S/p ORIF of pelvis     Interventions/Recommendations (treatment strategy):  Collaboration with other providers  General diet  Commercial beverage    Nutrition Diagnosis Status:   New      Malnutrition Assessment  NFPE not completed at this time- pt seen remotely.     Reason for Assessment    Reason For Assessment: consult  Diagnosis: other (see comments) (Closed nondisplaced fracture of anterior column of right acetabulum with routine healing s/p ORIF of pelvis on 9/20/2024)  Relevant Medical History: Bradycardia, PVCs, Hypertension, CAD, Hyperlipidemia, HFpEF, Pulmonary HTN, AKHIL on CPAP, Chronic respiratory failure w/ home oxygen and hypothyroidism  Interdisciplinary Rounds: did not attend  General Information Comments: RD received consult- new admit to SNF. Pt on regular diet + ONS with all meals, consuming 25-50% of recent meals. On cardiac diet during recent previous admit.  Nutrition Discharge Planning: Cardiac Diet    Nutrition Risk Screen    Nutrition Risk Screen: no indicators present    Nutrition Related Social Determinants of Health: SDOH: Unable to assess at this time.     Nutrition/Diet History    Spiritual, Cultural Beliefs, Worship Practices, Values that Affect Care: no  Food Allergies: NKFA    Anthropometrics    Temp:  "97.8 °F (36.6 °C)  Height Method: Stated  Height: 5' 11" (180.3 cm)  Height (inches): 71 in  Weight Method: Bed Scale  Weight: 97.5 kg (214 lb 15.2 oz)  Weight (lb): 214.95 lb  Ideal Body Weight (IBW), Male: 172 lb  % Ideal Body Weight, Male (lb): 126.77 %  BMI (Calculated): 30  BMI Grade: 30 - 34.9- obesity - grade I  Per chart review, pt weighed 225 lbs on 7/23/24. Indicates 11 lb weight loss in 2 months (5%)    Lab/Procedures/Meds    Pertinent Labs Reviewed: reviewed  Pertinent Labs Comments: Hgb: 12.6, Hct: 39.2, CO2: 32, BUN: 33, eGFR: 55.5, Glucose: 164, HBA1C: 6.3  Pertinent Medications Reviewed: reviewed   acetaminophen  1,000 mg Oral Q8H    [START ON 10/5/2024] amiodarone  200 mg Oral Daily    amiodarone  400 mg Oral BID    apixaban  10 mg Oral BID    [START ON 9/27/2024] apixaban  5 mg Oral BID    aspirin  81 mg Oral Daily    atorvastatin  10 mg Oral Daily    furosemide  40 mg Oral BID    levothyroxine  200 mcg Oral Before breakfast    methocarbamoL  750 mg Oral QID    metoprolol succinate  12.5 mg Oral Daily    mirtazapine  7.5 mg Oral QHS    multivitamin  1 tablet Oral Daily    polyethylene glycol  17 g Oral Daily    sacubitriL-valsartan  1 tablet Oral BID    senna-docusate 8.6-50 mg  1 tablet Oral BID    spironolactone  25 mg Oral Daily     Estimated/Assessed Needs    Weight Used For Calorie Calculations: 97.5 kg (214 lb 15.2 oz)  Energy Calorie Requirements (kcal): 2048 kcal/day  Energy Need Method: [place]ck[place] Terranceor (x 1.2)  Protein Requirements: 117 g (1.2 g/kg)  Weight Used For Protein Calculations: 97.5 kg (214 lb 15.2 oz)  Fluid Requirements (mL): 1 mL/kcal or per MD  Estimated Fluid Requirement Method: RDA Method  RDA Method (mL): 2048  CHO Requirement: 256 g      Nutrition Prescription Ordered    Current Diet Order: Regular Diet  Oral Nutrition Supplement: Boost Plus TID    Evaluation of Received Nutrient/Fluid Intake    I/O: - 400 mL since admit  Energy Calories Required: not meeting " needs  Protein Required: not meeting needs  Fluid Required: other (see comments) (As per MD)  Comments: LBM 9/24  Tolerance: tolerating  % Intake of Estimated Energy Needs: 25 - 50 %  % Meal Intake: 25 - 50 %    Nutrition Risk    Level of Risk/Frequency of Follow-up: low (F/u 1x/week)     Monitor and Evaluation    Food and Nutrient Intake: energy intake, food and beverage intake  Food and Nutrient Adminstration: diet order  Knowledge/Beliefs/Attitudes: food and nutrition knowledge/skill  Physical Activity and Function: nutrition-related ADLs and IADLs  Anthropometric Measurements: weight change, weight, body mass index  Biochemical Data, Medical Tests and Procedures: electrolyte and renal panel, gastrointestinal profile, glucose/endocrine profile, inflammatory profile, lipid profile  Nutrition-Focused Physical Findings: overall appearance, skin     Nutrition Follow-Up    RD Follow-up?: Yes

## 2024-09-25 NOTE — PLAN OF CARE
Problem: Occupational Therapy  Goal: Occupational Therapy Goal  Description: Goals to be met by: 10/24/2024     Patient will increase functional independence with ADLs by performing:    UE Dressing with Minimal Assistance.  LE Dressing with Moderate Assistance.  Grooming while seated at sink with Supervision.  Toileting from toilet /c raised toilet seat with Moderate Assistance for hygiene and clothing management.   Bathing from  shower chair/bench with Moderate Assistance.  Sitting at edge of bed x10 minutes with Contact Guard Assistance.  Rolling to Bilateral with Stand-by Assistance.   Stand pivot transfers with Minimal Assistance and LRAD as needed  Toilet transfer to raised toilet with Minimal Assistance and LRAD as needed.  SC transfer /c Minimal Assistance and LRAD as needed    Outcome: Progressing     Pt evaluated and goals set based on performance at admission.\

## 2024-09-25 NOTE — PLAN OF CARE
Recommendation/Intervention:   1) Continue current regular diet as tolerated, encourage PO intake, honor food preferences as able  2) Continue Boost Plus TID to promote adequate kcal/protein consumption              - Consider changing to Boost Glucose Control if glucose remains elevated  3) RD to monitor and follow-up as needed     Goals: Meet % of EEN/EPN by next RD follow-up  Nutrition Goal Status: new  Communication of RD Recs: other (comment) (POC)
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
## 2024-09-25 NOTE — PLAN OF CARE
PT evaluation completed, POC established    Problem: Physical Therapy  Goal: Physical Therapy Goal  Description: Goals to be met by: 10/25/24     Patient will increase functional independence with mobility by performin. Supine to sit with Contact Guard Assistance  2. Sit to supine with Contact Guard Assistance  3. Rolling to Left and Right with Supervision.  4. Sit to stand transfer with SBA.  5. Bed to chair transfer with Contact Guard Assistance using Rolling Walker  6. Gait  x 150 feet with Contact Guard Assistance using Rolling Walker.   7. Wheelchair propulsion x150 feet with Modified [place] using bilateral uppper extremities    Outcome: Progressing

## 2024-09-25 NOTE — PT/OT/SLP EVAL
Physical Therapy Evaluation/Co-evaluation with OT    Co-evaluation/treatment performed due to patient's multiple deficits requiring two skilled therapists to appropriately and safely assess patient's strength and endurance while facilitating functional tasks in addition to accommodating for patient's activity tolerance.      Patient Name:  Bean Salas   MRN:  [id number]  Admit Date: 9/24/2024  Recent Surgeries: R pelvic ORIF (9/20/24)    General Precautions: Standard, fall   Orthopedic Precautions: RLE weight bearing as tolerated, LLE weight bearing as tolerated (ROMAT)   Braces: N/A    Recommendations:     Discharge Recommendations: home with home health   Level of Assistance Recommended: 24 hours significant assistance  Discharge Equipment Recommendations: wheelchair, bedside commode, hip kit   Barriers to discharge: Decreased caregiver support (son lives 60 miles away and works during the day)    Assessment:     Bean Salas is a 79 y.o. male admitted with a medical diagnosis of Closed nondisplaced fracture of anterior column of right acetabulum with routine healing.  Performance deficits affecting function  weakness, impaired endurance, impaired self care skills, impaired functional mobility, gait instability, impaired balance, decreased coordination, decreased lower extremity function, decreased safety awareness, pain, decreased ROM, orthopedic precautions (SOB).    Pt with fair tolerance to PT evaluation. He was limited by pain and very fearful of falling. Pt required totalA for supine<>sit with assist needed for management of BLE and lifting upper trunk. Pt with tendency for posterior lean once seated EOB with maxA-2 person assist needed for sitting balance. Pt reported feeling SOB while seated EOB (on 3 L O2) so sat for a few minutes and sitting balance improved some however pt with difficulty leaning forward due to pain. Pt declined to attempt sit<>stand or transferring to w/c today due to high pain  "levels. Pt lives alone and was previously Ashley with mobility using RW. He is currently functioning well below his prior level of functioning and could benefit from skilled rehab services to address deficits and optimize independence upon discharge.     Rehab Potential is fair     Activity Tolerance: Poor    Plan:     Patient to be seen 5 x/week to address the above listed problems via gait training, therapeutic activities, therapeutic exercises, neuromuscular re-education, wheelchair management/training     Plan of Care Expires: 10/25/24  Plan of Care Reviewed with: patient    Subjective     Chief Complaint: "I'm just so scared of falling."  Patient/Family Comments/goals: improve independence, walk  Pain/Comfort:  Pain Rating 1: 8/10  Location - Side 1: Right  Location 1: hip  Pain Addressed 1: Reposition, Distraction (states he had already received pain medication)  Pain Rating Post-Intervention 1: 10/10 (following sitting up)    Living Environment: Pt lives alone in a 1  with 0 ABDI. He reports son lives ~60 miles away and works during the day, but can provide occasional assistance as needed.     Prior to admission, patients level of function was Ashley with RW. Pt was driving and reports independence with ADL's/IADL's.  Equipment used at home: RW, shower chair, raised toilet seat  .  DME owned (not currently used): none.  Upon discharge, patient will have limited assistance from son.     Patients cultural, spiritual, Latter-day conflicts given the current situation: no    Objective:     Communicated with OT/nursing prior to session.  Patient found HOB elevated with PureWick, oxygen (3 L O2)  upon PT entry to room.    Exams:  Cognitive Exam:  Patient is oriented to Person, Place, Time, and Situation  RLE ROM: grossly assessed, decreased R hip ROM due to pain  RLE Strength: grossly assessed, 2+ to 3- /5 strength throughout. Limited by pain.  LLE ROM: WFL  LLE Strength: WFL    Functional Mobility:     09/25/24 1008 "   Prior Functioning: Everyday Activities   Self Care Independent   Indoor Mobility (Ambulation) Independent   Stairs Needed some help   Functional Cognition Independent   Prior Device Use Walker  (RW)   Functional Limitation in Range of Motion   Upper Extremity No impairment   Lower Extremity Impairment on one side  (decreased R hip ROM)   Mobility Devices None of the above were used   Roll Left and Right   Was the activity attempted? Yes   Was the activity done independently? No   Assistance Needed Physical assistance   Physical Assistance Level Less than half   Was adaptive equipment used? Yes  (bedrail)   CARE Score - Roll Left and Right 3   Sit to Lying   Was the activity attempted? Yes   Was the activity done independently? No   Assistance Needed Physical assistance   Physical Assistance Level 2 or more helpers   Was adaptive equipment used? No   CARE Score - Sit to Lying 1   Lying to Sitting on Side of Bed   Was the activity attempted? Yes   Was the activity done independently? No   Assistance Needed Physical assistance   Physical Assistance Level 2 or more helpers   Was adaptive equipment used? No   CARE Score - Lying to Sitting on Side of Bed 1   Sit to Stand   Was the activity attempted? No   Reason if not Attempted Safety concerns  (pain, pt very fearful of falling and leaning posteriorly on bed)   CARE Score - Sit to Stand 88   Chair/Bed-to-Chair Transfer   Was the activity attempted? No   Reason if not Attempted Safety concerns  (pain, pt very fearful of falling and leaning posteriorly on bed)   CARE Score - Chair/Bed-to-Chair Transfer 88   Car Transfer   Was the activity attempted? No   Reason if not Attempted Environmental limitations   CARE Score - Car Transfer 10   Walk 10 Feet   Was the activity attempted? No   Reason if not Attempted Safety concerns  (pain, fear of falling, unable to stand)   CARE Score - Walk 10 Feet 88   Walk 50 Feet with Two Turns   Was the activity attempted? No   Reason if not  Attempted Safety concerns  (pain, fear of falling, unable to stand)   CARE Score - Walk 50 Feet with Two Turns 88   Walk 50 Feet with Two Turns Discharge Goal   Discharge Goal 4   Walk 150 Feet   Was the activity attempted? No   Reason if not Attempted Safety concerns  (pain, fear of falling, unable to stand)   CARE Score - Walk 150 Feet 88   Walking 10 Feet on Uneven Surfaces   Was the activity attempted? No   Reason if not Attempted Safety concerns  (pain, fear of falling, unable to stand)   CARE Score - Walking 10 Feet on Uneven Surfaces 88   1 Step (Curb)   Was the activity attempted? No   Reason if not Attempted Safety concerns  (pain, fear of falling, unable to stand)   CARE Score - 1 Step (Curb) 88   4 Steps   Was the activity attempted? No   Reason if not Attempted Safety concerns  (pain, fear of falling, unable to stand)   CARE Score - 4 Steps 88   12 Steps   Was the activity attempted? No   Reason if not Attempted Safety concerns  (pain, fear of falling, unable to stand)   CARE Score - 12 Steps 88   Picking Up Object   Was the activity attempted? No   Reason if not Attempted Safety concerns  (pain, fear of falling, unable to stand)   CARE Score - Picking Up Object 88   OTHER   Uses a Wheelchair/Scooter? Yes   Wheel 50 Feet with Two Turns   Was the activity attempted? No   Reason if not Attempted Safety concerns  (pain, fear of falling, unable to transfer to w/c)   CARE Score - Wheel 50 Feet with Two Turns 88   Wheel 150 Feet   Was the activity attempted? No   Reason if not Attempted Safety concerns  (pain, fear of falling, unable to transfer to w/c)   CARE Score - Wheel 150 Feet 88       Therapeutic Activities and Exercises:   -While OT assessed lower body dressing, PT assessed rolling. Pt able to performing rolling R/L with Stephy using bedrail.   -Pt performs static sitting for ~8 min with PT anteriorly and OT posteriorly providing mod-maxA for sitting balance. Pt with tendency to lean posteriorly and  relies heavily on UE support on bed. Anterior weight shift limited by pain and fear of falling.     Education:  Patient provided with daily orientation and goals of this PT session.  Patient educated to complete bed level toileting at this time due to being unable to perform bed<>chair transfer.  Patient educated on bed mobility training, Fall risk, plan of care, and weight bearing restrictions by explanation.   Patient Verbalized Understanding.  Time provided for therapeutic counseling and discussion of current health disposition. All questions answered to satisfaction, within scope of PT practice; voiced no other concerns. White board updated in patient's room, RN notified of session.     AM-PAC 6 CLICK MOBILITY  Total Score:9     Patient left HOB elevated with call button in reach and OT present.    GOALS:   Multidisciplinary Problems       Physical Therapy Goals          Problem: Physical Therapy    Goal Priority Disciplines Outcome Goal Variances Interventions   Physical Therapy Goal     PT, PT/OT Progressing     Description: Goals to be met by: 10/25/24     Patient will increase functional independence with mobility by performin. Supine to sit with Contact Guard Assistance  2. Sit to supine with Contact Guard Assistance  3. Rolling to Left and Right with Supervision.  4. Sit to stand transfer with SBA.  5. Bed to chair transfer with Contact Guard Assistance using Rolling Walker  6. Gait  x 150 feet with Contact Guard Assistance using Rolling Walker.   7. Wheelchair propulsion x150 feet with Modified [place] using bilateral uppper extremities                         History:     Past Medical History:   Diagnosis Date    Atrial fibrillation, unspecified type 2023    COPD exacerbation 2023    Thyroid disease     hypo       Past Surgical History:   Procedure Laterality Date    COLONOSCOPY  2011    CORONARY ANGIOGRAPHY N/A 2021    Procedure: ANGIOGRAM, CORONARY ARTERY;  Surgeon:  Hank Barry MD;  Location: [facility] CATH LAB/EP;  Service: Cardiology;  Laterality: N/A;    EYE SURGERY Bilateral     cataracts extraction    FRACTURE SURGERY Right 09/2021    femur    HERNIA REPAIR      HIP SURGERY Right 08/2021    INTRAMEDULLARY RODDING OF TROCHANTER OF FEMUR Right 09/03/2021    Procedure: INSERTION, INTRAMEDULLARY RACQUEL, FEMUR, TROCHANTER;  Surgeon: Darryn Hall MD;  Location: [facility];  Service: Orthopedics;  Laterality: Right;    JOINT REPLACEMENT Bilateral     knee    LEFT HEART CATHETERIZATION Right 07/22/2021    Procedure: Left heart cath;  Surgeon: Hank Barry MD;  Location: [facility] CATH LAB/EP;  Service: Cardiology;  Laterality: Right;    OPEN REDUCTION AND INTERNAL FIXATION (ORIF) OF INJURY OF HIP Right 9/20/2024    Procedure: ORIF,PELVIS;  Surgeon: Negrito Stapleton MD;  [address];  Service: Orthopedics;  Laterality: Right;  +local    TRANSESOPHAGEAL ECHOCARDIOGRAPHY N/A 9/19/2024    Procedure: ECHOCARDIOGRAM, TRANSESOPHAGEAL;  Surgeon: Leonora Butt MD;  Location: [facility] EP LAB;  Service: Cardiology;  Laterality: N/A;    TREATMENT OF CARDIAC ARRHYTHMIA N/A 9/19/2024    Procedure: Cardioversion or Defibrillation;  Surgeon: ANA Steiner MD;  Location: [facility] EP LAB;  Service: Cardiology;  Laterality: N/A;  AF, DEMETRICE/DCCV, ANES, GP, 524    VASECTOMY         Time Tracking:     PT Received On: 09/25/24  PT Start Time: 0806     PT Stop Time: 0835  PT Total Time (min): 29 min     Billable Minutes: Evaluation 18 and Therapeutic Activity 11      09/25/2024

## 2024-09-25 NOTE — PROGRESS NOTES
[facility]                                  Skilled Nursing Facility                   Progress Note     Admit Date: 9/24/2024  LATOSHA   [id number]  Principal Problem:  Closed nondisplaced fracture of anterior column of right acetabulum with routine healing   HPI obtained from patient interview and chart review     Chief Complaint: Establish Care/ Initial Visit     HPI:   Bean Salas is a 79 year old male with PMHx of  Bradycardia, PVCs, HTN, CAD, HLD, HFpEF, Pulmonary HTN, AKHIL on CPAP, Chronic respiratory failure w/ home oxygen and hypothyroidism who presents to SNF following hospitalization for right superior pubic ramus fracture, right sacral ala fracture, right pelvic ring fracture s/p percutaneous fixation and ORIF on 09/20 with Dr. Stapleton, also noted to have PE.  Admission to SNF for secondary weakness and debility.    Patient originally presented to [facility] ED on 09/15 reporting right hip pain after ground level fall outside.  Per hospital notes, He was doing some yard work and reaching for some piles of wood when a bunch of ants started crawling all over him and biting him. He tried to get away but forgot there was a step up from the grass to the concrete/carport next to him causing him to fall over onto his bottom/right side and then went more slowly back hitting his head as well.  Did not lose consciousness. He had trouble pulling himself up off the ground and his cell phone was inside his house where he lives alone. He called out for a neighbor and a few of them were able to get him into a chair however when he tried to stand he had immediate sharp pain in his right hip and then called for an ambulance. He normally ambulates with a walker and is independent with ADLs. He otherwise felt in his normal state of health prior to this. However, reports somewhat chronic/gradually worsening SLADE and SOB after activities such as  Educated patient's mother about risk for severe COVID-19 due to risk factors according to CDC guidelines. ACM reviewed discharge instructions, medical action plan and red flag symptoms with the patient's mother who verbalized understanding. Discussed COVID vaccination status: patient too young for vaccine. Education provided on COVID-19 vaccination as appropriate. Discussed exposure protocols and quarantine with CDC Guidelines. Patient's mother was given an opportunity to verbalize any questions and concerns and agrees to contact ACM or health care provider for questions related to their healthcare. eating requiring him to sit and use a CPAP for 2-3 hours every afternoon and lower extremity swelling right > left not much improved with his home PO lasix and spironolactone. He also reports requiring 2-3L supplemental oxygen while sleeping. He has some little red marks all over his obody from the ant bites but is mostly unbothered by them.  XR/CT Imaging with acute fractures of the right superior and inferior pubic rami with possible involvement of the anterosuperior acetabulum. CT chest/abdomen/pelvis also notable for right-sided pulmonary embolism with nonocclusive thrombus in the main right pulmonary artery and right lower lobe branch vessels, and occlusive thrombus in a subsegmental branch of the right pulmonary artery, no right sided heart strain, unclear chronicity. CT head and cervical spine without acute findings.  Orthopedic surgery consulted for pelvic fracture. Started on heparin gtt for PE. Admitted to  for further manement.  EKG showing some concerning changes with T wave inversions and ST depressions. Cardiology consulted and echo done. Cardiology recommended to start to diuresis patient with Lasix 80 mg IV BID per Cards recs. Patient started on Entresto for depressed EF and titrated as per Cardiology recs and continue on Aldactone. Cardiology recommending ischemic work-up as outpatient with PET stress and consideration of Life Vest on discharge which patient declining at this time but will discuss with Cardiology as outpatient. Patient also having a number of PVCs and PACs in hospital and reviewed by Cardiology who noted patient in atrial flutter. After discussion with Cardiology they recommended DEMETRICE and cardioversion for his atrial flutter as patient symptomatic and felt could be contributing to his heart failure and low EF. Patient to go for DEMETRICE and cardioversion on 9/19. Patient not progressing with PT/OT as limited by dyspnea and pain to right hip despite multimodal pain medications. Discussed  "with Dr. Stapleton from Orthopedics on 9/19 and he stated he spoke with patient and his son and after discussion plan to proceed with percutaneous screw fixation of right acetabular fracture and right sided pelvic fractures for 9/20. Patient had DEMETRICE and successful DCCV by Cardiology on 9/19 with return back to normal sinus rhythm. Patient started on IV Amiodarone infusion after cardioversion and plan for 24 hours then switch to oral Amiodarone as per Cardiology recommendations. Patient switched from IV to po Lasix on 9/19 after cardioversion to Lasix 40 mg po BID as now euvolemic. Patient taken to OR on 9/20/2024 and underwent ORIF of pelvis by Dr. Negrito Stapleton. Post-op, patient is weight bear as tolerated and range of motion as tolerate to bilateral lower extremities. Patient completed Amiodarone infusion on evening of 9/20. Patient started on Amiodarone 400 mg po BID for 14 days on am of 9/21 followed by Amiodarone 200 mg po daily as per Cardiology recs. Patient remains in sinus rhythm on cardioversion. Patient reports pain controlled to right hip post-op. Therapy recommending high intensity therapy. SNF referrals sent and patient accepted to [facility] for 9/23. Patient approved on 9/23 by insurance but do not have BM and needs BM prior to discharge to [facility] so to be given Lactulose and Magnesium citrate to have BM.  Patient had several bowel movements on am of 9/24. Patient reports right hip pain controlled. Vitals signs stable. Patient discharged in good condition to [facility] on 9/24. Surgical bandages to remain in place until Orthopedic clinic follow-up."    Today, patient states that his postoperative pain is still not well controlled.  Patient is not able to do much with therapy.  He is also reporting a postnasal drip.  Endorses decreased appetite.  States that his abdomen feels very full, tympanic sounds heard on percussion.  Patient reports last bowel movement was yesterday.      Patient " will be treated at [facility] with PT and OT to improve functional status and ability to perform ADLs.     Past Medical History: Patient has a past medical history of Atrial fibrillation, unspecified type (09/06/2023), COPD exacerbation (09/06/2023), and Thyroid disease.    Past Surgical History: Patient has a past surgical history that includes Hernia repair; Colonoscopy (01/01/2011); Joint replacement (Bilateral); Left heart catheterization (Right, 07/22/2021); Coronary angiography (N/A, 07/22/2021); Intramedullary rodding of trochanter of femur (Right, 09/03/2021); Hip surgery (Right, 08/2021); Eye surgery (Bilateral); Fracture surgery (Right, 09/2021); Vasectomy; Treatment of cardiac arrhythmia (N/A, 9/19/2024); Transesophageal echocardiography (N/A, 9/19/2024); and Open reduction and internal fixation (ORIF) of injury of hip (Right, 9/20/2024).    Social History: Patient reports that he quit smoking about 24 years ago. His smoking use included cigarettes. He started smoking about 59 years ago. He has a 35 pack-year smoking history. He has been exposed to tobacco smoke. He has never used smokeless tobacco. He reports that he does not drink alcohol and does not use drugs.    Family History: family history includes Crohn's disease in his mother; Dementia in his mother; Heart attack in his father; No Known Problems in his brother, sister, and son.    Allergies: Patient has No Known Allergies.    ROS  Constitutional: Negative for fever   Eyes: Negative for blurred vision, double vision   Respiratory: Negative for cough. + postnasal drip, SLADE  Cardiovascular: Negative for chest pain, palpitations, and leg swelling.   Gastrointestinal: Negative for  constipation, diarrhea, nausea, vomiting.  +abdominal discomfort, flatulence  Genitourinary: Negative for dysuria, frequency   Musculoskeletal:  + generalized weakness.  +severe right hip pain  Skin: Negative for itching and rash.   Neurological: Negative for dizziness,  "headaches.   Psychiatric/Behavioral: Negative for depression. The patient is not nervous/anxious.      24 hour Vital Sign Range   Temp:  [97.8 °F (36.6 °C)-98.4 °F (36.9 °C)]   Pulse:  [69-82]   Resp:  [15-20]   BP: (124-133)/(58-70)   SpO2:  [90 %-95 %]     Current BMI: Body mass index is 29.98 kg/m².    PEx  Constitutional: Patient appears obese, debilitated.  No distress noted  HENT:   Head: Normocephalic and atraumatic.   Eyes: Pupils are equal, round  Neck: Normal range of motion. Neck supple.   Cardiovascular: Normal rate, regular rhythm and normal heart sounds.    Pulmonary/Chest: Effort normal and breath sounds are clear  Abdominal: Soft. Bowel sounds are normal.   Musculoskeletal: Normal range of motion.   Neurological: Alert and oriented to person, place, and time.   Psychiatric: Normal mood and affect. Behavior is normal.   Skin: Skin is warm and dry.  Occlusive gauze dressing to right pelvic area    No results for input(s): "GLUCOSE", "NA", "K", "CL", "CO2", "BUN", "CREATININE", "CALCIUM", "MG" in the last 24 hours.  No results for input(s): "WBC", "RBC", "HGB", "HCT", "PLT", "MCV", "MCH", "MCHC" in the last 24 hours.  No results for input(s): "POCTGLUCOSE" in the last 168 hours.     Assessment and Plan:    Closed nondisplaced fracture of anterior column of right acetabulum with routine healing   s/p ORIF of pelvis on 9/20/2024  Closed pelvic ring fracture   s/p percutaneous ring 9/20/2024   - PT/OT, BLE WBAT, RPMAT  - DVT PPX with apixaban  - Monitor and report drainage to incisional site  - maintain surgical dressing until removed by Orthopedics  - Fall precautions  - Bowel regimen in place, hold for loose or frequent stools  - Ortho ALEKSANDR to see patient intermittently at SNF  - follow-up with orthopedics after discharge    Acute postoperative pain  - continue acetaminophen 1000 mg q.8 hours, methocarbamol 500 mg QID, initiated oxycodone 5 or 10 mg q.4 hours PRN.  Bowel regimen in place    Pulmonary " "embolism without acute cor pulmonale  - Present on admit.   - CT chest/abdomen/pelvis as part of trauma work up in ED showed "Right-sided pulmonary embolism, detailed above, with nonocclusive thrombus in the main right pulmonary artery and right lower lobe branch vessels, and occlusive thrombus in a subsegmental branch of the right pulmonary artery. Note comparison images are not available to determine chronicity. No right sided heart strain."   - Denies previous diagnosis or PE/DVT.  - Initialed on Heparin drip IV to treat PE and transitioned to therapeutic oral Apixiban post-op to treat with Apixiban 10 mg po BID x 7 days (started on evening of 9/20) followed by indefinite treatment of Apixiban 5 mg po BID.   - Cards consulted, continue current therapies. No indication to activate PE response team at this time.   - No signs of right heart strain  - US of lower extremities on this admit negative for DVTs so IVC filter not indicated.   - patient continues on supplemental oxygen at 3 L      Acute on chronic combined systolic and diastolic congestive heart failure  Pulmonary hypertension   - most recent echo reviewed from 09/16/2024, severe global hypokinesis, EF 20-25%, mild MR, PASP 44 mmHg  - Cardiology consulted, Patient taken off Losartan and started on Entresto to treat his reduced EF and heart failure.   - Will need Cardiac PET stress as outpatient for ischemic evaluation.  - Fluid restriction 1.5 liters a day and low salt diet.   - Will need life vest on discharge if patient wishes, as is DNR baseline. Patient declining life vest at this time but willing to discuss with Cardiology as outpatient.   - continue Lasix 40 mg BID, spironolactone 25 mg daily, metoprolol XL 50 mg daily, Entresto    Paroxysmal atrial flutter  - Per chart review, his cardiologist Dr. Hank Barry feels EKGs that were read as atrial fibrillation previously are actually sinus rhythm and thus not on long term anticoagulation on admit.   - " Resolved after cardioversion on 9/19. Patient remains in sinus rhythm. Patient completed 24 hour IV infusion of Amiodarone post cardioversion and started on Amiodarone 400 mg po BID x 14 days followed b y Amiodarone 200 mg po daily for indefinite treatment.   - Continue Toprol XL for rate control. Continue Apixiban for long term anticoagulation.     Essential hypertension  - home regimen with amlodipine 2.5 mg daily, Lasix 40 mg BID, losartan 100 mg daily, metoprolol XL 50 mg daily, Aldactone 25 mg daily  - continue on Lasix 40 mg BID, metoprolol XL 12.5 mg daily, Entresto  mg BID, spironolactone 25 mg daily    Bradycardia  - Patient has history of atrial bigeminy and bradycardia per chart.   - follows with EP as outpatient  - HR ranges 40s-80s at times.   - Hold Metoprolol if HR sustains < 60.     PVC's (premature ventricular contractions)  - Patient noted in history and experienced in acute setting  - Keep Magnesium > 2, Potassium >4  - continue metoprolol     Coronary artery disease involving native coronary artery of native heart without angina pectoris  Elevated troponin  Abnormal ventricular wall motion   - Patient with known CAD which is controlled.   - Previous angiogram with non obstructive CAD 2021.   - continue home ASA 81 mg daily, Toprol XL and atorvastatin 10 mg daily   - Cardiology recommended for outpatient PET cardiac stress for ischemic work up.    Chronic hypoxemic respiratory failure  AKHIL (obstructive sleep apnea)  - He is on home oxygen at 2-3 LPM, but states only at night with his CPAP  - ordering for CPAP qHS.  - continue supplemental oxygen, wean as tolerated     Insomnia  - Chronic and controlled.   - Continue home Mirtazapine 7.5 mg qHS.     Hypothyroidism due to acquired atrophy of thyroid  - Continue home Levothyroxine 200 mcg daily     Pure hypercholesterolemia  - continue atorvastatin 10 mg daily     Slow transit constipation  - chronic  - continue senna docusate 1 tab BID,  initiated daily MiraLax    Debility   - Continue with PT/OT for gait training and strengthening and restoration of ADL's   - Encourage mobility, OOB in chair, and early ambulation as appropriate  - Fall precautions   - Monitor for bowel and bladder dysfunction  - Monitor for and prevent skin breakdown and pressure ulcers  - Continue DVT prophylaxis with apixaban     Psychiatric Assessment  Patient Health Questionnaire-9 (PHQ-9)    Date: 9/25/24    1. Little interest or pleasure in doing things?   yes,  Several days                = 1    2. Feeling down, depressed, or hopeless?   yes, Several days                = 1    3. Trouble falling or staying asleep, or sleeping too much?   no, Not at all                       = 0    4. Feeling tired or having little energy?   no, Not at all                       = 0    5. Poor appetite or overeating?   no, Not at all                       = 0    6. Feeling bad about yourself- or that you are a failure or have let yourself or your family down?   no, Not at all                       = 0    7. Trouble concentrating on things, such as reading the newspaper or watching TV?   no, Not at all                       = 0    8. Moving or speaking so slowly that other people could have noticed? Or the opposite- being so fidgety or restless that you have been moving around a lot more than usual?   no, Not at all                       = 0    9. Thoughts that you would be better off dead or of hurting yourself in some way?   no, Not at all                       = 0    Total Score: 2/27     How difficult have these problems made it for you to do your work, take care of things at home, or get along with other people?   no      Screening is  Negative        Anticipate disposition:  Home with home health?     IP [facility] RISK OF UNPLANNED READMISSION Model: HIGH     Follow-up needed during SNF stay-    Appointment not to send patient to- orthopedics (10/4)    Follow-up needed after discharge from SNF:  PCP, be scheduled cardiology follow-up, PET scan (10/28), orthopedics (11/1)    Future Appointments   Date Time Provider Department Center   9/26/2024 10:20 AM Elio Ackerman MD [facility] CARDIO [place]   10/4/2024  8:45 AM Red Ken, NP [facility] ORTHO [address]   10/28/2024  1:30 PM CARDIAC, PET IMAGING [facility]   11/1/2024 10:45 AM Red Ken NP [facility] ORTHO [address]   1/27/2025 10:40 AM Ramirez Levine MD [facility] Med     Advance Care Planning   This was a voluntary visit in the option to decline counseling was given  Length of Conversation:  18 minutes  Topics Discussed: Disease process of advanced heart disease, recent fall with surgery  Overview of Materials/Resources given(if applicable):  Discussed code status.  Discussed what it means to be DNR.  Discussed what resuscitative measures are in detail.  All topics recapped and questions answered.  Outcome of Discussion: Patient does want to be a DNR. DNR order implemented in EMR.   Individuals present:  Myself and patient  Decision Maker: Bean Salas, patient      I certify that SNF services are required to be given on an inpatient basis because Bean Salas needs for skilled nursing care and/or skilled rehabilitation are required on a daily basis and such services can only practically be provided in a skilled nursing facility setting and are for an ongoing condition for which she received inpatient care in the hospital.     Total time of the visit 163 minutes (does not include ACP time)  Description of non physical exam/non charting time: counseling patient on clinical conditions and therapies provided.  Extensive chart review completed including all consultation notes.  All pertinent laboratory and radiographical images reviewed.  Care coordination with pharmacist.  Outpatient records reviewed.      Piper Oneal NP  Department of Hospital Medicine   [facility]      DOS: 9/25/2024       Patient note was created using MModal Dictation.  Any errors in syntax or even information may not have been identified and edited on initial review prior to signing this note.

## 2024-09-26 LAB
ANION GAP SERPL CALC-SCNC: 8 MMOL/L (ref 8–16)
BASOPHILS # BLD AUTO: 0.04 K/UL (ref 0–0.2)
BASOPHILS NFR BLD: 0.5 % (ref 0–1.9)
BUN SERPL-MCNC: 49 MG/DL (ref 8–23)
CALCIUM SERPL-MCNC: 9.5 MG/DL (ref 8.7–10.5)
CHLORIDE SERPL-SCNC: 96 MMOL/L (ref 95–110)
CO2 SERPL-SCNC: 33 MMOL/L (ref 23–29)
CREAT SERPL-MCNC: 1.8 MG/DL (ref 0.5–1.4)
DIFFERENTIAL METHOD BLD: ABNORMAL
EOSINOPHIL # BLD AUTO: 0.2 K/UL (ref 0–0.5)
EOSINOPHIL NFR BLD: 2.4 % (ref 0–8)
ERYTHROCYTE [DISTWIDTH] IN BLOOD BY AUTOMATED COUNT: 14.9 % (ref 11.5–14.5)
EST. GFR  (NO RACE VARIABLE): 37.6 ML/MIN/1.73 M^2
GLUCOSE SERPL-MCNC: 174 MG/DL (ref 70–110)
HCT VFR BLD AUTO: 30.8 % (ref 40–54)
HGB BLD-MCNC: 10 G/DL (ref 14–18)
IMM GRANULOCYTES # BLD AUTO: 0.08 K/UL (ref 0–0.04)
IMM GRANULOCYTES NFR BLD AUTO: 1 % (ref 0–0.5)
LYMPHOCYTES # BLD AUTO: 0.9 K/UL (ref 1–4.8)
LYMPHOCYTES NFR BLD: 10.9 % (ref 18–48)
MAGNESIUM SERPL-MCNC: 2.5 MG/DL (ref 1.6–2.6)
MCH RBC QN AUTO: 32.4 PG (ref 27–31)
MCHC RBC AUTO-ENTMCNC: 32.5 G/DL (ref 32–36)
MCV RBC AUTO: 100 FL (ref 82–98)
MONOCYTES # BLD AUTO: 1 K/UL (ref 0.3–1)
MONOCYTES NFR BLD: 11.5 % (ref 4–15)
NEUTROPHILS # BLD AUTO: 6.2 K/UL (ref 1.8–7.7)
NEUTROPHILS NFR BLD: 73.7 % (ref 38–73)
NRBC BLD-RTO: 0 /100 WBC
PHOSPHATE SERPL-MCNC: 4.6 MG/DL (ref 2.7–4.5)
PLATELET # BLD AUTO: 246 K/UL (ref 150–450)
PMV BLD AUTO: 11.9 FL (ref 9.2–12.9)
POTASSIUM SERPL-SCNC: 3.6 MMOL/L (ref 3.5–5.1)
RBC # BLD AUTO: 3.09 M/UL (ref 4.6–6.2)
SODIUM SERPL-SCNC: 137 MMOL/L (ref 136–145)
WBC # BLD AUTO: 8.42 K/UL (ref 3.9–12.7)

## 2024-09-26 PROCEDURE — 84100 ASSAY OF PHOSPHORUS: CPT | Mod: HCNC | Performed by: HOSPITALIST

## 2024-09-26 PROCEDURE — 36415 COLL VENOUS BLD VENIPUNCTURE: CPT | Mod: HCNC | Performed by: HOSPITALIST

## 2024-09-26 PROCEDURE — 97112 NEUROMUSCULAR REEDUCATION: CPT | Mod: HCNC

## 2024-09-26 PROCEDURE — 94761 N-INVAS EAR/PLS OXIMETRY MLT: CPT | Mod: HCNC

## 2024-09-26 PROCEDURE — 94660 CPAP INITIATION&MGMT: CPT | Mod: HCNC

## 2024-09-26 PROCEDURE — 25000003 PHARM REV CODE 250: Mod: HCNC | Performed by: HOSPITALIST

## 2024-09-26 PROCEDURE — 97530 THERAPEUTIC ACTIVITIES: CPT | Mod: HCNC

## 2024-09-26 PROCEDURE — 27000221 HC OXYGEN, UP TO 24 HOURS: Mod: HCNC

## 2024-09-26 PROCEDURE — 99900035 HC TECH TIME PER 15 MIN (STAT): Mod: HCNC

## 2024-09-26 PROCEDURE — 25000003 PHARM REV CODE 250: Mod: HCNC | Performed by: NURSE PRACTITIONER

## 2024-09-26 PROCEDURE — 85025 COMPLETE CBC W/AUTO DIFF WBC: CPT | Mod: HCNC | Performed by: HOSPITALIST

## 2024-09-26 PROCEDURE — 80048 BASIC METABOLIC PNL TOTAL CA: CPT | Mod: HCNC | Performed by: HOSPITALIST

## 2024-09-26 PROCEDURE — 25000242 PHARM REV CODE 250 ALT 637 W/ HCPCS: Mod: HCNC | Performed by: NURSE PRACTITIONER

## 2024-09-26 PROCEDURE — 11000004 HC SNF PRIVATE: Mod: HCNC

## 2024-09-26 PROCEDURE — 83735 ASSAY OF MAGNESIUM: CPT | Mod: HCNC | Performed by: HOSPITALIST

## 2024-09-26 RX ORDER — DOCUSATE SODIUM 283 MG/5ML
1 LIQUID RECTAL ONCE
Status: COMPLETED | OUTPATIENT
Start: 2024-09-26 | End: 2024-09-26

## 2024-09-26 RX ORDER — GABAPENTIN 300 MG/1
300 CAPSULE ORAL 3 TIMES DAILY
Status: DISCONTINUED | OUTPATIENT
Start: 2024-09-26 | End: 2024-10-01 | Stop reason: HOSPADM

## 2024-09-26 RX ADMIN — ATORVASTATIN CALCIUM 10 MG: 10 TABLET, FILM COATED ORAL at 09:09

## 2024-09-26 RX ADMIN — SIMETHICONE 80 MG: 80 TABLET, CHEWABLE ORAL at 09:09

## 2024-09-26 RX ADMIN — MIRTAZAPINE 7.5 MG: 7.5 TABLET, FILM COATED ORAL at 08:09

## 2024-09-26 RX ADMIN — ASPIRIN 81 MG: 81 TABLET, COATED ORAL at 09:09

## 2024-09-26 RX ADMIN — AMMONIUM LACTATE: 12 LOTION TOPICAL at 09:09

## 2024-09-26 RX ADMIN — METHOCARBAMOL 750 MG: 750 TABLET ORAL at 09:09

## 2024-09-26 RX ADMIN — GABAPENTIN 300 MG: 300 CAPSULE ORAL at 08:09

## 2024-09-26 RX ADMIN — FLUTICASONE PROPIONATE 100 MCG: 50 SPRAY, METERED NASAL at 09:09

## 2024-09-26 RX ADMIN — SIMETHICONE 80 MG: 80 TABLET, CHEWABLE ORAL at 01:09

## 2024-09-26 RX ADMIN — METHOCARBAMOL 750 MG: 750 TABLET ORAL at 01:09

## 2024-09-26 RX ADMIN — THERA TABS 1 TABLET: TAB at 09:09

## 2024-09-26 RX ADMIN — SACUBITRIL AND VALSARTAN 1 TABLET: 24; 26 TABLET, FILM COATED ORAL at 08:09

## 2024-09-26 RX ADMIN — OXYCODONE HYDROCHLORIDE 10 MG: 10 TABLET ORAL at 06:09

## 2024-09-26 RX ADMIN — ACETAMINOPHEN 1000 MG: 500 TABLET ORAL at 01:09

## 2024-09-26 RX ADMIN — APIXABAN 10 MG: 2.5 TABLET, FILM COATED ORAL at 08:09

## 2024-09-26 RX ADMIN — METHOCARBAMOL 750 MG: 750 TABLET ORAL at 04:09

## 2024-09-26 RX ADMIN — AMIODARONE HYDROCHLORIDE 400 MG: 200 TABLET ORAL at 09:09

## 2024-09-26 RX ADMIN — ACETAMINOPHEN 1000 MG: 500 TABLET ORAL at 05:09

## 2024-09-26 RX ADMIN — SENNOSIDES AND DOCUSATE SODIUM 1 TABLET: 50; 8.6 TABLET ORAL at 08:09

## 2024-09-26 RX ADMIN — GABAPENTIN 300 MG: 300 CAPSULE ORAL at 03:09

## 2024-09-26 RX ADMIN — SENNOSIDES AND DOCUSATE SODIUM 1 TABLET: 50; 8.6 TABLET ORAL at 09:09

## 2024-09-26 RX ADMIN — DOCUSATE SODIUM 1 ENEMA: 283 LIQUID RECTAL at 04:09

## 2024-09-26 RX ADMIN — OXYCODONE HYDROCHLORIDE 10 MG: 10 TABLET ORAL at 02:09

## 2024-09-26 RX ADMIN — APIXABAN 10 MG: 2.5 TABLET, FILM COATED ORAL at 09:09

## 2024-09-26 RX ADMIN — LEVOTHYROXINE SODIUM 200 MCG: 100 TABLET ORAL at 05:09

## 2024-09-26 RX ADMIN — POLYETHYLENE GLYCOL 3350 17 G: 17 POWDER, FOR SOLUTION ORAL at 09:09

## 2024-09-26 RX ADMIN — METHOCARBAMOL 750 MG: 750 TABLET ORAL at 08:09

## 2024-09-26 RX ADMIN — SIMETHICONE 80 MG: 80 TABLET, CHEWABLE ORAL at 06:09

## 2024-09-26 NOTE — PT/OT/SLP PROGRESS
Occupational Therapy   Co-Treatment  Co tx performed with PT due to patient need for 2 skilled therapist to ensure patient and staff safety and to accommondate for activity tolerance.      Name: Bean Salas  MRN: [id number]  Admit Date: 9/24/2024  Admitting Diagnosis:  Closed nondisplaced fracture of anterior column of right acetabulum with routine healing    General Precautions: Standard, fall   Orthopedic Precautions: LLE weight bearing as tolerated, RLE weight bearing as tolerated (ROMAT)   Braces: N/A    Recommendations:     Discharge Recommendations:  home health OT  Level of Assistance Recommended at Discharge: 24 hours physical assistance for all ADL's and home management tasks  Discharge Equipment Recommendations: wheelchair, 3-in-1 commode, hip kit  Barriers to discharge:  Decreased caregiver support (current functional level)    Assessment:     Bean Salas is a 79 y.o. male with a medical diagnosis of Closed nondisplaced fracture of anterior column of right acetabulum with routine healing. Pt tolerated session very poorly 2/2 pain when seated at EOB. Pt able to tolerate sitting at EOB for ~3 minutes, however noted to have increased depth/speed of breathing and antalgic movement patterns while attempting to offload pain onto BUE/R hips. Repositioning for decreased pain at EOB attempted by therapists, however ineffective and adamantly requested to return to supine.  Pt would benefit from con't OT in SNF setting to improve safety and independence /c functional tasks as able.   . Performance deficits affecting function are weakness, impaired endurance, impaired self care skills, impaired functional mobility, gait instability, impaired balance, pain, decreased lower extremity function, impaired cardiopulmonary response to activity.     Rehab Potential is good    Activity tolerance:  Poor    Plan:     Patient to be seen 5 x/week to address the above listed problems via self-care/home management,  "community/work re-entry, therapeutic activities, therapeutic exercises, neuromuscular re-education    Plan of Care Expires: 09/26/24  Plan of Care Reviewed with: patient    Subjective     Communicated with: NSG prior to session.     "I'm telling you this is all I can do. I need to lay back down. " .    Pain/Comfort:  Pain Rating 1: other (see comments) (did not rate, however stated it was severe when seated at EOB and limited therapy session)  Location - Side 1: Bilateral  Location - Orientation 1: generalized  Location 1: hip  Pain Addressed 1: Distraction, Reposition, Cessation of Activity  Pain Rating Post-Intervention 1: other (see comments) (did not rate; decreased in supine)    Patient's cultural, spiritual, Baptism conflicts given the current situation:  no    Objective:     Patient found supine with PureWick, oxygen upon OT entry to room. Pt alert and agreeable to therapy.       Bed Mobility:    Patient completed Rolling/Turning to Left with  minimum assistance and with side rail  Patient completed Rolling/Turning to Right with minimum assistance and with side rail  Patient completed Scooting/Bridging with stand by assistance and over head bed rail  Patient completed Supine to Sit with maximal assistance and 2 persons  Patient completed Sit to Supine with maximal assistance and 2 persons   Pt able to tolerate sitting at EOB for ~3 minutes /c Mod A. Pt noted to have antalgic movement patterns and increased breathing rate/depth 2/2 pain. Pt offloading onto BUE and /c R lean for pain control. Pt attempted sitting upright without lean and relaxing trunk onto support, however both attempts increased pain. Pt requested to return to supine.   [facility] 6 Click ADL: 13    Treatment & Education:  Pt educated on POC, role of OT, and safety. Pt performed tasks to improve safety and independence in functional tasks and ADLs as mentioned above.       Patient left supine with all lines intact, call button in reach, and all " needs met    GOALS:   Multidisciplinary Problems       Occupational Therapy Goals          Problem: Occupational Therapy    Goal Priority Disciplines Outcome Interventions   Occupational Therapy Goal     OT, PT/OT Progressing    Description: Goals to be met by: 10/24/2024     Patient will increase functional independence with ADLs by performing:    UE Dressing with Minimal Assistance.  LE Dressing with Moderate Assistance.  Grooming while seated at sink with Supervision.  Toileting from toilet /c raised toilet seat with Moderate Assistance for hygiene and clothing management.   Bathing from  shower chair/bench with Moderate Assistance.  Sitting at edge of bed x10 minutes with Contact Guard Assistance.  Rolling to Bilateral with Stand-by Assistance.   Stand pivot transfers with Minimal Assistance and LRAD as needed  Toilet transfer to raised toilet with Minimal Assistance and LRAD as needed.  SC transfer /c Minimal Assistance and LRAD as needed                         Time Tracking:     OT Date of Treatment: 09/26/24  OT Start Time: 1116    OT Stop Time: 1133  OT Total Time (min): 17 min    Billable Minutes:Neuromuscular Re-education 17    9/26/2024

## 2024-09-26 NOTE — PLAN OF CARE
Problem: Adult Inpatient Plan of Care  Goal: Plan of Care Review  Outcome: Progressing  Flowsheets (Taken 9/26/2024 6951)  Plan of Care Reviewed With: patient  Goal: Patient-Specific Goal (Individualized)  Outcome: Progressing  Goal: Absence of Hospital-Acquired Illness or Injury  Outcome: Progressing  Goal: Optimal Comfort and Wellbeing  Outcome: Progressing  Goal: Readiness for Transition of Care  Outcome: Progressing     Problem: Adult Inpatient Plan of Care  Goal: Patient-Specific Goal (Individualized)  Outcome: Progressing     Problem: Adult Inpatient Plan of Care  Goal: Absence of Hospital-Acquired Illness or Injury  Outcome: Progressing     Problem: Adult Inpatient Plan of Care  Goal: Optimal Comfort and Wellbeing  Outcome: Progressing     Problem: Adult Inpatient Plan of Care  Goal: Readiness for Transition of Care  Outcome: Progressing     Problem: Wound  Goal: Optimal Coping  Outcome: Progressing  Goal: Optimal Functional Ability  Outcome: Progressing  Goal: Absence of Infection Signs and Symptoms  Outcome: Progressing  Goal: Improved Oral Intake  Outcome: Progressing  Goal: Optimal Pain Control and Function  Outcome: Progressing  Goal: Skin Health and Integrity  Outcome: Progressing  Goal: Optimal Wound Healing  Outcome: Progressing     Problem: Wound  Goal: Optimal Functional Ability  Outcome: Progressing     Problem: Wound  Goal: Absence of Infection Signs and Symptoms  Outcome: Progressing     Problem: Wound  Goal: Improved Oral Intake  Outcome: Progressing     Problem: Wound  Goal: Optimal Pain Control and Function  Outcome: Progressing     Problem: Wound  Goal: Skin Health and Integrity  Outcome: Progressing     Problem: Wound  Goal: Optimal Wound Healing  Outcome: Progressing     Problem: Skin Injury Risk Increased  Goal: Skin Health and Integrity  Outcome: Progressing     Problem: Fall Injury Risk  Goal: Absence of Fall and Fall-Related Injury  Outcome: Progressing

## 2024-09-26 NOTE — PLAN OF CARE
Interdisciplinary team, Allan Munoz RN Charge Nurse, Bishop Stokes Jr., OT, Rehab, Zenobia Garcia, Activities, and Michelle Wood LPN, , spoke to patient for care plan conference, weekly status update, and therapy progress update. Tentative discharge date set to be determined at next IDT meeting. Declined for IDT to call family.    Preferred Home Health: [facility]  Preferred Pharmacy: [facility] in [place]

## 2024-09-26 NOTE — CLINICAL REVIEW
Clinical Pharmacy Chart Review Note      Admit Date: 9/24/2024   LOS: 2 days       Bean Salas is a 79 y.o. male admitted to SNF for PT/OT after hospitalization for closed nondisplaced fracture of anterior column of right acetabulum with routine healing s/p ORIF of pelvis on 9/20/2024.    Active Hospital Problems    Diagnosis  POA    *Closed nondisplaced fracture of anterior column of right acetabulum with routine healing s/p ORIF of pelvis on 9/20/2024 [S32.434D]  Not Applicable    Insomnia [G47.00]  Yes    Slow transit constipation [K59.01]  Yes    Chronic hypoxemic respiratory failure [J96.11]  Yes    Pulmonary embolism without acute cor pulmonale [I26.99]  Yes    Paroxysmal atrial flutter [I48.92]  Yes    AKHIL (obstructive sleep apnea) [G47.33]  Yes    Impaired activities of daily living [Z78.9]  Yes    Coronary artery disease involving native coronary artery of native heart without angina pectoris [I25.10]  Yes    Hypothyroidism due to acquired atrophy of thyroid [E03.4]  Yes    Pure hypercholesterolemia [E78.00]  Yes    Essential hypertension [I10]  Yes      Resolved Hospital Problems   No resolved problems to display.     Review of patient's allergies indicates:  No Known Allergies  Patient Active Problem List    Diagnosis Date Noted    Closed pelvic ring fracture s/p ORIF of pelvis on 9/20/2024 09/21/2024    Pulmonary embolism without acute cor pulmonale 09/16/2024    Elevated troponin 09/16/2024    Insomnia 09/16/2024    Abnormal ventricular wall motion 09/16/2024    Chronic hypoxemic respiratory failure 09/16/2024    Slow transit constipation 09/16/2024    Pulmonary hypertension 11/15/2023    Paroxysmal atrial flutter 09/06/2023    Aortic atherosclerosis 09/06/2023    AKHIL (obstructive sleep apnea) 10/05/2022    PVC's (premature ventricular contractions) 06/13/2022    Impaired range of motion of right hip 05/09/2022    Decreased right shoulder range of motion 05/09/2022    Weakness 05/09/2022    Impaired  activities of daily living 05/09/2022    Rotator cuff impingement syndrome of right shoulder 04/27/2022    Venous stasis ulcer of right calf limited to breakdown of skin without varicose veins     Acute on chronic combined systolic and diastolic congestive heart failure     Coronary artery disease involving native coronary artery of native heart without angina pectoris     SOB (shortness of breath)     Elevated LFTs 09/25/2021    Advance care planning 09/25/2021    Displaced intertrochanteric fracture of right femur, initial encounter for closed fracture 09/03/2021    Closed nondisplaced fracture of anterior column of right acetabulum with routine healing s/p ORIF of pelvis on 9/20/2024 09/03/2021    Bradycardia 08/06/2021    Atrial bigeminy 06/21/2021    Essential hypertension 06/25/2017    Pure hypercholesterolemia 06/25/2017    Hypothyroidism due to acquired atrophy of thyroid 06/25/2017       Scheduled Meds:    acetaminophen  1,000 mg Oral Q8H    [START ON 10/5/2024] amiodarone  200 mg Oral Daily    amiodarone  400 mg Oral BID    ammonium lactate   Topical (Top) BID    apixaban  10 mg Oral BID    [START ON 9/27/2024] apixaban  5 mg Oral BID    aspirin  81 mg Oral Daily    atorvastatin  10 mg Oral Daily    fluticasone propionate  2 spray Each Nostril Daily    levothyroxine  200 mcg Oral Before breakfast    methocarbamoL  750 mg Oral QID    metoprolol succinate  12.5 mg Oral Daily    mirtazapine  7.5 mg Oral QHS    multivitamin  1 tablet Oral Daily    polyethylene glycol  17 g Oral Daily    sacubitriL-valsartan  1 tablet Oral BID    senna-docusate 8.6-50 mg  1 tablet Oral BID    simethicone  1 tablet Oral QID (PC + HS)     Continuous Infusions:   PRN Meds:   Current Facility-Administered Medications:     acetaminophen, 650 mg, Oral, Q6H PRN    calcium carbonate, 500 mg, Oral, BID PRN    melatonin, 6 mg, Oral, Nightly PRN    oxyCODONE, 5 mg, Oral, Q4H PRN    oxyCODONE, 10 mg, Oral, Q4H PRN    OBJECTIVE:     Vital  Signs (Last 24H)  Temp:  [97.5 °F (36.4 °C)-98.1 °F (36.7 °C)]   Pulse:  [50-62]   Resp:  [16-22]   BP: ()/(52-53)   SpO2:  [93 %-98 %]     Laboratory:  CBC:   Recent Labs   Lab 09/22/24  0544 09/23/24  0157 09/26/24  0500   WBC 7.25 7.65 8.42   RBC 3.88* 3.95* 3.09*   HGB 12.5* 12.6* 10.0*   HCT 39.7* 39.2* 30.8*    179 246   * 99* 100*   MCH 32.2* 31.9* 32.4*   MCHC 31.5* 32.1 32.5     BMP:   Recent Labs   Lab 09/22/24  0544 09/23/24  0157 09/26/24  0500   * 164* 174*    139 137   K 4.0 4.1 3.6    99 96   CO2 30* 32* 33*   BUN 36* 33* 49*   CREATININE 1.2 1.3 1.8*   CALCIUM 8.7 8.8 9.5   MG 2.3 2.3 2.5     CMP:   Recent Labs   Lab 09/22/24  0544 09/23/24  0157 09/26/24  0500   * 164* 174*   CALCIUM 8.7 8.8 9.5    139 137   K 4.0 4.1 3.6   CO2 30* 32* 33*    99 96   BUN 36* 33* 49*   CREATININE 1.2 1.3 1.8*     Lab Results   Component Value Date    ALT 18 09/16/2024    AST 36 09/16/2024    ALKPHOS 49 (L) 09/16/2024    BILITOT 3.3 (H) 09/16/2024     Lab Results   Component Value Date    TSH 3.670 01/18/2024    FREET4 1.32 08/15/2023           ASSESSMENT/PLAN:     I have reviewed the medications in compliance with CMS Regulation F756 of the [employer]. Based on information gathered, the following items may need to be addressed:    **According to PMH and home medication list, patient takes the following medications at home. These medications are not currently ordered at SNF:  Furosemide 40 mg twice daily (on hold for PAULA)  Spironolactone 25 mg daily (on hold for PAULA)    **Patient is taking mirtazapine (home med) for insomnia. A gradual dose reduction is not recommended at this time. Patient to follow-up with prescribing MD as outpatient.  Monitor: mental status for insomnia, depression, suicide ideation, anxiety, serotonin syndrome, hyponatremia, dizziness, drowsiness, somnolence    Medications reviewed by PharmD, please re-consult if needed.      Gabriela Ware,  Pharm. D.  Clinical Pharmacist  [facility]

## 2024-09-26 NOTE — PT/OT/SLP PROGRESS
Physical Therapy Co-Treatment    Co-evaluation/treatment performed due to patient's multiple deficits requiring two skilled therapists to appropriately and safely assess patient's strength and endurance while facilitating functional tasks in addition to accommodating for patient's activity tolerance.       Patient Name:  Bean Salas   MRN:  [id number]  Admit Date: 9/24/2024  Admitting Diagnosis: Closed nondisplaced fracture of anterior column of right acetabulum with routine healing  Recent Surgeries: R pelvic ORIF (9/20/24)     General Precautions: Standard, fall  Orthopedic Precautions: LLE weight bearing as tolerated, RLE weight bearing as tolerated (ROMAT)  Braces: N/A    Recommendations:     Discharge Recommendations: home health PT  Level of Assistance Recommended at Discharge: 24 hours significant assistance  Discharge Equipment Recommendations: wheelchair, 3-in-1 commode, hip kit (Continue to assess with progression of mobility)  Barriers to discharge: Decreased caregiver support, Other (Comment) (Increased skilled assistance for mobility)    Assessment:     Bean Salas is a 79 y.o. male admitted with a medical diagnosis of Closed nondisplaced fracture of anterior column of right acetabulum with routine healing. Patient agreeable to PT treatment this AM. Patient with significant limitation in tolerance for EOB sitting this session secondary to presence of B hip/pelvic region pain. Patient with adamant request to return to supine reporting he is unable to attempt standing or continue with EOB sitting. Patient reports decrease in pain upon return to supine position. Patient will benefit from continued SNF rehabilitation services to address deficits as well as progress mobility towards maximal functional potential for improved quality of life and decreased caregiver burden.      Performance deficits affecting function: weakness, impaired endurance, impaired self care skills, impaired functional mobility,  "gait instability, impaired balance, decreased lower extremity function, decreased safety awareness, pain, decreased ROM, impaired cardiopulmonary response to activity, orthopedic precautions.    Rehab Potential is fair    Activity Tolerance: Poor    Plan:     Patient to be seen 5 x/week to address the above listed problems via gait training, therapeutic activities, therapeutic exercises, neuromuscular re-education, wheelchair management/training    Plan of Care Expires: 10/25/24  Plan of Care Reviewed with: patient    Subjective     "I am not trying to be mean or get out of it, but I just can't do this right now."     Pain/Comfort:  Pain Rating 1: other (see comments) (Pain level not rated)  Location - Side 1: Bilateral  Location - Orientation 1: generalized  Location 1: hip (pelvis)  Pain Addressed 1: Reposition, Distraction, Cessation of Activity  Pain Rating Post-Intervention 1: other (see comments) (Pain level not rated)    Patient's cultural, spiritual, Evangelical conflicts given the current situation:  no    Objective:     Communicated with nursing staff prior to session.  Patient found supine with oxygen, PureWick upon PT entry to room.     Therapeutic Activities and Exercises:   Patient tolerated approximately 3 minutes sitting EOB with ModA for trunk stability. PT standing in front of patient for safety and adjusting placement of RLE as needed for improved posturing. Attempted to assist patient with repositioning of trunk; increased pain reported when offloading R hip.     Functional Mobility:  Bed Mobility:     Rolling Left:  minimum assistance and HOB flat with bed rail  Rolling Right: minimum assistance and HOB flat with bed rail  Scooting: stand by assistance and HOB flat with use of rail at headboard  Supine to Sit: maximal assistance, of 2 persons, and HOB flat and use of bed rail  Sit to Supine: maximal assistance, of 2 persons, and HOB flat    AM-PAC 6 CLICK MOBILITY  9    Patient left supine with " all lines intact, call button in reach, and nursing staff notified. Pillow placed under BLE for elevation of B heels.     GOALS:   Multidisciplinary Problems       Physical Therapy Goals          Problem: Physical Therapy    Goal Priority Disciplines Outcome Goal Variances Interventions   Physical Therapy Goal     PT, PT/OT Progressing     Description: Goals to be met by: 10/25/24     Patient will increase functional independence with mobility by performin. Supine to sit with Contact Guard Assistance  2. Sit to supine with Contact Guard Assistance  3. Rolling to Left and Right with Supervision.  4. Sit to stand transfer with SBA.  5. Bed to chair transfer with Contact Guard Assistance using Rolling Walker  6. Gait  x 150 feet with Contact Guard Assistance using Rolling Walker.   7. Wheelchair propulsion x150 feet with Modified [place] using bilateral uppper extremities                         Time Tracking:     PT Received On: 24  PT Start Time: 1116  PT Stop Time: 1133  PT Total Time (min): 17 min    Billable Minutes: Therapeutic Activity 17    Treatment Type: Treatment  PT/PTA: PT     Number of PTA visits since last PT visit: 0     2024

## 2024-09-27 PROCEDURE — 97530 THERAPEUTIC ACTIVITIES: CPT | Mod: HCNC

## 2024-09-27 PROCEDURE — 97112 NEUROMUSCULAR REEDUCATION: CPT | Mod: HCNC

## 2024-09-27 PROCEDURE — 97535 SELF CARE MNGMENT TRAINING: CPT | Mod: HCNC

## 2024-09-27 PROCEDURE — 25000003 PHARM REV CODE 250: Mod: HCNC | Performed by: NURSE PRACTITIONER

## 2024-09-27 PROCEDURE — 11000004 HC SNF PRIVATE: Mod: HCNC

## 2024-09-27 PROCEDURE — 97110 THERAPEUTIC EXERCISES: CPT | Mod: HCNC

## 2024-09-27 PROCEDURE — 25000003 PHARM REV CODE 250: Mod: HCNC | Performed by: HOSPITALIST

## 2024-09-27 RX ORDER — METHOCARBAMOL 750 MG/1
750 TABLET, FILM COATED ORAL 4 TIMES DAILY
Status: DISCONTINUED | OUTPATIENT
Start: 2024-09-27 | End: 2024-10-01 | Stop reason: HOSPADM

## 2024-09-27 RX ORDER — METHOCARBAMOL 500 MG/1
1000 TABLET, FILM COATED ORAL 4 TIMES DAILY
Status: DISCONTINUED | OUTPATIENT
Start: 2024-09-27 | End: 2024-09-27

## 2024-09-27 RX ORDER — AMIODARONE HYDROCHLORIDE 200 MG/1
200 TABLET ORAL 2 TIMES DAILY
Status: DISCONTINUED | OUTPATIENT
Start: 2024-09-27 | End: 2024-10-01 | Stop reason: HOSPADM

## 2024-09-27 RX ADMIN — ACETAMINOPHEN 1000 MG: 500 TABLET ORAL at 12:09

## 2024-09-27 RX ADMIN — METHOCARBAMOL 1000 MG: 500 TABLET ORAL at 12:09

## 2024-09-27 RX ADMIN — METHOCARBAMOL 750 MG: 750 TABLET ORAL at 05:09

## 2024-09-27 RX ADMIN — AMMONIUM LACTATE: 12 LOTION TOPICAL at 08:09

## 2024-09-27 RX ADMIN — OXYCODONE HYDROCHLORIDE 5 MG: 5 TABLET ORAL at 08:09

## 2024-09-27 RX ADMIN — OXYCODONE HYDROCHLORIDE 10 MG: 10 TABLET ORAL at 08:09

## 2024-09-27 RX ADMIN — AMIODARONE HYDROCHLORIDE 400 MG: 200 TABLET ORAL at 08:09

## 2024-09-27 RX ADMIN — THERA TABS 1 TABLET: TAB at 08:09

## 2024-09-27 RX ADMIN — ACETAMINOPHEN 1000 MG: 500 TABLET ORAL at 08:09

## 2024-09-27 RX ADMIN — SACUBITRIL AND VALSARTAN 1 TABLET: 24; 26 TABLET, FILM COATED ORAL at 08:09

## 2024-09-27 RX ADMIN — MIRTAZAPINE 7.5 MG: 7.5 TABLET, FILM COATED ORAL at 08:09

## 2024-09-27 RX ADMIN — AMIODARONE HYDROCHLORIDE 200 MG: 200 TABLET ORAL at 08:09

## 2024-09-27 RX ADMIN — GABAPENTIN 300 MG: 300 CAPSULE ORAL at 08:09

## 2024-09-27 RX ADMIN — LEVOTHYROXINE SODIUM 200 MCG: 100 TABLET ORAL at 05:09

## 2024-09-27 RX ADMIN — APIXABAN 5 MG: 2.5 TABLET, FILM COATED ORAL at 08:09

## 2024-09-27 RX ADMIN — AMMONIUM LACTATE: 12 LOTION TOPICAL at 09:09

## 2024-09-27 RX ADMIN — METHOCARBAMOL 750 MG: 750 TABLET ORAL at 08:09

## 2024-09-27 RX ADMIN — ATORVASTATIN CALCIUM 10 MG: 10 TABLET, FILM COATED ORAL at 08:09

## 2024-09-27 RX ADMIN — ASPIRIN 81 MG: 81 TABLET, COATED ORAL at 08:09

## 2024-09-27 RX ADMIN — FLUTICASONE PROPIONATE 100 MCG: 50 SPRAY, METERED NASAL at 08:09

## 2024-09-27 RX ADMIN — OXYCODONE HYDROCHLORIDE 10 MG: 10 TABLET ORAL at 04:09

## 2024-09-27 RX ADMIN — APIXABAN 10 MG: 2.5 TABLET, FILM COATED ORAL at 08:09

## 2024-09-27 RX ADMIN — GABAPENTIN 300 MG: 300 CAPSULE ORAL at 02:09

## 2024-09-27 RX ADMIN — SENNOSIDES AND DOCUSATE SODIUM 1 TABLET: 50; 8.6 TABLET ORAL at 08:09

## 2024-09-27 NOTE — PT/OT/SLP PROGRESS
"Physical Therapy Treatment/Co-treat with OT    Co-evaluation/treatment performed due to patient's multiple deficits requiring two skilled therapists to appropriately and safely assess patient's strength and endurance while facilitating functional tasks in addition to accommodating for patient's activity tolerance.      Patient Name:  Bean Salas   MRN:  [id number]  Admit Date: 9/24/2024  Admitting Diagnosis: Closed nondisplaced fracture of anterior column of right acetabulum with routine healing  Recent Surgeries: R pelvic ORIF (9/20/24)     General Precautions: Standard, fall  Orthopedic Precautions: LLE weight bearing as tolerated, RLE weight bearing as tolerated (ROMAT)  Braces: N/A    Recommendations:     Discharge Recommendations: home health PT  Level of Assistance Recommended at Discharge: 24 hours significant assistance  Discharge Equipment Recommendations: wheelchair, 3-in-1 commode, hip kit (Continue to assess with progression of mobility)  Barriers to discharge: Decreased caregiver support, Other (Comment) (Increased skilled assistance for mobility)    Assessment:     Bean Salas is a 79 y.o. male admitted with a medical diagnosis of Closed nondisplaced fracture of anterior column of right acetabulum with routine healing. Pt was agreeable to participating in treatment session. He expressed that he is fearful of sitting up due to anticipating high pain levels as well as fear of falling. PT/OT engaged in empathetic discussion with pt prior to attempting to sit up and encouraged him to try to work through the pain and reassured that therapists are well trained to prevent falls. Pt continues to require 2 person assist for supine <> sit. Pt is severely limited with sitting tolerance due to c/o severe pain in R hip. Pt reports fear that "screws came loose and are floating" in joint. Pt only tolerates sitting EOB ~5 min before requesting to return to supine despite encouragement to try to stay sitting up. " "Pt was able to perform active R hip flexion, hip abduction and SAQ in supine with minimal pain. He states his pain is mostly just when sitting. NP notified of pt's participation continuing to be limited by pain and states she will order x-ray and reach out to ortho. Pt could benefit from continued skilled PT services to address deficits and optimize independence with functional mobility.       Performance deficits affecting function: weakness, impaired endurance, impaired self care skills, impaired functional mobility, gait instability, impaired balance, decreased lower extremity function, decreased safety awareness, pain, decreased ROM, impaired cardiopulmonary response to activity, orthopedic precautions.    Rehab Potential is fair    Activity Tolerance: Poor    Plan:     Patient to be seen 5 x/week to address the above listed problems via gait training, therapeutic activities, therapeutic exercises, neuromuscular re-education, wheelchair management/training    Plan of Care Expires: 10/25/24  Plan of Care Reviewed with: patient    Subjective     "You all don't understand my pain. Let me lay back down so I can tell you about it."     Pain/Comfort:  Pain Rating 1: 3/10 (3-4/10 in supine, increases to "unbearable" pain when sitting EOB with pt unable to tolerate position)  Location - Side 1: Right  Location - Orientation 1: generalized  Location 1: hip  Pain Addressed 1: Reposition, Distraction  Pain Rating Post-Intervention 1: other (see comments) (did not rate, states it is decreased once returned to supine)    Patient's cultural, spiritual, Amish conflicts given the current situation:  no    Objective:     Communicated with OT/nursing prior to session.  Patient found HOB elevated with PureWick, oxygen (3 L) upon PT entry to room. Pt with noted R hip ER in bed (able to tolerate therapist passively rotating back to midline).     Therapeutic Activities and Exercises:   -pt rolls R/L with [place] using bedrail " several times when donning/doffing pants with OT  -Pt tolerates sitting EOB for ~5 min with OT providing support posteriorly and PT blocking knees and providing support anteriorly. Pt with posterior lean in sitting, bedrail placed up and encouraged pt to use it to help support himself but pt unable to tolerate position and requesting to return to supine.   -In supine pt performs the following exercises with RLE only: heel slides 2x10 reps AAROM, hip abduction 2x10 RLE AAROM, SAQs 2x10 reps     Functional Mobility:  Bed Mobility:     Rolling Left:  minimum assistance with use of bedrail  Rolling Right: minimum assistance with use of bedrail  Supine to Sit: moderate assistance and of 2 persons  Sit to Supine: moderate assistance and of 2 persons    AM-PAC 6 CLICK MOBILITY  9    Patient left HOB elevated with call button in reach and pillow placed under LE to float heels .    GOALS:   Multidisciplinary Problems       Physical Therapy Goals          Problem: Physical Therapy    Goal Priority Disciplines Outcome Goal Variances Interventions   Physical Therapy Goal     PT, PT/OT Progressing     Description: Goals to be met by: 10/25/24     Patient will increase functional independence with mobility by performin. Supine to sit with Contact Guard Assistance  2. Sit to supine with Contact Guard Assistance  3. Rolling to Left and Right with Supervision.  4. Sit to stand transfer with SBA.  5. Bed to chair transfer with Contact Guard Assistance using Rolling Walker  6. Gait  x 150 feet with Contact Guard Assistance using Rolling Walker.   7. Wheelchair propulsion x150 feet with Modified [place] using bilateral uppper extremities                         Time Tracking:     PT Received On: 24  PT Start Time: 1042  PT Stop Time: 1122  PT Total Time (min): 40 min    Billable Minutes: Therapeutic Activity 27 min and Therapeutic Exercise 13 min    Treatment Type: Treatment  PT/PTA: PT     Number of PTA visits since  last PT visit: 0     09/27/2024

## 2024-09-27 NOTE — H&P
"[facility]illed [facility]  Department of [facility] Medicine  History & Physical    Patient Name: Bean Salas  MRN: [id number]  Code Status: Full Code  Admission Date: 9/24/2024  Attending Physician: Troy Rosas MD   Primary Care Provider: Ramirez Levine MD    Subjective:     Principal Problem:Closed nondisplaced fracture of anterior column of right acetabulum with routine healing    Chief Complaint   Patient presents with    Hip Injury     Admitted to [facility] for rehabilitation following hospital stay for right acetabular and superior pubic ramus fractures s/p ORIF.         HPI:  79 y.o. male with PMHx of  Bradycardia, PVCs, Hypertension, Coronary Artery Disease, Hyperlipidemia, HFpEF, Pulmonary HTN, AKHIL on CPAP, Chronic respiratory failure w/ home oxygen and hypothyroidism. He presents with right hip pain after ground level fall outside and unable to bear weight to right leg due to right hip pain. Patient brought to [facility] ED and on CT scan and X-rays found to have  and then eventually helped up found to have "Mildly displaced acute fracture of the right superior pubic ramus with questionable minimal extension into the anterosuperior acetabulum." CT scan of chest and abdomen and pelvis as per trauma protocol was done and noted "Right-sided pulmonary embolism, detailed above, with nonocclusive thrombus in the main right pulmonary artery and right lower lobe branch vessels, and occlusive thrombus in a subsegmental branch of the right pulmonary artery." Patient started on IV Heparin drip to treat PE. Orthopedics consulted and recommended trial of PT/OT and pain management and weight bear as tolerated to right lower extremity. Patient on admit also noted to have elevated troponin 1 and BNP 900s. EKG showing some concerning changes with T wave inversions and ST depressions. Cardiology consulted who recommended to start to diuresis patient with Lasix 80 mg IV BID per Cards recs. Patient started on Entresto " for depressed EF and titrated as per Cardiology recs and continue on Aldactone. Cardiology recommending ischemic work-up as outpatient with PET stress and consideration of Life Vest on discharge which patient declining at this time but will discuss with Cardiology as outpatient. Patient also having a number of PVCs and PACs in hospital and reviewed by Cardiology who noted patient in atrial flutter. After discussion with Cardiology they recommended DEMETRICE and cardioversion for his atrial flutter as patient symptomatic and felt could be contributing to his heart failure and low EF. Patient to go for DEMETRICE and cardioversion on 9/19. Patient not progressing with PT/OT as limited by dyspnea and pain to right hip despite multimodal pain medications. Discussed with Dr. Stapleton from Orthopedics on 9/19 and he stated he spoke with patient and his son and after discussion plan to proceed with percutaneous screw fixation of right acetabular fracture and right sided pelvic fractures for 9/20. Patient had DEMETRICE and successful DCCV by Cardiology on 9/19 with return back to normal sinus rhythm. Patient started on IV Amiodarone infusion after cardioversion and plan for 24 hours then switch to oral Amiodarone as per Cardiology recommendations. Patient switched from IV to po Lasix on 9/19 after cardioversion to Lasix 40 mg po BID as now euvolemic. Patient taken to OR on 9/20/2024 and underwent ORIF of pelvis by Dr. Negrito Stapleton. Post-op, patient is weight bear as tolerated and range of motion as tolerate to bilateral lower extremities. PT/OT re consulted post-op. Patient completed Amiodarone infusion on evening of 9/20. Patient started on Amiodarone 400 mg po BID for 14 days on am of 9/21 followed by Amiodarone 200 mg po daily as per Cardiology recs. Patient remains in sinus rhythm on cardioversion. Patient reports pain controlled to right hip post-op. Surgical bandages to remain in place until Orthopedic clinic follow-up. Patient  expressed he wished to be DNR and patient made DNR once all his surgeries and procedures complete and DNR on discharge. Patient has LaPost in place. Patient admitted to  [facility] with PT and OT to improve functional status and ability to perform ADLs.     Past Medical History:   Diagnosis Date    Atrial fibrillation, unspecified type 09/06/2023    COPD exacerbation 09/06/2023    Thyroid disease     hypo       Past Surgical History:   Procedure Laterality Date    COLONOSCOPY  01/01/2011    CORONARY ANGIOGRAPHY N/A 07/22/2021    Procedure: ANGIOGRAM, CORONARY ARTERY;  Surgeon: Hank Barry MD;  Location: [facility] CATH LAB/EP;  Service: Cardiology;  Laterality: N/A;    EYE SURGERY Bilateral     cataracts extraction    FRACTURE SURGERY Right 09/2021    femur    HERNIA REPAIR      HIP SURGERY Right 08/2021    INTRAMEDULLARY RODDING OF TROCHANTER OF FEMUR Right 09/03/2021    Procedure: INSERTION, INTRAMEDULLARY RACQUEL, FEMUR, TROCHANTER;  Surgeon: Darryn Hall MD;  Location: [facility];  Service: Orthopedics;  Laterality: Right;    JOINT REPLACEMENT Bilateral     knee    LEFT HEART CATHETERIZATION Right 07/22/2021    Procedure: Left heart cath;  Surgeon: Hank Barry MD;  Location: [facility] CATH LAB/EP;  Service: Cardiology;  Laterality: Right;    OPEN REDUCTION AND INTERNAL FIXATION (ORIF) OF INJURY OF HIP Right 9/20/2024    Procedure: ORIF,PELVIS;  Surgeon: Negrito Stapleton MD;  [address];  Service: Orthopedics;  Laterality: Right;  +local    TRANSESOPHAGEAL ECHOCARDIOGRAPHY N/A 9/19/2024    Procedure: ECHOCARDIOGRAM, TRANSESOPHAGEAL;  Surgeon: Leonora Butt MD;  Location: [facility] EP LAB;  Service: Cardiology;  Laterality: N/A;    TREATMENT OF CARDIAC ARRHYTHMIA N/A 9/19/2024    Procedure: Cardioversion or Defibrillation;  Surgeon: ANA Steiner MD;  Location: [facility] EP LAB;  Service: Cardiology;  Laterality: N/A;  AF, DEMETRICE/DCCV, ANES, GP, 524    VASECTOMY         Review of patient's  allergies indicates:  No Known Allergies    No current facility-administered medications on file prior to encounter.     Current Outpatient Medications on File Prior to Encounter   Medication Sig    acetaminophen (TYLENOL) 500 MG tablet Take 2 tablets (1,000 mg total) by mouth every 8 (eight) hours.    amiodarone (PACERONE) 200 MG Tab Take 2 tablets (400 mg total) by mouth 2 (two) times daily for 4 days, THEN 1 tablet (200 mg total) once daily.    apixaban (ELIQUIS) 5 mg Tab Take 2 tablets (10 mg total) by mouth 2 (two) times daily for 4 days, THEN 1 tablet (5 mg total) 2 (two) times daily.    aspirin (ECOTRIN) 81 MG EC tablet Take 1 tablet (81 mg total) by mouth once daily.    atorvastatin (LIPITOR) 10 MG tablet Take 1 tablet by mouth once daily    empagliflozin (JARDIANCE) 10 mg tablet Take 1 tablet (10 mg total) by mouth once daily.    furosemide (LASIX) 40 MG tablet Take 1 tablet (40 mg total) by mouth 2 (two) times a day. Take daily for next 3 days, then use as needed. Weigh daily. Afterwards, f weight increases 2 lbs/24h, take dose of lasix daily  until weight is back to baseline    levothyroxine (SYNTHROID) 200 MCG tablet Take 1 tablet by mouth once daily    melatonin (MELATIN) 3 mg tablet Take 2 tablets (6 mg total) by mouth nightly as needed for Insomnia.    methocarbamoL (ROBAXIN) 750 MG Tab Take 1 tablet (750 mg total) by mouth 4 (four) times daily.    metoprolol succinate (TOPROL-XL) 25 MG 24 hr tablet Take 0.5 tablets (12.5 mg total) by mouth once daily.    mirtazapine (REMERON) 7.5 MG Tab Take 1 tablet (7.5 mg total) by mouth nightly. One tablet po each night for sleep    multivit-min-FA-lycopen-lutein (CENTRUM SILVER) 0.4-300-250 mg-mcg-mcg Tab Take 1 tablet by mouth once daily.    oxyCODONE (ROXICODONE) 5 MG immediate release tablet Take 1 tablet (5 mg total) by mouth every 4 (four) hours as needed for Pain.    sacubitriL-valsartan (ENTRESTO)  mg per tablet Take 1 tablet by mouth 2 (two) times  daily.    senna-docusate 8.6-50 mg (PERICOLACE) 8.6-50 mg per tablet Take 1 tablet by mouth once daily.    spironolactone (ALDACTONE) 25 MG tablet Take 1 tablet by mouth once daily     Family History       Problem Relation (Age of Onset)    Crohn's disease Mother    Dementia Mother    Heart attack Father    No Known Problems Sister, Brother, Son          Tobacco Use    Smoking status: Former     Current packs/day: 0.00     Average packs/day: 1 pack/day for 35.0 years (35.0 ttl pk-yrs)     Types: Cigarettes     Start date: 1965     Quit date: 2000     Years since quittin.7     Passive exposure: Past    Smokeless tobacco: Never   Substance and Sexual Activity    Alcohol use: No    Drug use: Never    Sexual activity: Not Currently     Review of Systems      Constitutional:  Negative for fever.   Respiratory:  Negative for cough and shortness of breath.    Cardiovascular:  Negative for chest pain and leg swelling.   Gastrointestinal:  Negative for abdominal pain, nausea and vomiting.   Musculoskeletal:  Positive for arthralgias (Right hip).   Psychiatric/Behavioral:  Negative for agitation and confusion.        Objective:     Vital Signs (Most Recent):  Temp: 98 °F (36.7 °C) (24 0730)  Pulse: (!) 50 (24 0815)  Resp: 16 (24 0855)  BP: (!) 104/51 (24 0815)  SpO2: (!) 94 % (24 0730) Vital Signs (24h Range):  Temp:  [97.2 °F (36.2 °C)-98 °F (36.7 °C)] 98 °F (36.7 °C)  Pulse:  [50-77] 50  Resp:  [16-20] 16  SpO2:  [94 %-96 %] 94 %  BP: (102-117)/(50-60) 104/51     Weight: 98.2 kg (216 lb 7.9 oz)  Body mass index is 30.19 kg/m².    Physical Exam      Vitals and nursing note reviewed.   Constitutional:       General: He is awake. He is not in acute distress.     Appearance: Normal appearance. He is well-developed. He is obese. He is not ill-appearing.      Comments: Patient sitting up in bed. Patient in no apparent distress. Patient awake and alert.    Eyes:      Conjunctiva/sclera:  Conjunctivae normal.   Cardiovascular:      Rate and Rhythm: Normal rate and regular rhythm.      Heart sounds: Normal heart sounds. No murmur heard.  Pulmonary:      Effort: Pulmonary effort is normal. No respiratory distress.      Breath sounds: Normal breath sounds. No wheezing.   Abdominal:      General: Abdomen is flat. Bowel sounds are normal. There is no distension.      Palpations: Abdomen is soft.      Tenderness: There is no abdominal tenderness.   Musculoskeletal:      Right lower leg: No edema.      Left lower leg: No edema.   Skin:     Findings: No erythema.   Neurological:      Mental Status: He is alert and oriented to person, place, and time.   Psychiatric:         Mood and Affect: Mood normal.         Behavior: Behavior normal. Behavior is cooperative.         Thought Content: Thought content normal.         Judgment: Judgment normal.         Significant Labs: All pertinent labs within the past 24 hours have been reviewed.  CBC:   Recent Labs   Lab 09/26/24  0500   WBC 8.42   HGB 10.0*   HCT 30.8*        CMP:   Recent Labs   Lab 09/26/24  0500      K 3.6   CL 96   CO2 33*   *   BUN 49*   CREATININE 1.8*   CALCIUM 9.5   ANIONGAP 8       Significant Imaging: I have reviewed all pertinent imaging results/findings within the past 24 hours.  I have reviewed and interpreted all pertinent imaging results/findings within the past 24 hours.  Assessment/Plan:     Active Diagnoses:    Diagnosis Date Noted POA    PRINCIPAL PROBLEM:  Closed nondisplaced fracture of anterior column of right acetabulum with routine healing s/p ORIF of pelvis on 9/20/2024 [S32.434D] 09/03/2021 Not Applicable    Insomnia [G47.00] 09/16/2024 Yes    Slow transit constipation [K59.01] 09/16/2024 Yes    Chronic hypoxemic respiratory failure [J96.11] 09/16/2024 Yes    Pulmonary embolism without acute cor pulmonale [I26.99] 09/16/2024 Yes    Paroxysmal atrial flutter [I48.92] 09/06/2023 Yes    AKHIL (obstructive sleep  apnea) [G47.33] 10/05/2022 Yes    Impaired activities of daily living [Z78.9] 05/09/2022 Yes    Coronary artery disease involving native coronary artery of native heart without angina pectoris [I25.10]  Yes    Hypothyroidism due to acquired atrophy of thyroid [E03.4] 06/25/2017 Yes    Pure hypercholesterolemia [E78.00] 06/25/2017 Yes    Essential hypertension [I10] 06/25/2017 Yes      Problems Resolved During this Admission:     * Closed nondisplaced fracture of anterior column of right acetabulum with routine healing s/p ORIF of pelvis on 9/20/2024  Closed pelvic ring fracture s/p ORIF of pelvis on 9/20/2024   78 y/o male presenting after ground level fall with right hip pain on standing. CT scan showed acute closed fractures of the right superior and inferior pubic rami with involvement of the anterosuperior acetabulum.   - Orthopedic surgery consulted by ED and recommended weight bear as tolerated to right lower extremity and trial of PT/OT and pain management.   - PT/OT consulted and patient not progressing and only able to take a few shuffling steps and total assist x 2 people. Patient limited by dyspnea and pain. Orthopedics discussed with patient and his son and plan to proceed with operative intervention on pelvic fractures with percutaneous screw fixation. Patient taken to OR on 9/20/2024 with Dr. Negrito Stapleton and underwent percutaneous screw fixation of pelvic ring and right acetabular fractures.  - Post-op, patient is weight bear as tolerated and range of motion as tolerate to bilateral lower extremities. PT/OT re consulted post-op and worked with patient this am and recommending high intensity therapy. Patient agreeable to post acute placement on discharge and will need SNF placement on discharge and he agrees and states he does not want any facility in [place] area where he is from. Patient accepted to [facility] for admission pending bowel movement. Patient had BM on am of 9/24 and discharged  "to [facility] in good condition.   - Continue pain control with scheduled Tylenol 1000 mg po every 8 hours but increase Robaxin 750 mg po 4 times daily for pain management with Oxycodone IR 5 mg po every 4 hours prn for pain management on discharge.   - Fall precautions on discharge.  - Patient on therapeutic Apixiban post-op for treatment of PE and also for his atrial flutter so fully anticoagulated so no chemical DVT prophylaxis needed post-op and continue Apixiban long term on discharge.   - Surgical bandages to remain in place post-op until Orthopedic clinic follow-up.         Pulmonary embolism without acute cor pulmonale  Present on admit. CT chest/abdomen/pelvis as part of trauma work up in ED showed "Right-sided pulmonary embolism, detailed above, with nonocclusive thrombus in the main right pulmonary artery and right lower lobe branch vessels, and occlusive thrombus in a subsegmental branch of the right pulmonary artery. Note comparison images are not available to determine chronicity. No right sided heart strain."   - Denies previous diagnosis or PE/DVT.  - Denies chest pain or SOB.  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 He requires 2-3L NC of oxygen at home/sleeping and uses a CPAP after meals (exertional to patient) and for naps.  - Currently stable on 2-3L NC oxygen. No hypotension. Patient has baseline dyspnea when mobilizing sideways in bed even prior to this admit.   - Labs with elevated Troponin 0.811>1.041 and .   - Initialed on Heparin drip IV to treat PE and transitioned to therapeutic oral Apixiban post-op to treat with Apixiban 10 mg po BID x 7 days (started on evening of 9/20) and to complete on 9/27 followed by indefinite treatment of Apixiban 5 mg po BID starting on 9/28.   - Cards consulted, continue current therapies. No indication to activate PE response team at this time.   - No signs of right heart strain  - US of lower extremities on this admit negative for DVTs so IVC filter not " indicated.       Insomnia  Chronic and controlled. Continue home Mirtazapine to treat on discharge.      AKHIL (obstructive sleep apnea)  Chronic and controlled. Patient on CPAP at home at night.    Hypothyroidism due to acquired atrophy of thyroid  Chronic and controlled. Continue home Levothyroxine to treat on discharge.        Pulmonary hypertension   Patient now euvolemic on exam. Acute heart failure resolved. Continue Toprol XL, Entresto, Lasix for heart failure on discharge. Start Jardiance on discharge for his heart failure. Follow-up with Cardiology as outpatient on discharge for ischemic evaluation and monitoring of his cardiomyopathy.   Patient is identified as having Combined Systolic and Diastolic heart failure that is Acute on chronic. CHF is currently controlled. Latest ECHO performed and demonstrates- Results for orders placed during the hospital encounter of 10/04/23  Echo     Result Date: 9/16/2024    Technically difficult study    Left Ventricle: The left ventricle is normal in size. Normal wall   thickness. Severe global hypokinesis present. Septal motion is abnormal.   There is severely reduced systolic function with a visually estimated   ejection fraction of 20 - 25%. Unable to assess diastolic function due to   atrial flutter.    Right Ventricle: Mild right ventricular enlargement. Wall thickness is   normal. Systolic function is moderately reduced.    Severe biatrial enlargement    Mitral Valve: There is mild regurgitation.    Pulmonary Artery: There is mild to moderate pulmonary hypertension. The   estimated pulmonary artery systolic pressure is 49 mmHg.    IVC/SVC: Intermediate venous pressure at 8 mmHg.     - He denies acute SOB but reports SLADE, SOB after activities such as eating requiring him to sit and use a CPAP for 2-3 hours, and lower extremity swelling right > left not much improved with his home PO Lasix and Spironolactone. He also reports requiring 2-3 liters supplemental oxygen  while sleeping as well. He also reports eating bags of salty chips to help with his leg swelling.  - Cardiology consulted on this admit given new reduced EF with global wall motion and septal wall motion abnormalities Patient taken off Losartan and started on Entresto to treat his reduced EF and heart failure. Patient continued on his home Aldactone. Patient placed on Lasix 80 mg IV BID to diuresis patient in hospital and patient diuresising well and net negative 4.5 liters since admit. Patient now euvolemic on 9/20 so switched to po Lasix 40 mg po BID by Cardiology on 9/19 and will continue.   - Toprol XL 12.5 mg po daily for his chronic heart failure.   - Will need life vest on discharge if patient wishes, as is DNR baseline. Patient declining life vest at this time but willing to discuss with Cardiology as outpatient.   - Will need Cardiac PET stress as outpatient for ischemic evaluation.  - Fluid restriction 1.5 liters a day and low salt diet.       Paroxysmal atrial flutter  - Resolved after cardioversion on 9/19. Patient remains in sinus rhythm. Patient completed 24 hour IV infusion of Amiodarone post cardioversion and started on Amiodarone 400 mg po BID x 14 days followed by Amiodarone 200 mg po daily for indefinite treatment. Continue Toprol XL for rate control. Continue Apixiban for long term anticoagulation.   - Per chart review, his cardiologist Dr. Hank Barry feels EKGs that were read as atrial fibrillation previously are actually sinus rhythm and thus not on long term anticoagulation on admit.   - Cardiology consulted here who report current rhythm is atrial flutter and report if does not self convert may need DEMETRICE/DCCV once euvolemic. Patient now euvolemic on 9/19 and remains in atrial flutter and rate is controlled at 45-65 on Toprol XL. Patient taken for DEMETIRCE and underwent successful cardioversion by Cardiology on 9/19 and back in sinus rhythm. Patient on IV Amiodarone infusion as per Cardiology and  needs for 24 hours and scheduled to end tonight and plan to switch to po Amiodarone on 9/21.   - Patient transitioned to oral Apixiban post-op for long term anticoagulation for elevated CHADsVasc score.       Kayden Vann MD  Department of [facility] Medicine  [facility] - Skilled Nursing

## 2024-09-27 NOTE — PROGRESS NOTES
[facility]                                  Skilled Nursing Facility                   Progress Note     Admit Date: 9/24/2024  LATOSHA 10/17/2024  [id number]  Principal Problem:  Closed nondisplaced fracture of anterior column of right acetabulum with routine healing   HPI obtained from patient interview and chart review     Chief Complaint: Re-evaluation of medical treatment and therapy status: Lab review     HPI:   Bean Salas is a 79 year old male with PMHx of  Bradycardia, PVCs, HTN, CAD, HLD, HFpEF, Pulmonary HTN, AKHIL on CPAP, Chronic respiratory failure w/ home oxygen and hypothyroidism who presents to SNF following hospitalization for right superior pubic ramus fracture, right sacral ala fracture, right pelvic ring fracture s/p percutaneous fixation and ORIF on 09/20 with Dr. Stapleton, also noted to have PE.  Admission to SNF for secondary weakness and debility.     Interval history: All of today's labs reviewed and are listed below.  Acute worsening renal function with BUN/creatinine increased to 49/1.8 from 33/1.3.  Blood pressures have been low, patient on very high dose of newly prescribed Entresto, currently receiving to diuretics.  24 hr vital sign ranges reviewed and listed below.  Continues on supplemental oxygen.  Pain is still not well controlled.  Will initiate patient on gabapentin 300 mg TID.  Patient denies shortness of breath, abdominal discomfort, nausea, or vomiting.  Patient reports an inadequate appetite.  Patient denies dysuria.  Patient reports having regular bowel movements.  Patient progessing with PT/OT- Supine to Sit: maximal assistance, of 2 persons, and HOB flat and use of bed rail; Sit to Supine: maximal assistance, of 2 persons, and HOB flat. Continuing to follow and treat all acute and chronic conditions.    Past Medical History: Patient has a past medical history of Atrial fibrillation,  unspecified type (09/06/2023), COPD exacerbation (09/06/2023), and Thyroid disease.    Past Surgical History: Patient has a past surgical history that includes Hernia repair; Colonoscopy (01/01/2011); Joint replacement (Bilateral); Left heart catheterization (Right, 07/22/2021); Coronary angiography (N/A, 07/22/2021); Intramedullary rodding of trochanter of femur (Right, 09/03/2021); Hip surgery (Right, 08/2021); Eye surgery (Bilateral); Fracture surgery (Right, 09/2021); Vasectomy; Treatment of cardiac arrhythmia (N/A, 9/19/2024); Transesophageal echocardiography (N/A, 9/19/2024); and Open reduction and internal fixation (ORIF) of injury of hip (Right, 9/20/2024).    Social History: Patient reports that he quit smoking about 24 years ago. His smoking use included cigarettes. He started smoking about 59 years ago. He has a 35 pack-year smoking history. He has been exposed to tobacco smoke. He has never used smokeless tobacco. He reports that he does not drink alcohol and does not use drugs.    Family History: family history includes Crohn's disease in his mother; Dementia in his mother; Heart attack in his father; No Known Problems in his brother, sister, and son.    Allergies: Patient has No Known Allergies.     ROS  Constitutional: Negative for fever   Eyes: Negative for blurred vision, double vision   Respiratory: Negative for cough. + postnasal drip, SLADE  Cardiovascular: Negative for chest pain, palpitations, and leg swelling.   Gastrointestinal: Negative for  constipation, diarrhea, nausea, vomiting.  +abdominal discomfort, flatulence  Genitourinary: Negative for dysuria, frequency   Musculoskeletal:  + generalized weakness.  +severe right hip pain  Skin: Negative for itching and rash.   Neurological: Negative for dizziness, headaches.   Psychiatric/Behavioral: Negative for depression. The patient is not nervous/anxious.          24 hour Vital Sign Range   Temp:  [97.5 °F (36.4 °C)]   Pulse:  [50-62]   Resp:   "[16-22]   BP: ()/(52-60)   SpO2:  [93 %-95 %]     Current BMI: Body mass index is 30.19 kg/m².    PEx  Constitutional: Patient appears obese, debilitated.  No distress noted  HENT:   Head: Normocephalic and atraumatic.   Eyes: Pupils are equal, round  Neck: Normal range of motion. Neck supple.   Cardiovascular: Normal rate, regular rhythm and normal heart sounds.    Pulmonary/Chest: Effort normal and breath sounds are clear  Abdominal: Soft. Bowel sounds are normal.   Musculoskeletal: Normal range of motion.   Neurological: Alert and oriented to person, place, and time.   Psychiatric: Normal mood and affect. Behavior is normal.   Skin: Skin is warm and dry.  Occlusive gauze dressing to right pelvic area    Recent Labs   Lab 09/26/24  0500      K 3.6   CL 96   CO2 33*   BUN 49*   CREATININE 1.8*   CALCIUM 9.5   MG 2.5     Recent Labs   Lab 09/26/24  0500   WBC 8.42   RBC 3.09*   HGB 10.0*   HCT 30.8*      *   MCH 32.4*   MCHC 32.5     No results for input(s): "POCTGLUCOSE" in the last 168 hours.     Assessment and Plan:    Closed nondisplaced fracture of anterior column of right acetabulum with routine healing   s/p ORIF of pelvis on 9/20/2024  Closed pelvic ring fracture   s/p percutaneous ring 9/20/2024   - PT/OT, BLE WBAT, RPMAT  - DVT PPX with apixaban  - Monitor and report drainage to incisional site  - maintain surgical dressing until removed by Orthopedics  - Fall precautions  - Bowel regimen in place, hold for loose or frequent stools  - Ortho ALEKSANDR to see patient intermittently at SNF  - follow-up with orthopedics after discharge     Acute postoperative pain  - unstable, initiated gabapentin 3 mg TID, increase methocarbamol 750 mg QID, continue acetaminophen 1000 mg q.8 hours,  oxycodone 5 or 10 mg q.4 hours PRN.  Bowel regimen in place    PAULA, new  - likely due to hypotension, diuretic therapy, newly prescribed high-dose Entresto  - decreased Entresto to 2426 mg BID with holding " "parameter, continue  metoprolol XL 12.5 mg daily, currently holding Lasix and spironolactone  - repeat BMP on 09/28 as patient has received his diuretics for this morning     Pulmonary embolism without acute cor pulmonale  - Present on admit.   - CT chest/abdomen/pelvis as part of trauma work up in ED showed "Right-sided pulmonary embolism, detailed above, with nonocclusive thrombus in the main right pulmonary artery and right lower lobe branch vessels, and occlusive thrombus in a subsegmental branch of the right pulmonary artery. Note comparison images are not available to determine chronicity. No right sided heart strain."   - Denies previous diagnosis or PE/DVT.  - Initialed on Heparin drip IV to treat PE and transitioned to therapeutic oral Apixiban post-op to treat with Apixiban 10 mg po BID x 7 days (started on evening of 9/20) followed by indefinite treatment of Apixiban 5 mg po BID.   - Cards consulted, continue current therapies. No indication to activate PE response team at this time.   - No signs of right heart strain  - US of lower extremities on this admit negative for DVTs so IVC filter not indicated.   - patient continues on supplemental oxygen at 3 L      Acute on chronic combined systolic and diastolic congestive heart failure  Pulmonary hypertension   - most recent echo reviewed from 09/16/2024, severe global hypokinesis, EF 20-25%, mild MR, PASP 44 mmHg  - Cardiology consulted, Patient taken off Losartan and started on Entresto to treat his reduced EF and heart failure.   - Will need Cardiac PET stress as outpatient for ischemic evaluation.  - Fluid restriction 1.5 liters a day and low salt diet.   - Will need life vest on discharge if patient wishes, as is DNR baseline. Patient declining life vest at this time but willing to discuss with Cardiology as outpatient.   - 9/26 decreased Entresto to 2426 mg BID with holding parameter, continue  metoprolol XL 12.5 mg daily, currently holding Lasix and " spironolactone     Paroxysmal atrial flutter  - Per chart review, his cardiologist Dr. Hank Barry feels EKGs that were read as atrial fibrillation previously are actually sinus rhythm and thus not on long term anticoagulation on admit.   - Resolved after cardioversion on 9/19. Patient remains in sinus rhythm. Patient completed 24 hour IV infusion of Amiodarone post cardioversion and started on Amiodarone 400 mg po BID x 14 days followed b y Amiodarone 200 mg po daily for indefinite treatment.   - Continue Toprol XL for rate control. Continue Apixiban for long term anticoagulation.      Essential hypertension  - home regimen with amlodipine 2.5 mg daily, Lasix 40 mg BID, losartan 100 mg daily, metoprolol XL 50 mg daily, Aldactone 25 mg daily  - 9/26 decreased Entresto to 2426 mg BID with holding parameter, continue  metoprolol XL 12.5 mg daily, currently holding Lasix and spironolactone     Bradycardia  - Patient has history of atrial bigeminy and bradycardia per chart.   - follows with EP as outpatient  - HR ranges 40s-80s at times.   - Hold Metoprolol if HR sustains < 60.     PVC's (premature ventricular contractions)  - Patient noted in history and experienced in acute setting  - Keep Magnesium > 2, Potassium >4  - continue metoprolol     Coronary artery disease involving native coronary artery of native heart without angina pectoris  Elevated troponin  Abnormal ventricular wall motion   - Patient with known CAD which is controlled.   - Previous angiogram with non obstructive CAD 2021.   - continue home ASA 81 mg daily, Toprol XL and atorvastatin 10 mg daily   - Cardiology recommended for outpatient PET cardiac stress for ischemic work up.     Chronic hypoxemic respiratory failure  AKHIL (obstructive sleep apnea)  - He is on home oxygen at 2-3 LPM, but states only at night with his CPAP  - ordering for CPAP qHS.  - persistent, continue supplemental oxygen, wean as tolerated     Insomnia  - Chronic and controlled.    - stable, Continue home Mirtazapine 7.5 mg qHS.     Hypothyroidism due to acquired atrophy of thyroid  - Continue home Levothyroxine 200 mcg daily     Pure hypercholesterolemia  - continue atorvastatin 10 mg daily     Slow transit constipation  - chronic  - 9/26 ordered for enema today, continue senna docusate 1 tab BID, initiated daily MiraLax     Debility   - Continue with PT/OT for gait training and strengthening and restoration of ADL's   - Encourage mobility, OOB in chair, and early ambulation as appropriate  - Fall precautions   - Monitor for bowel and bladder dysfunction  - Monitor for and prevent skin breakdown and pressure ulcers  - Continue DVT prophylaxis with apixaban      Anticipate disposition:  Home with home health?    IP [facility] RISK OF UNPLANNED READMISSION Model: HIGH     Follow-up needed during SNF stay-     Appointment not to send patient to- orthopedics (10/4)     Follow-up needed after discharge from SNF: PCP, be scheduled cardiology follow-up, PET scan (10/28), orthopedics (11/1)       Future Appointments   Date Time Provider Department Center   10/4/2024  8:45 AM Red Ken NP [facility] [address]THO [address]   10/28/2024  1:30 PM CARDIAC, PET IMAGING [facility]   11/1/2024 10:45 AM Red Ken NP [facility]THO [address]   1/27/2025 10:40 AM Ramirez Levine MD [facility] Med     I spent 47 minutes reviewing patient records, examining, and counseling the patient/ patient's family with greater than 50% of the time spent in direct patient care and coordination.  Care coordination with staff      Piper Oneal NP  Department of Hospital Medicine   [facility]     DOS: 9/26/2024       Patient note was created using MModal Dictation.  Any errors in syntax or even information may not have been identified and edited on initial review prior to signing this note.

## 2024-09-27 NOTE — PT/OT/SLP PROGRESS
Occupational Therapy  Co- Treatment  Co tx performed with PT due to patient need for 2 skilled therapist to ensure patient and staff safety and to accommondate for activity tolerance.'    Name: Bean Salas  MRN: [id number]  Admit Date: 9/24/2024  Admitting Diagnosis:  Closed nondisplaced fracture of anterior column of right acetabulum with routine healing    General Precautions: Standard, fall   Orthopedic Precautions: LLE weight bearing as tolerated, RLE weight bearing as tolerated (ROMAT)   Braces: N/A    Recommendations:     Discharge Recommendations:  home health OT  Level of Assistance Recommended at Discharge: 24 hours physical assistance for all ADL's and home management tasks  Discharge Equipment Recommendations: wheelchair, 3-in-1 commode, hip kit  Barriers to discharge:  Decreased caregiver support (current functional level)    Assessment:     Bean Salas is a 79 y.o. male with a medical diagnosis of Closed nondisplaced fracture of anterior column of right acetabulum with routine healing. Pt tolerated sitting at EOB poorly 2/2 severe pain, however tolerated exercises and ADL tasks in supine. Benefits of EOB/OOB activity explained. NP notified of pt severe pain and limited tolerance for EOB/OOB activity. Pt would benefit from con't OT in SNF setting to improve safety and independence /c functional tasks.  Performance deficits affecting function are weakness, impaired endurance, impaired self care skills, impaired functional mobility, gait instability, impaired balance, pain, decreased lower extremity function, impaired cardiopulmonary response to activity.     Rehab Potential is fair    Activity tolerance:  Fair    Plan:     Patient to be seen 5 x/week to address the above listed problems via self-care/home management, community/work re-entry, therapeutic activities, therapeutic exercises, neuromuscular re-education    Plan of Care Expires: 09/26/24  Plan of Care Reviewed with: patient    Subjective  "    Communicated with: NSG prior to session.     "I just can't. I have to lay back down girls."    Pain/Comfort:  Pain Rating 1: 3/10 (in supine, however increased to unbearable when seated at EOB. Decreased when returned to supine)  Location - Side 1: Right  Location - Orientation 1: generalized  Location 1: hip  Pain Addressed 1: Reposition, Distraction  Pain Rating Post-Intervention 1: other (see comments) (decreased when returned to supine)    Patient's cultural, spiritual, L[employer] conflicts given the current situation:  no    Objective:     Patient found supine with PureWick, oxygen (3 L) upon OT entry to room. Pt alert and agreeable to therapy.     Bed Mobility:    Patient completed Rolling/Turning to Left with  minimum assistance and with side rail  Patient completed Rolling/Turning to Right with minimum assistance and with side rail  Patient completed Supine to Sit with maximal assistance and 2 persons  Patient completed Sit to Supine with maximal assistance and 2 persons   Pt tolerated sitting at EOB /c Min-Mod A for ~1 minutes prior to requesting to return to supine 2/2 pain    Activities of Daily Living:  Lower Body Dressing: maximal assistance in supine /c pt able to assist donning/doffing from hips rolling    AMPAC 6 Click ADL: 13    OT Exercises:     ~Pt educated on technique and performed 1x10  reps of the following exercises with red theraband while in supine using BUE.     Biceps curls, Triceps pushdowns, Shoulder flexion, Shoulder extension, Shoulder horizontal abduction , and Shoulder horizontal adduction       ~Pt interchanging /c LB exercises led by PT    Treatment & Education:  Pt educated on POC, role of OT, importance of OOB activity, and safety. Pt performed tasks to improve safety and independence in functional tasks and ADLs as mentioned above.       Patient left supine with all lines intact, call button in reach, and all needs met    GOALS:   Multidisciplinary Problems       " Occupational Therapy Goals          Problem: Occupational Therapy    Goal Priority Disciplines Outcome Interventions   Occupational Therapy Goal     OT, PT/OT Progressing    Description: Goals to be met by: 10/24/2024     Patient will increase functional independence with ADLs by performing:    UE Dressing with Minimal Assistance.  LE Dressing with Moderate Assistance.  Grooming while seated at sink with Supervision.  Toileting from toilet /c raised toilet seat with Moderate Assistance for hygiene and clothing management.   Bathing from  shower chair/bench with Moderate Assistance.  Sitting at edge of bed x10 minutes with Contact Guard Assistance.  Rolling to Bilateral with Stand-by Assistance.   Stand pivot transfers with Minimal Assistance and LRAD as needed  Toilet transfer to raised toilet with Minimal Assistance and LRAD as needed.  SC transfer /c Minimal Assistance and LRAD as needed                         Time Tracking:     OT Date of Treatment: 09/27/24  OT Start Time: 1042    OT Stop Time: 1122  OT Total Time (min): 40 min    Billable Minutes:Self Care/Home Management 15  Therapeutic Exercise 17  Neuromuscular Re-education 8    9/27/2024

## 2024-09-28 ENCOUNTER — HOSPITAL ENCOUNTER (INPATIENT)
Facility: HOSPITAL | Age: 79
LOS: 9 days | Discharge: NURSING, REHAB OR LONG-TERM CARE | DRG: 683 | End: 2024-10-07
Attending: EMERGENCY MEDICINE | Admitting: EMERGENCY MEDICINE
Payer: MEDICARE

## 2024-09-28 VITALS
HEART RATE: 52 BPM | TEMPERATURE: 98 F | OXYGEN SATURATION: 97 % | HEIGHT: 71 IN | BODY MASS INDEX: 30 KG/M2 | RESPIRATION RATE: 18 BRPM | DIASTOLIC BLOOD PRESSURE: 51 MMHG | WEIGHT: 214.31 LBS | SYSTOLIC BLOOD PRESSURE: 101 MMHG

## 2024-09-28 DIAGNOSIS — I95.9 HYPOTENSION: ICD-10-CM

## 2024-09-28 DIAGNOSIS — R00.1 BRADYCARDIA: ICD-10-CM

## 2024-09-28 DIAGNOSIS — R07.9 CHEST PAIN: ICD-10-CM

## 2024-09-28 DIAGNOSIS — I49.9 IRREGULAR HEART RATE: ICD-10-CM

## 2024-09-28 DIAGNOSIS — R06.02 SOB (SHORTNESS OF BREATH): ICD-10-CM

## 2024-09-28 DIAGNOSIS — N17.9 AKI (ACUTE KIDNEY INJURY): Primary | ICD-10-CM

## 2024-09-28 PROBLEM — R65.10 SIRS (SYSTEMIC INFLAMMATORY RESPONSE SYNDROME): Status: ACTIVE | Noted: 2024-09-28

## 2024-09-28 PROBLEM — A41.9 SEPSIS: Status: ACTIVE | Noted: 2024-09-28

## 2024-09-28 LAB
ALBUMIN SERPL BCP-MCNC: 2.8 G/DL (ref 3.5–5.2)
ALLENS TEST: ABNORMAL
ALP SERPL-CCNC: 129 U/L (ref 55–135)
ALT SERPL W/O P-5'-P-CCNC: 18 U/L (ref 10–44)
ANION GAP SERPL CALC-SCNC: 10 MMOL/L (ref 8–16)
ANION GAP SERPL CALC-SCNC: 12 MMOL/L (ref 8–16)
AST SERPL-CCNC: 23 U/L (ref 10–40)
BASOPHILS # BLD AUTO: 0.02 K/UL (ref 0–0.2)
BASOPHILS NFR BLD: 0.1 % (ref 0–1.9)
BILIRUB SERPL-MCNC: 1.8 MG/DL (ref 0.1–1)
BILIRUB UR QL STRIP: NEGATIVE
BNP SERPL-MCNC: 376 PG/ML (ref 0–99)
BUN SERPL-MCNC: 72 MG/DL (ref 8–23)
BUN SERPL-MCNC: 83 MG/DL (ref 8–23)
BUN SERPL-MCNC: 94 MG/DL (ref 6–30)
CALCIUM SERPL-MCNC: 8.9 MG/DL (ref 8.7–10.5)
CALCIUM SERPL-MCNC: 9.4 MG/DL (ref 8.7–10.5)
CHLORIDE SERPL-SCNC: 87 MMOL/L (ref 95–110)
CHLORIDE SERPL-SCNC: 91 MMOL/L (ref 95–110)
CHLORIDE SERPL-SCNC: 93 MMOL/L (ref 95–110)
CLARITY UR REFRACT.AUTO: CLEAR
CO2 SERPL-SCNC: 28 MMOL/L (ref 23–29)
CO2 SERPL-SCNC: 31 MMOL/L (ref 23–29)
COLOR UR AUTO: YELLOW
CREAT SERPL-MCNC: 2.7 MG/DL (ref 0.5–1.4)
CREAT SERPL-MCNC: 2.7 MG/DL (ref 0.5–1.4)
CREAT SERPL-MCNC: 3.1 MG/DL (ref 0.5–1.4)
DIFFERENTIAL METHOD BLD: ABNORMAL
EOSINOPHIL # BLD AUTO: 0 K/UL (ref 0–0.5)
EOSINOPHIL NFR BLD: 0.3 % (ref 0–8)
ERYTHROCYTE [DISTWIDTH] IN BLOOD BY AUTOMATED COUNT: 15.2 % (ref 11.5–14.5)
EST. GFR  (NO RACE VARIABLE): 23.1 ML/MIN/1.73 M^2
EST. GFR  (NO RACE VARIABLE): 23.1 ML/MIN/1.73 M^2
GLUCOSE SERPL-MCNC: 182 MG/DL (ref 70–110)
GLUCOSE SERPL-MCNC: 213 MG/DL (ref 70–110)
GLUCOSE SERPL-MCNC: 276 MG/DL (ref 70–110)
GLUCOSE UR QL STRIP: NEGATIVE
HCT VFR BLD AUTO: 32.3 % (ref 40–54)
HCT VFR BLD CALC: 28 %PCV (ref 36–54)
HCV AB SERPL QL IA: NORMAL
HGB BLD-MCNC: 10.3 G/DL (ref 14–18)
HGB UR QL STRIP: NEGATIVE
HIV 1+2 AB+HIV1 P24 AG SERPL QL IA: NORMAL
IMM GRANULOCYTES # BLD AUTO: 0.11 K/UL (ref 0–0.04)
IMM GRANULOCYTES NFR BLD AUTO: 0.8 % (ref 0–0.5)
KETONES UR QL STRIP: NEGATIVE
LACTATE SERPL-SCNC: 2.5 MMOL/L (ref 0.5–2.2)
LDH SERPL L TO P-CCNC: 3.21 MMOL/L (ref 0.5–2.2)
LEUKOCYTE ESTERASE UR QL STRIP: NEGATIVE
LYMPHOCYTES # BLD AUTO: 0.7 K/UL (ref 1–4.8)
LYMPHOCYTES NFR BLD: 5.2 % (ref 18–48)
MCH RBC QN AUTO: 32.4 PG (ref 27–31)
MCHC RBC AUTO-ENTMCNC: 31.9 G/DL (ref 32–36)
MCV RBC AUTO: 102 FL (ref 82–98)
MONOCYTES # BLD AUTO: 1.1 K/UL (ref 0.3–1)
MONOCYTES NFR BLD: 8.1 % (ref 4–15)
NEUTROPHILS # BLD AUTO: 11.7 K/UL (ref 1.8–7.7)
NEUTROPHILS NFR BLD: 85.5 % (ref 38–73)
NITRITE UR QL STRIP: NEGATIVE
NRBC BLD-RTO: 0 /100 WBC
PH UR STRIP: 6 [PH] (ref 5–8)
PLATELET # BLD AUTO: 321 K/UL (ref 150–450)
PMV BLD AUTO: 12 FL (ref 9.2–12.9)
POC IONIZED CALCIUM: 1.16 MMOL/L (ref 1.06–1.42)
POC TCO2 (MEASURED): 32 MMOL/L (ref 23–29)
POCT GLUCOSE: 181 MG/DL (ref 70–110)
POCT GLUCOSE: 183 MG/DL (ref 70–110)
POCT GLUCOSE: 230 MG/DL (ref 70–110)
POCT GLUCOSE: 320 MG/DL (ref 70–110)
POCT GLUCOSE: 342 MG/DL (ref 70–110)
POTASSIUM BLD-SCNC: 3.9 MMOL/L (ref 3.5–5.1)
POTASSIUM SERPL-SCNC: 4.8 MMOL/L (ref 3.5–5.1)
POTASSIUM SERPL-SCNC: 5.5 MMOL/L (ref 3.5–5.1)
PROCALCITONIN SERPL IA-MCNC: 0.37 NG/ML
PROT SERPL-MCNC: 6.4 G/DL (ref 6–8.4)
PROT UR QL STRIP: NEGATIVE
RBC # BLD AUTO: 3.18 M/UL (ref 4.6–6.2)
SAMPLE: ABNORMAL
SAMPLE: ABNORMAL
SITE: ABNORMAL
SODIUM BLD-SCNC: 129 MMOL/L (ref 136–145)
SODIUM SERPL-SCNC: 131 MMOL/L (ref 136–145)
SODIUM SERPL-SCNC: 134 MMOL/L (ref 136–145)
SP GR UR STRIP: 1.01 (ref 1–1.03)
TROPONIN I SERPL DL<=0.01 NG/ML-MCNC: 0.1 NG/ML (ref 0–0.03)
URN SPEC COLLECT METH UR: NORMAL
WBC # BLD AUTO: 13.72 K/UL (ref 3.9–12.7)

## 2024-09-28 PROCEDURE — 63600175 PHARM REV CODE 636 W HCPCS: Mod: HCNC

## 2024-09-28 PROCEDURE — 97530 THERAPEUTIC ACTIVITIES: CPT | Mod: HCNC,CQ

## 2024-09-28 PROCEDURE — 84484 ASSAY OF TROPONIN QUANT: CPT | Mod: HCNC | Performed by: EMERGENCY MEDICINE

## 2024-09-28 PROCEDURE — 87040 BLOOD CULTURE FOR BACTERIA: CPT | Mod: 59,HCNC | Performed by: STUDENT IN AN ORGANIZED HEALTH CARE EDUCATION/TRAINING PROGRAM

## 2024-09-28 PROCEDURE — 25000242 PHARM REV CODE 250 ALT 637 W/ HCPCS: Mod: HCNC

## 2024-09-28 PROCEDURE — 96375 TX/PRO/DX INJ NEW DRUG ADDON: CPT | Mod: HCNC

## 2024-09-28 PROCEDURE — 83880 ASSAY OF NATRIURETIC PEPTIDE: CPT | Mod: HCNC | Performed by: EMERGENCY MEDICINE

## 2024-09-28 PROCEDURE — 96361 HYDRATE IV INFUSION ADD-ON: CPT | Mod: HCNC

## 2024-09-28 PROCEDURE — 25000003 PHARM REV CODE 250: Mod: HCNC | Performed by: FAMILY MEDICINE

## 2024-09-28 PROCEDURE — 25000003 PHARM REV CODE 250: Mod: HCNC | Performed by: NURSE PRACTITIONER

## 2024-09-28 PROCEDURE — 94761 N-INVAS EAR/PLS OXIMETRY MLT: CPT | Mod: HCNC

## 2024-09-28 PROCEDURE — 83605 ASSAY OF LACTIC ACID: CPT | Mod: HCNC

## 2024-09-28 PROCEDURE — 99900035 HC TECH TIME PER 15 MIN (STAT): Mod: HCNC

## 2024-09-28 PROCEDURE — 86803 HEPATITIS C AB TEST: CPT | Mod: HCNC | Performed by: PHYSICIAN ASSISTANT

## 2024-09-28 PROCEDURE — 25000003 PHARM REV CODE 250: Mod: HCNC

## 2024-09-28 PROCEDURE — 18500000 HC LEAVE OF ABSENCE HOSPITAL SERVICES: Mod: HCNC

## 2024-09-28 PROCEDURE — 93005 ELECTROCARDIOGRAM TRACING: CPT | Mod: HCNC

## 2024-09-28 PROCEDURE — 12000002 HC ACUTE/MED SURGE SEMI-PRIVATE ROOM: Mod: HCNC

## 2024-09-28 PROCEDURE — 94640 AIRWAY INHALATION TREATMENT: CPT | Mod: HCNC

## 2024-09-28 PROCEDURE — 93010 ELECTROCARDIOGRAM REPORT: CPT | Mod: HCNC,,, | Performed by: INTERNAL MEDICINE

## 2024-09-28 PROCEDURE — 87389 HIV-1 AG W/HIV-1&-2 AB AG IA: CPT | Mod: HCNC | Performed by: PHYSICIAN ASSISTANT

## 2024-09-28 PROCEDURE — 80048 BASIC METABOLIC PNL TOTAL CA: CPT | Mod: HCNC,XB | Performed by: NURSE PRACTITIONER

## 2024-09-28 PROCEDURE — 25000003 PHARM REV CODE 250: Mod: HCNC | Performed by: HOSPITALIST

## 2024-09-28 PROCEDURE — 84145 PROCALCITONIN (PCT): CPT | Mod: HCNC

## 2024-09-28 PROCEDURE — 99285 EMERGENCY DEPT VISIT HI MDM: CPT | Mod: 25,HCNC

## 2024-09-28 PROCEDURE — 97110 THERAPEUTIC EXERCISES: CPT | Mod: HCNC,CQ

## 2024-09-28 PROCEDURE — 27000221 HC OXYGEN, UP TO 24 HOURS: Mod: HCNC

## 2024-09-28 PROCEDURE — 82962 GLUCOSE BLOOD TEST: CPT | Mod: HCNC

## 2024-09-28 PROCEDURE — 96365 THER/PROPH/DIAG IV INF INIT: CPT | Mod: HCNC

## 2024-09-28 PROCEDURE — 81003 URINALYSIS AUTO W/O SCOPE: CPT | Mod: HCNC

## 2024-09-28 PROCEDURE — 85025 COMPLETE CBC W/AUTO DIFF WBC: CPT | Mod: HCNC

## 2024-09-28 PROCEDURE — 80053 COMPREHEN METABOLIC PANEL: CPT | Mod: HCNC

## 2024-09-28 RX ORDER — IBUPROFEN 200 MG
16 TABLET ORAL
Status: DISCONTINUED | OUTPATIENT
Start: 2024-09-28 | End: 2024-10-07 | Stop reason: HOSPADM

## 2024-09-28 RX ORDER — AMOXICILLIN 250 MG
1 CAPSULE ORAL DAILY
Status: DISCONTINUED | OUTPATIENT
Start: 2024-09-29 | End: 2024-10-07 | Stop reason: HOSPADM

## 2024-09-28 RX ORDER — TALC
6 POWDER (GRAM) TOPICAL NIGHTLY PRN
Status: DISCONTINUED | OUTPATIENT
Start: 2024-09-28 | End: 2024-10-07 | Stop reason: HOSPADM

## 2024-09-28 RX ORDER — BISACODYL 10 MG/1
10 SUPPOSITORY RECTAL DAILY PRN
Status: DISCONTINUED | OUTPATIENT
Start: 2024-09-28 | End: 2024-10-07 | Stop reason: HOSPADM

## 2024-09-28 RX ORDER — ACETAMINOPHEN 325 MG/1
650 TABLET ORAL EVERY 4 HOURS PRN
Status: DISCONTINUED | OUTPATIENT
Start: 2024-09-28 | End: 2024-10-07 | Stop reason: HOSPADM

## 2024-09-28 RX ORDER — ASPIRIN 81 MG/1
81 TABLET ORAL DAILY
Status: DISCONTINUED | OUTPATIENT
Start: 2024-09-29 | End: 2024-10-07 | Stop reason: HOSPADM

## 2024-09-28 RX ORDER — IPRATROPIUM BROMIDE AND ALBUTEROL SULFATE 2.5; .5 MG/3ML; MG/3ML
3 SOLUTION RESPIRATORY (INHALATION) EVERY 4 HOURS PRN
Status: DISCONTINUED | OUTPATIENT
Start: 2024-09-28 | End: 2024-10-07 | Stop reason: HOSPADM

## 2024-09-28 RX ORDER — CALCIUM GLUCONATE 20 MG/ML
1 INJECTION, SOLUTION INTRAVENOUS
Status: COMPLETED | OUTPATIENT
Start: 2024-09-28 | End: 2024-09-28

## 2024-09-28 RX ORDER — IBUPROFEN 200 MG
24 TABLET ORAL
Status: DISCONTINUED | OUTPATIENT
Start: 2024-09-28 | End: 2024-10-07 | Stop reason: HOSPADM

## 2024-09-28 RX ORDER — SODIUM CHLORIDE 0.9 % (FLUSH) 0.9 %
10 SYRINGE (ML) INJECTION EVERY 12 HOURS PRN
Status: DISCONTINUED | OUTPATIENT
Start: 2024-09-28 | End: 2024-10-07 | Stop reason: HOSPADM

## 2024-09-28 RX ORDER — IBUPROFEN 200 MG
16 TABLET ORAL
Status: DISCONTINUED | OUTPATIENT
Start: 2024-09-28 | End: 2024-10-01 | Stop reason: HOSPADM

## 2024-09-28 RX ORDER — PROMETHAZINE HYDROCHLORIDE 12.5 MG/1
25 TABLET ORAL EVERY 6 HOURS PRN
Status: DISCONTINUED | OUTPATIENT
Start: 2024-09-28 | End: 2024-10-07 | Stop reason: HOSPADM

## 2024-09-28 RX ORDER — LEVOTHYROXINE SODIUM 100 UG/1
200 TABLET ORAL
Status: DISCONTINUED | OUTPATIENT
Start: 2024-09-29 | End: 2024-10-07 | Stop reason: HOSPADM

## 2024-09-28 RX ORDER — ONDANSETRON 8 MG/1
8 TABLET, ORALLY DISINTEGRATING ORAL EVERY 8 HOURS PRN
Status: DISCONTINUED | OUTPATIENT
Start: 2024-09-28 | End: 2024-09-28

## 2024-09-28 RX ORDER — ATORVASTATIN CALCIUM 10 MG/1
10 TABLET, FILM COATED ORAL DAILY
Status: DISCONTINUED | OUTPATIENT
Start: 2024-09-29 | End: 2024-10-07 | Stop reason: HOSPADM

## 2024-09-28 RX ORDER — AMIODARONE HYDROCHLORIDE 200 MG/1
200 TABLET ORAL DAILY
Status: DISCONTINUED | OUTPATIENT
Start: 2024-09-29 | End: 2024-09-30

## 2024-09-28 RX ORDER — IBUPROFEN 200 MG
24 TABLET ORAL
Status: DISCONTINUED | OUTPATIENT
Start: 2024-09-28 | End: 2024-10-01 | Stop reason: HOSPADM

## 2024-09-28 RX ORDER — GLUCAGON 1 MG
1 KIT INJECTION
Status: DISCONTINUED | OUTPATIENT
Start: 2024-09-28 | End: 2024-10-07 | Stop reason: HOSPADM

## 2024-09-28 RX ORDER — SODIUM CHLORIDE 9 MG/ML
INJECTION, SOLUTION INTRAVENOUS CONTINUOUS
Status: DISCONTINUED | OUTPATIENT
Start: 2024-09-28 | End: 2024-10-01 | Stop reason: HOSPADM

## 2024-09-28 RX ORDER — INSULIN ASPART 100 [IU]/ML
0-5 INJECTION, SOLUTION INTRAVENOUS; SUBCUTANEOUS
Status: DISCONTINUED | OUTPATIENT
Start: 2024-09-28 | End: 2024-10-01 | Stop reason: HOSPADM

## 2024-09-28 RX ORDER — POLYETHYLENE GLYCOL 3350 17 G/17G
17 POWDER, FOR SOLUTION ORAL DAILY PRN
Status: DISCONTINUED | OUTPATIENT
Start: 2024-09-28 | End: 2024-10-07 | Stop reason: HOSPADM

## 2024-09-28 RX ORDER — GLUCAGON 1 MG
1 KIT INJECTION
Status: DISCONTINUED | OUTPATIENT
Start: 2024-09-28 | End: 2024-10-01 | Stop reason: HOSPADM

## 2024-09-28 RX ORDER — NALOXONE HCL 0.4 MG/ML
0.02 VIAL (ML) INJECTION
Status: DISCONTINUED | OUTPATIENT
Start: 2024-09-28 | End: 2024-10-07 | Stop reason: HOSPADM

## 2024-09-28 RX ORDER — ALBUTEROL SULFATE 2.5 MG/.5ML
15 SOLUTION RESPIRATORY (INHALATION)
Status: COMPLETED | OUTPATIENT
Start: 2024-09-28 | End: 2024-09-28

## 2024-09-28 RX ORDER — OXYCODONE HYDROCHLORIDE 5 MG/1
5 TABLET ORAL EVERY 4 HOURS PRN
Status: DISCONTINUED | OUTPATIENT
Start: 2024-09-28 | End: 2024-10-07 | Stop reason: HOSPADM

## 2024-09-28 RX ORDER — METHOCARBAMOL 750 MG/1
750 TABLET, FILM COATED ORAL 4 TIMES DAILY
Status: DISCONTINUED | OUTPATIENT
Start: 2024-09-28 | End: 2024-09-30

## 2024-09-28 RX ADMIN — AMIODARONE HYDROCHLORIDE 200 MG: 200 TABLET ORAL at 09:09

## 2024-09-28 RX ADMIN — VANCOMYCIN HYDROCHLORIDE 1500 MG: 1.5 INJECTION, POWDER, LYOPHILIZED, FOR SOLUTION INTRAVENOUS at 05:09

## 2024-09-28 RX ADMIN — SENNOSIDES AND DOCUSATE SODIUM 1 TABLET: 50; 8.6 TABLET ORAL at 10:09

## 2024-09-28 RX ADMIN — METHOCARBAMOL 750 MG: 750 TABLET ORAL at 08:09

## 2024-09-28 RX ADMIN — METHOCARBAMOL 750 MG: 750 TABLET ORAL at 10:09

## 2024-09-28 RX ADMIN — CALCIUM GLUCONATE 1 G: 20 INJECTION, SOLUTION INTRAVENOUS at 01:09

## 2024-09-28 RX ADMIN — PIPERACILLIN SODIUM AND TAZOBACTAM SODIUM 4.5 G: 4; .5 INJECTION, POWDER, FOR SOLUTION INTRAVENOUS at 09:09

## 2024-09-28 RX ADMIN — OXYCODONE HYDROCHLORIDE 10 MG: 10 TABLET ORAL at 11:09

## 2024-09-28 RX ADMIN — AMMONIUM LACTATE: 12 LOTION TOPICAL at 10:09

## 2024-09-28 RX ADMIN — ASPIRIN 81 MG: 81 TABLET, COATED ORAL at 10:09

## 2024-09-28 RX ADMIN — POLYETHYLENE GLYCOL 3350 17 G: 17 POWDER, FOR SOLUTION ORAL at 09:09

## 2024-09-28 RX ADMIN — DEXTROSE MONOHYDRATE 250 ML: 100 INJECTION, SOLUTION INTRAVENOUS at 01:09

## 2024-09-28 RX ADMIN — INSULIN HUMAN 5 UNITS: 100 INJECTION, SOLUTION PARENTERAL at 02:09

## 2024-09-28 RX ADMIN — APIXABAN 5 MG: 2.5 TABLET, FILM COATED ORAL at 10:09

## 2024-09-28 RX ADMIN — ALBUTEROL SULFATE 15 MG: 2.5 SOLUTION RESPIRATORY (INHALATION) at 01:09

## 2024-09-28 RX ADMIN — FLUTICASONE PROPIONATE 100 MCG: 50 SPRAY, METERED NASAL at 09:09

## 2024-09-28 RX ADMIN — LEVOTHYROXINE SODIUM 200 MCG: 100 TABLET ORAL at 05:09

## 2024-09-28 RX ADMIN — THERA TABS 1 TABLET: TAB at 10:09

## 2024-09-28 RX ADMIN — METHOCARBAMOL 750 MG: 750 TABLET ORAL at 05:09

## 2024-09-28 RX ADMIN — SODIUM CHLORIDE, POTASSIUM CHLORIDE, SODIUM LACTATE AND CALCIUM CHLORIDE 500 ML: 600; 310; 30; 20 INJECTION, SOLUTION INTRAVENOUS at 03:09

## 2024-09-28 RX ADMIN — APIXABAN 5 MG: 5 TABLET, FILM COATED ORAL at 08:09

## 2024-09-28 RX ADMIN — SODIUM ZIRCONIUM CYCLOSILICATE 10 G: 5 POWDER, FOR SUSPENSION ORAL at 11:09

## 2024-09-28 RX ADMIN — ATORVASTATIN CALCIUM 10 MG: 10 TABLET, FILM COATED ORAL at 10:09

## 2024-09-28 RX ADMIN — GABAPENTIN 300 MG: 300 CAPSULE ORAL at 10:09

## 2024-09-28 NOTE — PROVIDER PROGRESS NOTES - EMERGENCY DEPT.
"Encounter Date: 9/28/2024    ED Physician Progress Notes        Physician Note:   ED RESIDENT  PHYSICIAN HAND-OFF NOTE:  3:09 PM 9/28/2024  Bean Salas is a 79 y.o. male who presented to the ED on 9/28/2024 and has been managed by , who reports patient C/O kidney problems. I assumed care of patient from off-going ED physician team at 3:09 PM pending imaging and admission to Hospital Medicine.    On my evaluation, Bean Salas appears well, hemodynamically stable and in NAD. Thus far, Bean Salas has received:  Medications  lactated ringers bolus 500 mL (500 mLs Intravenous New Bag 9/28/24 1501)  calcium gluconate 1 g in NS IVPB (premixed) (0 g Intravenous Stopped 9/28/24 1349)  insulin regular injection 5 Units 0.05 mL (5 Units Intravenous Given 9/28/24 1409)  dextrose 10% bolus 250 mL 250 mL (0 mLs Intravenous Stopped 9/28/24 1356)  albuterol sulfate nebulizer solution 15 mg (15 mg Nebulization Given 9/28/24 1351)    On my exam, I appreciate:  BP (!) 136/56   Pulse 80   Temp 98.4 °F (36.9 °C) (Oral)   Resp (!) 21   Ht 5' 11" (1.803 m)   Wt 97.1 kg (214 lb)   SpO2 97%   BMI 29.85 kg/m²   Constitutional: Well-appearing; Well-Nourished; Non-Toxic-appearing and in NAD.  Head/Face: AT/NC & No Facial asymmetry.  Oropharynx: Speaking Full Sentences with No drooling or slurring of speech.  Cardiovascular: Reg Rate; Reg Rhythm; No Murmurs.  Pulmonary/Chest: AT Thorax with Lungs CTA B/L.  Abdominal: Soft, ND, NT, obese.  Musculoskeletal: moves all extremities spontaneously  Neuro/Psych: Calm; Cooperative, Following Command, Answering Questions Appropriately, and No Gait/Balance deficits.         Disposition:  The patient will be admitted to Hospital Medicine after we discussed the case with them.  I have discussed and counseled Bean Salas regarding exam, results, diagnosis, treatment, and plan.  ______________________  Ernesto Jackson, DO  Emergency Medicine [id number]  3:09 PM " 9/28/2024

## 2024-09-28 NOTE — ASSESSMENT & PLAN NOTE
History of chronic hypoxic respiratory failure. Currently on 2L NC with good saturations. Wean oxygen as tolerated. Prn anastasiia.

## 2024-09-28 NOTE — SUBJECTIVE & OBJECTIVE
Past Medical History:   Diagnosis Date    Atrial fibrillation, unspecified type 09/06/2023    COPD exacerbation 09/06/2023    Thyroid disease     hypo       Past Surgical History:   Procedure Laterality Date    COLONOSCOPY  01/01/2011    CORONARY ANGIOGRAPHY N/A 07/22/2021    Procedure: ANGIOGRAM, CORONARY ARTERY;  Surgeon: Hank Barry MD;  Location: [facility] CATH LAB/EP;  Service: Cardiology;  Laterality: N/A;    EYE SURGERY Bilateral     cataracts extraction    FRACTURE SURGERY Right 09/2021    femur    HERNIA REPAIR      HIP SURGERY Right 08/2021    INTRAMEDULLARY RODDING OF TROCHANTER OF FEMUR Right 09/03/2021    Procedure: INSERTION, INTRAMEDULLARY RACQUEL, FEMUR, TROCHANTER;  Surgeon: Darryn Hall MD;  Location: [facility];  Service: Orthopedics;  Laterality: Right;    JOINT REPLACEMENT Bilateral     knee    LEFT HEART CATHETERIZATION Right 07/22/2021    Procedure: Left heart cath;  Surgeon: Hank Barry MD;  Location: [facility] CATH LAB/EP;  Service: Cardiology;  Laterality: Right;    OPEN REDUCTION AND INTERNAL FIXATION (ORIF) OF INJURY OF HIP Right 9/20/2024    Procedure: ORIF,PELVIS;  Surgeon: Negrito Stapleton MD;  [address];  Service: Orthopedics;  Laterality: Right;  +local    TRANSESOPHAGEAL ECHOCARDIOGRAPHY N/A 9/19/2024    Procedure: ECHOCARDIOGRAM, TRANSESOPHAGEAL;  Surgeon: Leonora Butt MD;  Location: [facility] EP LAB;  Service: Cardiology;  Laterality: N/A;    TREATMENT OF CARDIAC ARRHYTHMIA N/A 9/19/2024    Procedure: Cardioversion or Defibrillation;  Surgeon: ANA Steiner MD;  Location: [facility] EP LAB;  Service: Cardiology;  Laterality: N/A;  AF, DEMETRICE/DCCV, ANES, GP, 524    VASECTOMY         Review of patient's allergies indicates:  No Known Allergies    Current Facility-Administered Medications on File Prior to Encounter   Medication    [MAR Hold - Suspended Admission] 0.9%  NaCl infusion    [MAR Hold - Suspended Admission] acetaminophen tablet 1,000 mg    [MAR Hold -  Suspended Admission] acetaminophen tablet 650 mg    [MAR Hold - Suspended Admission] amiodarone tablet 200 mg    [MAR Hold - Suspended Admission] amiodarone tablet 200 mg    [MAR Hold - Suspended Admission] ammonium lactate 12 % lotion    [MAR Hold - Suspended Admission] apixaban tablet 5 mg    [MAR Hold - Suspended Admission] aspirin EC tablet 81 mg    [MAR Hold - Suspended Admission] atorvastatin tablet 10 mg    [MAR Hold - Suspended Admission] calcium carbonate 200 mg calcium (500 mg) chewable tablet 500 mg    [MAR Hold - Suspended Admission] dextrose 10% bolus 125 mL 125 mL    [MAR Hold - Suspended Admission] dextrose 10% bolus 250 mL 250 mL    [MAR Hold - Suspended Admission] fluticasone propionate 50 mcg/actuation nasal spray 100 mcg    [MAR Hold - Suspended Admission] gabapentin capsule 300 mg    [MAR Hold - Suspended Admission] glucagon (human recombinant) injection 1 mg    [MAR Hold - Suspended Admission] glucose chewable tablet 16 g    [MAR Hold - Suspended Admission] glucose chewable tablet 24 g    [MAR Hold - Suspended Admission] insulin aspart U-100 pen 0-5 Units    [MAR Hold - Suspended Admission] levothyroxine tablet 200 mcg    [MAR Hold - Suspended Admission] melatonin tablet 6 mg    [MAR Hold - Suspended Admission] methocarbamoL tablet 750 mg    [MAR Hold - Suspended Admission] metoprolol succinate (TOPROL-XL) 24 hr split tablet 12.5 mg    [MAR Hold - Suspended Admission] mirtazapine tablet 7.5 mg    [MAR Hold - Suspended Admission] multivitamin tablet    [MAR Hold - Suspended Admission] oxyCODONE immediate release tablet 5 mg    [MAR Hold - Suspended Admission] oxyCODONE immediate release tablet Tab 10 mg    [MAR Hold - Suspended Admission] polyethylene glycol packet 17 g    [MAR Hold - Suspended Admission] sacubitriL-valsartan 24-26 mg per tablet 1 tablet    [MAR Hold - Suspended Admission] senna-docusate 8.6-50 mg per tablet 1 tablet    [COMPLETED] sodium zirconium cyclosilicate packet 10 g      Current Outpatient Medications on File Prior to Encounter   Medication Sig    acetaminophen (TYLENOL) 500 MG tablet Take 2 tablets (1,000 mg total) by mouth every 8 (eight) hours.    amiodarone (PACERONE) 200 MG Tab Take 2 tablets (400 mg total) by mouth 2 (two) times daily for 4 days, THEN 1 tablet (200 mg total) once daily.    apixaban (ELIQUIS) 5 mg Tab Take 2 tablets (10 mg total) by mouth 2 (two) times daily for 4 days, THEN 1 tablet (5 mg total) 2 (two) times daily.    aspirin (ECOTRIN) 81 MG EC tablet Take 1 tablet (81 mg total) by mouth once daily.    atorvastatin (LIPITOR) 10 MG tablet Take 1 tablet by mouth once daily    empagliflozin (JARDIANCE) 10 mg tablet Take 1 tablet (10 mg total) by mouth once daily.    furosemide (LASIX) 40 MG tablet Take 1 tablet (40 mg total) by mouth 2 (two) times a day. Take daily for next 3 days, then use as needed. Weigh daily. Afterwards, f weight increases 2 lbs/24h, take dose of lasix daily  until weight is back to baseline    levothyroxine (SYNTHROID) 200 MCG tablet Take 1 tablet by mouth once daily    melatonin (MELATIN) 3 mg tablet Take 2 tablets (6 mg total) by mouth nightly as needed for Insomnia.    methocarbamoL (ROBAXIN) 750 MG Tab Take 1 tablet (750 mg total) by mouth 4 (four) times daily.    metoprolol succinate (TOPROL-XL) 25 MG 24 hr tablet Take 0.5 tablets (12.5 mg total) by mouth once daily.    mirtazapine (REMERON) 7.5 MG Tab Take 1 tablet (7.5 mg total) by mouth nightly. One tablet po each night for sleep    multivit-min-FA-lycopen-lutein (CENTRUM SILVER) 0.4-300-250 mg-mcg-mcg Tab Take 1 tablet by mouth once daily.    oxyCODONE (ROXICODONE) 5 MG immediate release tablet Take 1 tablet (5 mg total) by mouth every 4 (four) hours as needed for Pain.    sacubitriL-valsartan (ENTRESTO)  mg per tablet Take 1 tablet by mouth 2 (two) times daily.    senna-docusate 8.6-50 mg (PERICOLACE) 8.6-50 mg per tablet Take 1 tablet by mouth once daily.     spironolactone (ALDACTONE) 25 MG tablet Take 1 tablet by mouth once daily     Family History       Problem Relation (Age of Onset)    Crohn's disease Mother    Dementia Mother    Heart attack Father    No Known Problems Sister, Brother, Son          Tobacco Use    Smoking status: Former     Current packs/day: 0.00     Average packs/day: 1 pack/day for 35.0 years (35.0 ttl pk-yrs)     Types: Cigarettes     Start date: 1965     Quit date: 2000     Years since quittin.7     Passive exposure: Past    Smokeless tobacco: Never   Substance and Sexual Activity    Alcohol use: No    Drug use: Never    Sexual activity: Not Currently     Review of Systems   Constitutional:  Negative for activity change, chills and fever.   HENT:  Negative for trouble swallowing.    Eyes:  Negative for photophobia and visual disturbance.   Respiratory:  Negative for chest tightness, shortness of breath and wheezing.    Cardiovascular:  Negative for chest pain, palpitations and leg swelling.   Gastrointestinal:  Negative for abdominal pain, constipation, diarrhea, nausea and vomiting.   Genitourinary:  Negative for dysuria, frequency, hematuria and urgency.   Musculoskeletal:  Positive for arthralgias, back pain and myalgias. Negative for gait problem.   Skin:  Negative for color change and rash.   Neurological:  Negative for dizziness, syncope, weakness, light-headedness, numbness and headaches.   Psychiatric/Behavioral:  Negative for agitation and confusion. The patient is not nervous/anxious.      Objective:     Vital Signs (Most Recent):  Temp: 98.4 °F (36.9 °C) (24 1500)  Pulse: 80 (24 1500)  Resp: (!) 21 (24 1500)  BP: (!) 136/56 (24 1500)  SpO2: 97 % (24 1500) Vital Signs (24h Range):  Temp:  [97.6 °F (36.4 °C)-98.4 °F (36.9 °C)] 98.4 °F (36.9 °C)  Pulse:  [] 80  Resp:  [16-23] 21  SpO2:  [95 %-100 %] 97 %  BP: ()/(50-74) 136/56     Weight: 97.1 kg (214 lb)  Body mass index is 29.85  kg/m².     Physical Exam  Vitals and nursing note reviewed.   Constitutional:       General: He is not in acute distress.     Appearance: He is well-developed. He is obese.   HENT:      Head: Normocephalic and atraumatic.      Mouth/Throat:      Pharynx: No oropharyngeal exudate.   Eyes:      Conjunctiva/sclera: Conjunctivae normal.      Pupils: Pupils are equal, round, and reactive to light.   Cardiovascular:      Rate and Rhythm: Normal rate and regular rhythm.      Heart sounds: Normal heart sounds.   Pulmonary:      Effort: Pulmonary effort is normal. No respiratory distress.      Breath sounds: Normal breath sounds. No wheezing.   Abdominal:      General: Bowel sounds are normal. There is no distension.      Palpations: Abdomen is soft.      Tenderness: There is no abdominal tenderness.   Musculoskeletal:         General: No tenderness. Normal range of motion.      Cervical back: Normal range of motion and neck supple.   Lymphadenopathy:      Cervical: No cervical adenopathy.   Skin:     General: Skin is warm and dry.      Capillary Refill: Capillary refill takes less than 2 seconds.      Findings: No rash.   Neurological:      General: No focal deficit present.      Mental Status: He is alert and oriented to person, place, and time.      Cranial Nerves: No cranial nerve deficit.      Sensory: No sensory deficit.      Coordination: Coordination normal.   Psychiatric:         Behavior: Behavior normal.         Thought Content: Thought content normal.         Judgment: Judgment normal.              CRANIAL NERVES     CN III, IV, VI   Pupils are equal, round, and reactive to light.       Significant Labs: All pertinent labs within the past 24 hours have been reviewed.  CBC:   Recent Labs   Lab 09/28/24  1407 09/28/24  1502   WBC 13.72*  --    HGB 10.3*  --    HCT 32.3* 28*     --      CMP:   Recent Labs   Lab 09/28/24  0524 09/28/24  1407   * 134*   K 5.5* 4.8   CL 93* 91*   CO2 28 31*   * 182*    BUN 72* 83*   CREATININE 2.7* 2.7*   CALCIUM 8.9 9.4   PROT  --  6.4   ALBUMIN  --  2.8*   BILITOT  --  1.8*   ALKPHOS  --  129   AST  --  23   ALT  --  18   ANIONGAP 10 12       Significant Imaging: I have reviewed all pertinent imaging results/findings within the past 24 hours.  Imaging Results              X-Ray Chest AP Portable (Final result)  Result time 09/28/24 15:12:38      Final result by Nixon Rodriguez MD (09/28/24 15:12:38)                   Impression:      Cardiomegaly with pulmonary vascular congestion and bilateral pleural effusions, suggestive of possible fluid overload state secondary to CHF.      Electronically signed by: Nixon Rodriguez MD  Date:    09/28/2024  Time:    15:12               Narrative:    EXAMINATION:  XR CHEST AP PORTABLE    CLINICAL HISTORY:  Hypotension, unspecified    TECHNIQUE:  Single frontal view of the chest was performed.    COMPARISON:  09/17/2024.    FINDINGS:  Monitoring EKG leads are present.  The trachea is unremarkable.  The cardiomediastinal silhouette is enlarged.  There are small bilateral pleural effusions with left greater than right.  There is no evidence of a pneumothorax.  There is no evidence of pneumomediastinum.  There is pulmonary vascular congestion.  There is no focal consolidation.  There are degenerative changes in the osseous structures.

## 2024-09-28 NOTE — ED PROVIDER NOTES
Source of History:  Patient and chart    Chief complaint:  abnormal labs (Kidney issues/From [facility] rehab)      HPI:  Bean Salas is a 79 y.o. male with a medical history as below presents to the emergency department with a chief complaint of abnormal labs.  Patient was at [facility] nursing facility physical therapy following pelvic fracture fixation.  Routine labs were concerning for worsening kidney function as evidenced by increasing creatinine as well as increased potassium.  Patient denies any physical symptoms other than chronic back pain when she suffers from.  He denies any fever, chills, nausea, vomiting, diarrhea or any other symptoms.  Does endorse normal urinary output.  It was no further concerns at this time.    Review of patient's allergies indicates:  No Known Allergies    Current Facility-Administered Medications on File Prior to Encounter   Medication Dose Route Frequency Provider Last Rate Last Admin    [MAR Hold - Suspended Admission] 0.9%  NaCl infusion   Intravenous Continuous Linda Elias NP        [MAR Hold - Suspended Admission] acetaminophen tablet 1,000 mg  1,000 mg Oral Q8H Troy Rosas MD   1,000 mg at 09/27/24 2048    [MAR Hold - Suspended Admission] acetaminophen tablet 650 mg  650 mg Oral Q6H PRN Troy Rosas MD        [MAR Hold - Suspended Admission] amiodarone tablet 200 mg  200 mg Oral Daily Piper Oneal NP        [MAR Hold - Suspended Admission] amiodarone tablet 200 mg  200 mg Oral BID Piper Oneal NP   200 mg at 09/28/24 0959    [MAR Hold - Suspended Admission] ammonium lactate 12 % lotion   Topical (Top) BID Piper Oneal NP   Given at 09/28/24 1009    [MAR Hold - Suspended Admission] apixaban tablet 5 mg  5 mg Oral BID Piper Oneal NP   5 mg at 09/28/24 1001    [MAR Hold - Suspended Admission] aspirin EC tablet 81 mg  81 mg Oral Daily Troy Rosas MD   81 mg at 09/28/24 1001    [MAR Hold - Suspended Admission] atorvastatin  tablet 10 mg  10 mg Oral Daily Troy Rosas MD   10 mg at 09/28/24 1000    [MAR Hold - Suspended Admission] calcium carbonate 200 mg calcium (500 mg) chewable tablet 500 mg  500 mg Oral BID PRN Troy Rosas MD        [MAR Hold - Suspended Admission] dextrose 10% bolus 125 mL 125 mL  12.5 g Intravenous PRN Linda Elias NP        [MAR Hold - Suspended Admission] dextrose 10% bolus 250 mL 250 mL  25 g Intravenous PRN Linda Elias NP        [MAR Hold - Suspended Admission] fluticasone propionate 50 mcg/actuation nasal spray 100 mcg  2 spray Each Nostril Daily Piper Oneal NP   100 mcg at 09/28/24 0959    [MAR Hold - Suspended Admission] gabapentin capsule 300 mg  300 mg Oral TID Piper Oneal NP   300 mg at 09/28/24 1004    [MAR Hold - Suspended Admission] glucagon (human recombinant) injection 1 mg  1 mg Intramuscular PRN Linda Elias NP        [MAR Hold - Suspended Admission] glucose chewable tablet 16 g  16 g Oral PRN Linda Elias NP        [MAR Hold - Suspended Admission] glucose chewable tablet 24 g  24 g Oral PRN Linda Elias NP        [MAR Hold - Suspended Admission] insulin aspart U-100 pen 0-5 Units  0-5 Units Subcutaneous QID (AC + HS) PRN Linda Elias NP        [MAR Hold - Suspended Admission] levothyroxine tablet 200 mcg  200 mcg Oral Before breakfast Troy Rosas MD   200 mcg at 09/28/24 0535    [MAR Hold - Suspended Admission] melatonin tablet 6 mg  6 mg Oral Nightly PRN Troy Rosas MD   6 mg at 09/25/24 2107    [MAR Hold - Suspended Admission] methocarbamoL tablet 750 mg  750 mg Oral QID Piper Oneal NP   750 mg at 09/28/24 1000    [MAR Hold - Suspended Admission] metoprolol succinate (TOPROL-XL) 24 hr split tablet 12.5 mg  12.5 mg Oral Daily Piper Oneal NP   12.5 mg at 09/25/24 0916    [MAR Hold - Suspended Admission] mirtazapine tablet 7.5 mg  7.5 mg Oral QHS Troy Rosas MD   7.5 mg at 09/27/24 2047    [MAR Hold  - Suspended Admission] multivitamin tablet  1 tablet Oral Daily Troy Rosas MD   1 tablet at 09/28/24 1001    [MAR Hold - Suspended Admission] oxyCODONE immediate release tablet 5 mg  5 mg Oral Q4H PRN Piper Oneal, NP   5 mg at 09/27/24 0855    [MAR Hold - Suspended Admission] oxyCODONE immediate release tablet Tab 10 mg  10 mg Oral Q4H PRN Piper Oneal, NP   10 mg at 09/28/24 1148    [MAR Hold - Suspended Admission] polyethylene glycol packet 17 g  17 g Oral Daily Piper Oneal, NP   17 g at 09/28/24 0959    [MAR Hold - Suspended Admission] sacubitriL-valsartan 24-26 mg per tablet 1 tablet  1 tablet Oral BID Piper Oneal, NP   1 tablet at 09/27/24 2047    [MAR Hold - Suspended Admission] senna-docusate 8.6-50 mg per tablet 1 tablet  1 tablet Oral BID Troy Rosas MD   1 tablet at 09/28/24 1001    [COMPLETED] sodium zirconium cyclosilicate packet 10 g  10 g Oral Once Linda Elias, NP   10 g at 09/28/24 1126     Current Outpatient Medications on File Prior to Encounter   Medication Sig Dispense Refill    acetaminophen (TYLENOL) 500 MG tablet Take 2 tablets (1,000 mg total) by mouth every 8 (eight) hours.      amiodarone (PACERONE) 200 MG Tab Take 2 tablets (400 mg total) by mouth 2 (two) times daily for 4 days, THEN 1 tablet (200 mg total) once daily.      apixaban (ELIQUIS) 5 mg Tab Take 2 tablets (10 mg total) by mouth 2 (two) times daily for 4 days, THEN 1 tablet (5 mg total) 2 (two) times daily.      aspirin (ECOTRIN) 81 MG EC tablet Take 1 tablet (81 mg total) by mouth once daily.      atorvastatin (LIPITOR) 10 MG tablet Take 1 tablet by mouth once daily 90 tablet 1    empagliflozin (JARDIANCE) 10 mg tablet Take 1 tablet (10 mg total) by mouth once daily.      furosemide (LASIX) 40 MG tablet Take 1 tablet (40 mg total) by mouth 2 (two) times a day. Take daily for next 3 days, then use as needed. Weigh daily. Afterwards, f weight increases 2 lbs/24h, take dose of lasix daily   until weight is back to baseline 180 tablet 3    levothyroxine (SYNTHROID) 200 MCG tablet Take 1 tablet by mouth once daily 90 tablet 3    melatonin (MELATIN) 3 mg tablet Take 2 tablets (6 mg total) by mouth nightly as needed for Insomnia.      methocarbamoL (ROBAXIN) 750 MG Tab Take 1 tablet (750 mg total) by mouth 4 (four) times daily.      metoprolol succinate (TOPROL-XL) 25 MG 24 hr tablet Take 0.5 tablets (12.5 mg total) by mouth once daily.      mirtazapine (REMERON) 7.5 MG Tab Take 1 tablet (7.5 mg total) by mouth nightly. One tablet po each night for sleep      multivit-min-FA-lycopen-lutein (CENTRUM SILVER) 0.4-300-250 mg-mcg-mcg Tab Take 1 tablet by mouth once daily.      oxyCODONE (ROXICODONE) 5 MG immediate release tablet Take 1 tablet (5 mg total) by mouth every 4 (four) hours as needed for Pain. 30 tablet 0    sacubitriL-valsartan (ENTRESTO)  mg per tablet Take 1 tablet by mouth 2 (two) times daily.      senna-docusate 8.6-50 mg (PERICOLACE) 8.6-50 mg per tablet Take 1 tablet by mouth once daily.      spironolactone (ALDACTONE) 25 MG tablet Take 1 tablet by mouth once daily 90 tablet 3       PMH:  As per HPI and below:  Past Medical History:   Diagnosis Date    Atrial fibrillation, unspecified type 09/06/2023    COPD exacerbation 09/06/2023    Thyroid disease     hypo     Past Surgical History:   Procedure Laterality Date    COLONOSCOPY  01/01/2011    CORONARY ANGIOGRAPHY N/A 07/22/2021    Procedure: ANGIOGRAM, CORONARY ARTERY;  Surgeon: Hank Barry MD;  Location: [facility] CATH LAB/EP;  Service: Cardiology;  Laterality: N/A;    EYE SURGERY Bilateral     cataracts extraction    FRACTURE SURGERY Right 09/2021    femur    HERNIA REPAIR      HIP SURGERY Right 08/2021    INTRAMEDULLARY RODDING OF TROCHANTER OF FEMUR Right 09/03/2021    Procedure: INSERTION, INTRAMEDULLARY RACQUEL, FEMUR, TROCHANTER;  Surgeon: Darryn Hall MD;  Location: [facility] OR;  Service: Orthopedics;  Laterality: Right;    JOINT  REPLACEMENT Bilateral     knee    LEFT HEART CATHETERIZATION Right 2021    Procedure: Left heart cath;  Surgeon: Hank Barry MD;  Location: [facility] CATH LAB/EP;  Service: Cardiology;  Laterality: Right;    OPEN REDUCTION AND INTERNAL FIXATION (ORIF) OF INJURY OF HIP Right 2024    Procedure: ORIF,PELVIS;  Surgeon: Negrito Stapleton MD;  [facility];  Service: Orthopedics;  Laterality: Right;  +local    TRANSESOPHAGEAL ECHOCARDIOGRAPHY N/A 2024    Procedure: ECHOCARDIOGRAM, TRANSESOPHAGEAL;  Surgeon: Leonora Butt MD;  Location: [facility] EP LAB;  Service: Cardiology;  Laterality: N/A;    TREATMENT OF CARDIAC ARRHYTHMIA N/A 2024    Procedure: Cardioversion or Defibrillation;  Surgeon: ANA Steiner MD;  Location: [facility] EP LAB;  Service: Cardiology;  Laterality: N/A;  AF, DEMETRICE/DCCV, ANES, GP, 524    VASECTOMY         Social History     Socioeconomic History    Marital status:    Tobacco Use    Smoking status: Former     Current packs/day: 0.00     Average packs/day: 1 pack/day for 35.0 years (35.0 ttl pk-yrs)     Types: Cigarettes     Start date: 1965     Quit date: 2000     Years since quittin.7     Passive exposure: Past    Smokeless tobacco: Never   Substance and Sexual Activity    Alcohol use: No    Drug use: Never    Sexual activity: Not Currently     Social Drivers of Health     Financial Resource Strain: Low Risk  (2024)    Overall Financial Resource Strain (CARDIA)     Difficulty of Paying Living Expenses: Not hard at all   Food Insecurity: No Food Insecurity (2024)    Hunger Vital Sign     Worried About Running Out of Food in the Last Year: Never true     Ran Out of Food in the Last Year: Never true   Transportation Needs: No Transportation Needs (2024)    TRANSPORTATION NEEDS     Transportation : No   Physical Activity: Inactive (2024)    Exercise Vital Sign     Days of Exercise per Week: 0 days     Minutes of Exercise  per Session: 0 min   Stress: No Stress Concern Present (9/16/2024)    [employer] - Occupational Stress Questionnaire     Feeling of Stress : Not at all   Housing Stability: Low Risk  (9/16/2024)    Housing Stability Vital Sign     Unable to Pay for Housing in the Last Year: No     Homeless in the Last Year: No       Family History   Problem Relation Name Age of Onset    Crohn's disease Mother      Dementia Mother      Heart attack Father      No Known Problems Sister      No Known Problems Brother      No Known Problems Son         Physical Exam:      Vitals:    09/28/24 1500   BP: (!) 136/56   Pulse: 80   Resp: (!) 21   Temp: 98.4 °F (36.9 °C)     Physical Exam  Gen:  Chronically ill-appearing man in no acute distress  Mental Status:  Alert and oriented    Skin: Warm, dry. No rashes seen.  Eyes: No conjunctival injection.  Pulm: No increased work of breathing.  No significant tachypnea.  No conversational dyspnea.    CV: Regular rate.   Abd: Soft.  Not distended.  Nontender.   MSK: No deformities.    Neuro: Awake. Speech normal. No focal neuro deficit observed.      Procedures  Laboratory Studies:  Labs Reviewed   CBC W/ AUTO DIFFERENTIAL - Abnormal       Result Value    WBC 13.72 (*)     RBC 3.18 (*)     Hemoglobin 10.3 (*)     Hematocrit 32.3 (*)      (*)     MCH 32.4 (*)     MCHC 31.9 (*)     RDW 15.2 (*)     Platelets 321      MPV 12.0      Immature Granulocytes 0.8 (*)     Gran # (ANC) 11.7 (*)     Immature Grans (Abs) 0.11 (*)     Lymph # 0.7 (*)     Mono # 1.1 (*)     Eos # 0.0      Baso # 0.02      nRBC 0      Gran % 85.5 (*)     Lymph % 5.2 (*)     Mono % 8.1      Eosinophil % 0.3      Basophil % 0.1      Differential Method Automated     COMPREHENSIVE METABOLIC PANEL - Abnormal    Sodium 134 (*)     Potassium 4.8      Chloride 91 (*)     CO2 31 (*)     Glucose 182 (*)     BUN 83 (*)     Creatinine 2.7 (*)     Calcium 9.4      Total Protein 6.4      Albumin 2.8 (*)      Total Bilirubin 1.8 (*)     Alkaline Phosphatase 129      AST 23      ALT 18      eGFR 23.1 (*)     Anion Gap 12     ISTAT PROCEDURE - Abnormal    POC Glucose 276 (*)     POC BUN 94 (*)     POC Creatinine 3.1 (*)     POC Sodium 129 (*)     POC Potassium 3.9      POC Chloride 87 (*)     POC TCO2 (MEASURED) 32 (*)     POC Ionized Calcium 1.16      POC Hematocrit 28 (*)     Sample STEVE     ISTAT LACTATE - Abnormal    POC Lactate 3.21 (*)     Sample VENOUS      Site Other      Allens Test N/A     HIV 1 / 2 ANTIBODY    HIV 1/2 Ag/Ab Non-reactive      Narrative:     Release to patient->Immediate   HEPATITIS C ANTIBODY    Hepatitis C Ab Non-reactive      Narrative:     Release to patient->Immediate   URINALYSIS, REFLEX TO URINE CULTURE   TROPONIN I   B-TYPE NATRIURETIC PEPTIDE   ISTAT CHEM8       EKG (independently interpreted by me):  Wide complex bradycardia.  Prolonged QTC interval of 559.  Left axis deviation.  No STEMI    X-rays (independently interpreted by me):  Some pulmonary effusion noted on left lung fields    Chart reviewed.  Echo performed September of this year showed an ejection fraction of 45-50%.    Imaging Results              X-Ray Chest AP Portable (Final result)  Result time 09/28/24 15:12:38      Final result by Nixon Rodriguez MD (09/28/24 15:12:38)                   Impression:      Cardiomegaly with pulmonary vascular congestion and bilateral pleural effusions, suggestive of possible fluid overload state secondary to CHF.      Electronically signed by: Nixon Rodriguez MD  Date:    09/28/2024  Time:    15:12               Narrative:    EXAMINATION:  XR CHEST AP PORTABLE    CLINICAL HISTORY:  Hypotension, unspecified    TECHNIQUE:  Single frontal view of the chest was performed.    COMPARISON:  09/17/2024.    FINDINGS:  Monitoring EKG leads are present.  The trachea is unremarkable.  The cardiomediastinal silhouette is enlarged.  There are small bilateral pleural effusions with left greater than right.   There is no evidence of a pneumothorax.  There is no evidence of pneumomediastinum.  There is pulmonary vascular congestion.  There is no focal consolidation.  There are degenerative changes in the osseous structures.                                      Medications Given:  Medications   lactated ringers bolus 500 mL (500 mLs Intravenous New Bag 9/28/24 1501)   vancomycin 1,500 mg in D5W 250 mL IVPB (admixture device) (has no administration in time range)   piperacillin-tazobactam (ZOSYN) 4.5 g in D5W 100 mL IVPB (MB+) (has no administration in time range)   calcium gluconate 1 g in NS IVPB (premixed) (0 g Intravenous Stopped 9/28/24 1349)   insulin regular injection 5 Units 0.05 mL (5 Units Intravenous Given 9/28/24 1409)   dextrose 10% bolus 250 mL 250 mL (0 mLs Intravenous Stopped 9/28/24 1356)   albuterol sulfate nebulizer solution 15 mg (15 mg Nebulization Given 9/28/24 1351)       Discussed with:  Hospital Medicine.  They will admit the patient that has service further management.    MDM:    79 y.o. male with worsening kidney function    Differential includes but is not limited to dehydration, progressive kidney failure, UTI    Patient was blood pressure and initial labs were consistent with hypovolemia.  Patient responded well to half a L fluid bolus.  However, it was found to have an elevated lactate.  For this reason we initiated wide spectrum antibiotics and will admit the patient for further management.      Medical Decision Making       Diagnostic Impression:    1. Hypotension               Patient and/or family understands the plan and is in agreement, verbalized understanding, questions answered    V Kristofer Dexter MD  Resident  Emergency Medicine         Luisito Dexter MD  Resident  09/28/24 3895    Attending Note:  I have seen the patient, have repeated the key portions of the history and physical, reviewed and agree with the medical documentation, and supervised and managed the medical care of the  patient. Additionally, I was present for the critical portion of any procedure(s) performed.    79 M hx above sent from SNF for hypotension, PAULA  BP soft but responsive to fluids.  Chest x-ray shows pulmonary edema.  Labs with a mild leukocytosis, creatinine at 2.7.  K 5.5 without hemolysis.  EKG concerning for wide complex, shifted with insulin, dextrose, calcium.  Plan for broad spectrum antibiotics, admission for continued monitoring.  Blood cultures pending    Critical Care time:35 minutes inclusive of direct patient care, review of previous records, interpretation of labs, imaging and ekg, as well as discussion of my impression and plan of care with the patient, family and other clinicians/consultants. This time is exclusive of any separate billable procedures and of treating other patients.         Janeth Schmitt MD  09/29/24 2708

## 2024-09-28 NOTE — NURSING
Two calls placed to pt's son (Efren Christina at [phone]) in regards to pt being sent to [facility] main [place] ED for treatment and evaluation. Son did not answer phone, nor could a message be left to call unit d/t mailbox being full. Will continue to monitor.

## 2024-09-28 NOTE — ASSESSMENT & PLAN NOTE
Etiology likely pre-renal 2/2 dehydration and over diuresis.  - Cr 2.7, baseline near 1.0  - hold aldactone, lasix, entresto  - s/p 500cc IVF in ED  - caution with IVF given reduced EF  - serial CMP, avoid nephrotoxins, renally dose meds  - consider urine studies and renal US if no improvement in 48hr

## 2024-09-28 NOTE — PLAN OF CARE
Problem: Adult Inpatient Plan of Care  Goal: Plan of Care Review  Outcome: Progressing  Flowsheets (Taken 9/28/2024 1143)  Plan of Care Reviewed With: patient  Goal: Patient-Specific Goal (Individualized)  Outcome: Progressing  Goal: Absence of Hospital-Acquired Illness or Injury  Outcome: Progressing  Goal: Optimal Comfort and Wellbeing  Outcome: Progressing  Goal: Readiness for Transition of Care  Outcome: Progressing     Problem: Adult Inpatient Plan of Care  Goal: Patient-Specific Goal (Individualized)  Outcome: Progressing     Problem: Adult Inpatient Plan of Care  Goal: Absence of Hospital-Acquired Illness or Injury  Outcome: Progressing     Problem: Adult Inpatient Plan of Care  Goal: Optimal Comfort and Wellbeing  Outcome: Progressing     Problem: Adult Inpatient Plan of Care  Goal: Readiness for Transition of Care  Outcome: Progressing     Problem: Wound  Goal: Optimal Coping  Outcome: Progressing  Goal: Optimal Functional Ability  Outcome: Progressing  Goal: Absence of Infection Signs and Symptoms  Outcome: Progressing  Goal: Improved Oral Intake  Outcome: Progressing  Goal: Optimal Pain Control and Function  Outcome: Progressing  Goal: Skin Health and Integrity  Outcome: Progressing  Goal: Optimal Wound Healing  Outcome: Progressing     Problem: Wound  Goal: Optimal Functional Ability  Outcome: Progressing     Problem: Wound  Goal: Absence of Infection Signs and Symptoms  Outcome: Progressing     Problem: Wound  Goal: Improved Oral Intake  Outcome: Progressing     Problem: Wound  Goal: Optimal Pain Control and Function  Outcome: Progressing     Problem: Wound  Goal: Skin Health and Integrity  Outcome: Progressing     Problem: Wound  Goal: Optimal Wound Healing  Outcome: Progressing     Problem: Skin Injury Risk Increased  Goal: Skin Health and Integrity  Outcome: Progressing     Problem: Fall Injury Risk  Goal: Absence of Fall and Fall-Related Injury  Outcome: Progressing

## 2024-09-28 NOTE — ASSESSMENT & PLAN NOTE
Noted. Status post ORIF of pelvis 9/20/2024 and at SNF for rehab  - therapy consulted  - surgical bandages to remain in place post-op until orthopedic clinic follow up  - prn pain control

## 2024-09-28 NOTE — ED NOTES
I-STAT Chem-8+ Results:   Value Reference Range   Sodium 129 136-145 mmol/L   Potassium  3.9 3.5-5.1 mmol/L   Chloride 87  mmol/L   Ionized Calcium 1.16 1.06-1.42 mmol/L   CO2 (measured) 32 23-29 mmol/L   Glucose 276  mg/dL   BUN 94 6-30 mg/dL   Creatinine 3.1 0.5-1.4 mg/dL   Hematocrit 28 36-54%

## 2024-09-28 NOTE — H&P
[facility] - Emergency Dept  [facility] Medicine  History & Physical    Patient Name: Bean Salas  MRN: [id number]  Patient Class: IP- Inpatient  Admission Date: 9/28/2024  Attending Physician: Miguel Asencio MD   Primary Care Provider: Ramirez Levine MD         Patient information was obtained from patient and ER records.     Subjective:     Principal Problem:Sepsis    Chief Complaint:   Chief Complaint   Patient presents with    abnormal labs     Kidney issues  From [facility] re[facility]        HPI: Bean Salas is a 79 y.o. male with PMHx of HTN, HLD, hypothyroidism, combined CHF (EF 20-25%), CAD, A-fib, chronic respiratory failure, and AKHIL who presents to [facility] ED from SNF due to hypotension and worsening kidney function on labs. Patient with recent pelvic fracture fixation 09/2024. He reports chronic back pain. He denies HA, fever, chills, chest pain, shortness of breath, abdominal pain, n/v/d, urinary symptoms, numbness or tingling.     In the ED: Initially hypotensive but improved s/p IVF. On admit AF, HDS, 2L NC. CBC with WBC 13.72, Hgb stable. CMP with K 5.5 >> 4.8, Cr 2.7 (BL near 1.0). T bili 1.8. Hep C and HIV non-reactive. POC lactate 3.21. Initially with some EKG changes due to hyperkalemia but improved after shifting. CXR c/f fluid overload. XR R Hip with Pelvis without hardware complication, stable postoperative changes. Blood cultures x 2. Shifted in ED and received IVF and IV vanc + zosyn. Admitted to .    Past Medical History:   Diagnosis Date    Atrial fibrillation, unspecified type 09/06/2023    COPD exacerbation 09/06/2023    Thyroid disease     hypo       Past Surgical History:   Procedure Laterality Date    COLONOSCOPY  01/01/2011    CORONARY ANGIOGRAPHY N/A 07/22/2021    Procedure: ANGIOGRAM, CORONARY ARTERY;  Surgeon: Hank Barry MD;  Location: [facility] CATH LAB/EP;  Service: Cardiology;  Laterality: N/A;    EYE SURGERY Bilateral     cataracts extraction    FRACTURE SURGERY Right  09/2021    femur    HERNIA REPAIR      HIP SURGERY Right 08/2021    INTRAMEDULLARY RODDING OF TROCHANTER OF FEMUR Right 09/03/2021    Procedure: INSERTION, INTRAMEDULLARY RACQUEL, FEMUR, TROCHANTER;  Surgeon: Darryn Hall MD;  Location: [facility];  Service: Orthopedics;  Laterality: Right;    JOINT REPLACEMENT Bilateral     knee    LEFT HEART CATHETERIZATION Right 07/22/2021    Procedure: Left heart cath;  Surgeon: Hank Barry MD;  Location: [facility] CATH LAB/EP;  Service: Cardiology;  Laterality: Right;    OPEN REDUCTION AND INTERNAL FIXATION (ORIF) OF INJURY OF HIP Right 9/20/2024    Procedure: ORIF,PELVIS;  Surgeon: Negrito Stapleton MD;  Location: [facility] OR [facility] FLR;  Service: Orthopedics;  Laterality: Right;  +local    TRANSESOPHAGEAL ECHOCARDIOGRAPHY N/A 9/19/2024    Procedure: ECHOCARDIOGRAM, TRANSESOPHAGEAL;  Surgeon: Leonora Butt MD;  Location: [facility] EP LAB;  Service: Cardiology;  Laterality: N/A;    TREATMENT OF CARDIAC ARRHYTHMIA N/A 9/19/2024    Procedure: Cardioversion or Defibrillation;  Surgeon: ANA Steiner MD;  Location: [facility] EP LAB;  Service: Cardiology;  Laterality: N/A;  AF, DEMETRICE/DCCV, ANES, GP, 524    VASECTOMY         Review of patient's allergies indicates:  No Known Allergies    Current Facility-Administered Medications on File Prior to Encounter   Medication    [MAR Hold - Suspended Admission] 0.9%  NaCl infusion    [MAR Hold - Suspended Admission] acetaminophen tablet 1,000 mg    [MAR Hold - Suspended Admission] acetaminophen tablet 650 mg    [MAR Hold - Suspended Admission] amiodarone tablet 200 mg    [MAR Hold - Suspended Admission] amiodarone tablet 200 mg    [MAR Hold - Suspended Admission] ammonium lactate 12 % lotion    [MAR Hold - Suspended Admission] apixaban tablet 5 mg    [MAR Hold - Suspended Admission] aspirin EC tablet 81 mg    [MAR Hold - Suspended Admission] atorvastatin tablet 10 mg    [MAR Hold - Suspended Admission] calcium carbonate 200 mg calcium (500 mg)  chewable tablet 500 mg    [MAR Hold - Suspended Admission] dextrose 10% bolus 125 mL 125 mL    [MAR Hold - Suspended Admission] dextrose 10% bolus 250 mL 250 mL    [MAR Hold - Suspended Admission] fluticasone propionate 50 mcg/actuation nasal spray 100 mcg    [MAR Hold - Suspended Admission] gabapentin capsule 300 mg    [MAR Hold - Suspended Admission] glucagon (human recombinant) injection 1 mg    [MAR Hold - Suspended Admission] glucose chewable tablet 16 g    [MAR Hold - Suspended Admission] glucose chewable tablet 24 g    [MAR Hold - Suspended Admission] insulin aspart U-100 pen 0-5 Units    [MAR Hold - Suspended Admission] levothyroxine tablet 200 mcg    [MAR Hold - Suspended Admission] melatonin tablet 6 mg    [MAR Hold - Suspended Admission] methocarbamoL tablet 750 mg    [MAR Hold - Suspended Admission] metoprolol succinate (TOPROL-XL) 24 hr split tablet 12.5 mg    [MAR Hold - Suspended Admission] mirtazapine tablet 7.5 mg    [MAR Hold - Suspended Admission] multivitamin tablet    [MAR Hold - Suspended Admission] oxyCODONE immediate release tablet 5 mg    [MAR Hold - Suspended Admission] oxyCODONE immediate release tablet Tab 10 mg    [MAR Hold - Suspended Admission] polyethylene glycol packet 17 g    [MAR Hold - Suspended Admission] sacubitriL-valsartan 24-26 mg per tablet 1 tablet    [MAR Hold - Suspended Admission] senna-docusate 8.6-50 mg per tablet 1 tablet    [COMPLETED] sodium zirconium cyclosilicate packet 10 g     Current Outpatient Medications on File Prior to Encounter   Medication Sig    acetaminophen (TYLENOL) 500 MG tablet Take 2 tablets (1,000 mg total) by mouth every 8 (eight) hours.    amiodarone (PACERONE) 200 MG Tab Take 2 tablets (400 mg total) by mouth 2 (two) times daily for 4 days, THEN 1 tablet (200 mg total) once daily.    apixaban (ELIQUIS) 5 mg Tab Take 2 tablets (10 mg total) by mouth 2 (two) times daily for 4 days, THEN 1 tablet (5 mg total) 2 (two) times daily.    aspirin  (ECOTRIN) 81 MG EC tablet Take 1 tablet (81 mg total) by mouth once daily.    atorvastatin (LIPITOR) 10 MG tablet Take 1 tablet by mouth once daily    empagliflozin (JARDIANCE) 10 mg tablet Take 1 tablet (10 mg total) by mouth once daily.    furosemide (LASIX) 40 MG tablet Take 1 tablet (40 mg total) by mouth 2 (two) times a day. Take daily for next 3 days, then use as needed. Weigh daily. Afterwards, f weight increases 2 lbs/24h, take dose of lasix daily  until weight is back to baseline    levothyroxine (SYNTHROID) 200 MCG tablet Take 1 tablet by mouth once daily    melatonin (MELATIN) 3 mg tablet Take 2 tablets (6 mg total) by mouth nightly as needed for Insomnia.    methocarbamoL (ROBAXIN) 750 MG Tab Take 1 tablet (750 mg total) by mouth 4 (four) times daily.    metoprolol succinate (TOPROL-XL) 25 MG 24 hr tablet Take 0.5 tablets (12.5 mg total) by mouth once daily.    mirtazapine (REMERON) 7.5 MG Tab Take 1 tablet (7.5 mg total) by mouth nightly. One tablet po each night for sleep    multivit-min-FA-lycopen-lutein (CENTRUM SILVER) 0.4-300-250 mg-mcg-mcg Tab Take 1 tablet by mouth once daily.    oxyCODONE (ROXICODONE) 5 MG immediate release tablet Take 1 tablet (5 mg total) by mouth every 4 (four) hours as needed for Pain.    sacubitriL-valsartan (ENTRESTO)  mg per tablet Take 1 tablet by mouth 2 (two) times daily.    senna-docusate 8.6-50 mg (PERICOLACE) 8.6-50 mg per tablet Take 1 tablet by mouth once daily.    spironolactone (ALDACTONE) 25 MG tablet Take 1 tablet by mouth once daily     Family History       Problem Relation (Age of Onset)    Crohn's disease Mother    Dementia Mother    Heart attack Father    No Known Problems Sister, Brother, Son          Tobacco Use    Smoking status: Former     Current packs/day: 0.00     Average packs/day: 1 pack/day for 35.0 years (35.0 ttl pk-yrs)     Types: Cigarettes     Start date: 1965     Quit date: 2000     Years since quittin.7     Passive  exposure: Past    Smokeless tobacco: Never   Substance and Sexual Activity    Alcohol use: No    Drug use: Never    Sexual activity: Not Currently     Review of Systems   Constitutional:  Negative for activity change, chills and fever.   HENT:  Negative for trouble swallowing.    Eyes:  Negative for photophobia and visual disturbance.   Respiratory:  Negative for chest tightness, shortness of breath and wheezing.    Cardiovascular:  Negative for chest pain, palpitations and leg swelling.   Gastrointestinal:  Negative for abdominal pain, constipation, diarrhea, nausea and vomiting.   Genitourinary:  Negative for dysuria, frequency, hematuria and urgency.   Musculoskeletal:  Positive for arthralgias, back pain and myalgias. Negative for gait problem.   Skin:  Negative for color change and rash.   Neurological:  Negative for dizziness, syncope, weakness, light-headedness, numbness and headaches.   Psychiatric/Behavioral:  Negative for agitation and confusion. The patient is not nervous/anxious.      Objective:     Vital Signs (Most Recent):  Temp: 98.4 °F (36.9 °C) (09/28/24 1500)  Pulse: 80 (09/28/24 1500)  Resp: (!) 21 (09/28/24 1500)  BP: (!) 136/56 (09/28/24 1500)  SpO2: 97 % (09/28/24 1500) Vital Signs (24h Range):  Temp:  [97.6 °F (36.4 °C)-98.4 °F (36.9 °C)] 98.4 °F (36.9 °C)  Pulse:  [] 80  Resp:  [16-23] 21  SpO2:  [95 %-100 %] 97 %  BP: ()/(50-74) 136/56     Weight: 97.1 kg (214 lb)  Body mass index is 29.85 kg/m².     Physical Exam  Vitals and nursing note reviewed.   Constitutional:       General: He is not in acute distress.     Appearance: He is well-developed. He is obese.   HENT:      Head: Normocephalic and atraumatic.      Mouth/Throat:      Pharynx: No oropharyngeal exudate.   Eyes:      Conjunctiva/sclera: Conjunctivae normal.      Pupils: Pupils are equal, round, and reactive to light.   Cardiovascular:      Rate and Rhythm: Normal rate and regular rhythm.      Heart sounds: Normal  heart sounds.   Pulmonary:      Effort: Pulmonary effort is normal. No respiratory distress.      Breath sounds: Normal breath sounds. No wheezing.   Abdominal:      General: Bowel sounds are normal. There is no distension.      Palpations: Abdomen is soft.      Tenderness: There is no abdominal tenderness.   Musculoskeletal:         General: No tenderness. Normal range of motion.      Cervical back: Normal range of motion and neck supple.   Lymphadenopathy:      Cervical: No cervical adenopathy.   Skin:     General: Skin is warm and dry.      Capillary Refill: Capillary refill takes less than 2 seconds.      Findings: No rash.   Neurological:      General: No focal deficit present.      Mental Status: He is alert and oriented to person, place, and time.      Cranial Nerves: No cranial nerve deficit.      Sensory: No sensory deficit.      Coordination: Coordination normal.   Psychiatric:         Behavior: Behavior normal.         Thought Content: Thought content normal.         Judgment: Judgment normal.              CRANIAL NERVES     CN III, IV, VI   Pupils are equal, round, and reactive to light.       Significant Labs: All pertinent labs within the past 24 hours have been reviewed.  CBC:   Recent Labs   Lab 09/28/24  1407 09/28/24  1502   WBC 13.72*  --    HGB 10.3*  --    HCT 32.3* 28*     --      CMP:   Recent Labs   Lab 09/28/24  0524 09/28/24  1407   * 134*   K 5.5* 4.8   CL 93* 91*   CO2 28 31*   * 182*   BUN 72* 83*   CREATININE 2.7* 2.7*   CALCIUM 8.9 9.4   PROT  --  6.4   ALBUMIN  --  2.8*   BILITOT  --  1.8*   ALKPHOS  --  129   AST  --  23   ALT  --  18   ANIONGAP 10 12       Significant Imaging: I have reviewed all pertinent imaging results/findings within the past 24 hours.  Imaging Results              X-Ray Chest AP Portable (Final result)  Result time 09/28/24 15:12:38      Final result by Nixon Rodriguez MD (09/28/24 15:12:38)                   Impression:      Cardiomegaly with  pulmonary vascular congestion and bilateral pleural effusions, suggestive of possible fluid overload state secondary to CHF.      Electronically signed by: Nixon Rodriguez MD  Date:    09/28/2024  Time:    15:12               Narrative:    EXAMINATION:  XR CHEST AP PORTABLE    CLINICAL HISTORY:  Hypotension, unspecified    TECHNIQUE:  Single frontal view of the chest was performed.    COMPARISON:  09/17/2024.    FINDINGS:  Monitoring EKG leads are present.  The trachea is unremarkable.  The cardiomediastinal silhouette is enlarged.  There are small bilateral pleural effusions with left greater than right.  There is no evidence of a pneumothorax.  There is no evidence of pneumomediastinum.  There is pulmonary vascular congestion.  There is no focal consolidation.  There are degenerative changes in the osseous structures.                                      Assessment/Plan:     * Sepsis  Unclear etiology. 3/4 SIRS on arrival - tachycardia, tachypnea, leukocytosis. Organ dysfunction - PAULA. CXR c/f fluid overload. XR R Hip with Pelvis without hardware complication, stable postoperative changes.    - AF, HDS s/p IVF  - WBC (+), lactate 3.21  - s/p IVF  - IV vanc + cefepime  - f/u blood clx  - hold BP meds in setting of hypotension  - caution with fluid resuscitation with hx of HF  - cont tele    PAULA (acute kidney injury)  Etiology likely pre-renal 2/2 dehydration and over diuresis.  - Cr 2.7, baseline near 1.0  - hold aldactone, lasix, entresto  - s/p 500cc IVF in ED  - caution with IVF given reduced EF  - serial CMP, avoid nephrotoxins, renally dose meds  - consider urine studies and renal US if no improvement in 48hr    Chronic hypoxemic respiratory failure  History of chronic hypoxic respiratory failure. Currently on 2L NC with good saturations. Wean oxygen as tolerated. Prn duonebs.    Paroxysmal atrial flutter  - cont amio and eliquis  - cont tele    AKHIL (obstructive sleep apnea)  - CPAP qhs    Coronary artery disease  involving native coronary artery of native heart without angina pectoris  Continue aspirin and statin. Monitor for S/Sx of angina/ACS. Continue to monitor on telemetry.     Chronic combined systolic and diastolic congestive heart failure  History of combined CHF (EF 20-25%). Last echo reviewed. No signs of acute overload on exam.   - , lower than baseline  - hold GDMT in setting of hypotension  - okay to restart GDMT as BP improves  - I/Os, 1.5L fluid restriction  - cont tele    Advance care planning  Patient confirmed DNR.    Closed nondisplaced fracture of anterior column of right acetabulum with routine healing s/p ORIF of pelvis on 9/20/2024  Noted. Status post ORIF of pelvis 9/20/2024 and at SNF for rehab  - therapy consulted  - surgical bandages to remain in place post-op until orthopedic clinic follow up  - prn pain control    Hypothyroidism due to acquired atrophy of thyroid  - cont synthroid    Pure hypercholesterolemia  - cont statin    Essential hypertension  - hold lasix, aldactone, entresto in setting of hypotension  - cont metoprolol      VTE Risk Mitigation (From admission, onward)           Ordered     apixaban tablet 5 mg  2 times daily         09/28/24 7738                                    Fabian Raza PA-C  Department of Hospital Medicine  [facility] - Emergency Dept

## 2024-09-28 NOTE — HPI
Bean Salas is an 79 year old white man with former cigarette smoking (1966 to 2000), hypertension, hyperlipidemia, coronary artery disease, chronic diastolic congestive heart failure, biatrial enlargement, paroxysmal atrial flutter status post cardioversion on 9/18/2024, aortic atherosclerosis, obstructive sleep apnea, chronic hypoxic respiratory failure, hypothyroidism, history of right intertrochanteric femur fracture status post intramedullary gurpreet insertion on 9/3/2021, history of pelvic ring fracture status post open reduction and internal fixation on 9/20/2024, right shoulder rotator cuff impingement syndrome, history of pulmonary embolism, history of bilateral knee arthroplasty, history of vasectomy. He lives in [place], [place]. He is . His primary care physician is Dr. Ramirez Cloud. He is DNR.   He was hospitalized at [facility] from 9/15/2024 to 9/24/2024 for a right acetabular fracture. He underwent open reduction and internal fixation on 9/20/2024. He was discharged to [facility].    He presented to [facility] Emergency Department on 9/28/2024 with hypotension and worsening renal function on labs. He reported chronic back pain.    In the emergency department, he was initially hypotensive, with WBC 21848/uL, stable hemoglobin, potassium 5.5 mmol/L, creatinine 2.7 mg/dL, total bilirubin 1.8 mg/dL, lactate 3.21 mmol/L. EKG showed changes due to hyperkalemia. He was given medication to shift potassium, which improved to 4.8. Chest X-ray showed pulmonary vascular congestion and bilateral pleural effusions. X-ray of the hip and pelvis showed stable postoperative findings. He was admitted to Hospital Medicine Team B.

## 2024-09-28 NOTE — PROGRESS NOTES
[facility]                                  Skilled Nursing Facility                   Progress Note     Admit Date: 9/24/2024  LATOSHA 10/17/2024  [id number]  Principal Problem:  Closed nondisplaced fracture of anterior column of right acetabulum with routine healing   HPI obtained from patient interview and chart review     Chief Complaint: Re-evaluation of medical treatment and therapy status: Lab review, worsening PAULA, hyperglycemia, ED send out    HPI:   Bean Salas is a 79 year old male with PMHx of  Bradycardia, PVCs, HTN, CAD, HLD, HFpEF, Pulmonary HTN, AKHIL on CPAP, Chronic respiratory failure w/ home oxygen and hypothyroidism who presents to SNF following hospitalization for right superior pubic ramus fracture, right sacral ala fracture, right pelvic ring fracture s/p percutaneous fixation and ORIF on 09/20 with Dr. Stapleton, also noted to have PE.  Admission to SNF for secondary weakness and debility.     Interval history:   PAULA worsened since yesterday. Initiate PIV, continuous NS IVF at 75 ml/hr (gentle d/t HF). Re-check BMP in am.  Fasting glucose > 200 on lab. Last Hgb A1C in 2023 was 6.1. Repeat A1C in am and iniate AC/HS CBG checks with low dose SSI and hypoglycemia protocol. Will change to diabetic diet if indicated by A1C.  3L NC.  D/w primary provider and [facility] Attending MD.  Decision to send to ED.    Past Medical History: Patient has a past medical history of Atrial fibrillation, unspecified type (09/06/2023), COPD exacerbation (09/06/2023), and Thyroid disease.    Past Surgical History: Patient has a past surgical history that includes Hernia repair; Colonoscopy (01/01/2011); Joint replacement (Bilateral); Left heart catheterization (Right, 07/22/2021); Coronary angiography (N/A, 07/22/2021); Intramedullary rodding of trochanter of femur (Right, 09/03/2021); Hip surgery (Right, 08/2021); Eye surgery  (Bilateral); Fracture surgery (Right, 09/2021); Vasectomy; Treatment of cardiac arrhythmia (N/A, 9/19/2024); Transesophageal echocardiography (N/A, 9/19/2024); and Open reduction and internal fixation (ORIF) of injury of hip (Right, 9/20/2024).    Social History: Patient reports that he quit smoking about 24 years ago. His smoking use included cigarettes. He started smoking about 59 years ago. He has a 35 pack-year smoking history. He has been exposed to tobacco smoke. He has never used smokeless tobacco. He reports that he does not drink alcohol and does not use drugs.    Family History: family history includes Crohn's disease in his mother; Dementia in his mother; Heart attack in his father; No Known Problems in his brother, sister, and son.    Allergies: Patient has No Known Allergies.     ROS  Constitutional: Negative for fever   Eyes: Negative for blurred vision, double vision   Respiratory: Negative for cough. + postnasal drip, SLADE  Cardiovascular: Negative for chest pain, palpitations, and leg swelling.   Gastrointestinal: Negative for  constipation, diarrhea, nausea, vomiting.  +abdominal discomfort, flatulence  Genitourinary: Negative for dysuria, frequency   Musculoskeletal:  + generalized weakness.  +severe right hip pain  Skin: Negative for itching and rash.   Neurological: Negative for dizziness, headaches.   Psychiatric/Behavioral: Negative for depression. The patient is not nervous/anxious.          24 hour Vital Sign Range   Temp:  [98.3 °F (36.8 °C)]   Pulse:  [44-58]   Resp:  [16-18]   BP: (121-130)/(56-73)   SpO2:  [95 %-96 %]     Current BMI: Body mass index is 29.89 kg/m².    PEx  Constitutional: Patient appears obese, debilitated.  No distress noted  HENT:   Head: Normocephalic and atraumatic.   Eyes: Pupils are equal, round  Neck: Normal range of motion. Neck supple.   Cardiovascular: Normal rate, regular rhythm and normal heart sounds.    Pulmonary/Chest: Effort normal and breath sounds are  "clear. Continuous LFNC O2   Abdominal: Soft, rounded. Bowel sounds are normal.   Musculoskeletal: Normal range of motion.   Neurological: Alert and oriented to person, place, and time.   Psychiatric: Normal mood and affect. Behavior is normal.   Skin: Skin is warm and dry.  Occlusive gauze dressing to right pelvic area    Recent Labs   Lab 09/28/24  0524   *   K 5.5*   CL 93*   CO2 28   BUN 72*   CREATININE 2.7*   CALCIUM 8.9     No results for input(s): "WBC", "RBC", "HGB", "HCT", "PLT", "MCV", "MCH", "MCHC" in the last 24 hours.    No results for input(s): "POCTGLUCOSE" in the last 168 hours.     Assessment and Plan:    Closed nondisplaced fracture of anterior column of right acetabulum with routine healing   s/p ORIF of pelvis on 9/20/2024  Closed pelvic ring fracture   s/p percutaneous ring 9/20/2024   - PT/OT, BLE WBAT, RPMAT  - DVT PPX with apixaban  - Monitor and report drainage to incisional site  - maintain surgical dressing until removed by Orthopedics  - Fall precautions  - Bowel regimen in place, hold for loose or frequent stools  - Ortho ALEKSANDR to see patient intermittently at SNF  - follow-up with orthopedics after discharge     Acute postoperative pain  - unstable, initiated gabapentin 3 mg TID, increase methocarbamol 750 mg QID, continue acetaminophen 1000 mg q.8 hours,  oxycodone 5 or 10 mg q.4 hours PRN.  Bowel regimen in place    PAULA, new, worsening  - likely due to hypotension, diuretic therapy, newly prescribed high-dose Entresto  - decreased Entresto to 2426 mg BID with holding parameter, continue  metoprolol XL 12.5 mg daily, currently holding Lasix and spironolactone  - repeat BMP on 09/28 as patient has received his diuretics for this morning  - send to ED 9/28     Pulmonary embolism without acute cor pulmonale  - Present on admit.   - CT chest/abdomen/pelvis as part of trauma work up in ED showed "Right-sided pulmonary embolism, detailed above, with nonocclusive thrombus in the main " "right pulmonary artery and right lower lobe branch vessels, and occlusive thrombus in a subsegmental branch of the right pulmonary artery. Note comparison images are not available to determine chronicity. No right sided heart strain."   - Denies previous diagnosis or PE/DVT.  - Initialed on Heparin drip IV to treat PE and transitioned to therapeutic oral Apixiban post-op to treat with Apixiban 10 mg po BID x 7 days (started on evening of 9/20) followed by indefinite treatment of Apixiban 5 mg po BID.   - Cards consulted, continue current therapies. No indication to activate PE response team at this time.   - No signs of right heart strain  - US of lower extremities on this admit negative for DVTs so IVC filter not indicated.   - patient continues on supplemental oxygen at 3 L      Acute on chronic combined systolic and diastolic congestive heart failure  Pulmonary hypertension   - most recent echo reviewed from 09/16/2024, severe global hypokinesis, EF 20-25%, mild MR, PASP 44 mmHg  - Cardiology consulted, Patient taken off Losartan and started on Entresto to treat his reduced EF and heart failure.   - Will need Cardiac PET stress as outpatient for ischemic evaluation.  - Fluid restriction 1.5 liters a day and low salt diet.   - Will need life vest on discharge if patient wishes, as is DNR baseline. Patient declining life vest at this time but willing to discuss with Cardiology as outpatient.   - 9/26 decreased Entresto to 2426 mg BID with holding parameter, continue  metoprolol XL 12.5 mg daily, currently holding Lasix and spironolactone     Paroxysmal atrial flutter  - Per chart review, his cardiologist Dr. Hank Barry feels EKGs that were read as atrial fibrillation previously are actually sinus rhythm and thus not on long term anticoagulation on admit.   - Resolved after cardioversion on 9/19. Patient remains in sinus rhythm. Patient completed 24 hour IV infusion of Amiodarone post cardioversion and started on " Amiodarone 400 mg po BID x 14 days followed b y Amiodarone 200 mg po daily for indefinite treatment.   - Continue Toprol XL for rate control. Continue Apixiban for long term anticoagulation.      Essential hypertension  - home regimen with amlodipine 2.5 mg daily, Lasix 40 mg BID, losartan 100 mg daily, metoprolol XL 50 mg daily, Aldactone 25 mg daily  - 9/26 decreased Entresto to 2426 mg BID with holding parameter, continue  metoprolol XL 12.5 mg daily, currently holding Lasix and spironolactone     Bradycardia  - Patient has history of atrial bigeminy and bradycardia per chart.   - follows with EP as outpatient  - HR ranges 40s-80s at times.   - Hold Metoprolol if HR sustains < 60.     PVC's (premature ventricular contractions)  - Patient noted in history and experienced in acute setting  - Keep Magnesium > 2, Potassium >4  - continue metoprolol     Coronary artery disease involving native coronary artery of native heart without angina pectoris  Elevated troponin  Abnormal ventricular wall motion   - Patient with known CAD which is controlled.   - Previous angiogram with non obstructive CAD 2021.   - continue home ASA 81 mg daily, Toprol XL and atorvastatin 10 mg daily   - Cardiology recommended for outpatient PET cardiac stress for ischemic work up.     Chronic hypoxemic respiratory failure  AKHIL (obstructive sleep apnea)  - He is on home oxygen at 2-3 LPM, but states only at night with his CPAP  - ordering for CPAP qHS.  - persistent, continue supplemental oxygen, wean as tolerated     Insomnia  - Chronic and controlled.   - stable, Continue home Mirtazapine 7.5 mg qHS.     Hypothyroidism due to acquired atrophy of thyroid  - Continue home Levothyroxine 200 mcg daily     Pure hypercholesterolemia  - continue atorvastatin 10 mg daily     Slow transit constipation  - chronic  - 9/26 ordered for enema today, continue senna docusate 1 tab BID, initiated daily MiraLax     Debility   - Continue with PT/OT for gait  training and strengthening and restoration of ADL's   - Encourage mobility, OOB in chair, and early ambulation as appropriate  - Fall precautions   - Monitor for bowel and bladder dysfunction  - Monitor for and prevent skin breakdown and pressure ulcers  - Continue DVT prophylaxis with apixaban      Anticipate disposition:  Home with home health?    IP [facility] RISK OF UNPLANNED READMISSION Model: HIGH     Follow-up needed during SNF stay-     Appointment not to send patient to- orthopedics (10/4)     Follow-up needed after discharge from SNF: PCP, be scheduled cardiology follow-up, PET scan (10/28), orthopedics (11/1)       Future Appointments   Date Time Provider Department Center   10/4/2024  8:45 AM Red Ken NP [facility] ORTHO Alli Eli   10/28/2024  1:30 PM CARDIAC, PET IMAGING [facility] SOPHIE Carson heather   11/1/2024  9:45 AM [facility] FRACTURE CARE CLINIC [facility] FRA DONNIE Carson heather Eli   11/1/2024 10:45 AM Red Ken NP [facility] ORTHO Alli heather [place]   1/27/2025 10:40 AM Ramirez Levine MD [facility] MED [facility]     I spent 87 minutes reviewing patient records, examining, and counseling the patient/ patient's family with greater than 50% of the time spent in direct patient care and coordination.  Care coordination with staff      Linda Elias NP  Department of Hospital Medicine   [facility]     DOS: 9/28/2024       Patient note was created using MModal Dictation.  Any errors in syntax or even information may not have been identified and edited on initial review prior to signing this note.

## 2024-09-28 NOTE — ED TRIAGE NOTES
Bean Salas, a 79 y.o. male presents to the ED w/ complaint of abnormal labs.    Triage note:  Chief Complaint   Patient presents with    abnormal labs     Kidney issues  From [facility]     Review of patient's allergies indicates:  No Known Allergies  Past Medical History:   Diagnosis Date    Atrial fibrillation, unspecified type 09/06/2023    COPD exacerbation 09/06/2023    Thyroid disease     hypo

## 2024-09-28 NOTE — PT/OT/SLP PROGRESS
Physical Therapy Treatment    Patient Name:  Bean Salas   MRN:  [id number]  Admit Date: 9/24/2024  Admitting Diagnosis: Closed nondisplaced fracture of anterior column of right acetabulum with routine healing  Recent Surgeries: R pelvic ORIF (9/20/24)     General Precautions: Standard, fall  Orthopedic Precautions: LLE weight bearing as tolerated, RLE weight bearing as tolerated (ROMAT)  Braces: N/A    Recommendations:     Discharge Recommendations: home health PT  Level of Assistance Recommended at Discharge: 24 hours significant assistance  Discharge Equipment Recommendations: wheelchair, 3-in-1 commode, hip kit (Continue to assess with progression of mobility)  Barriers to discharge: Decreased caregiver support, Other (Comment) (Increased skilled assistance for mobility)    Assessment:     Bean Salas is a 79 y.o. male admitted with a medical diagnosis of Closed nondisplaced fracture of anterior column of right acetabulum with routine healing. Pt tolerated session fairly well and without incident. Interventions today included bed mobility, static sitting at EOB, and supine AA therex. Pt continues to require two-person assist to safely perform supine to sit and maintain EOB sitting. Pt fluctuating from need for Mod A x 2 to brief moments of CGA-Min A (BLE blocked by PTA) though demos both lateral and posterior lean with fatigue. PT tech present to assist with stabilization. Pt will continue to benefit from skilled PT services in order to further promote functional mobility, endurance, and BLE strengthening. Pt remains appropriate for PT treatment at this time.    Performance deficits affecting function: weakness, impaired endurance, impaired self care skills, impaired functional mobility, gait instability, impaired balance, decreased lower extremity function, decreased safety awareness, pain, decreased ROM, impaired cardiopulmonary response to activity, orthopedic precautions.    Rehab Potential is  fair    Activity Tolerance: Fair    Plan:     Patient to be seen 5 x/week to address the above listed problems via gait training, therapeutic activities, therapeutic exercises, neuromuscular re-education, wheelchair management/training    Plan of Care Expires: 10/25/24  Plan of Care Reviewed with: patient    Subjective     Pt agreeable to treatment.     Pain/Comfort:  Pain Rating 1: 3/10  Location - Side 1: Bilateral  Location - Orientation 1: generalized  Location 1: back  Pain Addressed 1: Reposition, Distraction, Pre-medicate for activity  Pain Rating Post-Intervention 1: 6/10  Pain Rating 2: 3/10  Location - Side 2: Bilateral  Location - Orientation 2: generalized  Location 2: hip  Pain Addressed 2: Reposition, Distraction, Pre-medicate for activity  Pain Rating Post-Intervention 2: 6/10    Patient's cultural, spiritual, Orthodoxy conflicts given the current situation:  no    Objective:     Communicated with nursing prior to session.  Patient found HOB elevated with oxygen (3 L) upon PT entry to room. Pt with noted R hip ER in bed (able to tolerate therapist passively rotating back to midline).        Therapeutic Activities and Exercises: Pt able to perform supine AA exercises consisting of B ankle DF/PF, B knee extension (SAQ), B hip flexion (SKTC), and B hip abd/adduction (AA). All exercises incorporated to promote BLE strengthening, tissue extensibility, endurance and functional mobility (15 reps).    Pt performing B shoulder horizontal abduction and B bicep curls x 15 reps using red theraband (supine, HOB elevated). While seated EOB, pt performing 5 reps of trunk flexion with Min A/BLE blocked. Pt's RUE on bed rail, LUE on mattress. PT emphasis on trunk control, trunk extension without exhibiting posterior lean.    Patient educated on role of therapy, goals of session, and benefits of out of bed mobility.   Instructed on use of call button and importance of calling nursing staff for assistance with mobility    Questions/concerns addressed within PTA scope of practice.  Pt verbalized understanding.    Functional Mobility:  Bed Mobility:     Rolling Left:  moderate assistance, bed rail  Scooting: moderate assistance and of 2 persons to EOB using draw sheet  Scooting: total assistance x 2 to HOB using draw sheet  Supine to Sit: moderate assistance and of 2 persons, HOB elevated, bed rail  Sit to Supine: moderate assistance and of 2 persons, HOB flat, bed rail  Balance: static/dynamic sitting at EOB with Mod A x 2 fluctuating to and from brief moments of CGA-Min A. Pt exhibiting both right lateral trunk lean and posterior lean with fatigue ~6-7 minutes. Pt using BUE on mattress and bed rail to support himself. BLE blocked for adequate B knee flexion, maintaining feet flat on floor.    AM-PAC 6 CLICK MOBILITY  9    Patient left HOB elevated with heels floated on pillow, all lines intact and call button in reach.    GOALS:   Multidisciplinary Problems       Physical Therapy Goals          Problem: Physical Therapy    Goal Priority Disciplines Outcome Interventions   Physical Therapy Goal     PT, PT/OT Progressing    Description: Goals to be met by: 10/25/24     Patient will increase functional independence with mobility by performin. Supine to sit with Contact Guard Assistance  2. Sit to supine with Contact Guard Assistance  3. Rolling to Left and Right with Supervision.  4. Sit to stand transfer with SBA.  5. Bed to chair transfer with Contact Guard Assistance using Rolling Walker  6. Gait  x 150 feet with Contact Guard Assistance using Rolling Walker.   7. Wheelchair propulsion x150 feet with Modified [place] using bilateral uppper extremities                         Time Tracking:     PT Received On: 24  PT Start Time: 1019  PT Stop Time: 1058  PT Total Time (min): 39 min    Billable Minutes: Therapeutic Activity 23 and Therapeutic Exercise 16    Treatment Type: Treatment  PT/PTA: PTA     Number of PTA  visits since last PT visit: 1 09/28/2024

## 2024-09-28 NOTE — NURSING
Ulysses called to [facility] ED charge nurse, López. Verbal report given on pt's diagnosis, current issues, past medical history, pertinent lab results, and current VS.

## 2024-09-28 NOTE — ASSESSMENT & PLAN NOTE
History of combined CHF (EF 20-25%). Last echo reviewed. No signs of acute overload on exam.   - , lower than baseline  - hold GDMT in setting of hypotension  - okay to restart GDMT as BP improves  - I/Os, 1.5L fluid restriction  - cont tele

## 2024-09-28 NOTE — ASSESSMENT & PLAN NOTE
Unclear etiology. 3/4 SIRS on arrival - tachycardia, tachypnea, leukocytosis. Organ dysfunction - PAULA. CXR c/f fluid overload. XR R Hip with Pelvis without hardware complication, stable postoperative changes.    - AF, HDS s/p IVF  - WBC (+), lactate 3.21  - s/p IVF  - IV vanc + cefepime  - f/u blood clx  - hold BP meds in setting of hypotension  - caution with fluid resuscitation with hx of HF  - cont tele

## 2024-09-29 LAB
ALBUMIN SERPL BCP-MCNC: 2.6 G/DL (ref 3.5–5.2)
ALP SERPL-CCNC: 128 U/L (ref 55–135)
ALT SERPL W/O P-5'-P-CCNC: 17 U/L (ref 10–44)
ANION GAP SERPL CALC-SCNC: 12 MMOL/L (ref 8–16)
AST SERPL-CCNC: 24 U/L (ref 10–40)
BASOPHILS # BLD AUTO: 0.03 K/UL (ref 0–0.2)
BASOPHILS NFR BLD: 0.3 % (ref 0–1.9)
BILIRUB SERPL-MCNC: 1.7 MG/DL (ref 0.1–1)
BUN SERPL-MCNC: 81 MG/DL (ref 8–23)
CALCIUM SERPL-MCNC: 9.2 MG/DL (ref 8.7–10.5)
CHLORIDE SERPL-SCNC: 92 MMOL/L (ref 95–110)
CO2 SERPL-SCNC: 27 MMOL/L (ref 23–29)
CREAT SERPL-MCNC: 2.5 MG/DL (ref 0.5–1.4)
CREAT UR-MCNC: 80 MG/DL (ref 23–375)
DIFFERENTIAL METHOD BLD: ABNORMAL
EOSINOPHIL # BLD AUTO: 0.1 K/UL (ref 0–0.5)
EOSINOPHIL NFR BLD: 0.7 % (ref 0–8)
ERYTHROCYTE [DISTWIDTH] IN BLOOD BY AUTOMATED COUNT: 15.2 % (ref 11.5–14.5)
EST. GFR  (NO RACE VARIABLE): 25.3 ML/MIN/1.73 M^2
GLUCOSE SERPL-MCNC: 171 MG/DL (ref 70–110)
HCT VFR BLD AUTO: 29.6 % (ref 40–54)
HGB BLD-MCNC: 9.6 G/DL (ref 14–18)
IMM GRANULOCYTES # BLD AUTO: 0.12 K/UL (ref 0–0.04)
IMM GRANULOCYTES NFR BLD AUTO: 1.1 % (ref 0–0.5)
LACTATE SERPL-SCNC: 1 MMOL/L (ref 0.5–2.2)
LACTATE SERPL-SCNC: 1.7 MMOL/L (ref 0.5–2.2)
LYMPHOCYTES # BLD AUTO: 1 K/UL (ref 1–4.8)
LYMPHOCYTES NFR BLD: 9.1 % (ref 18–48)
MAGNESIUM SERPL-MCNC: 2.8 MG/DL (ref 1.6–2.6)
MCH RBC QN AUTO: 33.1 PG (ref 27–31)
MCHC RBC AUTO-ENTMCNC: 32.4 G/DL (ref 32–36)
MCV RBC AUTO: 102 FL (ref 82–98)
MONOCYTES # BLD AUTO: 1.2 K/UL (ref 0.3–1)
MONOCYTES NFR BLD: 11.1 % (ref 4–15)
NEUTROPHILS # BLD AUTO: 8.7 K/UL (ref 1.8–7.7)
NEUTROPHILS NFR BLD: 77.7 % (ref 38–73)
NRBC BLD-RTO: 0 /100 WBC
PHOSPHATE SERPL-MCNC: 4 MG/DL (ref 2.7–4.5)
PLATELET # BLD AUTO: 290 K/UL (ref 150–450)
PMV BLD AUTO: 11.8 FL (ref 9.2–12.9)
POCT GLUCOSE: 180 MG/DL (ref 70–110)
POCT GLUCOSE: 191 MG/DL (ref 70–110)
POTASSIUM SERPL-SCNC: 5 MMOL/L (ref 3.5–5.1)
PROT SERPL-MCNC: 5.9 G/DL (ref 6–8.4)
RBC # BLD AUTO: 2.9 M/UL (ref 4.6–6.2)
SODIUM SERPL-SCNC: 131 MMOL/L (ref 136–145)
SODIUM UR-SCNC: 69 MMOL/L (ref 20–250)
UUN UR-MCNC: 653 MG/DL (ref 140–1050)
VANCOMYCIN SERPL-MCNC: 15.9 UG/ML
VANCOMYCIN SERPL-MCNC: 27.3 UG/ML
WBC # BLD AUTO: 11.15 K/UL (ref 3.9–12.7)

## 2024-09-29 PROCEDURE — 21400001 HC TELEMETRY ROOM: Mod: HCNC

## 2024-09-29 PROCEDURE — 85025 COMPLETE CBC W/AUTO DIFF WBC: CPT | Mod: HCNC

## 2024-09-29 PROCEDURE — 63600175 PHARM REV CODE 636 W HCPCS: Mod: HCNC | Performed by: STUDENT IN AN ORGANIZED HEALTH CARE EDUCATION/TRAINING PROGRAM

## 2024-09-29 PROCEDURE — 25000003 PHARM REV CODE 250: Mod: HCNC | Performed by: STUDENT IN AN ORGANIZED HEALTH CARE EDUCATION/TRAINING PROGRAM

## 2024-09-29 PROCEDURE — 97165 OT EVAL LOW COMPLEX 30 MIN: CPT | Mod: HCNC

## 2024-09-29 PROCEDURE — 84300 ASSAY OF URINE SODIUM: CPT | Mod: HCNC | Performed by: STUDENT IN AN ORGANIZED HEALTH CARE EDUCATION/TRAINING PROGRAM

## 2024-09-29 PROCEDURE — 82570 ASSAY OF URINE CREATININE: CPT | Mod: HCNC | Performed by: STUDENT IN AN ORGANIZED HEALTH CARE EDUCATION/TRAINING PROGRAM

## 2024-09-29 PROCEDURE — 80053 COMPREHEN METABOLIC PANEL: CPT | Mod: HCNC

## 2024-09-29 PROCEDURE — 97162 PT EVAL MOD COMPLEX 30 MIN: CPT | Mod: HCNC

## 2024-09-29 PROCEDURE — 97535 SELF CARE MNGMENT TRAINING: CPT | Mod: HCNC

## 2024-09-29 PROCEDURE — 84540 ASSAY OF URINE/UREA-N: CPT | Mod: HCNC | Performed by: STUDENT IN AN ORGANIZED HEALTH CARE EDUCATION/TRAINING PROGRAM

## 2024-09-29 PROCEDURE — 80202 ASSAY OF VANCOMYCIN: CPT | Mod: 91,HCNC | Performed by: STUDENT IN AN ORGANIZED HEALTH CARE EDUCATION/TRAINING PROGRAM

## 2024-09-29 PROCEDURE — 82803 BLOOD GASES ANY COMBINATION: CPT | Mod: HCNC

## 2024-09-29 PROCEDURE — 94761 N-INVAS EAR/PLS OXIMETRY MLT: CPT | Mod: HCNC,XB

## 2024-09-29 PROCEDURE — 25000003 PHARM REV CODE 250: Mod: HCNC

## 2024-09-29 PROCEDURE — 83605 ASSAY OF LACTIC ACID: CPT | Mod: HCNC

## 2024-09-29 PROCEDURE — 36415 COLL VENOUS BLD VENIPUNCTURE: CPT | Mod: HCNC | Performed by: STUDENT IN AN ORGANIZED HEALTH CARE EDUCATION/TRAINING PROGRAM

## 2024-09-29 PROCEDURE — 83735 ASSAY OF MAGNESIUM: CPT | Mod: HCNC

## 2024-09-29 PROCEDURE — 27000221 HC OXYGEN, UP TO 24 HOURS: Mod: HCNC

## 2024-09-29 PROCEDURE — 36600 WITHDRAWAL OF ARTERIAL BLOOD: CPT | Mod: HCNC

## 2024-09-29 PROCEDURE — 97530 THERAPEUTIC ACTIVITIES: CPT | Mod: HCNC

## 2024-09-29 PROCEDURE — 83605 ASSAY OF LACTIC ACID: CPT | Mod: 91,HCNC

## 2024-09-29 PROCEDURE — 99900035 HC TECH TIME PER 15 MIN (STAT): Mod: HCNC

## 2024-09-29 PROCEDURE — 63600175 PHARM REV CODE 636 W HCPCS: Mod: HCNC

## 2024-09-29 PROCEDURE — 18500000 HC LEAVE OF ABSENCE HOSPITAL SERVICES: Mod: HCNC

## 2024-09-29 PROCEDURE — 84100 ASSAY OF PHOSPHORUS: CPT | Mod: HCNC

## 2024-09-29 PROCEDURE — 80202 ASSAY OF VANCOMYCIN: CPT | Mod: HCNC

## 2024-09-29 RX ORDER — SODIUM CHLORIDE 9 MG/ML
INJECTION, SOLUTION INTRAVENOUS CONTINUOUS
Status: ACTIVE | OUTPATIENT
Start: 2024-09-29 | End: 2024-09-29

## 2024-09-29 RX ADMIN — VANCOMYCIN HYDROCHLORIDE 1500 MG: 1.5 INJECTION, POWDER, LYOPHILIZED, FOR SOLUTION INTRAVENOUS at 09:09

## 2024-09-29 RX ADMIN — PIPERACILLIN SODIUM AND TAZOBACTAM SODIUM 4.5 G: 4; .5 INJECTION, POWDER, FOR SOLUTION INTRAVENOUS at 03:09

## 2024-09-29 RX ADMIN — PIPERACILLIN SODIUM AND TAZOBACTAM SODIUM 4.5 G: 4; .5 INJECTION, POWDER, FOR SOLUTION INTRAVENOUS at 08:09

## 2024-09-29 RX ADMIN — ATORVASTATIN CALCIUM 10 MG: 10 TABLET, FILM COATED ORAL at 09:09

## 2024-09-29 RX ADMIN — APIXABAN 5 MG: 5 TABLET, FILM COATED ORAL at 08:09

## 2024-09-29 RX ADMIN — APIXABAN 5 MG: 5 TABLET, FILM COATED ORAL at 09:09

## 2024-09-29 RX ADMIN — METHOCARBAMOL 750 MG: 750 TABLET ORAL at 09:09

## 2024-09-29 RX ADMIN — SODIUM CHLORIDE: 9 INJECTION, SOLUTION INTRAVENOUS at 01:09

## 2024-09-29 RX ADMIN — PIPERACILLIN SODIUM AND TAZOBACTAM SODIUM 4.5 G: 4; .5 INJECTION, POWDER, FOR SOLUTION INTRAVENOUS at 01:09

## 2024-09-29 RX ADMIN — OXYCODONE 5 MG: 5 TABLET ORAL at 11:09

## 2024-09-29 RX ADMIN — LEVOTHYROXINE SODIUM 200 MCG: 100 TABLET ORAL at 05:09

## 2024-09-29 RX ADMIN — ASPIRIN 81 MG: 81 TABLET, COATED ORAL at 09:09

## 2024-09-29 RX ADMIN — METHOCARBAMOL 750 MG: 750 TABLET ORAL at 08:09

## 2024-09-29 RX ADMIN — SENNOSIDES AND DOCUSATE SODIUM 1 TABLET: 50; 8.6 TABLET ORAL at 09:09

## 2024-09-29 NOTE — PLAN OF CARE
Problem: Skin Injury Risk Increased  Goal: Skin Health and Integrity  Reactivated     Problem: Adult Inpatient Plan of Care  Goal: Plan of Care Review  Reactivated  Goal: Patient-Specific Goal (Individualized)  Reactivated  Goal: Absence of Hospital-Acquired Illness or Injury  Reactivated  Goal: Optimal Comfort and Wellbeing  Reactivated  Goal: Readiness for Transition of Care  Reactivated     Problem: Infection  Goal: Absence of Infection Signs and Symptoms  Reactivated     Problem: Sepsis/Septic Shock  Goal: Optimal Coping  Reactivated  Goal: Absence of Bleeding  Reactivated  Goal: Blood Glucose Level Within Targeted Range  Reactivated  Goal: Absence of Infection Signs and Symptoms  Reactivated  Goal: Optimal Nutrition Intake  Reactivated     Problem: Acute Kidney Injury/Impairment  Goal: Fluid and Electrolyte Balance  Reactivated  Goal: Improved Oral Intake  Reactivated  Goal: Effective Renal Function  Reactivated     Problem: Wound  Goal: Optimal Coping  Reactivated  Goal: Optimal Functional Ability  Reactivated  Goal: Absence of Infection Signs and Symptoms  Reactivated  Goal: Improved Oral Intake  Reactivated  Goal: Optimal Pain Control and Function  Reactivated  Goal: Skin Health and Integrity  Reactivated  Goal: Optimal Wound Healing  Reactivated     Problem: Fall Injury Risk  Goal: Absence of Fall and Fall-Related Injury  Reactivated

## 2024-09-29 NOTE — PLAN OF CARE
Problem: Physical Therapy  Goal: Physical Therapy Goal  Description: PT goals until 10/14/24    1. Pt supine to sit with moderate assist-not met  2. Pt sit to supine with moderate assist-not met  3. Pt sit to stand with RW with WBAT B LE with moderate assist-not met  4. Pt to perform gait 5 steps with RW with WBAT B LE with max assist.-not met  5. Pt to transfer bed to/from bedside chair/w/c with LRAD as needed for safety with WBAT B LE with max assist.-not met  6. Pt to perform B LE exs in sitting or supine x 10 reps to strengthen B LE to improve functional mobility.-not met    DME Justifications (see above for complete DME recommendations)    Hospital Bed-  Patient requires the head of the bed to be elevated more than 30 degrees most of the time due to congestive heart failure, chronic pulmonary disease, and/or problems with aspiration. The use of pillows and wedges is not adequate for the proper body positioning this patient requires     Wheelchair-  Patient has a mobility limitation that significantly impairs their ability to participate in one or more mobility related activities of daily living in customary locations in the home. The mobility limitation cannot be sufficiently resolved by the use of a cane or walker. The use of a manual wheelchair will greatly improve the patient's ability to participate in MRADLs. The patient will use the wheelchair on a regular basis at home. They have expressed their willingness to use a manual wheelchair in the home, and have a caregiver who is available and willing to assist with the wheelchair if needed.        Outcome: Progressing   Pt's goals set and pt will benefit from skilled PT services to work towards improved functional mobility including: bed mobility, transfers, w/c transfers, and gait. Gabriela Cronin PT   9/29/2024

## 2024-09-29 NOTE — PT/OT/SLP EVAL
"Physical Therapy Evaluation/co eval with OT    Patient Name:  Bean Salas   MRN:  [id number]    Recommendations:     Discharge Recommendations: Moderate Intensity Therapy   Discharge Equipment Recommendations: hospital bed, wheelchair, 3-in-1 commode, hip kit   Barriers to discharge: Decreased caregiver support  Lives alone and requires assist for mobility  Assessment:     Bean Salas is a 79 y.o. male admitted with a medical diagnosis of Sepsis.  He presents with the following impairments/functional limitations: weakness, impaired balance, decreased safety awareness, pain, impaired self care skills, impaired functional mobility, gait instability, decreased lower extremity function, orthopedic precautions . Pt is unsafe with functional mobility at this time due to pt requires total assist of 1-2 persons for bed mobility, total assist of 2 for transfers, and minimal to max assist for sitting balance due to pt pushing posterior and leaning to the R. Pt is motivated to progress with functional mobility.     Rehab Prognosis: Fair; patient would benefit from acute skilled PT services to address these deficits and reach maximum level of function.    Recent Surgery: * No surgery found *      Plan:     During this hospitalization, patient to be seen 4 x/week to address the identified rehab impairments via gait training, therapeutic activities, therapeutic exercises, neuromuscular re-education and progress toward the following goals:    Plan of Care Expires:  10/28/24    Subjective   "I told the doctor I wanted a day off" PT educated pt that he didn't do PT the day prior  Pain/Comfort:  Pain Rating 1: 6/10  Location - Side 1: Right  Location - Orientation 1: generalized  Location 1: hip  Pain Addressed 1: Reposition, Cessation of Activity, Pre-medicate for activity (PT spoke with pt's nurse prior to treatment to get him pre medicated to work with therapy)  Pain Rating Post-Intervention 1: 6/10    Patients cultural, " spiritual, Voodoo conflicts given the current situation: no    Living Environment:  Pt lives alone in a 1 story home with no ABDI  Prior to admission, patients level of function was mod independent with RW prior to his fall; has been max/total assist since his fall/fracture/ORIF.  Equipment used at home: walker, rolling, raised toilet.  Upon discharge, patient will have assistance from unknown.    Objective:     Communicated with nurse prior to session.  Patient found HOB elevated with oxygen, peripheral IV, telemetry, bed alarm (purewick present but not attatched to pt)  upon PT entry to room.    General Precautions: Standard, fall  Orthopedic Precautions:RLE weight bearing as tolerated, LLE weight bearing as tolerated (ROMAT)   Braces: N/A  Respiratory Status: Nasal cannula, flow 3 L/min    Exams:  Cognitive Exam:  Patient is oriented to Person and Place  Sensation:    -       Intact  light/touch B LE above his ankles ; impaired to lt touch below B ankles due to pt inconsistent with responses  RLE ROM: Deficits: limited hip flex due to pain; knee flex/ext WFL; ankle DF to neutral  RLE Strength: Deficits: hip flex 2/5; knee flex/ext 2+/5; ankle DF 3-/5  LLE ROM: Deficits: hip flex limited due to pain; knee flex/ext WFL; ankle DF WFL  LLE Strength: Deficits: hip flex 2+/5; knee flex/ext 3-/5; ankle DF 3/5    Functional Mobility:  Bed Mobility:     Supine to Sit: total assistance  Sit to Supine: total assistance and of 2 persons  Transfers:     Sit to Stand:  total assistance and of 2 persons with rolling walker; pt remained flexed at hips and trunk requiring manual cues to clear his hips off the bed to reposition linen. Pt leaned to the R when standing.    AM-PAC 6 CLICK MOBILITY  Total Score:9     Treatment & Education:  Pt required total assist to scoot to the EOB in sitting due to pt pushing hard into the bed for support and unable to unweight his arms to assist with scooting. Pt sat on the EOB ~ 15 min with  max assist initially due to pt pushing posterior and leaning to the R; improved with verbal and manual cues to requiring only minimal assist. Pt stood with total assist of 2 with RW as above with ~ 8 inch clearance of his hips from the bed to allow for changing his soiled linens. Pt stood ~ 20 sec with total assist of 2 with RW  Co-treat with OT due to medical complexity of pt and need for skilled hands for safe intervention.   Patient left HOB elevated with all lines intact, call button in reach, bed alarm on, nurse notified, and PCT present.    GOALS:   Multidisciplinary Problems       Physical Therapy Goals          Problem: Physical Therapy    Goal Priority Disciplines Outcome Interventions   Physical Therapy Goal     PT, PT/OT Progressing    Description: PT goals until 10/14/24    1. Pt supine to sit with moderate assist-not met  2. Pt sit to supine with moderate assist-not met  3. Pt sit to stand with RW with WBAT B LE with moderate assist-not met  4. Pt to perform gait 5 steps with RW with WBAT B LE with max assist.-not met  5. Pt to transfer bed to/from bedside chair/w/c with LRAD as needed for safety with WBAT B LE with max assist.-not met  6. Pt to perform B LE exs in sitting or supine x 10 reps to strengthen B LE to improve functional mobility.-not met    DME Justifications (see above for complete DME recommendations)    Hospital Bed-  Patient requires the head of the bed to be elevated more than 30 degrees most of the time due to congestive heart failure, chronic pulmonary disease, and/or problems with aspiration. The use of pillows and wedges is not adequate for the proper body positioning this patient requires     Wheelchair-  Patient has a mobility limitation that significantly impairs their ability to participate in one or more mobility related activities of daily living in customary locations in the home. The mobility limitation cannot be sufficiently resolved by the use of a cane or walker. The use  of a manual wheelchair will greatly improve the patient's ability to participate in MRADLs. The patient will use the wheelchair on a regular basis at home. They have expressed their willingness to use a manual wheelchair in the home, and have a caregiver who is available and willing to assist with the wheelchair if needed.                             History:     Past Medical History:   Diagnosis Date    Atrial fibrillation, unspecified type 09/06/2023    COPD exacerbation 09/06/2023    Thyroid disease     hypo       Past Surgical History:   Procedure Laterality Date    COLONOSCOPY  01/01/2011    CORONARY ANGIOGRAPHY N/A 07/22/2021    Procedure: ANGIOGRAM, CORONARY ARTERY;  Surgeon: Hank Barry MD;  Location: [facility] CATH LAB/EP;  Service: Cardiology;  Laterality: N/A;    EYE SURGERY Bilateral     cataracts extraction    FRACTURE SURGERY Right 09/2021    femur    HERNIA REPAIR      HIP SURGERY Right 08/2021    INTRAMEDULLARY RODDING OF TROCHANTER OF FEMUR Right 09/03/2021    Procedure: INSERTION, INTRAMEDULLARY RACQUEL, FEMUR, TROCHANTER;  Surgeon: Darryn Hall MD;  Location: [facility];  Service: Orthopedics;  Laterality: Right;    JOINT REPLACEMENT Bilateral     knee    LEFT HEART CATHETERIZATION Right 07/22/2021    Procedure: Left heart cath;  Surgeon: Hank Barry MD;  Location: [facility] CATH LAB/EP;  Service: Cardiology;  Laterality: Right;    OPEN REDUCTION AND INTERNAL FIXATION (ORIF) OF INJURY OF HIP Right 9/20/2024    Procedure: ORIF,PELVIS;  Surgeon: Negrito Stapleton MD;  Location: [address];  Service: Orthopedics;  Laterality: Right;  +local    TRANSESOPHAGEAL ECHOCARDIOGRAPHY N/A 9/19/2024    Procedure: ECHOCARDIOGRAM, TRANSESOPHAGEAL;  Surgeon: Leonora Butt MD;  Location: [facility] EP LAB;  Service: Cardiology;  Laterality: N/A;    TREATMENT OF CARDIAC ARRHYTHMIA N/A 9/19/2024    Procedure: Cardioversion or Defibrillation;  Surgeon: ANA Steiner MD;  Location: [facility] EP LAB;  Service:  Cardiology;  Laterality: N/A;  AF, DEMETRICE/DCCV, ANES, GP, 524    VASECTOMY         Time Tracking:     PT Received On: 09/29/24  PT Start Time: 1133     PT Stop Time: 1156  PT Total Time (min): 23 min     Billable Minutes: Evaluation 13 and Therapeutic Activity 10      09/29/2024

## 2024-09-29 NOTE — PLAN OF CARE
Problem: Skin Injury Risk Increased  Goal: Skin Health and Integrity  Outcome: Progressing     Problem: Adult Inpatient Plan of Care  Goal: Plan of Care Review  Outcome: Progressing  Goal: Patient-Specific Goal (Individualized)  Outcome: Progressing  Goal: Absence of Hospital-Acquired Illness or Injury  Outcome: Progressing  Goal: Optimal Comfort and Wellbeing  Outcome: Progressing  Goal: Readiness for Transition of Care  Outcome: Progressing     Problem: Infection  Goal: Absence of Infection Signs and Symptoms  Outcome: Progressing     Problem: Sepsis/Septic Shock  Goal: Optimal Coping  Outcome: Progressing  Goal: Absence of Bleeding  Outcome: Progressing  Goal: Blood Glucose Level Within Targeted Range  Outcome: Progressing  Goal: Absence of Infection Signs and Symptoms  Outcome: Progressing  Goal: Optimal Nutrition Intake  Outcome: Progressing     Problem: Acute Kidney Injury/Impairment  Goal: Fluid and Electrolyte Balance  Outcome: Progressing  Goal: Improved Oral Intake  Outcome: Progressing  Goal: Effective Renal Function  Outcome: Progressing     Problem: Wound  Goal: Optimal Coping  Outcome: Progressing  Goal: Optimal Functional Ability  Outcome: Progressing  Goal: Absence of Infection Signs and Symptoms  Outcome: Progressing  Goal: Improved Oral Intake  Outcome: Progressing  Goal: Optimal Pain Control and Function  Outcome: Progressing  Goal: Skin Health and Integrity  Outcome: Progressing  Goal: Optimal Wound Healing  Outcome: Progressing     Problem: Fall Injury Risk  Goal: Absence of Fall and Fall-Related Injury  Outcome: Progressing

## 2024-09-29 NOTE — NURSING
UA collected via straight cath, sterile technique performed. 1550 ml discarded, post bladder scan showed 0 ml. IM BB notified.

## 2024-09-29 NOTE — ED NOTES
Report received from HUMPHREY Linton. Assume care of pt. Pt resting comfortably and independently repositioned in stretcher with bed locked in lowest position for safety. NAD noted at this time. Respirations even and unlabored and visible chest rise noted.  Patient offered bathroom assistance and denies need at this time. Pt instructed to call if assistance is needed. Pt on continuous cardiac, BP, and O2 monitoring. Call light within reach. No needs at this time. Will continue to monitor.

## 2024-09-29 NOTE — ASSESSMENT & PLAN NOTE
Etiology likely pre-renal 2/2 dehydration and over diuresis.  - Cr 2.7 >> 2.5, baseline near 1.0  - hold aldactone, lasix, entresto  - s/p 500cc IVF in ED  - caution with IVF given reduced EF  - serial CMP, avoid nephrotoxins, renally dose meds  - consider urine studies and renal US if no improvement in 48hr

## 2024-09-29 NOTE — ED NOTES
Nurses Note -- 4 Eyes      9/28/2024   9:48 PM      Skin assessed during: shift change      [] No Altered Skin Integrity Present    []Prevention Measures Documented      [x] Yes- Altered Skin Integrity Present or Discovered   [] LDA Added if Not in Epic (Describe Wound)   [] New Altered Skin Integrity was Present on Admit and Documented in LDA   [] Wound Image Taken    Wound Care Consulted? No    Attending Nurse:  Masoud Sanders RN/Staff Member:   Farooq

## 2024-09-29 NOTE — PT/OT/SLP EVAL
"Occupational Therapy  Co- Evaluation  Co-treatment performed due to patient's multiple deficits requiring two skilled therapists to appropriately and safely assess patient's strength and endurance while facilitating functional tasks in addition to accommodating for patient's activity tolerance.    Name: Bean Salas  MRN: [id number]  Admitting Diagnosis: Sepsis  Recent Surgery: * No surgery found *      Recommendations:     Discharge Recommendations: Moderate Intensity Therapy  Discharge Equipment Recommendations:  wheelchair, hospital bed, hip kit  Barriers to discharge:  Decreased caregiver support, Other (Comment) (Skiled A required)    Assessment:     Bean Salas is a 79 y.o. male with a medical diagnosis of Sepsis.  He presents with Performance deficits affecting function: weakness, impaired endurance, impaired self care skills, impaired functional mobility, gait instability, impaired balance, pain, decreased safety awareness, decreased lower extremity function, orthopedic precautions.  Pt presents in bed, agreeable to questions but denying desire to participate in mobility stating "I need a day off". With education and encouragement, pt agreeable to participation. Pt A&O x4, presents with significant fear of falling and anxiety to engagement limiting performance t/o. Pt requires significnat physical A not required at baseline. Pt is a good candidate for moderate intensity post-acute care to address deficits impacting IND and safety in ADLs and functional mobility.     Rehab Prognosis: Good; patient would benefit from acute skilled OT services to address these deficits and reach maximum level of function.       Plan:     Patient to be seen 4 x/week to address the above listed problems via self-care/home management, therapeutic activities, therapeutic exercises, neuromuscular re-education  Plan of Care Expires: 10/28/24  Plan of Care Reviewed with: patient    Subjective     Chief Complaint: hip pain, wanting " ""A day off" despite no tx prior date.  Patient/Family Comments/goals: to rest, be able to stand himself up    Occupational Profile:  Living Environment: Alone, Ssh, WIS with built in bench, BSC over toilet vs. Raised toilet seat (2 documents state each)  Previous level of function: Ashley with DME and RW for ambulation  Equipment Used at Home: bedside commode, walker, rolling, other (see comments) (built in bath bench)  Assistance upon Discharge: Son resides 60miles away from pt, no consistent caregiver available if phyiscal A required.    Pain/Comfort:  Pain Rating 1: 6/10  Location - Side 1: Bilateral  Location - Orientation 1: generalized  Location 1: hip  Pain Addressed 1: Reposition, Cessation of Activity, Pre-medicate for activity  Pain Rating Post-Intervention 1: 6/10    Patients cultural, spiritual, Mandaeism conflicts given the current situation: no    Objective:     Communicated with: Nsg prior to session.  Patient found HOB elevated with bed alarm, oxygen, peripheral IV, telemetry upon OT entry to room.    General Precautions: Standard, fall  Orthopedic Precautions: RLE weight bearing as tolerated, LLE weight bearing as tolerated  Braces: N/A  Respiratory Status: Nasal cannula    Occupational Performance:    Bed Mobility:    Patient completed Supine to Sit with total assistance  Patient completed Sit to Supine with total assistance and 2 persons  Pt sits EOB ~15m with at best momentary Stephy given maximal cues/Kerr of hand positioning, mostly maxA d/t had posterior lean and R lean. Pt frequently states "Im going down, help me, push me forward" despite physical capacity/strength to do so himself.     Functional Mobility/Transfers:  Patient completed Sit <> Stand Transfer with total assistance and of 2 persons  with  rolling walker   Functional Mobility: Pt stands with total x2 and RW, poor hip clearance, R lean, but allowed linen change, tolerates ~20second standing given skilled handling to facilitate " upright.    Activities of Daily Living:  Lower Body Dressing: total assistance to manage  socks  Toileting: total assistance with elicia scottyck noted bladder incontinence and chucks changed as needed    Cognitive/Visual Perceptual:  Cognitive/Psychosocial Skills:     -       Oriented to: Person, Place, and partially time    -       Follows Commands/attention:Inattentive and Follows one-step commands  -       Safety awareness/insight to disability: impaired   -       Mood/Affect/Coping skills/emotional control: Anxious and Guarded    Physical Exam:  Postural examination/scapula alignment:    -       Rounded shoulders  -       Forward head  -       Posterior pelvic tilt  -       Kyphosis  Upper Extremity Range of Motion:     -       Right Upper Extremity: WFL except shoulder decreased   -       Left Upper Extremity: WFL except shoulder decreased  Upper Extremity Strength:    -       Right Upper Extremity: WFL  -       Left Upper Extremity: WFL  Fine Motor Coordination:    -       Intact    AMPAC 6 Click ADL:  AMPAC Total Score: 13    Treatment & Education:  Pt requires maximal edu and encouragement to participate in mobility, educated on POC, impact of time in bed on strength, independence, and prognosis for regaining mobility/ADL function.   -Education on focused breathing to manage anxiety  -Education on energy conservation and task modification to maximize safety and (I) during ADLs and mobility  -Education on importance of OOB activity to improve overall activity tolerance and promote recovery  -Pt educated to call for assistance and to transfer with hospital staff only  -Provided education regarding role of OT, POC, & discharge recommendations with pt verbalizing understanding.  Pt had no further questions & when asked whether there were any concerns pt reported none.     Patient left HOB elevated with all lines intact, call button in reach, and nsg notified    GOALS:   Multidisciplinary Problems        Occupational Therapy Goals          Problem: Occupational Therapy    Goal Priority Disciplines Outcome Interventions   Occupational Therapy Goal     OT, PT/OT Progressing    Description: Goals to be met by: 10/28/24     Patient will increase functional independence with ADLs by performing:    UE Dressing with Minimal Assistance.  LE Dressing with Minimal Assistance.  Grooming while seated with Minimal Assistance.  Toileting from bedside commode with Minimal Assistance for hygiene and clothing management.   Toilet transfer to bedside commode with Minimal Assistance.    DME: TBD on progress/Next level of care; likely:   Patient has a mobility limitation that significantly impairs their ability to participate in one or more mobility related activities of daily living in customary locations in the home. The mobility limitation cannot be sufficiently resolved by the use of a cane or walker. The use of a manual wheelchair will greatly improve the patient's ability to participate in MRADLs. The patient will use the wheelchair on a regular basis at home. They have expressed their willingness to use a manual wheelchair in the home, and have a caregiver who is available and willing to assist with the wheelchair if needed.                          History:     Past Medical History:   Diagnosis Date    Atrial fibrillation, unspecified type 09/06/2023    COPD exacerbation 09/06/2023    Thyroid disease     hypo         Past Surgical History:   Procedure Laterality Date    COLONOSCOPY  01/01/2011    CORONARY ANGIOGRAPHY N/A 07/22/2021    Procedure: ANGIOGRAM, CORONARY ARTERY;  Surgeon: Hank Barry MD;  Location: [facility] CATH LAB/EP;  Service: Cardiology;  Laterality: N/A;    EYE SURGERY Bilateral     cataracts extraction    FRACTURE SURGERY Right 09/2021    femur    HERNIA REPAIR      HIP SURGERY Right 08/2021    INTRAMEDULLARY RODDING OF TROCHANTER OF FEMUR Right 09/03/2021    Procedure: INSERTION, INTRAMEDULLARY RACQUEL, FEMUR,  TROCHANTER;  Surgeon: Darryn Hall MD;  Location: [facility] OR;  Service: Orthopedics;  Laterality: Right;    JOINT REPLACEMENT Bilateral     knee    LEFT HEART CATHETERIZATION Right 07/22/2021    Procedure: Left heart cath;  Surgeon: Hank Barry MD;  Location: [facility] CATH LAB/EP;  Service: Cardiology;  Laterality: Right;    OPEN REDUCTION AND INTERNAL FIXATION (ORIF) OF INJURY OF HIP Right 9/20/2024    Procedure: ORIF,PELVIS;  Surgeon: Negrito Stapleton MD;  Location: [facility] OR 2ND FLR;  Service: Orthopedics;  Laterality: Right;  +local    TRANSESOPHAGEAL ECHOCARDIOGRAPHY N/A 9/19/2024    Procedure: ECHOCARDIOGRAM, TRANSESOPHAGEAL;  Surgeon: Leonora Butt MD;  Location: [facility] EP LAB;  Service: Cardiology;  Laterality: N/A;    TREATMENT OF CARDIAC ARRHYTHMIA N/A 9/19/2024    Procedure: Cardioversion or Defibrillation;  Surgeon: ANA Steiner MD;  Location: [facility] EP LAB;  Service: Cardiology;  Laterality: N/A;  AF, DEMETRICE/DCCV, ANES, GP, 524    VASECTOMY         Time Tracking:     OT Date of Treatment: 09/29/24  OT Start Time: 1133  OT Stop Time: 1156  OT Total Time (min): 23 min    Billable Minutes:Evaluation 10  Self Care/Home Management 13    9/29/2024

## 2024-09-29 NOTE — PROGRESS NOTES
Pharmacokinetic Assessment Follow Up: IV Vancomycin    Vancomycin serum concentration assessment(s):    The random level was drawn correctly and can be used to guide therapy at this time. The measurement is within the desired definitive target range of 15 to 20 mcg/mL.    Vancomycin Regimen Plan:    Continue regimen to Vancomycin 1500 mg IV once with next serum trough concentration measured at 2100 on 9/29    Pulse dose since scr fluctuating  Last 12 hr VR within range, may need q12h dosing    Drug levels (last 3 results):  Recent Labs   Lab Result Units 09/29/24  0322   Vancomycin, Random ug/mL 15.9       Pharmacy will continue to follow and monitor vancomycin.    Please contact pharmacy at extension [phone] for questions regarding this assessment.    Thank you for the consult,   Kristi Chow       Patient brief summary:  Bean Salas is a 79 y.o. male initiated on antimicrobial therapy with IV Vancomycin for treatment of sepsis    The patient's current regimen is pulse dosing (s/p 1500 mg x2)    Drug Allergies:   Review of patient's allergies indicates:  No Known Allergies    Actual Body Weight:   105.3 kg    Renal Function:   Estimated Creatinine Clearance: 29.1 mL/min (A) (based on SCr of 2.5 mg/dL (H)).,     Dialysis Method (if applicable):  N/A    CBC (last 72 hours):  Recent Labs   Lab Result Units 09/28/24  1407 09/29/24  0322   WBC K/uL 13.72* 11.15   Hemoglobin g/dL 10.3* 9.6*   Hematocrit % 32.3* 29.6*   Platelets K/uL 321 290   Gran % % 85.5* 77.7*   Lymph % % 5.2* 9.1*   Mono % % 8.1 11.1   Eosinophil % % 0.3 0.7   Basophil % % 0.1 0.3   Differential Method  Automated Automated       Metabolic Panel (last 72 hours):  Recent Labs   Lab Result Units 09/28/24  0524 09/28/24  1407 09/28/24  1619 09/29/24  0322   Sodium mmol/L 131* 134*  --  131*   Potassium mmol/L 5.5* 4.8  --  5.0   Chloride mmol/L 93* 91*  --  92*   CO2 mmol/L 28 31*  --  27   Glucose mg/dL 213* 182*  --  171*   Glucose, UA   --   --   Negative  --    BUN mg/dL 72* 83*  --  81*   Creatinine mg/dL 2.7* 2.7*  --  2.5*   Albumin g/dL  --  2.8*  --  2.6*   Total Bilirubin mg/dL  --  1.8*  --  1.7*   Alkaline Phosphatase U/L  --  129  --  128   AST U/L  --  23  --  24   ALT U/L  --  18  --  17   Magnesium mg/dL  --   --   --  2.8*   Phosphorus mg/dL  --   --   --  4.0       Vancomycin Administrations:  vancomycin given in the last 96 hours                     vancomycin 1,500 mg in D5W 250 mL IVPB (admixture device) (mg) 1,500 mg New Bag 09/28/24 1740                    Microbiologic Results:  Microbiology Results (last 7 days)       Procedure Component Value Units Date/Time    Blood culture [[id number]] Collected: 09/28/24 1657    Order Status: Completed Specimen: Blood from Peripheral, Forearm, Left Updated: 09/29/24 0115     Blood Culture, Routine No Growth to date    Blood culture [[id number]] Collected: 09/28/24 1657    Order Status: Completed Specimen: Blood from Peripheral, Forearm, Right Updated: 09/29/24 0115     Blood Culture, Routine No Growth to date

## 2024-09-29 NOTE — ASSESSMENT & PLAN NOTE
Unclear etiology. 3/4 SIRS on arrival - tachycardia, tachypnea, leukocytosis. Organ dysfunction - PAULA. CXR c/f fluid overload. XR R Hip with Pelvis without hardware complication, stable postoperative changes.    - AF, HDS s/p IVF  - lactate and leukocytosis improved  - s/p IVF  - IV vanc + zosyn  - f/u blood clx  - hold BP meds in setting of hypotension  - caution with fluid resuscitation with hx of HF  - cont tele

## 2024-09-29 NOTE — ED NOTES
Telemetry Verification   Patient placed on Telemetry Box  Verified with War Room  Box # [id number]   Monitor Tech Laya   Rate 51   Rhythm Juwan

## 2024-09-29 NOTE — SUBJECTIVE & OBJECTIVE
Interval History: AF, soft BP but HDS. Reporting R hip pain. Procal elevated, cont abx, follow cultures.    Review of Systems   Constitutional:  Negative for chills and fever.   Respiratory:  Negative for chest tightness and shortness of breath.    Cardiovascular:  Negative for chest pain and leg swelling.   Gastrointestinal:  Negative for abdominal pain and nausea.   Musculoskeletal:  Positive for arthralgias, back pain and myalgias.   Neurological:  Negative for dizziness and weakness.     Objective:     Vital Signs (Most Recent):  Temp: 99 °F (37.2 °C) (09/29/24 0731)  Pulse: (!) 53 (09/29/24 0731)  Resp: 18 (09/29/24 0731)  BP: (!) 101/57 (09/29/24 0731)  SpO2: 96 % (09/29/24 0731) Vital Signs (24h Range):  Temp:  [97.9 °F (36.6 °C)-99 °F (37.2 °C)] 99 °F (37.2 °C)  Pulse:  [] 53  Resp:  [16-23] 18  SpO2:  [94 %-100 %] 96 %  BP: ()/(50-77) 101/57     Weight: 105.3 kg (232 lb 2.3 oz)  Body mass index is 32.38 kg/m².    Intake/Output Summary (Last 24 hours) at 9/29/2024 1012  Last data filed at 9/29/2024 1000  Gross per 24 hour   Intake 1090 ml   Output 300 ml   Net 790 ml         Physical Exam  Vitals and nursing note reviewed.   Constitutional:       Appearance: He is well-developed. He is obese.   Eyes:      Pupils: Pupils are equal, round, and reactive to light.   Cardiovascular:      Rate and Rhythm: Normal rate and regular rhythm.   Pulmonary:      Effort: Pulmonary effort is normal.      Breath sounds: Normal breath sounds.   Abdominal:      Palpations: Abdomen is soft.      Tenderness: There is no abdominal tenderness.   Musculoskeletal:         General: No tenderness.   Skin:     General: Skin is warm and dry.   Neurological:      Mental Status: He is alert and oriented to person, place, and time.   Psychiatric:         Behavior: Behavior normal.             Significant Labs: All pertinent labs within the past 24 hours have been reviewed.  CBC:   Recent Labs   Lab 09/28/24  1407 09/28/24  1502  09/29/24 0322   WBC 13.72*  --  11.15   HGB 10.3*  --  9.6*   HCT 32.3* 28* 29.6*     --  290     CMP:   Recent Labs   Lab 09/28/24  0524 09/28/24  1407 09/29/24 0322   * 134* 131*   K 5.5* 4.8 5.0   CL 93* 91* 92*   CO2 28 31* 27   * 182* 171*   BUN 72* 83* 81*   CREATININE 2.7* 2.7* 2.5*   CALCIUM 8.9 9.4 9.2   PROT  --  6.4 5.9*   ALBUMIN  --  2.8* 2.6*   BILITOT  --  1.8* 1.7*   ALKPHOS  --  129 128   AST  --  23 24   ALT  --  18 17   ANIONGAP 10 12 12       Significant Imaging: I have reviewed all pertinent imaging results/findings within the past 24 hours.

## 2024-09-29 NOTE — HOSPITAL COURSE
"His furosemide, spironolactone, and Entresto were held. He was given IV fluids and antibiotics. Since Entresto had just been started a week before he developed acute kidney injury and hypotension, it was discontinued. His metoprolol and amiodarone were held due to bradycardia. Leukocytosis improved. Physical and Occupational Therapy were consulted. Acute kidney injury improved. He had urinary retention and was started on tamsulosin and Mckeon catheter was placed. He was persistently hypotensive on 9/30/2024 and given 2 liters of IV fluids. Echocardiogram showed resolution of systolic dysfunction. Antibiotics were stopped. He awaited skilled nursing facility. He was accepted on 10/3/2024. He had persistent episodes of symptomatic bradycardia, particularly with activity, despite holding his metoprolol and amiodarone. He also complained of pain from his fracture limiting his activity, which was expected. Pain was not worse than when the fracture initially occurred. Cardiology was consulted. Cardiology said "Consult received for symptomatic bradycardia. There have been charted heart rates as low as 30 on multiple occasions. Telemetry has been reviewed; heart rate has never been below 50. Current rhythm is sinus with PVCs in a bigeminy pattern. It is unlikely that bradycardia is causing any symptoms." Acute kidney injury resolved on 10/4/2024. Cardiology reviewed again and suspected that his bigeminy was mistakenly being detected as bradycardia and that his lightheadedness was due to his PVCs, which would be treated by resuming his amiodarone. He had hypotension again with blood pressure as low as 79/50 despite still holding furosemide, spironolactone, and metoprolol. He was given IV fluids. Blood pressures improved to normal. Furosemide was changed to prn. Spironolactone was discontinued.   "

## 2024-09-29 NOTE — NURSING
Nurses Note -- 4 Eyes      9/29/2024   4:21 AM      Skin assessed during: Admit      [] No Altered Skin Integrity Present    []Prevention Measures Documented      [x] Yes- Altered Skin Integrity Present or Discovered   [] LDA Added if Not in Epic (Describe Wound)   [x] New Altered Skin Integrity was Present on Admit and Documented in LDA   [x] Wound Image Taken    Wound Care Consulted? Yes    Attending Nurse:  Ade Sanders RN/Staff Member:   Iza BEAR RN

## 2024-09-29 NOTE — PROGRESS NOTES
Pharmacokinetic Initial Assessment & Plan: IV Vancomycin    IV Vancomycin 1500 mg x once given in the ED on 09/28 @ 1740  Subsequent doses based on random Vancomycin level  Obtain a Vancomycin random tomorrow on 09/29 @  0600   Desired empiric serum trough concentration is 15 to 20 mcg/mL    Pharmacy will continue to follow and monitor vancomycin.    [id number] with any questions regarding this assessment.     Thank you for the consult,   Ailyn Kan         Patient brief summary:  Bean Salas is a 79 y.o. male initiated on antimicrobial therapy with IV Vancomycin for treatment of suspected sepsis    Drug Allergies:   Review of patient's allergies indicates:  No Known Allergies    Actual Body Weight:   97.1 kg    Renal Function:   Estimated Creatinine Clearance: 25.9 mL/min (A) (based on SCr of 2.7 mg/dL (H)).,     Dialysis Method (if applicable):  N/A    CBC (last 72 hours):  Recent Labs   Lab Result Units 09/26/24  0500 09/28/24  1407   WBC K/uL 8.42 13.72*   Hemoglobin g/dL 10.0* 10.3*   Hematocrit % 30.8* 32.3*   Platelets K/uL 246 321   Gran % % 73.7* 85.5*   Lymph % % 10.9* 5.2*   Mono % % 11.5 8.1   Eosinophil % % 2.4 0.3   Basophil % % 0.5 0.1   Differential Method  Automated Automated       Metabolic Panel (last 72 hours):  Recent Labs   Lab Result Units 09/26/24  0500 09/28/24  0524 09/28/24  1407 09/28/24  1619   Sodium mmol/L 137 131* 134*  --    Potassium mmol/L 3.6 5.5* 4.8  --    Chloride mmol/L 96 93* 91*  --    CO2 mmol/L 33* 28 31*  --    Glucose mg/dL 174* 213* 182*  --    Glucose, UA   --   --   --  Negative   BUN mg/dL 49* 72* 83*  --    Creatinine mg/dL 1.8* 2.7* 2.7*  --    Albumin g/dL  --   --  2.8*  --    Total Bilirubin mg/dL  --   --  1.8*  --    Alkaline Phosphatase U/L  --   --  129  --    AST U/L  --   --  23  --    ALT U/L  --   --  18  --    Magnesium mg/dL 2.5  --   --   --    Phosphorus mg/dL 4.6*  --   --   --        Drug levels (last 3 results):  No results for input(s):  ""VANCOMYCINRA", "VANCORANDOM", "VANCOMYCINPE", "VANCOPEAK", "VANCOMYCINTR", "VANCOTROUGH" in the last 72 hours.    Microbiologic Results:  Microbiology Results (last 7 days)       Procedure Component Value Units Date/Time    Blood culture [[id number]] Collected: 09/28/24 1657    Order Status: Sent Specimen: Blood from Peripheral, Forearm, Right Updated: 09/28/24 1710    Blood culture [[id number]] Collected: 09/28/24 1657    Order Status: Sent Specimen: Blood from Peripheral, Forearm, Left Updated: 09/28/24 1710            "

## 2024-09-29 NOTE — PROGRESS NOTES
[facility] - Internal Medicine [facility] Medicine  Progress Note    Patient Name: Bean Salas  MRN: [id number]  Patient Class: IP- Inpatient   Admission Date: 9/28/2024  Length of Stay: 1 days  Attending Physician: Miguel Asencio MD  Primary Care Provider: Ramirez Levine MD        Subjective:     Principal Problem:Sepsis        HPI:  Bean Salas is a 79 y.o. male with PMHx of HTN, HLD, hypothyroidism, combined CHF (EF 20-25%), CAD, A-fib, chronic respiratory failure, and AKHIL who presents to [facility] ED from SNF due to hypotension and worsening kidney function on labs. Patient with recent pelvic fracture fixation 09/2024. He reports chronic back pain. He denies HA, fever, chills, chest pain, shortness of breath, abdominal pain, n/v/d, urinary symptoms, numbness or tingling.     In the ED: Initially hypotensive but improved s/p IVF. On admit AF, HDS, 2L NC. CBC with WBC 13.72, Hgb stable. CMP with K 5.5 >> 4.8, Cr 2.7 (BL near 1.0). T bili 1.8. Hep C and HIV non-reactive. POC lactate 3.21. Initially with some EKG changes due to hyperkalemia but improved after shifting. CXR c/f fluid overload. XR R Hip with Pelvis without hardware complication, stable postoperative changes. Blood cultures x 2. Shifted in ED and received IVF and IV vanc + zosyn. Admitted to .    Overview/Hospital Course:  Bean Salas is admitted to  for management of hypotension and PAULA. Imaging reviewed. Initially hyperkalemic and shifted with improvement in K. Lactate elevated and leukocytosis, now improving. Received IVF and antibiotics. Following blood cultures. Therapy consulted.    Interval History: AF, soft BP but HDS. Reporting R hip pain. Procal elevated, cont abx, follow cultures.    Review of Systems   Constitutional:  Negative for chills and fever.   Respiratory:  Negative for chest tightness and shortness of breath.    Cardiovascular:  Negative for chest pain and leg swelling.   Gastrointestinal:  Negative for  abdominal pain and nausea.   Musculoskeletal:  Positive for arthralgias, back pain and myalgias.   Neurological:  Negative for dizziness and weakness.     Objective:     Vital Signs (Most Recent):  Temp: 99 °F (37.2 °C) (09/29/24 0731)  Pulse: (!) 53 (09/29/24 0731)  Resp: 18 (09/29/24 0731)  BP: (!) 101/57 (09/29/24 0731)  SpO2: 96 % (09/29/24 0731) Vital Signs (24h Range):  Temp:  [97.9 °F (36.6 °C)-99 °F (37.2 °C)] 99 °F (37.2 °C)  Pulse:  [] 53  Resp:  [16-23] 18  SpO2:  [94 %-100 %] 96 %  BP: ()/(50-77) 101/57     Weight: 105.3 kg (232 lb 2.3 oz)  Body mass index is 32.38 kg/m².    Intake/Output Summary (Last 24 hours) at 9/29/2024 1012  Last data filed at 9/29/2024 1000  Gross per 24 hour   Intake 1090 ml   Output 300 ml   Net 790 ml         Physical Exam  Vitals and nursing note reviewed.   Constitutional:       Appearance: He is well-developed. He is obese.   Eyes:      Pupils: Pupils are equal, round, and reactive to light.   Cardiovascular:      Rate and Rhythm: Normal rate and regular rhythm.   Pulmonary:      Effort: Pulmonary effort is normal.      Breath sounds: Normal breath sounds.   Abdominal:      Palpations: Abdomen is soft.      Tenderness: There is no abdominal tenderness.   Musculoskeletal:         General: No tenderness.   Skin:     General: Skin is warm and dry.   Neurological:      Mental Status: He is alert and oriented to person, place, and time.   Psychiatric:         Behavior: Behavior normal.             Significant Labs: All pertinent labs within the past 24 hours have been reviewed.  CBC:   Recent Labs   Lab 09/28/24  1407 09/28/24  1502 09/29/24  0322   WBC 13.72*  --  11.15   HGB 10.3*  --  9.6*   HCT 32.3* 28* 29.6*     --  290     CMP:   Recent Labs   Lab 09/28/24  0524 09/28/24  1407 09/29/24  0322   * 134* 131*   K 5.5* 4.8 5.0   CL 93* 91* 92*   CO2 28 31* 27   * 182* 171*   BUN 72* 83* 81*   CREATININE 2.7* 2.7* 2.5*   CALCIUM 8.9 9.4 9.2   PROT   --  6.4 5.9*   ALBUMIN  --  2.8* 2.6*   BILITOT  --  1.8* 1.7*   ALKPHOS  --  129 128   AST  --  23 24   ALT  --  18 17   ANIONGAP 10 12 12       Significant Imaging: I have reviewed all pertinent imaging results/findings within the past 24 hours.    Assessment/Plan:      * Sepsis  Unclear etiology. 3/4 SIRS on arrival - tachycardia, tachypnea, leukocytosis. Organ dysfunction - PAULA. CXR c/f fluid overload. XR R Hip with Pelvis without hardware complication, stable postoperative changes.    - AF, HDS s/p IVF  - lactate and leukocytosis improved  - s/p IVF  - IV vanc + zosyn  - f/u blood clx  - hold BP meds in setting of hypotension  - caution with fluid resuscitation with hx of HF  - cont tele    PAULA (acute kidney injury)  Etiology likely pre-renal 2/2 dehydration and over diuresis.  - Cr 2.7 >> 2.5, baseline near 1.0  - hold aldactone, lasix, entresto  - s/p 500cc IVF in ED  - caution with IVF given reduced EF  - serial CMP, avoid nephrotoxins, renally dose meds  - consider urine studies and renal US if no improvement in 48hr    Chronic hypoxemic respiratory failure  History of chronic hypoxic respiratory failure. Currently on 2L NC with good saturations. Wean oxygen as tolerated. Prn duonebs.    Paroxysmal atrial flutter  - cont amio and eliquis  - cont tele    AKHIL (obstructive sleep apnea)  - CPAP qhs    Coronary artery disease involving native coronary artery of native heart without angina pectoris  Continue aspirin and statin. Monitor for S/Sx of angina/ACS. Continue to monitor on telemetry.     Chronic combined systolic and diastolic congestive heart failure  History of combined CHF (EF 20-25%). Last echo reviewed. No signs of acute overload on exam.   - , lower than baseline  - hold GDMT in setting of hypotension  - okay to restart GDMT as BP improves  - I/Os, 1.5L fluid restriction  - cont tele    Advance care planning  Patient confirmed DNR.    Closed nondisplaced fracture of anterior column of right  acetabulum with routine healing s/p ORIF of pelvis on 9/20/2024  Noted. Status post ORIF of pelvis 9/20/2024 and at SNF for rehab  - therapy consulted  - surgical bandages to remain in place post-op until orthopedic clinic follow up  - prn pain control    Hypothyroidism due to acquired atrophy of thyroid  - cont synthroid    Pure hypercholesterolemia  - cont statin    Essential hypertension  - hold lasix, aldactone, entresto in setting of hypotension  - cont metoprolol      VTE Risk Mitigation (From admission, onward)           Ordered     apixaban tablet 5 mg  2 times daily         09/28/24 1636                    Discharge Planning   LATOSHA: 9/30/2024     Code Status: DNR   Is the patient medically ready for discharge?:     Reason for patient still in hospital (select all that apply): Patient trending condition, Laboratory test, and Treatment                     Fabian Raza PA-C  Department of Hospital Medicine   [facility] - Internal Medicine Telemetry

## 2024-09-29 NOTE — PLAN OF CARE
Goals to be met by: 10/28/24     Patient will increase functional independence with ADLs by performing:    UE Dressing with Minimal Assistance.  LE Dressing with Minimal Assistance.  Grooming while seated with Minimal Assistance.  Toileting from bedside commode with Minimal Assistance for hygiene and clothing management.   Toilet transfer to bedside commode with Minimal Assistance.    DME: TBD on progress/Next level of care; likely:   Patient has a mobility limitation that significantly impairs their ability to participate in one or more mobility related activities of daily living in customary locations in the home. The mobility limitation cannot be sufficiently resolved by the use of a cane or walker. The use of a manual wheelchair will greatly improve the patient's ability to participate in MRADLs. The patient will use the wheelchair on a regular basis at home. They have expressed their willingness to use a manual wheelchair in the home, and have a caregiver who is available and willing to assist with the wheelchair if needed.

## 2024-09-30 LAB
ALBUMIN SERPL BCP-MCNC: 2.2 G/DL (ref 3.5–5.2)
ALBUMIN SERPL BCP-MCNC: 2.3 G/DL (ref 3.5–5.2)
ALP SERPL-CCNC: 113 U/L (ref 55–135)
ALP SERPL-CCNC: 115 U/L (ref 55–135)
ALT SERPL W/O P-5'-P-CCNC: 22 U/L (ref 10–44)
ALT SERPL W/O P-5'-P-CCNC: 24 U/L (ref 10–44)
ANION GAP SERPL CALC-SCNC: 6 MMOL/L (ref 8–16)
ANION GAP SERPL CALC-SCNC: 7 MMOL/L (ref 8–16)
AST SERPL-CCNC: 38 U/L (ref 10–40)
AST SERPL-CCNC: 43 U/L (ref 10–40)
BASOPHILS # BLD AUTO: 0.03 K/UL (ref 0–0.2)
BASOPHILS # BLD AUTO: 0.04 K/UL (ref 0–0.2)
BASOPHILS NFR BLD: 0.4 % (ref 0–1.9)
BASOPHILS NFR BLD: 0.5 % (ref 0–1.9)
BILIRUB SERPL-MCNC: 1.4 MG/DL (ref 0.1–1)
BILIRUB SERPL-MCNC: 1.5 MG/DL (ref 0.1–1)
BUN SERPL-MCNC: 64 MG/DL (ref 8–23)
BUN SERPL-MCNC: 79 MG/DL (ref 8–23)
CALCIUM SERPL-MCNC: 8.3 MG/DL (ref 8.7–10.5)
CALCIUM SERPL-MCNC: 8.7 MG/DL (ref 8.7–10.5)
CHLORIDE SERPL-SCNC: 98 MMOL/L (ref 95–110)
CHLORIDE SERPL-SCNC: 98 MMOL/L (ref 95–110)
CO2 SERPL-SCNC: 29 MMOL/L (ref 23–29)
CO2 SERPL-SCNC: 29 MMOL/L (ref 23–29)
CREAT SERPL-MCNC: 1.8 MG/DL (ref 0.5–1.4)
CREAT SERPL-MCNC: 2.3 MG/DL (ref 0.5–1.4)
DIFFERENTIAL METHOD BLD: ABNORMAL
DIFFERENTIAL METHOD BLD: ABNORMAL
EOSINOPHIL # BLD AUTO: 0.1 K/UL (ref 0–0.5)
EOSINOPHIL # BLD AUTO: 0.2 K/UL (ref 0–0.5)
EOSINOPHIL NFR BLD: 1.4 % (ref 0–8)
EOSINOPHIL NFR BLD: 2 % (ref 0–8)
ERYTHROCYTE [DISTWIDTH] IN BLOOD BY AUTOMATED COUNT: 15 % (ref 11.5–14.5)
ERYTHROCYTE [DISTWIDTH] IN BLOOD BY AUTOMATED COUNT: 15.1 % (ref 11.5–14.5)
EST. GFR  (NO RACE VARIABLE): 28 ML/MIN/1.73 M^2
EST. GFR  (NO RACE VARIABLE): 37.6 ML/MIN/1.73 M^2
GLUCOSE SERPL-MCNC: 122 MG/DL (ref 70–110)
GLUCOSE SERPL-MCNC: 159 MG/DL (ref 70–110)
HCT VFR BLD AUTO: 26.5 % (ref 40–54)
HCT VFR BLD AUTO: 28.7 % (ref 40–54)
HGB BLD-MCNC: 8.5 G/DL (ref 14–18)
HGB BLD-MCNC: 9.1 G/DL (ref 14–18)
IMM GRANULOCYTES # BLD AUTO: 0.04 K/UL (ref 0–0.04)
IMM GRANULOCYTES # BLD AUTO: 0.05 K/UL (ref 0–0.04)
IMM GRANULOCYTES NFR BLD AUTO: 0.5 % (ref 0–0.5)
IMM GRANULOCYTES NFR BLD AUTO: 0.7 % (ref 0–0.5)
LACTATE SERPL-SCNC: 0.6 MMOL/L (ref 0.5–2.2)
LYMPHOCYTES # BLD AUTO: 0.7 K/UL (ref 1–4.8)
LYMPHOCYTES # BLD AUTO: 0.8 K/UL (ref 1–4.8)
LYMPHOCYTES NFR BLD: 11.1 % (ref 18–48)
LYMPHOCYTES NFR BLD: 9 % (ref 18–48)
MAGNESIUM SERPL-MCNC: 2.8 MG/DL (ref 1.6–2.6)
MCH RBC QN AUTO: 33.2 PG (ref 27–31)
MCH RBC QN AUTO: 33.2 PG (ref 27–31)
MCHC RBC AUTO-ENTMCNC: 31.7 G/DL (ref 32–36)
MCHC RBC AUTO-ENTMCNC: 32.1 G/DL (ref 32–36)
MCV RBC AUTO: 104 FL (ref 82–98)
MCV RBC AUTO: 105 FL (ref 82–98)
MONOCYTES # BLD AUTO: 0.6 K/UL (ref 0.3–1)
MONOCYTES # BLD AUTO: 0.8 K/UL (ref 0.3–1)
MONOCYTES NFR BLD: 11.2 % (ref 4–15)
MONOCYTES NFR BLD: 8.7 % (ref 4–15)
NEUTROPHILS # BLD AUTO: 5.5 K/UL (ref 1.8–7.7)
NEUTROPHILS # BLD AUTO: 5.9 K/UL (ref 1.8–7.7)
NEUTROPHILS NFR BLD: 74.7 % (ref 38–73)
NEUTROPHILS NFR BLD: 79.8 % (ref 38–73)
NRBC BLD-RTO: 0 /100 WBC
NRBC BLD-RTO: 0 /100 WBC
PHOSPHATE SERPL-MCNC: 3.9 MG/DL (ref 2.7–4.5)
PLATELET # BLD AUTO: 284 K/UL (ref 150–450)
PLATELET # BLD AUTO: 301 K/UL (ref 150–450)
PMV BLD AUTO: 11.4 FL (ref 9.2–12.9)
PMV BLD AUTO: 11.7 FL (ref 9.2–12.9)
POCT GLUCOSE: 148 MG/DL (ref 70–110)
POCT GLUCOSE: 152 MG/DL (ref 70–110)
POCT GLUCOSE: 176 MG/DL (ref 70–110)
POCT GLUCOSE: 200 MG/DL (ref 70–110)
POTASSIUM SERPL-SCNC: 4.6 MMOL/L (ref 3.5–5.1)
POTASSIUM SERPL-SCNC: 4.6 MMOL/L (ref 3.5–5.1)
PROT SERPL-MCNC: 5.3 G/DL (ref 6–8.4)
PROT SERPL-MCNC: 5.6 G/DL (ref 6–8.4)
RBC # BLD AUTO: 2.56 M/UL (ref 4.6–6.2)
RBC # BLD AUTO: 2.74 M/UL (ref 4.6–6.2)
SODIUM SERPL-SCNC: 133 MMOL/L (ref 136–145)
SODIUM SERPL-SCNC: 134 MMOL/L (ref 136–145)
VANCOMYCIN SERPL-MCNC: 19 UG/ML
WBC # BLD AUTO: 7.33 K/UL (ref 3.9–12.7)
WBC # BLD AUTO: 7.41 K/UL (ref 3.9–12.7)

## 2024-09-30 PROCEDURE — 63600175 PHARM REV CODE 636 W HCPCS: Mod: HCNC

## 2024-09-30 PROCEDURE — 18500000 HC LEAVE OF ABSENCE HOSPITAL SERVICES: Mod: HCNC

## 2024-09-30 PROCEDURE — 36415 COLL VENOUS BLD VENIPUNCTURE: CPT | Mod: HCNC

## 2024-09-30 PROCEDURE — 36415 COLL VENOUS BLD VENIPUNCTURE: CPT | Mod: HCNC | Performed by: STUDENT IN AN ORGANIZED HEALTH CARE EDUCATION/TRAINING PROGRAM

## 2024-09-30 PROCEDURE — 51798 US URINE CAPACITY MEASURE: CPT | Mod: HCNC

## 2024-09-30 PROCEDURE — 80053 COMPREHEN METABOLIC PANEL: CPT | Mod: HCNC

## 2024-09-30 PROCEDURE — 99900035 HC TECH TIME PER 15 MIN (STAT): Mod: HCNC

## 2024-09-30 PROCEDURE — 27000221 HC OXYGEN, UP TO 24 HOURS: Mod: HCNC

## 2024-09-30 PROCEDURE — 51702 INSERT TEMP BLADDER CATH: CPT | Mod: HCNC

## 2024-09-30 PROCEDURE — 94660 CPAP INITIATION&MGMT: CPT | Mod: HCNC

## 2024-09-30 PROCEDURE — 83735 ASSAY OF MAGNESIUM: CPT | Mod: HCNC

## 2024-09-30 PROCEDURE — 84100 ASSAY OF PHOSPHORUS: CPT | Mod: HCNC

## 2024-09-30 PROCEDURE — 94761 N-INVAS EAR/PLS OXIMETRY MLT: CPT | Mod: HCNC

## 2024-09-30 PROCEDURE — 80202 ASSAY OF VANCOMYCIN: CPT | Mod: HCNC | Performed by: STUDENT IN AN ORGANIZED HEALTH CARE EDUCATION/TRAINING PROGRAM

## 2024-09-30 PROCEDURE — 25000003 PHARM REV CODE 250: Mod: HCNC | Performed by: STUDENT IN AN ORGANIZED HEALTH CARE EDUCATION/TRAINING PROGRAM

## 2024-09-30 PROCEDURE — 85025 COMPLETE CBC W/AUTO DIFF WBC: CPT | Mod: HCNC

## 2024-09-30 PROCEDURE — 83605 ASSAY OF LACTIC ACID: CPT | Mod: HCNC

## 2024-09-30 PROCEDURE — 21400001 HC TELEMETRY ROOM: Mod: HCNC

## 2024-09-30 PROCEDURE — 25000003 PHARM REV CODE 250: Mod: HCNC

## 2024-09-30 PROCEDURE — 97530 THERAPEUTIC ACTIVITIES: CPT | Mod: HCNC,CQ

## 2024-09-30 RX ORDER — TAMSULOSIN HYDROCHLORIDE 0.4 MG/1
0.4 CAPSULE ORAL DAILY
Status: DISCONTINUED | OUTPATIENT
Start: 2024-09-30 | End: 2024-10-07 | Stop reason: HOSPADM

## 2024-09-30 RX ADMIN — SODIUM CHLORIDE 1000 ML: 9 INJECTION, SOLUTION INTRAVENOUS at 04:09

## 2024-09-30 RX ADMIN — OXYCODONE 5 MG: 5 TABLET ORAL at 05:09

## 2024-09-30 RX ADMIN — ATORVASTATIN CALCIUM 10 MG: 10 TABLET, FILM COATED ORAL at 09:09

## 2024-09-30 RX ADMIN — METHOCARBAMOL 750 MG: 750 TABLET ORAL at 02:09

## 2024-09-30 RX ADMIN — SENNOSIDES AND DOCUSATE SODIUM 1 TABLET: 50; 8.6 TABLET ORAL at 09:09

## 2024-09-30 RX ADMIN — METHOCARBAMOL 750 MG: 750 TABLET ORAL at 09:09

## 2024-09-30 RX ADMIN — PIPERACILLIN SODIUM AND TAZOBACTAM SODIUM 4.5 G: 4; .5 INJECTION, POWDER, FOR SOLUTION INTRAVENOUS at 05:09

## 2024-09-30 RX ADMIN — ASPIRIN 81 MG: 81 TABLET, COATED ORAL at 09:09

## 2024-09-30 RX ADMIN — LEVOTHYROXINE SODIUM 200 MCG: 100 TABLET ORAL at 05:09

## 2024-09-30 RX ADMIN — SODIUM CHLORIDE 1000 ML: 9 INJECTION, SOLUTION INTRAVENOUS at 11:09

## 2024-09-30 RX ADMIN — TAMSULOSIN HYDROCHLORIDE 0.4 MG: 0.4 CAPSULE ORAL at 11:09

## 2024-09-30 RX ADMIN — PIPERACILLIN SODIUM AND TAZOBACTAM SODIUM 4.5 G: 4; .5 INJECTION, POWDER, FOR SOLUTION INTRAVENOUS at 10:09

## 2024-09-30 RX ADMIN — PIPERACILLIN SODIUM AND TAZOBACTAM SODIUM 4.5 G: 4; .5 INJECTION, POWDER, FOR SOLUTION INTRAVENOUS at 02:09

## 2024-09-30 RX ADMIN — APIXABAN 5 MG: 5 TABLET, FILM COATED ORAL at 09:09

## 2024-09-30 NOTE — ASSESSMENT & PLAN NOTE
Etiology likely pre-renal 2/2 dehydration and over diuresis.  - Cr 2.7 >> 2.5, >> 2.3 >> 1.8 >> 1.5, baseline near 1.0  - hold aldactone, lasix, entresto  - s/p 500cc IVF in ED  - renal US and urine studies reviewed  - cont flomax, harrison placed  - s/p 2L IVF 9/30  - monitor I/Os  - serial CMP, avoid nephrotoxins, renally dose meds

## 2024-09-30 NOTE — PROGRESS NOTES
[facility] - Internal Medicine [facility] Medicine  Progress Note    Patient Name: Bean Salas  MRN: [id number]  Patient Class: IP- Inpatient   Admission Date: 9/28/2024  Length of Stay: 2 days  Attending Physician: Miguel Asencio MD  Primary Care Provider: Ramirez Levine MD        Subjective:     Principal Problem:PAULA (acute kidney injury)        HPI:  Bean Salas is a 79 y.o. male with PMHx of HTN, HLD, hypothyroidism, combined CHF (EF 20-25%), CAD, A-fib, chronic respiratory failure, and AKHIL who presents to [facility] ED from SNF due to hypotension and worsening kidney function on labs. Patient with recent pelvic fracture fixation 09/2024. He reports chronic back pain. He denies HA, fever, chills, chest pain, shortness of breath, abdominal pain, n/v/d, urinary symptoms, numbness or tingling.     In the ED: Initially hypotensive but improved s/p IVF. On admit AF, HDS, 2L NC. CBC with WBC 13.72, Hgb stable. CMP with K 5.5 >> 4.8, Cr 2.7 (BL near 1.0). T bili 1.8. Hep C and HIV non-reactive. POC lactate 3.21. Initially with some EKG changes due to hyperkalemia but improved after shifting. CXR c/f fluid overload. XR R Hip with Pelvis without hardware complication, stable postoperative changes. Blood cultures x 2. Shifted in ED and received IVF and IV vanc + zosyn. Admitted to .    Overview/Hospital Course:  Bean Salas is admitted to  for management of hypotension and PAULA. Imaging reviewed. Initially hyperkalemic and shifted with improvement in K. Lactate elevated and leukocytosis on admit, now improving. Received IVF and antibiotics. Following blood cultures. Therapy consulted. Cr remains elevated but slowly down trending. Urine studies and renal US unremarkable. Start flomax, consult Nephro.    Interval History: AF, HDS. Retaining urine, requiring multiple straight caths with over 1L output. Urine studies and renal US unremarkable. Start flomax and consult Nephro.    Review of Systems    Constitutional:  Negative for chills and fever.   Respiratory:  Negative for chest tightness and shortness of breath.    Cardiovascular:  Negative for chest pain and leg swelling.   Gastrointestinal:  Negative for abdominal pain and nausea.   Genitourinary:  Positive for difficulty urinating.   Musculoskeletal:  Positive for arthralgias, back pain and myalgias.   Neurological:  Negative for dizziness and weakness.     Objective:     Vital Signs (Most Recent):  Temp: 97.3 °F (36.3 °C) (09/30/24 0751)  Pulse: (!) 47 (09/30/24 0751)  Resp: 19 (09/30/24 0751)  BP: 112/61 (09/30/24 0751)  SpO2: 98 % (09/30/24 0751) Vital Signs (24h Range):  Temp:  [97.3 °F (36.3 °C)-98.4 °F (36.9 °C)] 97.3 °F (36.3 °C)  Pulse:  [42-84] 47  Resp:  [18-20] 19  SpO2:  [93 %-99 %] 98 %  BP: ()/(46-63) 112/61     Weight: 105.3 kg (232 lb 2.3 oz)  Body mass index is 32.38 kg/m².    Intake/Output Summary (Last 24 hours) at 9/30/2024 1037  Last data filed at 9/30/2024 0932  Gross per 24 hour   Intake 1429.81 ml   Output 2750 ml   Net -1320.19 ml         Physical Exam  Vitals and nursing note reviewed.   Constitutional:       Appearance: He is well-developed. He is obese.   Eyes:      Pupils: Pupils are equal, round, and reactive to light.   Cardiovascular:      Rate and Rhythm: Normal rate and regular rhythm.   Pulmonary:      Effort: Pulmonary effort is normal.      Breath sounds: Normal breath sounds.   Abdominal:      Palpations: Abdomen is soft.      Tenderness: There is no abdominal tenderness.   Musculoskeletal:         General: No tenderness.   Skin:     General: Skin is warm and dry.   Neurological:      Mental Status: He is alert and oriented to person, place, and time.   Psychiatric:         Behavior: Behavior normal.             Significant Labs: All pertinent labs within the past 24 hours have been reviewed.  CBC:   Recent Labs   Lab 09/28/24  1407 09/28/24  1502 09/29/24  0322 09/30/24  0325   WBC 13.72*  --  11.15 7.41    HGB 10.3*  --  9.6* 9.1*   HCT 32.3* 28* 29.6* 28.7*     --  290 301     CMP:   Recent Labs   Lab 09/28/24  1407 09/29/24  0322 09/30/24  0325   * 131* 134*   K 4.8 5.0 4.6   CL 91* 92* 98   CO2 31* 27 29   * 171* 122*   BUN 83* 81* 79*   CREATININE 2.7* 2.5* 2.3*   CALCIUM 9.4 9.2 8.7   PROT 6.4 5.9* 5.6*   ALBUMIN 2.8* 2.6* 2.3*   BILITOT 1.8* 1.7* 1.5*   ALKPHOS 129 128 115   AST 23 24 38   ALT 18 17 24   ANIONGAP 12 12 7*       Significant Imaging: I have reviewed all pertinent imaging results/findings within the past 24 hours.    Assessment/Plan:      * PAULA (acute kidney injury)  Etiology likely pre-renal 2/2 dehydration and over diuresis.  - Cr 2.7 >> 2.5, >> 2.3, baseline near 1.0  - hold aldactone, lasix, entresto  - s/p 500cc IVF in ED  - renal US and urine studies reviewed  - start flomax  - nephro consulted, appreciate assistance  - serial CMP, avoid nephrotoxins, renally dose meds    SIRS (systemic inflammatory response syndrome)  Unclear etiology. 3/4 SIRS on arrival - tachycardia, tachypnea, leukocytosis. Organ dysfunction - PAULA. CXR c/f fluid overload. XR R Hip with Pelvis without hardware complication, stable postoperative changes.     - AF, HDS s/p IVF  - lactate and leukocytosis improved  - s/p IVF  - IV vanc + zosyn  - blood clx ngtd  - hold BP meds in setting of hypotension  - caution with fluid resuscitation with hx of HF  - cont tele    Sepsis  See SIRS.    Chronic hypoxemic respiratory failure  History of chronic hypoxic respiratory failure. Currently on 2L NC with good saturations. Wean oxygen as tolerated. Prn duonebs.    Paroxysmal atrial flutter  - cont eliquis at renal dosing, hold amio in bradycardia  - cont tele    AKHIL (obstructive sleep apnea)  - CPAP qhs    Coronary artery disease involving native coronary artery of native heart without angina pectoris  Continue aspirin and statin. Monitor for S/Sx of angina/ACS. Continue to monitor on telemetry.     Chronic  combined systolic and diastolic congestive heart failure  History of combined CHF (EF 20-25%). Last echo reviewed. No signs of acute overload on exam.   - , lower than baseline  - hold GDMT in setting of hypotension  - okay to restart GDMT as BP improves  - I/Os, 1.5L fluid restriction  - cont tele    Advance care planning  Patient confirmed DNR.    Closed nondisplaced fracture of anterior column of right acetabulum with routine healing s/p ORIF of pelvis on 9/20/2024  Noted. Status post ORIF of pelvis 9/20/2024 and at [facility] for rehab  - therapy consulted  - surgical bandages to remain in place post-op until orthopedic clinic follow up  - prn pain control    Hypothyroidism due to acquired atrophy of thyroid  - cont synthroid    Pure hypercholesterolemia  - cont statin    Essential hypertension  - hold lasix, aldactone, entresto in setting of hypotension  - hold metoprolol given bradycardia      VTE Risk Mitigation (From admission, onward)           Ordered     apixaban tablet 2.5 mg  2 times daily         09/30/24 0936                    Discharge Planning   LATOSHA: 10/1/2024     Code Status: DNR   Is the patient medically ready for discharge?:     Reason for patient still in hospital (select all that apply): Patient trending condition, Laboratory test, Treatment, and Consult recommendations                     Fabian Raza PA-C  Department of Hospital Medicine   [facility] - Internal Medicine Telemetry

## 2024-09-30 NOTE — ASSESSMENT & PLAN NOTE
Unclear etiology. 3/4 SIRS on arrival - tachycardia, tachypnea, leukocytosis. Organ dysfunction - PAULA. CXR c/f fluid overload. XR R Hip with Pelvis without hardware complication, stable postoperative changes.     - AF, HDS s/p IVF  - lactate and leukocytosis improved  - s/p IVF  - IV vanc + zosyn  - blood clx ngtd  - hold BP meds in setting of hypotension  - caution with fluid resuscitation with hx of HF  - cont tele

## 2024-09-30 NOTE — PROGRESS NOTES
Pharmacokinetic Assessment Follow Up: IV Vancomycin    Vancomycin serum concentration assessment(s):    Vancomycin random was drown 12 hours post dose and is above goal of 15 - 20.    Vancomycin Regimen Plan:    Draw another vancomycin random on 9/30 at 1000 ~ 24 hours post dose.   Re-dose when level is less than 20.   Pharmacy will continue to follow and monitor vancomycin.    Drug levels (last 3 results):  Recent Labs   Lab Result Units 09/29/24  0322 09/29/24  2059   Vancomycin, Random ug/mL 15.9 27.3       Please contact pharmacy at extension [phone] for questions regarding this assessment.    Thank you for the consult,   Elke Ge       Patient brief summary:  Bean Salas is a 79 y.o. male initiated on antimicrobial therapy with IV Vancomycin for treatment of sepsis    The patient's current regimen is pulse dosing.     Drug Allergies:   Review of patient's allergies indicates:  No Known Allergies    Actual Body Weight:   105.3 kg     Renal Function:   Estimated Creatinine Clearance: 29.1 mL/min (A) (based on SCr of 2.5 mg/dL (H)).,     Dialysis Method (if applicable):  N/A    CBC (last 72 hours):  Recent Labs   Lab Result Units 09/28/24  1407 09/29/24  0322   WBC K/uL 13.72* 11.15   Hemoglobin g/dL 10.3* 9.6*   Hematocrit % 32.3* 29.6*   Platelets K/uL 321 290   Gran % % 85.5* 77.7*   Lymph % % 5.2* 9.1*   Mono % % 8.1 11.1   Eosinophil % % 0.3 0.7   Basophil % % 0.1 0.3   Differential Method  Automated Automated       Metabolic Panel (last 72 hours):  Recent Labs   Lab Result Units 09/28/24  0524 09/28/24  1407 09/28/24  1619 09/29/24  0322 09/29/24  1855   Sodium mmol/L 131* 134*  --  131*  --    Sodium, Urine mmol/L  --   --   --   --  69   Potassium mmol/L 5.5* 4.8  --  5.0  --    Chloride mmol/L 93* 91*  --  92*  --    CO2 mmol/L 28 31*  --  27  --    Glucose mg/dL 213* 182*  --  171*  --    Glucose, UA   --   --  Negative  --   --    BUN mg/dL 72* 83*  --  81*  --    Creatinine mg/dL 2.7* 2.7*  --   2.5*  --    Creatinine, Urine mg/dL  --   --   --   --  80.0   Albumin g/dL  --  2.8*  --  2.6*  --    Total Bilirubin mg/dL  --  1.8*  --  1.7*  --    Alkaline Phosphatase U/L  --  129  --  128  --    AST U/L  --  23  --  24  --    ALT U/L  --  18  --  17  --    Magnesium mg/dL  --   --   --  2.8*  --    Phosphorus mg/dL  --   --   --  4.0  --        Vancomycin Administrations:  vancomycin given in the last 96 hours                     vancomycin 1,500 mg in D5W 250 mL IVPB (admixture device) (mg) 1,500 mg New Bag 09/29/24 0958    vancomycin 1,500 mg in D5W 250 mL IVPB (admixture device) (mg) 1,500 mg New Bag 09/28/24 1740                    Microbiologic Results:  Microbiology Results (last 7 days)       Procedure Component Value Units Date/Time    Blood culture [[id number]] Collected: 09/28/24 1657    Order Status: Completed Specimen: Blood from Peripheral, Forearm, Left Updated: 09/29/24 1812     Blood Culture, Routine No Growth to date      No Growth to date    Blood culture [[id number]] Collected: 09/28/24 1657    Order Status: Completed Specimen: Blood from Peripheral, Forearm, Right Updated: 09/29/24 1812     Blood Culture, Routine No Growth to date      No Growth to date

## 2024-09-30 NOTE — ASSESSMENT & PLAN NOTE
Etiology likely pre-renal 2/2 dehydration and over diuresis.  - Cr 2.7 >> 2.5, baseline near 1.0  - hold aldactone, lasix, entresto  - s/p 500cc IVF in ED  - renal US and urine studies reviewed  - start flomax  - nephro consulted, appreciate assistance  - serial CMP, avoid nephrotoxins, renally dose meds

## 2024-09-30 NOTE — SUBJECTIVE & OBJECTIVE
Interval History: AF, HDS. Retaining urine, requiring multiple straight caths with over 1L output. Urine studies and renal US unremarkable. Start flomax and consult Nephro.    Review of Systems   Constitutional:  Negative for chills and fever.   Respiratory:  Negative for chest tightness and shortness of breath.    Cardiovascular:  Negative for chest pain and leg swelling.   Gastrointestinal:  Negative for abdominal pain and nausea.   Genitourinary:  Positive for difficulty urinating.   Musculoskeletal:  Positive for arthralgias, back pain and myalgias.   Neurological:  Negative for dizziness and weakness.     Objective:     Vital Signs (Most Recent):  Temp: 97.3 °F (36.3 °C) (09/30/24 0751)  Pulse: (!) 47 (09/30/24 0751)  Resp: 19 (09/30/24 0751)  BP: 112/61 (09/30/24 0751)  SpO2: 98 % (09/30/24 0751) Vital Signs (24h Range):  Temp:  [97.3 °F (36.3 °C)-98.4 °F (36.9 °C)] 97.3 °F (36.3 °C)  Pulse:  [42-84] 47  Resp:  [18-20] 19  SpO2:  [93 %-99 %] 98 %  BP: ()/(46-63) 112/61     Weight: 105.3 kg (232 lb 2.3 oz)  Body mass index is 32.38 kg/m².    Intake/Output Summary (Last 24 hours) at 9/30/2024 1037  Last data filed at 9/30/2024 0932  Gross per 24 hour   Intake 1429.81 ml   Output 2750 ml   Net -1320.19 ml         Physical Exam  Vitals and nursing note reviewed.   Constitutional:       Appearance: He is well-developed. He is obese.   Eyes:      Pupils: Pupils are equal, round, and reactive to light.   Cardiovascular:      Rate and Rhythm: Normal rate and regular rhythm.   Pulmonary:      Effort: Pulmonary effort is normal.      Breath sounds: Normal breath sounds.   Abdominal:      Palpations: Abdomen is soft.      Tenderness: There is no abdominal tenderness.   Musculoskeletal:         General: No tenderness.   Skin:     General: Skin is warm and dry.   Neurological:      Mental Status: He is alert and oriented to person, place, and time.   Psychiatric:         Behavior: Behavior normal.              Significant Labs: All pertinent labs within the past 24 hours have been reviewed.  CBC:   Recent Labs   Lab 09/28/24  1407 09/28/24  1502 09/29/24  0322 09/30/24  0325   WBC 13.72*  --  11.15 7.41   HGB 10.3*  --  9.6* 9.1*   HCT 32.3* 28* 29.6* 28.7*     --  290 301     CMP:   Recent Labs   Lab 09/28/24  1407 09/29/24  0322 09/30/24  0325   * 131* 134*   K 4.8 5.0 4.6   CL 91* 92* 98   CO2 31* 27 29   * 171* 122*   BUN 83* 81* 79*   CREATININE 2.7* 2.5* 2.3*   CALCIUM 9.4 9.2 8.7   PROT 6.4 5.9* 5.6*   ALBUMIN 2.8* 2.6* 2.3*   BILITOT 1.8* 1.7* 1.5*   ALKPHOS 129 128 115   AST 23 24 38   ALT 18 17 24   ANIONGAP 12 12 7*       Significant Imaging: I have reviewed all pertinent imaging results/findings within the past 24 hours.

## 2024-09-30 NOTE — PLAN OF CARE
[facility] - Internal Medicine Telemetry  Initial Discharge Assessment       Primary Care Provider: Ramirez Levine MD    Admission Diagnosis: Chest pain [R07.9]  Hypotension [I95.9]    Admission Date: 9/28/2024  Expected Discharge Date: 10/1/2024    Transition of Care Barriers: (P) None    Payor: [employer] MANAGED MEDICARE / Plan: [employer] SNP HMO PPO SPECIAL NEEDS / Product Type: Medicare Advantage /     Extended Emergency Contact Information  Primary Emergency Contact: Efren Salas  Mobile Phone: [phone]  Relation: Son    Discharge Plan A: (P) Skilled Nursing Facility  Discharge Plan B: (P) Home Health      [id number] - [place] - [address]  [address]  [place] [address]  Phone: [phone] Fax: [phone]      Initial Assessment (most recent)       Adult Discharge Assessment - 09/30/24 1454          Discharge Assessment    Assessment Type Discharge Planning Assessment (P)      Confirmed/corrected address, phone number and insurance Yes (P)      Confirmed Demographics Correct on Facesheet (P)      Source of Information patient;family (P)      When was your last doctors appointment? 09/18/24 (P)      Communicated LATOSHA with patient/caregiver Date not available/Unable to determine (P)      Reason For Admission PAULA (P)      People in Home alone (P)      Facility Arrived From: [facility] (P)      Do you expect to return to your current living situation? Yes (P)      Do you have help at home or someone to help you manage your care at home? No (P)      Prior to hospitilization cognitive status: Unable to Assess (P)      Current cognitive status: Alert/Oriented (P)      Walking or Climbing Stairs Difficulty yes (P)      Walking or Climbing Stairs ambulation difficulty, requires equipment (P)      Mobility Management RW (P)      Dressing/Bathing Difficulty yes (P)      Dressing/Bathing bathing difficulty, requires equipment (P)      Dressing/Bathing Management shower chair (P)      Home  Layout Able to live on 1st floor (P)      Equipment Currently Used at Home walker, rolling;raised toilet;oxygen;shower chair (P)      Readmission within 30 days? Yes (P)      Do you currently have service(s) that help you manage your care at home? No (P)      Do you take prescription medications? Yes (P)      Do you have prescription coverage? Yes (P)      Coverage [employer] (P)      Do you have any problems affording any of your prescribed medications? No (P)      Is the patient taking medications as prescribed? yes (P)      Who is going to help you get home at discharge? SNF anticipated (P)      How do you get to doctors appointments? family or friend will provide (P)      Are you on dialysis? No (P)      Do you take coumadin? No (P)      Discharge Plan A Skilled Nursing Facility (P)      Discharge Plan B Home Health (P)      DME Needed Upon Discharge  none (P)      Discharge Plan discussed with: Patient;Adult children (P)      Transition of Care Barriers None (P)         Physical Activity    On average, how many days per week do you engage in moderate to strenuous exercise (like a brisk walk)? 0 days (P)      On average, how many minutes do you engage in exercise at this level? 0 min (P)         Financial Resource Strain    How hard is it for you to pay for the very basics like food, housing, medical care, and heating? Not very hard (P)         Housing Stability    In the last 12 months, was there a time when you were not able to pay the mortgage or rent on time? No (P)      At any time in the past 12 months, were you homeless or living in a shelter (including now)? No (P)         Transportation Needs    Has the lack of transportation kept you from medical appointments, meetings, work or from getting things needed for daily living? No (P)         Food Insecurity    Within the past 12 months, you worried that your food would run out before you got the money to buy more. Never true (P)      Within the past 12 months,  the food you bought just didn't last and you didn't have money to get more. Never true (P)         Stress    Do you feel stress - tense, restless, nervous, or anxious, or unable to sleep at night because your mind is troubled all the time - these days? To some extent (P)         Social Isolation    How often do you feel lonely or isolated from those around you?  Never (P)         Alcohol Use    Q1: How often do you have a drink containing alcohol? Never (P)      Q2: How many drinks containing alcohol do you have on a typical day when you are drinking? Patient does not drink (P)      Q3: How often do you have six or more drinks on one occasion? Never (P)         Utilities    In the past 12 months has the electric, gas, oil, or water company threatened to shut off services in your home? No (P)         Health Literacy    How often do you need to have someone help you when you read instructions, pamphlets, or other written material from your doctor or pharmacy? Never (P)                  CM spoke with pt and son Efren in room.  DC plan SNF.  Son states that pt came from [facility]. Per son, they would like a list of other in-network facilities.    Lorena from [facility] confirms that pt came directly to hospital from [facility].  CM sent referral to [facility].    4:18 PM  CM provided list of other in-network facilities in room.    SAMANTHA Salter, BS, RN, CCM      Discharge Plan A and Plan B have been determined by review of patient's clinical status, future medical and therapeutic needs, and coverage/benefits for post-acute care in coordination with multidisciplinary team members.

## 2024-09-30 NOTE — PT/OT/SLP PROGRESS
"Physical Therapy Treatment    Patient Name:  Bean Salas   MRN:  [id number]    Recommendations:     Discharge Recommendations: Moderate Intensity Therapy  Discharge Equipment Recommendations: wheelchair, hospital bed, hip kit  Barriers to discharge: Decreased caregiver support; Lives alone and requires assist for mobility    Assessment:     Bean Salas is a 79 y.o. male admitted with a medical diagnosis of PAULA (acute kidney injury).  He presents with the following impairments/functional limitations: weakness, impaired balance, decreased safety awareness, pain, impaired self care skills, impaired functional mobility, gait instability, decreased lower extremity function, orthopedic precautions.    Pt seen this date primarily for patient education on benefits of regular participation with OT/PT to improve functional status. Pt requesting rest today with notably tense disposition present while simply laying in bed. Pt tightly gripping bed rail, stating "I just want y'all to know I'm not suicidal or anything. I just feel like all these little things keep happening, and I'm on the verge of dying." Pt able to perform rolling left/right with Max A and use of bed rail to relief pressure on buttocks. Pt will continue to benefit from skilled PT services in order to further promote functional mobility, endurance, and BLE strengthening. Pt remains appropriate for PT treatment at this time.    Rehab Prognosis: Fair; patient would benefit from acute skilled PT services to address these deficits and reach maximum level of function.    Recent Surgery: * No surgery found *      Plan:     During this hospitalization, patient to be seen 4 x/week to address the identified rehab impairments via gait training, therapeutic activities, therapeutic exercises, neuromuscular re-education and progress toward the following goals:    Plan of Care Expires:  10/28/24    Subjective     Chief Complaint: "Therapy's not doing me any good right " "now. I just need to rest."  Patient/Family Comments/goals: agreeable to brief session focused on pt education on current medical disposition, regular participation in PT/OT  Pain/Comfort:  Pain Rating 1: other (see comments) (not rated)  Location - Side 1: Bilateral  Location - Orientation 1: generalized  Location 1: hip  Pain Addressed 1: Reposition, Distraction      Objective:     Communicated with nursing prior to session.  Patient found HOB elevated with bed alarm, telemetry, oxygen, peripheral IV, PureWick upon PT entry to room.     General Precautions: Standard, fall  Orthopedic Precautions: RLE weight bearing as tolerated, LLE weight bearing as tolerated  Braces: N/A  Respiratory Status: Nasal cannula, flow 3 L/min     Functional Mobility:  Bed Mobility:     Rolling Left:  maximal assistance, bed rail  Rolling Right: maximal assistance, bed rail  Scooting: stand by assistance  Bridging: stand by assistance (mini bridges to reposition in bed, induce pressure relief)      AM-PAC 6 CLICK MOBILITY  Turning over in bed (including adjusting bedclothes, sheets and blankets)?: 2  Sitting down on and standing up from a chair with arms (e.g., wheelchair, bedside commode, etc.): 2  Moving from lying on back to sitting on the side of the bed?: 2  Moving to and from a bed to a chair (including a wheelchair)?: 1  Need to walk in hospital room?: 1  Climbing 3-5 steps with a railing?: 1  Basic Mobility Total Score: 9       Treatment & Education:  Patient educated on role of therapy, goals of session, and benefits of out of bed mobility.   Patient educated on current medical disposition and regular PT/OT participation to improve functional status.  Instructed on use of call button and importance of calling nursing staff for assistance with mobility   Questions/concerns addressed within PTA scope of practice.  Pt verbalized understanding.    Patient left HOB elevated with all lines intact and call button in reach..    GOALS: "   Multidisciplinary Problems       Physical Therapy Goals          Problem: Physical Therapy    Goal Priority Disciplines Outcome Interventions   Physical Therapy Goal     PT, PT/OT Progressing    Description: PT goals until 10/14/24    1. Pt supine to sit with moderate assist-not met  2. Pt sit to supine with moderate assist-not met  3. Pt sit to stand with RW with WBAT B LE with moderate assist-not met  4. Pt to perform gait 5 steps with RW with WBAT B LE with max assist.-not met  5. Pt to transfer bed to/from bedside chair/w/c with LRAD as needed for safety with WBAT B LE with max assist.-not met  6. Pt to perform B LE exs in sitting or supine x 10 reps to strengthen B LE to improve functional mobility.-not met    DME Justifications (see above for complete DME recommendations)    Hospital Bed-  Patient requires the head of the bed to be elevated more than 30 degrees most of the time due to congestive heart failure, chronic pulmonary disease, and/or problems with aspiration. The use of pillows and wedges is not adequate for the proper body positioning this patient requires     Wheelchair-  Patient has a mobility limitation that significantly impairs their ability to participate in one or more mobility related activities of daily living in customary locations in the home. The mobility limitation cannot be sufficiently resolved by the use of a cane or walker. The use of a manual wheelchair will greatly improve the patient's ability to participate in MRADLs. The patient will use the wheelchair on a regular basis at home. They have expressed their willingness to use a manual wheelchair in the home, and have a caregiver who is available and willing to assist with the wheelchair if needed.                             Time Tracking:     PT Received On: 09/30/24  PT Start Time: 1328     PT Stop Time: 1336  PT Total Time (min): 8 min     Billable Minutes: Therapeutic Activity 8    Treatment Type: Treatment  PT/PTA: PTA      Number of PTA visits since last PT visit: 1 09/30/2024

## 2024-10-01 LAB
ALBUMIN SERPL BCP-MCNC: 2.2 G/DL (ref 3.5–5.2)
ALP SERPL-CCNC: 111 U/L (ref 55–135)
ALT SERPL W/O P-5'-P-CCNC: 24 U/L (ref 10–44)
ANION GAP SERPL CALC-SCNC: 5 MMOL/L (ref 8–16)
AST SERPL-CCNC: 42 U/L (ref 10–40)
AV AREA BY CONTINUOUS VTI: 1.7 CM2
AV INDEX (PROSTH): 0.52
AV LVOT MEAN GRADIENT: 2 MMHG
AV LVOT PEAK GRADIENT: 3 MMHG
AV MEAN GRADIENT: 6.4 MMHG
AV PEAK GRADIENT: 13 MMHG
AV VALVE AREA BY VELOCITY RATIO: 1.6 CM²
AV VALVE AREA: 1.6 CM2
AV VELOCITY RATIO: 0.5
BASOPHILS # BLD AUTO: 0.02 K/UL (ref 0–0.2)
BASOPHILS NFR BLD: 0.3 % (ref 0–1.9)
BILIRUB SERPL-MCNC: 1.4 MG/DL (ref 0.1–1)
BSA FOR ECHO PROCEDURE: 2.3 M2
BUN SERPL-MCNC: 51 MG/DL (ref 8–23)
CALCIUM SERPL-MCNC: 8.3 MG/DL (ref 8.7–10.5)
CHLORIDE SERPL-SCNC: 102 MMOL/L (ref 95–110)
CO2 SERPL-SCNC: 28 MMOL/L (ref 23–29)
CREAT SERPL-MCNC: 1.5 MG/DL (ref 0.5–1.4)
CV ECHO LV RWT: 0.38 CM
DIFFERENTIAL METHOD BLD: ABNORMAL
DOP CALC AO PEAK VEL: 1.8 M/S
DOP CALC AO VTI: 34.2 CM
DOP CALC LVOT AREA: 3.1 CM2
DOP CALC LVOT DIAMETER: 2 CM
DOP CALC LVOT PEAK VEL: 0.9 M/S
DOP CALC LVOT STROKE VOLUME: 55.9 CM3
DOP CALCLVOT PEAK VEL VTI: 17.8 CM
E WAVE DECELERATION TIME: 374.42 MS
E/A RATIO: 1.19
E/E' RATIO: 6.25 M/S
ECHO EF ESTIMATED: 49 %
ECHO LV POSTERIOR WALL: 1.1 CM (ref 0.6–1.1)
EJECTION FRACTION: 55 %
EOSINOPHIL # BLD AUTO: 0.1 K/UL (ref 0–0.5)
EOSINOPHIL NFR BLD: 1.7 % (ref 0–8)
ERYTHROCYTE [DISTWIDTH] IN BLOOD BY AUTOMATED COUNT: 14.9 % (ref 11.5–14.5)
EST. GFR  (NO RACE VARIABLE): 46.8 ML/MIN/1.73 M^2
FRACTIONAL SHORTENING: 24.1 % (ref 28–44)
GLUCOSE SERPL-MCNC: 167 MG/DL (ref 70–110)
HCT VFR BLD AUTO: 27.3 % (ref 40–54)
HGB BLD-MCNC: 8.5 G/DL (ref 14–18)
IMM GRANULOCYTES # BLD AUTO: 0.03 K/UL (ref 0–0.04)
IMM GRANULOCYTES NFR BLD AUTO: 0.5 % (ref 0–0.5)
INTERVENTRICULAR SEPTUM: 0.9 CM (ref 0.6–1.1)
IVC DIAMETER: 1.89 CM
IVRT: 83.73 MS
LA MAJOR: 8.17 CM
LA MINOR: 8.1 CM
LA WIDTH: 5.86 CM
LEFT ATRIUM SIZE: 5.95 CM
LEFT ATRIUM VOLUME INDEX MOD: 70.1 ML/M2
LEFT ATRIUM VOLUME INDEX: 107.2 ML/M2
LEFT ATRIUM VOLUME MOD: 157.78 ML
LEFT ATRIUM VOLUME: 241.09 CM3
LEFT INTERNAL DIMENSION IN SYSTOLE: 4.4 CM (ref 2.1–4)
LEFT VENTRICLE DIASTOLIC VOLUME INDEX: 75.19 ML/M2
LEFT VENTRICLE DIASTOLIC VOLUME: 169.17 ML
LEFT VENTRICLE MASS INDEX: 103.6 G/M2
LEFT VENTRICLE SYSTOLIC VOLUME INDEX: 38.7 ML/M2
LEFT VENTRICLE SYSTOLIC VOLUME: 87.08 ML
LEFT VENTRICULAR INTERNAL DIMENSION IN DIASTOLE: 5.8 CM (ref 3.5–6)
LEFT VENTRICULAR MASS: 233.1 G
LV LATERAL E/E' RATIO: 6.25
LV SEPTAL E/E' RATIO: 6.25
LYMPHOCYTES # BLD AUTO: 0.7 K/UL (ref 1–4.8)
LYMPHOCYTES NFR BLD: 10.4 % (ref 18–48)
MAGNESIUM SERPL-MCNC: 2.7 MG/DL (ref 1.6–2.6)
MCH RBC QN AUTO: 32.2 PG (ref 27–31)
MCHC RBC AUTO-ENTMCNC: 31.1 G/DL (ref 32–36)
MCV RBC AUTO: 103 FL (ref 82–98)
MONOCYTES # BLD AUTO: 0.6 K/UL (ref 0.3–1)
MONOCYTES NFR BLD: 9.6 % (ref 4–15)
MV A" WAVE DURATION": 76.12 MS
MV PEAK A VEL: 0.42 M/S
MV PEAK E VEL: 0.5 M/S
MV VALVE AREA BY PLANIMETRY: 2.83 CM2
NEUTROPHILS # BLD AUTO: 5.1 K/UL (ref 1.8–7.7)
NEUTROPHILS NFR BLD: 77.5 % (ref 38–73)
NRBC BLD-RTO: 0 /100 WBC
OHS CV RV/LV RATIO: 0.83 CM
PHOSPHATE SERPL-MCNC: 2.4 MG/DL (ref 2.7–4.5)
PISA TR MAX VEL: 3.28 M/S
PLATELET # BLD AUTO: 314 K/UL (ref 150–450)
PMV BLD AUTO: 11.5 FL (ref 9.2–12.9)
POCT GLUCOSE: 161 MG/DL (ref 70–110)
POCT GLUCOSE: 163 MG/DL (ref 70–110)
POCT GLUCOSE: 172 MG/DL (ref 70–110)
POTASSIUM SERPL-SCNC: 4.1 MMOL/L (ref 3.5–5.1)
PROT SERPL-MCNC: 5.2 G/DL (ref 6–8.4)
PULM VEIN A" WAVE DURATION": 76.12 MS
PULM VEIN S/D RATIO: 0.74
PULMONIC VEIN PEAK A VELOCITY: 0.2 M/S
PV PEAK D VEL: 0.39 M/S
PV PEAK S VEL: 0.29 M/S
RA MAJOR: 5.73 CM
RA PRESSURE ESTIMATED: 3 MMHG
RA WIDTH: 4.88 CM
RBC # BLD AUTO: 2.64 M/UL (ref 4.6–6.2)
RIGHT ATRIAL AREA: 22.2 CM2
RIGHT VENTRICLE DIASTOLIC BASEL DIMENSION: 4.8 CM
RV TB RVSP: 6 MMHG
RV TISSUE DOPPLER FREE WALL SYSTOLIC VELOCITY 1 (APICAL 4 CHAMBER VIEW): 17.12 CM/S
SINUS: 3.52 CM
SODIUM SERPL-SCNC: 135 MMOL/L (ref 136–145)
STJ: 2.81 CM
TDI LATERAL: 0.08 M/S
TDI SEPTAL: 0.08 M/S
TDI: 0.08 M/S
TR MAX PG: 43 MMHG
TRICUSPID ANNULAR PLANE SYSTOLIC EXCURSION: 1.64 CM
TV PEAK GRADIENT: 43 MMHG
TV REST PULMONARY ARTERY PRESSURE: 46 MMHG
VANCOMYCIN SERPL-MCNC: 13.5 UG/ML
WBC # BLD AUTO: 6.57 K/UL (ref 3.9–12.7)
Z-SCORE OF LEFT VENTRICULAR DIMENSION IN END DIASTOLE: -3.4
Z-SCORE OF LEFT VENTRICULAR DIMENSION IN END SYSTOLE: -0.91

## 2024-10-01 PROCEDURE — 97110 THERAPEUTIC EXERCISES: CPT | Mod: HCNC

## 2024-10-01 PROCEDURE — 63600175 PHARM REV CODE 636 W HCPCS: Mod: HCNC

## 2024-10-01 PROCEDURE — 83735 ASSAY OF MAGNESIUM: CPT | Mod: HCNC

## 2024-10-01 PROCEDURE — 80053 COMPREHEN METABOLIC PANEL: CPT | Mod: HCNC

## 2024-10-01 PROCEDURE — 99900035 HC TECH TIME PER 15 MIN (STAT): Mod: HCNC

## 2024-10-01 PROCEDURE — 80202 ASSAY OF VANCOMYCIN: CPT | Mod: HCNC | Performed by: STUDENT IN AN ORGANIZED HEALTH CARE EDUCATION/TRAINING PROGRAM

## 2024-10-01 PROCEDURE — 25000003 PHARM REV CODE 250: Mod: HCNC

## 2024-10-01 PROCEDURE — 27000221 HC OXYGEN, UP TO 24 HOURS: Mod: HCNC

## 2024-10-01 PROCEDURE — 21400001 HC TELEMETRY ROOM: Mod: HCNC

## 2024-10-01 PROCEDURE — 97530 THERAPEUTIC ACTIVITIES: CPT | Mod: HCNC

## 2024-10-01 PROCEDURE — 94660 CPAP INITIATION&MGMT: CPT | Mod: HCNC

## 2024-10-01 PROCEDURE — 84100 ASSAY OF PHOSPHORUS: CPT | Mod: HCNC

## 2024-10-01 PROCEDURE — 85025 COMPLETE CBC W/AUTO DIFF WBC: CPT | Mod: HCNC

## 2024-10-01 PROCEDURE — 25000003 PHARM REV CODE 250: Mod: HCNC | Performed by: STUDENT IN AN ORGANIZED HEALTH CARE EDUCATION/TRAINING PROGRAM

## 2024-10-01 RX ADMIN — PIPERACILLIN SODIUM AND TAZOBACTAM SODIUM 4.5 G: 4; .5 INJECTION, POWDER, FOR SOLUTION INTRAVENOUS at 05:10

## 2024-10-01 RX ADMIN — OXYCODONE 5 MG: 5 TABLET ORAL at 05:10

## 2024-10-01 RX ADMIN — OXYCODONE 5 MG: 5 TABLET ORAL at 09:10

## 2024-10-01 RX ADMIN — OXYCODONE 5 MG: 5 TABLET ORAL at 04:10

## 2024-10-01 RX ADMIN — OXYCODONE 5 MG: 5 TABLET ORAL at 01:10

## 2024-10-01 RX ADMIN — ACETAMINOPHEN 650 MG: 325 TABLET ORAL at 01:10

## 2024-10-01 RX ADMIN — TAMSULOSIN HYDROCHLORIDE 0.4 MG: 0.4 CAPSULE ORAL at 08:10

## 2024-10-01 RX ADMIN — APIXABAN 2.5 MG: 2.5 TABLET, FILM COATED ORAL at 08:10

## 2024-10-01 RX ADMIN — SENNOSIDES AND DOCUSATE SODIUM 1 TABLET: 50; 8.6 TABLET ORAL at 08:10

## 2024-10-01 RX ADMIN — LEVOTHYROXINE SODIUM 200 MCG: 100 TABLET ORAL at 05:10

## 2024-10-01 RX ADMIN — ASPIRIN 81 MG: 81 TABLET, COATED ORAL at 08:10

## 2024-10-01 RX ADMIN — ATORVASTATIN CALCIUM 10 MG: 10 TABLET, FILM COATED ORAL at 08:10

## 2024-10-01 NOTE — ASSESSMENT & PLAN NOTE
Unclear etiology. 3/4 SIRS on arrival - tachycardia, tachypnea, leukocytosis. Organ dysfunction - PAULA. CXR c/f fluid overload. XR R Hip with Pelvis without hardware complication, stable postoperative changes.     - AF, HDS s/p IVF  - lactate and leukocytosis improved  - s/p IVF  - d/c vanc/zosyn, blood clx ngtd  - hold BP meds in setting of hypotension  - s/p 2L IVF 9/30  - f/u echo  - cont tele

## 2024-10-01 NOTE — PT/OT/SLP PROGRESS
"Occupational Therapy      Patient Name:  Bean Salas   MRN:  [id number]    Patient not seen today secondary to Patient fatigue, Patient unwilling to participate, Pain. Attempt made at 9:03 am - pt refusing secondary to increased fatigue and pain levels. "I finally just got comfortable, and I'm not moving." Provided education on importance of OOB mobilization to prevent stiffness/contracture and promote functional strength and ROM needed to return to PLOF - pt still declining, stating, "I'll get therapy at my next facility," and, "I'll be out of here by tomorrow." Will follow-up as appropriate.    10/1/2024  "

## 2024-10-01 NOTE — PLAN OF CARE
Met with pt to review discharge recommendation of SNF and is agreeable to plan    Patient/family provided list of facilities in-network with patient's payor plan. Providers that are owned, operated, or affiliated with [facility] are included on the list.     Notified that referral sent to below listed facilities from in-network list based on proximity to home/family support:   [facility] [place], [facility], [facility] [facility], [facility], [place].    Patient/family instructed to identify preference.    Preferred Facility: (if more than 1, listed in order of descending preference)  1.[facility]    If an additional preferred facility not listed above is identified, additional referral to be sent. If above facilities unable to accept, will send additional referrals to in-network providers.     Per therapy notes, pt didn't participate in therapy yesterday.  CM instructed pt in importance of participating in therapy; insurance less likely to approve SNF LOC if pt not participating in therapy.  Pt v/u, states he will participate in therapy.    3:15 PM  CM requested CHW team 1 to call in [place].  CM uploaded Pas[facility] to CP.    4:01 PM  No accepting facilities in CP.  CM expanded search, sent referrals to [facility] [place], [facility], [facility].      CM called pt's son Efren [phone] to discuss d/c plan.  LVM requesting call back.    VENTURA SalterN, BS, RN, CCM

## 2024-10-01 NOTE — PT/OT/SLP PROGRESS
"Physical Therapy      Patient Name:  Bean Salas   MRN:  [id number]    Patient not seen today secondary to Patient unwilling to participate and stated "I don't want therapy.". Will follow-up as appropriate within POC.    "

## 2024-10-01 NOTE — PROGRESS NOTES
[facility] - Internal Medicine [facility] Medicine  Progress Note    Patient Name: Bean Salas  MRN: [id number]  Patient Class: IP- Inpatient   Admission Date: 9/28/2024  Length of Stay: 3 days  Attending Physician: Miguel Asencio MD  Primary Care Provider: Ramirez Levine MD        Subjective:     Principal Problem:PAULA (acute kidney injury)        HPI:  Bean Salas is a 79 y.o. male with PMHx of HTN, HLD, hypothyroidism, combined CHF (EF 20-25%), CAD, A-fib, chronic respiratory failure, and AKHIL who presents to [facility] ED from SNF due to hypotension and worsening kidney function on labs. Patient with recent pelvic fracture fixation 09/2024. He reports chronic back pain. He denies HA, fever, chills, chest pain, shortness of breath, abdominal pain, n/v/d, urinary symptoms, numbness or tingling.     In the ED: Initially hypotensive but improved s/p IVF. On admit AF, HDS, 2L NC. CBC with WBC 13.72, Hgb stable. CMP with K 5.5 >> 4.8, Cr 2.7 (BL near 1.0). T bili 1.8. Hep C and HIV non-reactive. POC lactate 3.21. Initially with some EKG changes due to hyperkalemia but improved after shifting. CXR c/f fluid overload. XR R Hip with Pelvis without hardware complication, stable postoperative changes. Blood cultures x 2. Shifted in ED and received IVF and IV vanc + zosyn. Admitted to .    Overview/Hospital Course:  Bean Salas is admitted to  for management of hypotension and PAULA. Imaging reviewed. Initially hyperkalemic and shifted with improvement in K. Lactate elevated and leukocytosis on admit, now improving. Received IVF and antibiotics. Therapy consulted. Cr remains elevated but slowly down trending. Urine studies and renal US unremarkable. Started on flomax, harrison placed with good urine return. Remained persistently hypotensive 9/30, now post 2L IVF. Blood cultures NGTD, antibiotics stopped.     Interval History: AF, soft BP but HDS. Received 2L IVF yesterday. Good urine return on harrison, cont  flomax. Blood clx ngtd, abx stopped. Echo ordered.    Review of Systems   Constitutional:  Negative for chills and fever.   Respiratory:  Negative for chest tightness and shortness of breath.    Cardiovascular:  Negative for chest pain and leg swelling.   Gastrointestinal:  Negative for abdominal pain and nausea.   Genitourinary:  Positive for difficulty urinating.   Musculoskeletal:  Positive for arthralgias, back pain and myalgias.   Neurological:  Negative for dizziness and weakness.     Objective:     Vital Signs (Most Recent):  Temp: 98.2 °F (36.8 °C) (10/01/24 0752)  Pulse: (!) 50 (10/01/24 0752)  Resp: 18 (10/01/24 0933)  BP: (!) 114/52 (10/01/24 0752)  SpO2: 95 % (10/01/24 1032) Vital Signs (24h Range):  Temp:  [97.9 °F (36.6 °C)-98.6 °F (37 °C)] 98.2 °F (36.8 °C)  Pulse:  [46-72] 50  Resp:  [17-20] 18  SpO2:  [95 %-98 %] 95 %  BP: ()/(38-52) 114/52     Weight: 105.3 kg (232 lb 2.3 oz)  Body mass index is 32.38 kg/m².    Intake/Output Summary (Last 24 hours) at 10/1/2024 1039  Last data filed at 10/1/2024 0557  Gross per 24 hour   Intake 3223.02 ml   Output 2600 ml   Net 623.02 ml         Physical Exam  Vitals and nursing note reviewed.   Constitutional:       Appearance: He is well-developed. He is obese.   Eyes:      Pupils: Pupils are equal, round, and reactive to light.   Cardiovascular:      Rate and Rhythm: Normal rate and regular rhythm.   Pulmonary:      Effort: Pulmonary effort is normal.      Breath sounds: Normal breath sounds.   Abdominal:      Palpations: Abdomen is soft.      Tenderness: There is no abdominal tenderness.   Musculoskeletal:         General: No tenderness.      Right lower leg: Edema present.      Left lower leg: Edema present.   Skin:     General: Skin is warm and dry.   Neurological:      Mental Status: He is alert and oriented to person, place, and time.   Psychiatric:         Behavior: Behavior normal.             Significant Labs: All pertinent labs within the past 24  hours have been reviewed.  CBC:   Recent Labs   Lab 09/30/24  0325 09/30/24  1628 10/01/24  0243   WBC 7.41 7.33 6.57   HGB 9.1* 8.5* 8.5*   HCT 28.7* 26.5* 27.3*    284 314     CMP:   Recent Labs   Lab 09/30/24  0325 09/30/24  1628 10/01/24  0243   * 133* 135*   K 4.6 4.6 4.1   CL 98 98 102   CO2 29 29 28   * 159* 167*   BUN 79* 64* 51*   CREATININE 2.3* 1.8* 1.5*   CALCIUM 8.7 8.3* 8.3*   PROT 5.6* 5.3* 5.2*   ALBUMIN 2.3* 2.2* 2.2*   BILITOT 1.5* 1.4* 1.4*   ALKPHOS 115 113 111   AST 38 43* 42*   ALT 24 22 24   ANIONGAP 7* 6* 5*       Significant Imaging: I have reviewed all pertinent imaging results/findings within the past 24 hours.    Assessment/Plan:      * PAULA (acute kidney injury)  Etiology likely pre-renal 2/2 dehydration and over diuresis.  - Cr 2.7 >> 2.5, >> 2.3 >> 1.8 >> 1.5, baseline near 1.0  - hold aldactone, lasix, entresto  - s/p 500cc IVF in ED  - renal US and urine studies reviewed  - cont flomax, harrison placed  - s/p 2L IVF 9/30  - monitor I/Os  - serial CMP, avoid nephrotoxins, renally dose meds    SIRS (systemic inflammatory response syndrome)  Unclear etiology. 3/4 SIRS on arrival - tachycardia, tachypnea, leukocytosis. Organ dysfunction - PAULA. CXR c/f fluid overload. XR R Hip with Pelvis without hardware complication, stable postoperative changes.     - AF, HDS s/p IVF  - lactate and leukocytosis improved  - s/p IVF  - d/c vanc/zosyn, blood clx ngtd  - hold BP meds in setting of hypotension  - s/p 2L IVF 9/30  - f/u echo  - cont tele    Sepsis  See SIRS.    Chronic hypoxemic respiratory failure  History of chronic hypoxic respiratory failure. Currently on 2L NC with good saturations. Wean oxygen as tolerated. Prn duonebs.    Paroxysmal atrial flutter  - cont eliquis at renal dosing, hold amio in bradycardia  - cont tele    AKHIL (obstructive sleep apnea)  - CPAP qhs    Coronary artery disease involving native coronary artery of native heart without angina pectoris  Continue  aspirin and statin. Monitor for S/Sx of angina/ACS. Continue to monitor on telemetry.     Chronic combined systolic and diastolic congestive heart failure  History of combined CHF (EF 20-25%). Last echo reviewed. No signs of acute overload on exam.   - , lower than baseline  - hold GDMT in setting of hypotension  - okay to restart GDMT as BP improves  - I/Os, 1.5L fluid restriction  - cont tele    Advance care planning  Patient confirmed DNR.    Closed nondisplaced fracture of anterior column of right acetabulum with routine healing s/p ORIF of pelvis on 9/20/2024  Noted. Status post ORIF of pelvis 9/20/2024 and at SNF for rehab  - therapy consulted  - surgical bandages to remain in place post-op until orthopedic clinic follow up  - prn pain control    Hypothyroidism due to acquired atrophy of thyroid  - cont synthroid    Pure hypercholesterolemia  - cont statin    Essential hypertension  - hold lasix, aldactone, entresto in setting of hypotension  - hold metoprolol given bradycardia      VTE Risk Mitigation (From admission, onward)           Ordered     apixaban tablet 2.5 mg  2 times daily         09/30/24 1307                    Discharge Planning   LATOSHA: 10/3/2024     Code Status: DNR   Is the patient medically ready for discharge?:     Reason for patient still in hospital (select all that apply): Patient trending condition, Laboratory test, Treatment, and Imaging  Discharge Plan A: Skilled Nursing Facility                  Fabian Raza PA-C  Department of Hospital Medicine   [facility] - Internal Medicine Telemetry

## 2024-10-01 NOTE — SUBJECTIVE & OBJECTIVE
Interval History: AF, soft BP but HDS. Received 2L IVF yesterday. Good urine return on harrison, cont flomax. Blood clx ngtd, abx stopped. Echo ordered.    Review of Systems   Constitutional:  Negative for chills and fever.   Respiratory:  Negative for chest tightness and shortness of breath.    Cardiovascular:  Negative for chest pain and leg swelling.   Gastrointestinal:  Negative for abdominal pain and nausea.   Genitourinary:  Positive for difficulty urinating.   Musculoskeletal:  Positive for arthralgias, back pain and myalgias.   Neurological:  Negative for dizziness and weakness.     Objective:     Vital Signs (Most Recent):  Temp: 98.2 °F (36.8 °C) (10/01/24 0752)  Pulse: (!) 50 (10/01/24 0752)  Resp: 18 (10/01/24 0933)  BP: (!) 114/52 (10/01/24 0752)  SpO2: 95 % (10/01/24 1032) Vital Signs (24h Range):  Temp:  [97.9 °F (36.6 °C)-98.6 °F (37 °C)] 98.2 °F (36.8 °C)  Pulse:  [46-72] 50  Resp:  [17-20] 18  SpO2:  [95 %-98 %] 95 %  BP: ()/(38-52) 114/52     Weight: 105.3 kg (232 lb 2.3 oz)  Body mass index is 32.38 kg/m².    Intake/Output Summary (Last 24 hours) at 10/1/2024 1039  Last data filed at 10/1/2024 0557  Gross per 24 hour   Intake 3223.02 ml   Output 2600 ml   Net 623.02 ml         Physical Exam  Vitals and nursing note reviewed.   Constitutional:       Appearance: He is well-developed. He is obese.   Eyes:      Pupils: Pupils are equal, round, and reactive to light.   Cardiovascular:      Rate and Rhythm: Normal rate and regular rhythm.   Pulmonary:      Effort: Pulmonary effort is normal.      Breath sounds: Normal breath sounds.   Abdominal:      Palpations: Abdomen is soft.      Tenderness: There is no abdominal tenderness.   Musculoskeletal:         General: No tenderness.      Right lower leg: Edema present.      Left lower leg: Edema present.   Skin:     General: Skin is warm and dry.   Neurological:      Mental Status: He is alert and oriented to person, place, and time.   Psychiatric:          Behavior: Behavior normal.             Significant Labs: All pertinent labs within the past 24 hours have been reviewed.  CBC:   Recent Labs   Lab 09/30/24 0325 09/30/24 1628 10/01/24  0243   WBC 7.41 7.33 6.57   HGB 9.1* 8.5* 8.5*   HCT 28.7* 26.5* 27.3*    284 314     CMP:   Recent Labs   Lab 09/30/24  0325 09/30/24  1628 10/01/24  0243   * 133* 135*   K 4.6 4.6 4.1   CL 98 98 102   CO2 29 29 28   * 159* 167*   BUN 79* 64* 51*   CREATININE 2.3* 1.8* 1.5*   CALCIUM 8.7 8.3* 8.3*   PROT 5.6* 5.3* 5.2*   ALBUMIN 2.3* 2.2* 2.2*   BILITOT 1.5* 1.4* 1.4*   ALKPHOS 115 113 111   AST 38 43* 42*   ALT 24 22 24   ANIONGAP 7* 6* 5*       Significant Imaging: I have reviewed all pertinent imaging results/findings within the past 24 hours.

## 2024-10-01 NOTE — PT/OT/SLP PROGRESS
Occupational Therapy   Treatment    Name: Bean Salas  MRN: [id number]  Admitting Diagnosis:  PAULA (acute kidney injury)       Recommendations:     Discharge Recommendations: Moderate Intensity Therapy  Discharge Equipment Recommendations:  hip kit, hospital bed, wheelchair  Barriers to discharge:  Decreased caregiver support    Assessment:     Bean Salas is a 79 y.o. male with a medical diagnosis of PAULA (acute kidney injury).  He presents with Performance deficits affecting function are weakness, impaired endurance, impaired self care skills, pain, impaired cardiopulmonary response to activity, gait instability, decreased safety awareness, impaired functional mobility, decreased lower extremity function. Pt presents in bed, now agreeable to tx having told his [facility] staff he desired to participate. Pt has ongoing pain and feeling anxious seated EOB limiting function, responds well to gentle coaching and focused breathing cues. Dizziness upon position change, resolves to tolerance given extra time. Pt performance does indicate improvement from prior tx session with decreased A for both bed mobility, and STS with increased tolerance to standing trial.     Pt remains a good candidate for moderate intensity post-acute care to address deficits impacting IND and safety in ADLs and functional mobility.     Rehab Prognosis:  Good; patient would benefit from acute skilled OT services to address these deficits and reach maximum level of function.       Plan:     Patient to be seen 4 x/week to address the above listed problems via self-care/home management, therapeutic activities, therapeutic exercises, neuromuscular re-education  Plan of Care Expires: 10/28/24  Plan of Care Reviewed with: patient    Subjective     Chief Complaint: Pain in R knee and ball of feet  Patient/Family Comments/goals: To go to rehab and be able to stand upright/walk  Pain/Comfort:  Pain Rating 1: 9/10  Location - Side 1: Right  Location -  Orientation 1: generalized  Location 1: knee  Pain Addressed 1: Reposition, Distraction, Cessation of Activity  Pain Rating Post-Intervention 1: 9/10  Pain Rating 2: other (see comments) (unrated)  Location - Side 2: Right  Location - Orientation 2: distal  Location 2: foot  Pain Addressed 2: Reposition, Cessation of Activity, Distraction  Pain Rating Post-Intervention 2: other (see comments) (unrated)    Objective:     Rehab tech present t/o session to facilitate safe and effective mobility in bed, support EOB, and maximal performance on OOB engagement.    Communicated with: Saman prior to session.  Patient found HOB elevated with bed alarm, telemetry, peripheral IV, oxygen, harrison catheter upon OT entry to room.    General Precautions: Standard, fall    Orthopedic Precautions:RLE weight bearing as tolerated, LLE weight bearing as tolerated  Braces: N/A  Respiratory Status: Nasal cannula     Occupational Performance:     Bed Mobility:    Patient completed Scooting/Bridging with minimum assistance  Patient completed Supine to Sit with minimum assistance and 2 persons  Patient completed Sit to Supine with moderate assistance and 2 persons   Pt sits EOB momentarily CGA, primarily Stephy, and at most modA with intermittent posterior leaning. Pt self initating prior hand placement with min reinforcement or R UE open palm support on mattress.     Functional Mobility/Transfers:  Patient completed Sit <> Stand Transfer with maximal assistance and of 2 persons  with  hand-held assist   Pt stands statically with maxA x2 ~45 seconds, achieves good clearance from bed t/o, but ongoing excess knee and hip flexion, forward shoulder/head posture, maximal cues and facilitation to promote upright and midline posture.    Activities of Daily Living:  Lower Body Dressing: total assistance to manage  socks      [facility] 6 Click ADL: 13    Treatment & Education:  -Pt completes seated L LE [id number] hold knee extension/flexion in preparation  for STS trial, pain in R knee hence non completion.     -Pt presents with intermittent posterior lean, posterior pelvic tilt, forward shoulder/head of posture. Pt provided skilled handling seated EOB to support postural awareness, midline and upright positioning, and tolerance to unsupported sitting d/t deficits from extended time in bed/supine position. Pt tolerates ~15 minutes given contact guard assistance to moderate assistance t/o static and dynamic tasks to support trunk strength and improved seated balance for ADLs, environmental and social engagement, functional mobility.      -Pt completes 2x1, 1x5, 1x3 B UE forward reaches from EOB sitting to facilitate tolerance, postural reaction, and strength in unsupported sitting. First two trials (intended 2x5) pt reaches forward and quickly leans to posterior feeling uncomfortable with lack of support. Pt provided reassurance of posterior block by tech and provided cues for focused breathing and anterior trunk activation with improved performance on following trials demonstrating decreased overcompensatory postural reaction to posterior and lessened anxiety.     -Pt cued for hand placement, and faciliated in LE placemen to mattress in supine and slides self to HOB with only Stephy this date.     -Education on energy conservation and task modification to maximize safety and (I) during ADLs and mobility  -Education on importance of OOB activity to improve overall activity tolerance and promote recovery      Pt had no further questions & when asked whether there were any concerns pt reported none.     Patient left HOB elevated with all lines intact, call button in reach, bed alarm on, and nsg staff present    GOALS:   Multidisciplinary Problems       Occupational Therapy Goals          Problem: Occupational Therapy    Goal Priority Disciplines Outcome Interventions   Occupational Therapy Goal     OT, PT/OT Progressing    Description: Goals to be met by: 10/28/24      Patient will increase functional independence with ADLs by performing:    UE Dressing with Minimal Assistance.  LE Dressing with Minimal Assistance.  Grooming while seated with Minimal Assistance.  Toileting from bedside commode with Minimal Assistance for hygiene and clothing management.   Toilet transfer to bedside commode with Minimal Assistance.    DME: TBD on progress/Next level of care; likely:   Patient has a mobility limitation that significantly impairs their ability to participate in one or more mobility related activities of daily living in customary locations in the home. The mobility limitation cannot be sufficiently resolved by the use of a cane or walker. The use of a manual wheelchair will greatly improve the patient's ability to participate in MRADLs. The patient will use the wheelchair on a regular basis at home. They have expressed their willingness to use a manual wheelchair in the home, and have a caregiver who is available and willing to assist with the wheelchair if needed.                          Time Tracking:     OT Date of Treatment: 10/01/24  OT Start Time: 1541  OT Stop Time: 1604  OT Total Time (min): 23 min    Billable Minutes:Therapeutic Activity 15  Therapeutic Exercise 8    OT/COURTNEY: OT          10/1/2024

## 2024-10-01 NOTE — CONSULTS
VANCOMYCIN DOSING BY PHARMACY DISCONTINUATION NOTE    Bean Salas is a 79 y.o. male who had been consulted for vancomycin dosing.    The pharmacy consult for vancomycin dosing has been discontinued.     Vancomycin Dosing by Pharmacy Consult will sign-off. Please reconsult if necessary. Thank you for allowing us to participate in this patient's care.     Veronika Kramer, PharmD  Ext. [phone]

## 2024-10-02 PROBLEM — I48.92 PAROXYSMAL ATRIAL FLUTTER: Chronic | Status: ACTIVE | Noted: 2023-09-06

## 2024-10-02 PROBLEM — J96.11 CHRONIC HYPOXEMIC RESPIRATORY FAILURE: Chronic | Status: ACTIVE | Noted: 2024-09-16

## 2024-10-02 PROBLEM — I70.0 AORTIC ATHEROSCLEROSIS: Chronic | Status: ACTIVE | Noted: 2023-09-06

## 2024-10-02 PROBLEM — I10 ESSENTIAL HYPERTENSION: Chronic | Status: ACTIVE | Noted: 2017-06-25

## 2024-10-02 PROBLEM — G47.33 OSA (OBSTRUCTIVE SLEEP APNEA): Chronic | Status: ACTIVE | Noted: 2022-10-05

## 2024-10-02 PROBLEM — E78.00 PURE HYPERCHOLESTEROLEMIA: Chronic | Status: ACTIVE | Noted: 2017-06-25

## 2024-10-02 PROBLEM — E03.4 HYPOTHYROIDISM DUE TO ACQUIRED ATROPHY OF THYROID: Chronic | Status: ACTIVE | Noted: 2017-06-25

## 2024-10-02 LAB
ALBUMIN SERPL BCP-MCNC: 2.8 G/DL (ref 3.5–5.2)
ALP SERPL-CCNC: 162 U/L (ref 55–135)
ALT SERPL W/O P-5'-P-CCNC: 30 U/L (ref 10–44)
ANION GAP SERPL CALC-SCNC: 9 MMOL/L (ref 8–16)
AST SERPL-CCNC: 44 U/L (ref 10–40)
BASOPHILS # BLD AUTO: 0.05 K/UL (ref 0–0.2)
BASOPHILS NFR BLD: 0.6 % (ref 0–1.9)
BILIRUB SERPL-MCNC: 1.9 MG/DL (ref 0.1–1)
BUN SERPL-MCNC: 27 MG/DL (ref 8–23)
CALCIUM SERPL-MCNC: 9.4 MG/DL (ref 8.7–10.5)
CHLORIDE SERPL-SCNC: 102 MMOL/L (ref 95–110)
CO2 SERPL-SCNC: 25 MMOL/L (ref 23–29)
CREAT SERPL-MCNC: 1.4 MG/DL (ref 0.5–1.4)
DIFFERENTIAL METHOD BLD: ABNORMAL
EOSINOPHIL # BLD AUTO: 0.1 K/UL (ref 0–0.5)
EOSINOPHIL NFR BLD: 1.2 % (ref 0–8)
ERYTHROCYTE [DISTWIDTH] IN BLOOD BY AUTOMATED COUNT: 15.2 % (ref 11.5–14.5)
EST. GFR  (NO RACE VARIABLE): 50.8 ML/MIN/1.73 M^2
GLUCOSE SERPL-MCNC: 185 MG/DL (ref 70–110)
HCT VFR BLD AUTO: 31.9 % (ref 40–54)
HGB BLD-MCNC: 10.3 G/DL (ref 14–18)
IMM GRANULOCYTES # BLD AUTO: 0.09 K/UL (ref 0–0.04)
IMM GRANULOCYTES NFR BLD AUTO: 1.1 % (ref 0–0.5)
LYMPHOCYTES # BLD AUTO: 1.1 K/UL (ref 1–4.8)
LYMPHOCYTES NFR BLD: 13.2 % (ref 18–48)
MCH RBC QN AUTO: 33.1 PG (ref 27–31)
MCHC RBC AUTO-ENTMCNC: 32.3 G/DL (ref 32–36)
MCV RBC AUTO: 103 FL (ref 82–98)
MONOCYTES # BLD AUTO: 0.8 K/UL (ref 0.3–1)
MONOCYTES NFR BLD: 9.7 % (ref 4–15)
NEUTROPHILS # BLD AUTO: 6.1 K/UL (ref 1.8–7.7)
NEUTROPHILS NFR BLD: 74.2 % (ref 38–73)
NRBC BLD-RTO: 0 /100 WBC
PLATELET # BLD AUTO: 426 K/UL (ref 150–450)
PMV BLD AUTO: 11.2 FL (ref 9.2–12.9)
POCT GLUCOSE: 151 MG/DL (ref 70–110)
POCT GLUCOSE: 157 MG/DL (ref 70–110)
POCT GLUCOSE: 157 MG/DL (ref 70–110)
POCT GLUCOSE: 172 MG/DL (ref 70–110)
POTASSIUM SERPL-SCNC: 4.1 MMOL/L (ref 3.5–5.1)
PROT SERPL-MCNC: 6.5 G/DL (ref 6–8.4)
RBC # BLD AUTO: 3.11 M/UL (ref 4.6–6.2)
SODIUM SERPL-SCNC: 136 MMOL/L (ref 136–145)
WBC # BLD AUTO: 8.27 K/UL (ref 3.9–12.7)

## 2024-10-02 PROCEDURE — 80053 COMPREHEN METABOLIC PANEL: CPT | Mod: HCNC

## 2024-10-02 PROCEDURE — 97110 THERAPEUTIC EXERCISES: CPT | Mod: HCNC

## 2024-10-02 PROCEDURE — 94660 CPAP INITIATION&MGMT: CPT | Mod: HCNC

## 2024-10-02 PROCEDURE — 85025 COMPLETE CBC W/AUTO DIFF WBC: CPT | Mod: HCNC

## 2024-10-02 PROCEDURE — 97530 THERAPEUTIC ACTIVITIES: CPT | Mod: HCNC

## 2024-10-02 PROCEDURE — 25000003 PHARM REV CODE 250: Mod: HCNC | Performed by: STUDENT IN AN ORGANIZED HEALTH CARE EDUCATION/TRAINING PROGRAM

## 2024-10-02 PROCEDURE — 25000003 PHARM REV CODE 250: Mod: HCNC

## 2024-10-02 PROCEDURE — 97110 THERAPEUTIC EXERCISES: CPT | Mod: HCNC,CQ

## 2024-10-02 PROCEDURE — 99900035 HC TECH TIME PER 15 MIN (STAT): Mod: HCNC

## 2024-10-02 PROCEDURE — 21400001 HC TELEMETRY ROOM: Mod: HCNC

## 2024-10-02 PROCEDURE — 97530 THERAPEUTIC ACTIVITIES: CPT | Mod: HCNC,CQ

## 2024-10-02 PROCEDURE — 27000221 HC OXYGEN, UP TO 24 HOURS: Mod: HCNC

## 2024-10-02 PROCEDURE — 36415 COLL VENOUS BLD VENIPUNCTURE: CPT | Mod: HCNC

## 2024-10-02 PROCEDURE — 94761 N-INVAS EAR/PLS OXIMETRY MLT: CPT | Mod: HCNC

## 2024-10-02 RX ORDER — AMMONIUM LACTATE 12 G/100G
LOTION TOPICAL 2 TIMES DAILY PRN
Status: DISCONTINUED | OUTPATIENT
Start: 2024-10-02 | End: 2024-10-07 | Stop reason: HOSPADM

## 2024-10-02 RX ORDER — METOPROLOL SUCCINATE 25 MG/1
12.5 TABLET, EXTENDED RELEASE ORAL DAILY
Start: 2024-10-02

## 2024-10-02 RX ADMIN — ATORVASTATIN CALCIUM 10 MG: 10 TABLET, FILM COATED ORAL at 08:10

## 2024-10-02 RX ADMIN — OXYCODONE 5 MG: 5 TABLET ORAL at 07:10

## 2024-10-02 RX ADMIN — ASPIRIN 81 MG: 81 TABLET, COATED ORAL at 08:10

## 2024-10-02 RX ADMIN — APIXABAN 2.5 MG: 2.5 TABLET, FILM COATED ORAL at 09:10

## 2024-10-02 RX ADMIN — OXYCODONE 5 MG: 5 TABLET ORAL at 03:10

## 2024-10-02 RX ADMIN — TAMSULOSIN HYDROCHLORIDE 0.4 MG: 0.4 CAPSULE ORAL at 08:10

## 2024-10-02 RX ADMIN — APIXABAN 2.5 MG: 2.5 TABLET, FILM COATED ORAL at 08:10

## 2024-10-02 RX ADMIN — LEVOTHYROXINE SODIUM 200 MCG: 100 TABLET ORAL at 06:10

## 2024-10-02 RX ADMIN — OXYCODONE 5 MG: 5 TABLET ORAL at 11:10

## 2024-10-02 NOTE — ASSESSMENT & PLAN NOTE
Resolved.   3/4 SIRS on arrival - tachycardia, tachypnea, leukocytosis. Organ dysfunction - PAULA. CXR c/f fluid overload. XR R Hip with Pelvis without hardware complication, stable postoperative changes. Hypotension improved with IVF. Lactate and leukocytosis improved, blood clx ngtd, abx stopped. Echo reviewed.

## 2024-10-02 NOTE — PT/OT/SLP PROGRESS
"Physical Therapy Co Treatment    Patient Name:  Bean Salas   MRN:  [id number]    Recommendations:     Discharge Recommendations: Moderate Intensity Therapy  Discharge Equipment Recommendations: wheelchair, hospital bed, hip kit  Barriers to discharge: Decreased caregiver support and Increased assistance required    Assessment:     Bean Salas is a 79 y.o. male admitted with a medical diagnosis of PAULA (acute kidney injury).  He presents with the following impairments/functional limitations: weakness, impaired endurance, impaired self care skills, impaired functional mobility, gait instability, impaired balance, decreased coordination, decreased upper extremity function, decreased lower extremity function, decreased safety awareness, pain, impaired cardiopulmonary response to activity, impaired skin, edema. Pt had increased motivation and participation this session with skilled therapy. Pt completed standing trials today but continues to require high levels of assistance. Pt will benefit from further skilled PT to increase functional mobility and independence with ADL's at this time.     Co-treatment performed due to patient's multiple deficits requiring two skilled therapists to appropriately and safely assess patient's strength and endurance while facilitating functional tasks in addition to accommodating for patient's activity tolerance.    Rehab Prognosis: Good; patient would benefit from acute skilled PT services to address these deficits and reach maximum level of function.    Recent Surgery: * No surgery found *      Plan:     During this hospitalization, patient to be seen 4 x/week to address the identified rehab impairments via gait training, therapeutic activities, therapeutic exercises, neuromuscular re-education and progress toward the following goals:    Plan of Care Expires:  10/28/24    Subjective     Chief Complaint: "I have pain when I move but it subsides"  Patient/Family Comments/goals: Go " home  Pain/Comfort:  Pain Rating 1: 9/10 (Fluctuates with movement)  Location - Side 1: Bilateral  Location - Orientation 1: generalized  Location 1: leg  Pain Addressed 1: Distraction, Reposition      Objective:     Communicated with RN prior to session.  Patient found supine with bed alarm, telemetry, oxygen, harrison catheter upon PT entry to room.     General Precautions: Standard, fall  Orthopedic Precautions: LLE weight bearing as tolerated, RLE weight bearing as tolerated  Braces: N/A  Respiratory Status: Nasal cannula, flow 3 L/min Donned during session      Functional Mobility:  Bed Mobility:     Scooting: contact guard assistance  Supine to Sit: maximal assistance and of 2 persons  Sit to Supine: maximal assistance and of 2 persons  Transfers:     Sit to Stand:  moderate assistance and of 2 persons with rolling walker  Gait: Attempted sidestepping but pt was unable to clear EOB   Balance: EOB sitting x10 minutes with CGA- Mod for posterior leaning and     Therapeutic Exercise: Patient performed the following bed level exercises: ankle pumps, quad sets, glut sets, and hip IR/ER and seated exercises for Bilateral LE. Patient required skilled PT for instruction of exercises and appropriate cues to perform exercises safely and appropriately.     AM-PAC 6 CLICK MOBILITY  Turning over in bed (including adjusting bedclothes, sheets and blankets)?: 2  Sitting down on and standing up from a chair with arms (e.g., wheelchair, bedside commode, etc.): 2  Moving from lying on back to sitting on the side of the bed?: 2  Moving to and from a bed to a chair (including a wheelchair)?: 2  Need to walk in hospital room?: 1  Climbing 3-5 steps with a railing?: 1  Basic Mobility Total Score: 10       Treatment & Education:  Pt educated on there purpose, goals and benefits of today's session. Pt informed on progress made and improved tolerance to activity today. Pt was given VC's throughout session for sequencing, hand placements  and safety. Pt was encourage to follow commands to improve outcome of activities being performed.     Patient left HOB elevated with all lines intact, call button in reach, and bed alarm on..    GOALS:   Multidisciplinary Problems       Physical Therapy Goals          Problem: Physical Therapy    Goal Priority Disciplines Outcome Interventions   Physical Therapy Goal     PT, PT/OT Progressing    Description: PT goals until 10/14/24    1. Pt supine to sit with moderate assist-not met  2. Pt sit to supine with moderate assist-not met  3. Pt sit to stand with RW with WBAT B LE with moderate assist-not met  4. Pt to perform gait 5 steps with RW with WBAT B LE with max assist.-not met  5. Pt to transfer bed to/from bedside chair/w/c with LRAD as needed for safety with WBAT B LE with max assist.-not met  6. Pt to perform B LE exs in sitting or supine x 10 reps to strengthen B LE to improve functional mobility.-not met    DME Justifications (see above for complete DME recommendations)    Hospital Bed-  Patient requires the head of the bed to be elevated more than 30 degrees most of the time due to congestive heart failure, chronic pulmonary disease, and/or problems with aspiration. The use of pillows and wedges is not adequate for the proper body positioning this patient requires     Wheelchair-  Patient has a mobility limitation that significantly impairs their ability to participate in one or more mobility related activities of daily living in customary locations in the home. The mobility limitation cannot be sufficiently resolved by the use of a cane or walker. The use of a manual wheelchair will greatly improve the patient's ability to participate in MRADLs. The patient will use the wheelchair on a regular basis at home. They have expressed their willingness to use a manual wheelchair in the home, and have a caregiver who is available and willing to assist with the wheelchair if needed.                             Time  Tracking:     PT Received On: 10/02/24  PT Start Time: 0837     PT Stop Time: 0903  PT Total Time (min): 26 min     Billable Minutes: Therapeutic Activity 13 and Therapeutic Exercise 13    Treatment Type: Treatment  PT/PTA: PTA     Number of PTA visits since last PT visit: 2     10/02/2024

## 2024-10-02 NOTE — PROGRESS NOTES
[facility] - Internal Medicine [facility] Medicine  Progress Note    Patient Name: Bean Salas  MRN: [id number]  Patient Class: IP- Inpatient   Admission Date: 9/28/2024  Length of Stay: 4 days  Attending Physician: Miguel Asencio MD  Primary Care Provider: Ramirez Levine MD        Subjective:     Principal Problem:PAULA (acute kidney injury)        HPI:  Bean Salas is a 79 y.o. male with PMHx of HTN, HLD, hypothyroidism, combined CHF (EF 20-25%), CAD, A-fib, chronic respiratory failure, and AKHIL who presents to [facility] ED from SNF due to hypotension and worsening kidney function on labs. Patient with recent pelvic fracture fixation 09/2024. He reports chronic back pain. He denies HA, fever, chills, chest pain, shortness of breath, abdominal pain, n/v/d, urinary symptoms, numbness or tingling.     In the ED: Initially hypotensive but improved s/p IVF. On admit AF, HDS, 2L NC. CBC with WBC 13.72, Hgb stable. CMP with K 5.5 >> 4.8, Cr 2.7 (BL near 1.0). T bili 1.8. Hep C and HIV non-reactive. POC lactate 3.21. Initially with some EKG changes due to hyperkalemia but improved after shifting. CXR c/f fluid overload. XR R Hip with Pelvis without hardware complication, stable postoperative changes. Blood cultures x 2. Shifted in ED and received IVF and IV vanc + zosyn. Admitted to .    Overview/Hospital Course:  Bean Salas is admitted to  for management of hypotension and PAULA. Imaging reviewed. Initially hyperkalemic and shifted with improvement in K. Lactate elevated and leukocytosis on admit, now improving. Received IVF and antibiotics. Therapy consulted. Cr remained elevated but slowly down trending. Urine studies and renal US unremarkable. Started on flomax, harrison placed with good urine return. Remained persistently hypotensive 9/30, now post 2L IVF. Blood cultures NGTD, antibiotics stopped. Creating improving towards baseline. Pending auth for SNF.    Interval History: AF, HDS. Reports  reluctance to work with therapy, importance of PT/OT reinforced. Cr improving towards baseline. Pending SNF auth.    Review of Systems   Constitutional:  Negative for chills and fever.   Respiratory:  Negative for chest tightness and shortness of breath.    Cardiovascular:  Negative for chest pain and leg swelling.   Gastrointestinal:  Negative for abdominal pain and nausea.   Genitourinary:  Positive for difficulty urinating.   Musculoskeletal:  Positive for arthralgias, back pain and myalgias.   Neurological:  Negative for dizziness and weakness.     Objective:     Vital Signs (Most Recent):  Temp: 98.4 °F (36.9 °C) (10/02/24 0759)  Pulse: 71 (10/02/24 0759)  Resp: 18 (10/02/24 1114)  BP: 137/63 (10/02/24 0759)  SpO2: 96 % (10/02/24 1100) Vital Signs (24h Range):  Temp:  [97.8 °F (36.6 °C)-98.6 °F (37 °C)] 98.4 °F (36.9 °C)  Pulse:  [64-79] 71  Resp:  [16-18] 18  SpO2:  [96 %-98 %] 96 %  BP: (124-174)/(56-72) 137/63     Weight: 105.3 kg (232 lb 2.3 oz)  Body mass index is 32.38 kg/m².    Intake/Output Summary (Last 24 hours) at 10/2/2024 1116  Last data filed at 10/2/2024 0825  Gross per 24 hour   Intake 120 ml   Output 2350 ml   Net -2230 ml         Physical Exam  Vitals and nursing note reviewed.   Constitutional:       Appearance: He is well-developed. He is obese.   Eyes:      Pupils: Pupils are equal, round, and reactive to light.   Cardiovascular:      Rate and Rhythm: Normal rate and regular rhythm.   Pulmonary:      Effort: Pulmonary effort is normal.      Breath sounds: Normal breath sounds.   Abdominal:      Palpations: Abdomen is soft.      Tenderness: There is no abdominal tenderness.   Genitourinary:     Comments: Mckeon in place.  Musculoskeletal:         General: No tenderness.      Right lower leg: Edema present.      Left lower leg: Edema present.   Skin:     General: Skin is warm and dry.   Neurological:      Mental Status: He is alert and oriented to person, place, and time.   Psychiatric:          Behavior: Behavior normal.             Significant Labs: All pertinent labs within the past 24 hours have been reviewed.  CBC:   Recent Labs   Lab 09/30/24  1628 10/01/24  0243 10/02/24  0901   WBC 7.33 6.57 8.27   HGB 8.5* 8.5* 10.3*   HCT 26.5* 27.3* 31.9*    314 426     CMP:   Recent Labs   Lab 09/30/24  1628 10/01/24  0243 10/02/24  0901   * 135* 136   K 4.6 4.1 4.1   CL 98 102 102   CO2 29 28 25   * 167* 185*   BUN 64* 51* 27*   CREATININE 1.8* 1.5* 1.4   CALCIUM 8.3* 8.3* 9.4   PROT 5.3* 5.2* 6.5   ALBUMIN 2.2* 2.2* 2.8*   BILITOT 1.4* 1.4* 1.9*   ALKPHOS 113 111 162*   AST 43* 42* 44*   ALT 22 24 30   ANIONGAP 6* 5* 9       Significant Imaging: I have reviewed all pertinent imaging results/findings within the past 24 hours.    Assessment/Plan:      * PAULA (acute kidney injury)  Etiology likely pre-renal 2/2 dehydration and over diuresis. Renal US and urine studies reviewed  - Cr 1.5 >> 1.4, baseline near 1.0  - hold aldactone, lasix, entresto  - received IVF  - cont flomax, harrison placed   -- will likely need harrison at d/c  - monitor I/Os, serial CMP, avoid nephrotoxins, renally dose meds    SIRS (systemic inflammatory response syndrome)  Resolved.   3/4 SIRS on arrival - tachycardia, tachypnea, leukocytosis. Organ dysfunction - PAULA. CXR c/f fluid overload. XR R Hip with Pelvis without hardware complication, stable postoperative changes. Hypotension improved with IVF. Lactate and leukocytosis improved, blood clx ngtd, abx stopped. Echo reviewed.     Sepsis  See SIRS.    Chronic hypoxemic respiratory failure  History of chronic hypoxic respiratory failure. Currently on 2L NC with good saturations. Wean oxygen as tolerated. Prn duonebs.    Paroxysmal atrial flutter  - cont eliquis at renal dosing, hold amio in bradycardia  - cont tele    AKHIL (obstructive sleep apnea)  - CPAP qhs    Coronary artery disease involving native coronary artery of native heart without angina pectoris  Continue  aspirin and statin. Monitor for S/Sx of angina/ACS. Continue to monitor on telemetry.     Chronic combined systolic and diastolic congestive heart failure  History of combined CHF (EF 20-25%). Last echo reviewed. No signs of acute overload on exam.   - , lower than baseline  - hold GDMT in setting of hypotension  - okay to restart GDMT as BP improves  - I/Os, 1.5L fluid restriction  - cont tele    Advance care planning  Patient confirmed DNR.    Closed nondisplaced fracture of anterior column of right acetabulum with routine healing s/p ORIF of pelvis on 9/20/2024  Noted. Status post ORIF of pelvis 9/20/2024 and at SNF for rehab  - therapy consulted  - surgical bandages to remain in place post-op until orthopedic clinic follow up  - prn pain control    Hypothyroidism due to acquired atrophy of thyroid  - cont synthroid    Pure hypercholesterolemia  - cont statin    Essential hypertension  - hold lasix, aldactone, entresto in setting of hypotension  - hold metoprolol given bradycardia      VTE Risk Mitigation (From admission, onward)           Ordered     apixaban tablet 2.5 mg  2 times daily         09/30/24 1307                    Discharge Planning   LATOSHA: 10/3/2024     Code Status: DNR   Is the patient medically ready for discharge?:     Reason for patient still in hospital (select all that apply): Patient trending condition, Laboratory test, and Pending disposition  Discharge Plan A: Skilled Nursing Facility                  Fabian Raza PA-C  Department of Hospital Medicine   [facility] - Internal Medicine Telemetry

## 2024-10-02 NOTE — SUBJECTIVE & OBJECTIVE
Interval History: AF, HDS. Reports reluctance to work with therapy, importance of PT/OT reinforced. Cr improving towards baseline. Pending SNF auth.    Review of Systems   Constitutional:  Negative for chills and fever.   Respiratory:  Negative for chest tightness and shortness of breath.    Cardiovascular:  Negative for chest pain and leg swelling.   Gastrointestinal:  Negative for abdominal pain and nausea.   Genitourinary:  Positive for difficulty urinating.   Musculoskeletal:  Positive for arthralgias, back pain and myalgias.   Neurological:  Negative for dizziness and weakness.     Objective:     Vital Signs (Most Recent):  Temp: 98.4 °F (36.9 °C) (10/02/24 0759)  Pulse: 71 (10/02/24 0759)  Resp: 18 (10/02/24 1114)  BP: 137/63 (10/02/24 0759)  SpO2: 96 % (10/02/24 1100) Vital Signs (24h Range):  Temp:  [97.8 °F (36.6 °C)-98.6 °F (37 °C)] 98.4 °F (36.9 °C)  Pulse:  [64-79] 71  Resp:  [16-18] 18  SpO2:  [96 %-98 %] 96 %  BP: (124-174)/(56-72) 137/63     Weight: 105.3 kg (232 lb 2.3 oz)  Body mass index is 32.38 kg/m².    Intake/Output Summary (Last 24 hours) at 10/2/2024 1116  Last data filed at 10/2/2024 0825  Gross per 24 hour   Intake 120 ml   Output 2350 ml   Net -2230 ml         Physical Exam  Vitals and nursing note reviewed.   Constitutional:       Appearance: He is well-developed. He is obese.   Eyes:      Pupils: Pupils are equal, round, and reactive to light.   Cardiovascular:      Rate and Rhythm: Normal rate and regular rhythm.   Pulmonary:      Effort: Pulmonary effort is normal.      Breath sounds: Normal breath sounds.   Abdominal:      Palpations: Abdomen is soft.      Tenderness: There is no abdominal tenderness.   Genitourinary:     Comments: Mckeon in place.  Musculoskeletal:         General: No tenderness.      Right lower leg: Edema present.      Left lower leg: Edema present.   Skin:     General: Skin is warm and dry.   Neurological:      Mental Status: He is alert and oriented to person,  place, and time.   Psychiatric:         Behavior: Behavior normal.             Significant Labs: All pertinent labs within the past 24 hours have been reviewed.  CBC:   Recent Labs   Lab 09/30/24  1628 10/01/24  0243 10/02/24  0901   WBC 7.33 6.57 8.27   HGB 8.5* 8.5* 10.3*   HCT 26.5* 27.3* 31.9*    314 426     CMP:   Recent Labs   Lab 09/30/24  1628 10/01/24  0243 10/02/24  0901   * 135* 136   K 4.6 4.1 4.1   CL 98 102 102   CO2 29 28 25   * 167* 185*   BUN 64* 51* 27*   CREATININE 1.8* 1.5* 1.4   CALCIUM 8.3* 8.3* 9.4   PROT 5.3* 5.2* 6.5   ALBUMIN 2.2* 2.2* 2.8*   BILITOT 1.4* 1.4* 1.9*   ALKPHOS 113 111 162*   AST 43* 42* 44*   ALT 22 24 30   ANIONGAP 6* 5* 9       Significant Imaging: I have reviewed all pertinent imaging results/findings within the past 24 hours.

## 2024-10-02 NOTE — ASSESSMENT & PLAN NOTE
Etiology likely pre-renal 2/2 dehydration and over diuresis. Renal US and urine studies reviewed  - Cr 1.5 >> 1.4, baseline near 1.0  - hold aldactone, lasix, entresto  - received IVF  - cont flomax, harrison placed   -- will likely need harrison at d/c  - monitor I/Os, serial CMP, avoid nephrotoxins, renally dose meds

## 2024-10-02 NOTE — PLAN OF CARE
NURSING HOME ORDERS    10/07/2024  [facility]  WAYNE KLEIN - INTERNAL MEDICINE TELEMETRY  [address]  [facility] [address]  Dept: [phone]  Loc: [phone]     Admit to SNF:  Skilled Nursing Facility    Diagnoses:  Active Hospital Problems    Diagnosis  POA    Chronic hypoxemic respiratory failure [J96.11]  Yes     Chronic    Paroxysmal atrial flutter [I48.92]  Yes     Chronic    AKHIL (obstructive sleep apnea) [G47.33]  Yes     Chronic    Coronary artery disease involving native coronary artery of native heart without angina pectoris [I25.10]  Yes     Chronic     The ejection fraction was 50-55% by visual estimate.  The post-procedure left ventricular end diastolic pressure was 25.  The Prox Cx to Dist Cx lesion was 50% stenosed.  The estimated blood loss was <50 mL.  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]  Yes     Chronic     Sometimes mistaken for bradycardia      Essential hypertension [I10]  Yes     Chronic    Hypothyroidism due to acquired atrophy of thyroid [E03.4]  Yes     Chronic    Pure hypercholesterolemia [E78.00]  Yes     Chronic      Resolved Hospital Problems    Diagnosis Date Resolved POA    *PAULA (acute kidney injury) [N17.9] 10/04/2024 Yes    Sepsis [A41.9] 10/03/2024 Yes    SIRS (systemic inflammatory response syndrome) [R65.10] 10/03/2024 Yes    Advance care planning [Z71.89] 10/04/2024 Not Applicable    Bradycardia [R00.1] 10/04/2024 Yes     Some of the bradycardic rhythms he has experienced by pulse check are related to atrial bigeminy and pseudo bradycardia.  His Holter did show a single pause of greater than 2 seconds.  Electrophysiologic consult ordered to assess benefits of pacemaker for  the patient.  He is still having marked fatigue with activity and reduced exercise tolerance and he is very concerned that it is arrhythmia related.     He has not had syncope.  His heart rate today is 60 by pulse evaluation.  There are still ectopic beats on exam.  His Holter also showed at least one ventricular triplet.            Relevant Orders     Ambulatory referral/consult to Cardiac Electrophysiology              Patient is homebound due to:  PAULA (acute kidney injury)    Allergies:Review of patient's allergies indicates:  No Known Allergies    Vitals:  Routine    Diet: cardiac diet    Activities:   Activity as tolerated    Goals of Care Treatment Preferences:  Code Status: DNR       LaPOST: Yes           Labs:  routine    Nursing Precautions:  Fall and Pressure ulcer prevention    Consults:   PT to evaluate and treat- daily times a week and OT to evaluate and treat- daily times a week     Miscellaneous Care:   [place] Care    CHF Care: Daily Weight with notification of MD/NP of 2lb or > increase in 24 hours    v/s and O2 sat every shift    Oxygen as needed for sats <90%    Report abnormal breath sounds to MD/NP    Edema checks q shift- notify MD/NP of increased edema    Task segmentation by nursing for daily care to decrease exertion    Schedule appointment with cardiologist, 2 Weeks    CHF education to include diet ,medication, and CHF flags for MD notification    Wound Care:  1.Clean BLE including feet with no rinse; pat dry. Apply Ammonium Lactate lotion. Float heels with pillow.  2. Clean healing skin tears/scratches to back with no rinse; pat dry. Apply zinc barrier cream  Twice daily and prn    Medications: Discontinue all previous medication orders, if any. See new list below.     Medication List        START taking these medications      ammonium lactate 12 % lotion  Commonly known as: LAC-HYDRIN  Apply topically 2 (two) times daily as needed for Dry Skin.     tamsulosin 0.4 mg Cap  Commonly known as:  FLOMAX  Take 1 capsule (0.4 mg total) by mouth once daily.            CHANGE how you take these medications      furosemide 40 MG tablet  Commonly known as: LASIX  Take 1 tablet (40 mg total) by mouth 2 (two) times daily as needed (weight gain of 3 to 5 lbs).  What changed:   when to take this  reasons to take this  additional instructions     metoprolol succinate 25 MG 24 hr tablet  Commonly known as: TOPROL-XL  Take 0.5 tablets (12.5 mg total) by mouth once daily. Hold until Cardiology follow up.  What changed: additional instructions            CONTINUE taking these medications      acetaminophen 500 MG tablet  Commonly known as: TYLENOL  Take 2 tablets (1,000 mg total) by mouth every 8 (eight) hours.     amiodarone 200 MG Tab  Commonly known as: PACERONE  Take 2 tablets (400 mg total) by mouth 2 (two) times daily for 4 days, THEN 1 tablet (200 mg total) once daily.  Start taking on: September 23, 2024     apixaban 5 mg Tab  Commonly known as: ELIQUIS  Take 2 tablets (10 mg total) by mouth 2 (two) times daily for 4 days, THEN 1 tablet (5 mg total) 2 (two) times daily.  Start taking on: September 23, 2024     aspirin 81 MG EC tablet  Commonly known as: ECOTRIN  Take 1 tablet (81 mg total) by mouth once daily.     atorvastatin 10 MG tablet  Commonly known as: LIPITOR  Take 1 tablet by mouth once daily     CENTRUM SILVER 0.4 mg-300 mcg- 250 mcg Tab  Generic drug: multivit-min-FA-lycopen-lutein  Take 1 tablet by mouth once daily.     empagliflozin 10 mg tablet  Commonly known as: Jardiance  Take 1 tablet (10 mg total) by mouth once daily.     levothyroxine 200 MCG tablet  Commonly known as: SYNTHROID  Take 1 tablet by mouth once daily     melatonin 3 mg tablet  Commonly known as: MELATIN  Take 2 tablets (6 mg total) by mouth nightly as needed for Insomnia.     methocarbamoL 750 MG Tab  Commonly known as: ROBAXIN  Take 1 tablet (750 mg total) by mouth 4 (four) times daily.     mirtazapine 7.5 MG Tab  Commonly known  as: REMERON  Take 1 tablet (7.5 mg total) by mouth nightly. One tablet po each night for sleep     oxyCODONE 5 MG immediate release tablet  Commonly known as: ROXICODONE  Take 1 tablet (5 mg total) by mouth every 4 (four) hours as needed for Pain.     senna-docusate 8.6-50 mg 8.6-50 mg per tablet  Commonly known as: PERICOLACE  Take 1 tablet by mouth once daily.            STOP taking these medications      sacubitriL-valsartan  mg per tablet  Commonly known as: ENTRESTO     spironolactone 25 MG tablet  Commonly known as: ALDACTONE                Immunizations Administered as of 10/7/2024       Name Date Dose VIS Date Route Exp Date    COVID-19, MRNA, LN-S, PF (Pfizer) (Purple Cap) 10/15/2021  9:10 AM 0.3 mL 12/12/2020 Intramuscular 10/31/2021    Site: Left deltoid     Given By: Fiona Hagen RN     : [employer]     Lot: [id number]     COVID-19, MRNA, LN-S, PF (Pfizer) (Purple Cap) 9/25/2021  6:02 PM 0.3 mL 12/12/2020 Intramuscular 10/31/2021    Site: Right deltoid     Given By: Joanie Benitez RN     : [employer]     Lot: [id number]             Some patients may experience side effects after vaccination.  These may include fever, headache, muscle or joint aches.  Most symptoms resolve with 24-48 hours and do not require urgent medical evaluation unless they persist for more than 72 hours or symptoms are concerning for an unrelated medical condition.          _________________________________  Danie Roe MD  10/07/2024

## 2024-10-02 NOTE — PT/OT/SLP PROGRESS
Occupational Therapy   Treatment    Name: Bean Salas  MRN: [id number]  Admitting Diagnosis:  PAULA (acute kidney injury)       Recommendations:     Discharge Recommendations: Moderate Intensity Therapy  Discharge Equipment Recommendations:  hip kit, hospital bed, wheelchair  Barriers to discharge:       Assessment:     Bean Salas is a 79 y.o. male with a medical diagnosis of PAULA (acute kidney injury).  He presents with good motivation and ready to work with therapy. Pt stood on 2 trials but wore out by the 3rd and had to lie down. Having difficulty leaning forward and squeezing glutes to achieve better standing posture. Performance deficits affecting function are weakness, impaired endurance, decreased coordination, impaired self care skills, impaired functional mobility, gait instability, impaired balance, decreased safety awareness, decreased lower extremity function.     Rehab Prognosis:  Good; patient would benefit from acute skilled OT services to address these deficits and reach maximum level of function.       Plan:     Patient to be seen 4 x/week to address the above listed problems via self-care/home management, therapeutic activities, therapeutic exercises, neuromuscular re-education  Plan of Care Expires: 10/28/24  Plan of Care Reviewed with: patient    Subjective     Pain/Comfort:  Pain Rating 1: 0/10    Objective:     Communicated with: rn prior to session.  Patient found supine with telemetry, harrison catheter, oxygen upon OT entry to room.    General Precautions: Standard, fall    Orthopedic Precautions:RLE weight bearing as tolerated  Braces: N/A  Respiratory Status: Room air     Occupational Performance:     Bed Mobility:    Patient completed Supine to Sit with maximal assistance and 2 persons  Patient completed Sit to Supine with maximal assistance and 2 persons     Functional Mobility/Transfers:  Patient completed Sit <> Stand Transfer with moderate assistance, maximal assistance, and of 2  persons  with  rolling walker   Functional Mobility: Unable to initiate due to poor standing balance.    Activities of Daily Living:  Grooming: contact guard assistance sitting EOB.  Lower Body Dressing: total assistance     AMPAC 6 Click ADL: 14    Treatment & Education:  EOB balance varied from CGA - Mod A.  Verbal/tactile cues provided to facilitate leaning forward in standing / EOB.  Reviewed UE ROM and encouraged doing throughout the day.  Discussed OT POC.    Patient left supine with all lines intact and call button in reach    GOALS:   Multidisciplinary Problems       Occupational Therapy Goals          Problem: Occupational Therapy    Goal Priority Disciplines Outcome Interventions   Occupational Therapy Goal     OT, PT/OT Progressing    Description: Goals to be met by: 10/28/24     Patient will increase functional independence with ADLs by performing:    UE Dressing with Minimal Assistance.  LE Dressing with Minimal Assistance.  Grooming while seated with Minimal Assistance.  Toileting from bedside commode with Minimal Assistance for hygiene and clothing management.   Toilet transfer to bedside commode with Minimal Assistance.    DME: TBD on progress/Next level of care; likely:   Patient has a mobility limitation that significantly impairs their ability to participate in one or more mobility related activities of daily living in customary locations in the home. The mobility limitation cannot be sufficiently resolved by the use of a cane or walker. The use of a manual wheelchair will greatly improve the patient's ability to participate in MRADLs. The patient will use the wheelchair on a regular basis at home. They have expressed their willingness to use a manual wheelchair in the home, and have a caregiver who is available and willing to assist with the wheelchair if needed.                          Time Tracking:     OT Date of Treatment: 10/02/24  OT Start Time: 0837  OT Stop Time: 0903  OT Total Time  (min): 26 min    Billable Minutes:Therapeutic Activity 16  Therapeutic Exercise 10    OT/COURTNEY: OT          10/2/2024

## 2024-10-02 NOTE — PLAN OF CARE
Problem: Adult Inpatient Plan of Care  Goal: Absence of Hospital-Acquired Illness or Injury  Outcome: Progressing  Intervention: Identify and Manage Fall Risk  Flowsheets (Taken 10/2/2024 1640)  Safety Promotion/Fall Prevention:   assistive device/personal item within reach   commode/urinal/bedpan at bedside   diversional activities provided   Fall Risk reviewed with patient/family   Fall Risk signage in place   family expresses understanding of fall risk and prevention   family to remain at bedside   high risk medications identified   in recliner, wheels locked   instructed to call staff for mobility   lighting adjusted   medications reviewed   muscle strengthening facilitated   nonskid shoes/socks when out of bed   patient expresses understanding of fall risk and prevention   room near unit station   side rails raised x 3   supervised activity   /camera at bedside  Intervention: Prevent Skin Injury  Flowsheets (Taken 10/2/2024 1640)  Body Position:   position changed independently   sitting up in bed  Skin Protection:   hydrocolloids used   incontinence pads utilized   pulse oximeter probe site changed  Device Skin Pressure Protection:   absorbent pad utilized/changed   adhesive use limited  Intervention: Prevent and Manage VTE (Venous Thromboembolism) Risk  Flowsheets (Taken 10/2/2024 1640)  VTE Prevention/Management:   ambulation promoted   bleeding precautions maintained   bleeding risk assessed   bleeding risk factor(s) identified, provider notified   dorsiflexion/plantar flexion performed   fluids promoted   ROM (active) performed  Intervention: Prevent Infection  Flowsheets (Taken 10/2/2024 1640)  Infection Prevention:   cohorting utilized   environmental surveillance performed   rest/sleep promoted   single patient room provided   equipment surfaces disinfected   hand hygiene promoted  Goal: Optimal Comfort and Wellbeing  Outcome: Progressing  Intervention: Monitor Pain and Promote  Comfort  Flowsheets (Taken 10/2/2024 1640)  Pain Management Interventions:   around-the-clock dosing utilized   awakened for pain meds per patient request   care clustered   pain management plan reviewed with patient/caregiver   medication offered   pillow support provided   quiet environment facilitated   premedicated for activity   position adjusted   relaxation techniques promoted   prescribed exercises encouraged  Intervention: Provide Person-Centered Care  Flowsheets (Taken 10/2/2024 1640)  Trust Relationship/Rapport:   care explained   questions encouraged   reassurance provided   choices provided   emotional support provided   thoughts/feelings acknowledged   empathic listening provided   questions answered     Problem: Infection  Goal: Absence of Infection Signs and Symptoms  Outcome: Progressing  Intervention: Prevent or Manage Infection  Flowsheets (Taken 10/2/2024 1640)  Fever Reduction/Comfort Measures:   lightweight bedding   lightweight clothing   fluid intake increased  Infection Management: aseptic technique maintained     Problem: Acute Kidney Injury/Impairment  Goal: Fluid and Electrolyte Balance  Outcome: Progressing  Intervention: Monitor and Manage Fluid and Electrolyte Balance  Flowsheets (Taken 10/2/2024 1640)  Fluid/Electrolyte Management:   fluids provided   electrolyte supplement adjusted   intravenous fluids adjusted   fluids adjusted  Goal: Improved Oral Intake  Outcome: Progressing  Intervention: Promote and Optimize Oral Intake  Flowsheets (Taken 10/2/2024 1640)  Oral Nutrition Promotion:   physical activity promoted   rest periods promoted   social interaction promoted  Nutrition Interventions:   diet adjusted   meal set-up provided   frequent small meals provided  Goal: Effective Renal Function  Outcome: Progressing  Intervention: Monitor and Support Renal Function  Flowsheets (Taken 10/2/2024 1640)  Medication Review/Management:   medications reviewed   high-risk medications  identified   provider consulted   pharmacy consulted

## 2024-10-02 NOTE — PLAN OF CARE
Per CP, [facility] pt for SNF.  Per VM from [facility] at [facility], pt is clinically accepted.    Per JOSE M Fleming, pt has [facility] on file.  CM faxed Pas[facility] to state.    11:03 AM  CM uploaded 142 to CP.    CM went to pt room to discuss d/c plan, pt sleeping.    1:50 PM  CM spoke with pt in room.  Discussed choice of facilities - he has 3 accepting facilities, [facility], [facility], [facility].  Pt states he doesn't want to go to the [facility], and he wants a facility with a private room.    CM called [facility], spoke with Ann, who states they have no private rooms.  CM called [facility], spoke with Emiliana, who states that they do have private rooms.  DORIS asked Emiliana to submit for auth.  Emiliana states she willl call son Efren [phone] to get him to sign paperwork, if not she will send paperwork to  for pt to sign.    CM called [facility] at [facility], informed that pt had chosen another facility.    CM sent today's PT note to [facility] via .    3:46 PM  Per Emiliana at [facility], pt has a copay of $10/day, will need to pay $200 up front. She can come see pt tomorrow morning and assist him with signing admit paperwork if pt is agreeable to copay and signing paperwork. DORIS spoke with pt in room.  He states he is agreeable to signing paperwork tomorrow.  He is agreeable to paying copay, but states he won't have credit card until tomorrow afternoon when his son comes by after work.  DORIS called Emiliana [phone], informed Emiliana that pt agreeable to signing paperwork tomorrow AM, won't have card until the afternoon.  Emiliana states paperwork and copay collection don't have to be at exactly the same time, but pt must have done both before he can be admitted to [facility].  Emiliana states she will come by tomorrow with paperwork for pt to sign.    VENTURA SalterN, BS, RN, CCM

## 2024-10-02 NOTE — CONSULTS
[facility] - Internal Medicine Telemetry  Wound Care    Patient Name:  Bean Salas   MRN:  [id number]  Date: 10/2/2024  Diagnosis: PAULA (acute kidney injury)    Pt seen for wound consult- BLE. Pt was awake, lying in bed, agreeable to wound assessment. Pt has no open wounds to BLE, only skin discoloration and a small dried scab. Clean and applied Lac Hydrin, which was at bedside. Pt's heels were clear but dry skin noted. Pt did not have any complaints about groin area. PT/OT in the room to work with pt; when pt stood up, assessed sacrum/ buttocks, which were clear. Pt's back does have faded areas which appear as excoriations but are not open- currently closed with skin discoloration/scar tissue. Spoke with pt about turning/offloading sacrum as a preventative; pt said he would like the waffle overlay for the mattress. Discussed wound care plan with pt; he was in agreement.    Recommendations:  Daily: clean BLE with no rinse, pat dry and apply Lac Hydrin lotion. Offload heels  May apply silicone moisture barrier for protection  Waffle overlay for mattress & waffle chair cushion-both ordered from central supply.      SAMANTHA Tuttle, RN, ON  [facility]  10/2/24   10/02/24 0830   WOCN Assessment   WOCN Total Time (mins) 60   Visit Date 10/02/24   Visit Time 0830   Consult Type New   WOCN Speciality Wound   Wound venous   Intervention assessed;changed;applied;chart review;coordination of care   Teaching on-going        Wound 09/16/24 0422 Left lower Leg   Date First Assessed/Time First Assessed: 09/16/24 0422   Present on Original Admission: Yes  Side: Left  Orientation: lower  Location: Leg   Wound Image    Dressing Appearance Open to air   Drainage Amount None   Drainage Characteristics/Odor No odor   Appearance Maroon;Purple;Dry;Pink;Red   Periwound Area Hemosiderin Staining;Ecchymotic;Dry   Care Cleansed with:;Soap and water   Dressing Other (comment)  (no open wounds/ legs COURTNEY)   Periwound  Care Moisturizer applied       History:     Past Medical History:   Diagnosis Date    Atrial fibrillation, unspecified type 2023    COPD exacerbation 2023    Thyroid disease     hypo       Social History     Socioeconomic History    Marital status:    Tobacco Use    Smoking status: Former     Current packs/day: 0.00     Average packs/day: 1 pack/day for 35.0 years (35.0 ttl pk-yrs)     Types: Cigarettes     Start date: 1965     Quit date: 2000     Years since quittin.7     Passive exposure: Past    Smokeless tobacco: Never   Substance and Sexual Activity    Alcohol use: No    Drug use: Never    Sexual activity: Not Currently     Social Drivers of Health     Financial Resource Strain: Low Risk  (2024)    Overall Financial Resource Strain (CARDIA)     Difficulty of Paying Living Expenses: Not very hard   Food Insecurity: No Food Insecurity (2024)    Hunger Vital Sign     Worried About Running Out of Food in the Last Year: Never true     Ran Out of Food in the Last Year: Never true   Transportation Needs: No Transportation Needs (2024)    TRANSPORTATION NEEDS     Transportation : No   Physical Activity: Inactive (2024)    Exercise Vital Sign     Days of Exercise per Week: 0 days     Minutes of Exercise per Session: 0 min   Stress: Stress Concern Present (2024)    [employer] - Occupational Stress Questionnaire     Feeling of Stress : To some extent   Housing Stability: Low Risk  (2024)    Housing Stability Vital Sign     Unable to Pay for Housing in the Last Year: No     Homeless in the Last Year: No       Precautions:     Allergies as of 2024    (No Known Allergies)       WOC Assessment Details/Treatment   See above

## 2024-10-03 DIAGNOSIS — I49.3 PVC'S (PREMATURE VENTRICULAR CONTRACTIONS): Primary | ICD-10-CM

## 2024-10-03 PROBLEM — A41.9 SEPSIS: Status: RESOLVED | Noted: 2024-09-28 | Resolved: 2024-10-03

## 2024-10-03 PROBLEM — R65.10 SIRS (SYSTEMIC INFLAMMATORY RESPONSE SYNDROME): Status: RESOLVED | Noted: 2024-09-28 | Resolved: 2024-10-03

## 2024-10-03 LAB
BACTERIA BLD CULT: NORMAL
BACTERIA BLD CULT: NORMAL
POCT GLUCOSE: 136 MG/DL (ref 70–110)
POCT GLUCOSE: 138 MG/DL (ref 70–110)
POCT GLUCOSE: 141 MG/DL (ref 70–110)
POCT GLUCOSE: 156 MG/DL (ref 70–110)
SARS-COV-2 RNA RESP QL NAA+PROBE: NOT DETECTED

## 2024-10-03 PROCEDURE — 25000003 PHARM REV CODE 250: Mod: HCNC

## 2024-10-03 PROCEDURE — 5A09357 ASSISTANCE WITH RESPIRATORY VENTILATION, LESS THAN 24 CONSECUTIVE HOURS, CONTINUOUS POSITIVE AIRWAY PRESSURE: ICD-10-PCS | Performed by: HOSPITALIST

## 2024-10-03 PROCEDURE — 25000003 PHARM REV CODE 250: Mod: HCNC | Performed by: PHYSICIAN ASSISTANT

## 2024-10-03 PROCEDURE — 99900035 HC TECH TIME PER 15 MIN (STAT): Mod: HCNC

## 2024-10-03 PROCEDURE — 97530 THERAPEUTIC ACTIVITIES: CPT | Mod: HCNC,CO

## 2024-10-03 PROCEDURE — 27000221 HC OXYGEN, UP TO 24 HOURS: Mod: HCNC

## 2024-10-03 PROCEDURE — 93005 ELECTROCARDIOGRAM TRACING: CPT | Mod: HCNC

## 2024-10-03 PROCEDURE — 87635 SARS-COV-2 COVID-19 AMP PRB: CPT | Mod: HCNC | Performed by: HOSPITALIST

## 2024-10-03 PROCEDURE — 30200315 PPD INTRADERMAL TEST REV CODE 302: Mod: HCNC | Performed by: HOSPITALIST

## 2024-10-03 PROCEDURE — 93010 ELECTROCARDIOGRAM REPORT: CPT | Mod: HCNC,,, | Performed by: INTERNAL MEDICINE

## 2024-10-03 PROCEDURE — 21400001 HC TELEMETRY ROOM: Mod: HCNC

## 2024-10-03 PROCEDURE — 25000003 PHARM REV CODE 250: Mod: HCNC | Performed by: HOSPITALIST

## 2024-10-03 PROCEDURE — 97110 THERAPEUTIC EXERCISES: CPT | Mod: HCNC,CQ

## 2024-10-03 PROCEDURE — 94761 N-INVAS EAR/PLS OXIMETRY MLT: CPT | Mod: HCNC

## 2024-10-03 PROCEDURE — 94660 CPAP INITIATION&MGMT: CPT | Mod: HCNC

## 2024-10-03 PROCEDURE — 86580 TB INTRADERMAL TEST: CPT | Mod: HCNC | Performed by: HOSPITALIST

## 2024-10-03 RX ORDER — TAMSULOSIN HYDROCHLORIDE 0.4 MG/1
0.4 CAPSULE ORAL DAILY
Qty: 30 CAPSULE | Refills: 11 | Status: SHIPPED | OUTPATIENT
Start: 2024-10-04 | End: 2025-10-04

## 2024-10-03 RX ORDER — METHOCARBAMOL 500 MG/1
500 TABLET, FILM COATED ORAL ONCE
Status: DISCONTINUED | OUTPATIENT
Start: 2024-10-03 | End: 2024-10-03

## 2024-10-03 RX ORDER — OXYCODONE HYDROCHLORIDE 5 MG/1
5 TABLET ORAL ONCE
Status: DISCONTINUED | OUTPATIENT
Start: 2024-10-03 | End: 2024-10-03

## 2024-10-03 RX ORDER — OXYCODONE HYDROCHLORIDE 5 MG/1
5 TABLET ORAL ONCE
Status: COMPLETED | OUTPATIENT
Start: 2024-10-03 | End: 2024-10-03

## 2024-10-03 RX ORDER — OXYCODONE HYDROCHLORIDE 5 MG/1
5 TABLET ORAL EVERY 4 HOURS PRN
Qty: 30 TABLET | Refills: 0 | Status: SHIPPED | OUTPATIENT
Start: 2024-10-03

## 2024-10-03 RX ORDER — AMMONIUM LACTATE 12 G/100G
LOTION TOPICAL 2 TIMES DAILY PRN
Start: 2024-10-03

## 2024-10-03 RX ORDER — GABAPENTIN 100 MG/1
100 CAPSULE ORAL 2 TIMES DAILY
Status: DISCONTINUED | OUTPATIENT
Start: 2024-10-03 | End: 2024-10-07 | Stop reason: HOSPADM

## 2024-10-03 RX ADMIN — TUBERCULIN PURIFIED PROTEIN DERIVATIVE 5 UNITS: 5 INJECTION, SOLUTION INTRADERMAL at 05:10

## 2024-10-03 RX ADMIN — OXYCODONE 5 MG: 5 TABLET ORAL at 02:10

## 2024-10-03 RX ADMIN — SENNOSIDES AND DOCUSATE SODIUM 1 TABLET: 50; 8.6 TABLET ORAL at 09:10

## 2024-10-03 RX ADMIN — ASPIRIN 81 MG: 81 TABLET, COATED ORAL at 09:10

## 2024-10-03 RX ADMIN — POLYETHYLENE GLYCOL 3350 17 G: 17 POWDER, FOR SOLUTION ORAL at 10:10

## 2024-10-03 RX ADMIN — ATORVASTATIN CALCIUM 10 MG: 10 TABLET, FILM COATED ORAL at 09:10

## 2024-10-03 RX ADMIN — LEVOTHYROXINE SODIUM 200 MCG: 100 TABLET ORAL at 05:10

## 2024-10-03 RX ADMIN — TAMSULOSIN HYDROCHLORIDE 0.4 MG: 0.4 CAPSULE ORAL at 09:10

## 2024-10-03 RX ADMIN — APIXABAN 5 MG: 5 TABLET, FILM COATED ORAL at 10:10

## 2024-10-03 RX ADMIN — OXYCODONE HYDROCHLORIDE 5 MG: 5 TABLET ORAL at 05:10

## 2024-10-03 RX ADMIN — GABAPENTIN 100 MG: 100 CAPSULE ORAL at 10:10

## 2024-10-03 NOTE — ASSESSMENT & PLAN NOTE
Noted. Status post ORIF of pelvis 9/20/2024 and was a SNF. Continue PT and OT. Surgical bandages to remain in place post-op until orthopedic clinic follow up

## 2024-10-03 NOTE — PT/OT/SLP PROGRESS
Occupational Therapy   Treatment    Name: Bean Salas  MRN: [id number]  Admitting Diagnosis:  PAULA (acute kidney injury)       Recommendations:     Discharge Recommendations: Moderate Intensity Therapy  Discharge Equipment Recommendations:  wheelchair, hospital bed, hip kit  Barriers to discharge:  Decreased caregiver support    Assessment:     Bean Salas is a 79 y.o. male with a medical diagnosis of PAULA (acute kidney injury).  He presents with the fallowing performance deficits affecting function are weakness, impaired endurance, impaired self care skills, impaired functional mobility, gait instability, impaired balance, pain, impaired cardiopulmonary response to activity, decreased safety awareness, decreased lower extremity function, decreased upper extremity function, decreased coordination. Patient reported that he was not feeling well, patient with a low HR 31-32 at rest. BP was taking at rest prior to session, 146/59  MAP 88 31 HR. Patient's nurse was notified. Patient politely decline EOB activity. Patient would benefit from Moderate intensity therapy intervention post acute setting to address over all functional decline with self-care task, endurance, and mobility.     Rehab Prognosis:  Good; patient would benefit from acute skilled OT services to address these deficits and reach maximum level of function.       Plan:     Patient to be seen 4 x/week to address the above listed problems via self-care/home management, therapeutic activities, therapeutic exercises, neuromuscular re-education  Plan of Care Expires: 10/28/24  Plan of Care Reviewed with: patient    Subjective     Chief Complaint: weakness   Patient/Family Comments/goals: to return to PLOF  Pain/Comfort:  Pain Rating 1: 0/10    Objective:     Communicated with: Nurse prior to session.  Patient found HOB elevated with telemetry, oxygen, harrison catheter upon OT entry to room.  A client care conference was completed by the OTR and the COLVIN prior  to treatment by the COLVIN to discuss the patient's POC and current status.   Co-treated with PT to accommodate patient's limited endurance and to ensure the safety of patient during mobility task.   General Precautions: Standard, fall    Orthopedic Precautions:RLE weight bearing as tolerated, LLE weight bearing as tolerated  Braces: N/A  Respiratory Status: Nasal cannula, flow 3 L/min     Occupational Performance:     Bed Mobility:    Patient completed Scooting to HOB with maximal assistance of 2 persons via drawsheet   Patient educated on proper bed mobility task and positioning to reduce the risk for bed sore and stiffness.    Functional Mobility/Transfers:    Activities of Daily Living:  Decline self-care task     [facility] 6 Click ADL:      Treatment & Education:  Discussed OT POC and progress  Educated patient on the importance to continue to perform exercises in order to reduce stiffness and promote joint mobility and blood flow  Addressed patient's questions and concerns within COLVIN scope of practice      Patient left HOB elevated with all lines intact, call button in reach, and nurse notified    GOALS:   Multidisciplinary Problems       Occupational Therapy Goals          Problem: Occupational Therapy    Goal Priority Disciplines Outcome Interventions   Occupational Therapy Goal     OT, PT/OT Progressing    Description: Goals to be met by: 10/28/24     Patient will increase functional independence with ADLs by performing:    UE Dressing with Minimal Assistance.  LE Dressing with Minimal Assistance.  Grooming while seated with Minimal Assistance.  Toileting from bedside commode with Minimal Assistance for hygiene and clothing management.   Toilet transfer to bedside commode with Minimal Assistance.    DME: TBD on progress/Next level of care; likely:   Patient has a mobility limitation that significantly impairs their ability to participate in one or more mobility related activities of daily living in customary  locations in the home. The mobility limitation cannot be sufficiently resolved by the use of a cane or walker. The use of a manual wheelchair will greatly improve the patient's ability to participate in MRADLs. The patient will use the wheelchair on a regular basis at home. They have expressed their willingness to use a manual wheelchair in the home, and have a caregiver who is available and willing to assist with the wheelchair if needed.                          Time Tracking:     OT Date of Treatment: 10/03/24  OT Start Time: 1028  OT Stop Time: 1044  OT Total Time (min): 16 min    Billable Minutes:Therapeutic Activity 16    OT/COURTNEY: COURTNEY     Number of COURTNEY visits since last OT visit: 1    10/3/2024

## 2024-10-03 NOTE — PLAN OF CARE
Problem: Adult Inpatient Plan of Care  Goal: Plan of Care Review  Outcome: Not Progressing  Goal: Optimal Comfort and Wellbeing  Outcome: Not Progressing     Problem: Acute Kidney Injury/Impairment  Goal: Effective Renal Function  Outcome: Not Progressing     Problem: Fall Injury Risk  Goal: Absence of Fall and Fall-Related Injury  Outcome: Not Progressing

## 2024-10-03 NOTE — PLAN OF CARE
[facility] - Internal Medicine Telemetry  Discharge Reassessment    Primary Care Provider: Ramirez Levine MD    Expected Discharge Date: 10/3/2024    Reassessment (most recent)       Discharge Reassessment - 10/03/24 1128          Discharge Reassessment    Assessment Type Discharge Planning Reassessment (P)      Did the patient's condition or plan change since previous assessment? No (P)      Discharge Plan discussed with: Patient (P)      Communicated LATOSHA with patient/caregiver Date not available/Unable to determine (P)      Discharge Plan A Skilled Nursing Facility (P)      Discharge Plan B Home Health (P)      DME Needed Upon Discharge  none (P)      Transition of Care Barriers None (P)         Post-Acute Status    Post-Acute Authorization Placement (P)      Post-Acute Placement Status Referrals Sent (P)      Coverage [employer] (P)      Discharge Delays None known at this time (P)                    CM spoke with pt in room.  DC plan is [facility] SNF with ambulance with O2 transport.  CM spoke with Anna from [facility] as she was leaving the room; she states she completed admit paperwork with the pt.    3:08 PM  CM sent SNF orders, Covid, Mar to [facility] via CP.  CM ordered PPD.  CM called [facility] [phone], M stating requested info had been sent, PPD pending; LVM requesting call back.    4:13 PM  Per Dr. Roe, he is holding the d/c.  CM notified nurse Elizabeth.  CM called [facility], notified that d/c being held.  [facility] states she needs more recent Covid test.  CM ordered Covid test.  [facility] states she's not been able to obtain payment for SNF from son Efren, she has tried to call him. DORIS called number on file; it is incorrect.  DORIS corrected number in demographics [phone], called Efren, requested that he call [facility] to complete payment.  Efren states he will.  CM uploaded hard rx for Oxy to CP, sent to [facility].    VENTURA SalterN, BS, RN, CCM      Discharge Plan A and Plan  B have been determined by review of patient's clinical status, future medical and therapeutic needs, and coverage/benefits for post-acute care in coordination with multidisciplinary team members.

## 2024-10-03 NOTE — ASSESSMENT & PLAN NOTE
Atrial bigeminy   Holding home amiodarone and metoprolol. Symptomatic. Cardiology said telemetry showed no bradycardia, but he is having symptoms whenever nurses note bradycardia on pulse check. Get EKG.

## 2024-10-03 NOTE — SUBJECTIVE & OBJECTIVE
Interval History: Awaiting SNF.    Review of Systems   Constitutional:  Negative for chills and fever.   Neurological:  Negative for seizures and syncope.     Objective:     Vital Signs (Most Recent):  Temp: 97.6 °F (36.4 °C) (10/03/24 0429)  Pulse: 76 (10/03/24 0449)  Resp: 16 (10/03/24 0557)  BP: (!) 131/53 (10/03/24 0429)  SpO2: 97 % (10/03/24 0449) Vital Signs (24h Range):  Temp:  [97.5 °F (36.4 °C)-98.6 °F (37 °C)] 97.6 °F (36.4 °C)  Pulse:  [43-77] 76  Resp:  [16-18] 16  SpO2:  [96 %-98 %] 97 %  BP: (131-155)/(53-65) 131/53     Weight: 105.3 kg (232 lb 2.3 oz)  Body mass index is 32.38 kg/m².    Intake/Output Summary (Last 24 hours) at 10/3/2024 0700  Last data filed at 10/3/2024 0557  Gross per 24 hour   Intake --   Output 2650 ml   Net -2650 ml         Physical Exam  Vitals and nursing note reviewed.   Constitutional:       General: He is not in acute distress.     Appearance: He is well-developed. He is obese. He is not diaphoretic.      Interventions: He is not intubated.  Pulmonary:      Effort: Pulmonary effort is normal. No accessory muscle usage or respiratory distress. He is not intubated.   Skin:     General: Skin is warm and dry.      Coloration: Skin is not jaundiced or pale.   Neurological:      Mental Status: He is alert. Mental status is at baseline.      Motor: No tremor or seizure activity.   Psychiatric:         Attention and Perception: Attention normal.         Mood and Affect: Affect normal.         Behavior: Behavior is not agitated. Behavior is cooperative.             Significant Labs: All pertinent labs within the past 24 hours have been reviewed.  CMP:   Recent Labs   Lab 10/02/24  0901      K 4.1      CO2 25   *   BUN 27*   CREATININE 1.4   CALCIUM 9.4   PROT 6.5   ALBUMIN 2.8*   BILITOT 1.9*   ALKPHOS 162*   AST 44*   ALT 30   ANIONGAP 9       Significant Imaging: I have reviewed all pertinent imaging results/findings within the past 24 hours.  X-Ray Chest AP  Portable 9/28/24: FINDINGS:   Monitoring EKG leads are present.  The trachea is unremarkable.  The cardiomediastinal silhouette is enlarged.  There are small bilateral pleural effusions with left greater than right.  There is no evidence of a pneumothorax.  There is no evidence of pneumomediastinum.  There is pulmonary vascular congestion.  There is no focal consolidation.  There are degenerative changes in the osseous structures.   Impression:  Cardiomegaly with pulmonary vascular congestion and bilateral pleural effusions, suggestive of possible fluid overload state secondary to CHF.   US Retroperitoneal Complete 9/29/24: FINDINGS:   Right kidney: The right kidney measures 11.6 cm. There is cortical thinning.  There is loss of corticomedullary distinction. Resistive index measures 0.89.  A cyst arises from the interpolar region measuring up to 2 cm.  No renal stone. No hydronephrosis.   Left kidney: The left kidney measures 12.3 cm. No cortical thinning. There is mild loss of corticomedullary distinction. Resistive index measures 0.89.  A cyst arises from the lower pole measuring up to 6.2 cm.  A subcentimeter cyst arises from the interpolar region.  No renal stone. No hydronephrosis.   The bladder is partially distended at the time of scanning and has an unremarkable appearance.   Impression:  Renal findings suggest chronic medical renal disease noting resistive indices elevated as compared to examination 08/08/2022, acute on chronic process is a consideration.  No hydronephrosis.   Bilateral renal cysts.   Transthoracic echo complete with contrast 10/1/24:     Left Ventricle: The left ventricle is mildly dilated. Ventricular mass is normal. Mildly increased wall thickness. Regional wall motion abnormalities present. See diagram for wall motion findings. Septal motion is abnormal. There is normal systolic function with a visually estimated ejection fraction of 55 - 60%. Ejection fraction is approximately 55%.  Grade II diastolic dysfunction.    Right Ventricle: Normal right ventricular cavity size. Wall thickness is normal. Systolic function is normal.    Left Atrium: Left atrium is severely dilated.    Right Atrium: Right atrium is mildly dilated.    Aortic Valve: There is moderate aortic valve sclerosis.    Mitral Valve: There is mild to moderate regurgitation with multiple jets.    Pulmonary Artery: There is mild to moderate pulmonary hypertension. The estimated pulmonary artery systolic pressure is 46 mmHg.    IVC/SVC: Normal venous pressure at 3 mmHg.

## 2024-10-03 NOTE — PROGRESS NOTES
[facility] Internal Medicine [facility] Medicine  Progress Note    Patient Name: Bean Salas  MRN: [id number]  Patient Class: IP- Inpatient   Admission Date: 9/28/2024  Length of Stay: 5 days  Attending Physician: Danie Roe MD  Primary Care Provider: Ramirez Levine MD        Subjective:     Principal Problem:PAULA (acute kidney injury)        HPI:  Bean Salas is an 79 year old white man with former cigarette smoking (1965 to 2000), hypertension, hyperlipidemia, coronary artery disease, chronic diastolic congestive heart failure, biatrial enlargement, paroxysmal atrial flutter status post cardioversion on 9/18/2024, aortic atherosclerosis, obstructive sleep apnea, chronic hypoxic respiratory failure, hypothyroidism, history of right intertrochanteric femur fracture status post intramedullary gurpreet insertion on 9/3/2021, history of pelvic ring fracture status post open reduction and internal fixation on 9/20/2024, right shoulder rotator cuff impingement syndrome, history of pulmonary embolism, history of bilateral knee arthroplasty, history of vasectomy. He lives in [place], [place]. He is . His primary care physician is Dr. Ramirez Cloud. He is DNR.   He was hospitalized at [facility] from 9/15/2024 to 9/24/2024 for a right acetabular fracture. He underwent open reduction and internal fixation on 9/20/2024. He was discharged to [facility].    He presented to [facility] Emergency Department on 9/28/2024 with hypotension and worsening renal function on labs. He reported chronic back pain.    In the emergency department, he was initially hypotensive, with WBC 71328/uL, stable hemoglobin, potassium 5.5 mmol/L, creatinine 2.7 mg/dL, total bilirubin 1.8 mg/dL, lactate 3.21 mmol/L. EKG showed changes due to hyperkalemia. He was given medication to shift potassium, which improved to 4.8. Chest X-ray showed pulmonary vascular  congestion and bilateral pleural effusions. X-ray of the hip and pelvis showed stable postoperative findings. He was admitted to Hospital Medicine Team B.    Overview/Hospital Course:  His furosemide, spironolactone, and Entresto were held. He was given IV fluids and antibiotics. Since Entresto had just been started a week before he developed acute kidney injury and hypotension, it was discontinued. His metoprolol and amiodarone were held due to bradycardia. Leukocytosis improved. Physical and Occupational Therapy were consulted. Acute kidney injury improved. He had urinary retention and was started on tamsulosin and Mckeon catheter was placed. He was persistently hypotensive on 9/30/2024 and given 2 liters of IV fluids. Antibiotics were stopped. He awaited skilled nursing facility. He was accepted on 10/3/2024.     Interval History: Awaiting SNF. Does not feel ready to go due to symptomatic episodes of bradycardia.    Review of Systems   Constitutional:  Negative for chills and fever.   Neurological:  Negative for seizures and syncope.     Objective:     Vital Signs (Most Recent):  Temp: 97.6 °F (36.4 °C) (10/03/24 0429)  Pulse: 76 (10/03/24 0449)  Resp: 16 (10/03/24 0557)  BP: (!) 131/53 (10/03/24 0429)  SpO2: 97 % (10/03/24 0449) Vital Signs (24h Range):  Temp:  [97.5 °F (36.4 °C)-98.6 °F (37 °C)] 97.6 °F (36.4 °C)  Pulse:  [43-77] 76  Resp:  [16-18] 16  SpO2:  [96 %-98 %] 97 %  BP: (131-155)/(53-65) 131/53     Weight: 105.3 kg (232 lb 2.3 oz)  Body mass index is 32.38 kg/m².    Intake/Output Summary (Last 24 hours) at 10/3/2024 0700  Last data filed at 10/3/2024 0557  Gross per 24 hour   Intake --   Output 2650 ml   Net -2650 ml         Physical Exam  Vitals and nursing note reviewed.   Constitutional:       General: He is not in acute distress.     Appearance: He is well-developed. He is obese. He is not diaphoretic.      Interventions: He is not intubated.  Pulmonary:      Effort: Pulmonary effort is normal. No  accessory muscle usage or respiratory distress. He is not intubated.   Skin:     General: Skin is warm and dry.      Coloration: Skin is not jaundiced or pale.   Neurological:      Mental Status: He is alert. Mental status is at baseline.      Motor: No tremor or seizure activity.   Psychiatric:         Attention and Perception: Attention normal.         Mood and Affect: Affect normal.         Behavior: Behavior is not agitated. Behavior is cooperative.             Significant Labs: All pertinent labs within the past 24 hours have been reviewed.  CMP:   Recent Labs   Lab 10/02/24  0901      K 4.1      CO2 25   *   BUN 27*   CREATININE 1.4   CALCIUM 9.4   PROT 6.5   ALBUMIN 2.8*   BILITOT 1.9*   ALKPHOS 162*   AST 44*   ALT 30   ANIONGAP 9       Significant Imaging: I have reviewed all pertinent imaging results/findings within the past 24 hours.  X-Ray Chest AP Portable 9/28/24: FINDINGS:   Monitoring EKG leads are present.  The trachea is unremarkable.  The cardiomediastinal silhouette is enlarged.  There are small bilateral pleural effusions with left greater than right.  There is no evidence of a pneumothorax.  There is no evidence of pneumomediastinum.  There is pulmonary vascular congestion.  There is no focal consolidation.  There are degenerative changes in the osseous structures.   Impression:  Cardiomegaly with pulmonary vascular congestion and bilateral pleural effusions, suggestive of possible fluid overload state secondary to CHF.   US Retroperitoneal Complete 9/29/24: FINDINGS:   Right kidney: The right kidney measures 11.6 cm. There is cortical thinning.  There is loss of corticomedullary distinction. Resistive index measures 0.89.  A cyst arises from the interpolar region measuring up to 2 cm.  No renal stone. No hydronephrosis.   Left kidney: The left kidney measures 12.3 cm. No cortical thinning. There is mild loss of corticomedullary distinction. Resistive index measures 0.89.  A  cyst arises from the lower pole measuring up to 6.2 cm.  A subcentimeter cyst arises from the interpolar region.  No renal stone. No hydronephrosis.   The bladder is partially distended at the time of scanning and has an unremarkable appearance.   Impression:  Renal findings suggest chronic medical renal disease noting resistive indices elevated as compared to examination 08/08/2022, acute on chronic process is a consideration.  No hydronephrosis.   Bilateral renal cysts.   Transthoracic echo complete with contrast 10/1/24:     Left Ventricle: The left ventricle is mildly dilated. Ventricular mass is normal. Mildly increased wall thickness. Regional wall motion abnormalities present. See diagram for wall motion findings. Septal motion is abnormal. There is normal systolic function with a visually estimated ejection fraction of 55 - 60%. Ejection fraction is approximately 55%. Grade II diastolic dysfunction.    Right Ventricle: Normal right ventricular cavity size. Wall thickness is normal. Systolic function is normal.    Left Atrium: Left atrium is severely dilated.    Right Atrium: Right atrium is mildly dilated.    Aortic Valve: There is moderate aortic valve sclerosis.    Mitral Valve: There is mild to moderate regurgitation with multiple jets.    Pulmonary Artery: There is mild to moderate pulmonary hypertension. The estimated pulmonary artery systolic pressure is 46 mmHg.    IVC/SVC: Normal venous pressure at 3 mmHg.    Assessment/Plan:      * PAULA (acute kidney injury)  Holding home furosemide, Entresto, and spironolactone. Improving.     Chronic hypoxemic respiratory failure  Continue supplemental oxygen.    Paroxysmal atrial flutter  Holding home amiodarone. Continue home apixaban.    AKHIL (obstructive sleep apnea)  CPAP nightly.    Coronary artery disease involving native coronary artery of native heart without angina pectoris  Continue home aspirin and atorvastatin.    Chronic combined systolic and diastolic  congestive heart failure  Holding home furosemide, Entresto, and spironolactone.     Advance care planning  Patient confirmed DNR.    Closed nondisplaced fracture of anterior column of right acetabulum with routine healing s/p ORIF of pelvis on 9/20/2024  Noted. Status post ORIF of pelvis 9/20/2024 and was a SNF. Continue PT and OT. Surgical bandages to remain in place post-op until orthopedic clinic follow up    Bradycardia  Holding home amiodarone and metoprolol. He reports being symptomatic. Consult EP.      Hypothyroidism due to acquired atrophy of thyroid  Continue home levothyroxine.    Pure hypercholesterolemia  Continue home atorvastatin.    Essential hypertension  Holding home Entresto, metoprolol. Monitor BP.      VTE Risk Mitigation (From admission, onward)           Ordered     apixaban tablet 5 mg  2 times daily         10/03/24 0905                    Discharge Planning   LATOSHA: 10/4/2024     Code Status: DNR   Is the patient medically ready for discharge?:     Reason for patient still in hospital (select all that apply): Pending disposition  Discharge Plan A: Skilled Nursing Facility                  Danie Roe MD  Department of Hospital Medicine   [facility] - Internal Medicine Telemetry

## 2024-10-03 NOTE — PT/OT/SLP PROGRESS
"Physical Therapy CoTreatment  OT present for cotreat due to pt's multiple medical comorbidities and functional/cognition deficits requiring two skilled therapists to appropriately progress pt's musculoskeletal strength, neuromuscular control, and endurance while taking into consideration medical acuity and pt safety.     Patient Name:  Bean Salas   MRN:  [id number]    Recommendations:     Discharge Recommendations: Moderate Intensity Therapy  Discharge Equipment Recommendations: wheelchair, hospital bed, hip kit  Barriers to discharge: None    Assessment:     Bean Salas is a 79 y.o. male admitted with a medical diagnosis of PAULA (acute kidney injury).  He presents with the following impairments/functional limitations: weakness, impaired endurance, impaired self care skills, impaired functional mobility, gait instability.  Pt agreeable for therapy, RN reports pt HR has been fluctuating, /59mmHG w/HR at 31bpm, focused on bed mobility and bed level exercises    Rehab Prognosis: Fair; patient would benefit from acute skilled PT services to address these deficits and reach maximum level of function.    Recent Surgery: * No surgery found *      Plan:     During this hospitalization, patient to be seen 4 x/week to address the identified rehab impairments via gait training, therapeutic activities, therapeutic exercises, neuromuscular re-education and progress toward the following goals:    Plan of Care Expires:  10/28/24    Subjective     Chief Complaint: "I haven't slept but 2 hours last night"  Patient/Family Comments/goals: fatigue  Pain/Comfort:  Pain Rating 1: 0/10  Pain Rating Post-Intervention 1: 0/10      Objective:     Communicated with RN prior to session.  Patient found supine with telemetry, oxygen, harrison catheter upon PT entry to room.     General Precautions: Standard, fall  Orthopedic Precautions: RLE weight bearing as tolerated, LLE weight bearing as tolerated  Braces: N/A  Respiratory Status: " Nasal cannula, flow 2 L/min     Functional Mobility:    Bed Mobility:   Rolling: to L side with minimum assistance    Exercises: ankle pumps x 10, hip abd/add x 5             AM-PAC 6 CLICK MOBILITY  Turning over in bed (including adjusting bedclothes, sheets and blankets)?: 2  Sitting down on and standing up from a chair with arms (e.g., wheelchair, bedside commode, etc.): 2  Moving from lying on back to sitting on the side of the bed?: 2  Moving to and from a bed to a chair (including a wheelchair)?: 2  Need to walk in hospital room?: 1  Climbing 3-5 steps with a railing?: 1  Basic Mobility Total Score: 10       Treatment & Education:  Education provided regarding PT role and PoC to increase activity tolerance, strength and endurance once HR stabilizes, pt verbalizes understanding    Patient left supine with all lines intact and call button in reach..    GOALS:   Multidisciplinary Problems       Physical Therapy Goals          Problem: Physical Therapy    Goal Priority Disciplines Outcome Interventions   Physical Therapy Goal     PT, PT/OT Progressing    Description: PT goals until 10/14/24    1. Pt supine to sit with moderate assist-not met  2. Pt sit to supine with moderate assist-not met  3. Pt sit to stand with RW with WBAT B LE with moderate assist-not met  4. Pt to perform gait 5 steps with RW with WBAT B LE with max assist.-not met  5. Pt to transfer bed to/from bedside chair/w/c with LRAD as needed for safety with WBAT B LE with max assist.-not met  6. Pt to perform B LE exs in sitting or supine x 10 reps to strengthen B LE to improve functional mobility.-not met    DME Justifications (see above for complete DME recommendations)    Hospital Bed-  Patient requires the head of the bed to be elevated more than 30 degrees most of the time due to congestive heart failure, chronic pulmonary disease, and/or problems with aspiration. The use of pillows and wedges is not adequate for the proper body positioning  this patient requires     Wheelchair-  Patient has a mobility limitation that significantly impairs their ability to participate in one or more mobility related activities of daily living in customary locations in the home. The mobility limitation cannot be sufficiently resolved by the use of a cane or walker. The use of a manual wheelchair will greatly improve the patient's ability to participate in MRADLs. The patient will use the wheelchair on a regular basis at home. They have expressed their willingness to use a manual wheelchair in the home, and have a caregiver who is available and willing to assist with the wheelchair if needed.                             Time Tracking:     PT Received On: 10/03/24  PT Start Time: 1030     PT Stop Time: 1045  PT Total Time (min): 15 min     Billable Minutes: Therapeutic Exercise 15min    Treatment Type: Treatment  PT/PTA: PTA     Number of PTA visits since last PT visit: 3     10/03/2024

## 2024-10-04 PROBLEM — I49.8 ATRIAL BIGEMINY: Chronic | Status: ACTIVE | Noted: 2021-06-21

## 2024-10-04 PROBLEM — Z71.89 ADVANCE CARE PLANNING: Status: RESOLVED | Noted: 2021-09-25 | Resolved: 2024-10-04

## 2024-10-04 PROBLEM — R00.1 BRADYCARDIA: Status: RESOLVED | Noted: 2021-08-06 | Resolved: 2024-10-04

## 2024-10-04 PROBLEM — N17.9 AKI (ACUTE KIDNEY INJURY): Status: RESOLVED | Noted: 2024-09-28 | Resolved: 2024-10-04

## 2024-10-04 LAB
ANION GAP SERPL CALC-SCNC: 5 MMOL/L (ref 8–16)
BASOPHILS # BLD AUTO: 0.04 K/UL (ref 0–0.2)
BASOPHILS NFR BLD: 0.4 % (ref 0–1.9)
BUN SERPL-MCNC: 18 MG/DL (ref 8–23)
CALCIUM SERPL-MCNC: 9 MG/DL (ref 8.7–10.5)
CHLORIDE SERPL-SCNC: 101 MMOL/L (ref 95–110)
CO2 SERPL-SCNC: 31 MMOL/L (ref 23–29)
CREAT SERPL-MCNC: 1.1 MG/DL (ref 0.5–1.4)
DIFFERENTIAL METHOD BLD: ABNORMAL
EOSINOPHIL # BLD AUTO: 0.2 K/UL (ref 0–0.5)
EOSINOPHIL NFR BLD: 1.9 % (ref 0–8)
ERYTHROCYTE [DISTWIDTH] IN BLOOD BY AUTOMATED COUNT: 15 % (ref 11.5–14.5)
EST. GFR  (NO RACE VARIABLE): >60 ML/MIN/1.73 M^2
GLUCOSE SERPL-MCNC: 140 MG/DL (ref 70–110)
HCT VFR BLD AUTO: 29.9 % (ref 40–54)
HGB BLD-MCNC: 9.2 G/DL (ref 14–18)
IMM GRANULOCYTES # BLD AUTO: 0.06 K/UL (ref 0–0.04)
IMM GRANULOCYTES NFR BLD AUTO: 0.7 % (ref 0–0.5)
LYMPHOCYTES # BLD AUTO: 0.9 K/UL (ref 1–4.8)
LYMPHOCYTES NFR BLD: 9.8 % (ref 18–48)
MAGNESIUM SERPL-MCNC: 2.1 MG/DL (ref 1.6–2.6)
MCH RBC QN AUTO: 31.7 PG (ref 27–31)
MCHC RBC AUTO-ENTMCNC: 30.8 G/DL (ref 32–36)
MCV RBC AUTO: 103 FL (ref 82–98)
MONOCYTES # BLD AUTO: 0.6 K/UL (ref 0.3–1)
MONOCYTES NFR BLD: 6.9 % (ref 4–15)
NEUTROPHILS # BLD AUTO: 7.2 K/UL (ref 1.8–7.7)
NEUTROPHILS NFR BLD: 80.3 % (ref 38–73)
NRBC BLD-RTO: 0 /100 WBC
OHS QRS DURATION: 114 MS
OHS QRS DURATION: 116 MS
OHS QTC CALCULATION: 523 MS
OHS QTC CALCULATION: 579 MS
PLATELET # BLD AUTO: 394 K/UL (ref 150–450)
PMV BLD AUTO: 11 FL (ref 9.2–12.9)
POCT GLUCOSE: 120 MG/DL (ref 70–110)
POCT GLUCOSE: 167 MG/DL (ref 70–110)
POCT GLUCOSE: 173 MG/DL (ref 70–110)
POCT GLUCOSE: 205 MG/DL (ref 70–110)
POTASSIUM SERPL-SCNC: 3.9 MMOL/L (ref 3.5–5.1)
RBC # BLD AUTO: 2.9 M/UL (ref 4.6–6.2)
SODIUM SERPL-SCNC: 137 MMOL/L (ref 136–145)
WBC # BLD AUTO: 8.98 K/UL (ref 3.9–12.7)

## 2024-10-04 PROCEDURE — 97530 THERAPEUTIC ACTIVITIES: CPT | Mod: HCNC,CQ

## 2024-10-04 PROCEDURE — 80048 BASIC METABOLIC PNL TOTAL CA: CPT | Mod: HCNC

## 2024-10-04 PROCEDURE — 63600175 PHARM REV CODE 636 W HCPCS: Mod: HCNC

## 2024-10-04 PROCEDURE — 36415 COLL VENOUS BLD VENIPUNCTURE: CPT | Mod: HCNC

## 2024-10-04 PROCEDURE — 97535 SELF CARE MNGMENT TRAINING: CPT | Mod: HCNC,CO

## 2024-10-04 PROCEDURE — 85025 COMPLETE CBC W/AUTO DIFF WBC: CPT | Mod: HCNC

## 2024-10-04 PROCEDURE — 93010 ELECTROCARDIOGRAM REPORT: CPT | Mod: HCNC,,, | Performed by: INTERNAL MEDICINE

## 2024-10-04 PROCEDURE — 83735 ASSAY OF MAGNESIUM: CPT | Mod: HCNC

## 2024-10-04 PROCEDURE — 93005 ELECTROCARDIOGRAM TRACING: CPT | Mod: HCNC

## 2024-10-04 PROCEDURE — 21400001 HC TELEMETRY ROOM: Mod: HCNC

## 2024-10-04 PROCEDURE — 99900035 HC TECH TIME PER 15 MIN (STAT): Mod: HCNC

## 2024-10-04 PROCEDURE — 94660 CPAP INITIATION&MGMT: CPT | Mod: HCNC

## 2024-10-04 PROCEDURE — 94761 N-INVAS EAR/PLS OXIMETRY MLT: CPT | Mod: HCNC

## 2024-10-04 PROCEDURE — 27000221 HC OXYGEN, UP TO 24 HOURS: Mod: HCNC

## 2024-10-04 PROCEDURE — 25000003 PHARM REV CODE 250: Mod: HCNC | Performed by: HOSPITALIST

## 2024-10-04 PROCEDURE — 25000003 PHARM REV CODE 250: Mod: HCNC

## 2024-10-04 RX ORDER — SPIRONOLACTONE 25 MG/1
25 TABLET ORAL DAILY
Status: DISCONTINUED | OUTPATIENT
Start: 2024-10-04 | End: 2024-10-05

## 2024-10-04 RX ORDER — MUPIROCIN 20 MG/G
OINTMENT TOPICAL 2 TIMES DAILY
Status: CANCELLED | OUTPATIENT
Start: 2024-10-04 | End: 2024-10-09

## 2024-10-04 RX ORDER — FUROSEMIDE 10 MG/ML
20 INJECTION INTRAMUSCULAR; INTRAVENOUS ONCE
Status: COMPLETED | OUTPATIENT
Start: 2024-10-04 | End: 2024-10-04

## 2024-10-04 RX ORDER — AMIODARONE HYDROCHLORIDE 200 MG/1
200 TABLET ORAL 2 TIMES DAILY
Status: DISCONTINUED | OUTPATIENT
Start: 2024-10-04 | End: 2024-10-07 | Stop reason: HOSPADM

## 2024-10-04 RX ORDER — FUROSEMIDE 40 MG/1
40 TABLET ORAL 2 TIMES DAILY
Status: DISCONTINUED | OUTPATIENT
Start: 2024-10-04 | End: 2024-10-05

## 2024-10-04 RX ADMIN — GABAPENTIN 100 MG: 100 CAPSULE ORAL at 08:10

## 2024-10-04 RX ADMIN — FUROSEMIDE 20 MG: 10 INJECTION, SOLUTION INTRAMUSCULAR; INTRAVENOUS at 12:10

## 2024-10-04 RX ADMIN — ASPIRIN 81 MG: 81 TABLET, COATED ORAL at 08:10

## 2024-10-04 RX ADMIN — FUROSEMIDE 40 MG: 40 TABLET ORAL at 10:10

## 2024-10-04 RX ADMIN — ATORVASTATIN CALCIUM 10 MG: 10 TABLET, FILM COATED ORAL at 08:10

## 2024-10-04 RX ADMIN — OXYCODONE 5 MG: 5 TABLET ORAL at 08:10

## 2024-10-04 RX ADMIN — APIXABAN 5 MG: 5 TABLET, FILM COATED ORAL at 08:10

## 2024-10-04 RX ADMIN — APIXABAN 5 MG: 5 TABLET, FILM COATED ORAL at 10:10

## 2024-10-04 RX ADMIN — LEVOTHYROXINE SODIUM 200 MCG: 100 TABLET ORAL at 06:10

## 2024-10-04 RX ADMIN — GABAPENTIN 100 MG: 100 CAPSULE ORAL at 10:10

## 2024-10-04 RX ADMIN — AMIODARONE HYDROCHLORIDE 200 MG: 200 TABLET ORAL at 10:10

## 2024-10-04 RX ADMIN — FUROSEMIDE 40 MG: 40 TABLET ORAL at 11:10

## 2024-10-04 RX ADMIN — SPIRONOLACTONE 25 MG: 25 TABLET ORAL at 11:10

## 2024-10-04 RX ADMIN — SENNOSIDES AND DOCUSATE SODIUM 1 TABLET: 50; 8.6 TABLET ORAL at 08:10

## 2024-10-04 RX ADMIN — TAMSULOSIN HYDROCHLORIDE 0.4 MG: 0.4 CAPSULE ORAL at 08:10

## 2024-10-04 NOTE — PROGRESS NOTES
10/03/24 2014   Vital Signs   Temp 97.6 °F (36.4 °C)   Temp Source Oral   Pulse (!) 33   Heart Rate Source Monitor   Resp 18   SpO2 97 %   BP (!) 145/55   MAP (mmHg) 85   BP Location Left arm   Patient Position Lying     Pt had en episode of bradycardia and SOB on 2L NC. Nurse ordered prn EKG, notified rapid response, and Indira Darling PA-C. MARIFER to review EKG and medications and rapid ordered a repeat chest X=RAY. Pt HR then came up to 70 and reported SOB resolved. Pt visualized breathing even and unlabored with no acute s/s of distress, instructed the pt to call for assistance if needed, call light within reach. Plan of care ongoing.

## 2024-10-04 NOTE — PLAN OF CARE
Problem: Adult Inpatient Plan of Care  Goal: Plan of Care Review  Outcome: Not Progressing  Goal: Optimal Comfort and Wellbeing  Outcome: Not Progressing     Problem: Fall Injury Risk  Goal: Absence of Fall and Fall-Related Injury  Outcome: Not Progressing     Problem: Mobility Impairment  Goal: Optimal Mobility  Outcome: Not Progressing

## 2024-10-04 NOTE — PLAN OF CARE
Patient's HR is being recorded by dynamap/pulse ox in the 30s however there is ventricular bigeminy and it is being recorded as half and is actually around 60.   Reviewed tele data with Dr Jean and Dr Cantu from EP team. There is no indication for pacemaker at this time.   However, PVCs could be a possible reason for his lightheadedness. We would recommend medical management for that.   PO amio 200 bid and after one week amio 200 qd, keep fall precautions until lightheadedness completely resolves  F/up with EP as outpatient

## 2024-10-04 NOTE — SUBJECTIVE & OBJECTIVE
Principal Problem:PAULA (acute kidney injury)    Principal Orthopedic Problem: s/p percutaneous pelvic ring fixation anterior and posterior 9/20/24    Interval History: Admitted in the setting if PAULA/hyperkalemia. Patient was scheduled for 2 week post op visit but due to readmission 2 week wound check was completed today inpatient. Incisions are clean and dry without areas of erythema or dehiscence. Dressings may be kept off and patient may shower now - avoid submerging in a bath or tub for an additional 4 weeks.   He states regarding his right hip he is still having moderate pain and weakness when attempting to walk with a walker. Otherwise he has minimal pain at rest and no pain with passive ROM of the hip.     Review of patient's allergies indicates:  No Known Allergies    Current Facility-Administered Medications   Medication    acetaminophen tablet 650 mg    albuterol-ipratropium 2.5 mg-0.5 mg/3 mL nebulizer solution 3 mL    ammonium lactate 12 % lotion    apixaban tablet 5 mg    aspirin EC tablet 81 mg    atorvastatin tablet 10 mg    bisacodyL suppository 10 mg    dextrose 10% bolus 125 mL 125 mL    dextrose 10% bolus 250 mL 250 mL    furosemide tablet 40 mg    gabapentin capsule 100 mg    glucagon (human recombinant) injection 1 mg    glucose chewable tablet 16 g    glucose chewable tablet 24 g    levothyroxine tablet 200 mcg    melatonin tablet 6 mg    naloxone 0.4 mg/mL injection 0.02 mg    oxyCODONE immediate release tablet 5 mg    perflutren protein-A microsphr 0.22 mg/mL IV susp    polyethylene glycol packet 17 g    promethazine tablet 25 mg    senna-docusate 8.6-50 mg per tablet 1 tablet    sodium chloride 0.9% flush 10 mL    spironolactone tablet 25 mg    tamsulosin 24 hr capsule 0.4 mg     Objective:     Vital Signs (Most Recent):  Temp: 97.9 °F (36.6 °C) (10/04/24 1145)  Pulse: 65 (10/04/24 1058)  Resp: 18 (10/04/24 1045)  BP: 138/61 (10/04/24 1045)  SpO2: 97 % (10/04/24 1045) Vital Signs (24h  "Range):  Temp:  [97.6 °F (36.4 °C)-99 °F (37.2 °C)] 97.9 °F (36.6 °C)  Pulse:  [33-82] 65  Resp:  [14-22] 18  SpO2:  [95 %-99 %] 97 %  BP: (128-157)/(55-91) 138/61     Weight: 102.4 kg (225 lb 12 oz)  Height: 5' 11" (180.3 cm)  Body mass index is 31.49 kg/m².      Intake/Output Summary (Last 24 hours) at 10/4/2024 1228  Last data filed at 10/4/2024 0839  Gross per 24 hour   Intake 462 ml   Output 1150 ml   Net -688 ml        Ortho/SPM Exam  RLE:  Incisions well healed without surrounding erythema, no drainage and no signs of dehiscence  No pain with axial loading of both hips  No bony TTP over the anterior pelvic ring   Compartments soft  Painless passive ROM of the right hip; mild discomfort with terminal hip internal rotation  SILT Sa/Kerr/DP/SP/T  Motor intact TA/SP/DP  2+ DP/PT         Significant Labs: CBC:   Recent Labs   Lab 10/04/24  0338   WBC 8.98   HGB 9.2*   HCT 29.9*        CMP:   Recent Labs   Lab 10/04/24  0338      K 3.9      CO2 31*   *   BUN 18   CREATININE 1.1   CALCIUM 9.0   ANIONGAP 5*     All pertinent labs within the past 24 hours have been reviewed.    Significant Imaging: I have reviewed and interpreted all pertinent imaging results/findings.  "

## 2024-10-04 NOTE — PT/OT/SLP PROGRESS
Physical Therapy Treatment  Co-treatment with OT due to acuity of condition, level of skilled assist needed for assessment of safety with mobility and potential of not tolerating a second treatment session.     Patient Name:  Bean Salas   MRN:  [id number]    Recommendations:     Discharge Recommendations: Moderate Intensity Therapy  Discharge Equipment Recommendations: wheelchair, hospital bed, hip kit  Barriers to discharge:  current level of assistance required     Assessment:     Bean Salas is a 79 y.o. male admitted with a medical diagnosis of PAULA (acute kidney injury).  He presents with the following impairments/functional limitations: weakness, impaired endurance, impaired self care skills, impaired functional mobility, impaired balance, decreased safety awareness, decreased lower extremity function, decreased upper extremity function, impaired cardiopulmonary response to activity, orthopedic precautions Pt tolerated treatment session fairly well today. Pt vitals monitored throughout session and as follows: supine BP (154/66) HR (34). Rn notified and okaying to proceed with therapy, yet if pt had any symptoms of lightheadedness to return pt supine. Once seated EOB pt reporting feeling of lightheadedness, and pt returned supine and RN notified. BP supine (158/58) HR (ranging from 60's to mid 30's). Patient remains appropriate for continued skilled services within the acute environment and goals remain appropriate.   .    Rehab Prognosis: Good; patient would benefit from acute skilled PT services to address these deficits and reach maximum level of function.    Recent Surgery: * No surgery found *      Plan:     During this hospitalization, patient to be seen 4 x/week to address the identified rehab impairments via gait training, therapeutic activities, therapeutic exercises, neuromuscular re-education and progress toward the following goals:    Plan of Care Expires:  10/28/24    Subjective     Chief  Complaint: Lightheadedness   Patient/Family Comments/goals: Pt agreeable to PT   Pain/Comfort:  Pain Rating 1: 0/10      Objective:     Communicated with Rn prior to session.  Patient found supine with telemetry, oxygen, harrison catheter upon PT entry to room.     General Precautions: Standard, fall  Orthopedic Precautions: RLE weight bearing as tolerated, LLE weight bearing as tolerated  Braces: N/A  Respiratory Status: Nasal cannula     Functional Mobility:  Bed Mobility:     Rolling Left:  moderate assistance  Supine to Sit: maximal assistance and of 2 persons  EOB sitting: ~ 1-2 minutes ModA with heavy reliance on L UE   Sit to Supine: maximal assistance and of 2 persons      AM-PAC 6 CLICK MOBILITY  Turning over in bed (including adjusting bedclothes, sheets and blankets)?: 2  Sitting down on and standing up from a chair with arms (e.g., wheelchair, bedside commode, etc.): 2  Moving from lying on back to sitting on the side of the bed?: 2  Moving to and from a bed to a chair (including a wheelchair)?: 2  Need to walk in hospital room?: 1  Climbing 3-5 steps with a railing?: 1  Basic Mobility Total Score: 10       Treatment & Education:  Therapist provided instruction and educated for safety during bed mobility and Eob sitting. As well as proper body mechanics, energy conservation, and fall prevention strategies during tasks listed above, and the effects of prolonged immobility and the importance of performing EOB/OOB activity and exercises to promote healing and reduce recovery time.       Patient left supine with all lines intact, call button in reach, Rn notified, and Rn present..    GOALS:   Multidisciplinary Problems       Physical Therapy Goals          Problem: Physical Therapy    Goal Priority Disciplines Outcome Interventions   Physical Therapy Goal     PT, PT/OT Progressing    Description: PT goals until 10/14/24    1. Pt supine to sit with moderate assist-not met  2. Pt sit to supine with moderate  assist-not met  3. Pt sit to stand with RW with WBAT B LE with moderate assist-not met  4. Pt to perform gait 5 steps with RW with WBAT B LE with max assist.-not met  5. Pt to transfer bed to/from bedside chair/w/c with LRAD as needed for safety with WBAT B LE with max assist.-not met  6. Pt to perform B LE exs in sitting or supine x 10 reps to strengthen B LE to improve functional mobility.-not met    DME Justifications (see above for complete DME recommendations)    Hospital Bed-  Patient requires the head of the bed to be elevated more than 30 degrees most of the time due to congestive heart failure, chronic pulmonary disease, and/or problems with aspiration. The use of pillows and wedges is not adequate for the proper body positioning this patient requires     Wheelchair-  Patient has a mobility limitation that significantly impairs their ability to participate in one or more mobility related activities of daily living in customary locations in the home. The mobility limitation cannot be sufficiently resolved by the use of a cane or walker. The use of a manual wheelchair will greatly improve the patient's ability to participate in MRADLs. The patient will use the wheelchair on a regular basis at home. They have expressed their willingness to use a manual wheelchair in the home, and have a caregiver who is available and willing to assist with the wheelchair if needed.                             Time Tracking:     PT Received On: 10/04/24  PT Start Time: 1018     PT Stop Time: 1054  PT Total Time (min): 36 min     Billable Minutes: Therapeutic Activity 36    Treatment Type: Treatment  PT/PTA: PTA     Number of PTA visits since last PT visit: 4     10/04/2024

## 2024-10-04 NOTE — NURSING
AAOx4. Hr currently 63 / pvc's on telemetry monitoring with brief fluctuation of bradycardia at 35. Attending Md consulted cardiology and pt to restart po amiodarone. No distress noted. C/o mild generalized weakness 2L 02 in progress per nc,  02 sats 97%, b/p 136/61. Per md discharge postponed for eval.

## 2024-10-04 NOTE — CARE UPDATE
"RAPID RESPONSE NURSE PROACTIVE ROUNDING NOTE       Time of Visit:     Admit Date: 2024  LOS: 5  Code Status: DNR   Date of Visit: 10/03/2024  : 1945  Age: 79 y.o.  Sex: male  Race: White  Bed: 1129/1129 A:   MRN: [id number]  Was the patient discharged from an ICU this admission? No   Was the patient discharged from a PACU within last 24 hours? No   Did the patient receive conscious sedation/general anesthesia in last 24 hours? No  Was the patient in the ED within the past 24 hours? No  Was the patient on NIPPV within the past 24 hours? Yes   Attending Physician: Danie Roe MD  Primary Service: [facility] [place] [facility] B   Time spent at the bedside: 15 -30 min    SITUATION    Notified by bedside RN via phone call.  Reason for alert: bradycardia  Called to evaluate the patient for Circulatory.    BACKGROUND     Why is the patient in the hospital?: PAULA (acute kidney injury)    Patient has a past medical history of Atrial fibrillation, unspecified type, COPD exacerbation, and Thyroid disease.    Last Vitals:  Temp: 97.6 °F (36.4 °C) (10/03 2014)  Pulse: 68 (10/03 2158)  Resp: 21 (10/03 2158)  BP: 145/55 (10/03 2014)  SpO2: 95 % (10/03 2158)    24 Hours Vitals Range:  Temp:  [97.6 °F (36.4 °C)-99 °F (37.2 °C)]   Pulse:  [30-76]   Resp:  [16-22]   BP: (131-147)/(44-71)   SpO2:  [95 %-98 %]     Labs:  Recent Labs     10/01/24  0243 10/02/24  0901   WBC 6.57 8.27   HGB 8.5* 10.3*   HCT 27.3* 31.9*    426       Recent Labs     10/01/24  0243 10/02/24  09   * 136   K 4.1 4.1    102   CO2 28 25   BUN 51* 27*   CREATININE 1.5* 1.4   * 185*   PHOS 2.4*  --    MG 2.7*  --         No results for input(s): "PH", "PCO2", "PO2", "HCO3", "POCSATURATED", "BE" in the last 72 hours.     ASSESSMENT      Physical Exam  Constitutional:       Appearance: He is ill-appearing.   Cardiovascular:      Rate and Rhythm: Bradycardia present. Rhythm irregular. Extrasystoles are present.  Pulmonary:      Effort: " "Tachypnea present.      Breath sounds: Rales present.   Musculoskeletal:      Right lower le+ Edema present.      Left lower le+ Edema present.   Skin:     General: Skin is warm and dry.      Capillary Refill: Capillary refill takes 2 to 3 seconds.   Neurological:      Mental Status: He is alert and oriented to person, place, and time.       Upon arrival to room patient lying in bed, awake and alert, eating dinner. Pleasantly conversant. Endorses feeling "a little lethargic" and also states he is having some mild shortness of breath at rest that worsens with exertion. Denies any dizziness/lightheadedness or chest pain.    Review of telemetry shows HR 50s-70s with frequent PVCs.    INTERVENTIONS    The patient was seen for Cardiac problem. Staff concerns included bradycardia. The following interventions were performed: portable chest x-ray, EKG, and continuous cardiac monitoring continued.    Repeat CXR, EKG ordered  Vital signs currently stable    Cardiology consult placed by primary team at 1723 today    RECOMMENDATIONS    Maintain continuous cardiac monitoring  Follow up with imaging  Strict I&O  Follow up with cardiology recommendations  Encourage CPAP use at night while sleeping    PROVIDER ESCALATION    Yes/No  No- bedside RN in contact with provider    Orders received and case discussed with NA.    Disposition: Remain in room 1129.    FOLLOW-UP    Bedside RNKeegan, charge HUMPHREY Johnson  updated on plan of care. Instructed to call the Rapid Response NurseJENNY RN at [phone] for additional questions or concerns.            "

## 2024-10-04 NOTE — PROGRESS NOTES
[facility] - Internal Medicine Telemetry  Orthopedics  Progress Note    Patient Name: Bean Salas  MRN: [id number]  Admission Date: 9/28/2024  Hospital Length of Stay: 6 days  Attending Provider: Danie Roe MD  Primary Care Provider: Ramirez Levine MD    Subjective:     Principal Problem:PAULA (acute kidney injury)    Principal Orthopedic Problem: s/p percutaneous pelvic ring fixation anterior and posterior 9/20/24    Interval History: Admitted in the setting if PAULA/hyperkalemia. Patient was scheduled for 2 week post op visit but due to readmission 2 week wound check was completed today inpatient. Incisions are clean and dry without areas of erythema or dehiscence. Dressings may be kept off and patient may shower now - avoid submerging in a bath or tub for an additional 4 weeks.   He states regarding his right hip he is still having moderate pain and weakness when attempting to walk with a walker. Otherwise he has minimal pain at rest and no pain with passive ROM of the hip.     Review of patient's allergies indicates:  No Known Allergies    Current Facility-Administered Medications   Medication    acetaminophen tablet 650 mg    albuterol-ipratropium 2.5 mg-0.5 mg/3 mL nebulizer solution 3 mL    ammonium lactate 12 % lotion    apixaban tablet 5 mg    aspirin EC tablet 81 mg    atorvastatin tablet 10 mg    bisacodyL suppository 10 mg    dextrose 10% bolus 125 mL 125 mL    dextrose 10% bolus 250 mL 250 mL    furosemide tablet 40 mg    gabapentin capsule 100 mg    glucagon (human recombinant) injection 1 mg    glucose chewable tablet 16 g    glucose chewable tablet 24 g    levothyroxine tablet 200 mcg    melatonin tablet 6 mg    naloxone 0.4 mg/mL injection 0.02 mg    oxyCODONE immediate release tablet 5 mg    perflutren protein-A microsphr 0.22 mg/mL IV susp    polyethylene glycol packet 17 g    promethazine tablet 25 mg    senna-docusate 8.6-50 mg per tablet 1 tablet    sodium chloride 0.9% flush 10 mL     "spironolactone tablet 25 mg    tamsulosin 24 hr capsule 0.4 mg     Objective:     Vital Signs (Most Recent):  Temp: 97.9 °F (36.6 °C) (10/04/24 1145)  Pulse: 65 (10/04/24 1058)  Resp: 18 (10/04/24 1045)  BP: 138/61 (10/04/24 1045)  SpO2: 97 % (10/04/24 1045) Vital Signs (24h Range):  Temp:  [97.6 °F (36.4 °C)-99 °F (37.2 °C)] 97.9 °F (36.6 °C)  Pulse:  [33-82] 65  Resp:  [14-22] 18  SpO2:  [95 %-99 %] 97 %  BP: (128-157)/(55-91) 138/61     Weight: 102.4 kg (225 lb 12 oz)  Height: 5' 11" (180.3 cm)  Body mass index is 31.49 kg/m².      Intake/Output Summary (Last 24 hours) at 10/4/2024 1228  Last data filed at 10/4/2024 0839  Gross per 24 hour   Intake 462 ml   Output 1150 ml   Net -688 ml        Ortho/SPM Exam  RLE:  Incisions well healed without surrounding erythema, no drainage and no signs of dehiscence  No pain with axial loading of both hips  No bony TTP over the anterior pelvic ring   Compartments soft  Painless passive ROM of the right hip; mild discomfort with terminal hip internal rotation  SILT Sa/Kerr/DP/SP/T  Motor intact TA/SP/DP  2+ DP/PT         Significant Labs: CBC:   Recent Labs   Lab 10/04/24  0338   WBC 8.98   HGB 9.2*   HCT 29.9*        CMP:   Recent Labs   Lab 10/04/24  0338      K 3.9      CO2 31*   *   BUN 18   CREATININE 1.1   CALCIUM 9.0   ANIONGAP 5*     All pertinent labs within the past 24 hours have been reviewed.    Significant Imaging: I have reviewed and interpreted all pertinent imaging results/findings.  Assessment/Plan:     Closed nondisplaced fracture of anterior column of right acetabulum with routine healing s/p ORIF of pelvis on 9/20/2024  Bean SAMS Josue is a 79 y.o. male readmitted for PAULA and hyperkalemia after orthopedic procedure on 9/20/24 for percutaneous pelvic fixation of the anterior and posterior pelvic rings.     - Continue WBAT RLE and ROM as tolerated  - Dressings removed and all incisions are healing well. Keep incision sites clean and " dry and avoid submerging in a tub for an additional 4 weeks  - New inlet/outlet and AP pelvic films ordered today   - Next visit with orthopedics scheduled for 6 weeks post op   - Continue DVT ppx for total of 6 weeks post op   - PT/OT while admitted for mobilization           Jarrell العلي MD  Orthopedics  [facility] - [facility] Medicine Telemetry

## 2024-10-04 NOTE — CONSULTS
Consult received for symptomatic bradycardia. There have been charted heart rates as low as 30 on multiple occasions. Telemetry has been reviewed; heart rate has never been below 50. Current rhythm is sinus with PVCs in a bigemini pattern. It is unlikely that bradycardia is causing any symptoms. Please contact cardiology for any further questions.

## 2024-10-04 NOTE — PT/OT/SLP PROGRESS
Occupational Therapy   Treatment    Name: Bean Salas  MRN: [id number]  Admitting Diagnosis:  PAULA (acute kidney injury)       Recommendations:     Discharge Recommendations: Moderate Intensity Therapy  Discharge Equipment Recommendations:  wheelchair, hospital bed, hip kit  Barriers to discharge:  Decreased caregiver support, Other (Comment) (patient requires increased level of assistance)    Assessment:     Bean Salas is a 79 y.o. male with a medical diagnosis of PAULA (acute kidney injury).  He presents with the fallowing performance deficits affecting function are weakness, impaired endurance, impaired self care skills, impaired functional mobility, gait instability, impaired balance, pain, decreased safety awareness, impaired cardiopulmonary response to activity, decreased lower extremity function, orthopedic precautions, decreased upper extremity function, edema, decreased coordination. Patient agreeable to tx session, however; tx session limited due to patient exhibits fluctuating HR and high BP. More so during supine to sit. BP at rest was 154/66 MAP 90 34 HR oxygen 97%. After supine to sit 158/58 HR 64-34 oxygen 94%. Patient return to supine position due to patient with increase dizziness and SOB. Nurse was notified. Patient would benefit from Moderate intensity therapy intervention to address over all functional decline with mobility task, endurance, and ADLs in order to return to PLOF.     Rehab Prognosis:  Good; patient would benefit from acute skilled OT services to address these deficits and reach maximum level of function.       Plan:     Patient to be seen 4 x/week to address the above listed problems via self-care/home management, therapeutic activities, therapeutic exercises, neuromuscular re-education  Plan of Care Expires: 10/28/24  Plan of Care Reviewed with: patient    Subjective     Chief Complaint: pain,dizziness, and SOB   Patient/Family Comments/goals: to return to  [facility]  Pain/Comfort:  Pain Rating 1:  (patient did not rate)  Location - Orientation 1: generalized  Location 1:  (left ankle, right arm/chest)  Pain Addressed 1: Reposition, Distraction  Pain Rating Post-Intervention 1:  (patient did not rate)    Objective:     Communicated with: Nurse prior to session.  Patient found HOB elevated with telemetry, bed alarm, oxygen, harrison catheter upon OT entry to room.  Co-treated with PT due to patient complexity deficits requiring two skilled therapist to appropriately and safely mobilized patient while facilitating functional task in addition to accommodating for patient's activity tolerance.    General Precautions: Standard, fall    Orthopedic Precautions:RLE weight bearing as tolerated, LLE weight bearing as tolerated  Braces: N/A  Respiratory Status: Nasal cannula, flow 3 L/min     Occupational Performance:     Bed Mobility:    Patient completed Rolling/Turning to Left with  moderate assistance  Patient completed Scooting to EOB  with moderate assistance  Patient completed Scooting to HOB with Max A of 2 persons via drawsheet   Patient completed Supine to Sit with maximal assistance of 2 persons   Patient completed Sit to Supine with maximal assistance of 2 persons   Patient required Mod A with extensive use of left arm for support     Functional Mobility/Transfers:  Deferred mobility task due to safety concerns    Activities of Daily Living:  Lower Body Dressing: total assistance to don socks   Toileting: total assistance for pericare hygiene       [facility] 6 Click ADL: 14    Treatment & Education:  Discussed OT POC and progress  Educated patient on the importance to continue to perform exercises in order to reduce stiffness and promote joint mobility and blood flow  Addressed patient's questions and concerns within COLVIN scope of practice      Patient left HOB elevated with all lines intact, call button in reach, bed alarm on, and nurse present    GOALS:   Multidisciplinary  Problems       Occupational Therapy Goals          Problem: Occupational Therapy    Goal Priority Disciplines Outcome Interventions   Occupational Therapy Goal     OT, PT/OT Progressing    Description: Goals to be met by: 10/28/24     Patient will increase functional independence with ADLs by performing:    UE Dressing with Minimal Assistance.  LE Dressing with Minimal Assistance.  Grooming while seated with Minimal Assistance.  Toileting from bedside commode with Minimal Assistance for hygiene and clothing management.   Toilet transfer to bedside commode with Minimal Assistance.    DME: TBD on progress/Next level of care; likely:   Patient has a mobility limitation that significantly impairs their ability to participate in one or more mobility related activities of daily living in customary locations in the home. The mobility limitation cannot be sufficiently resolved by the use of a cane or walker. The use of a manual wheelchair will greatly improve the patient's ability to participate in MRADLs. The patient will use the wheelchair on a regular basis at home. They have expressed their willingness to use a manual wheelchair in the home, and have a caregiver who is available and willing to assist with the wheelchair if needed.                          Time Tracking:     OT Date of Treatment: 10/04/24  OT Start Time: 1018  OT Stop Time: 1054  OT Total Time (min): 36 min    Billable Minutes:Self Care/Home Management 36    OT/COURTNEY: COURTNEY     Number of COURTNEY visits since last OT visit: 1    10/4/2024

## 2024-10-04 NOTE — PROGRESS NOTES
[facility] Internal Medicine [facility] Medicine  Progress Note    Patient Name: Bean Salas  MRN: [id number]  Patient Class: IP- Inpatient   Admission Date: 9/28/2024  Length of Stay: 6 days  Attending Physician: Danie Roe MD  Primary Care Provider: Ramirez Levine MD        Subjective:     Principal Problem:PAULA (acute kidney injury)        HPI:  Bean Salas is an 79 year old white man with former cigarette smoking (1965 to 2000), hypertension, hyperlipidemia, coronary artery disease, chronic diastolic congestive heart failure, biatrial enlargement, paroxysmal atrial flutter status post cardioversion on 9/18/2024, aortic atherosclerosis, obstructive sleep apnea, chronic hypoxic respiratory failure, hypothyroidism, history of right intertrochanteric femur fracture status post intramedullary gurpreet insertion on 9/3/2021, history of pelvic ring fracture status post open reduction and internal fixation on 9/20/2024, right shoulder rotator cuff impingement syndrome, history of pulmonary embolism, history of bilateral knee arthroplasty, history of vasectomy. He lives in [place], [place]. He is . His primary care physician is Dr. Ramirez Cloud. He is DNR.   He was hospitalized at [facility] from 9/15/2024 to 9/24/2024 for a right acetabular fracture. He underwent open reduction and internal fixation on 9/20/2024. He was discharged to [facility].    He presented to [facility] Emergency Department on 9/28/2024 with hypotension and worsening renal function on labs. He reported chronic back pain.    In the emergency department, he was initially hypotensive, with WBC 45694/uL, stable hemoglobin, potassium 5.5 mmol/L, creatinine 2.7 mg/dL, total bilirubin 1.8 mg/dL, lactate 3.21 mmol/L. EKG showed changes due to hyperkalemia. He was given medication to shift potassium, which improved to 4.8. Chest X-ray showed pulmonary vascular  "congestion and bilateral pleural effusions. X-ray of the hip and pelvis showed stable postoperative findings. He was admitted to Hospital Medicine Team B.    Overview/Hospital Course:  His furosemide, spironolactone, and Entresto were held. He was given IV fluids and antibiotics. Since Entresto had just been started a week before he developed acute kidney injury and hypotension, it was discontinued. His metoprolol and amiodarone were held due to bradycardia. Leukocytosis improved. Physical and Occupational Therapy were consulted. Acute kidney injury improved. He had urinary retention and was started on tamsulosin and Mckeon catheter was placed. He was persistently hypotensive on 9/30/2024 and given 2 liters of IV fluids. Antibiotics were stopped. He awaited skilled nursing facility. He was accepted on 10/3/2024. He had persistent episodes of symptomatic bradycardia, particularly with activity, despite holding his metoprolol and amiodarone. He also complained of pain from his fracture limiting his activity, which was expected. Pain was not worse than when the fracture initially occurred. Cardiology was consulted. Cardiology said "Consult received for symptomatic bradycardia. There have been charted heart rates as low as 30 on multiple occasions. Telemetry has been reviewed; heart rate has never been below 50. Current rhythm is sinus with PVCs in a bigemini pattern. It is unlikely that bradycardia is causing any symptoms." Acute kidney injury resolved on 10/4/2024.     Interval History: Cardiology said telemetry showed no bradycardia but he has episodes of lightheadedness associated with bradycardia recorded by nurses.     Review of Systems   Constitutional:  Negative for chills and fever.   Neurological:  Positive for light-headedness. Negative for seizures and syncope.     Objective:     Vital Signs (Most Recent):  Temp: 97.9 °F (36.6 °C) (10/04/24 0845)  Pulse: 65 (10/04/24 1058)  Resp: 18 (10/04/24 1045)  BP: " 138/61 (10/04/24 1045)  SpO2: 97 % (10/04/24 1045) Vital Signs (24h Range):  Temp:  [97.6 °F (36.4 °C)-99 °F (37.2 °C)] 97.9 °F (36.6 °C)  Pulse:  [30-82] 65  Resp:  [14-22] 18  SpO2:  [95 %-99 %] 97 %  BP: (128-157)/(55-91) 138/61     Weight: 102.4 kg (225 lb 12 oz)  Body mass index is 31.49 kg/m².    Intake/Output Summary (Last 24 hours) at 10/4/2024 1101  Last data filed at 10/4/2024 0839  Gross per 24 hour   Intake 342 ml   Output 1150 ml   Net -808 ml         Physical Exam  Vitals and nursing note reviewed.   Constitutional:       General: He is not in acute distress.     Appearance: He is well-developed. He is obese. He is not diaphoretic.      Interventions: He is not intubated.  Pulmonary:      Effort: Pulmonary effort is normal. No accessory muscle usage or respiratory distress. He is not intubated.   Skin:     General: Skin is warm and dry.      Coloration: Skin is not jaundiced or pale.   Neurological:      Mental Status: He is alert. Mental status is at baseline.      Motor: No tremor or seizure activity.   Psychiatric:         Attention and Perception: Attention normal.         Mood and Affect: Affect normal.         Behavior: Behavior is not agitated. Behavior is cooperative.             Significant Labs: All pertinent labs within the past 24 hours have been reviewed.  CMP:   Recent Labs   Lab 10/04/24  0338      K 3.9      CO2 31*   *   BUN 18   CREATININE 1.1   CALCIUM 9.0   ANIONGAP 5*       Significant Imaging: I have reviewed all pertinent imaging results/findings within the past 24 hours.    Assessment/Plan:      Chronic hypoxemic respiratory failure  Continue supplemental oxygen.    Paroxysmal atrial flutter  Holding home amiodarone. Continue home apixaban.    AKHIL (obstructive sleep apnea)  CPAP nightly.    Coronary artery disease involving native coronary artery of native heart without angina pectoris  Continue home aspirin and atorvastatin.    Chronic combined systolic and  diastolic congestive heart failure  Holding home furosemide, Entresto, and spironolactone.     Closed nondisplaced fracture of anterior column of right acetabulum with routine healing s/p ORIF of pelvis on 9/20/2024  Noted. Status post ORIF of pelvis 9/20/2024 and was a SNF. Continue PT and OT. Surgical bandages to remain in place post-op until orthopedic clinic follow up    Bradycardia  Atrial bigeminy   Holding home amiodarone and metoprolol. Symptomatic. Cardiology said telemetry showed no bradycardia, but he is having symptoms whenever nurses note bradycardia on pulse check. Get EKG.       Hypothyroidism due to acquired atrophy of thyroid  Continue home levothyroxine.    Pure hypercholesterolemia  Continue home atorvastatin.    Essential hypertension  Holding home Entresto, metoprolol. Monitor BP.      VTE Risk Mitigation (From admission, onward)           Ordered     apixaban tablet 5 mg  2 times daily         10/03/24 0905                    Discharge Planning   LATOSHA: 10/4/2024     Code Status: DNR   Is the patient medically ready for discharge?:     Reason for patient still in hospital (select all that apply): Patient new problem  Discharge Plan A: Skilled Nursing Facility   Discharge Delays: None known at this time              Danie Roe MD  Department of Hospital Medicine   [facility] - Internal Medicine Telemetry

## 2024-10-04 NOTE — RESPIRATORY THERAPY
RAPID RESPONSE RESPIRATORY CHART CHECK       Chart check completed.  Please call [phone] for further concerns or assistance.

## 2024-10-04 NOTE — SUBJECTIVE & OBJECTIVE
Interval History: Cardiology said telemetry showed no bradycardia but he has episodes of lightheadedness associated with bradycardia recorded by nurses.     Review of Systems   Constitutional:  Negative for chills and fever.   Neurological:  Positive for light-headedness. Negative for seizures and syncope.     Objective:     Vital Signs (Most Recent):  Temp: 97.9 °F (36.6 °C) (10/04/24 0845)  Pulse: 65 (10/04/24 1058)  Resp: 18 (10/04/24 1045)  BP: 138/61 (10/04/24 1045)  SpO2: 97 % (10/04/24 1045) Vital Signs (24h Range):  Temp:  [97.6 °F (36.4 °C)-99 °F (37.2 °C)] 97.9 °F (36.6 °C)  Pulse:  [30-82] 65  Resp:  [14-22] 18  SpO2:  [95 %-99 %] 97 %  BP: (128-157)/(55-91) 138/61     Weight: 102.4 kg (225 lb 12 oz)  Body mass index is 31.49 kg/m².    Intake/Output Summary (Last 24 hours) at 10/4/2024 1101  Last data filed at 10/4/2024 0839  Gross per 24 hour   Intake 342 ml   Output 1150 ml   Net -808 ml         Physical Exam  Vitals and nursing note reviewed.   Constitutional:       General: He is not in acute distress.     Appearance: He is well-developed. He is obese. He is not diaphoretic.      Interventions: He is not intubated.  Pulmonary:      Effort: Pulmonary effort is normal. No accessory muscle usage or respiratory distress. He is not intubated.   Skin:     General: Skin is warm and dry.      Coloration: Skin is not jaundiced or pale.   Neurological:      Mental Status: He is alert. Mental status is at baseline.      Motor: No tremor or seizure activity.   Psychiatric:         Attention and Perception: Attention normal.         Mood and Affect: Affect normal.         Behavior: Behavior is not agitated. Behavior is cooperative.             Significant Labs: All pertinent labs within the past 24 hours have been reviewed.  CMP:   Recent Labs   Lab 10/04/24  0338      K 3.9      CO2 31*   *   BUN 18   CREATININE 1.1   CALCIUM 9.0   ANIONGAP 5*       Significant Imaging: I have reviewed all  pertinent imaging results/findings within the past 24 hours.

## 2024-10-04 NOTE — ASSESSMENT & PLAN NOTE
Bean Salas is a 79 y.o. male readmitted for PAULA and hyperkalemia after orthopedic procedure on 9/20/24 for percutaneous pelvic fixation of the anterior and posterior pelvic rings.     - Continue WBAT RLE and ROM as tolerated  - Dressings removed and all incisions are healing well. Keep incision sites clean and dry and avoid submerging in a tub for an additional 4 weeks  - New inlet/outlet and AP pelvic films ordered today   - Next visit with orthopedics scheduled for 6 weeks post op   - Continue DVT ppx for total of 6 weeks post op   - PT/OT while admitted for mobilization

## 2024-10-04 NOTE — DISCHARGE SUMMARY
[facility] - Internal Medicine [facility] Medicine  Discharge Summary      Patient Name: Bean Salas  MRN: [id number]  [facility]: [id number]  Patient Class: IP- Inpatient  Admission Date: 9/28/2024  Hospital Length of Stay: 9 days  Discharge Date and Time: 10/7/2024  3:01 PM  Attending Physician: Danie Roe MD   Discharging Provider: Danie Roe MD  Primary Care Provider: Ramirez Levine MD  [facility] Medicine Team: [facility]OSP MED B Danie Roe MD  Primary Care Team: [facility] MED B    HPI:   Bean Salas is an 79 year old white man with former cigarette smoking (1966 to 2000), hypertension, hyperlipidemia, coronary artery disease, chronic diastolic congestive heart failure, biatrial enlargement, paroxysmal atrial flutter status post cardioversion on 9/18/2024, aortic atherosclerosis, obstructive sleep apnea, chronic hypoxic respiratory failure, hypothyroidism, history of right intertrochanteric femur fracture status post intramedullary gurpreet insertion on 9/3/2021, history of pelvic ring fracture status post open reduction and internal fixation on 9/20/2024, right shoulder rotator cuff impingement syndrome, history of pulmonary embolism, history of bilateral knee arthroplasty, history of vasectomy. He lives in [place], [place]. He is . His primary care physician is Dr. Ramirez Cloud. He is DNR.   He was hospitalized at [facility] from 9/15/2024 to 9/24/2024 for a right acetabular fracture. He underwent open reduction and internal fixation on 9/20/2024. He was discharged to [facility].    He presented to [facility] Emergency Department on 9/28/2024 with hypotension and worsening renal function on labs. He reported chronic back pain.    In the emergency department, he was initially hypotensive, with WBC 06114/uL, stable hemoglobin, potassium 5.5 mmol/L, creatinine 2.7 mg/dL, total bilirubin 1.8 mg/dL, lactate 3.21 mmol/L.  "EKG showed changes due to hyperkalemia. He was given medication to shift potassium, which improved to 4.8. Chest X-ray showed pulmonary vascular congestion and bilateral pleural effusions. X-ray of the hip and pelvis showed stable postoperative findings. He was admitted to Hospital Medicine Team B.    Hospital Course:   His furosemide, spironolactone, and Entresto were held. He was given IV fluids and antibiotics. Since Entresto had just been started a week before he developed acute kidney injury and hypotension, it was discontinued. His metoprolol and amiodarone were held due to bradycardia. Leukocytosis improved. Physical and Occupational Therapy were consulted. Acute kidney injury improved. He had urinary retention and was started on tamsulosin and Mckeon catheter was placed. He was persistently hypotensive on 9/30/2024 and given 2 liters of IV fluids. Echocardiogram showed resolution of systolic dysfunction. Antibiotics were stopped. He awaited skilled nursing facility. He was accepted on 10/3/2024. He had persistent episodes of symptomatic bradycardia, particularly with activity, despite holding his metoprolol and amiodarone. He also complained of pain from his fracture limiting his activity, which was expected. Pain was not worse than when the fracture initially occurred. Cardiology was consulted. Cardiology said "Consult received for symptomatic bradycardia. There have been charted heart rates as low as 30 on multiple occasions. Telemetry has been reviewed; heart rate has never been below 50. Current rhythm is sinus with PVCs in a bigeminy pattern. It is unlikely that bradycardia is causing any symptoms." Acute kidney injury resolved on 10/4/2024. Cardiology reviewed again and suspected that his bigeminy was mistakenly being detected as bradycardia and that his lightheadedness was due to his PVCs, which would be treated by resuming his amiodarone. He had hypotension again with blood pressure as low as 79/50 " despite still holding furosemide, spironolactone, and metoprolol. He was given IV fluids. Blood pressures improved to normal. Furosemide was changed to prn. Spironolactone was discontinued.      Goals of Care Treatment Preferences:  Code Status: DNR       [place]: Yes           SDOH Screening:  The patient was screened for utility difficulties, food insecurity, transport difficulties, housing insecurity, and interpersonal safety and there were no concerns identified this admission.     Consults:   Consults (From admission, onward)          Status Ordering Provider     Inpatient consult to Cardiology  Once        Provider:  (Not yet assigned)    LAYLA Stovall          Final Active Diagnoses:    Diagnosis Date Noted POA    Chronic hypoxemic respiratory failure [J96.11] 09/16/2024 Yes     Chronic    Paroxysmal atrial flutter [I48.92] 09/06/2023 Yes     Chronic    KAHIL (obstructive sleep apnea) [G47.33] 10/05/2022 Yes     Chronic    Coronary artery disease involving native coronary artery of native heart without angina pectoris [I25.10]  Yes     Chronic    Chronic diastolic congestive heart failure [I50.32]  Yes     Chronic    Closed nondisplaced fracture of anterior column of right acetabulum with routine healing s/p ORIF of pelvis on 9/20/2024 [S32.434D] 09/03/2021 Not Applicable    Atrial bigeminy [I49.8] 06/21/2021 Yes     Chronic    Essential hypertension [I10] 06/25/2017 Yes     Chronic    Hypothyroidism due to acquired atrophy of thyroid [E03.4] 06/25/2017 Yes     Chronic    Pure hypercholesterolemia [E78.00] 06/25/2017 Yes     Chronic      Problems Resolved During this Admission:    Diagnosis Date Noted Date Resolved POA    PRINCIPAL PROBLEM:  PAULA (acute kidney injury) [N17.9] 09/28/2024 10/04/2024 Yes    Sepsis [A41.9] 09/28/2024 10/03/2024 Yes    SIRS (systemic inflammatory response syndrome) [R65.10] 09/28/2024 10/03/2024 Yes    Advance care planning [Z71.89] 09/25/2021 10/04/2024 Not Applicable     Bradycardia [R00.1] 08/06/2021 10/04/2024 Yes       Discharged Condition: good    Disposition: Skilled Nursing Facility    Follow Up:   Follow-up Information       Ramirez Levine MD Follow up.    Specialty: Family Medicine  Contact information:  [address] [facility] [place] [address] [phone]                           Patient Instructions:      Diet Adult Regular     Notify your health care provider if you experience any of the following:  increased confusion or weakness     Notify your health care provider if you experience any of the following:  persistent dizziness, light-headedness, or visual disturbances     Notify your health care provider if you experience any of the following:  persistent nausea and vomiting or diarrhea     Notify your health care provider if you experience any of the following:  difficulty breathing or increased cough     Change dressing (specify)   Order Comments: 1.Clean BLE including feet with no rinse; pat dry. Apply Ammonium Lactate lotion. Float heels with pillow.  2. Clean healing skin tears/scratches to back with no rinse; pat dry. Apply zinc barrier cream  Twice daily and as needed.     Activity as tolerated       Significant Diagnostic Studies:   Recent Labs   Lab 09/30/24  1628 10/01/24  0243 10/02/24  0901 10/04/24  0338 10/05/24  1520 10/06/24  0236 10/07/24  0211   * 135* 136   < > 139 136 136   K 4.6 4.1 4.1   < > 4.4 4.1 4.1   CL 98 102 102   < > 99 98 100   CO2 29 28 25   < > 34* 32* 31*   BUN 64* 51* 27*   < > 18 21 21   CREATININE 1.8* 1.5* 1.4   < > 1.1 1.0 1.0   CALCIUM 8.3* 8.3* 9.4   < > 9.1 9.1 8.7   PROT 5.3* 5.2* 6.5  --   --   --   --    BILITOT 1.4* 1.4* 1.9*  --   --   --   --    ALKPHOS 113 111 162*  --   --   --   --    ALT 22 24 30  --   --   --   --    AST 43* 42* 44*  --   --   --   --     < > = values in this interval not displayed.       X-Ray Chest AP Portable 9/28/24: FINDINGS:   Monitoring EKG leads are present.  The trachea is unremarkable.   The cardiomediastinal silhouette is enlarged.  There are small bilateral pleural effusions with left greater than right.  There is no evidence of a pneumothorax.  There is no evidence of pneumomediastinum.  There is pulmonary vascular congestion.  There is no focal consolidation.  There are degenerative changes in the osseous structures.   Impression:  Cardiomegaly with pulmonary vascular congestion and bilateral pleural effusions, suggestive of possible fluid overload state secondary to CHF.   US Retroperitoneal Complete 9/29/24: FINDINGS:   Right kidney: The right kidney measures 11.6 cm. There is cortical thinning.  There is loss of corticomedullary distinction. Resistive index measures 0.89.  A cyst arises from the interpolar region measuring up to 2 cm.  No renal stone. No hydronephrosis.   Left kidney: The left kidney measures 12.3 cm. No cortical thinning. There is mild loss of corticomedullary distinction. Resistive index measures 0.89.  A cyst arises from the lower pole measuring up to 6.2 cm.  A subcentimeter cyst arises from the interpolar region.  No renal stone. No hydronephrosis.   The bladder is partially distended at the time of scanning and has an unremarkable appearance.   Impression:  Renal findings suggest chronic medical renal disease noting resistive indices elevated as compared to examination 08/08/2022, acute on chronic process is a consideration.  No hydronephrosis.   Bilateral renal cysts.   Transthoracic echo complete with contrast 10/1/24:     Left Ventricle: The left ventricle is mildly dilated. Ventricular mass is normal. Mildly increased wall thickness. Regional wall motion abnormalities present. See diagram for wall motion findings. Septal motion is abnormal. There is normal systolic function with a visually estimated ejection fraction of 55 - 60%. Ejection fraction is approximately 55%. Grade II diastolic dysfunction.    Right Ventricle: Normal right ventricular cavity size. Wall  thickness is normal. Systolic function is normal.    Left Atrium: Left atrium is severely dilated.    Right Atrium: Right atrium is mildly dilated.    Aortic Valve: There is moderate aortic valve sclerosis.    Mitral Valve: There is mild to moderate regurgitation with multiple jets.    Pulmonary Artery: There is mild to moderate pulmonary hypertension. The estimated pulmonary artery systolic pressure is 46 mmHg.    IVC/SVC: Normal venous pressure at 3 mmHg.    Medications:  Reconciled Home Medications:      Medication List        START taking these medications      ammonium lactate 12 % lotion  Commonly known as: LAC-HYDRIN  Apply topically 2 (two) times daily as needed for Dry Skin.     tamsulosin 0.4 mg Cap  Commonly known as: FLOMAX  Take 1 capsule (0.4 mg total) by mouth once daily.            CHANGE how you take these medications      furosemide 40 MG tablet  Commonly known as: LASIX  Take 1 tablet (40 mg total) by mouth 2 (two) times daily as needed (weight gain of 3 to 5 lbs).  What changed:   when to take this  reasons to take this  additional instructions     metoprolol succinate 25 MG 24 hr tablet  Commonly known as: TOPROL-XL  Take 0.5 tablets (12.5 mg total) by mouth once daily. Hold until Cardiology follow up.  What changed: additional instructions            CONTINUE taking these medications      acetaminophen 500 MG tablet  Commonly known as: TYLENOL  Take 2 tablets (1,000 mg total) by mouth every 8 (eight) hours.     amiodarone 200 MG Tab  Commonly known as: PACERONE  Take 2 tablets (400 mg total) by mouth 2 (two) times daily for 4 days, THEN 1 tablet (200 mg total) once daily.  Start taking on: September 23, 2024     apixaban 5 mg Tab  Commonly known as: ELIQUIS  Take 2 tablets (10 mg total) by mouth 2 (two) times daily for 4 days, THEN 1 tablet (5 mg total) 2 (two) times daily.  Start taking on: September 23, 2024     aspirin 81 MG EC tablet  Commonly known as: ECOTRIN  Take 1 tablet (81 mg total)  by mouth once daily.     atorvastatin 10 MG tablet  Commonly known as: LIPITOR  Take 1 tablet by mouth once daily     CENTRUM SILVER 0.4 mg-300 mcg- 250 mcg Tab  Generic drug: multivit-min-FA-lycopen-lutein  Take 1 tablet by mouth once daily.     empagliflozin 10 mg tablet  Commonly known as: Jardiance  Take 1 tablet (10 mg total) by mouth once daily.     levothyroxine 200 MCG tablet  Commonly known as: SYNTHROID  Take 1 tablet by mouth once daily     melatonin 3 mg tablet  Commonly known as: MELATIN  Take 2 tablets (6 mg total) by mouth nightly as needed for Insomnia.     methocarbamoL 750 MG Tab  Commonly known as: ROBAXIN  Take 1 tablet (750 mg total) by mouth 4 (four) times daily.     mirtazapine 7.5 MG Tab  Commonly known as: REMERON  Take 1 tablet (7.5 mg total) by mouth nightly. One tablet po each night for sleep     oxyCODONE 5 MG immediate release tablet  Commonly known as: ROXICODONE  Take 1 tablet (5 mg total) by mouth every 4 (four) hours as needed for Pain.     senna-docusate 8.6-50 mg 8.6-50 mg per tablet  Commonly known as: PERICOLACE  Take 1 tablet by mouth once daily.            STOP taking these medications      sacubitriL-valsartan  mg per tablet  Commonly known as: ENTRESTO     spironolactone 25 MG tablet  Commonly known as: ALDACTONE              Indwelling Lines/Drains at time of discharge:   Lines/Drains/Airways       Drain  Duration                  Urethral Catheter 09/30/24 1205 3 days                    Time spent on the discharge of patient: 325 minutes         Danie Roe MD  Department of Hospital Medicine  [facility] - Internal Medicine Telemetry

## 2024-10-04 NOTE — PLAN OF CARE
DC orders in.  DORIS sent PPD, updated SNF orders to Anna at [facility] via CP.  DORIS called Anna [phone] to discuss d/c; LVM requesting call back.    10:52 AM  Anna called back.  She states she's still not heard from pt's son Efren with payment; payment is the only thing holding up the d/c.  DORIS called Efren [phone], instructed that [facility] still needs payment before [facility] can accept pt.  Efren states he will call and pay over the phone.  DORIS provided Anna's phone number.    3:07 PM  CM called Anna, informed that pt not being d/c'd today.    DORIS spoke with pt in room.  Informed that d/c is being held; he will be in hospital over the weekend.  Pt v/u, states he will tell his son Efren that he's not being d/c'd today when Efren gets to hospital.    VENTURA SalterN, BS, RN, CCM

## 2024-10-05 LAB
ANION GAP SERPL CALC-SCNC: 6 MMOL/L (ref 8–16)
BUN SERPL-MCNC: 18 MG/DL (ref 8–23)
CALCIUM SERPL-MCNC: 9.1 MG/DL (ref 8.7–10.5)
CHLORIDE SERPL-SCNC: 99 MMOL/L (ref 95–110)
CO2 SERPL-SCNC: 34 MMOL/L (ref 23–29)
CREAT SERPL-MCNC: 1.1 MG/DL (ref 0.5–1.4)
EST. GFR  (NO RACE VARIABLE): >60 ML/MIN/1.73 M^2
GLUCOSE SERPL-MCNC: 140 MG/DL (ref 70–110)
MAGNESIUM SERPL-MCNC: 2 MG/DL (ref 1.6–2.6)
OHS QRS DURATION: 112 MS
OHS QTC CALCULATION: 505 MS
POCT GLUCOSE: 147 MG/DL (ref 70–110)
POCT GLUCOSE: 147 MG/DL (ref 70–110)
POCT GLUCOSE: 185 MG/DL (ref 70–110)
POTASSIUM SERPL-SCNC: 4.4 MMOL/L (ref 3.5–5.1)
SODIUM SERPL-SCNC: 139 MMOL/L (ref 136–145)

## 2024-10-05 PROCEDURE — 80048 BASIC METABOLIC PNL TOTAL CA: CPT | Mod: HCNC | Performed by: HOSPITALIST

## 2024-10-05 PROCEDURE — 25000003 PHARM REV CODE 250: Mod: HCNC

## 2024-10-05 PROCEDURE — 94660 CPAP INITIATION&MGMT: CPT | Mod: HCNC

## 2024-10-05 PROCEDURE — 83735 ASSAY OF MAGNESIUM: CPT | Mod: HCNC | Performed by: HOSPITALIST

## 2024-10-05 PROCEDURE — 21400001 HC TELEMETRY ROOM: Mod: HCNC

## 2024-10-05 PROCEDURE — 99900035 HC TECH TIME PER 15 MIN (STAT): Mod: HCNC

## 2024-10-05 PROCEDURE — 93005 ELECTROCARDIOGRAM TRACING: CPT | Mod: HCNC

## 2024-10-05 PROCEDURE — 36415 COLL VENOUS BLD VENIPUNCTURE: CPT | Mod: HCNC | Performed by: HOSPITALIST

## 2024-10-05 PROCEDURE — 94761 N-INVAS EAR/PLS OXIMETRY MLT: CPT | Mod: HCNC

## 2024-10-05 PROCEDURE — 25000003 PHARM REV CODE 250: Mod: HCNC | Performed by: HOSPITALIST

## 2024-10-05 PROCEDURE — 27000221 HC OXYGEN, UP TO 24 HOURS: Mod: HCNC

## 2024-10-05 PROCEDURE — 93010 ELECTROCARDIOGRAM REPORT: CPT | Mod: HCNC,,, | Performed by: INTERNAL MEDICINE

## 2024-10-05 RX ADMIN — SENNOSIDES AND DOCUSATE SODIUM 1 TABLET: 50; 8.6 TABLET ORAL at 08:10

## 2024-10-05 RX ADMIN — APIXABAN 5 MG: 5 TABLET, FILM COATED ORAL at 10:10

## 2024-10-05 RX ADMIN — AMIODARONE HYDROCHLORIDE 200 MG: 200 TABLET ORAL at 08:10

## 2024-10-05 RX ADMIN — APIXABAN 5 MG: 5 TABLET, FILM COATED ORAL at 08:10

## 2024-10-05 RX ADMIN — AMIODARONE HYDROCHLORIDE 200 MG: 200 TABLET ORAL at 10:10

## 2024-10-05 RX ADMIN — GABAPENTIN 100 MG: 100 CAPSULE ORAL at 10:10

## 2024-10-05 RX ADMIN — OXYCODONE 5 MG: 5 TABLET ORAL at 08:10

## 2024-10-05 RX ADMIN — ATORVASTATIN CALCIUM 10 MG: 10 TABLET, FILM COATED ORAL at 08:10

## 2024-10-05 RX ADMIN — GABAPENTIN 100 MG: 100 CAPSULE ORAL at 08:10

## 2024-10-05 RX ADMIN — LEVOTHYROXINE SODIUM 200 MCG: 100 TABLET ORAL at 06:10

## 2024-10-05 RX ADMIN — TAMSULOSIN HYDROCHLORIDE 0.4 MG: 0.4 CAPSULE ORAL at 08:10

## 2024-10-05 RX ADMIN — ASPIRIN 81 MG: 81 TABLET, COATED ORAL at 08:10

## 2024-10-05 NOTE — PROGRESS NOTES
[facility] Internal Medicine [facility] Medicine  Progress Note    Patient Name: Bean Salas  MRN: [id number]  Patient Class: IP- Inpatient   Admission Date: 9/28/2024  Length of Stay: 7 days  Attending Physician: Danie Roe MD  Primary Care Provider: Ramirez Levine MD        Subjective:     Principal Problem:PAULA (acute kidney injury)        HPI:  Bean Salas is an 79 year old white man with former cigarette smoking (1966 to 2000), hypertension, hyperlipidemia, coronary artery disease, chronic diastolic congestive heart failure, biatrial enlargement, paroxysmal atrial flutter status post cardioversion on 9/18/2024, aortic atherosclerosis, obstructive sleep apnea, chronic hypoxic respiratory failure, hypothyroidism, history of right intertrochanteric femur fracture status post intramedullary gurpreet insertion on 9/3/2021, history of pelvic ring fracture status post open reduction and internal fixation on 9/20/2024, right shoulder rotator cuff impingement syndrome, history of pulmonary embolism, history of bilateral knee arthroplasty, history of vasectomy. He lives in [place], [place]. He is . His primary care physician is Dr. Ramirez Cloud. He is DNR.   He was hospitalized at [facility] from 9/15/2024 to 9/24/2024 for a right acetabular fracture. He underwent open reduction and internal fixation on 9/20/2024. He was discharged to [facility].    He presented to [facility] Emergency Department on 9/28/2024 with hypotension and worsening renal function on labs. He reported chronic back pain.    In the emergency department, he was initially hypotensive, with WBC 75803/uL, stable hemoglobin, potassium 5.5 mmol/L, creatinine 2.7 mg/dL, total bilirubin 1.8 mg/dL, lactate 3.21 mmol/L. EKG showed changes due to hyperkalemia. He was given medication to shift potassium, which improved to 4.8. Chest X-ray showed pulmonary vascular  "congestion and bilateral pleural effusions. X-ray of the hip and pelvis showed stable postoperative findings. He was admitted to Hospital Medicine Team B.    Overview/Hospital Course:  His furosemide, spironolactone, and Entresto were held. He was given IV fluids and antibiotics. Since Entresto had just been started a week before he developed acute kidney injury and hypotension, it was discontinued. His metoprolol and amiodarone were held due to bradycardia. Leukocytosis improved. Physical and Occupational Therapy were consulted. Acute kidney injury improved. He had urinary retention and was started on tamsulosin and Mckeon catheter was placed. He was persistently hypotensive on 9/30/2024 and given 2 liters of IV fluids. Antibiotics were stopped. He awaited skilled nursing facility. He was accepted on 10/3/2024. He had persistent episodes of symptomatic bradycardia, particularly with activity, despite holding his metoprolol and amiodarone. He also complained of pain from his fracture limiting his activity, which was expected. Pain was not worse than when the fracture initially occurred. Cardiology was consulted. Cardiology said "Consult received for symptomatic bradycardia. There have been charted heart rates as low as 30 on multiple occasions. Telemetry has been reviewed; heart rate has never been below 50. Current rhythm is sinus with PVCs in a bigeminy pattern. It is unlikely that bradycardia is causing any symptoms." Acute kidney injury resolved on 10/4/2024. Cardiology reviewed again and suspected that his bigeminy was mistakenly being detected as bradycardia and that his lightheadedness was due to his PVCs, which would be treated by resuming his amiodarone.     Interval History: Resumed amiodarone, furosemide, and spironolactone. Had PVCs and a hypotensive overnight. Will hold furosemide and spironolactone again for now.     Review of Systems   Constitutional:  Positive for fatigue. Negative for chills and " fever.   Neurological:  Negative for seizures and syncope.     Objective:     Vital Signs (Most Recent):  Temp: 97.8 °F (36.6 °C) (10/05/24 0751)  Pulse: 75 (10/05/24 0801)  Resp: (!) 21 (10/05/24 0751)  BP: (!) 146/64 (10/05/24 0800)  SpO2: 98 % (10/05/24 0751) Vital Signs (24h Range):  Temp:  [97.6 °F (36.4 °C)-98.8 °F (37.1 °C)] 97.8 °F (36.6 °C)  Pulse:  [32-77] 75  Resp:  [16-21] 21  SpO2:  [96 %-100 %] 98 %  BP: ()/(50-91) 146/64     Weight: 102.4 kg (225 lb 12 oz)  Body mass index is 31.49 kg/m².    Intake/Output Summary (Last 24 hours) at 10/5/2024 0832  Last data filed at 10/5/2024 0429  Gross per 24 hour   Intake 480 ml   Output 2200 ml   Net -1720 ml         Physical Exam  Vitals and nursing note reviewed.   Constitutional:       General: He is not in acute distress.     Appearance: He is well-developed. He is obese. He is not diaphoretic.      Interventions: He is not intubated.  Pulmonary:      Effort: Pulmonary effort is normal. No accessory muscle usage or respiratory distress. He is not intubated.   Skin:     General: Skin is warm and dry.      Coloration: Skin is not jaundiced or pale.   Neurological:      Mental Status: He is alert. Mental status is at baseline.      Motor: No tremor or seizure activity.   Psychiatric:         Attention and Perception: Attention normal.         Mood and Affect: Affect normal.         Behavior: Behavior is not agitated. Behavior is cooperative.             Significant Labs: All pertinent labs within the past 24 hours have been reviewed.  CMP:   Recent Labs   Lab 10/04/24  0338      K 3.9      CO2 31*   *   BUN 18   CREATININE 1.1   CALCIUM 9.0   ANIONGAP 5*       Significant Imaging: I have reviewed all pertinent imaging results/findings within the past 24 hours.    Assessment/Plan:      Chronic hypoxemic respiratory failure  Continue supplemental oxygen.    Paroxysmal atrial flutter  Resumed home amiodarone on 10/4/2024. Continue home  apixaban.    AKHIL (obstructive sleep apnea)  CPAP nightly.    Coronary artery disease involving native coronary artery of native heart without angina pectoris  Continue home aspirin and atorvastatin.    Chronic combined systolic and diastolic congestive heart failure  Holding home furosemide, Entresto, and spironolactone.     Closed nondisplaced fracture of anterior column of right acetabulum with routine healing s/p ORIF of pelvis on 9/20/2024  Noted. Status post ORIF of pelvis 9/20/2024 and was a SNF. Continue PT and OT. Surgical bandages to remain in place post-op until orthopedic clinic follow up    Atrial bigeminy  Registers as bradycardia on vital signs checks. Cardiology determined that there was no actual bradycardia and recommended restarting his amiodarone twice daily for a week then back to daily. Hopefully will improve over the weekend and allow him to go to SNF on Monday.       Hypothyroidism due to acquired atrophy of thyroid  Continue home levothyroxine.    Pure hypercholesterolemia  Continue home atorvastatin.    Essential hypertension  Holding home Entresto, metoprolol. Monitor BP.      VTE Risk Mitigation (From admission, onward)           Ordered     apixaban tablet 5 mg  2 times daily         10/03/24 0905                    Discharge Planning   LATOSHA: 10/7/2024     Code Status: DNR   Is the patient medically ready for discharge?:     Reason for patient still in hospital (select all that apply): Patient trending condition and Treatment  Discharge Plan A: Skilled Nursing Facility   Discharge Delays: None known at this time              Danie Roe MD  Department of Hospital Medicine   [facility] - Internal Medicine Telemetry

## 2024-10-05 NOTE — CARE UPDATE
RAPID RESPONSE NURSE PROACTIVE ROUNDING NOTE       Time of Visit: 035    Admit Date: 2024  LOS: 7  Code Status: DNR   Date of Visit: 10/05/2024  : 1945  Age: 79 y.o.  Sex: male  Race: White  Bed: 1129/1129 A:   MRN: [id number]  Was the patient discharged from an ICU this admission? No   Was the patient discharged from a PACU within last 24 hours? No   Did the patient receive conscious sedation/general anesthesia in last 24 hours? No  Was the patient in the ED within the past 24 hours? No  Was the patient on NIPPV within the past 24 hours? No   Attending Physician: Danie Roe MD  Primary Service: [facility] B   Time spent at the bedside: < 15 min    SITUATION    Notified by bedside RN via phone call.  Reason for alert: bradycardia, hypotension  Called to evaluate the patient for Circulatory.    BACKGROUND     Why is the patient in the hospital?: PAULA (acute kidney injury)    Patient has a past medical history of Atrial fibrillation, unspecified type, COPD exacerbation, and Thyroid disease.    Last Vitals:  Temp: 97.6 °F (36.4 °C) (10/05 0346)  Pulse: 61 (10/05 0400)  Resp: 20 (10/05 0400)  BP: 79/50 (10/05 0346)  SpO2: 99 % (10/05 0400)    24 Hours Vitals Range:  Temp:  [97.6 °F (36.4 °C)-98.8 °F (37.1 °C)]   Pulse:  [32-77]   Resp:  [16-20]   BP: ()/(50-91)   SpO2:  [96 %-100 %]     Labs:  Recent Labs     10/02/24  0901 10/04/24  0338   WBC 8.27 8.98   HGB 10.3* 9.2*   HCT 31.9* 29.9*    394       Recent Labs     10/02/24  0901 10/04/24  0338    137   K 4.1 3.9    101   CO2 25 31*   BUN 27* 18   CREATININE 1.4 1.1   * 140*   MG  --  2.1            ASSESSMENT      Physical Exam  Vitals reviewed.   Constitutional:       General: He is awake.      Interventions: Nasal cannula in place.   Cardiovascular:      Rate and Rhythm: Rhythm irregular. Extrasystoles are present.     Pulses:           Radial pulses are 2+ on the right side and 2+ on the left side.   Pulmonary:     "  Effort: Pulmonary effort is normal.      Comments: 3L NC  Skin:     General: Skin is warm.   Neurological:      Mental Status: He is alert and oriented to person, place, and time.      GCS: GCS eye subscore is 4. GCS verbal subscore is 5. GCS motor subscore is 6.   Psychiatric:         Behavior: Behavior is cooperative.       HR 61  /64  SpO2 99%-3L NC  RR 20    Called to bedside for hypotension (79/50) and bradycardia (30s) on telemetry. Upon arrival patient is lying in bed, AOx3 and conversing appropriately with staff. Pt endorses "feeling tired" at this time, but not "light-headed."     EKG obtained, HR 63 bpm. Repeat /64. Pt denies further complaints at this time.      INTERVENTIONS    The patient was seen for Cardiac problem. Staff concerns included bradycardia and hypotension. The following interventions were performed: EKG, continuous cardiac monitoring continued, and No additional interventions needed at this time..    RECOMMENDATIONS    -Assess HR and BP before scheduled 200mg PO Amiodarone administration    -Continue cardiology recommendations     -Maintain telemetry monitoring    -Monitor oxygenation status    PROVIDER ESCALATION    Yes/No  No    Orders received and case discussed with NA.    Disposition: Remain in room 1129.    FOLLOW-UP    Charge RNMelvi, Bedside RN, Keegan  updated on plan of care. Instructed to call the Rapid Response Nurse, Elizabeth Diaz RN at [phone] for additional questions or concerns.            "

## 2024-10-05 NOTE — SUBJECTIVE & OBJECTIVE
Interval History: Resumed amiodarone, furosemide, and spironolactone. Had PVCs and a hypotensive overnight. Will hold furosemide and spironolactone again for now.     Review of Systems   Constitutional:  Positive for fatigue. Negative for chills and fever.   Neurological:  Negative for seizures and syncope.     Objective:     Vital Signs (Most Recent):  Temp: 97.8 °F (36.6 °C) (10/05/24 0751)  Pulse: 75 (10/05/24 0801)  Resp: (!) 21 (10/05/24 0751)  BP: (!) 146/64 (10/05/24 0800)  SpO2: 98 % (10/05/24 0751) Vital Signs (24h Range):  Temp:  [97.6 °F (36.4 °C)-98.8 °F (37.1 °C)] 97.8 °F (36.6 °C)  Pulse:  [32-77] 75  Resp:  [16-21] 21  SpO2:  [96 %-100 %] 98 %  BP: ()/(50-91) 146/64     Weight: 102.4 kg (225 lb 12 oz)  Body mass index is 31.49 kg/m².    Intake/Output Summary (Last 24 hours) at 10/5/2024 0832  Last data filed at 10/5/2024 0429  Gross per 24 hour   Intake 480 ml   Output 2200 ml   Net -1720 ml         Physical Exam  Vitals and nursing note reviewed.   Constitutional:       General: He is not in acute distress.     Appearance: He is well-developed. He is obese. He is not diaphoretic.      Interventions: He is not intubated.  Pulmonary:      Effort: Pulmonary effort is normal. No accessory muscle usage or respiratory distress. He is not intubated.   Skin:     General: Skin is warm and dry.      Coloration: Skin is not jaundiced or pale.   Neurological:      Mental Status: He is alert. Mental status is at baseline.      Motor: No tremor or seizure activity.   Psychiatric:         Attention and Perception: Attention normal.         Mood and Affect: Affect normal.         Behavior: Behavior is not agitated. Behavior is cooperative.             Significant Labs: All pertinent labs within the past 24 hours have been reviewed.  CMP:   Recent Labs   Lab 10/04/24  0338      K 3.9      CO2 31*   *   BUN 18   CREATININE 1.1   CALCIUM 9.0   ANIONGAP 5*       Significant Imaging: I have  reviewed all pertinent imaging results/findings within the past 24 hours.

## 2024-10-05 NOTE — ASSESSMENT & PLAN NOTE
Registers as bradycardia on vital signs checks. Cardiology determined that there was no actual bradycardia and recommended restarting his amiodarone twice daily for a week then back to daily. Hopefully will improve over the weekend and allow him to go to SNF on Monday.

## 2024-10-06 LAB
ANION GAP SERPL CALC-SCNC: 6 MMOL/L (ref 8–16)
BNP SERPL-MCNC: 399 PG/ML (ref 0–99)
BUN SERPL-MCNC: 21 MG/DL (ref 8–23)
CALCIUM SERPL-MCNC: 9.1 MG/DL (ref 8.7–10.5)
CHLORIDE SERPL-SCNC: 98 MMOL/L (ref 95–110)
CO2 SERPL-SCNC: 32 MMOL/L (ref 23–29)
CREAT SERPL-MCNC: 1 MG/DL (ref 0.5–1.4)
ERYTHROCYTE [DISTWIDTH] IN BLOOD BY AUTOMATED COUNT: 15.9 % (ref 11.5–14.5)
EST. GFR  (NO RACE VARIABLE): >60 ML/MIN/1.73 M^2
GLUCOSE SERPL-MCNC: 126 MG/DL (ref 70–110)
HCT VFR BLD AUTO: 29.4 % (ref 40–54)
HGB BLD-MCNC: 9.4 G/DL (ref 14–18)
MAGNESIUM SERPL-MCNC: 2.1 MG/DL (ref 1.6–2.6)
MCH RBC QN AUTO: 33.6 PG (ref 27–31)
MCHC RBC AUTO-ENTMCNC: 32 G/DL (ref 32–36)
MCV RBC AUTO: 105 FL (ref 82–98)
PLATELET # BLD AUTO: 342 K/UL (ref 150–450)
PMV BLD AUTO: 11 FL (ref 9.2–12.9)
POCT GLUCOSE: 123 MG/DL (ref 70–110)
POCT GLUCOSE: 134 MG/DL (ref 70–110)
POCT GLUCOSE: 141 MG/DL (ref 70–110)
POCT GLUCOSE: 163 MG/DL (ref 70–110)
POCT GLUCOSE: 180 MG/DL (ref 70–110)
POTASSIUM SERPL-SCNC: 4.1 MMOL/L (ref 3.5–5.1)
RBC # BLD AUTO: 2.8 M/UL (ref 4.6–6.2)
SODIUM SERPL-SCNC: 136 MMOL/L (ref 136–145)
WBC # BLD AUTO: 7.21 K/UL (ref 3.9–12.7)

## 2024-10-06 PROCEDURE — 36415 COLL VENOUS BLD VENIPUNCTURE: CPT | Mod: HCNC | Performed by: HOSPITALIST

## 2024-10-06 PROCEDURE — 85027 COMPLETE CBC AUTOMATED: CPT | Mod: HCNC | Performed by: HOSPITALIST

## 2024-10-06 PROCEDURE — 83735 ASSAY OF MAGNESIUM: CPT | Mod: HCNC | Performed by: HOSPITALIST

## 2024-10-06 PROCEDURE — 80048 BASIC METABOLIC PNL TOTAL CA: CPT | Mod: HCNC | Performed by: HOSPITALIST

## 2024-10-06 PROCEDURE — 94761 N-INVAS EAR/PLS OXIMETRY MLT: CPT | Mod: HCNC

## 2024-10-06 PROCEDURE — 94660 CPAP INITIATION&MGMT: CPT | Mod: HCNC

## 2024-10-06 PROCEDURE — 25000003 PHARM REV CODE 250: Mod: HCNC

## 2024-10-06 PROCEDURE — 83880 ASSAY OF NATRIURETIC PEPTIDE: CPT | Mod: HCNC | Performed by: HOSPITALIST

## 2024-10-06 PROCEDURE — 99900035 HC TECH TIME PER 15 MIN (STAT): Mod: HCNC

## 2024-10-06 PROCEDURE — 25000003 PHARM REV CODE 250: Mod: HCNC | Performed by: HOSPITALIST

## 2024-10-06 PROCEDURE — 11000001 HC ACUTE MED/SURG PRIVATE ROOM: Mod: HCNC

## 2024-10-06 PROCEDURE — 25000003 PHARM REV CODE 250: Mod: HCNC | Performed by: STUDENT IN AN ORGANIZED HEALTH CARE EDUCATION/TRAINING PROGRAM

## 2024-10-06 RX ORDER — SODIUM CHLORIDE 9 MG/ML
INJECTION, SOLUTION INTRAVENOUS CONTINUOUS
Status: ACTIVE | OUTPATIENT
Start: 2024-10-06 | End: 2024-10-06

## 2024-10-06 RX ADMIN — AMIODARONE HYDROCHLORIDE 200 MG: 200 TABLET ORAL at 08:10

## 2024-10-06 RX ADMIN — ASPIRIN 81 MG: 81 TABLET, COATED ORAL at 09:10

## 2024-10-06 RX ADMIN — GABAPENTIN 100 MG: 100 CAPSULE ORAL at 08:10

## 2024-10-06 RX ADMIN — AMIODARONE HYDROCHLORIDE 200 MG: 200 TABLET ORAL at 09:10

## 2024-10-06 RX ADMIN — GABAPENTIN 100 MG: 100 CAPSULE ORAL at 09:10

## 2024-10-06 RX ADMIN — APIXABAN 5 MG: 5 TABLET, FILM COATED ORAL at 09:10

## 2024-10-06 RX ADMIN — SODIUM CHLORIDE: 9 INJECTION, SOLUTION INTRAVENOUS at 09:10

## 2024-10-06 RX ADMIN — SENNOSIDES AND DOCUSATE SODIUM 1 TABLET: 50; 8.6 TABLET ORAL at 09:10

## 2024-10-06 RX ADMIN — AMMONIUM LACTATE: 12 LOTION TOPICAL at 09:10

## 2024-10-06 RX ADMIN — TAMSULOSIN HYDROCHLORIDE 0.4 MG: 0.4 CAPSULE ORAL at 09:10

## 2024-10-06 RX ADMIN — OXYCODONE 5 MG: 5 TABLET ORAL at 05:10

## 2024-10-06 RX ADMIN — LEVOTHYROXINE SODIUM 200 MCG: 100 TABLET ORAL at 06:10

## 2024-10-06 RX ADMIN — ACETAMINOPHEN 650 MG: 325 TABLET ORAL at 03:10

## 2024-10-06 RX ADMIN — ATORVASTATIN CALCIUM 10 MG: 10 TABLET, FILM COATED ORAL at 09:10

## 2024-10-06 RX ADMIN — Medication 6 MG: at 08:10

## 2024-10-06 RX ADMIN — APIXABAN 5 MG: 5 TABLET, FILM COATED ORAL at 08:10

## 2024-10-06 RX ADMIN — OXYCODONE 5 MG: 5 TABLET ORAL at 01:10

## 2024-10-06 NOTE — ASSESSMENT & PLAN NOTE
Holding home furosemide, Entresto, and spironolactone. Discontinue Entresto and spironolactone due to normal systolic function.

## 2024-10-06 NOTE — SUBJECTIVE & OBJECTIVE
Interval History: Has had some low blood pressures despite holding home diuretics. Will give another liter of IV fluids and check BNP.    Review of Systems   Constitutional:  Negative for chills and fever.   Neurological:  Negative for seizures and syncope.     Objective:     Vital Signs (Most Recent):  Temp: 97.7 °F (36.5 °C) (10/06/24 0806)  Pulse: (!) 58 (10/06/24 0806)  Resp: 17 (10/06/24 0806)  BP: (!) 122/52 (10/06/24 0806)  SpO2: 98 % (10/06/24 0806) Vital Signs (24h Range):  Temp:  [97.7 °F (36.5 °C)-98.7 °F (37.1 °C)] 97.7 °F (36.5 °C)  Pulse:  [28-68] 58  Resp:  [16-18] 17  SpO2:  [98 %-99 %] 98 %  BP: ()/(52-76) 122/52     Weight: 102.4 kg (225 lb 12 oz)  Body mass index is 31.49 kg/m².    Intake/Output Summary (Last 24 hours) at 10/6/2024 0811  Last data filed at 10/6/2024 0501  Gross per 24 hour   Intake 598 ml   Output 1200 ml   Net -602 ml         Physical Exam  Vitals and nursing note reviewed.   Constitutional:       General: He is not in acute distress.     Appearance: He is well-developed. He is obese. He is not diaphoretic.      Interventions: He is not intubated.  Pulmonary:      Effort: Pulmonary effort is normal. No accessory muscle usage or respiratory distress. He is not intubated.   Skin:     General: Skin is warm and dry.      Coloration: Skin is not jaundiced or pale.   Neurological:      Mental Status: He is alert. Mental status is at baseline.      Motor: No tremor or seizure activity.   Psychiatric:         Attention and Perception: Attention normal.         Mood and Affect: Affect normal.         Behavior: Behavior is not agitated. Behavior is cooperative.             Significant Labs: All pertinent labs within the past 24 hours have been reviewed.  CMP:   Recent Labs   Lab 10/05/24  1520 10/06/24  0236    136   K 4.4 4.1   CL 99 98   CO2 34* 32*   * 126*   BUN 18 21   CREATININE 1.1 1.0   CALCIUM 9.1 9.1   ANIONGAP 6* 6*       Significant Imaging: I have reviewed  all pertinent imaging results/findings within the past 24 hours.

## 2024-10-06 NOTE — PLAN OF CARE
Problem: Skin Injury Risk Increased  Goal: Skin Health and Integrity  Outcome: Progressing     Problem: Adult Inpatient Plan of Care  Goal: Plan of Care Review  Outcome: Progressing  Goal: Patient-Specific Goal (Individualized)  Outcome: Progressing  Goal: Absence of Hospital-Acquired Illness or Injury  Outcome: Progressing  Goal: Optimal Comfort and Wellbeing  Outcome: Progressing  Goal: Readiness for Transition of Care  Outcome: Progressing     Problem: Infection  Goal: Absence of Infection Signs and Symptoms  Outcome: Progressing     Problem: Sepsis/Septic Shock  Goal: Optimal Coping  Outcome: Progressing  Goal: Absence of Bleeding  Outcome: Progressing  Goal: Blood Glucose Level Within Targeted Range  Outcome: Progressing  Goal: Absence of Infection Signs and Symptoms  Outcome: Progressing  Goal: Optimal Nutrition Intake  Outcome: Progressing     Problem: Acute Kidney Injury/Impairment  Goal: Fluid and Electrolyte Balance  Outcome: Progressing  Goal: Improved Oral Intake  Outcome: Progressing  Goal: Effective Renal Function  Outcome: Progressing     Problem: Wound  Goal: Optimal Coping  Outcome: Progressing  Goal: Optimal Functional Ability  Outcome: Progressing  Goal: Absence of Infection Signs and Symptoms  Outcome: Progressing  Goal: Improved Oral Intake  Outcome: Progressing  Goal: Optimal Pain Control and Function  Outcome: Progressing  Goal: Skin Health and Integrity  Outcome: Progressing  Goal: Optimal Wound Healing  Outcome: Progressing     Problem: Fall Injury Risk  Goal: Absence of Fall and Fall-Related Injury  Outcome: Progressing     Problem: Mobility Impairment  Goal: Optimal Mobility  Outcome: Progressing

## 2024-10-06 NOTE — PROGRESS NOTES
10/06/24 0407   Vital Signs   Temp 97.7 °F (36.5 °C)   Temp Source Oral   Pulse (!) 47   SpO2 98 %   Flow (L/min) (Oxygen Therapy) 3   Device (Oxygen Therapy) nasal cannula with humidification   BP (!) 84/56   BP Location Left arm   BP Method Manual   Patient Position Lying   Height and Weight   Weight 102.4 kg (225 lb 12 oz)   BMI (Calculated) 31.5   (RETIRED)      Pain Scale: PAINAD   PAINAD Breathing 0-->normal   PAINAD Negative Vocalization 0-->none   PAINAD Facial Expression 0-->smiling or inexpressive   PAINAD Body Language 0-->relaxed   PAINAD Consolability 0-->no need to console   PAINAD Score 0   Assessments (Pre/Post)   Level of Consciousness (AVPU) alert     Pt  Presented with hypotension, on 3L NC,non symptomatic, and AAOX4. Rapid team and Steve Padilla MD are aware. Pt visualized breathing even and unlabored with no acute s/s of distress, instructed the pt to call for assistance if needed, call light within reach. Plan of care ongoing.

## 2024-10-06 NOTE — PROGRESS NOTES
[facility] Internal Medicine [facility] Medicine  Progress Note    Patient Name: Bean Salas  MRN: [id number]  Patient Class: IP- Inpatient   Admission Date: 9/28/2024  Length of Stay: 8 days  Attending Physician: Danie Roe MD  Primary Care Provider: Ramirez Levine MD        Subjective:     Principal Problem:PAULA (acute kidney injury)        HPI:  Bean Salas is an 79 year old white man with former cigarette smoking (1965 to 2000), hypertension, hyperlipidemia, coronary artery disease, chronic diastolic congestive heart failure, biatrial enlargement, paroxysmal atrial flutter status post cardioversion on 9/18/2024, aortic atherosclerosis, obstructive sleep apnea, chronic hypoxic respiratory failure, hypothyroidism, history of right intertrochanteric femur fracture status post intramedullary gurpreet insertion on 9/3/2021, history of pelvic ring fracture status post open reduction and internal fixation on 9/20/2024, right shoulder rotator cuff impingement syndrome, history of pulmonary embolism, history of bilateral knee arthroplasty, history of vasectomy. He lives in [place], [place]. He is . His primary care physician is Dr. Ramirez Cloud. He is DNR.   He was hospitalized at [facility] from 9/15/2024 to 9/24/2024 for a right acetabular fracture. He underwent open reduction and internal fixation on 9/20/2024. He was discharged to [facility].    He presented to [facility] Emergency Department on 9/28/2024 with hypotension and worsening renal function on labs. He reported chronic back pain.    In the emergency department, he was initially hypotensive, with WBC 70219/uL, stable hemoglobin, potassium 5.5 mmol/L, creatinine 2.7 mg/dL, total bilirubin 1.8 mg/dL, lactate 3.21 mmol/L. EKG showed changes due to hyperkalemia. He was given medication to shift potassium, which improved to 4.8. Chest X-ray showed pulmonary vascular  "congestion and bilateral pleural effusions. X-ray of the hip and pelvis showed stable postoperative findings. He was admitted to Hospital Medicine Team B.    Overview/Hospital Course:  His furosemide, spironolactone, and Entresto were held. He was given IV fluids and antibiotics. Since Entresto had just been started a week before he developed acute kidney injury and hypotension, it was discontinued. His metoprolol and amiodarone were held due to bradycardia. Leukocytosis improved. Physical and Occupational Therapy were consulted. Acute kidney injury improved. He had urinary retention and was started on tamsulosin and Mckeon catheter was placed. He was persistently hypotensive on 9/30/2024 and given 2 liters of IV fluids. Echocardiogram showed resolution of systolic dysfunction. Antibiotics were stopped. He awaited skilled nursing facility. He was accepted on 10/3/2024. He had persistent episodes of symptomatic bradycardia, particularly with activity, despite holding his metoprolol and amiodarone. He also complained of pain from his fracture limiting his activity, which was expected. Pain was not worse than when the fracture initially occurred. Cardiology was consulted. Cardiology said "Consult received for symptomatic bradycardia. There have been charted heart rates as low as 30 on multiple occasions. Telemetry has been reviewed; heart rate has never been below 50. Current rhythm is sinus with PVCs in a bigeminy pattern. It is unlikely that bradycardia is causing any symptoms." Acute kidney injury resolved on 10/4/2024. Cardiology reviewed again and suspected that his bigeminy was mistakenly being detected as bradycardia and that his lightheadedness was due to his PVCs, which would be treated by resuming his amiodarone. He had hypotension again with blood pressure as low as 79/50 despite still holding furosemide, spironolactone, and metoprolol.     Interval History: Has had some low blood pressures despite holding " home diuretics. Will give another liter of IV fluids and check BNP.    Review of Systems   Constitutional:  Negative for chills and fever.   Neurological:  Negative for seizures and syncope.     Objective:     Vital Signs (Most Recent):  Temp: 97.7 °F (36.5 °C) (10/06/24 0806)  Pulse: (!) 58 (10/06/24 0806)  Resp: 17 (10/06/24 0806)  BP: (!) 122/52 (10/06/24 0806)  SpO2: 98 % (10/06/24 0806) Vital Signs (24h Range):  Temp:  [97.7 °F (36.5 °C)-98.7 °F (37.1 °C)] 97.7 °F (36.5 °C)  Pulse:  [28-68] 58  Resp:  [16-18] 17  SpO2:  [98 %-99 %] 98 %  BP: ()/(52-76) 122/52     Weight: 102.4 kg (225 lb 12 oz)  Body mass index is 31.49 kg/m².    Intake/Output Summary (Last 24 hours) at 10/6/2024 0811  Last data filed at 10/6/2024 0501  Gross per 24 hour   Intake 598 ml   Output 1200 ml   Net -602 ml         Physical Exam  Vitals and nursing note reviewed.   Constitutional:       General: He is not in acute distress.     Appearance: He is well-developed. He is obese. He is not diaphoretic.      Interventions: He is not intubated.  Pulmonary:      Effort: Pulmonary effort is normal. No accessory muscle usage or respiratory distress. He is not intubated.   Skin:     General: Skin is warm and dry.      Coloration: Skin is not jaundiced or pale.   Neurological:      Mental Status: He is alert. Mental status is at baseline.      Motor: No tremor or seizure activity.   Psychiatric:         Attention and Perception: Attention normal.         Mood and Affect: Affect normal.         Behavior: Behavior is not agitated. Behavior is cooperative.             Significant Labs: All pertinent labs within the past 24 hours have been reviewed.  CMP:   Recent Labs   Lab 10/05/24  1520 10/06/24  0236    136   K 4.4 4.1   CL 99 98   CO2 34* 32*   * 126*   BUN 18 21   CREATININE 1.1 1.0   CALCIUM 9.1 9.1   ANIONGAP 6* 6*       Significant Imaging: I have reviewed all pertinent imaging results/findings within the past 24  hours.    Assessment/Plan:      Chronic hypoxemic respiratory failure  Continue supplemental oxygen.    Paroxysmal atrial flutter  Resumed home amiodarone on 10/4/2024. Continue home apixaban.    AKHIL (obstructive sleep apnea)  CPAP nightly.    Coronary artery disease involving native coronary artery of native heart without angina pectoris  Continue home aspirin and atorvastatin.    Chronic diastolic congestive heart failure  Holding home furosemide, Entresto, and spironolactone. Discontinue Entresto and spironolactone due to normal systolic function.     Closed nondisplaced fracture of anterior column of right acetabulum with routine healing s/p ORIF of pelvis on 9/20/2024  Noted. Status post ORIF of pelvis 9/20/2024 and was a SNF. Continue PT and OT. Surgical bandages to remain in place post-op until orthopedic clinic follow up    Atrial bigeminy  Registers as bradycardia on vital signs checks. Cardiology determined that there was no actual bradycardia and recommended restarting his amiodarone twice daily for a week then back to daily. Hopefully will improve over the weekend and allow him to go to SNF on Monday.       Hypothyroidism due to acquired atrophy of thyroid  Continue home levothyroxine.    Pure hypercholesterolemia  Continue home atorvastatin.    Essential hypertension  Holding home Entresto, metoprolol. Monitor BP.      VTE Risk Mitigation (From admission, onward)           Ordered     apixaban tablet 5 mg  2 times daily         10/03/24 0905                    Discharge Planning   LATOSHA: 10/7/2024     Code Status: DNR   Is the patient medically ready for discharge?:     Reason for patient still in hospital (select all that apply): Pending disposition  Discharge Plan A: Skilled Nursing Facility   Discharge Delays: None known at this time              Danei Roe MD  Department of Hospital Medicine   [facility] - Internal Medicine Telemetry

## 2024-10-07 VITALS
OXYGEN SATURATION: 100 % | HEART RATE: 60 BPM | RESPIRATION RATE: 17 BRPM | DIASTOLIC BLOOD PRESSURE: 63 MMHG | WEIGHT: 225.75 LBS | SYSTOLIC BLOOD PRESSURE: 116 MMHG | BODY MASS INDEX: 31.6 KG/M2 | TEMPERATURE: 98 F | HEIGHT: 71 IN

## 2024-10-07 LAB
ANION GAP SERPL CALC-SCNC: 5 MMOL/L (ref 8–16)
BUN SERPL-MCNC: 21 MG/DL (ref 8–23)
CALCIUM SERPL-MCNC: 8.7 MG/DL (ref 8.7–10.5)
CHLORIDE SERPL-SCNC: 100 MMOL/L (ref 95–110)
CO2 SERPL-SCNC: 31 MMOL/L (ref 23–29)
CREAT SERPL-MCNC: 1 MG/DL (ref 0.5–1.4)
EST. GFR  (NO RACE VARIABLE): >60 ML/MIN/1.73 M^2
GLUCOSE SERPL-MCNC: 117 MG/DL (ref 70–110)
MAGNESIUM SERPL-MCNC: 2.2 MG/DL (ref 1.6–2.6)
POCT GLUCOSE: 154 MG/DL (ref 70–110)
POCT GLUCOSE: 173 MG/DL (ref 70–110)
POTASSIUM SERPL-SCNC: 4.1 MMOL/L (ref 3.5–5.1)
SODIUM SERPL-SCNC: 136 MMOL/L (ref 136–145)

## 2024-10-07 PROCEDURE — 36415 COLL VENOUS BLD VENIPUNCTURE: CPT | Mod: HCNC | Performed by: HOSPITALIST

## 2024-10-07 PROCEDURE — 25000003 PHARM REV CODE 250: Mod: HCNC | Performed by: HOSPITALIST

## 2024-10-07 PROCEDURE — 83735 ASSAY OF MAGNESIUM: CPT | Mod: HCNC | Performed by: HOSPITALIST

## 2024-10-07 PROCEDURE — 25000003 PHARM REV CODE 250: Mod: HCNC

## 2024-10-07 PROCEDURE — 99900035 HC TECH TIME PER 15 MIN (STAT): Mod: HCNC

## 2024-10-07 PROCEDURE — 80048 BASIC METABOLIC PNL TOTAL CA: CPT | Mod: HCNC | Performed by: HOSPITALIST

## 2024-10-07 PROCEDURE — 97535 SELF CARE MNGMENT TRAINING: CPT | Mod: HCNC,CO

## 2024-10-07 PROCEDURE — 94660 CPAP INITIATION&MGMT: CPT | Mod: HCNC

## 2024-10-07 PROCEDURE — 97530 THERAPEUTIC ACTIVITIES: CPT | Mod: HCNC

## 2024-10-07 PROCEDURE — 97116 GAIT TRAINING THERAPY: CPT | Mod: HCNC

## 2024-10-07 RX ORDER — FUROSEMIDE 40 MG/1
40 TABLET ORAL 2 TIMES DAILY PRN
Start: 2024-10-07

## 2024-10-07 RX ORDER — HYDROXYZINE HYDROCHLORIDE 25 MG/1
25 TABLET, FILM COATED ORAL 3 TIMES DAILY PRN
Status: DISCONTINUED | OUTPATIENT
Start: 2024-10-07 | End: 2024-10-07 | Stop reason: HOSPADM

## 2024-10-07 RX ADMIN — ASPIRIN 81 MG: 81 TABLET, COATED ORAL at 09:10

## 2024-10-07 RX ADMIN — TAMSULOSIN HYDROCHLORIDE 0.4 MG: 0.4 CAPSULE ORAL at 09:10

## 2024-10-07 RX ADMIN — ATORVASTATIN CALCIUM 10 MG: 10 TABLET, FILM COATED ORAL at 09:10

## 2024-10-07 RX ADMIN — HYDROXYZINE HYDROCHLORIDE 25 MG: 25 TABLET, FILM COATED ORAL at 01:10

## 2024-10-07 RX ADMIN — GABAPENTIN 100 MG: 100 CAPSULE ORAL at 09:10

## 2024-10-07 RX ADMIN — AMIODARONE HYDROCHLORIDE 200 MG: 200 TABLET ORAL at 09:10

## 2024-10-07 RX ADMIN — OXYCODONE 5 MG: 5 TABLET ORAL at 03:10

## 2024-10-07 RX ADMIN — APIXABAN 5 MG: 5 TABLET, FILM COATED ORAL at 09:10

## 2024-10-07 RX ADMIN — LEVOTHYROXINE SODIUM 200 MCG: 100 TABLET ORAL at 05:10

## 2024-10-07 NOTE — PT/OT/SLP PROGRESS
Physical Therapy Treatment    Patient Name:  Bean Salas   MRN:  [id number]    Recommendations:     Discharge Recommendations: Moderate Intensity Therapy  Discharge Equipment Recommendations: wheelchair, hospital bed, hip kit  Barriers to discharge: None    Assessment:     Bean Salas is a 79 y.o. male admitted with a medical diagnosis of PAULA (acute kidney injury).  He presents with the following impairments/functional limitations: weakness, impaired endurance, impaired self care skills, impaired functional mobility, gait instability, impaired balance, decreased safety awareness, pain, decreased lower extremity function Pt. cooperative and tolerated treatment fairly well, but fatigues quickly. Pt. progressing slowly with mobility, but appears to be fearful of falling.    Rehab Prognosis: Good; patient would benefit from acute skilled PT services to address these deficits and reach maximum level of function.    Recent Surgery: * No surgery found *      Plan:     During this hospitalization, patient to be seen 4 x/week to address the identified rehab impairments via gait training, therapeutic exercises, therapeutic activities, neuromuscular re-education and progress toward the following goals:    Plan of Care Expires:  10/28/24    Subjective     Chief Complaint: weakness, hip soreness  Patient/Family Comments/goals: pt. Agreeable to PT   Pain/Comfort:  Pain Rating 1:  (pt. did not rate)  Location - Side 1: Right  Location 1: hip  Pain Addressed 1: Reposition, Distraction      Objective:     Communicated with nursing prior to session.  Patient found supine with peripheral IV, oxygen, harrison catheter, bed alarm upon PT entry to room.     General Precautions: Standard, fall  Orthopedic Precautions: RLE weight bearing as tolerated, LLE weight bearing as tolerated  Braces: N/A  Respiratory Status: Room air     Functional Mobility:  Bed Mobility:     Rolling Left:  moderate assistance  Scooting: moderate  assistance  Supine to Sit: moderate assistance and of 2 persons  Sit to Supine: moderate assistance and of 2 persons  Transfers:     Sit to Stand:  maximal assistance with hand-held assist  Gait: 4 side steps with Max A for LE advancement/balance/guidance/safety with decreased step length/david/floor clearance  Balance: fair sitting and poor standing      AM-PAC 6 CLICK MOBILITY  Turning over in bed (including adjusting bedclothes, sheets and blankets)?: 2  Sitting down on and standing up from a chair with arms (e.g., wheelchair, bedside commode, etc.): 2  Moving from lying on back to sitting on the side of the bed?: 2  Moving to and from a bed to a chair (including a wheelchair)?: 2  Need to walk in hospital room?: 2  Climbing 3-5 steps with a railing?: 1  Basic Mobility Total Score: 11       Treatment & Education:  Discussed pt.'s progress, goals, and POC.    Patient left supine with all lines intact and call button in reach..    GOALS:   Multidisciplinary Problems       Physical Therapy Goals          Problem: Physical Therapy    Goal Priority Disciplines Outcome Interventions   Physical Therapy Goal     PT, PT/OT Progressing    Description: PT goals until 10/14/24    1. Pt supine to sit with moderate assist-not met  2. Pt sit to supine with moderate assist-not met  3. Pt sit to stand with RW with WBAT B LE with moderate assist-not met  4. Pt to perform gait 5 steps with RW with WBAT B LE with max assist.-not met  5. Pt to transfer bed to/from bedside chair/w/c with LRAD as needed for safety with WBAT B LE with max assist.-not met  6. Pt to perform B LE exs in sitting or supine x 10 reps to strengthen B LE to improve functional mobility.-not met    DME Justifications (see above for complete DME recommendations)    Hospital Bed-  Patient requires the head of the bed to be elevated more than 30 degrees most of the time due to congestive heart failure, chronic pulmonary disease, and/or problems with aspiration.  The use of pillows and wedges is not adequate for the proper body positioning this patient requires     Wheelchair-  Patient has a mobility limitation that significantly impairs their ability to participate in one or more mobility related activities of daily living in customary locations in the home. The mobility limitation cannot be sufficiently resolved by the use of a cane or walker. The use of a manual wheelchair will greatly improve the patient's ability to participate in MRADLs. The patient will use the wheelchair on a regular basis at home. They have expressed their willingness to use a manual wheelchair in the home, and have a caregiver who is available and willing to assist with the wheelchair if needed.                             Time Tracking:     PT Received On: 10/07/24  PT Start Time: 0940     PT Stop Time: 1006  PT Total Time (min): 26 min     Billable Minutes: Gait Training 13 and Therapeutic Activity 13    Treatment Type: Treatment  PT/PTA: PT     Number of PTA visits since last PT visit: 0     10/07/2024

## 2024-10-07 NOTE — PLAN OF CARE
DC orders in.  CM sent SNF orders to [facility] via CP.  DORIS called Anna with [facility] requesting call back.  DORIS ordered Covid and PPD.    Anna called back, said pt doesn't need new PPD and Covid.  DORIS discontinued orders.  Anna states she will be review orders and call CM back with report info.    1:12 PM  Per Anna, have nurse call report to Armida with [facility] [phone], Rm [phone].  notified nurse Rhiannon.    CM arranged ambulance transport with O2 for 1:30 PM.    VENTURA SalterN, BS, RN, CCM

## 2024-10-07 NOTE — PLAN OF CARE
Problem: Skin Injury Risk Increased  Goal: Skin Health and Integrity  Outcome: Progressing     Problem: Infection  Goal: Absence of Infection Signs and Symptoms  Outcome: Progressing     Problem: Sepsis/Septic Shock  Goal: Optimal Coping  Outcome: Progressing  Goal: Absence of Bleeding  Outcome: Progressing  Goal: Blood Glucose Level Within Targeted Range  Outcome: Progressing  Goal: Absence of Infection Signs and Symptoms  Outcome: Progressing  Goal: Optimal Nutrition Intake  Outcome: Progressing     Problem: Acute Kidney Injury/Impairment  Goal: Fluid and Electrolyte Balance  Outcome: Progressing  Goal: Improved Oral Intake  Outcome: Progressing  Goal: Effective Renal Function  Outcome: Progressing     Problem: Wound  Goal: Optimal Coping  Outcome: Progressing  Goal: Optimal Functional Ability  Outcome: Progressing  Goal: Absence of Infection Signs and Symptoms  Outcome: Progressing  Goal: Improved Oral Intake  Outcome: Progressing  Goal: Optimal Pain Control and Function  Outcome: Progressing  Goal: Skin Health and Integrity  Outcome: Progressing  Goal: Optimal Wound Healing  Outcome: Progressing     Problem: Fall Injury Risk  Goal: Absence of Fall and Fall-Related Injury  Outcome: Progressing     Problem: Mobility Impairment  Goal: Optimal Mobility  Outcome: Progressing   Pt Alert & able to express needs.  C/o pain and   positions as needed.  Discharge to SNF pending. Blood pressure and heart rate may require frequent re-checks as needed.   Safety maintained.  Bed in low position,  call  light in reach.

## 2024-10-07 NOTE — PLAN OF CARE
Problem: Skin Injury Risk Increased  Goal: Skin Health and Integrity  Outcome: Adequate for Care Transition     Problem: Adult Inpatient Plan of Care  Goal: Plan of Care Review  Outcome: Adequate for Care Transition  Goal: Patient-Specific Goal (Individualized)  Outcome: Adequate for Care Transition  Goal: Absence of Hospital-Acquired Illness or Injury  Outcome: Adequate for Care Transition  Goal: Optimal Comfort and Wellbeing  Outcome: Adequate for Care Transition  Goal: Readiness for Transition of Care  Outcome: Adequate for Care Transition     Problem: Infection  Goal: Absence of Infection Signs and Symptoms  Outcome: Adequate for Care Transition     Problem: Sepsis/Septic Shock  Goal: Optimal Coping  Outcome: Adequate for Care Transition  Goal: Absence of Bleeding  Outcome: Adequate for Care Transition  Goal: Blood Glucose Level Within Targeted Range  Outcome: Adequate for Care Transition  Goal: Absence of Infection Signs and Symptoms  Outcome: Adequate for Care Transition  Goal: Optimal Nutrition Intake  Outcome: Adequate for Care Transition     Problem: Acute Kidney Injury/Impairment  Goal: Fluid and Electrolyte Balance  Outcome: Adequate for Care Transition  Goal: Improved Oral Intake  Outcome: Adequate for Care Transition  Goal: Effective Renal Function  Outcome: Adequate for Care Transition     Problem: Wound  Goal: Optimal Coping  Outcome: Adequate for Care Transition  Goal: Optimal Functional Ability  Outcome: Adequate for Care Transition  Goal: Absence of Infection Signs and Symptoms  Outcome: Adequate for Care Transition  Goal: Improved Oral Intake  Outcome: Adequate for Care Transition  Goal: Optimal Pain Control and Function  Outcome: Adequate for Care Transition  Goal: Skin Health and Integrity  Outcome: Adequate for Care Transition  Goal: Optimal Wound Healing  Outcome: Adequate for Care Transition     Problem: Fall Injury Risk  Goal: Absence of Fall and Fall-Related Injury  Outcome: Adequate for  Care Transition     Problem: Mobility Impairment  Goal: Optimal Mobility  Outcome: Adequate for Care Transition   Pt discharge to skilled nursing facility at 1506. All personal belongings packed and sent with patient, IV access removed, and report called to nurse Huffman.

## 2024-10-07 NOTE — PLAN OF CARE
[facility] - Internal Medicine Telemetry  Discharge Final Note    Primary Care Provider: Ramirez Levine MD    Expected Discharge Date: 10/7/2024    Final Discharge Note (most recent)       Final Note - 10/07/24 1620          Final Note    Assessment Type Final Discharge Note (P)      Anticipated Discharge Disposition Skilled Nursing Facility (P)         Post-Acute Status    Post-Acute Authorization Placement (P)      Post-Acute Placement Status Set-up Complete/Auth obtained (P)      Coverage [employer] (P)      Discharge Delays None known at this time (P)                      Important Message from Medicare  Important Message from Medicare regarding Discharge Appeal Rights: Given to patient/caregiver, Explained to patient/caregiver, Signed/date by patient/caregiver     Date IMM was signed: 10/07/24  Time IMM was signed: 1352    Contact Info       Ramirez Levine MD   Specialty: Family Medicine   Relationship: PCP - General    [address] [place] [address]   Phone: [phone]       Next Steps: Follow up        Pt d/c'd to [facility] SNF via ambulance transport.    VENTURA SalterN, BS, RN, CCM

## 2024-10-07 NOTE — PT/OT/SLP PROGRESS
Occupational Therapy   Treatment    Name: Bean Salas  MRN: [id number]  Admitting Diagnosis:  PAULA (acute kidney injury)       Recommendations:     Discharge Recommendations: Moderate Intensity Therapy  Discharge Equipment Recommendations:  wheelchair, hospital bed, hip kit  Barriers to discharge:  Decreased caregiver support, Other (Comment) (patient requires increased level of assistance)    Assessment:     Bean Salas is a 79 y.o. male with a medical diagnosis of PAULA (acute kidney injury).  He presents with the fallowing performance deficits affecting function are weakness, impaired endurance, impaired self care skills, impaired functional mobility, gait instability, impaired balance, pain, decreased safety awareness, decreased lower extremity function, impaired cardiopulmonary response to activity, decreased upper extremity function, decreased coordination, edema. Patient agreeable to tx session, patient agreeable to attempt to performed a sit to stand but did required some encouragement, patient did reported of having increased R leg pain and not so much dizziness and SOB as of previous sessions. Patient requires increased level of assistance with functional transfers, bed mobility, and to maintain standing balance. Patient continues to have limited activity tolerance due to pain and weakness from recent hospitalization.. Patient would benefit from Moderate intensity therapy intervention to address over all functional decline with mobility task, endurance, and ADLs in order to return to PLOF.     Rehab Prognosis:  Good; patient would benefit from acute skilled OT services to address these deficits and reach maximum level of function.       Plan:     Patient to be seen 4 x/week to address the above listed problems via self-care/home management, therapeutic activities, therapeutic exercises, neuromuscular re-education  Plan of Care Expires: 10/28/24  Plan of Care Reviewed with: patient    Subjective     Chief  Complaint: pain   Patient/Family Comments/goals: to return to PLOF  Pain/Comfort:  Pain Rating 1:  (patient did not rate)  Location - Orientation 1: generalized  Location 1:  (foot and leg)  Pain Addressed 1: Reposition, Distraction  Pain Rating Post-Intervention 1:  (patient did not rate)    Objective:     Communicated with: Nurse prior to session.  Patient found HOB elevated with telemetry, oxygen, harrison catheter, bed alarm (tele-sitter) upon OT entry to room.  Co-treated with PT due to patient complexity deficits requiring two skilled therapist to appropriately and safely mobilized patient while facilitating functional task in addition to accommodating for patient's activity tolerance.    General Precautions: Standard, fall    Orthopedic Precautions:RLE weight bearing as tolerated, LLE weight bearing as tolerated  Braces: N/A  Respiratory Status: Nasal cannula, flow 3 L/min     Occupational Performance:     Bed Mobility:    Patient completed Rolling/Turning to Left with  maximal assistance  Patient completed Scooting/Bridging with maximal assistance  Patient completed Supine to Sit with maximal assistance  Patient completed Sit to Supine with maximal assistance     Functional Mobility/Transfers:  Patient completed Sit <> Stand Transfer with maximal assistance  with  no assistive device with  hand-held assist   Functional Mobility: patient was able to take 3-4 lateral steps to HOB with Max A with no AD with HHA  Patient noticed with mild instability not able to maintain upright posture and with decreased step length and foot clearance.    Activities of Daily Living:  Grooming: Setup  to wash face   Upper Body Dressing: maximal assistance to don/doff gown   Lower Body Dressing: total assistance to don/doff socks       AMPA 6 Click ADL: 14    Treatment & Education:  Discussed OT POC and progress  Educated patient on the importance to continue to perform exercises in order to reduce stiffness and promote joint  mobility and blood flow  Addressed patient's questions and concerns within COLVIN scope of practice      Patient left HOB elevated with all lines intact and call button in reach    GOALS:   Multidisciplinary Problems       Occupational Therapy Goals          Problem: Occupational Therapy    Goal Priority Disciplines Outcome Interventions   Occupational Therapy Goal     OT, PT/OT Progressing    Description: Goals to be met by: 10/28/24     Patient will increase functional independence with ADLs by performing:    UE Dressing with Minimal Assistance.  LE Dressing with Minimal Assistance.  Grooming while seated with Minimal Assistance.  Toileting from bedside commode with Minimal Assistance for hygiene and clothing management.   Toilet transfer to bedside commode with Minimal Assistance.    DME: TBD on progress/Next level of care; likely:   Patient has a mobility limitation that significantly impairs their ability to participate in one or more mobility related activities of daily living in customary locations in the home. The mobility limitation cannot be sufficiently resolved by the use of a cane or walker. The use of a manual wheelchair will greatly improve the patient's ability to participate in MRADLs. The patient will use the wheelchair on a regular basis at home. They have expressed their willingness to use a manual wheelchair in the home, and have a caregiver who is available and willing to assist with the wheelchair if needed.                          Time Tracking:     OT Date of Treatment: 10/07/24  OT Start Time: 0940  OT Stop Time: 1006  OT Total Time (min): 26 min    Billable Minutes:Self Care/Home Management 26    OT/COURTNEY: COURTNEY     Number of COURTNEY visits since last OT visit: 1    10/7/2024

## 2024-10-08 ENCOUNTER — PATIENT OUTREACH (OUTPATIENT)
Dept: ADMINISTRATIVE | Facility: CLINIC | Age: 79
End: 2024-10-08
Payer: MEDICARE

## 2024-10-10 NOTE — DISCHARGE SUMMARY
Department of Hospital Medicine   [facility]   Discharge Summary    Patient Name: Bean Salas  Room: [id number]/ZHVI094 A   MRN: [id number]  Admit Date: 9/24/2024   Date of Discharge:  9/28/2024  Length of Stay:  LOS: 4 days   Attending Physician: No att. providers found  Nurse Practitioner: Linda Elias NP  Code Status: DNR (Do Not Resuscitate)    Date of Service: 10/10/2024    Principal Problem: Closed nondisplaced fracture of anterior column of right acetabulum with routine healing    HPI  Bean Salas is a 79 year old male with PMHx of Bradycardia, PVCs, HTN, CAD, HLD, HFpEF, Pulmonary HTN, AKHIL on CPAP, Chronic respiratory failure w/ home oxygen and hypothyroidism who presents to SNF following hospitalization for right superior pubic ramus fracture, right sacral ala fracture, right pelvic ring fracture s/p percutaneous fixation and ORIF on 09/20 with Dr. Stapleton, also noted to have PE.     Hospital Course  Admission to SNF for secondary weakness and debility. Patient progessing with PT/OT- Supine to Sit: maximal assistance, of 2 persons, and HOB flat and use of bed rail; Sit to Supine: maximal assistance, of 2 persons, and HOB flat. Continuing to follow and treat all acute and chronic conditions. Blood pressures have been low, patient on very high dose of newly prescribed Entresto, currently receiving to diuretics. Continues on supplemental oxygen. Diffuculty achieving pain control. Gabapentin started 9/27. Worsening PAULA - send to ED.    Physical Exam  Constitutional: Patient appears obese, debilitated.  No distress noted  HENT:   Head: Normocephalic and atraumatic.   Eyes: Pupils are equal, round  Neck: Normal range of motion. Neck supple.   Cardiovascular: Normal rate, regular rhythm and normal heart sounds.    Pulmonary/Chest: Effort normal and breath sounds are clear. Continuous LFNC O2   Abdominal: Soft, rounded. Bowel sounds are normal.   Musculoskeletal: Normal  "range of motion.   Neurological: Alert and oriented to person, place, and time.   Psychiatric: Normal mood and affect. Behavior is normal.   Skin: Skin is warm and dry.  Occlusive gauze dressing to right pelvic area    Recent Labs   Lab 10/04/24  0338 10/06/24  1057   WBC 8.98 7.21   HGB 9.2* 9.4*   HCT 29.9* 29.4*    342     Recent Labs   Lab 10/05/24  1520 10/06/24  0236 10/07/24  0211    136 136   K 4.4 4.1 4.1   CL 99 98 100   CO2 34* 32* 31*   BUN 18 21 21   CREATININE 1.1 1.0 1.0   * 126* 117*   CALCIUM 9.1 9.1 8.7   MG 2.0 2.1 2.2     No results for input(s): "ALKPHOS", "ALT", "AST", "ALBUMIN", "PROT", "BILITOT", "INR" in the last 168 hours.   No results for input(s): "CPK", "CPKMB", "MB", "TROPONINI" in the last 72 hours.  No results for input(s): "LACTATE" in the last 72 hours.    Recent Labs   Lab 10/06/24  0806 10/06/24  1145 10/06/24  1506 10/06/24  2057 10/07/24  0816 10/07/24  1203   POCTGLUCOSE 180* 141* 163* 134* 173* 154*      Hemoglobin A1C   Date Value Ref Range Status   09/16/2024 6.3 (H) 4.0 - 5.6 % Final     Comment:     ADA Screening Guidelines:  5.7-6.4%  Consistent with prediabetes  >or=6.5%  Consistent with diabetes    High levels of fetal hemoglobin interfere with the HbA1C  assay. Heterozygous hemoglobin variants (HbS, HgC, etc)do  not significantly interfere with this assay.   However, presence of multiple variants may affect accuracy.     10/05/2023 6.1 (H) 4.0 - 5.6 % Final     Comment:     ADA Screening Guidelines:  5.7-6.4%  Consistent with prediabetes  >or=6.5%  Consistent with diabetes    High levels of fetal hemoglobin interfere with the HbA1C  assay. Heterozygous hemoglobin variants (HbS, HgC, etc)do  not significantly interfere with this assay.   However, presence of multiple variants may affect accuracy.     08/15/2023 6.2 (H) 4.0 - 5.6 % Final     Comment:     ADA Screening Guidelines:  5.7-6.4%  Consistent with prediabetes  >or=6.5%  Consistent with " diabetes    High levels of fetal hemoglobin interfere with the HbA1C  assay. Heterozygous hemoglobin variants (HbS, HgC, etc)do  not significantly interfere with this assay.   However, presence of multiple variants may affect accuracy.       Consultations included: Orthopedic Surgery, RT, Wound Care, Registered Dietitian, PT, OT, and Case Management.      Discharge Medication List as of 10/1/2024  1:01 PM        CONTINUE these medications which have NOT CHANGED    Details   acetaminophen (TYLENOL) 500 MG tablet Take 2 tablets (1,000 mg total) by mouth every 8 (eight) hours., Starting Mon 9/23/2024, OTC      amiodarone (PACERONE) 200 MG Tab Multiple Dosages:Starting Mon 9/23/2024, Until Thu 9/26/2024 at 2359, THEN Starting Fri 9/27/2024, Until Fri 9/26/2025 at 2359Take 2 tablets (400 mg total) by mouth 2 (two) times daily for 4 days, THEN 1 tablet (200 mg total) once daily., No Print      apixaban (ELIQUIS) 5 mg Tab Multiple Dosages:Starting Mon 9/23/2024, Until Thu 9/26/2024 at 2359, THEN Starting Fri 9/27/2024, Until Fri 9/26/2025 at 2359Take 2 tablets (10 mg total) by mouth 2 (two) times daily for 4 days, THEN 1 tablet (5 mg total) 2 (two) times daily., No Leela nt      aspirin (ECOTRIN) 81 MG EC tablet Take 1 tablet (81 mg total) by mouth once daily., Starting Mon 6/13/2022, No Print      atorvastatin (LIPITOR) 10 MG tablet Take 1 tablet by mouth once daily, Starting Mon 6/10/2024, Normal      empagliflozin (JARDIANCE) 10 mg tablet Take 1 tablet (10 mg total) by mouth once daily., Starting Mon 9/23/2024, No Print      levothyroxine (SYNTHROID) 200 MCG tablet Take 1 tablet by mouth once daily, Starting Tue 3/12/2024, Normal      melatonin (MELATIN) 3 mg tablet Take 2 tablets (6 mg total) by mouth nightly as needed for Insomnia., Starting Mon 9/23/2024, No Print      methocarbamoL (ROBAXIN) 750 MG Tab Take 1 tablet (750 mg total) by mouth 4 (four) times daily., Starting Mon 9/23/2024, No Print      mirtazapine  (REMERON) 7.5 MG Tab Take 1 tablet (7.5 mg total) by mouth nightly. One tablet po each night for sleep, Starting Mon 9/23/2024, No Print      multivit-min-FA-lycopen-lutein (CENTRUM SILVER) 0.4-300-250 mg-mcg-mcg Tab Take 1 tablet by mouth once daily., Historical Med      senna-docusate 8.6-50 mg (PERICOLACE) 8.6-50 mg per tablet Take 1 tablet by mouth once daily., Starting Mon 9/23/2024, OTC      furosemide (LASIX) 40 MG tablet Take 1 tablet (40 mg total) by mouth 2 (two) times a day. Take daily for next 3 days, then use as needed. Weigh daily. Afterwards, f weight increases 2 lbs/24h, take dose of lasix daily  until weight is back to baseline, Starting Mon 12/4/2023, Normal      metoprolol succinate (TOPROL-XL) 25 MG 24 hr tablet Take 0.5 tablets (12.5 mg total) by mouth once daily., Starting Mon 9/23/2024, No Print      oxyCODONE (ROXICODONE) 5 MG immediate release tablet Take 1 tablet (5 mg total) by mouth every 4 (four) hours as needed for Pain., Starting Mon 9/23/2024, Print      sacubitriL-valsartan (ENTRESTO)  mg per tablet Take 1 tablet by mouth 2 (two) times daily., Starting Mon 9/23/2024, No Print      spironolactone (ALDACTONE) 25 MG tablet Take 1 tablet by mouth once daily, Starting Tue 12/5/2023, Normal             Discharge Condition: Fair    Tests pending at the time of discharge: none      Disposition: ED    Future Appointments   Date Time Provider Department Center   10/28/2024  1:30 PM CARDIAC, PET IMAGING [facility] SOPHIE [facility]   11/1/2024  9:45 AM [facility] FRACTURE CARE CLINIC [facility] AGUSTINA Eli   11/1/2024 10:45 AM Red Ken NP [facility] ORTHO Alli Eli   1/27/2025 10:40 AM [facility]       Linda Jada, NP  Department of Hospital Medicine   [facility]

## 2024-10-14 LAB
ALLENS TEST: ABNORMAL
DELSYS: ABNORMAL
FLOW: 3
HCO3 UR-SCNC: 31.8 MMOL/L (ref 24–28)
MODE: ABNORMAL
PCO2 BLDA: 51.7 MMHG (ref 35–45)
PH SMN: 7.4 [PH] (ref 7.35–7.45)
PO2 BLDA: 82 MMHG (ref 80–100)
POC BE: 7 MMOL/L
POC SATURATED O2: 96 % (ref 95–100)
POC TCO2: 33 MMOL/L (ref 23–27)
SAMPLE: ABNORMAL
SITE: ABNORMAL
SP02: 98

## 2024-10-24 ENCOUNTER — TELEPHONE (OUTPATIENT)
Dept: CARDIOLOGY | Facility: HOSPITAL | Age: 79
End: 2024-10-24
Payer: MEDICARE

## 2024-10-28 ENCOUNTER — HOSPITAL ENCOUNTER (INPATIENT)
Facility: HOSPITAL | Age: 79
LOS: 10 days | Discharge: NURSING, REHAB OR LONG-TERM CARE | DRG: 242 | End: 2024-11-08
Attending: EMERGENCY MEDICINE | Admitting: INTERNAL MEDICINE
Payer: MEDICARE

## 2024-10-28 DIAGNOSIS — I44.2 CHB (COMPLETE HEART BLOCK): Primary | ICD-10-CM

## 2024-10-28 DIAGNOSIS — R06.02 SHORTNESS OF BREATH: ICD-10-CM

## 2024-10-28 DIAGNOSIS — I50.9 DECOMPENSATED HEART FAILURE: ICD-10-CM

## 2024-10-28 DIAGNOSIS — I50.9 CHF (CONGESTIVE HEART FAILURE): ICD-10-CM

## 2024-10-28 DIAGNOSIS — J96.21 ACUTE ON CHRONIC RESPIRATORY FAILURE WITH HYPOXIA: ICD-10-CM

## 2024-10-28 DIAGNOSIS — I49.9 ARRHYTHMIA: ICD-10-CM

## 2024-10-28 DIAGNOSIS — R06.02 SOB (SHORTNESS OF BREATH): ICD-10-CM

## 2024-10-28 PROBLEM — I48.0 PAROXYSMAL ATRIAL FIBRILLATION: Status: ACTIVE | Noted: 2024-10-28

## 2024-10-28 PROBLEM — Z86.711 HISTORY OF PULMONARY EMBOLISM: Status: ACTIVE | Noted: 2024-10-28

## 2024-10-28 PROBLEM — E66.811 CLASS 1 OBESITY WITH BODY MASS INDEX (BMI) OF 31.0 TO 31.9 IN ADULT: Status: ACTIVE | Noted: 2024-10-28

## 2024-10-28 LAB
ALBUMIN SERPL BCP-MCNC: 3 G/DL (ref 3.5–5.2)
ALP SERPL-CCNC: 196 U/L (ref 40–150)
ALT SERPL W/O P-5'-P-CCNC: 21 U/L (ref 10–44)
ANION GAP SERPL CALC-SCNC: 8 MMOL/L (ref 8–16)
AST SERPL-CCNC: 19 U/L (ref 10–40)
BASOPHILS # BLD AUTO: 0.02 K/UL (ref 0–0.2)
BASOPHILS NFR BLD: 0.4 % (ref 0–1.9)
BILIRUB SERPL-MCNC: 1.5 MG/DL (ref 0.1–1)
BNP SERPL-MCNC: 955 PG/ML (ref 0–99)
BUN SERPL-MCNC: 10 MG/DL (ref 8–23)
CALCIUM SERPL-MCNC: 8.8 MG/DL (ref 8.7–10.5)
CHLORIDE SERPL-SCNC: 101 MMOL/L (ref 95–110)
CO2 SERPL-SCNC: 32 MMOL/L (ref 23–29)
CREAT SERPL-MCNC: 0.9 MG/DL (ref 0.5–1.4)
DIFFERENTIAL METHOD BLD: ABNORMAL
EOSINOPHIL # BLD AUTO: 0 K/UL (ref 0–0.5)
EOSINOPHIL NFR BLD: 0 % (ref 0–8)
ERYTHROCYTE [DISTWIDTH] IN BLOOD BY AUTOMATED COUNT: 15.9 % (ref 11.5–14.5)
EST. GFR  (NO RACE VARIABLE): >60 ML/MIN/1.73 M^2
GLUCOSE SERPL-MCNC: 154 MG/DL (ref 70–110)
HCT VFR BLD AUTO: 37.3 % (ref 40–54)
HGB BLD-MCNC: 11.9 G/DL (ref 14–18)
IMM GRANULOCYTES # BLD AUTO: 0.01 K/UL (ref 0–0.04)
IMM GRANULOCYTES NFR BLD AUTO: 0.2 % (ref 0–0.5)
INFLUENZA A, MOLECULAR: NOT DETECTED
INFLUENZA B, MOLECULAR: NOT DETECTED
LYMPHOCYTES # BLD AUTO: 0.3 K/UL (ref 1–4.8)
LYMPHOCYTES NFR BLD: 5.3 % (ref 18–48)
MAGNESIUM SERPL-MCNC: 2.3 MG/DL (ref 1.6–2.6)
MCH RBC QN AUTO: 33.5 PG (ref 27–31)
MCHC RBC AUTO-ENTMCNC: 31.9 G/DL (ref 32–36)
MCV RBC AUTO: 105 FL (ref 82–98)
MONOCYTES # BLD AUTO: 0.4 K/UL (ref 0.3–1)
MONOCYTES NFR BLD: 8 % (ref 4–15)
NEUTROPHILS # BLD AUTO: 4.1 K/UL (ref 1.8–7.7)
NEUTROPHILS NFR BLD: 86.1 % (ref 38–73)
NRBC BLD-RTO: 0 /100 WBC
PLATELET # BLD AUTO: 178 K/UL (ref 150–450)
PMV BLD AUTO: 11.4 FL (ref 9.2–12.9)
POTASSIUM SERPL-SCNC: 4.4 MMOL/L (ref 3.5–5.1)
PROT SERPL-MCNC: 6.2 G/DL (ref 6–8.4)
RBC # BLD AUTO: 3.55 M/UL (ref 4.6–6.2)
RSV AG BY MOLECULAR METHOD: NOT DETECTED
SARS-COV-2 RNA RESP QL NAA+PROBE: NOT DETECTED
SODIUM SERPL-SCNC: 141 MMOL/L (ref 136–145)
TROPONIN I SERPL DL<=0.01 NG/ML-MCNC: 0.09 NG/ML (ref 0–0.03)
WBC # BLD AUTO: 4.76 K/UL (ref 3.9–12.7)

## 2024-10-28 PROCEDURE — G0378 HOSPITAL OBSERVATION PER HR: HCPCS

## 2024-10-28 PROCEDURE — 85025 COMPLETE CBC W/AUTO DIFF WBC: CPT | Performed by: EMERGENCY MEDICINE

## 2024-10-28 PROCEDURE — 96374 THER/PROPH/DIAG INJ IV PUSH: CPT

## 2024-10-28 PROCEDURE — 84484 ASSAY OF TROPONIN QUANT: CPT | Performed by: EMERGENCY MEDICINE

## 2024-10-28 PROCEDURE — 93005 ELECTROCARDIOGRAM TRACING: CPT

## 2024-10-28 PROCEDURE — 5A09357 ASSISTANCE WITH RESPIRATORY VENTILATION, LESS THAN 24 CONSECUTIVE HOURS, CONTINUOUS POSITIVE AIRWAY PRESSURE: ICD-10-PCS | Performed by: HOSPITALIST

## 2024-10-28 PROCEDURE — 83880 ASSAY OF NATRIURETIC PEPTIDE: CPT | Performed by: EMERGENCY MEDICINE

## 2024-10-28 PROCEDURE — 94640 AIRWAY INHALATION TREATMENT: CPT

## 2024-10-28 PROCEDURE — 80053 COMPREHEN METABOLIC PANEL: CPT | Performed by: EMERGENCY MEDICINE

## 2024-10-28 PROCEDURE — 25000242 PHARM REV CODE 250 ALT 637 W/ HCPCS: Performed by: EMERGENCY MEDICINE

## 2024-10-28 PROCEDURE — 25000003 PHARM REV CODE 250: Performed by: HOSPITALIST

## 2024-10-28 PROCEDURE — 63600175 PHARM REV CODE 636 W HCPCS: Performed by: EMERGENCY MEDICINE

## 2024-10-28 PROCEDURE — 99285 EMERGENCY DEPT VISIT HI MDM: CPT | Mod: 25

## 2024-10-28 PROCEDURE — 27000221 HC OXYGEN, UP TO 24 HOURS

## 2024-10-28 PROCEDURE — 0241U SARS-COV2 (COVID) WITH FLU/RSV BY PCR: CPT | Performed by: EMERGENCY MEDICINE

## 2024-10-28 PROCEDURE — 94761 N-INVAS EAR/PLS OXIMETRY MLT: CPT

## 2024-10-28 PROCEDURE — 83735 ASSAY OF MAGNESIUM: CPT | Performed by: EMERGENCY MEDICINE

## 2024-10-28 RX ORDER — IPRATROPIUM BROMIDE AND ALBUTEROL SULFATE 2.5; .5 MG/3ML; MG/3ML
9 SOLUTION RESPIRATORY (INHALATION) ONCE
Status: COMPLETED | OUTPATIENT
Start: 2024-10-28 | End: 2024-10-28

## 2024-10-28 RX ORDER — IPRATROPIUM BROMIDE AND ALBUTEROL SULFATE 2.5; .5 MG/3ML; MG/3ML
3 SOLUTION RESPIRATORY (INHALATION)
Status: DISCONTINUED | OUTPATIENT
Start: 2024-10-28 | End: 2024-10-28

## 2024-10-28 RX ORDER — FUROSEMIDE 10 MG/ML
60 INJECTION INTRAMUSCULAR; INTRAVENOUS 2 TIMES DAILY
Status: DISCONTINUED | OUTPATIENT
Start: 2024-10-29 | End: 2024-10-29

## 2024-10-28 RX ORDER — LEVOTHYROXINE SODIUM 100 UG/1
200 TABLET ORAL
Status: DISCONTINUED | OUTPATIENT
Start: 2024-10-29 | End: 2024-11-08 | Stop reason: HOSPADM

## 2024-10-28 RX ORDER — MIRTAZAPINE 7.5 MG/1
7.5 TABLET, FILM COATED ORAL NIGHTLY
Status: DISCONTINUED | OUTPATIENT
Start: 2024-10-28 | End: 2024-11-08 | Stop reason: HOSPADM

## 2024-10-28 RX ORDER — ATORVASTATIN CALCIUM 10 MG/1
10 TABLET, FILM COATED ORAL DAILY
Status: DISCONTINUED | OUTPATIENT
Start: 2024-10-29 | End: 2024-11-08 | Stop reason: HOSPADM

## 2024-10-28 RX ORDER — FUROSEMIDE 10 MG/ML
80 INJECTION INTRAMUSCULAR; INTRAVENOUS
Status: COMPLETED | OUTPATIENT
Start: 2024-10-28 | End: 2024-10-28

## 2024-10-28 RX ORDER — TAMSULOSIN HYDROCHLORIDE 0.4 MG/1
0.4 CAPSULE ORAL DAILY
Status: DISCONTINUED | OUTPATIENT
Start: 2024-10-29 | End: 2024-10-29

## 2024-10-28 RX ORDER — SODIUM CHLORIDE 0.9 % (FLUSH) 0.9 %
10 SYRINGE (ML) INJECTION
Status: DISCONTINUED | OUTPATIENT
Start: 2024-10-28 | End: 2024-11-08 | Stop reason: HOSPADM

## 2024-10-28 RX ORDER — ASPIRIN 81 MG/1
81 TABLET ORAL DAILY
Status: DISCONTINUED | OUTPATIENT
Start: 2024-10-29 | End: 2024-11-08 | Stop reason: HOSPADM

## 2024-10-28 RX ADMIN — FUROSEMIDE 80 MG: 10 INJECTION, SOLUTION INTRAVENOUS at 05:10

## 2024-10-28 RX ADMIN — APIXABAN 5 MG: 5 TABLET, FILM COATED ORAL at 09:10

## 2024-10-28 RX ADMIN — IPRATROPIUM BROMIDE AND ALBUTEROL SULFATE 9 ML: .5; 3 SOLUTION RESPIRATORY (INHALATION) at 04:10

## 2024-10-28 RX ADMIN — MIRTAZAPINE 7.5 MG: 7.5 TABLET, FILM COATED ORAL at 09:10

## 2024-10-28 NOTE — Clinical Note
The lead thresholds were tested and was secured in place. A new lead was attached to the coronary sinus.

## 2024-10-28 NOTE — HPI
80 yo M with PMHx CAD, combined CHF (recovered EF), CAD, A-fib on eliquis, chronic respiratory failure on 3L NC, and hypothyroidism who presents to the ED from nursing home for worsening SOB, hypoxia, and leg welling x a few days. Per nursing home and ED documentation, pt. Had been complaining of worsening SOB with exertion and was noted to have low O2 levels on vitals check. He is stypically on 2-3L NC, but over the last few days he has been uptitrated to on day of presentation. Nursing Home also noted inc reased B/L lower extremity swelling. Pt. Reports having SOB earleir ion the day but he reports feeling ok on the 5L NC. No cough, fevers, chills, nausea, or vomiting.    Of note, pt. Was recently admitted to this facility 9/28-10/7 for hypotension and PAULA. Lasix was held and entresto was discontinued. At discharge lasix dosing was changed to PRN only. It is unclear if he had been receiving any of these PRN doses at the nursing home.

## 2024-10-28 NOTE — Clinical Note
A venogram was performed in the coronary sinus. A balloon was inserted. The vessel was injected via hand injection  with 8 mL of contrast. Balloon removed after venogram

## 2024-10-28 NOTE — ED NOTES
Pt identifiers Bean Salas were checked and are correct  LOC: The patient is awake, alert, aware of environment with an appropriate affect. Oriented x4, speaking appropriately  APPEARANCE: Pt resting comfortably, in no acute distress, pt is clean and well groomed, clothing properly fastened  Mckeon catheter to  bag patent draining dark yellow colored urine   SKIN: Skin warm, dry and intact, normal skin turgor, moist mucus membranes  skin discoloration noted to lower extremities  Wound with dry scab noted on lateral side right foot and right ankle   RESPIRATORY: Airway is open and patent, respirations are spontaneous, even and unlabored, normal effort and rate  O2 at 5L/min per nasal cannula  CARDIAC: Radial pulses strong and irregular, 1 +  peripheral edema noted, capillary refill < 3 seconds, bilateral radial pulses 2+  Pt is on a cardiac monitor and pulse oximetry   ABDOMEN: Soft, nontender, nondistended. Bowel sounds present   NEUROLOGIC: PERRL, facial expression is symmetrical, patient moving all extremities spontaneously, normal sensation in all extremities when touched with a finger.  Follows all commands appropriately  MUSCULOSKELETAL: No obvious deformities.

## 2024-10-28 NOTE — ED PROVIDER NOTES
Encounter Date: 10/28/2024       History     Chief Complaint   Patient presents with    Shortness of Breath     Worsening over weeks. Peripheral edema. [facility] Pt is poor historian. Was removed from blood thinner medications 2wk ago.      Bean Salas is a 79 M hx of hypertension, hyperlipidemia, coronary artery disease, chronic diastolic congestive heart failure, biatrial enlargement, paroxysmal atrial flutter status post cardioversion on 9/18/2024, aortic atherosclerosis, obstructive sleep apnea, chronic hypoxic respiratory failure, hypothyroidism, history of right intertrochanteric femur fracture status post intramedullary racquel insertion on 9/3/2021 sent from nursing facility for shortness of breath.  Per EMS, symptoms have been getting worse over the past several weeks.  He is noted to have increased peripheral edema.  I called the nursing home, states that patient typically is on 3 L of oxygen, had increased to 5 L over the past couple days.  No reported fever.  Vital signs have otherwise been stable.  Per their documentation, he was not been taken off of Eliquis.      Review of patient's allergies indicates:  No Known Allergies  Past Medical History:   Diagnosis Date    Atrial fibrillation, unspecified type 09/06/2023    COPD exacerbation 09/06/2023    Thyroid disease     hypo     Past Surgical History:   Procedure Laterality Date    COLONOSCOPY  01/01/2011    CORONARY ANGIOGRAPHY N/A 07/22/2021    Procedure: ANGIOGRAM, CORONARY ARTERY;  Surgeon: Hank Barry MD;  Location: [facility] CATH LAB/EP;  Service: Cardiology;  Laterality: N/A;    EYE SURGERY Bilateral     cataracts extraction    FRACTURE SURGERY Right 09/2021    femur    HERNIA REPAIR      HIP SURGERY Right 08/2021    INTRAMEDULLARY RODDING OF TROCHANTER OF FEMUR Right 09/03/2021    Procedure: INSERTION, INTRAMEDULLARY RACQUEL, FEMUR, TROCHANTER;  Surgeon: Darryn Hall MD;  Location: [facility] OR;  Service: Orthopedics;  Laterality:  Right;    JOINT REPLACEMENT Bilateral     knee    LEFT HEART CATHETERIZATION Right 2021    Procedure: Left heart cath;  Surgeon: Hank Barry MD;  Location: [facility] CATH LAB/EP;  Service: Cardiology;  Laterality: Right;    OPEN REDUCTION AND INTERNAL FIXATION (ORIF) OF INJURY OF HIP Right 2024    Procedure: ORIF,PELVIS;  Surgeon: Negrito Stapleton MD;  Location: [facility] OR [facility] FLR;  Service: Orthopedics;  Laterality: Right;  +local    TRANSESOPHAGEAL ECHOCARDIOGRAPHY N/A 2024    Procedure: ECHOCARDIOGRAM, TRANSESOPHAGEAL;  Surgeon: Leonora Butt MD;  Location: [facility] EP LAB;  Service: Cardiology;  Laterality: N/A;    TREATMENT OF CARDIAC ARRHYTHMIA N/A 2024    Procedure: Cardioversion or Defibrillation;  Surgeon: ANA Steiner MD;  Location: [facility] EP LAB;  Service: Cardiology;  Laterality: N/A;  AF, DEMETRICE/DCCV, ANES, GP, 524    VASECTOMY       Family History   Problem Relation Name Age of Onset    Crohn's disease Mother      Dementia Mother      Heart attack Father      No Known Problems Sister      No Known Problems Brother      No Known Problems Son       Social History     Tobacco Use    Smoking status: Former     Current packs/day: 0.00     Average packs/day: 1 pack/day for 35.0 years (35.0 ttl pk-yrs)     Types: Cigarettes     Start date: 1965     Quit date: 2000     Years since quittin.8     Passive exposure: Past    Smokeless tobacco: Never   Substance Use Topics    Alcohol use: No    Drug use: Never     Review of Systems    Physical Exam     Initial Vitals [10/28/24 1452]   BP Pulse Resp Temp SpO2   (!) 148/78 80 (!) 24 97.5 °F (36.4 °C) 95 %      MAP       --         Physical Exam    Nursing note and vitals reviewed.  Constitutional: He appears well-developed and well-nourished. No distress.   HENT: Mouth/Throat: Oropharynx is clear and moist.   Eyes: Conjunctivae are normal.   Neck: Neck supple.   Cardiovascular:  Normal rate, regular rhythm and intact distal  pulses.           Pulmonary/Chest: He is in respiratory distress (Conversational dyspnea). He has wheezes. He has no rales.   Abdominal: Abdomen is soft. Bowel sounds are normal. There is no abdominal tenderness.   Musculoskeletal:         General: Edema (1+ edema bilaterally) present.      Cervical back: Neck supple.     Lymphadenopathy:     He has no cervical adenopathy.   Neurological: He is alert and oriented to person, place, and time.   Skin: No rash noted.   Psychiatric: He has a normal mood and affect.         ED Course   Procedures  Labs Reviewed   B-TYPE NATRIURETIC PEPTIDE - Abnormal       Result Value     (*)    CBC W/ AUTO DIFFERENTIAL - Abnormal    WBC 4.76      RBC 3.55 (*)     Hemoglobin 11.9 (*)     Hematocrit 37.3 (*)      (*)     MCH 33.5 (*)     MCHC 31.9 (*)     RDW 15.9 (*)     Platelets 178      MPV 11.4      Immature Granulocytes 0.2      Gran # (ANC) 4.1      Immature Grans (Abs) 0.01      Lymph # 0.3 (*)     Mono # 0.4      Eos # 0.0      Baso # 0.02      nRBC 0      Gran % 86.1 (*)     Lymph % 5.3 (*)     Mono % 8.0      Eosinophil % 0.0      Basophil % 0.4      Differential Method Automated     COMPREHENSIVE METABOLIC PANEL - Abnormal    Sodium 141      Potassium 4.4      Chloride 101      CO2 32 (*)     Glucose 154 (*)     BUN 10      Creatinine 0.9      Calcium 8.8      Total Protein 6.2      Albumin 3.0 (*)     Total Bilirubin 1.5 (*)     Alkaline Phosphatase 196 (*)     AST 19      ALT 21      eGFR >60.0      Anion Gap 8     TROPONIN I - Abnormal    Troponin I 0.085 (*)    MAGNESIUM    Magnesium 2.3     SARS-COV2 (COVID) WITH FLU/RSV BY PCR    SARS-CoV2 (COVID-19) Qualitative PCR Not Detected      Influenza A, Molecular Not Detected      Influenza B, Molecular Not Detected      RSV Ag by Molecular Method Not Detected       EKG Readings: (Independently Interpreted)   EKG atrial fibrillation at a rate of 69 with PVCs, no STEMI       Imaging Results              X-Ray Chest  AP Portable (Final result)  Result time 10/28/24 17:18:56      Final result by Dewayne Quigley MD (10/28/24 17:18:56)                   Impression:      1. Pulmonary findings suggest edema or other pneumonitis noting small pleural effusions.  Correlation and follow-up is advised.      Electronically signed by: Dewayne Quigley MD  Date:    10/28/2024  Time:    17:18               Narrative:    EXAMINATION:  XR CHEST AP PORTABLE    CLINICAL HISTORY:  Shortness of breath    TECHNIQUE:  Single frontal view of the chest was performed.    COMPARISON:  10/03/2024    FINDINGS:  The cardiomediastinal silhouette is prominent, similar to the previous exam noting calcification of the aorta..  There is obscuration of the costophrenic angles suggesting effusions..  The trachea is midline.  The lungs are symmetrically expanded bilaterally with coarse interstitial attenuation bilaterally suggesting edema or other nonspecific pneumonitis..  No large focal consolidation seen.  There is no pneumothorax.  The osseous structures are remarkable for degenerative change noting osteopenia.  There are remote appearing bilateral rib injuries.                                       Medications   tamsulosin 24 hr capsule 0.4 mg (has no administration in time range)   apixaban tablet 5 mg (has no administration in time range)   aspirin EC tablet 81 mg (has no administration in time range)   atorvastatin tablet 10 mg (has no administration in time range)   levothyroxine tablet 200 mcg (has no administration in time range)   metoprolol succinate (TOPROL-XL) 24 hr split tablet 12.5 mg (has no administration in time range)   mirtazapine tablet 7.5 mg (has no administration in time range)   sodium chloride 0.9% flush 10 mL (has no administration in time range)   furosemide injection 60 mg (has no administration in time range)   albuterol-ipratropium 2.5 mg-0.5 mg/3 mL nebulizer solution 9 mL (9 mLs Nebulization Given 10/28/24 1638)   furosemide  injection 80 mg (80 mg Intravenous Given 10/28/24 1561)     Medical Decision Making  Emergent evaluation of a 79-year-old male presenting today increased shortness of breath, bilateral leg swelling and increased oxygen requirement.    On arrival, hypertensive with tachypnea, saturation 95% on 5 L.    Differential includes but not limited to acute hypoxemic respiratory failure secondary to pneumonia, decompensated heart failure, viral syndrome, COPD exacerbation, pleural effusion    Plan:  Labs  EKG  Chest x-ray  Continuous pulse and cardiac monitor    Labs reviewed, concerning for decompensated heart failure.  Chest x-ray shows increased congestion.  BNP elevated.  Troponin baseline.  Patient treated with Lasix 80 mg IV.  He is titrated down to 4 L nasal cannula.  I do not think he requires antibiotics at this time.    Patient will be placed in observation under hospital medicine for further diuresis.      Amount and/or Complexity of Data Reviewed  External Data Reviewed: notes.     Details: Recent admission on 09/28 for PAULA likely from over-diuresis  Labs: ordered. Decision-making details documented in ED Course.  Radiology: ordered and independent interpretation performed.  ECG/medicine tests: ordered and independent interpretation performed.    Risk  Prescription drug management.                                      Clinical Impression:  Final diagnoses:  [R06.02] SOB (shortness of breath)  [I50.9] Decompensated heart failure  [J96.21] Acute on chronic respiratory failure with hypoxia (Primary)          ED Disposition Condition    Observation Stable                Janeth Schmitt MD  10/28/24 2014

## 2024-10-28 NOTE — Clinical Note
Diagnosis: Decompensated heart failure [7234761]   Future Attending Provider: TYSHAWN GALAVIZ [[id number]]   Is the patient being sent to ED Observation?: No

## 2024-10-28 NOTE — Clinical Note
A percutaneous stick to the left axillary vein vein was performed. Ultrasound guidance was used to obtain access.

## 2024-10-29 ENCOUNTER — DOCUMENTATION ONLY (OUTPATIENT)
Dept: CARDIOLOGY | Facility: CLINIC | Age: 79
End: 2024-10-29
Payer: MEDICARE

## 2024-10-29 PROBLEM — I44.2 CHB (COMPLETE HEART BLOCK): Status: ACTIVE | Noted: 2024-10-29

## 2024-10-29 PROBLEM — G93.41 ENCEPHALOPATHY, METABOLIC: Status: ACTIVE | Noted: 2024-10-29

## 2024-10-29 LAB
ALBUMIN SERPL BCP-MCNC: 3.2 G/DL (ref 3.5–5.2)
ALLENS TEST: ABNORMAL
ALP SERPL-CCNC: 198 U/L (ref 40–150)
ALT SERPL W/O P-5'-P-CCNC: 21 U/L (ref 10–44)
AMPHET+METHAMPHET UR QL: NEGATIVE
ANION GAP SERPL CALC-SCNC: 11 MMOL/L (ref 8–16)
APTT PPP: 30.8 SEC (ref 21–32)
AST SERPL-CCNC: 22 U/L (ref 10–40)
BARBITURATES UR QL SCN>200 NG/ML: NEGATIVE
BASOPHILS # BLD AUTO: 0.01 K/UL (ref 0–0.2)
BASOPHILS # BLD AUTO: 0.01 K/UL (ref 0–0.2)
BASOPHILS NFR BLD: 0.2 % (ref 0–1.9)
BASOPHILS NFR BLD: 0.2 % (ref 0–1.9)
BENZODIAZ UR QL SCN>200 NG/ML: NEGATIVE
BILIRUB SERPL-MCNC: 2 MG/DL (ref 0.1–1)
BUN SERPL-MCNC: 12 MG/DL (ref 8–23)
BZE UR QL SCN: NEGATIVE
CALCIUM SERPL-MCNC: 9.1 MG/DL (ref 8.7–10.5)
CANNABINOIDS UR QL SCN: NEGATIVE
CHLORIDE SERPL-SCNC: 95 MMOL/L (ref 95–110)
CO2 SERPL-SCNC: 35 MMOL/L (ref 23–29)
CREAT SERPL-MCNC: 0.9 MG/DL (ref 0.5–1.4)
CREAT UR-MCNC: 83 MG/DL (ref 23–375)
DELSYS: ABNORMAL
DIFFERENTIAL METHOD BLD: ABNORMAL
DIFFERENTIAL METHOD BLD: ABNORMAL
EOSINOPHIL # BLD AUTO: 0 K/UL (ref 0–0.5)
EOSINOPHIL # BLD AUTO: 0 K/UL (ref 0–0.5)
EOSINOPHIL NFR BLD: 0 % (ref 0–8)
EOSINOPHIL NFR BLD: 0.2 % (ref 0–8)
EP: 6
EP: 6
ERYTHROCYTE [DISTWIDTH] IN BLOOD BY AUTOMATED COUNT: 15.9 % (ref 11.5–14.5)
ERYTHROCYTE [DISTWIDTH] IN BLOOD BY AUTOMATED COUNT: 16.1 % (ref 11.5–14.5)
ERYTHROCYTE [SEDIMENTATION RATE] IN BLOOD BY WESTERGREN METHOD: 12 MM/H
ERYTHROCYTE [SEDIMENTATION RATE] IN BLOOD BY WESTERGREN METHOD: 25 MM/H
EST. GFR  (NO RACE VARIABLE): >60 ML/MIN/1.73 M^2
ETHANOL UR-MCNC: <10 MG/DL
FIO2: 50
FLOW: 7
GLUCOSE SERPL-MCNC: 116 MG/DL (ref 70–110)
HCO3 UR-SCNC: 37.7 MMOL/L (ref 24–28)
HCO3 UR-SCNC: 43.8 MMOL/L (ref 24–28)
HCO3 UR-SCNC: 45.2 MMOL/L (ref 24–28)
HCT VFR BLD AUTO: 36.9 % (ref 40–54)
HCT VFR BLD AUTO: 39.2 % (ref 40–54)
HCT VFR BLD CALC: 36 %PCV (ref 36–54)
HGB BLD-MCNC: 11.5 G/DL (ref 14–18)
HGB BLD-MCNC: 12.3 G/DL (ref 14–18)
IMM GRANULOCYTES # BLD AUTO: 0.01 K/UL (ref 0–0.04)
IMM GRANULOCYTES # BLD AUTO: 0.02 K/UL (ref 0–0.04)
IMM GRANULOCYTES NFR BLD AUTO: 0.2 % (ref 0–0.5)
IMM GRANULOCYTES NFR BLD AUTO: 0.4 % (ref 0–0.5)
INR PPP: 1.1 (ref 0.8–1.2)
IP: 12
IP: 12
LYMPHOCYTES # BLD AUTO: 0.4 K/UL (ref 1–4.8)
LYMPHOCYTES # BLD AUTO: 0.4 K/UL (ref 1–4.8)
LYMPHOCYTES NFR BLD: 7 % (ref 18–48)
LYMPHOCYTES NFR BLD: 9.8 % (ref 18–48)
MAGNESIUM SERPL-MCNC: 2.2 MG/DL (ref 1.6–2.6)
MCH RBC QN AUTO: 33.3 PG (ref 27–31)
MCH RBC QN AUTO: 33.3 PG (ref 27–31)
MCHC RBC AUTO-ENTMCNC: 31.2 G/DL (ref 32–36)
MCHC RBC AUTO-ENTMCNC: 31.4 G/DL (ref 32–36)
MCV RBC AUTO: 106 FL (ref 82–98)
MCV RBC AUTO: 107 FL (ref 82–98)
METHADONE UR QL SCN>300 NG/ML: NEGATIVE
MODE: ABNORMAL
MONOCYTES # BLD AUTO: 0.6 K/UL (ref 0.3–1)
MONOCYTES # BLD AUTO: 0.6 K/UL (ref 0.3–1)
MONOCYTES NFR BLD: 11.7 % (ref 4–15)
MONOCYTES NFR BLD: 13.5 % (ref 4–15)
NEUTROPHILS # BLD AUTO: 3.3 K/UL (ref 1.8–7.7)
NEUTROPHILS # BLD AUTO: 4.1 K/UL (ref 1.8–7.7)
NEUTROPHILS NFR BLD: 76.3 % (ref 38–73)
NEUTROPHILS NFR BLD: 80.5 % (ref 38–73)
NRBC BLD-RTO: 0 /100 WBC
NRBC BLD-RTO: 0 /100 WBC
OHS QRS DURATION: 102 MS
OHS QRS DURATION: 106 MS
OHS QRS DURATION: 120 MS
OHS QTC CALCULATION: 469 MS
OHS QTC CALCULATION: 558 MS
OHS QTC CALCULATION: 581 MS
OPIATES UR QL SCN: NEGATIVE
PCO2 BLDA: 72.7 MMHG (ref 35–45)
PCO2 BLDA: 82.5 MMHG (ref 35–45)
PCO2 BLDA: 84.8 MMHG (ref 35–45)
PCP UR QL SCN>25 NG/ML: NEGATIVE
PH SMN: 7.32 [PH] (ref 7.35–7.45)
PH SMN: 7.33 [PH] (ref 7.35–7.45)
PH SMN: 7.33 [PH] (ref 7.35–7.45)
PLATELET # BLD AUTO: 188 K/UL (ref 150–450)
PLATELET # BLD AUTO: 191 K/UL (ref 150–450)
PMV BLD AUTO: 11.5 FL (ref 9.2–12.9)
PMV BLD AUTO: 12 FL (ref 9.2–12.9)
PO2 BLDA: 28 MMHG (ref 40–60)
PO2 BLDA: 31 MMHG (ref 40–60)
PO2 BLDA: 74 MMHG (ref 80–100)
POC BE: 12 MMOL/L
POC BE: 18 MMOL/L
POC BE: 19 MMOL/L
POC IONIZED CALCIUM: 1.14 MMOL/L (ref 1.06–1.42)
POC SATURATED O2: 45 % (ref 95–100)
POC SATURATED O2: 50 % (ref 95–100)
POC SATURATED O2: 93 % (ref 95–100)
POC TCO2: 40 MMOL/L (ref 23–27)
POC TCO2: 46 MMOL/L (ref 24–29)
POC TCO2: 48 MMOL/L (ref 24–29)
POCT GLUCOSE: 112 MG/DL (ref 70–110)
POCT GLUCOSE: 114 MG/DL (ref 70–110)
POCT GLUCOSE: 137 MG/DL (ref 70–110)
POCT GLUCOSE: 186 MG/DL (ref 70–110)
POTASSIUM BLD-SCNC: 4.2 MMOL/L (ref 3.5–5.1)
POTASSIUM SERPL-SCNC: 4.1 MMOL/L (ref 3.5–5.1)
PROT SERPL-MCNC: 6.5 G/DL (ref 6–8.4)
PROTHROMBIN TIME: 12.3 SEC (ref 9–12.5)
RBC # BLD AUTO: 3.45 M/UL (ref 4.6–6.2)
RBC # BLD AUTO: 3.69 M/UL (ref 4.6–6.2)
SAMPLE: ABNORMAL
SITE: ABNORMAL
SODIUM BLD-SCNC: 140 MMOL/L (ref 136–145)
SODIUM SERPL-SCNC: 141 MMOL/L (ref 136–145)
SP02: 94
SP02: 96
T4 FREE SERPL-MCNC: 0.83 NG/DL (ref 0.71–1.51)
TOXICOLOGY INFORMATION: NORMAL
TSH SERPL DL<=0.005 MIU/L-ACNC: 8.99 UIU/ML (ref 0.4–4)
WBC # BLD AUTO: 4.38 K/UL (ref 3.9–12.7)
WBC # BLD AUTO: 5.14 K/UL (ref 3.9–12.7)

## 2024-10-29 PROCEDURE — 84295 ASSAY OF SERUM SODIUM: CPT

## 2024-10-29 PROCEDURE — 99900035 HC TECH TIME PER 15 MIN (STAT)

## 2024-10-29 PROCEDURE — 25000242 PHARM REV CODE 250 ALT 637 W/ HCPCS

## 2024-10-29 PROCEDURE — 85014 HEMATOCRIT: CPT

## 2024-10-29 PROCEDURE — 63600175 PHARM REV CODE 636 W HCPCS

## 2024-10-29 PROCEDURE — 83735 ASSAY OF MAGNESIUM: CPT | Performed by: HOSPITALIST

## 2024-10-29 PROCEDURE — 99292 CRITICAL CARE ADDL 30 MIN: CPT | Mod: 25,GC,, | Performed by: INTERNAL MEDICINE

## 2024-10-29 PROCEDURE — 63600175 PHARM REV CODE 636 W HCPCS: Performed by: HOSPITALIST

## 2024-10-29 PROCEDURE — 94640 AIRWAY INHALATION TREATMENT: CPT | Mod: XB

## 2024-10-29 PROCEDURE — 85025 COMPLETE CBC W/AUTO DIFF WBC: CPT | Mod: 91

## 2024-10-29 PROCEDURE — 84439 ASSAY OF FREE THYROXINE: CPT

## 2024-10-29 PROCEDURE — 82330 ASSAY OF CALCIUM: CPT

## 2024-10-29 PROCEDURE — 80053 COMPREHEN METABOLIC PANEL: CPT | Performed by: HOSPITALIST

## 2024-10-29 PROCEDURE — 85730 THROMBOPLASTIN TIME PARTIAL: CPT

## 2024-10-29 PROCEDURE — 25000003 PHARM REV CODE 250: Performed by: HOSPITALIST

## 2024-10-29 PROCEDURE — 20000000 HC ICU ROOM

## 2024-10-29 PROCEDURE — 85025 COMPLETE CBC W/AUTO DIFF WBC: CPT | Performed by: HOSPITALIST

## 2024-10-29 PROCEDURE — 25000003 PHARM REV CODE 250

## 2024-10-29 PROCEDURE — 84443 ASSAY THYROID STIM HORMONE: CPT

## 2024-10-29 PROCEDURE — 27000190 HC CPAP FULL FACE MASK W/VALVE

## 2024-10-29 PROCEDURE — 85610 PROTHROMBIN TIME: CPT

## 2024-10-29 PROCEDURE — 82803 BLOOD GASES ANY COMBINATION: CPT

## 2024-10-29 PROCEDURE — 84132 ASSAY OF SERUM POTASSIUM: CPT

## 2024-10-29 PROCEDURE — 94761 N-INVAS EAR/PLS OXIMETRY MLT: CPT | Mod: XB

## 2024-10-29 PROCEDURE — 02H633Z INSERTION OF INFUSION DEVICE INTO RIGHT ATRIUM, PERCUTANEOUS APPROACH: ICD-10-PCS | Performed by: RADIOLOGY

## 2024-10-29 PROCEDURE — 36600 WITHDRAWAL OF ARTERIAL BLOOD: CPT

## 2024-10-29 PROCEDURE — 80307 DRUG TEST PRSMV CHEM ANLYZR: CPT

## 2024-10-29 PROCEDURE — 94660 CPAP INITIATION&MGMT: CPT

## 2024-10-29 PROCEDURE — 36415 COLL VENOUS BLD VENIPUNCTURE: CPT | Performed by: HOSPITALIST

## 2024-10-29 PROCEDURE — 27100171 HC OXYGEN HIGH FLOW UP TO 24 HOURS

## 2024-10-29 PROCEDURE — 5A1223Z PERFORMANCE OF CARDIAC PACING, CONTINUOUS: ICD-10-PCS | Performed by: RADIOLOGY

## 2024-10-29 PROCEDURE — 99291 CRITICAL CARE FIRST HOUR: CPT | Mod: 25,GC,, | Performed by: INTERNAL MEDICINE

## 2024-10-29 PROCEDURE — 99223 1ST HOSP IP/OBS HIGH 75: CPT | Mod: ,,, | Performed by: INTERNAL MEDICINE

## 2024-10-29 RX ORDER — FENTANYL CITRATE 50 UG/ML
12.5 INJECTION, SOLUTION INTRAMUSCULAR; INTRAVENOUS ONCE
Status: DISCONTINUED | OUTPATIENT
Start: 2024-10-29 | End: 2024-10-29

## 2024-10-29 RX ORDER — WARFARIN 2.5 MG/1
2.5 TABLET ORAL DAILY
Status: DISCONTINUED | OUTPATIENT
Start: 2024-10-30 | End: 2024-10-31

## 2024-10-29 RX ORDER — HEPARIN SODIUM,PORCINE/D5W 25000/250
0-40 INTRAVENOUS SOLUTION INTRAVENOUS CONTINUOUS
Status: DISCONTINUED | OUTPATIENT
Start: 2024-10-29 | End: 2024-11-04

## 2024-10-29 RX ORDER — WARFARIN 2.5 MG/1
2.5 TABLET ORAL ONCE
Status: COMPLETED | OUTPATIENT
Start: 2024-10-30 | End: 2024-10-30

## 2024-10-29 RX ORDER — MIDAZOLAM HYDROCHLORIDE 1 MG/ML
0.5 INJECTION, SOLUTION INTRAMUSCULAR; INTRAVENOUS ONCE
Status: COMPLETED | OUTPATIENT
Start: 2024-10-29 | End: 2024-10-29

## 2024-10-29 RX ORDER — IPRATROPIUM BROMIDE AND ALBUTEROL SULFATE 2.5; .5 MG/3ML; MG/3ML
3 SOLUTION RESPIRATORY (INHALATION) ONCE
Status: COMPLETED | OUTPATIENT
Start: 2024-10-29 | End: 2024-10-29

## 2024-10-29 RX ORDER — MIDAZOLAM HYDROCHLORIDE 1 MG/ML
INJECTION, SOLUTION INTRAMUSCULAR; INTRAVENOUS
Status: COMPLETED
Start: 2024-10-29 | End: 2024-10-29

## 2024-10-29 RX ORDER — LIDOCAINE HYDROCHLORIDE 10 MG/ML
INJECTION, SOLUTION EPIDURAL; INFILTRATION; INTRACAUDAL; PERINEURAL
Status: COMPLETED
Start: 2024-10-29 | End: 2024-10-29

## 2024-10-29 RX ORDER — FENTANYL CITRATE 50 UG/ML
INJECTION, SOLUTION INTRAMUSCULAR; INTRAVENOUS
Status: COMPLETED
Start: 2024-10-29 | End: 2024-10-29

## 2024-10-29 RX ORDER — FENTANYL CITRATE 50 UG/ML
12.5 INJECTION, SOLUTION INTRAMUSCULAR; INTRAVENOUS ONCE
Status: COMPLETED | OUTPATIENT
Start: 2024-10-29 | End: 2024-10-29

## 2024-10-29 RX ORDER — FENTANYL CITRATE 50 UG/ML
25 INJECTION, SOLUTION INTRAMUSCULAR; INTRAVENOUS ONCE AS NEEDED
Status: COMPLETED | OUTPATIENT
Start: 2024-10-29 | End: 2024-10-29

## 2024-10-29 RX ORDER — FUROSEMIDE 10 MG/ML
60 INJECTION INTRAMUSCULAR; INTRAVENOUS 2 TIMES DAILY
Status: DISCONTINUED | OUTPATIENT
Start: 2024-10-29 | End: 2024-10-30

## 2024-10-29 RX ADMIN — IPRATROPIUM BROMIDE AND ALBUTEROL SULFATE 3 ML: 2.5; .5 SOLUTION RESPIRATORY (INHALATION) at 06:10

## 2024-10-29 RX ADMIN — FENTANYL CITRATE 12.5 MCG: 50 INJECTION, SOLUTION INTRAMUSCULAR; INTRAVENOUS at 06:10

## 2024-10-29 RX ADMIN — FENTANYL CITRATE 25 MCG: 50 INJECTION INTRAMUSCULAR; INTRAVENOUS at 03:10

## 2024-10-29 RX ADMIN — MIDAZOLAM 0.5 MG: 1 INJECTION INTRAMUSCULAR; INTRAVENOUS at 03:10

## 2024-10-29 RX ADMIN — FENTANYL CITRATE 25 MCG: 50 INJECTION, SOLUTION INTRAMUSCULAR; INTRAVENOUS at 03:10

## 2024-10-29 RX ADMIN — LIDOCAINE HYDROCHLORIDE: 10 SOLUTION INTRAVENOUS at 03:10

## 2024-10-29 RX ADMIN — FUROSEMIDE 60 MG: 10 INJECTION, SOLUTION INTRAVENOUS at 05:10

## 2024-10-29 RX ADMIN — THIAMINE HYDROCHLORIDE 100 MG: 100 INJECTION, SOLUTION INTRAMUSCULAR; INTRAVENOUS at 11:10

## 2024-10-29 RX ADMIN — FENTANYL CITRATE 12.5 MCG: 50 INJECTION INTRAMUSCULAR; INTRAVENOUS at 06:10

## 2024-10-29 RX ADMIN — MIDAZOLAM HYDROCHLORIDE 0.5 MG: 1 INJECTION, SOLUTION INTRAMUSCULAR; INTRAVENOUS at 03:10

## 2024-10-29 RX ADMIN — HEPARIN SODIUM 18 UNITS/KG/HR: 10000 INJECTION, SOLUTION INTRAVENOUS at 06:10

## 2024-10-29 NOTE — RESPIRATORY THERAPY
RAPID RESPONSE RESPIRATORY THERAPY NOTE             Code Status: Full Code   : 1945  Bed: 7085/7085 A:   MRN: [id number]  Time page Received: 0600  Time Rapid Response RT at Bedside: 0648  Time Rapid Response RT left Bedside: 0655    SITUATION    Evaluated patient for: Respiratory    BACKGROUND    Why is the patient in the hospital?: Acute on chronic respiratory failure with hypoxia    Patient has a past medical history of Atrial fibrillation, unspecified type, COPD exacerbation, and Thyroid disease.    24 Hours Vitals Range:  Temp:  [97.2 °F (36.2 °C)-99.8 °F (37.7 °C)]   Pulse:  [55-87]   Resp:  [16-35]   BP: (123-189)/(56-87)   SpO2:  [92 %-100 %]     Labs:    Recent Labs     10/28/24  1639 10/29/24  0256    141   K 4.4 4.1    95   CO2 32* 35*   BUN 10 12   CREATININE 0.9 0.9   * 116*   MG 2.3 2.2        Recent Labs     10/29/24  0600   PH 7.323*   PCO2 72.7*   PO2 74*   HCO3 37.7*   POCSATURATED 93   BE 12*       ASSESSMENT/INTERVENTIONS  Pt placed on Bipap    Last Vitals: Temp: 98.8 °F (37.1 °C) (10/29 0700)  Pulse: 83 (10/29 0700)  Resp: 30 (10/29 0700)  BP: 123/56 (10/29 0700)  SpO2: 94 % (10/29 0700)  Level of Consciousness: Level of Consciousness (AVPU): alert  Respiratory Effort: Respiratory Effort: Labored  Expansion/Accessory Muscle Usage: Expansion/Accessory Muscles/Retractions: no use of accessory muscles, no retractions, expansion symmetric  All Lung Field Breath Sounds: All Lung Fields Breath Sounds: Anterior:, Lateral:, diminished  FRANCISCO Breath Sounds: diminished  LLL Breath Sounds: diminished  RUL Breath Sounds: diminished  RML Breath Sounds: diminished  RLL Breath Sounds: diminished  O2 Device/Concentration: Bipap 50%  NIPPV: Yes, Type: Bipap Settings: /  Surgical airway: No Vent: No  ETCO2 monitored:    Ambu at bedside:      Active Orders   Respiratory Care    Bipap Continuous     Frequency: Continuous     Number of Occurrences: Until Specified     Order Questions:       Mode BIPAP      FiO2% 50      Inspiratory pressure: 12      Expiratory pressure: 6    CPAP QHS     Frequency: QHS     Number of Occurrences: Until Specified    Inhalation Treatment Once     Frequency: Once     Number of Occurrences: 1 Occurrences    POCT Venous Blood Gas     Frequency: PRN     Number of Occurrences: Until Specified     Order Comments: This test should be used for VBGs.  If using this order for other tests (K, creatinine, HCT, PT/INR, lactate etc)  ONLY do so in the case of an emergency or rapid response.Notify Physician if: see parameters below.         Order Questions:      Component: Lactate    Pulse Oximetry Q Shift     Frequency: Q Shift     Number of Occurrences: Until Specified       RECOMMENDATIONS  ?  We recommend: RRT Recs: pt to ICU    ESCALATION        FOLLOW-UP    Disposition: Tx in ICU bed 7061.    Please call back the Rapid Response RTImtiaz RRT at x [phone] for any questions or concerns.

## 2024-10-29 NOTE — ASSESSMENT & PLAN NOTE
-Pt. Recently admitted for hypotension, BP medications stopped. He is now only on metoprolol. Continue metoprolol, consider restarting entresto or aldactone if BP tolerates

## 2024-10-29 NOTE — ASSESSMENT & PLAN NOTE
Consulted for acute encephalopathy  Baseline unclear  CTH withot acute intracranial process  VBG mildly acidotic with hypercapnia above baseline    -continue rescue bipap  -f/u echo  -TSH markedly elevated from baseline, f/u free t4  -holding off on infectious workup as patient vitally and clinically not consistent with infection

## 2024-10-29 NOTE — ASSESSMENT & PLAN NOTE
-Continue home amiodarone and metoprolol for rate control. Continue eliquis for AC  -Monitor on tele while admitted

## 2024-10-29 NOTE — ASSESSMENT & PLAN NOTE
Mr. 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     Recommendations:   - TVP placed. Threshold 0.3. Will check threshold daily.   - Avoid AV brady blocking agents.   - Will discuss with primary team regarding anticoagulation options in the setting of PE. Post pacemaker patient will need to be off heparin products for 5 days.   - NPO at midnight for possible pacemaker insertion tomorrow.   - For full recommendations please see staff attestation.

## 2024-10-29 NOTE — CODE/ RAPID DOCUMENTATION
RAPID RESPONSE NURSE NOTE        Admit Date: 10/28/2024  LOS: 0  Code Status: Full Code   Date of Consult: 10/29/2024  : 1945  Age: 79 y.o.  Weight:   Wt Readings from Last 1 Encounters:   10/29/24 102.9 kg (226 lb 13.7 oz)     Sex: male  Race: White   [facility]  [facility]N: [id number]  Time Rapid Response Team page Received: 0600  Time Rapid Response Team at Bedside: 06  Time Rapid Response Team left Bedside: 0655  Was the patient discharged from an ICU this admission? Yes   Was the patient discharged from a PACU within last 24 hours? No   Did the patient receive conscious sedation/general anesthesia in last 24 hours? No  Was the patient in the ED within the past 24 hours? Yes  Was the patient on NIPPV within the past 24 hours? Yes   Did this progress into an ARC or CPA: No  Attending Physician: Bruno Mahan*  Primary Service: [facility]        SITUATION    Notified by charge RN via phone call.  Reason for alert: respiratory distress  Called to evaluate the patient for Respiratory.    BACKGROUND     Why is the patient in the hospital?: Acute on chronic respiratory failure with hypoxia    Patient has a past medical history of Atrial fibrillation, unspecified type, COPD exacerbation, and Thyroid disease.    Last Vitals:  Temp: 99.8 °F (37.7 °C) (10/29 0603)  Pulse: 81 (10/29 0624)  Resp: 29 (10/29 0624)  BP: 143/70 (10/29 0624)  SpO2: 96 % (10/29 0624)    24 Hours Vitals Range:  Temp:  [97.2 °F (36.2 °C)-99.8 °F (37.7 °C)]   Pulse:  [55-87]   Resp:  [16-35]   BP: (132-189)/(63-87)   SpO2:  [92 %-100 %]     Labs:  Recent Labs     10/28/24  1639 10/29/24  025   WBC 4.76 5.14   HGB 11.9* 12.3*   HCT 37.3* 39.2*    188       Recent Labs     10/28/24  1639 10/29/24  0256    141   K 4.4 4.1    95   CO2 32* 35*   BUN 10 12   CREATININE 0.9 0.9   * 116*   MG 2.3 2.2        Recent Labs     10/29/24  0600   PH 7.323*   PCO2 72.7*   PO2 74*   HCO3 37.7*   POCSATURATED 93   BE 12*         ASSESSMENT     Physical Exam  Constitutional:       General: He is in acute distress.      Appearance: He is ill-appearing.   Cardiovascular:      Rate and Rhythm: Normal rate. Rhythm irregular.   Pulmonary:      Effort: Tachypnea, accessory muscle usage and respiratory distress present.      Breath sounds: Wheezing and rhonchi present.   Musculoskeletal:      Right lower leg: Edema present.      Left lower leg: Edema present.   Skin:     General: Skin is warm and dry.      Coloration: Skin is pale.   Neurological:      General: No focal deficit present.      Mental Status: He is lethargic and disoriented.      GCS: GCS eye subscore is 3. GCS verbal subscore is 3. GCS motor subscore is 5.       HR 87  /87 (125)  RR 35, SpO2 95% on 50% BiPAP 12/6  Temp 99.8 axillary      Called by charge RN for hypoxia down to 70% and respiratory distress    Upon arrival to room patient already placed on BiPAP 12/6 50%. Notable accessory muscle usage with audible wheezing and ronchi.   Per bedside RN patient has been increasingly confused throughout the night, refused CPAP despite multiple attempts at education. Pt more confused at 0545, pulled out IV and pulled off telemetry, SpO2 checked at that time and found to be 70%    INTERVENTIONS    The patient was seen for Respiratory problem. Staff concerns included tachypnea, new onset of difficulty breathing, oxygen saturation < 90% despite supplemental oxygen, increased WOB, and increased oxygen requirements. The following interventions were performed: Bipap, POCT arterial blood gas , portable chest x-ray, albuterol-ipratropium (DUO-NEB) 0.5-3 mg/ 3 ml nebulizer for wheezing, continuous pulse ox monitoring, continuous cardiac monitoring, and consult to critical care medicine.    Arterial Blood Gas result:  pO2 74; pCO2 72.7; pH 7.323;  HCO3 37.7, %O2 Sat 93.     CXR to bedside  Duo-neb given in-line while BiPAP in place    AM dose 60mg IVP lasix given early as per   Zaynab    Patient WOB improved while RRN at bedside, however mentation still poor pt remains lethargic and difficult to arouse    Dr Worthy to bedside to evaluate pt, decision made to consult CCM for possible continuous BiPAP    Dr Livingston and ANA Jim, NP with CCM to bedside, will transfer pt to MICU for higher level of care    RECOMMENDATIONS    We recommend: further care per CCM    PROVIDER ESCALATION    Orders received and case discussed with Dr. Worthy with  and Dr Livingston with [facility] .    Primary team arrival time: 0620    Disposition: Tx in ICU bed 7085.  Pt transported to MICU on continuous BiPAP with RRN x2, RT, CSU RN    FOLLOW UP    MICU Bedside Miah YIN  updated on plan of care, given bedside handoff. Instructed to call the Rapid Response NurseJENNY, RN at [phone] for additional questions or concerns.

## 2024-10-29 NOTE — PROGRESS NOTES
"Pt getting increasingly confused as night continues. He pointed to the TV screen asking why the ppl from the show was in his room, he continuously pointed to the window asking me to get his "grabber" for him. Pt reoriented to place and situation. MD notified stated pt has history of sundowning. Pt currently asleep in bed. Pt is currently on list to receive tele sitter monitoring when one becomes available.   "

## 2024-10-29 NOTE — SUBJECTIVE & OBJECTIVE
Past Medical History:   Diagnosis Date    Atrial fibrillation, unspecified type 09/06/2023    COPD exacerbation 09/06/2023    Thyroid disease     hypo       Past Surgical History:   Procedure Laterality Date    COLONOSCOPY  01/01/2011    CORONARY ANGIOGRAPHY N/A 07/22/2021    Procedure: ANGIOGRAM, CORONARY ARTERY;  Surgeon: Hank Barry MD;  Location: [facility] CATH LAB/EP;  Service: Cardiology;  Laterality: N/A;    EYE SURGERY Bilateral     cataracts extraction    FRACTURE SURGERY Right 09/2021    femur    HERNIA REPAIR      HIP SURGERY Right 08/2021    INTRAMEDULLARY RODDING OF TROCHANTER OF FEMUR Right 09/03/2021    Procedure: INSERTION, INTRAMEDULLARY RACQUEL, FEMUR, TROCHANTER;  Surgeon: Darryn Hall MD;  Location: [facility] OR;  Service: Orthopedics;  Laterality: Right;    JOINT REPLACEMENT Bilateral     knee    LEFT HEART CATHETERIZATION Right 07/22/2021    Procedure: Left heart cath;  Surgeon: Hank Barry MD;  Location: [facility] CATH LAB/EP;  Service: Cardiology;  Laterality: Right;    OPEN REDUCTION AND INTERNAL FIXATION (ORIF) OF INJURY OF HIP Right 9/20/2024    Procedure: ORIF,PELVIS;  Surgeon: Negrito Stapleton MD;  [address];  Service: Orthopedics;  Laterality: Right;  +local    TRANSESOPHAGEAL ECHOCARDIOGRAPHY N/A 9/19/2024    Procedure: ECHOCARDIOGRAM, TRANSESOPHAGEAL;  Surgeon: Leonora Butt MD;  Location: [facility] EP LAB;  Service: Cardiology;  Laterality: N/A;    TREATMENT OF CARDIAC ARRHYTHMIA N/A 9/19/2024    Procedure: Cardioversion or Defibrillation;  Surgeon: ANA Steiner MD;  Location: [facility] EP LAB;  Service: Cardiology;  Laterality: N/A;  AF, DEMETRICE/DCCV, ANES, GP, 524    VASECTOMY         Review of patient's allergies indicates:  No Known Allergies    No current facility-administered medications on file prior to encounter.     Current Outpatient Medications on File Prior to Encounter   Medication Sig    acetaminophen (TYLENOL) 500 MG tablet Take 2 tablets (1,000 mg  total) by mouth every 8 (eight) hours.    amiodarone (PACERONE) 200 MG Tab Take 2 tablets (400 mg total) by mouth 2 (two) times daily for 4 days, THEN 1 tablet (200 mg total) once daily.    ammonium lactate (LAC-HYDRIN) 12 % lotion Apply topically 2 (two) times daily as needed for Dry Skin.    apixaban (ELIQUIS) 5 mg Tab Take 2 tablets (10 mg total) by mouth 2 (two) times daily for 4 days, THEN 1 tablet (5 mg total) 2 (two) times daily.    aspirin (ECOTRIN) 81 MG EC tablet Take 1 tablet (81 mg total) by mouth once daily.    atorvastatin (LIPITOR) 10 MG tablet Take 1 tablet by mouth once daily    empagliflozin (JARDIANCE) 10 mg tablet Take 1 tablet (10 mg total) by mouth once daily.    furosemide (LASIX) 40 MG tablet Take 1 tablet (40 mg total) by mouth 2 (two) times daily as needed (weight gain of 3 to 5 lbs).    levothyroxine (SYNTHROID) 200 MCG tablet Take 1 tablet by mouth once daily    melatonin (MELATIN) 3 mg tablet Take 2 tablets (6 mg total) by mouth nightly as needed for Insomnia.    methocarbamoL (ROBAXIN) 750 MG Tab Take 1 tablet (750 mg total) by mouth 4 (four) times daily.    metoprolol succinate (TOPROL-XL) 25 MG 24 hr tablet Take 0.5 tablets (12.5 mg total) by mouth once daily. Hold until Cardiology follow up.    mirtazapine (REMERON) 7.5 MG Tab Take 1 tablet (7.5 mg total) by mouth nightly. One tablet po each night for sleep    multivit-min-FA-lycopen-lutein (CENTRUM SILVER) 0.4-300-250 mg-mcg-mcg Tab Take 1 tablet by mouth once daily.    oxyCODONE (ROXICODONE) 5 MG immediate release tablet Take 1 tablet (5 mg total) by mouth every 4 (four) hours as needed for Pain.    senna-docusate 8.6-50 mg (PERICOLACE) 8.6-50 mg per tablet Take 1 tablet by mouth once daily.    tamsulosin (FLOMAX) 0.4 mg Cap Take 1 capsule (0.4 mg total) by mouth once daily.     Family History       Problem Relation (Age of Onset)    Crohn's disease Mother    Dementia Mother    Heart attack Father    No Known Problems Sister,  Brother, Son          Tobacco Use    Smoking status: Former     Current packs/day: 0.00     Average packs/day: 1 pack/day for 35.0 years (35.0 ttl pk-yrs)     Types: Cigarettes     Start date: 1965     Quit date: 2000     Years since quittin.8     Passive exposure: Past    Smokeless tobacco: Never   Substance and Sexual Activity    Alcohol use: No    Drug use: Never    Sexual activity: Not Currently     ROSUnable to obtain due to sedation  Objective:     Vital Signs (Most Recent):  Temp: 98.5 °F (36.9 °C) (10/29/24 1600)  Pulse: 71 (10/29/24 1800)  Resp: (!) 24 (10/29/24 1802)  BP: (!) 158/66 (10/29/24 1800)  SpO2: 95 % (10/29/24 1800) Vital Signs (24h Range):  Temp:  [97.2 °F (36.2 °C)-99.8 °F (37.7 °C)] 98.5 °F (36.9 °C)  Pulse:  [50-87] 71  Resp:  [16-40] 24  SpO2:  [92 %-100 %] 95 %  BP: ()/(50-87) 158/66       Weight: 102.9 kg (226 lb 13.7 oz)  Body mass index is 31.64 kg/m².    SpO2: 95 %        Physical Exam  Constitutional:       General: He is sleeping. He is not in acute distress.  Neck:      Vascular: No hepatojugular reflux or JVD.   Cardiovascular:      Rate and Rhythm: Bradycardia present. Rhythm irregular.   Pulmonary:      Effort: Pulmonary effort is normal.      Breath sounds: Normal breath sounds.   Abdominal:      General: Bowel sounds are normal.      Palpations: Abdomen is soft.   Skin:     General: Skin is warm.   Neurological:      Mental Status: He is unresponsive.            Significant Labs: None    Significant Imaging: Echocardiogram: Transthoracic echo (TTE) complete (Cupid Only):   Results for orders placed or performed during the hospital encounter of 24   Echo   Result Value Ref Range    LV Diastolic Volume 169.17 mL    Echo EF Estimated 49 %    LV Systolic Volume 87.08 mL    IVS 0.9 0.6 - 1.1 cm    LVIDd 5.8 3.5 - 6.0 cm    LVIDs 4.4 (A) 2.1 - 4.0 cm    LVOT diameter 2.0 cm    PW 1.1 0.6 - 1.1 cm    IVRT 83.73 ms    AV LVOT peak gradient 3 mmHg    LVOT mn grad  "2 mmHg    LVOT peak hari 0.9 m/s    LVOT peak VTI 17.8 cm    RV- wilson basal diam 4.8 cm    RV S' 17.12 cm/s    LA size 5.95 cm    Left Atrium Major Axis 8.17 cm    Left Atrium Minor Axis 8.10 cm    LA Vol (MOD) 157.78 mL    RA Major Axis 5.73 cm    RA Area 22.2 cm2    AV valve area 1.6 cm2    AV area by cont VTI 1.7 cm2    AV peak gradient 13.0 mmHg    AV mean gradient 6.4 mmHg    Ao peak hari 1.8 m/s    Ao VTI 34.2 cm    MV Peak A Hari 0.42 m/s    E wave deceleration time 374.42 ms    MV Peak E Hari 0.50 m/s    E/A ratio 1.19     LV LATERAL E/E' RATIO 6.25     LV SEPTAL E/E' RATIO 6.25     TDI LATERAL 0.08 m/s    TDI SEPTAL 0.08 m/s    MV valve area by planimetry 2.83 cm2    TV peak gradient 43 mmHg    TR Max Hari 3.28 m/s    STJ 2.81 cm    P vein A 0.2 m/s    MV "A" wave duration 76.12 ms    Pulm vein "A" wave 76.12 ms    PV Peak D Hari 0.39 m/s    PV Peak S Hari 0.29 m/s    IVC diameter 1.89 cm    Sinus 3.52 cm    RA Width 4.88 cm    TAPSE 1.64 cm    LA WIDTH 5.86 cm    BSA 2.3 m2    LVOT stroke volume 55.9 cm3    LV Systolic Volume Index 38.7 mL/m2    LV Diastolic Volume Index 75.19 mL/m2    LVOT area 3.1 cm2    FS 24.1 (A) 28 - 44 %    Left Ventricle Relative Wall Thickness 0.38 cm    LV mass 233.1 g    LV Mass Index 103.6 g/m2    E/E' ratio 6.25 m/s    REGINALD 107.2 mL/m2    LA Vol 241.09 cm3    Pulm vein S/D ratio 0.74     RV/LV Ratio 0.83 cm    REGINALD (MOD) 70.1 mL/m2    AV Velocity Ratio 0.50     AV index (prosthetic) 0.52     BRAYAN by Velocity Ratio 1.6 cm²    Triscuspid Valve Regurgitation Peak Gradient 43 mmHg    Mean e' 0.08 m/s    ZLVIDS -0.91     ZLVIDD -3.40     EF 55 %    TV resting pulmonary artery pressure 46 mmHg    RV TB RVSP 6 mmHg    Est. RA pres 3 mmHg    Narrative      Left Ventricle: The left ventricle is mildly dilated. Ventricular mass   is normal. Mildly increased wall thickness. Regional wall motion   abnormalities present. See diagram for wall motion findings. Septal motion   is abnormal. There is " normal systolic function with a visually estimated   ejection fraction of 55 - 60%. Ejection fraction is approximately 55%.   Grade II diastolic dysfunction.    Right Ventricle: Normal right ventricular cavity size. Wall thickness   is normal. Systolic function is normal.    Left Atrium: Left atrium is severely dilated.    Right Atrium: Right atrium is mildly dilated.    Aortic Valve: There is moderate aortic valve sclerosis.    Mitral Valve: There is mild to moderate regurgitation with multiple   jets.    Pulmonary Artery: There is mild to moderate pulmonary hypertension. The   estimated pulmonary artery systolic pressure is 46 mmHg.    IVC/SVC: Normal venous pressure at 3 mmHg.

## 2024-10-29 NOTE — ASSESSMENT & PLAN NOTE
Patient with Hypoxic Respiratory failure which is Acute on chronic.  he is on home oxygen at 2 LPM. Supplemental oxygen was provided and noted- Oxygen Concentration (%):  [50] 50. Signs/symptoms of respiratory failure include- increased work of breathing. Contributing diagnoses includes - CHF Labs and images were reviewed. Patient Has not had a recent ABG. Will treat underlying causes and adjust management of respiratory failure as follows-     hypoxia requiring 5L NC, baseline 3L NC. CXR reveals Pulmonary findings suggest edema.  and pt. With edema, suspect HF Exacerbation, diurese and wean O2 as tolerated     -Continue BiPAP  -Lasix 60mg BID

## 2024-10-29 NOTE — ASSESSMENT & PLAN NOTE
Pt. Recently admitted for hypotension, BP medications stopped.   He is now only on metoprolol.     -Continue metoprolol, consider restarting entresto or aldactone if BP tolerates

## 2024-10-29 NOTE — PLAN OF CARE
MICU DAILY GOALS     Family/Goals of care/Code Status   Code Status: Full Code    24H Vital Sign Range  Temp:  [97.2 °F (36.2 °C)-99.8 °F (37.7 °C)]   Pulse:  [50-87]   Resp:  [16-40]   BP: ()/(50-87)   SpO2:  [92 %-100 %]      Shift Events (include procedures and significant events)   Pt RR at shift change report received from CSU bedside nurse. Pt placed on cont BiPAP for increased O2 demands and O2 requirements, also Pts mentation. Pt became more lethargic throughout shift. Pt also, was jaya while sleeping around high 40s low 50s. Throughout shift Pt started to become more Jaya while sleeping; in the 30s. Team called EKG ran; Pt in complete heart block. Transcutaneous pacing started at bedside; Cardiology consulted EP at bedside to place TVP. TVP placed; Pt to get pacemaker tomorrow. CT head negative. Heparin gtt started.     AWAKE RASS: Goal -    Actual -      Restraint necessity: Not necessary   BREATHE SBT: Not intubated    Coordinate A & B, analgesics/sedatives Pain: managed   SAT: Not intubated   Delirium CAM-ICU: Overall CAM-ICU: Positive   Early(intubated/ Progressive (non-intubated) Mobility MOVE Screen (INTUBATED ONLY): Not intubated    Activity: Activity Management: Rolling - L1   Feeding/Nutrition Diet order: Diet/Nutrition Received: 2 gram sodium,     Thrombus DVT prophylaxis: VTE Core Measure: Pharmacological prophylaxis initiated/maintained   HOB Elevation Head of Bed (HOB) Positioning: HOB at 30-45 degrees   Ulcer Prophylaxis GI: yes   Glucose control managed     Skin Skin assessment:     Sacrum intact/not altered? No  Heels intact/not altered? No  Surgical wound? No    CHECK ONE!   (no altered skin or altered skin) and sub boxes:  [] No Altered Skin Integrity Present    []Prevention Measures Documented    [x] Altered Skin Integrity Present or Discovered   [x] LDA present in EPIC, daily doc completed              [] LDA added if not in EPIC (describe wound).                    When  describing wound, do not stage, use descriptive words only.    [] Wound Image Taken (required on admit,                   transfer/discharge and every Tuesday)    Wound Care Consulted? Yes   Bowel Function no issues    Indwelling Catheter Necessity      Urethral Catheter-Reason for Continuing Urinary Catheterization: Chronic Indwelling Urinary Catheter on Admission       Chronic harrison   De-escalation Antibiotics NA        VS and assessment per flow sheet, patient progressing towards goals as tolerated, plan of care reviewed with  Pt and  son (Efren) , all concerns addressed, will continue to monitor.

## 2024-10-29 NOTE — SUBJECTIVE & OBJECTIVE
Past Medical History:   Diagnosis Date    Atrial fibrillation, unspecified type 09/06/2023    COPD exacerbation 09/06/2023    Thyroid disease     hypo       Past Surgical History:   Procedure Laterality Date    COLONOSCOPY  01/01/2011    CORONARY ANGIOGRAPHY N/A 07/22/2021    Procedure: ANGIOGRAM, CORONARY ARTERY;  Surgeon: Hank Barry MD;  Location: [facility] CATH LAB/EP;  Service: Cardiology;  Laterality: N/A;    EYE SURGERY Bilateral     cataracts extraction    FRACTURE SURGERY Right 09/2021    femur    HERNIA REPAIR      HIP SURGERY Right 08/2021    INTRAMEDULLARY RODDING OF TROCHANTER OF FEMUR Right 09/03/2021    Procedure: INSERTION, INTRAMEDULLARY RACQUEL, FEMUR, TROCHANTER;  Surgeon: Darryn Hall MD;  Location: [facility] OR;  Service: Orthopedics;  Laterality: Right;    JOINT REPLACEMENT Bilateral     knee    LEFT HEART CATHETERIZATION Right 07/22/2021    Procedure: Left heart cath;  Surgeon: Hank Barry MD;  Location: [facility] CATH LAB/EP;  Service: Cardiology;  Laterality: Right;    OPEN REDUCTION AND INTERNAL FIXATION (ORIF) OF INJURY OF HIP Right 9/20/2024    Procedure: ORIF,PELVIS;  Surgeon: Negrito Stapleton MD;  [address];  Service: Orthopedics;  Laterality: Right;  +local    TRANSESOPHAGEAL ECHOCARDIOGRAPHY N/A 9/19/2024    Procedure: ECHOCARDIOGRAM, TRANSESOPHAGEAL;  Surgeon: Leonora Butt MD;  Location: [facility] EP LAB;  Service: Cardiology;  Laterality: N/A;    TREATMENT OF CARDIAC ARRHYTHMIA N/A 9/19/2024    Procedure: Cardioversion or Defibrillation;  Surgeon: ANA Steiner MD;  Location: [facility] EP LAB;  Service: Cardiology;  Laterality: N/A;  AF, DEMETRICE/DCCV, ANES, GP, 524    VASECTOMY         Review of patient's allergies indicates:  No Known Allergies    Family History       Problem Relation (Age of Onset)    Crohn's disease Mother    Dementia Mother    Heart attack Father    No Known Problems Sister, Brother, Son          Tobacco Use    Smoking status:  Former     Current packs/day: 0.00     Average packs/day: 1 pack/day for 35.0 years (35.0 ttl pk-yrs)     Types: Cigarettes     Start date: 1965     Quit date: 2000     Years since quittin.8     Passive exposure: Past    Smokeless tobacco: Never   Substance and Sexual Activity    Alcohol use: No    Drug use: Never    Sexual activity: Not Currently      Review of Systems   Unable to perform ROS: Mental status change     Objective:     Vital Signs (Most Recent):  Temp: 99.8 °F (37.7 °C) (10/29/24 06)  Pulse: 81 (10/29/24 0624)  Resp: (!) 29 (10/29/24 0624)  BP: (!) 143/70 (10/29/24 0624)  SpO2: 96 % (10/29/24 0624) Vital Signs (24h Range):  Temp:  [97.2 °F (36.2 °C)-99.8 °F (37.7 °C)] 99.8 °F (37.7 °C)  Pulse:  [55-87] 81  Resp:  [16-35] 29  SpO2:  [92 %-100 %] 96 %  BP: (132-189)/(63-87) 143/70   Weight: 102.9 kg (226 lb 13.7 oz)  Body mass index is 31.64 kg/m².      Intake/Output Summary (Last 24 hours) at 10/29/2024 0627  Last data filed at 10/29/2024 0621  Gross per 24 hour   Intake 220 ml   Output 4125 ml   Net -3905 ml          Physical Exam  Vitals and nursing note reviewed.   Constitutional:       General: He is not in acute distress.     Appearance: He is well-developed.   HENT:      Head: Normocephalic and atraumatic.   Eyes:      Extraocular Movements: Extraocular movements intact.      Pupils: Pupils are equal, round, and reactive to light.   Neck:      Vascular: No JVD.      Trachea: No tracheal deviation.   Cardiovascular:      Rate and Rhythm: Normal rate and regular rhythm.      Heart sounds:      No friction rub. No gallop.   Pulmonary:      Effort: No respiratory distress.      Breath sounds: Rales present. No wheezing.   Abdominal:      General: Bowel sounds are normal. There is no distension.      Palpations: Abdomen is soft. There is no mass.      Tenderness: There is no abdominal tenderness.   Musculoskeletal:         General: No deformity.      Cervical back: Neck supple.       Right lower leg: Edema present.      Left lower leg: Edema present.   Lymphadenopathy:      Cervical: No cervical adenopathy.   Skin:     General: Skin is warm and dry.      Findings: No rash.   Neurological:      Mental Status: He is alert and oriented to person, place, and time.          Vents:  Oxygen Concentration (%): 50 (10/29/24 0603)  Lines/Drains/Airways       Peripheral Intravenous Line  Duration                  Peripheral IV - Single Lumen 10/28/24 1738 18 G 1 1/4 in Anterior;Distal;Left Upper Arm <1 day                  Significant Labs:    CBC/Anemia Profile:  Recent Labs   Lab 10/28/24  1639 10/29/24  0256   WBC 4.76 5.14   HGB 11.9* 12.3*   HCT 37.3* 39.2*    188   * 106*   RDW 15.9* 15.9*        Chemistries:  Recent Labs   Lab 10/28/24  1639 10/29/24  0256    141   K 4.4 4.1    95   CO2 32* 35*   BUN 10 12   CREATININE 0.9 0.9   CALCIUM 8.8 9.1   ALBUMIN 3.0* 3.2*   PROT 6.2 6.5   BILITOT 1.5* 2.0*   ALKPHOS 196* 198*   ALT 21 21   AST 19 22   MG 2.3 2.2       All pertinent labs within the past 24 hours have been reviewed.    Significant Imaging: I have reviewed all pertinent imaging results/findings within the past 24 hours.

## 2024-10-29 NOTE — HPI
80 yo M with CAD, combined CHF (recovered EF), recent PE (9/2024) on eliquis, A-fib, chronic respiratory failure on 3L NC, and hypothyroidism who presents to the ED from nursing home for worsening SOB. Pt is normally on 2-3L NC at home. Was recently admitted to this facility 9/28-10/7 for hypotension and PAULA. At discharge from last hospitalization patient's lasix was PRN only. Patient is minimally interactive on my examination and history is provided by chart review. The patient was brought in from his nursing home for worsening shortness of breath, hypoxemia, and leg swelling for several days and was admitted for hypoxemic respiratory. Morning of 10/29 patient became less responsive. ABG 7.3272 (BL 50). Stepping up to MICU for acute on chronic hypercapnic respiratory failure requiring continuous bipap.

## 2024-10-29 NOTE — PROCEDURES
"Bean Salas is a 79 y.o. male patient.    Temp: 98.8 °F (37.1 °C) (10/29/24 0700)  Pulse: 80 (10/29/24 1600)  Resp: (!) 36 (10/29/24 1600)  BP: 124/68 (10/29/24 1600)  SpO2: 100 % (10/29/24 1600)  Weight: 102.9 kg (226 lb 13.7 oz) (10/29/24 0430)  Height: 5' 11" (180.3 cm) (10/28/24 2139)       Central Line    Date/Time: 10/29/2024 5:33 PM    Performed by: Vidal Jean MD  Authorized by: Vidal Jean MD    Supervisor Name:  Corry Rowell MD  Location procedure was performed:  [facility] CARDIOLOGY  Assisting Provider:  Corry Rowell MD  Consent Done ?:  Yes  Time out complete?: Verified correct patient, procedure, equipment, staff, and site/side    Indications:  Vascular access and hemodynamic monitoring  Anesthesia:  Local infiltration  Local anesthetic:  Lidocaine 1% without epinephrine  Preparation:  Skin prepped with ChloraPrep  Skin prep agent dried: Skin prep agent completely dried prior to procedure    Sterile barriers: All five maximal sterile barriers used - gloves, gown, cap, mask and large sterile sheet    Hand hygiene: Hand hygiene performed immediately prior to central venous catheter insertion    Location:  Right internal jugular  Catheter size:  6 Fr  Ultrasound guidance: Yes    Vessel Caliber:  Medium  Comprressibility:  Normal  Needle advanced into vessel with real time ultrasound guidance.    Guidewire confirmed in vessel.    Steril sheath on probe.    Sterile gel used.  Manometry: No    Securement:  Line sutured, chlorhexidine patch and sterile dressing applied  Guidewire: guidewire removed intact    XRay:  Placement verified by x-ray  Adverse Events:  None  Other Complications:  Initial TVP line lost capture so second TVP line was placed through cordis under sterile technique with good capture. Threshold 0.3. Base pacing rate set at 70 with A output at 10 and V output at 15.    Initial TVP line lost capture so second TVP line was placed through cordis under sterile " technique with good capture. Threshold 0.3. Base pacing rate set at 70 with A output at 10 and V output at 15.       10/29/2024

## 2024-10-29 NOTE — PROGRESS NOTES
Around 0530 nurse pt tele monitor off, upon nurse entering pt room pt experiencing increased WOB, labored breathing and lethargy. Pt O2 70%, non re-breather mask placed, pt O2 increased to 82% then eventually 90% with non re-breather. Charge nurse informed and RT was called pt placed on BiPaP and 60mg of lasix given. Pt still with increased WOB rapid response team paged.

## 2024-10-29 NOTE — PLAN OF CARE
"Pt maintained free from falls/trauma/injuries. Pt with intermittent confusion throughout night (see previous note). Pt refused CPAP mask d/t the "want to speak with the CPAP engineers and read the manual handbook". Pt denied pain or discomfort. Plan of care reviewed. Pt verbalized understanding. All questions and concerns addressed. VSS with call light and belongings within reach.    Problem: Adult Inpatient Plan of Care  Goal: Plan of Care Review  Outcome: Progressing  Goal: Patient-Specific Goal (Individualized)  Outcome: Progressing     Problem: Skin Injury Risk Increased  Goal: Skin Health and Integrity  Outcome: Progressing     Problem: Wound  Goal: Optimal Coping  Outcome: Progressing  Goal: Optimal Functional Ability  Outcome: Progressing     "

## 2024-10-29 NOTE — PT/OT/SLP PROGRESS
Physical Therapy      Patient Name:  Bean Salas   MRN:  [id number]    Orders discharged as pt transferred to ICU after physical therapy orders placed. Transferred to MICU for lethargy and hypoxia. Will need new orders when medically appropriate.    10/29/2024

## 2024-10-29 NOTE — ASSESSMENT & PLAN NOTE
-Pt. With hypoxia requiring 5L NC, baseline 3L NC. CXR reveals Pulmonary findings suggest edema.  and pt. With edema, suspect HF Exacerbation, diurese and wean O2 as tolerated

## 2024-10-29 NOTE — CONSULTS
Patient seen and evaluated by critical care medicine. To be admitted to ICU for further management. Full H&P to follow.     KAVITHA Rose-BC  Pulmonary Critical Care  10/29/2024

## 2024-10-29 NOTE — PT/OT/SLP PROGRESS
Occupational Therapy      Patient Name:  Bean Salas   MRN:  [id number]    Patient not seen today secondary to Transfer to ICU, await clearance after OT orders placed. Will require new OT orders for evaluation.     10/29/2024

## 2024-10-29 NOTE — SUBJECTIVE & OBJECTIVE
Past Medical History:   Diagnosis Date    Atrial fibrillation, unspecified type 09/06/2023    COPD exacerbation 09/06/2023    Thyroid disease     hypo       Past Surgical History:   Procedure Laterality Date    COLONOSCOPY  01/01/2011    CORONARY ANGIOGRAPHY N/A 07/22/2021    Procedure: ANGIOGRAM, CORONARY ARTERY;  Surgeon: Hank Barry MD;  Location: [facility] CATH LAB/EP;  Service: Cardiology;  Laterality: N/A;    EYE SURGERY Bilateral     cataracts extraction    FRACTURE SURGERY Right 09/2021    femur    HERNIA REPAIR      HIP SURGERY Right 08/2021    INTRAMEDULLARY RODDING OF TROCHANTER OF FEMUR Right 09/03/2021    Procedure: INSERTION, INTRAMEDULLARY RACQUEL, FEMUR, TROCHANTER;  Surgeon: Darryn Hall MD;  Location: [facility] OR;  Service: Orthopedics;  Laterality: Right;    JOINT REPLACEMENT Bilateral     knee    LEFT HEART CATHETERIZATION Right 07/22/2021    Procedure: Left heart cath;  Surgeon: Hank Barry MD;  Location: [facility] CATH LAB/EP;  Service: Cardiology;  Laterality: Right;    OPEN REDUCTION AND INTERNAL FIXATION (ORIF) OF INJURY OF HIP Right 9/20/2024    Procedure: ORIF,PELVIS;  Surgeon: Negrito Stapleton MD;  [address]  Service: Orthopedics;  Laterality: Right;  +local    TRANSESOPHAGEAL ECHOCARDIOGRAPHY N/A 9/19/2024    Procedure: ECHOCARDIOGRAM, TRANSESOPHAGEAL;  Surgeon: Leonora Butt MD;  Location: [facility] EP LAB;  Service: Cardiology;  Laterality: N/A;    TREATMENT OF CARDIAC ARRHYTHMIA N/A 9/19/2024    Procedure: Cardioversion or Defibrillation;  Surgeon: ANA Steiner MD;  Location: [facility] EP LAB;  Service: Cardiology;  Laterality: N/A;  AF, DEMETRICE/DCCV, ANES, GP, 524    VASECTOMY         Review of patient's allergies indicates:  No Known Allergies    No current facility-administered medications on file prior to encounter.     Current Outpatient Medications on File Prior to Encounter   Medication Sig    acetaminophen (TYLENOL) 500 MG tablet Take 2 tablets (1,000 mg  total) by mouth every 8 (eight) hours.    amiodarone (PACERONE) 200 MG Tab Take 2 tablets (400 mg total) by mouth 2 (two) times daily for 4 days, THEN 1 tablet (200 mg total) once daily.    ammonium lactate (LAC-HYDRIN) 12 % lotion Apply topically 2 (two) times daily as needed for Dry Skin.    apixaban (ELIQUIS) 5 mg Tab Take 2 tablets (10 mg total) by mouth 2 (two) times daily for 4 days, THEN 1 tablet (5 mg total) 2 (two) times daily.    aspirin (ECOTRIN) 81 MG EC tablet Take 1 tablet (81 mg total) by mouth once daily.    atorvastatin (LIPITOR) 10 MG tablet Take 1 tablet by mouth once daily    empagliflozin (JARDIANCE) 10 mg tablet Take 1 tablet (10 mg total) by mouth once daily.    furosemide (LASIX) 40 MG tablet Take 1 tablet (40 mg total) by mouth 2 (two) times daily as needed (weight gain of 3 to 5 lbs).    levothyroxine (SYNTHROID) 200 MCG tablet Take 1 tablet by mouth once daily    melatonin (MELATIN) 3 mg tablet Take 2 tablets (6 mg total) by mouth nightly as needed for Insomnia.    methocarbamoL (ROBAXIN) 750 MG Tab Take 1 tablet (750 mg total) by mouth 4 (four) times daily.    metoprolol succinate (TOPROL-XL) 25 MG 24 hr tablet Take 0.5 tablets (12.5 mg total) by mouth once daily. Hold until Cardiology follow up.    mirtazapine (REMERON) 7.5 MG Tab Take 1 tablet (7.5 mg total) by mouth nightly. One tablet po each night for sleep    multivit-min-FA-lycopen-lutein (CENTRUM SILVER) 0.4-300-250 mg-mcg-mcg Tab Take 1 tablet by mouth once daily.    oxyCODONE (ROXICODONE) 5 MG immediate release tablet Take 1 tablet (5 mg total) by mouth every 4 (four) hours as needed for Pain.    senna-docusate 8.6-50 mg (PERICOLACE) 8.6-50 mg per tablet Take 1 tablet by mouth once daily.    tamsulosin (FLOMAX) 0.4 mg Cap Take 1 capsule (0.4 mg total) by mouth once daily.     Family History       Problem Relation (Age of Onset)    Crohn's disease Mother    Dementia Mother    Heart attack Father    No Known Problems Sister,  Brother, Son          Tobacco Use    Smoking status: Former     Current packs/day: 0.00     Average packs/day: 1 pack/day for 35.0 years (35.0 ttl pk-yrs)     Types: Cigarettes     Start date: 1965     Quit date: 2000     Years since quittin.8     Passive exposure: Past    Smokeless tobacco: Never   Substance and Sexual Activity    Alcohol use: No    Drug use: Never    Sexual activity: Not Currently     Review of Systems   Constitutional:  Negative for activity change, chills, fever and unexpected weight change.   HENT:  Negative for congestion and sore throat.    Respiratory:  Positive for cough and shortness of breath. Negative for wheezing.    Cardiovascular:  Positive for leg swelling. Negative for chest pain and palpitations.   Gastrointestinal:  Negative for abdominal pain, blood in stool, nausea and vomiting.   Genitourinary:  Negative for dysuria and hematuria.   Musculoskeletal:  Negative for arthralgias and neck pain.   Skin:  Negative for color change and rash.   Neurological:  Negative for dizziness, seizures and numbness.   Psychiatric/Behavioral:  Negative for hallucinations and suicidal ideas.      Objective:     Vital Signs (Most Recent):  Temp: 98.7 °F (37.1 °C) (10/28/24 2139)  Pulse: 64 (10/28/24 2139)  Resp: 20 (10/28/24 2139)  BP: (!) 146/71 (10/28/24 2139)  SpO2: 96 % (10/28/24 2139) Vital Signs (24h Range):  Temp:  [97.5 °F (36.4 °C)-99.4 °F (37.4 °C)] 98.7 °F (37.1 °C)  Pulse:  [59-80] 64  Resp:  [16-24] 20  SpO2:  [95 %-100 %] 96 %  BP: (132-182)/(69-79) 146/71     Weight: 103 kg (227 lb 1.2 oz)  Body mass index is 31.67 kg/m².     Physical Exam  Vitals reviewed.   Constitutional:       General: He is not in acute distress.     Appearance: He is well-developed.   HENT:      Head: Normocephalic and atraumatic.   Eyes:      Extraocular Movements: Extraocular movements intact.      Pupils: Pupils are equal, round, and reactive to light.   Neck:      Vascular: No JVD.      Trachea:  No tracheal deviation.   Cardiovascular:      Rate and Rhythm: Normal rate and regular rhythm.      Heart sounds: Murmur heard.      No friction rub. No gallop.   Pulmonary:      Effort: No respiratory distress.      Breath sounds: Rales present. No wheezing.   Abdominal:      General: Bowel sounds are normal. There is no distension.      Palpations: Abdomen is soft. There is no mass.      Tenderness: There is no abdominal tenderness.   Musculoskeletal:         General: No deformity.      Cervical back: Neck supple.      Right lower leg: Edema present.      Left lower leg: Edema present.   Lymphadenopathy:      Cervical: No cervical adenopathy.   Skin:     General: Skin is warm and dry.      Findings: No rash.   Neurological:      Mental Status: He is alert and oriented to person, place, and time.              CRANIAL NERVES     CN III, IV, VI   Pupils are equal, round, and reactive to light.       Significant Labs: All pertinent labs within the past 24 hours have been reviewed.    Significant Imaging: I have reviewed all pertinent imaging results/findings within the past 24 hours.

## 2024-10-29 NOTE — ED NOTES
Wound with dry scab noted to right lower leg, left lower leg ,right ankle, right lateral side of foot, Purple colored bruising noted to right and left heel, pressure area with purple colored bruise noted to left buttock

## 2024-10-29 NOTE — PROGRESS NOTES
Patient arrived to the unit. Telemetry box applied and vital signs documented in flow sheet. IV 18 rohit maintained. Identification band on patient. Pt oriented to room, plan of care reviewed, and admission completed. Call light and belongings within reach. Pt with no concerns at this time. Bed in low and locked position.      Skin assessed during: Admit      [] No Altered Skin Integrity Present    []Prevention Measures Documented      [] Yes- Altered Skin Integrity Present or Discovered   [] LDA Added if Not in Epic (Describe Wound)   [] New Altered Skin Integrity was Present on Admit and Documented in LDA   [] Wound Image Taken  [x] Already in LDA    Wound Care Consulted? Yes    Attending Nurse:  Alcides Joseph RN     Second RN/Staff Member:  HUMPHREY Luna

## 2024-10-29 NOTE — ASSESSMENT & PLAN NOTE
"Pt. With edema, SLADE, and PND. , CXR with "Pulmonary findings suggest edema consistent" with HF exacerbation.   Pt. Recently had lasix dosing changed to PRN instead of daily  Last TTE 10/1 revealed EF recovered 55% but WMA. Grade II DD, moderate mitral regurg    -f/u Echo  -HF pathway ordered with IV lasix 60 mg BID, fluid restricted diet, strict intake and output  -Monitor electrolytes cloesly while diresing  -advance GDMT as tolerated  "

## 2024-10-29 NOTE — HPI
Mr. 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 Patient upgraded to ICU on 10/29 for acute on chronic hypercapnic respiratory failure requiring continuous bipap. While in ICU patient noted to go into complete heart block and EP consulted for pacemaker evaluation. At the time of my examination patient sedated after receiving pain medication while being transcutaneously paced so history gathered from chart review.

## 2024-10-29 NOTE — PROGRESS NOTES
"Patient yelling from room d/t wanting to "get up and watch the sunrise". Pt oriented to time. Nurse informed there are no tele-sitters available. Call light and belongings within reach with bed alarm on.  "

## 2024-10-29 NOTE — ED NOTES
Telemetry Verification   Patient placed on Telemetry Box  Verified with War Room  Tech    Box # [id number]   Rate 75   Rhythm NSR

## 2024-10-29 NOTE — EICU
Intervention Initiated From:  Bedside    Oleg intervened regarding:  Time-Out    Nurse Notified:  Yes    Doctor Notified:  Yes    Comments: [id number] called into room for timeout for TVP for Dr Jean with supervision from DR Ge, privileges checked, consent from son, timeout complete, labs and allergies reviewed with bedside, introducer with US, TVP placed, sutured in place, locked into place, site covered with bipatch and tegaderm, with portable chest xray ordered, tolerated well

## 2024-10-29 NOTE — SIGNIFICANT EVENT
"Notified by rapid response team about patient being lethargic and hypoxic. He was admitted earlier last evening for ADHF with background h/o COPD on home oxygen. Noncompliant to CPAP. He has been diuresing well overnight with IV lasix.     Placed on BIPAP for hypoxia and ABG showed: pH 7.32 CO2 72.7 PO2 74 HCO3 37.7SO2 93     BP (!) 143/70   Pulse 81   Temp 99.8 °F (37.7 °C) (Oral)   Resp (!) 29   Ht 5' 11" (1.803 m)   Wt 102.9 kg (226 lb 13.7 oz)   SpO2 96%   BMI 31.64 kg/m²     CXR showed some improvement of pulmonary edema compared to earlier.     On exam, patient is lethargic but arousable, somewhat confused but able to follow simple commands. Grossly fluid overloaded with JVD, abdominal distention and LE edema.     CCS consulted for acute on chronic respiratory failure with hypoxia and hypercarbia.     A/P: Hypercapnic encephalopathy, ADHF  -continue on BIPAP with plan to wean off as tolerated   -diuresis with IV lasix for ADHF   -CCS evaluated patient and accepted transfer to MICU.     Critical Care Time: 35 minutes    Critical Care was time spent personally by me on the following activities: evaluating this patient's organ dysfunction, development of treatment plan, discussing treatment plan with patient or surrogate and bedside caregivers, discussions with consultants, evaluation of patient's response to treatment, examination of patient, ordering and performing treatments and interventions, ordering and review of laboratory studies, ordering and review of radiographic studies, re-evaluation of patient's condition. This critical care time did not overlap with that of any other provider or involve time for any separately billed procedures    Diagnosis requiring critical care: acute hypercapnic and hypoxic encephalopathy.     Medardo Worthy DO  Staff Physician, Hospital Medicine.        "

## 2024-10-29 NOTE — CONSULTS
[facility] - Medical [facility]  Cardiac Electrophysiology  Consult Note    Admission Date: 10/28/2024  Code Status: Full Code   Attending Provider: Bruno Mahan*  Consulting Provider: Vidal Jean MD  Principal Problem:CHB (complete heart block)    Inpatient consult to Electrophysiology  Consult performed by: Vidal Jean MD  Consult ordered by: Gibson Harris MD  Reason for consult: Complete heart block        Subjective:     Chief Complaint:  Complete heart block    HPI:   Mr. 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 Patient upgraded to ICU on 10/29 for acute on chronic hypercapnic respiratory failure requiring continuous bipap. While in ICU patient noted to go into complete heart block and EP consulted for pacemaker evaluation. At the time of my examination patient sedated after receiving pain medication while being transcutaneously paced so history gathered from chart review.          Past Medical History:   Diagnosis Date    Atrial fibrillation, unspecified type 09/06/2023    COPD exacerbation 09/06/2023    Thyroid disease     hypo       Past Surgical History:   Procedure Laterality Date    COLONOSCOPY  01/01/2011    CORONARY ANGIOGRAPHY N/A 07/22/2021    Procedure: ANGIOGRAM, CORONARY ARTERY;  Surgeon: Hank Barry MD;  Location: [facility] CATH LAB/EP;  Service: Cardiology;  Laterality: N/A;    EYE SURGERY Bilateral     cataracts extraction    FRACTURE SURGERY Right 09/2021    femur    HERNIA REPAIR      HIP SURGERY Right 08/2021    INTRAMEDULLARY RODDING OF TROCHANTER OF FEMUR Right 09/03/2021    Procedure: INSERTION, INTRAMEDULLARY RACQUEL, FEMUR, TROCHANTER;  Surgeon: Darryn Hall MD;  Location: [facility] OR;  Service: Orthopedics;  Laterality: Right;    JOINT REPLACEMENT  Bilateral     knee    LEFT HEART CATHETERIZATION Right 07/22/2021    Procedure: Left heart cath;  Surgeon: Hank Barry MD;  Location: [facility] CATH LAB/EP;  Service: Cardiology;  Laterality: Right;    OPEN REDUCTION AND INTERNAL FIXATION (ORIF) OF INJURY OF HIP Right 9/20/2024    Procedure: ORIF,PELVIS;  Surgeon: Negrito Stapleton MD;  [facility];  Service: Orthopedics;  Laterality: Right;  +local    TRANSESOPHAGEAL ECHOCARDIOGRAPHY N/A 9/19/2024    Procedure: ECHOCARDIOGRAM, TRANSESOPHAGEAL;  Surgeon: Leonora Butt MD;  Location: [facility] EP LAB;  Service: Cardiology;  Laterality: N/A;    TREATMENT OF CARDIAC ARRHYTHMIA N/A 9/19/2024    Procedure: Cardioversion or Defibrillation;  Surgeon: ANA Steiner MD;  Location: [facility] EP LAB;  Service: Cardiology;  Laterality: N/A;  AF, DEMETRICE/DCCV, ANES, GP, 524    VASECTOMY         Review of patient's allergies indicates:  No Known Allergies    No current facility-administered medications on file prior to encounter.     Current Outpatient Medications on File Prior to Encounter   Medication Sig    acetaminophen (TYLENOL) 500 MG tablet Take 2 tablets (1,000 mg total) by mouth every 8 (eight) hours.    amiodarone (PACERONE) 200 MG Tab Take 2 tablets (400 mg total) by mouth 2 (two) times daily for 4 days, THEN 1 tablet (200 mg total) once daily.    ammonium lactate (LAC-HYDRIN) 12 % lotion Apply topically 2 (two) times daily as needed for Dry Skin.    apixaban (ELIQUIS) 5 mg Tab Take 2 tablets (10 mg total) by mouth 2 (two) times daily for 4 days, THEN 1 tablet (5 mg total) 2 (two) times daily.    aspirin (ECOTRIN) 81 MG EC tablet Take 1 tablet (81 mg total) by mouth once daily.    atorvastatin (LIPITOR) 10 MG tablet Take 1 tablet by mouth once daily    empagliflozin (JARDIANCE) 10 mg tablet Take 1 tablet (10 mg total) by mouth once daily.    furosemide (LASIX) 40 MG tablet Take 1 tablet (40 mg total) by mouth 2 (two) times daily as needed (weight  gain of 3 to 5 lbs).    levothyroxine (SYNTHROID) 200 MCG tablet Take 1 tablet by mouth once daily    melatonin (MELATIN) 3 mg tablet Take 2 tablets (6 mg total) by mouth nightly as needed for Insomnia.    methocarbamoL (ROBAXIN) 750 MG Tab Take 1 tablet (750 mg total) by mouth 4 (four) times daily.    metoprolol succinate (TOPROL-XL) 25 MG 24 hr tablet Take 0.5 tablets (12.5 mg total) by mouth once daily. Hold until Cardiology follow up.    mirtazapine (REMERON) 7.5 MG Tab Take 1 tablet (7.5 mg total) by mouth nightly. One tablet po each night for sleep    multivit-min-FA-lycopen-lutein (CENTRUM SILVER) 0.4-300-250 mg-mcg-mcg Tab Take 1 tablet by mouth once daily.    oxyCODONE (ROXICODONE) 5 MG immediate release tablet Take 1 tablet (5 mg total) by mouth every 4 (four) hours as needed for Pain.    senna-docusate 8.6-50 mg (PERICOLACE) 8.6-50 mg per tablet Take 1 tablet by mouth once daily.    tamsulosin (FLOMAX) 0.4 mg Cap Take 1 capsule (0.4 mg total) by mouth once daily.     Family History       Problem Relation (Age of Onset)    Crohn's disease Mother    Dementia Mother    Heart attack Father    No Known Problems Sister, Brother, Son          Tobacco Use    Smoking status: Former     Current packs/day: 0.00     Average packs/day: 1 pack/day for 35.0 years (35.0 ttl pk-yrs)     Types: Cigarettes     Start date: 1965     Quit date: 2000     Years since quittin.8     Passive exposure: Past    Smokeless tobacco: Never   Substance and Sexual Activity    Alcohol use: No    Drug use: Never    Sexual activity: Not Currently     ROSUnable to obtain due to sedation  Objective:     Vital Signs (Most Recent):  Temp: 98.5 °F (36.9 °C) (10/29/24 1600)  Pulse: 71 (10/29/24 1800)  Resp: (!) 24 (10/29/24 1802)  BP: (!) 158/66 (10/29/24 1800)  SpO2: 95 % (10/29/24 1800) Vital Signs (24h Range):  Temp:  [97.2 °F (36.2 °C)-99.8 °F (37.7 °C)] 98.5 °F (36.9 °C)  Pulse:  [50-87] 71  Resp:  [16-40] 24  SpO2:  [92 %-100 %]  "95 %  BP: ()/(50-87) 158/66       Weight: 102.9 kg (226 lb 13.7 oz)  Body mass index is 31.64 kg/m².    SpO2: 95 %        Physical Exam  Constitutional:       General: He is sleeping. He is not in acute distress.  Neck:      Vascular: No hepatojugular reflux or JVD.   Cardiovascular:      Rate and Rhythm: Bradycardia present. Rhythm irregular.   Pulmonary:      Effort: Pulmonary effort is normal.      Breath sounds: Normal breath sounds.   Abdominal:      General: Bowel sounds are normal.      Palpations: Abdomen is soft.   Skin:     General: Skin is warm.   Neurological:      Mental Status: He is unresponsive.            Significant Labs: None    Significant Imaging: Echocardiogram: Transthoracic echo (TTE) complete (Cupid Only):   Results for orders placed or performed during the hospital encounter of 09/28/24   Echo   Result Value Ref Range    LV Diastolic Volume 169.17 mL    Echo EF Estimated 49 %    LV Systolic Volume 87.08 mL    IVS 0.9 0.6 - 1.1 cm    LVIDd 5.8 3.5 - 6.0 cm    LVIDs 4.4 (A) 2.1 - 4.0 cm    LVOT diameter 2.0 cm    PW 1.1 0.6 - 1.1 cm    IVRT 83.73 ms    AV LVOT peak gradient 3 mmHg    LVOT mn grad 2 mmHg    LVOT peak hari 0.9 m/s    LVOT peak VTI 17.8 cm    RV- wilson basal diam 4.8 cm    RV S' 17.12 cm/s    LA size 5.95 cm    Left Atrium Major Axis 8.17 cm    Left Atrium Minor Axis 8.10 cm    LA Vol (MOD) 157.78 mL    RA Major Axis 5.73 cm    RA Area 22.2 cm2    AV valve area 1.6 cm2    AV area by cont VTI 1.7 cm2    AV peak gradient 13.0 mmHg    AV mean gradient 6.4 mmHg    Ao peak hari 1.8 m/s    Ao VTI 34.2 cm    MV Peak A Hari 0.42 m/s    E wave deceleration time 374.42 ms    MV Peak E Hari 0.50 m/s    E/A ratio 1.19     LV LATERAL E/E' RATIO 6.25     LV SEPTAL E/E' RATIO 6.25     TDI LATERAL 0.08 m/s    TDI SEPTAL 0.08 m/s    MV valve area by planimetry 2.83 cm2    TV peak gradient 43 mmHg    TR Max Hari 3.28 m/s    STJ 2.81 cm    P vein A 0.2 m/s    MV "A" wave duration 76.12 ms    Pulm " "vein "A" wave 76.12 ms    PV Peak D Hari 0.39 m/s    PV Peak S Hari 0.29 m/s    IVC diameter 1.89 cm    Sinus 3.52 cm    RA Width 4.88 cm    TAPSE 1.64 cm    LA WIDTH 5.86 cm    BSA 2.3 m2    LVOT stroke volume 55.9 cm3    LV Systolic Volume Index 38.7 mL/m2    LV Diastolic Volume Index 75.19 mL/m2    LVOT area 3.1 cm2    FS 24.1 (A) 28 - 44 %    Left Ventricle Relative Wall Thickness 0.38 cm    LV mass 233.1 g    LV Mass Index 103.6 g/m2    E/E' ratio 6.25 m/s    REGINALD 107.2 mL/m2    LA Vol 241.09 cm3    Pulm vein S/D ratio 0.74     RV/LV Ratio 0.83 cm    REGINALD (MOD) 70.1 mL/m2    AV Velocity Ratio 0.50     AV index (prosthetic) 0.52     BRAYAN by Velocity Ratio 1.6 cm²    Triscuspid Valve Regurgitation Peak Gradient 43 mmHg    Mean e' 0.08 m/s    ZLVIDS -0.91     ZLVIDD -3.40     EF 55 %    TV resting pulmonary artery pressure 46 mmHg    RV TB RVSP 6 mmHg    Est. RA pres 3 mmHg    Narrative      Left Ventricle: The left ventricle is mildly dilated. Ventricular mass   is normal. Mildly increased wall thickness. Regional wall motion   abnormalities present. See diagram for wall motion findings. Septal motion   is abnormal. There is normal systolic function with a visually estimated   ejection fraction of 55 - 60%. Ejection fraction is approximately 55%.   Grade II diastolic dysfunction.    Right Ventricle: Normal right ventricular cavity size. Wall thickness   is normal. Systolic function is normal.    Left Atrium: Left atrium is severely dilated.    Right Atrium: Right atrium is mildly dilated.    Aortic Valve: There is moderate aortic valve sclerosis.    Mitral Valve: There is mild to moderate regurgitation with multiple   jets.    Pulmonary Artery: There is mild to moderate pulmonary hypertension. The   estimated pulmonary artery systolic pressure is 46 mmHg.    IVC/SVC: Normal venous pressure at 3 mmHg.                 Assessment and Plan:     * CHB (complete heart block)  Mr. 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     Recommendations:   - TVP placed. Threshold 0.3. Will check threshold daily.   - Avoid AV brady blocking agents.   - Will discuss with primary team regarding anticoagulation options in the setting of PE. Post pacemaker patient will need to be off heparin products for 5 days.   - NPO at midnight for possible pacemaker insertion tomorrow.   - For full recommendations please see staff attestation.           Thank you for your consult. I will follow-up with patient. Please contact us if you have any additional questions.    Vidal Jean MD  Cardiac Electrophysiology  [facility] - [facility]

## 2024-10-29 NOTE — H&P
Alli Ahumada - Cardiology [facility] Medicine  History & Physical    Patient Name: Bean Salas  MRN: [id number]  Patient Class: OP- Observation  Admission Date: 10/28/2024  Attending Physician: Patrice Mtz MD   Primary Care Provider: Ramirez Levine MD         Patient information was obtained from patient, past medical records, and ER records.     Subjective:     Principal Problem:Acute on chronic respiratory failure with hypoxia    Chief Complaint:   Chief Complaint   Patient presents with    Shortness of Breath     Worsening over weeks. Peripheral edema. Arrives from [facility]. Pt is poor historian. Was removed from blood thinner medications 2wk ago.         HPI: 78 yo M with PMHx CAD, combined CHF (recovered EF), CAD, A-fib on eliquis, chronic respiratory failure on 3L NC, and hypothyroidism who presents to the ED from nursing home for worsening SOB, hypoxia, and leg welling x a few days. Per nursing home and ED documentation, pt. Had been complaining of worsening SOB with exertion and was noted to have low O2 levels on vitals check. He is stypically on 2-3L NC, but over the last few days he has been uptitrated to on day of presentation. Nursing Home also noted inc reased B/L lower extremity swelling. Pt. Reports having SOB earleir ion the day but he reports feeling ok on the 5L NC. No cough, fevers, chills, nausea, or vomiting.    Of note, pt. Was recently admitted to this facility 9/28-10/7 for hypotension and PAULA. Lasix was held and entresto was discontinued. At discharge lasix dosing was changed to PRN only. It is unclear if he had been receiving any of these PRN doses at the nursing home.    Past Medical History:   Diagnosis Date    Atrial fibrillation, unspecified type 09/06/2023    COPD exacerbation 09/06/2023    Thyroid disease     hypo       Past Surgical History:   Procedure Laterality Date    COLONOSCOPY  01/01/2011    CORONARY ANGIOGRAPHY N/A 07/22/2021    Procedure:  ANGIOGRAM, CORONARY ARTERY;  Surgeon: Hank Barry MD;  Location: [facility] CATH LAB/EP;  Service: Cardiology;  Laterality: N/A;    EYE SURGERY Bilateral     cataracts extraction    FRACTURE SURGERY Right 09/2021    femur    HERNIA REPAIR      HIP SURGERY Right 08/2021    INTRAMEDULLARY RODDING OF TROCHANTER OF FEMUR Right 09/03/2021    Procedure: INSERTION, INTRAMEDULLARY RACQUEL, FEMUR, TROCHANTER;  Surgeon: Darryn Hall MD;  Location: [facility];  Service: Orthopedics;  Laterality: Right;    JOINT REPLACEMENT Bilateral     knee    LEFT HEART CATHETERIZATION Right 07/22/2021    Procedure: Left heart cath;  Surgeon: Hank Barry MD;  Location: [facility] CATH LAB/EP;  Service: Cardiology;  Laterality: Right;    OPEN REDUCTION AND INTERNAL FIXATION (ORIF) OF INJURY OF HIP Right 9/20/2024    Procedure: ORIF,PELVIS;  Surgeon: Negrito Stapleton MD;  Location: [address] FLR;  Service: Orthopedics;  Laterality: Right;  +local    TRANSESOPHAGEAL ECHOCARDIOGRAPHY N/A 9/19/2024    Procedure: ECHOCARDIOGRAM, TRANSESOPHAGEAL;  Surgeon: Leonora Butt MD;  [facility];  Service: Cardiology;  Laterality: N/A;    TREATMENT OF CARDIAC ARRHYTHMIA N/A 9/19/2024    Procedure: Cardioversion or Defibrillation;  Surgeon: ANA Steiner MD;  [facility];  Service: Cardiology;  Laterality: N/A;  AF, DEMETRICE/DCCV, ANES, GP, 524    VASECTOMY         Review of patient's allergies indicates:  No Known Allergies    No current facility-administered medications on file prior to encounter.     Current Outpatient Medications on File Prior to Encounter   Medication Sig    acetaminophen (TYLENOL) 500 MG tablet Take 2 tablets (1,000 mg total) by mouth every 8 (eight) hours.    amiodarone (PACERONE) 200 MG Tab Take 2 tablets (400 mg total) by mouth 2 (two) times daily for 4 days, THEN 1 tablet (200 mg total) once daily.    ammonium lactate (LAC-HYDRIN) 12 % lotion Apply topically 2 (two) times daily as needed for Dry Skin.     apixaban (ELIQUIS) 5 mg Tab Take 2 tablets (10 mg total) by mouth 2 (two) times daily for 4 days, THEN 1 tablet (5 mg total) 2 (two) times daily.    aspirin (ECOTRIN) 81 MG EC tablet Take 1 tablet (81 mg total) by mouth once daily.    atorvastatin (LIPITOR) 10 MG tablet Take 1 tablet by mouth once daily    empagliflozin (JARDIANCE) 10 mg tablet Take 1 tablet (10 mg total) by mouth once daily.    furosemide (LASIX) 40 MG tablet Take 1 tablet (40 mg total) by mouth 2 (two) times daily as needed (weight gain of 3 to 5 lbs).    levothyroxine (SYNTHROID) 200 MCG tablet Take 1 tablet by mouth once daily    melatonin (MELATIN) 3 mg tablet Take 2 tablets (6 mg total) by mouth nightly as needed for Insomnia.    methocarbamoL (ROBAXIN) 750 MG Tab Take 1 tablet (750 mg total) by mouth 4 (four) times daily.    metoprolol succinate (TOPROL-XL) 25 MG 24 hr tablet Take 0.5 tablets (12.5 mg total) by mouth once daily. Hold until Cardiology follow up.    mirtazapine (REMERON) 7.5 MG Tab Take 1 tablet (7.5 mg total) by mouth nightly. One tablet po each night for sleep    multivit-min-FA-lycopen-lutein (CENTRUM SILVER) 0.4-300-250 mg-mcg-mcg Tab Take 1 tablet by mouth once daily.    oxyCODONE (ROXICODONE) 5 MG immediate release tablet Take 1 tablet (5 mg total) by mouth every 4 (four) hours as needed for Pain.    senna-docusate 8.6-50 mg (PERICOLACE) 8.6-50 mg per tablet Take 1 tablet by mouth once daily.    tamsulosin (FLOMAX) 0.4 mg Cap Take 1 capsule (0.4 mg total) by mouth once daily.     Family History       Problem Relation (Age of Onset)    Crohn's disease Mother    Dementia Mother    Heart attack Father    No Known Problems Sister, Brother, Son          Tobacco Use    Smoking status: Former     Current packs/day: 0.00     Average packs/day: 1 pack/day for 35.0 years (35.0 ttl pk-yrs)     Types: Cigarettes     Start date: 1965     Quit date: 2000     Years since quittin.8     Passive exposure: Past    Smokeless  tobacco: Never   Substance and Sexual Activity    Alcohol use: No    Drug use: Never    Sexual activity: Not Currently     Review of Systems   Constitutional:  Negative for activity change, chills, fever and unexpected weight change.   HENT:  Negative for congestion and sore throat.    Respiratory:  Positive for cough and shortness of breath. Negative for wheezing.    Cardiovascular:  Positive for leg swelling. Negative for chest pain and palpitations.   Gastrointestinal:  Negative for abdominal pain, blood in stool, nausea and vomiting.   Genitourinary:  Negative for dysuria and hematuria.   Musculoskeletal:  Negative for arthralgias and neck pain.   Skin:  Negative for color change and rash.   Neurological:  Negative for dizziness, seizures and numbness.   Psychiatric/Behavioral:  Negative for hallucinations and suicidal ideas.      Objective:     Vital Signs (Most Recent):  Temp: 98.7 °F (37.1 °C) (10/28/24 2139)  Pulse: 64 (10/28/24 2139)  Resp: 20 (10/28/24 2139)  BP: (!) 146/71 (10/28/24 2139)  SpO2: 96 % (10/28/24 2139) Vital Signs (24h Range):  Temp:  [97.5 °F (36.4 °C)-99.4 °F (37.4 °C)] 98.7 °F (37.1 °C)  Pulse:  [59-80] 64  Resp:  [16-24] 20  SpO2:  [95 %-100 %] 96 %  BP: (132-182)/(69-79) 146/71     Weight: 103 kg (227 lb 1.2 oz)  Body mass index is 31.67 kg/m².     Physical Exam  Vitals reviewed.   Constitutional:       General: He is not in acute distress.     Appearance: He is well-developed.   HENT:      Head: Normocephalic and atraumatic.   Eyes:      Extraocular Movements: Extraocular movements intact.      Pupils: Pupils are equal, round, and reactive to light.   Neck:      Vascular: No JVD.      Trachea: No tracheal deviation.   Cardiovascular:      Rate and Rhythm: Normal rate and regular rhythm.      Heart sounds: Murmur heard.      No friction rub. No gallop.   Pulmonary:      Effort: No respiratory distress.      Breath sounds: Rales present. No wheezing.   Abdominal:      General: Bowel  "sounds are normal. There is no distension.      Palpations: Abdomen is soft. There is no mass.      Tenderness: There is no abdominal tenderness.   Musculoskeletal:         General: No deformity.      Cervical back: Neck supple.      Right lower leg: Edema present.      Left lower leg: Edema present.   Lymphadenopathy:      Cervical: No cervical adenopathy.   Skin:     General: Skin is warm and dry.      Findings: No rash.   Neurological:      Mental Status: He is alert and oriented to person, place, and time.              CRANIAL NERVES     CN III, IV, VI   Pupils are equal, round, and reactive to light.       Significant Labs: All pertinent labs within the past 24 hours have been reviewed.    Significant Imaging: I have reviewed all pertinent imaging results/findings within the past 24 hours.  Assessment/Plan:     * Acute on chronic respiratory failure with hypoxia  -Pt. With hypoxia requiring 5L NC, baseline 3L NC. CXR reveals Pulmonary findings suggest edema.  and pt. With edema, suspect HF Exacerbation, diurese and wean O2 as tolerated    History of pulmonary embolism  -Diagnosed with PE 9/16/2024 when admitted for pelvic fracture. Continue home eliquis      Paroxysmal atrial fibrillation  -Continue home amiodarone and metoprolol for rate control. Continue eliquis for AC  -Monitor on tele while admitted    Acute on chronic diastolic heart failure  -Pt. With edema, SLADE, and PND. , CXR with "Pulmonary findings suggest edema consistent" with HF exacerbation. Pt. Recently had lasix dosing changed to PRN instead of daily  -Last TTE 10/1 revealed EF recovered 55% but WMA. Grade II DD, moderate mitral regurg  -HF pathway ordered with IV lasix 60 mg BID, fluid restricted diet, strict intake and output  -Monitor electrolytes cloesly while diresing  -Continue GDMT with metoprolol        Hypothyroidism due to acquired atrophy of thyroid  -Continue home synthroid      Essential hypertension  -Pt. Recently " admitted for hypotension, BP medications stopped. He is now only on metoprolol. Continue metoprolol, consider restarting entresto or aldactone if BP tolerates      VTE Risk Mitigation (From admission, onward)           Ordered     apixaban tablet 5 mg  2 times daily         10/28/24 1827                       On 10/28/2024, patient should be placed in hospital observation services under my care.             Patrice Mtz MD  Department of Hospital Medicine  [facility] - Cardiology Stepdown

## 2024-10-29 NOTE — ASSESSMENT & PLAN NOTE
"-Pt. With edema, SLADE, and PND. , CXR with "Pulmonary findings suggest edema consistent" with HF exacerbation. Pt. Recently had lasix dosing changed to PRN instead of daily  -Last TTE 10/1 revealed EF recovered 55% but WMA. Grade II DD, moderate mitral regurg  -HF pathway ordered with IV lasix 60 mg BID, fluid restricted diet, strict intake and output  -Monitor electrolytes cloesly while diresing  -Continue GDMT with metoprolol      "

## 2024-10-29 NOTE — H&P
[facility]umada - Medical ICU  Critical Care Medicine  History & Physical    Patient Name: Bean Salas  MRN: [id number]  Admission Date: 10/28/2024  Hospital Length of Stay: 0 days  Code Status: Full Code  Attending Physician: Bruno Mahan*   Primary Care Provider: Ramirez Levine MD   Principal Problem: Encephalopathy, metabolic    Subjective:     HPI:  80 yo M with CAD, combined CHF (recovered EF), recent PE (9/2024) on eliquis, A-fib, chronic respiratory failure on 3L NC, and hypothyroidism who presents to the ED from nursing home for worsening SOB. Pt is normally on 2-3L NC at home. Was recently admitted to this facility 9/28-10/7 for hypotension and PAULA. At discharge from last hospitalization patient's lasix was PRN only. Patient is minimally interactive on my examination and history is provided by chart review. The patient was brought in from his nursing home for worsening shortness of breath, hypoxemia, and leg swelling for several days and was admitted for hypoxemic respiratory. Morning of 10/29 patient became less responsive. ABG 7.3272 (BL 50). Stepping up to MICU for acute on chronic hypercapnic respiratory failure requiring continuous bipap.     Hospital/ICU Course:  No notes on file     Past Medical History:   Diagnosis Date    Atrial fibrillation, unspecified type 09/06/2023    COPD exacerbation 09/06/2023    Thyroid disease     hypo       Past Surgical History:   Procedure Laterality Date    COLONOSCOPY  01/01/2011    CORONARY ANGIOGRAPHY N/A 07/22/2021    Procedure: ANGIOGRAM, CORONARY ARTERY;  Surgeon: Hank Barry MD;  Location: [facility] CATH LAB/EP;  Service: Cardiology;  Laterality: N/A;    EYE SURGERY Bilateral     cataracts extraction    FRACTURE SURGERY Right 09/2021    femur    HERNIA REPAIR      HIP SURGERY Right 08/2021    INTRAMEDULLARY RODDING OF TROCHANTER OF FEMUR Right 09/03/2021    Procedure: INSERTION, INTRAMEDULLARY RACQUEL, FEMUR, TROCHANTER;  Surgeon: Darryn Hall,  MD;  Location: [facility] OR;  Service: Orthopedics;  Laterality: Right;    JOINT REPLACEMENT Bilateral     knee    LEFT HEART CATHETERIZATION Right 2021    Procedure: Left heart cath;  Surgeon: Hank Barry MD;  Location: [facility] CATH LAB/EP;  Service: Cardiology;  Laterality: Right;    OPEN REDUCTION AND INTERNAL FIXATION (ORIF) OF INJURY OF HIP Right 2024    Procedure: ORIF,PELVIS;  Surgeon: Negrito Stapleton MD;  Location: [facility] OR [facility] FLR;  Service: Orthopedics;  Laterality: Right;  +local    TRANSESOPHAGEAL ECHOCARDIOGRAPHY N/A 2024    Procedure: ECHOCARDIOGRAM, TRANSESOPHAGEAL;  Surgeon: Leonora Butt MD;  Location: [facility] EP LAB;  Service: Cardiology;  Laterality: N/A;    TREATMENT OF CARDIAC ARRHYTHMIA N/A 2024    Procedure: Cardioversion or Defibrillation;  Surgeon: ANA Steiner MD;  Location: [facility] EP LAB;  Service: Cardiology;  Laterality: N/A;  AF, DEMETRICE/DCCV, ANES, GP, 524    VASECTOMY         Review of patient's allergies indicates:  No Known Allergies    Family History       Problem Relation (Age of Onset)    Crohn's disease Mother    Dementia Mother    Heart attack Father    No Known Problems Sister, Brother, Son          Tobacco Use    Smoking status: Former     Current packs/day: 0.00     Average packs/day: 1 pack/day for 35.0 years (35.0 ttl pk-yrs)     Types: Cigarettes     Start date: 1965     Quit date: 2000     Years since quittin.8     Passive exposure: Past    Smokeless tobacco: Never   Substance and Sexual Activity    Alcohol use: No    Drug use: Never    Sexual activity: Not Currently      Review of Systems   Unable to perform ROS: Mental status change     Objective:     Vital Signs (Most Recent):  Temp: 99.8 °F (37.7 °C) (10/29/24 0603)  Pulse: 81 (10/29/24 0624)  Resp: (!) 29 (10/29/24 0624)  BP: (!) 143/70 (10/29/24 0624)  SpO2: 96 % (10/29/24 0624) Vital Signs (24h Range):  Temp:  [97.2 °F (36.2 °C)-99.8 °F (37.7 °C)] 99.8 °F (37.7  °C)  Pulse:  [55-87] 81  Resp:  [16-35] 29  SpO2:  [92 %-100 %] 96 %  BP: (132-189)/(63-87) 143/70   Weight: 102.9 kg (226 lb 13.7 oz)  Body mass index is 31.64 kg/m².      Intake/Output Summary (Last 24 hours) at 10/29/2024 0627  Last data filed at 10/29/2024 0621  Gross per 24 hour   Intake 220 ml   Output 4125 ml   Net -3905 ml          Physical Exam  Vitals and nursing note reviewed.   Constitutional:       General: He is not in acute distress.     Appearance: He is well-developed.   HENT:      Head: Normocephalic and atraumatic.   Eyes:      Extraocular Movements: Extraocular movements intact.      Pupils: Pupils are equal, round, and reactive to light.   Neck:      Vascular: No JVD.      Trachea: No tracheal deviation.   Cardiovascular:      Rate and Rhythm: Normal rate and regular rhythm.      Heart sounds:      No friction rub. No gallop.   Pulmonary:      Effort: No respiratory distress.      Breath sounds: Rales present. No wheezing.   Abdominal:      General: Bowel sounds are normal. There is no distension.      Palpations: Abdomen is soft. There is no mass.      Tenderness: There is no abdominal tenderness.   Musculoskeletal:         General: No deformity.      Cervical back: Neck supple.      Right lower leg: Edema present.      Left lower leg: Edema present.   Lymphadenopathy:      Cervical: No cervical adenopathy.   Skin:     General: Skin is warm and dry.      Findings: No rash.   Neurological:      Mental Status: He is alert and oriented to person, place, and time.          Vents:  Oxygen Concentration (%): 50 (10/29/24 0603)  Lines/Drains/Airways       Peripheral Intravenous Line  Duration                  Peripheral IV - Single Lumen 10/28/24 1738 18 G 1 1/4 in Anterior;Distal;Left Upper Arm <1 day                  Significant Labs:    CBC/Anemia Profile:  Recent Labs   Lab 10/28/24  1639 10/29/24  0256   WBC 4.76 5.14   HGB 11.9* 12.3*   HCT 37.3* 39.2*    188   * 106*   RDW 15.9*  "15.9*        Chemistries:  Recent Labs   Lab 10/28/24  1639 10/29/24  0256    141   K 4.4 4.1    95   CO2 32* 35*   BUN 10 12   CREATININE 0.9 0.9   CALCIUM 8.8 9.1   ALBUMIN 3.0* 3.2*   PROT 6.2 6.5   BILITOT 1.5* 2.0*   ALKPHOS 196* 198*   ALT 21 21   AST 19 22   MG 2.3 2.2       All pertinent labs within the past 24 hours have been reviewed.    Significant Imaging: I have reviewed all pertinent imaging results/findings within the past 24 hours.   Assessment/Plan:     Neuro  * Encephalopathy, metabolic  Consulted for acute encephalopathy  Baseline unclear  CTH withot acute intracranial process  VBG mildly acidotic with hypercapnia above baseline    -continue rescue bipap  -f/u echo  -TSH markedly elevated from baseline, f/u free t4  -holding off on infectious workup as patient vitally and clinically not consistent with infection    Pulmonary  Acute on chronic respiratory failure with hypoxia  Patient with Hypoxic Respiratory failure which is Acute on chronic.  he is on home oxygen at 2 LPM. Supplemental oxygen was provided and noted- Oxygen Concentration (%):  [50] 50. Signs/symptoms of respiratory failure include- increased work of breathing. Contributing diagnoses includes - CHF Labs and images were reviewed. Patient Has not had a recent ABG. Will treat underlying causes and adjust management of respiratory failure as follows-     hypoxia requiring 5L NC, baseline 3L NC. CXR reveals Pulmonary findings suggest edema.  and pt. With edema, suspect HF Exacerbation, diurese and wean O2 as tolerated     -Continue BiPAP  -Lasix 60mg BID    Cardiac/Vascular  Paroxysmal atrial fibrillation  -Continue metoprolol for rate control.  -Continue eliquis for AC  -Monitor on tele while admitted    Acute on chronic diastolic heart failure  Pt. With edema, SLADE, and PND. , CXR with "Pulmonary findings suggest edema consistent" with HF exacerbation.   Pt. Recently had lasix dosing changed to PRN instead of " daily  Last TTE 10/1 revealed EF recovered 55% but WMA. Grade II DD, moderate mitral regurg    -f/u Echo  -HF pathway ordered with IV lasix 60 mg BID, fluid restricted diet, strict intake and output  -Monitor electrolytes cloesly while diresing  -advance GDMT as tolerated    Essential hypertension  Pt. Recently admitted for hypotension, BP medications stopped.   He is now only on metoprolol.     -Continue metoprolol, consider restarting entresto or aldactone if BP tolerates     Hematology  History of pulmonary embolism  Diagnosed with PE 9/16/2024 when admitted for pelvic fracture. Continue home eliquis     Endocrine  Hypothyroidism due to acquired atrophy of thyroid  -Continue home synthroid   -TSH 8.99  -f/u reflex t4        Critical Care Daily Checklist:    A: Awake: RASS Goal/Actual Goal:    Actual:     B: Spontaneous Breathing Trial Performed?     C: SAT & SBT Coordinated?  N/a                      D: Delirium: CAM-ICU     E: Early Mobility Performed? no   F: Feeding Goal:    Status:     Current Diet Order   Procedures    Diet Low Sodium, 2gm Fluid - 1500mL     Order Specific Question:   Fluid restriction:     Answer:   Fluid - 1500mL      AS: Analgesia/Sedation none   T: Thromboembolic Prophylaxis apixaban   H: HOB > 300 yes   U: Stress Ulcer Prophylaxis (if needed) N/a   G: Glucose Control None needed   B: Bowel Function     I: Indwelling Catheter (Lines & Mckeon) Necessity none   D: De-escalation of Antimicrobials/Pharmacotherapies none    Plan for the day/ETD Bipap, encephalopathy workup. Echo    Code Status:  Family/Goals of Care: Full Code         Critical secondary to acute encephalopathy    Critical care was time spent personally by me on the following activities: development of treatment plan with patient or surrogate and bedside caregivers, discussions with consultants, evaluation of patient's response to treatment, examination of patient, ordering and performing treatments and interventions, ordering  and review of laboratory studies, ordering and review of radiographic studies, pulse oximetry, re-evaluation of patient's condition. This critical care time did not overlap with that of any other provider or involve time for any procedures.     Gibson Harris MD  Critical Care Medicine  [facility] - [facility]

## 2024-10-30 LAB
ALBUMIN SERPL BCP-MCNC: 2.7 G/DL (ref 3.5–5.2)
ALBUMIN SERPL BCP-MCNC: 2.8 G/DL (ref 3.5–5.2)
ALP SERPL-CCNC: 142 U/L (ref 40–150)
ALP SERPL-CCNC: 146 U/L (ref 40–150)
ALT SERPL W/O P-5'-P-CCNC: 18 U/L (ref 10–44)
ALT SERPL W/O P-5'-P-CCNC: 20 U/L (ref 10–44)
ANION GAP SERPL CALC-SCNC: 12 MMOL/L (ref 8–16)
ANION GAP SERPL CALC-SCNC: 8 MMOL/L (ref 8–16)
APTT PPP: 51 SEC (ref 21–32)
APTT PPP: 51.5 SEC (ref 21–32)
APTT PPP: 63.7 SEC (ref 21–32)
APTT PPP: 80 SEC (ref 21–32)
AST SERPL-CCNC: 25 U/L (ref 10–40)
AST SERPL-CCNC: 41 U/L (ref 10–40)
AV AREA BY CONTINUOUS VTI: 2.1 CM2
AV INDEX (PROSTH): 0.51
AV LVOT MEAN GRADIENT: 1 MMHG
AV LVOT PEAK GRADIENT: 2 MMHG
AV MEAN GRADIENT: 4 MMHG
AV PEAK GRADIENT: 6.8 MMHG
AV VALVE AREA BY VELOCITY RATIO: 2.2 CM²
AV VALVE AREA: 2.1 CM2
AV VELOCITY RATIO: 0.54
BASOPHILS # BLD AUTO: 0.01 K/UL (ref 0–0.2)
BASOPHILS # BLD AUTO: 0.02 K/UL (ref 0–0.2)
BASOPHILS NFR BLD: 0.2 % (ref 0–1.9)
BASOPHILS NFR BLD: 0.5 % (ref 0–1.9)
BILIRUB SERPL-MCNC: 1.7 MG/DL (ref 0.1–1)
BILIRUB SERPL-MCNC: 2 MG/DL (ref 0.1–1)
BSA FOR ECHO PROCEDURE: 2.29 M2
BUN SERPL-MCNC: 20 MG/DL (ref 8–23)
BUN SERPL-MCNC: 23 MG/DL (ref 8–23)
CALCIUM SERPL-MCNC: 8.3 MG/DL (ref 8.7–10.5)
CALCIUM SERPL-MCNC: 8.3 MG/DL (ref 8.7–10.5)
CHLORIDE SERPL-SCNC: 92 MMOL/L (ref 95–110)
CHLORIDE SERPL-SCNC: 95 MMOL/L (ref 95–110)
CO2 SERPL-SCNC: 34 MMOL/L (ref 23–29)
CO2 SERPL-SCNC: 41 MMOL/L (ref 23–29)
CREAT SERPL-MCNC: 0.9 MG/DL (ref 0.5–1.4)
CREAT SERPL-MCNC: 1 MG/DL (ref 0.5–1.4)
CV ECHO LV RWT: 0.36 CM
DIFFERENTIAL METHOD BLD: ABNORMAL
DIFFERENTIAL METHOD BLD: ABNORMAL
DOP CALC AO PEAK VEL: 1.3 M/S
DOP CALC AO VTI: 24.6 CM
DOP CALC LVOT AREA: 4.2 CM2
DOP CALC LVOT DIAMETER: 2.3 CM
DOP CALC LVOT PEAK VEL: 0.7 M/S
DOP CALC LVOT STROKE VOLUME: 52.3 CM3
DOP CALCLVOT PEAK VEL VTI: 12.6 CM
E/E' RATIO: 5.62 M/S
ECHO EF ESTIMATED: 50 %
ECHO LV POSTERIOR WALL: 1 CM (ref 0.6–1.1)
EJECTION FRACTION: 445 %
EOSINOPHIL # BLD AUTO: 0 K/UL (ref 0–0.5)
EOSINOPHIL # BLD AUTO: 0 K/UL (ref 0–0.5)
EOSINOPHIL NFR BLD: 0.2 % (ref 0–8)
EOSINOPHIL NFR BLD: 0.2 % (ref 0–8)
ERYTHROCYTE [DISTWIDTH] IN BLOOD BY AUTOMATED COUNT: 15.9 % (ref 11.5–14.5)
ERYTHROCYTE [DISTWIDTH] IN BLOOD BY AUTOMATED COUNT: 16 % (ref 11.5–14.5)
EST. GFR  (NO RACE VARIABLE): >60 ML/MIN/1.73 M^2
EST. GFR  (NO RACE VARIABLE): >60 ML/MIN/1.73 M^2
FRACTIONAL SHORTENING: 26.8 % (ref 28–44)
GLUCOSE SERPL-MCNC: 89 MG/DL (ref 70–110)
GLUCOSE SERPL-MCNC: 94 MG/DL (ref 70–110)
HCT VFR BLD AUTO: 35.2 % (ref 40–54)
HCT VFR BLD AUTO: 36.5 % (ref 40–54)
HGB BLD-MCNC: 10.9 G/DL (ref 14–18)
HGB BLD-MCNC: 11.3 G/DL (ref 14–18)
IMM GRANULOCYTES # BLD AUTO: 0 K/UL (ref 0–0.04)
IMM GRANULOCYTES # BLD AUTO: 0.02 K/UL (ref 0–0.04)
IMM GRANULOCYTES NFR BLD AUTO: 0 % (ref 0–0.5)
IMM GRANULOCYTES NFR BLD AUTO: 0.5 % (ref 0–0.5)
INR PPP: 1.2 (ref 0.8–1.2)
INTERVENTRICULAR SEPTUM: 1.1 CM (ref 0.6–1.1)
IVC DIAMETER: 1.07 CM
LA MAJOR: 6.96 CM
LA MINOR: 7.16 CM
LA WIDTH: 5.99 CM
LEFT ATRIUM SIZE: 4.95 CM
LEFT ATRIUM VOLUME INDEX MOD: 104.4 ML/M2
LEFT ATRIUM VOLUME INDEX: 80.1 ML/M2
LEFT ATRIUM VOLUME MOD: 231.68 ML
LEFT ATRIUM VOLUME: 177.9 CM3
LEFT INTERNAL DIMENSION IN SYSTOLE: 4.1 CM (ref 2.1–4)
LEFT VENTRICLE DIASTOLIC VOLUME INDEX: 68.69 ML/M2
LEFT VENTRICLE DIASTOLIC VOLUME: 152.5 ML
LEFT VENTRICLE MASS INDEX: 105.6 G/M2
LEFT VENTRICLE SYSTOLIC VOLUME INDEX: 34 ML/M2
LEFT VENTRICLE SYSTOLIC VOLUME: 75.56 ML
LEFT VENTRICULAR INTERNAL DIMENSION IN DIASTOLE: 5.6 CM (ref 3.5–6)
LEFT VENTRICULAR MASS: 234.3 G
LV LATERAL E/E' RATIO: 4.54 M/S
LV SEPTAL E/E' RATIO: 7.38 M/S
LYMPHOCYTES # BLD AUTO: 0.3 K/UL (ref 1–4.8)
LYMPHOCYTES # BLD AUTO: 0.4 K/UL (ref 1–4.8)
LYMPHOCYTES NFR BLD: 6.8 % (ref 18–48)
LYMPHOCYTES NFR BLD: 9.3 % (ref 18–48)
MAGNESIUM SERPL-MCNC: 2.2 MG/DL (ref 1.6–2.6)
MAGNESIUM SERPL-MCNC: 2.2 MG/DL (ref 1.6–2.6)
MCH RBC QN AUTO: 32.8 PG (ref 27–31)
MCH RBC QN AUTO: 32.9 PG (ref 27–31)
MCHC RBC AUTO-ENTMCNC: 31 G/DL (ref 32–36)
MCHC RBC AUTO-ENTMCNC: 31 G/DL (ref 32–36)
MCV RBC AUTO: 106 FL (ref 82–98)
MCV RBC AUTO: 106 FL (ref 82–98)
MONOCYTES # BLD AUTO: 0.5 K/UL (ref 0.3–1)
MONOCYTES # BLD AUTO: 0.6 K/UL (ref 0.3–1)
MONOCYTES NFR BLD: 11.9 % (ref 4–15)
MONOCYTES NFR BLD: 13.7 % (ref 4–15)
MV PEAK E VEL: 0.59 M/S
NEUTROPHILS # BLD AUTO: 3.3 K/UL (ref 1.8–7.7)
NEUTROPHILS # BLD AUTO: 3.4 K/UL (ref 1.8–7.7)
NEUTROPHILS NFR BLD: 76.3 % (ref 38–73)
NEUTROPHILS NFR BLD: 80.4 % (ref 38–73)
NRBC BLD-RTO: 0 /100 WBC
NRBC BLD-RTO: 0 /100 WBC
OHS CV RV/LV RATIO: 0.64 CM
PHOSPHATE SERPL-MCNC: 2.6 MG/DL (ref 2.7–4.5)
PHOSPHATE SERPL-MCNC: 3.4 MG/DL (ref 2.7–4.5)
PLATELET # BLD AUTO: 154 K/UL (ref 150–450)
PLATELET # BLD AUTO: 161 K/UL (ref 150–450)
PMV BLD AUTO: 11.5 FL (ref 9.2–12.9)
PMV BLD AUTO: 11.8 FL (ref 9.2–12.9)
POTASSIUM SERPL-SCNC: 4 MMOL/L (ref 3.5–5.1)
POTASSIUM SERPL-SCNC: 4.7 MMOL/L (ref 3.5–5.1)
PROT SERPL-MCNC: 5.9 G/DL (ref 6–8.4)
PROT SERPL-MCNC: 6 G/DL (ref 6–8.4)
PROTHROMBIN TIME: 12.5 SEC (ref 9–12.5)
RA MAJOR: 6.27 CM
RA PRESSURE ESTIMATED: 3 MMHG
RA WIDTH: 5.24 CM
RBC # BLD AUTO: 3.32 M/UL (ref 4.6–6.2)
RBC # BLD AUTO: 3.43 M/UL (ref 4.6–6.2)
RIGHT ATRIAL AREA: 28.6 CM2
RIGHT VENTRICLE DIASTOLIC BASEL DIMENSION: 3.6 CM
RV TISSUE DOPPLER FREE WALL SYSTOLIC VELOCITY 1 (APICAL 4 CHAMBER VIEW): 13.77 CM/S
SINUS: 3.96 CM
SODIUM SERPL-SCNC: 141 MMOL/L (ref 136–145)
SODIUM SERPL-SCNC: 141 MMOL/L (ref 136–145)
TDI LATERAL: 0.13 M/S
TDI SEPTAL: 0.08 M/S
TDI: 0.11 M/S
TRICUSPID ANNULAR PLANE SYSTOLIC EXCURSION: 1.34 CM
WBC # BLD AUTO: 4.28 K/UL (ref 3.9–12.7)
WBC # BLD AUTO: 4.3 K/UL (ref 3.9–12.7)
Z-SCORE OF LEFT VENTRICULAR DIMENSION IN END DIASTOLE: -3.25
Z-SCORE OF LEFT VENTRICULAR DIMENSION IN END SYSTOLE: -1.09

## 2024-10-30 PROCEDURE — 63600175 PHARM REV CODE 636 W HCPCS

## 2024-10-30 PROCEDURE — 63600175 PHARM REV CODE 636 W HCPCS: Performed by: HOSPITALIST

## 2024-10-30 PROCEDURE — 85730 THROMBOPLASTIN TIME PARTIAL: CPT | Mod: 91 | Performed by: INTERNAL MEDICINE

## 2024-10-30 PROCEDURE — 27000221 HC OXYGEN, UP TO 24 HOURS

## 2024-10-30 PROCEDURE — 25500020 PHARM REV CODE 255: Performed by: INTERNAL MEDICINE

## 2024-10-30 PROCEDURE — 85025 COMPLETE CBC W/AUTO DIFF WBC: CPT | Mod: 91 | Performed by: INTERNAL MEDICINE

## 2024-10-30 PROCEDURE — 85025 COMPLETE CBC W/AUTO DIFF WBC: CPT | Performed by: STUDENT IN AN ORGANIZED HEALTH CARE EDUCATION/TRAINING PROGRAM

## 2024-10-30 PROCEDURE — 94799 UNLISTED PULMONARY SVC/PX: CPT

## 2024-10-30 PROCEDURE — 85610 PROTHROMBIN TIME: CPT | Performed by: STUDENT IN AN ORGANIZED HEALTH CARE EDUCATION/TRAINING PROGRAM

## 2024-10-30 PROCEDURE — 83735 ASSAY OF MAGNESIUM: CPT | Performed by: STUDENT IN AN ORGANIZED HEALTH CARE EDUCATION/TRAINING PROGRAM

## 2024-10-30 PROCEDURE — 85730 THROMBOPLASTIN TIME PARTIAL: CPT

## 2024-10-30 PROCEDURE — 20000000 HC ICU ROOM

## 2024-10-30 PROCEDURE — 25000003 PHARM REV CODE 250: Performed by: STUDENT IN AN ORGANIZED HEALTH CARE EDUCATION/TRAINING PROGRAM

## 2024-10-30 PROCEDURE — 84100 ASSAY OF PHOSPHORUS: CPT | Mod: 91 | Performed by: INTERNAL MEDICINE

## 2024-10-30 PROCEDURE — 99900035 HC TECH TIME PER 15 MIN (STAT)

## 2024-10-30 PROCEDURE — 83735 ASSAY OF MAGNESIUM: CPT | Mod: 91 | Performed by: INTERNAL MEDICINE

## 2024-10-30 PROCEDURE — 94761 N-INVAS EAR/PLS OXIMETRY MLT: CPT

## 2024-10-30 PROCEDURE — 25000003 PHARM REV CODE 250

## 2024-10-30 PROCEDURE — 84100 ASSAY OF PHOSPHORUS: CPT | Performed by: STUDENT IN AN ORGANIZED HEALTH CARE EDUCATION/TRAINING PROGRAM

## 2024-10-30 PROCEDURE — 25000003 PHARM REV CODE 250: Performed by: HOSPITALIST

## 2024-10-30 PROCEDURE — 99223 1ST HOSP IP/OBS HIGH 75: CPT | Mod: GC,,, | Performed by: STUDENT IN AN ORGANIZED HEALTH CARE EDUCATION/TRAINING PROGRAM

## 2024-10-30 PROCEDURE — 99233 SBSQ HOSP IP/OBS HIGH 50: CPT | Mod: GC,,, | Performed by: INTERNAL MEDICINE

## 2024-10-30 PROCEDURE — 80053 COMPREHEN METABOLIC PANEL: CPT | Mod: 91 | Performed by: INTERNAL MEDICINE

## 2024-10-30 PROCEDURE — 94660 CPAP INITIATION&MGMT: CPT

## 2024-10-30 PROCEDURE — 80053 COMPREHEN METABOLIC PANEL: CPT | Performed by: STUDENT IN AN ORGANIZED HEALTH CARE EDUCATION/TRAINING PROGRAM

## 2024-10-30 PROCEDURE — 27100171 HC OXYGEN HIGH FLOW UP TO 24 HOURS

## 2024-10-30 RX ORDER — FUROSEMIDE 10 MG/ML
80 INJECTION INTRAMUSCULAR; INTRAVENOUS 2 TIMES DAILY
Status: DISCONTINUED | OUTPATIENT
Start: 2024-10-30 | End: 2024-11-03

## 2024-10-30 RX ADMIN — ATORVASTATIN CALCIUM 10 MG: 10 TABLET, FILM COATED ORAL at 08:10

## 2024-10-30 RX ADMIN — FUROSEMIDE 80 MG: 10 INJECTION, SOLUTION INTRAVENOUS at 05:10

## 2024-10-30 RX ADMIN — SACUBITRIL AND VALSARTAN 1 TABLET: 24; 26 TABLET, FILM COATED ORAL at 09:10

## 2024-10-30 RX ADMIN — WARFARIN SODIUM 2.5 MG: 2.5 TABLET ORAL at 04:10

## 2024-10-30 RX ADMIN — ASPIRIN 81 MG: 81 TABLET, COATED ORAL at 08:10

## 2024-10-30 RX ADMIN — THIAMINE HYDROCHLORIDE 100 MG: 100 INJECTION, SOLUTION INTRAMUSCULAR; INTRAVENOUS at 09:10

## 2024-10-30 RX ADMIN — MIRTAZAPINE 7.5 MG: 7.5 TABLET, FILM COATED ORAL at 09:10

## 2024-10-30 RX ADMIN — FUROSEMIDE 60 MG: 10 INJECTION, SOLUTION INTRAVENOUS at 08:10

## 2024-10-30 RX ADMIN — WARFARIN SODIUM 2.5 MG: 2.5 TABLET ORAL at 12:10

## 2024-10-30 RX ADMIN — PERFLUTREN 1.5 ML: 6.52 INJECTION, SUSPENSION INTRAVENOUS at 10:10

## 2024-10-30 RX ADMIN — HEPARIN SODIUM 15 UNITS/KG/HR: 10000 INJECTION, SOLUTION INTRAVENOUS at 08:10

## 2024-10-30 NOTE — ASSESSMENT & PLAN NOTE
Patient p/w hypoxia requiring increased O2 requirements, baseline 3L NC. History of CHF with history of moderately reduced EF of 45-50% in the past. Was previously on GDMT but had some pillars of therapy removed during previous admission due to hypotension and PAULA. CXR reveals pulmonary findings suggest edema.  and pt. With edema, suspect HF exacerbation, diurese and wean O2 as tolerated.    Plan:  - BiPAP qhs or with naps.   - On 9L NC currently, wean as tolerated.  - Lasix 80mg BID for diuresis for pulmonary edema, titrate to UOP, 2-3L negative daily  - Fluid restriction, 1.5L and low Na diet  - Strict I/Os, daily weights and volume assessments  - Monitor electrolytes with daily CMP, maintain Mg > 2 and K > 4.

## 2024-10-30 NOTE — ASSESSMENT & PLAN NOTE
Patient has persistent (7 days or more) atrial fibrillation. Patient is currently in sinus rhythm. [id number] Score: 3. The patients heart rate in the last 24 hours is as follows:  Pulse  Min: 64  Max: 80       Anticoagulants  heparin 25,000 units in dextrose 5% 250 mL (100 units/mL) infusion HIGH INTENSITY nomogram - [facility], Continuous, Intravenous  heparin 25,000 units in dextrose 5% (100 units/ml) IV bolus from bag HIGH INTENSITY nomogram - [facility], As needed (PRN), Intravenous  heparin 25,000 units in dextrose 5% (100 units/ml) IV bolus from bag HIGH INTENSITY nomogram - [facility], As needed (PRN), Intravenous  warfarin (COUMADIN) tablet 2.5 mg, Daily, Oral    Plan  - Replete lytes with a goal of K>4, Mg >2  - Patient is anticoagulated, see medications listed above.  - Patient's afib is currently controlled, will hold rate control at this time given TVP.

## 2024-10-30 NOTE — HOSPITAL COURSE
80 yo M with CAD, combined CHF (recovered EF), recent PE (9/2024) on eliquis, A-fib, chronic respiratory failure on 3L NC, and hypothyroidism who presents to the ED from nursing home for worsening SOB admitted to the MICU 2/2 AoC hypercapnic respiratory failure requiring BiPAP. Respiratory rate improving following BiPAP

## 2024-10-30 NOTE — ASSESSMENT & PLAN NOTE
-Hold metoprolol for rate control.  -Hold home eliquis for AC  -On Heparin currently - Warfarin started  -Monitor on tele while admitted

## 2024-10-30 NOTE — NURSING
Patient admitted to the unit as a lateral transfer from MICU. Patient stable. TVP via right IJ. Paced at 70. Vitals stable. Patient awake, alert and oriented.

## 2024-10-30 NOTE — HPI
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 Patient is normally on 2-3L NC at home and he was recently admitted to [facility] [place]28-10/7 for hypotension and PAULA. At discharge from last hospitalization patient's lasix was made PRN only. Morning of 10/29 patient was noted to be less responsive and less interactive by NH staff. On arrival, ABG with pH 7.32 and CO2 72. Admitted to MICU for acute on chronic hypercapnic respiratory failure requiring continuous BiPAP. While in the MICU, patient was noted to become bradycardic while sleeping, with HR in the 30s-40s. EKG showed third degree AV block and EP was consulted. Transcutaneous pacing started at bedside with TVP subsequently placed by EP. Following initiation of TVP, patient clinically appearing better with improved mentation. Given his recent PE, patient will need to be anticoagulated but will need to avoid heparin products and DOAC after PPM placement, so requiring transition to Warfarin for target INR 2.0 prior to PPM placement. Patient transferred to CICU for continued monitoring and eventual pacemaker placement. Mentating better and on 9L NC.

## 2024-10-30 NOTE — NURSING
MICU DAILY GOALS     Family/Goals of care/Code Status   Code Status: Full Code    24H Vital Sign Range  Temp:  [97 °F (36.1 °C)-98.4 °F (36.9 °C)]   Pulse:  [64-76]   Resp:  [14-51]   BP: ()/(51-66)   SpO2:  [95 %-100 %]      Shift Events (include procedures and significant events)   No acute events throughout shift,      AWAKE RASS: Goal - RASS Goal: 0-->alert and calm  Actual - RASS (Nettles Agitation-Sedation Scale): alert and calm    Restraint necessity: Not necessary   BREATHE SBT: Not intubated    Coordinate A & B, analgesics/sedatives Pain: managed   SAT: Not intubated   Delirium CAM-ICU: Overall CAM-ICU: Negative   Early(intubated/ Progressive (non-intubated) Mobility MOVE Screen (INTUBATED ONLY): Not intubated    Activity: Activity Management: Rolling - L1   Feeding/Nutrition Diet order: Diet/Nutrition Received: restrict fluids,     Thrombus DVT prophylaxis: VTE Core Measure: Pharmacological prophylaxis initiated/maintained   HOB Elevation Head of Bed (HOB) Positioning: HOB elevated   Ulcer Prophylaxis GI: yes   Glucose control managed     Skin Skin assessment:     Sacrum intact/not altered? No  Heels intact/not altered? No  Surgical wound? No    CHECK ONE!   (no altered skin or altered skin) and sub boxes:  [] No Altered Skin Integrity Present    []Prevention Measures Documented    [x] Altered Skin Integrity Present or Discovered   [x] LDA present in EPIC, daily doc completed              [] LDA added if not in EPIC (describe wound).                    When describing wound, do not stage, use descriptive words only.    [x] Wound Image Taken (required on admit,                   transfer/discharge and every Tuesday)    Wound Care Consulted? Yes   Bowel Function no issues    Indwelling Catheter Necessity      Urethral Catheter-Reason for Continuing Urinary Catheterization: Chronic Indwelling Urinary Catheter on Admission    Introducer 10/29/24 1601 Internal Jugular Right-Line Necessity Review:  Hemodynamic instability     De-escalation Antibiotics No        VS and assessment per flow sheet, patient progressing towards goals as tolerated, plan of care reviewed with  Patient , all concerns addressed, will continue to monitor.

## 2024-10-30 NOTE — ASSESSMENT & PLAN NOTE
Pt. Recently admitted for hypotension, BP medications stopped.   He is now only on metoprolol.     -Hold metoprolol per electrophysiology  -Consider restarting entresto or aldactone if BP tolerates

## 2024-10-30 NOTE — ASSESSMENT & PLAN NOTE
P/w acute encephalopathy but improved after BiPAP and TVP. GCS 15 and ANOx4 currently. Mental status improved. Anticipate AMS 2/2 hypercapnia and bradyarrhythmia. CTH without acute intracranial process  VBG mildly acidotic with hypercapnia above baseline     - See acute respiratory failure and CHB  - Improved

## 2024-10-30 NOTE — ASSESSMENT & PLAN NOTE
Patients blood pressure range in the last 24 hours was: BP  Min: 87/53  Max: 189/87.The patient's inpatient anti-hypertensive regimen is listed below:  Current Antihypertensives  furosemide injection 80 mg, 2 times daily, Intravenous    Plan  - BP is controlled, no changes needed to their regimen  - Due to initial hypotension, will be conservative with antihypertensives. Can adjust regimen or dosages as indicated  - Entresto previously discontinued on prior admission but was a prior home medication. Given moderately reduced EF, will resume Entresto at the lowest dose 24-26 BID.

## 2024-10-30 NOTE — H&P
[facility] - Medical ICU  Cardiology  History and Physical     Patient Name: Bean Salas  MRN: [id number]  Admission Date: 10/28/2024  Code Status: Full Code   Attending Provider: Elio Giron MD   Primary Care Physician: Ramirez Levine MD  Principal Problem:CHB (complete heart block)    Patient information was obtained from patient, past medical records, and ER records.     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 Patient is normally on 2-3L NC at home and he was recently admitted to [facility] [place]28-10/7 for hypotension and PAULA. At discharge from last hospitalization patient's lasix was made PRN only. Morning of 10/29 patient was noted to be less responsive and less interactive by NH staff. On arrival, ABG with pH 7.32 and CO2 72. Admitted to MICU for acute on chronic hypercapnic respiratory failure requiring continuous BiPAP. While in the MICU, patient was noted to become bradycardic while sleeping, with HR in the 30s-40s. EKG showed third degree AV block and EP was consulted. Transcutaneous pacing started at bedside with TVP subsequently placed by EP. Following initiation of TVP, patient clinically appearing better with improved mentation. Given his recent PE, patient will need to be anticoagulated but will need to avoid heparin products and DOAC after PPM placement, so requiring transition to Warfarin for target INR 2.0 prior to PPM placement. Patient transferred to CICU for continued monitoring and eventual pacemaker placement. Mentating better and on 9L NC.    Past Medical History:   Diagnosis Date    Atrial fibrillation, unspecified type 09/06/2023    COPD exacerbation 09/06/2023    Thyroid disease     hypo        Past Surgical History:   Procedure Laterality Date    COLONOSCOPY  01/01/2011    CORONARY ANGIOGRAPHY N/A 07/22/2021    Procedure: ANGIOGRAM, CORONARY ARTERY;  Surgeon: Hank Barry MD;  Location: [facility] CATH LAB/EP;  Service: Cardiology;  Laterality: N/A;    EYE SURGERY Bilateral     cataracts extraction    FRACTURE SURGERY Right 09/2021    femur    HERNIA REPAIR      HIP SURGERY Right 08/2021    INTRAMEDULLARY RODDING OF TROCHANTER OF FEMUR Right 09/03/2021    Procedure: INSERTION, INTRAMEDULLARY RACQUEL, FEMUR, TROCHANTER;  Surgeon: Darryn Hall MD;  Location: [facility] OR;  Service: Orthopedics;  Laterality: Right;    JOINT REPLACEMENT Bilateral     knee    LEFT HEART CATHETERIZATION Right 07/22/2021    Procedure: Left heart cath;  Surgeon: Hank Barry MD;  Location: [facility] CATH LAB/EP;  Service: Cardiology;  Laterality: Right;    OPEN REDUCTION AND INTERNAL FIXATION (ORIF) OF INJURY OF HIP Right 9/20/2024    Procedure: ORIF,PELVIS;  Surgeon: Negrito Stapleton MD;  [address]  Service: Orthopedics;  Laterality: Right;  +local    TRANSESOPHAGEAL ECHOCARDIOGRAPHY N/A 9/19/2024    Procedure: ECHOCARDIOGRAM, TRANSESOPHAGEAL;  Surgeon: Leonora Butt MD;  Location: [facility] EP LAB;  Service: Cardiology;  Laterality: N/A;    TREATMENT OF CARDIAC ARRHYTHMIA N/A 9/19/2024    Procedure: Cardioversion or Defibrillation;  Surgeon: ANA Steiner MD;  Location: [facility] EP LAB;  Service: Cardiology;  Laterality: N/A;  AF, DEMETRICE/DCCV, ANES, GP, 524    VASECTOMY         Review of patient's allergies indicates:  No Known Allergies    No current facility-administered medications on file prior to encounter.     Current Outpatient Medications on File Prior to Encounter   Medication Sig    acetaminophen (TYLENOL) 500 MG tablet Take 2 tablets (1,000 mg total) by mouth every 8 (eight) hours.    amiodarone (PACERONE) 200 MG Tab Take 2 tablets (400 mg total) by mouth 2 (two) times daily for 4 days, THEN 1  tablet (200 mg total) once daily.    ammonium lactate (LAC-HYDRIN) 12 % lotion Apply topically 2 (two) times daily as needed for Dry Skin.    apixaban (ELIQUIS) 5 mg Tab Take 2 tablets (10 mg total) by mouth 2 (two) times daily for 4 days, THEN 1 tablet (5 mg total) 2 (two) times daily.    aspirin (ECOTRIN) 81 MG EC tablet Take 1 tablet (81 mg total) by mouth once daily.    atorvastatin (LIPITOR) 10 MG tablet Take 1 tablet by mouth once daily    empagliflozin (JARDIANCE) 10 mg tablet Take 1 tablet (10 mg total) by mouth once daily.    furosemide (LASIX) 40 MG tablet Take 1 tablet (40 mg total) by mouth 2 (two) times daily as needed (weight gain of 3 to 5 lbs).    levothyroxine (SYNTHROID) 200 MCG tablet Take 1 tablet by mouth once daily    melatonin (MELATIN) 3 mg tablet Take 2 tablets (6 mg total) by mouth nightly as needed for Insomnia.    methocarbamoL (ROBAXIN) 750 MG Tab Take 1 tablet (750 mg total) by mouth 4 (four) times daily.    metoprolol succinate (TOPROL-XL) 25 MG 24 hr tablet Take 0.5 tablets (12.5 mg total) by mouth once daily. Hold until Cardiology follow up.    mirtazapine (REMERON) 7.5 MG Tab Take 1 tablet (7.5 mg total) by mouth nightly. One tablet po each night for sleep    multivit-min-FA-lycopen-lutein (CENTRUM SILVER) 0.4-300-250 mg-mcg-mcg Tab Take 1 tablet by mouth once daily.    oxyCODONE (ROXICODONE) 5 MG immediate release tablet Take 1 tablet (5 mg total) by mouth every 4 (four) hours as needed for Pain.    senna-docusate 8.6-50 mg (PERICOLACE) 8.6-50 mg per tablet Take 1 tablet by mouth once daily.    tamsulosin (FLOMAX) 0.4 mg Cap Take 1 capsule (0.4 mg total) by mouth once daily.     Family History       Problem Relation (Age of Onset)    Crohn's disease Mother    Dementia Mother    Heart attack Father    No Known Problems Sister, Brother, Son          Tobacco Use    Smoking status: Former     Current packs/day: 0.00     Average packs/day: 1 pack/day for 35.0 years (35.0 ttl pk-yrs)      Types: Cigarettes     Start date: 1965     Quit date: 2000     Years since quittin.8     Passive exposure: Past    Smokeless tobacco: Never   Substance and Sexual Activity    Alcohol use: No    Drug use: Never    Sexual activity: Not Currently     Review of Systems   Constitutional: Negative for chills and fever.   Cardiovascular:  Positive for dyspnea on exertion and leg swelling. Negative for chest pain.   Respiratory:  Positive for shortness of breath. Negative for cough.    Musculoskeletal:  Negative for back pain.   Gastrointestinal:  Negative for abdominal pain and constipation.   Genitourinary:  Negative for dysuria and hematuria.   Neurological:  Negative for focal weakness and headaches.   Psychiatric/Behavioral:  Negative for altered mental status. The patient is not nervous/anxious.      Objective:     Vital Signs (Most Recent):  Temp: 98.4 °F (36.9 °C) (10/30/24 1100)  Pulse: 70 (10/30/24 1301)  Resp: (!) 30 (10/30/24 1301)  BP: (!) 110/57 (10/30/24 1301)  SpO2: 100 % (10/30/24 1301) Vital Signs (24h Range):  Temp:  [97 °F (36.1 °C)-98.5 °F (36.9 °C)] 98.4 °F (36.9 °C)  Pulse:  [58-80] 70  Resp:  [14-36] 30  SpO2:  [95 %-100 %] 100 %  BP: ()/(51-68) 110/57     Weight: 102.9 kg (226 lb 13.7 oz)  Body mass index is 31.64 kg/m².    SpO2: 100 %         Intake/Output Summary (Last 24 hours) at 10/30/2024 1420  Last data filed at 10/30/2024 1301  Gross per 24 hour   Intake 1532.01 ml   Output 3100 ml   Net -1567.99 ml       Lines/Drains/Airways       Central Venous Catheter Line  Duration             Introducer 10/29/24 1601 Internal Jugular Right <1 day              Drain  Duration                  Urethral Catheter -- days              Line  Duration                  Pacer Wires 10/29/24 1606 <1 day              Peripheral Intravenous Line  Duration                  Peripheral IV - Single Lumen 10/28/24 1738 18 G 1 / in Anterior;Distal;Left Upper Arm 1 day         Peripheral IV - Single  Lumen 10/29/24 1838 20 G Right Antecubital <1 day                     Physical Exam  Constitutional:       General: He is not in acute distress.  HENT:      Head: Normocephalic and atraumatic.      Nose:      Comments: BiPAP on  Neck:      Comments: TVP  Cardiovascular:      Rate and Rhythm: Normal rate and regular rhythm.   Pulmonary:      Effort: Pulmonary effort is normal.   Abdominal:      Palpations: Abdomen is soft.   Musculoskeletal:         General: Swelling present.      Right lower leg: Edema present.      Left lower leg: Edema present.   Skin:     General: Skin is warm.   Neurological:      Mental Status: He is alert and oriented to person, place, and time.          Significant Labs: All pertinent lab results from the last 24 hours have been reviewed.    Significant Imaging: All relevant imaging was reviewed      Assessment and Plan:     * CHB (complete heart block)  Presented to the ED with SOB and AMS. Found to be in CHB in the MICU. TV placed by RP on 10/29 with symptomatic improvement. V paced at 70.     - EP following, will place pacemaker once INR therapeutic on coumadin. Goal ~2.0    Encephalopathy, metabolic  P/w acute encephalopathy but improved after BiPAP and TVP. GCS 15 and ANOx4 currently. Mental status improved. Anticipate AMS 2/2 hypercapnia and bradyarrhythmia. CTH without acute intracranial process  VBG mildly acidotic with hypercapnia above baseline     - See acute respiratory failure and CHB  - Improved    History of pulmonary embolism  Diagnosed with PE 9/16/2024 when admitted for pelvic fracture.     - On heparin gtt with warfarin started.  - INR goal 2.0 prior to PPM placement.    Paroxysmal atrial fibrillation  Patient has persistent (7 days or more) atrial fibrillation. Patient is currently in sinus rhythm. [id number] Score: 3. The patients heart rate in the last 24 hours is as follows:  Pulse  Min: 64  Max: 80       Anticoagulants  heparin 25,000 units in dextrose 5% 250 mL (100  units/mL) infusion HIGH INTENSITY nomogram - [facility], Continuous, Intravenous  heparin 25,000 units in dextrose 5% (100 units/ml) IV bolus from bag HIGH INTENSITY nomogram - [facility], As needed (PRN), Intravenous  heparin 25,000 units in dextrose 5% (100 units/ml) IV bolus from bag HIGH INTENSITY nomogram - [facility], As needed (PRN), Intravenous  warfarin (COUMADIN) tablet 2.5 mg, Daily, Oral    Plan  - Replete lytes with a goal of K>4, Mg >2  - Patient is anticoagulated, see medications listed above.  - Patient's afib is currently controlled, will hold rate control at this time given TVP.    Acute on chronic respiratory failure with hypoxia  Patient p/w hypoxia requiring increased O2 requirements, baseline 3L NC. History of CHF with history of moderately reduced EF of 45-50% in the past. Was previously on GDMT but had some pillars of therapy removed during previous admission due to hypotension and PAULA. CXR reveals pulmonary findings suggest edema.  and pt. With edema, suspect HF exacerbation, diurese and wean O2 as tolerated.    Plan:  - BiPAP qhs or with naps.   - On 9L NC currently, wean as tolerated.  - Lasix 80mg BID for diuresis for pulmonary edema, titrate to UOP, 2-3L negative daily  - Fluid restriction, 1.5L and low Na diet  - Strict I/Os, daily weights and volume assessments  - Monitor electrolytes with daily CMP, maintain Mg > 2 and K > 4.    Acute on chronic diastolic heart failure  See acute on chronic respiratory failure.    Hypothyroidism due to acquired atrophy of thyroid  Chronic condition, stable.    - Continue home synthroid     Essential hypertension  Patients blood pressure range in the last 24 hours was: BP  Min: 87/53  Max: 189/87.The patient's inpatient anti-hypertensive regimen is listed below:  Current Antihypertensives  furosemide injection 80 mg, 2 times daily, Intravenous    Plan  - BP is controlled, no changes needed to their regimen  - Due to initial hypotension, will be conservative with  antihypertensives. Can adjust regimen or dosages as indicated  - Entresto previously discontinued on prior admission but was a prior home medication. Given moderately reduced EF, will resume Entresto at the lowest dose 24-26 BID.        VTE Risk Mitigation (From admission, onward)           Ordered     warfarin (COUMADIN) tablet 2.5 mg  Daily         10/29/24 2347     heparin 25,000 units in dextrose 5% (100 units/ml) IV bolus from bag HIGH INTENSITY nomogram - [facility]  As needed (PRN)        Question:  Heparin Infusion Adjustment (DO NOT MODIFY ANSWER)  Answer:  [email or web address]\Images\Pharmacy\HeparinInfusions\heparin HIGH INTENSITY nomogram for [facility] [id number]    10/29/24 1656     heparin 25,000 units in dextrose 5% (100 units/ml) IV bolus from bag HIGH INTENSITY nomogram - [facility]  As needed (PRN)        Question:  Heparin Infusion Adjustment (DO NOT MODIFY ANSWER)  Answer:  [email or web address]\Images\Pharmacy\HeparinInfusions\heparin HIGH INTENSITY nomogram for [facility] [id number]    10/29/24 1656     heparin 25,000 units in dextrose 5% 250 mL (100 units/mL) infusion HIGH INTENSITY nomogram - [facility]  Continuous        Question:  Begin at (units/kg/hr)  Answer:  18    10/29/24 1656                    Nathan Marshall MD  Cardiology   [facility] - Medical ICU

## 2024-10-30 NOTE — ASSESSMENT & PLAN NOTE
Mr. 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     Recommendations:   - TVP placed. Threshold 0.3. Will check threshold daily.   - Avoid AV brady blocking agents.   - Patient on coumadin. Pacemaker placement pending therapeutic INR (close to 2).   - For full recommendations please see staff attestation.

## 2024-10-30 NOTE — PROGRESS NOTES
[facility] - Medical [facility]  Critical Care Medicine  Progress Note    Patient Name: Bean Salas  MRN: [id number]  Admission Date: 10/28/2024  Hospital Length of Stay: 1 days  Code Status: Full Code  Attending Provider: Bruno Mahan*  Primary Care Provider: Ramirez Levine MD   Principal Problem: CHB (complete heart block)    Subjective:     HPI:  80 yo M with CAD, combined CHF (recovered EF), recent PE (9/2024) on eliquis, A-fib, chronic respiratory failure on 3L NC, and hypothyroidism who presents to the ED from nursing home for worsening SOB. Pt is normally on 2-3L NC at home. Was recently admitted to this facility 9/28-10/7 for hypotension and PAULA. At discharge from last hospitalization patient's lasix was PRN only. Patient is minimally interactive on my examination and history is provided by chart review. The patient was brought in from his nursing home for worsening shortness of breath, hypoxemia, and leg swelling for several days and was admitted for hypoxemic respiratory. Morning of 10/29 patient became less responsive. ABG 7.3272 (BL 50). Stepping up to MICU for acute on chronic hypercapnic respiratory failure requiring continuous bipap.     Hospital/ICU Course:  80 yo M with CAD, combined CHF (recovered EF), recent PE (9/2024) on eliquis, A-fib, chronic respiratory failure on 3L NC, and hypothyroidism who presents to the ED from nursing home for worsening SOB admitted to the MICU 2/2 AoC hypercapnic respiratory failure requiring BiPAP. Respiratory rate improving following BiPAP    Interval History/Significant Events: Admit to MICU. Started on BiPAP    Review of Systems   Respiratory:  Positive for shortness of breath.    All other systems reviewed and are negative.    Objective:     Vital Signs (Most Recent):  Temp: 98.3 °F (36.8 °C) (10/30/24 0715)  Pulse: 70 (10/30/24 0800)  Resp: (!) 24 (10/30/24 0800)  BP: 117/64 (10/30/24 0815)  SpO2: 98 % (10/30/24 0800) Vital Signs (24h Range):  Temp:   [97 °F (36.1 °C)-98.7 °F (37.1 °C)] 98.3 °F (36.8 °C)  Pulse:  [50-80] 70  Resp:  [14-40] 24  SpO2:  [92 %-100 %] 98 %  BP: ()/(50-83) 117/64   Weight: 102.9 kg (226 lb 13.7 oz)  Body mass index is 31.64 kg/m².      Intake/Output Summary (Last 24 hours) at 10/30/2024 0903  Last data filed at 10/30/2024 0818  Gross per 24 hour   Intake 1395.96 ml   Output 2100 ml   Net -704.04 ml          Physical Exam  Constitutional:       Appearance: Normal appearance.   HENT:      Head: Normocephalic and atraumatic.      Right Ear: External ear normal.      Left Ear: External ear normal.      Nose: Nose normal.      Mouth/Throat:      Mouth: Mucous membranes are moist.   Eyes:      Extraocular Movements: Extraocular movements intact.   Neck:      Comments: R-transvenous pacer site C/D/I  Cardiovascular:      Rate and Rhythm: Normal rate and regular rhythm.      Pulses: Normal pulses.   Pulmonary:      Comments: Mildly increased WOB  Fine crackles bilaterally  Abdominal:      General: Abdomen is flat.      Tenderness: There is no abdominal tenderness.   Musculoskeletal:      Cervical back: Normal range of motion.      Right lower leg: Edema present.      Left lower leg: Edema present.   Skin:     General: Skin is warm.      Capillary Refill: Capillary refill takes less than 2 seconds.   Neurological:      General: No focal deficit present.      Mental Status: He is alert and oriented to person, place, and time.   Psychiatric:         Mood and Affect: Mood normal.         Behavior: Behavior normal.            Vents:  Oxygen Concentration (%): 50 (10/30/24 0000)  Lines/Drains/Airways       Central Venous Catheter Line  Duration             Introducer 10/29/24 1601 Internal Jugular Right <1 day              Drain  Duration                  Urethral Catheter -- days              Line  Duration                  Pacer Wires 10/29/24 1606 <1 day              Peripheral Intravenous Line  Duration                  Peripheral IV -  Single Lumen 10/28/24 1738 18 G 1 1/4 in Anterior;Distal;Left Upper Arm 1 day         Peripheral IV - Single Lumen 10/29/24 1838 20 G Right Antecubital <1 day                  Significant Labs:    CBC/Anemia Profile:  Recent Labs   Lab 10/29/24  0256 10/29/24  1027 10/29/24  1732 10/30/24  0443   WBC 5.14  --  4.38 4.28   HGB 12.3*  --  11.5* 10.9*   HCT 39.2* 36 36.9* 35.2*     --  191 154   *  --  107* 106*   RDW 15.9*  --  16.1* 16.0*        Chemistries:  Recent Labs   Lab 10/28/24  1639 10/29/24  0256 10/30/24  0443    141 141   K 4.4 4.1 4.7    95 95   CO2 32* 35* 34*   BUN 10 12 20   CREATININE 0.9 0.9 0.9   CALCIUM 8.8 9.1 8.3*   ALBUMIN 3.0* 3.2* 2.7*   PROT 6.2 6.5 6.0   BILITOT 1.5* 2.0* 1.7*   ALKPHOS 196* 198* 142   ALT 21 21 18   AST 19 22 41*   MG 2.3 2.2 2.2   PHOS  --   --  3.4       All pertinent labs within the past 24 hours have been reviewed.    Significant Imaging:  I have reviewed all pertinent imaging results/findings within the past 24 hours.    ABG  Recent Labs   Lab 10/29/24  1351   PH 7.335*   PO2 31*   PCO2 84.8*   HCO3 45.2*   BE 19*     Assessment/Plan:     Neuro  Encephalopathy, metabolic  Consulted for acute encephalopathy. GCS 15 and ANOx4 currently. Mental status improved. Anticipate AMS 2/2 hypercapnia. CTH withot acute intracranial process  VBG mildly acidotic with hypercapnia above baseline    -f/u echo  -TSH markedly elevated from baseline, f/u free t4  -holding off on infectious workup as patient vitally and clinically not consistent with infection    Pulmonary  Acute on chronic respiratory failure with hypoxia  Patient with Hypoxic Respiratory failure which is Acute on chronic.  he is on home oxygen at 2 LPM. Supplemental oxygen was provided and noted- Oxygen Concentration (%):  [50] 50. Signs/symptoms of respiratory failure include- increased work of breathing. Contributing diagnoses includes - CHF Labs and images were reviewed. Patient Has not had a  "recent ABG. Will treat underlying causes and adjust management of respiratory failure as follows-     hypoxia requiring 5L NC, baseline 3L NC. CXR reveals Pulmonary findings suggest edema.  and pt. With edema, suspect HF Exacerbation, diurese and wean O2 as tolerated     -Not tolerating BiPAP. On 9L NC currently  -Lasix 60mg BID    Cardiac/Vascular  * CHB (complete heart block)  Transvenous pacer wire currently in place getting capture. Rate approximately 65-70.     Electrophysiology recs;  - TVP placed. Threshold 0.3. Will check threshold daily.   - Avoid AV brady blocking agents.   - Will discuss with primary team regarding anticoagulation options in the setting of PE. Post pacemaker patient will need to be off heparin products for 5 days.   - NPO at midnight for possible pacemaker insertion tomorrow.   - For full recommendations please see staff attestation.     Paroxysmal atrial fibrillation  -Hold metoprolol for rate control.  -Hold home eliquis for AC  -On Heparin currently - Warfarin started  -Monitor on tele while admitted    Acute on chronic diastolic heart failure  Pt. With edema, SLADE, and PND. , CXR with "Pulmonary findings suggest edema consistent" with HF exacerbation.   Pt. Recently had lasix dosing changed to PRN instead of daily  Last TTE 10/1 revealed EF recovered 55% but WMA. Grade II DD, moderate mitral regurg    -f/u Echo  -HF pathway ordered with IV lasix 60 mg BID, fluid restricted diet, strict intake and output  -Monitor electrolytes cloesly while diresing  -advance GDMT as tolerated    Essential hypertension  Pt. Recently admitted for hypotension, BP medications stopped.   He is now only on metoprolol.     -Hold metoprolol per electrophysiology  -Consider restarting entresto or aldactone if BP tolerates     Hematology  History of pulmonary embolism  Diagnosed with PE 9/16/2024 when admitted for pelvic fracture. 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     C: SAT & SBT Coordinated?  N/a                      D: Delirium: CAM-ICU Overall CAM-ICU: Negative   E: Early Mobility Performed? No   F: Feeding Goal:    Status:     Current Diet Order   Procedures    Diet NPO Except for: Medication     Order Specific Question:   Except for:     Answer:   Medication      AS: Analgesia/Sedation none   T: Thromboembolic Prophylaxis Heparin   H: HOB > 300 Yes   U: Stress Ulcer Prophylaxis (if needed) none   G: Glucose Control N/a   B: Bowel Function     I: Indwelling Catheter (Lines & Mckeon) Necessity PIV, RIJ TVP wire   D: De-escalation of Antimicrobials/Pharmacotherapies none    Plan for the day/ETD Continue ICU care    Code Status:  Family/Goals of Care: Full Code         Critical secondary to Patient has a condition that poses threat to life and bodily function: complete heart block      Critical care was time spent personally by me on the following activities: development of treatment plan with patient or surrogate and bedside caregivers, discussions with consultants, evaluation of patient's response to treatment, examination of patient, ordering and performing treatments and interventions, ordering and review of laboratory studies, ordering and review of radiographic studies, pulse oximetry, re-evaluation of patient's condition. This critical care time did not overlap with that of any other provider or involve time for any procedures.     Mac Lynch MD  Critical Care Medicine  [facility] - Medical ICU

## 2024-10-30 NOTE — SUBJECTIVE & OBJECTIVE
Past Medical History:   Diagnosis Date    Atrial fibrillation, unspecified type 09/06/2023    COPD exacerbation 09/06/2023    Thyroid disease     hypo       Past Surgical History:   Procedure Laterality Date    COLONOSCOPY  01/01/2011    CORONARY ANGIOGRAPHY N/A 07/22/2021    Procedure: ANGIOGRAM, CORONARY ARTERY;  Surgeon: Hank Barry MD;  Location: [facility] CATH LAB/EP;  Service: Cardiology;  Laterality: N/A;    EYE SURGERY Bilateral     cataracts extraction    FRACTURE SURGERY Right 09/2021    femur    HERNIA REPAIR      HIP SURGERY Right 08/2021    INTRAMEDULLARY RODDING OF TROCHANTER OF FEMUR Right 09/03/2021    Procedure: INSERTION, INTRAMEDULLARY RACQUEL, FEMUR, TROCHANTER;  Surgeon: Darryn Hall MD;  Location: [facility] OR;  Service: Orthopedics;  Laterality: Right;    JOINT REPLACEMENT Bilateral     knee    LEFT HEART CATHETERIZATION Right 07/22/2021    Procedure: Left heart cath;  Surgeon: Hank Barry MD;  Location: [facility] CATH LAB/EP;  Service: Cardiology;  Laterality: Right;    OPEN REDUCTION AND INTERNAL FIXATION (ORIF) OF INJURY OF HIP Right 9/20/2024    Procedure: ORIF,PELVIS;  Surgeon: Negrito Stapleton MD;  [address]  Service: Orthopedics;  Laterality: Right;  +local    TRANSESOPHAGEAL ECHOCARDIOGRAPHY N/A 9/19/2024    Procedure: ECHOCARDIOGRAM, TRANSESOPHAGEAL;  Surgeon: Leonora Butt MD;  Location: [facility] EP LAB;  Service: Cardiology;  Laterality: N/A;    TREATMENT OF CARDIAC ARRHYTHMIA N/A 9/19/2024    Procedure: Cardioversion or Defibrillation;  Surgeon: ANA Steiner MD;  Location: [facility] EP LAB;  Service: Cardiology;  Laterality: N/A;  AF, DEMETRICE/DCCV, ANES, GP, 524    VASECTOMY         Review of patient's allergies indicates:  No Known Allergies    No current facility-administered medications on file prior to encounter.     Current Outpatient Medications on File Prior to Encounter   Medication Sig    acetaminophen (TYLENOL) 500 MG tablet Take 2 tablets (1,000 mg  total) by mouth every 8 (eight) hours.    amiodarone (PACERONE) 200 MG Tab Take 2 tablets (400 mg total) by mouth 2 (two) times daily for 4 days, THEN 1 tablet (200 mg total) once daily.    ammonium lactate (LAC-HYDRIN) 12 % lotion Apply topically 2 (two) times daily as needed for Dry Skin.    apixaban (ELIQUIS) 5 mg Tab Take 2 tablets (10 mg total) by mouth 2 (two) times daily for 4 days, THEN 1 tablet (5 mg total) 2 (two) times daily.    aspirin (ECOTRIN) 81 MG EC tablet Take 1 tablet (81 mg total) by mouth once daily.    atorvastatin (LIPITOR) 10 MG tablet Take 1 tablet by mouth once daily    empagliflozin (JARDIANCE) 10 mg tablet Take 1 tablet (10 mg total) by mouth once daily.    furosemide (LASIX) 40 MG tablet Take 1 tablet (40 mg total) by mouth 2 (two) times daily as needed (weight gain of 3 to 5 lbs).    levothyroxine (SYNTHROID) 200 MCG tablet Take 1 tablet by mouth once daily    melatonin (MELATIN) 3 mg tablet Take 2 tablets (6 mg total) by mouth nightly as needed for Insomnia.    methocarbamoL (ROBAXIN) 750 MG Tab Take 1 tablet (750 mg total) by mouth 4 (four) times daily.    metoprolol succinate (TOPROL-XL) 25 MG 24 hr tablet Take 0.5 tablets (12.5 mg total) by mouth once daily. Hold until Cardiology follow up.    mirtazapine (REMERON) 7.5 MG Tab Take 1 tablet (7.5 mg total) by mouth nightly. One tablet po each night for sleep    multivit-min-FA-lycopen-lutein (CENTRUM SILVER) 0.4-300-250 mg-mcg-mcg Tab Take 1 tablet by mouth once daily.    oxyCODONE (ROXICODONE) 5 MG immediate release tablet Take 1 tablet (5 mg total) by mouth every 4 (four) hours as needed for Pain.    senna-docusate 8.6-50 mg (PERICOLACE) 8.6-50 mg per tablet Take 1 tablet by mouth once daily.    tamsulosin (FLOMAX) 0.4 mg Cap Take 1 capsule (0.4 mg total) by mouth once daily.     Family History       Problem Relation (Age of Onset)    Crohn's disease Mother    Dementia Mother    Heart attack Father    No Known Problems Sister,  Brother, Son          Tobacco Use    Smoking status: Former     Current packs/day: 0.00     Average packs/day: 1 pack/day for 35.0 years (35.0 ttl pk-yrs)     Types: Cigarettes     Start date: 1965     Quit date: 2000     Years since quittin.8     Passive exposure: Past    Smokeless tobacco: Never   Substance and Sexual Activity    Alcohol use: No    Drug use: Never    Sexual activity: Not Currently     Review of Systems   Constitutional: Negative for chills and fever.   Cardiovascular:  Positive for dyspnea on exertion and leg swelling. Negative for chest pain.   Respiratory:  Positive for shortness of breath. Negative for cough.    Musculoskeletal:  Negative for back pain.   Gastrointestinal:  Negative for abdominal pain and constipation.   Genitourinary:  Negative for dysuria and hematuria.   Neurological:  Negative for focal weakness and headaches.   Psychiatric/Behavioral:  Negative for altered mental status. The patient is not nervous/anxious.      Objective:     Vital Signs (Most Recent):  Temp: 98.4 °F (36.9 °C) (10/30/24 1100)  Pulse: 70 (10/30/24 1301)  Resp: (!) 30 (10/30/24 1301)  BP: (!) 110/57 (10/30/24 1301)  SpO2: 100 % (10/30/24 1301) Vital Signs (24h Range):  Temp:  [97 °F (36.1 °C)-98.5 °F (36.9 °C)] 98.4 °F (36.9 °C)  Pulse:  [58-80] 70  Resp:  [14-36] 30  SpO2:  [95 %-100 %] 100 %  BP: ()/(51-68) 110/57     Weight: 102.9 kg (226 lb 13.7 oz)  Body mass index is 31.64 kg/m².    SpO2: 100 %         Intake/Output Summary (Last 24 hours) at 10/30/2024 1420  Last data filed at 10/30/2024 1301  Gross per 24 hour   Intake 1532.01 ml   Output 3100 ml   Net -1567.99 ml       Lines/Drains/Airways       Central Venous Catheter Line  Duration             Introducer 10/29/24 1601 Internal Jugular Right <1 day              Drain  Duration                  Urethral Catheter -- days              Line  Duration                  Pacer Wires 10/29/24 1606 <1 day              Peripheral  Intravenous Line  Duration                  Peripheral IV - Single Lumen 10/28/24 1738 18 G 1 1/4 in Anterior;Distal;Left Upper Arm 1 day         Peripheral IV - Single Lumen 10/29/24 1838 20 G Right Antecubital <1 day                     Physical Exam  Constitutional:       General: He is not in acute distress.  HENT:      Head: Normocephalic and atraumatic.      Nose:      Comments: BiPAP on  Neck:      Comments: TVP  Cardiovascular:      Rate and Rhythm: Normal rate and regular rhythm.   Pulmonary:      Effort: Pulmonary effort is normal.   Abdominal:      Palpations: Abdomen is soft.   Musculoskeletal:         General: Swelling present.      Right lower leg: Edema present.      Left lower leg: Edema present.   Skin:     General: Skin is warm.   Neurological:      Mental Status: He is alert and oriented to person, place, and time.          Significant Labs: All pertinent lab results from the last 24 hours have been reviewed.    Significant Imaging: All relevant imaging was reviewed

## 2024-10-30 NOTE — SUBJECTIVE & OBJECTIVE
Interval History/Significant Events: Admit to MICU. Started on BiPAP    Review of Systems   Respiratory:  Positive for shortness of breath.    All other systems reviewed and are negative.    Objective:     Vital Signs (Most Recent):  Temp: 98.3 °F (36.8 °C) (10/30/24 0715)  Pulse: 70 (10/30/24 0800)  Resp: (!) 24 (10/30/24 0800)  BP: 117/64 (10/30/24 0815)  SpO2: 98 % (10/30/24 0800) Vital Signs (24h Range):  Temp:  [97 °F (36.1 °C)-98.7 °F (37.1 °C)] 98.3 °F (36.8 °C)  Pulse:  [50-80] 70  Resp:  [14-40] 24  SpO2:  [92 %-100 %] 98 %  BP: ()/(50-83) 117/64   Weight: 102.9 kg (226 lb 13.7 oz)  Body mass index is 31.64 kg/m².      Intake/Output Summary (Last 24 hours) at 10/30/2024 0903  Last data filed at 10/30/2024 0818  Gross per 24 hour   Intake 1395.96 ml   Output 2100 ml   Net -704.04 ml          Physical Exam  Constitutional:       Appearance: Normal appearance.   HENT:      Head: Normocephalic and atraumatic.      Right Ear: External ear normal.      Left Ear: External ear normal.      Nose: Nose normal.      Mouth/Throat:      Mouth: Mucous membranes are moist.   Eyes:      Extraocular Movements: Extraocular movements intact.   Neck:      Comments: R-transvenous pacer site C/D/I  Cardiovascular:      Rate and Rhythm: Normal rate and regular rhythm.      Pulses: Normal pulses.   Pulmonary:      Comments: Mildly increased WOB  Fine crackles bilaterally  Abdominal:      General: Abdomen is flat.      Tenderness: There is no abdominal tenderness.   Musculoskeletal:      Cervical back: Normal range of motion.      Right lower leg: Edema present.      Left lower leg: Edema present.   Skin:     General: Skin is warm.      Capillary Refill: Capillary refill takes less than 2 seconds.   Neurological:      General: No focal deficit present.      Mental Status: He is alert and oriented to person, place, and time.   Psychiatric:         Mood and Affect: Mood normal.         Behavior: Behavior normal.             Vents:  Oxygen Concentration (%): 50 (10/30/24 0000)  Lines/Drains/Airways       Central Venous Catheter Line  Duration             Introducer 10/29/24 1601 Internal Jugular Right <1 day              Drain  Duration                  Urethral Catheter -- days              Line  Duration                  Pacer Wires 10/29/24 1606 <1 day              Peripheral Intravenous Line  Duration                  Peripheral IV - Single Lumen 10/28/24 1738 18 G 1 1/4 in Anterior;Distal;Left Upper Arm 1 day         Peripheral IV - Single Lumen 10/29/24 1838 20 G Right Antecubital <1 day                  Significant Labs:    CBC/Anemia Profile:  Recent Labs   Lab 10/29/24  0256 10/29/24  1027 10/29/24  1732 10/30/24  0443   WBC 5.14  --  4.38 4.28   HGB 12.3*  --  11.5* 10.9*   HCT 39.2* 36 36.9* 35.2*     --  191 154   *  --  107* 106*   RDW 15.9*  --  16.1* 16.0*        Chemistries:  Recent Labs   Lab 10/28/24  1639 10/29/24  0256 10/30/24  0443    141 141   K 4.4 4.1 4.7    95 95   CO2 32* 35* 34*   BUN 10 12 20   CREATININE 0.9 0.9 0.9   CALCIUM 8.8 9.1 8.3*   ALBUMIN 3.0* 3.2* 2.7*   PROT 6.2 6.5 6.0   BILITOT 1.5* 2.0* 1.7*   ALKPHOS 196* 198* 142   ALT 21 21 18   AST 19 22 41*   MG 2.3 2.2 2.2   PHOS  --   --  3.4       All pertinent labs within the past 24 hours have been reviewed.    Significant Imaging:  I have reviewed all pertinent imaging results/findings within the past 24 hours.

## 2024-10-30 NOTE — NURSING
Pt's continuous Heparin drip restarted pending therapeutic INR from PO warfarin therapy per C.C. fellow. Heparin restarted at 15/u/kg/hr with next APTT draw due at 1455.

## 2024-10-30 NOTE — ASSESSMENT & PLAN NOTE
Consulted for acute encephalopathy. GCS 15 and ANOx4 currently. Mental status improved. Anticipate AMS 2/2 hypercapnia. CTH withot acute intracranial process  VBG mildly acidotic with hypercapnia above baseline    -f/u echo  -TSH markedly elevated from baseline, f/u free t4  -holding off on infectious workup as patient vitally and clinically not consistent with infection

## 2024-10-30 NOTE — PLAN OF CARE
[facility]  Initial Discharge Assessment       Primary Care Provider: Ramirez Levine MD    Admission Diagnosis: Shortness of breath [R06.02]  SOB (shortness of breath) [R06.02]  Acute on chronic respiratory failure with hypoxia [J96.21]  Decompensated heart failure [I50.9]    Admission Date: 10/28/2024  Expected Discharge Date: 11/5/2024    Transition of Care Barriers: None    Payor: HU[employer] MANAGED MEDICARE / Plan: [employer] MEDICARE HMO / Product Type: Capitation /     Extended Emergency Contact Information  Primary Emergency Contact: Efren Salas  Mobile Phone: [phone]  Relation: Son    Discharge Plan A: Skilled Nursing Facility  Discharge Plan B: Home with family, Home Health      [facility], [place] - [address]  [address] [facility] [address]  Phone: [phone] Fax: [phone]      Initial Assessment (most recent)       Adult Discharge Assessment - 10/30/24 1629          Discharge Assessment    Assessment Type Discharge Planning Assessment     Confirmed/corrected address, phone number and insurance Yes     Confirmed Demographics Correct on Facesheet     Source of Information family     If unable to respond/provide information was family/caregiver contacted? Yes     Contact Name/Number Efren jose carlos Salas/cp# [phone]     Does patient/caregiver understand observation status Yes     Communicated LATOSHA with patient/caregiver Date not available/Unable to determine     Reason For Admission Shortness of Breath     People in Home alone     Facility Arrived From: [facility]     Do you expect to return to your current living situation? No     Do you have help at home or someone to help you manage your care at home? Yes     Who are your caregiver(s) and their phone number(s)? jose carlos Rodriguez/cp# [phone]     Prior to hospitilization cognitive status: Unable to Assess     Current cognitive status: Unable to Assess     Walking or Climbing Stairs Difficulty  yes     Walking or Climbing Stairs ambulation difficulty, requires equipment     Mobility Management Rolling Walker     Dressing/Bathing Difficulty yes     Dressing/Bathing bathing difficulty, requires equipment     Dressing/Bathing Management Shower bench     Equipment Currently Used at Home wheelchair;bath bench     Readmission within 30 days? Yes     Patient currently being followed by outpatient case management? No     Do you currently have service(s) that help you manage your care at home? No     Do you take prescription medications? Yes     Do you have prescription coverage? Yes     Coverage [employer] Man[employer] Medicare - O     Do you have any problems affording any of your prescribed medications? No     Is the patient taking medications as prescribed? yes     Who is going to help you get home at discharge? depends on if patient return to a snf. EMS vs son     How do you get to doctors appointments? car, drives self     Are you on dialysis? No     Do you take coumadin? No     Discharge Plan A Skilled Nursing Facility     Discharge Plan B Home with family;Home Health     DME Needed Upon Discharge  none     Discharge Plan discussed with: Adult children     Transition of Care Barriers None        Physical Activity    On average, how many days per week do you engage in moderate to strenuous exercise (like a brisk walk)? 0 days     On average, how many minutes do you engage in exercise at this level? 0 min        Financial Resource Strain    How hard is it for you to pay for the very basics like food, housing, medical care, and heating? Not hard at all        Housing Stability    In the last 12 months, was there a time when you were not able to pay the mortgage or rent on time? No     At any time in the past 12 months, were you homeless or living in a shelter (including now)? No        Transportation Needs    Has the lack of transportation kept you from medical appointments, meetings, work or from getting things  needed for daily living? No        Food Insecurity    Within the past 12 months, you worried that your food would run out before you got the money to buy more. Never true     Within the past 12 months, the food you bought just didn't last and you didn't have money to get more. Never true        Stress    Do you feel stress - tense, restless, nervous, or anxious, or unable to sleep at night because your mind is troubled all the time - these days? Not at all        Social Isolation    How often do you feel lonely or isolated from those around you?  Never        Alcohol Use    Q1: How often do you have a drink containing alcohol? Never     Q2: How many drinks containing alcohol do you have on a typical day when you are drinking? Patient does not drink     Q3: How often do you have six or more drinks on one occasion? Never        Utilities    In the past 12 months has the electric, gas, oil, or water company threatened to shut off services in your home? No        Health Literacy    How often do you need to have someone help you when you read instructions, pamphlets, or other written material from your doctor or pharmacy? Sometimes                     Readmission Assessment (most recent)       Readmission Assessment - 10/30/24 1627          Readmission    Was this a planned readmission? No     Why were you hospitalized in the last 30 days? Sepsis     Why were you readmitted? Alarmed about signs/symptoms     When you left the hospital how did you feel? better     When you left the hospital where did you go? SNF     Did patient/caregiver refused recommended DC plan? No     Tell me about what happened between when you left the hospital and the day you returned. Shortness of breath     When did you start not feeling well? a few days     Did you try to manage your symptoms your self? No     Did you call anyone? Yes     Who did you call? Other (comments)   [facility]H staff    Did you try to see or did see a doctor or nurse before you  came? Yes     Were you seen? Yes     Did you have  a follow-up appointment on discharge? No                    SW spoke with son (Efren Salas) via phone to complete DPA. Patient on bipap.  Son able to verify demographics on face sheet are correct.  Patient transferred to [facility].  Son expressed understanding. CM team remains available for any further patient needs or concerns.     Discharge Plan A and Plan B have been determined by review of patient's clinical status, future medical and therapeutic needs, and coverage/benefits for post-acute care in coordination with multidisciplinary team members.    Rex Solano, TODD  [facility]  [phone]

## 2024-10-30 NOTE — ASSESSMENT & PLAN NOTE
Diagnosed with PE 9/16/2024 when admitted for pelvic fracture.     - On heparin gtt with warfarin started.  - INR goal 2.0 prior to PPM placement.

## 2024-10-30 NOTE — PLAN OF CARE
MICU DAILY GOALS     Family/Goals of care/Code Status   Code Status: Full Code    24H Vital Sign Range  Temp:  [97 °F (36.1 °C)-98.8 °F (37.1 °C)]   Pulse:  [50-83]   Resp:  [14-40]   BP: ()/(50-83)   SpO2:  [92 %-100 %]      Shift Events (include procedures and significant events)   NPO at midnight and heparin gtt stopped at 0530 this morning for pacemaker placement today.    AWAKE RASS: Goal - RASS Goal: 0-->alert and calm  Actual - RASS (Nettles Agitation-Sedation Scale): alert and calm    Restraint necessity: Not necessary   BREATHE SBT: Not intubated    Coordinate A & B, analgesics/sedatives Pain: managed   SAT: Not intubated   Delirium CAM-ICU: Overall CAM-ICU: Negative   Early(intubated/ Progressive (non-intubated) Mobility MOVE Screen (INTUBATED ONLY): Not intubated    Activity: Activity Management: Rolling - L1   Feeding/Nutrition Diet order: Diet/Nutrition Received: 2 gram sodium,     Thrombus DVT prophylaxis: VTE Core Measure: Pharmacological prophylaxis initiated/maintained   HOB Elevation Head of Bed (HOB) Positioning: HOB elevated   Ulcer Prophylaxis GI: no   Glucose control managed     Skin Skin assessment:     Sacrum intact/not altered? No  Heels intact/not altered? No  Surgical wound? No    CHECK ONE!   (no altered skin or altered skin) and sub boxes:  [] No Altered Skin Integrity Present    []Prevention Measures Documented    [x] Altered Skin Integrity Present or Discovered   [x] LDA present in EPIC, daily doc completed              [] LDA added if not in EPIC (describe wound).                    When describing wound, do not stage, use descriptive words only.    [] Wound Image Taken (required on admit,                   transfer/discharge and every Tuesday)    Wound Care Consulted? Yes   Bowel Function no issues    Indwelling Catheter Necessity      Urethral Catheter-Reason for Continuing Urinary Catheterization: Chronic Indwelling Urinary Catheter on Admission    Introducer 10/29/24 0167  Internal Jugular Right-Line Necessity Review: Hemodynamic instability     De-escalation Antibiotics No        VS and assessment per flow sheet, patient progressing towards goals as tolerated, plan of care reviewed with patient, all concerns addressed, will continue to monitor.

## 2024-10-30 NOTE — ASSESSMENT & PLAN NOTE
Transvenous pacer wire currently in place getting capture. Rate approximately 65-70.     Electrophysiology recs;  - TVP placed. Threshold 0.3. Will check threshold daily.   - Avoid AV brady blocking agents.   - Will discuss with primary team regarding anticoagulation options in the setting of PE. Post pacemaker patient will need to be off heparin products for 5 days.   - NPO at midnight for possible pacemaker insertion tomorrow.   - For full recommendations please see staff attestation.

## 2024-10-30 NOTE — PROGRESS NOTES
[facility]  Cardiac Electrophysiology  Progress Note    Admission Date: 10/28/2024  Code Status: Full Code   Attending Physician: Bruno Mahan*   Expected Discharge Date: 11/5/2024  Principal Problem:CHB (complete heart block)    Subjective:     Interval History: TVP placed yesterday evening. NAEON. V paced at 70. No acute complaints. Plan for pacemaker once INR therapeutic on coumadin.     ROS12 point ROS negative except for HPI.   Objective:     Vital Signs (Most Recent):  Temp: 98.3 °F (36.8 °C) (10/30/24 0715)  Pulse: 71 (10/30/24 0900)  Resp: (!) 27 (10/30/24 0900)  BP: 114/60 (10/30/24 0900)  SpO2: 100 % (10/30/24 0900) Vital Signs (24h Range):  Temp:  [97 °F (36.1 °C)-98.7 °F (37.1 °C)] 98.3 °F (36.8 °C)  Pulse:  [50-80] 71  Resp:  [14-40] 27  SpO2:  [92 %-100 %] 100 %  BP: ()/(50-83) 114/60     Weight: 102.9 kg (226 lb 13.7 oz)  Body mass index is 31.64 kg/m².     SpO2: 100 %        Physical Exam  Constitutional:       General: He is not in acute distress.  Neck:      Comments: TVP  Cardiovascular:      Rate and Rhythm: Normal rate and regular rhythm.   Pulmonary:      Effort: Pulmonary effort is normal.   Abdominal:      Palpations: Abdomen is soft.   Skin:     General: Skin is warm.   Neurological:      Mental Status: He is alert and oriented to person, place, and time.            Significant Labs: None    Significant Imaging: Echocardiogram: Transthoracic echo (TTE) complete (Cupid Only):   Results for orders placed or performed during the hospital encounter of 10/28/24   Echo   Result Value Ref Range    LV Diastolic Volume 152.50 mL    Echo EF Estimated 50 %    LV Systolic Volume 75.56 mL    IVS 1.1 0.6 - 1.1 cm    LVIDd 5.6 3.5 - 6.0 cm    LVIDs 4.1 (A) 2.1 - 4.0 cm    LVOT diameter 2.3 cm    PW 1.0 0.6 - 1.1 cm    AV LVOT peak gradient 2 mmHg    LVOT mn grad 1 mmHg    LVOT peak doc 0.7 m/s    LVOT peak VTI 12.6 cm    RV- wilson basal diam 3.6 cm    RV S' 13.77 cm/s    LA  size 4.95 cm    Left Atrium Major Axis 6.96 cm    Left Atrium Minor Axis 7.16 cm    LA Vol (MOD) 231.68 mL    RA Major Axis 6.27 cm    RA Area 28.6 cm2    AV valve area 2.3 cm2    AV area by cont VTI 2.1 cm2    AV peak gradient 6.6 mmHg    AV peak gradient 6.6 mmHg    AV mean gradient 4.0 mmHg    Ao peak hari 1.3 m/s    Ao VTI 24.6 cm    MV Peak E Hari 0.59 m/s    TDI LATERAL 0.13 m/s    TDI SEPTAL 0.08 m/s    IVC diameter 1.07 cm    Sinus 3.96 cm    LA WIDTH 5.99 cm    RA Width 5.24 cm    TAPSE 1.34 cm     Assessment and Plan:     * CHB (complete heart block)  Mr. 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     Recommendations:   - TVP placed. Threshold 0.3. Will check threshold daily.   - Avoid AV brady blocking agents.   - Patient on coumadin. Pacemaker placement pending therapeutic INR (close to 2).   - For full recommendations please see staff attestation.           Vidal Jean MD  Cardiac Electrophysiology  [facility] - Medical ICU

## 2024-10-30 NOTE — ASSESSMENT & PLAN NOTE
Patient with Hypoxic Respiratory failure which is Acute on chronic.  he is on home oxygen at 2 LPM. Supplemental oxygen was provided and noted- Oxygen Concentration (%):  [50] 50. Signs/symptoms of respiratory failure include- increased work of breathing. Contributing diagnoses includes - CHF Labs and images were reviewed. Patient Has not had a recent ABG. Will treat underlying causes and adjust management of respiratory failure as follows-     hypoxia requiring 5L NC, baseline 3L NC. CXR reveals Pulmonary findings suggest edema.  and pt. With edema, suspect HF Exacerbation, diurese and wean O2 as tolerated     -Not tolerating BiPAP. On 9L NC currently  -Lasix 60mg BID

## 2024-10-30 NOTE — SUBJECTIVE & OBJECTIVE
Interval History: TVP placed yesterday evening. NAEON. V paced at 70. No acute complaints. Plan for pacemaker once INR therapeutic on coumadin.     ROS12 point ROS negative except for HPI.   Objective:     Vital Signs (Most Recent):  Temp: 98.3 °F (36.8 °C) (10/30/24 0715)  Pulse: 71 (10/30/24 0900)  Resp: (!) 27 (10/30/24 0900)  BP: 114/60 (10/30/24 0900)  SpO2: 100 % (10/30/24 0900) Vital Signs (24h Range):  Temp:  [97 °F (36.1 °C)-98.7 °F (37.1 °C)] 98.3 °F (36.8 °C)  Pulse:  [50-80] 71  Resp:  [14-40] 27  SpO2:  [92 %-100 %] 100 %  BP: ()/(50-83) 114/60     Weight: 102.9 kg (226 lb 13.7 oz)  Body mass index is 31.64 kg/m².     SpO2: 100 %        Physical Exam  Constitutional:       General: He is not in acute distress.  Neck:      Comments: TVP  Cardiovascular:      Rate and Rhythm: Normal rate and regular rhythm.   Pulmonary:      Effort: Pulmonary effort is normal.   Abdominal:      Palpations: Abdomen is soft.   Skin:     General: Skin is warm.   Neurological:      Mental Status: He is alert and oriented to person, place, and time.            Significant Labs: None    Significant Imaging: Echocardiogram: Transthoracic echo (TTE) complete (Cupid Only):   Results for orders placed or performed during the hospital encounter of 10/28/24   Echo   Result Value Ref Range    LV Diastolic Volume 152.50 mL    Echo EF Estimated 50 %    LV Systolic Volume 75.56 mL    IVS 1.1 0.6 - 1.1 cm    LVIDd 5.6 3.5 - 6.0 cm    LVIDs 4.1 (A) 2.1 - 4.0 cm    LVOT diameter 2.3 cm    PW 1.0 0.6 - 1.1 cm    AV LVOT peak gradient 2 mmHg    LVOT mn grad 1 mmHg    LVOT peak doc 0.7 m/s    LVOT peak VTI 12.6 cm    RV- wilson basal diam 3.6 cm    RV S' 13.77 cm/s    LA size 4.95 cm    Left Atrium Major Axis 6.96 cm    Left Atrium Minor Axis 7.16 cm    LA Vol (MOD) 231.68 mL    RA Major Axis 6.27 cm    RA Area 28.6 cm2    AV valve area 2.3 cm2    AV area by cont VTI 2.1 cm2    AV peak gradient 6.6 mmHg    AV peak gradient 6.6 mmHg    AV  mean gradient 4.0 mmHg    Ao peak hari 1.3 m/s    Ao VTI 24.6 cm    MV Peak E Hari 0.59 m/s    TDI LATERAL 0.13 m/s    TDI SEPTAL 0.08 m/s    IVC diameter 1.07 cm    Sinus 3.96 cm    LA WIDTH 5.99 cm    RA Width 5.24 cm    TAPSE 1.34 cm

## 2024-10-30 NOTE — ASSESSMENT & PLAN NOTE
Presented to the ED with SOB and AMS. Found to be in CHB in the MICU. TV placed by RP on 10/29 with symptomatic improvement. V paced at 70.     - EP following, will place pacemaker once INR therapeutic on coumadin. Goal ~2.0

## 2024-10-31 LAB
ALBUMIN SERPL BCP-MCNC: 2.3 G/DL (ref 3.5–5.2)
ALP SERPL-CCNC: 135 U/L (ref 40–150)
ALT SERPL W/O P-5'-P-CCNC: 16 U/L (ref 10–44)
ANION GAP SERPL CALC-SCNC: 11 MMOL/L (ref 8–16)
APTT PPP: 37.7 SEC (ref 21–32)
APTT PPP: 50.2 SEC (ref 21–32)
APTT PPP: 70 SEC (ref 21–32)
AST SERPL-CCNC: 24 U/L (ref 10–40)
BASOPHILS # BLD AUTO: 0.01 K/UL (ref 0–0.2)
BASOPHILS NFR BLD: 0.3 % (ref 0–1.9)
BILIRUB SERPL-MCNC: 2.2 MG/DL (ref 0.1–1)
BUN SERPL-MCNC: 22 MG/DL (ref 8–23)
CALCIUM SERPL-MCNC: 8.2 MG/DL (ref 8.7–10.5)
CHLORIDE SERPL-SCNC: 92 MMOL/L (ref 95–110)
CO2 SERPL-SCNC: 36 MMOL/L (ref 23–29)
CREAT SERPL-MCNC: 0.9 MG/DL (ref 0.5–1.4)
DIFFERENTIAL METHOD BLD: ABNORMAL
EOSINOPHIL # BLD AUTO: 0 K/UL (ref 0–0.5)
EOSINOPHIL NFR BLD: 0.3 % (ref 0–8)
ERYTHROCYTE [DISTWIDTH] IN BLOOD BY AUTOMATED COUNT: 15.9 % (ref 11.5–14.5)
EST. GFR  (NO RACE VARIABLE): >60 ML/MIN/1.73 M^2
GLUCOSE SERPL-MCNC: 102 MG/DL (ref 70–110)
HCT VFR BLD AUTO: 34 % (ref 40–54)
HGB BLD-MCNC: 10.8 G/DL (ref 14–18)
IMM GRANULOCYTES # BLD AUTO: 0.01 K/UL (ref 0–0.04)
IMM GRANULOCYTES NFR BLD AUTO: 0.3 % (ref 0–0.5)
INR PPP: 1.1 (ref 0.8–1.2)
LYMPHOCYTES # BLD AUTO: 0.3 K/UL (ref 1–4.8)
LYMPHOCYTES NFR BLD: 8.4 % (ref 18–48)
MAGNESIUM SERPL-MCNC: 2.1 MG/DL (ref 1.6–2.6)
MCH RBC QN AUTO: 33.1 PG (ref 27–31)
MCHC RBC AUTO-ENTMCNC: 31.8 G/DL (ref 32–36)
MCV RBC AUTO: 104 FL (ref 82–98)
MONOCYTES # BLD AUTO: 0.5 K/UL (ref 0.3–1)
MONOCYTES NFR BLD: 13.5 % (ref 4–15)
NEUTROPHILS # BLD AUTO: 3 K/UL (ref 1.8–7.7)
NEUTROPHILS NFR BLD: 77.2 % (ref 38–73)
NRBC BLD-RTO: 0 /100 WBC
PHOSPHATE SERPL-MCNC: 2.2 MG/DL (ref 2.7–4.5)
PLATELET # BLD AUTO: 156 K/UL (ref 150–450)
PMV BLD AUTO: 11.7 FL (ref 9.2–12.9)
POTASSIUM SERPL-SCNC: 3.5 MMOL/L (ref 3.5–5.1)
POTASSIUM SERPL-SCNC: 3.6 MMOL/L (ref 3.5–5.1)
POTASSIUM SERPL-SCNC: 3.8 MMOL/L (ref 3.5–5.1)
PROT SERPL-MCNC: 5.5 G/DL (ref 6–8.4)
PROTHROMBIN TIME: 11.9 SEC (ref 9–12.5)
RBC # BLD AUTO: 3.26 M/UL (ref 4.6–6.2)
SODIUM SERPL-SCNC: 139 MMOL/L (ref 136–145)
WBC # BLD AUTO: 3.92 K/UL (ref 3.9–12.7)

## 2024-10-31 PROCEDURE — 25000003 PHARM REV CODE 250: Performed by: NURSE PRACTITIONER

## 2024-10-31 PROCEDURE — 63600175 PHARM REV CODE 636 W HCPCS

## 2024-10-31 PROCEDURE — 99291 CRITICAL CARE FIRST HOUR: CPT | Mod: GC,,, | Performed by: INTERNAL MEDICINE

## 2024-10-31 PROCEDURE — 94660 CPAP INITIATION&MGMT: CPT

## 2024-10-31 PROCEDURE — 25000003 PHARM REV CODE 250: Performed by: INTERNAL MEDICINE

## 2024-10-31 PROCEDURE — 25000003 PHARM REV CODE 250

## 2024-10-31 PROCEDURE — 94761 N-INVAS EAR/PLS OXIMETRY MLT: CPT

## 2024-10-31 PROCEDURE — 85730 THROMBOPLASTIN TIME PARTIAL: CPT | Mod: 91 | Performed by: INTERNAL MEDICINE

## 2024-10-31 PROCEDURE — 85730 THROMBOPLASTIN TIME PARTIAL: CPT

## 2024-10-31 PROCEDURE — 84132 ASSAY OF SERUM POTASSIUM: CPT | Performed by: INTERNAL MEDICINE

## 2024-10-31 PROCEDURE — 25000003 PHARM REV CODE 250: Performed by: STUDENT IN AN ORGANIZED HEALTH CARE EDUCATION/TRAINING PROGRAM

## 2024-10-31 PROCEDURE — 27000221 HC OXYGEN, UP TO 24 HOURS

## 2024-10-31 PROCEDURE — 25000003 PHARM REV CODE 250: Performed by: HOSPITALIST

## 2024-10-31 PROCEDURE — 83735 ASSAY OF MAGNESIUM: CPT | Performed by: STUDENT IN AN ORGANIZED HEALTH CARE EDUCATION/TRAINING PROGRAM

## 2024-10-31 PROCEDURE — 80053 COMPREHEN METABOLIC PANEL: CPT | Performed by: STUDENT IN AN ORGANIZED HEALTH CARE EDUCATION/TRAINING PROGRAM

## 2024-10-31 PROCEDURE — 84100 ASSAY OF PHOSPHORUS: CPT | Performed by: STUDENT IN AN ORGANIZED HEALTH CARE EDUCATION/TRAINING PROGRAM

## 2024-10-31 PROCEDURE — 85025 COMPLETE CBC W/AUTO DIFF WBC: CPT | Performed by: STUDENT IN AN ORGANIZED HEALTH CARE EDUCATION/TRAINING PROGRAM

## 2024-10-31 PROCEDURE — 85610 PROTHROMBIN TIME: CPT | Performed by: STUDENT IN AN ORGANIZED HEALTH CARE EDUCATION/TRAINING PROGRAM

## 2024-10-31 PROCEDURE — 99233 SBSQ HOSP IP/OBS HIGH 50: CPT | Mod: ,,, | Performed by: STUDENT IN AN ORGANIZED HEALTH CARE EDUCATION/TRAINING PROGRAM

## 2024-10-31 PROCEDURE — 20000000 HC ICU ROOM

## 2024-10-31 PROCEDURE — 99900035 HC TECH TIME PER 15 MIN (STAT)

## 2024-10-31 RX ORDER — TAMSULOSIN HYDROCHLORIDE 0.4 MG/1
0.4 CAPSULE ORAL DAILY
Status: DISCONTINUED | OUTPATIENT
Start: 2024-10-31 | End: 2024-11-08 | Stop reason: HOSPADM

## 2024-10-31 RX ORDER — WARFARIN SODIUM 5 MG/1
5 TABLET ORAL DAILY
Status: DISCONTINUED | OUTPATIENT
Start: 2024-10-31 | End: 2024-11-01

## 2024-10-31 RX ORDER — POTASSIUM CHLORIDE 750 MG/1
50 CAPSULE, EXTENDED RELEASE ORAL ONCE
Status: COMPLETED | OUTPATIENT
Start: 2024-10-31 | End: 2024-10-31

## 2024-10-31 RX ORDER — POTASSIUM CHLORIDE 750 MG/1
50 CAPSULE, EXTENDED RELEASE ORAL ONCE
Status: DISCONTINUED | OUTPATIENT
Start: 2024-11-01 | End: 2024-10-31

## 2024-10-31 RX ORDER — POTASSIUM CHLORIDE 750 MG/1
50 CAPSULE, EXTENDED RELEASE ORAL ONCE
Status: COMPLETED | OUTPATIENT
Start: 2024-11-01 | End: 2024-10-31

## 2024-10-31 RX ORDER — SODIUM,POTASSIUM PHOSPHATES 280-250MG
2 POWDER IN PACKET (EA) ORAL ONCE
Status: COMPLETED | OUTPATIENT
Start: 2024-10-31 | End: 2024-10-31

## 2024-10-31 RX ORDER — BALSAM PERU/CASTOR OIL
OINTMENT (GRAM) TOPICAL 2 TIMES DAILY
Status: DISCONTINUED | OUTPATIENT
Start: 2024-10-31 | End: 2024-11-08 | Stop reason: HOSPADM

## 2024-10-31 RX ORDER — THIAMINE HCL 100 MG
100 TABLET ORAL DAILY
Status: DISCONTINUED | OUTPATIENT
Start: 2024-11-01 | End: 2024-11-08 | Stop reason: HOSPADM

## 2024-10-31 RX ADMIN — MIRTAZAPINE 7.5 MG: 7.5 TABLET, FILM COATED ORAL at 09:10

## 2024-10-31 RX ADMIN — TAMSULOSIN HYDROCHLORIDE 0.4 MG: 0.4 CAPSULE ORAL at 08:10

## 2024-10-31 RX ADMIN — POTASSIUM CHLORIDE 50 MEQ: 750 CAPSULE, EXTENDED RELEASE ORAL at 05:10

## 2024-10-31 RX ADMIN — WARFARIN SODIUM 5 MG: 5 TABLET ORAL at 05:10

## 2024-10-31 RX ADMIN — HEPARIN SODIUM 15 UNITS/KG/HR: 10000 INJECTION, SOLUTION INTRAVENOUS at 03:10

## 2024-10-31 RX ADMIN — SACUBITRIL AND VALSARTAN 1 TABLET: 24; 26 TABLET, FILM COATED ORAL at 08:10

## 2024-10-31 RX ADMIN — THIAMINE HYDROCHLORIDE 100 MG: 100 INJECTION, SOLUTION INTRAMUSCULAR; INTRAVENOUS at 09:10

## 2024-10-31 RX ADMIN — ASPIRIN 81 MG: 81 TABLET, COATED ORAL at 08:10

## 2024-10-31 RX ADMIN — FUROSEMIDE 80 MG: 10 INJECTION, SOLUTION INTRAVENOUS at 08:10

## 2024-10-31 RX ADMIN — LEVOTHYROXINE SODIUM 200 MCG: 100 TABLET ORAL at 05:10

## 2024-10-31 RX ADMIN — SACUBITRIL AND VALSARTAN 1 TABLET: 24; 26 TABLET, FILM COATED ORAL at 09:10

## 2024-10-31 RX ADMIN — Medication: at 09:10

## 2024-10-31 RX ADMIN — POTASSIUM CHLORIDE 50 MEQ: 750 CAPSULE, EXTENDED RELEASE ORAL at 11:10

## 2024-10-31 RX ADMIN — Medication: at 05:10

## 2024-10-31 RX ADMIN — POTASSIUM & SODIUM PHOSPHATES POWDER PACK 280-160-250 MG 2 PACKET: 280-160-250 PACK at 08:10

## 2024-10-31 RX ADMIN — FUROSEMIDE 80 MG: 10 INJECTION, SOLUTION INTRAVENOUS at 05:10

## 2024-10-31 RX ADMIN — ATORVASTATIN CALCIUM 10 MG: 10 TABLET, FILM COATED ORAL at 08:10

## 2024-10-31 NOTE — PROGRESS NOTES
[facility] - Card[facility] Intensive Care  Cardiology  Progress Note    Patient Name: Bean Salas  MRN: [id number]  Admission Date: 10/28/2024  Hospital Length of Stay: 2 days  Code Status: Full Code   Attending Physician: Elio Giron MD   Primary Care Physician: Ramirez Levine MD  Expected Discharge Date: 11/5/2024  Principal Problem:CHB (complete heart block)    Subjective:     Hospital Course:   Patient was transferred to CICU from MICU on 10/30 for continued heparin bridging until INR therapeutic on warfarin prior to PPM placement. Patient diuresing well and with O2 weaned down to home baseline of 3L NC. INR still subtherapeutic on 10/31 so warfarin dose increased.    Interval History: Patient doing well this morning. No acute events overnight. Diuresing well. INR 1.1, increasing warfarin dose to 5mg. Down to 3L NC (baseline at home).    Review of Systems   Constitutional: Negative for chills and fever.   Cardiovascular:  Positive for dyspnea on exertion and leg swelling. Negative for chest pain.   Respiratory:  Positive for shortness of breath. Negative for cough.    Musculoskeletal:  Negative for back pain.   Gastrointestinal:  Negative for abdominal pain and constipation.   Genitourinary:  Negative for dysuria and hematuria.   Neurological:  Negative for focal weakness and headaches.   Psychiatric/Behavioral:  Negative for altered mental status. The patient is not nervous/anxious.      Objective:     Vital Signs (Most Recent):  Temp: 98.2 °F (36.8 °C) (10/31/24 0300)  Pulse: 70 (10/31/24 0600)  Resp: 16 (10/31/24 0600)  BP: (!) 109/56 (10/31/24 0600)  SpO2: 96 % (10/31/24 0600) Vital Signs (24h Range):  Temp:  [97.5 °F (36.4 °C)-98.7 °F (37.1 °C)] 98.2 °F (36.8 °C)  Pulse:  [68-71] 70  Resp:  [15-51] 16  SpO2:  [93 %-100 %] 96 %  BP: ()/(51-61) 109/56     Weight: 102.9 kg (226 lb 13.7 oz)  Body mass index is 31.64 kg/m².     SpO2: 96 %         Intake/Output Summary (Last 24 hours) at 10/31/2024  1019  Last data filed at 10/31/2024 0600  Gross per 24 hour   Intake 804.83 ml   Output 3100 ml   Net -2295.17 ml       Lines/Drains/Airways       Central Venous Catheter Line  Duration             Introducer 10/29/24 1601 Internal Jugular Right 1 day              Drain  Duration                  Urethral Catheter -- days              Line  Duration                  Pacer Wires 10/29/24 1606 1 day              Peripheral Intravenous Line  Duration                  Peripheral IV - Single Lumen 10/28/24 1738 18 G 1 1/4 in Anterior;Distal;Left Upper Arm 2 days         Peripheral IV - Single Lumen 10/29/24 1838 20 G Right Antecubital 1 day                       Physical Exam  Constitutional:       General: He is not in acute distress.  HENT:      Head: Normocephalic and atraumatic.      Nose:      Comments: NC on  Neck:      Comments: TVP  Cardiovascular:      Rate and Rhythm: Normal rate and regular rhythm.   Pulmonary:      Effort: Pulmonary effort is normal.   Abdominal:      Palpations: Abdomen is soft.   Musculoskeletal:         General: Swelling present.      Right lower leg: Edema present.      Left lower leg: Edema present.   Skin:     General: Skin is warm.   Neurological:      Mental Status: He is alert and oriented to person, place, and time.            Significant Labs: All pertinent lab results from the last 24 hours have been reviewed.    Significant Imaging: All relevant imaging was reviewed    Assessment and Plan:       * CHB (complete heart block)  Presented to the ED with SOB and AMS. Found to be in CHB in the MICU. TV placed by RP on 10/29 with symptomatic improvement. V paced at 70.     - EP following, will place pacemaker once INR therapeutic on coumadin. Goal ~2.0    Encephalopathy, metabolic  P/w acute encephalopathy but improved after BiPAP and TVP. GCS 15 and ANOx4 currently. Mental status improved. Anticipate AMS 2/2 hypercapnia and bradyarrhythmia. CTH without acute intracranial process  VBG  mildly acidotic with hypercapnia above baseline     - See acute respiratory failure and CHB  - Improved    History of pulmonary embolism  Diagnosed with PE 9/16/2024 when admitted for pelvic fracture.     - On heparin gtt with warfarin started.  - INR goal 2.0 prior to PPM placement.  - INR still subtherapeutic, will increase Warfarin to 5mg    Paroxysmal atrial fibrillation  Patient has persistent (7 days or more) atrial fibrillation. Patient is currently in sinus rhythm. [id number] Score: 3. The patients heart rate in the last 24 hours is as follows:  Pulse  Min: 64  Max: 80       Anticoagulants  heparin 25,000 units in dextrose 5% 250 mL (100 units/mL) infusion HIGH INTENSITY nomogram - [facility], Continuous, Intravenous  heparin 25,000 units in dextrose 5% (100 units/ml) IV bolus from bag HIGH INTENSITY nomogram - [facility], As needed (PRN), Intravenous  heparin 25,000 units in dextrose 5% (100 units/ml) IV bolus from bag HIGH INTENSITY nomogram - [facility], As needed (PRN), Intravenous  warfarin (COUMADIN) tablet 2.5 mg, Daily, Oral    Plan  - Replete lytes with a goal of K>4, Mg >2  - Patient is anticoagulated, see medications listed above.  - Patient's afib is currently controlled, will hold rate control at this time given TVP.    Acute on chronic respiratory failure with hypoxia  Patient p/w hypoxia requiring increased O2 requirements, baseline 3L NC. History of CHF with history of moderately reduced EF of 45-50% in the past. Was previously on GDMT but had some pillars of therapy removed during previous admission due to hypotension and PAULA. CXR reveals pulmonary findings suggest edema.  and pt. With edema, suspect HF exacerbation, diurese and wean O2 as tolerated.    Plan:  - BiPAP qhs or with naps.   - On home O2 requirements, 3L  - Lasix 80mg BID for diuresis for pulmonary edema, titrate to UOP, 2-3L negative daily   - At goal.  - Fluid restriction, 1.5L and low Na diet  - Strict I/Os, daily weights and volume  assessments  - Monitor electrolytes with daily CMP, maintain Mg > 2 and K > 4.    Acute on chronic diastolic heart failure  See acute on chronic respiratory failure.    Hypothyroidism due to acquired atrophy of thyroid  Chronic condition, stable.    - Continue home synthroid     Essential hypertension  Patients blood pressure range in the last 24 hours was: BP  Min: 87/53  Max: 189/87.The patient's inpatient anti-hypertensive regimen is listed below:  Current Antihypertensives  furosemide injection 80 mg, 2 times daily, Intravenous    Plan  - BP is controlled, no changes needed to their regimen  - Due to initial hypotension, will be conservative with antihypertensives. Can adjust regimen or dosages as indicated  - Entresto previously discontinued on prior admission but was a prior home medication. Given moderately reduced EF, will resume Entresto at the lowest dose 24-26 BID.        VTE Risk Mitigation (From admission, onward)           Ordered     warfarin (COUMADIN) tablet 5 mg  Daily         10/31/24 0911     heparin 25,000 units in dextrose 5% (100 units/ml) IV bolus from bag HIGH INTENSITY nomogram - [facility]  As needed (PRN)        Question:  Heparin Infusion Adjustment (DO NOT MODIFY ANSWER)  Answer:  [email or web address]\Images\Pharmacy\HeparinInfusions\heparin HIGH INTENSITY nomogram for [facility] [id number]    10/29/24 1656     heparin 25,000 units in dextrose 5% (100 units/ml) IV bolus from bag HIGH INTENSITY nomogram - [facility]  As needed (PRN)        Question:  Heparin Infusion Adjustment (DO NOT MODIFY ANSWER)  Answer:  [email or web address]\[email or web address]\Images\Pharmacy\HeparinInfusions\heparin HIGH INTENSITY nomogram for [facility] [id number].pdf    10/29/24 1656     heparin 25,000 units in dextrose 5% 250 mL (100 units/mL) infusion HIGH INTENSITY nomogram - [facility]  Continuous        Question:  Begin at (units/kg/hr)  Answer:  18    10/29/24 1656                    Nathan Marshall MD  Cardiology  [facility] - Cardiac Intensive Care

## 2024-10-31 NOTE — PLAN OF CARE
CICU DAILY GOALS       A: Awake    RASS: Goal - RASS Goal: 0-->alert and calm  Actual - RASS (Nettles Agitation-Sedation Scale): alert and calm   Restraint necessity:    B: Breath   SBT: Not intubated   C: Coordinate A & B, analgesics/sedatives   Pain: managed    SAT: Not intubated  D: Delirium   CAM-ICU: Overall CAM-ICU: Negative  E: Early(intubated/ Progressive (non-intubated) Mobility   MOVE Screen: Fail   Activity: Activity Management: Rolling - L1  FAS: Feeding/Nutrition   Diet order: Diet/Nutrition Received: low saturated fat/low cholesterol, 2 gram sodium, restrict fluids,   Fluid restriction: Fluid Requirement: 1500   Nutritional Supplement Intake: Quantity 0, Type:  0  T: Thrombus   DVT prophylaxis: VTE Core Measure: Pharmacological prophylaxis initiated/maintained  H: HOB Elevation   Head of Bed (HOB) Positioning: HOB elevated  U: Ulcer Prophylaxis   GI: yes  G: Glucose control   managed    S: Skin   Bathing/Skin Care: bath, complete;linen changed (10/29/24 0705)  Wounds: No  Wound care consulted: No  B: Bowel Function   no issues   I: Indwelling Catheters   [place] necessity:      Urethral Catheter-Reason for Continuing Urinary Catheterization: Chronic Indwelling Urinary Catheter on Admission   CVC necessity: Yes  D: De-escalation Antibx   No  Plan for the day   Monitor HR and BP  Family/Goals of care/Code Status   Code Status: Full Code     No acute events throughout day, VS and assessment per flow sheet, patient progressing towards goals as tolerated, plan of care reviewed with Bean Salas and family, all concerns addressed, will continue to monitor.

## 2024-10-31 NOTE — HOSPITAL COURSE
Patient was transferred to CICU from MICU on 10/30 for continued heparin bridging until INR therapeutic on warfarin prior to PPM placement. Patient diuresing well and with O2 weaned down to home baseline of 3L NC. INR remained subtherapeutic so warfarin dose was increased to 10mg on 11/2. INR 1.6 on 11/3, warfarin decreased to 7.5. Plan for PPM on 11/4.

## 2024-10-31 NOTE — PROGRESS NOTES
[facility] - Cardiac Intensive Care  Cardiac Electrophysiology  Progress Note    Admission Date: 10/28/2024  Code Status: Full Code   Attending Physician: Elio Giron MD   Expected Discharge Date: 11/5/2024  Principal Problem:CHB (complete heart block)    Subjective:     Interval History: Received first dose of warfarin yesterday, heparin bridge. INR this morning 1.1. TVP threshold 0.3. No complaints at this time. Holding home metoprolol and amiodarone.     TTE 10/30/24:    Left Ventricle: The left ventricle is normal in size. Normal wall thickness. Mild global hypokinesis present. There is mildly reduced systolic function. Ejection fraction is approximately 45%.    Right Ventricle: Normal right ventricular cavity size. Systolic function is mildly reduced.    Left Atrium: Left atrium is severely dilated. The left atrium volume index MOD is 104 mL/m2.    Right Atrium: Right atrium is mildly dilated.    Pulmonary Artery: Pulmonary artery pressure could not be estimated.    IVC/SVC: Normal venous pressure at 3 mmHg.    TTE 10/1/24:    Left Ventricle: The left ventricle is mildly dilated. Ventricular mass is normal. Mildly increased wall thickness. Regional wall motion abnormalities present. See diagram for wall motion findings. Septal motion is abnormal. There is normal systolic function with a visually estimated ejection fraction of 55 - 60%. Ejection fraction is approximately 55%. Grade II diastolic dysfunction.    Right Ventricle: Normal right ventricular cavity size. Wall thickness is normal. Systolic function is normal.    Left Atrium: Left atrium is severely dilated.    Right Atrium: Right atrium is mildly dilated.    Aortic Valve: There is moderate aortic valve sclerosis.    Mitral Valve: There is mild to moderate regurgitation with multiple jets.    Pulmonary Artery: There is mild to moderate pulmonary hypertension. The estimated pulmonary artery systolic pressure is 46 mmHg.    IVC/SVC: Normal venous  pressure at 3 mmHg.    Objective:     Vital Signs (Most Recent):  Temp: 98.2 °F (36.8 °C) (10/31/24 0300)  Pulse: 70 (10/31/24 0600)  Resp: 16 (10/31/24 0600)  BP: (!) 109/56 (10/31/24 0600)  SpO2: 96 % (10/31/24 0600) Vital Signs (24h Range):  Temp:  [97.5 °F (36.4 °C)-98.7 °F (37.1 °C)] 98.2 °F (36.8 °C)  Pulse:  [68-71] 70  Resp:  [15-51] 16  SpO2:  [93 %-100 %] 96 %  BP: ()/(51-61) 109/56     Weight: 102.9 kg (226 lb 13.7 oz)  Body mass index is 31.64 kg/m².     SpO2: 96 %        Physical Exam     Constitutional:       General: He is not in acute distress.  Neck:      Comments: TVP  Cardiovascular:      Rate and Rhythm: Normal rate and regular rhythm.   Pulmonary:      Effort: Pulmonary effort is normal.   Abdominal:      Palpations: Abdomen is soft.   Skin:     General: Skin is warm.   Neurological:      Mental Status: He is alert and oriented to person, place, and time.       Significant Labs:   Recent Lab Results         10/31/24  0321   10/30/24  2116   10/30/24  1503   10/30/24  1025        Albumin 2.3   2.8              146           ALT 16   20           Anion Gap 11   8           Ao peak doc       1.3       Ao VTI       24.6       PTT 70.0  Comment: Refer to local heparin nomogram for intensity/dose specific   therapeutic   range.     63.7  Comment: Refer to local heparin nomogram for intensity/dose specific   therapeutic   range.     51.0  Comment: Refer to local heparin nomogram for intensity/dose specific   therapeutic   range.           AST 24   25           AV valve area       2.1       BRAYAN by Velocity Ratio       2.2       AV area by cont VTI       2.1       AV mean gradient       4.0       AV index (prosthetic)       0.51       LVOT mn grad       1       AV LVOT peak gradient       2       AV peak gradient       6.8       AV Velocity Ratio       0.54       Baso # 0.01   0.02           Basophil % 0.3   0.5           BILIRUBIN TOTAL 2.2  Comment: For infants and newborns, interpretation  of results should be based  on gestational age, weight and in agreement with clinical  observations.    Premature Infant recommended reference ranges:  Up to 24 hours.............<8.0 mg/dL  Up to 48 hours............<12.0 mg/dL  3-5 days..................<15.0 mg/dL  6-29 days.................<15.0 mg/dL     2.0  Comment: For infants and newborns, interpretation of results should be based  on gestational age, weight and in agreement with clinical  observations.    Premature Infant recommended reference ranges:  Up to 24 hours.............<8.0 mg/dL  Up to 48 hours............<12.0 mg/dL  3-5 days..................<15.0 mg/dL  6-29 days.................<15.0 mg/dL             BSA       2.29       BUN 22   23           Calcium 8.2   8.3           Chloride 92   92           CO2 36   41  Comment: *Critical value notification by MINISTERIO with confirmation of receipt to   Cricket Ramos RN at [id number]/24 Time21:55             Creatinine 0.9   1.0           Left Ventricle Relative Wall Thickness       0.36       Differential Method Automated   Automated           Echo EF Estimated       50       E/E' ratio       5.62       eGFR >60.0   >60.0           EF       445       Eos # 0.0   0.0           Eos % 0.3   0.2           FS       26.8       Glucose 102   94           Gran # (ANC) 3.0   3.3           Gran % 77.2   76.3           Hematocrit 34.0   36.5           Hemoglobin 10.8   11.3           Immature Grans (Abs) 0.01  Comment: Mild elevation in immature granulocytes is non specific and   can be seen in a variety of conditions including stress response,   acute inflammation, trauma and pregnancy. Correlation with other   laboratory and clinical findings is essential.     0.00  Comment: Mild elevation in immature granulocytes is non specific and   can be seen in a variety of conditions including stress response,   acute inflammation, trauma and pregnancy. Correlation with other   laboratory and clinical findings is  essential.             Immature Granulocytes 0.3   0.0           INR 1.1  Comment: Coumadin Therapy:  2.0 - 3.0 for INR for all indicators except mechanical heart valves  and antiphospholipid syndromes which should use 2.5 - 3.5.               IVC diameter       1.07       IVSd       1.1       LA WIDTH       5.99       Left Atrium Major Axis       6.96       Left Atrium Minor Axis       7.16       LA size       4.95       LA Vol       177.90              231.68       LA vol index       80.1       REGINALD (MOD)       104.4       LVOT area       4.2       LV LATERAL E/E' RATIO       4.54       LV SEPTAL E/E' RATIO       7.38       LV EDV BP       152.50       LV Diastolic Volume Index       68.69       LVIDd       5.6       LVIDs       4.1       LV mass       234.3       LV Mass Index       105.6       LVOT diameter       2.3       LVOT peak doc       0.7       LVOT stroke volume       52.3       LVOT peak VTI       12.6       LV ESV BP       75.56       LV Systolic Volume Index       34.0       Lymph # 0.3   0.4           Lymph % 8.4   9.3           Magnesium  2.1   2.2           MCH 33.1   32.9           MCHC 31.8   31.0              106           Mean e'       0.11       Mono # 0.5   0.6           Mono % 13.5   13.7           MPV 11.7   11.5           MV Peak E Doc       0.59       nRBC 0   0           Phosphorus Level 2.2   2.6           Platelet Count 156   161           Potassium 3.5   4.0           PROTEIN TOTAL 5.5   5.9           PT 11.9             PW       1.0       RA Area       28.6       RA Major Axis       6.27       Est. RA pres       3       RA Width       5.24       RBC 3.26   3.43           RDW 15.9   15.9           RV S'       13.77       RV/LV Ratio       0.64       RV- wilson basal diam       3.6       Sinus       3.96       Sodium 139   141           TAPSE       1.34       TDI SEPTAL       0.08       TDI LATERAL       0.13       WBC 3.92   4.30           ZLVIDD       -3.25       ZLVIDS        -1.09                 Assessment and Plan:     * CHB (complete heart block)  Mr. 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     Recommendations:   - TVP placed. Threshold 0.3. Will check threshold daily.   - Avoid AV brady blocking agents.   - Patient on coumadin. Pacemaker placement pending therapeutic INR (close to 2).   - For full recommendations please see staff attestation.           Chiquita Akins MD  Cardiac Electrophysiology  [facility] - Cardiac Intensive [facility]

## 2024-10-31 NOTE — ASSESSMENT & PLAN NOTE
Patient p/w hypoxia requiring increased O2 requirements, baseline 3L NC. History of CHF with history of moderately reduced EF of 45-50% in the past. Was previously on GDMT but had some pillars of therapy removed during previous admission due to hypotension and PAULA. CXR reveals pulmonary findings suggest edema.  and pt. With edema, suspect HF exacerbation, diurese and wean O2 as tolerated.    Plan:  - BiPAP qhs or with naps.   - On home O2 requirements, 3L  - Lasix 80mg BID for diuresis for pulmonary edema, titrate to UOP, 2-3L negative daily   - At goal.  - Fluid restriction, 1.5L and low Na diet  - Strict I/Os, daily weights and volume assessments  - Monitor electrolytes with daily CMP, maintain Mg > 2 and K > 4.

## 2024-10-31 NOTE — SUBJECTIVE & OBJECTIVE
Interval History: Patient doing well this morning. No acute events overnight. Diuresing well. INR 1.1, increasing warfarin dose to 5mg. Down to 3L NC (baseline at home).    Review of Systems   Constitutional: Negative for chills and fever.   Cardiovascular:  Positive for dyspnea on exertion and leg swelling. Negative for chest pain.   Respiratory:  Positive for shortness of breath. Negative for cough.    Musculoskeletal:  Negative for back pain.   Gastrointestinal:  Negative for abdominal pain and constipation.   Genitourinary:  Negative for dysuria and hematuria.   Neurological:  Negative for focal weakness and headaches.   Psychiatric/Behavioral:  Negative for altered mental status. The patient is not nervous/anxious.      Objective:     Vital Signs (Most Recent):  Temp: 98.2 °F (36.8 °C) (10/31/24 0300)  Pulse: 70 (10/31/24 0600)  Resp: 16 (10/31/24 0600)  BP: (!) 109/56 (10/31/24 0600)  SpO2: 96 % (10/31/24 0600) Vital Signs (24h Range):  Temp:  [97.5 °F (36.4 °C)-98.7 °F (37.1 °C)] 98.2 °F (36.8 °C)  Pulse:  [68-71] 70  Resp:  [15-51] 16  SpO2:  [93 %-100 %] 96 %  BP: ()/(51-61) 109/56     Weight: 102.9 kg (226 lb 13.7 oz)  Body mass index is 31.64 kg/m².     SpO2: 96 %         Intake/Output Summary (Last 24 hours) at 10/31/2024 1019  Last data filed at 10/31/2024 0600  Gross per 24 hour   Intake 804.83 ml   Output 3100 ml   Net -2295.17 ml       Lines/Drains/Airways       Central Venous Catheter Line  Duration             Introducer 10/29/24 1601 Internal Jugular Right 1 day              Drain  Duration                  Urethral Catheter -- days              Line  Duration                  Pacer Wires 10/29/24 1606 1 day              Peripheral Intravenous Line  Duration                  Peripheral IV - Single Lumen 10/28/24 1738 18 G 1 1/4 in Anterior;Distal;Left Upper Arm 2 days         Peripheral IV - Single Lumen 10/29/24 1838 20 G Right Antecubital 1 day                       Physical  Exam  Constitutional:       General: He is not in acute distress.  HENT:      Head: Normocephalic and atraumatic.      Nose:      Comments: NC on  Neck:      Comments: TVP  Cardiovascular:      Rate and Rhythm: Normal rate and regular rhythm.   Pulmonary:      Effort: Pulmonary effort is normal.   Abdominal:      Palpations: Abdomen is soft.   Musculoskeletal:         General: Swelling present.      Right lower leg: Edema present.      Left lower leg: Edema present.   Skin:     General: Skin is warm.   Neurological:      Mental Status: He is alert and oriented to person, place, and time.            Significant Labs: All pertinent lab results from the last 24 hours have been reviewed.    Significant Imaging: All relevant imaging was reviewed

## 2024-10-31 NOTE — PROGRESS NOTES
[facility] - Cardiac Intensive Care  Wound Care    Patient Name:  Bean Salas   MRN:  [id number]  Date: 10/31/2024  Diagnosis: CHB (complete heart block)    History:     Past Medical History:   Diagnosis Date    Atrial fibrillation, unspecified type 2023    COPD exacerbation 2023    Thyroid disease     hypo       Social History     Socioeconomic History    Marital status:    Tobacco Use    Smoking status: Former     Current packs/day: 0.00     Average packs/day: 1 pack/day for 35.0 years (35.0 ttl pk-yrs)     Types: Cigarettes     Start date: 1965     Quit date: 2000     Years since quittin.8     Passive exposure: Past    Smokeless tobacco: Never   Substance and Sexual Activity    Alcohol use: No    Drug use: Never    Sexual activity: Not Currently     Social Drivers of Health     Financial Resource Strain: Low Risk  (10/30/2024)    Overall Financial Resource Strain (CARDIA)     Difficulty of Paying Living Expenses: Not hard at all   Food Insecurity: No Food Insecurity (10/30/2024)    Hunger Vital Sign     Worried About Running Out of Food in the Last Year: Never true     Ran Out of Food in the Last Year: Never true   Transportation Needs: No Transportation Needs (10/30/2024)    TRANSPORTATION NEEDS     Transportation : No   Physical Activity: Inactive (10/30/2024)    Exercise Vital Sign     Days of Exercise per Week: 0 days     Minutes of Exercise per Session: 0 min   Stress: No Stress Concern Present (10/30/2024)    [employer] - Occupational Stress Questionnaire     Feeling of Stress : Not at all   Recent Concern: Stress - Stress Concern Present (2024)    [employer] - Occupational Stress Questionnaire     Feeling of Stress : To some extent   Housing Stability: Low Risk  (10/30/2024)    Housing Stability Vital Sign     Unable to Pay for Housing in the Last Year: No     Homeless in the Last Year: No       Precautions:      Allergies as of 10/28/2024    (No Known Allergies)       [facility] Assessment Details/Treatment   Patient seen for wound care consultation.  Chart reviewed for this encounter.  See flow sheet for findings.    Pt in bed and agreeable to care. Pt positioned on the left side with minimal assistance. Partial thickness skin loss to the left buttock with a pink and moist wound bed. The periwound is intact and moist. Triad applied. The left upper back and right lateral foot are intact with purple discoloration. The bilateral heels are intact, pink, dry and blanchable.     Recommendations:  - Left upper back and right lateral foot: bedside nursing to cleanse with NS, pat dry, peel back foam dressing apply BPCO every shift; change foam dressing every 3 days   -  Buttocks: bedside nursing to cleanse with bath wipes, pat dry and apply triad bid/prn; no dressings  - Turning every 2 hours  - Heel lift boots      10/31/24 0853        Wound 10/28/24 1857 Pressure Injury Left Buttocks   Date First Assessed/Time First Assessed: 10/28/24 1857   Present on Original Admission: Yes  Primary Wound Type: (c) Pressure Injury  Side: Left  Location: Buttocks   Wound Image    Pressure Injury Stage 2   Dressing Appearance Open to air   Drainage Amount Small   Drainage Characteristics/Odor Serosanguineous   Appearance Pink;Red;Moist   Tissue loss description Partial thickness   Periwound Area Intact;Moist   Care Cleansed with:;Other (see comments)  (bath wipe)   Dressing Applied;Other (comment)  (triad)        Wound 10/31/24 0853 Pressure Injury Left Back   Date First Assessed/Time First Assessed: 10/31/24 0853   Present on Original Admission: Yes  Primary Wound Type: Pressure Injury  Side: Left  Location: Back   Wound Image    Pressure Injury Stage DTPI   Dressing Appearance Open to air   Drainage Amount None   Appearance Intact;Dry;Purple   Periwound Area Intact;Dry        Wound 10/31/24 0853 Other (comment) Right Heel   Date First  Assessed/Time First Assessed: 10/31/24 0853   Present on Original Admission: Yes  Primary Wound Type: (c) Other (comment)  Side: Right  Location: Heel   Wound Image    Dressing Appearance Dry;Intact   Drainage Amount None   Appearance Dry;Pink;Intact   Periwound Area Intact;Dry   Dressing Foam        Wound 10/31/24 0853 Pressure Injury Right lateral Foot   Date First Assessed/Time First Assessed: 10/31/24 0853   Present on Original Admission: Yes  Primary Wound Type: Pressure Injury  Side: Right  Orientation: lateral  Location: Foot   Wound Image    Pressure Injury Stage DTPI   Dressing Appearance Open to air   Drainage Amount None   Appearance Intact;Purple;Maroon;Dry   Periwound Area Intact;Dry     Recommendations made to primary team for above plan via secure chat. Wound care will follow-up as needed.    10/31/2024

## 2024-10-31 NOTE — ASSESSMENT & PLAN NOTE
Diagnosed with PE 9/16/2024 when admitted for pelvic fracture.     - On heparin gtt with warfarin started.  - INR goal 2.0 prior to PPM placement.  - INR still subtherapeutic, will increase Warfarin to 5mg

## 2024-10-31 NOTE — PLAN OF CARE
CICU DAILY GOALS       A: Awake    RASS: Goal - RASS Goal: 0-->alert and calm  Actual - RASS (Nettles Agitation-Sedation Scale): alert and calm   Restraint necessity:    B: Breath   SBT: Not intubated   C: Coordinate A & B, analgesics/sedatives   Pain: managed    SAT: Not intubated  D: Delirium   CAM-ICU: Overall CAM-ICU: Negative  E: Early(intubated/ Progressive (non-intubated) Mobility   MOVE Screen: Pass   Activity: Activity Management: Rolling - L1  FAS: Feeding/Nutrition   Diet order: Diet/Nutrition Received: low saturated fat/low cholesterol, 2 gram sodium, restrict fluids,   Fluid restriction: Fluid Requirement: 1500     T: Thrombus   DVT prophylaxis: VTE Core Measure: Pharmacological prophylaxis initiated/maintained  H: HOB Elevation   Head of Bed (HOB) Positioning: HOB elevated  U: Ulcer Prophylaxis   GI: yes  G: Glucose control   managed    S: Skin   Bathing/Skin Care: bath, complete (10/31/24 7703)  Wounds: Yes  Wound care consulted: Yes  B: Bowel Function   no issues   I: Indwelling Catheters   Harrison necessity: [REMOVED]      Urethral Catheter-Reason for Continuing Urinary Catheterization: Chronic Indwelling Urinary Catheter on Admission   CVC necessity: Yes  D: De-escalation Antibx   NA  Plan for the day   Volume Control, Monitoring VS     Family/Goals of care/Code Status   Code Status: Full Code     Harrison removed for voiding tirial. Pt c/o of abdominal discomfort. US of bladder done, Retaining >767, Notified Joo RODRIGEUZ, ordered to place harrison. Harrison placed. Pt will be NPO at midnight. Heparin should be held @ 0000 11/1. Notified Joo RODRIGUEZ that warfarin was still ordered for this evening stated ok to give. VS and assessment per flow sheet, patient progressing towards goals as tolerated, plan of care reviewed with Bean Salas and family, all concerns addressed, will continue to monitor.

## 2024-10-31 NOTE — SUBJECTIVE & OBJECTIVE
Interval History: Received first dose of warfarin yesterday, heparin bridge. INR this morning 1.1. TVP threshold 0.3. No complaints at this time.     Objective:     Vital Signs (Most Recent):  Temp: 98.2 °F (36.8 °C) (10/31/24 0300)  Pulse: 70 (10/31/24 0600)  Resp: 16 (10/31/24 0600)  BP: (!) 109/56 (10/31/24 0600)  SpO2: 96 % (10/31/24 0600) Vital Signs (24h Range):  Temp:  [97.5 °F (36.4 °C)-98.7 °F (37.1 °C)] 98.2 °F (36.8 °C)  Pulse:  [68-71] 70  Resp:  [15-51] 16  SpO2:  [93 %-100 %] 96 %  BP: ()/(51-61) 109/56     Weight: 102.9 kg (226 lb 13.7 oz)  Body mass index is 31.64 kg/m².     SpO2: 96 %        Physical Exam     Constitutional:       General: He is not in acute distress.  Neck:      Comments: TVP  Cardiovascular:      Rate and Rhythm: Normal rate and regular rhythm.   Pulmonary:      Effort: Pulmonary effort is normal.   Abdominal:      Palpations: Abdomen is soft.   Skin:     General: Skin is warm.   Neurological:      Mental Status: He is alert and oriented to person, place, and time.       Significant Labs:   Recent Lab Results         10/31/24  0321   10/30/24  2116   10/30/24  1503   10/30/24  1025        Albumin 2.3   2.8              146           ALT 16   20           Anion Gap 11   8           Ao peak doc       1.3       Ao VTI       24.6       PTT 70.0  Comment: Refer to local heparin nomogram for intensity/dose specific   therapeutic   range.     63.7  Comment: Refer to local heparin nomogram for intensity/dose specific   therapeutic   range.     51.0  Comment: Refer to local heparin nomogram for intensity/dose specific   therapeutic   range.           AST 24   25           AV valve area       2.1       BRAYAN by Velocity Ratio       2.2       AV area by cont VTI       2.1       AV mean gradient       4.0       AV index (prosthetic)       0.51       LVOT mn grad       1       AV LVOT peak gradient       2       AV peak gradient       6.8       AV Velocity Ratio       0.54        Baso # 0.01   0.02           Basophil % 0.3   0.5           BILIRUBIN TOTAL 2.2  Comment: For infants and newborns, interpretation of results should be based  on gestational age, weight and in agreement with clinical  observations.    Premature Infant recommended reference ranges:  Up to 24 hours.............<8.0 mg/dL  Up to 48 hours............<12.0 mg/dL  3-5 days..................<15.0 mg/dL  6-29 days.................<15.0 mg/dL     2.0  Comment: For infants and newborns, interpretation of results should be based  on gestational age, weight and in agreement with clinical  observations.    Premature Infant recommended reference ranges:  Up to 24 hours.............<8.0 mg/dL  Up to 48 hours............<12.0 mg/dL  3-5 days..................<15.0 mg/dL  6-29 days.................<15.0 mg/dL             BSA       2.29       BUN 22   23           Calcium 8.2   8.3           Chloride 92   92           CO2 36   41  Comment: *Critical value notification by MINISTERIO with confirmation of receipt to   Cricket Ramos RN at [id number]/24 Time21:55             Creatinine 0.9   1.0           Left Ventricle Relative Wall Thickness       0.36       Differential Method Automated   Automated           Echo EF Estimated       50       E/E' ratio       5.62       eGFR >60.0   >60.0           EF       445       Eos # 0.0   0.0           Eos % 0.3   0.2           FS       26.8       Glucose 102   94           Gran # (ANC) 3.0   3.3           Gran % 77.2   76.3           Hematocrit 34.0   36.5           Hemoglobin 10.8   11.3           Immature Grans (Abs) 0.01  Comment: Mild elevation in immature granulocytes is non specific and   can be seen in a variety of conditions including stress response,   acute inflammation, trauma and pregnancy. Correlation with other   laboratory and clinical findings is essential.     0.00  Comment: Mild elevation in immature granulocytes is non specific and   can be seen in a variety of conditions including  stress response,   acute inflammation, trauma and pregnancy. Correlation with other   laboratory and clinical findings is essential.             Immature Granulocytes 0.3   0.0           INR 1.1  Comment: Coumadin Therapy:  2.0 - 3.0 for INR for all indicators except mechanical heart valves  and antiphospholipid syndromes which should use 2.5 - 3.5.               IVC diameter       1.07       IVSd       1.1       LA WIDTH       5.99       Left Atrium Major Axis       6.96       Left Atrium Minor Axis       7.16       LA size       4.95       LA Vol       177.90              231.68       LA vol index       80.1       REGINALD (MOD)       104.4       LVOT area       4.2       LV LATERAL E/E' RATIO       4.54       LV SEPTAL E/E' RATIO       7.38       LV EDV BP       152.50       LV Diastolic Volume Index       68.69       LVIDd       5.6       LVIDs       4.1       LV mass       234.3       LV Mass Index       105.6       LVOT diameter       2.3       LVOT peak doc       0.7       LVOT stroke volume       52.3       LVOT peak VTI       12.6       LV ESV BP       75.56       LV Systolic Volume Index       34.0       Lymph # 0.3   0.4           Lymph % 8.4   9.3           Magnesium  2.1   2.2           MCH 33.1   32.9           MCHC 31.8   31.0              106           Mean e'       0.11       Mono # 0.5   0.6           Mono % 13.5   13.7           MPV 11.7   11.5           MV Peak E Doc       0.59       nRBC 0   0           Phosphorus Level 2.2   2.6           Platelet Count 156   161           Potassium 3.5   4.0           PROTEIN TOTAL 5.5   5.9           PT 11.9             PW       1.0       RA Area       28.6       RA Major Axis       6.27       Est. RA pres       3       RA Width       5.24       RBC 3.26   3.43           RDW 15.9   15.9           RV S'       13.77       RV/LV Ratio       0.64       RV- wilson basal diam       3.6       Sinus       3.96       Sodium 139   141           TAPSE       1.34        TDI SEPTAL       0.08       TDI LATERAL       0.13       WBC 3.92   4.30           ZLVIDD       -3.25       ZLVIDS       -1.09

## 2024-11-01 LAB
ALBUMIN SERPL BCP-MCNC: 2.8 G/DL (ref 3.5–5.2)
ALP SERPL-CCNC: 130 U/L (ref 40–150)
ALT SERPL W/O P-5'-P-CCNC: 19 U/L (ref 10–44)
ANION GAP SERPL CALC-SCNC: 9 MMOL/L (ref 8–16)
APTT PPP: 31.9 SEC (ref 21–32)
APTT PPP: 46.7 SEC (ref 21–32)
APTT PPP: 51.7 SEC (ref 21–32)
AST SERPL-CCNC: 26 U/L (ref 10–40)
BASOPHILS # BLD AUTO: 0.02 K/UL (ref 0–0.2)
BASOPHILS NFR BLD: 0.5 % (ref 0–1.9)
BILIRUB SERPL-MCNC: 2.3 MG/DL (ref 0.1–1)
BUN SERPL-MCNC: 23 MG/DL (ref 8–23)
CALCIUM SERPL-MCNC: 8.4 MG/DL (ref 8.7–10.5)
CHLORIDE SERPL-SCNC: 94 MMOL/L (ref 95–110)
CO2 SERPL-SCNC: 37 MMOL/L (ref 23–29)
CREAT SERPL-MCNC: 1 MG/DL (ref 0.5–1.4)
DIFFERENTIAL METHOD BLD: ABNORMAL
EOSINOPHIL # BLD AUTO: 0 K/UL (ref 0–0.5)
EOSINOPHIL NFR BLD: 0 % (ref 0–8)
ERYTHROCYTE [DISTWIDTH] IN BLOOD BY AUTOMATED COUNT: 15.6 % (ref 11.5–14.5)
EST. GFR  (NO RACE VARIABLE): >60 ML/MIN/1.73 M^2
GLUCOSE SERPL-MCNC: 120 MG/DL (ref 70–110)
HCT VFR BLD AUTO: 36.1 % (ref 40–54)
HGB BLD-MCNC: 11.2 G/DL (ref 14–18)
IMM GRANULOCYTES # BLD AUTO: 0.01 K/UL (ref 0–0.04)
IMM GRANULOCYTES NFR BLD AUTO: 0.2 % (ref 0–0.5)
INR PPP: 1.1 (ref 0.8–1.2)
LYMPHOCYTES # BLD AUTO: 0.5 K/UL (ref 1–4.8)
LYMPHOCYTES NFR BLD: 11.7 % (ref 18–48)
MAGNESIUM SERPL-MCNC: 2.1 MG/DL (ref 1.6–2.6)
MCH RBC QN AUTO: 32.2 PG (ref 27–31)
MCHC RBC AUTO-ENTMCNC: 31 G/DL (ref 32–36)
MCV RBC AUTO: 104 FL (ref 82–98)
MONOCYTES # BLD AUTO: 0.5 K/UL (ref 0.3–1)
MONOCYTES NFR BLD: 12.4 % (ref 4–15)
NEUTROPHILS # BLD AUTO: 3.1 K/UL (ref 1.8–7.7)
NEUTROPHILS NFR BLD: 75.2 % (ref 38–73)
NRBC BLD-RTO: 0 /100 WBC
PHOSPHATE SERPL-MCNC: 2.4 MG/DL (ref 2.7–4.5)
PLATELET # BLD AUTO: 147 K/UL (ref 150–450)
PMV BLD AUTO: 11.7 FL (ref 9.2–12.9)
POTASSIUM SERPL-SCNC: 4.2 MMOL/L (ref 3.5–5.1)
PROT SERPL-MCNC: 5.8 G/DL (ref 6–8.4)
PROTHROMBIN TIME: 11.9 SEC (ref 9–12.5)
RBC # BLD AUTO: 3.48 M/UL (ref 4.6–6.2)
SODIUM SERPL-SCNC: 140 MMOL/L (ref 136–145)
WBC # BLD AUTO: 4.12 K/UL (ref 3.9–12.7)

## 2024-11-01 PROCEDURE — 99900035 HC TECH TIME PER 15 MIN (STAT)

## 2024-11-01 PROCEDURE — 97162 PT EVAL MOD COMPLEX 30 MIN: CPT

## 2024-11-01 PROCEDURE — 80048 BASIC METABOLIC PNL TOTAL CA: CPT | Mod: XB | Performed by: INTERNAL MEDICINE

## 2024-11-01 PROCEDURE — 63600175 PHARM REV CODE 636 W HCPCS

## 2024-11-01 PROCEDURE — 25000003 PHARM REV CODE 250: Performed by: INTERNAL MEDICINE

## 2024-11-01 PROCEDURE — 25000003 PHARM REV CODE 250: Performed by: STUDENT IN AN ORGANIZED HEALTH CARE EDUCATION/TRAINING PROGRAM

## 2024-11-01 PROCEDURE — 85730 THROMBOPLASTIN TIME PARTIAL: CPT | Performed by: STUDENT IN AN ORGANIZED HEALTH CARE EDUCATION/TRAINING PROGRAM

## 2024-11-01 PROCEDURE — 94761 N-INVAS EAR/PLS OXIMETRY MLT: CPT

## 2024-11-01 PROCEDURE — 85610 PROTHROMBIN TIME: CPT | Performed by: STUDENT IN AN ORGANIZED HEALTH CARE EDUCATION/TRAINING PROGRAM

## 2024-11-01 PROCEDURE — 25000003 PHARM REV CODE 250: Performed by: HOSPITALIST

## 2024-11-01 PROCEDURE — 25000003 PHARM REV CODE 250

## 2024-11-01 PROCEDURE — 83735 ASSAY OF MAGNESIUM: CPT | Performed by: STUDENT IN AN ORGANIZED HEALTH CARE EDUCATION/TRAINING PROGRAM

## 2024-11-01 PROCEDURE — 20000000 HC ICU ROOM

## 2024-11-01 PROCEDURE — 27000221 HC OXYGEN, UP TO 24 HOURS

## 2024-11-01 PROCEDURE — 85730 THROMBOPLASTIN TIME PARTIAL: CPT | Mod: 91 | Performed by: INTERNAL MEDICINE

## 2024-11-01 PROCEDURE — 83735 ASSAY OF MAGNESIUM: CPT | Mod: 91 | Performed by: INTERNAL MEDICINE

## 2024-11-01 PROCEDURE — 84100 ASSAY OF PHOSPHORUS: CPT | Performed by: STUDENT IN AN ORGANIZED HEALTH CARE EDUCATION/TRAINING PROGRAM

## 2024-11-01 PROCEDURE — 80053 COMPREHEN METABOLIC PANEL: CPT | Performed by: STUDENT IN AN ORGANIZED HEALTH CARE EDUCATION/TRAINING PROGRAM

## 2024-11-01 PROCEDURE — 85025 COMPLETE CBC W/AUTO DIFF WBC: CPT | Performed by: STUDENT IN AN ORGANIZED HEALTH CARE EDUCATION/TRAINING PROGRAM

## 2024-11-01 PROCEDURE — 97165 OT EVAL LOW COMPLEX 30 MIN: CPT

## 2024-11-01 PROCEDURE — 99231 SBSQ HOSP IP/OBS SF/LOW 25: CPT | Mod: GC,,, | Performed by: INTERNAL MEDICINE

## 2024-11-01 RX ORDER — POTASSIUM CHLORIDE 20 MEQ/1
20 TABLET, EXTENDED RELEASE ORAL ONCE
Status: COMPLETED | OUTPATIENT
Start: 2024-11-01 | End: 2024-11-01

## 2024-11-01 RX ADMIN — HEPARIN SODIUM 13 UNITS/KG/HR: 10000 INJECTION, SOLUTION INTRAVENOUS at 06:11

## 2024-11-01 RX ADMIN — ASPIRIN 81 MG: 81 TABLET, COATED ORAL at 09:11

## 2024-11-01 RX ADMIN — LEVOTHYROXINE SODIUM 200 MCG: 100 TABLET ORAL at 05:11

## 2024-11-01 RX ADMIN — Medication: at 08:11

## 2024-11-01 RX ADMIN — TAMSULOSIN HYDROCHLORIDE 0.4 MG: 0.4 CAPSULE ORAL at 09:11

## 2024-11-01 RX ADMIN — WARFARIN SODIUM 7.5 MG: 2.5 TABLET ORAL at 06:11

## 2024-11-01 RX ADMIN — Medication: at 09:11

## 2024-11-01 RX ADMIN — SACUBITRIL AND VALSARTAN 1 TABLET: 24; 26 TABLET, FILM COATED ORAL at 08:11

## 2024-11-01 RX ADMIN — POTASSIUM CHLORIDE 20 MEQ: 1500 TABLET, EXTENDED RELEASE ORAL at 03:11

## 2024-11-01 RX ADMIN — ATORVASTATIN CALCIUM 10 MG: 10 TABLET, FILM COATED ORAL at 09:11

## 2024-11-01 RX ADMIN — FUROSEMIDE 80 MG: 10 INJECTION, SOLUTION INTRAVENOUS at 06:11

## 2024-11-01 RX ADMIN — SACUBITRIL AND VALSARTAN 1 TABLET: 24; 26 TABLET, FILM COATED ORAL at 09:11

## 2024-11-01 RX ADMIN — FUROSEMIDE 80 MG: 10 INJECTION, SOLUTION INTRAVENOUS at 09:11

## 2024-11-01 RX ADMIN — MIRTAZAPINE 7.5 MG: 7.5 TABLET, FILM COATED ORAL at 08:11

## 2024-11-01 RX ADMIN — THIAMINE HCL TAB 100 MG 100 MG: 100 TAB at 09:11

## 2024-11-01 NOTE — PROGRESS NOTES
"[facility] - [facility]  Cardiac Electrophysiology  Progress Note    Admission Date: 10/28/2024  Code Status: Full Code   Attending Physician: Barbara Canada MD   Expected Discharge Date: 11/5/2024  Principal Problem:CHB (complete heart block)    Subjective:     Interval History: No events overnight, reports feeling "good." He understands plan to continue warfarin until INR therapeutic and tentatively plan for potentially Monday pending INR. No events on telemetry.       Objective:     Vital Signs (Most Recent):  Temp: 98.3 °F (36.8 °C) (11/01/24 1101)  Pulse: 70 (11/01/24 1101)  Resp: (!) 23 (11/01/24 1101)  BP: (!) 99/57 (11/01/24 1101)  SpO2: 96 % (11/01/24 1101) Vital Signs (24h Range):  Temp:  [98 °F (36.7 °C)-98.7 °F (37.1 °C)] 98.3 °F (36.8 °C)  Pulse:  [58-72] 70  Resp:  [16-32] 23  SpO2:  [96 %-98 %] 96 %  BP: ()/(52-72) 99/57     Weight: 102.9 kg (226 lb 13.7 oz)  Body mass index is 31.64 kg/m².     SpO2: 96 %        Physical Exam     Constitutional:       General: He is not in acute distress.  Neck:      Comments: TVP  Cardiovascular:      Rate and Rhythm: Normal rate and regular rhythm.   Pulmonary:      Effort: Pulmonary effort is normal.   Abdominal:      Palpations: Abdomen is soft.   Skin:     General: Skin is warm.   Neurological:      Mental Status: He is alert and oriented to person, place, and time.         Significant Labs:   Recent Lab Results  (Last 5 results in the past 24 hours)        11/01/24  1115   11/01/24  0634   11/01/24  0546   11/01/24  0355   10/31/24  2145        Albumin       2.8         ALP       130         ALT       19         Anion Gap       9         PTT 46.7  Comment: Refer to local heparin nomogram for intensity/dose specific   therapeutic   range.     31.9  Comment: Refer to local heparin nomogram for intensity/dose specific   therapeutic   range.               AST       26         Baso #       0.02         Basophil %       0.5         BILIRUBIN " TOTAL       2.3  Comment: For infants and newborns, interpretation of results should be based  on gestational age, weight and in agreement with clinical  observations.    Premature Infant recommended reference ranges:  Up to 24 hours.............<8.0 mg/dL  Up to 48 hours............<12.0 mg/dL  3-5 days..................<15.0 mg/dL  6-29 days.................<15.0 mg/dL           BUN       23         Calcium       8.4         Chloride       94         CO2       37         Creatinine       1.0         Differential Method       Automated         eGFR       >60.0         Eos #       0.0         Eos %       0.0         Glucose       120         Gran # (ANC)       3.1         Gran %       75.2         Hematocrit       36.1         Hemoglobin       11.2         Immature Grans (Abs)       0.01  Comment: Mild elevation in immature granulocytes is non specific and   can be seen in a variety of conditions including stress response,   acute inflammation, trauma and pregnancy. Correlation with other   laboratory and clinical findings is essential.           Immature Granulocytes       0.2         INR     1.1  Comment: Coumadin Therapy:  2.0 - 3.0 for INR for all indicators except mechanical heart valves  and antiphospholipid syndromes which should use 2.5 - 3.5.             Lymph #       0.5         Lymph %       11.7         Magnesium        2.1         MCH       32.2         MCHC       31.0         MCV       104         Mono #       0.5         Mono %       12.4         MPV       11.7         nRBC       0         Phosphorus Level       2.4         Platelet Count       147         Potassium       4.2   3.6       PROTEIN TOTAL       5.8         PT     11.9           RBC       3.48         RDW       15.6         Sodium       140         WBC       4.12                                  Assessment and Plan:     * CHB (complete heart block)  Mr. 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 Respiratory status improving. EP consulted for CHB.     Recommendations:   - TVP placed. Threshold 0.3. Will check threshold daily.   - Avoid AV brady blocking agents.   - Continue Warfarin. Pacemaker placement pending therapeutic INR (close to 2).   - NPO at midnight Sunday   - For full recommendations please see staff attestation.           Chiquita Akins MD  Cardiac Electrophysiology  [facility]

## 2024-11-01 NOTE — ASSESSMENT & PLAN NOTE
Diagnosed with PE 9/16/2024 when admitted for pelvic fracture.     - On heparin gtt with warfarin started.  - INR goal 2.0 prior to PPM placement.  - INR still subtherapeutic, will increase Warfarin to 7.5 mg

## 2024-11-01 NOTE — PLAN OF CARE
CICU DAILY GOALS       A: Awake    RASS: Goal - RASS Goal: 0-->alert and calm  Actual - RASS (Nettles Agitation-Sedation Scale): alert and calm   Restraint necessity:    B: Breath   SBT: Not intubated   C: Coordinate A & B, analgesics/sedatives   Pain: managed    SAT: Not intubated  D: Delirium   CAM-ICU: Overall CAM-ICU: Negative  E: Early(intubated/ Progressive (non-intubated) Mobility   MOVE Screen: Pass   Activity: Activity Management: Rolling - L1  FAS: Feeding/Nutrition   Diet order: Diet/Nutrition Received: NPO,   Fluid restriction: Fluid Requirement: 1500CC FR     T: Thrombus   DVT prophylaxis: VTE Core Measure: Pharmacological prophylaxis initiated/maintained  H: HOB Elevation   Head of Bed (HOB) Positioning: HOB elevated  U: Ulcer Prophylaxis   GI: yes  G: Glucose control   managed    S: Skin   Bathing/Skin Care: back care;bath, complete;dressed/undressed;incontinence care;linen changed (10/31/24 1900)  Wounds: Yes  Wound care consulted: Yes  B: Bowel Function   no issues   I: Indwelling Catheters   Mckeon necessity: [REMOVED]      Urethral Catheter-Reason for Continuing Urinary Catheterization: Chronic Indwelling Urinary Catheter on Admission       Urethral Catheter 10/31/24 1535-Reason for Continuing Urinary Catheterization: Chronic Indwelling Urinary Catheter on Admission   CVC necessity: No  D: De-escalation Antibx   NA  Plan for the day   Volume Control, Monitoring VS    Family/Goals of care/Code Status   Code Status: Full Code     No acute events throughout day, VS and assessment per flow sheet, patient progressing towards goals as tolerated, plan of care reviewed with Bean Salas, all concerns addressed, will continue to monitor.

## 2024-11-01 NOTE — PT/OT/SLP EVAL
Occupational Therapy  Co Evaluation    Name: Bean Salas  MRN: [id number]  Admitting Diagnosis: CHB (complete heart block)  Pt with fall sustaining LE and pelvic fracture. He had surgery 9/20/21 including pelvic screw placement x 2 and ORIF R femur.   Pt was t/f to [facility] then SNF [place] and had a PE. [facility] [place]29/24 with SOB and AMS  Pt with hx of chronic respiratory failure  Recommendations:     Discharge Recommendations: Moderate Intensity Therapy      Assessment:     Bean Salas is a 79 y.o. male with a medical diagnosis of CHB (complete heart block). Performance deficits affecting function: weakness, impaired endurance, impaired self care skills, impaired functional mobility, gait instability, impaired balance, decreased lower extremity function. Limited assessment completed due to nsg reports poor securement of TVP thus she rec to HOLD EOB sitting assessment.     Rehab Prognosis: Fair; patient would benefit from acute skilled OT services to address these deficits and reach maximum level of function.       Plan:     Patient to be seen 3 x/week to address the above listed problems via self-care/home management, therapeutic activities, therapeutic exercises  Plan of Care Expires:    Plan of Care Reviewed with: patient    Subjective     Pt agreeable to therapy     Occupational Profile:  Pt reports he lives alone in single story home with threshold to enter. Pt has RW, shower chair in walkin shower with grab bars, BSC and raised toilet and home oxygen   Pt reports he has not ambulated since his fall and has been requiring 2 person assist for squat pivot t/f bed>chair     Pain/Comfort:  Pain Rating 1: 0/10    Patients cultural, spiritual, [employer] conflicts given the current situation: no    Objective:     Communicated with: nsg prior to session.  Patient found in bed with tele, pulse ox, BP cuff, harrison, 3 LPM and TVP.   Coeval completed this date to optimize functional performance  and safety given impaired tolerance for activity in setting of ICU     General Precautions: Standard,  fall    Occupational Performance:    Activities of Daily Living:  Feeding and g/h skills completed with set-up in supported sitting.   TOTAL A UE/LE dressing     Cognitive/Visual Perceptual:  Pt awake, alert and following commands.     Physical Exam:  Pt demo WFL UE strength/ROM, coordination and sensation.     AMPAC 6 Click ADL:  AMPAC Total Score: 10    Treatment & Education:  Nsg cleared pt for evaluation following discussion of prior ortho surgeries and anticoagulation for PE.  Increased time spent obtaining PLOF. Upon assessment of TVP, nsg states the TVP is unable to be fully secured limiting safety for EOB sitting. Nsg reports medical team is aware of the instability. Thus, pt not transitioned to EOB this date.  Potential placement of PPM on Monday     Education provided re: role of OT and safety with functional mobility/ADl skills.       Patient left HOB elevated with all lines intact, call button in reach, and nsg notified    GOALS:   Multidisciplinary Problems       Occupational Therapy Goals          Problem: Occupational Therapy    Goal Priority Disciplines Outcome Interventions   Occupational Therapy Goal     OT, PT/OT Progressing    Description: Goals to be met by: 14 days 11/15/24     Patient will increase functional independence with ADLs by performing:    Pt to complete g/h skills seated EOB with set-up  Pt to tolerated sitting EOB with Fair + sitting balance to increase performance with ADl skills  Pt to complete UE dressing with set-up  Pt to complete t/f to BSC with MOD A   Pt to complete toileting with MOD A                        History:     Past Medical History:   Diagnosis Date    Atrial fibrillation, unspecified type 09/06/2023    COPD exacerbation 09/06/2023    Thyroid disease     hypo         Past Surgical History:   Procedure Laterality Date    COLONOSCOPY  01/01/2011    CORONARY  ANGIOGRAPHY N/A 07/22/2021    Procedure: ANGIOGRAM, CORONARY ARTERY;  Surgeon: Hank Barry MD;  Location: [facility] CATH LAB/EP;  Service: Cardiology;  Laterality: N/A;    EYE SURGERY Bilateral     cataracts extraction    FRACTURE SURGERY Right 09/2021    femur    HERNIA REPAIR      HIP SURGERY Right 08/2021    INTRAMEDULLARY RODDING OF TROCHANTER OF FEMUR Right 09/03/2021    Procedure: INSERTION, INTRAMEDULLARY RACQUEL, FEMUR, TROCHANTER;  Surgeon: Darryn Hall MD;  Location: [facility] OR;  Service: Orthopedics;  Laterality: Right;    JOINT REPLACEMENT Bilateral     knee    LEFT HEART CATHETERIZATION Right 07/22/2021    Procedure: Left heart cath;  Surgeon: Hank Barry MD;  Location: [facility] CATH LAB/EP;  Service: Cardiology;  Laterality: Right;    OPEN REDUCTION AND INTERNAL FIXATION (ORIF) OF INJURY OF HIP Right 9/20/2024    Procedure: ORIF,PELVIS;  Surgeon: Negrito Stapleton MD;  [address]  Service: Orthopedics;  Laterality: Right;  +local    TRANSESOPHAGEAL ECHOCARDIOGRAPHY N/A 9/19/2024    Procedure: ECHOCARDIOGRAM, TRANSESOPHAGEAL;  Surgeon: Leonora Butt MD;  Location: [facility] EP LAB;  Service: Cardiology;  Laterality: N/A;    TREATMENT OF CARDIAC ARRHYTHMIA N/A 9/19/2024    Procedure: Cardioversion or Defibrillation;  Surgeon: ANA Steiner MD;  Location: [facility] EP LAB;  Service: Cardiology;  Laterality: N/A;  AF, DEMETRICE/DCCV, ANES, GP, 524    VASECTOMY         Time Tracking:     OT Date of Treatment: 11/01/24  OT Start Time: 0927  OT Stop Time: 0952  OT Total Time (min): 25 min    Billable Minutes:Evaluation 25 11/1/2024

## 2024-11-01 NOTE — PT/OT/SLP EVAL
Physical Therapy Evaluation     Patient Name:  Bean Salas  MRN: [id number]    Admit Date: 10/28/2024  Admitting Diagnosis:  CHB (complete heart block)  Length of Stay: 3 days  Recent Surgery: * No surgery found *      Recommendations:     Discharge Recommendations: moderate intensity therapy  Equipment recommendations:  TBD pending progress  Barriers to discharge: Decreased caregiver support available , Increased level of skilled assistance required, and Fall risk     Assessment:     Bean Salas is a 79 y.o. male admitted to [facility] on 10/28/2024 with medical diagnosis of CHB (complete heart block). Pt presents with weakness, impaired endurance, impaired functional mobility, impaired balance, impaired cardiopulmonary response to activity. These deficits effect their roles and responsibilities in which they were able to complete prior to admit.     Pt agreeable to evaluation today. Evaluation initiated and before starting bed mobility, therapists noted that TVP present did not appear secured. Stepped out of room to discuss with nsg who states that TVP is currently unable to be fully secured with tape for mobility; she states when rolling, TVP moves. So therapists performed bed level MMT and obtained hx but no mobility performed. Will await guidance from medical team moving forward.    PTA, pt was at an LTAC and SNF from [facility] ORIF and pelvis fx (9/20/24). Pt states he did not walk during his time at either facility; staff transferred him into w/c. Before fx, pt was ambulating ~20 ft at home without DME and the rest of the time using a RW. Pt's RLE is significantly weaker than LLE and pt is aware he has a long rehabilitation journey. Will continue to assess mobility as tolerated.     Bean Salas would benefit from acute PT intervention to improve quality of life, focus on recovery of impairments, provide patient/caregiver education, reduce fall risk, and maximize (I) and safety with functional mobility. Once  medically stable, recommending pt discharge to moderate intensity therapy. Patient currently demonstrates a need for moderate intensity therapy on a daily basis post acute secondary to a decline in functional status due to illness .      Rehab Prognosis: Fair    Plan:     During this hospitalization, patient to be seen 3 x/week to address the identified rehab impairments via gait training, therapeutic activities, therapeutic exercises, neuromuscular re-education and progress towards stated goals.     Plan of Care Expires:  12/01/24  Plan of Care reviewed with: patient    This plan of care has been discussed with the patient/caregiver, who was included in its development and is in agreement with the identified goals and treatment plan.     Subjective     Communicated with RN prior to session.  Patient found HOB elevated upon PT entry to room, agreeable to evaluation. Pt alone during session.    Chief Complaint: none stated    Patient/Family Comments/goals: none stated    Pain/Comfort:  Pain Rating 1: 0/10    Patients cultural, spiritual, Orthodoxy conflicts given the current situation: None identified     Patient History: information obtained from pt     Living Environment: Pt lives alone in single level home  with threshold ABDI. Bathroom set-up: walk-in shower  Prior Level of Function: modified (I) for mobility and ADLs using RW as AD   DME owned: SC, grab bar, raised toilet, BSC, RW  Support Available/Caregiver Assistance: son lives on the [place] so can provide light assist  Drives: yes before fx  Objective:   OT present for coeval due to pt's multiple medical comorbidities and functional/cognition deficits requiring two skilled therapists to appropriately progress pt's musculoskeletal strength, neuromuscular control, and endurance while taking into consideration medical acuity and pt safety.    Patient found with: oxygen, telemetry, pulse ox (continuous), blood pressure cuff, harrison catheter (TVP)    Recent  Surgery: * No surgery found *    General Precautions: Standard, fall (TVP)   Orthopedic Precautions:    Braces:     Oxygen Device: nasal cannula      Exams:    Cognition:  Oriented X 4  Command following: Follows multistep verbal commands  Communication: clear/fluent    Sensation:   Light touch sensation: Intact BLEs    Gross Motor Coordination: ASAEL    Edema/Skin Integrity: None noted; Visible skin intact    Postural examination/scapula alignment: ASAEL    Lower Extremity Range of Motion:  Right Lower Extremity: WFL PROM  Left Lower Extremity: WFL PROM    Lower Extremity Strength:  Right Lower Extremity: Deficits: grossly 2/5  Left Lower Extremity: grossly 3+/5    Outcome Measure: AM-PAC 6 CLICK MOBILITY  Total Score:7     Patient/Caregiver Education:     Therapist educated pt/caregiver regarding:   PT POC and goals for therapy   Safety with mobility and fall risk   Instruction on use of call button and importance of calling nursing staff for assistance with mobility   Time provided for therapeutic counseling and discussion of current health disposition. All questions answered to satisfaction, within scope of PT practice     Patient/caregiver able to verbalize understanding and expressed no further questions this visit; will follow-up with pt/caregiver during current admit for additional questions/concerns within scope of practice.     White board updated.     Patient left HOB elevated with all lines intact, call button in reach, and nsg notified.    GOALS:   Multidisciplinary Problems       Physical Therapy Goals          Problem: Physical Therapy    Goal Priority Disciplines Outcome Interventions   Physical Therapy Goal     PT, PT/OT Progressing    Description: Goals to be met by: 11/15/24     Patient will increase functional independence with mobility by performin. Supine to sit with MInimal Assistance  2. Sit to supine with MInimal Assistance  3. Sit to stand transfer with Minimal Assistance  4. Bed to  chair transfer with Minimal Assistance using LRAD  5. Gait  x 25 feet with Minimal Assistance using LRAD.   6. Sitting at edge of bed x8 minutes with Minimal Assistance                           History:     Past Medical History:   Diagnosis Date    Atrial fibrillation, unspecified type 09/06/2023    COPD exacerbation 09/06/2023    Thyroid disease     hypo       Past Surgical History:   Procedure Laterality Date    COLONOSCOPY  01/01/2011    CORONARY ANGIOGRAPHY N/A 07/22/2021    Procedure: ANGIOGRAM, CORONARY ARTERY;  Surgeon: Hank Barry MD;  Location: [facility] CATH LAB/EP;  Service: Cardiology;  Laterality: N/A;    EYE SURGERY Bilateral     cataracts extraction    FRACTURE SURGERY Right 09/2021    femur    HERNIA REPAIR      HIP SURGERY Right 08/2021    INTRAMEDULLARY RODDING OF TROCHANTER OF FEMUR Right 09/03/2021    Procedure: INSERTION, INTRAMEDULLARY RACQUEL, FEMUR, TROCHANTER;  Surgeon: Darryn Hall MD;  Location: [facility];  Service: Orthopedics;  Laterality: Right;    JOINT REPLACEMENT Bilateral     knee    LEFT HEART CATHETERIZATION Right 07/22/2021    Procedure: Left heart cath;  Surgeon: Hank Barry MD;  Location: [facility] CATH LAB/EP;  Service: Cardiology;  Laterality: Right;    OPEN REDUCTION AND INTERNAL FIXATION (ORIF) OF INJURY OF HIP Right 9/20/2024    Procedure: ORIF,PELVIS;  Surgeon: Negrito Stapleton MD;  Location: [address];  Service: Orthopedics;  Laterality: Right;  +local    TRANSESOPHAGEAL ECHOCARDIOGRAPHY N/A 9/19/2024    Procedure: ECHOCARDIOGRAM, TRANSESOPHAGEAL;  Surgeon: Leonora Butt MD;  Location: [facility] EP LAB;  Service: Cardiology;  Laterality: N/A;    TREATMENT OF CARDIAC ARRHYTHMIA N/A 9/19/2024    Procedure: Cardioversion or Defibrillation;  Surgeon: ANA Steiner MD;  Location: [facility] EP LAB;  Service: Cardiology;  Laterality: N/A;  AF, DEMETRICE/DCCV, ANES, GP, 524    VASECTOMY         Time Tracking:     PT Received On: 11/01/24  PT Start Time: 0927     PT Stop  Time: 0950  PT Total Time (min): 23 min     Billable Minutes: Evaluation 23 11/01/2024

## 2024-11-01 NOTE — PLAN OF CARE
Problem: Occupational Therapy  Goal: Occupational Therapy Goal  Description: Goals to be met by: 14 days 11/15/24     Patient will increase functional independence with ADLs by performing:    Pt to complete g/h skills seated EOB with set-up  Pt to tolerated sitting EOB with Fair + sitting balance to increase performance with ADl skills  Pt to complete UE dressing with set-up  Pt to complete t/f to BSC with MOD A   Pt to complete toileting with MOD A   Outcome: Progressing

## 2024-11-01 NOTE — ASSESSMENT & PLAN NOTE
Patients blood pressure range in the last 24 hours was: BP  Min: 87/53  Max: 189/87.The patient's inpatient anti-hypertensive regimen is listed below:  Current Antihypertensives  furosemide injection 80 mg, 2 times daily, Intravenous    Plan  - BP is controlled, no changes needed to their regimen  - Due to initial hypotension, will be conservative with antihypertensives. Can adjust regimen or dosages as indicated  - Given mildly reduced EF, will continue Entresto at the lowest dose 24-26 BID.

## 2024-11-01 NOTE — SUBJECTIVE & OBJECTIVE
Interval History: NAEON and VSS. Patient feeling well this AM without any complaints  INR still subtherapeutic. Will increase warfarin dose to 7.5 mg.     Review of Systems   Constitutional: Negative for chills and fever.   Cardiovascular:  Positive for dyspnea on exertion and leg swelling. Negative for chest pain.   Respiratory:  Positive for shortness of breath. Negative for cough.    Musculoskeletal:  Negative for back pain.   Gastrointestinal:  Negative for abdominal pain and constipation.   Genitourinary:  Negative for dysuria and hematuria.   Neurological:  Negative for focal weakness and headaches.   Psychiatric/Behavioral:  Negative for altered mental status. The patient is not nervous/anxious.      Objective:     Vital Signs (Most Recent):  Temp: 98.3 °F (36.8 °C) (11/01/24 1101)  Pulse: 70 (11/01/24 1101)  Resp: (!) 23 (11/01/24 1101)  BP: (!) 99/57 (11/01/24 1101)  SpO2: 96 % (11/01/24 1101) Vital Signs (24h Range):  Temp:  [98 °F (36.7 °C)-98.7 °F (37.1 °C)] 98.3 °F (36.8 °C)  Pulse:  [58-72] 70  Resp:  [16-32] 23  SpO2:  [94 %-98 %] 96 %  BP: ()/(52-72) 99/57     Weight: 102.9 kg (226 lb 13.7 oz)  Body mass index is 31.64 kg/m².     SpO2: 96 %         Intake/Output Summary (Last 24 hours) at 11/1/2024 1249  Last data filed at 11/1/2024 1101  Gross per 24 hour   Intake 1149.28 ml   Output 4225 ml   Net -3075.72 ml       Lines/Drains/Airways       Central Venous Catheter Line  Duration             Introducer 10/29/24 1601 Internal Jugular Right 2 days              Drain  Duration                  Urethral Catheter 10/31/24 1535 <1 day              Line  Duration                  Pacer Wires 10/29/24 1606 2 days              Peripheral Intravenous Line  Duration                  Peripheral IV - Single Lumen 10/28/24 1738 18 G 1 1/4 in Anterior;Distal;Left Upper Arm 3 days         Peripheral IV - Single Lumen 10/29/24 1838 20 G Right Antecubital 2 days                       Physical  Exam  Constitutional:       General: He is not in acute distress.  HENT:      Head: Normocephalic and atraumatic.      Nose:      Comments: NC in place  Neck:      Comments: TVP  Cardiovascular:      Rate and Rhythm: Normal rate and regular rhythm.   Pulmonary:      Effort: Pulmonary effort is normal.   Abdominal:      Palpations: Abdomen is soft.   Musculoskeletal:         General: Swelling present.      Right lower leg: Edema present.      Left lower leg: Edema present.   Skin:     General: Skin is warm.   Neurological:      Mental Status: He is alert and oriented to person, place, and time.            Significant Labs: All pertinent lab results from the last 24 hours have been reviewed.    Significant Imaging:   X-Ray Chest AP Portable   Final Result      New right-sided TVP tip in the region of the right atrium.      Otherwise, unchanged cardiopulmonary exam.         Electronically signed by: Nixon Rodriguez MD   Date:    10/29/2024   Time:    18:48      CT Head Without Contrast   Final Result      Allowing for scatter artifacts no acute intracranial findings as detailed above specifically without evidence for acute intracranial hemorrhage or sulcal effacement to suggest large territory recent infarction.  Clinical correlation and further evaluation as warranted clinically.         Electronically signed by: Grzegorz Lopez DO   Date:    10/29/2024   Time:    11:28      X-Ray Chest 1 View   Final Result      As above.         Electronically signed by: Farooq Rodriguez   Date:    10/29/2024   Time:    07:17      X-Ray Chest AP Portable   Final Result      1. Pulmonary findings suggest edema or other pneumonitis noting small pleural effusions.  Correlation and follow-up is advised.         Electronically signed by: Dewayne Quigley MD   Date:    10/28/2024   Time:    17:18

## 2024-11-01 NOTE — SUBJECTIVE & OBJECTIVE
"Interval History: No events overnight, reports feeling "good." He understands plan to continue warfarin until INR therapeutic and tentatively plan for potentially Monday pending INR. No events on telemetry.       Objective:     Vital Signs (Most Recent):  Temp: 98.3 °F (36.8 °C) (11/01/24 1101)  Pulse: 70 (11/01/24 1101)  Resp: (!) 23 (11/01/24 1101)  BP: (!) 99/57 (11/01/24 1101)  SpO2: 96 % (11/01/24 1101) Vital Signs (24h Range):  Temp:  [98 °F (36.7 °C)-98.7 °F (37.1 °C)] 98.3 °F (36.8 °C)  Pulse:  [58-72] 70  Resp:  [16-32] 23  SpO2:  [96 %-98 %] 96 %  BP: ()/(52-72) 99/57     Weight: 102.9 kg (226 lb 13.7 oz)  Body mass index is 31.64 kg/m².     SpO2: 96 %        Physical Exam     Constitutional:       General: He is not in acute distress.  Neck:      Comments: TVP  Cardiovascular:      Rate and Rhythm: Normal rate and regular rhythm.   Pulmonary:      Effort: Pulmonary effort is normal.   Abdominal:      Palpations: Abdomen is soft.   Skin:     General: Skin is warm.   Neurological:      Mental Status: He is alert and oriented to person, place, and time.         Significant Labs:   Recent Lab Results  (Last 5 results in the past 24 hours)        11/01/24  1115   11/01/24  0634   11/01/24  0546   11/01/24  0355   10/31/24  2145        Albumin       2.8         ALP       130         ALT       19         Anion Gap       9         PTT 46.7  Comment: Refer to local heparin nomogram for intensity/dose specific   therapeutic   range.     31.9  Comment: Refer to local heparin nomogram for intensity/dose specific   therapeutic   range.               AST       26         Baso #       0.02         Basophil %       0.5         BILIRUBIN TOTAL       2.3  Comment: For infants and newborns, interpretation of results should be based  on gestational age, weight and in agreement with clinical  observations.    Premature Infant recommended reference ranges:  Up to 24 hours.............<8.0 mg/dL  Up to 48 " hours............<12.0 mg/dL  3-5 days..................<15.0 mg/dL  6-29 days.................<15.0 mg/dL           BUN       23         Calcium       8.4         Chloride       94         CO2       37         Creatinine       1.0         Differential Method       Automated         eGFR       >60.0         Eos #       0.0         Eos %       0.0         Glucose       120         Gran # (ANC)       3.1         Gran %       75.2         Hematocrit       36.1         Hemoglobin       11.2         Immature Grans (Abs)       0.01  Comment: Mild elevation in immature granulocytes is non specific and   can be seen in a variety of conditions including stress response,   acute inflammation, trauma and pregnancy. Correlation with other   laboratory and clinical findings is essential.           Immature Granulocytes       0.2         INR     1.1  Comment: Coumadin Therapy:  2.0 - 3.0 for INR for all indicators except mechanical heart valves  and antiphospholipid syndromes which should use 2.5 - 3.5.             Lymph #       0.5         Lymph %       11.7         Magnesium        2.1         MCH       32.2         MCHC       31.0         MCV       104         Mono #       0.5         Mono %       12.4         MPV       11.7         nRBC       0         Phosphorus Level       2.4         Platelet Count       147         Potassium       4.2   3.6       PROTEIN TOTAL       5.8         PT     11.9           RBC       3.48         RDW       15.6         Sodium       140         WBC       4.12

## 2024-11-01 NOTE — PROGRESS NOTES
[facility] - [facility]iac [facility]ensive [facility]  Cardiology  Progress Note    Patient Name: Bean Salas  MRN: [id number]  Admission Date: 10/28/2024  Hospital Length of Stay: 3 days  Code Status: Full Code   Attending Physician: Barbara Canada MD   Primary Care Physician: Ramirez Levine MD  Expected Discharge Date: 11/5/2024  Principal Problem:CHB (complete heart block)    Subjective:     Hospital Course:   Patient was transferred to CICU from MICU on 10/30 for continued heparin bridging until INR therapeutic on warfarin prior to PPM placement. Patient diuresing well and with O2 weaned down to home baseline of 3L NC. INR still subtherapeutic so increasing to 7.5.    Interval History: NAEON and VSS. Patient feeling well this AM without any complaints  INR still subtherapeutic. Will increase warfarin dose to 7.5 mg.     Review of Systems   Constitutional: Negative for chills and fever.   Cardiovascular:  Positive for dyspnea on exertion and leg swelling. Negative for chest pain.   Respiratory:  Positive for shortness of breath. Negative for cough.    Musculoskeletal:  Negative for back pain.   Gastrointestinal:  Negative for abdominal pain and constipation.   Genitourinary:  Negative for dysuria and hematuria.   Neurological:  Negative for focal weakness and headaches.   Psychiatric/Behavioral:  Negative for altered mental status. The patient is not nervous/anxious.      Objective:     Vital Signs (Most Recent):  Temp: 98.3 °F (36.8 °C) (11/01/24 1101)  Pulse: 70 (11/01/24 1101)  Resp: (!) 23 (11/01/24 1101)  BP: (!) 99/57 (11/01/24 1101)  SpO2: 96 % (11/01/24 1101) Vital Signs (24h Range):  Temp:  [98 °F (36.7 °C)-98.7 °F (37.1 °C)] 98.3 °F (36.8 °C)  Pulse:  [58-72] 70  Resp:  [16-32] 23  SpO2:  [94 %-98 %] 96 %  BP: ()/(52-72) 99/57     Weight: 102.9 kg (226 lb 13.7 oz)  Body mass index is 31.64 kg/m².     SpO2: 96 %         Intake/Output Summary (Last 24 hours) at 11/1/2024 9299  Last data filed at  11/1/2024 1101  Gross per 24 hour   Intake 1149.28 ml   Output 4225 ml   Net -3075.72 ml       Lines/Drains/Airways       Central Venous Catheter Line  Duration             Introducer 10/29/24 1601 Internal Jugular Right 2 days              Drain  Duration                  Urethral Catheter 10/31/24 1535 <1 day              Line  Duration                  Pacer Wires 10/29/24 1606 2 days              Peripheral Intravenous Line  Duration                  Peripheral IV - Single Lumen 10/28/24 1738 18 G 1 1/4 in Anterior;Distal;Left Upper Arm 3 days         Peripheral IV - Single Lumen 10/29/24 1838 20 G Right Antecubital 2 days                       Physical Exam  Constitutional:       General: He is not in acute distress.  HENT:      Head: Normocephalic and atraumatic.      Nose:      Comments: NC in place  Neck:      Comments: TVP  Cardiovascular:      Rate and Rhythm: Normal rate and regular rhythm.   Pulmonary:      Effort: Pulmonary effort is normal.   Abdominal:      Palpations: Abdomen is soft.   Musculoskeletal:         General: Swelling present.      Right lower leg: Edema present.      Left lower leg: Edema present.   Skin:     General: Skin is warm.   Neurological:      Mental Status: He is alert and oriented to person, place, and time.            Significant Labs: All pertinent lab results from the last 24 hours have been reviewed.    Significant Imaging:   X-Ray Chest AP Portable   Final Result      New right-sided TVP tip in the region of the right atrium.      Otherwise, unchanged cardiopulmonary exam.         Electronically signed by: Nixon Rodriguez MD   Date:    10/29/2024   Time:    18:48      CT Head Without Contrast   Final Result      Allowing for scatter artifacts no acute intracranial findings as detailed above specifically without evidence for acute intracranial hemorrhage or sulcal effacement to suggest large territory recent infarction.  Clinical correlation and further evaluation as warranted  clinically.         Electronically signed by: Grzegorz Lopez DO   Date:    10/29/2024   Time:    11:28      X-Ray Chest 1 View   Final Result      As above.         Electronically signed by: Farooq Rodriguez   Date:    10/29/2024   Time:    07:17      X-Ray Chest AP Portable   Final Result      1. Pulmonary findings suggest edema or other pneumonitis noting small pleural effusions.  Correlation and follow-up is advised.         Electronically signed by: Dewayne Quigley MD   Date:    10/28/2024   Time:    17:18          Assessment and Plan:     * CHB (complete heart block)  Presented to the ED with SOB and AMS. Found to be in CHB in the MICU. TV placed by RP on 10/29 with symptomatic improvement. V paced at 70.     - EP following, will place pacemaker once INR therapeutic on coumadin. Goal ~2.0    Encephalopathy, metabolic  P/w acute encephalopathy but improved after BiPAP and TVP. GCS 15 and ANOx4 currently. Mental status improved. Anticipate AMS 2/2 hypercapnia and bradyarrhythmia. CTH without acute intracranial process  VBG mildly acidotic with hypercapnia above baseline     - See acute respiratory failure and CHB  - Improved    History of pulmonary embolism  Diagnosed with PE 9/16/2024 when admitted for pelvic fracture.     - On heparin gtt with warfarin started.  - INR goal 2.0 prior to PPM placement.  - INR still subtherapeutic, will increase Warfarin to 7.5 mg    Paroxysmal atrial fibrillation  Patient has persistent (7 days or more) atrial fibrillation. Patient is currently in sinus rhythm. [id number] Score: 3. The patients heart rate in the last 24 hours is as follows:  Pulse  Min: 58  Max: 72       Anticoagulants  heparin 25,000 units in dextrose 5% 250 mL (100 units/mL) infusion HIGH INTENSITY nomogram - [facility], Continuous, Intravenous  heparin 25,000 units in dextrose 5% (100 units/ml) IV bolus from bag HIGH INTENSITY nomogram - [facility], As needed (PRN), Intravenous  heparin 25,000 units in dextrose 5% (100  units/ml) IV bolus from bag HIGH INTENSITY nomogram - [facility], As needed (PRN), Intravenous  warfarin tablet 7.5 mg, Daily, Oral    Plan  - Replete lytes with a goal of K>4, Mg >2  - Patient is anticoagulated, see medications listed above.  - Patient's afib is currently controlled, will hold rate control at this time given TVP.    Acute on chronic respiratory failure with hypoxia  Patient p/w hypoxia requiring increased O2 requirements, baseline 3L NC. History of CHF with history of moderately reduced EF of 45-50% in the past. Was previously on GDMT but had some pillars of therapy removed during previous admission due to hypotension and PAULA. CXR reveals pulmonary findings suggest edema.  and pt. With edema, suspect HF exacerbation, diurese and wean O2 as tolerated.    Plan:  - BiPAP qhs or with naps.   - On home O2 requirements, 3L  - Lasix 80mg BID for diuresis for pulmonary edema, titrate to UOP, 2-3L negative daily   - At goal.  - Fluid restriction, 1.5L and low Na diet  - Strict I/Os, daily weights and volume assessments  - Monitor electrolytes with daily CMP, maintain Mg > 2 and K > 4.    Acute on chronic diastolic heart failure  See acute on chronic respiratory failure.    Hypothyroidism due to acquired atrophy of thyroid  Chronic condition, stable.    - Continue home synthroid     Essential hypertension  Patients blood pressure range in the last 24 hours was: BP  Min: 87/53  Max: 189/87.The patient's inpatient anti-hypertensive regimen is listed below:  Current Antihypertensives  furosemide injection 80 mg, 2 times daily, Intravenous    Plan  - BP is controlled, no changes needed to their regimen  - Due to initial hypotension, will be conservative with antihypertensives. Can adjust regimen or dosages as indicated  - Given mildly reduced EF, will continue Entresto at the lowest dose 24-26 BID.        VTE Risk Mitigation (From admission, onward)           Ordered     warfarin tablet 7.5 mg  Daily          11/01/24 1000     heparin 25,000 units in dextrose 5% (100 units/ml) IV bolus from bag HIGH INTENSITY nomogram - [facility]  As needed (PRN)        Question:  Heparin Infusion Adjustment (DO NOT MODIFY ANSWER)  Answer:  [email or web address]\Images\Pharmacy\HeparinInfusions\heparin HIGH INTENSITY nomogram for [facility] [id number]    10/29/24 1656     heparin 25,000 units in dextrose 5% (100 units/ml) IV bolus from bag HIGH INTENSITY nomogram - [facility]  As needed (PRN)        Question:  Heparin Infusion Adjustment (DO NOT MODIFY ANSWER)  Answer:  [email or web address]\epic\Images\Pharmacy\HeparinInfusions\heparin HIGH INTENSITY nomogram for [facility] [id number].pdf    10/29/24 1656     heparin 25,000 units in dextrose 5% 250 mL (100 units/mL) infusion HIGH INTENSITY nomogram - [facility]  Continuous        Question:  Begin at (units/kg/hr)  Answer:  18    10/29/24 1655                    Ramirez Madsen MD  Internal Medicine, PGY-1  Cardiology  [facility] - Cardiac Intensive Care

## 2024-11-01 NOTE — ASSESSMENT & PLAN NOTE
Patient has persistent (7 days or more) atrial fibrillation. Patient is currently in sinus rhythm. [id number] Score: 3. The patients heart rate in the last 24 hours is as follows:  Pulse  Min: 58  Max: 72       Anticoagulants  heparin 25,000 units in dextrose 5% 250 mL (100 units/mL) infusion HIGH INTENSITY nomogram - [facility], Continuous, Intravenous  heparin 25,000 units in dextrose 5% (100 units/ml) IV bolus from bag HIGH INTENSITY nomogram - [facility], As needed (PRN), Intravenous  heparin 25,000 units in dextrose 5% (100 units/ml) IV bolus from bag HIGH INTENSITY nomogram - [facility], As needed (PRN), Intravenous  warfarin tablet 7.5 mg, Daily, Oral    Plan  - Replete lytes with a goal of K>4, Mg >2  - Patient is anticoagulated, see medications listed above.  - Patient's afib is currently controlled, will hold rate control at this time given TVP.

## 2024-11-01 NOTE — PLAN OF CARE
PT evaluation complete - see note for details. POC and goals established.    Problem: Physical Therapy  Goal: Physical Therapy Goal  Description: Goals to be met by: 11/15/24     Patient will increase functional independence with mobility by performin. Supine to sit with MInimal Assistance  2. Sit to supine with MInimal Assistance  3. Sit to stand transfer with Minimal Assistance  4. Bed to chair transfer with Minimal Assistance using LRAD  5. Gait  x 25 feet with Minimal Assistance using LRAD.   6. Sitting at edge of bed x8 minutes with Minimal Assistance    Outcome: Progressing     2024

## 2024-11-01 NOTE — PLAN OF CARE
CICU DAILY GOALS       A: Awake    RASS: Goal - RASS Goal: 0-->alert and calm  Actual - RASS ([place]ettles Agitation-Sedation Scale): alert and calm   Restraint necessity:    B: Breath   SBT: Not intubated   C: Coordinate A & B, analgesics/sedatives   Pain: managed    SAT: Not intubated  D: Delirium   CAM-ICU: Overall CAM-ICU: Negative  E: Early(intubated/ Progressive (non-intubated) Mobility   MOVE Screen: Fail   Activity: Activity Management: Rolling - L1  FAS: Feeding/Nutrition   Diet order: Diet/Nutrition Received: NPO,   Fluid restriction: Fluid Requirement: 1500CC FR   Nutritional Supplement Intake: Quantity 0, Type:  0  T: Thrombus   DVT prophylaxis: VTE Core Measure: Pharmacological prophylaxis initiated/maintained  H: HOB Elevation   Head of Bed (HOB) Positioning: HOB at 20-30 degrees  U: Ulcer Prophylaxis   GI: yes  G: Glucose control   managed    S: Skin   Bathing/Skin Care: back care;bath, complete;dressed/undressed;incontinence care;linen changed (10/31/24 1900)  Wounds: yes  Wound care consulted: Yes  B: Bowel Function   no issues   I: Indwelling Catheters   [place] necessity: [REMOVED]      Urethral Catheter-Reason for Continuing Urinary Catheterization: Chronic Indwelling Urinary Catheter on Admission       Urethral Catheter 10/31/24 1535-Reason for Continuing Urinary Catheterization: Critically ill in ICU and requiring hourly monitoring of intake/output   CVC necessity: Yes  D: De-escalation Antibx   No  Plan for the day   PPM  Family/Goals of care/Code Status   Code Status: Full Code     No acute events throughout day, VS and assessment per flow sheet, patient progressing towards goals as tolerated, plan of care reviewed with Bean Salas and family, all concerns addressed, will continue to monitor.

## 2024-11-01 NOTE — PLAN OF CARE
[facility] Card[facility]  Discharge Reassessment    Primary Care Provider: Ramirez Levine MD    Expected Discharge Date: 11/5/2024    Reassessment (most recent)       Discharge Reassessment - 11/01/24 1639          Discharge Reassessment    Assessment Type Discharge Planning Reassessment     Did the patient's condition or plan change since previous assessment? Yes     Discharge Plan discussed with: Patient     Communicated LATOSHA with patient/caregiver Date not available/Unable to determine     Discharge Plan A Skilled Nursing Facility     Discharge Plan B Home Health;Home with family     DME Needed Upon Discharge  none     Transition of Care Barriers None     Why the patient remains in the hospital Requires continued medical care        Post-Acute Status    Post-Acute Authorization Placement     Post-Acute Placement Status Pending medical clearance/testing                 SW met with pt at bedside to discuss discharge planning.  Pt stated that he does not want to return to [facility] but he is not yet sure where he does want to go.  Pt stated that he could possible go home or to his son's house depending on how he does after his procedure.  Decided that we will re-discuss when pt gets closer to discharge.  Discharge Plan A and Plan B have been determined by review of patient's clinical status, future medical and therapeutic needs, and coverage/benefits for post-acute care in coordination with multidisciplinary team members.  Will continue to follow.      Gabriela Zavala LMSW  [facility]  [id number]

## 2024-11-02 LAB
ALBUMIN SERPL BCP-MCNC: 2.7 G/DL (ref 3.5–5.2)
ALP SERPL-CCNC: 120 U/L (ref 40–150)
ALT SERPL W/O P-5'-P-CCNC: 19 U/L (ref 10–44)
ANION GAP SERPL CALC-SCNC: 7 MMOL/L (ref 8–16)
ANION GAP SERPL CALC-SCNC: 8 MMOL/L (ref 8–16)
APTT PPP: 48.2 SEC (ref 21–32)
AST SERPL-CCNC: 25 U/L (ref 10–40)
BASOPHILS # BLD AUTO: 0.02 K/UL (ref 0–0.2)
BASOPHILS NFR BLD: 0.6 % (ref 0–1.9)
BILIRUB SERPL-MCNC: 1.7 MG/DL (ref 0.1–1)
BUN SERPL-MCNC: 23 MG/DL (ref 8–23)
BUN SERPL-MCNC: 23 MG/DL (ref 8–23)
CALCIUM SERPL-MCNC: 8.6 MG/DL (ref 8.7–10.5)
CALCIUM SERPL-MCNC: 8.6 MG/DL (ref 8.7–10.5)
CHLORIDE SERPL-SCNC: 93 MMOL/L (ref 95–110)
CHLORIDE SERPL-SCNC: 95 MMOL/L (ref 95–110)
CO2 SERPL-SCNC: 36 MMOL/L (ref 23–29)
CO2 SERPL-SCNC: 37 MMOL/L (ref 23–29)
CREAT SERPL-MCNC: 1 MG/DL (ref 0.5–1.4)
CREAT SERPL-MCNC: 1 MG/DL (ref 0.5–1.4)
DIFFERENTIAL METHOD BLD: ABNORMAL
EOSINOPHIL # BLD AUTO: 0.1 K/UL (ref 0–0.5)
EOSINOPHIL NFR BLD: 1.5 % (ref 0–8)
ERYTHROCYTE [DISTWIDTH] IN BLOOD BY AUTOMATED COUNT: 15.7 % (ref 11.5–14.5)
EST. GFR  (NO RACE VARIABLE): >60 ML/MIN/1.73 M^2
EST. GFR  (NO RACE VARIABLE): >60 ML/MIN/1.73 M^2
GLUCOSE SERPL-MCNC: 123 MG/DL (ref 70–110)
GLUCOSE SERPL-MCNC: 183 MG/DL (ref 70–110)
HCT VFR BLD AUTO: 35.2 % (ref 40–54)
HGB BLD-MCNC: 10.9 G/DL (ref 14–18)
IMM GRANULOCYTES # BLD AUTO: 0.01 K/UL (ref 0–0.04)
IMM GRANULOCYTES NFR BLD AUTO: 0.3 % (ref 0–0.5)
INR PPP: 1.2 (ref 0.8–1.2)
LYMPHOCYTES # BLD AUTO: 0.5 K/UL (ref 1–4.8)
LYMPHOCYTES NFR BLD: 15.7 % (ref 18–48)
MAGNESIUM SERPL-MCNC: 2.2 MG/DL (ref 1.6–2.6)
MAGNESIUM SERPL-MCNC: 2.2 MG/DL (ref 1.6–2.6)
MCH RBC QN AUTO: 32.4 PG (ref 27–31)
MCHC RBC AUTO-ENTMCNC: 31 G/DL (ref 32–36)
MCV RBC AUTO: 105 FL (ref 82–98)
MONOCYTES # BLD AUTO: 0.5 K/UL (ref 0.3–1)
MONOCYTES NFR BLD: 13.6 % (ref 4–15)
NEUTROPHILS # BLD AUTO: 2.3 K/UL (ref 1.8–7.7)
NEUTROPHILS NFR BLD: 68.3 % (ref 38–73)
NRBC BLD-RTO: 0 /100 WBC
OHS QRS DURATION: 212 MS
OHS QTC CALCULATION: 590 MS
PHOSPHATE SERPL-MCNC: 3.1 MG/DL (ref 2.7–4.5)
PLATELET # BLD AUTO: 143 K/UL (ref 150–450)
PMV BLD AUTO: 12 FL (ref 9.2–12.9)
POTASSIUM SERPL-SCNC: 3.7 MMOL/L (ref 3.5–5.1)
POTASSIUM SERPL-SCNC: 4 MMOL/L (ref 3.5–5.1)
POTASSIUM SERPL-SCNC: 4.3 MMOL/L (ref 3.5–5.1)
PROT SERPL-MCNC: 5.7 G/DL (ref 6–8.4)
PROTHROMBIN TIME: 12.6 SEC (ref 9–12.5)
RBC # BLD AUTO: 3.36 M/UL (ref 4.6–6.2)
SODIUM SERPL-SCNC: 137 MMOL/L (ref 136–145)
SODIUM SERPL-SCNC: 139 MMOL/L (ref 136–145)
WBC # BLD AUTO: 3.32 K/UL (ref 3.9–12.7)

## 2024-11-02 PROCEDURE — 99900035 HC TECH TIME PER 15 MIN (STAT)

## 2024-11-02 PROCEDURE — 25000003 PHARM REV CODE 250: Performed by: INTERNAL MEDICINE

## 2024-11-02 PROCEDURE — 94660 CPAP INITIATION&MGMT: CPT

## 2024-11-02 PROCEDURE — 99231 SBSQ HOSP IP/OBS SF/LOW 25: CPT | Mod: GC,,, | Performed by: INTERNAL MEDICINE

## 2024-11-02 PROCEDURE — 84100 ASSAY OF PHOSPHORUS: CPT | Performed by: STUDENT IN AN ORGANIZED HEALTH CARE EDUCATION/TRAINING PROGRAM

## 2024-11-02 PROCEDURE — 25000003 PHARM REV CODE 250: Performed by: STUDENT IN AN ORGANIZED HEALTH CARE EDUCATION/TRAINING PROGRAM

## 2024-11-02 PROCEDURE — 63600175 PHARM REV CODE 636 W HCPCS

## 2024-11-02 PROCEDURE — 85025 COMPLETE CBC W/AUTO DIFF WBC: CPT | Performed by: STUDENT IN AN ORGANIZED HEALTH CARE EDUCATION/TRAINING PROGRAM

## 2024-11-02 PROCEDURE — 85730 THROMBOPLASTIN TIME PARTIAL: CPT

## 2024-11-02 PROCEDURE — 84132 ASSAY OF SERUM POTASSIUM: CPT | Performed by: INTERNAL MEDICINE

## 2024-11-02 PROCEDURE — 83735 ASSAY OF MAGNESIUM: CPT | Performed by: STUDENT IN AN ORGANIZED HEALTH CARE EDUCATION/TRAINING PROGRAM

## 2024-11-02 PROCEDURE — 94761 N-INVAS EAR/PLS OXIMETRY MLT: CPT

## 2024-11-02 PROCEDURE — 25000003 PHARM REV CODE 250

## 2024-11-02 PROCEDURE — 85610 PROTHROMBIN TIME: CPT | Performed by: STUDENT IN AN ORGANIZED HEALTH CARE EDUCATION/TRAINING PROGRAM

## 2024-11-02 PROCEDURE — 80053 COMPREHEN METABOLIC PANEL: CPT | Performed by: STUDENT IN AN ORGANIZED HEALTH CARE EDUCATION/TRAINING PROGRAM

## 2024-11-02 PROCEDURE — 27000221 HC OXYGEN, UP TO 24 HOURS

## 2024-11-02 PROCEDURE — 20000000 HC ICU ROOM

## 2024-11-02 PROCEDURE — 25000003 PHARM REV CODE 250: Performed by: HOSPITALIST

## 2024-11-02 RX ORDER — WARFARIN SODIUM 5 MG/1
10 TABLET ORAL DAILY
Status: DISCONTINUED | OUTPATIENT
Start: 2024-11-02 | End: 2024-11-03

## 2024-11-02 RX ORDER — POTASSIUM CHLORIDE 20 MEQ/1
40 TABLET, EXTENDED RELEASE ORAL ONCE
Status: COMPLETED | OUTPATIENT
Start: 2024-11-02 | End: 2024-11-02

## 2024-11-02 RX ADMIN — SACUBITRIL AND VALSARTAN 1 TABLET: 24; 26 TABLET, FILM COATED ORAL at 09:11

## 2024-11-02 RX ADMIN — FUROSEMIDE 80 MG: 10 INJECTION, SOLUTION INTRAVENOUS at 09:11

## 2024-11-02 RX ADMIN — LEVOTHYROXINE SODIUM 200 MCG: 100 TABLET ORAL at 06:11

## 2024-11-02 RX ADMIN — MIRTAZAPINE 7.5 MG: 7.5 TABLET, FILM COATED ORAL at 09:11

## 2024-11-02 RX ADMIN — ASPIRIN 81 MG: 81 TABLET, COATED ORAL at 09:11

## 2024-11-02 RX ADMIN — POTASSIUM CHLORIDE 40 MEQ: 1500 TABLET, EXTENDED RELEASE ORAL at 06:11

## 2024-11-02 RX ADMIN — FUROSEMIDE 80 MG: 10 INJECTION, SOLUTION INTRAVENOUS at 05:11

## 2024-11-02 RX ADMIN — Medication: at 09:11

## 2024-11-02 RX ADMIN — WARFARIN SODIUM 10 MG: 5 TABLET ORAL at 04:11

## 2024-11-02 RX ADMIN — HEPARIN SODIUM 13 UNITS/KG/HR: 10000 INJECTION, SOLUTION INTRAVENOUS at 06:11

## 2024-11-02 RX ADMIN — THIAMINE HCL TAB 100 MG 100 MG: 100 TAB at 09:11

## 2024-11-02 RX ADMIN — ATORVASTATIN CALCIUM 10 MG: 10 TABLET, FILM COATED ORAL at 09:11

## 2024-11-02 RX ADMIN — TAMSULOSIN HYDROCHLORIDE 0.4 MG: 0.4 CAPSULE ORAL at 09:11

## 2024-11-02 NOTE — PHYSICIAN QUERY
Due to conflicting documentation, please clarify the type of atrial fibrillation.   Other persistent atrial fibrillation

## 2024-11-02 NOTE — CARE UPDATE
Update: TVP not capturing this morning, repositioned with good capture. EP will continue to monitor. INR 1.2 today. Goal close to 2 prior to PPM implantation. NPO at midnight on Sunday. Discussed with Dr. Alejandro.

## 2024-11-02 NOTE — SUBJECTIVE & OBJECTIVE
Interval History: Overnight, patient with loss of capture on TVP. TVP was repositioned with full capture this morning. INR remains subtherapeutic at 1.2 today. Warfarin increased to 10mg daily today. No other acute events.    Review of Systems   Constitutional: Negative for chills and fever.   Cardiovascular:  Negative for chest pain and dyspnea on exertion.   Respiratory:  Negative for cough and shortness of breath.    Musculoskeletal:  Negative for back pain and joint swelling.   Gastrointestinal:  Negative for abdominal pain and constipation.   Genitourinary:  Negative for dysuria and hematuria.   Neurological:  Negative for focal weakness and headaches.   Psychiatric/Behavioral:  Negative for altered mental status. The patient is not nervous/anxious.      Objective:     Vital Signs (Most Recent):  Temp: 97.6 °F (36.4 °C) (11/02/24 1101)  Pulse: 69 (11/02/24 1201)  Resp: (!) 23 (11/02/24 1201)  BP: (!) 85/51 (11/02/24 1201)  SpO2: 96 % (11/02/24 1201) Vital Signs (24h Range):  Temp:  [97.5 °F (36.4 °C)-98.3 °F (36.8 °C)] 97.6 °F (36.4 °C)  Pulse:  [] 69  Resp:  [13-30] 23  SpO2:  [95 %-99 %] 96 %  BP: ()/(50-72) 85/51     Weight: 102.9 kg (226 lb 13.7 oz)  Body mass index is 31.64 kg/m².     SpO2: 96 %         Intake/Output Summary (Last 24 hours) at 11/2/2024 1255  Last data filed at 11/2/2024 1201  Gross per 24 hour   Intake 1257.58 ml   Output 2645 ml   Net -1387.42 ml       Lines/Drains/Airways       Central Venous Catheter Line  Duration             Introducer 10/29/24 1601 Internal Jugular Right 3 days              Drain  Duration                  Urethral Catheter 10/31/24 1535 1 day              Line  Duration                  Pacer Wires 10/29/24 1606 3 days              Peripheral Intravenous Line  Duration                  Peripheral IV - Single Lumen 10/29/24 1838 20 G Right Antecubital 3 days         Peripheral IV - Single Lumen 11/02/24 0032 20 G Anterior;Right Forearm <1 day                        Physical Exam  Constitutional:       General: He is not in acute distress.  HENT:      Head: Normocephalic and atraumatic.      Nose:      Comments: NC on  Neck:      Comments: TVP  Cardiovascular:      Rate and Rhythm: Normal rate and regular rhythm.   Pulmonary:      Effort: Pulmonary effort is normal.   Abdominal:      Palpations: Abdomen is soft.   Musculoskeletal:         General: Swelling present.      Right lower leg: Edema present.      Left lower leg: Edema present.      Comments: Swelling improved   Skin:     General: Skin is warm.   Neurological:      Mental Status: He is alert and oriented to person, place, and time.            Significant Labs: All pertinent lab results from the last 24 hours have been reviewed.    Significant Imaging: All relevant imaging was reviewed

## 2024-11-02 NOTE — PROGRESS NOTES
[facility] - Cardiac Intensive Care  Cardiology  Progress Note    Patient Name: Bean Salas  MRN: [id number]  Admission Date: 10/28/2024  Hospital Length of Stay: 4 days  Code Status: Full Code   Attending Physician: Barbara Canada MD   Primary Care Physician: Ramirez Levine MD  Expected Discharge Date: 11/5/2024  Principal Problem:CHB (complete heart block)    Subjective:     Hospital Course:   Patient was transferred to CICU from MICU on 10/30 for continued heparin bridging until INR therapeutic on warfarin prior to PPM placement. Patient diuresing well and with O2 weaned down to home baseline of 3L NC. INR remained subtherapeutic so warfarin dose was increased to 10mg on 11/2.    Interval History: Overnight, patient with loss of capture on TVP. TVP was repositioned with full capture this morning. INR remains subtherapeutic at 1.2 today. Warfarin increased to 10mg daily today. No other acute events.    Review of Systems   Constitutional: Negative for chills and fever.   Cardiovascular:  Negative for chest pain and dyspnea on exertion.   Respiratory:  Negative for cough and shortness of breath.    Musculoskeletal:  Negative for back pain and joint swelling.   Gastrointestinal:  Negative for abdominal pain and constipation.   Genitourinary:  Negative for dysuria and hematuria.   Neurological:  Negative for focal weakness and headaches.   Psychiatric/Behavioral:  Negative for altered mental status. The patient is not nervous/anxious.      Objective:     Vital Signs (Most Recent):  Temp: 97.6 °F (36.4 °C) (11/02/24 1101)  Pulse: 69 (11/02/24 1201)  Resp: (!) 23 (11/02/24 1201)  BP: (!) 85/51 (11/02/24 1201)  SpO2: 96 % (11/02/24 1201) Vital Signs (24h Range):  Temp:  [97.5 °F (36.4 °C)-98.3 °F (36.8 °C)] 97.6 °F (36.4 °C)  Pulse:  [] 69  Resp:  [13-30] 23  SpO2:  [95 %-99 %] 96 %  BP: ()/(50-72) 85/51     Weight: 102.9 kg (226 lb 13.7 oz)  Body mass index is 31.64 kg/m².     SpO2: 96 %          Intake/Output Summary (Last 24 hours) at 11/2/2024 1255  Last data filed at 11/2/2024 1201  Gross per 24 hour   Intake 1257.58 ml   Output 2645 ml   Net -1387.42 ml       Lines/Drains/Airways       Central Venous Catheter Line  Duration             Introducer 10/29/24 1601 Internal Jugular Right 3 days              Drain  Duration                  Urethral Catheter 10/31/24 1535 1 day              Line  Duration                  Pacer Wires 10/29/24 1606 3 days              Peripheral Intravenous Line  Duration                  Peripheral IV - Single Lumen 10/29/24 1838 20 G Right Antecubital 3 days         Peripheral IV - Single Lumen 11/02/24 0032 20 G Anterior;Right Forearm <1 day                       Physical Exam  Constitutional:       General: He is not in acute distress.  HENT:      Head: Normocephalic and atraumatic.      Nose:      Comments: NC on  Neck:      Comments: TVP  Cardiovascular:      Rate and Rhythm: Normal rate and regular rhythm.   Pulmonary:      Effort: Pulmonary effort is normal.   Abdominal:      Palpations: Abdomen is soft.   Musculoskeletal:         General: Swelling present.      Right lower leg: Edema present.      Left lower leg: Edema present.      Comments: Swelling improved   Skin:     General: Skin is warm.   Neurological:      Mental Status: He is alert and oriented to person, place, and time.            Significant Labs: All pertinent lab results from the last 24 hours have been reviewed.    Significant Imaging: All relevant imaging was reviewed    Assessment and Plan:       * CHB (complete heart block)  Presented to the ED with SOB and AMS. Found to be in CHB in the MICU. TV placed by RP on 10/29 with symptomatic improvement. V paced at 70.     - EP following, will place pacemaker once INR therapeutic on coumadin. Goal ~2.0    Encephalopathy, metabolic  P/w acute encephalopathy but improved after BiPAP and TVP. GCS 15 and ANOx4 currently. Mental status improved.  Anticipate AMS 2/2 hypercapnia and bradyarrhythmia. CTH without acute intracranial process  VBG mildly acidotic with hypercapnia above baseline     - See acute respiratory failure and CHB  - Improved    History of pulmonary embolism  Diagnosed with PE 9/16/2024 when admitted for pelvic fracture.     - On heparin gtt with warfarin started.  - INR goal 2.0 prior to PPM placement.  - INR still subtherapeutic, will increase Warfarin to 10 mg today    Paroxysmal atrial fibrillation  Patient has persistent (7 days or more) atrial fibrillation. Patient is currently in sinus rhythm. [id number] Score: 3. The patients heart rate in the last 24 hours is as follows:  Pulse  Min: 58  Max: 72       Anticoagulants  heparin 25,000 units in dextrose 5% 250 mL (100 units/mL) infusion HIGH INTENSITY nomogram - [facility], Continuous, Intravenous  heparin 25,000 units in dextrose 5% (100 units/ml) IV bolus from bag HIGH INTENSITY nomogram - [facility], As needed (PRN), Intravenous  heparin 25,000 units in dextrose 5% (100 units/ml) IV bolus from bag HIGH INTENSITY nomogram - [facility], As needed (PRN), Intravenous  warfarin tablet 7.5 mg, Daily, Oral    Plan  - Replete lytes with a goal of K>4, Mg >2  - Patient is anticoagulated, see medications listed above.  - Patient's afib is currently controlled, will hold rate control at this time given TVP.    Acute on chronic respiratory failure with hypoxia  Patient p/w hypoxia requiring increased O2 requirements, baseline 3L NC. History of CHF with history of moderately reduced EF of 45-50% in the past. Was previously on GDMT but had some pillars of therapy removed during previous admission due to hypotension and PAULA. CXR reveals pulmonary findings suggest edema.  and pt. With edema, suspect HF exacerbation, diurese and wean O2 as tolerated.    Plan:  - BiPAP qhs or with naps.   - On home O2 requirements, 3L  - Lasix 80mg BID for diuresis for pulmonary edema, titrate to UOP, 2-3L negative daily   - At  goal.  - Fluid restriction, 1.5L and low Na diet  - Strict I/Os, daily weights and volume assessments  - Monitor electrolytes with daily CMP, maintain Mg > 2 and K > 4.    Acute on chronic diastolic heart failure  See acute on chronic respiratory failure.    Hypothyroidism due to acquired atrophy of thyroid  Chronic condition, stable.    - Continue home synthroid     Essential hypertension  Patients blood pressure range in the last 24 hours was: BP  Min: 85/51  Max: 189/87.The patient's inpatient anti-hypertensive regimen is listed below:  Current Antihypertensives  furosemide injection 80 mg, 2 times daily, Intravenous    Plan  - BP is controlled, no changes needed to their regimen  - Due to initial hypotension, will be conservative with antihypertensives. Can adjust regimen or dosages as indicated  - Given mildly reduced EF, will continue Entresto at the lowest dose 24-26 BID.        VTE Risk Mitigation (From admission, onward)           Ordered     warfarin (COUMADIN) tablet 10 mg  Daily         11/02/24 1053     heparin 25,000 units in dextrose 5% (100 units/ml) IV bolus from bag HIGH INTENSITY nomogram - [facility]  As needed (PRN)        Question:  Heparin Infusion Adjustment (DO NOT MODIFY ANSWER)  Answer:  [email or web address]\Images\Pharmacy\HeparinInfusions\heparin HIGH INTENSITY nomogram for [facility] [id number]    10/29/24 1656     heparin 25,000 units in dextrose 5% (100 units/ml) IV bolus from bag HIGH INTENSITY nomogram - [facility]  As needed (PRN)        Question:  Heparin Infusion Adjustment (DO NOT MODIFY ANSWER)  Answer:  [email or web address]\Images\Pharmacy\HeparinInfusions\heparin HIGH INTENSITY nomogram for [facility] [id number].pdf    10/29/24 1656     heparin 25,000 units in dextrose 5% 250 mL (100 units/mL) infusion HIGH INTENSITY nomogram - [facility]  Continuous        Question:  Begin at (units/kg/hr)  Answer:  18    10/29/24 1656                    Nathan Marshall MD  Cardiology  [facility] - Cardiac Intensive Care

## 2024-11-02 NOTE — PLAN OF CARE
CICU DAILY GOALS     Awaiting PPM placement  Patient turned Q2 hours    A: Awake    RASS: Goal - RASS Goal: 0-->alert and calm  Actual - RASS ([place]tet[place] Agitation-Sedation Scale): alert and calm   Restraint necessity:    B: Breath   SBT: Not intubated   C: Coordinate A & B, analgesics/sedatives   Pain: managed    SAT: Not intubated  D: Delirium   CAM-ICU: Overall CAM-ICU: Negative  E: Early(intubated/ Progressive (non-intubated) Mobility   MOVE Screen: Fail   Activity: Activity Management: Leg kicks - L2  FAS: Feeding/Nutrition   Diet order: Diet/Nutrition Received: 2 gram sodium, low saturated fat/low cholesterol,   Fluid restriction: Fluid Requirement: 1500mL   Nutritional Supplement Intake: Quantity 0, Type:  n/a  T: Thrombus   DVT prophylaxis: VTE Core Measure: Pharmacological prophylaxis initiated/maintained  H: HOB Elevation   Head of Bed (HOB) Positioning: HOB at 30-45 degrees  U: Ulcer Prophylaxis   GI: no  G: Glucose control   managed    S: Skin   Bathing/Skin Care: back care;bath, complete;dressed/undressed;incontinence care;linen changed (10/31/24 1900)  Wounds: Yes  Wound care consulted: Yes  B: Bowel Function   LBM 10/30  I: Indwelling Catheters   Mckeon necessity: [REMOVED]      Urethral Catheter-Reason for Continuing Urinary Catheterization: Chronic Indwelling Urinary Catheter on Admission       Urethral Catheter 10/31/24 1535-Reason for Continuing Urinary Catheterization: Chronic Indwelling Urinary Catheter on Admission   CVC necessity: No  D: De-escalation Antibx   No  Plan for the day    Family/Goals of care/Code Status   Code Status: Full Code     No acute events throughout day, VS and assessment per flow sheet, patient progressing towards goals as tolerated, plan of care reviewed with Bean Salas and family, all concerns addressed.

## 2024-11-02 NOTE — ASSESSMENT & PLAN NOTE
Patients blood pressure range in the last 24 hours was: BP  Min: 85/51  Max: 189/87.The patient's inpatient anti-hypertensive regimen is listed below:  Current Antihypertensives  furosemide injection 80 mg, 2 times daily, Intravenous    Plan  - BP is controlled, no changes needed to their regimen  - Due to initial hypotension, will be conservative with antihypertensives. Can adjust regimen or dosages as indicated  - Given mildly reduced EF, will continue Entresto at the lowest dose 24-26 BID.

## 2024-11-02 NOTE — PLAN OF CARE
"CICU DAILY GOALS   Plan for the day   TINO Jean MD, CCU Fellow notified of abnormal pacer spikes with variations in heart rate but no change in BP. MD to bedside for testing of threshold and assessment, no additional orders placed nor TVP settings changed. VS and assessment per flow sheet, patient progressing towards goals as tolerated, plan of care reviewed with Bean Salas and family, all concerns addressed, plan of care continues as ordered per Primary Team.     Family/Goals of care/Code Status   Code Status: Full Code     Labs/Accuchecks:  Recent Labs   Lab 10/31/24  0321 11/01/24  0355 11/02/24  0301   WBC 3.92 4.12 3.32*   RBC 3.26* 3.48* 3.36*   HGB 10.8* 11.2* 10.9*   HCT 34.0* 36.1* 35.2*    147* 143*      Recent Labs   Lab 10/31/24  0321 10/31/24  1123 11/01/24  0546 11/01/24  0634 11/01/24  1115 11/01/24  1728 11/02/24  0301   INR 1.1  --  1.1  --   --   --  1.2   APTT 70.0*   < >  --    < > 46.7* 51.7* 48.2*    < > = values in this interval not displayed.      Recent Labs     11/02/24  0301      K 3.7   CO2 36*   CL 93*   BUN 23   CREATININE 1.0   ALKPHOS 120   ALT 19   AST 25   BILITOT 1.7*       Recent Labs   Lab 10/28/24  1639   TROPONINI 0.085*    No results for input(s): "PH", "PCO2", "PO2", "HCO3", "POCSATURATED", "BE" in the last 72 hours.    Electrolytes: Electrolytes replaced  Accuchecks: none    Gtts/LDAs:   heparin (porcine) in D5W  0-40 Units/kg/hr (Adjusted) Intravenous Continuous 11.2 mL/hr at 11/02/24 0608 13 Units/kg/hr at 11/02/24 0608       Lines/Drains/Airways       Central Venous Catheter Line  Duration             Introducer 10/29/24 1601 Internal Jugular Right 3 days              Drain  Duration                  Urethral Catheter 10/31/24 1535 1 day              Line  Duration                  Pacer Wires 10/29/24 1606 3 days              Peripheral Intravenous Line  Duration                  Peripheral IV - Single Lumen 10/29/24 1838 20 G Right Antecubital 3 days "         Peripheral IV - Single Lumen 11/02/24 0032 20 G Anterior;Right Forearm <1 day                    A: Awake    RASS: Goal - RASS Goal: 0-->alert and calm  Actual - RASS (Nettles Agitation-Sedation Scale): alert and calm   Restraint necessity:    B: Breath   3L NC (Baseline)  C: Coordinate A & B, analgesics/sedatives   Pain: managed   D: Delirium   CAM-ICU: Overall CAM-ICU: Negative  E: Early(intubated/ Progressive (non-intubated) Mobility   MOVE Screen: Pass   Activity: Activity Management: Arm raise - L1, Ankle pumps - L1  FAS: Feeding/Nutrition   Diet order: Diet/Nutrition Received: 2 gram sodium, low saturated fat/low cholesterol,   Fluid restriction: Fluid Requirement: 1500mL   Nutritional Supplement Intake: Quantity 0, Type: Boost  T: Thrombus   DVT prophylaxis: VTE Core Measure: Pharmacological prophylaxis initiated/maintained  H: HOB Elevation   Head of Bed (HOB) Positioning: HOB at 30-45 degrees  U: Ulcer Prophylaxis   GI: no  G: Glucose control   NA    S: Skin   Nurses Note -- 4 Eyes  Skin assessed during: Q Shift Change   CHOOSE ONE:  [x] No Altered Skin Integrity Present      [x]Prevention Measures Documented    [] Yes- Altered Skin Integrity Present or Discovered     [] LDA Added if Not in Epic (Describe Wound)     [] New Altered Skin Integrity was Present on Admit and Documented in LDA     [] Wound Image Taken  Wound Care Consulted? Yes  Attending Nurse:  Aminata Harper RN  Second RN/Staff Member:   Shruti Barry RN  B: Bowel Function   no issues - passing gas LBM 10/30  I: Indwelling Catheters   Mckeon necessity: Yes Chronic retention   CVC necessity: Yes   IPAD offered: Not appropriate  D: De-escalation Antibx   No

## 2024-11-02 NOTE — ASSESSMENT & PLAN NOTE
Diagnosed with PE 9/16/2024 when admitted for pelvic fracture.     - On heparin gtt with warfarin started.  - INR goal 2.0 prior to PPM placement.  - INR still subtherapeutic, will increase Warfarin to 10 mg today

## 2024-11-03 LAB
ALBUMIN SERPL BCP-MCNC: 2.7 G/DL (ref 3.5–5.2)
ALP SERPL-CCNC: 119 U/L (ref 40–150)
ALT SERPL W/O P-5'-P-CCNC: 21 U/L (ref 10–44)
ANION GAP SERPL CALC-SCNC: 7 MMOL/L (ref 8–16)
APTT PPP: 61.9 SEC (ref 21–32)
AST SERPL-CCNC: 24 U/L (ref 10–40)
BASOPHILS # BLD AUTO: 0.03 K/UL (ref 0–0.2)
BASOPHILS NFR BLD: 0.7 % (ref 0–1.9)
BILIRUB SERPL-MCNC: 1.3 MG/DL (ref 0.1–1)
BUN SERPL-MCNC: 21 MG/DL (ref 8–23)
CALCIUM SERPL-MCNC: 8.7 MG/DL (ref 8.7–10.5)
CHLORIDE SERPL-SCNC: 96 MMOL/L (ref 95–110)
CO2 SERPL-SCNC: 37 MMOL/L (ref 23–29)
CREAT SERPL-MCNC: 1.1 MG/DL (ref 0.5–1.4)
DIFFERENTIAL METHOD BLD: ABNORMAL
EOSINOPHIL # BLD AUTO: 0.1 K/UL (ref 0–0.5)
EOSINOPHIL NFR BLD: 1.9 % (ref 0–8)
ERYTHROCYTE [DISTWIDTH] IN BLOOD BY AUTOMATED COUNT: 15.9 % (ref 11.5–14.5)
EST. GFR  (NO RACE VARIABLE): >60 ML/MIN/1.73 M^2
GLUCOSE SERPL-MCNC: 178 MG/DL (ref 70–110)
HCT VFR BLD AUTO: 36.4 % (ref 40–54)
HGB BLD-MCNC: 11 G/DL (ref 14–18)
IMM GRANULOCYTES # BLD AUTO: 0.01 K/UL (ref 0–0.04)
IMM GRANULOCYTES NFR BLD AUTO: 0.2 % (ref 0–0.5)
INR PPP: 1.6 (ref 0.8–1.2)
LYMPHOCYTES # BLD AUTO: 0.7 K/UL (ref 1–4.8)
LYMPHOCYTES NFR BLD: 16.3 % (ref 18–48)
MAGNESIUM SERPL-MCNC: 2.3 MG/DL (ref 1.6–2.6)
MCH RBC QN AUTO: 31.5 PG (ref 27–31)
MCHC RBC AUTO-ENTMCNC: 30.2 G/DL (ref 32–36)
MCV RBC AUTO: 104 FL (ref 82–98)
MONOCYTES # BLD AUTO: 0.5 K/UL (ref 0.3–1)
MONOCYTES NFR BLD: 12.9 % (ref 4–15)
NEUTROPHILS # BLD AUTO: 2.8 K/UL (ref 1.8–7.7)
NEUTROPHILS NFR BLD: 68 % (ref 38–73)
NRBC BLD-RTO: 0 /100 WBC
PHOSPHATE SERPL-MCNC: 3.1 MG/DL (ref 2.7–4.5)
PLATELET # BLD AUTO: 143 K/UL (ref 150–450)
PMV BLD AUTO: 12.3 FL (ref 9.2–12.9)
POTASSIUM SERPL-SCNC: 4.1 MMOL/L (ref 3.5–5.1)
PROT SERPL-MCNC: 5.9 G/DL (ref 6–8.4)
PROTHROMBIN TIME: 17.4 SEC (ref 9–12.5)
RBC # BLD AUTO: 3.49 M/UL (ref 4.6–6.2)
SODIUM SERPL-SCNC: 140 MMOL/L (ref 136–145)
WBC # BLD AUTO: 4.17 K/UL (ref 3.9–12.7)

## 2024-11-03 PROCEDURE — 25000003 PHARM REV CODE 250: Performed by: INTERNAL MEDICINE

## 2024-11-03 PROCEDURE — 25000003 PHARM REV CODE 250: Performed by: STUDENT IN AN ORGANIZED HEALTH CARE EDUCATION/TRAINING PROGRAM

## 2024-11-03 PROCEDURE — 63600175 PHARM REV CODE 636 W HCPCS

## 2024-11-03 PROCEDURE — 94660 CPAP INITIATION&MGMT: CPT

## 2024-11-03 PROCEDURE — 99231 SBSQ HOSP IP/OBS SF/LOW 25: CPT | Mod: GC,,, | Performed by: INTERNAL MEDICINE

## 2024-11-03 PROCEDURE — 99233 SBSQ HOSP IP/OBS HIGH 50: CPT | Mod: ,,, | Performed by: INTERNAL MEDICINE

## 2024-11-03 PROCEDURE — 83735 ASSAY OF MAGNESIUM: CPT | Performed by: STUDENT IN AN ORGANIZED HEALTH CARE EDUCATION/TRAINING PROGRAM

## 2024-11-03 PROCEDURE — 25000003 PHARM REV CODE 250

## 2024-11-03 PROCEDURE — 85730 THROMBOPLASTIN TIME PARTIAL: CPT

## 2024-11-03 PROCEDURE — 20000000 HC ICU ROOM

## 2024-11-03 PROCEDURE — 63600175 PHARM REV CODE 636 W HCPCS: Performed by: INTERNAL MEDICINE

## 2024-11-03 PROCEDURE — 27000221 HC OXYGEN, UP TO 24 HOURS

## 2024-11-03 PROCEDURE — 63600175 PHARM REV CODE 636 W HCPCS: Performed by: STUDENT IN AN ORGANIZED HEALTH CARE EDUCATION/TRAINING PROGRAM

## 2024-11-03 PROCEDURE — 85025 COMPLETE CBC W/AUTO DIFF WBC: CPT | Performed by: STUDENT IN AN ORGANIZED HEALTH CARE EDUCATION/TRAINING PROGRAM

## 2024-11-03 PROCEDURE — 25000242 PHARM REV CODE 250 ALT 637 W/ HCPCS

## 2024-11-03 PROCEDURE — 94761 N-INVAS EAR/PLS OXIMETRY MLT: CPT

## 2024-11-03 PROCEDURE — 99900035 HC TECH TIME PER 15 MIN (STAT)

## 2024-11-03 PROCEDURE — 84100 ASSAY OF PHOSPHORUS: CPT | Performed by: STUDENT IN AN ORGANIZED HEALTH CARE EDUCATION/TRAINING PROGRAM

## 2024-11-03 PROCEDURE — 25000003 PHARM REV CODE 250: Performed by: HOSPITALIST

## 2024-11-03 PROCEDURE — 85610 PROTHROMBIN TIME: CPT | Performed by: STUDENT IN AN ORGANIZED HEALTH CARE EDUCATION/TRAINING PROGRAM

## 2024-11-03 PROCEDURE — 80053 COMPREHEN METABOLIC PANEL: CPT | Performed by: STUDENT IN AN ORGANIZED HEALTH CARE EDUCATION/TRAINING PROGRAM

## 2024-11-03 RX ORDER — FUROSEMIDE 20 MG/1
20 TABLET ORAL 2 TIMES DAILY
Status: DISCONTINUED | OUTPATIENT
Start: 2024-11-03 | End: 2024-11-03

## 2024-11-03 RX ORDER — FUROSEMIDE 20 MG/1
40 TABLET ORAL DAILY
Status: DISCONTINUED | OUTPATIENT
Start: 2024-11-04 | End: 2024-11-04

## 2024-11-03 RX ADMIN — WARFARIN SODIUM 7.5 MG: 2.5 TABLET ORAL at 04:11

## 2024-11-03 RX ADMIN — LEVOTHYROXINE SODIUM 200 MCG: 100 TABLET ORAL at 06:11

## 2024-11-03 RX ADMIN — TAMSULOSIN HYDROCHLORIDE 0.4 MG: 0.4 CAPSULE ORAL at 08:11

## 2024-11-03 RX ADMIN — EMPAGLIFLOZIN 10 MG: 10 TABLET, FILM COATED ORAL at 11:11

## 2024-11-03 RX ADMIN — ASPIRIN 81 MG: 81 TABLET, COATED ORAL at 08:11

## 2024-11-03 RX ADMIN — HEPARIN SODIUM 13 UNITS/KG/HR: 10000 INJECTION, SOLUTION INTRAVENOUS at 06:11

## 2024-11-03 RX ADMIN — Medication: at 08:11

## 2024-11-03 RX ADMIN — ATORVASTATIN CALCIUM 10 MG: 10 TABLET, FILM COATED ORAL at 08:11

## 2024-11-03 RX ADMIN — MIRTAZAPINE 7.5 MG: 7.5 TABLET, FILM COATED ORAL at 08:11

## 2024-11-03 RX ADMIN — PIPERACILLIN SODIUM AND TAZOBACTAM SODIUM 4.5 G: 4; .5 INJECTION, POWDER, FOR SOLUTION INTRAVENOUS at 07:11

## 2024-11-03 RX ADMIN — VANCOMYCIN HYDROCHLORIDE 1750 MG: 10 INJECTION, POWDER, LYOPHILIZED, FOR SOLUTION INTRAVENOUS at 05:11

## 2024-11-03 RX ADMIN — FUROSEMIDE 80 MG: 10 INJECTION, SOLUTION INTRAVENOUS at 08:11

## 2024-11-03 RX ADMIN — THIAMINE HCL TAB 100 MG 100 MG: 100 TAB at 08:11

## 2024-11-03 NOTE — SUBJECTIVE & OBJECTIVE
Interval History: No complaints this morning. Threshold 1.5. Discussed the pacemaker procedure and explained the risks. Threshold tvp 1.5 this AM. INR 1.6.       Objective:     Vital Signs (Most Recent):  Temp: 98.3 °F (36.8 °C) (11/03/24 0701)  Pulse: 71 (11/03/24 1001)  Resp: (!) 24 (11/03/24 1001)  BP: (!) 78/54 (11/03/24 1001)  SpO2: 97 % (11/03/24 1001) Vital Signs (24h Range):  Temp:  [97.6 °F (36.4 °C)-98.5 °F (36.9 °C)] 98.3 °F (36.8 °C)  Pulse:  [61-73] 71  Resp:  [13-39] 24  SpO2:  [95 %-100 %] 97 %  BP: ()/(50-67) 78/54     Weight: 102.9 kg (226 lb 13.7 oz)  Body mass index is 31.64 kg/m².     SpO2: 97 %        Physical Exam     Constitutional:       General: He is not in acute distress.  Neck:      Comments: TVP  Cardiovascular:      Rate and Rhythm: Normal rate and regular rhythm.   Pulmonary:      Effort: Pulmonary effort is normal.   Abdominal:      Palpations: Abdomen is soft.   Skin:     General: Skin is warm.   Neurological:      Mental Status: He is alert and oriented to person, place, and time.   Significant Labs:   Recent Lab Results         11/03/24  0240   11/02/24  1444        Albumin 2.7                  ALT 21         Anion Gap 7         PTT 61.9  Comment: Refer to local heparin nomogram for intensity/dose specific   therapeutic   range.           AST 24         Baso # 0.03         Basophil % 0.7         BILIRUBIN TOTAL 1.3  Comment: For infants and newborns, interpretation of results should be based  on gestational age, weight and in agreement with clinical  observations.    Premature Infant recommended reference ranges:  Up to 24 hours.............<8.0 mg/dL  Up to 48 hours............<12.0 mg/dL  3-5 days..................<15.0 mg/dL  6-29 days.................<15.0 mg/dL           BUN 21         Calcium 8.7         Chloride 96         CO2 37         Creatinine 1.1         Differential Method Automated         eGFR >60.0         Eos # 0.1         Eos % 1.9         Glucose  178         Gran # (ANC) 2.8         Gran % 68.0         Hematocrit 36.4         Hemoglobin 11.0         Immature Grans (Abs) 0.01  Comment: Mild elevation in immature granulocytes is non specific and   can be seen in a variety of conditions including stress response,   acute inflammation, trauma and pregnancy. Correlation with other   laboratory and clinical findings is essential.           Immature Granulocytes 0.2         INR 1.6  Comment: Coumadin Therapy:  2.0 - 3.0 for INR for all indicators except mechanical heart valves  and antiphospholipid syndromes which should use 2.5 - 3.5.           Lymph # 0.7         Lymph % 16.3         Magnesium  2.3         MCH 31.5         MCHC 30.2                  Mono # 0.5         Mono % 12.9         MPV 12.3         nRBC 0         Phosphorus Level 3.1         Platelet Count 143         Potassium 4.1   4.3       PROTEIN TOTAL 5.9         PT 17.4         RBC 3.49         RDW 15.9         Sodium 140         WBC 4.17

## 2024-11-03 NOTE — PLAN OF CARE
CICU DAILY GOALS   Plan for the day   ANA Akins, CCU Fellow notified of intermittent hypotension MAP of 60 this AM after nightly entresto administration. No additional orders received plan of care continues. VS and assessment per flow sheet, patient progressing towards goals as tolerated, plan of care reviewed with Bean Salas and family, all concerns addressed, plan of care continues as ordered per Primary Team.     Family/Goals of care/Code Status   Code Status: Full Code     Labs/Accuchecks:  Recent Labs   Lab 11/01/24  0355 11/02/24  0301 11/03/24  0240   WBC 4.12 3.32* 4.17   RBC 3.48* 3.36* 3.49*   HGB 11.2* 10.9* 11.0*   HCT 36.1* 35.2* 36.4*   * 143* 143*      Recent Labs   Lab 11/01/24  0546 11/01/24  0634 11/01/24  1728 11/02/24  0301 11/03/24  0240   INR 1.1  --   --  1.2 1.6*   APTT  --    < > 51.7* 48.2* 61.9*    < > = values in this interval not displayed.      Recent Labs     11/03/24  0240      K 4.1   CO2 37*   CL 96   BUN 21   CREATININE 1.1   ALKPHOS 119   ALT 21   AST 24   BILITOT 1.3*       Recent Labs   Lab 10/28/24  1639   TROPONINI 0.085*     Electrolytes: N/A - electrolytes WDL  Accuchecks: none    Gtts/LDAs:   heparin (porcine) in D5W  0-40 Units/kg/hr (Adjusted) Intravenous Continuous 11.2 mL/hr at 11/03/24 0501 13 Units/kg/hr at 11/03/24 0501     Lines/Drains/Airways       Central Venous Catheter Line  Duration             Introducer 10/29/24 1601 Internal Jugular Right 4 days              Drain  Duration                  Urethral Catheter 10/31/24 1535 2 days              Line  Duration                  Pacer Wires 10/29/24 1606 4 days              Peripheral Intravenous Line  Duration                  Peripheral IV - Single Lumen 11/02/24 0032 20 G Anterior;Right Forearm 1 day         Peripheral IV - Single Lumen 11/02/24 1446 20 G Anterior;Left Forearm <1 day                  A: Awake    RASS: Goal - RASS Goal: 0-->alert and calm  Actual - RASS (Nettles  Agitation-Sedation Scale): alert and calm   Restraint necessity:    B: Breath   3L NC = Baseline   C: Coordinate A & B, analgesics/sedatives   Pain: managed   D: Delirium   CAM-ICU: Overall CAM-ICU: Negative  E: Early(intubated/ Progressive (non-intubated) Mobility   MOVE Screen: Pass   Activity: Activity Management: Arm raise - L1, Ankle pumps - L1  FAS: Feeding/Nutrition   Diet order: Diet/Nutrition Received: 2 gram sodium, low saturated fat/low cholesterol,   Fluid restriction: Fluid Requirement: 1500mL FR   Nutritional Supplement Intake: Quantity 0, Type: Boost  T: Thrombus   DVT prophylaxis: VTE Core Measure: Pharmacological prophylaxis initiated/maintained  H: HOB Elevation   Head of Bed (HOB) Positioning: HOB elevated  U: Ulcer Prophylaxis   GI: yes  G: Glucose control   managed    B: Bowel Function   no issues   I: Indwelling Catheters   Mckeon necessity: [REMOVED]      Urethral Catheter-Reason for Continuing Urinary Catheterization: Chronic Indwelling Urinary Catheter on Admission       Urethral Catheter 10/31/24 1535-Reason for Continuing Urinary Catheterization: Chronic Indwelling Urinary Catheter on Admission   CVC necessity: N/A   IPAD offered: Not appropriate  D: De-escalation Antibx   No  Problem: Adult Inpatient Plan of Care  Goal: Plan of Care Review  Outcome: Progressing  Goal: Patient-Specific Goal (Individualized)  Outcome: Progressing  Goal: Absence of Hospital-Acquired Illness or Injury  Outcome: Progressing  Goal: Optimal Comfort and Wellbeing  Outcome: Progressing  Goal: Readiness for Transition of Care  Outcome: Progressing

## 2024-11-03 NOTE — SUBJECTIVE & OBJECTIVE
Interval History: overnight with some hypotension. Entresto held. Will adjust medications and d/c entresto and add SGLT2i instead given moderately reduced EF only. Lasix switched to PO. INR 1.6 today. Will decrease warfarin to 7.5 tonight. Pending INR, will plan for PPM tomorrow, NPO at MN. No complaints otherwise.     Review of Systems   Constitutional: Negative for chills and fever.   Cardiovascular:  Negative for chest pain and dyspnea on exertion.   Respiratory:  Negative for cough and shortness of breath.    Musculoskeletal:  Negative for back pain and joint swelling.   Gastrointestinal:  Negative for abdominal pain and constipation.   Genitourinary:  Negative for dysuria and hematuria.   Neurological:  Negative for focal weakness and headaches.   Psychiatric/Behavioral:  Negative for altered mental status. The patient is not nervous/anxious.      Objective:     Vital Signs (Most Recent):  Temp: 98.2 °F (36.8 °C) (11/03/24 1101)  Pulse: 70 (11/03/24 1201)  Resp: 18 (11/03/24 1201)  BP: (!) 88/59 (11/03/24 1201)  SpO2: 97 % (11/03/24 1201) Vital Signs (24h Range):  Temp:  [98 °F (36.7 °C)-98.5 °F (36.9 °C)] 98.2 °F (36.8 °C)  Pulse:  [61-73] 70  Resp:  [13-39] 18  SpO2:  [95 %-100 %] 97 %  BP: ()/(50-73) 88/59     Weight: 102.9 kg (226 lb 13.7 oz)  Body mass index is 31.64 kg/m².     SpO2: 97 %         Intake/Output Summary (Last 24 hours) at 11/3/2024 1316  Last data filed at 11/3/2024 1201  Gross per 24 hour   Intake 1098.22 ml   Output 1810 ml   Net -711.78 ml       Lines/Drains/Airways       Central Venous Catheter Line  Duration             Introducer 10/29/24 1601 Internal Jugular Right 4 days              Drain  Duration                  Urethral Catheter 10/31/24 1535 2 days              Line  Duration                  Pacer Wires 10/29/24 1606 4 days              Peripheral Intravenous Line  Duration                  Peripheral IV - Single Lumen 11/02/24 0032 20 G Anterior;Right Forearm 1 day          Peripheral IV - Single Lumen 11/02/24 1446 20 G Anterior;Left Forearm <1 day                       Physical Exam  Constitutional:       General: He is not in acute distress.  HENT:      Head: Normocephalic and atraumatic.      Nose:      Comments: NC on  Neck:      Comments: TVP  Cardiovascular:      Rate and Rhythm: Normal rate and regular rhythm.   Pulmonary:      Effort: Pulmonary effort is normal.   Abdominal:      Palpations: Abdomen is soft.   Musculoskeletal:         General: Swelling present.      Right lower leg: Edema present.      Left lower leg: Edema present.      Comments: Swelling improved   Skin:     General: Skin is warm.   Neurological:      Mental Status: He is alert and oriented to person, place, and time.            Significant Labs: All pertinent lab results from the last 24 hours have been reviewed.    Significant Imaging: All relevant imaging was reviewed

## 2024-11-03 NOTE — ASSESSMENT & PLAN NOTE
Diagnosed with PE 9/16/2024 when admitted for pelvic fracture.     - On heparin gtt with warfarin started.  - INR goal 2.0 prior to PPM placement.  - INR still subtherapeutic, but improved to 1.6.  - Decrease warfarin to 7.5 mg today

## 2024-11-03 NOTE — ASSESSMENT & PLAN NOTE
Patients blood pressure range in the last 24 hours was: BP  Min: 78/54  Max: 189/87.The patient's inpatient anti-hypertensive regimen is listed below:  Current Antihypertensives  furosemide tablet 40 mg, Daily, Oral    Plan  - BP is controlled, no changes needed to their regimen  - Due to initial hypotension, will be conservative with antihypertensives. Can adjust regimen or dosages as indicated  - Given mildly reduced EF, will start Jardiance 10mg, and discontinue Entresto due to hypotension.

## 2024-11-03 NOTE — ASSESSMENT & PLAN NOTE
Patient p/w hypoxia requiring increased O2 requirements, baseline 3L NC. History of CHF with history of moderately reduced EF of 45-50% in the past. Was previously on GDMT but had some pillars of therapy removed during previous admission due to hypotension and PAULA. CXR reveals pulmonary findings suggest edema.  and pt. With edema, suspect HF exacerbation, diurese and wean O2 as tolerated.    Plan:  - BiPAP qhs or with naps.   - On home O2 requirements, 3L  - Lasix IV switched to PO lasix 40mg daily.  - Fluid restriction, 1.5L and low Na diet  - Strict I/Os, daily weights and volume assessments  - Monitor electrolytes with daily CMP, maintain Mg > 2 and K > 4.

## 2024-11-03 NOTE — ASSESSMENT & PLAN NOTE
Presented to the ED with SOB and AMS. Found to be in CHB in the MICU. TV placed by RP on 10/29 with symptomatic improvement. V paced at 70.     - EP following, will place pacemaker once INR therapeutic on coumadin. Goal ~2.0  - INR 1.6 on 11/3

## 2024-11-03 NOTE — PROGRESS NOTES
[facility] - Card[facility]  Cardiology  Progress Note    Patient Name: Bean Salas  MRN: [id number]  Admission Date: 10/28/2024  Hospital Length of Stay: 5 days  Code Status: Full Code   Attending Physician: Barbara Canada MD   Primary Care Physician: Ramirez Levine MD  Expected Discharge Date: 11/5/2024  Principal Problem:CHB (complete heart block)    Subjective:     Hospital Course:   Patient was transferred to CICU from MICU on 10/30 for continued heparin bridging until INR therapeutic on warfarin prior to PPM placement. Patient diuresing well and with O2 weaned down to home baseline of 3L NC. INR remained subtherapeutic so warfarin dose was increased to 10mg on 11/2. INR 1.6 on 11/3, warfarin decreased to 7.5. Plan for PPM on 11/4 pending INR.    Interval History: overnight with some hypotension. Entresto held. Will adjust medications and d/c entresto and add SGLT2i instead given moderately reduced EF only. Lasix switched to PO. INR 1.6 today. Will decrease warfarin to 7.5 tonight. Pending INR, will plan for PPM tomorrow, NPO at MN. No complaints otherwise.     Review of Systems   Constitutional: Negative for chills and fever.   Cardiovascular:  Negative for chest pain and dyspnea on exertion.   Respiratory:  Negative for cough and shortness of breath.    Musculoskeletal:  Negative for back pain and joint swelling.   Gastrointestinal:  Negative for abdominal pain and constipation.   Genitourinary:  Negative for dysuria and hematuria.   Neurological:  Negative for focal weakness and headaches.   Psychiatric/Behavioral:  Negative for altered mental status. The patient is not nervous/anxious.      Objective:     Vital Signs (Most Recent):  Temp: 98.2 °F (36.8 °C) (11/03/24 1101)  Pulse: 70 (11/03/24 1201)  Resp: 18 (11/03/24 1201)  BP: (!) 88/59 (11/03/24 1201)  SpO2: 97 % (11/03/24 1201) Vital Signs (24h Range):  Temp:  [98 °F (36.7 °C)-98.5 °F (36.9 °C)] 98.2 °F (36.8 °C)  Pulse:  [61-73]  70  Resp:  [13-39] 18  SpO2:  [95 %-100 %] 97 %  BP: ()/(50-73) 88/59     Weight: 102.9 kg (226 lb 13.7 oz)  Body mass index is 31.64 kg/m².     SpO2: 97 %         Intake/Output Summary (Last 24 hours) at 11/3/2024 1316  Last data filed at 11/3/2024 1201  Gross per 24 hour   Intake 1098.22 ml   Output 1810 ml   Net -711.78 ml       Lines/Drains/Airways       Central Venous Catheter Line  Duration             Introducer 10/29/24 1601 Internal Jugular Right 4 days              Drain  Duration                  Urethral Catheter 10/31/24 1535 2 days              Line  Duration                  Pacer Wires 10/29/24 1606 4 days              Peripheral Intravenous Line  Duration                  Peripheral IV - Single Lumen 11/02/24 0032 20 G Anterior;Right Forearm 1 day         Peripheral IV - Single Lumen 11/02/24 1446 20 G Anterior;Left Forearm <1 day                       Physical Exam  Constitutional:       General: He is not in acute distress.  HENT:      Head: Normocephalic and atraumatic.      Nose:      Comments: NC on  Neck:      Comments: TVP  Cardiovascular:      Rate and Rhythm: Normal rate and regular rhythm.   Pulmonary:      Effort: Pulmonary effort is normal.   Abdominal:      Palpations: Abdomen is soft.   Musculoskeletal:         General: Swelling present.      Right lower leg: Edema present.      Left lower leg: Edema present.      Comments: Swelling improved   Skin:     General: Skin is warm.   Neurological:      Mental Status: He is alert and oriented to person, place, and time.            Significant Labs: All pertinent lab results from the last 24 hours have been reviewed.    Significant Imaging: All relevant imaging was reviewed      Assessment and Plan:       * CHB (complete heart block)  Presented to the ED with SOB and AMS. Found to be in CHB in the MICU. TV placed by RP on 10/29 with symptomatic improvement. V paced at 70.     - EP following, will place pacemaker once INR therapeutic on  coumadin. Goal ~2.0  - INR 1.6 on 11/3    Encephalopathy, metabolic  P/w acute encephalopathy but improved after BiPAP and TVP. GCS 15 and ANOx4 currently. Mental status improved. Anticipate AMS 2/2 hypercapnia and bradyarrhythmia. CTH without acute intracranial process  VBG mildly acidotic with hypercapnia above baseline     - See acute respiratory failure and CHB  - Improved    History of pulmonary embolism  Diagnosed with PE 9/16/2024 when admitted for pelvic fracture.     - On heparin gtt with warfarin started.  - INR goal 2.0 prior to PPM placement.  - INR still subtherapeutic, but improved to 1.6.  - Decrease warfarin to 7.5 mg today    Paroxysmal atrial fibrillation  Patient has persistent (7 days or more) atrial fibrillation. Patient is currently in sinus rhythm. [id number] Score: 3. The patients heart rate in the last 24 hours is as follows:  Pulse  Min: 58  Max: 72       Anticoagulants  heparin 25,000 units in dextrose 5% 250 mL (100 units/mL) infusion HIGH INTENSITY nomogram - [facility], Continuous, Intravenous  heparin 25,000 units in dextrose 5% (100 units/ml) IV bolus from bag HIGH INTENSITY nomogram - [facility], As needed (PRN), Intravenous  heparin 25,000 units in dextrose 5% (100 units/ml) IV bolus from bag HIGH INTENSITY nomogram - [facility], As needed (PRN), Intravenous  warfarin tablet 7.5 mg, Daily, Oral    Plan  - Replete lytes with a goal of K>4, Mg >2  - Patient is anticoagulated, see medications listed above.  - Patient's afib is currently controlled, will hold rate control at this time given TVP.    Acute on chronic respiratory failure with hypoxia  Patient p/w hypoxia requiring increased O2 requirements, baseline 3L NC. History of CHF with history of moderately reduced EF of 45-50% in the past. Was previously on GDMT but had some pillars of therapy removed during previous admission due to hypotension and PAULA. CXR reveals pulmonary findings suggest edema.  and pt. With edema, suspect HF exacerbation,  diurese and wean O2 as tolerated.    Plan:  - BiPAP qhs or with naps.   - On home O2 requirements, 3L  - Lasix IV switched to PO lasix 40mg daily.  - Fluid restriction, 1.5L and low Na diet  - Strict I/Os, daily weights and volume assessments  - Monitor electrolytes with daily CMP, maintain Mg > 2 and K > 4.    Acute on chronic diastolic heart failure  See acute on chronic respiratory failure.    Hypothyroidism due to acquired atrophy of thyroid  Chronic condition, stable.    - Continue home synthroid     Essential hypertension  Patients blood pressure range in the last 24 hours was: BP  Min: 78/54  Max: 189/87.The patient's inpatient anti-hypertensive regimen is listed below:  Current Antihypertensives  furosemide tablet 40 mg, Daily, Oral    Plan  - BP is controlled, no changes needed to their regimen  - Due to initial hypotension, will be conservative with antihypertensives. Can adjust regimen or dosages as indicated  - Given mildly reduced EF, will start Jardiance 10mg, and discontinue Entresto due to hypotension.        VTE Risk Mitigation (From admission, onward)           Ordered     warfarin tablet 7.5 mg  Daily         11/03/24 1208     heparin 25,000 units in dextrose 5% (100 units/ml) IV bolus from bag HIGH INTENSITY nomogram - [facility]  As needed (PRN)        Question:  Heparin Infusion Adjustment (DO NOT MODIFY ANSWER)  Answer:  [email or web address]\Images\Pharmacy\HeparinInfusions\heparin HIGH INTENSITY nomogram for [facility] [id number].[email or web address]    10/29/24 1656     heparin 25,000 units in dextrose 5% (100 units/ml) IV bolus from bag HIGH INTENSITY nomogram - [facility]  As needed (PRN)        Question:  Heparin Infusion Adjustment (DO NOT MODIFY ANSWER)  Answer:  \[email or web address]\Images\Pharmacy\HeparinInfusions\heparin HIGH INTENSITY nomogram for [facility] [id number].pdf    10/29/24 1656     heparin 25,000 units in dextrose 5% 250 mL (100 units/mL) infusion HIGH INTENSITY nomogram - [facility]  Continuous        Question:  Begin at  (units/kg/hr)  Answer:  18    10/29/24 1656                    Nathan Marshall MD  Cardiology  [facility] - Cardiac Intensive Care

## 2024-11-03 NOTE — PLAN OF CARE
CICU DAILY GOALS     NPO at midnight for PPM    A: Awake    RASS: Goal - RASS Goal: 0-->alert and calm  Actual - RASS (Nettles Agitation-Sedation Scale): alert and calm   Restraint necessity:    B: Breath   SBT: Not intubated   C: Coordinate A & B, analgesics/sedatives   Pain: managed    SAT: Not intubated  D: Delirium   CAM-ICU: Overall CAM-ICU: Negative  E: Early(intubated/ Progressive (non-intubated) Mobility   MOVE Screen: Fail   Activity: Activity Management: Ankle pumps - L1  FAS: Feeding/Nutrition   Diet order: Diet/Nutrition Received: 2 gram sodium, low saturated fat/low cholesterol,   Fluid restriction: Fluid Requirement: 1500mL FR   Nutritional Supplement Intake: Quantity 0, Type:  n/a  T: Thrombus   DVT prophylaxis: VTE Core Measure: Pharmacological prophylaxis initiated/maintained  H: HOB Elevation   Head of Bed (HOB) Positioning: HOB lowered  U: Ulcer Prophylaxis   GI: no  G: Glucose control   managed    S: Skin   Bathing/Skin Care: (S) bath, complete;dressed/undressed;electrode patches/site rotation;incontinence care;linen changed;moisturizer applied (11/02/24 0601)  Wounds: Yes  Wound care consulted: Yes  B: Bowel Function   LBM 10/30   I: Indwelling Catheters   [place] necessity: [REMOVED]      Urethral Catheter-Reason for Continuing Urinary Catheterization: Chronic Indwelling Urinary Catheter on Admission       Urethral Catheter 10/31/24 1535-Reason for Continuing Urinary Catheterization: Chronic Indwelling Urinary Catheter on Admission   CVC necessity: No  D: De-escalation Antibx   Yes\     Family/Goals of care/Code Status   Code Status: Full Code     No acute events throughout day, VS and assessment per flow sheet, patient progressing towards goals as tolerated, plan of care reviewed with Bean Salas and family, all concerns addressed.

## 2024-11-03 NOTE — PROGRESS NOTES
[facility] - Cardiac Intensive [facility]  Cardiac Electrophysiology  Progress Note    Admission Date: 10/28/2024  Code Status: Full Code   Attending Physician: Barbara Canada MD   Expected Discharge Date: 11/5/2024  Principal Problem:CHB (complete heart block)    Subjective:     Interval History: No complaints this morning. Threshold 1.5. Discussed the pacemaker procedure and explained the risks. Threshold tvp 1.5 this AM. INR 1.6.       Objective:     Vital Signs (Most Recent):  Temp: 98.3 °F (36.8 °C) (11/03/24 0701)  Pulse: 71 (11/03/24 1001)  Resp: (!) 24 (11/03/24 1001)  BP: (!) 78/54 (11/03/24 1001)  SpO2: 97 % (11/03/24 1001) Vital Signs (24h Range):  Temp:  [97.6 °F (36.4 °C)-98.5 °F (36.9 °C)] 98.3 °F (36.8 °C)  Pulse:  [61-73] 71  Resp:  [13-39] 24  SpO2:  [95 %-100 %] 97 %  BP: ()/(50-67) 78/54     Weight: 102.9 kg (226 lb 13.7 oz)  Body mass index is 31.64 kg/m².     SpO2: 97 %        Physical Exam     Constitutional:       General: He is not in acute distress.  Neck:      Comments: TVP  Cardiovascular:      Rate and Rhythm: Normal rate and regular rhythm.   Pulmonary:      Effort: Pulmonary effort is normal.   Abdominal:      Palpations: Abdomen is soft.   Skin:     General: Skin is warm.   Neurological:      Mental Status: He is alert and oriented to person, place, and time.   Significant Labs:   Recent Lab Results         11/03/24  0240   11/02/24  1444        Albumin 2.7                  ALT 21         Anion Gap 7         PTT 61.9  Comment: Refer to local heparin nomogram for intensity/dose specific   therapeutic   range.           AST 24         Baso # 0.03         Basophil % 0.7         BILIRUBIN TOTAL 1.3  Comment: For infants and newborns, interpretation of results should be based  on gestational age, weight and in agreement with clinical  observations.    Premature Infant recommended reference ranges:  Up to 24 hours.............<8.0 mg/dL  Up to 48 hours............<12.0 mg/dL  3-5  days..................<15.0 mg/dL  6-29 days.................<15.0 mg/dL           BUN 21         Calcium 8.7         Chloride 96         CO2 37         Creatinine 1.1         Differential Method Automated         eGFR >60.0         Eos # 0.1         Eos % 1.9         Glucose 178         Gran # (ANC) 2.8         Gran % 68.0         Hematocrit 36.4         Hemoglobin 11.0         Immature Grans (Abs) 0.01  Comment: Mild elevation in immature granulocytes is non specific and   can be seen in a variety of conditions including stress response,   acute inflammation, trauma and pregnancy. Correlation with other   laboratory and clinical findings is essential.           Immature Granulocytes 0.2         INR 1.6  Comment: Coumadin Therapy:  2.0 - 3.0 for INR for all indicators except mechanical heart valves  and antiphospholipid syndromes which should use 2.5 - 3.5.           Lymph # 0.7         Lymph % 16.3         Magnesium  2.3         MCH 31.5         MCHC 30.2                  Mono # 0.5         Mono % 12.9         MPV 12.3         nRBC 0         Phosphorus Level 3.1         Platelet Count 143         Potassium 4.1   4.3       PROTEIN TOTAL 5.9         PT 17.4         RBC 3.49         RDW 15.9         Sodium 140         WBC 4.17                   Assessment and Plan:     * CHB (complete heart block)  Mr. 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     Recommendations:   - TVP placed. Threshold 1.5. Will check threshold daily.   - Avoid AV brady blocking agents.   - Continue Warfarin bridge with heparin with plans to discontinue heparin pending INR >1.9 -Pacemaker placement pending therapeutic INR > 1.9  - NPO at midnight tonight    - For full recommendations  please see staff attestation.              Chiquita Akins MD  Cardiac Electrophysiology  [facility] - Cardiac Intensive Care

## 2024-11-04 ENCOUNTER — ANESTHESIA EVENT (OUTPATIENT)
Dept: MEDSURG UNIT | Facility: HOSPITAL | Age: 79
End: 2024-11-04
Payer: MEDICARE

## 2024-11-04 ENCOUNTER — ANESTHESIA (OUTPATIENT)
Dept: MEDSURG UNIT | Facility: HOSPITAL | Age: 79
End: 2024-11-04
Payer: MEDICARE

## 2024-11-04 LAB
ALBUMIN SERPL BCP-MCNC: 2.6 G/DL (ref 3.5–5.2)
ALP SERPL-CCNC: 109 U/L (ref 40–150)
ALT SERPL W/O P-5'-P-CCNC: 21 U/L (ref 10–44)
ANION GAP SERPL CALC-SCNC: 7 MMOL/L (ref 8–16)
APTT PPP: 78.2 SEC (ref 21–32)
AST SERPL-CCNC: 22 U/L (ref 10–40)
BASOPHILS # BLD AUTO: 0.02 K/UL (ref 0–0.2)
BASOPHILS NFR BLD: 0.5 % (ref 0–1.9)
BILIRUB SERPL-MCNC: 1.1 MG/DL (ref 0.1–1)
BUN SERPL-MCNC: 22 MG/DL (ref 8–23)
CALCIUM SERPL-MCNC: 9.1 MG/DL (ref 8.7–10.5)
CHLORIDE SERPL-SCNC: 96 MMOL/L (ref 95–110)
CO2 SERPL-SCNC: 37 MMOL/L (ref 23–29)
CREAT SERPL-MCNC: 1.1 MG/DL (ref 0.5–1.4)
DIFFERENTIAL METHOD BLD: ABNORMAL
EOSINOPHIL # BLD AUTO: 0.1 K/UL (ref 0–0.5)
EOSINOPHIL NFR BLD: 1.6 % (ref 0–8)
ERYTHROCYTE [DISTWIDTH] IN BLOOD BY AUTOMATED COUNT: 15.5 % (ref 11.5–14.5)
EST. GFR  (NO RACE VARIABLE): >60 ML/MIN/1.73 M^2
GLUCOSE SERPL-MCNC: 119 MG/DL (ref 70–110)
HCT VFR BLD AUTO: 35 % (ref 40–54)
HGB BLD-MCNC: 10.8 G/DL (ref 14–18)
IMM GRANULOCYTES # BLD AUTO: 0.01 K/UL (ref 0–0.04)
IMM GRANULOCYTES NFR BLD AUTO: 0.2 % (ref 0–0.5)
INR PPP: 2.6 (ref 0.8–1.2)
LYMPHOCYTES # BLD AUTO: 0.7 K/UL (ref 1–4.8)
LYMPHOCYTES NFR BLD: 15.2 % (ref 18–48)
MAGNESIUM SERPL-MCNC: 2.3 MG/DL (ref 1.6–2.6)
MCH RBC QN AUTO: 32.2 PG (ref 27–31)
MCHC RBC AUTO-ENTMCNC: 30.9 G/DL (ref 32–36)
MCV RBC AUTO: 105 FL (ref 82–98)
MONOCYTES # BLD AUTO: 0.5 K/UL (ref 0.3–1)
MONOCYTES NFR BLD: 11.1 % (ref 4–15)
NEUTROPHILS # BLD AUTO: 3.2 K/UL (ref 1.8–7.7)
NEUTROPHILS NFR BLD: 71.4 % (ref 38–73)
NRBC BLD-RTO: 0 /100 WBC
OHS QRS DURATION: 114 MS
OHS QTC CALCULATION: 520 MS
PHOSPHATE SERPL-MCNC: 3.6 MG/DL (ref 2.7–4.5)
PLATELET # BLD AUTO: 156 K/UL (ref 150–450)
PMV BLD AUTO: 12.2 FL (ref 9.2–12.9)
POTASSIUM SERPL-SCNC: 3.9 MMOL/L (ref 3.5–5.1)
PROT SERPL-MCNC: 5.7 G/DL (ref 6–8.4)
PROTHROMBIN TIME: 27.3 SEC (ref 9–12.5)
RBC # BLD AUTO: 3.35 M/UL (ref 4.6–6.2)
SODIUM SERPL-SCNC: 140 MMOL/L (ref 136–145)
WBC # BLD AUTO: 4.42 K/UL (ref 3.9–12.7)

## 2024-11-04 PROCEDURE — 02H63JZ INSERTION OF PACEMAKER LEAD INTO RIGHT ATRIUM, PERCUTANEOUS APPROACH: ICD-10-PCS | Performed by: INTERNAL MEDICINE

## 2024-11-04 PROCEDURE — 63600175 PHARM REV CODE 636 W HCPCS: Performed by: INTERNAL MEDICINE

## 2024-11-04 PROCEDURE — 99231 SBSQ HOSP IP/OBS SF/LOW 25: CPT | Mod: GC,,, | Performed by: INTERNAL MEDICINE

## 2024-11-04 PROCEDURE — 33208 INSRT HEART PM ATRIAL & VENT: CPT | Performed by: INTERNAL MEDICINE

## 2024-11-04 PROCEDURE — 37000008 HC ANESTHESIA 1ST 15 MINUTES: Performed by: INTERNAL MEDICINE

## 2024-11-04 PROCEDURE — 02HL3JZ INSERTION OF PACEMAKER LEAD INTO LEFT VENTRICLE, PERCUTANEOUS APPROACH: ICD-10-PCS | Performed by: INTERNAL MEDICINE

## 2024-11-04 PROCEDURE — 25000003 PHARM REV CODE 250: Performed by: STUDENT IN AN ORGANIZED HEALTH CARE EDUCATION/TRAINING PROGRAM

## 2024-11-04 PROCEDURE — 25000003 PHARM REV CODE 250: Performed by: NURSE ANESTHETIST, CERTIFIED REGISTERED

## 2024-11-04 PROCEDURE — 37000009 HC ANESTHESIA EA ADD 15 MINS: Performed by: INTERNAL MEDICINE

## 2024-11-04 PROCEDURE — 85730 THROMBOPLASTIN TIME PARTIAL: CPT

## 2024-11-04 PROCEDURE — C1894 INTRO/SHEATH, NON-LASER: HCPCS | Performed by: INTERNAL MEDICINE

## 2024-11-04 PROCEDURE — C1730 CATH, EP, 19 OR FEW ELECT: HCPCS | Performed by: INTERNAL MEDICINE

## 2024-11-04 PROCEDURE — C1725 CATH, TRANSLUMIN NON-LASER: HCPCS | Performed by: INTERNAL MEDICINE

## 2024-11-04 PROCEDURE — 33225 L VENTRIC PACING LEAD ADD-ON: CPT | Mod: ,,, | Performed by: INTERNAL MEDICINE

## 2024-11-04 PROCEDURE — C1769 GUIDE WIRE: HCPCS | Performed by: INTERNAL MEDICINE

## 2024-11-04 PROCEDURE — C1893 INTRO/SHEATH, FIXED,NON-PEEL: HCPCS | Performed by: INTERNAL MEDICINE

## 2024-11-04 PROCEDURE — C1882 AICD, OTHER THAN SING/DUAL: HCPCS | Performed by: INTERNAL MEDICINE

## 2024-11-04 PROCEDURE — 0JH607Z INSERTION OF CARDIAC RESYNCHRONIZATION PACEMAKER PULSE GENERATOR INTO CHEST SUBCUTANEOUS TISSUE AND FASCIA, OPEN APPROACH: ICD-10-PCS | Performed by: INTERNAL MEDICINE

## 2024-11-04 PROCEDURE — 27000221 HC OXYGEN, UP TO 24 HOURS

## 2024-11-04 PROCEDURE — 63600175 PHARM REV CODE 636 W HCPCS: Mod: JG | Performed by: NURSE ANESTHETIST, CERTIFIED REGISTERED

## 2024-11-04 PROCEDURE — 99900035 HC TECH TIME PER 15 MIN (STAT)

## 2024-11-04 PROCEDURE — D9220A PRA ANESTHESIA: Mod: ANES,,, | Performed by: ANESTHESIOLOGY

## 2024-11-04 PROCEDURE — 25500020 PHARM REV CODE 255: Performed by: NURSE ANESTHETIST, CERTIFIED REGISTERED

## 2024-11-04 PROCEDURE — 25000003 PHARM REV CODE 250: Performed by: INTERNAL MEDICINE

## 2024-11-04 PROCEDURE — 85025 COMPLETE CBC W/AUTO DIFF WBC: CPT | Performed by: STUDENT IN AN ORGANIZED HEALTH CARE EDUCATION/TRAINING PROGRAM

## 2024-11-04 PROCEDURE — 93010 ELECTROCARDIOGRAM REPORT: CPT | Mod: ,,, | Performed by: INTERNAL MEDICINE

## 2024-11-04 PROCEDURE — 83735 ASSAY OF MAGNESIUM: CPT | Performed by: STUDENT IN AN ORGANIZED HEALTH CARE EDUCATION/TRAINING PROGRAM

## 2024-11-04 PROCEDURE — 63600175 PHARM REV CODE 636 W HCPCS: Performed by: NURSE ANESTHETIST, CERTIFIED REGISTERED

## 2024-11-04 PROCEDURE — 99233 SBSQ HOSP IP/OBS HIGH 50: CPT | Mod: ,,, | Performed by: INTERNAL MEDICINE

## 2024-11-04 PROCEDURE — 25000003 PHARM REV CODE 250

## 2024-11-04 PROCEDURE — C1900 LEAD, CORONARY VENOUS: HCPCS | Performed by: INTERNAL MEDICINE

## 2024-11-04 PROCEDURE — 84100 ASSAY OF PHOSPHORUS: CPT | Performed by: STUDENT IN AN ORGANIZED HEALTH CARE EDUCATION/TRAINING PROGRAM

## 2024-11-04 PROCEDURE — D9220A PRA ANESTHESIA: Mod: CRNA,,, | Performed by: NURSE ANESTHETIST, CERTIFIED REGISTERED

## 2024-11-04 PROCEDURE — 82803 BLOOD GASES ANY COMBINATION: CPT

## 2024-11-04 PROCEDURE — 27201423 OPTIME MED/SURG SUP & DEVICES STERILE SUPPLY: Performed by: INTERNAL MEDICINE

## 2024-11-04 PROCEDURE — 02HK3JZ INSERTION OF PACEMAKER LEAD INTO RIGHT VENTRICLE, PERCUTANEOUS APPROACH: ICD-10-PCS | Performed by: INTERNAL MEDICINE

## 2024-11-04 PROCEDURE — C1898 LEAD, PMKR, OTHER THAN TRANS: HCPCS | Performed by: INTERNAL MEDICINE

## 2024-11-04 PROCEDURE — 25000003 PHARM REV CODE 250: Performed by: HOSPITALIST

## 2024-11-04 PROCEDURE — 93005 ELECTROCARDIOGRAM TRACING: CPT

## 2024-11-04 PROCEDURE — 85610 PROTHROMBIN TIME: CPT | Performed by: STUDENT IN AN ORGANIZED HEALTH CARE EDUCATION/TRAINING PROGRAM

## 2024-11-04 PROCEDURE — 25000242 PHARM REV CODE 250 ALT 637 W/ HCPCS

## 2024-11-04 PROCEDURE — 63600175 PHARM REV CODE 636 W HCPCS: Performed by: STUDENT IN AN ORGANIZED HEALTH CARE EDUCATION/TRAINING PROGRAM

## 2024-11-04 PROCEDURE — 94761 N-INVAS EAR/PLS OXIMETRY MLT: CPT

## 2024-11-04 PROCEDURE — 80053 COMPREHEN METABOLIC PANEL: CPT | Performed by: STUDENT IN AN ORGANIZED HEALTH CARE EDUCATION/TRAINING PROGRAM

## 2024-11-04 PROCEDURE — 21400001 HC TELEMETRY ROOM

## 2024-11-04 PROCEDURE — 33208 INSRT HEART PM ATRIAL & VENT: CPT | Mod: KX,,, | Performed by: INTERNAL MEDICINE

## 2024-11-04 DEVICE — LEFT HEART LEAD
Type: IMPLANTABLE DEVICE | Site: HEART | Status: FUNCTIONAL
Brand: QUARTET™

## 2024-11-04 DEVICE — PACING LEAD
Type: IMPLANTABLE DEVICE | Site: HEART | Status: FUNCTIONAL
Brand: TENDRIL™

## 2024-11-04 DEVICE — CRT CARDIAC RESYNCHRONIZATION THERAPY PACEMAKER DDDRV
Type: IMPLANTABLE DEVICE | Site: HEART | Status: FUNCTIONAL
Brand: QUADRA ALLURE MP™

## 2024-11-04 RX ORDER — PROPOFOL 10 MG/ML
VIAL (ML) INTRAVENOUS CONTINUOUS PRN
Status: DISCONTINUED | OUTPATIENT
Start: 2024-11-04 | End: 2024-11-04

## 2024-11-04 RX ORDER — ONDANSETRON HYDROCHLORIDE 2 MG/ML
INJECTION, SOLUTION INTRAVENOUS
Status: DISCONTINUED | OUTPATIENT
Start: 2024-11-04 | End: 2024-11-04

## 2024-11-04 RX ORDER — VANCOMYCIN HYDROCHLORIDE 1 G/20ML
INJECTION, POWDER, LYOPHILIZED, FOR SOLUTION INTRAVENOUS
Status: DISCONTINUED | OUTPATIENT
Start: 2024-11-04 | End: 2024-11-04

## 2024-11-04 RX ORDER — ACETAMINOPHEN 325 MG/1
650 TABLET ORAL EVERY 4 HOURS PRN
Status: DISCONTINUED | OUTPATIENT
Start: 2024-11-04 | End: 2024-11-08 | Stop reason: HOSPADM

## 2024-11-04 RX ORDER — SODIUM CHLORIDE 0.9 G/100ML
IRRIGANT IRRIGATION
Status: DISCONTINUED | OUTPATIENT
Start: 2024-11-04 | End: 2024-11-04

## 2024-11-04 RX ORDER — SENNOSIDES 8.6 MG/1
8.6 TABLET ORAL DAILY
Status: DISCONTINUED | OUTPATIENT
Start: 2024-11-04 | End: 2024-11-08 | Stop reason: HOSPADM

## 2024-11-04 RX ORDER — CEFAZOLIN SODIUM 1 G/3ML
INJECTION, POWDER, FOR SOLUTION INTRAMUSCULAR; INTRAVENOUS
Status: DISCONTINUED | OUTPATIENT
Start: 2024-11-04 | End: 2024-11-04

## 2024-11-04 RX ORDER — PHENYLEPHRINE HYDROCHLORIDE 10 MG/ML
INJECTION INTRAVENOUS
Status: DISCONTINUED | OUTPATIENT
Start: 2024-11-04 | End: 2024-11-04

## 2024-11-04 RX ORDER — VASOPRESSIN 20 [USP'U]/ML
INJECTION, SOLUTION INTRAMUSCULAR; SUBCUTANEOUS
Status: DISCONTINUED | OUTPATIENT
Start: 2024-11-04 | End: 2024-11-04

## 2024-11-04 RX ORDER — GUAIFENESIN 600 MG/1
600 TABLET, EXTENDED RELEASE ORAL ONCE
Status: COMPLETED | OUTPATIENT
Start: 2024-11-04 | End: 2024-11-04

## 2024-11-04 RX ORDER — IODIXANOL 320 MG/ML
INJECTION, SOLUTION INTRAVASCULAR
Status: DISCONTINUED | OUTPATIENT
Start: 2024-11-04 | End: 2024-11-04

## 2024-11-04 RX ORDER — LIDOCAINE HYDROCHLORIDE 20 MG/ML
INJECTION, SOLUTION INFILTRATION; PERINEURAL
Status: DISCONTINUED | OUTPATIENT
Start: 2024-11-04 | End: 2024-11-04

## 2024-11-04 RX ORDER — DOXYCYCLINE HYCLATE 100 MG
100 TABLET ORAL EVERY 12 HOURS
Status: DISCONTINUED | OUTPATIENT
Start: 2024-11-04 | End: 2024-11-08 | Stop reason: HOSPADM

## 2024-11-04 RX ORDER — POLYETHYLENE GLYCOL 3350 17 G/17G
17 POWDER, FOR SOLUTION ORAL DAILY
Status: DISCONTINUED | OUTPATIENT
Start: 2024-11-04 | End: 2024-11-08 | Stop reason: HOSPADM

## 2024-11-04 RX ORDER — CALCIUM CHLORIDE INJECTION 100 MG/ML
INJECTION, SOLUTION INTRAVENOUS
Status: DISCONTINUED | OUTPATIENT
Start: 2024-11-04 | End: 2024-11-04

## 2024-11-04 RX ORDER — BUPIVACAINE HYDROCHLORIDE 2.5 MG/ML
INJECTION, SOLUTION EPIDURAL; INFILTRATION; INTRACAUDAL
Status: DISCONTINUED | OUTPATIENT
Start: 2024-11-04 | End: 2024-11-04

## 2024-11-04 RX ADMIN — GUAIFENESIN 600 MG: 600 TABLET, EXTENDED RELEASE ORAL at 08:11

## 2024-11-04 RX ADMIN — PIPERACILLIN SODIUM AND TAZOBACTAM SODIUM 4.5 G: 4; .5 INJECTION, POWDER, FOR SOLUTION INTRAVENOUS at 03:11

## 2024-11-04 RX ADMIN — CEFAZOLIN 2 G: 330 INJECTION, POWDER, FOR SOLUTION INTRAMUSCULAR; INTRAVENOUS at 11:11

## 2024-11-04 RX ADMIN — TAMSULOSIN HYDROCHLORIDE 0.4 MG: 0.4 CAPSULE ORAL at 08:11

## 2024-11-04 RX ADMIN — Medication: at 08:11

## 2024-11-04 RX ADMIN — PHENYLEPHRINE HYDROCHLORIDE 50 MCG: 10 INJECTION INTRAVENOUS at 11:11

## 2024-11-04 RX ADMIN — WARFARIN SODIUM 7.5 MG: 2.5 TABLET ORAL at 04:11

## 2024-11-04 RX ADMIN — PHENYLEPHRINE HYDROCHLORIDE 0.6 MCG/KG/MIN: 10 INJECTION INTRAVENOUS at 11:11

## 2024-11-04 RX ADMIN — SENNOSIDES 8.6 MG: 8.6 TABLET, FILM COATED ORAL at 08:11

## 2024-11-04 RX ADMIN — ATORVASTATIN CALCIUM 10 MG: 10 TABLET, FILM COATED ORAL at 08:11

## 2024-11-04 RX ADMIN — LEVOTHYROXINE SODIUM 200 MCG: 100 TABLET ORAL at 05:11

## 2024-11-04 RX ADMIN — EMPAGLIFLOZIN 10 MG: 10 TABLET, FILM COATED ORAL at 08:11

## 2024-11-04 RX ADMIN — SODIUM CHLORIDE 250 ML: 9 INJECTION, SOLUTION INTRAVENOUS at 10:11

## 2024-11-04 RX ADMIN — PROPOFOL 40 MCG/KG/MIN: 10 INJECTION, EMULSION INTRAVENOUS at 10:11

## 2024-11-04 RX ADMIN — CALCIUM CHLORIDE INJECTION 500 G: 100 INJECTION, SOLUTION INTRAVENOUS at 11:11

## 2024-11-04 RX ADMIN — SODIUM CHLORIDE 250 ML: 0.9 INJECTION, SOLUTION INTRAVENOUS at 04:11

## 2024-11-04 RX ADMIN — MIRTAZAPINE 7.5 MG: 7.5 TABLET, FILM COATED ORAL at 08:11

## 2024-11-04 RX ADMIN — SODIUM CHLORIDE, SODIUM GLUCONATE, SODIUM ACETATE, POTASSIUM CHLORIDE, MAGNESIUM CHLORIDE, SODIUM PHOSPHATE, DIBASIC, AND POTASSIUM PHOSPHATE: .53; .5; .37; .037; .03; .012; .00082 INJECTION, SOLUTION INTRAVENOUS at 10:11

## 2024-11-04 RX ADMIN — ASPIRIN 81 MG: 81 TABLET, COATED ORAL at 08:11

## 2024-11-04 RX ADMIN — THIAMINE HCL TAB 100 MG 100 MG: 100 TAB at 08:11

## 2024-11-04 RX ADMIN — DOXYCYCLINE HYCLATE 100 MG: 100 TABLET, COATED ORAL at 08:11

## 2024-11-04 RX ADMIN — FUROSEMIDE 40 MG: 20 TABLET ORAL at 08:11

## 2024-11-04 RX ADMIN — POLYETHYLENE GLYCOL 3350 17 G: 17 POWDER, FOR SOLUTION ORAL at 08:11

## 2024-11-04 RX ADMIN — SODIUM CHLORIDE 250 ML: 0.9 INJECTION, SOLUTION INTRAVENOUS at 10:11

## 2024-11-04 RX ADMIN — PHENYLEPHRINE HYDROCHLORIDE 100 MCG: 10 INJECTION INTRAVENOUS at 11:11

## 2024-11-04 RX ADMIN — ONDANSETRON 4 MG: 2 INJECTION INTRAMUSCULAR; INTRAVENOUS at 12:11

## 2024-11-04 RX ADMIN — IODIXANOL 10 ML: 320 INJECTION, SOLUTION INTRAVASCULAR at 11:11

## 2024-11-04 RX ADMIN — VASOPRESSIN 1 UNITS: 20 INJECTION INTRAVENOUS at 11:11

## 2024-11-04 NOTE — PROGRESS NOTES
Therapy with Vancomycin discontinued by provider.  Pharmacy will sign off, please re-consult as needed.  Thank you.

## 2024-11-04 NOTE — ASSESSMENT & PLAN NOTE
Mr. Bean Salas is a 78 y/o M w/ PMHx of obstructive CAD, combined systolic and diastolic HF (recovered EF), PAF, chronic respiratory failure on 3L NC, and hypothyroidism who was initially admitted to the hospital for ADHF and worseing acute on chronic hypercapnia, now found to be complete heart block.      Recommendations:   - TVP placed. Threshold 2.   - Avoid AV brady blocking agents.   - Avoid heparin products for at least 5 days post pacemaker implantation   - Post procedure wound care instructions placed in patient instruction section of discharge tab   - OK to discharge from a EP perspective   - Follow up in device clinic for site check in 1 week   - Follow up in EP clinic within 3 months   - Doxycycline x 5 days, if patient will be discharged please ensure leaves with doxycycline (via bedside delivery by [facility])  - For full recommendations please see staff attestation

## 2024-11-04 NOTE — PROGRESS NOTES
[facility] - [facility]  Cardiology  Progress Note    Patient Name: Bean Salas  MRN: [id number]  Admission Date: 10/28/2024  Hospital Length of Stay: 6 days  Code Status: Full Code   Attending Physician: Barbara Canada MD   Primary Care Physician: Ramirez Levine MD  Expected Discharge Date: 11/5/2024  Principal Problem:CHB (complete heart block)    Subjective:     Hospital Course:   Patient was transferred to CICU from MICU on 10/30 for continued heparin bridging until INR therapeutic on warfarin prior to PPM placement. Patient diuresing well and with O2 weaned down to home baseline of 3L NC. INR remained subtherapeutic so warfarin dose was increased to 10mg on 11/2. INR 1.6 on 11/3, warfarin decreased to 7.5. Plan for PPM on 11/4.     Interval History: NAOE. Soft BP /50s, MAP 65, earlier this morning had MAP of 58. 2.4L UOP.     Review of Systems   Constitutional: Negative for chills and fever.   Cardiovascular:  Negative for chest pain and dyspnea on exertion.   Respiratory:  Negative for cough and shortness of breath.    Musculoskeletal:  Negative for back pain and joint swelling.   Gastrointestinal:  Negative for abdominal pain and constipation.   Genitourinary:  Negative for dysuria and hematuria.   Neurological:  Negative for focal weakness and headaches.   Psychiatric/Behavioral:  Negative for altered mental status. The patient is not nervous/anxious.      Objective:     Vital Signs (Most Recent):  Temp: 98 °F (36.7 °C) (11/04/24 0701)  Pulse: 66 (11/04/24 1001)  Resp: 16 (11/04/24 1001)  BP: (!) 84/57 (11/04/24 1008)  SpO2: 97 % (11/04/24 1001) Vital Signs (24h Range):  Temp:  [98 °F (36.7 °C)-98.5 °F (36.9 °C)] 98 °F (36.7 °C)  Pulse:  [60-76] 66  Resp:  [15-39] 16  SpO2:  [92 %-99 %] 97 %  BP: ()/(48-63) 84/57     Weight: 102.9 kg (226 lb 13.7 oz)  Body mass index is 31.64 kg/m².     SpO2: 97 %         Intake/Output Summary (Last 24 hours) at 11/4/2024 4602  Last data filed at  11/4/2024 1001  Gross per 24 hour   Intake 1370.2 ml   Output 2325 ml   Net -954.8 ml       Lines/Drains/Airways       Central Venous Catheter Line  Duration             Introducer 10/29/24 1601 Internal Jugular Right 5 days              Drain  Duration                  Urethral Catheter 10/31/24 1535 3 days              Line  Duration                  Pacer Wires 10/29/24 1606 5 days              Peripheral Intravenous Line  Duration                  Peripheral IV - Single Lumen 11/02/24 0032 20 G Anterior;Right Forearm 2 days         Peripheral IV - Single Lumen 11/02/24 1446 20 G Anterior;Left Forearm 1 day                       Physical Exam  Constitutional:       General: He is not in acute distress.  HENT:      Head: Normocephalic and atraumatic.      Nose:      Comments: NC on  Neck:      Comments: TVP  Cardiovascular:      Rate and Rhythm: Normal rate and regular rhythm.   Pulmonary:      Effort: Pulmonary effort is normal.   Abdominal:      Palpations: Abdomen is soft.   Musculoskeletal:         General: Swelling present.      Right lower leg: Edema present.      Left lower leg: Edema present.      Comments: Swelling improved   Skin:     General: Skin is warm.   Neurological:      Mental Status: He is alert and oriented to person, place, and time.            Significant Labs: All pertinent lab results from the last 24 hours have been reviewed.    Significant Imaging:  Imaging reviewed   Assessment and Plan:     * CHB (complete heart block)  Presented to the ED with SOB and AMS. Found to be in CHB in the MICU. TV placed by RP on 10/29 with symptomatic improvement. V paced at 70.     - EP following, will place pacemaker once INR therapeutic on coumadin. Goal ~2.0  - PPM on 11/4, patient can stepdown to regular hospital medicine floor after.     Encephalopathy, metabolic  P/w acute encephalopathy but improved after BiPAP and TVP. GCS 15 and ANOx4 currently. Mental status improved. Anticipate AMS 2/2  hypercapnia and bradyarrhythmia. CTH without acute intracranial process  VBG mildly acidotic with hypercapnia above baseline     - See acute respiratory failure and CHB  - Improved    History of pulmonary embolism  Diagnosed with PE 9/16/2024 when admitted for pelvic fracture.     - On heparin gtt with warfarin started.  - INR goal 2.0 prior to PPM placement  - warfarin to 7.5 mg, INR in therapeutic range (11/4)    Paroxysmal atrial fibrillation  Patient has persistent (7 days or more) atrial fibrillation. Patient is currently in sinus rhythm. [id number] Score: 3. The patients heart rate in the last 24 hours is as follows:  Pulse  Min: 58  Max: 72       Anticoagulants  heparin 25,000 units in dextrose 5% 250 mL (100 units/mL) infusion HIGH INTENSITY nomogram - [facility], Continuous, Intravenous  heparin 25,000 units in dextrose 5% (100 units/ml) IV bolus from bag HIGH INTENSITY nomogram - [facility], As needed (PRN), Intravenous  heparin 25,000 units in dextrose 5% (100 units/ml) IV bolus from bag HIGH INTENSITY nomogram - [facility], As needed (PRN), Intravenous  warfarin tablet 7.5 mg, Daily, Oral    Plan  - Replete lytes with a goal of K>4, Mg >2  - Patient is anticoagulated, see medications listed above.  - Patient's afib is currently controlled, will hold rate control at this time given TVP.    Acute on chronic respiratory failure with hypoxia  Patient p/w hypoxia requiring increased O2 requirements, baseline 3L NC. History of CHF with history of moderately reduced EF of 45-50% in the past. Was previously on GDMT but had some pillars of therapy removed during previous admission due to hypotension and PAULA. CXR reveals pulmonary findings suggest edema.  and pt. With edema, suspect HF exacerbation, diurese and wean O2 as tolerated.    Plan:  - BiPAP qhs or with naps.   - On home O2 requirements, 3L  - Lasix IV switched to PO lasix 40mg daily.  - Fluid restriction, 1.5L and low Na diet  - Strict I/Os, daily weights and volume  assessments  - Monitor electrolytes with daily CMP, maintain Mg > 2 and K > 4.    Acute on chronic diastolic heart failure  See acute on chronic respiratory failure.    Hypothyroidism due to acquired atrophy of thyroid  Chronic condition, stable.    - Continue home synthroid     Essential hypertension  Patients blood pressure range in the last 24 hours was: BP  Min: 75/53  Max: 189/87.The patient's inpatient anti-hypertensive regimen is listed below:  Current Antihypertensives  furosemide tablet 40 mg, Daily, Oral    Plan  - BP is controlled, no changes needed to their regimen  - Due to initial hypotension, will be conservative with antihypertensives. Can adjust regimen or dosages as indicated  - Given mildly reduced EF, continue Jardiance 10mg, and (Entresto dc due to hypotension).        VTE Risk Mitigation (From admission, onward)           Ordered     warfarin (COUMADIN) tablet 7.5 mg  Daily         11/03/24 1437                    Citlali Priest MD  Cardiology  [facility] - Cardiology

## 2024-11-04 NOTE — ANESTHESIA POSTPROCEDURE EVALUATION
Anesthesia Post Evaluation    Patient: Bean Salas    Procedure(s) Performed: Procedure(s) (LRB):  INSERTION, PACEMAKER, BIVENTRICULAR (N/A)    Final Anesthesia Type: general      Patient location during evaluation: ICU  Patient participation: Yes- Able to Participate  Level of consciousness: awake and alert  Post-procedure vital signs: reviewed and stable  Pain management: adequate  Airway patency: patent    PONV status at discharge: No PONV  Anesthetic complications: no      Cardiovascular status: hemodynamically stable  Follow-up not needed.              Vitals Value Taken Time   BP 99/52 11/04/24 1517   Temp 36.8 °C (98.3 °F) 11/04/24 1340   Pulse 51 11/04/24 1521   Resp 26 11/04/24 1521   SpO2 93 % 11/04/24 1521   Vitals shown include unfiled device data.      No case tracking events are documented in the log.      Pain/Tej Score: No data recorded

## 2024-11-04 NOTE — PROGRESS NOTES
Pharmacokinetic Initial Assessment: IV Vancomycin    Assessment/Plan:    Initiate intravenous vancomycin with loading dose of 1750 mg once  Followed by a maintenance dose of vancomycin 1500 mg IV every 24 hours  Desired empiric serum trough concentration is 15 to 20 mcg/mL  Draw vancomycin trough level 60 min prior to third dose on 11/5/24 at approximately 1700  Pharmacy will continue to follow and monitor vancomycin    Please contact pharmacy at extension [phone] with any questions regarding this assessment.     Thank you for the consult,   Aixa Solorio PharmD       Patient brief summary:  Bean Salas is a 79 y.o. male initiated on antimicrobial therapy with IV Vancomycin for treatment of suspected sepsis    Drug Allergies:   Review of patient's allergies indicates:  No Known Allergies    Actual Body Weight:   102.9 kg    Renal Function:   Estimated Creatinine Clearance: 65.4 mL/min (based on SCr of 1.1 mg/dL).,     Dialysis Method (if applicable):  N/A    CBC (last 72 hours):  Recent Labs   Lab Result Units 11/01/24  0355 11/02/24  0301 11/03/24  0240   WBC K/uL 4.12 3.32* 4.17   Hemoglobin g/dL 11.2* 10.9* 11.0*   Hematocrit % 36.1* 35.2* 36.4*   Platelets K/uL 147* 143* 143*   Gran % % 75.2* 68.3 68.0   Lymph % % 11.7* 15.7* 16.3*   Mono % % 12.4 13.6 12.9   Eosinophil % % 0.0 1.5 1.9   Basophil % % 0.5 0.6 0.7   Differential Method  Automated Automated Automated       Metabolic Panel (last 72 hours):  Recent Labs   Lab Result Units 10/31/24  2145 11/01/24  0355 11/01/24  2345 11/02/24  0301 11/02/24  1444 11/03/24  0240   Sodium mmol/L  --  140 139 137  --  140   Potassium mmol/L 3.6 4.2 4.0 3.7 4.3 4.1   Chloride mmol/L  --  94* 95 93*  --  96   CO2 mmol/L  --  37* 37* 36*  --  37*   Glucose mg/dL  --  120* 123* 183*  --  178*   BUN mg/dL  --  23 23 23  --  21   Creatinine mg/dL  --  1.0 1.0 1.0  --  1.1   Albumin g/dL  --  2.8*  --  2.7*  --  2.7*   Total Bilirubin mg/dL  --  2.3*  --  1.7*  --  1.3*  "  Alkaline Phosphatase U/L  --  130  --  120  --  119   AST U/L  --  26  --  25  --  24   ALT U/L  --  19  --  19  --  21   Magnesium mg/dL  --  2.1 2.2 2.2  --  2.3   Phosphorus mg/dL  --  2.4*  --  3.1  --  3.1       Drug levels (last 3 results):  No results for input(s): "VANCOMYCINRA", "VANCORANDOM", "VANCOMYCINPE", "VANCOPEAK", "VANCOMYCINTR", "VANCOTROUGH" in the last 72 hours.    Microbiologic Results:  Microbiology Results (last 7 days)       ** No results found for the last 168 hours. **            "

## 2024-11-04 NOTE — ANESTHESIA PREPROCEDURE EVALUATION
11/04/2024  Pre-operative evaluation for Procedure(s) (LRB):  INSERTION, CARDIAC PACEMAKER, DUAL CHAMBER (N/A)    Bean Salas is a 79 y.o. male with complete heart block (TVP in place) here for permanent pacemaker.     Hx of acute on chronic systolic and diastolic heart failure. Last echo reviewed: LVEF 45%    Hx of PE on anticoagulation, hypoxic respiratory failure on O2 via nasal cannula    Patient Active Problem List   Diagnosis    Essential hypertension    Pure hypercholesterolemia    Hypothyroidism due to acquired atrophy of thyroid    Atrial bigeminy    Displaced intertrochanteric fracture of right femur, initial encounter for closed fracture    Closed nondisplaced fracture of anterior column of right acetabulum with routine healing s/p ORIF of pelvis on 9/20/2024    Elevated LFTs    Acute on chronic diastolic heart failure    Coronary artery disease involving native coronary artery of native heart without angina pectoris    SOB (shortness of breath)    Venous stasis ulcer of right calf limited to breakdown of skin without varicose veins    Rotator cuff impingement syndrome of right shoulder    Impaired range of motion of right hip    Decreased right shoulder range of motion    Weakness    Impaired activities of daily living    PVC's (premature ventricular contractions)    AKHIL (obstructive sleep apnea)    Paroxysmal atrial flutter    Aortic atherosclerosis    Pulmonary hypertension    Pulmonary embolism without acute cor pulmonale    Elevated troponin    Insomnia    Abnormal ventricular wall motion    Acute on chronic respiratory failure with hypoxia    Slow transit constipation    Closed pelvic ring fracture s/p ORIF of pelvis on 9/20/2024    Paroxysmal atrial fibrillation    Class 1 obesity with body mass index (BMI) of 31.0 to 31.9 in adult    History of pulmonary embolism    Encephalopathy,  metabolic    CHB (complete heart block)       Review of patient's allergies indicates:  No Known Allergies    No current facility-administered medications on file prior to encounter.     Current Outpatient Medications on File Prior to Encounter   Medication Sig Dispense Refill    amiodarone (PACERONE) 200 MG Tab Take 2 tablets (400 mg total) by mouth 2 (two) times daily for 4 days, THEN 1 tablet (200 mg total) once daily.      ammonium lactate (LAC-HYDRIN) 12 % lotion Apply topically 2 (two) times daily as needed for Dry Skin.      atorvastatin (LIPITOR) 10 MG tablet Take 1 tablet by mouth once daily 90 tablet 1    empagliflozin (JARDIANCE) 10 mg tablet Take 1 tablet (10 mg total) by mouth once daily.      furosemide (LASIX) 40 MG tablet Take 1 tablet (40 mg total) by mouth 2 (two) times daily as needed (weight gain of 3 to 5 lbs).      levothyroxine (SYNTHROID) 200 MCG tablet Take 1 tablet by mouth once daily 90 tablet 3    methocarbamoL (ROBAXIN) 750 MG Tab Take 1 tablet (750 mg total) by mouth 4 (four) times daily.      mirtazapine (REMERON) 7.5 MG Tab Take 1 tablet (7.5 mg total) by mouth nightly. One tablet po each night for sleep      oxyCODONE (ROXICODONE) 5 MG immediate release tablet Take 1 tablet (5 mg total) by mouth every 4 (four) hours as needed for Pain. 30 tablet 0    tamsulosin (FLOMAX) 0.4 mg Cap Take 1 capsule (0.4 mg total) by mouth once daily. 30 capsule 11    acetaminophen (TYLENOL) 500 MG tablet Take 2 tablets (1,000 mg total) by mouth every 8 (eight) hours.      aspirin (ECOTRIN) 81 MG EC tablet Take 1 tablet (81 mg total) by mouth once daily.      melatonin (MELATIN) 3 mg tablet Take 2 tablets (6 mg total) by mouth nightly as needed for Insomnia.      multivit-min-FA-lycopen-lutein (CENTRUM SILVER) 0.4-300-250 mg-mcg-mcg Tab Take 1 tablet by mouth once daily.      senna-docusate 8.6-50 mg (PERICOLACE) 8.6-50 mg per tablet Take 1 tablet by mouth once daily.         Past Surgical History:    Procedure Laterality Date    COLONOSCOPY  2011    CORONARY ANGIOGRAPHY N/A 2021    Procedure: ANGIOGRAM, CORONARY ARTERY;  Surgeon: Hank Barry MD;  Location: [facility] CATH LAB/EP;  Service: Cardiology;  Laterality: N/A;    EYE SURGERY Bilateral     cataracts extraction    FRACTURE SURGERY Right 2021    femur    HERNIA REPAIR      HIP SURGERY Right 2021    INTRAMEDULLARY RODDING OF TROCHANTER OF FEMUR Right 2021    Procedure: INSERTION, INTRAMEDULLARY RACQUEL, FEMUR, TROCHANTER;  Surgeon: Darryn Hall MD;  Location: [facility] OR;  Service: Orthopedics;  Laterality: Right;    JOINT REPLACEMENT Bilateral     knee    LEFT HEART CATHETERIZATION Right 2021    Procedure: Left heart cath;  Surgeon: Hank Barry MD;  Location: [facility] CATH LAB/EP;  Service: Cardiology;  Laterality: Right;    OPEN REDUCTION AND INTERNAL FIXATION (ORIF) OF INJURY OF HIP Right 2024    Procedure: ORIF,PELVIS;  Surgeon: Negrito Stapleton MD;  [address];  Service: Orthopedics;  Laterality: Right;  +local    TRANSESOPHAGEAL ECHOCARDIOGRAPHY N/A 2024    Procedure: ECHOCARDIOGRAM, TRANSESOPHAGEAL;  Surgeon: Leonora Butt MD;  Location: [facility] EP LAB;  Service: Cardiology;  Laterality: N/A;    TREATMENT OF CARDIAC ARRHYTHMIA N/A 2024    Procedure: Cardioversion or Defibrillation;  Surgeon: ANA Steiner MD;  Location: [facility] EP LAB;  Service: Cardiology;  Laterality: N/A;  AF, DEMETRICE/DCCV, ANES, GP, 524    VASECTOMY         Social History     Socioeconomic History    Marital status:    Tobacco Use    Smoking status: Former     Current packs/day: 0.00     Average packs/day: 1 pack/day for 35.0 years (35.0 ttl pk-yrs)     Types: Cigarettes     Start date: 1965     Quit date: 2000     Years since quittin.8     Passive exposure: Past    Smokeless tobacco: Never   Substance and Sexual Activity    Alcohol use: No    Drug use: Never    Sexual activity: Not Currently      Social Drivers of Health     Financial Resource Strain: Low Risk  (10/30/2024)    Overall Financial Resource Strain (CARDIA)     Difficulty of Paying Living Expenses: Not hard at all   Food Insecurity: No Food Insecurity (10/30/2024)    Hunger Vital Sign     Worried About Running Out of Food in the Last Year: Never true     Ran Out of Food in the Last Year: Never true   Transportation Needs: No Transportation Needs (10/30/2024)    TRANSPORTATION NEEDS     Transportation : No   Physical Activity: Inactive (10/30/2024)    Exercise Vital Sign     Days of Exercise per Week: 0 days     Minutes of Exercise per Session: 0 min   Stress: No Stress Concern Present (10/30/2024)    [place] - Occupational Stress Questionnaire     Feeling of Stress : Not at all   Recent Concern: Stress - Stress Concern Present (9/30/2024)    [place] - Occupational Stress Questionnaire     Feeling of Stress : To some extent   Housing Stability: Low Risk  (10/30/2024)    Housing Stability Vital Sign     Unable to Pay for Housing in the Last Year: No     Homeless in the Last Year: No         CBC:   Recent Labs     11/03/24 0240 11/04/24 0302   WBC 4.17 4.42   RBC 3.49* 3.35*   HGB 11.0* 10.8*   HCT 36.4* 35.0*   * 156   * 105*   MCH 31.5* 32.2*   MCHC 30.2* 30.9*       CMP:   Recent Labs     11/03/24 0240 11/04/24 0302    140   K 4.1 3.9   CL 96 96   CO2 37* 37*   BUN 21 22   CREATININE 1.1 1.1   * 119*   MG 2.3 2.3   PHOS 3.1 3.6   CALCIUM 8.7 9.1   ALBUMIN 2.7* 2.6*   PROT 5.9* 5.7*   ALKPHOS 119 109   ALT 21 21   AST 24 22   BILITOT 1.3* 1.1*       INR  Recent Labs     11/02/24  0301 11/03/24 0240 11/04/24 0303   INR 1.2 1.6* 2.6*   APTT 48.2* 61.9* 78.2*               Pre-op Assessment    I have reviewed the Patient Summary Reports.     I have reviewed the Nursing Notes. I have reviewed the NPO Status.      Review of Systems  Anesthesia Hx:  No  problems with previous Anesthesia                Cardiovascular:     Hypertension   CAD    Dysrhythmias                                      Pulmonary:   COPD   Shortness of breath  Sleep Apnea                Neurological:    Neuromuscular Disease,                                       Physical Exam    Airway:  Mallampati: II   Mouth Opening: Normal  Tongue: Normal    Chest/Lungs:  Normal Respiratory Rate    Heart:  Rhythm: Regular Rhythm        Anesthesia Plan  Type of Anesthesia, risks & benefits discussed:    Anesthesia Type: Gen Natural Airway  Intra-op Monitoring Plan: Standard ASA Monitors  Induction:  IV  Informed Consent: Informed consent signed with the Patient and all parties understand the risks and agree with anesthesia plan.  All questions answered.   ASA Score: 4    Ready For Surgery From Anesthesia Perspective.     .

## 2024-11-04 NOTE — PLAN OF CARE
"CICU DAILY GOALS   Plan for the day   No acute events noted throughout shift, plan for permanent pacemaker today. Mild hypotension noted at beginning of shift, CCU Fellow notified, no additional orders placed. Patient NPO since midnight with sip of water for morning med administration. CHG bath provided with shave. INR 2.6 VS and assessment per flow sheet, patient progressing towards goals as tolerated, plan of care reviewed with Bean Salas and family, all concerns addressed, plan of care continues as ordered per Primary Team.     Family/Goals of care/Code Status   Code Status: Full Code     Heparin @ 10 units/kg/min    Labs/Accuchecks:  Recent Labs   Lab 11/02/24  0301 11/03/24  0240 11/04/24  0302   WBC 3.32* 4.17 4.42   RBC 3.36* 3.49* 3.35*   HGB 10.9* 11.0* 10.8*   HCT 35.2* 36.4* 35.0*   * 143* 156      Recent Labs   Lab 11/02/24  0301 11/03/24  0240 11/04/24  0303   INR 1.2 1.6* 2.6*   APTT 48.2* 61.9* 78.2*      Recent Labs     11/04/24  0302      K 3.9   CO2 37*   CL 96   BUN 22   CREATININE 1.1   ALKPHOS 109   ALT 21   AST 22   BILITOT 1.1*       Recent Labs   Lab 10/28/24  1639   TROPONINI 0.085*    No results for input(s): "PH", "PCO2", "PO2", "HCO3", "POCSATURATED", "BE" in the last 72 hours.    Electrolytes: No replacement orders  Accuchecks: none    Gtts/LDAs:   heparin (porcine) in D5W  0-40 Units/kg/hr (Adjusted) Intravenous Continuous 8.6 mL/hr at 11/04/24 0523 10 Units/kg/hr at 11/04/24 0523       Lines/Drains/Airways       Central Venous Catheter Line  Duration             Introducer 10/29/24 1601 Internal Jugular Right 5 days              Drain  Duration                  Urethral Catheter 10/31/24 1535 3 days              Line  Duration                  Pacer Wires 10/29/24 1606 5 days              Peripheral Intravenous Line  Duration                  Peripheral IV - Single Lumen 11/02/24 0032 20 G Anterior;Right Forearm 2 days         Peripheral IV - Single Lumen 11/02/24 " 1446 20 G Anterior;Left Forearm 1 day                    A: Awake    RASS: Goal - RASS Goal: 0-->alert and calm  Actual - RASS ([place]ettles Agitation-Sedation Scale): alert and calm   Restraint necessity:    B: Breath   3L NC - baseline  C: Coordinate A & B, analgesics/sedatives   Pain: managed   D: Delirium   CAM-ICU: Overall CAM-ICU: Negative  E: Early(intubated/ Progressive (non-intubated) Mobility   MOVE Screen: Pass   Activity: Activity Management: Ankle pumps - L1, Arm raise - L1  FAS: Feeding/Nutrition   Diet order: Diet/Nutrition Received: 2 gram sodium, low saturated fat/low cholesterol,   Fluid restriction: Fluid Requirement: 1500 mL FR   Nutritional Supplement Intake: Quantity 0, Type: Boost  T: Thrombus   DVT prophylaxis: VTE Core Measure: Pharmacological prophylaxis initiated/maintained  H: HOB Elevation   Head of Bed (HOB) Positioning: HOB lowered  U: Ulcer Prophylaxis   GI: yes  G: Glucose control   managed    B: Bowel Function   no issues   I: Indwelling Catheters   [place] necessity: [REMOVED]      Urethral Catheter-Reason for Continuing Urinary Catheterization: Chronic Indwelling Urinary Catheter on Admission       Urethral Catheter 10/31/24 1535-Reason for Continuing Urinary Catheterization: Chronic Indwelling Urinary Catheter on Admission   CVC necessity: No   IPAD offered: Not appropriate  D: De-escalation Antibx   No  Problem: Adult Inpatient Plan of Care  Goal: Plan of Care Review  Outcome: Progressing  Goal: Patient-Specific Goal (Individualized)  Outcome: Progressing  Goal: Absence of Hospital-Acquired Illness or Injury  Outcome: Progressing  Goal: Optimal Comfort and Wellbeing  Outcome: Progressing  Goal: Readiness for Transition of Care  Outcome: Progressing

## 2024-11-04 NOTE — SUBJECTIVE & OBJECTIVE
Interval History: NAOE. Soft BP /50s, MAP 65, earlier this morning had MAP of 58. 2.4L UOP.     Review of Systems   Constitutional: Negative for chills and fever.   Cardiovascular:  Negative for chest pain and dyspnea on exertion.   Respiratory:  Negative for cough and shortness of breath.    Musculoskeletal:  Negative for back pain and joint swelling.   Gastrointestinal:  Negative for abdominal pain and constipation.   Genitourinary:  Negative for dysuria and hematuria.   Neurological:  Negative for focal weakness and headaches.   Psychiatric/Behavioral:  Negative for altered mental status. The patient is not nervous/anxious.      Objective:     Vital Signs (Most Recent):  Temp: 98 °F (36.7 °C) (11/04/24 0701)  Pulse: 66 (11/04/24 1001)  Resp: 16 (11/04/24 1001)  BP: (!) 84/57 (11/04/24 1008)  SpO2: 97 % (11/04/24 1001) Vital Signs (24h Range):  Temp:  [98 °F (36.7 °C)-98.5 °F (36.9 °C)] 98 °F (36.7 °C)  Pulse:  [60-76] 66  Resp:  [15-39] 16  SpO2:  [92 %-99 %] 97 %  BP: ()/(48-63) 84/57     Weight: 102.9 kg (226 lb 13.7 oz)  Body mass index is 31.64 kg/m².     SpO2: 97 %         Intake/Output Summary (Last 24 hours) at 11/4/2024 1237  Last data filed at 11/4/2024 1001  Gross per 24 hour   Intake 1370.2 ml   Output 2325 ml   Net -954.8 ml       Lines/Drains/Airways       Central Venous Catheter Line  Duration             Introducer 10/29/24 1601 Internal Jugular Right 5 days              Drain  Duration                  Urethral Catheter 10/31/24 1535 3 days              Line  Duration                  Pacer Wires 10/29/24 1606 5 days              Peripheral Intravenous Line  Duration                  Peripheral IV - Single Lumen 11/02/24 0032 20 G Anterior;Right Forearm 2 days         Peripheral IV - Single Lumen 11/02/24 1446 20 G Anterior;Left Forearm 1 day                       Physical Exam  Constitutional:       General: He is not in acute distress.  HENT:      Head: Normocephalic and atraumatic.       Nose:      Comments: NC on  Neck:      Comments: TVP  Cardiovascular:      Rate and Rhythm: Normal rate and regular rhythm.   Pulmonary:      Effort: Pulmonary effort is normal.   Abdominal:      Palpations: Abdomen is soft.   Musculoskeletal:         General: Swelling present.      Right lower leg: Edema present.      Left lower leg: Edema present.      Comments: Swelling improved   Skin:     General: Skin is warm.   Neurological:      Mental Status: He is alert and oriented to person, place, and time.            Significant Labs: All pertinent lab results from the last 24 hours have been reviewed.    Significant Imaging:  Imaging reviewed

## 2024-11-04 NOTE — PHYSICIAN QUERY
Please clarify the integumentary diagnosis related to the documentation outlined in the query message for the Left Buttocks.  Pressure Injury/Decubitus Ulcer, Stage 2

## 2024-11-04 NOTE — ASSESSMENT & PLAN NOTE
Presented to the ED with SOB and AMS. Found to be in CHB in the MICU. TV placed by RP on 10/29 with symptomatic improvement. V paced at 70.     - EP following, will place pacemaker once INR therapeutic on coumadin. Goal ~2.0  - PPM on 11/4, patient can stepdown to regular hospital medicine floor after.

## 2024-11-04 NOTE — PHYSICIAN QUERY
Please clarify the integumentary diagnosis related to the documentation outlined in the query message for the Right Lateral Foot.  Deep Tissue Pressure Injury

## 2024-11-04 NOTE — PROGRESS NOTES
[facility] - Cardiac Intensive Care  Cardiac Electrophysiology  Progress Note    Admission Date: 10/28/2024  Code Status: Full Code   Attending Physician: Barbara Canada MD   Expected Discharge Date: 11/5/2024  Principal Problem:CHB (complete heart block)    Subjective:     Interval History: INR 2.6 this morning. Has been NPO since midnight. Looking forward to procedure and wonders could he be discharged today. TVP threshold 2. No events on telemetry.     ROS negative unless noted above   Objective:     Vital Signs (Most Recent):  Temp: 98 °F (36.7 °C) (11/04/24 0701)  Pulse: 61 (11/04/24 0801)  Resp: 15 (11/04/24 0801)  BP: (!) 94/54 (11/04/24 0801)  SpO2: 98 % (11/04/24 0801) Vital Signs (24h Range):  Temp:  [98 °F (36.7 °C)-98.5 °F (36.9 °C)] 98 °F (36.7 °C)  Pulse:  [61-76] 61  Resp:  [15-39] 15  SpO2:  [92 %-99 %] 98 %  BP: ()/(51-73) 94/54     Weight: 102.9 kg (226 lb 13.7 oz)  Body mass index is 31.64 kg/m².     SpO2: 98 %        Physical Exam     Constitutional:       General: He is not in acute distress.  Neck:      Comments: TVP  Cardiovascular:      Rate and Rhythm: Normal rate and regular rhythm.   Pulmonary:      Effort: Pulmonary effort is normal.   Abdominal:      Palpations: Abdomen is soft.   Skin:     General: Skin is warm.   Neurological:      Mental Status: He is alert and oriented to person, place, and time.       Significant Labs:   Recent Lab Results         11/04/24  0303   11/04/24  0302        Albumin   2.6       ALP   109       ALT   21       Anion Gap   7       PTT 78.2  Comment: Refer to local heparin nomogram for intensity/dose specific   therapeutic   range.           AST   22       Baso #   0.02       Basophil %   0.5       BILIRUBIN TOTAL   1.1  Comment: For infants and newborns, interpretation of results should be based  on gestational age, weight and in agreement with clinical  observations.    Premature Infant recommended reference ranges:  Up to 24  hours.............<8.0 mg/dL  Up to 48 hours............<12.0 mg/dL  3-5 days..................<15.0 mg/dL  6-29 days.................<15.0 mg/dL         BUN   22       Calcium   9.1       Chloride   96       CO2   37       Creatinine   1.1       Differential Method   Automated       eGFR   >60.0       Eos #   0.1       Eos %   1.6       Glucose   119       Gran # (ANC)   3.2       Gran %   71.4       Hematocrit   35.0       Hemoglobin   10.8       Immature Grans (Abs)   0.01  Comment: Mild elevation in immature granulocytes is non specific and   can be seen in a variety of conditions including stress response,   acute inflammation, trauma and pregnancy. Correlation with other   laboratory and clinical findings is essential.         Immature Granulocytes   0.2       INR 2.6  Comment: Coumadin Therapy:  2.0 - 3.0 for INR for all indicators except mechanical heart valves  and antiphospholipid syndromes which should use 2.5 - 3.5.           Lymph #   0.7       Lymph %   15.2       Magnesium    2.3       MCH   32.2       MCHC   30.9       MCV   105       Mono #   0.5       Mono %   11.1       MPV   12.2       nRBC   0       Phosphorus Level   3.6       Platelet Count   156       Potassium   3.9       PROTEIN TOTAL   5.7       PT 27.3         RBC   3.35       RDW   15.5       Sodium   140       WBC   4.42               Significant Imaging:   Assessment and Plan:     * CHB (complete heart block)  Mr. 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      Recommendations:   - TVP placed. Threshold 2.   - Avoid AV brady blocking agents.   - Post procedure wound care instructions placed in patient instruction section of discharge tab   - OK to discharge from a EP perspective    - Follow up in device clinic for site check in 1 week   - Follow up in EP clinic within 3 months   - Doxycycline x 5 days, if patient will be discharged please ensure leaves with doxycycline (via bedside delivery by [facility] pharmacy)  - For full recommendations please see staff attestation          Chiquita Akins MD  Cardiac Electrophysiology  [facility] - Cardiac Intensive [facility]

## 2024-11-04 NOTE — TRANSFER OF CARE
"Anesthesia Transfer of Care Note    Patient: Bean Salas    Procedure(s) Performed: Procedure(s) (LRB):  INSERTION, PACEMAKER, BIVENTRICULAR (N/A)    Patient location: ICU    Anesthesia Type: general    Transport from OR: Transported from OR on 6-10 L/min O2 by face mask with adequate spontaneous ventilation    Post pain: adequate analgesia    Post assessment: no apparent anesthetic complications and tolerated procedure well    Post vital signs: stable    Level of consciousness: sedated and responds to stimulation    Nausea/Vomiting: no nausea/vomiting    Complications: none    Transfer of care protocol was followed      Last vitals: Visit Vitals  BP (!) 84/57   Pulse 66   Temp 36.7 °C (98 °F) (Oral)   Resp 16   Ht 5' 11" (1.803 m)   Wt 102.9 kg (226 lb 13.7 oz)   SpO2 97%   BMI 31.64 kg/m²     "

## 2024-11-04 NOTE — ASSESSMENT & PLAN NOTE
Diagnosed with PE 9/16/2024 when admitted for pelvic fracture.     - On heparin gtt with warfarin started.  - INR goal 2.0 prior to PPM placement  - warfarin to 7.5 mg, INR in therapeutic range (11/4)

## 2024-11-04 NOTE — DISCHARGE INSTRUCTIONS
Medication instructions:    Complete your 5 days of antibiotics  If you are on a blood thinner (examples: apixiban [Eliquis], rivaroxaban [Xarelto], dabigatran [Pradaxa], or enoxaparin [Lovenox]), do not take your blood thinner for the next 5 days after your procedure. You can resume after 5 days post-procedure (i.e., when you complete your antibiotics). If you are on Coumadin you can continue to take it the day of your procedure  Pain control: you can take up to Tylenol 1000 mg every 6 hours as needed or (if you do not have kidney disease) ibuprofen 600 mg every 6 hours as needed as needed for pain control (if you do not have kidney disease). If unsure, please contact your primary care physician for recommendations.       Activity restrictions & precautions:    Sling & arm instructions:  Wear your sling for 48 hours. After 48 hours, you can take your arm out of your sling.   You must wear your sling at night when you sleep for 6 weeks after your procedure  Do not lift >10 lbs for 2 weeks.  Do not raise your elbow above your shoulder on the same side as your device for 6 weeks.      Surgical site   Dressing: if you have a bandage over your surgical site remove it the morning after your procedure  Do not place any creams or ointments over the surgical site. This could effect the integrity of the Dermabond glue.  You can shower after 24 hours post-procedure, but do not let the jet directly hit your pocket site for at least 2 weeks. Recommend showering with your back to the shower head. Recommend purchasing large water proof bandages from "[employer] and placing them over the surgical site while showering for the first 2 weeks. Make sure the sticky part of the bandage does not make contact with the glue over your surgical site as this will remove the glue when you remove the bandage. Make sure to remove the bandage after you shower, and the surgical site is dried off completely after you shower. Do not reach your  arm (on the same side as your device) around your back during showering for 6 weeks.  Do not submerge your surgical incision site under water (swimming, bathing if you submerge the actual surgical site) for at least 6 week. It is imperative that the surgical site is completely healed prior to doing so     Follow up in device clinic in 1 week to check your incision and device function    Please contact the electrophysiology clinic if you have any questions or if you experience: potential surgical/pocket site complications (pain, swelling, bleeding, drainage), fevers, chest pain/shortness of breath, or for any other concerns.

## 2024-11-04 NOTE — PT/OT/SLP PROGRESS
Occupational Therapy      Patient Name:  Bean Salas   MRN:  [id number]    Pt not seen as he is off unit for PPM placement. OT to check status at later date.     11/4/2024

## 2024-11-04 NOTE — PHYSICIAN QUERY
Please clarify the integumentary diagnosis related to the documentation outlined in the query message of the Left Back.  Deep Tissue Pressure Injury

## 2024-11-04 NOTE — PT/OT/SLP PROGRESS
Physical Therapy      Patient Name:  Bean Salas   MRN:  [id number]    Patient not seen today secondary to: PRETTY PPM placement  Will follow-up per POC when medically appropriate.    11/4/2024

## 2024-11-04 NOTE — ASSESSMENT & PLAN NOTE
Patients blood pressure range in the last 24 hours was: BP  Min: 75/53  Max: 189/87.The patient's inpatient anti-hypertensive regimen is listed below:  Current Antihypertensives  furosemide tablet 40 mg, Daily, Oral    Plan  - BP is controlled, no changes needed to their regimen  - Due to initial hypotension, will be conservative with antihypertensives. Can adjust regimen or dosages as indicated  - Given mildly reduced EF, continue Jardiance 10mg, and (Entresto dc due to hypotension).

## 2024-11-04 NOTE — SUBJECTIVE & OBJECTIVE
Interval History: INR 2.6 this morning. Has been NPO since midnight. Looking forward to procedure and wonders could he be discharged today. TVP threshold 2. No events on telemetry.     ROS negative unless noted above   Objective:     Vital Signs (Most Recent):  Temp: 98 °F (36.7 °C) (11/04/24 0701)  Pulse: 61 (11/04/24 0801)  Resp: 15 (11/04/24 0801)  BP: (!) 94/54 (11/04/24 0801)  SpO2: 98 % (11/04/24 0801) Vital Signs (24h Range):  Temp:  [98 °F (36.7 °C)-98.5 °F (36.9 °C)] 98 °F (36.7 °C)  Pulse:  [61-76] 61  Resp:  [15-39] 15  SpO2:  [92 %-99 %] 98 %  BP: ()/(51-73) 94/54     Weight: 102.9 kg (226 lb 13.7 oz)  Body mass index is 31.64 kg/m².     SpO2: 98 %        Physical Exam     Constitutional:       General: He is not in acute distress.  Neck:      Comments: TVP  Cardiovascular:      Rate and Rhythm: Normal rate and regular rhythm.   Pulmonary:      Effort: Pulmonary effort is normal.   Abdominal:      Palpations: Abdomen is soft.   Skin:     General: Skin is warm.   Neurological:      Mental Status: He is alert and oriented to person, place, and time.       Significant Labs:   Recent Lab Results         11/04/24  0303   11/04/24  0302        Albumin   2.6       ALP   109       ALT   21       Anion Gap   7       PTT 78.2  Comment: Refer to local heparin nomogram for intensity/dose specific   therapeutic   range.           AST   22       Baso #   0.02       Basophil %   0.5       BILIRUBIN TOTAL   1.1  Comment: For infants and newborns, interpretation of results should be based  on gestational age, weight and in agreement with clinical  observations.    Premature Infant recommended reference ranges:  Up to 24 hours.............<8.0 mg/dL  Up to 48 hours............<12.0 mg/dL  3-5 days..................<15.0 mg/dL  6-29 days.................<15.0 mg/dL         BUN   22       Calcium   9.1       Chloride   96       CO2   37       Creatinine   1.1       Differential Method   Automated       eGFR   >60.0        Eos #   0.1       Eos %   1.6       Glucose   119       Gran # (ANC)   3.2       Gran %   71.4       Hematocrit   35.0       Hemoglobin   10.8       Immature Grans (Abs)   0.01  Comment: Mild elevation in immature granulocytes is non specific and   can be seen in a variety of conditions including stress response,   acute inflammation, trauma and pregnancy. Correlation with other   laboratory and clinical findings is essential.         Immature Granulocytes   0.2       INR 2.6  Comment: Coumadin Therapy:  2.0 - 3.0 for INR for all indicators except mechanical heart valves  and antiphospholipid syndromes which should use 2.5 - 3.5.           Lymph #   0.7       Lymph %   15.2       Magnesium    2.3       MCH   32.2       MCHC   30.9       MCV   105       Mono #   0.5       Mono %   11.1       MPV   12.2       nRBC   0       Phosphorus Level   3.6       Platelet Count   156       Potassium   3.9       PROTEIN TOTAL   5.7       PT 27.3         RBC   3.35       RDW   15.5       Sodium   140       WBC   4.42               Significant Imaging:

## 2024-11-05 ENCOUNTER — DOCUMENTATION ONLY (OUTPATIENT)
Dept: CARDIOLOGY | Facility: HOSPITAL | Age: 79
End: 2024-11-05
Payer: MEDICARE

## 2024-11-05 PROBLEM — R33.9 URINARY RETENTION: Status: ACTIVE | Noted: 2024-11-05

## 2024-11-05 LAB
ALBUMIN SERPL BCP-MCNC: 2.9 G/DL (ref 3.5–5.2)
ALP SERPL-CCNC: 110 U/L (ref 40–150)
ALT SERPL W/O P-5'-P-CCNC: 17 U/L (ref 10–44)
ANION GAP SERPL CALC-SCNC: 9 MMOL/L (ref 8–16)
AST SERPL-CCNC: 23 U/L (ref 10–40)
BASOPHILS # BLD AUTO: 0.01 K/UL (ref 0–0.2)
BASOPHILS NFR BLD: 0.2 % (ref 0–1.9)
BILIRUB SERPL-MCNC: 1.2 MG/DL (ref 0.1–1)
BUN SERPL-MCNC: 21 MG/DL (ref 8–23)
CALCIUM SERPL-MCNC: 9.2 MG/DL (ref 8.7–10.5)
CHLORIDE SERPL-SCNC: 95 MMOL/L (ref 95–110)
CO2 SERPL-SCNC: 35 MMOL/L (ref 23–29)
CREAT SERPL-MCNC: 1.2 MG/DL (ref 0.5–1.4)
DIFFERENTIAL METHOD BLD: ABNORMAL
EOSINOPHIL # BLD AUTO: 0 K/UL (ref 0–0.5)
EOSINOPHIL NFR BLD: 0.8 % (ref 0–8)
ERYTHROCYTE [DISTWIDTH] IN BLOOD BY AUTOMATED COUNT: 15.4 % (ref 11.5–14.5)
EST. GFR  (NO RACE VARIABLE): >60 ML/MIN/1.73 M^2
GLUCOSE SERPL-MCNC: 103 MG/DL (ref 70–110)
HCT VFR BLD AUTO: 36.5 % (ref 40–54)
HGB BLD-MCNC: 11.1 G/DL (ref 14–18)
IMM GRANULOCYTES # BLD AUTO: 0.01 K/UL (ref 0–0.04)
IMM GRANULOCYTES NFR BLD AUTO: 0.2 % (ref 0–0.5)
INR PPP: 3.2 (ref 0.8–1.2)
LYMPHOCYTES # BLD AUTO: 0.7 K/UL (ref 1–4.8)
LYMPHOCYTES NFR BLD: 13.4 % (ref 18–48)
MAGNESIUM SERPL-MCNC: 2.4 MG/DL (ref 1.6–2.6)
MCH RBC QN AUTO: 32.1 PG (ref 27–31)
MCHC RBC AUTO-ENTMCNC: 30.4 G/DL (ref 32–36)
MCV RBC AUTO: 106 FL (ref 82–98)
MONOCYTES # BLD AUTO: 0.5 K/UL (ref 0.3–1)
MONOCYTES NFR BLD: 10.3 % (ref 4–15)
NEUTROPHILS # BLD AUTO: 3.9 K/UL (ref 1.8–7.7)
NEUTROPHILS NFR BLD: 75.1 % (ref 38–73)
NRBC BLD-RTO: 0 /100 WBC
PHOSPHATE SERPL-MCNC: 4 MG/DL (ref 2.7–4.5)
PLATELET # BLD AUTO: 156 K/UL (ref 150–450)
PMV BLD AUTO: 12.5 FL (ref 9.2–12.9)
POTASSIUM SERPL-SCNC: 4.9 MMOL/L (ref 3.5–5.1)
PROT SERPL-MCNC: 6.3 G/DL (ref 6–8.4)
PROTHROMBIN TIME: 32.6 SEC (ref 9–12.5)
RBC # BLD AUTO: 3.46 M/UL (ref 4.6–6.2)
SODIUM SERPL-SCNC: 139 MMOL/L (ref 136–145)
WBC # BLD AUTO: 5.22 K/UL (ref 3.9–12.7)

## 2024-11-05 PROCEDURE — 25000003 PHARM REV CODE 250: Performed by: INTERNAL MEDICINE

## 2024-11-05 PROCEDURE — 25000003 PHARM REV CODE 250: Performed by: HOSPITALIST

## 2024-11-05 PROCEDURE — 97530 THERAPEUTIC ACTIVITIES: CPT

## 2024-11-05 PROCEDURE — 20600001 HC STEP DOWN PRIVATE ROOM

## 2024-11-05 PROCEDURE — 83735 ASSAY OF MAGNESIUM: CPT | Performed by: STUDENT IN AN ORGANIZED HEALTH CARE EDUCATION/TRAINING PROGRAM

## 2024-11-05 PROCEDURE — 25000003 PHARM REV CODE 250: Performed by: STUDENT IN AN ORGANIZED HEALTH CARE EDUCATION/TRAINING PROGRAM

## 2024-11-05 PROCEDURE — 27000221 HC OXYGEN, UP TO 24 HOURS

## 2024-11-05 PROCEDURE — 85025 COMPLETE CBC W/AUTO DIFF WBC: CPT | Performed by: STUDENT IN AN ORGANIZED HEALTH CARE EDUCATION/TRAINING PROGRAM

## 2024-11-05 PROCEDURE — 80053 COMPREHEN METABOLIC PANEL: CPT | Performed by: STUDENT IN AN ORGANIZED HEALTH CARE EDUCATION/TRAINING PROGRAM

## 2024-11-05 PROCEDURE — 81001 URINALYSIS AUTO W/SCOPE: CPT | Performed by: PHYSICIAN ASSISTANT

## 2024-11-05 PROCEDURE — 97110 THERAPEUTIC EXERCISES: CPT

## 2024-11-05 PROCEDURE — 94761 N-INVAS EAR/PLS OXIMETRY MLT: CPT

## 2024-11-05 PROCEDURE — 84100 ASSAY OF PHOSPHORUS: CPT | Performed by: STUDENT IN AN ORGANIZED HEALTH CARE EDUCATION/TRAINING PROGRAM

## 2024-11-05 PROCEDURE — 51798 US URINE CAPACITY MEASURE: CPT

## 2024-11-05 PROCEDURE — 99231 SBSQ HOSP IP/OBS SF/LOW 25: CPT | Mod: GC,,, | Performed by: INTERNAL MEDICINE

## 2024-11-05 PROCEDURE — 25000242 PHARM REV CODE 250 ALT 637 W/ HCPCS

## 2024-11-05 PROCEDURE — 85610 PROTHROMBIN TIME: CPT | Performed by: STUDENT IN AN ORGANIZED HEALTH CARE EDUCATION/TRAINING PROGRAM

## 2024-11-05 PROCEDURE — 94660 CPAP INITIATION&MGMT: CPT

## 2024-11-05 PROCEDURE — 99900035 HC TECH TIME PER 15 MIN (STAT)

## 2024-11-05 RX ORDER — WARFARIN SODIUM 5 MG/1
5 TABLET ORAL DAILY
Status: DISCONTINUED | OUTPATIENT
Start: 2024-11-06 | End: 2024-11-08 | Stop reason: HOSPADM

## 2024-11-05 RX ORDER — FUROSEMIDE 40 MG/1
40 TABLET ORAL DAILY
Status: DISCONTINUED | OUTPATIENT
Start: 2024-11-06 | End: 2024-11-08 | Stop reason: HOSPADM

## 2024-11-05 RX ORDER — METOPROLOL TARTRATE 25 MG/1
12.5 TABLET ORAL 2 TIMES DAILY
Status: DISCONTINUED | OUTPATIENT
Start: 2024-11-05 | End: 2024-11-08 | Stop reason: HOSPADM

## 2024-11-05 RX ADMIN — SENNOSIDES 8.6 MG: 8.6 TABLET, FILM COATED ORAL at 08:11

## 2024-11-05 RX ADMIN — DOXYCYCLINE HYCLATE 100 MG: 100 TABLET, COATED ORAL at 08:11

## 2024-11-05 RX ADMIN — TAMSULOSIN HYDROCHLORIDE 0.4 MG: 0.4 CAPSULE ORAL at 08:11

## 2024-11-05 RX ADMIN — METOPROLOL TARTRATE 12.5 MG: 25 TABLET, FILM COATED ORAL at 08:11

## 2024-11-05 RX ADMIN — EMPAGLIFLOZIN 10 MG: 10 TABLET, FILM COATED ORAL at 08:11

## 2024-11-05 RX ADMIN — POLYETHYLENE GLYCOL 3350 17 G: 17 POWDER, FOR SOLUTION ORAL at 08:11

## 2024-11-05 RX ADMIN — ASPIRIN 81 MG: 81 TABLET, COATED ORAL at 08:11

## 2024-11-05 RX ADMIN — ATORVASTATIN CALCIUM 10 MG: 10 TABLET, FILM COATED ORAL at 08:11

## 2024-11-05 RX ADMIN — THIAMINE HCL TAB 100 MG 100 MG: 100 TAB at 08:11

## 2024-11-05 RX ADMIN — LEVOTHYROXINE SODIUM 200 MCG: 100 TABLET ORAL at 05:11

## 2024-11-05 RX ADMIN — MIRTAZAPINE 7.5 MG: 7.5 TABLET, FILM COATED ORAL at 08:11

## 2024-11-05 RX ADMIN — Medication: at 09:11

## 2024-11-05 RX ADMIN — Medication: at 08:11

## 2024-11-05 NOTE — NURSING TRANSFER
Nursing Transfer Note      11/5/2024   3:40    Nurse giving handoff: HUMPHREY Guzmán  Nurse receiving handoff: HUMPHREY Ruiz    Reason patient is being transferred: step down    Transfer From: CICU [id number] to     Transfer via bed    Transfer with O2, cardiac monitoring    Transported by RN    Telemetry: Box Number [id number]  Order for Tele Monitor? Yes    Additional Lines: Oxygen    Medicines sent: yes    Any special needs or follow-up needed: no    Patient belongings transferred with patient: Yes    Chart send with patient: Yes    Notified: son    Patient reassessed at: 1545 (date, time)  1  Upon arrival to floor: cardiac monitor applied, patient oriented to room, call bell in reach, and bed in lowest position

## 2024-11-05 NOTE — HOSPITAL COURSE
Initially presenting from NH with altered mentation and worsening dyspnea.  Admitted to MICU for acute on chronic hypercapnic respiratory failure requiring continuous BiPAP.  Noted to be volume overloaded, started on IV diuresis.  Found to be bradycardic with HR 30s to 40s with complete heart block while in MICU.  Transcutaneous pacing initiated with subsequent placement of T BP by EP physician.  Admitted to CCU.  Now status post ppm placement 11/4.  On Coumadin.  Stable, on home oxygen    On arrival to floor intermittent HR readings in 30s, discussed with EP, likely false readings w bigeminy pvcs and rate not picking up correctly. Later started on low dose metoprolol w holding parameters. Patient will be discharged to SNF placement    #CHB (complete heart block)-Cont doxy BID 5 days post PPM last diose 11/9 -EP: ok for metoprolol given frequent PVCs    #Urinary retention-harrison 16 F in place, had to be replaced on 11/6 due to persistent retention - void trial at SNF when mobility improves or before in 3 days  -Cont flomax  #History of pulmonary embolism - c/w coumadin 5 mg on 11/9 and then start Eliquis on 11/10   #HF with mildly reduced EF- c/w Jardiance, entreso 24-26 BID and metoprolol for GDMT -Also continue lasix  monitor BP closely

## 2024-11-05 NOTE — NURSING
Patient due to void by 1800 per voiding trial; harrison removed at 1200. Bladder scan showed 250cc, ordered to check again in an hour and straight cath if bladder scan showed >300.

## 2024-11-05 NOTE — PT/OT/SLP PROGRESS
Occupational Therapy  Co Treatment    Name: Bean Salas  MRN: [id number]  Admitting Diagnosis:  CHB (complete heart block)  1 Day Post-Op      Pt is s/p PPM 11/4/24     Recommendations:     Discharge Recommendations: Moderate Intensity Therapy    Assessment:     Bean Salas is a 79 y.o. male with a medical diagnosis of CHB (complete heart block).  Performance deficits affecting function are weakness, impaired endurance, impaired self care skills, impaired functional mobility, gait instability, impaired balance, decreased upper extremity function, decreased lower extremity function, pain.   Pt tolerated session fairly well with stable vital signs. Pt to benefit from cont OT to address stated goals.   Rehab Prognosis:  Good; patient would benefit from acute skilled OT services to address these deficits and reach maximum level of function.       Plan:     Patient to be seen 3 x/week to address the above listed problems via self-care/home management, therapeutic activities, therapeutic exercises  Plan of Care Expires:    Plan of Care Reviewed with: patient    Subjective     Pt agreeable to therapy   Pt reports increased fear of falling due to 2 recent falls prior to admission.     Pain/Comfort:  Pain Rating 1: 0/10    Objective:     Communicated with: nsg prior to session.  Patient found in bed with tele, pulse ox, BP cuff, harrison, 3 LPM oxygen via NC. Sling/swath with poor placement.   Cotx completed this date to optimize functional performance and safety given impaired tolerance for activity in setting of ICU   General Precautions: Standard, fall      Occupational Performance:     Bed Mobility:    Supine<>sit TOTAL A x 2     Activities of Daily Living:  Feeding: set-up  G/H set-up in supported sitting. Pt requiring assist to maintain postural control EOB limited ability to engage in functional activity sitting EOB.   UE/LE dressing; TOTAL A     AMPAC 6 Click ADL: 10    Treatment & Education:  Pt awake, alert and  following commands.   Education provided and OT provided correct placement of sling/swath. Education provided re: pacemaker precautions and implications on functional activity. Further education needed.     Pt required MOD A for postural control seated EOB approx 12 min, but eventually demo SBA for brief time to maintain static sitting. Initially, pt with left posterior/lateral lean with impaired midline orientation.     Education provided re: role of OT and safety with functional mobility/ADL skills.       Patient left HOB elevated with all lines intact and call button in reach    GOALS:   Multidisciplinary Problems       Occupational Therapy Goals          Problem: Occupational Therapy    Goal Priority Disciplines Outcome Interventions   Occupational Therapy Goal     OT, PT/OT Progressing    Description: Goals to be met by: 14 days 11/15/24     Patient will increase functional independence with ADLs by performing:    Pt to complete g/h skills seated EOB with set-up  Pt to tolerated sitting EOB with Fair + sitting balance to increase performance with ADl skills  Pt to complete UE dressing with set-up  Pt to complete t/f to BSC with MOD A   Pt to complete toileting with MOD A                        Time Tracking:     OT Date of Treatment: 11/05/24  OT Start Time: 0921  OT Stop Time: 0944  OT Total Time (min): 23 min    Billable Minutes:Therapeutic Activity 23    OT/COURTNEY: OT          11/5/2024

## 2024-11-05 NOTE — PROGRESS NOTES
[facility] - Cardiac Intensive Care  Cardiology  Progress Note    Patient Name: Bean Salas  MRN: [id number]  Admission Date: 10/28/2024  Hospital Length of Stay: 7 days  Code Status: Full Code   Attending Physician: Barbara Canada MD   Primary Care Physician: Ramirez Levine MD  Expected Discharge Date: 11/6/2024  Principal Problem:CHB (complete heart block)    Subjective:     Hospital Course:   Patient was transferred to CICU from MICU on 10/30 for continued heparin bridging until INR therapeutic on warfarin prior to PPM placement. Patient diuresing well and with O2 weaned down to home baseline of 3L NC. INR remained subtherapeutic so warfarin dose was increased to 10mg on 11/2. INR 1.6 on 11/3, warfarin decreased to 7.5. Plan for PPM on 11/4.     Interval History: Overnight, patient had abdominal stimulation by pacemaker, wire re-adjusted this morning. Otherwise NAOE and this morning without acute complaints. VSS and BP stable with jardiance. Plan for voiding trial an d stepdown today.     Review of Systems   Constitutional: Negative for chills and fever.   Cardiovascular:  Negative for chest pain and dyspnea on exertion.   Respiratory:  Negative for cough and shortness of breath.    Musculoskeletal:  Negative for back pain and joint swelling.   Gastrointestinal:  Negative for abdominal pain and constipation.   Genitourinary:  Negative for dysuria and hematuria.   Neurological:  Negative for focal weakness and headaches.   Psychiatric/Behavioral:  Negative for altered mental status. The patient is not nervous/anxious.      Objective:     Vital Signs (Most Recent):  Temp: 98.5 °F (36.9 °C) (11/05/24 1100)  Pulse: 65 (11/05/24 1215)  Resp: 13 (11/05/24 1215)  BP: 132/63 (11/05/24 1200)  SpO2: 100 % (11/05/24 1215) Vital Signs (24h Range):  Temp:  [98 °F (36.7 °C)-98.5 °F (36.9 °C)] 98.5 °F (36.9 °C)  Pulse:  [32-84] 65  Resp:  [6-32] 13  SpO2:  [91 %-100 %] 100 %  BP: ()/(40-94) 132/63     Weight:  102.9 kg (226 lb 13.7 oz)  Body mass index is 31.64 kg/m².     SpO2: 100 %         Intake/Output Summary (Last 24 hours) at 11/5/2024 1252  Last data filed at 11/5/2024 1200  Gross per 24 hour   Intake 1738.59 ml   Output 2000 ml   Net -261.41 ml       Lines/Drains/Airways       Peripheral Intravenous Line  Duration                  Peripheral IV - Single Lumen 11/02/24 1446 20 G Anterior;Left Forearm 2 days         Peripheral IV - Single Lumen 11/04/24 2155 20 G Right Antecubital <1 day                       Physical Exam  Constitutional:       General: He is not in acute distress.  HENT:      Head: Normocephalic and atraumatic.      Nose:      Comments: NC on  Neck:      Comments: TVP  Cardiovascular:      Rate and Rhythm: Normal rate and regular rhythm.   Pulmonary:      Effort: Pulmonary effort is normal.   Abdominal:      Palpations: Abdomen is soft.   Musculoskeletal:         General: No swelling.      Right lower leg: No edema.      Left lower leg: No edema.      Comments: Swelling improved   Skin:     General: Skin is warm.   Neurological:      Mental Status: He is alert and oriented to person, place, and time.            Significant Labs: All pertinent lab results from the last 24 hours have been reviewed.    Significant Imaging:  Imaging reviewed   Assessment and Plan:     * CHB (complete heart block)  Presented to the ED with SOB and AMS. Found to be in CHB in the MICU. TV placed by RP on 10/29 with symptomatic improvement. V paced at 70.     - EP following, will place pacemaker once INR therapeutic on coumadin. Goal ~2.0  - PPM on 11/4  - doxycycline 100mg BID for 5 days post PPM    Urinary retention  On flomax at home. Harrison placed while patient in MICU d/t cf urinary retention     - resumed home flomax  - harrison placed, plan for voiding trial today     Encephalopathy, metabolic  P/w acute encephalopathy but improved after BiPAP and TVP. GCS 15 and ANOx4 currently. Mental status improved. Anticipate AMS  2/2 hypercapnia and bradyarrhythmia. CTH without acute intracranial process  VBG mildly acidotic with hypercapnia above baseline     - See acute respiratory failure and CHB  - Improved    History of pulmonary embolism  Diagnosed with PE 9/16/2024 when admitted for pelvic fracture.     - On heparin gtt with warfarin started.  - INR goal 2.0 prior to PPM placement  - warfarin to 7.5 mg, INR in therapeutic range (11/4)    Paroxysmal atrial fibrillation  Patient has persistent (7 days or more) atrial fibrillation. Patient is currently in sinus rhythm. [id number] Score: 3. The patients heart rate in the last 24 hours is as follows:  Pulse  Min: 32  Max: 84       Anticoagulants  warfarin (COUMADIN) tablet 5 mg, Daily, Oral    Plan  - Replete lytes with a goal of K>4, Mg >2  - Patient is anticoagulated, see medications listed above.  - Patient's afib is currently controlled    Acute on chronic respiratory failure with hypoxia  Patient p/w hypoxia requiring increased O2 requirements, baseline 3L NC. History of CHF with history of moderately reduced EF of 45-50% in the past. Was previously on GDMT but had some pillars of therapy removed during previous admission due to hypotension and PAULA. CXR reveals pulmonary findings suggest edema.  and pt. With edema, suspect HF exacerbation, diurese and wean O2 as tolerated.    Plan:  - BiPAP qhs or with naps.   - On home O2 requirements, 3L  - Lasix IV switched to PO lasix 40mg daily.  - Fluid restriction, 1.5L and low Na diet  - Strict I/Os, daily weights and volume assessments  - Monitor electrolytes with daily CMP, maintain Mg > 2 and K > 4.    Acute on chronic diastolic heart failure  See acute on chronic respiratory failure.    Hypothyroidism due to acquired atrophy of thyroid  Chronic condition, stable.    - Continue home synthroid     Essential hypertension  Patients blood pressure range in the last 24 hours was: BP  Min: 75/40  Max: 189/87.The patient's inpatient  anti-hypertensive regimen is listed below:  Current Antihypertensives  furosemide tablet 40 mg, Daily, Oral    Plan  - BP is controlled, no changes needed to their regimen  - Due to initial hypotension, will be conservative with antihypertensives. Can adjust regimen or dosages as indicated  - Given mildly reduced EF, continue Jardiance 10mg  - if BP continue to remain stable, will consider resuming entresto (initially held d/t hypotension)         VTE Risk Mitigation (From admission, onward)           Ordered     warfarin (COUMADIN) tablet 5 mg  Daily         11/05/24 1026                    Citlali Priest MD  Cardiology  [facility] - Cardiac [facility]

## 2024-11-05 NOTE — SUBJECTIVE & OBJECTIVE
Interval History: Overnight, patient had abdominal stimulation by pacemaker, wire re-adjusted this morning. Otherwise NAOE and this morning without acute complaints. VSS and BP stable with jardiance. Plan for voiding trial an d stepdown today.     Review of Systems   Constitutional: Negative for chills and fever.   Cardiovascular:  Negative for chest pain and dyspnea on exertion.   Respiratory:  Negative for cough and shortness of breath.    Musculoskeletal:  Negative for back pain and joint swelling.   Gastrointestinal:  Negative for abdominal pain and constipation.   Genitourinary:  Negative for dysuria and hematuria.   Neurological:  Negative for focal weakness and headaches.   Psychiatric/Behavioral:  Negative for altered mental status. The patient is not nervous/anxious.      Objective:     Vital Signs (Most Recent):  Temp: 98.5 °F (36.9 °C) (11/05/24 1100)  Pulse: 65 (11/05/24 1215)  Resp: 13 (11/05/24 1215)  BP: 132/63 (11/05/24 1200)  SpO2: 100 % (11/05/24 1215) Vital Signs (24h Range):  Temp:  [98 °F (36.7 °C)-98.5 °F (36.9 °C)] 98.5 °F (36.9 °C)  Pulse:  [32-84] 65  Resp:  [6-32] 13  SpO2:  [91 %-100 %] 100 %  BP: ()/(40-94) 132/63     Weight: 102.9 kg (226 lb 13.7 oz)  Body mass index is 31.64 kg/m².     SpO2: 100 %         Intake/Output Summary (Last 24 hours) at 11/5/2024 1252  Last data filed at 11/5/2024 1200  Gross per 24 hour   Intake 1738.59 ml   Output 2000 ml   Net -261.41 ml       Lines/Drains/Airways       Peripheral Intravenous Line  Duration                  Peripheral IV - Single Lumen 11/02/24 1446 20 G Anterior;Left Forearm 2 days         Peripheral IV - Single Lumen 11/04/24 2155 20 G Right Antecubital <1 day                       Physical Exam  Constitutional:       General: He is not in acute distress.  HENT:      Head: Normocephalic and atraumatic.      Nose:      Comments: NC on  Neck:      Comments: TVP  Cardiovascular:      Rate and Rhythm: Normal rate and regular rhythm.    Pulmonary:      Effort: Pulmonary effort is normal.   Abdominal:      Palpations: Abdomen is soft.   Musculoskeletal:         General: No swelling.      Right lower leg: No edema.      Left lower leg: No edema.      Comments: Swelling improved   Skin:     General: Skin is warm.   Neurological:      Mental Status: He is alert and oriented to person, place, and time.            Significant Labs: All pertinent lab results from the last 24 hours have been reviewed.    Significant Imaging:  Imaging reviewed

## 2024-11-05 NOTE — ASSESSMENT & PLAN NOTE
Presented to the ED with SOB and AMS. Found to be in CHB in the MICU. TV placed by RP on 10/29 with symptomatic improvement. V paced at 70.     - EP following, will place pacemaker once INR therapeutic on coumadin. Goal ~2.0  - PPM on 11/4  - doxycycline 100mg BID for 5 days post PPM

## 2024-11-05 NOTE — ASSESSMENT & PLAN NOTE
On flomax at home. Harrison placed while patient in MICU d/t cf urinary retention     - resumed home flomax  - harrison placed, plan for voiding trial today

## 2024-11-05 NOTE — PLAN OF CARE
[facility] Medicine ICU Acceptance Note    Date of Admit: 10/28/2024  Date of Transfer / Stepdown: 11/5/2024  Bojorge, C/J, L, Onc (IV chemo w/in 1 month), Gyn/Onc, or other special case?: No  ICU team stepping patient down: CCU  ICU team member giving handoff: Citlali Priest MD    Accepting  team: C    History of Present Illness:     78 yo M with PMHx CAD, combined CHF (recovered EF), CAD, A-fib on eliquis, chronic respiratory failure on 3L NC, and hypothyroidism who presents to the ED from nursing home for worsening SOB, hypoxia, and leg welling x a few days. Per nursing home and ED documentation, pt. Had been complaining of worsening SOB with exertion and was noted to have low O2 levels on vitals check. He is stypically on 2-3L NC, but over the last few days he has been uptitrated to on day of presentation. Nursing Home also noted inc reased B/L lower extremity swelling. Pt. Reports having SOB earleir ion the day but he reports feeling ok on the 5L NC. No cough, fevers, chills, nausea, or vomiting.     Of note, pt. Was recently admitted to this facility 9/28-10/7 for hypotension and PAULA. Lasix was held and entresto was discontinued. At discharge lasix dosing was changed to PRN only. It is unclear if he had been receiving any of these PRN doses at the nursing home.    Hospital/ICU Course:     Initially presenting from NH with altered mentation and worsening dyspnea.  Admitted to MICU for acute on chronic hypercapnic respiratory failure requiring continuous BiPAP.  Noted to be volume overloaded, started on IV diuresis.  Found to be bradycardic with HR 30s to 40s with complete heart block while in MICU.  Transcutaneous pacing initiated with subsequent placement of T BP by EP physician.  Admitted to CCU.  Now status post ppm placement 11/4.  On Coumadin.  Stable, on home oxygen.     On arrival to floor intermittent HR readings in 30s, discussed with EP, likely false readings w bigeminy pvcs and rate not picking  up correctly. Starting on low dose metoprolol w holding parameters per recommendations    Deemed appropriate for transfer to the floor on 11/5/2024, and was accepted to  team C for further care and management.    Consultants and Procedures:     Consultants:  Consults (From admission, onward)          Status Ordering Provider     Inpatient consult to Skin Integrity  Practitioner  Once        Provider:  (Not yet assigned)    Acknowledged VALERIO ANDREWS     Inpatient consult to Electrophysiology  Once        Provider:  (Not yet assigned)    Completed WERO JIMENEZ     Inpatient consult to Critical Care Medicine  Once        Provider:  (Not yet assigned)    Completed DENIS OJEDA     Inpatient consult to Critical Care Medicine  Once        Provider:  (Not yet assigned)    Completed DENIS OJEDA            Procedures:    As documented    Transfer Information:     Diet:  As ordered    Physical Activity:  As tolerated      Pending plan at time of transfer to the floor:  Continue current plan initiated by ICU, will further monitor and adjust as clinically indicated upon arrival to the floor.    Patient has been accepted by [facility] Medicine Team C, who will assume care of the patient upon arrival to the floor from the ICU. Please contact ICU team with any concerns prior to transfer to the floor.      Jere Mierles DO  11/05/2024

## 2024-11-05 NOTE — ASSESSMENT & PLAN NOTE
Patient has persistent (7 days or more) atrial fibrillation. Patient is currently in sinus rhythm. [id number] Score: 3. The patients heart rate in the last 24 hours is as follows:  Pulse  Min: 32  Max: 84       Anticoagulants  warfarin (COUMADIN) tablet 5 mg, Daily, Oral    Plan  - Replete lytes with a goal of K>4, Mg >2  - Patient is anticoagulated, see medications listed above.  - Patient's afib is currently controlled

## 2024-11-05 NOTE — PLAN OF CARE
[facility] - Cardiology Stepdown  Discharge Reassessment    Primary Care Provider: Ramirez Levine MD    Expected Discharge Date: 11/6/2024    Reassessment (most recent)       Discharge Reassessment - 11/05/24 1628          Discharge Reassessment    Assessment Type Discharge Planning Reassessment     Discharge Plan discussed with: Patient;Adult children     Discharge Plan A Skilled Nursing Facility     Discharge Plan B Home Health;Home with family     DME Needed Upon Discharge  none     Transition of Care Barriers None        Post-Acute Status    Post-Acute Authorization Placement     Post-Acute Placement Status Referrals Sent                   SW met with pt and son Efren at bedside to review discharge recommendation of SNF and are agreeable to plan.  Pt reinforced that he does not want to return to [facility].    Patient/family provided list of facilities in-network with patient's payor plan. Providers that are owned, operated, or affiliated with [facility] are included on the list.     Notified that referral sent to below listed facilities from in-network list based on proximity to home/family support:   [facility]  [facility]  [facility]    Patient/family instructed to identify preference.    Preferred Facility: (if more than 1, listed in order of descending preference)  OSNF    If an additional preferred facility not listed above is identified, additional referral to be sent. If above facilities unable to accept, will send additional referrals to in-network providers.     Discharge Plan A and Plan B have been determined by review of patient's clinical status, future medical and therapeutic needs, and coverage/benefits for post-acute care in coordination with multidisciplinary team members.  Will continue to follow.      Gabriela Zavala, TODD  [facility]  [phone]

## 2024-11-05 NOTE — PROGRESS NOTES
Inpatient device interrogation and/or reprogramming orders received; the industry representative was contacted and provided with patient's name and room number.      Cardiac device interrogation and/or reprogramming completed by industry representative, Mickey with [employer]; please refer to report located in the Media tab.

## 2024-11-05 NOTE — ASSESSMENT & PLAN NOTE
Patients blood pressure range in the last 24 hours was: BP  Min: 75/40  Max: 189/87.The patient's inpatient anti-hypertensive regimen is listed below:  Current Antihypertensives  furosemide tablet 40 mg, Daily, Oral    Plan  - BP is controlled, no changes needed to their regimen  - Due to initial hypotension, will be conservative with antihypertensives. Can adjust regimen or dosages as indicated  - Given mildly reduced EF, continue Jardiance 10mg  - if BP continue to remain stable, will consider resuming entresto (initially held d/t hypotension)

## 2024-11-05 NOTE — NURSING
1545 Patient arrived to unit via bed. Left arm in sling. Patient AAOx4, son bedside. VS WDL. Telemetry box registered.     1550 Tele showing HR in the 30s, pulse ox reading 50-60s. Patient denies lightheadedness, dizziness. Tele strip posted. Chi,  bedside.     1600 Tele showing HR 60s with pacer spikes. No new orders.

## 2024-11-05 NOTE — PLAN OF CARE
CICU DAILY GOALS     PPM today  250 cc bolus given x2 for hypotension  MD Miranda updated on patient throughout day    A: Awake    RASS: Goal - RASS Goal: 0-->alert and calm  Actual - RASS (Nettles Agitation-Sedation Scale): alert and calm   Restraint necessity:    B: Breath   SBT: Not intubated   C: Coordinate A & B, analgesics/sedatives   Pain: managed    SAT: Not intubated  D: Delirium   CAM-ICU: Overall CAM-ICU: Negative  E: Early(intubated/ Progressive (non-intubated) Mobility   MOVE Screen: Fail   Activity: Activity Management: Rolling - L1  FAS: Feeding/Nutrition   Diet order: Diet/Nutrition Received: 2 gram sodium, low saturated fat/low cholesterol,   Fluid restriction: Fluid Requirement: 1500 mL FR   Nutritional Supplement Intake: Quantity 0, Type:  n/a  T: Thrombus   DVT prophylaxis: VTE Core Measure: Pharmacological prophylaxis initiated/maintained  H: HOB Elevation   Head of Bed (HOB) Positioning: HOB lowered  U: Ulcer Prophylaxis   GI: yes  G: Glucose control   managed    S: Skin   Bathing/Skin Care: (S) bath, complete;dressed/undressed;electrode patches/site rotation;linen changed (11/04/24 0501)  Wounds: Yes  Wound care consulted: Yes  B: Bowel Function   LBM 10/30  I: Indwelling Catheters   Mckeon necessity: [REMOVED]      Urethral Catheter-Reason for Continuing Urinary Catheterization: Chronic Indwelling Urinary Catheter on Admission       Urethral Catheter 10/31/24 1535-Reason for Continuing Urinary Catheterization: Chronic Indwelling Urinary Catheter on Admission   CVC necessity: No  D: De-escalation Antibx   No    Family/Goals of care/Code Status   Code Status: Full Code     No acute events throughout day, VS and assessment per flow sheet, patient progressing towards goals as tolerated, plan of care reviewed with Bean Salas and family, all concerns addressed.

## 2024-11-05 NOTE — PLAN OF CARE
CICU DAILY GOALS   Plan for the day   VS and assessment per flow sheet, patient progressing towards goals as tolerated, plan of care reviewed with Bean Salas and family, all concerns addressed, plan of care continues as ordered per Primary Team.     Family/Goals of care/Code Status   Code Status: Full Code    Labs/Accuchecks:  Recent Labs   Lab 11/03/24  0240 11/04/24  0302 11/05/24 0227   WBC 4.17 4.42 5.22   RBC 3.49* 3.35* 3.46*   HGB 11.0* 10.8* 11.1*   HCT 36.4* 35.0* 36.5*   * 156 156      Recent Labs   Lab 11/02/24  0301 11/03/24  0240 11/04/24 0303 11/05/24 0227   INR 1.2 1.6* 2.6* 3.2*   APTT 48.2* 61.9* 78.2*  --       Recent Labs     11/05/24 0227      K 4.9   CO2 35*   CL 95   BUN 21   CREATININE 1.2   ALKPHOS 110   ALT 17   AST 23   BILITOT 1.2*     Electrolytes: N/A - electrolytes WDL  Accuchecks: none    Gtts/LDAs:      Lines/Drains/Airways       Drain  Duration                  Urethral Catheter 10/31/24 1535 4 days              Peripheral Intravenous Line  Duration                  Peripheral IV - Single Lumen 11/02/24 1446 20 G Anterior;Left Forearm 2 days         Peripheral IV - Single Lumen 11/04/24 2155 20 G Right Antecubital <1 day                    A: Awake    RASS: Goal - RASS Goal: 0-->alert and calm  Actual - RASS (Nettles Agitation-Sedation Scale): alert and calm   Restraint necessity:    B: Breath   3L NC - baseline   C: Coordinate A & B, analgesics/sedatives   Pain: managed   D: Delirium   CAM-ICU: Overall CAM-ICU: Negative  E: Early(intubated/ Progressive (non-intubated) Mobility   MOVE Screen: Pass   Activity: Activity Management: Arm raise - L1, Ankle pumps - L1  FAS: Feeding/Nutrition   Diet order: Diet/Nutrition Received: 2 gram sodium, low saturated fat/low cholesterol,   Fluid restriction: Fluid Requirement: 1.5L FR   Nutritional Supplement Intake: Quantity 0, Type: Boost  T: Thrombus   DVT prophylaxis: VTE Core Measure: Pharmacological prophylaxis  initiated/maintained  H: HOB Elevation   Head of Bed (HOB) Positioning: HOB lowered  U: Ulcer Prophylaxis   GI: yes  G: Glucose control   managed    B: Bowel Function   constipation  I: Indwelling Catheters   Mckeon necessity: [REMOVED]      Urethral Catheter-Reason for Continuing Urinary Catheterization: Chronic Indwelling Urinary Catheter on Admission       Urethral Catheter 10/31/24 1535-Reason for Continuing Urinary Catheterization: Chronic Indwelling Urinary Catheter on Admission   CVC necessity: No   IPAD offered: Not appropriate  D: De-escalation Antibx   No  Problem: Adult Inpatient Plan of Care  Goal: Plan of Care Review  Outcome: Progressing  Goal: Patient-Specific Goal (Individualized)  Outcome: Progressing  Goal: Absence of Hospital-Acquired Illness or Injury  Outcome: Progressing  Goal: Optimal Comfort and Wellbeing  Outcome: Progressing  Goal: Readiness for Transition of Care  Outcome: Progressing

## 2024-11-06 PROBLEM — R54 AGE-RELATED PHYSICAL DEBILITY: Status: ACTIVE | Noted: 2024-11-06

## 2024-11-06 LAB
ALBUMIN SERPL BCP-MCNC: 2.8 G/DL (ref 3.5–5.2)
ALP SERPL-CCNC: 113 U/L (ref 40–150)
ALT SERPL W/O P-5'-P-CCNC: 13 U/L (ref 10–44)
ANION GAP SERPL CALC-SCNC: 8 MMOL/L (ref 8–16)
AST SERPL-CCNC: 21 U/L (ref 10–40)
BACTERIA #/AREA URNS AUTO: NORMAL /HPF
BASOPHILS # BLD AUTO: 0.02 K/UL (ref 0–0.2)
BASOPHILS NFR BLD: 0.4 % (ref 0–1.9)
BILIRUB SERPL-MCNC: 1.1 MG/DL (ref 0.1–1)
BILIRUB UR QL STRIP: NEGATIVE
BUN SERPL-MCNC: 19 MG/DL (ref 8–23)
CALCIUM SERPL-MCNC: 9.1 MG/DL (ref 8.7–10.5)
CHLORIDE SERPL-SCNC: 97 MMOL/L (ref 95–110)
CLARITY UR REFRACT.AUTO: CLEAR
CO2 SERPL-SCNC: 34 MMOL/L (ref 23–29)
COLOR UR AUTO: YELLOW
CREAT SERPL-MCNC: 1.1 MG/DL (ref 0.5–1.4)
DIFFERENTIAL METHOD BLD: ABNORMAL
EOSINOPHIL # BLD AUTO: 0.1 K/UL (ref 0–0.5)
EOSINOPHIL NFR BLD: 1.2 % (ref 0–8)
ERYTHROCYTE [DISTWIDTH] IN BLOOD BY AUTOMATED COUNT: 15.5 % (ref 11.5–14.5)
EST. GFR  (NO RACE VARIABLE): >60 ML/MIN/1.73 M^2
GLUCOSE SERPL-MCNC: 125 MG/DL (ref 70–110)
GLUCOSE UR QL STRIP: ABNORMAL
HCT VFR BLD AUTO: 33.6 % (ref 40–54)
HGB BLD-MCNC: 10.4 G/DL (ref 14–18)
HGB UR QL STRIP: ABNORMAL
IMM GRANULOCYTES # BLD AUTO: 0.03 K/UL (ref 0–0.04)
IMM GRANULOCYTES NFR BLD AUTO: 0.6 % (ref 0–0.5)
INR PPP: 2.9 (ref 0.8–1.2)
KETONES UR QL STRIP: NEGATIVE
LEUKOCYTE ESTERASE UR QL STRIP: NEGATIVE
LYMPHOCYTES # BLD AUTO: 0.7 K/UL (ref 1–4.8)
LYMPHOCYTES NFR BLD: 15 % (ref 18–48)
MAGNESIUM SERPL-MCNC: 2.4 MG/DL (ref 1.6–2.6)
MCH RBC QN AUTO: 32.4 PG (ref 27–31)
MCHC RBC AUTO-ENTMCNC: 31 G/DL (ref 32–36)
MCV RBC AUTO: 105 FL (ref 82–98)
MICROSCOPIC COMMENT: NORMAL
MONOCYTES # BLD AUTO: 0.5 K/UL (ref 0.3–1)
MONOCYTES NFR BLD: 10 % (ref 4–15)
NEUTROPHILS # BLD AUTO: 3.6 K/UL (ref 1.8–7.7)
NEUTROPHILS NFR BLD: 72.8 % (ref 38–73)
NITRITE UR QL STRIP: NEGATIVE
NRBC BLD-RTO: 0 /100 WBC
PH UR STRIP: 7 [PH] (ref 5–8)
PHOSPHATE SERPL-MCNC: 2.7 MG/DL (ref 2.7–4.5)
PLATELET # BLD AUTO: 171 K/UL (ref 150–450)
PMV BLD AUTO: 12 FL (ref 9.2–12.9)
POTASSIUM SERPL-SCNC: 4.4 MMOL/L (ref 3.5–5.1)
PROT SERPL-MCNC: 6 G/DL (ref 6–8.4)
PROT UR QL STRIP: NEGATIVE
PROTHROMBIN TIME: 29.5 SEC (ref 9–12.5)
RBC # BLD AUTO: 3.21 M/UL (ref 4.6–6.2)
RBC #/AREA URNS AUTO: 3 /HPF (ref 0–4)
SODIUM SERPL-SCNC: 139 MMOL/L (ref 136–145)
SP GR UR STRIP: 1.02 (ref 1–1.03)
URN SPEC COLLECT METH UR: ABNORMAL
WBC # BLD AUTO: 4.92 K/UL (ref 3.9–12.7)
WBC #/AREA URNS AUTO: 5 /HPF (ref 0–5)
YEAST UR QL AUTO: NORMAL

## 2024-11-06 PROCEDURE — 51701 INSERT BLADDER CATHETER: CPT

## 2024-11-06 PROCEDURE — 51798 US URINE CAPACITY MEASURE: CPT

## 2024-11-06 PROCEDURE — 25000003 PHARM REV CODE 250: Performed by: STUDENT IN AN ORGANIZED HEALTH CARE EDUCATION/TRAINING PROGRAM

## 2024-11-06 PROCEDURE — 85025 COMPLETE CBC W/AUTO DIFF WBC: CPT | Performed by: STUDENT IN AN ORGANIZED HEALTH CARE EDUCATION/TRAINING PROGRAM

## 2024-11-06 PROCEDURE — 25000003 PHARM REV CODE 250: Performed by: INTERNAL MEDICINE

## 2024-11-06 PROCEDURE — 20600001 HC STEP DOWN PRIVATE ROOM

## 2024-11-06 PROCEDURE — 25000003 PHARM REV CODE 250

## 2024-11-06 PROCEDURE — 85610 PROTHROMBIN TIME: CPT | Performed by: STUDENT IN AN ORGANIZED HEALTH CARE EDUCATION/TRAINING PROGRAM

## 2024-11-06 PROCEDURE — 83735 ASSAY OF MAGNESIUM: CPT | Performed by: STUDENT IN AN ORGANIZED HEALTH CARE EDUCATION/TRAINING PROGRAM

## 2024-11-06 PROCEDURE — 80053 COMPREHEN METABOLIC PANEL: CPT | Performed by: STUDENT IN AN ORGANIZED HEALTH CARE EDUCATION/TRAINING PROGRAM

## 2024-11-06 PROCEDURE — 25000003 PHARM REV CODE 250: Performed by: NURSE PRACTITIONER

## 2024-11-06 PROCEDURE — 36415 COLL VENOUS BLD VENIPUNCTURE: CPT | Performed by: STUDENT IN AN ORGANIZED HEALTH CARE EDUCATION/TRAINING PROGRAM

## 2024-11-06 PROCEDURE — 84100 ASSAY OF PHOSPHORUS: CPT | Performed by: STUDENT IN AN ORGANIZED HEALTH CARE EDUCATION/TRAINING PROGRAM

## 2024-11-06 PROCEDURE — 25000003 PHARM REV CODE 250: Performed by: HOSPITALIST

## 2024-11-06 PROCEDURE — 25000242 PHARM REV CODE 250 ALT 637 W/ HCPCS

## 2024-11-06 RX ORDER — LIDOCAINE HYDROCHLORIDE 20 MG/ML
JELLY TOPICAL ONCE AS NEEDED
Status: COMPLETED | OUTPATIENT
Start: 2024-11-06 | End: 2024-11-06

## 2024-11-06 RX ADMIN — SACUBITRIL AND VALSARTAN 1 TABLET: 24; 26 TABLET, FILM COATED ORAL at 08:11

## 2024-11-06 RX ADMIN — METOPROLOL TARTRATE 12.5 MG: 25 TABLET, FILM COATED ORAL at 09:11

## 2024-11-06 RX ADMIN — EMPAGLIFLOZIN 10 MG: 10 TABLET, FILM COATED ORAL at 09:11

## 2024-11-06 RX ADMIN — LEVOTHYROXINE SODIUM 200 MCG: 100 TABLET ORAL at 06:11

## 2024-11-06 RX ADMIN — LIDOCAINE HYDROCHLORIDE 10 ML: 20 JELLY TOPICAL at 06:11

## 2024-11-06 RX ADMIN — DOXYCYCLINE HYCLATE 100 MG: 100 TABLET, COATED ORAL at 09:11

## 2024-11-06 RX ADMIN — DOXYCYCLINE HYCLATE 100 MG: 100 TABLET, COATED ORAL at 08:11

## 2024-11-06 RX ADMIN — Medication: at 09:11

## 2024-11-06 RX ADMIN — POLYETHYLENE GLYCOL 3350 17 G: 17 POWDER, FOR SOLUTION ORAL at 09:11

## 2024-11-06 RX ADMIN — FUROSEMIDE 40 MG: 40 TABLET ORAL at 09:11

## 2024-11-06 RX ADMIN — SACUBITRIL AND VALSARTAN 1 TABLET: 24; 26 TABLET, FILM COATED ORAL at 02:11

## 2024-11-06 RX ADMIN — TAMSULOSIN HYDROCHLORIDE 0.4 MG: 0.4 CAPSULE ORAL at 09:11

## 2024-11-06 RX ADMIN — WARFARIN SODIUM 5 MG: 5 TABLET ORAL at 06:11

## 2024-11-06 RX ADMIN — ASPIRIN 81 MG: 81 TABLET, COATED ORAL at 09:11

## 2024-11-06 RX ADMIN — ATORVASTATIN CALCIUM 10 MG: 10 TABLET, FILM COATED ORAL at 09:11

## 2024-11-06 RX ADMIN — Medication: at 10:11

## 2024-11-06 RX ADMIN — THIAMINE HCL TAB 100 MG 100 MG: 100 TAB at 09:11

## 2024-11-06 RX ADMIN — MIRTAZAPINE 7.5 MG: 7.5 TABLET, FILM COATED ORAL at 08:11

## 2024-11-06 NOTE — ASSESSMENT & PLAN NOTE
-Continue home metoprolol for rate control. Continue coumadin for AC  -Monitor on tele while admitted

## 2024-11-06 NOTE — PT/OT/SLP PROGRESS
Physical Therapy Treatment    Patient Name:  Bean Salas   MRN:  [id number]  Admit Date: 10/28/2024  Admitting Diagnosis:  CHB (complete heart block)   Length of Stay: 7 days  Recent Surgery: Procedure(s) (LRB):  INSERTION, PACEMAKER, BIVENTRICULAR (N/A) 1 Day Post-Op    Recommendations:     Discharge Recommendations:  Moderate Intensity Therapy   Discharge Equipment Recommendations: none   Barriers to discharge: decline from PLOF; increased caregiver support    Plan:     During this hospitalization, patient to be seen 3 x/week to address the listed problems via gait training, therapeutic activities, therapeutic exercises, neuromuscular re-education  Plan of Care Expires:  24  Plan of Care Reviewed with: patient    Assessment:     Bean Salas is a 79 y.o. male admitted with a medical diagnosis of CHB (complete heart block).        Problem List: weakness, impaired endurance, impaired functional mobility, impaired cardiopulmonary response to activity.  Rehab Prognosis: Good     GOALS:   Multidisciplinary Problems       Physical Therapy Goals          Problem: Physical Therapy    Goal Priority Disciplines Outcome Interventions   Physical Therapy Goal     PT, PT/OT Progressing    Description: Goals to be met by: 11/15/24     Patient will increase functional independence with mobility by performin. Supine to sit with MInimal Assistance  2. Sit to supine with MInimal Assistance  3. Sit to stand transfer with Minimal Assistance  4. Bed to chair transfer with Minimal Assistance using LRAD  5. Gait  x 25 feet with Minimal Assistance using LRAD.   6. Sitting at edge of bed x8 minutes with Minimal Assistance                         Subjective   Communicated with RN prior to session.  Patient found HOB elevated upon PT entry to room, agreeable to evaluation. Bean Salas's alone during session.    Chief Complaint: debility   Patient/Family Comments/goals: to get better   Pain/Comfort:  Pain Rating 1:  "0/10  Pain Rating Post-Intervention 1: 0/10    Objective:   Patient found with: telemetry, peripheral IV, harrison catheter, oxygen   General Precautions: Standard, Cardiac fall   Orthopedic Precautions:N/A   Braces: N/A   Oxygen Device: Nasal Cannula   Vitals: /75 (BP Location: Right forearm, Patient Position: Lying)   Pulse 64   Temp 98.1 °F (36.7 °C) (Oral)   Resp 20   Ht 5' 11" (1.803 m)   Wt 102.9 kg (226 lb 13.7 oz)   SpO2 96%   BMI 31.64 kg/m²     Outcome Measures:  AM-PAC 6 CLICK MOBILITY  Turning over in bed (including adjusting bedclothes, sheets and blankets)?: 2  Sitting down on and standing up from a chair with arms (e.g., wheelchair, bedside commode, etc.): 1  Moving from lying on back to sitting on the side of the bed?: 2  Moving to and from a bed to a chair (including a wheelchair)?: 1  Need to walk in hospital room?: 1  Climbing 3-5 steps with a railing?: 1  Basic Mobility Total Score: 8     Functional Mobility:  Additional staff present: OT - due to pt requiring 2 skilled therapists to safely perform functional mobility and to accommodate pt's activity tolerance    Bed Mobility:   Supine to Sit: total assistance and 2 persons; HOB elevated  Scooting anteriorly to EOB to have both feet planted on floor: total assistance and 2 persons  Sit to Supine: total assistance and 2 persons; HOB flat    Sitting Balance at Edge of Bed:  Assistance Level Required: SBA - mod A with the last few minutes of the trial at SBA  Time: 12 minutes   Comments:   Worked on activity tolerance sitting EOB, worked on tolerance to positional change, and worked on sitting balance   Worked on R forearm push ups to increase tolerance of weight bearing through R hip (pt able to sit with SBA after 5 forearm pushups to the right)    Transfers:   Not performed 2nd to pt still working on sitting balance       Gait:  Not performed     Therapeutic Activities, Exercises, and Education:   Educated pt on PT role/POC  Educated pt " on pacemaker precautions  Educated pt on how to don sling/swath  Educated pt on wear schedule for sling/swathe   Pt verbalized understanding     Therapeutic Exercise  Mobility for generalized strengthening  Initiation and use of core and postural muscles during sitting trial   R forearm push ups x 5 reps     Patient left HOB elevated with all lines intact, call button in reach, and RN notified..    Time Tracking:     PT Received On: 11/05/24  PT Start Time: 0920     PT Stop Time: 0944  PT Total Time (min): 24 min       Billable Minutes:   Therapeutic Exercise 23    Treatment Type: Evaluation  PT/PTA: PT

## 2024-11-06 NOTE — ASSESSMENT & PLAN NOTE
-Pt. Increased o2 requirement on arrival requiring bipap, baseline 3L NC. CXR reveals Pulmonary findings suggest edema.  and pt. With edema, suspect HF Exacerbation, diurese and wean O2 as tolerated. Now back on baseline o2

## 2024-11-06 NOTE — PLAN OF CARE
Problem: Adult Inpatient Plan of Care  Goal: Plan of Care Review  Outcome: Progressing  Flowsheets (Taken 11/6/2024 0354)  Plan of Care Reviewed With: patient  Goal: Patient-Specific Goal (Individualized)  Outcome: Progressing  Flowsheets (Taken 11/6/2024 0354)  Individualized Care Needs:   Pt's urinary output closely monitored   Pt remains on 3LNC for oxygenation needs  Anxieties, Fears or Concerns: None  Patient/Family-Specific Goals (Include Timeframe): Pt's bladder emptied throughout shift due to urinary retention.  Goal: Absence of Hospital-Acquired Illness or Injury  Outcome: Progressing  Goal: Optimal Comfort and Wellbeing  Outcome: Progressing  Goal: Readiness for Transition of Care  Outcome: Progressing  AAOX4,VSS,O2 sats > 95% on 3LNC. Plan of care discussed with patient. Patient has no complaints of chest pain/SOB/palpitations. Discussed medications and care. Wound care completed as ordered. Patient encouraged to void independently on condom catheter. All questions addressed. Patient resting comfortably with no acute distress. Call light within reach and bed at lowest position with HOB elevated 30 degrees. Care continues.

## 2024-11-06 NOTE — ASSESSMENT & PLAN NOTE
-Transferred to CCU on admission after foudnt obe in CHB while in MICU  -TVP placed on 10/29, followed by PPM placement on 11/4  -Cont doxy BID 5 days post PPM  -telemetry  -EP: ok to start metoprolol given frequent PVCs

## 2024-11-06 NOTE — PROGRESS NOTES
[facility] - Cardiology [facility] Medicine  Progress Note    Patient Name: Bean Salas  MRN: [id number]  Patient Class: IP- Inpatient   Admission Date: 10/28/2024  Length of Stay: 8 days  Attending Physician: Jere Mireles DO  Primary Care Provider: Ramirez Levine MD        Subjective:     Principal Problem:CHB (complete heart block)        HPI:  80 yo M with PMHx CAD, combined CHF (recovered EF), CAD, A-fib on eliquis, chronic respiratory failure on 3L NC, and hypothyroidism who presents to the ED from nursing home for worsening SOB, hypoxia, and leg welling x a few days. Per nursing home and ED documentation, pt. Had been complaining of worsening SOB with exertion and was noted to have low O2 levels on vitals check. He is stypically on 2-3L NC, but over the last few days he has been uptitrated to on day of presentation. Nursing Home also noted inc reased B/L lower extremity swelling. Pt. Reports having SOB earleir ion the day but he reports feeling ok on the 5L NC. No cough, fevers, chills, nausea, or vomiting.    Of note, pt. Was recently admitted to this facility 9/28-10/7 for hypotension and PAULA. Lasix was held and entresto was discontinued. At discharge lasix dosing was changed to PRN only. It is unclear if he had been receiving any of these PRN doses at the nursing home.    Overview/Hospital Course:  Initially presenting from NH with altered mentation and worsening dyspnea.  Admitted to MICU for acute on chronic hypercapnic respiratory failure requiring continuous BiPAP.  Noted to be volume overloaded, started on IV diuresis.  Found to be bradycardic with HR 30s to 40s with complete heart block while in MICU.  Transcutaneous pacing initiated with subsequent placement of T BP by EP physician.  Admitted to CCU.  Now status post ppm placement 11/4.  On Coumadin.  Stable, on home oxygen    On arrival to floor intermittent HR readings in 30s, discussed with EP, likely false readings mele manriquez  pvcs and rate not picking up correctly. Starting on low dose metoprolol w holding parameters. Pending SNF placement    Interval History: Seen this AM at bedside. No acute events overnight. Denies light headedness, chest pain, shortness of breath    Review of Systems  Objective:     Vital Signs (Most Recent):  Temp: 98.1 °F (36.7 °C) (11/06/24 1035)  Pulse: (!) 31 (11/06/24 1035)  Resp: 20 (11/06/24 1035)  BP: 128/67 (11/06/24 0506)  SpO2: 95 % (11/06/24 1035) Vital Signs (24h Range):  Temp:  [97.7 °F (36.5 °C)-98.4 °F (36.9 °C)] 98.1 °F (36.7 °C)  Pulse:  [31-87] 31  Resp:  [12-26] 20  SpO2:  [95 %-100 %] 95 %  BP: (121-132)/(56-75) 128/67     Weight: 97.8 kg (215 lb 9.8 oz)  Body mass index is 30.07 kg/m².    Intake/Output Summary (Last 24 hours) at 11/6/2024 1147  Last data filed at 11/6/2024 0908  Gross per 24 hour   Intake 702 ml   Output 1360 ml   Net -658 ml         Physical Exam  Vitals reviewed.   Constitutional:       General: He is not in acute distress.     Appearance: Normal appearance. He is well-developed. He is not ill-appearing.   HENT:      Head: Normocephalic and atraumatic.   Eyes:      Pupils: Pupils are equal, round, and reactive to light.   Neck:      Vascular: No JVD.      Trachea: No tracheal deviation.   Cardiovascular:      Rate and Rhythm: Normal rate and regular rhythm.      Heart sounds: Murmur heard.      No friction rub. No gallop.   Pulmonary:      Effort: No respiratory distress.      Breath sounds: No wheezing or rales.   Abdominal:      General: Bowel sounds are normal. There is no distension.      Palpations: Abdomen is soft. There is no mass.      Tenderness: There is no abdominal tenderness.   Musculoskeletal:         General: Normal range of motion.      Cervical back: Normal range of motion.      Right lower leg: No edema.      Left lower leg: No edema.   Skin:     General: Skin is warm and dry.      Capillary Refill: Capillary refill takes less than 2 seconds.      Findings:  "No rash.   Neurological:      General: No focal deficit present.      Mental Status: He is alert and oriented to person, place, and time.   Psychiatric:         Mood and Affect: Mood normal.         Behavior: Behavior normal.         Thought Content: Thought content normal.             Significant Labs: All pertinent labs within the past 24 hours have been reviewed.    Significant Imaging: I have reviewed all pertinent imaging results/findings within the past 24 hours.    Assessment/Plan:      * CHB (complete heart block)  -Transferred to CCU on admission after foudnt obe in CHB while in MICU  -TVP placed on 10/29, followed by PPM placement on 11/4  -Cont doxy BID 5 days post PPM  -telemetry  -EP: ok to start metoprolol given frequent PVCs      Essential hypertension  -Pt. Recently admitted for hypotension, BP medications stopped. Only on metoprolol on admission  -Cont metoprolol to assist with frequent PVCs  -Given mildly reduced EF, Jardiance restarted. Will attempt entreso 24-26 BID today  -Aldactone if BP tolerates    Acute on chronic diastolic heart failure  -Pt. With edema, SLADE, and PND. , CXR with "Pulmonary findings suggest edema consistent" with HF exacerbation. Pt. Recently had lasix dosing changed to PRN instead of daily  -Transitioned to PO lasix  -Monitor electrolytes cloesly while diresing  -Attempt to restart GDMT as tolerated per above        Age-related physical debility  Patient with Acute on chronic debility due to age-related physical debility. The patient's latest AMPAC (Activity Measure for Post Acute Care) Score is listed below.    AM-PAC Score - How much help does the patient need for each activity listed  Basic Mobility Total Score: 8  Turning over in bed (including adjusting bedclothes, sheets and blankets)?: A lot  Sitting down on and standing up from a chair with arms (e.g., wheelchair, bedside commode, etc.): Unable  Moving from lying on back to sitting on the side of the bed?: A " lot  Moving to and from a bed to a chair (including a wheelchair)?: Unable  Need to walk in hospital room?: Unable  Climbing 3-5 steps with a railing?: Unable    Plan  - PT/OT consulted  - SNF placement          Urinary retention  -Maintain harrison, had to be replaced on 11/6 due to persistent retention  -Cont flomax      History of pulmonary embolism  -Diagnosed with PE 9/16/2024 when admitted for pelvic fracture. Continue home eliquis      Paroxysmal atrial fibrillation  -Continue home metoprolol for rate control. Continue coumadin for AC  -Monitor on tele while admitted    Acute on chronic respiratory failure with hypoxia  -Pt. Increased o2 requirement on arrival requiring bipap, baseline 3L NC. CXR reveals Pulmonary findings suggest edema.  and pt. With edema, suspect HF Exacerbation, diurese and wean O2 as tolerated. Now back on baseline o2    Hypothyroidism due to acquired atrophy of thyroid  -Continue home synthroid        VTE Risk Mitigation (From admission, onward)           Ordered     warfarin (COUMADIN) tablet 5 mg  Daily         11/05/24 1026                    Discharge Planning   LATOSHA: 11/7/2024     Code Status: Full Code   Is the patient medically ready for discharge?:     Reason for patient still in hospital (select all that apply): Patient trending condition  Discharge Plan A: Skilled Nursing Facility                  Jere Mireles DO  Department of Hospital Medicine   [facility] - Cardiology Stepdown

## 2024-11-06 NOTE — ASSESSMENT & PLAN NOTE
-Pt. Recently admitted for hypotension, BP medications stopped. Only on metoprolol on admission  -Cont metoprolol to assist with frequent PVCs  -Given mildly reduced EF, Jardiance restarted. Will attempt entreso 24-26 BID today  -Aldactone if BP tolerates

## 2024-11-06 NOTE — ASSESSMENT & PLAN NOTE
"-Pt. With edema, SLADE, and PND. , CXR with "Pulmonary findings suggest edema consistent" with HF exacerbation. Pt. Recently had lasix dosing changed to PRN instead of daily  -Transitioned to PO lasix  -Monitor electrolytes cloesly while diresing  -Attempt to restart GDMT as tolerated per above      "

## 2024-11-06 NOTE — PLAN OF CARE
11/06/24 1307   Post-Acute Status   Post-Acute Authorization Placement   Post-Acute Placement Status Pending post-acute provider review/more information requested     DUNG received the following responses from the following facilities:  [facility] - accepted  [facility] - accepted but in co-pay days  [facility] - no beds    SW relayed the above to pt at bedside and reviewed his preferences:    Preferred Facility: (if more than 1, listed in order of descending preference)  [facility]    Pt requested that DUNG follow up with [facility] before he goes with another facility.  DUNG attempted to call admissions at [facility] and has placed on hold for over 10 minutes before the call disconnected.  DUNG tried again to speak with admissions and was told Rosita in admissions was at lunch.  DUNG left message requesting call back.      UPDATE 2:05 PM  SW spoke with admissions at [facility] who stated that they just got back in the building and that she would review referral.      Gabriela Zavala LMSW  [facility]  [phone]

## 2024-11-06 NOTE — NURSING
Informed on-call provider with [facility] Med Team C, June MONTES, that after pt was straight cathed at midnight and put back on condom catheter, pt did not put out any more urine. Pt bladder scanned with a reading of 330 mL. Pt expressed that he had no urge to void and would like to have an indwelling harrison catheter inserted as he was admitted to [facility] with one in place. Also informed PA that pt was experiencing discomfort from straight cathing with irritation around his urethral meatus. PA ordered for new harrison catheter to be inserted with urojet to be applied for comfort.     06:30  Indwelling harrison catheter inserted without complication and pt expressed no discomfort. Once harrison inserted, 300 mL of clear yellow urine emptied into drainage bag.

## 2024-11-06 NOTE — ASSESSMENT & PLAN NOTE
Patient with Acute on chronic debility due to age-related physical debility. The patient's latest AMPAC (Activity Measure for Post Acute Care) Score is listed below.    AM-PAC Score - How much help does the patient need for each activity listed  Basic Mobility Total Score: 8  Turning over in bed (including adjusting bedclothes, sheets and blankets)?: A lot  Sitting down on and standing up from a chair with arms (e.g., wheelchair, bedside commode, etc.): Unable  Moving from lying on back to sitting on the side of the bed?: A lot  Moving to and from a bed to a chair (including a wheelchair)?: Unable  Need to walk in hospital room?: Unable  Climbing 3-5 steps with a railing?: Unable    Plan  - PT/OT consulted  - SNF placement

## 2024-11-06 NOTE — SUBJECTIVE & OBJECTIVE
Interval History: Seen this AM at bedside. No acute events overnight. Denies light headedness, chest pain, shortness of breath    Review of Systems  Objective:     Vital Signs (Most Recent):  Temp: 98.1 °F (36.7 °C) (11/06/24 1035)  Pulse: (!) 31 (11/06/24 1035)  Resp: 20 (11/06/24 1035)  BP: 128/67 (11/06/24 0506)  SpO2: 95 % (11/06/24 1035) Vital Signs (24h Range):  Temp:  [97.7 °F (36.5 °C)-98.4 °F (36.9 °C)] 98.1 °F (36.7 °C)  Pulse:  [31-87] 31  Resp:  [12-26] 20  SpO2:  [95 %-100 %] 95 %  BP: (121-132)/(56-75) 128/67     Weight: 97.8 kg (215 lb 9.8 oz)  Body mass index is 30.07 kg/m².    Intake/Output Summary (Last 24 hours) at 11/6/2024 1147  Last data filed at 11/6/2024 0908  Gross per 24 hour   Intake 702 ml   Output 1360 ml   Net -658 ml         Physical Exam  Vitals reviewed.   Constitutional:       General: He is not in acute distress.     Appearance: Normal appearance. He is well-developed. He is not ill-appearing.   HENT:      Head: Normocephalic and atraumatic.   Eyes:      Pupils: Pupils are equal, round, and reactive to light.   Neck:      Vascular: No JVD.      Trachea: No tracheal deviation.   Cardiovascular:      Rate and Rhythm: Normal rate and regular rhythm.      Heart sounds: Murmur heard.      No friction rub. No gallop.   Pulmonary:      Effort: No respiratory distress.      Breath sounds: No wheezing or rales.   Abdominal:      General: Bowel sounds are normal. There is no distension.      Palpations: Abdomen is soft. There is no mass.      Tenderness: There is no abdominal tenderness.   Musculoskeletal:         General: Normal range of motion.      Cervical back: Normal range of motion.      Right lower leg: No edema.      Left lower leg: No edema.   Skin:     General: Skin is warm and dry.      Capillary Refill: Capillary refill takes less than 2 seconds.      Findings: No rash.   Neurological:      General: No focal deficit present.      Mental Status: He is alert and oriented to  person, place, and time.   Psychiatric:         Mood and Affect: Mood normal.         Behavior: Behavior normal.         Thought Content: Thought content normal.             Significant Labs: All pertinent labs within the past 24 hours have been reviewed.    Significant Imaging: I have reviewed all pertinent imaging results/findings within the past 24 hours.

## 2024-11-07 DIAGNOSIS — Z95.0 CARDIAC PACEMAKER IN SITU: Primary | ICD-10-CM

## 2024-11-07 DIAGNOSIS — I44.2 CHB (COMPLETE HEART BLOCK): ICD-10-CM

## 2024-11-07 LAB
ALBUMIN SERPL BCP-MCNC: 2.7 G/DL (ref 3.5–5.2)
ALP SERPL-CCNC: 109 U/L (ref 40–150)
ALT SERPL W/O P-5'-P-CCNC: 13 U/L (ref 10–44)
ANION GAP SERPL CALC-SCNC: 8 MMOL/L (ref 8–16)
AST SERPL-CCNC: 18 U/L (ref 10–40)
BASOPHILS # BLD AUTO: 0.02 K/UL (ref 0–0.2)
BASOPHILS NFR BLD: 0.4 % (ref 0–1.9)
BILIRUB SERPL-MCNC: 1.1 MG/DL (ref 0.1–1)
BUN SERPL-MCNC: 20 MG/DL (ref 8–23)
CALCIUM SERPL-MCNC: 9 MG/DL (ref 8.7–10.5)
CHLORIDE SERPL-SCNC: 99 MMOL/L (ref 95–110)
CO2 SERPL-SCNC: 33 MMOL/L (ref 23–29)
CREAT SERPL-MCNC: 1 MG/DL (ref 0.5–1.4)
DIFFERENTIAL METHOD BLD: ABNORMAL
EOSINOPHIL # BLD AUTO: 0.1 K/UL (ref 0–0.5)
EOSINOPHIL NFR BLD: 1.7 % (ref 0–8)
ERYTHROCYTE [DISTWIDTH] IN BLOOD BY AUTOMATED COUNT: 15.5 % (ref 11.5–14.5)
EST. GFR  (NO RACE VARIABLE): >60 ML/MIN/1.73 M^2
GLUCOSE SERPL-MCNC: 146 MG/DL (ref 70–110)
HCT VFR BLD AUTO: 34 % (ref 40–54)
HGB BLD-MCNC: 10.5 G/DL (ref 14–18)
IMM GRANULOCYTES # BLD AUTO: 0.03 K/UL (ref 0–0.04)
IMM GRANULOCYTES NFR BLD AUTO: 0.7 % (ref 0–0.5)
INFLUENZA A, MOLECULAR: NOT DETECTED
INFLUENZA B, MOLECULAR: NOT DETECTED
INR PPP: 2.5 (ref 0.8–1.2)
LYMPHOCYTES # BLD AUTO: 0.7 K/UL (ref 1–4.8)
LYMPHOCYTES NFR BLD: 16.1 % (ref 18–48)
MAGNESIUM SERPL-MCNC: 2.4 MG/DL (ref 1.6–2.6)
MCH RBC QN AUTO: 32.3 PG (ref 27–31)
MCHC RBC AUTO-ENTMCNC: 30.9 G/DL (ref 32–36)
MCV RBC AUTO: 105 FL (ref 82–98)
MONOCYTES # BLD AUTO: 0.5 K/UL (ref 0.3–1)
MONOCYTES NFR BLD: 11.3 % (ref 4–15)
NEUTROPHILS # BLD AUTO: 3.2 K/UL (ref 1.8–7.7)
NEUTROPHILS NFR BLD: 69.8 % (ref 38–73)
NRBC BLD-RTO: 0 /100 WBC
PHOSPHATE SERPL-MCNC: 2.6 MG/DL (ref 2.7–4.5)
PLATELET # BLD AUTO: 186 K/UL (ref 150–450)
PMV BLD AUTO: 12.2 FL (ref 9.2–12.9)
POTASSIUM SERPL-SCNC: 3.8 MMOL/L (ref 3.5–5.1)
PROT SERPL-MCNC: 5.9 G/DL (ref 6–8.4)
PROTHROMBIN TIME: 26.1 SEC (ref 9–12.5)
RBC # BLD AUTO: 3.25 M/UL (ref 4.6–6.2)
RSV AG BY MOLECULAR METHOD: NOT DETECTED
SARS-COV-2 RNA RESP QL NAA+PROBE: NOT DETECTED
SODIUM SERPL-SCNC: 140 MMOL/L (ref 136–145)
WBC # BLD AUTO: 4.6 K/UL (ref 3.9–12.7)

## 2024-11-07 PROCEDURE — 30200315 PPD INTRADERMAL TEST REV CODE 302: Performed by: STUDENT IN AN ORGANIZED HEALTH CARE EDUCATION/TRAINING PROGRAM

## 2024-11-07 PROCEDURE — 83735 ASSAY OF MAGNESIUM: CPT | Performed by: STUDENT IN AN ORGANIZED HEALTH CARE EDUCATION/TRAINING PROGRAM

## 2024-11-07 PROCEDURE — 25000003 PHARM REV CODE 250: Performed by: STUDENT IN AN ORGANIZED HEALTH CARE EDUCATION/TRAINING PROGRAM

## 2024-11-07 PROCEDURE — 80053 COMPREHEN METABOLIC PANEL: CPT | Performed by: STUDENT IN AN ORGANIZED HEALTH CARE EDUCATION/TRAINING PROGRAM

## 2024-11-07 PROCEDURE — 84100 ASSAY OF PHOSPHORUS: CPT | Performed by: STUDENT IN AN ORGANIZED HEALTH CARE EDUCATION/TRAINING PROGRAM

## 2024-11-07 PROCEDURE — 25000242 PHARM REV CODE 250 ALT 637 W/ HCPCS

## 2024-11-07 PROCEDURE — 86580 TB INTRADERMAL TEST: CPT | Performed by: STUDENT IN AN ORGANIZED HEALTH CARE EDUCATION/TRAINING PROGRAM

## 2024-11-07 PROCEDURE — 99222 1ST HOSP IP/OBS MODERATE 55: CPT | Mod: ,,, | Performed by: NURSE PRACTITIONER

## 2024-11-07 PROCEDURE — 20600001 HC STEP DOWN PRIVATE ROOM

## 2024-11-07 PROCEDURE — 0241U SARS-COV2 (COVID) WITH FLU/RSV BY PCR: CPT | Performed by: STUDENT IN AN ORGANIZED HEALTH CARE EDUCATION/TRAINING PROGRAM

## 2024-11-07 PROCEDURE — 97535 SELF CARE MNGMENT TRAINING: CPT

## 2024-11-07 PROCEDURE — 36415 COLL VENOUS BLD VENIPUNCTURE: CPT | Performed by: STUDENT IN AN ORGANIZED HEALTH CARE EDUCATION/TRAINING PROGRAM

## 2024-11-07 PROCEDURE — 97116 GAIT TRAINING THERAPY: CPT

## 2024-11-07 PROCEDURE — 97530 THERAPEUTIC ACTIVITIES: CPT

## 2024-11-07 PROCEDURE — 25000003 PHARM REV CODE 250: Performed by: INTERNAL MEDICINE

## 2024-11-07 PROCEDURE — 25000003 PHARM REV CODE 250: Performed by: HOSPITALIST

## 2024-11-07 PROCEDURE — 25000003 PHARM REV CODE 250

## 2024-11-07 PROCEDURE — 85025 COMPLETE CBC W/AUTO DIFF WBC: CPT | Performed by: STUDENT IN AN ORGANIZED HEALTH CARE EDUCATION/TRAINING PROGRAM

## 2024-11-07 PROCEDURE — 85610 PROTHROMBIN TIME: CPT | Performed by: STUDENT IN AN ORGANIZED HEALTH CARE EDUCATION/TRAINING PROGRAM

## 2024-11-07 RX ADMIN — DOXYCYCLINE HYCLATE 100 MG: 100 TABLET, COATED ORAL at 09:11

## 2024-11-07 RX ADMIN — DOXYCYCLINE HYCLATE 100 MG: 100 TABLET, COATED ORAL at 08:11

## 2024-11-07 RX ADMIN — POLYETHYLENE GLYCOL 3350 17 G: 17 POWDER, FOR SOLUTION ORAL at 09:11

## 2024-11-07 RX ADMIN — TUBERCULIN PURIFIED PROTEIN DERIVATIVE 5 UNITS: 5 INJECTION, SOLUTION INTRADERMAL at 09:11

## 2024-11-07 RX ADMIN — ATORVASTATIN CALCIUM 10 MG: 10 TABLET, FILM COATED ORAL at 09:11

## 2024-11-07 RX ADMIN — SENNOSIDES 8.6 MG: 8.6 TABLET, FILM COATED ORAL at 09:11

## 2024-11-07 RX ADMIN — FUROSEMIDE 40 MG: 40 TABLET ORAL at 09:11

## 2024-11-07 RX ADMIN — Medication: at 11:11

## 2024-11-07 RX ADMIN — TAMSULOSIN HYDROCHLORIDE 0.4 MG: 0.4 CAPSULE ORAL at 09:11

## 2024-11-07 RX ADMIN — EMPAGLIFLOZIN 10 MG: 10 TABLET, FILM COATED ORAL at 11:11

## 2024-11-07 RX ADMIN — SACUBITRIL AND VALSARTAN 1 TABLET: 24; 26 TABLET, FILM COATED ORAL at 08:11

## 2024-11-07 RX ADMIN — WARFARIN SODIUM 5 MG: 5 TABLET ORAL at 05:11

## 2024-11-07 RX ADMIN — MIRTAZAPINE 7.5 MG: 7.5 TABLET, FILM COATED ORAL at 08:11

## 2024-11-07 RX ADMIN — METOPROLOL TARTRATE 12.5 MG: 25 TABLET, FILM COATED ORAL at 08:11

## 2024-11-07 RX ADMIN — Medication: at 08:11

## 2024-11-07 RX ADMIN — LEVOTHYROXINE SODIUM 200 MCG: 100 TABLET ORAL at 05:11

## 2024-11-07 RX ADMIN — ASPIRIN 81 MG: 81 TABLET, COATED ORAL at 09:11

## 2024-11-07 RX ADMIN — THIAMINE HCL TAB 100 MG 100 MG: 100 TAB at 09:11

## 2024-11-07 NOTE — ASSESSMENT & PLAN NOTE
-Pt. Recently admitted for hypotension, BP medications stopped. Only on metoprolol on admission  -Cont metoprolol to assist with frequent PVCs  -Given mildly reduced EF, c/w Jardiance, entreso 24-26 BID and BB for GDMT - monitor BP closely  -Aldactone if BP tolerates

## 2024-11-07 NOTE — PT/OT/SLP PROGRESS
Physical Therapy Treatment    Patient Name:  Bean Salas   MRN:  [id number]  Admit Date: 10/28/2024  Admitting Diagnosis:  CHB (complete heart block)   Length of Stay: 9 days  Recent Surgery: Procedure(s) (LRB):  INSERTION, PACEMAKER, BIVENTRICULAR (N/A) 3 Days Post-Op    Recommendations:     Discharge Recommendations:  Moderate Intensity Therapy   Discharge Equipment Recommendations: none   Barriers to discharge: decline from PLOF after acute surgical intervention     Plan:     During this hospitalization, patient to be seen 4 x/week to address the listed problems via gait training, therapeutic activities, therapeutic exercises, neuromuscular re-education  Plan of Care Expires:  24  Plan of Care Reviewed with: patient    Assessment:     Bean Salas is a 79 y.o. male admitted with a medical diagnosis of CHB (complete heart block). Pt progressing towards goals, but not at PLOF. Pt tolerated session well.  Pt is improving with therapy evidenced by decreased assistance with bed mobility, ability to perform sit to stand transfer, and ability to initiate gait. Will continue to progress mobility per patient's tolerance. Patient currently demonstrates a need for Moderate Intensity Therapy on a daily basis post acute secondary to a decline in functional status.      Problem List: weakness, impaired endurance, impaired functional mobility, impaired cardiopulmonary response to activity, impaired balance.  Rehab Prognosis: Good     GOALS:   Multidisciplinary Problems       Physical Therapy Goals          Problem: Physical Therapy    Goal Priority Disciplines Outcome Interventions   Physical Therapy Goal     PT, PT/OT Progressing    Description: Goals to be met by: 11/15/24     Patient will increase functional independence with mobility by performin. Supine to sit with MInimal Assistance  2. Sit to supine with MInimal Assistance  3. Sit to stand transfer with Minimal Assistance  4. Bed to chair transfer with  "Minimal Assistance using LRAD  5. Gait  x 25 feet with Minimal Assistance using LRAD.   6. Sitting at edge of bed x8 minutes with Minimal Assistance                         Subjective   Communicated with RN prior to session.  Patient found HOB elevated upon PT entry to room, agreeable to evaluation. Bean Salas's alone during session.    Chief Complaint: debility   Patient/Family Comments/goals: to ambulate   Pain/Comfort:  Pain Rating 1: 0/10  Pain Rating Post-Intervention 1: 0/10    Objective:   Patient found with: telemetry, peripheral IV, harrison catheter, oxygen   General Precautions: Standard, Cardiac fall, pacemaker   Orthopedic Precautions:N/A   Braces: Sling and swathe   Oxygen Device: Nasal Cannula   Vitals: BP (!) 110/56 (BP Location: Right arm, Patient Position: Lying)   Pulse 61   Temp 97.8 °F (36.6 °C) (Oral)   Resp 20   Ht 5' 11" (1.803 m)   Wt 95.4 kg (210 lb 5.1 oz)   SpO2 98%   BMI 29.33 kg/m²     Outcome Measures:  AM-PAC 6 CLICK MOBILITY  Turning over in bed (including adjusting bedclothes, sheets and blankets)?: 2  Sitting down on and standing up from a chair with arms (e.g., wheelchair, bedside commode, etc.): 2  Moving from lying on back to sitting on the side of the bed?: 2  Moving to and from a bed to a chair (including a wheelchair)?: 2  Need to walk in hospital room?: 2  Climbing 3-5 steps with a railing?: 1  Basic Mobility Total Score: 11       Functional Mobility:  Additional staff present: OT - due to pt requiring 2 skilled therapists to safely perform functional mobility and to accommodate pt's activity tolerance    Bed Mobility:   Supine to Sit: maximal assistance; HOB elevated  Scooting anteriorly to EOB to have both feet planted on floor: minimum assistance  Sit to Supine: moderate assistance; HOB flat    Sitting Balance at Edge of Bed:  Assistance Level Required: Stand-by Assistance  Time: 10 minutes  Comments:   Worked on activity tolerance sitting EOB and worked on " tolerance to positional change   Pt with heavy use of R UE support    Transfers:   Sit <> Stand Transfer: moderate assistance and of 2 persons with rolling walker from raised EOB x 2 trials  Facilitation of anterior weight shift and hip extension   Increased time to stabilize in midline      Gait:  Patient ambulated: 6 side steps    Patient required: moderate assist and of 2 persons  Patient used: rolling walker  Gait Deviation(s): decreased step length, narrow base of support, and decreased david  Impairments due to: impaired balance, decreased strength, and decreased endurance  Comments:   No overt LOB but pt occasionally unsteady  No SOB  Verbal cueing for advancement of B LE and RW management   Facilitation of upright posture and RW management     Therapeutic Activities, Exercises, and Education:   Educated pt on PT role/POC  Educated pt on importance of OOB activity and daily ambulation   Educated pt on pacemaker precautions   Educated pt on sling and swathe wear schedule  Educated pt on how to don sling and swathe   Pt verbalized understanding         Patient left HOB elevated with all lines intact, call button in reach, and RN notified.    Time Tracking:     PT Received On: 11/07/24  PT Start Time: 1030     PT Stop Time: 1058  PT Total Time (min): 28 min       Billable Minutes:   Gait Training 8 and Therapeutic Activity 15    Treatment Type: Treatment  PT/PTA: PT

## 2024-11-07 NOTE — SUBJECTIVE & OBJECTIVE
Interval History: Seen this AM at bedside. No acute events overnight. Denies light headedness, chest pain, shortness of breath.    Reports that the dressing on the PPM was loose and was adjusted by the nurse. Clarifying with RN     Review of Systems   Gastrointestinal:  Negative for abdominal distention and abdominal pain.   Psychiatric/Behavioral:  Negative for agitation and behavioral problems.      Objective:     Vital Signs (Most Recent):  Temp: 98.1 °F (36.7 °C) (11/07/24 0945)  Pulse: 65 (11/07/24 0945)  Resp: 18 (11/07/24 0945)  BP: (!) 102/50 (11/07/24 0955)  SpO2: 97 % (11/07/24 0945) Vital Signs (24h Range):  Temp:  [97.4 °F (36.3 °C)-98.8 °F (37.1 °C)] 98.1 °F (36.7 °C)  Pulse:  [49-67] 65  Resp:  [18-20] 18  SpO2:  [95 %-99 %] 97 %  BP: ()/(49-82) 102/50     Weight: 95.4 kg (210 lb 5.1 oz)  Body mass index is 29.33 kg/m².    Intake/Output Summary (Last 24 hours) at 11/7/2024 0958  Last data filed at 11/7/2024 0902  Gross per 24 hour   Intake 942 ml   Output 1500 ml   Net -558 ml         Physical Exam  Vitals reviewed.   Constitutional:       General: He is not in acute distress.     Appearance: Normal appearance. He is well-developed. He is not ill-appearing.   HENT:      Head: Normocephalic and atraumatic.   Eyes:      Pupils: Pupils are equal, round, and reactive to light.   Neck:      Vascular: No JVD.      Trachea: No tracheal deviation.   Cardiovascular:      Rate and Rhythm: Normal rate and regular rhythm.      Heart sounds: Murmur heard.      No friction rub. No gallop.      Comments: Left chest with dressing over PPM site  Pulmonary:      Effort: No respiratory distress.      Breath sounds: No wheezing or rales.   Abdominal:      General: Bowel sounds are normal. There is no distension.      Palpations: Abdomen is soft. There is no mass.      Tenderness: There is no abdominal tenderness.   Musculoskeletal:         General: Normal range of motion.      Cervical back: Normal range of motion.       Right lower leg: No edema.      Left lower leg: No edema.   Skin:     General: Skin is warm and dry.      Capillary Refill: Capillary refill takes less than 2 seconds.      Findings: No rash.   Neurological:      General: No focal deficit present.      Mental Status: He is alert and oriented to person, place, and time.   Psychiatric:         Mood and Affect: Mood normal.         Behavior: Behavior normal.         Thought Content: Thought content normal.             Significant Labs: All pertinent labs within the past 24 hours have been reviewed.    Significant Imaging: I have reviewed all pertinent imaging results/findings within the past 24 hours.

## 2024-11-07 NOTE — PLAN OF CARE
Pt maintained free from falls/trauma/injuries. Pt denied pain or discomfort. Plan of care reviewed. Pt verbalized understanding. All questions and concerns addressed. VSS with call light and belongings within reach.    Problem: Adult Inpatient Plan of Care  Goal: Plan of Care Review  Outcome: Progressing  Goal: Patient-Specific Goal (Individualized)  Outcome: Progressing     Problem: Skin Injury Risk Increased  Goal: Skin Health and Integrity  Outcome: Progressing     Problem: Wound  Goal: Optimal Coping  Outcome: Progressing  Goal: Optimal Functional Ability  Outcome: Progressing     Problem: Infection  Goal: Absence of Infection Signs and Symptoms  Outcome: Progressing     Problem: Fall Injury Risk  Goal: Absence of Fall and Fall-Related Injury  Outcome: Progressing

## 2024-11-07 NOTE — PT/OT/SLP PROGRESS
Occupational Therapy   Co-Treatment  Co-treatment performed due to patient's multiple deficits requiring two skilled therapists to appropriately and safely assess patient's strength and endurance while facilitating functional tasks in addition to accommodating for patient's activity tolerance.      Name: Bean Salas  MRN: [id number]  Admitting Diagnosis:  CHB (complete heart block)  3 Days Post-Op    Recommendations:     Discharge Recommendations: Moderate Intensity Therapy  Discharge Equipment Recommendations:  none  Barriers to discharge:  None    Assessment:     Bean Salas is a 79 y.o. male with a medical diagnosis of CHB (complete heart block).  He presents with the following performance deficits affecting function are weakness, impaired endurance, impaired self care skills, impaired functional mobility, gait instability, impaired balance, decreased upper extremity function, pain, impaired cardiopulmonary response to activity. OT/PT provided education and demonstration on donning sling&swathe with good carryover noted. Also educated pt on sling&swathe wear schedule after PPM 11/4. Pt would continue to benefit from skilled OT services to maximize functional independence with ADLs and functional mobility, reduce caregiver burden, and facilitate safe discharge in the least restrictive environment.      Rehab Prognosis:  Good; patient would benefit from acute skilled OT services to address these deficits and reach maximum level of function.       Plan:     Patient to be seen 3 x/week to address the above listed problems via self-care/home management, therapeutic activities, therapeutic exercises  Plan of Care Expires: 12/06/24  Plan of Care Reviewed with: patient    Subjective     Chief Complaint: difficulty with swing&swathe  Patient/Family Comments/goals: to don sling&swathe (I)  Pain/Comfort:  Pain Rating 1: 0/10  Pain Rating Post-Intervention 1: 0/10    Objective:   Additional staff present:  Rachana  PT    Communicated with: RN prior to session.  Patient found HOB elevated with telemetry, harrison catheter, oxygen upon OT entry to room.    General Precautions: Standard, fall, pacemaker    Orthopedic Precautions:N/A  Braces: Sling and swathe  Respiratory Status: Nasal cannula     Occupational Performance:     Bed Mobility:    Patient completed Scooting/Bridging with contact guard assistance  Patient completed Supine to Sit with maximal assistance and 1 persons  Patient completed Sit to Supine with moderate assistance and 1 persons     Functional Mobility/Transfers:  Patient completed Sit <> Stand Transfer with moderate assistance and of 2 persons  with  rolling walker   Functional Mobility: Pt engaged in functional mobility to simulate household/community distances and performed side steps along EOB to the R with Mod x2 and RW in order to maximize functional endurance and standing balance required for engagement in occupations of choice   No LOB or SOB noted  Cues for upright posture, RW management, and sequencing    Activities of Daily Living:  Grooming: moderate assistance for functional balance/endurance as to washed face using L hand in standing with unilateral support on RW  Upper Body Dressing: moderate assistance required to don hospital gown over back  Lower Body Dressing: total assistance required to don hospital socks at bed level      [facility] 6 Click ADL: 14    Treatment & Education:  -Education on pacemaker precautions and sling&swathe management  -Education on energy conservation and task modification to maximize safety and (I) during ADLs and mobility  -Education on importance of OOB activity to improve overall activity tolerance and promote recovery  -Pt educated to call for assistance and to transfer with hospital staff only  -Provided education regarding role of OT, POC, & discharge recommendations with pt verbalizing understanding.  Pt had no further questions & when asked whether there were any  concerns pt reported none.     Patient left HOB elevated with all lines intact, call button in reach, and RN notified    GOALS:   Multidisciplinary Problems       Occupational Therapy Goals          Problem: Occupational Therapy    Goal Priority Disciplines Outcome Interventions   Occupational Therapy Goal     OT, PT/OT Progressing    Description: Goals to be met by: 14 days 11/15/24     Patient will increase functional independence with ADLs by performing:    Pt to complete g/h skills seated EOB with set-up  Pt to tolerated sitting EOB with Fair + sitting balance to increase performance with ADl skills  Pt to complete UE dressing with set-up  Pt to complete t/f to BSC with MOD A   Pt to complete toileting with MOD A                        Time Tracking:     OT Date of Treatment: 11/07/24  OT Start Time: 1030  OT Stop Time: 1101  OT Total Time (min): 31 min    Billable Minutes:Self Care/Home Management 15  Therapeutic Activity 16    OT/COURTNEY: OT          11/7/2024

## 2024-11-07 NOTE — PLAN OF CARE
AAOX4,VSS,O2 sats>97% on 3L NC.Plan of care discussed with patient.Patient has no complaints of pain/SOB. Discussed medications and care. Patient has no questions at this time.Pt visualised and stable.Call light within reach.Pt resting,bed at lowest position.        Problem: Adult Inpatient Plan of Care  Goal: Plan of Care Review  Outcome: Progressing  Goal: Patient-Specific Goal (Individualized)  Outcome: Progressing  Goal: Absence of Hospital-Acquired Illness or Injury  Outcome: Progressing  Goal: Optimal Comfort and Wellbeing  Outcome: Progressing  Goal: Readiness for Transition of Care  Outcome: Progressing     Problem: Skin Injury Risk Increased  Goal: Skin Health and Integrity  Outcome: Progressing     Problem: Wound  Goal: Optimal Coping  Outcome: Progressing  Goal: Optimal Functional Ability  Outcome: Progressing  Goal: Absence of Infection Signs and Symptoms  Outcome: Progressing  Goal: Improved Oral Intake  Outcome: Progressing  Goal: Optimal Pain Control and Function  Outcome: Progressing  Goal: Skin Health and Integrity  Outcome: Progressing  Goal: Optimal Wound Healing  Outcome: Progressing     Problem: Infection  Goal: Absence of Infection Signs and Symptoms  Outcome: Progressing     Problem: Fall Injury Risk  Goal: Absence of Fall and Fall-Related Injury  Outcome: Progressing

## 2024-11-07 NOTE — PROGRESS NOTES
[facility] - Cardiology [facility] Medicine  Progress Note    Patient Name: Bean Salas  MRN: [id number]  Patient Class: IP- Inpatient   Admission Date: 10/28/2024  Length of Stay: 9 days  Attending Physician: Brian Briscoe MD  Primary Care Provider: Ramirez Levine MD        Subjective:     Principal Problem:CHB (complete heart block)        HPI:  78 yo M with PMHx CAD, combined CHF (recovered EF), CAD, A-fib on eliquis, chronic respiratory failure on 3L NC, and hypothyroidism who presents to the ED from nursing home for worsening SOB, hypoxia, and leg welling x a few days. Per nursing home and ED documentation, pt. Had been complaining of worsening SOB with exertion and was noted to have low O2 levels on vitals check. He is stypically on 2-3L NC, but over the last few days he has been uptitrated to on day of presentation. Nursing Home also noted inc reased B/L lower extremity swelling. Pt. Reports having SOB earleir ion the day but he reports feeling ok on the 5L NC. No cough, fevers, chills, nausea, or vomiting.    Of note, pt. Was recently admitted to this facility 9/28-10/7 for hypotension and PAULA. Lasix was held and entresto was discontinued. At discharge lasix dosing was changed to PRN only. It is unclear if he had been receiving any of these PRN doses at the nursing home.    Overview/Hospital Course:  Initially presenting from NH with altered mentation and worsening dyspnea.  Admitted to MICU for acute on chronic hypercapnic respiratory failure requiring continuous BiPAP.  Noted to be volume overloaded, started on IV diuresis.  Found to be bradycardic with HR 30s to 40s with complete heart block while in MICU.  Transcutaneous pacing initiated with subsequent placement of T BP by EP physician.  Admitted to CCU.  Now status post ppm placement 11/4.  On Coumadin.  Stable, on home oxygen    On arrival to floor intermittent HR readings in 30s, discussed with EP, likely false readings w bigeminy pvcs  and rate not picking up correctly. Starting on low dose metoprolol w holding parameters. Pending SNF placement    Interval History: Seen this AM at bedside. No acute events overnight. Denies light headedness, chest pain, shortness of breath.    Reports that the dressing on the PPM was loose and was adjusted by the nurse. Clarifying with RN     Review of Systems   Gastrointestinal:  Negative for abdominal distention and abdominal pain.   Psychiatric/Behavioral:  Negative for agitation and behavioral problems.      Objective:     Vital Signs (Most Recent):  Temp: 98.1 °F (36.7 °C) (11/07/24 0945)  Pulse: 65 (11/07/24 0945)  Resp: 18 (11/07/24 0945)  BP: (!) 102/50 (11/07/24 0955)  SpO2: 97 % (11/07/24 0945) Vital Signs (24h Range):  Temp:  [97.4 °F (36.3 °C)-98.8 °F (37.1 °C)] 98.1 °F (36.7 °C)  Pulse:  [49-67] 65  Resp:  [18-20] 18  SpO2:  [95 %-99 %] 97 %  BP: ()/(49-82) 102/50     Weight: 95.4 kg (210 lb 5.1 oz)  Body mass index is 29.33 kg/m².    Intake/Output Summary (Last 24 hours) at 11/7/2024 0958  Last data filed at 11/7/2024 0902  Gross per 24 hour   Intake 942 ml   Output 1500 ml   Net -558 ml         Physical Exam  Vitals reviewed.   Constitutional:       General: He is not in acute distress.     Appearance: Normal appearance. He is well-developed. He is not ill-appearing.   HENT:      Head: Normocephalic and atraumatic.   Eyes:      Pupils: Pupils are equal, round, and reactive to light.   Neck:      Vascular: No JVD.      Trachea: No tracheal deviation.   Cardiovascular:      Rate and Rhythm: Normal rate and regular rhythm.      Heart sounds: Murmur heard.      No friction rub. No gallop.      Comments: Left chest with dressing over PPM site  Pulmonary:      Effort: No respiratory distress.      Breath sounds: No wheezing or rales.   Abdominal:      General: Bowel sounds are normal. There is no distension.      Palpations: Abdomen is soft. There is no mass.      Tenderness: There is no abdominal  tenderness.   Musculoskeletal:         General: Normal range of motion.      Cervical back: Normal range of motion.      Right lower leg: No edema.      Left lower leg: No edema.   Skin:     General: Skin is warm and dry.      Capillary Refill: Capillary refill takes less than 2 seconds.      Findings: No rash.   Neurological:      General: No focal deficit present.      Mental Status: He is alert and oriented to person, place, and time.   Psychiatric:         Mood and Affect: Mood normal.         Behavior: Behavior normal.         Thought Content: Thought content normal.             Significant Labs: All pertinent labs within the past 24 hours have been reviewed.    Significant Imaging: I have reviewed all pertinent imaging results/findings within the past 24 hours.    Assessment/Plan:      * CHB (complete heart block)  -Transferred to CCU on admission after foudnt obe in [facility] while in MICU  -TVP placed on 10/29, followed by PPM placement on 11/4  -Cont doxy BID 5 days post PPM  -telemetry  -EP: ok to start metoprolol given frequent PVCs - 12.5 BID started 11/5      Age-related physical debility  Patient with Acute on chronic debility due to age-related physical debility. The patient's latest AMPAC (Activity Measure for Post Acute Care) Score is listed below.    AM-PAC Score - How much help does the patient need for each activity listed  Basic Mobility Total Score: 8  Turning over in bed (including adjusting bedclothes, sheets and blankets)?: A lot  Sitting down on and standing up from a chair with arms (e.g., wheelchair, bedside commode, etc.): Unable  Moving from lying on back to sitting on the side of the bed?: A lot  Moving to and from a bed to a chair (including a wheelchair)?: Unable  Need to walk in hospital room?: Unable  Climbing 3-5 steps with a railing?: Unable    Plan  - PT/OT consulted  - SNF placement - pending disposition, SW assisting          Urinary retention  -Maintain harrison, had to be replaced on  "11/6 due to persistent retention  -Cont flomax      History of pulmonary embolism  -Diagnosed with PE 9/16/2024 when admitted for pelvic fracture. Continue home eliquis      Paroxysmal atrial fibrillation  -Continue home metoprolol for rate control. Continue coumadin for AC  -Monitor on tele while admitted    Acute on chronic respiratory failure with hypoxia  -Pt. Increased o2 requirement on arrival requiring bipap, baseline 3L NC. CXR reveals Pulmonary findings suggest edema.  and pt. With edema, suspect HF Exacerbation, diurese and wean O2 as tolerated. Now back on baseline o2    Acute on chronic diastolic heart failure  -Pt. With edema, SLADE, and PND. , CXR with "Pulmonary findings suggest edema consistent" with HF exacerbation. Pt. Recently had lasix dosing changed to PRN instead of daily  -Transitioned to PO lasix - continue  -Monitor electrolytes cloesly while diresing  -Attempt to restart GDMT as tolerated per above        Hypothyroidism due to acquired atrophy of thyroid  -Continue home synthroid      Essential hypertension  -Pt. Recently admitted for hypotension, BP medications stopped. Only on metoprolol on admission  -Cont metoprolol to assist with frequent PVCs  -Given mildly reduced EF, c/w Jardiance, entreso 24-26 BID and BB for GDMT - monitor BP closely  -Aldactone if BP tolerates      VTE Risk Mitigation (From admission, onward)           Ordered     warfarin (COUMADIN) tablet 5 mg  Daily         11/05/24 1026                    Discharge Planning   LATOSHA: 11/7/2024     Code Status: Full Code   Is the patient medically ready for discharge?:     Reason for patient still in hospital (select all that apply): Patient trending condition, Treatment, and Pending disposition  Discharge Plan A: Skilled Nursing Facility                  Brian Briscoe MD  Department of Hospital Medicine   [facility] - Cardiology Stepdown    "

## 2024-11-07 NOTE — ASSESSMENT & PLAN NOTE
Patient with Acute on chronic debility due to age-related physical debility. The patient's latest AMPAC (Activity Measure for Post Acute Care) Score is listed below.    AM-PAC Score - How much help does the patient need for each activity listed  Basic Mobility Total Score: 8  Turning over in bed (including adjusting bedclothes, sheets and blankets)?: A lot  Sitting down on and standing up from a chair with arms (e.g., wheelchair, bedside commode, etc.): Unable  Moving from lying on back to sitting on the side of the bed?: A lot  Moving to and from a bed to a chair (including a wheelchair)?: Unable  Need to walk in hospital room?: Unable  Climbing 3-5 steps with a railing?: Unable    Plan  - PT/OT consulted  - SNF placement - pending disposition, SW assisting

## 2024-11-07 NOTE — ASSESSMENT & PLAN NOTE
"-Pt. With edema, SLADE, and PND. , CXR with "Pulmonary findings suggest edema consistent" with HF exacerbation. Pt. Recently had lasix dosing changed to PRN instead of daily  -Transitioned to PO lasix - continue  -Monitor electrolytes cloesly while diresing  -Attempt to restart GDMT as tolerated per above      "

## 2024-11-07 NOTE — ASSESSMENT & PLAN NOTE
-Transferred to CCU on admission after foudnt obe in CHB while in MICU  -TVP placed on 10/29, followed by PPM placement on 11/4  -Cont doxy BID 5 days post PPM  -telemetry  -EP: ok to start metoprolol given frequent PVCs - 12.5 BID started 11/5

## 2024-11-07 NOTE — PLAN OF CARE
11/07/24 1331   Post-Acute Status   Post-Acute Authorization Placement   Post-Acute Placement Status Pending payor review/awaiting authorization (if required)     DUNG called and spoke with Rosita in admissions at [facility] who stated that they are going to accept pt and they already met with pt's son.  DUNG requested that facility submit for auth.  SW will follow up.      UPDATE 3:49 PM  SW informed pt at bedside of acceptance to [facility].  Updated PT/OT notes, PASRR, 142, and chest xray all sent to facility via [facility].  PPD, Covid test, and orders still pending.  Dr Briscoe updated.      Gabriela Zavala, LMSW [facility]  [phone]

## 2024-11-08 VITALS
BODY MASS INDEX: 29.78 KG/M2 | HEIGHT: 71 IN | DIASTOLIC BLOOD PRESSURE: 62 MMHG | RESPIRATION RATE: 20 BRPM | OXYGEN SATURATION: 95 % | SYSTOLIC BLOOD PRESSURE: 131 MMHG | WEIGHT: 212.75 LBS | HEART RATE: 35 BPM | TEMPERATURE: 98 F

## 2024-11-08 PROBLEM — L24.A9 IRRITANT CONTACT DERMATITIS DUE FRICTION OR CONTACT WITH OTHER SPECIFIED BODY FLUIDS: Status: ACTIVE | Noted: 2024-11-08

## 2024-11-08 LAB
ALBUMIN SERPL BCP-MCNC: 2.7 G/DL (ref 3.5–5.2)
ALP SERPL-CCNC: 101 U/L (ref 40–150)
ALT SERPL W/O P-5'-P-CCNC: 13 U/L (ref 10–44)
ANION GAP SERPL CALC-SCNC: 7 MMOL/L (ref 8–16)
AST SERPL-CCNC: 18 U/L (ref 10–40)
BASOPHILS # BLD AUTO: 0.03 K/UL (ref 0–0.2)
BASOPHILS NFR BLD: 0.6 % (ref 0–1.9)
BILIRUB SERPL-MCNC: 1 MG/DL (ref 0.1–1)
BUN SERPL-MCNC: 18 MG/DL (ref 8–23)
CALCIUM SERPL-MCNC: 8.7 MG/DL (ref 8.7–10.5)
CHLORIDE SERPL-SCNC: 101 MMOL/L (ref 95–110)
CO2 SERPL-SCNC: 30 MMOL/L (ref 23–29)
CREAT SERPL-MCNC: 1 MG/DL (ref 0.5–1.4)
DIFFERENTIAL METHOD BLD: ABNORMAL
EOSINOPHIL # BLD AUTO: 0.1 K/UL (ref 0–0.5)
EOSINOPHIL NFR BLD: 1.6 % (ref 0–8)
ERYTHROCYTE [DISTWIDTH] IN BLOOD BY AUTOMATED COUNT: 15.7 % (ref 11.5–14.5)
EST. GFR  (NO RACE VARIABLE): >60 ML/MIN/1.73 M^2
GLUCOSE SERPL-MCNC: 133 MG/DL (ref 70–110)
HCT VFR BLD AUTO: 33.7 % (ref 40–54)
HGB BLD-MCNC: 10.5 G/DL (ref 14–18)
IMM GRANULOCYTES # BLD AUTO: 0.03 K/UL (ref 0–0.04)
IMM GRANULOCYTES NFR BLD AUTO: 0.6 % (ref 0–0.5)
INR PPP: 2.7 (ref 0.8–1.2)
LYMPHOCYTES # BLD AUTO: 0.7 K/UL (ref 1–4.8)
LYMPHOCYTES NFR BLD: 13 % (ref 18–48)
MAGNESIUM SERPL-MCNC: 2.4 MG/DL (ref 1.6–2.6)
MCH RBC QN AUTO: 32.5 PG (ref 27–31)
MCHC RBC AUTO-ENTMCNC: 31.2 G/DL (ref 32–36)
MCV RBC AUTO: 104 FL (ref 82–98)
MONOCYTES # BLD AUTO: 0.5 K/UL (ref 0.3–1)
MONOCYTES NFR BLD: 10.5 % (ref 4–15)
NEUTROPHILS # BLD AUTO: 3.7 K/UL (ref 1.8–7.7)
NEUTROPHILS NFR BLD: 73.7 % (ref 38–73)
NRBC BLD-RTO: 0 /100 WBC
PHOSPHATE SERPL-MCNC: 3.5 MG/DL (ref 2.7–4.5)
PLATELET # BLD AUTO: 205 K/UL (ref 150–450)
PMV BLD AUTO: 11.8 FL (ref 9.2–12.9)
POTASSIUM SERPL-SCNC: 4.1 MMOL/L (ref 3.5–5.1)
PROT SERPL-MCNC: 5.8 G/DL (ref 6–8.4)
PROTHROMBIN TIME: 27.6 SEC (ref 9–12.5)
RBC # BLD AUTO: 3.23 M/UL (ref 4.6–6.2)
SODIUM SERPL-SCNC: 138 MMOL/L (ref 136–145)
WBC # BLD AUTO: 5.07 K/UL (ref 3.9–12.7)

## 2024-11-08 PROCEDURE — 25000003 PHARM REV CODE 250: Performed by: INTERNAL MEDICINE

## 2024-11-08 PROCEDURE — 97535 SELF CARE MNGMENT TRAINING: CPT

## 2024-11-08 PROCEDURE — 97112 NEUROMUSCULAR REEDUCATION: CPT

## 2024-11-08 PROCEDURE — 84100 ASSAY OF PHOSPHORUS: CPT | Performed by: STUDENT IN AN ORGANIZED HEALTH CARE EDUCATION/TRAINING PROGRAM

## 2024-11-08 PROCEDURE — 97116 GAIT TRAINING THERAPY: CPT

## 2024-11-08 PROCEDURE — 85610 PROTHROMBIN TIME: CPT | Performed by: STUDENT IN AN ORGANIZED HEALTH CARE EDUCATION/TRAINING PROGRAM

## 2024-11-08 PROCEDURE — 25000003 PHARM REV CODE 250

## 2024-11-08 PROCEDURE — 25000003 PHARM REV CODE 250: Performed by: STUDENT IN AN ORGANIZED HEALTH CARE EDUCATION/TRAINING PROGRAM

## 2024-11-08 PROCEDURE — 97530 THERAPEUTIC ACTIVITIES: CPT

## 2024-11-08 PROCEDURE — 83735 ASSAY OF MAGNESIUM: CPT | Performed by: STUDENT IN AN ORGANIZED HEALTH CARE EDUCATION/TRAINING PROGRAM

## 2024-11-08 PROCEDURE — 85025 COMPLETE CBC W/AUTO DIFF WBC: CPT | Performed by: STUDENT IN AN ORGANIZED HEALTH CARE EDUCATION/TRAINING PROGRAM

## 2024-11-08 PROCEDURE — 36415 COLL VENOUS BLD VENIPUNCTURE: CPT | Performed by: STUDENT IN AN ORGANIZED HEALTH CARE EDUCATION/TRAINING PROGRAM

## 2024-11-08 PROCEDURE — 80053 COMPREHEN METABOLIC PANEL: CPT | Performed by: STUDENT IN AN ORGANIZED HEALTH CARE EDUCATION/TRAINING PROGRAM

## 2024-11-08 PROCEDURE — 25000242 PHARM REV CODE 250 ALT 637 W/ HCPCS

## 2024-11-08 PROCEDURE — 25000003 PHARM REV CODE 250: Performed by: HOSPITALIST

## 2024-11-08 RX ORDER — WARFARIN SODIUM 5 MG/1
5 TABLET ORAL DAILY
Start: 2024-11-08 | End: 2025-11-08

## 2024-11-08 RX ORDER — FUROSEMIDE 40 MG/1
40 TABLET ORAL DAILY
Start: 2024-11-09 | End: 2025-11-09

## 2024-11-08 RX ORDER — DOXYCYCLINE HYCLATE 100 MG
100 TABLET ORAL EVERY 12 HOURS
Start: 2024-11-08

## 2024-11-08 RX ORDER — METOPROLOL TARTRATE 25 MG/1
12.5 TABLET, FILM COATED ORAL 2 TIMES DAILY
Start: 2024-11-08 | End: 2025-11-08

## 2024-11-08 RX ADMIN — SACUBITRIL AND VALSARTAN 1 TABLET: 24; 26 TABLET, FILM COATED ORAL at 08:11

## 2024-11-08 RX ADMIN — DOXYCYCLINE HYCLATE 100 MG: 100 TABLET, COATED ORAL at 08:11

## 2024-11-08 RX ADMIN — ASPIRIN 81 MG: 81 TABLET, COATED ORAL at 08:11

## 2024-11-08 RX ADMIN — THIAMINE HCL TAB 100 MG 100 MG: 100 TAB at 08:11

## 2024-11-08 RX ADMIN — TAMSULOSIN HYDROCHLORIDE 0.4 MG: 0.4 CAPSULE ORAL at 08:11

## 2024-11-08 RX ADMIN — EMPAGLIFLOZIN 10 MG: 10 TABLET, FILM COATED ORAL at 08:11

## 2024-11-08 RX ADMIN — LEVOTHYROXINE SODIUM 200 MCG: 100 TABLET ORAL at 05:11

## 2024-11-08 RX ADMIN — METOPROLOL TARTRATE 12.5 MG: 25 TABLET, FILM COATED ORAL at 08:11

## 2024-11-08 RX ADMIN — FUROSEMIDE 40 MG: 40 TABLET ORAL at 08:11

## 2024-11-08 RX ADMIN — Medication: at 09:11

## 2024-11-08 RX ADMIN — ATORVASTATIN CALCIUM 10 MG: 10 TABLET, FILM COATED ORAL at 08:11

## 2024-11-08 NOTE — SUBJECTIVE & OBJECTIVE
Interval History: Seen this AM at bedside. No acute events overnight. Denies light headedness, chest pain, shortness of breath.    PPD placed yesterday per SNF requirements, COVID negative. May discharge today. Needs to be on coumadin one more day and then will switch to Eliquis as will be beyond day 5 of PPM.     Review of Systems   Gastrointestinal:  Negative for abdominal distention and abdominal pain.   Psychiatric/Behavioral:  Negative for agitation and behavioral problems.      Objective:     Vital Signs (Most Recent):  Temp: 98.2 °F (36.8 °C) (11/08/24 0849)  Pulse: (!) 53 (11/08/24 1044)  Resp: 20 (11/08/24 0849)  BP: 126/77 (11/08/24 0849)  SpO2: 96 % (11/08/24 0849) Vital Signs (24h Range):  Temp:  [97.5 °F (36.4 °C)-98.2 °F (36.8 °C)] 98.2 °F (36.8 °C)  Pulse:  [42-75] 53  Resp:  [18-20] 20  SpO2:  [96 %-98 %] 96 %  BP: (102-126)/(56-77) 126/77     Weight: 96.5 kg (212 lb 11.9 oz)  Body mass index is 29.67 kg/m².    Intake/Output Summary (Last 24 hours) at 11/8/2024 1052  Last data filed at 11/8/2024 0918  Gross per 24 hour   Intake 1142 ml   Output 1625 ml   Net -483 ml         Physical Exam  Vitals reviewed.   Constitutional:       General: He is not in acute distress.     Appearance: Normal appearance. He is well-developed. He is not ill-appearing.   HENT:      Head: Normocephalic and atraumatic.   Eyes:      Pupils: Pupils are equal, round, and reactive to light.   Neck:      Vascular: No JVD.      Trachea: No tracheal deviation.   Cardiovascular:      Rate and Rhythm: Normal rate and regular rhythm.      Heart sounds: Murmur heard.      No friction rub. No gallop.      Comments: Left chest with dressing over PPM site  Pulmonary:      Effort: No respiratory distress.      Breath sounds: No wheezing or rales.   Abdominal:      General: Bowel sounds are normal. There is no distension.      Palpations: Abdomen is soft. There is no mass.      Tenderness: There is no abdominal tenderness.    Musculoskeletal:         General: Normal range of motion.      Cervical back: Normal range of motion.      Right lower leg: No edema.      Left lower leg: No edema.   Skin:     General: Skin is warm and dry.      Capillary Refill: Capillary refill takes less than 2 seconds.      Findings: No rash.   Neurological:      General: No focal deficit present.      Mental Status: He is alert and oriented to person, place, and time.   Psychiatric:         Mood and Affect: Mood normal.         Behavior: Behavior normal.         Thought Content: Thought content normal.             Significant Labs: All pertinent labs within the past 24 hours have been reviewed.    Significant Imaging: I have reviewed all pertinent imaging results/findings within the past 24 hours.

## 2024-11-08 NOTE — PT/OT/SLP PROGRESS
"Physical Therapy   Progress Note    Patient Name:  Bean Salas  MRN: [id number]    Admit Date: 10/28/2024  Admitting Diagnosis:  CHB (complete heart block)  Length of Stay: 10 days  Recent Surgery: Procedure(s) (LRB):  INSERTION, PACEMAKER, BIVENTRICULAR (N/A) 4 Days Post-Op    Recommendations:     Discharge Recommendations: moderate intensity therapy  Equipment recommendations: none  Barriers to discharge: Increased level of skilled assistance required and Fall risk     Assessment:     Bean Salas is a 79 y.o. male admitted to [facility] on 10/28/2024 with medical diagnosis of CHB (complete heart block). Pt presents with weakness, impaired endurance, impaired self care skills, impaired functional mobility, gait instability, impaired balance, decreased coordination, impaired coordination. Pt is progressing towards goals, but has not yet reached prior level of function.     Pt agreeable to therapy session. Requires significant 1 person assist for bed mobility. Once sitting eob, pt scooted himself forward; does push through LUE despite cueing not to. Endorses feeling lightheaded once eob (BP: 131/63). Trialed 2 stands with RW, demonstrating significant improvement with initiation during second stand. Able to take lateral steps during each standing trial with RW. V/c for pt to not externally rotate RLE. Pt returned to supine at end of session. Will continue to assess mobility as tolerated.    Messaged Dr. Briscoe to discuss use of RW after session d/t pacemaker precautions in light of pt needing to use RW d/t being physically debilitated; states "will have to be cautious with the limitations - the patient can also check with EP during device check early next week".    Bean Salas would benefit from continued acute PT intervention to improve quality of life, focus on recovery of impairments, provide patient/caregiver education, reduce fall risk, and maximize (I) and safety with functional mobility. Once medically " stable, recommending pt discharge to moderate intensity therapy. Patient continues to demonstrate the need for moderate intensity therapy on a daily basis post acute exhibited by decreased independence with functional mobility .      Rehab Prognosis: Good    Plan:     During this hospitalization, patient to be seen 4 x/week to address the identified rehab impairments via gait training, therapeutic activities, therapeutic exercises, neuromuscular re-education and progress towards stated goals.     Plan of Care Expires:  11/30/24  Plan of Care reviewed with: patient    This plan of care has been discussed with the patient/caregiver, who was included in its development and is in agreement with the identified goals and treatment plan.     Subjective     Communicated with RN prior to session.  Patient found HOB elevated upon PT entry to room, agreeable to therapy session. Pt alone during session.    Patient/Family Comments/goals: motivated to work with therapy    Pain/Comfort:  Pain Rating 1: 0/10  Pain Rating Post-Intervention 1: 0/10    Patients cultural, spiritual, Congregation conflicts given the current situation: None identified     Objective:   OT present for cotreat due to pt's multiple medical comorbidities and functional/cognition deficits requiring two skilled therapists to appropriately progress pt's musculoskeletal strength, neuromuscular control, and endurance while taking into consideration medical acuity and pt safety.    Patient found with: telemetry, oxygen, harrison catheter (avasys)    General Precautions: Standard, fall, pacemaker   Orthopedic Precautions:N/A   Braces: Sling and swathe   Oxygen Device: room air    Cognition:  Pt is Alert during session.    Therapist provided skilled verbal and tactile cueing to facilitate the following functional mobility tasks. Listed tasks are focused on recovery of impairments and improving pt's independence and efficiency with bed mobility, transfers and ambulation as  able.     Bed Mobility:  Supine > Sit: Maximum Assistancex1  Sit > Supine: Minimal Assistance    Transfers:   Sit <> Stand Transfer:   Trial 1: Modx2 with RW  Trial 2: Modx1 with Rw                 Gait:  Distance: 6 lateral steps R + seated RB + 6 lateral steps R  Assistance level: Minimal Assistancex2  Assistive Device: rolling walker (pre-placed LUE on RW to decrease force pushed through it)  Gait Assessment: decreased step length , decreased david, and decreased gait speed; RLE externally rotated    Balance:  Dynamic Sitting: GOOD: Maintains balance through MODERATE excursions of active trunk movement  Standing:  Static: POOR+: Needs MINIMAL assist to maintain   Dynamic: POOR+: Needs MIN (minimal ) assist during gait    Outcome Measure: AM-PAC 6 CLICK MOBILITY  Total Score:11     Patient/Caregiver Education and Additional Therapeutic Activities/Exercises     Provided pt/caregiver education regarding:   PT POC and goals for therapy   Safety with mobility and fall risk   Safe management of AD as needed   Energy conservation techniques   Instruction on use of call button and importance of calling nursing staff for assistance with mobility     Patient/caregiver able to verbalize understanding; will follow-up with pt/caregiver during current admit for additional questions/concerns within scope of practice.     White board updated.     Patient left HOB elevated with all lines intact, call button in reach, and messaged Dr. Briscoe after session .    Goals:     Multidisciplinary Problems       Physical Therapy Goals          Problem: Physical Therapy    Goal Priority Disciplines Outcome Interventions   Physical Therapy Goal     PT, PT/OT Progressing    Description: Goals to be met by: 11/15/24     Patient will increase functional independence with mobility by performin. Supine to sit with MInimal Assistance  2. Sit to supine with MInimal Assistance  3. Sit to stand transfer with Minimal Assistance  4. Bed to  chair transfer with Minimal Assistance using LRAD  5. Gait  x 25 feet with Minimal Assistance using LRAD.   6. Sitting at edge of bed x8 minutes with Minimal Assistance                         Time Tracking:       PT Received On: 11/08/24  PT Start Time: 0955     PT Stop Time: 1021  PT Total Time (min): 26 min     Billable Minutes: Gait Training 8 and Neuromuscular Re-education 18    11/08/2024

## 2024-11-08 NOTE — PT/OT/SLP PROGRESS
"Occupational Therapy  Co Treatment    Name: Bean Salas  MRN: [id number]  Admitting Diagnosis:  CHB (complete heart block)  4 Days Post-Op    Recommendations:     Discharge Recommendations: Moderate Intensity Therapy  Discharge Equipment Recommendations:  none    Assessment:     Bean Salas is a 79 y.o. male with a medical diagnosis of CHB (complete heart block). Performance deficits affecting function are weakness, impaired endurance, impaired self care skills, impaired functional mobility, gait instability, impaired balance, decreased upper extremity function, decreased lower extremity function.   Pt tolerated session well with good effort and performance.   Rehab Prognosis:  Good; patient would benefit from acute skilled OT services to address these deficits and reach maximum level of function.       Plan:     Patient to be seen 3 x/week to address the above listed problems via self-care/home management, therapeutic activities, therapeutic exercises  Plan of Care Expires: 12/06/24  Plan of Care Reviewed with: patient    Subjective     Pt agreeable to therapy.   "That was encouraging" pt states: re: performance with therapy     Pain/Comfort:  Pain Rating 1: 0/10    Objective:     Communicated with: nsg prior to session.  Patient found in bed with tele, pulse ox,   Cotx completed this date to  optimize functional performance and safety   General Precautions: Standard, fall, pacemaker      Occupational Performance:     Bed Mobility:    Supine>sit with MAX A x 1  Sit>supine with MIN A     Functional Mobility/Transfers:  Sit>stand with MOD A x 2 with RW first trial and then MOD A x 1 with RW second trail.     Activities of Daily Living:  Feeding: set-up  G/H seated with set-up for oral care and washing hands/face.   LE dressing: TOTAL A   UE dressing: MAX A     AMPAC 6 Click ADL: 14    Treatment & Education:  Pt awake, alert and following commands. Pt verb understanding of pacemaker precautions and avoided " bearing weight throughout left UE with transitions.   Pt demo SBA for postural control seated and completed 2 standing trials and was able to take few side steps with MIN A x 2    Education provided re: role of OT and safety with functional mobility/ADL skills.       Patient left HOB elevated with all lines intact and call button in reach    GOALS:   Multidisciplinary Problems       Occupational Therapy Goals          Problem: Occupational Therapy    Goal Priority Disciplines Outcome Interventions   Occupational Therapy Goal     OT, PT/OT Progressing    Description: Goals to be met by: 14 days 11/15/24     Patient will increase functional independence with ADLs by performing:    Pt to complete g/h skills seated EOB with set-up  Pt to tolerated sitting EOB with Fair + sitting balance to increase performance with ADl skills  Pt to complete UE dressing with set-up  Pt to complete t/f to BSC with MOD A   Pt to complete toileting with MOD A                        Time Tracking:     OT Date of Treatment: 11/08/24  OT Start Time: 0955  OT Stop Time: 1020  OT Total Time (min): 25 min    Billable Minutes:Self Care/Home Management 10  Therapeutic Activity 15    OT/COURTNEY: OT          11/8/2024

## 2024-11-08 NOTE — ASSESSMENT & PLAN NOTE
-Pt. Increased o2 requirement on arrival requiring bipap, baseline 3L NC. CXR reveals Pulmonary findings suggest edema.  and pt. With edema, suspected HF Exacerbation, diuresed and weaned O2 as tolerated. Now back on baseline o2

## 2024-11-08 NOTE — ASSESSMENT & PLAN NOTE
-Diagnosed with PE 9/16/2024 when admitted for pelvic fracture.   - On coumadin for 5 days post PPM then will switch back to home Eliquis on 11/10

## 2024-11-08 NOTE — PROGRESS NOTES
Pt heart rate 64 with pt due for 12.5mg of metoprolol. MD notified, nurse instructed to give metoprolol d/t pts hold parameters being SBP <110 and HR <55.

## 2024-11-08 NOTE — SUBJECTIVE & OBJECTIVE
Scheduled Meds:   aspirin  81 mg Oral Daily    atorvastatin  10 mg Oral Daily    balsam peru-castor oiL   Topical (Top) BID    doxycycline  100 mg Oral Q12H    empagliflozin  10 mg Oral Daily    furosemide  40 mg Oral Daily    levothyroxine  200 mcg Oral Before breakfast    metoprolol tartrate  12.5 mg Oral BID    mirtazapine  7.5 mg Oral Nightly    polyethylene glycol  17 g Oral Daily    sacubitriL-valsartan  1 tablet Oral BID    senna  8.6 mg Oral Daily    tamsulosin  0.4 mg Oral Daily    thiamine  100 mg Oral Daily    warfarin  5 mg Oral Daily     Continuous Infusions:  PRN Meds:  Current Facility-Administered Medications:     acetaminophen, 650 mg, Oral, Q4H PRN    sodium chloride 0.9%, 10 mL, Intravenous, PRN    Review of patient's allergies indicates:  No Known Allergies     Past Medical History:   Diagnosis Date    Atrial fibrillation, unspecified type 09/06/2023    COPD exacerbation 09/06/2023    Thyroid disease     hypo     Past Surgical History:   Procedure Laterality Date    COLONOSCOPY  01/01/2011    CORONARY ANGIOGRAPHY N/A 07/22/2021    Procedure: ANGIOGRAM, CORONARY ARTERY;  Surgeon: Hank Barry MD;  Location: [facility] CATH LAB/EP;  Service: Cardiology;  Laterality: N/A;    EYE SURGERY Bilateral     cataracts extraction    FRACTURE SURGERY Right 09/2021    femur    HERNIA REPAIR      HIP SURGERY Right 08/2021    IMPLANTATION OF BIVENTRICULAR HEART PACEMAKER N/A 11/4/2024    Procedure: INSERTION, PACEMAKER, BIVENTRICULAR;  Surgeon: Mac Alejandro MD;  Location: [facility] EP LAB;  Service: Cardiology;  Laterality: N/A;  CHB, CRT-P, SJM, Anes, DM, CICU 3081    INTRAMEDULLARY RODDING OF TROCHANTER OF FEMUR Right 09/03/2021    Procedure: INSERTION, INTRAMEDULLARY RACQUEL, FEMUR, TROCHANTER;  Surgeon: Darryn Hall MD;  Location: [facility] OR;  Service: Orthopedics;  Laterality: Right;    JOINT REPLACEMENT Bilateral     knee    LEFT HEART CATHETERIZATION Right 07/22/2021    Procedure: Left heart cath;  Surgeon:  Hank Barry MD;  Location: [facility] CATH LAB/EP;  Service: Cardiology;  Laterality: Right;    OPEN REDUCTION AND INTERNAL FIXATION (ORIF) OF INJURY OF HIP Right 2024    Procedure: ORIF,PELVIS;  Surgeon: Negrito Stapleton MD;  Location: [facility] OR [address] [place] [address];  Service: Orthopedics;  Laterality: Right;  +local    TRANSESOPHAGEAL ECHOCARDIOGRAPHY N/A 2024    Procedure: ECHOCARDIOGRAM, TRANSESOPHAGEAL;  Surgeon: Leonora Butt MD;  Location: [facility] EP LAB;  Service: Cardiology;  Laterality: N/A;    TREATMENT OF CARDIAC ARRHYTHMIA N/A 2024    Procedure: Cardioversion or Defibrillation;  Surgeon: ANA Steiner MD;  Location: [facility] EP LAB;  Service: Cardiology;  Laterality: N/A;  AF, DEMETRICE/DCCV, ANES, GP, 524    VASECTOMY         Family History       Problem Relation (Age of Onset)    Crohn's disease Mother    Dementia Mother    Heart attack Father    No Known Problems Sister, Brother, Son          Tobacco Use    Smoking status: Former     Current packs/day: 0.00     Average packs/day: 1 pack/day for 35.0 years (35.0 ttl pk-yrs)     Types: Cigarettes     Start date: 1965     Quit date: 2000     Years since quittin.8     Passive exposure: Past    Smokeless tobacco: Never   Substance and Sexual Activity    Alcohol use: No    Drug use: Never    Sexual activity: Not Currently     Review of Systems   Skin:  Positive for wound.       Objective:     Vital Signs (Most Recent):  Temp: 98.2 °F (36.8 °C) (24)  Pulse: (!) 42 (24)  Resp: 20 (24)  BP: 126/77 (24)  SpO2: 96 % (24) Vital Signs (24h Range):  Temp:  [97.5 °F (36.4 °C)-98.2 °F (36.8 °C)] 98.2 °F (36.8 °C)  Pulse:  [42-75] 42  Resp:  [18-20] 20  SpO2:  [96 %-98 %] 96 %  BP: ()/(49-77) 126/77     Weight: 96.5 kg (212 lb 11.9 oz)  Body mass index is 29.67 kg/m².     Physical Exam  Constitutional:       Appearance: Normal appearance.   Skin:     General: Skin is warm and dry.       Findings: Lesion present.   Neurological:      Mental Status: He is alert.          Laboratory:  All pertinent labs reviewed within the last 24 hours.    Diagnostic Results:  None

## 2024-11-08 NOTE — CONSULTS
[facility] - Cardiology Stepdown  Skin Integrity ALEKSANDR  Consult Note    Patient Name: Bean Salas  MRN: [id number]  Admission Date: 10/28/2024  Hospital Length of Stay: 10 days  Attending Physician: Brian Briscoe MD  Primary Care Provider: Ramirez Levine MD     Inpatient consult to Skin Integrity  Practitioner  Consult performed by: Yoly Yadav, ACACIA  Consult ordered by: Yoly Yadav, ACACIA        Subjective:     History of Present Illness:  Bean Salas is an 79 year old male with PMHx CAD, combined CHF (recovered EF), CAD, A-fib on eliquis, chronic respiratory failure on 3L NC, and hypothyroidism who presents to the ED from nursing home for worsening SOB, hypoxia, and leg welling x a few days. Per nursing home and ED documentation, pt. Had been complaining of worsening SOB with exertion and was noted to have low O2 levels on vitals check. He is stypically on 2-3L NC, but over the last few days he has been uptitrated to on day of presentation. Nursing Home also noted inc reased B/L lower extremity swelling. Pt. Reports having SOB earleir ion the day but he reports feeling ok on the 5L NC. No cough, fevers, chills, nausea, or vomiting.     Of note, pt. Was recently admitted to this facility 9/28-10/7 for hypotension and PAULA. Lasix was held and entresto was discontinued. At discharge lasix dosing was changed to PRN only. It is unclear if he had been receiving any of these PRN doses at the nursing home. Patient admitted to hospital medicine service for further management. Skin integrity ALEKSANDR consulted for evaluation of skin injury.    Scheduled Meds:   aspirin  81 mg Oral Daily    atorvastatin  10 mg Oral Daily    balsam peru-castor oiL   Topical (Top) BID    doxycycline  100 mg Oral Q12H    empagliflozin  10 mg Oral Daily    furosemide  40 mg Oral Daily    levothyroxine  200 mcg Oral Before breakfast    metoprolol tartrate  12.5 mg Oral BID    mirtazapine  7.5 mg Oral Nightly    polyethylene glycol  17 g Oral  Daily    sacubitriL-valsartan  1 tablet Oral BID    senna  8.6 mg Oral Daily    tamsulosin  0.4 mg Oral Daily    thiamine  100 mg Oral Daily    warfarin  5 mg Oral Daily     Continuous Infusions:  PRN Meds:  Current Facility-Administered Medications:     acetaminophen, 650 mg, Oral, Q4H PRN    sodium chloride 0.9%, 10 mL, Intravenous, PRN    Review of patient's allergies indicates:  No Known Allergies     Past Medical History:   Diagnosis Date    Atrial fibrillation, unspecified type 09/06/2023    COPD exacerbation 09/06/2023    Thyroid disease     hypo     Past Surgical History:   Procedure Laterality Date    COLONOSCOPY  01/01/2011    CORONARY ANGIOGRAPHY N/A 07/22/2021    Procedure: ANGIOGRAM, CORONARY ARTERY;  Surgeon: Hank Barry MD;  Location: [facility] CATH LAB/EP;  Service: Cardiology;  Laterality: N/A;    EYE SURGERY Bilateral     cataracts extraction    FRACTURE SURGERY Right 09/2021    femur    HERNIA REPAIR      HIP SURGERY Right 08/2021    IMPLANTATION OF BIVENTRICULAR HEART PACEMAKER N/A 11/4/2024    Procedure: INSERTION, PACEMAKER, BIVENTRICULAR;  Surgeon: Mac Alejandro MD;  Location: [facility] EP LAB;  Service: Cardiology;  Laterality: N/A;  CHB, CRT-P, SJM, Anes, DM, CICU 3081    INTRAMEDULLARY RODDING OF TROCHANTER OF FEMUR Right 09/03/2021    Procedure: INSERTION, INTRAMEDULLARY RACQUEL, FEMUR, TROCHANTER;  Surgeon: Darryn Hall MD;  Location: [facility];  Service: Orthopedics;  Laterality: Right;    JOINT REPLACEMENT Bilateral     knee    LEFT HEART CATHETERIZATION Right 07/22/2021    Procedure: Left heart cath;  Surgeon: Hank Barry MD;  Location: [facility] CATH LAB/EP;  Service: Cardiology;  Laterality: Right;    OPEN REDUCTION AND INTERNAL FIXATION (ORIF) OF INJURY OF HIP Right 9/20/2024    Procedure: ORIF,PELVIS;  Surgeon: Negrtio Stapleton MD;  Location: [address];  Service: Orthopedics;  Laterality: Right;  +local    TRANSESOPHAGEAL ECHOCARDIOGRAPHY N/A 9/19/2024    Procedure:  ECHOCARDIOGRAM, TRANSESOPHAGEAL;  Surgeon: Leonora Butt MD;  Location: [facility] EP LAB;  Service: Cardiology;  Laterality: N/A;    TREATMENT OF CARDIAC ARRHYTHMIA N/A 2024    Procedure: Cardioversion or Defibrillation;  Surgeon: ANA Steiner MD;  Location: [facility] EP LAB;  Service: Cardiology;  Laterality: N/A;  AF, DEMETRICE/DCCV, ANES, GP, 524    VASECTOMY         Family History       Problem Relation (Age of Onset)    Crohn's disease Mother    Dementia Mother    Heart attack Father    No Known Problems Sister, Brother, Son          Tobacco Use    Smoking status: Former     Current packs/day: 0.00     Average packs/day: 1 pack/day for 35.0 years (35.0 ttl pk-yrs)     Types: Cigarettes     Start date: 1965     Quit date: 2000     Years since quittin.8     Passive exposure: Past    Smokeless tobacco: Never   Substance and Sexual Activity    Alcohol use: No    Drug use: Never    Sexual activity: Not Currently     Review of Systems   Skin:  Positive for wound.       Objective:     Vital Signs (Most Recent):  Temp: 98.2 °F (36.8 °C) (24)  Pulse: (!) 42 (24)  Resp: 20 (24)  BP: 126/77 (24)  SpO2: 96 % (24) Vital Signs (24h Range):  Temp:  [97.5 °F (36.4 °C)-98.2 °F (36.8 °C)] 98.2 °F (36.8 °C)  Pulse:  [42-75] 42  Resp:  [18-20] 20  SpO2:  [96 %-98 %] 96 %  BP: ()/(49-77) 126/77     Weight: 96.5 kg (212 lb 11.9 oz)  Body mass index is 29.67 kg/m².     Physical Exam  Constitutional:       Appearance: Normal appearance.   Skin:     General: Skin is warm and dry.      Findings: Lesion present.   Neurological:      Mental Status: He is alert.          Laboratory:  All pertinent labs reviewed within the last 24 hours.    Diagnostic Results:  None      Assessment/Plan:              ALEKSANDR Skin Integrity Evaluation      Skin Integrity ALEKSANDR evaluation of patient as part of the comprehensive skin care team.     He has been admitted for 11 days. Skin  injury was noted on 10/28/24. POA yes.    L buttock        Derm  Irritant contact dermatitis due friction or contact with other specified body fluids  - consult received for evaluation of skin injury.  - pt presents to the ED from nursing home for worsening SOB, hypoxia, and leg swelling x a few days.  - healing partial thickness tissue loss to left buttock likely related to friction and moisture irritation.  - continue Triad bid/prn.  - denise surface.  - heel boots for offloading.  - turn q2h.  - ryan score documented at 18 with a nutrition sub scale score of 3.  - nursing to maintain pressure injury prevention measures and continue wound care per orders.          Thank you for your consult. I will follow-up with patient. Please contact us if you have any additional questions.      Yoly Yadav NP  Skin Integrity ALEKSANDR  [facility] - Cardiology Stepdown

## 2024-11-08 NOTE — DISCHARGE SUMMARY
[facility]ada - Cardiology [facility] Medicine  Discharge Summary      Patient Name: Bean Salas  MRN: [id number]  LIVAN: [id number]  Patient Class: IP- Inpatient  Admission Date: 10/28/2024  Hospital Length of Stay: 10 days  Discharge Date and Time:  11/08/2024 1:03 PM  Attending Physician: Brian Briscoe MD   Discharging Provider: Brian Briscoe MD  Primary Care Provider: Ramirez Levine MD  [facility] Medicine Team: [facility] C Brian Briscoe MD  Primary Care Team: [facility] C    HPI:   80 yo M with PMHx CAD, combined CHF (recovered EF), CAD, A-fib on eliquis, chronic respiratory failure on 3L NC, and hypothyroidism who presents to the ED from nursing home for worsening SOB, hypoxia, and leg welling x a few days. Per nursing home and ED documentation, pt. Had been complaining of worsening SOB with exertion and was noted to have low O2 levels on vitals check. He is stypically on 2-3L NC, but over the last few days he has been uptitrated to on day of presentation. Nursing Home also noted inc reased B/L lower extremity swelling. Pt. Reports having SOB earleir ion the day but he reports feeling ok on the 5L NC. No cough, fevers, chills, nausea, or vomiting.    Of note, pt. Was recently admitted to this facility 9/28-10/7 for hypotension and PAULA. Lasix was held and entresto was discontinued. At discharge lasix dosing was changed to PRN only. It is unclear if he had been receiving any of these PRN doses at the nursing home.    Procedure(s) (LRB):  INSERTION, PACEMAKER, BIVENTRICULAR (N/A)      Hospital Course:   Initially presenting from NH with altered mentation and worsening dyspnea.  Admitted to MICU for acute on chronic hypercapnic respiratory failure requiring continuous BiPAP.  Noted to be volume overloaded, started on IV diuresis.  Found to be bradycardic with HR 30s to 40s with complete heart block while in MICU.  Transcutaneous pacing initiated with subsequent placement of T BP by EP physician.   Admitted to CCU.  Now status post ppm placement 11/4.  On Coumadin.  Stable, on home oxygen    On arrival to floor intermittent HR readings in 30s, discussed with EP, likely false readings w bigeminy pvcs and rate not picking up correctly. Later started on low dose metoprolol w holding parameters. Patient will be discharged to SNF placement    #CHB (complete heart block)-Cont doxy BID 5 days post PPM last diose 11/9 -EP: ok for metoprolol given frequent PVCs    #Urinary retention-harrison 16 F in place, had to be replaced on 11/6 due to persistent retention - void trial at SNF when mobility improves or before in 3 days  -Cont flomax  #History of pulmonary embolism - c/w coumadin 5 mg on 11/9 and then start Eliquis on 11/10   #HF with mildly reduced EF- c/w Jardiance, entreso 24-26 BID and metoprolol for GDMT -Also continue lasix  monitor BP closely       Goals of Care Treatment Preferences:  Code Status: Full Code       LaPOST: Yes           SDOH Screening:  The patient was screened for utility difficulties, food insecurity, transport difficulties, housing insecurity, and interpersonal safety and there were no concerns identified this admission.     Consults:   Consults (From admission, onward)          Status Ordering Provider     Inpatient consult to Skin Integrity  Practitioner  Once        Provider:  (Not yet assigned)    Completed VALERIO ANDREWS     Inpatient consult to Electrophysiology  Once        Provider:  (Not yet assigned)    Completed WERO JIMENEZ     Inpatient consult to Critical Care Medicine  Once        Provider:  (Not yet assigned)    Completed DENIS OJEDA     Inpatient consult to Critical Care Medicine  Once        Provider:  (Not yet assigned)    Completed DENIS OJEDA            No new Assessment & Plan notes have been filed under this hospital service since the last note was generated.  Service: Hospital Medicine    Final Active Diagnoses:    Diagnosis Date Noted POA    PRINCIPAL PROBLEM:   CHB (complete heart block) [I44.2] 10/29/2024 Yes    Irritant contact dermatitis due friction or contact with other specified body fluids [L24.A9] 11/08/2024 Yes    Age-related physical debility [R54] 11/06/2024 Yes    Urinary retention [R33.9] 11/05/2024 Yes    Encephalopathy, metabolic [G93.41] 10/29/2024 Yes    Paroxysmal atrial fibrillation [I48.0] 10/28/2024 Yes    History of pulmonary embolism [Z86.711] 10/28/2024 Yes    Acute on chronic respiratory failure with hypoxia [J96.21] 09/16/2024 Yes    Acute on chronic diastolic heart failure [I50.33]  Yes    Essential hypertension [I10] 06/25/2017 Yes     Chronic    Hypothyroidism due to acquired atrophy of thyroid [E03.4] 06/25/2017 Yes     Chronic      Problems Resolved During this Admission:       Discharged Condition: stable    Disposition: Skilled Nursing Facility    Follow Up:    Patient Instructions:      Notify your health care provider if you experience any of the following:  difficulty breathing or increased cough     Notify your health care provider if you experience any of the following:  redness, tenderness, or signs of infection (pain, swelling, redness, odor or green/yellow discharge around incision site)     Notify your health care provider if you experience any of the following:  severe uncontrolled pain     Notify your health care provider if you experience any of the following:  persistent nausea and vomiting or diarrhea     Notify your health care provider if you experience any of the following:  temperature >100.4     Notify your health care provider if you experience any of the following:  persistent dizziness, light-headedness, or visual disturbances       Significant Diagnostic Studies: Labs: All labs within the past 24 hours have been reviewed    Pending Diagnostic Studies:       Procedure Component Value Units Date/Time    APTT [[id number]] Collected: 11/01/24 0355    Order Status: Sent Lab Status: No result     Specimen: Blood      Protime-INR [[id number]] Collected: 11/01/24 0355    Order Status: Sent Lab Status: No result     Specimen: Blood            Medications:  Reconciled Home Medications:      Medication List        START taking these medications      doxycycline 100 MG tablet  Commonly known as: VIBRA-TABS  Take 1 tablet (100 mg total) by mouth every 12 (twelve) hours. Take one tablet the night of 11/8/24 and one tablet the morning of 11/9/24.     metoprolol tartrate 25 MG tablet  Commonly known as: LOPRESSOR  Take 0.5 tablets (12.5 mg total) by mouth 2 (two) times daily.     sacubitriL-valsartan 24-26 mg per tablet  Commonly known as: ENTRESTO  Take 1 tablet by mouth 2 (two) times daily.     warfarin 5 MG tablet  Commonly known as: COUMADIN  Take 1 tablet (5 mg total) by mouth Daily. Last dose on 11/9/24. Switch to home apixaban on 11/10/24.            CHANGE how you take these medications      apixaban 5 mg Tab  Commonly known as: ELIQUIS  Take 1 tablet (5 mg total) by mouth 2 (two) times daily. Start on 11/10/24  Start taking on: November 10, 2024  What changed:   See the new instructions.  These instructions start on November 10, 2024. If you are unsure what to do until then, ask your doctor or other care provider.     furosemide 40 MG tablet  Commonly known as: LASIX  Take 1 tablet (40 mg total) by mouth once daily.  Start taking on: November 9, 2024  What changed:   when to take this  reasons to take this            CONTINUE taking these medications      acetaminophen 500 MG tablet  Commonly known as: TYLENOL  Take 2 tablets (1,000 mg total) by mouth every 8 (eight) hours.     ammonium lactate 12 % lotion  Commonly known as: LAC-HYDRIN  Apply topically 2 (two) times daily as needed for Dry Skin.     aspirin 81 MG EC tablet  Commonly known as: ECOTRIN  Take 1 tablet (81 mg total) by mouth once daily.     atorvastatin 10 MG tablet  Commonly known as: LIPITOR  Take 1 tablet by mouth once daily     CENTRUM SILVER 0.4 mg-300 mcg- 250  mcg Tab  Generic drug: multivit-min-FA-lycopen-lutein  Take 1 tablet by mouth once daily.     empagliflozin 10 mg tablet  Commonly known as: Jardiance  Take 1 tablet (10 mg total) by mouth once daily.     levothyroxine 200 MCG tablet  Commonly known as: SYNTHROID  Take 1 tablet by mouth once daily     melatonin 3 mg tablet  Commonly known as: MELATIN  Take 2 tablets (6 mg total) by mouth nightly as needed for Insomnia.     methocarbamoL 750 MG Tab  Commonly known as: ROBAXIN  Take 1 tablet (750 mg total) by mouth 4 (four) times daily.     mirtazapine 7.5 MG Tab  Commonly known as: REMERON  Take 1 tablet (7.5 mg total) by mouth nightly. One tablet po each night for sleep     oxyCODONE 5 MG immediate release tablet  Commonly known as: ROXICODONE  Take 1 tablet (5 mg total) by mouth every 4 (four) hours as needed for Pain.     senna-docusate 8.6-50 mg 8.6-50 mg per tablet  Commonly known as: PERICOLACE  Take 1 tablet by mouth once daily.     tamsulosin 0.4 mg Cap  Commonly known as: FLOMAX  Take 1 capsule (0.4 mg total) by mouth once daily.            STOP taking these medications      amiodarone 200 MG Tab  Commonly known as: PACERONE              Indwelling Lines/Drains at time of discharge:   Lines/Drains/Airways       Drain  Duration                  Urethral Catheter 11/06/24 0630 16 Fr. 2 days                    Time spent on the discharge of patient: 55 minutes         Brian Briscoe MD  Department of Hospital Medicine  [facility] - Cardiology Stepdown

## 2024-11-08 NOTE — HPI
Bean Salas is an 79 year old male with PMHx CAD, combined CHF (recovered EF), CAD, A-fib on eliquis, chronic respiratory failure on 3L NC, and hypothyroidism who presents to the ED from nursing home for worsening SOB, hypoxia, and leg welling x a few days. Per nursing home and ED documentation, pt. Had been complaining of worsening SOB with exertion and was noted to have low O2 levels on vitals check. He is stypically on 2-3L NC, but over the last few days he has been uptitrated to on day of presentation. Nursing Home also noted inc reased B/L lower extremity swelling. Pt. Reports having SOB earleir ion the day but he reports feeling ok on the 5L NC. No cough, fevers, chills, nausea, or vomiting.     Of note, pt. Was recently admitted to this facility 9/28-10/7 for hypotension and PAULA. Lasix was held and entresto was discontinued. At discharge lasix dosing was changed to PRN only. It is unclear if he had been receiving any of these PRN doses at the nursing home. Patient admitted to hospital medicine service for further management. Skin integrity ALEKSANDR consulted for evaluation of skin injury.

## 2024-11-08 NOTE — ASSESSMENT & PLAN NOTE
-Transferred to CCU on admission after foudnt obe in CHB while in MICU  -TVP placed on 10/29, followed by PPM placement on 11/4  -Cont doxy BID 5 days post PPM  -telemetry  -EP: ok to start metoprolol given frequent PVCs - 12.5 BID started 11/5  - On coumadin for 5 days post PPM then will switch back to home Eliquis

## 2024-11-08 NOTE — PROGRESS NOTES
[facility]ada - Cardiology Stepdown  Wound Care    Patient Name:  Bean Salas   MRN:  [id number]  Date: 2024  Diagnosis: CHB (complete heart block)    History:     Past Medical History:   Diagnosis Date    Atrial fibrillation, unspecified type 2023    COPD exacerbation 2023    Thyroid disease     hypo       Social History     Socioeconomic History    Marital status:    Tobacco Use    Smoking status: Former     Current packs/day: 0.00     Average packs/day: 1 pack/day for 35.0 years (35.0 ttl pk-yrs)     Types: Cigarettes     Start date: 1965     Quit date: 2000     Years since quittin.8     Passive exposure: Past    Smokeless tobacco: Never   Substance and Sexual Activity    Alcohol use: No    Drug use: Never    Sexual activity: Not Currently     Social Drivers of Health     Financial Resource Strain: Low Risk  (10/30/2024)    Overall Financial Resource Strain (CARDIA)     Difficulty of Paying Living Expenses: Not hard at all   Food Insecurity: No Food Insecurity (10/30/2024)    Hunger Vital Sign     Worried About Running Out of Food in the Last Year: Never true     Ran Out of Food in the Last Year: Never true   Transportation Needs: No Transportation Needs (10/30/2024)    TRANSPORTATION NEEDS     Transportation : No   Physical Activity: Inactive (10/30/2024)    Exercise Vital Sign     Days of Exercise per Week: 0 days     Minutes of Exercise per Session: 0 min   Stress: No Stress Concern Present (10/30/2024)    [employer] [place] [employer] - Occupational Stress Questionnaire     Feeling of Stress : Not at all   Recent Concern: Stress - Stress Concern Present (2024)    [employer] [place] [place] [employer] - Occupational Stress Questionnaire     Feeling of Stress : To some extent   Housing Stability: Low Risk  (10/30/2024)    Housing Stability Vital Sign     Unable to Pay for Housing in the Last Year: No     Homeless in the Last Year: No       Precautions:      Allergies as of 10/28/2024    (No Known Allergies)       [facility] Assessment Details/Treatment     Patient seen for inpatient wound care follow up. Patient sitting up in bed and agreeable to assessment at this time. Left upper back and right lateral foot with slow to maximilian discoloration. No open wounds or active drainage noted. Heels clean, dry, and intact. Left buttocks noted to have healing partial thickness tissue loss. Wound bed clean, dry, and healing.        Reviewed chart for this encounter.  See flowsheet for findings.      Recommendations: No other issues or concerns at this time. Continue current inpatient wound care orders. Skin integrity ALEKSANDR consulted and following. Will follow up 11/15/2024 or sooner if needed.        Discussed POC with patient and primary nurse.  See EMR for orders and patient education.    Bedside nursing to continue care and monitoring.  Bedside to maintain pressure injury prevention interventions.        11/08/24 0840   WOCN Assessment   WOCN Total Time (mins) 30   Visit Date 11/08/24   Visit Time 0840   Consult Type Follow Up   WOCN Speciality Wound   Intervention assessed;changed;applied;chart review;coordination of care;orders   Teaching on-going        Wound 10/28/24 1857 Pressure Injury Left Buttocks   Date First Assessed/Time First Assessed: 10/28/24 1857   Present on Original Admission: Yes  Primary Wound Type: (c) Pressure Injury  Side: Left  Location: Buttocks   Wound Image    Dressing Appearance Open to air;Dry;Intact   Drainage Amount None   Drainage Characteristics/Odor No odor   Appearance Pink;Red   Care Cleansed with:;Sterile normal saline;Applied:;Skin Barrier  (Triad)        Wound 10/31/24 0853 Pressure Injury Left Back   Date First Assessed/Time First Assessed: 10/31/24 0853   Present on Original Admission: Yes  Primary Wound Type: Pressure Injury  Side: Left  Location: Back   Wound Image    Dressing Appearance Open to air;Dry;Intact;Clean   Drainage Amount None    Drainage Characteristics/Odor No odor   Appearance Pink;Red   Care Cleansed with:;Sterile normal saline;Applied:  (BPCO)        Wound 10/31/24 0853 Other (comment) Right Heel   Date First Assessed/Time First Assessed: 10/31/24 0853   Present on Original Admission: Yes  Primary Wound Type: (c) Other (comment)  Side: Right  Location: Heel   Wound Image    Dressing Appearance Open to air;Dry;Intact;Clean   Drainage Amount None   Drainage Characteristics/Odor No odor   Appearance Pink;Red   Care Cleansed with:;Soap and water;Applied:  (BPCO)       Orders placed.   Farooq WHITEHEADN, RN, [facility]  11/08/2024

## 2024-11-08 NOTE — PLAN OF CARE
"[facility] - Cardiology Stepdown  Discharge Final Note    Primary Care Provider: Ramirez Levine MD    Expected Discharge Date: 11/8/2024    Final Discharge Note (most recent)       Final Note - 11/08/24 1448          Final Note    Assessment Type Final Discharge Note     Anticipated Discharge Disposition Skilled Nursing Facility        Post-Acute Status    Post-Acute Authorization Placement     Post-Acute Placement Status Set-up Complete/Auth obtained     Discharge Delays Post-Acute Set-up                 Auth received.  Transportation scheduled via wheelchair van with oxygen for 4:00pm to transfer to [facility] (time not guaranteed).  HUMPHREY Khanna/Tasha to call report [facility] at [phone], room 176[address].  HUPMHREY Khanna/Tasha was notified of the above.  SW also notified pt at bedside of transfer.      UPDATE 3:26 PM  SW informed by HUMPHREY Cordova that pt was voicing concerns about the facility to which he is going and wanted to talk to someone about staying in the hospital or going to a different facility.  SW met with pt at bedside and discussed that he's not going to know how things are there until he goes, and if he doesn't like it there he can always go home or ask for a transfer.  SW reminded pt that the long-term goal will be to go home.  Pt voiced understanding and explained he was just "venting."  SW validated that pt is nervous about going to a new place and that's okay.      Gabriela Zavala, TODD  [facility]  [id number]        Important Message from Medicare  Important Message from Medicare regarding Discharge Appeal Rights: Explained to patient/caregiver, Other (comments) (completed via phone with son)     Date IMM was signed: 11/08/24  Time IMM was signed: 1345        "

## 2024-11-08 NOTE — ASSESSMENT & PLAN NOTE
- consult received for evaluation of skin injury.  - pt presents to the ED from nursing home for worsening SOB, hypoxia, and leg swelling x a few days.  - healing partial thickness tissue loss to left buttock likely related to friction and moisture irritation.  - continue Triad bid/prn.  - denise surface.  - heel boots for offloading.  - turn q2h.  - ryan score documented at 18 with a nutrition sub scale score of 3.  - nursing to maintain pressure injury prevention measures and continue wound care per orders.

## 2024-11-08 NOTE — PROGRESS NOTES
Alli Ahumada - Cardiology [facility] Medicine  Progress Note    Patient Name: Bean Salas  MRN: [id number]  Patient Class: IP- Inpatient   Admission Date: 10/28/2024  Length of Stay: 10 days  Attending Physician: Brian Briscoe MD  Primary Care Provider: Ramirez Levine MD        Subjective:     Principal Problem:CHB (complete heart block)        HPI:  80 yo M with PMHx CAD, combined CHF (recovered EF), CAD, A-fib on eliquis, chronic respiratory failure on 3L NC, and hypothyroidism who presents to the ED from nursing home for worsening SOB, hypoxia, and leg welling x a few days. Per nursing home and ED documentation, pt. Had been complaining of worsening SOB with exertion and was noted to have low O2 levels on vitals check. He is stypically on 2-3L NC, but over the last few days he has been uptitrated to on day of presentation. Nursing Home also noted inc reased B/L lower extremity swelling. Pt. Reports having SOB earleir ion the day but he reports feeling ok on the 5L NC. No cough, fevers, chills, nausea, or vomiting.    Of note, pt. Was recently admitted to this facility 9/28-10/7 for hypotension and PAULA. Lasix was held and entresto was discontinued. At discharge lasix dosing was changed to PRN only. It is unclear if he had been receiving any of these PRN doses at the nursing home.    Overview/Hospital Course:  Initially presenting from NH with altered mentation and worsening dyspnea.  Admitted to MICU for acute on chronic hypercapnic respiratory failure requiring continuous BiPAP.  Noted to be volume overloaded, started on IV diuresis.  Found to be bradycardic with HR 30s to 40s with complete heart block while in MICU.  Transcutaneous pacing initiated with subsequent placement of T BP by EP physician.  Admitted to CCU.  Now status post ppm placement 11/4.  On Coumadin.  Stable, on home oxygen    On arrival to floor intermittent HR readings in 30s, discussed with EP, likely false readings w bigeminy pvcs  and rate not picking up correctly. Later started on low dose metoprolol w holding parameters. Patient will be discharged to SNF placement    Interval History: Seen this AM at bedside. No acute events overnight. Denies light headedness, chest pain, shortness of breath.    PPD placed yesterday per SNF requirements, COVID negative. May discharge today. Needs to be on coumadin one more day and then will switch to Eliquis as will be beyond day 5 of PPM.     Review of Systems   Gastrointestinal:  Negative for abdominal distention and abdominal pain.   Psychiatric/Behavioral:  Negative for agitation and behavioral problems.      Objective:     Vital Signs (Most Recent):  Temp: 98.2 °F (36.8 °C) (11/08/24 0849)  Pulse: (!) 53 (11/08/24 1044)  Resp: 20 (11/08/24 0849)  BP: 126/77 (11/08/24 0849)  SpO2: 96 % (11/08/24 0849) Vital Signs (24h Range):  Temp:  [97.5 °F (36.4 °C)-98.2 °F (36.8 °C)] 98.2 °F (36.8 °C)  Pulse:  [42-75] 53  Resp:  [18-20] 20  SpO2:  [96 %-98 %] 96 %  BP: (102-126)/(56-77) 126/77     Weight: 96.5 kg (212 lb 11.9 oz)  Body mass index is 29.67 kg/m².    Intake/Output Summary (Last 24 hours) at 11/8/2024 1052  Last data filed at 11/8/2024 0918  Gross per 24 hour   Intake 1142 ml   Output 1625 ml   Net -483 ml         Physical Exam  Vitals reviewed.   Constitutional:       General: He is not in acute distress.     Appearance: Normal appearance. He is well-developed. He is not ill-appearing.   HENT:      Head: Normocephalic and atraumatic.   Eyes:      Pupils: Pupils are equal, round, and reactive to light.   Neck:      Vascular: No JVD.      Trachea: No tracheal deviation.   Cardiovascular:      Rate and Rhythm: Normal rate and regular rhythm.      Heart sounds: Murmur heard.      No friction rub. No gallop.      Comments: Left chest with dressing over PPM site  Pulmonary:      Effort: No respiratory distress.      Breath sounds: No wheezing or rales.   Abdominal:      General: Bowel sounds are normal.  There is no distension.      Palpations: Abdomen is soft. There is no mass.      Tenderness: There is no abdominal tenderness.   Musculoskeletal:         General: Normal range of motion.      Cervical back: Normal range of motion.      Right lower leg: No edema.      Left lower leg: No edema.   Skin:     General: Skin is warm and dry.      Capillary Refill: Capillary refill takes less than 2 seconds.      Findings: No rash.   Neurological:      General: No focal deficit present.      Mental Status: He is alert and oriented to person, place, and time.   Psychiatric:         Mood and Affect: Mood normal.         Behavior: Behavior normal.         Thought Content: Thought content normal.             Significant Labs: All pertinent labs within the past 24 hours have been reviewed.    Significant Imaging: I have reviewed all pertinent imaging results/findings within the past 24 hours.    Assessment/Plan:      * CHB (complete heart block)  -Transferred to CCU on admission after foudnt obe in [facility] while in MICU  -TVP placed on 10/29, followed by PPM placement on 11/4  -Cont doxy BID 5 days post PPM  -telemetry  -EP: ok to start metoprolol given frequent PVCs - 12.5 BID started 11/5  - On coumadin for 5 days post PPM then will switch back to home Eliquis      Age-related physical debility  Patient with Acute on chronic debility due to age-related physical debility. The patient's latest AMPAC (Activity Measure for Post Acute Care) Score is listed below.    AM-PAC Score - How much help does the patient need for each activity listed  Basic Mobility Total Score: 8  Turning over in bed (including adjusting bedclothes, sheets and blankets)?: A lot  Sitting down on and standing up from a chair with arms (e.g., wheelchair, bedside commode, etc.): Unable  Moving from lying on back to sitting on the side of the bed?: A lot  Moving to and from a bed to a chair (including a wheelchair)?: Unable  Need to walk in hospital room?:  "Unable  Climbing 3-5 steps with a railing?: Unable    Plan  - PT/OT consulted  - SNF placement - pending disposition, SW assisting          Urinary retention  -Maintain harrison, had to be replaced on 11/6 due to persistent retention  -Cont flomax      History of pulmonary embolism  -Diagnosed with PE 9/16/2024 when admitted for pelvic fracture.   - On coumadin for 5 days post PPM then will switch back to home Eliquis on 11/10      Paroxysmal atrial fibrillation  -Continue home metoprolol for rate control. Continue coumadin for AC for 5 days post PPM then switch  to home DOAC on 11/10  -Monitor on tele while admitted    Acute on chronic respiratory failure with hypoxia  -Pt. Increased o2 requirement on arrival requiring bipap, baseline 3L NC. CXR reveals Pulmonary findings suggest edema.  and pt. With edema, suspected HF Exacerbation, diuresed and weaned O2 as tolerated. Now back on baseline o2    Acute on chronic diastolic heart failure  -Pt. With edema, SLADE, and PND. , CXR with "Pulmonary findings suggest edema consistent" with HF exacerbation. Pt. Recently had lasix dosing changed to PRN instead of daily  -Transitioned to PO lasix - continue  -Monitor electrolytes cloesly while diresing  -Attempt to restart GDMT as tolerated per above        Hypothyroidism due to acquired atrophy of thyroid  -Continue home synthroid      Essential hypertension  -Pt. Recently admitted for hypotension, BP medications stopped. Only on metoprolol on admission  -Cont metoprolol to assist with frequent PVCs  -Given mildly reduced EF, c/w Jardiance, entreso 24-26 BID and BB for GDMT - monitor BP closely  -Aldactone if BP tolerates      VTE Risk Mitigation (From admission, onward)           Ordered     warfarin (COUMADIN) tablet 5 mg  Daily         11/05/24 1026                    Discharge Planning   LATOSHA: 11/8/2024     Code Status: Full Code   Is the patient medically ready for discharge?:     Reason for patient still in " hospital (select all that apply): Patient trending condition and Pending disposition  Discharge Plan A: Skilled Nursing Facility                  Brian Briscoe MD  Department of Hospital Medicine   Alli heather - Cardiology Stepdown

## 2024-11-08 NOTE — PLAN OF CARE
Auth request escalated to  leadership.      UPDATE 9:21 AM  SW sent SNF orders, Covid test, and PPD to facility via [facility].      Gabriela Zavala LMSW  [facility]  [phone]

## 2024-11-08 NOTE — PLAN OF CARE
SKILLED NURSING FACILITY ORDERS    11[facility]08/2024  [facility]  [address] [address]  Dept: [phone]  Loc: [phone]     Admit to Skilled Nursing Facility      Diagnoses:  Active Hospital Problems    Diagnosis  POA    *CHB (complete heart block) [I44.2]  Yes    Age-related physical debility [R54]  Yes    Urinary retention [R33.9]  Yes    Encephalopathy, metabolic [G93.41]  Yes    Paroxysmal atrial fibrillation [I48.0]  Yes     S/p successful DCCV 9/2024      History of pulmonary embolism [Z86.711]  Yes    Acute on chronic respiratory failure with hypoxia [J96.21]  Yes    Acute on chronic diastolic heart failure [I50.33]  Yes    Essential hypertension [I10]  Yes     Chronic    Hypothyroidism due to acquired atrophy of thyroid [E03.4]  Yes     Chronic      Resolved Hospital Problems   No resolved problems to display.       Patient is homebound due to:  CHB (complete heart block)    Allergies:Review of patient's allergies indicates:  No Known Allergies    Vitals:  Routine    Diet: cardiac diet    Activities:   Activity as tolerated    Goals of Care Treatment Preferences:  Code Status: Full Code       LaPOST: Yes           Labs:  Per facility protocol, Needs INR monitoring    Nursing Precautions:  Aspiration , Fall, and Pressure ulcer prevention    Consults:   PT to evaluate and treat- 5 times a week, OT to evaluate and treat- 5 times a week, and Wound Care     Miscellaneous Care: Harrison Care: Empty Harrison bag every shift.  Change Harrison every month  Routine Skin for Bedridden Patients:  Apply moisture barrier cream to all  Wound Care: Per Facility protocol                   #Patient has harrison catheter or urinary retention - will need a void trial at facility when mobility improves or in 3 days.      #c/w coumadin 5 mg on 11/9 and then start Eliquis on 11/10       Medications: Discontinue all previous medication orders, if any. See new list  below.     Medication List        START taking these medications      doxycycline 100 MG tablet  Commonly known as: VIBRA-TABS  Take 1 tablet (100 mg total) by mouth every 12 (twelve) hours. Take one tablet the night of 11/8/24 and one tablet the morning of 11/9/24.     metoprolol tartrate 25 MG tablet  Commonly known as: LOPRESSOR  Take 0.5 tablets (12.5 mg total) by mouth 2 (two) times daily.     sacubitriL-valsartan 24-26 mg per tablet  Commonly known as: ENTRESTO  Take 1 tablet by mouth 2 (two) times daily.     warfarin 5 MG tablet  Commonly known as: COUMADIN  Take 1 tablet (5 mg total) by mouth Daily. Last dose on 11/9/24. Switch to home apixaban on 11/10/24.            CHANGE how you take these medications      apixaban 5 mg Tab  Commonly known as: ELIQUIS  Take 1 tablet (5 mg total) by mouth 2 (two) times daily. Start on 11/10/24  Start taking on: November 10, 2024  What changed:   See the new instructions.  These instructions start on November 10, 2024. If you are unsure what to do until then, ask your doctor or other care provider.     furosemide 40 MG tablet  Commonly known as: LASIX  Take 1 tablet (40 mg total) by mouth once daily.  Start taking on: November 9, 2024  What changed:   when to take this  reasons to take this            CONTINUE taking these medications      acetaminophen 500 MG tablet  Commonly known as: TYLENOL  Take 2 tablets (1,000 mg total) by mouth every 8 (eight) hours.     ammonium lactate 12 % lotion  Commonly known as: LAC-HYDRIN  Apply topically 2 (two) times daily as needed for Dry Skin.     aspirin 81 MG EC tablet  Commonly known as: ECOTRIN  Take 1 tablet (81 mg total) by mouth once daily.     atorvastatin 10 MG tablet  Commonly known as: LIPITOR  Take 1 tablet by mouth once daily     CENTRUM SILVER 0.4 mg-300 mcg- 250 mcg Tab  Generic drug: multivit-min-FA-lycopen-lutein  Take 1 tablet by mouth once daily.     empagliflozin 10 mg tablet  Commonly known as: Jardiance  Take 1  tablet (10 mg total) by mouth once daily.     levothyroxine 200 MCG tablet  Commonly known as: SYNTHROID  Take 1 tablet by mouth once daily     melatonin 3 mg tablet  Commonly known as: MELATIN  Take 2 tablets (6 mg total) by mouth nightly as needed for Insomnia.     methocarbamoL 750 MG Tab  Commonly known as: ROBAXIN  Take 1 tablet (750 mg total) by mouth 4 (four) times daily.     mirtazapine 7.5 MG Tab  Commonly known as: REMERON  Take 1 tablet (7.5 mg total) by mouth nightly. One tablet po each night for sleep     senna-docusate 8.6-50 mg 8.6-50 mg per tablet  Commonly known as: PERICOLACE  Take 1 tablet by mouth once daily.     tamsulosin 0.4 mg Cap  Commonly known as: FLOMAX  Take 1 capsule (0.4 mg total) by mouth once daily.            STOP taking these medications      amiodarone 200 MG Tab  Commonly known as: PACERONE     oxyCODONE 5 MG immediate release tablet  Commonly known as: ROXICODONE                Immunizations Administered as of 11/8/2024       Name Date Dose VIS Date Route Exp Date    COVID-19, MRNA, LN-S, PF (Pfizer) (Purple Cap) 10/15/2021  9:10 AM 0.3 mL 12/12/2020 Intramuscular 10/31/2021    Site: Left deltoid     Given By: Fiona Hagen RN     : [employer]     Lot: [id number]     COVID-19, MRNA, LN-S, PF (Pfizer) (Purple Cap) 9/25/2021  6:02 PM 0.3 mL 12/12/2020 Intramuscular 10/31/2021    Site: Right deltoid     Given By: Joanie Benitez RN     : [employer]     Lot: [id number]             PPD placed 21:04 pm on 11/7  COVID/RSV/FLU negative 11/7         #CHB (complete heart block)-Cont doxy BID 5 days post PPM -EP: ok to start metoprolol given frequent PVCs  - can consolidate tartarate to 25 mg toprol XL as outpatient with PCP  #Urinary retention-harrison 16 F in place, had to be replaced on 11/6 due to persistent retention - void trial at [facility] when mobility improves or before in 3 days  -Cont flomax  #History of pulmonary embolism - c/w coumadin 5 mg on 11/9 and then  start Eliquis on 11/10   #Paroxysmal atrial fibrillation-Continue home metoprolol   #Acute on chronic respiratory failure with hypoxia- c/w Home 3 L O2  #Hypothyroidism -Continue home synthroid  #Essential hypertension-Cont metoprolol to assist with frequent PVCs  #HF with mildly reduced EF- c/w Jardiance, entreso 24-26 BID and BB for GDMT -Also continue lasix  monitor BP closely  #Pacemaker placement: Doxycycline for 5 days after pacemaker placement - last day 11/9, APAP for pain   #Constipation - leesa colace      Some patients may experience side effects after vaccination.  These may include fever, headache, muscle or joint aches.  Most symptoms resolve with 24-48 hours and do not require urgent medical evaluation unless they persist for more than 72 hours or symptoms are concerning for an unrelated medical condition.          _________________________________  Brian Briscoe MD  11/08/2024

## 2024-11-08 NOTE — PLAN OF CARE
CHW met with patient/family at bedside. Patient experience rounding completed and reviewed the following.     Do you know your discharge plan? Yes or No,      If yes, what is the plan? (Home, Home Health, Rehab, SNF, LTAC, or Other)  Yes SNF    Have you discussed your needs and preferences with your SW/CM? Yes or No  Yes SNF    If you are discharging home, do you have help at home? Yes or No Yes SNF Staff    Do you think you will need help additional at home at discharge? Yes or No   No    Do you currently have difficulty keeping up with bills, affording medicine or buying food? Yes or No No    Assigned SW/CM notified of any patient/family needs or concerns. Appropriate resources provided to address patient's needs.  Gabriela Garcia, JOSE M  Case Management  [phone]

## 2024-11-08 NOTE — ASSESSMENT & PLAN NOTE
-Continue home metoprolol for rate control. Continue coumadin for AC for 5 days post PPM then switch  to home DOAC on 11/10  -Monitor on tele while admitted

## 2024-11-12 ENCOUNTER — TELEPHONE (OUTPATIENT)
Dept: ELECTROPHYSIOLOGY | Facility: CLINIC | Age: 79
End: 2024-11-12
Payer: MEDICARE

## 2024-11-12 NOTE — TELEPHONE ENCOUNTER
Spoke with patient's son, Efren in regards to missed pacemaker wound check appointment yesterday 11/11/2024. Patient's son states the patient currently lives at [facility] in [place]. He states that the facility manages his appointments and to contact them to reschedule. Spoke with [facility] and reschedule device check appt to 11/13/24 @ 7:40 am.

## 2024-11-13 ENCOUNTER — CLINICAL SUPPORT (OUTPATIENT)
Dept: CARDIOLOGY | Facility: HOSPITAL | Age: 79
End: 2024-11-13
Attending: STUDENT IN AN ORGANIZED HEALTH CARE EDUCATION/TRAINING PROGRAM
Payer: MEDICARE

## 2024-11-13 ENCOUNTER — TELEPHONE (OUTPATIENT)
Dept: CARDIOLOGY | Facility: HOSPITAL | Age: 79
End: 2024-11-13
Payer: MEDICARE

## 2024-11-13 DIAGNOSIS — Z95.0 CARDIAC PACEMAKER IN SITU: ICD-10-CM

## 2024-11-13 DIAGNOSIS — I44.2 CHB (COMPLETE HEART BLOCK): ICD-10-CM

## 2024-11-13 PROCEDURE — 93281 PM DEVICE PROGR EVAL MULTI: CPT | Mod: 26,HCNC,, | Performed by: STUDENT IN AN ORGANIZED HEALTH CARE EDUCATION/TRAINING PROGRAM

## 2024-11-13 PROCEDURE — 93281 PM DEVICE PROGR EVAL MULTI: CPT | Mod: HCNC

## 2024-11-13 NOTE — TELEPHONE ENCOUNTER
Patient is a former patient of Dr. Alejandro. With his departure, patient will be scheduled with Dr. Steiner to establish care. Patient agrees to transition to Dr. Steiner and agrees with next appointment date and time on 1/3/2025.

## 2024-11-15 LAB
OHS CV BIV PACING PERCENT: 65 %
OHS CV DC REMOTE DEVICE TYPE: NORMAL

## 2024-11-25 ENCOUNTER — TELEPHONE (OUTPATIENT)
Dept: ELECTROPHYSIOLOGY | Facility: CLINIC | Age: 79
End: 2024-11-25
Payer: MEDICARE

## 2024-11-25 NOTE — TELEPHONE ENCOUNTER
Called patient to push back appointment because it was sooner than the 91 day farhad. Also to offer appointment in [place] since [facility] is where pt saw Dr. Alejandro. Patient didn't answer, but was able to get in contact with his son. Patient is a former patient of Dr. Alejandro. With his departure, patient will be scheduled with Dr. Wong to establish care. Patient agrees to transition to Dr. Wong and agrees with appointment date and time.

## 2024-11-29 ENCOUNTER — TELEPHONE (OUTPATIENT)
Dept: ELECTROPHYSIOLOGY | Facility: CLINIC | Age: 79
End: 2024-11-29
Payer: MEDICARE

## 2024-11-29 DIAGNOSIS — I50.9 CHF (CONGESTIVE HEART FAILURE): ICD-10-CM

## 2024-11-29 RX ORDER — METOPROLOL TARTRATE 25 MG/1
25 TABLET, FILM COATED ORAL 2 TIMES DAILY
Qty: 180 TABLET | Refills: 3
Start: 2024-11-29 | End: 2025-11-29

## 2024-11-29 NOTE — TELEPHONE ENCOUNTER
Received verbal order from Dr. Wong to increase Lopressor to 25 mg po BID.    Called and informed pt who wishes that I let his nurse at Rehab know( he is being discharged today)  Spoke with Zhang Forrest at [facility].  Pt wishes for rx to be sent to [facility]

## 2024-11-29 NOTE — TELEPHONE ENCOUNTER
Alert received from Pt in AF > 48 hrs on [facility] HM.    Former Dr. Alejandro pt. CRT-P implanted 11/4/2024 for CHB by Dr. Steiner.  At in clinic wound check underlying rhythm SB.    Has 3 mos post implant follow up 3/8/2025 with Dr Wong at [facility]    Hx of AF/AFL-> Last DCCV 9/19/2024    AF in progress since 11/26/2024, rate controlled.  Spoke with pt who is asymptomatic. On O2, so has hx of SOB.      Clarified with his nurse at [facility] that he is taking Eliquis and meds. No longer on Coumadin.    BiV pacing 71% PVC 28%  Lopressor 25 mg takes takes 0.5 tab (12.5 mg ) po BID    Will forward to Dr. Wong for review.            ____          _______

## 2024-12-02 ENCOUNTER — TELEPHONE (OUTPATIENT)
Dept: FAMILY MEDICINE | Facility: CLINIC | Age: 79
End: 2024-12-02
Payer: MEDICARE

## 2024-12-02 ENCOUNTER — TELEPHONE (OUTPATIENT)
Dept: ELECTROPHYSIOLOGY | Facility: CLINIC | Age: 79
End: 2024-12-02
Payer: MEDICARE

## 2024-12-02 NOTE — TELEPHONE ENCOUNTER
AF being monitored thru [facility] .  Today the remote shows a Telemetry Lock-out. So not able to get reports.    Called pt and asked if he can come to clinic on 12/4/2024 or 12/5/2024.  Pt has an appt with his Family Medicine clinic 12/4/2024 that he is trying to get moved due to transportation. He states he left a msg with this clinic.    Pt would like me to call him tomorrow to set up device check for later this week.

## 2024-12-02 NOTE — TELEPHONE ENCOUNTER
----- Message from Dulce Maria sent at 12/2/2024  4:17 PM CST -----  Type:  Needs Medical Advice    Who Called: pt  Would the patient rather a call back or a response via [facility]? call  Best Call Back Number: [phone]  Additional Information: pt would like to see about changing 12.4 appt to 3PM if possible due to transportation because he is in wheelchair

## 2024-12-03 ENCOUNTER — TELEPHONE (OUTPATIENT)
Dept: FAMILY MEDICINE | Facility: CLINIC | Age: 79
End: 2024-12-03
Payer: MEDICARE

## 2024-12-03 NOTE — TELEPHONE ENCOUNTER
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     Pt has a scheduled appt for tomorrow.- scheduled virtual with Dr Love [facility] f/u  And cardiology appt

## 2024-12-04 ENCOUNTER — CLINICAL SUPPORT (OUTPATIENT)
Dept: CARDIOLOGY | Facility: HOSPITAL | Age: 79
End: 2024-12-04
Attending: STUDENT IN AN ORGANIZED HEALTH CARE EDUCATION/TRAINING PROGRAM
Payer: MEDICARE

## 2024-12-04 ENCOUNTER — TELEPHONE (OUTPATIENT)
Dept: FAMILY MEDICINE | Facility: CLINIC | Age: 79
End: 2024-12-04

## 2024-12-04 ENCOUNTER — TELEPHONE (OUTPATIENT)
Dept: ELECTROPHYSIOLOGY | Facility: CLINIC | Age: 79
End: 2024-12-04
Payer: MEDICARE

## 2024-12-04 DIAGNOSIS — Z95.0 CARDIAC PACEMAKER IN SITU: ICD-10-CM

## 2024-12-04 DIAGNOSIS — I50.9 CHF (CONGESTIVE HEART FAILURE): ICD-10-CM

## 2024-12-04 DIAGNOSIS — I49.9 ARRHYTHMIA: ICD-10-CM

## 2024-12-04 DIAGNOSIS — I44.2 CHB (COMPLETE HEART BLOCK): ICD-10-CM

## 2024-12-04 PROCEDURE — 93281 PM DEVICE PROGR EVAL MULTI: CPT | Mod: HCNC

## 2024-12-04 NOTE — TELEPHONE ENCOUNTER
I would put an Unna boot the worst leg-change every four days  Then when changing the 1st leg add an Unna boot to the 2nd leg.  And use Unna boots every four days both legs.    If you can not use an Unna boot I would suggest using zinc oxide over the areas and Setopress wrap

## 2024-12-04 NOTE — TELEPHONE ENCOUNTER
----- Message from Iraida sent at 12/4/2024 10:42 AM CST -----  Type:  Needs Medical Advice    Who Called: Linda w/ [facility]   Symptoms (please be specific): caller states that she would like to report that pt had a fall    How long has patient had these symptoms: This morning around 10AM   Would the patient rather a call back or a response via [facility]? Call back   Best Call Back Number: [phone]

## 2024-12-04 NOTE — TELEPHONE ENCOUNTER
Pt in AF. Reviewed with Dr. Wong  Orders received to increase Metoprolol to 50 mg po BID starting med change one week from today 12/11/2024.    Pt verbalizes understanding

## 2024-12-04 NOTE — TELEPHONE ENCOUNTER
Pt seen in clinic today to assess AF status.  Telemetry lock out on Merlin.   In office interrogation shows pt still in AF  Underlying AF with PVCs and AVB with junct escape.  Reviewed with Dr. Wong.  Increase Metoprolol to 50 mg po BID starting med change one week from today 12/11/2024.    Have appt moved up to January.  Pt understands to contact clinic for worsening of SOB, fatigue, increased heart rates.  Pt understands instructions.    Will ask MA team to push up appts.

## 2024-12-05 ENCOUNTER — TELEPHONE (OUTPATIENT)
Dept: FAMILY MEDICINE | Facility: CLINIC | Age: 79
End: 2024-12-05
Payer: MEDICARE

## 2024-12-05 NOTE — TELEPHONE ENCOUNTER
----- Message from Med Assistant Winn sent at 12/5/2024  3:52 PM CST -----  .Type:  Request Orders    Who Called: Pt   Would the patient rather a call back or a response via [facility]? Call Back  Best Call Back Number: [phone] x:[phone]  Additional Information: Caller requesting orders to change the Mckeon Catheter.

## 2024-12-05 NOTE — TELEPHONE ENCOUNTER
Start off using the Unna boot on one leg only.  Then after the four days of it being on one leg start doing the other leg in addition to the current leg using the Unna boot.  I do not want to push that much fluid back into his body at one time

## 2024-12-05 NOTE — TELEPHONE ENCOUNTER
I have spoken to Lorena  nurse. I informed her of the message.     Lorena is needing clarification on the last part of Unna boot instructions. Do you want to her alternate both legs for four days?

## 2024-12-06 ENCOUNTER — TELEPHONE (OUTPATIENT)
Dept: FAMILY MEDICINE | Facility: CLINIC | Age: 79
End: 2024-12-06
Payer: MEDICARE

## 2024-12-06 RX ORDER — AMOXICILLIN 250 MG
1 CAPSULE ORAL DAILY
Qty: 30 TABLET | COMMUNITY
Start: 2024-12-06

## 2024-12-06 RX ORDER — FUROSEMIDE 40 MG/1
40 TABLET ORAL DAILY
Qty: 90 TABLET | Refills: 1 | Status: SHIPPED | OUTPATIENT
Start: 2024-12-06 | End: 2025-12-06

## 2024-12-06 RX ORDER — AMMONIUM LACTATE 12 G/100G
LOTION TOPICAL
Qty: 396 G | Refills: 1 | Status: SHIPPED | OUTPATIENT
Start: 2024-12-06

## 2024-12-06 RX ORDER — TAMSULOSIN HYDROCHLORIDE 0.4 MG/1
0.4 CAPSULE ORAL DAILY
Qty: 90 CAPSULE | Refills: 3 | Status: SHIPPED | OUTPATIENT
Start: 2024-12-06 | End: 2025-12-06

## 2024-12-06 RX ORDER — ATORVASTATIN CALCIUM 10 MG/1
10 TABLET, FILM COATED ORAL DAILY
Qty: 90 TABLET | Refills: 1 | Status: SHIPPED | OUTPATIENT
Start: 2024-12-06

## 2024-12-06 RX ORDER — METHOCARBAMOL 750 MG/1
750 TABLET, FILM COATED ORAL 4 TIMES DAILY PRN
Qty: 45 TABLET | Refills: 0
Start: 2024-12-06

## 2024-12-06 RX ORDER — MIRTAZAPINE 7.5 MG/1
7.5 TABLET, FILM COATED ORAL NIGHTLY
Qty: 90 TABLET | Refills: 1 | Status: SHIPPED | OUTPATIENT
Start: 2024-12-06

## 2024-12-06 RX ORDER — LEVOTHYROXINE SODIUM 200 UG/1
200 TABLET ORAL
Qty: 90 TABLET | Refills: 3 | Status: SHIPPED | OUTPATIENT
Start: 2024-12-06

## 2024-12-06 NOTE — TELEPHONE ENCOUNTER
"----- Message from Manju sent at 12/6/2024  2:03 PM CST -----  Type:  Patient Returning Call    Who Called:[facility]/ [facility]   Does the patient know what this is regarding?:pt wasn't seen by home health today  Caller couldn't get in touch with pt or the pt's son  Requesting  orders to change the cathter   Would the patient rather a call back or a response via [facility]? Call   Best Call Back Number": [phone]  Additional Information:  "

## 2024-12-06 NOTE — TELEPHONE ENCOUNTER
----- Message from Shruti sent at 12/4/2024  4:22 PM CST -----  Type:  Needs Medical Advice/medication     Who Called: pt    Pharmacy name and phone #:  [id number] [address] [facility]  [address] [facility] [address]  Phone: [phone] Fax: [phone]  Hours: Not open 24 hours      Would the patient rather a call back or a response via [facility]? Call   Best Call Back Number: [phone]  Additional Information: pt requesting a call back to discuss 7 medications being sent in.      
I spoke with the pt    I will call him back when he is at home to get a good list of meds that he needs refilled at this time  He was discharged from rehab last friday with 7 day supply    I scheduled hospital f/u for next week with Dr Levine  
I spoke with the pt  - pt has appt with you next week (I updated his med list- coumadin is on his med list but he did not say he is taking this - should he be?)    [facility] the [facility]    entresto 24-26 mg 1 BID   Mirtazapine 7.5 mg 1 Q HS - 3 left  Methocarbamol 750 mg 1 QID prn - completely out   Atorvastatin  10 mg 1 Q HS - completely out  Metoprolol tart 50 mg 1 BID  Furosemide 40 mg 1 Keys - completely out  Apixaban 5 mg 1 BID - completely out  Levothyroxine 200 mg 1 daily - completely out  Tamsulosin 0.4 mg 1 daily  10. senna-docusate 8.6-50 mg (PERICOLACE) 8.6-50 mg 1 daily  11. Lac-hydrin 12 % lotion  12. Jardiance 10 mg       do not need refilled    Lactalose  -   Tylenol  Melatonin  MVI  ASA 81 mg   
[facility]  
No

## 2024-12-06 NOTE — TELEPHONE ENCOUNTER
Spoke to pt. I explained that the message was sent to Dr. Levine and that I will remind him to send the medication today.

## 2024-12-06 NOTE — TELEPHONE ENCOUNTER
----- Message from Iraida sent at 12/6/2024  2:51 PM CST -----  Type:  Needs Medical Advice    Who Called: Pt   Symptoms (please be specific): pt states that he would like to speak to Piper regarding prescriptions, pt states he spoke w/ her and prescriptions were supposed to be sent over to pharmacy, pt states that prescriptions are not in and needs a call back as soon as possible    Would the patient rather a call back or a response via [facility]? Call back   Best Call Back Number: [phone]

## 2024-12-10 ENCOUNTER — TELEPHONE (OUTPATIENT)
Dept: FAMILY MEDICINE | Facility: CLINIC | Age: 79
End: 2024-12-10

## 2024-12-10 ENCOUNTER — OFFICE VISIT (OUTPATIENT)
Dept: FAMILY MEDICINE | Facility: CLINIC | Age: 79
End: 2024-12-10
Payer: MEDICARE

## 2024-12-10 VITALS
BODY MASS INDEX: 31.92 KG/M2 | HEIGHT: 71 IN | OXYGEN SATURATION: 93 % | TEMPERATURE: 97 F | WEIGHT: 228 LBS | HEART RATE: 52 BPM | SYSTOLIC BLOOD PRESSURE: 110 MMHG | DIASTOLIC BLOOD PRESSURE: 78 MMHG

## 2024-12-10 DIAGNOSIS — R73.9 HYPERGLYCEMIA: ICD-10-CM

## 2024-12-10 DIAGNOSIS — S32.9XXD CLOSED NONDISPLACED FRACTURE OF PELVIS WITH ROUTINE HEALING, UNSPECIFIED PART OF PELVIS, SUBSEQUENT ENCOUNTER: Primary | ICD-10-CM

## 2024-12-10 DIAGNOSIS — I50.9 CONGESTIVE HEART FAILURE, UNSPECIFIED HF CHRONICITY, UNSPECIFIED HEART FAILURE TYPE: ICD-10-CM

## 2024-12-10 DIAGNOSIS — I10 ESSENTIAL HYPERTENSION: ICD-10-CM

## 2024-12-10 PROCEDURE — 3078F DIAST BP <80 MM HG: CPT | Mod: CPTII,S$GLB,, | Performed by: FAMILY MEDICINE

## 2024-12-10 PROCEDURE — 1160F RVW MEDS BY RX/DR IN RCRD: CPT | Mod: CPTII,S$GLB,, | Performed by: FAMILY MEDICINE

## 2024-12-10 PROCEDURE — 1157F ADVNC CARE PLAN IN RCRD: CPT | Mod: CPTII,S$GLB,, | Performed by: FAMILY MEDICINE

## 2024-12-10 PROCEDURE — 3288F FALL RISK ASSESSMENT DOCD: CPT | Mod: CPTII,S$GLB,, | Performed by: FAMILY MEDICINE

## 2024-12-10 PROCEDURE — 3074F SYST BP LT 130 MM HG: CPT | Mod: CPTII,S$GLB,, | Performed by: FAMILY MEDICINE

## 2024-12-10 PROCEDURE — 1100F PTFALLS ASSESS-DOCD GE2>/YR: CPT | Mod: CPTII,S$GLB,, | Performed by: FAMILY MEDICINE

## 2024-12-10 PROCEDURE — 1159F MED LIST DOCD IN RCRD: CPT | Mod: CPTII,S$GLB,, | Performed by: FAMILY MEDICINE

## 2024-12-10 PROCEDURE — 1126F AMNT PAIN NOTED NONE PRSNT: CPT | Mod: CPTII,S$GLB,, | Performed by: FAMILY MEDICINE

## 2024-12-10 PROCEDURE — 99214 OFFICE O/P EST MOD 30 MIN: CPT | Mod: S$GLB,,, | Performed by: FAMILY MEDICINE

## 2024-12-10 RX ORDER — LACTULOSE 10 G/15ML
SOLUTION ORAL; RECTAL
COMMUNITY
Start: 2024-11-19

## 2024-12-10 NOTE — TELEPHONE ENCOUNTER
----- Message from Med Assistant Winn sent at 12/10/2024  4:21 PM CST -----  Type:  Needs Medical Advice    Who Called: Lorena with [facility]  Would the patient rather a call back or a response via [facility]? Call Back  Best Call Back Number: [phone]  Additional Information: Lorena with [facility] advised the Pt was not seen by home health today, was able to reach him this afternoon. Another visit is scheduled for Thursday 12/12

## 2024-12-16 ENCOUNTER — CLINICAL SUPPORT (OUTPATIENT)
Dept: CARDIOLOGY | Facility: HOSPITAL | Age: 79
End: 2024-12-16
Attending: INTERNAL MEDICINE
Payer: MEDICARE

## 2024-12-16 DIAGNOSIS — I44.2 ATRIOVENTRICULAR BLOCK, COMPLETE: ICD-10-CM

## 2024-12-16 DIAGNOSIS — Z95.0 PRESENCE OF CARDIAC PACEMAKER: ICD-10-CM

## 2024-12-16 PROCEDURE — 93294 REM INTERROG EVL PM/LDLS PM: CPT | Mod: HCNC,,, | Performed by: INTERNAL MEDICINE

## 2024-12-16 PROCEDURE — 93296 REM INTERROG EVL PM/IDS: CPT | Mod: HCNC | Performed by: INTERNAL MEDICINE

## 2024-12-16 NOTE — ADDENDUM NOTE
Addended by: ANASTACIA SUBRAMANIAN on: 12/15/2024 10:45 PM     Modules accepted: Level of Service

## 2024-12-16 NOTE — PROGRESS NOTES
Patient ID: Bean Salas is a 79 y.o. male.    Chief Complaint: Hospital Follow Up    HPI    Bean Salas is a 79 y.o. male     History of Present Illness    CHIEF COMPLAINT:  Patient presents today for follow-up after a pelvic fracture and subsequent rehabilitation.    MOBILITY AND REHABILITATION:  He currently uses a wheelchair and walker for mobility, attending outpatient physical therapy twice weekly. He reports challenges with balance during transfers, particularly from wheelchair to walker, resulting in several controlled falls at home requiring EMT assistance. He successfully transfers in and out of the wheelchair most times but acknowledges difficulties with proper technique. He reports taking approximately 30 steps before needing to rest. A hospital bed with full electric adjustability has been ordered to assist with transfers. Home adaptations include strategically placing the walker near the bed. He denies receiving transfer training during inpatient rehabilitation. His current setup requires transfers to either wheelchair or walker to access the bathroom.    RECENT INJURY AND FALLS:  He recently experienced a fall resulting in a pelvic fracture, requiring surgical intervention with screws. He emphasizes the importance of proper technique when using the walker to prevent further falls.    ACTIVITIES OF DAILY LIVING:  He reports going to bed around 6 or 7 PM, watching TV before sleep. He wakes at 2 AM, remains in bed for about an hour before falling back asleep, and rises between 6 and 7 AM using a walker. He prepares his own meals, including [employer] frozen dinners.    CARDIOVASCULAR SYMPTOMS:  He reports chronic leg swelling, managed with diuretics. He experiences occasional shortness of breath when agitated, but denies chest pain and palpitations.    MEDICATIONS:  Current medications include Entresto (sacubitril/valsartan) and amlodipine for blood pressure, and mirtazapine for sleep and  "mood. He inquires about the possibility of combining multiple medications into a single bottle for convenience.         Vitals:    12/10/24 1531   BP: 110/78   BP Location: Right arm   Patient Position: Sitting   Pulse: (!) 52   Temp: 97.3 °F (36.3 °C)   TempSrc: Oral   SpO2: (!) 93%   Weight: 103.4 kg (228 lb)   Height: 5' 11" (1.803 m)            Review of Symptoms      Physical Exam    Constitutional:  Oriented to person, place, and time.appears well-developed and well-nourished.  No distress.      HENT  Head: Normocephalic and atraumatic  Right Ear: External ear normal.   Left Ear: External ear normal.   Nose: External nose normal.   Mouth:  Moist mucus membranes.    Eyes:  Conjunctivae are normal. Right eye exhibits no discharge.  Left eye exhibits no discharge. No scleral icterus.  No periorbital edema    Cardiovascular:  Regular rate and rhythm with normal S1 and S2     Pulmonary/Chest:   Clear to auscultation bilaterally without wheezes, rhonchi or rales      Musculoskeletal:  Bilateral lower extremity edema with no ulcerations   No obvious deformity No wasting       Neurological:  Alert and oriented to person, place, and time.   Coordination normal.     Skin:   Skin is warm and dry.  No diaphoresis.   No rash noted.     Psychiatric: Normal mood and affect. Behavior is normal.  Judgment and thought content normal.     Physical Exam    Lungs: No fluid in base of lungs.         Complete Blood Count  Lab Results   Component Value Date    RBC 3.23 (L) 11/08/2024    HGB 10.5 (L) 11/08/2024    HCT 33.7 (L) 11/08/2024     (H) 11/08/2024    MCH 32.5 (H) 11/08/2024    MCHC 31.2 (L) 11/08/2024    RDW 15.7 (H) 11/08/2024     11/08/2024    MPV 11.8 11/08/2024    GRAN 3.7 11/08/2024    GRAN 73.7 (H) 11/08/2024    LYMPH 0.7 (L) 11/08/2024    LYMPH 13.0 (L) 11/08/2024    MONO 0.5 11/08/2024    MONO 10.5 11/08/2024    EOS 0.1 11/08/2024    BASO 0.03 11/08/2024    EOSINOPHIL 1.6 11/08/2024    BASOPHIL 0.6 " 11/08/2024    DIFFMETHOD Automated 11/08/2024       Comprehensive Metabolic Panel  Lab Results   Component Value Date     (H) 11/08/2024    BUN 18 11/08/2024    CREATININE 1.0 11/08/2024     11/08/2024    K 4.1 11/08/2024     11/08/2024    PROT 5.8 (L) 11/08/2024    ALBUMIN 2.7 (L) 11/08/2024    BILITOT 1.0 11/08/2024    AST 18 11/08/2024    ALKPHOS 101 11/08/2024    CO2 30 (H) 11/08/2024    ALT 13 11/08/2024    ANIONGAP 7 (L) 11/08/2024       TSH  Lab Results   Component Value Date    TSH 8.988 (H) 10/29/2024       Assessment / Plan:      ICD-10-CM ICD-9-CM   1. Congestive heart failure, unspecified HF chronicity, unspecified heart failure type  I50.9 428.0   2. Essential hypertension  I10 401.9   3. Hyperglycemia  R73.9 790.29     Congestive heart failure, unspecified HF chronicity, unspecified heart failure type    Essential hypertension    Hyperglycemia        Assessment & Plan    - Assessed patient's mobility status post-pelvis fracture and rehabilitation  - Evaluated fluid retention and leg swelling, recommending increased diuretic use  - Reviewed medication regimen, adjusting doses as needed for optimal management of multiple chronic conditions  - Considered fall risk and proper use of assistive devices  - Discussed potential for future outpatient physical therapy after completion of home health services    FLUID RETENTION AND EDEMA:  - Explained relationship between sodium intake, fluid retention, and leg swelling.  - Discussed importance of movement and muscle contraction in reducing fluid accumulation in lower extremities.  - Patient to reduce sodium intake, particularly from processed and prepared foods.  - Patient to elevate legs when sitting to help reduce swelling.  - Provided information on the impact of processed foods on sodium intake.  - Increased furosemide (Lasix) to twice daily dosing for the next week, then return to daily thereafter.    FALL RISK AND MOBILITY:  - Educated on  the risks associated with falls and fractures in older adults, emphasizing the potential for severe complications.  - Patient to continue using wheelchair and walker as instructed to minimize fall risk.  - Patient to maximize participation in physical therapy sessions to improve mobility and strength.    CARDIOVASCULAR HEALTH:  - Continued Entresto for blood pressure and heart function.  - Continued atorvastatin for cholesterol management.  - Continued metoprolol for heart rate and blood pressure control.    ANTICOAGULATION:  - Continued apixaban for anticoagulation.    THYROID FUNCTION:  - Continued levothyroxine for thyroid function.    URINARY SYMPTOMS:  - Continued tamsulosin for urinary symptoms.    MOOD AND SLEEP:  - Continued mirtazapine 7.5mg for sleep and mood.  - Continued melatonin for sleep.    PAIN MANAGEMENT:  - Continued methocarbamol as muscle relaxer.  - Continued Tylenol as needed for pain.    BOWEL MANAGEMENT:  - Continued senna and lactulose for bowel management.    GENERAL HEALTH:  - Continued multivitamin daily.    FOLLOW-UP:  - Follow up in 3 months.  - Contact the office if any issues arise before the next appointment.       Went over each medication      This note was generated with the assistance of ambient listening technology. Verbal consent was obtained by the patient and accompanying visitor(s) for the recording of patient appointment to facilitate this note. I attest to having reviewed and edited the generated note for accuracy, though some syntax or spelling errors may persist. Please contact the author of this note for any clarification.

## 2024-12-26 ENCOUNTER — TELEPHONE (OUTPATIENT)
Dept: FAMILY MEDICINE | Facility: CLINIC | Age: 79
End: 2024-12-26
Payer: MEDICARE

## 2024-12-26 NOTE — TELEPHONE ENCOUNTER
I have spoken to Lorena from [facility]. She stated she seen pt today. Pt c/o sob and decrease urine output. Lorena stated she heard crackling lung sounds. Pt only wears O2 at night do the oxygen tank being hard to carry during the day. Pt is taking lasix 40 mg bid. Pt's legs are wrapped. Pt's vitals today were bp: 94/60, p: 66, O2: 91%    ----- Message from dinCloud sent at 12/26/2024 11:38 AM CST -----  Regarding: Lorena Calling from [facility] [phone]  Contact: Lorena Calling from [facility] [phone]  Who Called: Lorena Calling from [facility] [phone]    Calling to talk to nurse in regards to patient is still having shortness of breath and oxygen is still at 2 liters just at night heard some crackles on his lungs and taking lasix 2 times a day and fluid on his lungs.

## 2024-12-26 NOTE — NURSING
Stitches Removal    WHAT YOU NEED TO KNOW:    Stitches are usually removed within 14 days, depending on the location of the wound. Your healthcare provider will tell you when to return to have your stitches removed. Your provider will use sterile forceps or tweezers to  the knot of each stitch. He or she will cut the stitch with scissors and pull the stitch out. You may feel a slight tug as the stitch comes out.    DISCHARGE INSTRUCTIONS:    Return to the emergency department if:     Your wound splits open or is starting to come apart.      You suddenly cannot move your injured joint.      You have sudden numbness around your wound.      You see red streaks coming from your wound.    Contact your healthcare provider if:     You have a fever and chills.      Your wound is red, warm, swollen, or leaking pus.      There is a bad smell coming from your wound.      You have increased pain in the wound area.      You have questions or concerns about your condition or care.    Care for the area after the stitches are removed:     Do not pull medical tape off. Your provider may place small strips of medical tape across your wound after the stitches have been removed. These strips will peel and fall of on their own. Do not pull them off.      Clean the area as directed. Carefully wash the area with soap and water. Pat the area dry with a clean towel. Check the area for signs of infection, such as redness, swelling, or pus. Also check that the wound is not coming apart.      Protect your wound. Your wound can swell, bleed, or split open if it is stretched or bumped. You may need to wear a bandage that supports your wound until it is completely healed.      Care for a scar. You may have a scar after the stitches are removed. Use sunblock if the area is exposed to the sun. Apply it every day after the stitches are removed. This will help prevent skin discoloration. Talk to your healthcare provider about medicines you can use to make the scar less visible. Some medicines are available without a prescription.    Follow up with your healthcare provider as directed: You may need to return in 3 to 5 days if the stitches are on your face. Stitches on your scalp need to be removed after 7 to 14 days. Stitches over joints may remain in place up to 14 days. Write down your questions so you remember to ask them during your visits. Third attempt made to contact pt';s son, Efren. Mailbox is full and message could not be left in regards to his farther being transferred to ID for treatment. [facility] ambulance service is on unit. Pt was transferred to [facility] in stable cons\diton via stretcher per ambulance service.

## 2024-12-27 NOTE — TELEPHONE ENCOUNTER
He can make sure he is not consuming more than 2 l of fluids each day    Secondly he can try taking an extra lasix in the am  total of 80 mg but that may decrease his pressure too much.  I do not want to take him off his BP  and chg meds without cardiologist rec    He should reach to the cardiology team as well  ER if worsens

## 2024-12-30 DIAGNOSIS — I49.9 ARRHYTHMIA: ICD-10-CM

## 2024-12-30 NOTE — TELEPHONE ENCOUNTER
----- Message from Yodit sent at 12/30/2024 11:06 AM CST -----  Type:  RX Refill Request    Who Called: Lorena? Home Health  Refill or New Rx:refill :apixaban (ELIQUIS) 5 mg Tab, empagliflozin (JARDIANCE) 10 mg tablet  RX Name and Strength:[phone]  How is the patient currently taking it? (ex. 1XDay):1x  Is this a 30 day or 90 day RX:30  Preferred Pharmacy with phone number:[facility] [address] [place] [address] [facility]   Phone: [phone]  Fax: [phone]  Would the patient rather a call back or a response via [facility]? Call back   Best Call Back Number:[phone]  Additional Information:

## 2024-12-30 NOTE — TELEPHONE ENCOUNTER
Care Due:                  Date            Visit Type   Department     Provider  --------------------------------------------------------------------------------                                HOSPITAL     [facility]  Last Visit: 12-      FOLLOW UP    MEDICINE       Ramirez Levine                              EP -                              PRIMARY      [facility]  Next Visit: 01-      CARE ([facility])   MEDICINE       Ramirez Levine                                                            Last  Test          Frequency    Reason                     Performed    Due Date  --------------------------------------------------------------------------------    HBA1C.......  6 months...  empagliflozin............  09-   03-    Lipid Panel.  12 months..  atorvastatin.............  01- 01-    [facility] Embedded Care Due Messages. Reference number: [id number].   12/30/2024 11:28:37 AM CST

## 2025-01-08 LAB
OHS CV AF BURDEN PERCENT: 74
OHS CV BIV PACING PERCENT: 66 %
OHS CV DC REMOTE DEVICE TYPE: NORMAL
OHS CV ICD SHOCK: NO

## 2025-01-09 ENCOUNTER — TELEPHONE (OUTPATIENT)
Dept: FAMILY MEDICINE | Facility: CLINIC | Age: 80
End: 2025-01-09
Payer: MEDICARE

## 2025-01-09 DIAGNOSIS — N13.9 URINARY OBSTRUCTION: Primary | ICD-10-CM

## 2025-01-09 DIAGNOSIS — Z97.8 INDWELLING FOLEY CATHETER PRESENT: ICD-10-CM

## 2025-01-09 RX ORDER — CEPHALEXIN 500 MG/1
1000 CAPSULE ORAL EVERY 12 HOURS
Qty: 28 CAPSULE | Refills: 0 | Status: SHIPPED | OUTPATIENT
Start: 2025-01-09

## 2025-01-09 NOTE — TELEPHONE ENCOUNTER
I would hold the Entresto medication  secondly, then I would take tomorrow Lasix 40 milligrams 2 times daily-    If pressure dropped lower-and continues to be short of breath needs to go to the hospital-  while he has a Mckeon he can discontinue the Flomax/tamsulosin medication.    I will put in a urology follow-up      In regards to oxygen machine-what is your secondary oxygen machine-oxygen concentrate or? -if so what DME company-I suggest calling DME and have them replace it    Sent in Keflex- antibiotic- for possible UTI

## 2025-01-09 NOTE — TELEPHONE ENCOUNTER
Lorena with home health calling.    Pts bp low today after taking meds 88/60  Pt was SOB overnight. O2 states today were 84%  Pt asking to increase oxygen to 3 1/2 to 4 liters. Pt states he felt better when he did this.  Pt wants new secondary oxygen machine. His is broken.  Pt has a tear on urethra/ bottom of penis due to the harrison cath, little bleeding, no pain. Nurse suggested ER. Pt refused. Pt prefer to see a urologist, needs a referral.  Pts urine smells, no pressure, no burning. Nurse thinks pt needs antibiotics.    Staff call pt with response

## 2025-01-10 NOTE — TELEPHONE ENCOUNTER
Pt has been notified and verbalized understanding of below information.     Referral coordinator will contact patient to facilitate urology appt.

## 2025-01-13 ENCOUNTER — TELEPHONE (OUTPATIENT)
Dept: FAMILY MEDICINE | Facility: CLINIC | Age: 80
End: 2025-01-13
Payer: MEDICARE

## 2025-01-13 NOTE — TELEPHONE ENCOUNTER
----- Message from Silvia sent at 2025  9:28 AM CST -----  Type:  Orders request    Who Called: Lorena Das  Best Call Back Number: [phone]  Additional Information:   Patient is requesting orders for     Wound care for Pt new wound on penis.    I spoke with the pt HH nurse Lorena regarding message from Friday  She will see him today and relay the following messages to him - he did not answer our call Friday nor the portal    The concentrator he has is for his  wife - it is broken  She will call be back with the DME name so was can order a concentrator for him     She wants to know if ok to increase his oxygen at night?    I would hold the Entresto medication  secondly, then I would take tomorrow Lasix 40 milligrams 2 times daily-     If pressure dropped lower-and continues to be short of breath needs to go to the hospital-  while he has a Mckeon he can discontinue the Flomax/tamsulosin medication.     I will put in a urology follow-up        In regards to oxygen machine-what is your secondary oxygen machine-oxygen concentrate or? -if so what DME company-I suggest calling DME and have them replace it     Sent in Keflex- antibiotic- for possible UTI

## 2025-01-13 NOTE — TELEPHONE ENCOUNTER
Do we know who ordered home oxygen-they should have sent in the request to a DME company.  That company should be able to supply his homebound and traveling supply of oxygen

## 2025-01-13 NOTE — TELEPHONE ENCOUNTER
Message  Received: Today   Pt Advice  Silvia Ramírez Staff  Caller: Unspecified (Today,  1:19 PM)  Type:  Needs Medical Advice/ Fall    Who Called: Lorena Jacob Call Back Number: [phone]  Additional Information:     Patient is requesting a call back in regards to  Pt fell, found by home health on ground, ems was called.   Pt refused to go to hospital and refuses any injury.   O2 was 66% on room air.   Pt did have his concentrator fixed.

## 2025-01-15 ENCOUNTER — DOCUMENT SCAN (OUTPATIENT)
Dept: HOME HEALTH SERVICES | Facility: HOSPITAL | Age: 80
End: 2025-01-15
Payer: MEDICARE

## 2025-01-16 NOTE — TELEPHONE ENCOUNTER
nurse lorena [phone]  called    When she arrived today pt was in bed  With c/o  increased weakness and increase pain   Lower back pain and right hip pain  He feels like he is regressing     Not taking meds as prescribed    Wounds to back and butt - advised v/o to eval and treat    Pt wearing uni boot - heal wound has healed per dr jackson pt should start using compression socks instead      Lorena did advise pt to go to ER  And I notified her that is dr jackson's suggestion

## 2025-01-17 ENCOUNTER — HOSPITAL ENCOUNTER (EMERGENCY)
Facility: HOSPITAL | Age: 80
Discharge: HOME | End: 2025-01-17
Attending: EMERGENCY MEDICINE
Payer: MEDICARE

## 2025-01-17 ENCOUNTER — DOCUMENT SCAN (OUTPATIENT)
Dept: HOME HEALTH SERVICES | Facility: HOSPITAL | Age: 80
End: 2025-01-17
Payer: MEDICARE

## 2025-01-17 VITALS
WEIGHT: 220 LBS | TEMPERATURE: 98 F | BODY MASS INDEX: 30.8 KG/M2 | DIASTOLIC BLOOD PRESSURE: 76 MMHG | HEIGHT: 71 IN | OXYGEN SATURATION: 96 % | RESPIRATION RATE: 16 BRPM | HEART RATE: 106 BPM | SYSTOLIC BLOOD PRESSURE: 167 MMHG

## 2025-01-17 DIAGNOSIS — S22.069A CLOSED FRACTURE OF EIGHTH THORACIC VERTEBRA, UNSPECIFIED FRACTURE MORPHOLOGY, INITIAL ENCOUNTER: ICD-10-CM

## 2025-01-17 DIAGNOSIS — I50.9 CONGESTIVE HEART FAILURE, UNSPECIFIED HF CHRONICITY, UNSPECIFIED HEART FAILURE TYPE: Primary | ICD-10-CM

## 2025-01-17 DIAGNOSIS — I50.9 CHF (CONGESTIVE HEART FAILURE): ICD-10-CM

## 2025-01-17 DIAGNOSIS — R06.02 SHORTNESS OF BREATH: ICD-10-CM

## 2025-01-17 LAB
ALBUMIN SERPL BCP-MCNC: 3 G/DL (ref 3.5–5.2)
ALP SERPL-CCNC: 64 U/L (ref 38–126)
ALT SERPL W/O P-5'-P-CCNC: 56 U/L (ref 10–44)
ANION GAP SERPL CALC-SCNC: 3 MMOL/L (ref 8–16)
AST SERPL-CCNC: 53 U/L (ref 15–46)
BASOPHILS # BLD AUTO: 0.02 K/UL (ref 0–0.2)
BASOPHILS NFR BLD: 0.2 % (ref 0–1.9)
BILIRUB SERPL-MCNC: 1.2 MG/DL (ref 0.1–1)
CALCIUM SERPL-MCNC: 8.4 MG/DL (ref 8.7–10.5)
CHLORIDE SERPL-SCNC: 96 MMOL/L (ref 95–110)
CO2 SERPL-SCNC: 38 MMOL/L (ref 23–29)
CREAT SERPL-MCNC: 0.95 MG/DL (ref 0.5–1.4)
DIFFERENTIAL METHOD BLD: ABNORMAL
EOSINOPHIL # BLD AUTO: 0 K/UL (ref 0–0.5)
EOSINOPHIL NFR BLD: 0.1 % (ref 0–8)
ERYTHROCYTE [DISTWIDTH] IN BLOOD BY AUTOMATED COUNT: 16.2 % (ref 11.5–14.5)
EST. GFR  (NO RACE VARIABLE): >60 ML/MIN/1.73 M^2
GLUCOSE SERPL-MCNC: 158 MG/DL (ref 70–110)
HCT VFR BLD AUTO: 40.9 % (ref 40–54)
HGB BLD-MCNC: 12.7 G/DL (ref 14–18)
IMM GRANULOCYTES # BLD AUTO: 0.05 K/UL (ref 0–0.04)
IMM GRANULOCYTES NFR BLD AUTO: 0.4 % (ref 0–0.5)
LYMPHOCYTES # BLD AUTO: 0.4 K/UL (ref 1–4.8)
LYMPHOCYTES NFR BLD: 3.9 % (ref 18–48)
MCH RBC QN AUTO: 31 PG (ref 27–31)
MCHC RBC AUTO-ENTMCNC: 31.1 G/DL (ref 32–36)
MCV RBC AUTO: 100 FL (ref 82–98)
MONOCYTES # BLD AUTO: 0.6 K/UL (ref 0.3–1)
MONOCYTES NFR BLD: 5.3 % (ref 4–15)
NEUTROPHILS # BLD AUTO: 10.1 K/UL (ref 1.8–7.7)
NEUTROPHILS NFR BLD: 90.1 % (ref 38–73)
NRBC BLD-RTO: 0 /100 WBC
NT-PROBNP SERPL-MCNC: 5670 PG/ML (ref 5–1800)
PLATELET # BLD AUTO: 215 K/UL (ref 150–450)
PMV BLD AUTO: 11 FL (ref 9.2–12.9)
POTASSIUM SERPL-SCNC: 3.7 MMOL/L (ref 3.5–5.1)
PROT SERPL-MCNC: 6.7 G/DL (ref 6–8.4)
RBC # BLD AUTO: 4.1 M/UL (ref 4.6–6.2)
SODIUM SERPL-SCNC: 137 MMOL/L (ref 136–145)
TROPONIN I SERPL-MCNC: 0.03 NG/ML (ref 0.01–0.03)
UUN UR-MCNC: 29 MG/DL (ref 2–20)
WBC # BLD AUTO: 11.17 K/UL (ref 3.9–12.7)

## 2025-01-17 PROCEDURE — 96375 TX/PRO/DX INJ NEW DRUG ADDON: CPT | Mod: HCNC,ER

## 2025-01-17 PROCEDURE — 93005 ELECTROCARDIOGRAM TRACING: CPT | Mod: HCNC,ER

## 2025-01-17 PROCEDURE — 84484 ASSAY OF TROPONIN QUANT: CPT | Mod: HCNC,ER | Performed by: EMERGENCY MEDICINE

## 2025-01-17 PROCEDURE — 94761 N-INVAS EAR/PLS OXIMETRY MLT: CPT | Mod: HCNC,ER

## 2025-01-17 PROCEDURE — 25000003 PHARM REV CODE 250: Mod: HCNC,ER

## 2025-01-17 PROCEDURE — 93010 ELECTROCARDIOGRAM REPORT: CPT | Mod: HCNC,,, | Performed by: INTERNAL MEDICINE

## 2025-01-17 PROCEDURE — 27000221 HC OXYGEN, UP TO 24 HOURS: Mod: HCNC,ER

## 2025-01-17 PROCEDURE — 83880 ASSAY OF NATRIURETIC PEPTIDE: CPT | Mod: HCNC,ER | Performed by: EMERGENCY MEDICINE

## 2025-01-17 PROCEDURE — 63600175 PHARM REV CODE 636 W HCPCS: Mod: HCNC,ER | Performed by: EMERGENCY MEDICINE

## 2025-01-17 PROCEDURE — 85025 COMPLETE CBC W/AUTO DIFF WBC: CPT | Mod: HCNC,ER | Performed by: EMERGENCY MEDICINE

## 2025-01-17 PROCEDURE — 99285 EMERGENCY DEPT VISIT HI MDM: CPT | Mod: 25,HCNC,ER

## 2025-01-17 PROCEDURE — 80053 COMPREHEN METABOLIC PANEL: CPT | Mod: HCNC,ER | Performed by: EMERGENCY MEDICINE

## 2025-01-17 PROCEDURE — 96374 THER/PROPH/DIAG INJ IV PUSH: CPT | Mod: HCNC,ER

## 2025-01-17 RX ORDER — TRAMADOL HYDROCHLORIDE 50 MG/1
TABLET ORAL
Status: COMPLETED
Start: 2025-01-17 | End: 2025-01-17

## 2025-01-17 RX ORDER — MORPHINE SULFATE 4 MG/ML
4 INJECTION, SOLUTION INTRAMUSCULAR; INTRAVENOUS
Status: COMPLETED | OUTPATIENT
Start: 2025-01-17 | End: 2025-01-17

## 2025-01-17 RX ORDER — FUROSEMIDE 10 MG/ML
80 INJECTION INTRAMUSCULAR; INTRAVENOUS
Status: COMPLETED | OUTPATIENT
Start: 2025-01-17 | End: 2025-01-17

## 2025-01-17 RX ORDER — TRAMADOL HYDROCHLORIDE 50 MG/1
50 TABLET ORAL EVERY 6 HOURS PRN
Qty: 20 TABLET | Refills: 0 | Status: ON HOLD | OUTPATIENT
Start: 2025-01-17 | End: 2025-01-28

## 2025-01-17 RX ORDER — TRAMADOL HYDROCHLORIDE 50 MG/1
50 TABLET ORAL
Status: COMPLETED | OUTPATIENT
Start: 2025-01-17 | End: 2025-01-17

## 2025-01-17 RX ORDER — FUROSEMIDE 40 MG/1
40 TABLET ORAL NIGHTLY
Qty: 5 TABLET | Refills: 0 | Status: ON HOLD | OUTPATIENT
Start: 2025-01-17 | End: 2025-01-28 | Stop reason: HOSPADM

## 2025-01-17 RX ADMIN — MORPHINE SULFATE 4 MG: 4 INJECTION INTRAVENOUS at 09:01

## 2025-01-17 RX ADMIN — TRAMADOL HYDROCHLORIDE 50 MG: 50 TABLET, COATED ORAL at 11:01

## 2025-01-17 RX ADMIN — TRAMADOL HYDROCHLORIDE 50 MG: 50 TABLET ORAL at 11:01

## 2025-01-17 RX ADMIN — FUROSEMIDE 80 MG: 10 INJECTION, SOLUTION INTRAMUSCULAR; INTRAVENOUS at 08:01

## 2025-01-17 NOTE — DISCHARGE INSTRUCTIONS
Take fluid pill (furosemide) twice a day instead of once a day for the next 5 days.   Tramadol as needed for pain.  Assistance with walking, ambulate slowly in do what you can to prevent falls

## 2025-01-17 NOTE — ED PROVIDER NOTES
Encounter Date: 1/17/2025    History     Chief Complaint   Patient presents with    Fall     Pt C/O R sided rib pain and low back pain following fall X 3 days PTA. Pt denies hitting head, denies LOC. NADN.         HPI  This is a 79 y.o. male who presents to the Emergency Department with right-sided rib and low back pain after a fall 3 days ago.  Patient denies any head injury or LOC. States that when he walks with his walker he is somewhat careless with it and lost his footing.  Has a history of falls.  Also complaining of some shortness of breath over the last few days as well.  Denies any fever cough, denies any worsening of leg swelling.    History obtained for alternative sources:  Son, states that patient does not walk well, is shaky when getting up and this is how he has been.  States that he lives alone and sometimes does not take his meds correctly.    Previous or outside records requested and reviewed:  10/28/2024 admission for complete heart block    10/29/2024: Echocardiogram showing EF 45%, LA severely dilated, RA dilated    12/10/2024:  PCP visit for nondisplaced fracture of pelvis, CHF, hypertension and hyperglycemia        Review of patient's allergies indicates:  No Known Allergies  Past Medical History:   Diagnosis Date    Atrial fibrillation, unspecified type 09/06/2023    COPD exacerbation 09/06/2023    Thyroid disease     hypo     Past Surgical History:   Procedure Laterality Date    COLONOSCOPY  01/01/2011    CORONARY ANGIOGRAPHY N/A 07/22/2021    Procedure: ANGIOGRAM, CORONARY ARTERY;  Surgeon: Hank Barry MD;  Location: [facility] CATH LAB/EP;  Service: Cardiology;  Laterality: N/A;    EYE SURGERY Bilateral     cataracts extraction    FRACTURE SURGERY Right 09/2021    femur    HERNIA REPAIR      HIP SURGERY Right 08/2021    IMPLANTATION OF BIVENTRICULAR HEART PACEMAKER N/A 11/4/2024    Procedure: INSERTION, PACEMAKER, BIVENTRICULAR;  Surgeon: Mac Alejandro MD;  Location: [facility] EP LAB;   Service: Cardiology;  Laterality: N/A;  CHB, CRT-P, SJM, Anes, DM, CICU 3081    INTRAMEDULLARY RODDING OF TROCHANTER OF FEMUR Right 2021    Procedure: INSERTION, INTRAMEDULLARY RACQUEL, FEMUR, TROCHANTER;  Surgeon: Darryn Hall MD;  Location: [facility] OR;  Service: Orthopedics;  Laterality: Right;    JOINT REPLACEMENT Bilateral     knee    LEFT HEART CATHETERIZATION Right 2021    Procedure: Left heart cath;  Surgeon: Hank Barry MD;  Location: [facility] CATH LAB/EP;  Service: Cardiology;  Laterality: Right;    OPEN REDUCTION AND INTERNAL FIXATION (ORIF) OF INJURY OF HIP Right 2024    Procedure: ORIF,PELVIS;  Surgeon: Negrito Stapleton MD;  Location: [address];  Service: Orthopedics;  Laterality: Right;  +local    TRANSESOPHAGEAL ECHOCARDIOGRAPHY N/A 2024    Procedure: ECHOCARDIOGRAM, TRANSESOPHAGEAL;  Surgeon: Leonora Butt MD;  Location: [facility] EP LAB;  Service: Cardiology;  Laterality: N/A;    TREATMENT OF CARDIAC ARRHYTHMIA N/A 2024    Procedure: Cardioversion or Defibrillation;  Surgeon: ANA Steiner MD;  Location: [facility] EP LAB;  Service: Cardiology;  Laterality: N/A;  AF, DEMETRICE/DCCV, ANES, GP, 524    VASECTOMY       Family History   Problem Relation Name Age of Onset    Crohn's disease Mother      Dementia Mother      Heart attack Father      No Known Problems Sister      No Known Problems Brother      No Known Problems Son       Social History     Tobacco Use    Smoking status: Former     Current packs/day: 0.00     Average packs/day: 1 pack/day for 35.0 years (35.0 ttl pk-yrs)     Types: Cigarettes     Start date: 1965     Quit date: 2000     Years since quittin.0     Passive exposure: Past    Smokeless tobacco: Never   Substance Use Topics    Alcohol use: No    Drug use: Never       Review of Systems  All other systems reviewed and otherwise negative.    Physical Exam     Initial Vitals [25 0742]   BP Pulse Resp Temp SpO2   (!) 140/86 98 19 98.1  °F (36.7 °C) (S) (!) 86 %      MAP       --         Physical Exam    Nursing note and vitals reviewed.  Constitutional: He is not diaphoretic. No distress.   Elderly male lying in stretcher   HENT:   Head: Normocephalic and atraumatic. Mouth/Throat: Oropharynx is clear and moist.   Nasal cannula in place   Eyes: Conjunctivae are normal. Pupils are equal, round, and reactive to light.   Neck: No JVD present.   Cardiovascular:  Intact distal pulses.           Murmur heard.  Tachycardic and irregular   Pulmonary/Chest: No respiratory distress. He has rales (bibasilar).   Musculoskeletal:         General: Tenderness (right paralumbar around L3-L5, right parathoracic around T6-T8) and edema present. Normal range of motion.      Comments: Compressive dressing noted to the lower legs bilaterally.  Edema noted proximally.     Neurological: He is alert. GCS score is 15. GCS eye subscore is 4. GCS verbal subscore is 5. GCS motor subscore is 6.   Oriented x2, answering questions and following commands appropriately   Skin: Skin is warm and dry. No rash noted. No pallor.   Psychiatric: He has a normal mood and affect.             Medical Decision Making:     Differential Diagnosis:  Rib fracture, rib contusion, pulmonary contusion, hemothorax, pneumothorax, vertebral fracture, CHF, pneumonia    Comorbidities taken into consideration for development of diagnosis and treatment plan include CHF and HTN.      Plan:  Orders Placed This Encounter    Back/Cervical Brace For Home Use    X-Ray Chest AP Portable    CT Chest Without Contrast    X-Ray Lumbar Spine Ap And Lateral    CBC auto differential    Comprehensive metabolic panel    Troponin I    NT-Pro Natriuretic Peptide    Ambulatory referral/consult to Neurosurgery    Confirm Patient is not Eligible for Congestive Heart Failure Pathway    Vital signs    Cardiac Monitoring - Adult    Pulse Oximetry Continuous    Oxygen Continuous    EKG 12-lead    Insert peripheral IV    Possible  Hospitalization    furosemide injection 80 mg    morphine injection 4 mg    traMADoL (ULTRAM) 50 mg tablet    traMADoL tablet 50 mg    furosemide (LASIX) 40 MG tablet    traMADoL (ULTRAM) 50 mg tablet        Social determinants of health taken into consideration during development of our treatment plan include: Limited access to healthcare, Limited transportation, and Health Literacy    Procedures  Studies:   Labs:  Labs Reviewed   CBC W/ AUTO DIFFERENTIAL - Abnormal       Result Value    WBC 11.17      RBC 4.10 (*)     Hemoglobin 12.7 (*)     Hematocrit 40.9       (*)     MCH 31.0      MCHC 31.1 (*)     RDW 16.2 (*)     Platelets 215      MPV 11.0      Immature Granulocytes 0.4      Gran # (ANC) 10.1 (*)     Immature Grans (Abs) 0.05 (*)     Lymph # 0.4 (*)     Mono # 0.6      Eos # 0.0      Baso # 0.02      nRBC 0      Gran % 90.1 (*)     Lymph % 3.9 (*)     Mono % 5.3      Eosinophil % 0.1      Basophil % 0.2      Differential Method Automated     COMPREHENSIVE METABOLIC PANEL - Abnormal    Sodium 137      Potassium 3.7      Chloride 96      CO2 38 (*)     Glucose 158 (*)     BUN 29 (*)     Creatinine 0.95      Calcium 8.4 (*)     Total Protein 6.7      Albumin 3.0 (*)     Total Bilirubin 1.2 (*)     Alkaline Phosphatase 64      AST 53 (*)     ALT 56 (*)     Anion Gap 3 (*)     eGFR >60.0     NT-PRO NATRIURETIC PEPTIDE - Abnormal    NT-proBNP 5670 (*)    TROPONIN I    Troponin I 0.028         Imaging:  X-Rays:   Independently Interpreted Readings:   Chest X-Ray: Cardiomegaly present.  Increased vascular markings consistent with CHF are present.     Imaging Results              X-Ray Lumbar Spine Ap And Lateral (Final result)  Result time 01/17/25 09:46:13      Final result by Amado Rebolledo MD (Timothy) (01/17/25 09:46:13)                   Impression:      No acute bony changes.      Electronically signed by: Amado Rbeolledo MD  Date:    01/17/2025  Time:    09:46               Narrative:     EXAMINATION:  XR LUMBAR SPINE AP AND LATERAL    CLINICAL HISTORY:  ,back pain, fall;    TECHNIQUE:  Standard lumbar spine x-ray.  Three views.    COMPARISON:  Lumbar spine 06/12/2023.    FINDINGS:  No fractures or dislocations.  Multilevel endplate spurring and paraspinal ossifications.    Stable alignment.  Fusion of the SI joints.                                        CT Chest Without Contrast (Final result)  Result time 01/17/25 09:26:56      Final result by [facility] MD (01/17/25 09:26:56)                   Impression:      No definite acute rib fractures.  Nondisplaced fracture suspected extending through the T8 vertebral body.    Small bilateral pleural effusions    This report was flagged in Epic as abnormal.      Electronically signed by: Amado Rebolledo MD  Date:    01/17/2025  Time:    09:26               Narrative:    EXAMINATION:  CT CHEST WITHOUT CONTRAST    CLINICAL HISTORY:  Fracture, rib(s);right;    TECHNIQUE:  Standard noncontrast CT of the chest.  All CT scans at this facility use dose modulation, iterative reconstruction and/or weight based dosing when appropriate to reduce radiation dose to as low as reasonably achievable.    COMPARISON:  None    FINDINGS:  The heart is enlarged.  Pacemaker leads are present.  Coronary artery calcifications are present.  No pericardial effusions.  No evidence of mediastinal hematoma.  No mediastinal or hilar adenopathy.    Small bilateral pleural effusions.  Mild compressive atelectasis at the bases.  No pneumothorax.  No acute rib fractures.  The sternum is intact.  There is a suspected nondisplaced fracture extending through the T8 vertebral body.  There are bridging anterior spinal ossifications.                                       X-Ray Chest AP Portable (Final result)  Result time 01/17/25 08:55:05      Final result by Manuel Snow MD (01/17/25 08:55:05)                   Impression:      Suspect CHF.      Electronically signed  by: Manuel Snow MD  Date:    01/17/2025  Time:    08:55               Narrative:    EXAMINATION:  XR CHEST AP PORTABLE    CLINICAL HISTORY:  CHF;    TECHNIQUE:  Single frontal view of the chest was performed.    COMPARISON:  11/04/2024.    FINDINGS:  Cardiomegaly with pulmonary vascular congestion and basilar interstitial edema.  Small pleural effusions, right greater than left.  Suspect background of chronic interstitial lung disease.  Cardiac pacing wires demonstrates similar configuration.  Advanced degenerative changes of the bones.  Aorta demonstrates atherosclerotic disease.                                    EKG Readings: (Independently Interpreted)   Initial Reading: No STEMI. Rhythm: Atrial Fibrillation. Heart Rate: 98. Ectopy: No Ectopy. Conduction: Normal and LAFB. ST Segments: Normal ST Segments. T Waves: Normal. Clinical Impression: Atrial Fibrillation with PVCs Other Impression: Demand pacemaker?       ED Course:     ED Course as of 01/17/25 1156   Fri Jan 17, 2025   0922 NT-proBNP(!): 5670 [NP]   1025 Troponin I: 0.028 [NP]      ED Course User Index  [NP] Grzegorz Burk MD             Patient improved after diuresis here.  Will increase Lasix dose x5 days.    Noted T8 vertebral fracture. Pain medicine given with subsequent improvement as well.    TLSO brace ordered, Rx for tramadol for pain, neurosurgery ambulatory referral placed.    Patient requesting to go home. O2 sats WNL on home oxygen.     Clinical impression and plan including any treatment was discussed with patient and son.  Son will be home with the patient to assist with meds/care.     Pt/son is to call for follow up with PCP or referred physician in 7 days.   Pt is to return to the ED immediately for any new or worsening symptoms.  They are always welcome to return for any emergent concerns.    The patient and son had time for ask questions to which they were addressed.   They expressed understanding and agrees with my plan.          Clinical Impression:   Final diagnoses:  [R06.02] Shortness of breath  [I50.9] Congestive heart failure, unspecified HF chronicity, unspecified heart failure type (Primary)  [S22.092V] Closed fracture of eighth thoracic vertebra, unspecified fracture morphology, initial encounter         ED Prescriptions       Medication Sig Dispense Start Date End Date Auth. Provider    traMADoL (ULTRAM) 50 mg tablet Take 1 tablet (50 mg total) by mouth every 6 (six) hours as needed for Pain. 20 tablet 1/17/2025 -- Gzregorz Burk MD    furosemide (LASIX) 40 MG tablet Take 1 tablet (40 mg total) by mouth every evening. for 5 days 5 tablet 1/17/2025 1/22/2025 Grzegorz Burk MD          Follow-up Information       Follow up With Specialties Details Why Contact Info Additional Information    Ramirez Levine MD Family Medicine Schedule an appointment as soon as possible for a visit   [address] [facility] [address]  [phone]       [facility] - Neurosurgery Neurosurgery Schedule an appointment as soon as possible for a visit   [address]  [address]  [facility] [address] [phone] [address] Please park in Lot [address] or D and use Jesus tabares. Take Medical Office Bldg. elevators. Please check-in for all clinic appointments on the 1st floor, at the desk, in the [facility] lobby before coming up to the clinic for your appointment.          Orders Placed This Encounter   Procedures    Ambulatory referral/consult to Neurosurgery     Standing Status:   Future     Standing Expiration Date:   2/17/2026     Referral Priority:   Routine     Referral Type:   Consultation     Referral Reason:   Specialty Services Required     Requested Specialty:   Neurosurgery     Number of Visits Requested:   1       DISCLAIMER: This note was prepared with [email or web address]*[employer] voice recognition transcription software. Garbled syntax, mangled pronouns, and other bizarre constructions may be attributed to that software system.     Grzegorz Burk,  MD  01/17/25 153

## 2025-01-18 LAB
OHS QRS DURATION: 106 MS
OHS QTC CALCULATION: 477 MS

## 2025-01-19 ENCOUNTER — HOSPITAL ENCOUNTER (EMERGENCY)
Facility: HOSPITAL | Age: 80
Discharge: HOME | End: 2025-01-19
Attending: EMERGENCY MEDICINE
Payer: MEDICARE

## 2025-01-19 VITALS
DIASTOLIC BLOOD PRESSURE: 58 MMHG | TEMPERATURE: 99 F | SYSTOLIC BLOOD PRESSURE: 126 MMHG | HEART RATE: 79 BPM | WEIGHT: 220 LBS | BODY MASS INDEX: 30.8 KG/M2 | RESPIRATION RATE: 20 BRPM | HEIGHT: 71 IN | OXYGEN SATURATION: 93 %

## 2025-01-19 DIAGNOSIS — S22.069A: Primary | ICD-10-CM

## 2025-01-19 PROCEDURE — 63600175 PHARM REV CODE 636 W HCPCS: Mod: JZ,TB,HCNC | Performed by: EMERGENCY MEDICINE

## 2025-01-19 PROCEDURE — 99284 EMERGENCY DEPT VISIT MOD MDM: CPT | Mod: 25,HCNC

## 2025-01-19 PROCEDURE — 96372 THER/PROPH/DIAG INJ SC/IM: CPT | Performed by: EMERGENCY MEDICINE

## 2025-01-19 RX ORDER — KETOROLAC TROMETHAMINE 30 MG/ML
30 INJECTION, SOLUTION INTRAMUSCULAR; INTRAVENOUS
Status: COMPLETED | OUTPATIENT
Start: 2025-01-19 | End: 2025-01-19

## 2025-01-19 RX ORDER — FENTANYL CITRATE 50 UG/ML
100 INJECTION, SOLUTION INTRAMUSCULAR; INTRAVENOUS
Status: COMPLETED | OUTPATIENT
Start: 2025-01-19 | End: 2025-01-19

## 2025-01-19 RX ORDER — OXYCODONE AND ACETAMINOPHEN 5; 325 MG/1; MG/1
1 TABLET ORAL EVERY 6 HOURS PRN
Qty: 12 TABLET | Refills: 0 | Status: ON HOLD | OUTPATIENT
Start: 2025-01-19 | End: 2025-01-28

## 2025-01-19 RX ORDER — NAPROXEN 500 MG/1
500 TABLET ORAL 2 TIMES DAILY PRN
Qty: 20 TABLET | Refills: 0 | Status: ON HOLD | OUTPATIENT
Start: 2025-01-19 | End: 2025-01-28

## 2025-01-19 RX ORDER — METHOCARBAMOL 750 MG/1
1500 TABLET, FILM COATED ORAL 3 TIMES DAILY
Qty: 30 TABLET | Refills: 0 | Status: ON HOLD | OUTPATIENT
Start: 2025-01-19 | End: 2025-01-28

## 2025-01-19 RX ADMIN — KETOROLAC TROMETHAMINE 30 MG: 30 INJECTION, SOLUTION INTRAMUSCULAR; INTRAVENOUS at 04:01

## 2025-01-19 RX ADMIN — FENTANYL CITRATE 100 MCG: 50 INJECTION INTRAMUSCULAR; INTRAVENOUS at 04:01

## 2025-01-19 NOTE — ED PROVIDER NOTES
Encounter Date: 1/19/2025       History     Chief Complaint   Patient presents with    Back Pain     Bib AASI, fell x1 week, dx with t8 fracture c/o back pain     80-year-old male history of AFib, COPD, CHF presents for back pain.  He had a fall several days ago was seen in the ED diagnosed with a T8 vertebral fracture.  Discharged with tramadol.  TLSO brace scheduled for delivery tomorrow.  Pain not well controlled with tramadol.  His son is at the bedside states yes to assist him with his mobility.  Having to lift him recently cause some pain and he is concerned about re-injury.    The history is provided by the patient and medical records.     Review of patient's allergies indicates:  No Known Allergies  Past Medical History:   Diagnosis Date    Atrial fibrillation, unspecified type 09/06/2023    COPD exacerbation 09/06/2023    Thyroid disease     hypo     Past Surgical History:   Procedure Laterality Date    COLONOSCOPY  01/01/2011    CORONARY ANGIOGRAPHY N/A 07/22/2021    Procedure: ANGIOGRAM, CORONARY ARTERY;  Surgeon: Hank Barry MD;  Location: [facility] CATH LAB/EP;  Service: Cardiology;  Laterality: N/A;    EYE SURGERY Bilateral     cataracts extraction    FRACTURE SURGERY Right 09/2021    femur    HERNIA REPAIR      HIP SURGERY Right 08/2021    IMPLANTATION OF BIVENTRICULAR HEART PACEMAKER N/A 11/4/2024    Procedure: INSERTION, PACEMAKER, BIVENTRICULAR;  Surgeon: Mac Alejandro MD;  Location: [facility] EP LAB;  Service: Cardiology;  Laterality: N/A;  CHB, CRT-P, SJM, Anes, DM, CICU 3081    INTRAMEDULLARY RODDING OF TROCHANTER OF FEMUR Right 09/03/2021    Procedure: INSERTION, INTRAMEDULLARY RACQUEL, FEMUR, TROCHANTER;  Surgeon: Darryn Hall MD;  Location: [facility] OR;  Service: Orthopedics;  Laterality: Right;    JOINT REPLACEMENT Bilateral     knee    LEFT HEART CATHETERIZATION Right 07/22/2021    Procedure: Left heart cath;  Surgeon: Hank Barry MD;  Location: [facility] CATH LAB/EP;  Service: Cardiology;   Laterality: Right;    OPEN REDUCTION AND INTERNAL FIXATION (ORIF) OF INJURY OF HIP Right 2024    Procedure: ORIF,PELVIS;  Surgeon: Negrito Stapleton MD;  Location: [facility] OR [address] [place] [address];  Service: Orthopedics;  Laterality: Right;  +local    TRANSESOPHAGEAL ECHOCARDIOGRAPHY N/A 2024    Procedure: ECHOCARDIOGRAM, TRANSESOPHAGEAL;  Surgeon: Leonora Butt MD;  Location: [facility] EP LAB;  Service: Cardiology;  Laterality: N/A;    TREATMENT OF CARDIAC ARRHYTHMIA N/A 2024    Procedure: Cardioversion or Defibrillation;  Surgeon: ANA Steiner MD;  Location: [facility] EP LAB;  Service: Cardiology;  Laterality: N/A;  AF, DEMETRICE/DCCV, ANES, GP, 524    VASECTOMY       Family History   Problem Relation Name Age of Onset    Crohn's disease Mother      Dementia Mother      Heart attack Father      No Known Problems Sister      No Known Problems Brother      No Known Problems Son       Social History     Tobacco Use    Smoking status: Former     Current packs/day: 0.00     Average packs/day: 1 pack/day for 35.0 years (35.0 ttl pk-yrs)     Types: Cigarettes     Start date: 1965     Quit date: 2000     Years since quittin.0     Passive exposure: Past    Smokeless tobacco: Never   Substance Use Topics    Alcohol use: No    Drug use: Never     Review of Systems    Physical Exam     Initial Vitals   BP Pulse Resp Temp SpO2   25 0405 25 0405 25 0405 25 0424 25 0405   135/62 92 16 98.7 °F (37.1 °C) (!) 91 %      MAP       --                Physical Exam    Nursing note and vitals reviewed.  Constitutional: He appears well-developed and well-nourished. No distress.   HENT:   Head: Normocephalic and atraumatic. Mouth/Throat: Oropharynx is clear and moist.   Eyes: Conjunctivae and EOM are normal. Pupils are equal, round, and reactive to light.   Neck: Neck supple.   Normal range of motion.  Cardiovascular:  Normal rate, regular rhythm and intact distal pulses.           Distal pulses  equal bilaterally   Pulmonary/Chest: No stridor. No respiratory distress.   Mild bibasilar rales   Abdominal: Abdomen is soft. He exhibits no distension. There is no abdominal tenderness.   Musculoskeletal:         General: Normal range of motion.      Cervical back: Normal range of motion and neck supple.      Comments:   Lower extremities wrapped with compression dressings to assist with peripheral edema     Neurological: He is alert and oriented to person, place, and time.   CN 2-12 appear grossly intact   Skin: Skin is warm and dry.   Psychiatric: He has a normal mood and affect.         ED Course   Procedures  Labs Reviewed - No data to display       Imaging Results              X-Ray Thoracic Spine AP And Lateral (In process)                      Medications   fentaNYL 50 mcg/mL injection 100 mcg (100 mcg Intramuscular Given 1/19/25 0425)   ketorolac injection 30 mg (30 mg Intramuscular Given 1/19/25 0424)     Medical Decision Making  X-ray ordered to rule out worsening injury.  Additional analgesics ordered.  TLSO brace scheduled for delivery today according to patient.    Amount and/or Complexity of Data Reviewed  Radiology: ordered and independent interpretation performed. Decision-making details documented in ED Course.     Details: X-ray reviewed independently.  Poor quality films neither due to exposure or body habitus.  I see no worsening fracture based on this film.      Risk  Prescription drug management.               ED Course as of 01/19/25 0559   Sun Jan 19, 2025   0554 Patient re-evaluated, pain improved significantly [AP]   0556 Recommend patient keep follow-up appointment with spine specialist [AP]      ED Course User Index  [AP] Bharat Munroe DO                             Clinical Impression:  Final diagnoses:  [B15.101Z] T8 vertebral fracture (Primary)          ED Disposition Condition    Discharge Stable          ED Prescriptions       Medication Sig Dispense Start Date End Date  Auth. Provider    oxyCODONE-acetaminophen (PERCOCET) 5-325 mg per tablet Take 1 tablet by mouth every 6 (six) hours as needed for Pain. 12 tablet 1/19/2025 -- Bharat Munroe,     naproxen (NAPROSYN) 500 MG tablet Take 1 tablet (500 mg total) by mouth 2 (two) times daily as needed. 20 tablet 1/19/2025 -- Bharat Munroe, DO    methocarbamoL (ROBAXIN) 750 MG Tab Take 2 tablets (1,500 mg total) by mouth 3 (three) times daily. for 5 days 30 tablet 1/19/2025 1/24/2025 Bharat Munroe, DO          Follow-up Information       Follow up With Specialties Details Why Contact Info Additional Information    Ramirez Levine MD Family Medicine Schedule an appointment as soon as possible for a visit   [address] [facility] [address]  [phone]       [address] Orthopedics Schedule an appointment as soon as possible for a visit   [address] [facility] [address] [phone] [facility] - Main Building, 5th Floor Please park in [address] and use Atrium elevator    [place] - Emergency Dept Emergency Medicine  If symptoms worsen [address] [facility] [address]  [facility] [address] [phone]              Bharat Munroe,   01/19/25 0559

## 2025-01-19 NOTE — ED NOTES
Pt seen in room AAOx4 states he is having back pain related to a recent fall. Pt states he has had decreased mobility and subsequently required more help from his son at home. Pt was advised that he will need to follow up with orthopedics for further management. Discharge plan and instructions reviewed.

## 2025-01-19 NOTE — ED NOTES
Report Given To Jose YIN. Questions asked and answered. Care formally handed off to the receiving staff.      -Farooq Soto RN

## 2025-01-19 NOTE — ED NOTES
Pt JAQUELINE Schwartz ([phone]) contacted to inform PT is on the way home via EMS. Pt AAOx4, transferred to EMS stretcher without issue.

## 2025-01-20 ENCOUNTER — HOSPITAL ENCOUNTER (INPATIENT)
Facility: HOSPITAL | Age: 80
LOS: 8 days | Discharge: HOSPICE | DRG: 291 | End: 2025-01-29
Attending: STUDENT IN AN ORGANIZED HEALTH CARE EDUCATION/TRAINING PROGRAM | Admitting: INTERNAL MEDICINE
Payer: MEDICARE

## 2025-01-20 DIAGNOSIS — J96.90 RESPIRATORY FAILURE: ICD-10-CM

## 2025-01-20 DIAGNOSIS — I49.3 PVC (PREMATURE VENTRICULAR CONTRACTION): ICD-10-CM

## 2025-01-20 DIAGNOSIS — J96.21 ACUTE ON CHRONIC RESPIRATORY FAILURE WITH HYPOXIA AND HYPERCAPNIA: Primary | ICD-10-CM

## 2025-01-20 DIAGNOSIS — I50.33 ACUTE ON CHRONIC DIASTOLIC HEART FAILURE: ICD-10-CM

## 2025-01-20 DIAGNOSIS — I49.3 PVC'S (PREMATURE VENTRICULAR CONTRACTIONS): ICD-10-CM

## 2025-01-20 DIAGNOSIS — I50.9 CHF (CONGESTIVE HEART FAILURE): ICD-10-CM

## 2025-01-20 DIAGNOSIS — Z13.6 SCREENING FOR CARDIOVASCULAR CONDITION: ICD-10-CM

## 2025-01-20 DIAGNOSIS — S22.068A OTHER CLOSED FRACTURE OF EIGHTH THORACIC VERTEBRA, INITIAL ENCOUNTER: ICD-10-CM

## 2025-01-20 DIAGNOSIS — I10 ESSENTIAL HYPERTENSION: Chronic | ICD-10-CM

## 2025-01-20 DIAGNOSIS — J96.22 ACUTE ON CHRONIC RESPIRATORY FAILURE WITH HYPOXIA AND HYPERCAPNIA: Primary | ICD-10-CM

## 2025-01-20 DIAGNOSIS — J96.21 ACUTE ON CHRONIC HYPOXIC RESPIRATORY FAILURE: ICD-10-CM

## 2025-01-20 DIAGNOSIS — M45.4 ANKYLOSING SPONDYLITIS OF THORACIC REGION: ICD-10-CM

## 2025-01-20 LAB
ALBUMIN SERPL BCP-MCNC: 2.5 G/DL (ref 3.5–5.2)
ALP SERPL-CCNC: 64 U/L (ref 40–150)
ALT SERPL W/O P-5'-P-CCNC: 22 U/L (ref 10–44)
ANION GAP SERPL CALC-SCNC: 13 MMOL/L (ref 8–16)
AST SERPL-CCNC: 24 U/L (ref 10–40)
BASOPHILS # BLD AUTO: 0.02 K/UL (ref 0–0.2)
BASOPHILS NFR BLD: 0.2 % (ref 0–1.9)
BILIRUB SERPL-MCNC: 0.8 MG/DL (ref 0.1–1)
BNP SERPL-MCNC: 1146 PG/ML (ref 0–99)
BUN SERPL-MCNC: 26 MG/DL (ref 8–23)
CALCIUM SERPL-MCNC: 8.9 MG/DL (ref 8.7–10.5)
CHLORIDE SERPL-SCNC: 96 MMOL/L (ref 95–110)
CO2 SERPL-SCNC: 28 MMOL/L (ref 23–29)
CREAT SERPL-MCNC: 1.1 MG/DL (ref 0.5–1.4)
DIFFERENTIAL METHOD BLD: ABNORMAL
EOSINOPHIL # BLD AUTO: 0.1 K/UL (ref 0–0.5)
EOSINOPHIL NFR BLD: 0.5 % (ref 0–8)
ERYTHROCYTE [DISTWIDTH] IN BLOOD BY AUTOMATED COUNT: 16.6 % (ref 11.5–14.5)
EST. GFR  (NO RACE VARIABLE): >60 ML/MIN/1.73 M^2
GLUCOSE SERPL-MCNC: 156 MG/DL (ref 70–110)
HCT VFR BLD AUTO: 39.1 % (ref 40–54)
HGB BLD-MCNC: 12.2 G/DL (ref 14–18)
IMM GRANULOCYTES # BLD AUTO: 0.09 K/UL (ref 0–0.04)
IMM GRANULOCYTES NFR BLD AUTO: 0.8 % (ref 0–0.5)
INFLUENZA A, MOLECULAR: NEGATIVE
INFLUENZA B, MOLECULAR: NEGATIVE
LACTATE SERPL-SCNC: 1.5 MMOL/L (ref 0.5–2.2)
LDH SERPL L TO P-CCNC: 1.4 MMOL/L (ref 0.5–2.2)
LPM: 6
LYMPHOCYTES # BLD AUTO: 0.5 K/UL (ref 1–4.8)
LYMPHOCYTES NFR BLD: 4.3 % (ref 18–48)
MCH RBC QN AUTO: 31 PG (ref 27–31)
MCHC RBC AUTO-ENTMCNC: 31.2 G/DL (ref 32–36)
MCV RBC AUTO: 99 FL (ref 82–98)
MONOCYTES # BLD AUTO: 0.9 K/UL (ref 0.3–1)
MONOCYTES NFR BLD: 8 % (ref 4–15)
NEUTROPHILS # BLD AUTO: 9.2 K/UL (ref 1.8–7.7)
NEUTROPHILS NFR BLD: 86.2 % (ref 38–73)
NRBC BLD-RTO: 0 /100 WBC
PCO2 BLDA: 78.4 MMHG (ref 35–45)
PH SMN: 7.33 [PH] (ref 7.35–7.45)
PLATELET # BLD AUTO: 209 K/UL (ref 150–450)
PMV BLD AUTO: 11.3 FL (ref 9.2–12.9)
PO2 BLDA: 20.7 MMHG (ref 40–60)
POC BASE DEFICIT: 12.5 MMOL/L (ref -2–2)
POC HCO3: 41.8 MMOL/L (ref 24–28)
POC PERFORMED BY: ABNORMAL
POC SATURATED O2: 27.7 % (ref 95–100)
POTASSIUM SERPL-SCNC: 4.2 MMOL/L (ref 3.5–5.1)
PROCALCITONIN SERPL IA-MCNC: 0.39 NG/ML
PROT SERPL-MCNC: 7 G/DL (ref 6–8.4)
RBC # BLD AUTO: 3.94 M/UL (ref 4.6–6.2)
SARS-COV-2 RDRP RESP QL NAA+PROBE: NEGATIVE
SODIUM SERPL-SCNC: 137 MMOL/L (ref 136–145)
SPECIMEN SOURCE: ABNORMAL
SPECIMEN SOURCE: NORMAL
TROPONIN I SERPL DL<=0.01 NG/ML-MCNC: 0.03 NG/ML (ref 0–0.03)
WBC # BLD AUTO: 10.69 K/UL (ref 3.9–12.7)

## 2025-01-20 PROCEDURE — 84484 ASSAY OF TROPONIN QUANT: CPT | Mod: HCNC | Performed by: STUDENT IN AN ORGANIZED HEALTH CARE EDUCATION/TRAINING PROGRAM

## 2025-01-20 PROCEDURE — 96376 TX/PRO/DX INJ SAME DRUG ADON: CPT | Mod: HCNC

## 2025-01-20 PROCEDURE — 85025 COMPLETE CBC W/AUTO DIFF WBC: CPT | Mod: HCNC | Performed by: STUDENT IN AN ORGANIZED HEALTH CARE EDUCATION/TRAINING PROGRAM

## 2025-01-20 PROCEDURE — 87635 SARS-COV-2 COVID-19 AMP PRB: CPT | Mod: HCNC | Performed by: STUDENT IN AN ORGANIZED HEALTH CARE EDUCATION/TRAINING PROGRAM

## 2025-01-20 PROCEDURE — 96374 THER/PROPH/DIAG INJ IV PUSH: CPT | Mod: HCNC

## 2025-01-20 PROCEDURE — 94761 N-INVAS EAR/PLS OXIMETRY MLT: CPT | Mod: HCNC,XB

## 2025-01-20 PROCEDURE — 99900035 HC TECH TIME PER 15 MIN (STAT): Mod: HCNC

## 2025-01-20 PROCEDURE — 83880 ASSAY OF NATRIURETIC PEPTIDE: CPT | Mod: HCNC | Performed by: STUDENT IN AN ORGANIZED HEALTH CARE EDUCATION/TRAINING PROGRAM

## 2025-01-20 PROCEDURE — 87077 CULTURE AEROBIC IDENTIFY: CPT | Mod: HCNC | Performed by: STUDENT IN AN ORGANIZED HEALTH CARE EDUCATION/TRAINING PROGRAM

## 2025-01-20 PROCEDURE — 96375 TX/PRO/DX INJ NEW DRUG ADDON: CPT | Mod: HCNC

## 2025-01-20 PROCEDURE — 83605 ASSAY OF LACTIC ACID: CPT | Mod: HCNC | Performed by: STUDENT IN AN ORGANIZED HEALTH CARE EDUCATION/TRAINING PROGRAM

## 2025-01-20 PROCEDURE — 87040 BLOOD CULTURE FOR BACTERIA: CPT | Mod: HCNC | Performed by: STUDENT IN AN ORGANIZED HEALTH CARE EDUCATION/TRAINING PROGRAM

## 2025-01-20 PROCEDURE — 94660 CPAP INITIATION&MGMT: CPT | Mod: HCNC

## 2025-01-20 PROCEDURE — 84145 PROCALCITONIN (PCT): CPT | Mod: HCNC | Performed by: STUDENT IN AN ORGANIZED HEALTH CARE EDUCATION/TRAINING PROGRAM

## 2025-01-20 PROCEDURE — 80053 COMPREHEN METABOLIC PANEL: CPT | Mod: HCNC | Performed by: STUDENT IN AN ORGANIZED HEALTH CARE EDUCATION/TRAINING PROGRAM

## 2025-01-20 PROCEDURE — 82803 BLOOD GASES ANY COMBINATION: CPT | Mod: HCNC

## 2025-01-20 PROCEDURE — 94640 AIRWAY INHALATION TREATMENT: CPT | Mod: HCNC,XB

## 2025-01-20 PROCEDURE — 83605 ASSAY OF LACTIC ACID: CPT | Mod: HCNC

## 2025-01-20 PROCEDURE — 27000190 HC CPAP FULL FACE MASK W/VALVE: Mod: HCNC

## 2025-01-20 PROCEDURE — 27000221 HC OXYGEN, UP TO 24 HOURS: Mod: HCNC

## 2025-01-20 PROCEDURE — 63600175 PHARM REV CODE 636 W HCPCS: Mod: HCNC | Performed by: STUDENT IN AN ORGANIZED HEALTH CARE EDUCATION/TRAINING PROGRAM

## 2025-01-20 PROCEDURE — 87502 INFLUENZA DNA AMP PROBE: CPT | Mod: HCNC | Performed by: STUDENT IN AN ORGANIZED HEALTH CARE EDUCATION/TRAINING PROGRAM

## 2025-01-20 PROCEDURE — 87186 SC STD MICRODIL/AGAR DIL: CPT | Mod: HCNC | Performed by: STUDENT IN AN ORGANIZED HEALTH CARE EDUCATION/TRAINING PROGRAM

## 2025-01-20 PROCEDURE — 25000242 PHARM REV CODE 250 ALT 637 W/ HCPCS: Mod: HCNC | Performed by: STUDENT IN AN ORGANIZED HEALTH CARE EDUCATION/TRAINING PROGRAM

## 2025-01-20 RX ORDER — LORAZEPAM 2 MG/ML
1 INJECTION INTRAMUSCULAR
Status: ACTIVE | OUTPATIENT
Start: 2025-01-20 | End: 2025-01-21

## 2025-01-20 RX ORDER — METHYLPREDNISOLONE SOD SUCC 125 MG
125 VIAL (EA) INJECTION
Status: COMPLETED | OUTPATIENT
Start: 2025-01-20 | End: 2025-01-20

## 2025-01-20 RX ORDER — LEVALBUTEROL 1.25 MG/.5ML
1.25 SOLUTION, CONCENTRATE RESPIRATORY (INHALATION)
Status: COMPLETED | OUTPATIENT
Start: 2025-01-20 | End: 2025-01-20

## 2025-01-20 RX ORDER — FUROSEMIDE 10 MG/ML
40 INJECTION INTRAMUSCULAR; INTRAVENOUS
Status: COMPLETED | OUTPATIENT
Start: 2025-01-20 | End: 2025-01-20

## 2025-01-20 RX ADMIN — METHYLPREDNISOLONE SODIUM SUCCINATE 125 MG: 125 INJECTION, POWDER, FOR SOLUTION INTRAMUSCULAR; INTRAVENOUS at 10:01

## 2025-01-20 RX ADMIN — LEVALBUTEROL 1.25 MG: 1.25 SOLUTION, CONCENTRATE RESPIRATORY (INHALATION) at 10:01

## 2025-01-20 RX ADMIN — FUROSEMIDE 40 MG: 10 INJECTION, SOLUTION INTRAMUSCULAR; INTRAVENOUS at 10:01

## 2025-01-21 PROBLEM — S22.069A: Status: ACTIVE | Noted: 2025-01-21

## 2025-01-21 PROBLEM — N39.0 UTI (URINARY TRACT INFECTION): Status: ACTIVE | Noted: 2025-01-21

## 2025-01-21 PROBLEM — I48.91 A-FIB: Status: ACTIVE | Noted: 2024-10-28

## 2025-01-21 LAB
BACTERIA #/AREA URNS HPF: ABNORMAL /HPF
BILIRUB UR QL STRIP: NEGATIVE
CLARITY UR: ABNORMAL
COLOR UR: YELLOW
ESTIMATED AVG GLUCOSE: 123 MG/DL (ref 68–131)
GLUCOSE UR QL STRIP: NEGATIVE
HBA1C MFR BLD: 5.9 % (ref 4–5.6)
HGB UR QL STRIP: ABNORMAL
KETONES UR QL STRIP: NEGATIVE
LACTATE SERPL-SCNC: 1.1 MMOL/L (ref 0.5–2.2)
LEUKOCYTE ESTERASE UR QL STRIP: ABNORMAL
MAGNESIUM SERPL-MCNC: 2.1 MG/DL (ref 1.6–2.6)
MICROSCOPIC COMMENT: ABNORMAL
NITRITE UR QL STRIP: NEGATIVE
PH UR STRIP: 6 [PH] (ref 5–8)
POCT GLUCOSE: 173 MG/DL (ref 70–110)
PROT UR QL STRIP: ABNORMAL
RBC #/AREA URNS HPF: 71 /HPF (ref 0–4)
SP GR UR STRIP: 1.02 (ref 1–1.03)
URN SPEC COLLECT METH UR: ABNORMAL
UROBILINOGEN UR STRIP-ACNC: NEGATIVE EU/DL
WBC #/AREA URNS HPF: >100 /HPF (ref 0–5)
WBC CLUMPS URNS QL MICRO: ABNORMAL

## 2025-01-21 PROCEDURE — 94640 AIRWAY INHALATION TREATMENT: CPT | Mod: HCNC,XB

## 2025-01-21 PROCEDURE — 25000242 PHARM REV CODE 250 ALT 637 W/ HCPCS: Mod: HCNC | Performed by: FAMILY MEDICINE

## 2025-01-21 PROCEDURE — 93005 ELECTROCARDIOGRAM TRACING: CPT | Mod: HCNC

## 2025-01-21 PROCEDURE — 87088 URINE BACTERIA CULTURE: CPT | Mod: HCNC | Performed by: STUDENT IN AN ORGANIZED HEALTH CARE EDUCATION/TRAINING PROGRAM

## 2025-01-21 PROCEDURE — 83036 HEMOGLOBIN GLYCOSYLATED A1C: CPT | Mod: HCNC | Performed by: FAMILY MEDICINE

## 2025-01-21 PROCEDURE — 27000221 HC OXYGEN, UP TO 24 HOURS: Mod: HCNC

## 2025-01-21 PROCEDURE — 83605 ASSAY OF LACTIC ACID: CPT | Mod: HCNC | Performed by: STUDENT IN AN ORGANIZED HEALTH CARE EDUCATION/TRAINING PROGRAM

## 2025-01-21 PROCEDURE — 87086 URINE CULTURE/COLONY COUNT: CPT | Mod: HCNC | Performed by: STUDENT IN AN ORGANIZED HEALTH CARE EDUCATION/TRAINING PROGRAM

## 2025-01-21 PROCEDURE — 25000242 PHARM REV CODE 250 ALT 637 W/ HCPCS: Mod: HCNC | Performed by: INTERNAL MEDICINE

## 2025-01-21 PROCEDURE — 63600175 PHARM REV CODE 636 W HCPCS: Mod: HCNC | Performed by: INTERNAL MEDICINE

## 2025-01-21 PROCEDURE — 99285 EMERGENCY DEPT VISIT HI MDM: CPT | Mod: 25,HCNC

## 2025-01-21 PROCEDURE — 93010 ELECTROCARDIOGRAM REPORT: CPT | Mod: HCNC,,, | Performed by: STUDENT IN AN ORGANIZED HEALTH CARE EDUCATION/TRAINING PROGRAM

## 2025-01-21 PROCEDURE — 25000003 PHARM REV CODE 250: Mod: HCNC | Performed by: FAMILY MEDICINE

## 2025-01-21 PROCEDURE — 87186 SC STD MICRODIL/AGAR DIL: CPT | Mod: 59,HCNC | Performed by: STUDENT IN AN ORGANIZED HEALTH CARE EDUCATION/TRAINING PROGRAM

## 2025-01-21 PROCEDURE — 5A09357 ASSISTANCE WITH RESPIRATORY VENTILATION, LESS THAN 24 CONSECUTIVE HOURS, CONTINUOUS POSITIVE AIRWAY PRESSURE: ICD-10-PCS | Performed by: STUDENT IN AN ORGANIZED HEALTH CARE EDUCATION/TRAINING PROGRAM

## 2025-01-21 PROCEDURE — 99900035 HC TECH TIME PER 15 MIN (STAT): Mod: HCNC

## 2025-01-21 PROCEDURE — 83735 ASSAY OF MAGNESIUM: CPT | Mod: HCNC | Performed by: FAMILY MEDICINE

## 2025-01-21 PROCEDURE — 63600175 PHARM REV CODE 636 W HCPCS: Mod: HCNC | Performed by: FAMILY MEDICINE

## 2025-01-21 PROCEDURE — 99291 CRITICAL CARE FIRST HOUR: CPT | Mod: HCNC,,, | Performed by: STUDENT IN AN ORGANIZED HEALTH CARE EDUCATION/TRAINING PROGRAM

## 2025-01-21 PROCEDURE — 20000000 HC ICU ROOM: Mod: HCNC

## 2025-01-21 PROCEDURE — 94761 N-INVAS EAR/PLS OXIMETRY MLT: CPT | Mod: HCNC

## 2025-01-21 PROCEDURE — 81000 URINALYSIS NONAUTO W/SCOPE: CPT | Mod: HCNC | Performed by: STUDENT IN AN ORGANIZED HEALTH CARE EDUCATION/TRAINING PROGRAM

## 2025-01-21 RX ORDER — ASPIRIN 81 MG/1
81 TABLET ORAL DAILY
Status: DISCONTINUED | OUTPATIENT
Start: 2025-01-21 | End: 2025-01-25

## 2025-01-21 RX ORDER — CEFTRIAXONE 1 G/1
1 INJECTION, POWDER, FOR SOLUTION INTRAMUSCULAR; INTRAVENOUS
Status: DISCONTINUED | OUTPATIENT
Start: 2025-01-21 | End: 2025-01-25

## 2025-01-21 RX ORDER — FUROSEMIDE 10 MG/ML
40 INJECTION INTRAMUSCULAR; INTRAVENOUS EVERY 12 HOURS
Status: DISCONTINUED | OUTPATIENT
Start: 2025-01-21 | End: 2025-01-21

## 2025-01-21 RX ORDER — METOPROLOL TARTRATE 50 MG/1
50 TABLET ORAL 2 TIMES DAILY
Status: DISCONTINUED | OUTPATIENT
Start: 2025-01-21 | End: 2025-01-23

## 2025-01-21 RX ORDER — MIRTAZAPINE 7.5 MG/1
7.5 TABLET, FILM COATED ORAL NIGHTLY
Status: DISCONTINUED | OUTPATIENT
Start: 2025-01-21 | End: 2025-01-28

## 2025-01-21 RX ORDER — METHOCARBAMOL 750 MG/1
750 TABLET, FILM COATED ORAL 4 TIMES DAILY PRN
Status: DISCONTINUED | OUTPATIENT
Start: 2025-01-21 | End: 2025-01-28

## 2025-01-21 RX ORDER — OXYCODONE HYDROCHLORIDE 5 MG/1
10 TABLET ORAL EVERY 6 HOURS PRN
Status: DISCONTINUED | OUTPATIENT
Start: 2025-01-21 | End: 2025-01-28

## 2025-01-21 RX ORDER — IPRATROPIUM BROMIDE AND ALBUTEROL SULFATE 2.5; .5 MG/3ML; MG/3ML
3 SOLUTION RESPIRATORY (INHALATION) EVERY 4 HOURS PRN
Status: DISCONTINUED | OUTPATIENT
Start: 2025-01-21 | End: 2025-01-25

## 2025-01-21 RX ORDER — ONDANSETRON HYDROCHLORIDE 2 MG/ML
4 INJECTION, SOLUTION INTRAVENOUS EVERY 12 HOURS PRN
Status: DISCONTINUED | OUTPATIENT
Start: 2025-01-21 | End: 2025-01-28

## 2025-01-21 RX ORDER — ACETAMINOPHEN 325 MG/1
650 TABLET ORAL EVERY 8 HOURS PRN
Status: DISCONTINUED | OUTPATIENT
Start: 2025-01-21 | End: 2025-01-28

## 2025-01-21 RX ORDER — TAMSULOSIN HYDROCHLORIDE 0.4 MG/1
0.4 CAPSULE ORAL DAILY
Status: DISCONTINUED | OUTPATIENT
Start: 2025-01-21 | End: 2025-01-28

## 2025-01-21 RX ORDER — IPRATROPIUM BROMIDE AND ALBUTEROL SULFATE 2.5; .5 MG/3ML; MG/3ML
3 SOLUTION RESPIRATORY (INHALATION) ONCE
Status: COMPLETED | OUTPATIENT
Start: 2025-01-21 | End: 2025-01-21

## 2025-01-21 RX ORDER — FUROSEMIDE 10 MG/ML
40 INJECTION INTRAMUSCULAR; INTRAVENOUS EVERY 12 HOURS
Status: DISCONTINUED | OUTPATIENT
Start: 2025-01-21 | End: 2025-01-24

## 2025-01-21 RX ORDER — SACUBITRIL AND VALSARTAN 24; 26 MG/1; MG/1
1 TABLET, FILM COATED ORAL 2 TIMES DAILY
Status: DISCONTINUED | OUTPATIENT
Start: 2025-01-21 | End: 2025-01-25

## 2025-01-21 RX ORDER — ATORVASTATIN CALCIUM 10 MG/1
10 TABLET, FILM COATED ORAL DAILY
Status: DISCONTINUED | OUTPATIENT
Start: 2025-01-21 | End: 2025-01-28

## 2025-01-21 RX ORDER — TALC
6 POWDER (GRAM) TOPICAL NIGHTLY PRN
Status: DISCONTINUED | OUTPATIENT
Start: 2025-01-21 | End: 2025-01-28

## 2025-01-21 RX ORDER — SODIUM CHLORIDE 0.9 % (FLUSH) 0.9 %
10 SYRINGE (ML) INJECTION
Status: DISCONTINUED | OUTPATIENT
Start: 2025-01-21 | End: 2025-01-29 | Stop reason: HOSPADM

## 2025-01-21 RX ORDER — LEVOTHYROXINE SODIUM 100 UG/1
200 TABLET ORAL
Status: DISCONTINUED | OUTPATIENT
Start: 2025-01-21 | End: 2025-01-28

## 2025-01-21 RX ADMIN — IPRATROPIUM BROMIDE AND ALBUTEROL SULFATE 3 ML: .5; 3 SOLUTION RESPIRATORY (INHALATION) at 06:01

## 2025-01-21 RX ADMIN — LEVOTHYROXINE SODIUM 200 MCG: 100 TABLET ORAL at 06:01

## 2025-01-21 RX ADMIN — METOPROLOL TARTRATE 50 MG: 50 TABLET, FILM COATED ORAL at 08:01

## 2025-01-21 RX ADMIN — APIXABAN 5 MG: 5 TABLET, FILM COATED ORAL at 08:01

## 2025-01-21 RX ADMIN — ASPIRIN 81 MG: 81 TABLET, COATED ORAL at 08:01

## 2025-01-21 RX ADMIN — SACUBITRIL AND VALSARTAN 1 TABLET: 24; 26 TABLET, FILM COATED ORAL at 08:01

## 2025-01-21 RX ADMIN — FUROSEMIDE 40 MG: 10 INJECTION, SOLUTION INTRAMUSCULAR; INTRAVENOUS at 08:01

## 2025-01-21 RX ADMIN — OXYCODONE 10 MG: 5 TABLET ORAL at 02:01

## 2025-01-21 RX ADMIN — ATORVASTATIN CALCIUM 10 MG: 10 TABLET, FILM COATED ORAL at 08:01

## 2025-01-21 RX ADMIN — CEFTRIAXONE SODIUM 1 G: 1 INJECTION, POWDER, FOR SOLUTION INTRAMUSCULAR; INTRAVENOUS at 01:01

## 2025-01-21 RX ADMIN — TAMSULOSIN HYDROCHLORIDE 0.4 MG: 0.4 CAPSULE ORAL at 08:01

## 2025-01-21 RX ADMIN — IPRATROPIUM BROMIDE AND ALBUTEROL SULFATE 3 ML: 2.5; .5 SOLUTION RESPIRATORY (INHALATION) at 08:01

## 2025-01-21 RX ADMIN — EMPAGLIFLOZIN 10 MG: 10 TABLET, FILM COATED ORAL at 08:01

## 2025-01-21 RX ADMIN — FUROSEMIDE 40 MG: 10 INJECTION, SOLUTION INTRAMUSCULAR; INTRAVENOUS at 09:01

## 2025-01-21 RX ADMIN — MIRTAZAPINE 7.5 MG: 7.5 TABLET, FILM COATED ORAL at 01:01

## 2025-01-21 NOTE — ED NOTES
Pt on the ECG monitoring, and pt is noted to having increasing ectopy at this time.  EKG repeated, Dr. Qureshi aware.  Per Dr. Qureshi, obtain a repeat EKG in 15 minutes

## 2025-01-21 NOTE — ED NOTES
Ptook O2 sat off. Pt refuses to allow sat probe to be placed on him. Pt attempting to punch staff when touched. Chest rise and fall visible. Resps unlabored

## 2025-01-21 NOTE — CONSULTS
[facility] Consult Note     Attending Physician: Terra Centeno MD  Cardiology Attending Physician: Elio Eng MD  Date of Admit: 1/20/2025  Reason for Consult:      History of Present Illness:     Hx per chart review    Bean Salas is a pleasant 80 y.o. male with PMHx of Afib on OAC, HFrEF, HTN, HLD, CAD, presented with worsening SOB and back pain. Upon arrival he was found in respiratory distress satting 86-88% on 5L and placed on BiPap. Pt had a fall several days ago and had a T8 vertebral fracture. Per report Patient was evaluated for Hospice/Palliative care. Today history very limited.     50% circumflex disease, mild RCA, left anterior descending artery disease     History obtained by patient interview and supplemented by nursing documentation and chart review.     PMHx:  has a past medical history of Atrial fibrillation, unspecified type (09/06/2023), COPD exacerbation (09/06/2023), and Thyroid disease.   SurgHx:  has a past surgical history that includes Hernia repair; Colonoscopy (01/01/2011); Joint replacement (Bilateral); Left heart catheterization (Right, 07/22/2021); Coronary angiography (N/A, 07/22/2021); Intramedullary rodding of trochanter of femur (Right, 09/03/2021); Hip surgery (Right, 08/2021); Eye surgery (Bilateral); Fracture surgery (Right, 09/2021); Vasectomy; Treatment of cardiac arrhythmia (N/A, 9/19/2024); Transesophageal echocardiography (N/A, 9/19/2024); Open reduction and internal fixation (ORIF) of injury of hip (Right, 9/20/2024); and Implantation of biventricular heart pacemaker (N/A, 11/4/2024).   FamHx: family history includes Crohn's disease in his mother; Dementia in his mother; Heart attack in his father; No Known Problems in his brother, sister, and son.   SocialHx:  reports that he quit smoking about 25 years ago. His smoking use included cigarettes. He started smoking about 60 years ago. He has a 35 pack-year smoking history. He has been exposed to  tobacco smoke. He has never used smokeless tobacco. He reports that he does not drink alcohol and does not use drugs.  Home Meds:  Current Outpatient Medications   Medication Instructions    acetaminophen (TYLENOL) 1,000 mg, Oral, Every 8 hours    ammonium lactate (LAC-HYDRIN) 12 % lotion Apply topically 2 (two) times daily as needed for Dry Skin.    apixaban (ELIQUIS) 5 mg, Oral, 2 times daily, Start on 11/10/24    aspirin (ECOTRIN) 81 mg, Oral, Daily    atorvastatin (LIPITOR) 10 mg, Oral, Daily    cephALEXin (KEFLEX) 1,000 mg, Oral, Every 12 hours    empagliflozin (JARDIANCE) 10 mg, Oral, Daily    furosemide (LASIX) 40 mg, Oral, Daily    furosemide (LASIX) 40 mg, Oral, Nightly    INV metoprolol tartrate (LOPRESSOR) 50 mg, Oral, 2 times daily    lactulose (CHRONULAC) 10 gram/15 mL solution SMARTSIG:Milliliter(s) By Mouth    levothyroxine (SYNTHROID) 200 mcg, Oral, Before breakfast    melatonin (MELATIN) 6 mg, Oral, Nightly PRN    methocarbamoL (ROBAXIN) 750 mg, Oral, 4 times daily PRN    methocarbamoL (ROBAXIN) 1,500 mg, Oral, 3 times daily    mirtazapine (REMERON) 7.5 mg, Oral, Nightly, One tablet po each night for sleep    multivit-min-FA-lycopen-lutein (CENTRUM SILVER) 0.4-300-250 mg-mcg-mcg Tab 1 tablet, Daily    naproxen (NAPROSYN) 500 mg, Oral, 2 times daily PRN    oxyCODONE-acetaminophen (PERCOCET) 5-325 mg per tablet 1 tablet, Oral, Every 6 hours PRN    sacubitriL-valsartan (ENTRESTO) 24-26 mg per tablet 1 tablet, Oral, 2 times daily    senna-docusate 8.6-50 mg (PERICOLACE) 8.6-50 mg per tablet 1 tablet, Oral, Daily    tamsulosin (FLOMAX) 0.4 mg, Oral, Daily    traMADoL (ULTRAM) 50 mg, Oral, Every 6 hours PRN       Review of Systems: Comprehensive ROS was performed and is negative unless otherwise noted in HPI.     Objective:   Last 24 Hour Vital Signs:  BP  Min: 103/56  Max: 158/69  Temp  Av.8 °F (36.6 °C)  Min: 97.8 °F (36.6 °C)  Max: 97.8 °F (36.6 °C)  Pulse  Av.1  Min: 72  Max: 115  Resp  Avg:  "21.3  Min: 10  Max: 31  SpO2  Av.1 %  Min: 92 %  Max: 98 %  Height  Av' 11" (180.3 cm)  Min: 5' 11" (180.3 cm)  Max: 5' 11" (180.3 cm)  Weight  Av.8 kg (220 lb)  Min: 99.8 kg (220 lb)  Max: 99.8 kg (220 lb)  I/O last 3 completed shifts:  In: -   Out: 425 [Urine:425]  Physical Examination:  Constitutional: NAD, Atraumatic   HEENT: MMM, No JVD  Cardiovascular: RRR, no murmurs noted, no chest tenderness to palpation, 2+ radial pulses b/l  Pulmonary: normal respiratory effort, CTAB, no crackles, wheezes  Abdominal: S/NT/ND  Musculoskeletal: No lower extremity edema noted  Neurological: Alert & oriented to self, location, time and situation. No gross neurological deficits  Skin: W/D/I  Psych: Appropriate affect, normal mood    Laboratory:  Personally reviewed    Other Results:  TTE:  Results for orders placed during the hospital encounter of 10/28/24    Echo    Interpretation Summary    Left Ventricle: The left ventricle is normal in size. Normal wall thickness. Mild global hypokinesis present. There is mildly reduced systolic function. Ejection fraction is approximately 45%.    Right Ventricle: Normal right ventricular cavity size. Systolic function is mildly reduced.    Left Atrium: Left atrium is severely dilated. The left atrium volume index MOD is 104 mL/m2.    Right Atrium: Right atrium is mildly dilated.    Pulmonary Artery: Pulmonary artery pressure could not be estimated.    IVC/SVC: Normal venous pressure at 3 mmHg.    Stress Testing:   No results found for this or any previous visit.     Coronary Angiogram:  Results for orders placed during the hospital encounter of 21    Cardiac catheterization    Conclusion  · The ejection fraction was 50-55% by visual estimate.  · The post-procedure left ventricular end diastolic pressure was 25.  · The Prox Cx to Dist Cx lesion was 50% stenosed.  · The estimated blood loss was <50 mL.  · Mild LAD, RCA Disease  · No focal lesions to explain angina    The " procedure log was documented by No documenter listed and verified by Hank Barry MD.    Date: 7/22/2021  Time: 1:04 PM        Assessment/Plan:     Active Hospital Problems    Diagnosis    *Acute on chronic hypoxic respiratory failure    Closed fracture of T8 vertebra    UTI (urinary tract infection)    A-fib     S/p successful DCCV 9/2024      COPD exacerbation    Acute on chronic diastolic heart failure    Essential hypertension       Bean Salas is a pleasant 80 y.o. male with PMHx of Afib on OAC, HFrEF, HTN, HLD, CAD, presented with worsening SOB and back pain. Found to be in AoC HRF and ADHF.    - Continue Lasix 40 mg IV BID (good UOP and setting of elveated  BNP)  - Continue eliquis for now -given falls will need to have a discussion with family and patient for long term care if he does move forward with Hospice.  - Continue Entresto 24-26 mg BID, statin, asa, SLG2i  - Continue Lopressor 50 mg BID, if BP tolerates we can increase to lopressor 50 mg TID to suppress ectopy and will consider to stop entresto if needed.   - Interrogate device  - Echo   -rest of care per primary team     Elio Eng MD  Interventional Cardiology  [facility]      -Reviewing Medical records & lab results  -Independently reviewing and interpreting (if not documented by myself) EKGs, echocardiograms, catherizations   -Obtaining a history, performing a relevant exam, counseling/educating the patient/family  -Documenting clinical information in the EMR including ordering of tests  -Care coordination and communicating with other health care providers

## 2025-01-21 NOTE — ED NOTES
MD messaged due to pt seeming SOB still at this time.\  Pt still refusing to wear BiPap.  Another breathing tx ordered.  Pt also seemingly lethargic, MD to come as evaluate

## 2025-01-21 NOTE — ED NOTES
Pt transferred into an inpatient hospital bed at this time with a waffle mattress for comfort.  Pressure injuries noted to the pts R buttock and sacral area along with breakdown to the pts upper R back.  Skin cleansed with vashe wash and new mepelex applied to both areas.  Pulmonology also at pt bedside for assessment.  Pt O2 decreased to 3 LPM.

## 2025-01-21 NOTE — ASSESSMENT & PLAN NOTE
Admit to medical floor with telemetry  Patient with Hypercapnic and Hypoxic Respiratory failure which is Acute on chronic.  he is on home oxygen at 3 LPM. Supplemental oxygen was provided and noted- Oxygen Concentration (%):  [45] 45    .   Signs/symptoms of respiratory failure include- increased work of breathing, respiratory distress, and use of accessory muscles. Contributing diagnoses includes - CHF and COPD Labs and images were reviewed. Patient Has recent ABG, which has been reviewed. Will treat underlying causes and adjust management of respiratory failure as follows- continue with BiPAP at night

## 2025-01-21 NOTE — ASSESSMENT & PLAN NOTE
Patient has long standing persistent (>12 months) atrial fibrillation. Patient is currently in sinus rhythm. [id number] Score: 3. The patients heart rate in the last 24 hours is as follows:  Pulse  Min: 85  Max: 115     Antiarrhythmics  metoprolol tartrate (LOPRESSOR) tablet 50 mg, 2 times daily, Oral    Anticoagulants  apixaban tablet 5 mg, 2 times daily, Oral    Plan  - Replete lytes with a goal of K>4, Mg >2  - Patient is anticoagulated, see medications listed above.  - Patient's afib is currently controlled  - monitor for any arrhythmia

## 2025-01-21 NOTE — ED NOTES
"Pt pulled off BIPAP mask. Pulled out IV. Took himself off the monitor. Pt stating "I am ready to leave call my son". Pt demanding to talk with a provider. MD Corey made aware. O2 sat placed back on pt along with NC. Pt refuses to put back on BIPAP mask.   "

## 2025-01-21 NOTE — ASSESSMENT & PLAN NOTE
Patient's blood pressure range in the last 24 hours was: BP  Min: 134/86  Max: 158/69.The patient's inpatient anti-hypertensive regimen is listed below:  Current Antihypertensives  metoprolol tartrate (LOPRESSOR) tablet 50 mg, 2 times daily, Oral  furosemide injection 40 mg, Every 12 hours, Intravenous    Plan  - BP is controlled, no changes needed to their regimen  - monitor blood pressure closely

## 2025-01-21 NOTE — ED NOTES
Staff was able to get a O2 sat on pt's ear. Sats 94%.    negative No joint pain, swelling or deformity; no limitation of movement

## 2025-01-21 NOTE — ED NOTES
Pt presents to the ED c/o SOB since last night. Hx of COPD. Upon arrival pt breathing about 29-30x a min. Pt wears 3-4 L via NC daily. Pt was on 5L placed by EMS. Pt was sating 86-88% on 5L. NC changed sats increased to 96%- O2 turned down to 3L via NC pt sating 90-92%. Pt's resps labored with the use of accessory muscles. Pt's breath sound diminished in all lobes. Pt's skin cool to touch. Pt denies chest pain. Monitor in place. Pt tachycardic and tachypneic- MD aware. BP stable. Pt's harrison catheter changed that he can to hospital with- son is unsure when the last time the pt's harrison was changed. Urine returned. Pt alert and oriented x4. Son at bedside.

## 2025-01-21 NOTE — ED NOTES
During morning pill pass, pt was noted to cough after each pill with sips of water.  MD notified, pt will be made NPO at this time

## 2025-01-21 NOTE — ED NOTES
RN spoke with the pts son Efren at this time.  Per Efren, the pt and him were supposed to have an appointment with Hospice to help the pt get set up in their care.  Pts son is wondering if there's anything that can be done to facilitate that consult.  Dr. Centeno messaged in regards to the pts son's concern.   During this phone call the pts HR did increase to the 120s for 5-7 seconds, but came back down to a HR in the 80s.    MD messaged about this event as well.

## 2025-01-21 NOTE — SUBJECTIVE & OBJECTIVE
Past Medical History:   Diagnosis Date    Atrial fibrillation, unspecified type 09/06/2023    COPD exacerbation 09/06/2023    Thyroid disease     hypo       Past Surgical History:   Procedure Laterality Date    COLONOSCOPY  01/01/2011    CORONARY ANGIOGRAPHY N/A 07/22/2021    Procedure: ANGIOGRAM, CORONARY ARTERY;  Surgeon: Hank Barry MD;  Location: [facility] CATH LAB/EP;  Service: Cardiology;  Laterality: N/A;    EYE SURGERY Bilateral     cataracts extraction    FRACTURE SURGERY Right 09/2021    femur    HERNIA REPAIR      HIP SURGERY Right 08/2021    IMPLANTATION OF BIVENTRICULAR HEART PACEMAKER N/A 11/4/2024    Procedure: INSERTION, PACEMAKER, BIVENTRICULAR;  Surgeon: Mac Alejandro MD;  Location: [facility] EP LAB;  Service: Cardiology;  Laterality: N/A;  CHB, CRT-P, SJM, Anes, DM, CICU 3081    INTRAMEDULLARY RODDING OF TROCHANTER OF FEMUR Right 09/03/2021    Procedure: INSERTION, INTRAMEDULLARY RACQUEL, FEMUR, TROCHANTER;  Surgeon: Darryn Hall MD;  Location: [facility];  Service: Orthopedics;  Laterality: Right;    JOINT REPLACEMENT Bilateral     knee    LEFT HEART CATHETERIZATION Right 07/22/2021    Procedure: Left heart cath;  Surgeon: Hank Barry MD;  Location: [facility] CATH LAB/EP;  Service: Cardiology;  Laterality: Right;    OPEN REDUCTION AND INTERNAL FIXATION (ORIF) OF INJURY OF HIP Right 9/20/2024    Procedure: ORIF,PELVIS;  Surgeon: Negrito Stapleton MD;  Location: [address];  Service: Orthopedics;  Laterality: Right;  +local    TRANSESOPHAGEAL ECHOCARDIOGRAPHY N/A 9/19/2024    Procedure: ECHOCARDIOGRAM, TRANSESOPHAGEAL;  Surgeon: Leonora Butt MD;  Location: [facility] EP LAB;  Service: Cardiology;  Laterality: N/A;    TREATMENT OF CARDIAC ARRHYTHMIA N/A 9/19/2024    Procedure: Cardioversion or Defibrillation;  Surgeon: ANA Steiner MD;  Location: [facility] EP LAB;  Service: Cardiology;  Laterality: N/A;  AF, DEMETRICE/DCCV, ANES, GP, 524    VASECTOMY         Review of patient's allergies  indicates:  No Known Allergies    No current facility-administered medications on file prior to encounter.     Current Outpatient Medications on File Prior to Encounter   Medication Sig    acetaminophen (TYLENOL) 500 MG tablet Take 2 tablets (1,000 mg total) by mouth every 8 (eight) hours.    ammonium lactate (LAC-HYDRIN) 12 % lotion Apply topically 2 (two) times daily as needed for Dry Skin.    apixaban (ELIQUIS) 5 mg Tab Take 1 tablet (5 mg total) by mouth 2 (two) times daily. Start on 11/10/24    aspirin (ECOTRIN) 81 MG EC tablet Take 1 tablet (81 mg total) by mouth once daily.    atorvastatin (LIPITOR) 10 MG tablet Take 1 tablet (10 mg total) by mouth once daily.    cephALEXin (KEFLEX) 500 MG capsule Take 2 capsules (1,000 mg total) by mouth every 12 (twelve) hours.    empagliflozin (JARDIANCE) 10 mg tablet Take 1 tablet (10 mg total) by mouth once daily.    furosemide (LASIX) 40 MG tablet Take 1 tablet (40 mg total) by mouth once daily.    furosemide (LASIX) 40 MG tablet Take 1 tablet (40 mg total) by mouth every evening. for 5 days    INV metoprolol tartrate (LOPRESSOR) 50 MG Tab Take 1 tablet (50 mg total) by mouth 2 (two) times daily.    lactulose (CHRONULAC) 10 gram/15 mL solution SMARTSIG:Milliliter(s) By Mouth    levothyroxine (SYNTHROID) 200 MCG tablet Take 1 tablet (200 mcg total) by mouth before breakfast.    melatonin (MELATIN) 3 mg tablet Take 2 tablets (6 mg total) by mouth nightly as needed for Insomnia.    methocarbamoL (ROBAXIN) 750 MG Tab Take 1 tablet (750 mg total) by mouth 4 (four) times daily as needed (muscle spasm).    methocarbamoL (ROBAXIN) 750 MG Tab Take 2 tablets (1,500 mg total) by mouth 3 (three) times daily. for 5 days    mirtazapine (REMERON) 7.5 MG Tab Take 1 tablet (7.5 mg total) by mouth nightly. One tablet po each night for sleep    multivit-min-FA-lycopen-lutein (CENTRUM SILVER) 0.4-300-250 mg-mcg-mcg Tab Take 1 tablet by mouth once daily.    naproxen (NAPROSYN) 500 MG tablet  Take 1 tablet (500 mg total) by mouth 2 (two) times daily as needed.    oxyCODONE-acetaminophen (PERCOCET) 5-325 mg per tablet Take 1 tablet by mouth every 6 (six) hours as needed for Pain.    sacubitriL-valsartan (ENTRESTO) 24-26 mg per tablet Take 1 tablet by mouth 2 (two) times daily.    senna-docusate 8.6-50 mg (PERICOLACE) 8.6-50 mg per tablet Take 1 tablet by mouth once daily.    tamsulosin (FLOMAX) 0.4 mg Cap Take 1 capsule (0.4 mg total) by mouth once daily.    traMADoL (ULTRAM) 50 mg tablet Take 1 tablet (50 mg total) by mouth every 6 (six) hours as needed for Pain.     Family History       Problem Relation (Age of Onset)    Crohn's disease Mother    Dementia Mother    Heart attack Father    No Known Problems Sister, Brother, Son          Tobacco Use    Smoking status: Former     Current packs/day: 0.00     Average packs/day: 1 pack/day for 35.0 years (35.0 ttl pk-yrs)     Types: Cigarettes     Start date: 1965     Quit date: 2000     Years since quittin.0     Passive exposure: Past    Smokeless tobacco: Never   Substance and Sexual Activity    Alcohol use: No    Drug use: Never    Sexual activity: Not Currently     Review of Systems   Constitutional:  Positive for activity change.   HENT: Negative.     Respiratory:  Positive for shortness of breath.    Cardiovascular: Negative.    Gastrointestinal: Negative.    Genitourinary: Negative.    Musculoskeletal:  Positive for back pain.   Neurological: Negative.    All other systems reviewed and are negative.    Objective:     Vital Signs (Most Recent):  Temp: 97.8 °F (36.6 °C) (25)  Pulse: 86 (25 0016)  Resp: 19 (25 0016)  BP: (!) 158/69 (25 0001)  SpO2: (!) 94 % (25 001) Vital Signs (24h Range):  Temp:  [97.8 °F (36.6 °C)] 97.8 °F (36.6 °C)  Pulse:  [] 86  Resp:  [19-31] 19  SpO2:  [92 %-98 %] 94 %  BP: (134-158)/(69-86) 158/69     Weight: 99.8 kg (220 lb)  Body mass index is 30.68 kg/m².     Physical  Exam  Constitutional:       General: He is in acute distress.   HENT:      Head: Normocephalic and atraumatic.      Nose: Nose normal.      Mouth/Throat:      Mouth: Mucous membranes are moist.   Eyes:      Extraocular Movements: Extraocular movements intact.      Pupils: Pupils are equal, round, and reactive to light.   Cardiovascular:      Rate and Rhythm: Regular rhythm.   Pulmonary:      Breath sounds: Rhonchi present.   Abdominal:      Palpations: Abdomen is soft.   Musculoskeletal:         General: Normal range of motion.      Cervical back: Neck supple.   Skin:     General: Skin is warm.   Neurological:      Mental Status: He is alert. Mental status is at baseline.   Psychiatric:         Mood and Affect: Mood normal.              CRANIAL NERVES     CN III, IV, VI   Pupils are equal, round, and reactive to light.       Significant Labs: All pertinent labs within the past 24 hours have been reviewed.  Recent Lab Results         01/21/25  0017   01/20/25  2228   01/20/25  2227   01/20/25  2226        Influenza A, Molecular   Negative           Influenza B, Molecular   Negative           Base Deficit       12.5  Comment: Value above reference range       Performed By:       sherif       Procalcitonin     0.39  Comment: A concentration < 0.25 ng/mL represents a low risk of bacterial   infection.  Procalcitonin may not be accurate among patients with localized   infection, recent trauma or major surgery, immunosuppressed state,   invasive fungal infection, renal dysfunction. Decisions regarding   initiation or continuation of antibiotic therapy should not be based   solely on procalcitonin levels.           Specimen source       Venous       Albumin     2.5         ALP     64         ALT     22         Anion Gap     13         Appearance, UA Hazy             AST     24  Comment: Specimen slightly hemolyzed         Bacteria, UA Occasional             Baso #     0.02         Basophil %     0.2         Bilirubin (UA)  Negative             BILIRUBIN TOTAL     0.8  Comment: For infants and newborns, interpretation of results should be based  on gestational age, weight and in agreement with clinical  observations.    Premature Infant recommended reference ranges:  Up to 24 hours.............<8.0 mg/dL  Up to 48 hours............<12.0 mg/dL  3-5 days..................<15.0 mg/dL  6-29 days.................<15.0 mg/dL           BNP     1,146  Comment: Values of less than 100 pg/ml are consistent with non-CHF populations.         BUN     26         Calcium     8.9         Chloride     96         CO2     28         Color, UA Yellow             Creatinine     1.1         Differential Method     Automated         eGFR     >60         Eos #     0.1         Eos %     0.5         Flu A & B Source   NP           Glucose     156         Glucose, UA Negative             Gran # (ANC)     9.2         Gran %     86.2         Hematocrit     39.1         Hemoglobin     12.2         Immature Grans (Abs)     0.09  Comment: Mild elevation in immature granulocytes is non specific and   can be seen in a variety of conditions including stress response,   acute inflammation, trauma and pregnancy. Correlation with other   laboratory and clinical findings is essential.           Immature Granulocytes     0.8         Ketones, UA Negative             Lactic Acid Level     1.5  Comment: Falsely low lactic acid results can be found in samples   containing >=13.0 mg/dL total bilirubin and/or >=3.5 mg/dL   direct bilirubin.           Leukocyte Esterase, UA 3+             LPM       6.0       Lymph #     0.5         Lymph %     4.3         MCH     31.0         MCHC     31.2         MCV     99         Microscopic Comment SEE COMMENT  Comment: Other formed elements not mentioned in the report are not   present in the microscopic examination.                Mono #     0.9         Mono %     8.0         MPV     11.3         NITRITE UA Negative             nRBC     0          Blood, UA 2+             pH, UA 6.0             Platelet Count     209         POC HCO3       41.8  Comment: Value above reference range       POC Lactate       1.4       POC PCO2       78.4  Comment: Value above reference range       POC PH       7.335  Comment: Value below reference range       POC PO2       20.7  Comment:  notified  at    read back  Value below critical limit         POC SATURATED O2       27.7  Comment:  notified  at    read back  Value below critical limit         Potassium     4.2         PROTEIN TOTAL     7.0         Protein, UA Trace  Comment: Recommend a 24 hour urine protein or a urine   protein/creatinine ratio if globulin induced proteinuria is  clinically suspected.               RBC     3.94         RBC, UA 71             RDW     16.6         SARS-CoV-2 RNA, Amplification, Qual   Negative  Comment: This test utilizes isothermal nucleic acid amplification technology   to   detect the SARS-CoV-2 RdRp nucleic acid segment. The analytical   sensitivity   (limit of detection) is 500 copies/swab.    A POSITIVE result is indicative of the presence of SARS-CoV-2 RNA;   clinical   correlation with patient history and other diagnostic information is   necessary to determine patient infection status.    A NEGATIVE result means that SARS-CoV-2 nucleic acids are not present   above   the limit of detection. A NEGATIVE result should be treated as   presumptive.   It does not rule out the possibility of COVID-19 and should not be   the sole   basis for treatment decisions.    If COVID-19 is strongly suspected based on clinical and exposure   history,   re-testing using an alternate molecular assay should be considered.    This test is Food and Drug Administration (FDA) approved. Performance   characteristics of this has been independently verified by [facility] Department of Pathology and Laboratory Medicine.             Sodium     137         Spec Grav UA 1.020              Specimen UA Urine, Clean Catch             Troponin I     0.033  Comment: The reference interval for Troponin I represents the 99th percentile   cutoff   for our facility and is consistent with 3rd generation assay   performance.           UROBILINOGEN UA Negative             WBC Clumps, UA Moderate             WBC, UA >100             WBC     10.69                 Significant Imaging: I have reviewed all pertinent imaging results/findings within the past 24 hours.

## 2025-01-21 NOTE — H&P
[facility] Medicine  History & Physical    Patient Name: Bean Salas  MRN: [id number]  Patient Class: OP- Observation  Admission Date: 1/20/2025  Attending Physician: Ren Casper MD   Primary Care Provider: Ramirez Levine MD         Patient information was obtained from patient, past medical records, and ER records.     Subjective:     Principal Problem:Acute on chronic hypoxic respiratory failure    Chief Complaint:   Chief Complaint   Patient presents with    Shortness of Breath     Pt c/o Shortness of breath, has T8 fracture x2weeks ago, and a hip fracture x1 month. Received toradol en route,         HPI: Bean Salas is 80-year-old elderly male with a past medical history of CAD, combined CHF (recovered EF), CAD, A-fib on eliquis,COPD, chronic respiratory failure on 3L NC, and hypothyroidism who presents to the ED for worsening SOB and back pain.  Patient was evaluated emergency department and was found to be in respiratory distress and satting 86-88% on 5 L. Patient was placed on BiPAP and seemed to improve.  Patient also had a fall several days ago and had a T8 vertebral fracture and was complaining of moderate pain.  Due to patient's presenting symptoms he will require admission for further evaluation and management.  Patient is a poor historian however at time my examination he denied any headache, fever, chills, chest pain but complained of shortness of breath and back pain.    Past Medical History:   Diagnosis Date    Atrial fibrillation, unspecified type 09/06/2023    COPD exacerbation 09/06/2023    Thyroid disease     hypo       Past Surgical History:   Procedure Laterality Date    COLONOSCOPY  01/01/2011    CORONARY ANGIOGRAPHY N/A 07/22/2021    Procedure: ANGIOGRAM, CORONARY ARTERY;  Surgeon: Hank Barry MD;  Location: [facility] CATH LAB/EP;  Service: Cardiology;  Laterality: N/A;    EYE SURGERY Bilateral     cataracts extraction    FRACTURE SURGERY Right  09/2021    femur    HERNIA REPAIR      HIP SURGERY Right 08/2021    IMPLANTATION OF BIVENTRICULAR HEART PACEMAKER N/A 11/4/2024    Procedure: INSERTION, PACEMAKER, BIVENTRICULAR;  Surgeon: Mac Alejandro MD;  Location: [facility] EP LAB;  Service: Cardiology;  Laterality: N/A;  CHB, CRT-P, SJM, Anes, DM, CICU [id number]    INTRAMEDULLARY RODDING OF TROCHANTER OF FEMUR Right 09/03/2021    Procedure: INSERTION, INTRAMEDULLARY RACQUEL, FEMUR, TROCHANTER;  Surgeon: Darryn Hall MD;  Location: [facility] OR;  Service: Orthopedics;  Laterality: Right;    JOINT REPLACEMENT Bilateral     knee    LEFT HEART CATHETERIZATION Right 07/22/2021    Procedure: Left heart cath;  Surgeon: Hank Barry MD;  Location: [facility] CATH LAB/EP;  Service: Cardiology;  Laterality: Right;    OPEN REDUCTION AND INTERNAL FIXATION (ORIF) OF INJURY OF HIP Right 9/20/2024    Procedure: ORIF,PELVIS;  Surgeon: Negrito Stapleton MD;  Location: [facility] 2ND FLR;  Service: Orthopedics;  Laterality: Right;  +local    TRANSESOPHAGEAL ECHOCARDIOGRAPHY N/A 9/19/2024    Procedure: ECHOCARDIOGRAM, TRANSESOPHAGEAL;  Surgeon: Leonora Butt MD;  Location: [facility] EP LAB;  Service: Cardiology;  Laterality: N/A;    TREATMENT OF CARDIAC ARRHYTHMIA N/A 9/19/2024    Procedure: Cardioversion or Defibrillation;  Surgeon: ANA Steiner MD;  Location: [facility] EP LAB;  Service: Cardiology;  Laterality: N/A;  AF, DEMETRICE/DCCV, ANES, GP, 524    VASECTOMY         Review of patient's allergies indicates:  No Known Allergies    No current facility-administered medications on file prior to encounter.     Current Outpatient Medications on File Prior to Encounter   Medication Sig    acetaminophen (TYLENOL) 500 MG tablet Take 2 tablets (1,000 mg total) by mouth every 8 (eight) hours.    ammonium lactate (LAC-HYDRIN) 12 % lotion Apply topically 2 (two) times daily as needed for Dry Skin.    apixaban (ELIQUIS) 5 mg Tab Take 1 tablet (5 mg total) by mouth 2 (two) times daily. Start on  11/10/24    aspirin (ECOTRIN) 81 MG EC tablet Take 1 tablet (81 mg total) by mouth once daily.    atorvastatin (LIPITOR) 10 MG tablet Take 1 tablet (10 mg total) by mouth once daily.    cephALEXin (KEFLEX) 500 MG capsule Take 2 capsules (1,000 mg total) by mouth every 12 (twelve) hours.    empagliflozin (JARDIANCE) 10 mg tablet Take 1 tablet (10 mg total) by mouth once daily.    furosemide (LASIX) 40 MG tablet Take 1 tablet (40 mg total) by mouth once daily.    furosemide (LASIX) 40 MG tablet Take 1 tablet (40 mg total) by mouth every evening. for 5 days    INV metoprolol tartrate (LOPRESSOR) 50 MG Tab Take 1 tablet (50 mg total) by mouth 2 (two) times daily.    lactulose (CHRONULAC) 10 gram/15 mL solution SMARTSIG:Milliliter(s) By Mouth    levothyroxine (SYNTHROID) 200 MCG tablet Take 1 tablet (200 mcg total) by mouth before breakfast.    melatonin (MELATIN) 3 mg tablet Take 2 tablets (6 mg total) by mouth nightly as needed for Insomnia.    methocarbamoL (ROBAXIN) 750 MG Tab Take 1 tablet (750 mg total) by mouth 4 (four) times daily as needed (muscle spasm).    methocarbamoL (ROBAXIN) 750 MG Tab Take 2 tablets (1,500 mg total) by mouth 3 (three) times daily. for 5 days    mirtazapine (REMERON) 7.5 MG Tab Take 1 tablet (7.5 mg total) by mouth nightly. One tablet po each night for sleep    multivit-min-FA-lycopen-lutein (CENTRUM SILVER) 0.4-300-250 mg-mcg-mcg Tab Take 1 tablet by mouth once daily.    naproxen (NAPROSYN) 500 MG tablet Take 1 tablet (500 mg total) by mouth 2 (two) times daily as needed.    oxyCODONE-acetaminophen (PERCOCET) 5-325 mg per tablet Take 1 tablet by mouth every 6 (six) hours as needed for Pain.    sacubitriL-valsartan (ENTRESTO) 24-26 mg per tablet Take 1 tablet by mouth 2 (two) times daily.    senna-docusate 8.6-50 mg (PERICOLACE) 8.6-50 mg per tablet Take 1 tablet by mouth once daily.    tamsulosin (FLOMAX) 0.4 mg Cap Take 1 capsule (0.4 mg total) by mouth once daily.    traMADoL (ULTRAM)  50 mg tablet Take 1 tablet (50 mg total) by mouth every 6 (six) hours as needed for Pain.     Family History       Problem Relation (Age of Onset)    Crohn's disease Mother    Dementia Mother    Heart attack Father    No Known Problems Sister, Brother, Son          Tobacco Use    Smoking status: Former     Current packs/day: 0.00     Average packs/day: 1 pack/day for 35.0 years (35.0 ttl pk-yrs)     Types: Cigarettes     Start date: 1965     Quit date: 2000     Years since quittin.0     Passive exposure: Past    Smokeless tobacco: Never   Substance and Sexual Activity    Alcohol use: No    Drug use: Never    Sexual activity: Not Currently     Review of Systems   Constitutional:  Positive for activity change.   HENT: Negative.     Respiratory:  Positive for shortness of breath.    Cardiovascular: Negative.    Gastrointestinal: Negative.    Genitourinary: Negative.    Musculoskeletal:  Positive for back pain.   Neurological: Negative.    All other systems reviewed and are negative.    Objective:     Vital Signs (Most Recent):  Temp: 97.8 °F (36.6 °C) (25)  Pulse: 86 (25 0016)  Resp: 19 (25 0016)  BP: (!) 158/69 (25 0001)  SpO2: (!) 94 % (25 0016) Vital Signs (24h Range):  Temp:  [97.8 °F (36.6 °C)] 97.8 °F (36.6 °C)  Pulse:  [] 86  Resp:  [19-31] 19  SpO2:  [92 %-98 %] 94 %  BP: (134-158)/(69-86) 158/69     Weight: 99.8 kg (220 lb)  Body mass index is 30.68 kg/m².     Physical Exam  Constitutional:       General: He is in acute distress.   HENT:      Head: Normocephalic and atraumatic.      Nose: Nose normal.      Mouth/Throat:      Mouth: Mucous membranes are moist.   Eyes:      Extraocular Movements: Extraocular movements intact.      Pupils: Pupils are equal, round, and reactive to light.   Cardiovascular:      Rate and Rhythm: Regular rhythm.   Pulmonary:      Breath sounds: Rhonchi present.   Abdominal:      Palpations: Abdomen is soft.   Musculoskeletal:          General: Normal range of motion.      Cervical back: Neck supple.   Skin:     General: Skin is warm.   Neurological:      Mental Status: He is alert. Mental status is at baseline.   Psychiatric:         Mood and Affect: Mood normal.              CRANIAL NERVES     CN III, IV, VI   Pupils are equal, round, and reactive to light.       Significant Labs: All pertinent labs within the past 24 hours have been reviewed.  Recent Lab Results         01/21/25  0017   01/20/25  2228   01/20/25 2227   01/20/25  2226        Influenza A, Molecular   Negative           Influenza B, Molecular   Negative           Base Deficit       12.5  Comment: Value above reference range       Performed By:       sherif       Procalcitonin     0.39  Comment: A concentration < 0.25 ng/mL represents a low risk of bacterial   infection.  Procalcitonin may not be accurate among patients with localized   infection, recent trauma or major surgery, immunosuppressed state,   invasive fungal infection, renal dysfunction. Decisions regarding   initiation or continuation of antibiotic therapy should not be based   solely on procalcitonin levels.           Specimen source       Venous       Albumin     2.5         ALP     64         ALT     22         Anion Gap     13         Appearance, UA Hazy             AST     24  Comment: Specimen slightly hemolyzed         Bacteria, UA Occasional             Baso #     0.02         Basophil %     0.2         Bilirubin (UA) Negative             BILIRUBIN TOTAL     0.8  Comment: For infants and newborns, interpretation of results should be based  on gestational age, weight and in agreement with clinical  observations.    Premature Infant recommended reference ranges:  Up to 24 hours.............<8.0 mg/dL  Up to 48 hours............<12.0 mg/dL  3-5 days..................<15.0 mg/dL  6-29 days.................<15.0 mg/dL           BNP     1,146  Comment: Values of less than 100 pg/ml are consistent with non-CHF  populations.         BUN     26         Calcium     8.9         Chloride     96         CO2     28         Color, UA Yellow             Creatinine     1.1         Differential Method     Automated         eGFR     >60         Eos #     0.1         Eos %     0.5         Flu A & B Source   NP           Glucose     156         Glucose, UA Negative             Gran # (ANC)     9.2         Gran %     86.2         Hematocrit     39.1         Hemoglobin     12.2         Immature Grans (Abs)     0.09  Comment: Mild elevation in immature granulocytes is non specific and   can be seen in a variety of conditions including stress response,   acute inflammation, trauma and pregnancy. Correlation with other   laboratory and clinical findings is essential.           Immature Granulocytes     0.8         Ketones, UA Negative             Lactic Acid Level     1.5  Comment: Falsely low lactic acid results can be found in samples   containing >=13.0 mg/dL total bilirubin and/or >=3.5 mg/dL   direct bilirubin.           Leukocyte Esterase, UA 3+             LPM       6.0       Lymph #     0.5         Lymph %     4.3         MCH     31.0         MCHC     31.2         MCV     99         Microscopic Comment SEE COMMENT  Comment: Other formed elements not mentioned in the report are not   present in the microscopic examination.                Mono #     0.9         Mono %     8.0         MPV     11.3         NITRITE UA Negative             nRBC     0         Blood, UA 2+             pH, UA 6.0             Platelet Count     209         POC HCO3       41.8  Comment: Value above reference range       POC Lactate       1.4       POC PCO2       78.4  Comment: Value above reference range       POC PH       7.335  Comment: Value below reference range       POC PO2       20.7  Comment:  notified  at    read back  Value below critical limit         POC SATURATED O2       27.7  Comment:  notified  at    read back  Value below critical limit          Potassium     4.2         PROTEIN TOTAL     7.0         Protein, UA Trace  Comment: Recommend a 24 hour urine protein or a urine   protein/creatinine ratio if globulin induced proteinuria is  clinically suspected.               RBC     3.94         RBC, UA 71             RDW     16.6         SARS-CoV-2 RNA, Amplification, Qual   Negative  Comment: This test utilizes isothermal nucleic acid amplification technology   to   detect the SARS-CoV-2 RdRp nucleic acid segment. The analytical   sensitivity   (limit of detection) is 500 copies/swab.    A POSITIVE result is indicative of the presence of SARS-CoV-2 RNA;   clinical   correlation with patient history and other diagnostic information is   necessary to determine patient infection status.    A NEGATIVE result means that SARS-CoV-2 nucleic acids are not present   above   the limit of detection. A NEGATIVE result should be treated as   presumptive.   It does not rule out the possibility of COVID-19 and should not be   the sole   basis for treatment decisions.    If COVID-19 is strongly suspected based on clinical and exposure   history,   re-testing using an alternate molecular assay should be considered.    This test is Food and Drug Administration (FDA) approved. Performance   characteristics of this has been independently verified by [facility] Department of Pathology and Laboratory Medicine.             Sodium     137         Spec Grav UA 1.020             Specimen UA Urine, Clean Catch             Troponin I     0.033  Comment: The reference interval for Troponin I represents the 99th percentile   cutoff   for our facility and is consistent with 3rd generation assay   performance.           UROBILINOGEN UA Negative             WBC Clumps, UA Moderate             WBC, UA >100             WBC     10.69                 Significant Imaging: I have reviewed all pertinent imaging results/findings within the past 24 hours.  Assessment/Plan:     * Acute  on chronic hypoxic respiratory failure  Admit to medical floor with telemetry  Patient with Hypercapnic and Hypoxic Respiratory failure which is Acute on chronic.  he is on home oxygen at 3 LPM. Supplemental oxygen was provided and noted- Oxygen Concentration (%):  [45] 45    .   Signs/symptoms of respiratory failure include- increased work of breathing, respiratory distress, and use of accessory muscles. Contributing diagnoses includes - CHF and COPD Labs and images were reviewed. Patient Has recent ABG, which has been reviewed. Will treat underlying causes and adjust management of respiratory failure as follows- continue with BiPAP at night    Acute on chronic diastolic heart failure    Strict I's and O's.    Diuresis with Lasix.  Inpatient Cardiology consultation.    COPD exacerbation  Patient's COPD is with exacerbation noted by continued dyspnea and use of accessory muscles for breathing currently.  Patient is currently off COPD Pathway. Continue scheduled inhalers Antibiotics and Supplemental oxygen and monitor respiratory status closely.     Closed fracture of T8 vertebra  Pain control.  Back brace which is to be delivered to patient's home.      Essential hypertension  Patient's blood pressure range in the last 24 hours was: BP  Min: 134/86  Max: 158/69.The patient's inpatient anti-hypertensive regimen is listed below:  Current Antihypertensives  metoprolol tartrate (LOPRESSOR) tablet 50 mg, 2 times daily, Oral  furosemide injection 40 mg, Every 12 hours, Intravenous    Plan  - BP is controlled, no changes needed to their regimen  - monitor blood pressure closely    UTI (urinary tract infection)  Urine culture.  IV antibiotics.      A-fib  Patient has long standing persistent (>12 months) atrial fibrillation. Patient is currently in sinus rhythm. [id number] Score: 3. The patients heart rate in the last 24 hours is as follows:  Pulse  Min: 85  Max: 115     Antiarrhythmics  metoprolol tartrate (LOPRESSOR)  tablet 50 mg, 2 times daily, Oral    Anticoagulants  apixaban tablet 5 mg, 2 times daily, Oral    Plan  - Replete lytes with a goal of K>4, Mg >2  - Patient is anticoagulated, see medications listed above.  - Patient's afib is currently controlled  - monitor for any arrhythmia          VTE Risk Mitigation (From admission, onward)           Ordered     apixaban tablet 5 mg  2 times daily         01/21/25 0105                       On 01/21/2025, patient should be placed in hospital observation services under my care.             Ashkan Nicole MD  Department of Hospital Medicine  [place] - Emergency Dept

## 2025-01-21 NOTE — HPI
Bean Salas is 80-year-old elderly male with a past medical history of CAD, combined CHF (recovered EF), CAD, A-fib on eliquis,COPD, chronic respiratory failure on 3L NC, and hypothyroidism who presents to the ED for worsening SOB and back pain.  Patient was evaluated emergency department and was found to be in respiratory distress and satting 86-88% on 5 L. Patient was placed on BiPAP and seemed to improve.  Patient also had a fall several days ago and had a T8 vertebral fracture and was complaining of moderate pain.  Due to patient's presenting symptoms he will require admission for further evaluation and management.  Patient is a poor historian however at time my examination he denied any headache, fever, chills, chest pain but complained of shortness of breath and back pain.

## 2025-01-21 NOTE — ED NOTES
Pt lying in bed sleeping. Monitor in place. VSS. BIPAP in place. Side rails up x2. Bed in lowest position and wheels locked.

## 2025-01-21 NOTE — ASSESSMENT & PLAN NOTE
Patient's COPD is with exacerbation noted by continued dyspnea and use of accessory muscles for breathing currently.  Patient is currently off COPD Pathway. Continue scheduled inhalers Antibiotics and Supplemental oxygen and monitor respiratory status closely.

## 2025-01-21 NOTE — CONSULTS
Consult received for fluid and salt restricted diet education. RD working remotely due to weather. Handouts on fluid and salt restricted diet attached to d/c paperwork. RD to follow up with education as available.

## 2025-01-21 NOTE — ED PROVIDER NOTES
Encounter Date: 1/20/2025       History     Chief Complaint   Patient presents with    Shortness of Breath     Pt c/o Shortness of breath, has T8 fracture x2weeks ago, and a hip fracture x1 month. Received toradol en route,      HPI  80-year-old male with extensive medical history including CHF, COPD, presents with family for progressively worsening shortness of breath.  Over the last several days he has had progressively worsening orthopneic dyspnea that it prevented any sort of exertion.  Gasping on arrival Review of patient's allergies indicates:  No Known Allergies  Past Medical History:   Diagnosis Date    Atrial fibrillation, unspecified type 09/06/2023    COPD exacerbation 09/06/2023    Thyroid disease     hypo     Past Surgical History:   Procedure Laterality Date    COLONOSCOPY  01/01/2011    CORONARY ANGIOGRAPHY N/A 07/22/2021    Procedure: ANGIOGRAM, CORONARY ARTERY;  Surgeon: Hank Barry MD;  Location: [facility] CATH LAB/EP;  Service: Cardiology;  Laterality: N/A;    EYE SURGERY Bilateral     cataracts extraction    FRACTURE SURGERY Right 09/2021    femur    HERNIA REPAIR      HIP SURGERY Right 08/2021    IMPLANTATION OF BIVENTRICULAR HEART PACEMAKER N/A 11/4/2024    Procedure: INSERTION, PACEMAKER, BIVENTRICULAR;  Surgeon: Mac Alejandro MD;  Location: [facility] EP LAB;  Service: Cardiology;  Laterality: N/A;  CHB, CRT-P, SJM, Anes, DM, CICU 3081    INTRAMEDULLARY RODDING OF TROCHANTER OF FEMUR Right 09/03/2021    Procedure: INSERTION, INTRAMEDULLARY RACQUEL, FEMUR, TROCHANTER;  Surgeon: Darryn Hall MD;  Location: [facility] OR;  Service: Orthopedics;  Laterality: Right;    JOINT REPLACEMENT Bilateral     knee    LEFT HEART CATHETERIZATION Right 07/22/2021    Procedure: Left heart cath;  Surgeon: Hank Barry MD;  Location: [facility] CATH LAB/EP;  Service: Cardiology;  Laterality: Right;    OPEN REDUCTION AND INTERNAL FIXATION (ORIF) OF INJURY OF HIP Right 9/20/2024    Procedure: ORIF,PELVIS;  Surgeon: Pieter  Negrito BARON MD;  [facility];  Service: Orthopedics;  Laterality: Right;  +local    TRANSESOPHAGEAL ECHOCARDIOGRAPHY N/A 2024    Procedure: ECHOCARDIOGRAM, TRANSESOPHAGEAL;  Surgeon: Leonora Butt MD;  Location: [facility] EP LAB;  Service: Cardiology;  Laterality: N/A;    TREATMENT OF CARDIAC ARRHYTHMIA N/A 2024    Procedure: Cardioversion or Defibrillation;  Surgeon: ANA Steiner MD;  Location: [facility] EP LAB;  Service: Cardiology;  Laterality: N/A;  AF, DEMETRICE/DCCV, ANES, GP, 524    VASECTOMY       Family History   Problem Relation Name Age of Onset    Crohn's disease Mother      Dementia Mother      Heart attack Father      No Known Problems Sister      No Known Problems Brother      No Known Problems Son       Social History     Tobacco Use    Smoking status: Former     Current packs/day: 0.00     Average packs/day: 1 pack/day for 35.0 years (35.0 ttl pk-yrs)     Types: Cigarettes     Start date: 1965     Quit date: 2000     Years since quittin.0     Passive exposure: Past    Smokeless tobacco: Never   Substance Use Topics    Alcohol use: No    Drug use: Never   Medical surgical family and social history reviewed  Review of Systems  Complete review of systems was conducted and was negative except as per HPI and as marked  Physical Exam     Initial Vitals [25]   BP Pulse Resp Temp SpO2   134/86 (!) 115 (!) 25 97.8 °F (36.6 °C) (!) 92 %      MAP       --         Physical Exam  Physical  General: Awake, alert, no acute distress  Head: Normocephalic, atraumatic  Neck: Trachea midline, full range of motion, no meningismus  ENT: Atraumatic, Airway Patent  Cardio:  Irregularly irregular rhythm Heart Sounds Normal, Capillary refill normal  Chest: Atraumatic tachypnea with the use of accessory muscles to breathe, bibasilar crackles and diffuse wheezing with adequate air movement  Abdomen:  Belly breathing.  Otherwise Soft, Nontender, no involuntary guarding, rigidity,  or rebound.  Upper Ext: Atraumatic, Inspection normal, no swelling  Lower Ext plus one bilateral lower extremity edema, otherwise inspection normal.  Neuro: No gross cranial nerve abnormality, no lateralization, no gross sensory or motor deficits  Abdomen: Soft, Nontender, no involuntary guarding, rigidity, or rebound.  ED Course   Procedures  Labs Reviewed   CBC W/ AUTO DIFFERENTIAL - Abnormal       Result Value    WBC 10.69      RBC 3.94 (*)     Hemoglobin 12.2 (*)     Hematocrit 39.1 (*)     MCV 99 (*)     MCH 31.0      MCHC 31.2 (*)     RDW 16.6 (*)     Platelets 209      MPV 11.3      Immature Granulocytes 0.8 (*)     Gran # (ANC) 9.2 (*)     Immature Grans (Abs) 0.09 (*)     Lymph # 0.5 (*)     Mono # 0.9      Eos # 0.1      Baso # 0.02      nRBC 0      Gran % 86.2 (*)     Lymph % 4.3 (*)     Mono % 8.0      Eosinophil % 0.5      Basophil % 0.2      Differential Method Automated     COMPREHENSIVE METABOLIC PANEL - Abnormal    Sodium 137      Potassium 4.2      Chloride 96      CO2 28      Glucose 156 (*)     BUN 26 (*)     Creatinine 1.1      Calcium 8.9      Total Protein 7.0      Albumin 2.5 (*)     Total Bilirubin 0.8      Alkaline Phosphatase 64      AST 24      ALT 22      eGFR >60      Anion Gap 13     URINALYSIS, REFLEX TO URINE CULTURE - Abnormal    Specimen UA Urine, Clean Catch      Color, UA Yellow      Appearance, UA Hazy (*)     pH, UA 6.0      Specific Gravity, UA 1.020      Protein, UA Trace (*)     Glucose, UA Negative      Ketones, UA Negative      Bilirubin (UA) Negative      Occult Blood UA 2+ (*)     Nitrite, UA Negative      Urobilinogen, UA Negative      Leukocytes, UA 3+ (*)     Narrative:     Specimen Source->Urine   TROPONIN I - Abnormal    Troponin I 0.033 (*)    PROCALCITONIN - Abnormal    Procalcitonin 0.39 (*)    B-TYPE NATRIURETIC PEPTIDE - Abnormal    BNP 1,146 (*)    URINALYSIS MICROSCOPIC - Abnormal    RBC, UA 71 (*)     WBC, UA >100 (*)     WBC Clumps, UA Moderate (*)      Bacteria Occasional      Microscopic Comment SEE COMMENT      Narrative:     Specimen Source->Urine   INFLUENZA A & B BY MOLECULAR    Influenza A, Molecular Negative      Influenza B, Molecular Negative      Flu A & B Source NP     CULTURE, BLOOD   CULTURE, BLOOD   CULTURE, URINE   LACTIC ACID, PLASMA    Lactate (Lactic Acid) 1.5     SARS-COV-2 RNA AMPLIFICATION, QUAL    SARS-CoV-2 RNA, Amplification, Qual Negative     LACTIC ACID, PLASMA   HEMOGLOBIN A1C   MAGNESIUM          Imaging Results              X-Ray Chest AP Portable (Final result)  Result time 01/20/25 23:14:05      Final result by Hermila Aaron MD (01/20/25 23:14:05)                   Impression:      Please see above.      Electronically signed by: Hermila Aaron MD  Date:    01/20/2025  Time:    23:14               Narrative:    EXAMINATION:  XR CHEST AP PORTABLE    CLINICAL HISTORY:  Sepsis;    TECHNIQUE:  Single frontal view of the chest was performed.    COMPARISON:  01/17/2025    FINDINGS:  There is unchanged enlargement of the cardiomediastinal silhouette.  Stably positioned left-sided cardiac pacing device in place.  The lungs are symmetrically expanded with diffuse increased interstitial and parenchymal attenuation and perihilar interstitial opacities suggestive of interstitial edema/CHF although correlation for infectious process advised.  There is bilateral pleural fluid with bibasilar atelectasis and/or consolidation.  No pneumothorax.  Visualized osseous structures are intact with degenerative change.                                       Medications   LORazepam injection 1 mg (0 mg Intravenous Hold 1/20/25 2330)   apixaban tablet 5 mg (has no administration in time range)   aspirin EC tablet 81 mg (has no administration in time range)   atorvastatin tablet 10 mg (has no administration in time range)   empagliflozin (Jardiance) tablet 10 mg (has no administration in time range)   metoprolol tartrate (LOPRESSOR) tablet 50 mg (has no  administration in time range)   levothyroxine tablet 200 mcg (has no administration in time range)   melatonin tablet 6 mg (has no administration in time range)   methocarbamoL tablet 750 mg (has no administration in time range)   mirtazapine tablet 7.5 mg (7.5 mg Oral Given 1/21/25 0126)   sacubitriL-valsartan 24-26 mg per tablet 1 tablet (has no administration in time range)   tamsulosin 24 hr capsule 0.4 mg (has no administration in time range)   sodium chloride 0.9% flush 10 mL (has no administration in time range)   furosemide injection 40 mg (has no administration in time range)   ondansetron injection 4 mg (has no administration in time range)   acetaminophen tablet 650 mg (has no administration in time range)   oxyCODONE immediate release tablet 10 mg (10 mg Oral Given 1/21/25 0249)   cefTRIAXone injection 1 g (1 g Intravenous Given 1/21/25 0124)   albuterol-ipratropium 2.5 mg-0.5 mg/3 mL nebulizer solution 3 mL (has no administration in time range)   furosemide injection 40 mg (40 mg Intravenous Given 1/20/25 2249)   levalbuterol nebulizer solution 1.25 mg (1.25 mg Nebulization Given by Other 1/20/25 2220)   methylPREDNISolone sodium succinate injection 125 mg (125 mg Intravenous Given 1/20/25 2249)     Medical Decision Making  Amount and/or Complexity of Data Reviewed  Labs: ordered.  Radiology: ordered.    Risk  Prescription drug management.                Seems to be exacerbation of both conditions simultaneously.  Patient is DNR/DNI per [place] post, and this was confirmed with family.  They are actually hoping to have conversation about starting hospice in the very near future.   BiPAP was trialed, and initially he did well with it.  However, he subsequently refused this, and just about everything else.  He was improved after treatments given however, and was able to tolerate albeit apparently uncomfortably being on a nasal cannula.  He was admitted to ICU for further evaluation and treatment.    35 minutes  were spent in the critical care of this patient. Patient presented with acute on chronic hypoxemic and hypercapnic respiratory failure which is an acute life and limb threatening time-sensitive medical condition with high risk of decompensation requiring resuscitation including management of noninvasive positive pressure ventilation and multimodal emergent therapy for this life-threatening medical condition..  Time spent including time at the present bedside, time obtaining additional ancillary information, ordering and interpreting studies, reassessments, ordering interventions, discussion with patient and family,.  This does not include time spent on separately billed for procedures.  I personally performed the critical care time documented here and it does not include time spent by a APPs or residents.          Patient is not septic, by they affirmed that a perfusion reassessment was performed at approximately 3 hours present in the department           Clinical Impression:  Final diagnoses:  [Z13.6] Screening for cardiovascular condition  [J96.90] Respiratory failure  [J96.21, J96.22] Acute on chronic respiratory failure with hypoxia and hypercapnia (Primary)          ED Disposition Condition    Observation Stable                Mac Vallecillo MD  01/21/25 4565

## 2025-01-22 LAB
ANION GAP SERPL CALC-SCNC: 10 MMOL/L (ref 8–16)
BUN SERPL-MCNC: 30 MG/DL (ref 8–23)
CALCIUM SERPL-MCNC: 8.7 MG/DL (ref 8.7–10.5)
CHLORIDE SERPL-SCNC: 97 MMOL/L (ref 95–110)
CO2 SERPL-SCNC: 35 MMOL/L (ref 23–29)
CREAT SERPL-MCNC: 1 MG/DL (ref 0.5–1.4)
EST. GFR  (NO RACE VARIABLE): >60 ML/MIN/1.73 M^2
GLUCOSE SERPL-MCNC: 99 MG/DL (ref 70–110)
OHS QRS DURATION: 154 MS
OHS QTC CALCULATION: 548 MS
POTASSIUM SERPL-SCNC: 4.3 MMOL/L (ref 3.5–5.1)
SODIUM SERPL-SCNC: 142 MMOL/L (ref 136–145)

## 2025-01-22 PROCEDURE — 94660 CPAP INITIATION&MGMT: CPT | Mod: HCNC

## 2025-01-22 PROCEDURE — 99233 SBSQ HOSP IP/OBS HIGH 50: CPT | Mod: HCNC,,, | Performed by: STUDENT IN AN ORGANIZED HEALTH CARE EDUCATION/TRAINING PROGRAM

## 2025-01-22 PROCEDURE — 80048 BASIC METABOLIC PNL TOTAL CA: CPT | Mod: HCNC | Performed by: FAMILY MEDICINE

## 2025-01-22 PROCEDURE — 63600175 PHARM REV CODE 636 W HCPCS: Mod: HCNC | Performed by: FAMILY MEDICINE

## 2025-01-22 PROCEDURE — 63600175 PHARM REV CODE 636 W HCPCS: Mod: HCNC | Performed by: INTERNAL MEDICINE

## 2025-01-22 PROCEDURE — 27000221 HC OXYGEN, UP TO 24 HOURS: Mod: HCNC

## 2025-01-22 PROCEDURE — 27100171 HC OXYGEN HIGH FLOW UP TO 24 HOURS: Mod: HCNC

## 2025-01-22 PROCEDURE — 99900035 HC TECH TIME PER 15 MIN (STAT): Mod: HCNC

## 2025-01-22 PROCEDURE — 25000003 PHARM REV CODE 250: Mod: HCNC | Performed by: INTERNAL MEDICINE

## 2025-01-22 PROCEDURE — 36415 COLL VENOUS BLD VENIPUNCTURE: CPT | Mod: HCNC | Performed by: FAMILY MEDICINE

## 2025-01-22 PROCEDURE — 25000003 PHARM REV CODE 250: Mod: HCNC | Performed by: FAMILY MEDICINE

## 2025-01-22 PROCEDURE — 94761 N-INVAS EAR/PLS OXIMETRY MLT: CPT | Mod: HCNC

## 2025-01-22 PROCEDURE — 20000000 HC ICU ROOM: Mod: HCNC

## 2025-01-22 RX ORDER — POLYETHYLENE GLYCOL 3350 17 G/17G
17 POWDER, FOR SOLUTION ORAL DAILY
Status: DISCONTINUED | OUTPATIENT
Start: 2025-01-22 | End: 2025-01-28

## 2025-01-22 RX ORDER — MUPIROCIN 20 MG/G
OINTMENT TOPICAL 2 TIMES DAILY
Status: COMPLETED | OUTPATIENT
Start: 2025-01-22 | End: 2025-01-27

## 2025-01-22 RX ADMIN — SACUBITRIL AND VALSARTAN 1 TABLET: 24; 26 TABLET, FILM COATED ORAL at 10:01

## 2025-01-22 RX ADMIN — ATORVASTATIN CALCIUM 10 MG: 10 TABLET, FILM COATED ORAL at 10:01

## 2025-01-22 RX ADMIN — FUROSEMIDE 40 MG: 10 INJECTION, SOLUTION INTRAMUSCULAR; INTRAVENOUS at 10:01

## 2025-01-22 RX ADMIN — OXYCODONE 10 MG: 5 TABLET ORAL at 05:01

## 2025-01-22 RX ADMIN — EMPAGLIFLOZIN 10 MG: 10 TABLET, FILM COATED ORAL at 10:01

## 2025-01-22 RX ADMIN — MIRTAZAPINE 7.5 MG: 7.5 TABLET, FILM COATED ORAL at 09:01

## 2025-01-22 RX ADMIN — CEFTRIAXONE SODIUM 1 G: 1 INJECTION, POWDER, FOR SOLUTION INTRAMUSCULAR; INTRAVENOUS at 01:01

## 2025-01-22 RX ADMIN — FUROSEMIDE 40 MG: 10 INJECTION, SOLUTION INTRAMUSCULAR; INTRAVENOUS at 09:01

## 2025-01-22 RX ADMIN — METOPROLOL TARTRATE 50 MG: 50 TABLET, FILM COATED ORAL at 10:01

## 2025-01-22 RX ADMIN — SACUBITRIL AND VALSARTAN 1 TABLET: 24; 26 TABLET, FILM COATED ORAL at 09:01

## 2025-01-22 RX ADMIN — TAMSULOSIN HYDROCHLORIDE 0.4 MG: 0.4 CAPSULE ORAL at 10:01

## 2025-01-22 RX ADMIN — APIXABAN 5 MG: 5 TABLET, FILM COATED ORAL at 09:01

## 2025-01-22 RX ADMIN — APIXABAN 5 MG: 5 TABLET, FILM COATED ORAL at 10:01

## 2025-01-22 RX ADMIN — MUPIROCIN: 20 OINTMENT TOPICAL at 09:01

## 2025-01-22 RX ADMIN — ASPIRIN 81 MG: 81 TABLET, COATED ORAL at 10:01

## 2025-01-22 RX ADMIN — METOPROLOL TARTRATE 50 MG: 50 TABLET, FILM COATED ORAL at 09:01

## 2025-01-22 NOTE — NURSING
0252: Patient noted with bigeminy and trigeminy rhythm at rest. Asymptomatic. Denies any chest pain. Notified HM team. No new orders at this time.

## 2025-01-22 NOTE — SUBJECTIVE & OBJECTIVE
Interval History: Feeling better, reports some congestion. SOB improved.    Frequent runs of bigeminy and trigmeny    Review of Systems   Constitutional:  Positive for activity change.   HENT: Negative.     Respiratory:  Positive for shortness of breath.    Cardiovascular: Negative.    Gastrointestinal: Negative.    Genitourinary: Negative.    Musculoskeletal:  Positive for back pain.   Neurological: Negative.    All other systems reviewed and are negative.    Objective:     Vital Signs (Most Recent):  Temp: 99.1 °F (37.3 °C) (01/22/25 1144)  Pulse: 85 (01/22/25 1506)  Resp: 18 (01/22/25 1144)  BP: 100/64 (01/22/25 1144)  SpO2: 97 % (01/22/25 1144) Vital Signs (24h Range):  Temp:  [97.6 °F (36.4 °C)-99.1 °F (37.3 °C)] 99.1 °F (37.3 °C)  Pulse:  [] 85  Resp:  [13-23] 18  SpO2:  [92 %-97 %] 97 %  BP: (100-124)/(55-67) 100/64     Weight: 95.5 kg (210 lb 8.6 oz)  Body mass index is 29.36 kg/m².    Intake/Output Summary (Last 24 hours) at 1/22/2025 1549  Last data filed at 1/22/2025 0610  Gross per 24 hour   Intake --   Output 952 ml   Net -952 ml         Physical Exam  Constitutional:       General: He is not in acute distress.  HENT:      Head: Normocephalic and atraumatic.      Nose: Nose normal.      Mouth/Throat:      Mouth: Mucous membranes are moist.   Eyes:      Extraocular Movements: Extraocular movements intact.      Pupils: Pupils are equal, round, and reactive to light.   Cardiovascular:      Rate and Rhythm: Regular rhythm.   Pulmonary:      Breath sounds: Rhonchi present.   Abdominal:      Palpations: Abdomen is soft.   Musculoskeletal:         General: Normal range of motion.      Cervical back: Neck supple.   Skin:     General: Skin is warm.   Neurological:      Mental Status: He is alert. Mental status is at baseline.   Psychiatric:         Mood and Affect: Mood normal.             Significant Labs: All pertinent labs within the past 24 hours have been reviewed.  CBC:   Recent Labs   Lab  01/20/25 2227   WBC 10.69   HGB 12.2*   HCT 39.1*        CMP:   Recent Labs   Lab 01/20/25 2227 01/22/25  0710    142   K 4.2 4.3   CL 96 97   CO2 28 35*   * 99   BUN 26* 30*   CREATININE 1.1 1.0   CALCIUM 8.9 8.7   PROT 7.0  --    ALBUMIN 2.5*  --    BILITOT 0.8  --    ALKPHOS 64  --    AST 24  --    ALT 22  --    ANIONGAP 13 10       Significant Imaging: I have reviewed all pertinent imaging results/findings within the past 24 hours.

## 2025-01-22 NOTE — ED NOTES
Pt laying in bed resting with eyes closed rue and fall of chest noted. On 5L NC. O2 sat 95%. VS stable. Bed locked and in the lowest position, side rails up x2, call bell in reach.

## 2025-01-22 NOTE — CONSULTS
[facility] - Telemetry  Wound Care    Patient Name:  Bean Salas   MRN:  [id number]  Date: 2025  Diagnosis: Acute on chronic hypoxic respiratory failure    History:     Past Medical History:   Diagnosis Date    Atrial fibrillation, unspecified type 2023    COPD exacerbation 2023    Thyroid disease     hypo       Social History     Socioeconomic History    Marital status:    Tobacco Use    Smoking status: Former     Current packs/day: 0.00     Average packs/day: 1 pack/day for 35.0 years (35.0 ttl pk-yrs)     Types: Cigarettes     Start date: 1965     Quit date: 2000     Years since quittin.0     Passive exposure: Past    Smokeless tobacco: Never   Substance and Sexual Activity    Alcohol use: No    Drug use: Never    Sexual activity: Not Currently     Social Drivers of Health     Financial Resource Strain: Low Risk  (2025)    Overall Financial Resource Strain (CARDIA)     Difficulty of Paying Living Expenses: Not hard at all   Food Insecurity: No Food Insecurity (2025)    Hunger Vital Sign     Worried About Running Out of Food in the Last Year: Never true     Ran Out of Food in the Last Year: Never true   Transportation Needs: No Transportation Needs (2025)    TRANSPORTATION NEEDS     Transportation : No   Physical Activity: Inactive (10/30/2024)    Exercise Vital Sign     Days of Exercise per Week: 0 days     Minutes of Exercise per Session: 0 min   Stress: No Stress Concern Present (2025)    [employer] - Occupational Stress Questionnaire     Feeling of Stress : Not at all   Housing Stability: Low Risk  (2025)    Housing Stability Vital Sign     Unable to Pay for Housing in the Last Year: No     Homeless in the Last Year: No       Precautions:     Allergies as of 2025    (No Known Allergies)       WOC Assessment Details/Treatment     Photos in media provided by nurse  Eriberto/L buttocks- DTPI present on admit- evolving  purple maroon discoloration to coccyx extending to L buttocks- dried ulceration      Redness to back    Recommendations discussed with nurse and Dr Centeno:  - Pressure injury prevention interventions - waffle overlay/heel boots  - Triad ointment BID to buttocks/coccyx    01/22/2025

## 2025-01-22 NOTE — PROGRESS NOTES
"[facility] Progress Note     Attending Physician: Terra Centeno MD  Cardiology Attending Physician: Elio Eng MD  Date of Admit: 2025      Subjective/Interval History:      Pt seen this am, NAEO. Sleeping. VSS. Tele reviewed less ectopy noted. -1.6L     Objective:     Last 24 Hour Vital Signs:  BP  Min: 100/64  Max: 124/67  Temp  Av °F (36.7 °C)  Min: 97.6 °F (36.4 °C)  Max: 99.1 °F (37.3 °C)  Pulse  Av.9  Min: 66  Max: 112  Resp  Av.8  Min: 13  Max: 23  SpO2  Av %  Min: 92 %  Max: 97 %  Height  Av' 11" (180.3 cm)  Min: 5' 11" (180.3 cm)  Max: 5' 11" (180.3 cm)  Weight  Av.8 kg (211 lb 1.5 oz)  Min: 95.5 kg (210 lb 8.6 oz)  Max: 96 kg (211 lb 10.3 oz)  I/O last 3 completed shifts:  In: -   Out:  [Urine:]    Physical Examination:  Constitutional: NAD, Atraumatic   HEENT: MMM  Cardiovascular: RRR, no murmurs noted, no chest tenderness to palpation, 2+ radial pulses b/l  Pulmonary: normal respiratory effort, CTAB, no crackles, wheezes  Abdominal: S/NT/ND  Musculoskeletal: No lower extremity edema noted  Neurological: Alert & oriented to self, location, time and situation. No gross neurological deficits  Skin: W/D/I  Psych: Appropriate affect, normal mood    Laboratory/Radiographic/Cardiac Data:  Personally Reviewed      Assessment/Plan:     Patient Active Problem List    Diagnosis Date Noted    Closed fracture of T8 vertebra 2025    UTI (urinary tract infection) 2025    Irritant contact dermatitis due friction or contact with other specified body fluids 2024    Age-related physical debility 2024    Urinary retention 2024    Encephalopathy, metabolic 10/29/2024    CHB (complete heart block) 10/29/2024    A-fib 10/28/2024    Class 1 obesity with body mass index (BMI) of 31.0 to 31.9 in adult 10/28/2024    History of pulmonary embolism 10/28/2024    Closed pelvic ring fracture s/p ORIF of pelvis on 2024    " Pulmonary embolism without acute cor pulmonale 09/16/2024    Elevated troponin 09/16/2024    Insomnia 09/16/2024    Abnormal ventricular wall motion 09/16/2024    Acute on chronic hypoxic respiratory failure 09/16/2024    Slow transit constipation 09/16/2024    Pulmonary hypertension 11/15/2023    Paroxysmal atrial flutter 09/06/2023    Aortic atherosclerosis 09/06/2023    COPD exacerbation 09/06/2023    AKHIL (obstructive sleep apnea) 10/05/2022    PVC's (premature ventricular contractions) 06/13/2022    Impaired range of motion of right hip 05/09/2022    Decreased right shoulder range of motion 05/09/2022    Weakness 05/09/2022    Impaired activities of daily living 05/09/2022    Rotator cuff impingement syndrome of right shoulder 04/27/2022    Venous stasis ulcer of right calf limited to breakdown of skin without varicose veins     Acute on chronic diastolic heart failure     Coronary artery disease involving native coronary artery of native heart without angina pectoris     SOB (shortness of breath)     Elevated LFTs 09/25/2021    Displaced intertrochanteric fracture of right femur, initial encounter for closed fracture 09/03/2021    Closed nondisplaced fracture of anterior column of right acetabulum with routine healing s/p ORIF of pelvis on 9/20/2024 09/03/2021    Atrial bigeminy 06/21/2021    Essential hypertension 06/25/2017    Pure hypercholesterolemia 06/25/2017    Hypothyroidism due to acquired atrophy of thyroid 06/25/2017          Bean Salas is a pleasant 80 y.o. male with PMHx of Afib on OAC, HFrEF, HTN, HLD, CAD, presented with worsening SOB and back pain. Found to be in AoC HRF and ADHF.    - Continue Lasix 40 mg IV BID (good UOP and setting of elveated  BNP)--Tomorrow reassess the need of transition to PO   - Continue eliquis for now -given falls will need to have a discussion with family and patient for long term care if he does move forward with Hospice.  - Continue Entresto 24-26 mg BID,  statin, asa, SLG2i  - Continue Lopressor 50 mg BID  - Interrogate device  - Echo   - rest of care per primary team     Elio Eng M.D.  Interventional Cardiology   [facility]    -Medical records & lab results  -Independently reviewing and interpreting (if not documented by myself) EKGs, echocardiograms, catherizations   -Obtaining a history, performing a relevant exam, counseling/educating the patient/family  -Documenting clinical information in the EMR including ordering of tests  -Care coordination and communicating with other health care professionals

## 2025-01-22 NOTE — ASSESSMENT & PLAN NOTE
Patient has long standing persistent (>12 months) atrial fibrillation. Patient is currently in sinus rhythm. [id number] Score: 3. The patients heart rate in the last 24 hours is as follows:  Pulse  Min: 66  Max: 112     Antiarrhythmics  metoprolol tartrate (LOPRESSOR) tablet 50 mg, 2 times daily, Oral    Anticoagulants  apixaban tablet 5 mg, 2 times daily, Oral    Plan  - Replete lytes with a goal of K>4, Mg >2  - Patient is anticoagulated, see medications listed above.  - Patient's afib is currently controlled  - monitor for any arrhythmia

## 2025-01-22 NOTE — PROGRESS NOTES
[facility] Medicine  Progress Note    Patient Name: Bean Salas  MRN: [id number]  Patient Class: IP- Inpatient   Admission Date: 1/20/2025  Length of Stay: 1 days  Attending Physician: Terra Centeno MD  Primary Care Provider: Ramirez Levine MD        Subjective     Principal Problem:Acute on chronic hypoxic respiratory failure        HPI:  Bean Salas is 80-year-old elderly male with a past medical history of CAD, combined CHF (recovered EF), CAD, A-fib on eliquis,COPD, chronic respiratory failure on 3L NC, and hypothyroidism who presents to the ED for worsening SOB and back pain.  Patient was evaluated emergency department and was found to be in respiratory distress and satting 86-88% on 5 L. Patient was placed on BiPAP and seemed to improve.  Patient also had a fall several days ago and had a T8 vertebral fracture and was complaining of moderate pain.  Due to patient's presenting symptoms he will require admission for further evaluation and management.  Patient is a poor historian however at time my examination he denied any headache, fever, chills, chest pain but complained of shortness of breath and back pain.    Overview/Hospital Course:  1/22/25 SOB is improved.    Interval History: Feeling better, reports some congestion. SOB improved.    Frequent runs of bigeminy and trigmeny    Review of Systems   Constitutional:  Positive for activity change.   HENT: Negative.     Respiratory:  Positive for shortness of breath.    Cardiovascular: Negative.    Gastrointestinal: Negative.    Genitourinary: Negative.    Musculoskeletal:  Positive for back pain.   Neurological: Negative.    All other systems reviewed and are negative.    Objective:     Vital Signs (Most Recent):  Temp: 99.1 °F (37.3 °C) (01/22/25 1144)  Pulse: 85 (01/22/25 1506)  Resp: 18 (01/22/25 1144)  BP: 100/64 (01/22/25 1144)  SpO2: 97 % (01/22/25 1144) Vital Signs (24h Range):  Temp:  [97.6 °F (36.4 °C)-99.1 °F (37.3  °C)] 99.1 °F (37.3 °C)  Pulse:  [] 85  Resp:  [13-23] 18  SpO2:  [92 %-97 %] 97 %  BP: (100-124)/(55-67) 100/64     Weight: 95.5 kg (210 lb 8.6 oz)  Body mass index is 29.36 kg/m².    Intake/Output Summary (Last 24 hours) at 1/22/2025 1549  Last data filed at 1/22/2025 0610  Gross per 24 hour   Intake --   Output 952 ml   Net -952 ml         Physical Exam  Constitutional:       General: He is not in acute distress.  HENT:      Head: Normocephalic and atraumatic.      Nose: Nose normal.      Mouth/Throat:      Mouth: Mucous membranes are moist.   Eyes:      Extraocular Movements: Extraocular movements intact.      Pupils: Pupils are equal, round, and reactive to light.   Cardiovascular:      Rate and Rhythm: Regular rhythm.   Pulmonary:      Breath sounds: Rhonchi present.   Abdominal:      Palpations: Abdomen is soft.   Musculoskeletal:         General: Normal range of motion.      Cervical back: Neck supple.   Skin:     General: Skin is warm.   Neurological:      Mental Status: He is alert. Mental status is at baseline.   Psychiatric:         Mood and Affect: Mood normal.             Significant Labs: All pertinent labs within the past 24 hours have been reviewed.  CBC:   Recent Labs   Lab 01/20/25 2227   WBC 10.69   HGB 12.2*   HCT 39.1*        CMP:   Recent Labs   Lab 01/20/25 2227 01/22/25  0710    142   K 4.2 4.3   CL 96 97   CO2 28 35*   * 99   BUN 26* 30*   CREATININE 1.1 1.0   CALCIUM 8.9 8.7   PROT 7.0  --    ALBUMIN 2.5*  --    BILITOT 0.8  --    ALKPHOS 64  --    AST 24  --    ALT 22  --    ANIONGAP 13 10       Significant Imaging: I have reviewed all pertinent imaging results/findings within the past 24 hours.    Assessment and Plan     * Acute on chronic hypoxic respiratory failure  Admit to medical floor with telemetry  Patient with Hypercapnic and Hypoxic Respiratory failure which is Acute on chronic.  he is on home oxygen at 3 LPM. Supplemental oxygen was provided and  noted- Oxygen Concentration (%):  [45] 45    .   Signs/symptoms of respiratory failure include- increased work of breathing, respiratory distress, and use of accessory muscles. Contributing diagnoses includes - CHF and COPD Labs and images were reviewed. Patient Has recent ABG, which has been reviewed. Will treat underlying causes and adjust management of respiratory failure as follows- continue with BiPAP at night    UTI (urinary tract infection)  Urine culture.  IV antibiotics.      Closed fracture of T8 vertebra  Pain control.  Back brace which is to be delivered to patient's home.      A-fib  Patient has long standing persistent (>12 months) atrial fibrillation. Patient is currently in sinus rhythm. [id number] Score: 3. The patients heart rate in the last 24 hours is as follows:  Pulse  Min: 66  Max: 112     Antiarrhythmics  metoprolol tartrate (LOPRESSOR) tablet 50 mg, 2 times daily, Oral    Anticoagulants  apixaban tablet 5 mg, 2 times daily, Oral    Plan  - Replete lytes with a goal of K>4, Mg >2  - Patient is anticoagulated, see medications listed above.  - Patient's afib is currently controlled  - monitor for any arrhythmia        COPD exacerbation  Patient's COPD is with exacerbation noted by continued dyspnea and use of accessory muscles for breathing currently.  Patient is currently off COPD Pathway. Continue scheduled inhalers Antibiotics and Supplemental oxygen and monitor respiratory status closely.     Acute on chronic diastolic heart failure    Strict I's and O's.    Diuresis with Lasix.  Inpatient Cardiology consultation.    Essential hypertension  Patient's blood pressure range in the last 24 hours was: BP  Min: 134/86  Max: 158/69.The patient's inpatient anti-hypertensive regimen is listed below:  Current Antihypertensives  metoprolol tartrate (LOPRESSOR) tablet 50 mg, 2 times daily, Oral  furosemide injection 40 mg, Every 12 hours, Intravenous    Plan  - BP is controlled, no changes needed to  their regimen  - monitor blood pressure closely      VTE Risk Mitigation (From admission, onward)           Ordered     apixaban tablet 5 mg  2 times daily         01/21/25 0105                    Discharge Planning   LATOSHA:      Code Status: DNR   Medical Readiness for Discharge Date:                            Terra Centeno MD  Department of Hospital Medicine   [facility]

## 2025-01-22 NOTE — PLAN OF CARE
Problem: Adult Inpatient Plan of Care  Goal: Plan of Care Review  Outcome: Progressing  Goal: Patient-Specific Goal (Individualized)  Outcome: Progressing  Goal: Absence of Hospital-Acquired Illness or Injury  Outcome: Progressing  Goal: Optimal Comfort and Wellbeing  Outcome: Progressing     Problem: Wound  Goal: Optimal Wound Healing  Outcome: Progressing     Problem: Infection  Goal: Absence of Infection Signs and Symptoms  Outcome: Progressing     Problem: Pulmonary Impairment  Goal: Effective Airway Clearance  Outcome: Progressing     Problem: UTI (Urinary Tract Infection)  Goal: Improved Infection Symptoms  Outcome: Progressing     Problem: Fall Injury Risk  Goal: Absence of Fall and Fall-Related Injury  Outcome: Progressing     POC reviewed with patient. All questions and concerns addressed. Fall/safety precautions implemented and maintained. Mckeon care performed. BiPAP HS. Please see flowsheet for full assessment and vitals. Bed locked in lowest position. Side rails up x2. Call bell within reach.

## 2025-01-22 NOTE — CONSULTS
Medical Decision Making:    Admission Date: 2025     I have reviewed medical record data and the patient was evaluated. See the trainee's note for details. As the teaching physician, I was present for and have confirmed the key portions of the service performed by the resident today. 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  On presentation, BNP 1146 and VBG pH = 7.33; pCO2 = 78.  Unable to tolerate BiPAP. Did respond to lasix with improved SpO2 and less dyspnea. Comfortable on NC O2 during our evaluation. CXR with chronic opacities - possible edema.  His hypercapnia is well compensated so likely chronic. AKHIL is mild, making AKHIL/OHS less likely. Uncertain as to COPD severity (if indeed has COPD) but certainly be responsible. No current wheeze.   DNR with intent to pursue hospice management in near future.    Problems:  Decompensated Cardiac Status with Volume Overload  Chronic hypercapnic respiratory failure    Plan:  He's willing to retry BiPAP with sleep tonight so would offer.  LAMA/LABA and prn duonebs.  Hospice management.     Manuel Johnson MD  [facility]  [phone]

## 2025-01-22 NOTE — PLAN OF CARE
VIRTUAL NURSE:  Cued into patient's room.  Permission received per patient to turn camera to view patient.  Introduced as VN for night shift that will be working with floor nurse and nursing assistant.  Educated patient on VN's role in patient care and  VIP model.  Plan of care reviewed with patient.  Education per flowsheet.   Informed patient that staff will round on them every 2 hours but to use call light for any other needs they may have; informed of fall risk and fall precautions.  Patient verbalized understanding.  Call light within reach; bed siderails up x3.  Opportunity given for questions and questions answered.  Admission assessment questions answered.  Patient denies complaints or any needs at this time. Instructed to call for assistance.  Will cont to monitor and intervene as needed.    Labs, notes, orders, and careplan initiated.    Problem: Adult Inpatient Plan of Care  Goal: Plan of Care Review  1/21/2025 2137 by Floyd Foster RN  Outcome: Progressing  1/21/2025 2133 by Floyd Foster RN  Outcome: Progressing  Goal: Patient-Specific Goal (Individualized)  Outcome: Progressing      01/21/25 2137   Nurse Notification   Bedside Nurse Notified? Yes   Name of Bedside Nurse Gali   Nurse Notfication Method Secure Chat   Nurse Notified Of Other  (Admission profile is completed)   Admission   Initial VN Admission Questions Complete   Communication Issues? None   Shift   Virtual Nurse - Rounding Complete   Virtual Nurse - Patient Verbalized Approval Of Camera Use;VN Rounding   Type of Frequent Check   Type Patient Rounds;Telemetry Monitoring   Safety/Activity   Patient Rounds bed in low position;placement of personal items at bedside;call light in patient/parent reach;clutter free environment maintained;visualized patient   Safety Promotion/Fall Prevention assistive device/personal item within reach;side rails raised x 2;medications reviewed;instructed to call staff for mobility;Fall Risk reviewed  with patient/family   Positioning   Body Position position maintained;neutral head position;neutral body alignment   Head of Bed (HOB) Positioning HOB at 45 degrees   Pain/Comfort/Sleep   Preferred Pain Scale number (Numeric Rating Pain Scale)   Comfort/Acceptable Pain Level 0   Pain Rating (0-10): Rest 0   Pain Rating (0-10): Activity 0   POSS (Pasero Opioid-Induced Sed Scale) 1 - Awake and alert   Sleep/Rest/Relaxation awake   Cardiac   Cardiac/Telemetry Monitor On Yes

## 2025-01-22 NOTE — HOSPITAL COURSE
Patient was admitted for worsening shortness of breath and back pain, he was treated for acute CHF exacerbation with IV Lasix, seen by Cardiology.  Patient was found to have T8 fracture, Neurosurgery evaluated the patient and was planning for surgery but patient also had persistent Enterococcus so procedure was canceled , ID was consulted as well, patient has remained persistently bacteremic    This morning Patient was not responding well to treatment and this morning he was very altered and not arousable, he was in acute respiratory distress, was placed on BiPAP and given IV Lasix, palliative care discussed with the patient's son who agreed on inpatient hospice, later in the evening, when we took the BiPAP off and placement on 10 L high-flow nasal cannula, patient was more awake and conversant, I and Dr. Rivers from palliative Care discussed with him and he wanted to be on comfort care and agreed on admission to inpatient hospice.   Can continues p.o. Lasix p.r.n. if blood pressure tolerates for symptomatic relief of fluid overload   patient was accepted to [facility] for inpatient hospice admission

## 2025-01-23 LAB
ANION GAP SERPL CALC-SCNC: 8 MMOL/L (ref 8–16)
BUN SERPL-MCNC: 30 MG/DL (ref 8–23)
CALCIUM SERPL-MCNC: 8.6 MG/DL (ref 8.7–10.5)
CHLORIDE SERPL-SCNC: 93 MMOL/L (ref 95–110)
CO2 SERPL-SCNC: 40 MMOL/L (ref 23–29)
CREAT SERPL-MCNC: 1.2 MG/DL (ref 0.5–1.4)
EST. GFR  (NO RACE VARIABLE): >60 ML/MIN/1.73 M^2
GLUCOSE SERPL-MCNC: 141 MG/DL (ref 70–110)
POTASSIUM SERPL-SCNC: 4.1 MMOL/L (ref 3.5–5.1)
SODIUM SERPL-SCNC: 141 MMOL/L (ref 136–145)

## 2025-01-23 PROCEDURE — 25000003 PHARM REV CODE 250: Mod: HCNC | Performed by: INTERNAL MEDICINE

## 2025-01-23 PROCEDURE — 63600175 PHARM REV CODE 636 W HCPCS: Mod: HCNC | Performed by: INTERNAL MEDICINE

## 2025-01-23 PROCEDURE — 99233 SBSQ HOSP IP/OBS HIGH 50: CPT | Mod: HCNC,,, | Performed by: STUDENT IN AN ORGANIZED HEALTH CARE EDUCATION/TRAINING PROGRAM

## 2025-01-23 PROCEDURE — 25000003 PHARM REV CODE 250: Mod: HCNC | Performed by: STUDENT IN AN ORGANIZED HEALTH CARE EDUCATION/TRAINING PROGRAM

## 2025-01-23 PROCEDURE — 27000221 HC OXYGEN, UP TO 24 HOURS: Mod: HCNC

## 2025-01-23 PROCEDURE — 94761 N-INVAS EAR/PLS OXIMETRY MLT: CPT | Mod: HCNC

## 2025-01-23 PROCEDURE — 36415 COLL VENOUS BLD VENIPUNCTURE: CPT | Mod: HCNC | Performed by: FAMILY MEDICINE

## 2025-01-23 PROCEDURE — 20000000 HC ICU ROOM: Mod: HCNC

## 2025-01-23 PROCEDURE — 80048 BASIC METABOLIC PNL TOTAL CA: CPT | Mod: HCNC | Performed by: FAMILY MEDICINE

## 2025-01-23 PROCEDURE — 63600175 PHARM REV CODE 636 W HCPCS: Mod: HCNC | Performed by: FAMILY MEDICINE

## 2025-01-23 PROCEDURE — 25000003 PHARM REV CODE 250: Mod: HCNC | Performed by: FAMILY MEDICINE

## 2025-01-23 PROCEDURE — 99900035 HC TECH TIME PER 15 MIN (STAT): Mod: HCNC

## 2025-01-23 RX ORDER — METOPROLOL TARTRATE 50 MG/1
50 TABLET ORAL 3 TIMES DAILY
Status: DISCONTINUED | OUTPATIENT
Start: 2025-01-23 | End: 2025-01-25

## 2025-01-23 RX ADMIN — LEVOTHYROXINE SODIUM 200 MCG: 100 TABLET ORAL at 05:01

## 2025-01-23 RX ADMIN — MUPIROCIN: 20 OINTMENT TOPICAL at 09:01

## 2025-01-23 RX ADMIN — OXYCODONE 10 MG: 5 TABLET ORAL at 01:01

## 2025-01-23 RX ADMIN — METOPROLOL TARTRATE 50 MG: 50 TABLET, FILM COATED ORAL at 08:01

## 2025-01-23 RX ADMIN — SACUBITRIL AND VALSARTAN 1 TABLET: 24; 26 TABLET, FILM COATED ORAL at 09:01

## 2025-01-23 RX ADMIN — APIXABAN 5 MG: 5 TABLET, FILM COATED ORAL at 09:01

## 2025-01-23 RX ADMIN — APIXABAN 5 MG: 5 TABLET, FILM COATED ORAL at 08:01

## 2025-01-23 RX ADMIN — CEFTRIAXONE SODIUM 1 G: 1 INJECTION, POWDER, FOR SOLUTION INTRAMUSCULAR; INTRAVENOUS at 01:01

## 2025-01-23 RX ADMIN — SACUBITRIL AND VALSARTAN 1 TABLET: 24; 26 TABLET, FILM COATED ORAL at 08:01

## 2025-01-23 RX ADMIN — MELATONIN TAB 3 MG 6 MG: 3 TAB at 08:01

## 2025-01-23 RX ADMIN — TAMSULOSIN HYDROCHLORIDE 0.4 MG: 0.4 CAPSULE ORAL at 09:01

## 2025-01-23 RX ADMIN — ATORVASTATIN CALCIUM 10 MG: 10 TABLET, FILM COATED ORAL at 09:01

## 2025-01-23 RX ADMIN — MUPIROCIN: 20 OINTMENT TOPICAL at 08:01

## 2025-01-23 RX ADMIN — ASPIRIN 81 MG: 81 TABLET, COATED ORAL at 09:01

## 2025-01-23 RX ADMIN — METOPROLOL TARTRATE 50 MG: 50 TABLET, FILM COATED ORAL at 09:01

## 2025-01-23 RX ADMIN — FUROSEMIDE 40 MG: 10 INJECTION, SOLUTION INTRAMUSCULAR; INTRAVENOUS at 09:01

## 2025-01-23 RX ADMIN — FUROSEMIDE 40 MG: 10 INJECTION, SOLUTION INTRAMUSCULAR; INTRAVENOUS at 08:01

## 2025-01-23 RX ADMIN — EMPAGLIFLOZIN 10 MG: 10 TABLET, FILM COATED ORAL at 09:01

## 2025-01-23 RX ADMIN — OXYCODONE 10 MG: 5 TABLET ORAL at 08:01

## 2025-01-23 RX ADMIN — MIRTAZAPINE 7.5 MG: 7.5 TABLET, FILM COATED ORAL at 08:01

## 2025-01-23 RX ADMIN — POLYETHYLENE GLYCOL (3350) 17 G: 17 POWDER, FOR SOLUTION ORAL at 09:01

## 2025-01-23 NOTE — PLAN OF CARE
Problem: Adult Inpatient Plan of Care  Goal: Plan of Care Review  Outcome: Progressing  Goal: Patient-Specific Goal (Individualized)  Outcome: Progressing  Goal: Absence of Hospital-Acquired Illness or Injury  Outcome: Progressing  Goal: Optimal Comfort and Wellbeing  Outcome: Progressing     Problem: Wound  Goal: Optimal Pain Control and Function  Outcome: Progressing  Goal: Optimal Wound Healing  Outcome: Progressing     Problem: Pulmonary Impairment  Goal: Optimal Gas Exchange  Outcome: Progressing     Problem: Fall Injury Risk  Goal: Absence of Fall and Fall-Related Injury  Outcome: Progressing     POC reviewed with patient. All questions and concerns addressed. Fall/safety precautions implemented and maintained. Mckeon care performed. 1.5 L FR. Pain management provided. IV abx administered. Wound care performed. No acute events noted this shift. Please see flowsheet for full assessment and vitals. Bed locked in lowest position. Side rails up x2. Call bell within reach.

## 2025-01-23 NOTE — ASSESSMENT & PLAN NOTE
Admit to medical floor with telemetry  Patient with Hypercapnic and Hypoxic Respiratory failure which is Acute on chronic.  he is on home oxygen at 3 LPM. Supplemental oxygen was provided and noted-      .   Signs/symptoms of respiratory failure include- increased work of breathing, respiratory distress, and use of accessory muscles. Contributing diagnoses includes - CHF and COPD Labs and images were reviewed. Patient Has recent ABG, which has been reviewed. Will treat underlying causes and adjust management of respiratory failure as follows- continue with BiPAP at night

## 2025-01-23 NOTE — NURSING
Notified Terra Phillips: he is also having lots of bigemeny/trigemeny runs at this time as well. He was having them last night as well.       MD noted, No new orders at this time.

## 2025-01-23 NOTE — PLAN OF CARE
"0910  Pt has been accepted by [facility]. Information added to the pt's discharge paperwork.     [place] - Telemetry  Initial Discharge Assessment       Primary Care Provider: Ramirez Levine MD    Admission Diagnosis: Acute on chronic diastolic heart failure [I50.33]  Respiratory failure [J96.90]  CHF (congestive heart failure) [I50.9]  Screening for cardiovascular condition [Z13.6]  Essential hypertension [I10]  Acute on chronic respiratory failure with hypoxia and hypercapnia [J96.21, J96.22]  Acute on chronic hypoxic respiratory failure [J96.21]    Admission Date: 1/20/2025  Expected Discharge Date: 1/24/2025    Consult: pulm, card, wound care    Payor: [employer] MANAGED MEDICARE / Plan: [employer] MEDICARE HMO / Product Type: Capitation /     Extended Emergency Contact Information  Primary Emergency Contact: Duncan Salas "Efren"           [facility] Phone: [phone]  Mobile Phone: [phone]  Relation: Son    Discharge Plan A: Home Health ([facility])  Discharge Plan B: Skilled Nursing Facility      [facility] [address] [place] [address] [address] [facility]  [address] [facility] [address]  Phone: [phone] Fax: [phone]      Initial Assessment (most recent)       Adult Discharge Assessment - 01/23/25 1010          Discharge Assessment    Assessment Type Discharge Planning Assessment     Confirmed/corrected address, phone number and insurance Yes     Confirmed Demographics Correct on Facesheet     Source of Information patient     Communicated LATOSHA with patient/caregiver Date not available/Unable to determine     Do you expect to return to your current living situation? Yes     Do you have help at home or someone to help you manage your care at home? Yes     Prior to hospitilization cognitive status: Alert/Oriented     Current cognitive status: Alert/Oriented     Equipment Currently Used at Home blood pressure machine;walker, rolling;shower chair;bedside commode;oxygen "     Readmission within 30 days? No     Patient currently being followed by outpatient case management? No     Do you currently have service(s) that help you manage your care at home? No     Do you take prescription medications? Yes     Do you have prescription coverage? Yes     Do you have any problems affording any of your prescribed medications? No     Is the patient taking medications as prescribed? yes     How do you get to doctors appointments? family or friend will provide     Are you on dialysis? No     Do you take coumadin? No     Discharge Plan A Home Health   [facility]    Discharge Plan B Skilled Nursing Facility     DME Needed Upon Discharge  other (see comments)   tbd    Discharge Plan discussed with: Patient                   1010  Patient resting quietly in bed when CM rounded via [employer]. No family present. Patient was admitted with acute on chronic respiratory failure & is being followed by pulm, card, & wound care. Pox 94% on 4L O2 via NC this AM.    Patient lives alone, has equipment to assist with ADLs, uses 3L O2 via NC at all times (provided by [facility]), was currently receiving HH services from [facility] prior to this hospitalization, & denied the need for assistance with transportation at time of discharge.     DORIS informed the pt of a previously scheduled appointment with Dr Ramirez Levine (PCP) on 1/27/2025 at 1040. Pt verbalized understanding. Information added to the pt's discharge paperwork.       Will continue to follow.              Will continue to follow.

## 2025-01-23 NOTE — SUBJECTIVE & OBJECTIVE
"Interval History: Doing well, no new complaints    Review of Systems   Constitutional:  Positive for activity change.   HENT: Negative.     Respiratory:  Negative for shortness of breath.    Cardiovascular: Negative.    Gastrointestinal: Negative.    Genitourinary: Negative.    Musculoskeletal:  Positive for back pain.   Neurological: Negative.    All other systems reviewed and are negative.    Objective:     Vital Signs (Most Recent):  Temp: 98.1 °F (36.7 °C) (01/23/25 1547)  Pulse: 63 (01/23/25 1547)  Resp: 18 (01/23/25 1547)  BP: 100/64 (01/23/25 1547)  SpO2: 96 % (01/23/25 1547) Vital Signs (24h Range):  Temp:  [97.1 °F (36.2 °C)-98.1 °F (36.7 °C)] 98.1 °F (36.7 °C)  Pulse:  [46-94] 63  Resp:  [17-20] 18  SpO2:  [92 %-96 %] 96 %  BP: (100-129)/(55-76) 100/64     Weight: 95.5 kg (210 lb 8.6 oz)  Body mass index is 29.36 kg/m².    Intake/Output Summary (Last 24 hours) at 1/23/2025 1740  Last data filed at 1/23/2025 1642  Gross per 24 hour   Intake 960 ml   Output 3000 ml   Net -2040 ml         Physical Exam  Constitutional:       General: He is not in acute distress.  HENT:      Head: Normocephalic and atraumatic.      Nose: Nose normal.      Mouth/Throat:      Mouth: Mucous membranes are moist.   Eyes:      Extraocular Movements: Extraocular movements intact.      Pupils: Pupils are equal, round, and reactive to light.   Cardiovascular:      Rate and Rhythm: Regular rhythm.   Pulmonary:      Breath sounds: Rhonchi present.   Abdominal:      Palpations: Abdomen is soft.   Musculoskeletal:         General: Normal range of motion.      Cervical back: Neck supple.   Skin:     General: Skin is warm.   Neurological:      Mental Status: He is alert. Mental status is at baseline.   Psychiatric:         Mood and Affect: Mood normal.             Significant Labs: All pertinent labs within the past 24 hours have been reviewed.  Blood Culture: No results for input(s): "LABBLOO" in the last 48 hours.  CBC: No results for " "input(s): "WBC", "HGB", "HCT", "PLT" in the last 48 hours.  CMP:   Recent Labs   Lab 01/22/25  0710 01/23/25  0629    141   K 4.3 4.1   CL 97 93*   CO2 35* 40*   GLU 99 141*   BUN 30* 30*   CREATININE 1.0 1.2   CALCIUM 8.7 8.6*   ANIONGAP 10 8       Significant Imaging: I have reviewed all pertinent imaging results/findings within the past 24 hours.  "

## 2025-01-23 NOTE — ASSESSMENT & PLAN NOTE
Patient has long standing persistent (>12 months) atrial fibrillation. Patient is currently in sinus rhythm. [id number] Score: 3. The patients heart rate in the last 24 hours is as follows:  Pulse  Min: 46  Max: 94     Antiarrhythmics  metoprolol tartrate (LOPRESSOR) tablet 50 mg, 3 times daily, Oral    Anticoagulants  apixaban tablet 5 mg, 2 times daily, Oral    Plan  - Replete lytes with a goal of K>4, Mg >2  - Patient is anticoagulated, see medications listed above.  - Patient's afib is currently controlled  - monitor for any arrhythmia

## 2025-01-23 NOTE — PLAN OF CARE
"1100  Pt was receiving HH services from [facility] prior to this hospitalization. Referral sent via EPIC. Awaiting response.       [facility] - Telemetry  Initial Discharge Assessment       Primary Care Provider: Ramirez Levine MD    Admission Diagnosis: Acute on chronic diastolic heart failure [I50.33]  Respiratory failure [J96.90]  CHF (congestive heart failure) [I50.9]  Screening for cardiovascular condition [Z13.6]  Essential hypertension [I10]  Acute on chronic respiratory failure with hypoxia and hypercapnia [J96.21, J96.22]  Acute on chronic hypoxic respiratory failure [J96.21]    Admission Date: 1/20/2025  Expected Discharge Date: 1/24/2025    Consult: pulm, card, & wound care    Payor: [employer] MANAGED MEDICARE / Plan: [employer] MEDICARE HMO / Product Type: Capitation /     Extended Emergency Contact Information  Primary Emergency Contact: Duncan Salas "Efren"           [facility] Phone: [phone]  Mobile Phone: [phone]  Relation: Son    Discharge Plan A: (P) Home Health ([facility] )  Discharge Plan B: (P) Skilled Nursing Facility      [facility], [place] - [address]  [address] [facility] [place] [address]  Phone: [phone] Fax: [phone]      Initial Assessment (most recent)       Adult Discharge Assessment - 01/23/25 1010          Discharge Assessment    Assessment Type Discharge Planning Assessment (P)      Confirmed/corrected address, phone number and insurance Yes (P)      Confirmed Demographics Correct on Facesheet (P)      Source of Information patient (P)      Communicated LATOSHA with patient/caregiver Date not available/Unable to determine (P)      Do you expect to return to your current living situation? Yes (P)      Do you have help at home or someone to help you manage your care at home? Yes (P)      Prior to hospitilization cognitive status: Alert/Oriented (P)      Current cognitive status: Alert/Oriented (P)      Equipment " Currently Used at Home blood pressure machine;walker, rolling;shower chair;bedside commode;oxygen (P)      Readmission within 30 days? No (P)      Patient currently being followed by outpatient case management? No (P)      Do you currently have service(s) that help you manage your care at home? No (P)      Do you take prescription medications? Yes (P)      Do you have prescription coverage? Yes (P)      Do you have any problems affording any of your prescribed medications? No (P)      Is the patient taking medications as prescribed? yes (P)      How do you get to doctors appointments? family or friend will provide (P)      Are you on dialysis? No (P)      Do you take coumadin? No (P)      Discharge Plan A Home Health (P)    [facility]    Discharge Plan B Skilled Nursing Facility (P)      DME Needed Upon Discharge  other (see comments) (P)    tbd    Discharge Plan discussed with: Patient (P)                     1010  Patient resting quietly in bed when CM rounded via [employer]. No family present. Patient was admitted with [facility] on chronic respiratory failure & is being followed by pulm, card, & wound care.     Patient lives alone, has equipment to assist with ADLs, uses 3L O2 (provided by [facility]) all of the time, is [facility],  & denied the need for assistance with transportation at time of discharge.     DORIS informed the pt of the previously scheduled appointment with Dr Ramirez Levine (PCP) on 1/27/2025 at 1040. Pt verbalized understanding. Information added to the pt's discharge paperwork.      Will continue to follow.

## 2025-01-23 NOTE — PROGRESS NOTES
[facility] Progress Note     Attending Physician: Terra Centeno MD  Cardiology Attending Physician: Elio Eng MD  Date of Admit: 2025      Subjective/Interval History:      Pt seen this am, NAEO. VSS. Tele reviewed v-Paced with less ectopy noted. Long discussion he stated he wants to continue his treatments and would not like move forward with hospice.     -2.5L     Objective:     Last 24 Hour Vital Signs:  BP  Min: 100/64  Max: 129/67  Temp  Av.8 °F (36.6 °C)  Min: 97.1 °F (36.2 °C)  Max: 99.1 °F (37.3 °C)  Pulse  Av.3  Min: 46  Max: 91  Resp  Av.3  Min: 17  Max: 20  SpO2  Av.1 %  Min: 92 %  Max: 97 %  I/O last 3 completed shifts:  In: 240 [P.O.:240]  Out: 3525 [Urine:3525]    Physical Examination:  Constitutional: NAD, Atraumatic   HEENT: MMM  Cardiovascular: RRR, no murmurs noted, no chest tenderness to palpation, 2+ radial pulses b/l  Pulmonary: normal respiratory effort, CTAB, no crackles, wheezes  Abdominal: S/NT/ND  Musculoskeletal: No lower extremity edema noted  Neurological: Alert & oriented to self, location, time and situation. No gross neurological deficits  Skin: W/D/I  Psych: Appropriate affect, normal mood    Laboratory/Radiographic/Cardiac Data:  Personally Reviewed      Assessment/Plan:     Patient Active Problem List    Diagnosis Date Noted    Closed fracture of T8 vertebra 2025    UTI (urinary tract infection) 2025    Irritant contact dermatitis due friction or contact with other specified body fluids 2024    Age-related physical debility 2024    Urinary retention 2024    Encephalopathy, metabolic 10/29/2024    CHB (complete heart block) 10/29/2024    A-fib 10/28/2024    Class 1 obesity with body mass index (BMI) of 31.0 to 31.9 in adult 10/28/2024    History of pulmonary embolism 10/28/2024    Closed pelvic ring fracture s/p ORIF of pelvis on 2024    Pulmonary embolism without acute cor pulmonale  09/16/2024    Elevated troponin 09/16/2024    Insomnia 09/16/2024    Abnormal ventricular wall motion 09/16/2024    Acute on chronic hypoxic respiratory failure 09/16/2024    Slow transit constipation 09/16/2024    Pulmonary hypertension 11/15/2023    Paroxysmal atrial flutter 09/06/2023    Aortic atherosclerosis 09/06/2023    COPD exacerbation 09/06/2023    AKHIL (obstructive sleep apnea) 10/05/2022    PVC's (premature ventricular contractions) 06/13/2022    Impaired range of motion of right hip 05/09/2022    Decreased right shoulder range of motion 05/09/2022    Weakness 05/09/2022    Impaired activities of daily living 05/09/2022    Rotator cuff impingement syndrome of right shoulder 04/27/2022    Venous stasis ulcer of right calf limited to breakdown of skin without varicose veins     Acute on chronic diastolic heart failure     Coronary artery disease involving native coronary artery of native heart without angina pectoris     SOB (shortness of breath)     Elevated LFTs 09/25/2021    Displaced intertrochanteric fracture of right femur, initial encounter for closed fracture 09/03/2021    Closed nondisplaced fracture of anterior column of right acetabulum with routine healing s/p ORIF of pelvis on 9/20/2024 09/03/2021    Atrial bigeminy 06/21/2021    Essential hypertension 06/25/2017    Pure hypercholesterolemia 06/25/2017    Hypothyroidism due to acquired atrophy of thyroid 06/25/2017          Bean Salas is a pleasant 80 y.o. male with PMHx of Afib on OAC, HFrEF, CRT-P, HTN, HLD, CAD, presented with worsening SOB and back pain. Found to be in AoC HRF and ADHF.    - Continue Lasix 40 mg IV BID   - Continue eliquis for now - patient does not want to proceed with hospice. However, physical therapy should evaluated for fall risk--> risks and benefit of continuing OAC.   - Continue Entresto 24-26 mg BID, statin, asa, SLG2i  - Continue Lopressor 50 mg BID-->50 mg TID   - Interrogate device-- abbott -Quadra allure  NICOLE CRT-P  - Echo   - rest of care per primary team   -Would benefit of palliative consult for education  -PT evaluation    Elio Eng M.D.  Interventional Cardiology   [facility]    -Medical records & lab results  -Independently reviewing and interpreting (if not documented by myself) EKGs, echocardiograms, catherizations   -Obtaining a history, performing a relevant exam, counseling/educating the patient/family  -Documenting clinical information in the EMR including ordering of tests  -Care coordination and communicating with other health care professionals

## 2025-01-23 NOTE — PROGRESS NOTES
[facility] Medicine  Progress Note    Patient Name: Bean Salas  MRN: [id number]  Patient Class: IP- Inpatient   Admission Date: 1/20/2025  Length of Stay: 2 days  Attending Physician: Terra Centeno MD  Primary Care Provider: Ramirez Levine MD        Subjective     Principal Problem:Acute on chronic hypoxic respiratory failure        HPI:  Bean Salas is 80-year-old elderly male with a past medical history of CAD, combined CHF (recovered EF), CAD, A-fib on eliquis,COPD, chronic respiratory failure on 3L NC, and hypothyroidism who presents to the ED for worsening SOB and back pain.  Patient was evaluated emergency department and was found to be in respiratory distress and satting 86-88% on 5 L. Patient was placed on BiPAP and seemed to improve.  Patient also had a fall several days ago and had a T8 vertebral fracture and was complaining of moderate pain.  Due to patient's presenting symptoms he will require admission for further evaluation and management.  Patient is a poor historian however at time my examination he denied any headache, fever, chills, chest pain but complained of shortness of breath and back pain.    Overview/Hospital Course:  1/22/25 SOB is improved.  1/23/25 Improving    Interval History: Doing well, no new complaints    Review of Systems   Constitutional:  Positive for activity change.   HENT: Negative.     Respiratory:  Negative for shortness of breath.    Cardiovascular: Negative.    Gastrointestinal: Negative.    Genitourinary: Negative.    Musculoskeletal:  Positive for back pain.   Neurological: Negative.    All other systems reviewed and are negative.    Objective:     Vital Signs (Most Recent):  Temp: 98.1 °F (36.7 °C) (01/23/25 1547)  Pulse: 63 (01/23/25 1547)  Resp: 18 (01/23/25 1547)  BP: 100/64 (01/23/25 1547)  SpO2: 96 % (01/23/25 1547) Vital Signs (24h Range):  Temp:  [97.1 °F (36.2 °C)-98.1 °F (36.7 °C)] 98.1 °F (36.7 °C)  Pulse:  [46-94]  "63  Resp:  [17-20] 18  SpO2:  [92 %-96 %] 96 %  BP: (100-129)/(55-76) 100/64     Weight: 95.5 kg (210 lb 8.6 oz)  Body mass index is 29.36 kg/m².    Intake/Output Summary (Last 24 hours) at 1/23/2025 1740  Last data filed at 1/23/2025 1642  Gross per 24 hour   Intake 960 ml   Output 3000 ml   Net -2040 ml         Physical Exam  Constitutional:       General: He is not in acute distress.  HENT:      Head: Normocephalic and atraumatic.      Nose: Nose normal.      Mouth/Throat:      Mouth: Mucous membranes are moist.   Eyes:      Extraocular Movements: Extraocular movements intact.      Pupils: Pupils are equal, round, and reactive to light.   Cardiovascular:      Rate and Rhythm: Regular rhythm.   Pulmonary:      Breath sounds: Rhonchi present.   Abdominal:      Palpations: Abdomen is soft.   Musculoskeletal:         General: Normal range of motion.      Cervical back: Neck supple.   Skin:     General: Skin is warm.   Neurological:      Mental Status: He is alert. Mental status is at baseline.   Psychiatric:         Mood and Affect: Mood normal.             Significant Labs: All pertinent labs within the past 24 hours have been reviewed.  Blood Culture: No results for input(s): "LABBLOO" in the last 48 hours.  CBC: No results for input(s): "WBC", "HGB", "HCT", "PLT" in the last 48 hours.  CMP:   Recent Labs   Lab 01/22/25  0710 01/23/25  0629    141   K 4.3 4.1   CL 97 93*   CO2 35* 40*   GLU 99 141*   BUN 30* 30*   CREATININE 1.0 1.2   CALCIUM 8.7 8.6*   ANIONGAP 10 8       Significant Imaging: I have reviewed all pertinent imaging results/findings within the past 24 hours.    Assessment and Plan     * Acute on chronic hypoxic respiratory failure  Admit to medical floor with telemetry  Patient with Hypercapnic and Hypoxic Respiratory failure which is Acute on chronic.  he is on home oxygen at 3 LPM. Supplemental oxygen was provided and noted-      .   Signs/symptoms of respiratory failure include- increased " work of breathing, respiratory distress, and use of accessory muscles. Contributing diagnoses includes - CHF and COPD Labs and images were reviewed. Patient Has recent ABG, which has been reviewed. Will treat underlying causes and adjust management of respiratory failure as follows- continue with BiPAP at night    UTI (urinary tract infection)  Urine culture.  IV antibiotics.      Closed fracture of T8 vertebra  Pain control.  Back brace which is to be delivered to patient's home.      A-fib  Patient has long standing persistent (>12 months) atrial fibrillation. Patient is currently in sinus rhythm. [id number] Score: 3. The patients heart rate in the last 24 hours is as follows:  Pulse  Min: 46  Max: 94     Antiarrhythmics  metoprolol tartrate (LOPRESSOR) tablet 50 mg, 3 times daily, Oral    Anticoagulants  apixaban tablet 5 mg, 2 times daily, Oral    Plan  - Replete lytes with a goal of K>4, Mg >2  - Patient is anticoagulated, see medications listed above.  - Patient's afib is currently controlled  - monitor for any arrhythmia        COPD exacerbation  Patient's COPD is with exacerbation noted by continued dyspnea and use of accessory muscles for breathing currently.  Patient is currently off COPD Pathway. Continue scheduled inhalers Antibiotics and Supplemental oxygen and monitor respiratory status closely.     Acute on chronic diastolic heart failure    Strict I's and O's.    Diuresis with Lasix.  Inpatient Cardiology consultation.    Essential hypertension  Patient's blood pressure range in the last 24 hours was: BP  Min: 134/86  Max: 158/69.The patient's inpatient anti-hypertensive regimen is listed below:  Current Antihypertensives  metoprolol tartrate (LOPRESSOR) tablet 50 mg, 2 times daily, Oral  furosemide injection 40 mg, Every 12 hours, Intravenous    Plan  - BP is controlled, no changes needed to their regimen  - monitor blood pressure closely      VTE Risk Mitigation (From admission, onward)            Ordered     apixaban tablet 5 mg  2 times daily         01/21/25 0105                    Discharge Planning   LATOSHA: 1/24/2025     Code Status: DNR   Medical Readiness for Discharge Date:   Discharge Plan A: Home Health ([facility])                Please place Justification for DME        Terra Centeno MD  Department of Hospital Medicine   [facility]

## 2025-01-24 ENCOUNTER — DOCUMENT SCAN (OUTPATIENT)
Dept: HOME HEALTH SERVICES | Facility: HOSPITAL | Age: 80
End: 2025-01-24
Payer: MEDICARE

## 2025-01-24 LAB
ANION GAP SERPL CALC-SCNC: 9 MMOL/L (ref 8–16)
APICAL FOUR CHAMBER EJECTION FRACTION: 55 %
APICAL TWO CHAMBER EJECTION FRACTION: 71 %
ASCENDING AORTA: 3.09 CM
AV INDEX (PROSTH): 0.48
AV MEAN GRADIENT: 4 MMHG
AV PEAK GRADIENT: 7 MMHG
AV VALVE AREA BY VELOCITY RATIO: 1.8 CM²
AV VALVE AREA: 1.8 CM²
AV VELOCITY RATIO: 0.46
BSA FOR ECHO PROCEDURE: 2.19 M2
BUN SERPL-MCNC: 26 MG/DL (ref 8–23)
CALCIUM SERPL-MCNC: 8.5 MG/DL (ref 8.7–10.5)
CHLORIDE SERPL-SCNC: 91 MMOL/L (ref 95–110)
CO2 SERPL-SCNC: 41 MMOL/L (ref 23–29)
CREAT SERPL-MCNC: 1 MG/DL (ref 0.5–1.4)
CV ECHO LV RWT: 0.43 CM
DOP CALC AO PEAK VEL: 1.3 M/S
DOP CALC AO VTI: 22.6 CM
DOP CALC LVOT AREA: 3.8 CM2
DOP CALC LVOT DIAMETER: 2.2 CM
DOP CALC LVOT PEAK VEL: 0.6 M/S
DOP CALC LVOT STROKE VOLUME: 41 CM3
DOP CALCLVOT PEAK VEL VTI: 10.8 CM
E/E' RATIO: 5 M/S
ECHO LV POSTERIOR WALL: 1 CM (ref 0.6–1.1)
EST. GFR  (NO RACE VARIABLE): >60 ML/MIN/1.73 M^2
FRACTIONAL SHORTENING: 23.4 % (ref 28–44)
GLUCOSE SERPL-MCNC: 122 MG/DL (ref 70–110)
INTERVENTRICULAR SEPTUM: 0.9 CM (ref 0.6–1.1)
IVRT: 129 MSEC
LEFT ATRIUM AREA SYSTOLIC (APICAL 2 CHAMBER): 43.11 CM2
LEFT ATRIUM AREA SYSTOLIC (APICAL 4 CHAMBER): 37.17 CM2
LEFT ATRIUM VOLUME INDEX MOD: 71 ML/M2
LEFT ATRIUM VOLUME MOD: 153 ML
LEFT INTERNAL DIMENSION IN SYSTOLE: 3.6 CM (ref 2.1–4)
LEFT VENTRICLE DIASTOLIC VOLUME INDEX: 47.37 ML/M2
LEFT VENTRICLE DIASTOLIC VOLUME: 102.32 ML
LEFT VENTRICLE END DIASTOLIC VOLUME APICAL 2 CHAMBER: 99.18 ML
LEFT VENTRICLE END DIASTOLIC VOLUME APICAL 4 CHAMBER: 79.81 ML
LEFT VENTRICLE END SYSTOLIC VOLUME APICAL 2 CHAMBER: 169.34 ML
LEFT VENTRICLE END SYSTOLIC VOLUME APICAL 4 CHAMBER: 131.99 ML
LEFT VENTRICLE MASS INDEX: 71 G/M2
LEFT VENTRICLE SYSTOLIC VOLUME INDEX: 25.1 ML/M2
LEFT VENTRICLE SYSTOLIC VOLUME: 54.24 ML
LEFT VENTRICULAR INTERNAL DIMENSION IN DIASTOLE: 4.7 CM (ref 3.5–6)
LEFT VENTRICULAR MASS: 153.4 G
LV LATERAL E/E' RATIO: 3.6 M/S
LV SEPTAL E/E' RATIO: 8.3 M/S
LVED V (TEICH): 102.32 ML
LVES V (TEICH): 54.24 ML
LVOT MG: 1.06 MMHG
LVOT MV: 0.5 CM/S
MV PEAK E VEL: 0.58 M/S
OHS CV RV/LV RATIO: 0.87 CM
OHS LV EJECTION FRACTION SIMPSONS BIPLANE MOD: 63 %
OHS QRS DURATION: 132 MS
OHS QTC CALCULATION: 523 MS
PISA TR MAX VEL: 2.6 M/S
POCT GLUCOSE: 150 MG/DL (ref 70–110)
POTASSIUM SERPL-SCNC: 3.8 MMOL/L (ref 3.5–5.1)
RA PRESSURE ESTIMATED: 3 MMHG
RA VOL SYS: 128.01 ML
RIGHT ATRIAL AREA: 32.9 CM2
RIGHT ATRIUM VOLUME AREA LENGTH APICAL 4 CHAMBER: 121 ML
RIGHT VENTRICLE DIASTOLIC BASEL DIMENSION: 4.1 CM
RV TB RVSP: 6 MMHG
RV TISSUE DOPPLER FREE WALL SYSTOLIC VELOCITY 1 (APICAL 4 CHAMBER VIEW): 12.79 CM/S
SINUS: 3.57 CM
SODIUM SERPL-SCNC: 141 MMOL/L (ref 136–145)
STJ: 2.73 CM
TDI LATERAL: 0.16 M/S
TDI SEPTAL: 0.07 M/S
TDI: 0.12 M/S
TR MAX PG: 27 MMHG
TV REST PULMONARY ARTERY PRESSURE: 30 MMHG
Z-SCORE OF LEFT VENTRICULAR DIMENSION IN END DIASTOLE: -4.09
Z-SCORE OF LEFT VENTRICULAR DIMENSION IN END SYSTOLE: -1.42

## 2025-01-24 PROCEDURE — 93005 ELECTROCARDIOGRAM TRACING: CPT | Mod: HCNC

## 2025-01-24 PROCEDURE — 97530 THERAPEUTIC ACTIVITIES: CPT | Mod: HCNC

## 2025-01-24 PROCEDURE — 36415 COLL VENOUS BLD VENIPUNCTURE: CPT | Mod: HCNC | Performed by: INTERNAL MEDICINE

## 2025-01-24 PROCEDURE — 93010 ELECTROCARDIOGRAM REPORT: CPT | Mod: HCNC,,, | Performed by: INTERNAL MEDICINE

## 2025-01-24 PROCEDURE — 27100171 HC OXYGEN HIGH FLOW UP TO 24 HOURS: Mod: HCNC

## 2025-01-24 PROCEDURE — 80048 BASIC METABOLIC PNL TOTAL CA: CPT | Mod: HCNC | Performed by: FAMILY MEDICINE

## 2025-01-24 PROCEDURE — 87186 SC STD MICRODIL/AGAR DIL: CPT | Mod: HCNC | Performed by: INTERNAL MEDICINE

## 2025-01-24 PROCEDURE — 25000003 PHARM REV CODE 250: Mod: HCNC | Performed by: STUDENT IN AN ORGANIZED HEALTH CARE EDUCATION/TRAINING PROGRAM

## 2025-01-24 PROCEDURE — 87077 CULTURE AEROBIC IDENTIFY: CPT | Mod: HCNC | Performed by: INTERNAL MEDICINE

## 2025-01-24 PROCEDURE — 97535 SELF CARE MNGMENT TRAINING: CPT | Mod: HCNC

## 2025-01-24 PROCEDURE — 21400001 HC TELEMETRY ROOM: Mod: HCNC

## 2025-01-24 PROCEDURE — 99223 1ST HOSP IP/OBS HIGH 75: CPT | Mod: HCNC,,, | Performed by: NEUROLOGICAL SURGERY

## 2025-01-24 PROCEDURE — 94761 N-INVAS EAR/PLS OXIMETRY MLT: CPT | Mod: HCNC

## 2025-01-24 PROCEDURE — 99900035 HC TECH TIME PER 15 MIN (STAT): Mod: HCNC

## 2025-01-24 PROCEDURE — 36415 COLL VENOUS BLD VENIPUNCTURE: CPT | Mod: HCNC | Performed by: FAMILY MEDICINE

## 2025-01-24 PROCEDURE — 25000003 PHARM REV CODE 250: Mod: HCNC | Performed by: FAMILY MEDICINE

## 2025-01-24 PROCEDURE — 97166 OT EVAL MOD COMPLEX 45 MIN: CPT | Mod: HCNC

## 2025-01-24 PROCEDURE — 94660 CPAP INITIATION&MGMT: CPT | Mod: HCNC

## 2025-01-24 PROCEDURE — 87040 BLOOD CULTURE FOR BACTERIA: CPT | Mod: HCNC | Performed by: INTERNAL MEDICINE

## 2025-01-24 PROCEDURE — 25000003 PHARM REV CODE 250: Mod: HCNC | Performed by: INTERNAL MEDICINE

## 2025-01-24 PROCEDURE — 97161 PT EVAL LOW COMPLEX 20 MIN: CPT | Mod: HCNC

## 2025-01-24 PROCEDURE — 27000221 HC OXYGEN, UP TO 24 HOURS: Mod: HCNC

## 2025-01-24 PROCEDURE — 63600175 PHARM REV CODE 636 W HCPCS: Mod: HCNC | Performed by: INTERNAL MEDICINE

## 2025-01-24 PROCEDURE — 63600175 PHARM REV CODE 636 W HCPCS: Mod: HCNC | Performed by: FAMILY MEDICINE

## 2025-01-24 RX ORDER — LORAZEPAM 0.5 MG/1
0.5 TABLET ORAL DAILY PRN
Status: DISCONTINUED | OUTPATIENT
Start: 2025-01-24 | End: 2025-01-28

## 2025-01-24 RX ORDER — VANCOMYCIN 2 GRAM/500 ML IN 0.9 % SODIUM CHLORIDE INTRAVENOUS
2000
Status: DISCONTINUED | OUTPATIENT
Start: 2025-01-25 | End: 2025-01-26

## 2025-01-24 RX ORDER — HYDROXYZINE HYDROCHLORIDE 25 MG/1
50 TABLET, FILM COATED ORAL 3 TIMES DAILY PRN
Status: DISCONTINUED | OUTPATIENT
Start: 2025-01-24 | End: 2025-01-28

## 2025-01-24 RX ADMIN — OXYCODONE 10 MG: 5 TABLET ORAL at 02:01

## 2025-01-24 RX ADMIN — APIXABAN 5 MG: 5 TABLET, FILM COATED ORAL at 10:01

## 2025-01-24 RX ADMIN — MUPIROCIN: 20 OINTMENT TOPICAL at 09:01

## 2025-01-24 RX ADMIN — SACUBITRIL AND VALSARTAN 1 TABLET: 24; 26 TABLET, FILM COATED ORAL at 10:01

## 2025-01-24 RX ADMIN — APIXABAN 5 MG: 5 TABLET, FILM COATED ORAL at 09:01

## 2025-01-24 RX ADMIN — MUPIROCIN: 20 OINTMENT TOPICAL at 10:01

## 2025-01-24 RX ADMIN — ASPIRIN 81 MG: 81 TABLET, COATED ORAL at 09:01

## 2025-01-24 RX ADMIN — LEVOTHYROXINE SODIUM 200 MCG: 100 TABLET ORAL at 05:01

## 2025-01-24 RX ADMIN — EMPAGLIFLOZIN 10 MG: 10 TABLET, FILM COATED ORAL at 09:01

## 2025-01-24 RX ADMIN — VANCOMYCIN HYDROCHLORIDE 2250 MG: 500 INJECTION, POWDER, LYOPHILIZED, FOR SOLUTION INTRAVENOUS at 10:01

## 2025-01-24 RX ADMIN — POLYETHYLENE GLYCOL (3350) 17 G: 17 POWDER, FOR SOLUTION ORAL at 09:01

## 2025-01-24 RX ADMIN — LACTULOSE 20 G: 20 SOLUTION ORAL at 09:01

## 2025-01-24 RX ADMIN — HYDROXYZINE HYDROCHLORIDE 50 MG: 25 TABLET, FILM COATED ORAL at 10:01

## 2025-01-24 RX ADMIN — METOPROLOL TARTRATE 50 MG: 50 TABLET, FILM COATED ORAL at 09:01

## 2025-01-24 RX ADMIN — METOPROLOL TARTRATE 50 MG: 50 TABLET, FILM COATED ORAL at 10:01

## 2025-01-24 RX ADMIN — METOPROLOL TARTRATE 50 MG: 50 TABLET, FILM COATED ORAL at 02:01

## 2025-01-24 RX ADMIN — SACUBITRIL AND VALSARTAN 1 TABLET: 24; 26 TABLET, FILM COATED ORAL at 09:01

## 2025-01-24 RX ADMIN — PIPERACILLIN SODIUM AND TAZOBACTAM SODIUM 4.5 G: 4; .5 INJECTION, POWDER, LYOPHILIZED, FOR SOLUTION INTRAVENOUS at 06:01

## 2025-01-24 RX ADMIN — TAMSULOSIN HYDROCHLORIDE 0.4 MG: 0.4 CAPSULE ORAL at 09:01

## 2025-01-24 RX ADMIN — MIRTAZAPINE 7.5 MG: 7.5 TABLET, FILM COATED ORAL at 10:01

## 2025-01-24 RX ADMIN — METHOCARBAMOL TABLETS 750 MG: 750 TABLET, COATED ORAL at 02:01

## 2025-01-24 RX ADMIN — CEFTRIAXONE SODIUM 1 G: 1 INJECTION, POWDER, FOR SOLUTION INTRAMUSCULAR; INTRAVENOUS at 03:01

## 2025-01-24 RX ADMIN — ATORVASTATIN CALCIUM 10 MG: 10 TABLET, FILM COATED ORAL at 09:01

## 2025-01-24 NOTE — PT/OT/SLP EVAL
Occupational Therapy   Evaluation/tx    Name: Bean Salas  MRN: [id number]  Admitting Diagnosis: Acute on chronic hypoxic respiratory failure  Recent Surgery: * No surgery found *      Recommendations:     Discharge Recommendations: Moderate Intensity Therapy  Discharge Equipment Recommendations:  to be determined by next level of care  Barriers to discharge:  Decreased caregiver support, Other (Comment) (increased burden of care)    Assessment:   Pt presents w/ decreased overall endurance/conditioning, balance/mobility & coordination/sensation w/ subsequent decline in (I)/safety w/ BADLs, fxnl mobility & fxnl t/f's.  OT 3x/wk to increase phys/fxnl status & maximize potential to achieve established goals for d/c-->mod intensity tx w/ DME deferred.      Bean Salas is a 80 y.o. male with a medical diagnosis of Acute on chronic hypoxic respiratory failure.  He presents with performance deficits affecting function: weakness, impaired endurance, impaired sensation, impaired self care skills, impaired functional mobility, gait instability, impaired balance, impaired cognition, decreased coordination, decreased lower extremity function, decreased safety awareness, pain, decreased ROM, impaired cardiopulmonary response to activity, orthopedic precautions.      Rehab Prognosis: Good; patient would benefit from acute skilled OT services to address these deficits and reach maximum level of function.       Plan:     Patient to be seen 3 x/week to address the above listed problems via self-care/home management, therapeutic activities, therapeutic exercises  Plan of Care Expires: 02/24/25  Plan of Care Reviewed with: patient    Subjective     Chief Complaint: SOB  Patient/Family Comments/goals: return to PLOF    Occupational Profile:  Living Environment: alone in H w/ THE; WIS w/ GB & [facility]  Previous level of function: prior to 2mos ago, MI via RW  Roles and Routines: IADLs; drives  Equipment Used at Home: walker, rolling,  raised toilet, oxygen, other (see comments), wheelchair (adjustable bed)  Assistance upon Discharge: son visits on wkends    Pain/Comfort:  Pain Rating 1: 10/10 (at tx termination)  Location - Side 1: Left  Location - Orientation 1: generalized  Location 1: rib(s)  Pain Addressed 1: Other (see comments) (provided ice pack)  Pain Rating Post-Intervention 1: 0/10 (3 min after c/o 10/10 pain)    Patients cultural, spiritual, Druze conflicts given the current situation:      Objective:     Communicated with: nsg prior to session.  Patient found HOB elevated with bed alarm, oxygen, telemetry, peripheral IV, Condom Catheter upon OT entry to room.    General Precautions: Standard, fall  Orthopedic Precautions: spinal precautions  Braces:  (pt reports PCP ordered him a TLSO that was delivered to his home)  Respiratory Status: Nasal cannula, flow 4 L/min    Occupational Performance:    Bed Mobility:    Patient completed Rolling/Turning to Left with  moderate assistance and with side rail  Patient completed Supine to Sit with maximal assistance and 2 persons  Patient completed Sit to Supine with maximal assistance and 2 persons    Functional Mobility/Transfers:  Patient completed Sit <> Stand Transfer with minimum assistance and of 2 persons  with  rolling walker   Functional Mobility: see tx note below    Activities of Daily Living:  Lower Body Dressing: maximal assistance don socks    Cognitive/Visual Perceptual:  AO4  STM deficts    Physical Exam:  B shldrs to ~120*; elb-->Ds WFL    Sit balance: F  Stand balance: P+ to F-    Sensation: diminshed B feet  Coordination: decreased B feet  Endurance: F-    AMPAC 6 Click ADL:  AMPAC Total Score: 15    Treatment & Education:  Pt found in supine & agreeable to OT/PT co-eval/tx this date.  Pt at 90% on 4L at rest & cont w/ SOB at rest & perf the following:  **increased O2 to 5L for tx tasks  -logrolling L via bed rail w/ Mod A-->sitting EOB w/ Max A x 2 to achieve & S-SBA to  maintain  -standing via RW w/ Min A x 2 for sidestepping L w/ Min A x 2  -returned to supine via reverse log rolling w/ Max A x 2  **pt required mult RBs throughout tx 2/2 SOB w/o exertion  Edu/tx re: general safety techs, PLBing, HEP. Pt verbalized understanding.        Patient left HOB elevated with all lines intact, call button in reach, bed alarm on, and nsg notified    GOALS:   Multidisciplinary Problems       Occupational Therapy Goals          Problem: Occupational Therapy    Goal Priority Disciplines Outcome Interventions   Occupational Therapy Goal     OT, PT/OT Progressing    Description: Goals to be met by: 02/24     Patient will increase functional independence with ADLs by performing:    Grooming while standing at sink with Stand-by Assistance.  Toileting from toilet with Moderate Assistance for hygiene and clothing management.   Toilet transfer to toilet with Minimal Assistance.  Increased functional strength to WFL for ADLs.                         DME Justifications:  No DME recommended requiring DME justifications    History:     Past Medical History:   Diagnosis Date    Atrial fibrillation, unspecified type 09/06/2023    COPD exacerbation 09/06/2023    Thyroid disease     hypo         Past Surgical History:   Procedure Laterality Date    COLONOSCOPY  01/01/2011    CORONARY ANGIOGRAPHY N/A 07/22/2021    Procedure: ANGIOGRAM, CORONARY ARTERY;  Surgeon: Hank Barry MD;  Location: [facility] CATH LAB/EP;  Service: Cardiology;  Laterality: N/A;    EYE SURGERY Bilateral     cataracts extraction    FRACTURE SURGERY Right 09/2021    femur    HERNIA REPAIR      HIP SURGERY Right 08/2021    IMPLANTATION OF BIVENTRICULAR HEART PACEMAKER N/A 11/4/2024    Procedure: INSERTION, PACEMAKER, BIVENTRICULAR;  Surgeon: Mac Alejandro MD;  Location: [facility] EP LAB;  Service: Cardiology;  Laterality: N/A;  CHB, CRT-P, SJM, Anes, DM, CICU 3081    INTRAMEDULLARY RODDING OF TROCHANTER OF FEMUR Right 09/03/2021    Procedure:  INSERTION, INTRAMEDULLARY RACQUEL, FEMUR, TROCHANTER;  Surgeon: Darryn Hall MD;  Location: [facility] OR;  Service: Orthopedics;  Laterality: Right;    JOINT REPLACEMENT Bilateral     knee    LEFT HEART CATHETERIZATION Right 07/22/2021    Procedure: Left heart cath;  Surgeon: Hank Barry MD;  Location: [facility] CATH LAB/EP;  Service: Cardiology;  Laterality: Right;    OPEN REDUCTION AND INTERNAL FIXATION (ORIF) OF INJURY OF HIP Right 9/20/2024    Procedure: ORIF,PELVIS;  Surgeon: Negrito Stapleton MD;  Location: [facility] OR 2ND FLR;  Service: Orthopedics;  Laterality: Right;  +local    TRANSESOPHAGEAL ECHOCARDIOGRAPHY N/A 9/19/2024    Procedure: ECHOCARDIOGRAM, TRANSESOPHAGEAL;  Surgeon: Leonora Butt MD;  Location: [facility] EP LAB;  Service: Cardiology;  Laterality: N/A;    TREATMENT OF CARDIAC ARRHYTHMIA N/A 9/19/2024    Procedure: Cardioversion or Defibrillation;  Surgeon: ANA Steiner MD;  Location: [facility] EP LAB;  Service: Cardiology;  Laterality: N/A;  AF, DEMETRICE/DCCV, ANES, GP, 524    VASECTOMY         Time Tracking:     OT Date of Treatment: 01/24/25  OT Start Time: 0916  OT Stop Time: 1002  OT Total Time (min): 46 min    Billable Minutes:Evaluation 8  Self Care/Home Management 8  Therapeutic Activity 30  Total Time 46--co-eval/tx w/ PT    1/24/2025

## 2025-01-24 NOTE — SUBJECTIVE & OBJECTIVE
Interval History: Frequent bigeminy and trigeminy, back has been hurting with any movement, NSGY consulted, will need an MRI T-spine    Review of Systems   Constitutional:  Positive for activity change.   HENT: Negative.     Respiratory:  Negative for shortness of breath.    Cardiovascular: Negative.    Gastrointestinal: Negative.    Genitourinary: Negative.    Musculoskeletal:  Positive for back pain.   Neurological: Negative.    All other systems reviewed and are negative.    Objective:     Vital Signs (Most Recent):  Temp: 97.6 °F (36.4 °C) (01/24/25 1246)  Pulse: 88 (01/24/25 1251)  Resp: 16 (01/24/25 1455)  BP: 106/61 (01/24/25 1246)  SpO2: 95 % (01/24/25 1246) Vital Signs (24h Range):  Temp:  [97.5 °F (36.4 °C)-98.1 °F (36.7 °C)] 97.6 °F (36.4 °C)  Pulse:  [54-91] 88  Resp:  [16-20] 16  SpO2:  [95 %-97 %] 95 %  BP: (100-120)/(61-76) 106/61     Weight: 95.5 kg (210 lb 8.6 oz)  Body mass index is 29.36 kg/m².    Intake/Output Summary (Last 24 hours) at 1/24/2025 1521  Last data filed at 1/24/2025 0909  Gross per 24 hour   Intake 837 ml   Output 850 ml   Net -13 ml         Physical Exam  Constitutional:       General: He is not in acute distress.  HENT:      Head: Normocephalic and atraumatic.      Nose: Nose normal.      Mouth/Throat:      Mouth: Mucous membranes are moist.   Eyes:      Extraocular Movements: Extraocular movements intact.      Pupils: Pupils are equal, round, and reactive to light.   Cardiovascular:      Rate and Rhythm: Regular rhythm.   Pulmonary:      Breath sounds: Rhonchi present.   Abdominal:      Palpations: Abdomen is soft.   Musculoskeletal:         General: Normal range of motion.      Cervical back: Neck supple.   Skin:     General: Skin is warm.   Neurological:      Mental Status: He is alert. Mental status is at baseline.   Psychiatric:         Mood and Affect: Mood normal.             Significant Labs: All pertinent labs within the past 24 hours have been reviewed.  CBC: No results  "for input(s): "WBC", "HGB", "HCT", "PLT" in the last 48 hours.  CMP:   Recent Labs   Lab 01/23/25  0629 01/24/25  0533    141   K 4.1 3.8   CL 93* 91*   CO2 40* 41*   * 122*   BUN 30* 26*   CREATININE 1.2 1.0   CALCIUM 8.6* 8.5*   ANIONGAP 8 9       Significant Imaging: I have reviewed all pertinent imaging results/findings within the past 24 hours.  "

## 2025-01-24 NOTE — PLAN OF CARE
Problem: Occupational Therapy  Goal: Occupational Therapy Goal  Description: Goals to be met by: 02/24     Patient will increase functional independence with ADLs by performing:    Grooming while standing at sink with Stand-by Assistance.  Toileting from toilet with Moderate Assistance for hygiene and clothing management.   Toilet transfer to toilet with Minimal Assistance.  Increased functional strength to WFL for ADLs.    Outcome: Progressing   Pt found in supine & agreeable to OT/PT co-eval/tx this date.  Pt at 90% on 4L at rest & cont w/ SOB at rest & perf the following:  **increased O2 to 5L for tx tasks  -logrolling L via bed rail w/ Mod A-->sitting EOB w/ Max A x 2 to achieve & S-SBA to maintain  -standing via RW w/ Min A x 2 for sidestepping L w/ Min A x 2  -returned to supine via reverse log rolling w/ Max A x 2  **pt required mult RBs throughout tx 2/2 SOB w/o exertion  Edu/tx re: general safety techs, PLBing, HEP. Pt verbalized understanding.    Pt presents w/ decreased overall endurance/conditioning, balance/mobility & coordination/sensation w/ subsequent decline in (I)/safety w/ BADLs, fxnl mobility & fxnl t/f's.  OT 3x/wk to increase phys/fxnl status & maximize potential to achieve established goals for d/c-->mod intensity tx w/ DME deferred.
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
## 2025-01-24 NOTE — PROGRESS NOTES
[facility] Medicine  Progress Note    Patient Name: Bean Salas  MRN: [id number]  Patient Class: IP- Inpatient   Admission Date: 1/20/2025  Length of Stay: 3 days  Attending Physician: Terra Centeno MD  Primary Care Provider: Ramirez Levine MD        Subjective     Principal Problem:Acute on chronic hypoxic respiratory failure        HPI:  Bean Salas is 80-year-old elderly male with a past medical history of CAD, combined CHF (recovered EF), CAD, A-fib on eliquis,COPD, chronic respiratory failure on 3L NC, and hypothyroidism who presents to the ED for worsening SOB and back pain.  Patient was evaluated emergency department and was found to be in respiratory distress and satting 86-88% on 5 L. Patient was placed on BiPAP and seemed to improve.  Patient also had a fall several days ago and had a T8 vertebral fracture and was complaining of moderate pain.  Due to patient's presenting symptoms he will require admission for further evaluation and management.  Patient is a poor historian however at time my examination he denied any headache, fever, chills, chest pain but complained of shortness of breath and back pain.    Overview/Hospital Course:  1/22/25 SOB is improved.  1/23/25 Improving  1/24/25 NSGY consult for T8 fx, pending CT and MRI given PPM    Interval History: Frequent bigeminy and trigeminy, back has been hurting with any movement, NSGY consulted, will need an MRI T-spine    Review of Systems   Constitutional:  Positive for activity change.   HENT: Negative.     Respiratory:  Negative for shortness of breath.    Cardiovascular: Negative.    Gastrointestinal: Negative.    Genitourinary: Negative.    Musculoskeletal:  Positive for back pain.   Neurological: Negative.    All other systems reviewed and are negative.    Objective:     Vital Signs (Most Recent):  Temp: 97.6 °F (36.4 °C) (01/24/25 1246)  Pulse: 88 (01/24/25 1251)  Resp: 16 (01/24/25 1455)  BP: 106/61 (01/24/25  "1246)  SpO2: 95 % (01/24/25 1246) Vital Signs (24h Range):  Temp:  [97.5 °F (36.4 °C)-98.1 °F (36.7 °C)] 97.6 °F (36.4 °C)  Pulse:  [54-91] 88  Resp:  [16-20] 16  SpO2:  [95 %-97 %] 95 %  BP: (100-120)/(61-76) 106/61     Weight: 95.5 kg (210 lb 8.6 oz)  Body mass index is 29.36 kg/m².    Intake/Output Summary (Last 24 hours) at 1/24/2025 1521  Last data filed at 1/24/2025 0909  Gross per 24 hour   Intake 837 ml   Output 850 ml   Net -13 ml         Physical Exam  Constitutional:       General: He is not in acute distress.  HENT:      Head: Normocephalic and atraumatic.      Nose: Nose normal.      Mouth/Throat:      Mouth: Mucous membranes are moist.   Eyes:      Extraocular Movements: Extraocular movements intact.      Pupils: Pupils are equal, round, and reactive to light.   Cardiovascular:      Rate and Rhythm: Regular rhythm.   Pulmonary:      Breath sounds: Rhonchi present.   Abdominal:      Palpations: Abdomen is soft.   Musculoskeletal:         General: Normal range of motion.      Cervical back: Neck supple.   Skin:     General: Skin is warm.   Neurological:      Mental Status: He is alert. Mental status is at baseline.   Psychiatric:         Mood and Affect: Mood normal.             Significant Labs: All pertinent labs within the past 24 hours have been reviewed.  CBC: No results for input(s): "WBC", "HGB", "HCT", "PLT" in the last 48 hours.  CMP:   Recent Labs   Lab 01/23/25  0629 01/24/25  0533    141   K 4.1 3.8   CL 93* 91*   CO2 40* 41*   * 122*   BUN 30* 26*   CREATININE 1.2 1.0   CALCIUM 8.6* 8.5*   ANIONGAP 8 9       Significant Imaging: I have reviewed all pertinent imaging results/findings within the past 24 hours.    Assessment and Plan     * Acute on chronic hypoxic respiratory failure  Admit to medical floor with telemetry  Patient with Hypercapnic and Hypoxic Respiratory failure which is Acute on chronic.  he is on home oxygen at 3 LPM. Supplemental oxygen was provided and noted-  "     .   Signs/symptoms of respiratory failure include- increased work of breathing, respiratory distress, and use of accessory muscles. Contributing diagnoses includes - CHF and COPD Labs and images were reviewed. Patient Has recent ABG, which has been reviewed. Will treat underlying causes and adjust management of respiratory failure as follows- continue with BiPAP at night    UTI (urinary tract infection)  Urine culture.  IV antibiotics.      Closed fracture of T8 vertebra  Pain control.  Back brace which is to be delivered to patient's home.    PLAN:  Discussed with hospital medicine.  The patient's pacemaker is MRI conditional.  A thoracic spine MRI is indicated to rule out epidural hematoma and to better assess the fracture and to rule out ligamentous injuries.  In the meantime we will proceed with the CT of the thoracic spine, 1 mm cuts to better assess the actual fracture at T8  Recommending not to mobilize the patient out of bed until MRI is completed.  TLSO brace ordered to wear once clear to mobilize out of bed.      A-fib  Patient has long standing persistent (>12 months) atrial fibrillation. Patient is currently in sinus rhythm. [id number] Score: 3. The patients heart rate in the last 24 hours is as follows:  Pulse  Min: 54  Max: 91     Antiarrhythmics  metoprolol tartrate (LOPRESSOR) tablet 50 mg, 3 times daily, Oral    Anticoagulants  apixaban tablet 5 mg, 2 times daily, Oral    Plan  - Replete lytes with a goal of K>4, Mg >2  - Patient is anticoagulated, see medications listed above.  - Patient's afib is currently controlled  - monitor for any arrhythmia        COPD exacerbation  Patient's COPD is with exacerbation noted by continued dyspnea and use of accessory muscles for breathing currently.  Patient is currently off COPD Pathway. Continue scheduled inhalers Antibiotics and Supplemental oxygen and monitor respiratory status closely.     Acute on chronic diastolic heart failure    Strict I's and  O's.    Diuresis with Lasix.  Inpatient Cardiology consultation.    Essential hypertension  Patient's blood pressure range in the last 24 hours was: BP  Min: 134/86  Max: 158/69.The patient's inpatient anti-hypertensive regimen is listed below:  Current Antihypertensives  metoprolol tartrate (LOPRESSOR) tablet 50 mg, 2 times daily, Oral  furosemide injection 40 mg, Every 12 hours, Intravenous    Plan  - BP is controlled, no changes needed to their regimen  - monitor blood pressure closely      VTE Risk Mitigation (From admission, onward)           Ordered     apixaban tablet 5 mg  2 times daily         01/21/25 0105                    Discharge Planning   LATOSHA: 1/27/2025     Code Status: DNR   Medical Readiness for Discharge Date:   Discharge Plan A: Home Health ([facility])                Please place Justification for DME        Terra Centeno MD  Department of Hospital Medicine   [place] [facility]

## 2025-01-24 NOTE — ASSESSMENT & PLAN NOTE
Pain control.  Back brace which is to be delivered to patient's home.    PLAN:  Discussed with hospital medicine.  The patient's pacemaker is MRI conditional.  A thoracic spine MRI is indicated to rule out epidural hematoma and to better assess the fracture and to rule out ligamentous injuries.  In the meantime we will proceed with the CT of the thoracic spine, 1 mm cuts to better assess the actual fracture at T8  Recommending not to mobilize the patient out of bed until MRI is completed.  TLSO brace ordered to wear once clear to mobilize out of bed.

## 2025-01-24 NOTE — PLAN OF CARE
Problem: Adult Inpatient Plan of Care  Goal: Plan of Care Review  Outcome: Progressing  Goal: Patient-Specific Goal (Individualized)  Outcome: Progressing  Goal: Absence of Hospital-Acquired Illness or Injury  Outcome: Progressing  Goal: Optimal Comfort and Wellbeing  Outcome: Progressing  Goal: Readiness for Transition of Care  Outcome: Progressing     Problem: Wound  Goal: Optimal Coping  Outcome: Progressing  Goal: Optimal Functional Ability  Outcome: Progressing  Goal: Absence of Infection Signs and Symptoms  Outcome: Progressing  Goal: Improved Oral Intake  Outcome: Progressing  Goal: Optimal Pain Control and Function  Outcome: Progressing  Goal: Skin Health and Integrity  Outcome: Progressing  Goal: Optimal Wound Healing  Outcome: Progressing     Problem: Infection  Goal: Absence of Infection Signs and Symptoms  Outcome: Progressing     Problem: Pulmonary Impairment  Goal: Improved Activity Tolerance  Outcome: Progressing  Goal: Effective Airway Clearance  Outcome: Progressing  Goal: Optimal Gas Exchange  Outcome: Progressing     Problem: UTI (Urinary Tract Infection)  Goal: Improved Infection Symptoms  Outcome: Progressing     Problem: Fall Injury Risk  Goal: Absence of Fall and Fall-Related Injury  Outcome: Progressing     Problem: Skin Injury Risk Increased  Goal: Skin Health and Integrity  Outcome: Progressing     Problem: Diabetes Comorbidity  Goal: Blood Glucose Level Within Targeted Range  Outcome: Progressing     Problem: Comorbidity Management  Goal: Maintenance of Asthma Control  Outcome: Progressing  Goal: Maintenance of Behavioral Health Symptom Control  Outcome: Progressing  Goal: Blood Pressure in Desired Range  Outcome: Progressing

## 2025-01-24 NOTE — CONSULTS
NEUROSURGICAL INPATIENT CONSULTATION NOTE    DATE OF SERVICE:  01/24/2025    ATTENDING PHYSICIAN:  Mac Elizabeth MD    CONSULT REQUESTED BY:  Hospital medicine    REASON FOR CONSULT:  T8 fracture    SUBJECTIVE:    HISTORY OF PRESENT ILLNESS:  This is a very pleasant 80 y.o. male, CAD, pacemaker, lives at home, fell about a week ago and started to have severe midthoracic back pain 4 days ago.  The pain is severe in intensity and worse with standing up, twisting in bed.  He has been unable to walk since he has been feeling the pain.  Has not noticed any new onset of motor weakness.  He has chronic right hip flexion weakness due to a prior femur and pelvic fracture.    During my visit he is comfortable in bed.  Denies having any upper extremity or lower extremity weakness, numbness or sphincter dysfunction symptoms.  Was seen by Physical therapy today.               PAST MEDICAL HISTORY:  Active Ambulatory Problems     Diagnosis Date Noted    Essential hypertension 06/25/2017    Pure hypercholesterolemia 06/25/2017    Hypothyroidism due to acquired atrophy of thyroid 06/25/2017    Atrial bigeminy 06/21/2021    Displaced intertrochanteric fracture of right femur, initial encounter for closed fracture 09/03/2021    Closed nondisplaced fracture of anterior column of right acetabulum with routine healing s/p ORIF of pelvis on 9/20/2024 09/03/2021    Elevated LFTs 09/25/2021    Acute on chronic diastolic heart failure     Coronary artery disease involving native coronary artery of native heart without angina pectoris     SOB (shortness of breath)     Venous stasis ulcer of right calf limited to breakdown of skin without varicose veins     Rotator cuff impingement syndrome of right shoulder 04/27/2022    Impaired range of motion of right hip 05/09/2022    Decreased right shoulder range of motion 05/09/2022    Weakness 05/09/2022    Impaired activities of daily living 05/09/2022    PVC's (premature ventricular contractions)  06/13/2022    AKHIL (obstructive sleep apnea) 10/05/2022    Paroxysmal atrial flutter 09/06/2023    Aortic atherosclerosis 09/06/2023    COPD exacerbation 09/06/2023    Pulmonary hypertension 11/15/2023    Pulmonary embolism without acute cor pulmonale 09/16/2024    Elevated troponin 09/16/2024    Insomnia 09/16/2024    Abnormal ventricular wall motion 09/16/2024    Acute on chronic hypoxic respiratory failure 09/16/2024    Slow transit constipation 09/16/2024    Closed pelvic ring fracture s/p ORIF of pelvis on 9/20/2024 09/21/2024    A-fib 10/28/2024    Class 1 obesity with body mass index (BMI) of 31.0 to 31.9 in adult 10/28/2024    History of pulmonary embolism 10/28/2024    Encephalopathy, metabolic 10/29/2024    CHB (complete heart block) 10/29/2024    Urinary retention 11/05/2024    Age-related physical debility 11/06/2024    Irritant contact dermatitis due friction or contact with other specified body fluids 11/08/2024     Resolved Ambulatory Problems     Diagnosis Date Noted    Hyperglycemia 06/25/2017    Bradycardia 08/06/2021    Fall 09/02/2021    Advance care planning 09/25/2021    Aftercare for healing traumatic closed fracture of hip, right 04/27/2022    Painful orthopaedic hardware 07/27/2022    Closed fracture of anterior column of right acetabulum 09/16/2024    Bug bites 09/16/2024    PAC (premature atrial contraction) 09/16/2024    Abnormal EKG 09/16/2024    Hypokalemia 09/18/2024    Sepsis 09/28/2024    SIRS (systemic inflammatory response syndrome) 09/28/2024    PAULA (acute kidney injury) 09/28/2024     Past Medical History:   Diagnosis Date    Atrial fibrillation, unspecified type 09/06/2023    Thyroid disease        PAST SURGICAL HISTORY:  Past Surgical History:   Procedure Laterality Date    COLONOSCOPY  01/01/2011    CORONARY ANGIOGRAPHY N/A 07/22/2021    Procedure: ANGIOGRAM, CORONARY ARTERY;  Surgeon: Hank Barry MD;  Location: [facility] CATH LAB/EP;  Service: Cardiology;  Laterality: N/A;     EYE SURGERY Bilateral     cataracts extraction    FRACTURE SURGERY Right 2021    femur    HERNIA REPAIR      HIP SURGERY Right 2021    IMPLANTATION OF BIVENTRICULAR HEART PACEMAKER N/A 2024    Procedure: INSERTION, PACEMAKER, BIVENTRICULAR;  Surgeon: Mac Alejandro MD;  Location: [facility] EP LAB;  Service: Cardiology;  Laterality: N/A;  CHB, CRT-P, SJM, Anes, DM, CICU 3081    INTRAMEDULLARY RODDING OF TROCHANTER OF FEMUR Right 2021    Procedure: INSERTION, INTRAMEDULLARY RACQUEL, FEMUR, TROCHANTER;  Surgeon: Darryn Hall MD;  Location: [facility] OR;  Service: Orthopedics;  Laterality: Right;    JOINT REPLACEMENT Bilateral     knee    LEFT HEART CATHETERIZATION Right 2021    Procedure: Left heart cath;  Surgeon: Hank Barry MD;  Location: [facility] CATH LAB/EP;  Service: Cardiology;  Laterality: Right;    OPEN REDUCTION AND INTERNAL FIXATION (ORIF) OF INJURY OF HIP Right 2024    Procedure: ORIF,PELVIS;  Surgeon: Negrito Stapleton MD;  Location: [address] FLR;  Service: Orthopedics;  Laterality: Right;  +local    TRANSESOPHAGEAL ECHOCARDIOGRAPHY N/A 2024    Procedure: ECHOCARDIOGRAM, TRANSESOPHAGEAL;  Surgeon: Leonora Butt MD;  Location: [facility] EP LAB;  Service: Cardiology;  Laterality: N/A;    TREATMENT OF CARDIAC ARRHYTHMIA N/A 2024    Procedure: Cardioversion or Defibrillation;  Surgeon: ANA Steiner MD;  Location: [facility] EP LAB;  Service: Cardiology;  Laterality: N/A;  AF, DEMETRICE/DCCV, ANES, GP, 524    VASECTOMY         SOCIAL HISTORY:   Social History     Socioeconomic History    Marital status:    Tobacco Use    Smoking status: Former     Current packs/day: 0.00     Average packs/day: 1 pack/day for 35.0 years (35.0 ttl pk-yrs)     Types: Cigarettes     Start date: 1965     Quit date: 2000     Years since quittin.0     Passive exposure: Past    Smokeless tobacco: Never   Substance and Sexual Activity    Alcohol use: No    Drug use: Never     Sexual activity: Not Currently     Social Drivers of Health     Financial Resource Strain: Low Risk  (1/21/2025)    Overall Financial Resource Strain (CARDIA)     Difficulty of Paying Living Expenses: Not hard at all   Food Insecurity: No Food Insecurity (1/21/2025)    Hunger Vital Sign     Worried About Running Out of Food in the Last Year: Never true     Ran Out of Food in the Last Year: Never true   Transportation Needs: No Transportation Needs (1/21/2025)    TRANSPORTATION NEEDS     Transportation : No   Physical Activity: Inactive (10/30/2024)    Exercise Vital Sign     Days of Exercise per Week: 0 days     Minutes of Exercise per Session: 0 min   Stress: No Stress Concern Present (1/21/2025)    [employer] - Occupational Stress Questionnaire     Feeling of Stress : Not at all   Housing Stability: Low Risk  (1/21/2025)    Housing Stability Vital Sign     Unable to Pay for Housing in the Last Year: No     Homeless in the Last Year: No       FAMILY HISTORY:  Family History   Problem Relation Name Age of Onset    Crohn's disease Mother      Dementia Mother      Heart attack Father      No Known Problems Sister      No Known Problems Brother      No Known Problems Son         CURRENTS MEDICATIONS:    No current facility-administered medications on file prior to encounter.     Current Outpatient Medications on File Prior to Encounter   Medication Sig Dispense Refill    apixaban (ELIQUIS) 5 mg Tab Take 1 tablet (5 mg total) by mouth 2 (two) times daily. Start on 11/10/24 180 tablet 3    aspirin (ECOTRIN) 81 MG EC tablet Take 1 tablet (81 mg total) by mouth once daily.      atorvastatin (LIPITOR) 10 MG tablet Take 1 tablet (10 mg total) by mouth once daily. 90 tablet 1    furosemide (LASIX) 40 MG tablet Take 1 tablet (40 mg total) by mouth once daily. 90 tablet 1    INV metoprolol tartrate (LOPRESSOR) 50 MG Tab Take 1 tablet (50 mg total) by mouth 2 (two) times daily. 90 each 1     levothyroxine (SYNTHROID) 200 MCG tablet Take 1 tablet (200 mcg total) by mouth before breakfast. 90 tablet 3    melatonin (MELATIN) 3 mg tablet Take 2 tablets (6 mg total) by mouth nightly as needed for Insomnia.      methocarbamoL (ROBAXIN) 750 MG Tab Take 1 tablet (750 mg total) by mouth 4 (four) times daily as needed (muscle spasm). 45 tablet 0    mirtazapine (REMERON) 7.5 MG Tab Take 1 tablet (7.5 mg total) by mouth nightly. One tablet po each night for sleep 90 tablet 1    multivit-min-FA-lycopen-lutein (CENTRUM SILVER) 0.4-300-250 mg-mcg-mcg Tab Take 1 tablet by mouth once daily.      naproxen (NAPROSYN) 500 MG tablet Take 1 tablet (500 mg total) by mouth 2 (two) times daily as needed. 20 tablet 0    oxyCODONE-acetaminophen (PERCOCET) 5-325 mg per tablet Take 1 tablet by mouth every 6 (six) hours as needed for Pain. 12 tablet 0    sacubitriL-valsartan (ENTRESTO) 24-26 mg per tablet Take 1 tablet by mouth 2 (two) times daily. 180 tablet 3    tamsulosin (FLOMAX) 0.4 mg Cap Take 1 capsule (0.4 mg total) by mouth once daily. 90 capsule 3    acetaminophen (TYLENOL) 500 MG tablet Take 2 tablets (1,000 mg total) by mouth every 8 (eight) hours.      ammonium lactate (LAC-HYDRIN) 12 % lotion Apply topically 2 (two) times daily as needed for Dry Skin. 396 g 1    cephALEXin (KEFLEX) 500 MG capsule Take 2 capsules (1,000 mg total) by mouth every 12 (twelve) hours. 28 capsule 0    empagliflozin (JARDIANCE) 10 mg tablet Take 1 tablet (10 mg total) by mouth once daily. 90 tablet 3    lactulose (CHRONULAC) 10 gram/15 mL solution SMARTSIG:Milliliter(s) By Mouth      methocarbamoL (ROBAXIN) 750 MG Tab Take 2 tablets (1,500 mg total) by mouth 3 (three) times daily. for 5 days 30 tablet 0    senna-docusate 8.6-50 mg (PERICOLACE) 8.6-50 mg per tablet Take 1 tablet by mouth once daily. 30 tablet     traMADoL (ULTRAM) 50 mg tablet Take 1 tablet (50 mg total) by mouth every 6 (six) hours as needed for Pain. 20 tablet 0         apixaban  5 mg Oral BID    aspirin  81 mg Oral Daily    atorvastatin  10 mg Oral Daily    cefTRIAXone (Rocephin) IV (PEDS and ADULTS)  1 g Intravenous Q24H    empagliflozin  10 mg Oral Daily    levothyroxine  200 mcg Oral Before breakfast    INV metoprolol tartrate  50 mg Oral TID    mirtazapine  7.5 mg Oral Nightly    mupirocin   Nasal BID    polyethylene glycol  17 g Oral Daily    sacubitriL-valsartan  1 tablet Oral BID    tamsulosin  0.4 mg Oral Daily       ALLERGIES:  Review of patient's allergies indicates:  No Known Allergies    REVIEW OF SYSTEMS:  Review of Systems   Constitutional:  Negative for diaphoresis, fever and weight loss.   Respiratory:  Negative for shortness of breath.    Cardiovascular:  Negative for chest pain.   Gastrointestinal:  Negative for blood in stool.   Genitourinary:  Negative for hematuria.   Endo/Heme/Allergies:  Does not bruise/bleed easily.   All other systems reviewed and are negative.      OBJECTIVE:    PHYSICAL EXAMINATION:   Vitals:    01/24/25 1251   BP:    Pulse: 88   Resp:    Temp:        Physical Exam:  Vitals reviewed.    Constitutional: He appears well-developed and well-nourished.     Eyes: Pupils are equal, round, and reactive to light. Conjunctivae and EOM are normal.     Cardiovascular: Normal distal pulses and no edema.     Abdominal: Soft.     Skin: Skin displays no rash on trunk and no rash on extremities. Skin displays no lesions on trunk and no lesions on extremities.     Psych/Behavior: He is alert. He is oriented to person, place, and time. He has a normal mood and affect.     Musculoskeletal:        Neck: Range of motion is limited.       Right Ankle Exam     Muscle Strength   Dorsiflexion:  4-/5       Left Ankle Exam     Muscle Strength   Dorsiflexion:  5/5             Neurological Exam  Mental Status  Alert. Oriented to person, place, and time.    Cranial Nerves  CN III, IV, VI: Extraocular movements intact bilaterally. Pupils equal round and reactive to  light bilaterally.    Motor                                               Right                     Left  Hip extension                         4-                          5  Knee extension                      5                          5  Dorsiflexion                            4-                          5  Toe extension                        5                          5    Sensory  Sensation is intact to light touch, pinprick, vibration and proprioception in all four extremities.    Reflexes    Right pathological reflexes: Saira's absent. Ankle clonus absent.  Left pathological reflexes: Saira's absent. Ankle clonus absent.      DIAGNOSTIC DATA:    Recent Labs     01/22/25  0710 01/23/25  0629 01/24/25  0533    141 141   K 4.3 4.1 3.8   CL 97 93* 91*   GLU 99 141* 122*   BUN 30* 30* 26*   CREATININE 1.0 1.2 1.0   CALCIUM 8.7 8.6* 8.5*       Microbiology Results (last 7 days)       Procedure Component Value Units Date/Time    Blood culture x two cultures. Draw prior to antibiotics. [[id number]] Collected: 01/20/25 2156    Order Status: Sent Specimen: Blood Updated: 01/23/25 1112    Blood culture x two cultures. Draw prior to antibiotics. [[id number]] Collected: 01/20/25 2156    Order Status: Sent Specimen: Blood Updated: 01/23/25 1112    Urine culture [[id number]] Collected: 01/21/25 0017    Order Status: No result Specimen: Urine Updated: 01/21/25 0042    Influenza A & B by Molecular [[id number]] Collected: 01/20/25 2228    Order Status: Completed Specimen: Nasopharyngeal Swab Updated: 01/20/25 2301     Influenza A, Molecular Negative     Influenza B, Molecular Negative     Flu A & B Source NP            I personally interpreted the following imaging:    Chest CT showing T8 chance fracture involving the anterior and possibly middle column, mildly displaced, ankylosing spondylitis affecting the thoracic spine    Thoracic and lumbar x-ray reviewed showing appropriate alignment      ASSESSMENT:  This  is a 80 y.o. male with acute T8 chance fracture the context of ankylosing spondylitis following a fall.  The patient neurological function appears at baseline.  He has chronic right leg weakness.    Problem List Items Addressed This Visit          Neuro    Closed fracture of T8 vertebra    Relevant Orders    MRI Thoracic Spine Without Contrast    Brace application    CT Thoracic Spine Without Contrast       Pulmonary    * (Principal) Acute on chronic hypoxic respiratory failure       Cardiac/Vascular    Essential hypertension (Chronic)    Acute on chronic diastolic heart failure     Other Visit Diagnoses       Acute on chronic respiratory failure with hypoxia and hypercapnia    -  Primary    Screening for cardiovascular condition        Relevant Orders    EKG 12-lead (Completed)    Respiratory failure        CHF (congestive heart failure)        Relevant Orders    Echo    PVC (premature ventricular contraction)        Relevant Orders    EKG 12-lead    Ankylosing spondylitis of thoracic region        Relevant Orders    CT Thoracic Spine Without Contrast            PLAN:  Discussed with hospital medicine.  The patient's pacemaker is MRI conditional.  A thoracic spine MRI is indicated to rule out epidural hematoma and to better assess the fracture and to rule out ligamentous injuries.  In the meantime we will proceed with the CT of the thoracic spine, 1 mm cuts to better assess the actual fracture at T8  Recommending not to mobilize the patient out of bed until MRI is completed.  TLSO brace ordered to wear once clear to mobilize out of bed.    Mac Elizabeth MD  Cell: [phone]     0 = independent

## 2025-01-24 NOTE — PLAN OF CARE
Problem: Adult Inpatient Plan of Care  Goal: Plan of Care Review  Outcome: Progressing  Goal: Patient-Specific Goal (Individualized)  Outcome: Progressing  Goal: Absence of Hospital-Acquired Illness or Injury  Outcome: Progressing  Goal: Optimal Comfort and Wellbeing  Outcome: Progressing  Goal: Readiness for Transition of Care  Outcome: Progressing     Problem: Wound  Goal: Optimal Coping  Outcome: Progressing  Goal: Optimal Functional Ability  Outcome: Progressing  Goal: Absence of Infection Signs and Symptoms  Outcome: Progressing  Goal: Improved Oral Intake  Outcome: Progressing  Goal: Optimal Pain Control and Function  Outcome: Progressing  Goal: Skin Health and Integrity  Outcome: Progressing  Goal: Optimal Wound Healing  Outcome: Progressing     Problem: Infection  Goal: Absence of Infection Signs and Symptoms  Outcome: Progressing     Problem: Pulmonary Impairment  Goal: Improved Activity Tolerance  Outcome: Progressing  Goal: Effective Airway Clearance  Outcome: Progressing  Goal: Optimal Gas Exchange  Outcome: Progressing     Problem: UTI (Urinary Tract Infection)  Goal: Improved Infection Symptoms  Outcome: Progressing     Problem: Fall Injury Risk  Goal: Absence of Fall and Fall-Related Injury  Outcome: Progressing     Problem: Skin Injury Risk Increased  Goal: Skin Health and Integrity  Outcome: Progressing     Problem: Diabetes Comorbidity  Goal: Blood Glucose Level Within Targeted Range  Outcome: Progressing     Problem: Comorbidity Management  Goal: Maintenance of Asthma Control  Outcome: Progressing  Goal: Maintenance of Behavioral Health Symptom Control  Outcome: Progressing  Goal: Blood Pressure in Desired Range  Outcome: Progressing     Pt continues on 4 liters NC. Wants to drink - difficult to redirect. V/S with blood pressure trending lower. C/O back pain. TLSO brace ordered. Down for CT/MRI. Pt appears more tired this afternoon - breathing more difficult. Mckeon remain intact - draining clear  yellow urine.  Bipap applied for rest past busy afternoon. Son was in room speaking with case management.

## 2025-01-24 NOTE — PROGRESS NOTES
Food & Nutrition  Education    Diet Education: low Na  Time Spent: 15 minutes  Learners: pt      Nutrition Education provided with handouts:   Low Sodium and Fluid Restricted Diet    Comments:  Educated pt on need for low Na diet. Reviewed over foods high in sodium to avoid and cooking without salt. Pt reports he eats dry cereal for breakfast, ham and cheese sandwich for lunch and frozen [employer] meals for dinner. Reviewed over the high sodium content in ham and cheese and encouraged him to switch to turkey and low Na cheese. Also encouraged him to monitor the Na content in his frozen dinners and the importance of limiting his Na intake to 2000mg per day. Pt voiced understanding. Pt currently on soft & bite sized diet- will add low Na restrictions.    All questions and concerns answered. Dietitian's contact information provided.       Follow-Up: 1/31/25    Please Re-consult as needed        Thanks!

## 2025-01-24 NOTE — PT/OT/SLP EVAL
Physical Therapy Evaluation/Treatment    Patient Name:  Bean Salas   MRN:  [id number]    Recommendations:     Discharge Recommendations: Moderate Intensity Therapy   Discharge Equipment Recommendations: to be determined by next level of care   Barriers to discharge: Decreased caregiver support and decreased functional mobility from baseline    Assessment:     Bean Salas is a 80 y.o. male admitted with a medical diagnosis of Acute on chronic hypoxic respiratory failure.  He presents with the following impairments/functional limitations: weakness, impaired endurance, impaired sensation, impaired self care skills, impaired functional mobility, gait instability, impaired cognition, decreased coordination, decreased lower extremity function, decreased safety awareness, pain, decreased ROM, impaired cardiopulmonary response to activity, orthopedic precautions, impaired balance Co-eval per PT/OT for efficacy of outcomes and safety; pt lives alone in [facility] with [facility] and was Ashley for mobility and ADLs using RW; during today's session, pt found supine on 4L O2 at 90% O2 sats ; noted increased SOB with talking; increased O2 to 5L for session; log roll using bed rail with modA; maxA x 2 sup<>sit and sup/SBA to maintain sitting EOB; sit to stand with Stephy x 2 and with side stepping using RW; frequent rest breaks throughout session 2/2 SOB; educated in pursed lip breathing, HEP demonstrated understanding; will recommend moderate intensity therapy to maximize functional independence. .    Rehab Prognosis: Good and Fair; patient would benefit from acute skilled PT services to address these deficits and reach maximum level of function.    Recent Surgery: * No surgery found *      Plan:     During this hospitalization, patient to be seen 4 x/week to address the identified rehab impairments via gait training, therapeutic activities, therapeutic exercises, neuromuscular re-education and progress toward the following goals:    Plan  of Care Expires:  02/24/25    Subjective     Chief Complaint: SOB  Patient/Family Comments/goals: to improve mobility/strength  Pain/Comfort:  Pain Rating 1: 0/10  Location - Side 1: Left  Location - Orientation 1: generalized  Location 1: rib(s)  Pain Addressed 1:  (ice pack provided)  Pain Rating Post-Intervention 1: 0/10 (3 min after c/o 10/10 pain)    Patients cultural, spiritual, Restorationist conflicts given the current situation: no    Living Environment:  Patient lives alone in a H with ABDI, WIS  Prior to admission, patients level of function was Ashley using RW for amb and ADLs.  Equipment used at home: raised toilet, walker, rolling, oxygen, wheelchair (adjustable bed).  Upon discharge, patient will have minimal assistance from son on weekends.    Objective:     Communicated with nurse prior to session.  Patient found HOB elevated with bed alarm, oxygen, telemetry, peripheral IV, Condom Catheter  upon PT entry to room.    General Precautions: Standard, fall  Orthopedic Precautions:spinal precautions   Braces:  (pt reports that his PCP ordered him a TLSO brace that was supposed to be delivered to his home)  Respiratory Status: Nasal cannula, flow 4 L/min; 90% SpO2, HR 57    Exams:  Cognitive Exam:  Patient is oriented to Person, Place, Time, Situation, and followed one and two step commands; decreased STM  Gross Motor Coordination:  decreased speed  Postural Exam:  Patient presented with the following abnormalities:    -       Rounded shoulders  -       Forward head  -       Kyphosis  Sensation:    -       Impaired  lt touch L distal lateral toes and plantar foot  Skin Integrity/Edema:      -       Skin integrity: B distal LEs/feet dry, scaly min abrasions  -       Edema: Minimal/ Moderate BLEs  RLE ROM: WFL except df~neutral  RLE Strength: WFL except df~3+ to 4-  LLE ROM: WFL except df~neutral  LLE Strength: WFL    Functional Mobility:  Bed Mobility:     Rolling Left:  moderate assistance  Supine to Sit:  maximal assistance, of 2 persons, and use of bed rail  Sit to Supine: maximal assistance and of 2 persons  Transfers:     Sit to Stand:  minimum assistance, of 2 persons, with rolling walker  Gait: Patient took lateral steps to HOB using RW with Stephy x 2 and for RW management; short steps, decreased floor clearance, forward trunk flexion  Balance: sit static/dynamic~fair+; stand static~fair-, stand dynamic~fair-/poor+      AM-PAC 6 CLICK MOBILITY  Total Score:11       Treatment & Education:  -pt found supine on 4L O2 at 90% O2 sats  -VCs throughout for technique; educated in spinal precautions  -noted increased SOB with talking; increased O2 to 5L for session  -log roll using bed rail with modA  -maxA x 2 sup<>sit and sup/SBA to maintain sitting EOB  -sit to stand with Stephy x 2 and with side stepping using RW as above -frequent rest breaks throughout session  SOB  -educated in pursed lip breathing, HEP- APs, QS, short range heel slides-pt demonstrated understanding    Patient left HOB elevated with all lines intact, call button in reach, bed alarm on, and nurse notified.    GOALS:   Multidisciplinary Problems       Physical Therapy Goals          Problem: Physical Therapy    Goal Priority Disciplines Outcome Interventions   Physical Therapy Goal     PT, PT/OT Progressing    Description: Goals to be met by: 2025     Patient will increase functional independence with mobility by performin. Supine to sit with MInimal Assistance  2. Sit to supine with MInimal Assistance  3. Rolling with Contact Guard Assistance.  4. Sit to stand transfer with Minimal Assistance  5. Bed to chair transfer with Contact Guard Assistance using Rolling Walker  6. Gait  x 30 feet with Contact Guard Assistance using Rolling Walker.   5. Ascend/Descend 4 inch curb step with Minimal Assistance using Rolling Walker.  6. Lower extremity exercise program x10 reps with supervision                         DME Justifications:  No DME  recommended requiring DME justifications at this time    History:     Past Medical History:   Diagnosis Date    Atrial fibrillation, unspecified type 09/06/2023    COPD exacerbation 09/06/2023    Thyroid disease     hypo       Past Surgical History:   Procedure Laterality Date    COLONOSCOPY  01/01/2011    CORONARY ANGIOGRAPHY N/A 07/22/2021    Procedure: ANGIOGRAM, CORONARY ARTERY;  Surgeon: Hank Barry MD;  Location: [facility] CATH LAB/EP;  Service: Cardiology;  Laterality: N/A;    EYE SURGERY Bilateral     cataracts extraction    FRACTURE SURGERY Right 09/2021    femur    HERNIA REPAIR      HIP SURGERY Right 08/2021    IMPLANTATION OF BIVENTRICULAR HEART PACEMAKER N/A 11/4/2024    Procedure: INSERTION, PACEMAKER, BIVENTRICULAR;  Surgeon: Mac Alejandro MD;  Location: [facility] EP LAB;  Service: Cardiology;  Laterality: N/A;  CHB, CRT-P, SJM, Anes, DM, CICU 3081    INTRAMEDULLARY RODDING OF TROCHANTER OF FEMUR Right 09/03/2021    Procedure: INSERTION, INTRAMEDULLARY RACQUEL, FEMUR, TROCHANTER;  Surgeon: Darryn Hall MD;  Location: [facility] OR;  Service: Orthopedics;  Laterality: Right;    JOINT REPLACEMENT Bilateral     knee    LEFT HEART CATHETERIZATION Right 07/22/2021    Procedure: Left heart cath;  Surgeon: Hank Barry MD;  Location: [facility] CATH LAB/EP;  Service: Cardiology;  Laterality: Right;    OPEN REDUCTION AND INTERNAL FIXATION (ORIF) OF INJURY OF HIP Right 9/20/2024    Procedure: ORIF,PELVIS;  Surgeon: Negrito Stapleton MD;  [address]  Service: Orthopedics;  Laterality: Right;  +local    TRANSESOPHAGEAL ECHOCARDIOGRAPHY N/A 9/19/2024    Procedure: ECHOCARDIOGRAM, TRANSESOPHAGEAL;  Surgeon: Leonora Butt MD;  Location: [facility] EP LAB;  Service: Cardiology;  Laterality: N/A;    TREATMENT OF CARDIAC ARRHYTHMIA N/A 9/19/2024    Procedure: Cardioversion or Defibrillation;  Surgeon: ANA Steiner MD;  Location: [facility] EP LAB;  Service: Cardiology;  Laterality: N/A;  AF, DEMETRICE/DCCV,  RAFAELAS, GP, 524    VASECTOMY         Time Tracking:     PT Received On: 01/24/25  PT Start Time: 0916     PT Stop Time: 1002  PT Total Time (min): 46 min     Billable Minutes: Evaluation 15 and Therapeutic Activity 31      01/24/2025

## 2025-01-24 NOTE — PLAN OF CARE
Problem: Physical Therapy  Goal: Physical Therapy Goal  Description: Goals to be met by: 2025     Patient will increase functional independence with mobility by performin. Supine to sit with MInimal Assistance  2. Sit to supine with MInimal Assistance  3. Rolling with Contact Guard Assistance.  4. Sit to stand transfer with Minimal Assistance  5. Bed to chair transfer with Contact Guard Assistance using Rolling Walker  6. Gait  x 30 feet with Contact Guard Assistance using Rolling Walker.   5. Ascend/Descend 4 inch curb step with Minimal Assistance using Rolling Walker.  6. Lower extremity exercise program x10 reps with supervision    Outcome: Progressing   Co-eval per PT/OT for efficacy of outcomes and safety; pt lives alone  in [place] with ABDI and was Ashley for mobility and ADLs using RW; during today's session, pt found supine on 4L O2 at 90% O2 sats ; noted increased SOB with talking; increased O2 to 5L for session; log roll using bed rail with modA; maxA x 2 sup<>sit and sup/SBA to maintain sitting EOB; sit to stand with Stephy x 2 and with side stepping using RW; frequent rest breaks throughout session 2/2 SOB; educated in pursed lip breathing, HEP demonstrated understanding; will recommend moderate intensity therapy to maximize functional independence.

## 2025-01-24 NOTE — PROGRESS NOTES
"Pharmacokinetic Initial Assessment: IV Vancomycin    Assessment/Plan:    Initiate intravenous vancomycin with loading dose of 2250 mg once followed by a maintenance dose of vancomycin 2000 mg IV every 24 hours  Desired empiric serum trough concentration is 15 to 20 mcg/mL  Draw vancomycin trough level 60 min prior to third dose on 01/26/25 at approximately 1800  Pharmacy will continue to follow and monitor vancomycin.      Please contact pharmacy at extension [phone] with any questions regarding this assessment.     Thank you for the consult,   Ashley Quach       Patient brief summary:  Bean Salas is a 80 y.o. male initiated on antimicrobial therapy with IV Vancomycin for treatment of suspected bacteremia    Drug Allergies:   Review of patient's allergies indicates:  No Known Allergies    Actual Body Weight:   95.7 kg    Renal Function:   Estimated Creatinine Clearance: 68.4 mL/min (based on SCr of 1 mg/dL).,     Dialysis Method (if applicable):  N/A    CBC (last 72 hours):  No results for input(s): "WHITE BLOOD CELL COUNT", "HEMOGLOBIN", "HEMATOCRIT", "PLATELETS", "GRAN%", "LYMPH%", "MONO%", "EOSINOPHIL%", "BASOPHIL%", "DIFFERENTIAL METHOD" in the last 72 hours.    Metabolic Panel (last 72 hours):  Recent Labs   Lab Result Units 01/22/25  0710 01/23/25  0629 01/24/25  0533   Sodium mmol/L 142 141 141   Potassium mmol/L 4.3 4.1 3.8   Chloride mmol/L 97 93* 91*   CO2 mmol/L 35* 40* 41*   Glucose mg/dL 99 141* 122*   BUN mg/dL 30* 30* 26*   Creatinine mg/dL 1.0 1.2 1.0       Drug levels (last 3 results):  No results for input(s): "VANCOMYCINRA", "VANCORANDOM", "VANCOMYCINPE", "VANCOPEAK", "VANCOMYCINTR", "VANCOTROUGH" in the last 72 hours.    Microbiologic Results:  Microbiology Results (last 7 days)       Procedure Component Value Units Date/Time    Blood culture [[id number]] Collected: 01/24/25 6578    Order Status: Sent Specimen: Blood from Peripheral, Forearm, Right     Blood culture x two cultures. Draw " prior to antibiotics. [[id number]] Collected: 01/20/25 2156    Order Status: Completed Specimen: Blood Updated: 01/24/25 1704     Blood Culture, Routine Gram stain aer bottle: Gram positive cocci      Gram stain alden bottle: Gram positive cocci.      Results called to and read back by:Hilary Yo RN, 01/24/2025,17:01.    Narrative:      Aerobic and anaerobic    Blood culture x two cultures. Draw prior to antibiotics. [[id number]] Collected: 01/20/25 2156    Order Status: Completed Specimen: Blood Updated: 01/24/25 1703     Blood Culture, Routine Gram stain aer bottle: Gram positive cocci      Gram stain alden bottle: Gram positive cocci      Results called to and read back by:Hilary Yo RN, 01/24/2025, 17:03    Narrative:      Aerobic and anaerobic    Urine culture [[id number]]  (Abnormal) Collected: 01/21/25 0017    Order Status: Completed Specimen: Urine Updated: 01/24/25 1645     Urine Culture, Routine GRAM NEGATIVE RACQUEL  50,000 - 99,999 cfu/ml  Identification and susceptibility pending      Narrative:      Specimen Source->Urine    Influenza A & B by Molecular [[id number]] Collected: 01/20/25 2228    Order Status: Completed Specimen: Nasopharyngeal Swab Updated: 01/20/25 2301     Influenza A, Molecular Negative     Influenza B, Molecular Negative     Flu A & B Source NP

## 2025-01-24 NOTE — ASSESSMENT & PLAN NOTE
Patient has long standing persistent (>12 months) atrial fibrillation. Patient is currently in sinus rhythm. [id number] Score: 3. The patients heart rate in the last 24 hours is as follows:  Pulse  Min: 54  Max: 91     Antiarrhythmics  metoprolol tartrate (LOPRESSOR) tablet 50 mg, 3 times daily, Oral    Anticoagulants  apixaban tablet 5 mg, 2 times daily, Oral    Plan  - Replete lytes with a goal of K>4, Mg >2  - Patient is anticoagulated, see medications listed above.  - Patient's afib is currently controlled  - monitor for any arrhythmia

## 2025-01-25 PROBLEM — R78.81 BACTEREMIA: Status: ACTIVE | Noted: 2025-01-25

## 2025-01-25 LAB
ACINETOBACTER CALCOACETICUS/BAUMANNII COMPLEX: NOT DETECTED
ANION GAP SERPL CALC-SCNC: 8 MMOL/L (ref 8–16)
APICAL FOUR CHAMBER EJECTION FRACTION: 42 %
APICAL TWO CHAMBER EJECTION FRACTION: 30 %
BACTERIA BLD CULT: ABNORMAL
BACTEROIDES FRAGILIS: NOT DETECTED
BSA FOR ECHO PROCEDURE: 2.18 M2
BUN SERPL-MCNC: 21 MG/DL (ref 8–23)
CALCIUM SERPL-MCNC: 8.6 MG/DL (ref 8.7–10.5)
CANDIDA ALBICANS: NOT DETECTED
CANDIDA AURIS: NOT DETECTED
CANDIDA GLABRATA: NOT DETECTED
CANDIDA KRUSEI: NOT DETECTED
CANDIDA PARAPSILOSIS: NOT DETECTED
CANDIDA TROPICALIS: NOT DETECTED
CHLORIDE SERPL-SCNC: 93 MMOL/L (ref 95–110)
CO2 SERPL-SCNC: 37 MMOL/L (ref 23–29)
CREAT SERPL-MCNC: 0.9 MG/DL (ref 0.5–1.4)
CRYPTOCOCCUS NEOFORMANS/GATTII: NOT DETECTED
CTX-M GENE (ESBL PRODUCER): ABNORMAL
CV ECHO LV RWT: 0.44 CM
ECHO LV POSTERIOR WALL: 1.1 CM (ref 0.6–1.1)
ENTEROBACTER CLOACAE COMPLEX: NOT DETECTED
ENTEROBACTERALES: NOT DETECTED
ENTEROCOCCUS FAECALIS: DETECTED
ENTEROCOCCUS FAECIUM: NOT DETECTED
ESCHERICHIA COLI: NOT DETECTED
EST. GFR  (NO RACE VARIABLE): >60 ML/MIN/1.73 M^2
FIO2: 21 %
FRACTIONAL SHORTENING: 22 % (ref 28–44)
GLUCOSE SERPL-MCNC: 147 MG/DL (ref 70–110)
HAEMOPHILUS INFLUENZAE: NOT DETECTED
IMP GENE (CARBAPENEM RESISTANT): ABNORMAL
INTERVENTRICULAR SEPTUM: 1.1 CM (ref 0.6–1.1)
KLEBSIELLA AEROGENES: NOT DETECTED
KLEBSIELLA OXYTOCA: NOT DETECTED
KLEBSIELLA PNEUMONIAE GROUP: NOT DETECTED
KPC RESISTANCE GENE (CARBAPENEM): ABNORMAL
LEFT ATRIUM AREA SYSTOLIC (APICAL 4 CHAMBER): 35.61 CM2
LEFT INTERNAL DIMENSION IN SYSTOLE: 3.9 CM (ref 2.1–4)
LEFT VENTRICLE DIASTOLIC VOLUME INDEX: 54.13 ML/M2
LEFT VENTRICLE DIASTOLIC VOLUME: 116.37 ML
LEFT VENTRICLE END DIASTOLIC VOLUME APICAL 2 CHAMBER: 65.92 ML
LEFT VENTRICLE END DIASTOLIC VOLUME APICAL 4 CHAMBER: 122.01 ML
LEFT VENTRICLE END SYSTOLIC VOLUME APICAL 4 CHAMBER: 135.73 ML
LEFT VENTRICLE MASS INDEX: 96.3 G/M2
LEFT VENTRICLE SYSTOLIC VOLUME INDEX: 29.8 ML/M2
LEFT VENTRICLE SYSTOLIC VOLUME: 64.07 ML
LEFT VENTRICULAR INTERNAL DIMENSION IN DIASTOLE: 5 CM (ref 3.5–6)
LEFT VENTRICULAR MASS: 207.1 G
LISTERIA MONOCYTOGENES: NOT DETECTED
LVED V (TEICH): 116.37 ML
LVES V (TEICH): 64.07 ML
MCR-1: ABNORMAL
MEC A/C AND MREJ (MRSA): ABNORMAL
MEC A/C: ABNORMAL
NDM GENE (CARBAPENEM RESISTANT): ABNORMAL
NEISSERIA MENINGITIDIS: NOT DETECTED
OXA-48-LIKE (CARBAPENEM RESISTANT): ABNORMAL
PCO2 BLDA: 86.7 MMHG (ref 35–45)
PH SMN: 7.36 [PH] (ref 7.35–7.45)
PO2 BLDA: 20 MMHG (ref 40–60)
POC BASE DEFICIT: 18.2 MMOL/L (ref -2–2)
POC HCO3: 48.4 MMOL/L (ref 24–28)
POC PERFORMED BY: ABNORMAL
POC SATURATED O2: 25.7 % (ref 95–100)
POTASSIUM SERPL-SCNC: 4 MMOL/L (ref 3.5–5.1)
PROTEUS SPECIES: NOT DETECTED
PSEUDOMONAS AERUGINOSA: NOT DETECTED
RA PRESSURE ESTIMATED: 3 MMHG
SALMONELLA SP: NOT DETECTED
SERRATIA MARCESCENS: NOT DETECTED
SODIUM SERPL-SCNC: 138 MMOL/L (ref 136–145)
SPECIMEN SOURCE: ABNORMAL
STAPHYLOCOCCUS AUREUS: NOT DETECTED
STAPHYLOCOCCUS EPIDERMIDIS: NOT DETECTED
STAPHYLOCOCCUS LUGDUNESIS: NOT DETECTED
STAPHYLOCOCCUS SPECIES: NOT DETECTED
STENOTROPHOMONAS MALTOPHILIA: NOT DETECTED
STREPTOCOCCUS AGALACTIAE: NOT DETECTED
STREPTOCOCCUS PNEUMONIAE: NOT DETECTED
STREPTOCOCCUS PYOGENES: NOT DETECTED
STREPTOCOCCUS SPECIES: NOT DETECTED
TDI LATERAL: 0.12 M/S
TDI SEPTAL: 0.09 M/S
TDI: 0.11 M/S
VAN A/B (VRE GENE): NOT DETECTED
VIM GENE (CARBAPENEM RESISTANT): ABNORMAL
Z-SCORE OF LEFT VENTRICULAR DIMENSION IN END DIASTOLE: -3.33
Z-SCORE OF LEFT VENTRICULAR DIMENSION IN END SYSTOLE: -0.68

## 2025-01-25 PROCEDURE — C1751 CATH, INF, PER/CENT/MIDLINE: HCPCS | Mod: HCNC

## 2025-01-25 PROCEDURE — 25000003 PHARM REV CODE 250: Mod: HCNC | Performed by: STUDENT IN AN ORGANIZED HEALTH CARE EDUCATION/TRAINING PROGRAM

## 2025-01-25 PROCEDURE — 21400001 HC TELEMETRY ROOM: Mod: HCNC

## 2025-01-25 PROCEDURE — 99900035 HC TECH TIME PER 15 MIN (STAT): Mod: HCNC

## 2025-01-25 PROCEDURE — 63600175 PHARM REV CODE 636 W HCPCS: Mod: HCNC | Performed by: FAMILY MEDICINE

## 2025-01-25 PROCEDURE — 27100171 HC OXYGEN HIGH FLOW UP TO 24 HOURS: Mod: HCNC

## 2025-01-25 PROCEDURE — 99222 1ST HOSP IP/OBS MODERATE 55: CPT | Mod: HCNC,,, | Performed by: INTERNAL MEDICINE

## 2025-01-25 PROCEDURE — 36415 COLL VENOUS BLD VENIPUNCTURE: CPT | Mod: HCNC | Performed by: FAMILY MEDICINE

## 2025-01-25 PROCEDURE — 25000003 PHARM REV CODE 250: Mod: HCNC | Performed by: INTERNAL MEDICINE

## 2025-01-25 PROCEDURE — 99233 SBSQ HOSP IP/OBS HIGH 50: CPT | Mod: HCNC,,, | Performed by: INTERNAL MEDICINE

## 2025-01-25 PROCEDURE — 87154 CUL TYP ID BLD PTHGN 6+ TRGT: CPT | Mod: HCNC | Performed by: INTERNAL MEDICINE

## 2025-01-25 PROCEDURE — 99232 SBSQ HOSP IP/OBS MODERATE 35: CPT | Mod: HCNC,,, | Performed by: PHYSICIAN ASSISTANT

## 2025-01-25 PROCEDURE — 94640 AIRWAY INHALATION TREATMENT: CPT | Mod: HCNC

## 2025-01-25 PROCEDURE — 87040 BLOOD CULTURE FOR BACTERIA: CPT | Mod: HCNC | Performed by: INTERNAL MEDICINE

## 2025-01-25 PROCEDURE — 25000003 PHARM REV CODE 250: Mod: HCNC | Performed by: FAMILY MEDICINE

## 2025-01-25 PROCEDURE — 63600175 PHARM REV CODE 636 W HCPCS: Mod: HCNC | Performed by: INTERNAL MEDICINE

## 2025-01-25 PROCEDURE — 25000242 PHARM REV CODE 250 ALT 637 W/ HCPCS: Mod: HCNC | Performed by: INTERNAL MEDICINE

## 2025-01-25 PROCEDURE — 27000646 HC AEROBIKA DEVICE: Mod: HCNC

## 2025-01-25 PROCEDURE — 25000242 PHARM REV CODE 250 ALT 637 W/ HCPCS: Mod: HCNC

## 2025-01-25 PROCEDURE — 36415 COLL VENOUS BLD VENIPUNCTURE: CPT | Mod: HCNC | Performed by: INTERNAL MEDICINE

## 2025-01-25 PROCEDURE — 36410 VNPNXR 3YR/> PHY/QHP DX/THER: CPT | Mod: HCNC

## 2025-01-25 PROCEDURE — 82803 BLOOD GASES ANY COMBINATION: CPT | Mod: HCNC

## 2025-01-25 PROCEDURE — 94664 DEMO&/EVAL PT USE INHALER: CPT | Mod: HCNC

## 2025-01-25 PROCEDURE — 94761 N-INVAS EAR/PLS OXIMETRY MLT: CPT | Mod: HCNC,XB

## 2025-01-25 PROCEDURE — 94660 CPAP INITIATION&MGMT: CPT | Mod: HCNC

## 2025-01-25 PROCEDURE — 80048 BASIC METABOLIC PNL TOTAL CA: CPT | Mod: HCNC | Performed by: FAMILY MEDICINE

## 2025-01-25 RX ORDER — METOPROLOL TARTRATE 50 MG/1
100 TABLET ORAL 2 TIMES DAILY
Status: DISCONTINUED | OUTPATIENT
Start: 2025-01-25 | End: 2025-01-28

## 2025-01-25 RX ORDER — IPRATROPIUM BROMIDE AND ALBUTEROL SULFATE 2.5; .5 MG/3ML; MG/3ML
3 SOLUTION RESPIRATORY (INHALATION)
Status: DISCONTINUED | OUTPATIENT
Start: 2025-01-25 | End: 2025-01-26

## 2025-01-25 RX ORDER — ACETYLCYSTEINE 200 MG/ML
2 SOLUTION ORAL; RESPIRATORY (INHALATION)
Status: DISCONTINUED | OUTPATIENT
Start: 2025-01-25 | End: 2025-01-28

## 2025-01-25 RX ORDER — SODIUM CHLORIDE 0.9 % (FLUSH) 0.9 %
10 SYRINGE (ML) INJECTION EVERY 12 HOURS PRN
Status: DISCONTINUED | OUTPATIENT
Start: 2025-01-25 | End: 2025-01-29 | Stop reason: HOSPADM

## 2025-01-25 RX ADMIN — IPRATROPIUM BROMIDE AND ALBUTEROL SULFATE 3 ML: 2.5; .5 SOLUTION RESPIRATORY (INHALATION) at 12:01

## 2025-01-25 RX ADMIN — MUPIROCIN: 20 OINTMENT TOPICAL at 10:01

## 2025-01-25 RX ADMIN — SACUBITRIL AND VALSARTAN 1 TABLET: 24; 26 TABLET, FILM COATED ORAL at 09:01

## 2025-01-25 RX ADMIN — ACETYLCYSTEINE 2 ML: 200 INHALANT RESPIRATORY (INHALATION) at 07:01

## 2025-01-25 RX ADMIN — IPRATROPIUM BROMIDE AND ALBUTEROL SULFATE 3 ML: 2.5; .5 SOLUTION RESPIRATORY (INHALATION) at 07:01

## 2025-01-25 RX ADMIN — PIPERACILLIN SODIUM AND TAZOBACTAM SODIUM 4.5 G: 4; .5 INJECTION, POWDER, LYOPHILIZED, FOR SOLUTION INTRAVENOUS at 10:01

## 2025-01-25 RX ADMIN — EMPAGLIFLOZIN 10 MG: 10 TABLET, FILM COATED ORAL at 09:01

## 2025-01-25 RX ADMIN — PIPERACILLIN SODIUM AND TAZOBACTAM SODIUM 4.5 G: 4; .5 INJECTION, POWDER, LYOPHILIZED, FOR SOLUTION INTRAVENOUS at 09:01

## 2025-01-25 RX ADMIN — PIPERACILLIN SODIUM AND TAZOBACTAM SODIUM 4.5 G: 4; .5 INJECTION, POWDER, LYOPHILIZED, FOR SOLUTION INTRAVENOUS at 02:01

## 2025-01-25 RX ADMIN — LORAZEPAM 0.5 MG: 0.5 TABLET ORAL at 11:01

## 2025-01-25 RX ADMIN — LEVOTHYROXINE SODIUM 200 MCG: 100 TABLET ORAL at 06:01

## 2025-01-25 RX ADMIN — TAMSULOSIN HYDROCHLORIDE 0.4 MG: 0.4 CAPSULE ORAL at 09:01

## 2025-01-25 RX ADMIN — MUPIROCIN: 20 OINTMENT TOPICAL at 09:01

## 2025-01-25 RX ADMIN — IPRATROPIUM BROMIDE AND ALBUTEROL SULFATE 3 ML: 2.5; .5 SOLUTION RESPIRATORY (INHALATION) at 03:01

## 2025-01-25 RX ADMIN — METOPROLOL TARTRATE 50 MG: 50 TABLET, FILM COATED ORAL at 09:01

## 2025-01-25 RX ADMIN — MIRTAZAPINE 7.5 MG: 7.5 TABLET, FILM COATED ORAL at 10:01

## 2025-01-25 RX ADMIN — OXYCODONE 10 MG: 5 TABLET ORAL at 07:01

## 2025-01-25 RX ADMIN — OXYCODONE 10 MG: 5 TABLET ORAL at 05:01

## 2025-01-25 RX ADMIN — CEFTRIAXONE SODIUM 1 G: 1 INJECTION, POWDER, FOR SOLUTION INTRAMUSCULAR; INTRAVENOUS at 02:01

## 2025-01-25 RX ADMIN — ATORVASTATIN CALCIUM 10 MG: 10 TABLET, FILM COATED ORAL at 09:01

## 2025-01-25 NOTE — HPI
80-year-old elderly male with a past medical history of CAD, combined CHF (recovered EF), CAD, A-fib on eliquis,COPD, chronic respiratory failure on 3L NC, and hypothyroidism who presents to the ED for worsening SOB and back pain. Patient was evaluated emergency department and was found to be in respiratory distress and satting 86-88% on 5 L. Patient was placed on BiPAP and seemed to improve. Patient also had a fall several days ago and had a T8 vertebral fracture and was complaining of moderate pain. ID is consulted due to E faecalis bacteremia. Also with GNR in urine culture

## 2025-01-25 NOTE — PROGRESS NOTES
[facility] - Telemetry  Neurosurgery  Progress Note    Subjective:     Interval History: Back pain controlled with medication.  No leg pain or paresthesias.    History of Present Illness:   This is a very pleasant 80 y.o. male, CAD, pacemaker, lives at home, fell about a week ago and started to have severe midthoracic back pain 4 days ago.  The pain is severe in intensity and worse with standing up, twisting in bed.  He has been unable to walk since he has been feeling the pain.  Has not noticed any new onset of motor weakness.  He has chronic right hip flexion weakness due to a prior femur and pelvic fracture.     During my visit he is comfortable in bed.  Denies having any upper extremity or lower extremity weakness, numbness or sphincter dysfunction symptoms.  Was seen by Physical therapy today.          Post-Op Info:  * No surgery found *          Medications:  Continuous Infusions:  Scheduled Meds:   albuterol-ipratropium  3 mL Nebulization Q4H WAKE    atorvastatin  10 mg Oral Daily    empagliflozin  10 mg Oral Daily    levothyroxine  200 mcg Oral Before breakfast    INV metoprolol tartrate  50 mg Oral TID    mirtazapine  7.5 mg Oral Nightly    mupirocin   Nasal BID    piperacillin-tazobactam (Zosyn) IV (PEDS and ADULTS) (extended infusion is not appropriate)  4.5 g Intravenous Q8H    polyethylene glycol  17 g Oral Daily    sacubitriL-valsartan  1 tablet Oral BID    tamsulosin  0.4 mg Oral Daily    vancomycin (VANCOCIN) IV (PEDS and ADULTS)  2,000 mg Intravenous Q24H     PRN Meds:  Current Facility-Administered Medications:     acetaminophen, 650 mg, Oral, Q8H PRN    hydrOXYzine HCL, 50 mg, Oral, TID PRN    LORazepam, 0.5 mg, Oral, Daily PRN    melatonin, 6 mg, Oral, Nightly PRN    methocarbamoL, 750 mg, Oral, QID PRN    ondansetron, 4 mg, Intravenous, Q12H PRN    oxyCODONE, 10 mg, Oral, Q6H PRN    sodium chloride 0.9%, 10 mL, Intravenous, PRN    Pharmacy to dose Vancomycin consult, , , Once **AND** vancomycin -  "pharmacy to dose, , Intravenous, pharmacy to manage frequency     Review of Systems  Objective:     Weight: 95.3 kg (210 lb)  Body mass index is 29.29 kg/m².  Vital Signs (Most Recent):  Temp: 97.4 °F (36.3 °C) (01/25/25 1236)  Pulse: (!) 47 (01/25/25 1236)  Resp: 18 (01/25/25 1236)  BP: 113/64 (01/25/25 1236)  SpO2: 100 % (01/25/25 1236) Vital Signs (24h Range):  Temp:  [97.3 °F (36.3 °C)-98 °F (36.7 °C)] 97.4 °F (36.3 °C)  Pulse:  [47-95] 47  Resp:  [16-22] 18  SpO2:  [92 %-100 %] 100 %  BP: (100-137)/(57-82) 113/64                Oxygen Concentration (%):  [40-45] 40             Urethral Catheter 01/20/25 1200 (Active)   Site Assessment Clean;Intact;Dry 01/24/25 1940   Collection Container Urimeter 01/24/25 1940   Securement Method secured to top of thigh w/ adhesive device 01/24/25 1940   Catheter Care Performed yes 01/24/25 0730   Reason for Continuing Urinary Catheterization Chronic Indwelling Urinary Catheter on Admission 01/24/25 1940   CAUTI Prevention Bundle Securement Device in place with 1" slack;Intact seal between catheter & drainage tubing;Drainage bag/urimeter off the floor;Sheeting clip in use;No dependent loops or kinks;Drainage bag/urimeter not overfilled (<2/3 full);Drainage bag/urimeter below bladder 01/24/25 1940   Output (mL) 800 mL 01/25/25 0614       Neurosurgery Physical Exam    General: well developed, well nourished, no distress  Mental Status: Awake, Alert, Oriented X3.Thought content appropriate  GCS: Motor: 6/Verbal: 5/Eyes: 4 GCS Total: 15    Motor Strength:  Strength  Deltoids Triceps Biceps Wrist Extension Wrist Flexion Hand                          HF KF KE DF PF EHL   Lower: R 5/5 5/5 5/5 5/5 5/5 4/5    L 5/5 5/5 5/5 5/5 5/5 5/5         Clonus: absent B/L  Incision- CDI        Significant Labs:  Recent Labs   Lab 01/24/25  0533 01/25/25  0556   * 147*    138   K 3.8 4.0   CL 91* 93*   CO2 41* 37*   BUN 26* 21   CREATININE 1.0 0.9   CALCIUM 8.5* 8.6*     No results " "for input(s): "WBC", "HGB", "HCT", "PLT" in the last 48 hours.  No results for input(s): "LABPT", "INR", "APTT" in the last 48 hours.  Microbiology Results (last 7 days)       Procedure Component Value Units Date/Time    Blood culture x two cultures. Draw prior to antibiotics. [[id number]]  (Abnormal) Collected: 01/20/25 2156    Order Status: Completed Specimen: Blood Updated: 01/25/25 0846     Blood Culture, Routine Gram stain aer bottle: Gram positive cocci      Gram stain alden bottle: Gram positive cocci      Results called to and read back by:Hilayr Yo RN, 01/24/2025, 17:03      ENTEROCOCCUS FAECALIS  For susceptibility see order #[id number]      Narrative:      Aerobic and anaerobic    Blood culture x two cultures. Draw prior to antibiotics. [[id number]]  (Abnormal) Collected: 01/20/25 2156    Order Status: Completed Specimen: Blood Updated: 01/25/25 0845     Blood Culture, Routine Gram stain aer bottle: Gram positive cocci      Gram stain alden bottle: Gram positive cocci.      Results called to and read back by:Hilary Yo RN, 01/24/2025,17:01.      ENTEROCOCCUS FAECALIS  Susceptibility pending      Narrative:      Aerobic and anaerobic    Blood culture [[id number]] Collected: 01/24/25 1732    Order Status: Completed Specimen: Blood from Peripheral, Forearm, Right Updated: 01/25/25 0515     Blood Culture, Routine No Growth to date    Urine culture [[id number]]  (Abnormal) Collected: 01/21/25 0017    Order Status: Completed Specimen: Urine Updated: 01/24/25 1645     Urine Culture, Routine GRAM NEGATIVE RACQUEL  50,000 - 99,999 cfu/ml  Identification and susceptibility pending      Narrative:      Specimen Source->Urine    Influenza A & B by Molecular [[id number]] Collected: 01/20/25 2228    Order Status: Completed Specimen: Nasopharyngeal Swab Updated: 01/20/25 2301     Influenza A, Molecular Negative     Influenza B, Molecular Negative     Flu A & B Source NP            Significant " Diagnostics:    Impression:     1. Obliquely oriented fracture through the T8 vertebral body.  2. Partially healed fractures of 11th and 12th ribs near the costovertebral junction.  3. Right anterolateral bulky paravertebral ossification in the mid to lower thoracic spine, without significant bony ankylosis and with relatively few syndesmophytes.  Imaging findings are more characteristic of DISH.  4. Volume overload and CHF with pulmonary edema and bilateral pleural effusions.        Electronically signed by:Manuel Vásquez Jr  Date:                                            01/24/2025  Time:                                           16:40  Assessment/Plan:     Active Diagnoses:    Diagnosis Date Noted POA    PRINCIPAL PROBLEM:  Acute on chronic hypoxic respiratory failure [J96.21] 09/16/2024 Yes    Bacteremia [R78.81] 01/25/2025 Yes    Closed fracture of T8 vertebra [S22.069A] 01/21/2025 Yes    UTI (urinary tract infection) [N39.0] 01/21/2025 Yes    A-fib [I48.91] 10/28/2024 Yes    COPD exacerbation [J44.1] 09/06/2023 Yes    Acute on chronic diastolic heart failure [I50.33]  Yes    Essential hypertension [I10] 06/25/2017 Yes     Chronic      Problems Resolved During this Admission:     Unstable T8 fracture    -Neurosurgically stable   -will need surgical stabilization with percutaneous instrumentation  -Bedrest  -Canceled MRI Tspine order  -Will need medical clearance for surgery  -Surgery Tuesday with Dr. Elizabeth if cleared    All of the above discussed and reviewed with Dr. Elizabeth.      Yodit Foster PA-C  Neurosurgery  [facility] - Telemetry

## 2025-01-25 NOTE — SUBJECTIVE & OBJECTIVE
Past Medical History:   Diagnosis Date    Atrial fibrillation, unspecified type 09/06/2023    COPD exacerbation 09/06/2023    Thyroid disease     hypo       Past Surgical History:   Procedure Laterality Date    COLONOSCOPY  01/01/2011    CORONARY ANGIOGRAPHY N/A 07/22/2021    Procedure: ANGIOGRAM, CORONARY ARTERY;  Surgeon: Hank Barry MD;  Location: [facility] CATH LAB/EP;  Service: Cardiology;  Laterality: N/A;    EYE SURGERY Bilateral     cataracts extraction    FRACTURE SURGERY Right 09/2021    femur    HERNIA REPAIR      HIP SURGERY Right 08/2021    IMPLANTATION OF BIVENTRICULAR HEART PACEMAKER N/A 11/4/2024    Procedure: INSERTION, PACEMAKER, BIVENTRICULAR;  Surgeon: Mac Alejandro MD;  Location: [facility] EP LAB;  Service: Cardiology;  Laterality: N/A;  CHB, CRT-P, SJM, Anes, DM, CICU 3081    INTRAMEDULLARY RODDING OF TROCHANTER OF FEMUR Right 09/03/2021    Procedure: INSERTION, INTRAMEDULLARY RACQUEL, FEMUR, TROCHANTER;  Surgeon: Darryn Hall MD;  Location: [facility];  Service: Orthopedics;  Laterality: Right;    JOINT REPLACEMENT Bilateral     knee    LEFT HEART CATHETERIZATION Right 07/22/2021    Procedure: Left heart cath;  Surgeon: Hank Barry MD;  Location: [facility] CATH LAB/EP;  Service: Cardiology;  Laterality: Right;    OPEN REDUCTION AND INTERNAL FIXATION (ORIF) OF INJURY OF HIP Right 9/20/2024    Procedure: ORIF,PELVIS;  Surgeon: Negrito Stapleton MD;  Location: [address];  Service: Orthopedics;  Laterality: Right;  +local    TRANSESOPHAGEAL ECHOCARDIOGRAPHY N/A 9/19/2024    Procedure: ECHOCARDIOGRAM, TRANSESOPHAGEAL;  Surgeon: Leonora Butt MD;  Location: [facility] EP LAB;  Service: Cardiology;  Laterality: N/A;    TREATMENT OF CARDIAC ARRHYTHMIA N/A 9/19/2024    Procedure: Cardioversion or Defibrillation;  Surgeon: ANA Steiner MD;  Location: [facility] EP LAB;  Service: Cardiology;  Laterality: N/A;  AF, DEMETRICE/DCCV, ANES, GP, 524    VASECTOMY         Review of patient's allergies  indicates:  No Known Allergies    Medications:  Medications Prior to Admission   Medication Sig    apixaban (ELIQUIS) 5 mg Tab Take 1 tablet (5 mg total) by mouth 2 (two) times daily. Start on 11/10/24    aspirin (ECOTRIN) 81 MG EC tablet Take 1 tablet (81 mg total) by mouth once daily.    atorvastatin (LIPITOR) 10 MG tablet Take 1 tablet (10 mg total) by mouth once daily.    furosemide (LASIX) 40 MG tablet Take 1 tablet (40 mg total) by mouth once daily.    INV metoprolol tartrate (LOPRESSOR) 50 MG Tab Take 1 tablet (50 mg total) by mouth 2 (two) times daily.    levothyroxine (SYNTHROID) 200 MCG tablet Take 1 tablet (200 mcg total) by mouth before breakfast.    melatonin (MELATIN) 3 mg tablet Take 2 tablets (6 mg total) by mouth nightly as needed for Insomnia.    methocarbamoL (ROBAXIN) 750 MG Tab Take 1 tablet (750 mg total) by mouth 4 (four) times daily as needed (muscle spasm).    mirtazapine (REMERON) 7.5 MG Tab Take 1 tablet (7.5 mg total) by mouth nightly. One tablet po each night for sleep    multivit-min-FA-lycopen-lutein (CENTRUM SILVER) 0.4-300-250 mg-mcg-mcg Tab Take 1 tablet by mouth once daily.    naproxen (NAPROSYN) 500 MG tablet Take 1 tablet (500 mg total) by mouth 2 (two) times daily as needed.    oxyCODONE-acetaminophen (PERCOCET) 5-325 mg per tablet Take 1 tablet by mouth every 6 (six) hours as needed for Pain.    sacubitriL-valsartan (ENTRESTO) 24-26 mg per tablet Take 1 tablet by mouth 2 (two) times daily.    tamsulosin (FLOMAX) 0.4 mg Cap Take 1 capsule (0.4 mg total) by mouth once daily.    acetaminophen (TYLENOL) 500 MG tablet Take 2 tablets (1,000 mg total) by mouth every 8 (eight) hours.    ammonium lactate (LAC-HYDRIN) 12 % lotion Apply topically 2 (two) times daily as needed for Dry Skin.    cephALEXin (KEFLEX) 500 MG capsule Take 2 capsules (1,000 mg total) by mouth every 12 (twelve) hours.    empagliflozin (JARDIANCE) 10 mg tablet Take 1 tablet (10 mg total) by mouth once daily.     "furosemide (LASIX) 40 MG tablet Take 1 tablet (40 mg total) by mouth every evening. for 5 days    lactulose (CHRONULAC) 10 gram/15 mL solution SMARTSIG:Milliliter(s) By Mouth    [] methocarbamoL (ROBAXIN) 750 MG Tab Take 2 tablets (1,500 mg total) by mouth 3 (three) times daily. for 5 days    senna-docusate 8.6-50 mg (PERICOLACE) 8.6-50 mg per tablet Take 1 tablet by mouth once daily.    traMADoL (ULTRAM) 50 mg tablet Take 1 tablet (50 mg total) by mouth every 6 (six) hours as needed for Pain.     Antibiotics (From admission, onward)      Start     Stop Route Frequency Ordered    25 2300  vancomycin 2 g in 0.9% sodium chloride 500 mL IVPB         -- IV Every 24 hours (non-standard times) 25 1735    25 1815  piperacillin-tazobactam (ZOSYN) 4.5 g in D5W 100 mL IVPB (MB+)         -- IV Every 8 hours (non-standard times) 25 1711    25 1811  vancomycin - pharmacy to dose  (vancomycin IVPB (PEDS and ADULTS))        Placed in "And" Linked Group    -- IV pharmacy to manage frequency 25 17125 2100  mupirocin 2 % ointment         25 205 Nasl 2 times daily 25 1527          Antifungals (From admission, onward)      None          Antivirals (From admission, onward)      None             Immunization History   Administered Date(s) Administered    COVID-19, MRNA, LN-S, PF (Pfizer) (Purple Cap) 2021, 10/15/2021    PPD Test 2024, 10/03/2024, 2024       Family History       Problem Relation (Age of Onset)    Crohn's disease Mother    Dementia Mother    Heart attack Father    No Known Problems Sister, Brother, Son          Social History     Socioeconomic History    Marital status:    Tobacco Use    Smoking status: Former     Current packs/day: 0.00     Average packs/day: 1 pack/day for 35.0 years (35.0 ttl pk-yrs)     Types: Cigarettes     Start date: 1965     Quit date: 2000     Years since quittin.0     Passive exposure: Past    " Smokeless tobacco: Never   Substance and Sexual Activity    Alcohol use: No    Drug use: Never    Sexual activity: Not Currently     Social Drivers of Health     Financial Resource Strain: Low Risk  (1/21/2025)    Overall Financial Resource Strain (CARDIA)     Difficulty of Paying Living Expenses: Not hard at all   Food Insecurity: No Food Insecurity (1/21/2025)    Hunger Vital Sign     Worried About Running Out of Food in the Last Year: Never true     Ran Out of Food in the Last Year: Never true   Transportation Needs: No Transportation Needs (1/21/2025)    TRANSPORTATION NEEDS     Transportation : No   Physical Activity: Inactive (10/30/2024)    Exercise Vital Sign     Days of Exercise per Week: 0 days     Minutes of Exercise per Session: 0 min   Stress: No Stress Concern Present (1/21/2025)    [employer] - Occupational Stress Questionnaire     Feeling of Stress : Not at all   Housing Stability: Low Risk  (1/21/2025)    Housing Stability Vital Sign     Unable to Pay for Housing in the Last Year: No     Homeless in the Last Year: No     Review of Systems   Constitutional:  Positive for activity change.   HENT: Negative.     Respiratory:  Negative for shortness of breath.    Cardiovascular: Negative.    Gastrointestinal: Negative.    Genitourinary: Negative.    Musculoskeletal:  Positive for back pain.   Neurological: Negative.    All other systems reviewed and are negative.    Objective:     Vital Signs (Most Recent):  Temp: 97.6 °F (36.4 °C) (01/25/25 0748)  Pulse: 95 (01/25/25 1054)  Resp: 19 (01/25/25 0748)  BP: 121/81 (01/25/25 0748)  SpO2: 96 % (01/25/25 0748) Vital Signs (24h Range):  Temp:  [97.3 °F (36.3 °C)-98 °F (36.7 °C)] 97.6 °F (36.4 °C)  Pulse:  [54-95] 95  Resp:  [16-22] 19  SpO2:  [92 %-99 %] 96 %  BP: (100-137)/(57-82) 121/81     Weight: 95.7 kg (211 lb)  Body mass index is 29.43 kg/m².    Estimated Creatinine Clearance: 76 mL/min (based on SCr of 0.9 mg/dL).      Physical Exam  Constitutional:       General: He is not in acute distress.  HENT:      Head: Normocephalic and atraumatic.      Nose: Nose normal.      Mouth/Throat:      Mouth: Mucous membranes are moist.   Eyes:      Extraocular Movements: Extraocular movements intact.      Pupils: Pupils are equal, round, and reactive to light.   Cardiovascular:      Rate and Rhythm: Regular rhythm.   Pulmonary:      Breath sounds: Rhonchi present.   Abdominal:      Palpations: Abdomen is soft.   Musculoskeletal:         General: Normal range of motion.      Cervical back: Neck supple.   Skin:     General: Skin is warm.   Neurological:      Mental Status: He is alert. Mental status is at baseline.   Psychiatric:         Mood and Affect: Mood normal.          Significant Labs: All pertinent labs within the past 24 hours have been reviewed.    Significant Imaging: I have reviewed all pertinent imaging results/findings within the past 24 hours.

## 2025-01-25 NOTE — ASSESSMENT & PLAN NOTE
80-year-old elderly male with a past medical history of CAD, combined CHF (recovered EF), CAD, A-fib on eliquis,COPD, chronic respiratory failure on 3L NC, and hypothyroidism who presents to the ED for worsening SOB and back pain. Patient was evaluated emergency department and was found to be in respiratory distress and satting 86-88% on 5 L. Patient was placed on BiPAP and seemed to improve. Patient also had a fall several days ago and had a T8 vertebral fracture and was complaining of moderate pain. ID is consulted due to E faecalis bacteremia. Also with GNR in urine culture    -continue vanco while awaiting final cx  -continue zosyn  -repeat blood cx daily until clear x 48 hrs

## 2025-01-25 NOTE — CONSULTS
[place] [facility]  Infectious Disease  Consult Note    Patient Name: Bean Salas  MRN: [id number]  Admission Date: 1/20/2025  Hospital Length of Stay: 4 days  Attending Physician: Terra Centeno MD  Primary Care Provider: Ramirez Levine MD     Isolation Status: No active isolations    Patient information was obtained from patient and past medical records.      Inpatient consult to Infectious Diseases  Consult performed by: Duncan Fernandez MD  Consult ordered by: Terra Centeno MD  Reason for consult: bacteremia        Assessment/Plan:     ID  Bacteremia  80-year-old elderly male with a past medical history of CAD, combined CHF (recovered EF), CAD, A-fib on eliquis,COPD, chronic respiratory failure on 3L NC, and hypothyroidism who presents to the ED for worsening SOB and back pain. Patient was evaluated emergency department and was found to be in respiratory distress and satting 86-88% on 5 L. Patient was placed on BiPAP and seemed to improve. Patient also had a fall several days ago and had a T8 vertebral fracture and was complaining of moderate pain. ID is consulted due to E faecalis bacteremia. Also with GNR in urine culture    -continue vanco while awaiting final cx  -continue zosyn  -repeat blood cx daily until clear x 48 hrs            Thank you for your consult. I will follow-up with patient. Please contact us if you have any additional questions.    Duncan Fernandez MD  Infectious Disease  [place] [facility]    Subjective:     Principal Problem: Acute on chronic hypoxic respiratory failure    HPI: 80-year-old elderly male with a past medical history of CAD, combined CHF (recovered EF), CAD, A-fib on eliquis,COPD, chronic respiratory failure on 3L NC, and hypothyroidism who presents to the ED for worsening SOB and back pain. Patient was evaluated emergency department and was found to be in respiratory distress and satting 86-88% on 5 L. Patient was placed on BiPAP and seemed to improve.  Patient also had a fall several days ago and had a T8 vertebral fracture and was complaining of moderate pain. ID is consulted due to E faecalis bacteremia. Also with GNR in urine culture        Past Medical History:   Diagnosis Date    Atrial fibrillation, unspecified type 09/06/2023    COPD exacerbation 09/06/2023    Thyroid disease     hypo       Past Surgical History:   Procedure Laterality Date    COLONOSCOPY  01/01/2011    CORONARY ANGIOGRAPHY N/A 07/22/2021    Procedure: ANGIOGRAM, CORONARY ARTERY;  Surgeon: Hank Barry MD;  Location: [facility] CATH LAB/EP;  Service: Cardiology;  Laterality: N/A;    EYE SURGERY Bilateral     cataracts extraction    FRACTURE SURGERY Right 09/2021    femur    HERNIA REPAIR      HIP SURGERY Right 08/2021    IMPLANTATION OF BIVENTRICULAR HEART PACEMAKER N/A 11/4/2024    Procedure: INSERTION, PACEMAKER, BIVENTRICULAR;  Surgeon: Mac Alejandro MD;  Location: [facility] EP LAB;  Service: Cardiology;  Laterality: N/A;  CHB, CRT-P, SJM, Anes, DM, CICU 3081    INTRAMEDULLARY RODDING OF TROCHANTER OF FEMUR Right 09/03/2021    Procedure: INSERTION, INTRAMEDULLARY RACQUEL, FEMUR, TROCHANTER;  Surgeon: Darryn Hall MD;  Location: [facility] OR;  Service: Orthopedics;  Laterality: Right;    JOINT REPLACEMENT Bilateral     knee    LEFT HEART CATHETERIZATION Right 07/22/2021    Procedure: Left heart cath;  Surgeon: Hank Barry MD;  Location: [facility] CATH LAB/EP;  Service: Cardiology;  Laterality: Right;    OPEN REDUCTION AND INTERNAL FIXATION (ORIF) OF INJURY OF HIP Right 9/20/2024    Procedure: ORIF,PELVIS;  Surgeon: Negrito Stapleton MD;  [facility];  Service: Orthopedics;  Laterality: Right;  +local    TRANSESOPHAGEAL ECHOCARDIOGRAPHY N/A 9/19/2024    Procedure: ECHOCARDIOGRAM, TRANSESOPHAGEAL;  Surgeon: Leonora Butt MD;  Location: [facility] EP LAB;  Service: Cardiology;  Laterality: N/A;    TREATMENT OF CARDIAC ARRHYTHMIA N/A 9/19/2024    Procedure: Cardioversion or  Defibrillation;  Surgeon: ANA Steiner MD;  Location: [facility] EP LAB;  Service: Cardiology;  Laterality: N/A;  AF, DEMETRICE/DCCV, ANES, GP, 524    VASECTOMY         Review of patient's allergies indicates:  No Known Allergies    Medications:  Medications Prior to Admission   Medication Sig    apixaban (ELIQUIS) 5 mg Tab Take 1 tablet (5 mg total) by mouth 2 (two) times daily. Start on 11/10/24    aspirin (ECOTRIN) 81 MG EC tablet Take 1 tablet (81 mg total) by mouth once daily.    atorvastatin (LIPITOR) 10 MG tablet Take 1 tablet (10 mg total) by mouth once daily.    furosemide (LASIX) 40 MG tablet Take 1 tablet (40 mg total) by mouth once daily.    INV metoprolol tartrate (LOPRESSOR) 50 MG Tab Take 1 tablet (50 mg total) by mouth 2 (two) times daily.    levothyroxine (SYNTHROID) 200 MCG tablet Take 1 tablet (200 mcg total) by mouth before breakfast.    melatonin (MELATIN) 3 mg tablet Take 2 tablets (6 mg total) by mouth nightly as needed for Insomnia.    methocarbamoL (ROBAXIN) 750 MG Tab Take 1 tablet (750 mg total) by mouth 4 (four) times daily as needed (muscle spasm).    mirtazapine (REMERON) 7.5 MG Tab Take 1 tablet (7.5 mg total) by mouth nightly. One tablet po each night for sleep    multivit-min-FA-lycopen-lutein (CENTRUM SILVER) 0.4-300-250 mg-mcg-mcg Tab Take 1 tablet by mouth once daily.    naproxen (NAPROSYN) 500 MG tablet Take 1 tablet (500 mg total) by mouth 2 (two) times daily as needed.    oxyCODONE-acetaminophen (PERCOCET) 5-325 mg per tablet Take 1 tablet by mouth every 6 (six) hours as needed for Pain.    sacubitriL-valsartan (ENTRESTO) 24-26 mg per tablet Take 1 tablet by mouth 2 (two) times daily.    tamsulosin (FLOMAX) 0.4 mg Cap Take 1 capsule (0.4 mg total) by mouth once daily.    acetaminophen (TYLENOL) 500 MG tablet Take 2 tablets (1,000 mg total) by mouth every 8 (eight) hours.    ammonium lactate (LAC-HYDRIN) 12 % lotion Apply topically 2 (two) times daily as needed for Dry Skin.     "cephALEXin (KEFLEX) 500 MG capsule Take 2 capsules (1,000 mg total) by mouth every 12 (twelve) hours.    empagliflozin (JARDIANCE) 10 mg tablet Take 1 tablet (10 mg total) by mouth once daily.    furosemide (LASIX) 40 MG tablet Take 1 tablet (40 mg total) by mouth every evening. for 5 days    lactulose (CHRONULAC) 10 gram/15 mL solution SMARTSIG:Milliliter(s) By Mouth    [] methocarbamoL (ROBAXIN) 750 MG Tab Take 2 tablets (1,500 mg total) by mouth 3 (three) times daily. for 5 days    senna-docusate 8.6-50 mg (PERICOLACE) 8.6-50 mg per tablet Take 1 tablet by mouth once daily.    traMADoL (ULTRAM) 50 mg tablet Take 1 tablet (50 mg total) by mouth every 6 (six) hours as needed for Pain.     Antibiotics (From admission, onward)      Start     Stop Route Frequency Ordered    25 2300  vancomycin 2 g in 0.9% sodium chloride 500 mL IVPB         -- IV Every 24 hours (non-standard times) 25 1735    25 1815  piperacillin-tazobactam (ZOSYN) 4.5 g in D5W 100 mL IVPB (MB+)         -- IV Every 8 hours (non-standard times) 25 1711    25 1811  vancomycin - pharmacy to dose  (vancomycin IVPB (PEDS and ADULTS))        Placed in "And" Linked Group    -- IV pharmacy to manage frequency 25 1711    25 2100  mupirocin 2 % ointment         25 205 Nasl 2 times daily 25 1527          Antifungals (From admission, onward)      None          Antivirals (From admission, onward)      None             Immunization History   Administered Date(s) Administered    COVID-19, MRNA, LN-S, PF (Pfizer) (Purple Cap) 2021, 10/15/2021    PPD Test 2024, 10/03/2024, 2024       Family History       Problem Relation (Age of Onset)    Crohn's disease Mother    Dementia Mother    Heart attack Father    No Known Problems Sister, Brother, Son          Social History     Socioeconomic History    Marital status:    Tobacco Use    Smoking status: Former     Current packs/day: 0.00    "  Average packs/day: 1 pack/day for 35.0 years (35.0 ttl pk-yrs)     Types: Cigarettes     Start date: 1965     Quit date: 2000     Years since quittin.0     Passive exposure: Past    Smokeless tobacco: Never   Substance and Sexual Activity    Alcohol use: No    Drug use: Never    Sexual activity: Not Currently     Social Drivers of Health     Financial Resource Strain: Low Risk  (2025)    Overall Financial Resource Strain (CARDIA)     Difficulty of Paying Living Expenses: Not hard at all   Food Insecurity: No Food Insecurity (2025)    Hunger Vital Sign     Worried About Running Out of Food in the Last Year: Never true     Ran Out of Food in the Last Year: Never true   Transportation Needs: No Transportation Needs (2025)    TRANSPORTATION NEEDS     Transportation : No   Physical Activity: Inactive (10/30/2024)    Exercise Vital Sign     Days of Exercise per Week: 0 days     Minutes of Exercise per Session: 0 min   Stress: No Stress Concern Present (2025)    [employer] - Occupational Stress Questionnaire     Feeling of Stress : Not at all   Housing Stability: Low Risk  (2025)    Housing Stability Vital Sign     Unable to Pay for Housing in the Last Year: No     Homeless in the Last Year: No     Review of Systems   Constitutional:  Positive for activity change.   HENT: Negative.     Respiratory:  Negative for shortness of breath.    Cardiovascular: Negative.    Gastrointestinal: Negative.    Genitourinary: Negative.    Musculoskeletal:  Positive for back pain.   Neurological: Negative.    All other systems reviewed and are negative.    Objective:     Vital Signs (Most Recent):  Temp: 97.6 °F (36.4 °C) (25 0748)  Pulse: 95 (25 1054)  Resp: 19 (25 0748)  BP: 121/81 (25 0748)  SpO2: 96 % (25 0748) Vital Signs (24h Range):  Temp:  [97.3 °F (36.3 °C)-98 °F (36.7 °C)] 97.6 °F (36.4 °C)  Pulse:  [54-95] 95  Resp:  [16-22]  19  SpO2:  [92 %-99 %] 96 %  BP: (100-137)/(57-82) 121/81     Weight: 95.7 kg (211 lb)  Body mass index is 29.43 kg/m².    Estimated Creatinine Clearance: 76 mL/min (based on SCr of 0.9 mg/dL).     Physical Exam  Constitutional:       General: He is not in acute distress.  HENT:      Head: Normocephalic and atraumatic.      Nose: Nose normal.      Mouth/Throat:      Mouth: Mucous membranes are moist.   Eyes:      Extraocular Movements: Extraocular movements intact.      Pupils: Pupils are equal, round, and reactive to light.   Cardiovascular:      Rate and Rhythm: Regular rhythm.   Pulmonary:      Breath sounds: Rhonchi present.   Abdominal:      Palpations: Abdomen is soft.   Musculoskeletal:         General: Normal range of motion.      Cervical back: Neck supple.   Skin:     General: Skin is warm.   Neurological:      Mental Status: He is alert. Mental status is at baseline.   Psychiatric:         Mood and Affect: Mood normal.          Significant Labs: All pertinent labs within the past 24 hours have been reviewed.    Significant Imaging: I have reviewed all pertinent imaging results/findings within the past 24 hours.

## 2025-01-25 NOTE — PROGRESS NOTES
[facility] - Telemetry  Cardiology  Progress Note    Patient Name: Bean Salas  MRN: [id number]  Admission Date: 1/20/2025  Hospital Length of Stay: 4 days  Code Status: DNR   Attending Physician: Terra Centeno MD   Primary Care Physician: Ramirez Levine MD  Expected Discharge Date: 1/27/2025  Principal Problem:Acute on chronic hypoxic respiratory failure    Subjective:      Patient sleeping with CPAP.  I had a long discussion with the patient's son today.  Limited echocardiogram did not show any significant valvular vegetation.  EF remains mildly reduced with EF of 45-50%.  Significant PVCs noted on telemetry.    ROS  Objective:     Vital Signs (Most Recent):  Temp: 97.4 °F (36.3 °C) (01/25/25 1236)  Pulse: (!) 54 (01/25/25 1544)  Resp: 19 (01/25/25 1544)  BP: 113/64 (01/25/25 1236)  SpO2: 95 % (01/25/25 1544) Vital Signs (24h Range):  Temp:  [97.3 °F (36.3 °C)-98 °F (36.7 °C)] 97.4 °F (36.3 °C)  Pulse:  [47-95] 54  Resp:  [16-22] 19  SpO2:  [92 %-100 %] 95 %  BP: (100-137)/(57-82) 113/64     Weight: 95.3 kg (210 lb)  Body mass index is 29.29 kg/m².    SpO2: 95 %         Intake/Output Summary (Last 24 hours) at 1/25/2025 1553  Last data filed at 1/25/2025 0614  Gross per 24 hour   Intake 200 ml   Output 1800 ml   Net -1600 ml       Lines/Drains/Airways       Drain  Duration                  Urethral Catheter 01/20/25 1200 5 days              Peripheral Intravenous Line  Duration                  Peripheral IV - Single Lumen 01/24/25 0333 20 G Anterior;Right Forearm 1 day         Midline Catheter - Single Lumen 01/25/25 1320 Right basilic vein (medial side of arm) other (see comments) <1 day         Peripheral IV - Single Lumen 01/24/25 1844 22 G Left;Posterior Hand <1 day                    Physical Exam    Significant Labs: All pertinent lab results from the last 24 hours have been reviewed. and   Recent Lab Results         01/25/25  1350   01/25/25  1152   01/25/25  0556   01/24/25  1732        Base  Deficit   18.2  Comment: Value above reference range           Performed By:   lacie           Specimen source   Venous           A2C EF 30             A4C EF 42             Anion Gap     8         Blood Culture, Routine       Gram stain aer bottle: Gram positive cocci in chains resembling Strep  [P]              Results called to and read back by: Linda Dominguez RN,01/25/2025, 15:04  [P]       BSA 2.18             BUN     21         Calcium     8.6         Chloride     93         CO2     37         Creatinine     0.9         Left Ventricle Relative Wall Thickness 0.44             eGFR     >60         FiO2   21.0           FS 22.0             Glucose     147         IVSd 1.1             LA area A4C 35.61             LV EDV .37             LV Diastolic Volume Index 54.13             Left Ventricular End Diastolic Volume by Teichholz Method 116.37             LV EDV A2C [id number]             LV EDV A4C 122.01             Left Ventricular End Systolic Volume by Teichholz Method 64.07             LV ESV A4C 135.73             LVIDd 5.0             LVIDs 3.9             LV mass 207.1             LV Mass Index 96.3             LV ESV BP 64.07             LV Systolic Volume Index 29.8             Mean e' 0.11             POC HCO3   48.4  Comment: Value above reference range           POC PCO2   86.7  Comment: Value above reference range           POC PH   7.355           POC PO2   20.0  Comment:  notified  at    read back  Value below critical limit             POC SATURATED O2   25.7  Comment:  notified  at    read back  Value below critical limit             Potassium     4.0         PW 1.1             Est. RA pres 3             Sodium     138         TDI SEPTAL 0.09             TDI LATERAL 0.12             ZLVIDD -3.33             ZLVIDS -0.68                      [P] - Preliminary Result               Significant Imaging: Echocardiogram: Transthoracic echo (TTE) complete ([facility] Only):   Results for  orders placed or performed during the hospital encounter of 01/20/25   Echo   Result Value Ref Range    BSA 2.18 m2    A2C EF 30 %    A4C EF 42 %    LVIDd 5.0 3.5 - 6.0 cm    LV Systolic Volume 64.07 mL    LV Systolic Volume Index 29.8 mL/m2    LVIDs 3.9 2.1 - 4.0 cm    LV Diastolic Volume 116.37 mL    LV ESV A4C 135.73 mL    LV Diastolic Volume Index 54.13 mL/m2    LV EDV A2C 65.794852976946468 mL    LV EDV A4C 122.01 mL    Left Ventricular End Systolic Volume by Teichholz Method 64.07 mL    Left Ventricular End Diastolic Volume by Teichholz Method 116.37 mL    IVS 1.1 0.6 - 1.1 cm    FS 22.0 (A) 28 - 44 %    Left Ventricle Relative Wall Thickness 0.44 cm    PW 1.1 0.6 - 1.1 cm    LV mass 207.1 g    LV Mass Index 96.3 g/m2    TDI LATERAL 0.12 m/s    TDI SEPTAL 0.09 m/s    Mean e' 0.11 m/s    ZLVIDS -0.68     ZLVIDD -3.33     LA area A4C 35.61 cm2    Est. RA pres 3 mmHg    Narrative      Left Ventricle: The left ventricle is normal in size. There is   concentric remodeling. There is mildly reduced systolic function with a   visually estimated ejection fraction of 40 - 45%.    IVC/SVC: Normal venous pressure at 3 mmHg.    No definitive evidence of valvular vegetation       Assessment and Plan:     Brief HPI:     80-year-old elderly male with a past medical history of CAD, heart failure with mildly reduced ejection fraction, CAD, A-fib on eliquis,COPD, chronic respiratory failure on 3L NC, and hypothyroidism who presents to the ED for worsening SOB and back pain. Patient was evaluated emergency department and was found to be in respiratory distress and satting 86-88% on 5 L.  Patient also had a fall several days ago and had a T8 vertebral fracture.  Now found to have Enterococcus faecalis bacteremia for which ID has been consulted.  Cardiology consulted for volume overload, frequent PVCs.  Patient has a history of heart failure with reduced ejection fraction status post CRT pacemaker placement.  Telemetry shows  frequent PVCs.        - multiple ongoing medical issues.  Limited echocardiogram repeated today shows mildly reduced left ventricular ejection fraction (stable in comparison to previous echocardiogram) .  I will escalate beta blocker therapy today.  Metoprolol 100 mg twice a day.  Holding off of Entresto for 1 day  - no significant valvular vegetations noted.  If concern for significant/persistent bacteremia, may consider DEMETRICE.  -previous echocardiogram had trace tricuspid regurgitation.  Contraction alkalosis noted on recent lab workup.  Holding Lasix.  -we will continue to escalate beta blocker therapy.  If PVCs subside with escalation of beta blocker therapy, I think the patient would be a reasonable candidate to proceed with thoracic spine fracture surgery.  -continue on telemetry.      Active Diagnoses:    Diagnosis Date Noted POA    PRINCIPAL PROBLEM:  Acute on chronic hypoxic respiratory failure [J96.21] 09/16/2024 Yes    Bacteremia [R78.81] 01/25/2025 Yes    Closed fracture of T8 vertebra [S22.069A] 01/21/2025 Yes    UTI (urinary tract infection) [N39.0] 01/21/2025 Yes    A-fib [I48.91] 10/28/2024 Yes    COPD exacerbation [J44.1] 09/06/2023 Yes    Acute on chronic diastolic heart failure [I50.33]  Yes    Essential hypertension [I10] 06/25/2017 Yes     Chronic      Problems Resolved During this Admission:       VTE Risk Mitigation (From admission, onward)      None            Charanjit Dior MD  Cardiology  [place] - Telemetry

## 2025-01-25 NOTE — CONSULTS
[facility] Pulmonary & Critical Care Medicine Consult Note    Primary Attending Physician: Dr. Centeno  Consultant Attending: Dr. Luis  Consultant Fellow: Dr. Guzman    Reason for Consult:     Clearance for NSGY procedure    Subjective:      History of Present Illness:  Bean Salas is a 80 y.o.  male who  has a past medical history of Atrial fibrillation, unspecified type (09/06/2023), COPD exacerbation (09/06/2023), and Thyroid disease.. The patient presented to the [facility] on 1/20/2025 with a primary complaint of Shortness of Breath (Pt c/o Shortness of breath, has T8 fracture x2weeks ago, and a hip fracture x1 month. Received toradol en route, )    Patient presented initially to the ED for worsening shortness of breath and back pain.  Prior to this he had suffered a fall and was found to have a T8 vertebral fracture.  Patient was admitted to hospital and upon further workup neurosurgery plans to intervene on an unstable T8 fracture.  Pulmonary was consulted for surgical clearance.  Son at bedside who helps with history however patient is currently AAO x3 and answering questions appropriately.  Per nursing, BiPAP was ordered and patient refused to wear this.  VBG reviewed with a pH of  7.35 and PCO2 of 86.  This is consistent with chronic hypercapnic respiratory failure.  Person patient is on 3-4 L nasal cannula at home which is consistent with chronic hypoxic respiratory failure.  Patient's since states that he has a history of COPD but does not recall ever having pulmonary function testing completed nor is he on any inhalers or nebulizers at home.  He denies having a pulmonologist.  His pulmonary history is significant for previous smoker who quit about 30 years ago.  Currently, patient complains cough with mucus production, however, he states he is unable to clear his airway and has to swallow his phlegm because he can not cough it up.  He denies shortness of breath.     Past Medical History:  Past  Medical History:   Diagnosis Date    Atrial fibrillation, unspecified type 09/06/2023    COPD exacerbation 09/06/2023    Thyroid disease     hypo       Past Surgical History:  Past Surgical History:   Procedure Laterality Date    COLONOSCOPY  01/01/2011    CORONARY ANGIOGRAPHY N/A 07/22/2021    Procedure: ANGIOGRAM, CORONARY ARTERY;  Surgeon: Hank Barry MD;  Location: [facility] CATH LAB/EP;  Service: Cardiology;  Laterality: N/A;    EYE SURGERY Bilateral     cataracts extraction    FRACTURE SURGERY Right 09/2021    femur    HERNIA REPAIR      HIP SURGERY Right 08/2021    IMPLANTATION OF BIVENTRICULAR HEART PACEMAKER N/A 11/4/2024    Procedure: INSERTION, PACEMAKER, BIVENTRICULAR;  Surgeon: Mac Alejandro MD;  Location: [facility] EP LAB;  Service: Cardiology;  Laterality: N/A;  CHB, CRT-P, SJM, Anes, DM, CICU 3081    INTRAMEDULLARY RODDING OF TROCHANTER OF FEMUR Right 09/03/2021    Procedure: INSERTION, INTRAMEDULLARY RACQUEL, FEMUR, TROCHANTER;  Surgeon: Darryn Hall MD;  Location: [facility];  Service: Orthopedics;  Laterality: Right;    JOINT REPLACEMENT Bilateral     knee    LEFT HEART CATHETERIZATION Right 07/22/2021    Procedure: Left heart cath;  Surgeon: Hank Barry MD;  Location: [facility] CATH LAB/EP;  Service: Cardiology;  Laterality: Right;    OPEN REDUCTION AND INTERNAL FIXATION (ORIF) OF INJURY OF HIP Right 9/20/2024    Procedure: ORIF,PELVIS;  Surgeon: Negrito Stapleton MD;  Location: [address];  Service: Orthopedics;  Laterality: Right;  +local    TRANSESOPHAGEAL ECHOCARDIOGRAPHY N/A 9/19/2024    Procedure: ECHOCARDIOGRAM, TRANSESOPHAGEAL;  Surgeon: Leonora Butt MD;  Location: [facility] EP LAB;  Service: Cardiology;  Laterality: N/A;    TREATMENT OF CARDIAC ARRHYTHMIA N/A 9/19/2024    Procedure: Cardioversion or Defibrillation;  Surgeon: ANA Steiner MD;  Location: [facility] EP LAB;  Service: Cardiology;  Laterality: N/A;  AF, DEMETRICE/DCCV, ANES, GP, 524    VASECTOMY         Allergies:  Review  of patient's allergies indicates:  No Known Allergies    Medications:   In-Hospital Scheduled Medications:   albuterol-ipratropium  3 mL Nebulization Q4H WAKE    atorvastatin  10 mg Oral Daily    empagliflozin  10 mg Oral Daily    levothyroxine  200 mcg Oral Before breakfast    metoprolol tartrate  100 mg Oral BID    mirtazapine  7.5 mg Oral Nightly    mupirocin   Nasal BID    piperacillin-tazobactam (Zosyn) IV (PEDS and ADULTS) (extended infusion is not appropriate)  4.5 g Intravenous Q8H    polyethylene glycol  17 g Oral Daily    tamsulosin  0.4 mg Oral Daily    vancomycin (VANCOCIN) IV (PEDS and ADULTS)  2,000 mg Intravenous Q24H      In-Hospital PRN Medications:    Current Facility-Administered Medications:     acetaminophen, 650 mg, Oral, Q8H PRN    hydrOXYzine HCL, 50 mg, Oral, TID PRN    LORazepam, 0.5 mg, Oral, Daily PRN    melatonin, 6 mg, Oral, Nightly PRN    methocarbamoL, 750 mg, Oral, QID PRN    ondansetron, 4 mg, Intravenous, Q12H PRN    oxyCODONE, 10 mg, Oral, Q6H PRN    sodium chloride 0.9%, 10 mL, Intravenous, PRN    Flushing PICC/Midline Protocol, , , Until Discontinued **AND** sodium chloride 0.9%, 10 mL, Intravenous, Q12H PRN    Pharmacy to dose Vancomycin consult, , , Once **AND** vancomycin - pharmacy to dose, , Intravenous, pharmacy to manage frequency   In-Hospital IV Infusion Medications:     Home Medications:  Prior to Admission medications    Medication Sig Start Date End Date Taking? Authorizing Provider   apixaban (ELIQUIS) 5 mg Tab Take 1 tablet (5 mg total) by mouth 2 (two) times daily. Start on 11/10/24 12/30/24  Yes Ramirez Levine MD   aspirin (ECOTRIN) 81 MG EC tablet Take 1 tablet (81 mg total) by mouth once daily. 6/13/22  Yes Hank Barry MD   atorvastatin (LIPITOR) 10 MG tablet Take 1 tablet (10 mg total) by mouth once daily. 12/6/24  Yes Ramirez Levine MD   furosemide (LASIX) 40 MG tablet Take 1 tablet (40 mg total) by mouth once daily. 12/6/24 12/6/25 Yes   Ramirez Cm MD   INV metoprolol tartrate (LOPRESSOR) 50 MG Tab Take 1 tablet (50 mg total) by mouth 2 (two) times daily. 12/11/24 12/11/25 Yes Ramirez Levine MD   levothyroxine (SYNTHROID) 200 MCG tablet Take 1 tablet (200 mcg total) by mouth before breakfast. 12/6/24  Yes Ramirez Levine MD   melatonin (MELATIN) 3 mg tablet Take 2 tablets (6 mg total) by mouth nightly as needed for Insomnia. 9/23/24  Yes Annelise Bray MD   methocarbamoL (ROBAXIN) 750 MG Tab Take 1 tablet (750 mg total) by mouth 4 (four) times daily as needed (muscle spasm). 12/6/24  Yes Ramirez Levine MD   mirtazapine (REMERON) 7.5 MG Tab Take 1 tablet (7.5 mg total) by mouth nightly. One tablet po each night for sleep 12/6/24  Yes Ramirez Levine MD   multivit-min-FA-lycopen-lutein (CENTRUM SILVER) 0.4-300-250 mg-mcg-mcg Tab Take 1 tablet by mouth once daily.   Yes Provider, Historical   naproxen (NAPROSYN) 500 MG tablet Take 1 tablet (500 mg total) by mouth 2 (two) times daily as needed. 1/19/25  Yes Bharat Munroe, DO   oxyCODONE-acetaminophen (PERCOCET) 5-325 mg per tablet Take 1 tablet by mouth every 6 (six) hours as needed for Pain. 1/19/25  Yes Bharat Munroe, DO   sacubitriL-valsartan (ENTRESTO) 24-26 mg per tablet Take 1 tablet by mouth 2 (two) times daily. 12/6/24  Yes Ramirez Levine MD   tamsulosin (FLOMAX) 0.4 mg Cap Take 1 capsule (0.4 mg total) by mouth once daily. 12/6/24 12/6/25 Yes Ramirez Levine MD   acetaminophen (TYLENOL) 500 MG tablet Take 2 tablets (1,000 mg total) by mouth every 8 (eight) hours. 9/23/24   Annelise Bray MD   ammonium lactate (LAC-HYDRIN) 12 % lotion Apply topically 2 (two) times daily as needed for Dry Skin. 12/6/24   Ramirez Levine MD   cephALEXin (KEFLEX) 500 MG capsule Take 2 capsules (1,000 mg total) by mouth every 12 (twelve) hours. 1/9/25   Ramirez Levine MD   empagliflozin (JARDIANCE) 10 mg tablet Take 1 tablet (10 mg  "total) by mouth once daily. 24   Ramirez Levine MD   lactulose (CHRONULAC) 10 gram/15 mL solution SMARTSIG:Milliliter(s) By Mouth 24   [facility]   methocarbamoL (ROBAXIN) 750 MG Tab Take 2 tablets (1,500 mg total) by mouth 3 (three) times daily. for 5 days 25  Bharat Munroe,    senna-docusate 8.6-50 mg (PERICOLACE) 8.6-50 mg per tablet Take 1 tablet by mouth once daily. 24   Ramirez Levine MD   traMADoL (ULTRAM) 50 mg tablet Take 1 tablet (50 mg total) by mouth every 6 (six) hours as needed for Pain. 25   Grzegorz Burk MD       Family History:  Family History   Problem Relation Name Age of Onset    Crohn's disease Mother      Dementia Mother      Heart attack Father      No Known Problems Sister      No Known Problems Brother      No Known Problems Son         Social History:  Social History     Tobacco Use    Smoking status: Former     Current packs/day: 0.00     Average packs/day: 1 pack/day for 35.0 years (35.0 ttl pk-yrs)     Types: Cigarettes     Start date: 1965     Quit date: 2000     Years since quittin.0     Passive exposure: Past    Smokeless tobacco: Never   Substance Use Topics    Alcohol use: No    Drug use: Never       Review of Systems:  All other systems are reviewed and are negative.     Objective:   Last 24 Hour Vital Signs:  BP  Min: 100/57  Max: 126/73  Temp  Av.6 °F (36.4 °C)  Min: 97.4 °F (36.3 °C)  Max: 98 °F (36.7 °C)  Pulse  Av.4  Min: 47  Max: 95  Resp  Av.7  Min: 16  Max: 22  SpO2  Av.7 %  Min: 92 %  Max: 100 %  Height  Av' 11" (180.3 cm)  Min: 5' 11" (180.3 cm)  Max: 5' 11" (180.3 cm)  Weight  Av.3 kg (210 lb)  Min: 95.3 kg (210 lb)  Max: 95.3 kg (210 lb)  I/O last 3 completed shifts:  In: 797 [P.O.:797]  Out: 2650 [Urine:2650]    Physical Examination:  Physical Exam  Constitutional:       General: He is not in acute distress.     Appearance: He is not ill-appearing or " toxic-appearing.      Interventions: Nasal cannula in place.   Eyes:      Extraocular Movements: Extraocular movements intact.      Conjunctiva/sclera: Conjunctivae normal.   Cardiovascular:      Rate and Rhythm: Normal rate and regular rhythm.      Pulses: Normal pulses.      Heart sounds: Normal heart sounds. No murmur heard.  Pulmonary:      Effort: Pulmonary effort is normal. No respiratory distress.      Breath sounds: No stridor. No wheezing, rhonchi or rales.   Abdominal:      General: Abdomen is flat. There is no distension.      Palpations: Abdomen is soft.   Musculoskeletal:      Right lower leg: No edema.      Left lower leg: No edema.      Comments: Bilateral upper extremity digital clubbing noted   Skin:     Coloration: Skin is pale.   Neurological:      Mental Status: He is oriented to person, place, and time. He is lethargic.      Comments: Patient is lethargic on exam but answers questions appropriately and is AAO x3          Laboratory:  Trended Lab Data:  Recent Labs     01/23/25  0629 01/24/25  0533 01/25/25  0556    141 138   K 4.1 3.8 4.0   CL 93* 91* 93*   CO2 40* 41* 37*   BUN 30* 26* 21   CREATININE 1.2 1.0 0.9   * 122* 147*   CALCIUM 8.6* 8.5* 8.6*       Cardiac:   Recent Labs   Lab 01/20/25  2227   TROPONINI 0.033*   BNP 1,146*       Urinalysis:   Lab Results   Component Value Date    LABURIN (A) 01/21/2025     GRAM NEGATIVE RACQUEL  50,000 - 99,999 cfu/ml  Identification and susceptibility pending      COLORU Yellow 01/21/2025    SPECGRAV 1.020 01/21/2025    NITRITE Negative 01/21/2025    KETONESU Negative 01/21/2025    UROBILINOGEN Negative 01/21/2025       Microbiology:  Microbiology Results (last 7 days)       Procedure Component Value Units Date/Time    Blood culture [[id number]] Collected: 01/25/25 1088    Order Status: Sent Specimen: Blood from Peripheral, Hand, Right     Rapid Organism ID by PCR (from Blood culture) [[id number]] Collected: 01/25/25 1507    Order Status: No  result Updated: 01/25/25 1508    Blood culture [[id number]] Collected: 01/24/25 1732    Order Status: Completed Specimen: Blood from Peripheral, Forearm, Right Updated: 01/25/25 1506     Blood Culture, Routine Gram stain aer bottle: Gram positive cocci in chains resembling Strep      Results called to and read back by: Linda Dominguez RN,01/25/2025, 15:04    Blood culture x two cultures. Draw prior to antibiotics. [[id number]]  (Abnormal) Collected: 01/20/25 2156    Order Status: Completed Specimen: Blood Updated: 01/25/25 0846     Blood Culture, Routine Gram stain aer bottle: Gram positive cocci      Gram stain alden bottle: Gram positive cocci      Results called to and read back by:Hilary Yo RN, 01/24/2025, 17:03      ENTEROCOCCUS FAECALIS  For susceptibility see order #[id number]      Narrative:      Aerobic and anaerobic    Blood culture x two cultures. Draw prior to antibiotics. [[id number]]  (Abnormal) Collected: 01/20/25 2156    Order Status: Completed Specimen: Blood Updated: 01/25/25 0845     Blood Culture, Routine Gram stain aer bottle: Gram positive cocci      Gram stain alden bottle: Gram positive cocci.      Results called to and read back by:Hilary Yo RN, 01/24/2025,17:01.      ENTEROCOCCUS FAECALIS  Susceptibility pending      Narrative:      Aerobic and anaerobic    Urine culture [[id number]]  (Abnormal) Collected: 01/21/25 0017    Order Status: Completed Specimen: Urine Updated: 01/24/25 1645     Urine Culture, Routine GRAM NEGATIVE RACQUEL  50,000 - 99,999 cfu/ml  Identification and susceptibility pending      Narrative:      Specimen Source->Urine    Influenza A & B by Molecular [[id number]] Collected: 01/20/25 2228    Order Status: Completed Specimen: Nasopharyngeal Swab Updated: 01/20/25 2301     Influenza A, Molecular Negative     Influenza B, Molecular Negative     Flu A & B Source NP            Radiology:  CXR on 1/20:  There is unchanged enlargement of the cardiomediastinal silhouette.  Stably positioned left-sided cardiac pacing device in place. The lungs are symmetrically expanded with diffuse increased interstitial and parenchymal attenuation and perihilar interstitial opacities suggestive of interstitial edema/CHF although correlation for infectious process advised. There is bilateral pleural fluid with bibasilar atelectasis and/or consolidation. No pneumothorax. Visualized osseous structures are intact with degenerative change.     I have personally reviewed the above labs and imaging.     Assessment:     Bean Salas is a 80 y.o. male with chronic hypoxic and hypercapnic respiratory failure on 3-4 L at home who presented after a fall causing severe midthoracic back pain.  He was found to have an unstable T8 fracture.  For this, pulmonary consulted for clearance for neurosurgery procedure.      Plan:     # Chronic hypoxic and hypercapnic respiratory failure  - patient requires 3-4 L nasal cannula at home  - patient's son at bedside states he has a history of COPD however he does not have prescribed inhalers nor are we able to access any previous PFTs  # Chronic cough, unable to clear sputum  # history of nicotine use  - patient is a previous smoker, quit 30 years ago    Recommendations:  - patient is cleared from a pulmonary side for neurosurgery procedure to address his unstable T8 fracture  - would recommend after extubation postop to place patient on noninvasive ventilation for his respiratory failure for 4 hours post extubation  - patient states he feels as though he can not cough up his phlegm which could be from the pain in his back, would recommend  Aerobika and hypertonic saline nebulizers to improve his airway clearance  - would recommend outpatient PFTs and pulmonary follow-up    Thank you for allowing us to participate in the care of this patient. Please contact me if you have any questions regarding this consult.    Lavelle Guzman DO  Pulmonary & Critical Care Medicine Fellow

## 2025-01-25 NOTE — NURSING
Rapid Response Nurse Follow-up Note     Notified by bedside RN that patient decompensating and may need ICU. Per RN patient confused, pulling out IVs, primary MD wants Bipap, patient pulling off Bipap. VBG done. Followed up with patient for proactive rounding. Pulm CC at bedside assessing patient. Per Pulm, do not need to be on cont Bipap per VBG results. Patient oriented X 2, lethargic. Arouses to voice and answering questions. Recommendations: Delirium precautions, shade open, light turned on and reorientation conducted.    No acute issues at this time. Reviewed plan of care with Bedside RNLinda .   Team will continue to follow.  Please call Rapid Response RNGerard RN with any questions or concerns at [phone].

## 2025-01-25 NOTE — CONSULTS
Nephrology Consult  H&P      Consult Requested By: Terra Centeno MD  Reason for Consult: Alkalosis      SUBJECTIVE:     History of Present Illness:  Bean Salas is a 80 y.o.   male who  has a past medical history of Atrial fibrillation, unspecified type (09/06/2023), COPD exacerbation (09/06/2023), and Thyroid disease.. The patient presented to the [facility] on 1/20/2025 with a primary complaint of  SOB and back pain   ?    Review of Systems   Unable to perform ROS: Acuity of condition       Past Medical History:   Diagnosis Date    Atrial fibrillation, unspecified type 09/06/2023    COPD exacerbation 09/06/2023    Thyroid disease     hypo     Past Surgical History:   Procedure Laterality Date    COLONOSCOPY  01/01/2011    CORONARY ANGIOGRAPHY N/A 07/22/2021    Procedure: ANGIOGRAM, CORONARY ARTERY;  Surgeon: Hank Barry MD;  Location: [facility] CATH LAB/EP;  Service: Cardiology;  Laterality: N/A;    EYE SURGERY Bilateral     cataracts extraction    FRACTURE SURGERY Right 09/2021    femur    HERNIA REPAIR      HIP SURGERY Right 08/2021    IMPLANTATION OF BIVENTRICULAR HEART PACEMAKER N/A 11/4/2024    Procedure: INSERTION, PACEMAKER, BIVENTRICULAR;  Surgeon: Mac Alejandro MD;  Location: [facility] EP LAB;  Service: Cardiology;  Laterality: N/A;  CHB, CRT-P, SJM, Anes, DM, CICU 3081    INTRAMEDULLARY RODDING OF TROCHANTER OF FEMUR Right 09/03/2021    Procedure: INSERTION, INTRAMEDULLARY RACQUEL, FEMUR, TROCHANTER;  Surgeon: Darryn Hall MD;  Location: [facility];  Service: Orthopedics;  Laterality: Right;    JOINT REPLACEMENT Bilateral     knee    LEFT HEART CATHETERIZATION Right 07/22/2021    Procedure: Left heart cath;  Surgeon: Hank Barry MD;  Location: [facility] CATH LAB/EP;  Service: Cardiology;  Laterality: Right;    OPEN REDUCTION AND INTERNAL FIXATION (ORIF) OF INJURY OF HIP Right 9/20/2024    Procedure: ORIF,PELVIS;  Surgeon: Negrito Stapleton MD;  Location: [address] FLR;  Service:  Orthopedics;  Laterality: Right;  +local    TRANSESOPHAGEAL ECHOCARDIOGRAPHY N/A 2024    Procedure: ECHOCARDIOGRAM, TRANSESOPHAGEAL;  Surgeon: Leonora Butt MD;  Location: [facility] EP LAB;  Service: Cardiology;  Laterality: N/A;    TREATMENT OF CARDIAC ARRHYTHMIA N/A 2024    Procedure: Cardioversion or Defibrillation;  Surgeon: ANA Steiner MD;  Location: [facility] EP LAB;  Service: Cardiology;  Laterality: N/A;  AF, DEMETRICE/DCCV, ANES, GP, 524    VASECTOMY       Family History   Problem Relation Name Age of Onset    Crohn's disease Mother      Dementia Mother      Heart attack Father      No Known Problems Sister      No Known Problems Brother      No Known Problems Son       Social History     Tobacco Use    Smoking status: Former     Current packs/day: 0.00     Average packs/day: 1 pack/day for 35.0 years (35.0 ttl pk-yrs)     Types: Cigarettes     Start date: 1965     Quit date: 2000     Years since quittin.0     Passive exposure: Past    Smokeless tobacco: Never   Substance Use Topics    Alcohol use: No    Drug use: Never       Review of patient's allergies indicates:  No Known Allergies         OBJECTIVE:     Vital Signs (Most Recent)  Vitals:    25 1054 25 1125 25 1230 25 1236   BP:    113/64   BP Location:    Right arm   Patient Position:    Lying   Pulse: 95  80 (!) 47   Resp:   17 18   Temp:    97.4 °F (36.3 °C)   TempSrc:    Axillary   SpO2:  96% 96% 100%   Weight:       Height:                     Medications:   albuterol-ipratropium  3 mL Nebulization Q4H WAKE    atorvastatin  10 mg Oral Daily    empagliflozin  10 mg Oral Daily    levothyroxine  200 mcg Oral Before breakfast    INV metoprolol tartrate  50 mg Oral TID    mirtazapine  7.5 mg Oral Nightly    mupirocin   Nasal BID    piperacillin-tazobactam (Zosyn) IV (PEDS and ADULTS) (extended infusion is not appropriate)  4.5 g Intravenous Q8H    polyethylene glycol  17 g Oral Daily    sacubitriL-valsartan  1  tablet Oral BID    tamsulosin  0.4 mg Oral Daily    vancomycin (VANCOCIN) IV (PEDS and ADULTS)  2,000 mg Intravenous Q24H           Physical Exam  Vitals and nursing note reviewed.   Constitutional:       General: He is not in acute distress.     Appearance: He is obese. He is not diaphoretic.   HENT:      Head: Normocephalic and atraumatic.      Mouth/Throat:      Pharynx: No oropharyngeal exudate.   Eyes:      General: No scleral icterus.     Conjunctiva/sclera: Conjunctivae normal.      Pupils: Pupils are equal, round, and reactive to light.   Cardiovascular:      Rate and Rhythm: Normal rate and regular rhythm.      Heart sounds: Normal heart sounds. No murmur heard.  Pulmonary:      Effort: No respiratory distress.      Breath sounds: Normal breath sounds.   Abdominal:      General: Bowel sounds are normal. There is no distension.      Palpations: Abdomen is soft.      Tenderness: There is no abdominal tenderness.   Musculoskeletal:         General: Normal range of motion.      Cervical back: Normal range of motion and neck supple.   Skin:     General: Skin is warm and dry.      Findings: No erythema.   Neurological:      Mental Status: He is alert. He is disoriented.      Cranial Nerves: No cranial nerve deficit.   Psychiatric:         Attention and Perception: He is inattentive.         Thought Content: Thought content is delusional.         Cognition and Memory: Memory is impaired. He exhibits impaired recent memory.         Laboratory:  Recent Labs   Lab 01/20/25  2227   WBC 10.69   HGB 12.2*   HCT 39.1*      MONO 8.0  0.9   EOSINOPHIL 0.5     Recent Labs   Lab 01/23/25  0629 01/24/25  0533 01/25/25  0556    141 138   K 4.1 3.8 4.0   CL 93* 91* 93*   CO2 40* 41* 37*   BUN 30* 26* 21   CREATININE 1.2 1.0 0.9   CALCIUM 8.6* 8.5* 8.6*       Diagnostic Results:  X-Ray: Reviewed  US: Reviewed  Echo: Reviewed  ASSESSMENT/PLAN:       Alkalosis -  Respiratory acidosis with  Metabolic alkalosis most  "likely due to diuretics use and   respiratory know to have COPD   -- ph 7.355 now   Now diuretics on hold per chart review on Home O2 3L seems to be at baseline   If need diuretics would use Acetazolamide   -- Use BiPAP      Latest Reference Range & Units 01/25/25 11:52   POC PH 7.350 - 7.450  7.355   POC PCO2 35.0 - 45.0 mmHg 86.7 (H)   POC PO2 40.0 - 60.0 mmHg 20.0 (LL)   POC HCO3 24.0 - 28.0 mmol/l 48.4 (H)   POC SATURATED O2 95.0 - 100.0 % 25.7 (LL)   Specimen source  Venous   Base Deficit -2.0 - 2.0 mmol/l 18.2 (H)   FiO2 % 21.0   (LL): Data is critically low  (H): Data is abnormally high       Lab Results   Component Value Date    CO2 37 (H) 01/25/2025         HTN -  controlled     Anemia    Recent Labs   Lab 01/20/25  2227   HGB 12.2*   HCT 39.1*          Iron check levels   No results found for: "IRON", "TIBC", "FERRITIN", "SATURATEDIRO"            Nutrition/Hypoalbuminemia    Recent Labs   Lab 01/20/25  2227   ALBUMIN 2.5*     Nepro with meals TID. Renal vitamins daily      Thank you for allowing me to participate in care of your patient  With any question please call   Toma Brown MD     [facility]  JENNY Hare MD,   MD MAGDY Nathan MD E. V. Harmon, NP  [phone]   [place], [address] [phone]  After hours answering service: [phone]   "

## 2025-01-25 NOTE — PROGRESS NOTES
CT thoracic spine reviewed showing T8 Chance fracture with distraction. Most likely 3 columns injury. No neurological injury.     Recommending complete bed rest  Maintain HOB <30 degrees at all time  Medical clearance for T6-T10 posterior fixation for reduction of fracture  Stop asa  Stop Eliquis for 48 hours prior to surgery      Discussed with hospital medicine    Mac Elizabeth MD

## 2025-01-25 NOTE — PT/OT/SLP PROGRESS
Occupational Therapy  Missed Visit    Patient Name:  Bean Salas   MRN:  [id number]    Patient not seen today secondary to Other (Comment) (per NSGY note, pt not to be mobilized OOB until thorasic MRI complete.). Will follow-up after NSGY clarification.    1/25/2025

## 2025-01-25 NOTE — NURSING
Rapid Response Nurse Follow-up Note     Followed up with patient for proactive rounding. Patient in bed eating breakfast. Denies SOB or respiratory distress. Chart and labs reviewed. VSS. Satting well on 4L NC. BMP this AM but no CBC. No acute issues at this time. Reviewed plan of care with charge RNPrerna .   Team will continue to follow.  Please call Rapid Response RN, Gerard Garcia RN with any questions or concerns at [phone].

## 2025-01-25 NOTE — NURSING
RAPID RESPONSE NURSE PROACTIVE ROUNDING NOTE       Time of Visit: 1750    Admit Date: 2025  LOS: 3  Code Status: DNR   Date of Visit: 2025  : 1945  Age: 80 y.o.  Sex: male  Race: White  Bed: K485/K485 A:   MRN: [id number]  Was the patient discharged from an ICU this admission? No   Was the patient discharged from a PACU within last 24 hours? No   Did the patient receive conscious sedation/general anesthesia in last 24 hours? No   Was the patient in the ED within the past 24 hours? No   Was the patient on NIPPV within the past 24 hours? Yes   Attending Physician: Terra Centeno MD  Primary Service: Internal Medicine,Hospitalist   Time spent at the bedside: < 15 min    SITUATION    Notified by charge RN via phone call  Reason for alert: SOB    Diagnosis: Acute on chronic hypoxic respiratory failure   has a past medical history of Atrial fibrillation, unspecified type, COPD exacerbation, and Thyroid disease.    Last Vitals:  Temp: 97.3 °F (36.3 °C) (1642)  Pulse: 77 (1642)  Resp: 20 (1642)  BP: 137/82 (1642)  SpO2: 93 % (1645)    24 Hour Vitals Range:  Temp:  [97.3 °F (36.3 °C)-97.7 °F (36.5 °C)]   Pulse:  [54-91]   Resp:  [16-20]   BP: (106-137)/(61-82)   SpO2:  [93 %-97 %]     Clinical Issues: Respiratory    ASSESSMENT/INTERVENTIONS    Notified by charge HUMPHREY Graff that patient needs extra monitoring tonight. Patient having some respiratory distress. Hx of HR, COPD and respiratory failure, wears 3L NC at home. Fell at home, now with FX, Cx to NGSY--MRI pending. Also found to be fluid overloaded. Needed Bipap today. Upon RI rounding patient resting in bed. No respiratory distress noted. On 4L NC. Mckeon in place with good UO. Patient inquiring about CT and echo results, told that MD will update once reading is complete. Patient with complaints of pain. Chart and labs reviewed. VSS.     RECOMMENDATIONS  Strict I&Os  Control pain with pain regimen   Monitor for  respiratory distress  Encourage deep breathing and pulm hygiene       PROVIDER ESCALATION    Physician escalation: No    Orders received and case discussed with NA.    Disposition:Remain in room 485    FOLLOW UP    Call back the Rapid Response NurseGerard RN at [phone] for additional questions or concerns.

## 2025-01-26 LAB
ALBUMIN SERPL BCP-MCNC: 2.3 G/DL (ref 3.5–5.2)
ANION GAP SERPL CALC-SCNC: 11 MMOL/L (ref 8–16)
BACTERIA BLD CULT: ABNORMAL
BUN SERPL-MCNC: 15 MG/DL (ref 8–23)
CALCIUM SERPL-MCNC: 8.6 MG/DL (ref 8.7–10.5)
CHLORIDE SERPL-SCNC: 93 MMOL/L (ref 95–110)
CO2 SERPL-SCNC: 36 MMOL/L (ref 23–29)
CREAT SERPL-MCNC: 0.9 MG/DL (ref 0.5–1.4)
EST. GFR  (NO RACE VARIABLE): >60 ML/MIN/1.73 M^2
FERRITIN SERPL-MCNC: 258 NG/ML (ref 20–300)
GLUCOSE SERPL-MCNC: 146 MG/DL (ref 70–110)
IRON SERPL-MCNC: 52 UG/DL (ref 45–160)
MAGNESIUM SERPL-MCNC: 2.2 MG/DL (ref 1.6–2.6)
PHOSPHATE SERPL-MCNC: 3 MG/DL (ref 2.7–4.5)
POTASSIUM SERPL-SCNC: 3.6 MMOL/L (ref 3.5–5.1)
SATURATED IRON: 23 % (ref 20–50)
SODIUM SERPL-SCNC: 140 MMOL/L (ref 136–145)
TOTAL IRON BINDING CAPACITY: 225 UG/DL (ref 250–450)
TRANSFERRIN SERPL-MCNC: 152 MG/DL (ref 200–375)

## 2025-01-26 PROCEDURE — 25000242 PHARM REV CODE 250 ALT 637 W/ HCPCS: Mod: HCNC

## 2025-01-26 PROCEDURE — 82728 ASSAY OF FERRITIN: CPT | Mod: HCNC | Performed by: STUDENT IN AN ORGANIZED HEALTH CARE EDUCATION/TRAINING PROGRAM

## 2025-01-26 PROCEDURE — 83735 ASSAY OF MAGNESIUM: CPT | Mod: HCNC | Performed by: STUDENT IN AN ORGANIZED HEALTH CARE EDUCATION/TRAINING PROGRAM

## 2025-01-26 PROCEDURE — 80069 RENAL FUNCTION PANEL: CPT | Mod: HCNC | Performed by: STUDENT IN AN ORGANIZED HEALTH CARE EDUCATION/TRAINING PROGRAM

## 2025-01-26 PROCEDURE — 94761 N-INVAS EAR/PLS OXIMETRY MLT: CPT | Mod: HCNC

## 2025-01-26 PROCEDURE — 25000003 PHARM REV CODE 250: Mod: HCNC | Performed by: FAMILY MEDICINE

## 2025-01-26 PROCEDURE — 99232 SBSQ HOSP IP/OBS MODERATE 35: CPT | Mod: HCNC,,, | Performed by: PHYSICIAN ASSISTANT

## 2025-01-26 PROCEDURE — 99233 SBSQ HOSP IP/OBS HIGH 50: CPT | Mod: HCNC,,, | Performed by: INTERNAL MEDICINE

## 2025-01-26 PROCEDURE — 94640 AIRWAY INHALATION TREATMENT: CPT | Mod: HCNC

## 2025-01-26 PROCEDURE — 25000003 PHARM REV CODE 250: Mod: HCNC | Performed by: INTERNAL MEDICINE

## 2025-01-26 PROCEDURE — 99232 SBSQ HOSP IP/OBS MODERATE 35: CPT | Mod: HCNC,,, | Performed by: INTERNAL MEDICINE

## 2025-01-26 PROCEDURE — 21400001 HC TELEMETRY ROOM: Mod: HCNC

## 2025-01-26 PROCEDURE — 99900035 HC TECH TIME PER 15 MIN (STAT): Mod: HCNC

## 2025-01-26 PROCEDURE — 36415 COLL VENOUS BLD VENIPUNCTURE: CPT | Mod: HCNC | Performed by: STUDENT IN AN ORGANIZED HEALTH CARE EDUCATION/TRAINING PROGRAM

## 2025-01-26 PROCEDURE — 94664 DEMO&/EVAL PT USE INHALER: CPT | Mod: HCNC

## 2025-01-26 PROCEDURE — 63600175 PHARM REV CODE 636 W HCPCS: Mod: HCNC | Performed by: INTERNAL MEDICINE

## 2025-01-26 PROCEDURE — 83540 ASSAY OF IRON: CPT | Mod: HCNC | Performed by: STUDENT IN AN ORGANIZED HEALTH CARE EDUCATION/TRAINING PROGRAM

## 2025-01-26 PROCEDURE — 27000646 HC AEROBIKA DEVICE: Mod: HCNC

## 2025-01-26 PROCEDURE — 27000221 HC OXYGEN, UP TO 24 HOURS: Mod: HCNC

## 2025-01-26 PROCEDURE — 25000242 PHARM REV CODE 250 ALT 637 W/ HCPCS: Mod: HCNC | Performed by: INTERNAL MEDICINE

## 2025-01-26 RX ORDER — METHOCARBAMOL 500 MG/1
500 TABLET, FILM COATED ORAL 3 TIMES DAILY
Status: DISCONTINUED | OUTPATIENT
Start: 2025-01-26 | End: 2025-01-28

## 2025-01-26 RX ORDER — IPRATROPIUM BROMIDE 0.5 MG/2.5ML
0.5 SOLUTION RESPIRATORY (INHALATION)
Status: DISCONTINUED | OUTPATIENT
Start: 2025-01-26 | End: 2025-01-29 | Stop reason: HOSPADM

## 2025-01-26 RX ORDER — GUAIFENESIN 600 MG/1
600 TABLET, EXTENDED RELEASE ORAL 2 TIMES DAILY
Status: DISCONTINUED | OUTPATIENT
Start: 2025-01-26 | End: 2025-01-28

## 2025-01-26 RX ORDER — LEVALBUTEROL 1.25 MG/.5ML
0.25 SOLUTION, CONCENTRATE RESPIRATORY (INHALATION)
Status: DISCONTINUED | OUTPATIENT
Start: 2025-01-26 | End: 2025-01-26

## 2025-01-26 RX ORDER — LEVALBUTEROL 1.25 MG/.5ML
0.25 SOLUTION, CONCENTRATE RESPIRATORY (INHALATION)
Status: DISCONTINUED | OUTPATIENT
Start: 2025-01-26 | End: 2025-01-28

## 2025-01-26 RX ADMIN — IPRATROPIUM BROMIDE 0.5 MG: 0.5 SOLUTION RESPIRATORY (INHALATION) at 08:01

## 2025-01-26 RX ADMIN — METHOCARBAMOL 500 MG: 500 TABLET ORAL at 09:01

## 2025-01-26 RX ADMIN — ACETYLCYSTEINE 2 ML: 200 INHALANT RESPIRATORY (INHALATION) at 12:01

## 2025-01-26 RX ADMIN — METOPROLOL TARTRATE 100 MG: 50 TABLET, FILM COATED ORAL at 09:01

## 2025-01-26 RX ADMIN — MUPIROCIN: 20 OINTMENT TOPICAL at 09:01

## 2025-01-26 RX ADMIN — PIPERACILLIN SODIUM AND TAZOBACTAM SODIUM 4.5 G: 4; .5 INJECTION, POWDER, LYOPHILIZED, FOR SOLUTION INTRAVENOUS at 09:01

## 2025-01-26 RX ADMIN — GUAIFENESIN 600 MG: 600 TABLET, EXTENDED RELEASE ORAL at 09:01

## 2025-01-26 RX ADMIN — LEVOTHYROXINE SODIUM 200 MCG: 100 TABLET ORAL at 06:01

## 2025-01-26 RX ADMIN — IPRATROPIUM BROMIDE AND ALBUTEROL SULFATE 3 ML: 2.5; .5 SOLUTION RESPIRATORY (INHALATION) at 07:01

## 2025-01-26 RX ADMIN — EMPAGLIFLOZIN 10 MG: 10 TABLET, FILM COATED ORAL at 08:01

## 2025-01-26 RX ADMIN — METOPROLOL TARTRATE 100 MG: 50 TABLET, FILM COATED ORAL at 08:01

## 2025-01-26 RX ADMIN — MIRTAZAPINE 7.5 MG: 7.5 TABLET, FILM COATED ORAL at 09:01

## 2025-01-26 RX ADMIN — ATORVASTATIN CALCIUM 10 MG: 10 TABLET, FILM COATED ORAL at 08:01

## 2025-01-26 RX ADMIN — LEVALBUTEROL 0.25 MG: 1.25 SOLUTION, CONCENTRATE RESPIRATORY (INHALATION) at 08:01

## 2025-01-26 RX ADMIN — METHOCARBAMOL 500 MG: 500 TABLET ORAL at 11:01

## 2025-01-26 RX ADMIN — ACETYLCYSTEINE 2 ML: 200 INHALANT RESPIRATORY (INHALATION) at 04:01

## 2025-01-26 RX ADMIN — ACETYLCYSTEINE 2 ML: 200 INHALANT RESPIRATORY (INHALATION) at 08:01

## 2025-01-26 RX ADMIN — MUPIROCIN: 20 OINTMENT TOPICAL at 08:01

## 2025-01-26 RX ADMIN — METHOCARBAMOL 500 MG: 500 TABLET ORAL at 02:01

## 2025-01-26 RX ADMIN — VANCOMYCIN HYDROCHLORIDE 2000 MG: 500 INJECTION, POWDER, LYOPHILIZED, FOR SOLUTION INTRAVENOUS at 03:01

## 2025-01-26 RX ADMIN — TAMSULOSIN HYDROCHLORIDE 0.4 MG: 0.4 CAPSULE ORAL at 08:01

## 2025-01-26 RX ADMIN — MELATONIN TAB 3 MG 6 MG: 3 TAB at 09:01

## 2025-01-26 RX ADMIN — PIPERACILLIN SODIUM AND TAZOBACTAM SODIUM 4.5 G: 4; .5 INJECTION, POWDER, LYOPHILIZED, FOR SOLUTION INTRAVENOUS at 02:01

## 2025-01-26 RX ADMIN — PIPERACILLIN SODIUM AND TAZOBACTAM SODIUM 4.5 G: 4; .5 INJECTION, POWDER, LYOPHILIZED, FOR SOLUTION INTRAVENOUS at 06:01

## 2025-01-26 RX ADMIN — GUAIFENESIN 600 MG: 600 TABLET, EXTENDED RELEASE ORAL at 11:01

## 2025-01-26 RX ADMIN — OXYCODONE 10 MG: 5 TABLET ORAL at 11:01

## 2025-01-26 NOTE — PLAN OF CARE
Problem: Adult Inpatient Plan of Care  Goal: Plan of Care Review  Outcome: Progressing  Goal: Patient-Specific Goal (Individualized)  Outcome: Progressing  Goal: Absence of Hospital-Acquired Illness or Injury  Outcome: Progressing  Goal: Optimal Comfort and Wellbeing  Outcome: Progressing  Goal: Readiness for Transition of Care  Outcome: Progressing     Problem: Wound  Goal: Optimal Coping  Outcome: Progressing  Goal: Optimal Functional Ability  Outcome: Progressing  Goal: Absence of Infection Signs and Symptoms  Outcome: Progressing  Goal: Improved Oral Intake  Outcome: Progressing  Goal: Optimal Pain Control and Function  Outcome: Progressing  Goal: Skin Health and Integrity  Outcome: Progressing  Goal: Optimal Wound Healing  Outcome: Progressing     Problem: Infection  Goal: Absence of Infection Signs and Symptoms  Outcome: Progressing     Problem: Pulmonary Impairment  Goal: Improved Activity Tolerance  Outcome: Progressing  Goal: Effective Airway Clearance  Outcome: Progressing  Goal: Optimal Gas Exchange  Outcome: Progressing     Problem: UTI (Urinary Tract Infection)  Goal: Improved Infection Symptoms  Outcome: Progressing     Problem: Fall Injury Risk  Goal: Absence of Fall and Fall-Related Injury  Outcome: Progressing     Problem: Skin Injury Risk Increased  Goal: Skin Health and Integrity  Outcome: Progressing     Problem: Diabetes Comorbidity  Goal: Blood Glucose Level Within Targeted Range  Outcome: Progressing     Problem: Comorbidity Management  Goal: Maintenance of Asthma Control  Outcome: Progressing  Goal: Maintenance of Behavioral Health Symptom Control  Outcome: Progressing  Goal: Blood Pressure in Desired Range  Outcome: Progressing  Goal: Maintenance of COPD Symptom Control  Outcome: Progressing  Goal: Maintenance of Heart Failure Symptom Control  Outcome: Progressing     Problem: Orthopaedic Fracture  Goal: Absence of Bleeding  Outcome: Progressing  Goal: Bowel Elimination  Outcome:  Progressing  Goal: Absence of Embolism Signs and Symptoms  Outcome: Progressing  Goal: Fracture Stability  Outcome: Progressing  Goal: Optimal Functional Ability  Outcome: Progressing  Goal: Absence of Infection Signs and Symptoms  Outcome: Progressing  Goal: Effective Tissue Perfusion  Outcome: Progressing  Goal: Optimal Pain Control and Function  Outcome: Progressing  Goal: Effective Oxygenation and Ventilation  Outcome: Progressing     Problem: Restraint, Nonviolent  Goal: Absence of Harm or Injury  Outcome: Progressing

## 2025-01-26 NOTE — PLAN OF CARE
Pt received on 3lpm NC with SpO2 92%. No respiratory distress noted. Will continue to monitor. The proper method of use, as well as anticipated side effects, of this aerosol treatment are discussed and demonstrated to the patient.

## 2025-01-26 NOTE — ASSESSMENT & PLAN NOTE
80-year-old elderly male with a past medical history of CAD, combined CHF (recovered EF), CAD, A-fib on eliquis,COPD, chronic respiratory failure on 3L NC, and hypothyroidism who presents to the ED for worsening SOB and back pain. Patient was evaluated emergency department and was found to be in respiratory distress and satting 86-88% on 5 L. Patient was placed on BiPAP and seemed to improve. Patient also had a fall several days ago and had a T8 vertebral fracture and was complaining of moderate pain. ID is consulted due to E faecalis bacteremia. Also with GNR in urine culture    -discontinue vanco given that the enterococcus is amp sensitive  -continue zosyn   -repeat blood cx daily until clear x 48 hrs

## 2025-01-26 NOTE — ASSESSMENT & PLAN NOTE
BCx 1/21 +Enterococcus, repeat BCx 1/24 still positive  ID consulted  Cardiology reviewed echo, did not see vegetations

## 2025-01-26 NOTE — ASSESSMENT & PLAN NOTE
Patient has long standing persistent (>12 months) atrial fibrillation. Patient is currently in sinus rhythm. [id number] Score: 3. The patients heart rate in the last 24 hours is as follows:  Pulse  Min: 42  Max: 129     Antiarrhythmics  metoprolol tartrate (LOPRESSOR) tablet 100 mg, 2 times daily, Oral    Anticoagulants       Plan  - Replete lytes with a goal of K>4, Mg >2  - Patient is anticoagulated, see medications listed above.  - Patient's afib is currently controlled  - monitor for any arrhythmia

## 2025-01-26 NOTE — SUBJECTIVE & OBJECTIVE
Interval History: No acute events    Review of Systems   Constitutional:  Positive for activity change.   HENT: Negative.     Respiratory:  Negative for shortness of breath.    Cardiovascular: Negative.    Gastrointestinal: Negative.    Genitourinary: Negative.    Musculoskeletal:  Positive for back pain.   Neurological: Negative.    All other systems reviewed and are negative.    Objective:     Vital Signs (Most Recent):  Temp: 96.6 °F (35.9 °C) (01/26/25 0830)  Pulse: 64 (01/26/25 0830)  Resp: 18 (01/26/25 0830)  BP: (!) 105/56 (01/26/25 0830)  SpO2: (!) 90 % (01/26/25 0830) Vital Signs (24h Range):  Temp:  [96.6 °F (35.9 °C)-98.6 °F (37 °C)] 96.6 °F (35.9 °C)  Pulse:  [42-95] 64  Resp:  [16-21] 18  SpO2:  [90 %-100 %] 90 %  BP: ()/(46-73) 105/56     Weight: 94 kg (207 lb 3.7 oz)  Body mass index is 28.9 kg/m².    Estimated Creatinine Clearance: 75.4 mL/min (based on SCr of 0.9 mg/dL).     Physical Exam  Constitutional:       General: He is not in acute distress.  HENT:      Head: Normocephalic and atraumatic.      Nose: Nose normal.      Mouth/Throat:      Mouth: Mucous membranes are moist.   Eyes:      Extraocular Movements: Extraocular movements intact.      Pupils: Pupils are equal, round, and reactive to light.   Cardiovascular:      Rate and Rhythm: Regular rhythm.   Pulmonary:      Breath sounds: Rhonchi present.   Abdominal:      Palpations: Abdomen is soft.   Musculoskeletal:         General: Normal range of motion.      Cervical back: Neck supple.   Skin:     General: Skin is warm.   Neurological:      Mental Status: He is alert. Mental status is at baseline.   Psychiatric:         Mood and Affect: Mood normal.          Significant Labs: All pertinent labs within the past 24 hours have been reviewed.    Significant Imaging: I have reviewed all pertinent imaging results/findings within the past 24 hours.

## 2025-01-26 NOTE — PROGRESS NOTES
Therapy with vancomycin complete and/or consult discontinued by provider.  Pharmacy will sign off, please re-consult as needed.    Alejandro Santos PharmD, BCCCP  [phone]

## 2025-01-26 NOTE — ASSESSMENT & PLAN NOTE
Patient has long standing persistent (>12 months) atrial fibrillation. Patient is currently in sinus rhythm. [id number] Score: 3. The patients heart rate in the last 24 hours is as follows:  Pulse  Min: 42  Max: 137     Antiarrhythmics  metoprolol tartrate (LOPRESSOR) tablet 100 mg, 2 times daily, Oral    Anticoagulants       Plan  - Replete lytes with a goal of K>4, Mg >2  - Patient is anticoagulated, see medications listed above.  - Patient's afib is currently controlled  - monitor for any arrhythmia

## 2025-01-26 NOTE — SUBJECTIVE & OBJECTIVE
Interval History: Confused today, pulling out IVs, BiPap, son at bedside, prior toT8 fx patient has had other injuries but was managing with a WC. Explained that even after surgical intervention and rehab he will need close supervision.    Given patient's bacteremia,  UTI, T8 fx, hypercapnia and volume overload will attempt to optimize patient prior to surgery and also get Palliative Care on board      Review of Systems   Constitutional:  Positive for activity change.   HENT: Negative.     Respiratory:  Negative for shortness of breath.    Cardiovascular: Negative.    Gastrointestinal: Negative.    Genitourinary: Negative.    Musculoskeletal:  Positive for back pain.   Neurological: Negative.    All other systems reviewed and are negative.    Objective:     Vital Signs (Most Recent):  Temp: 97.6 °F (36.4 °C) (01/25/25 1920)  Pulse: (!) 50 (01/25/25 1927)  Resp: 20 (01/25/25 1927)  BP: (!) 93/46 (01/25/25 1920)  SpO2: (!) 92 % (01/25/25 1927) Vital Signs (24h Range):  Temp:  [97.4 °F (36.3 °C)-98 °F (36.7 °C)] 97.6 °F (36.4 °C)  Pulse:  [] 50  Resp:  [16-22] 20  SpO2:  [90 %-100 %] 92 %  BP: ()/(46-81) 93/46     Weight: 95.3 kg (210 lb)  Body mass index is 29.29 kg/m².    Intake/Output Summary (Last 24 hours) at 1/25/2025 2049  Last data filed at 1/25/2025 1809  Gross per 24 hour   Intake 200 ml   Output 1500 ml   Net -1300 ml         Physical Exam  Constitutional:       General: He is not in acute distress.  HENT:      Head: Normocephalic and atraumatic.      Nose: Nose normal.      Mouth/Throat:      Mouth: Mucous membranes are moist.   Eyes:      Extraocular Movements: Extraocular movements intact.      Pupils: Pupils are equal, round, and reactive to light.   Cardiovascular:      Rate and Rhythm: Regular rhythm.   Pulmonary:      Breath sounds: Rhonchi present.   Abdominal:      Palpations: Abdomen is soft.   Musculoskeletal:         General: Normal range of motion.      Cervical back: Neck supple.  "  Skin:     General: Skin is warm.   Neurological:      Mental Status: He is alert. Mental status is at baseline.   Psychiatric:         Mood and Affect: Mood normal.             Significant Labs: All pertinent labs within the past 24 hours have been reviewed.  BMP:   Recent Labs   Lab 01/25/25  0556   *      K 4.0   CL 93*   CO2 37*   BUN 21   CREATININE 0.9   CALCIUM 8.6*     CBC: No results for input(s): "WBC", "HGB", "HCT", "PLT" in the last 48 hours.    Significant Imaging: I have reviewed all pertinent imaging results/findings within the past 24 hours.  "

## 2025-01-26 NOTE — PROGRESS NOTES
[facility] - Telemetry  Neurosurgery  Progress Note    Subjective:     Interval History: Back pain controlled with medication.  No leg pain or paresthesias.  Occasionally feel pain wrapping around stomache when moving in bed or when turned.    History of Present Illness:   This is a very pleasant 80 y.o. male, CAD, pacemaker, lives at home, fell about a week ago and started to have severe midthoracic back pain 4 days ago.  The pain is severe in intensity and worse with standing up, twisting in bed.  He has been unable to walk since he has been feeling the pain.  Has not noticed any new onset of motor weakness.  He has chronic right hip flexion weakness due to a prior femur and pelvic fracture.     During my visit he is comfortable in bed.  Denies having any upper extremity or lower extremity weakness, numbness or sphincter dysfunction symptoms.  Was seen by Physical therapy today.          Post-Op Info:  * No surgery found *          Medications:  Continuous Infusions:  Scheduled Meds:   acetylcysteine 200 mg/ml (20%)  2 mL Nebulization Q4H WAKE    atorvastatin  10 mg Oral Daily    empagliflozin  10 mg Oral Daily    guaiFENesin  600 mg Oral BID    levothyroxine  200 mcg Oral Before breakfast    methocarbamoL  500 mg Oral TID    metoprolol tartrate  100 mg Oral BID    mirtazapine  7.5 mg Oral Nightly    mupirocin   Nasal BID    piperacillin-tazobactam (Zosyn) IV (PEDS and ADULTS) (extended infusion is not appropriate)  4.5 g Intravenous Q8H    polyethylene glycol  17 g Oral Daily    tamsulosin  0.4 mg Oral Daily     PRN Meds:  Current Facility-Administered Medications:     acetaminophen, 650 mg, Oral, Q8H PRN    hydrOXYzine HCL, 50 mg, Oral, TID PRN    LORazepam, 0.5 mg, Oral, Daily PRN    melatonin, 6 mg, Oral, Nightly PRN    methocarbamoL, 750 mg, Oral, QID PRN    ondansetron, 4 mg, Intravenous, Q12H PRN    oxyCODONE, 10 mg, Oral, Q6H PRN    sodium chloride 0.9%, 10 mL, Intravenous, PRN    Flushing PICC/Midline  "Protocol, , , Until Discontinued **AND** sodium chloride 0.9%, 10 mL, Intravenous, Q12H PRN     Review of Systems  Objective:     Weight: 94 kg (207 lb 3.7 oz)  Body mass index is 28.9 kg/m².  Vital Signs (Most Recent):  Temp: 97.4 °F (36.3 °C) (01/26/25 1652)  Pulse: 101 (01/26/25 1652)  Resp: 18 (01/26/25 1652)  BP: 118/73 (01/26/25 1652)  SpO2: (!) 94 % (01/26/25 1652) Vital Signs (24h Range):  Temp:  [96.6 °F (35.9 °C)-98.6 °F (37 °C)] 97.4 °F (36.3 °C)  Pulse:  [] 101  Resp:  [16-21] 18  SpO2:  [90 %-97 %] 94 %  BP: ()/(46-73) 118/73     Date 01/26/25 0700 - 01/27/25 0659   Shift 1153-9993 9048-6125 0140-8369 24 Hour Total   INTAKE   P.O. 240   240   Shift Total(mL/kg) 240(2.6)   240(2.6)   OUTPUT   Shift Total(mL/kg)       Weight (kg) 94 94 94 94                            Urethral Catheter 01/20/25 1200 (Active)   Site Assessment Clean;Intact;Dry 01/24/25 1940   Collection Container Urimeter 01/24/25 1940   Securement Method secured to top of thigh w/ adhesive device 01/24/25 1940   Catheter Care Performed yes 01/24/25 0730   Reason for Continuing Urinary Catheterization Chronic Indwelling Urinary Catheter on Admission 01/24/25 1940   CAUTI Prevention Bundle Securement Device in place with 1" slack;Intact seal between catheter & drainage tubing;Drainage bag/urimeter off the floor;Sheeting clip in use;No dependent loops or kinks;Drainage bag/urimeter not overfilled (<2/3 full);Drainage bag/urimeter below bladder 01/24/25 1940   Output (mL) 800 mL 01/25/25 0614       Neurosurgery Physical Exam    General: well developed, well nourished, no distress  Mental Status: Awake, Alert, Oriented X3.Thought content appropriate  GCS: Motor: 6/Verbal: 5/Eyes: 4 GCS Total: 15    Motor Strength:  Strength  Deltoids Triceps Biceps Wrist Extension Wrist Flexion Hand                          HF KF KE DF PF EHL   Lower: R 5/5 5/5 5/5 5/5 5/5 4/5    L 5/5 5/5 5/5 5/5 5/5 5/5         Clonus: absent B/L  Incision- " "CDI        Significant Labs:  Recent Labs   Lab 01/25/25  0556 01/26/25  0549   * 146*    140   K 4.0 3.6   CL 93* 93*   CO2 37* 36*   BUN 21 15   CREATININE 0.9 0.9   CALCIUM 8.6* 8.6*   MG  --  2.2     No results for input(s): "WBC", "HGB", "HCT", "PLT" in the last 48 hours.  No results for input(s): "LABPT", "INR", "APTT" in the last 48 hours.  Microbiology Results (last 7 days)       Procedure Component Value Units Date/Time    Blood culture [[id number]]  (Abnormal) Collected: 01/24/25 1732    Order Status: Completed Specimen: Blood from Peripheral, Forearm, Right Updated: 01/26/25 1351     Blood Culture, Routine Gram stain aer bottle: Gram positive cocci in chains resembling Strep      Results called to and read back by: Linda Dominguez RN,01/25/2025, 15:04      Gram stain alden bottle: Gram positive cocci in chains resembling Strep      Positive results previously called 01/25/2025  19:27      ENTEROCOCCUS FAECALIS  Susceptibility pending      Urine culture [[id number]]  (Abnormal)  (Susceptibility) Collected: 01/21/25 0017    Order Status: Completed Specimen: Urine Updated: 01/26/25 1341     Urine Culture, Routine CITROBACTER FREUNDII  50,000 - 99,999 cfu/ml        PSEUDOMONAS AERUGINOSA  50,000 - 99,999 cfu/ml  Susceptibility pending      Narrative:      Specimen Source->Urine    Blood culture x two cultures. Draw prior to antibiotics. [[id number]]  (Abnormal)  (Susceptibility) Collected: 01/20/25 2156    Order Status: Completed Specimen: Blood Updated: 01/26/25 0954     Blood Culture, Routine Gram stain aer bottle: Gram positive cocci      Gram stain alden bottle: Gram positive cocci.      Results called to and read back by:Hilary Yo RN, 01/24/2025,17:01.      ENTEROCOCCUS FAECALIS    Narrative:      Aerobic and anaerobic    Blood culture [[id number]] Collected: 01/25/25 1555    Order Status: Completed Specimen: Blood from Peripheral, Hand, Right Updated: 01/26/25 0715     Blood Culture, Routine " No Growth to date    Narrative:      20992  Collection has been rescheduled by [facility] at 01/25/2025 16:04 Reason:   Labs done  pda out. UAD  Collection has been rescheduled by [facility] at 01/25/2025 16:04 Reason:   Labs done  pda out. UAD    Rapid Organism ID by PCR (from Blood culture) [[id number]]  (Abnormal) Collected: 01/25/25 1507    Order Status: Completed Updated: 01/25/25 0066     Enterococcus faecalis Detected     Enterococcus faecium Not Detected     Listeria monocytogenes Not Detected     Staphylococcus spp. Not Detected     Staphylococcus aureus Not Detected     Staphylococcus epidermidis Not Detected     Staphylococcus lugdunensis Not Detected     Streptococcus species Not Detected     Streptococcus agalactiae Not Detected     Streptococcus pneumoniae Not Detected     Streptococcus pyogenes Not Detected     Acinetobacter calcoaceticus/baumannii complex Not Detected     Bacteroides fragilis Not Detected     Enterobacterales Not Detected     Enterobacter cloacae complex Not Detected     Escherichia coli Not Detected     Klebsiella aerogenes Not Detected     Klebsiella oxytoca Not Detected     Klebsiella pneumoniae group Not Detected     Proteus Not Detected     Salmonella sp Not Detected     Serratia marcescens Not Detected     Haemophilus influenzae Not Detected     Neisseria meningtidis Not Detected     Pseudomonas aeruginosa Not Detected     Stenotrophomonas maltophilia Not Detected     Candida albicans Not Detected     Candida auris Not Detected     Candida glabrata Not Detected     Candida krusei Not Detected     Candida parapsilosis Not Detected     Candida tropicalis Not Detected     Cryptococcus neoformans/gattii Not Detected     CTX-M (ESBL ) Test Not Applicable     IMP (Carbapenem resistant) Test Not Applicable     KPC resistance gene (Carbapenem resistant) Test Not Applicable     mcr-1  Test Not Applicable     mec A/C  Test Not Applicable     mec A/C and MREJ (MRSA) gene Test Not Applicable      NDM (Carbapenem resistant) Test Not Applicable     OXA-48-like (Carbapenem resistant) Test Not Applicable     van A/B (VRE gene) Not Detected     VIM (Carbapenem resistant) Test Not Applicable    Blood culture x two cultures. Draw prior to antibiotics. [[id number]]  (Abnormal) Collected: 01/20/25 2156    Order Status: Completed Specimen: Blood Updated: 01/25/25 0846     Blood Culture, Routine Gram stain aer bottle: Gram positive cocci      Gram stain alden bottle: Gram positive cocci      Results called to and read back by:Hilary Yo RN, 01/24/2025, 17:03      ENTEROCOCCUS FAECALIS  For susceptibility see order #[id number]      Narrative:      Aerobic and anaerobic    Influenza A & B by Molecular [[id number]] Collected: 01/20/25 2228    Order Status: Completed Specimen: Nasopharyngeal Swab Updated: 01/20/25 2301     Influenza A, Molecular Negative     Influenza B, Molecular Negative     Flu A & B Source NP            Significant Diagnostics:    Impression:     1. Obliquely oriented fracture through the T8 vertebral body.  2. Partially healed fractures of 11th and 12th ribs near the costovertebral junction.  3. Right anterolateral bulky paravertebral ossification in the mid to lower thoracic spine, without significant bony ankylosis and with relatively few syndesmophytes.  Imaging findings are more characteristic of DISH.  4. Volume overload and CHF with pulmonary edema and bilateral pleural effusions.        Electronically signed by:Manuel Vásquez Jr  Date:                                            01/24/2025  Time:                                           16:40  Assessment/Plan:     Active Diagnoses:    Diagnosis Date Noted POA    PRINCIPAL PROBLEM:  Acute on chronic hypoxic respiratory failure [J96.21] 09/16/2024 Yes    Bacteremia [R78.81] 01/25/2025 Yes    Closed fracture of T8 vertebra [S22.069A] 01/21/2025 Yes    UTI (urinary tract infection) [N39.0] 01/21/2025 Yes    A-fib [I48.91] 10/28/2024 Yes     COPD exacerbation [J44.1] 09/06/2023 Yes    Acute on chronic diastolic heart failure [I50.33]  Yes    Essential hypertension [I10] 06/25/2017 Yes     Chronic      Problems Resolved During this Admission:     Unstable T8 fracture    -Neurosurgically stable   -will need surgical stabilization with percutaneous instrumentation  -Bedrest  -Will need medical clearance for surgery  -Surgery Tuesday with Dr. Elizabeth if cleared    All of the above discussed and reviewed with Dr. Elizabeth.      RL MillanC  Neurosurgery  [place] - Telemetry

## 2025-01-26 NOTE — NURSING
Rapid Response Nurse Follow-up Note     Followed up with patient for proactive rounding. Patient in bed resting. No signs of respiratory distress. Tolerating 4L NC. Chart and labs reviewed. VSS.  No acute issues at this time. Reviewed plan of care with charge RNPrerna .   Team will continue to follow.  Please call Rapid Response RNGerard RN with any questions or concerns at [phone].

## 2025-01-26 NOTE — PROGRESS NOTES
[facility] Medicine  Progress Note    Patient Name: Bean Salas  MRN: [id number]  Patient Class: IP- Inpatient   Admission Date: 1/20/2025  Length of Stay: 4 days  Attending Physician: Terra Centeno MD  Primary Care Provider: Ramirez Levine MD        Subjective     Principal Problem:Acute on chronic hypoxic respiratory failure  Acute Condition:         HPI:  Bean Salas is 80-year-old elderly male with a past medical history of CAD, combined CHF (recovered EF), CAD, A-fib on eliquis,COPD, chronic respiratory failure on 3L NC, and hypothyroidism who presents to the ED for worsening SOB and back pain.  Patient was evaluated emergency department and was found to be in respiratory distress and satting 86-88% on 5 L. Patient was placed on BiPAP and seemed to improve.  Patient also had a fall several days ago and had a T8 vertebral fracture and was complaining of moderate pain.  Due to patient's presenting symptoms he will require admission for further evaluation and management.  Patient is a poor historian however at time my examination he denied any headache, fever, chills, chest pain but complained of shortness of breath and back pain.    Overview/Hospital Course:  1/22/25 SOB is improved.  1/23/25 Improving  1/24/25 NSGY consult for T8 fx, pending CT and MRI given PPM  1/25/25 Somewhat confused today, ID, Nephrology and Pulm consulted    Interval History: Confused today, pulling out IVs, BiPap, son at bedside, prior toT8 fx patient has had other injuries but was managing with a WC. Explained that even after surgical intervention and rehab he will need close supervision.    Given patient's bacteremia,  UTI, T8 fx, hypercapnia and volume overload will attempt to optimize patient prior to surgery and also get Palliative Care on board      Review of Systems   Constitutional:  Positive for activity change.   HENT: Negative.     Respiratory:  Negative for shortness of breath.   "  Cardiovascular: Negative.    Gastrointestinal: Negative.    Genitourinary: Negative.    Musculoskeletal:  Positive for back pain.   Neurological: Negative.    All other systems reviewed and are negative.    Objective:     Vital Signs (Most Recent):  Temp: 97.6 °F (36.4 °C) (01/25/25 1920)  Pulse: (!) 50 (01/25/25 1927)  Resp: 20 (01/25/25 1927)  BP: (!) 93/46 (01/25/25 1920)  SpO2: (!) 92 % (01/25/25 1927) Vital Signs (24h Range):  Temp:  [97.4 °F (36.3 °C)-98 °F (36.7 °C)] 97.6 °F (36.4 °C)  Pulse:  [] 50  Resp:  [16-22] 20  SpO2:  [90 %-100 %] 92 %  BP: ()/(46-81) 93/46     Weight: 95.3 kg (210 lb)  Body mass index is 29.29 kg/m².    Intake/Output Summary (Last 24 hours) at 1/25/2025 2049  Last data filed at 1/25/2025 1809  Gross per 24 hour   Intake 200 ml   Output 1500 ml   Net -1300 ml         Physical Exam  Constitutional:       General: He is not in acute distress.  HENT:      Head: Normocephalic and atraumatic.      Nose: Nose normal.      Mouth/Throat:      Mouth: Mucous membranes are moist.   Eyes:      Extraocular Movements: Extraocular movements intact.      Pupils: Pupils are equal, round, and reactive to light.   Cardiovascular:      Rate and Rhythm: Regular rhythm.   Pulmonary:      Breath sounds: Rhonchi present.   Abdominal:      Palpations: Abdomen is soft.   Musculoskeletal:         General: Normal range of motion.      Cervical back: Neck supple.   Skin:     General: Skin is warm.   Neurological:      Mental Status: He is alert. Mental status is at baseline.   Psychiatric:         Mood and Affect: Mood normal.             Significant Labs: All pertinent labs within the past 24 hours have been reviewed.  BMP:   Recent Labs   Lab 01/25/25  0556   *      K 4.0   CL 93*   CO2 37*   BUN 21   CREATININE 0.9   CALCIUM 8.6*     CBC: No results for input(s): "WBC", "HGB", "HCT", "PLT" in the last 48 hours.    Significant Imaging: I have reviewed all pertinent imaging " results/findings within the past 24 hours.    Assessment and Plan     * Acute on chronic hypoxic respiratory failure  Admit to medical floor with telemetry  Patient with Hypercapnic and Hypoxic Respiratory failure which is Acute on chronic.  he is on home oxygen at 3 LPM. Supplemental oxygen was provided and noted- Oxygen Concentration (%):  [40-45] 40    .   Signs/symptoms of respiratory failure include- increased work of breathing, respiratory distress, and use of accessory muscles. Contributing diagnoses includes - CHF and COPD Labs and images were reviewed. Patient Has recent ABG, which has been reviewed. Will treat underlying causes and adjust management of respiratory failure as follows- continue with BiPAP at night    Bacteremia  BCx 1/21 +Enterococcus, repeat BCx 1/24 still positive  ID consulted  Cardiology reviewed echo, did not see vegetations      UTI (urinary tract infection)  Urine culture.  IV antibiotics.      Closed fracture of T8 vertebra  Pain control.  Back brace which is to be delivered to patient's home.    PLAN:  Discussed with hospital medicine.  The patient's pacemaker is MRI conditional.  A thoracic spine MRI is indicated to rule out epidural hematoma and to better assess the fracture and to rule out ligamentous injuries.  In the meantime we will proceed with the CT of the thoracic spine, 1 mm cuts to better assess the actual fracture at T8  Recommending not to mobilize the patient out of bed until MRI is completed.  TLSO brace ordered to wear once clear to mobilize out of bed.      A-fib  Patient has long standing persistent (>12 months) atrial fibrillation. Patient is currently in sinus rhythm. [id number] Score: 3. The patients heart rate in the last 24 hours is as follows:  Pulse  Min: 42  Max: 129     Antiarrhythmics  metoprolol tartrate (LOPRESSOR) tablet 100 mg, 2 times daily, Oral    Anticoagulants       Plan  - Replete lytes with a goal of K>4, Mg >2  - Patient is anticoagulated,  see medications listed above.  - Patient's afib is currently controlled  - monitor for any arrhythmia        COPD exacerbation  Patient's COPD is with exacerbation noted by continued dyspnea and use of accessory muscles for breathing currently.  Patient is currently off COPD Pathway. Continue scheduled inhalers Antibiotics and Supplemental oxygen and monitor respiratory status closely.     Acute on chronic diastolic heart failure    Strict I's and O's.    Diuresis with Lasix.  Inpatient Cardiology consultation.    Essential hypertension  Patient's blood pressure range in the last 24 hours was: BP  Min: 134/86  Max: 158/69.The patient's inpatient anti-hypertensive regimen is listed below:  Current Antihypertensives  metoprolol tartrate (LOPRESSOR) tablet 50 mg, 2 times daily, Oral  furosemide injection 40 mg, Every 12 hours, Intravenous    Plan  - BP is controlled, no changes needed to their regimen  - monitor blood pressure closely      VTE Risk Mitigation (From admission, onward)      None            Discharge Planning   LATOSHA: 1/27/2025     Code Status: DNR   Medical Readiness for Discharge Date:   Discharge Plan A: Home Health ([facility] )                Please place Justification for DME        Terra Centeno MD  Department of Hospital Medicine   [place] [facility]

## 2025-01-26 NOTE — SUBJECTIVE & OBJECTIVE
Interval History: Reports back pain    Review of Systems   Constitutional:  Positive for activity change.   HENT: Negative.     Respiratory:  Negative for shortness of breath.    Cardiovascular: Negative.    Gastrointestinal: Negative.    Genitourinary: Negative.    Musculoskeletal:  Positive for back pain.   Neurological: Negative.    All other systems reviewed and are negative.    Objective:     Vital Signs (Most Recent):  Temp: 96.6 °F (35.9 °C) (01/26/25 0830)  Pulse: 100 (01/26/25 1209)  Resp: 20 (01/26/25 1209)  BP: (!) 105/56 (01/26/25 0830)  SpO2: (!) 94 % (01/26/25 1209) Vital Signs (24h Range):  Temp:  [96.6 °F (35.9 °C)-98.6 °F (37 °C)] 96.6 °F (35.9 °C)  Pulse:  [] 100  Resp:  [16-21] 20  SpO2:  [90 %-97 %] 94 %  BP: ()/(46-73) 105/56     Weight: 94 kg (207 lb 3.7 oz)  Body mass index is 28.9 kg/m².    Intake/Output Summary (Last 24 hours) at 1/26/2025 1503  Last data filed at 1/26/2025 1006  Gross per 24 hour   Intake 480 ml   Output 1300 ml   Net -820 ml         Physical Exam  Constitutional:       General: He is not in acute distress.  HENT:      Head: Normocephalic and atraumatic.      Nose: Nose normal.      Mouth/Throat:      Mouth: Mucous membranes are moist.   Eyes:      Extraocular Movements: Extraocular movements intact.      Pupils: Pupils are equal, round, and reactive to light.   Cardiovascular:      Rate and Rhythm: Regular rhythm.   Pulmonary:      Breath sounds: Rhonchi present.   Abdominal:      Palpations: Abdomen is soft.   Musculoskeletal:         General: Normal range of motion.      Cervical back: Neck supple.   Skin:     General: Skin is warm.   Neurological:      Mental Status: He is alert. Mental status is at baseline.   Psychiatric:         Mood and Affect: Mood normal.             Significant Labs: All pertinent labs within the past 24 hours have been reviewed.  Blood Culture:   Recent Labs   Lab 01/24/25  1732 01/25/25  1555   LABBLOO Gram stain aer bottle: Gram  "positive cocci in chains resembling Strep  Results called to and read back by: Linda Dominguez RN,01/25/2025, 15:04  Gram stain alden bottle: Gram positive cocci in chains resembling Strep  Positive results previously called 01/25/2025  19:27  ENTEROCOCCUS FAECALIS  Susceptibility pending  * No Growth to date     CBC: No results for input(s): "WBC", "HGB", "HCT", "PLT" in the last 48 hours.  CMP:   Recent Labs   Lab 01/25/25  0556 01/26/25  0549    140   K 4.0 3.6   CL 93* 93*   CO2 37* 36*   * 146*   BUN 21 15   CREATININE 0.9 0.9   CALCIUM 8.6* 8.6*   ALBUMIN  --  2.3*   ANIONGAP 8 11       Significant Imaging: I have reviewed all pertinent imaging results/findings within the past 24 hours.  "

## 2025-01-26 NOTE — PT/OT/SLP PROGRESS
Physical Therapy      Patient Name:  Bean Salas   MRN:  [id number]    Patient not seen today secondary to Nurse/ ALEKSANDR hold. Will follow-up after surgery. Confirmed with NSG that patient is on bed rest until after Tuesday's surgery. Will DC current PT orders and await post operative eval orders. .

## 2025-01-26 NOTE — PLAN OF CARE
Problem: Adult Inpatient Plan of Care  Goal: Plan of Care Review  Outcome: Progressing  Goal: Optimal Comfort and Wellbeing  Outcome: Progressing     Problem: Wound  Goal: Optimal Coping  Outcome: Progressing  Goal: Optimal Pain Control and Function  Outcome: Progressing     Problem: Infection  Goal: Absence of Infection Signs and Symptoms  Outcome: Progressing

## 2025-01-26 NOTE — PROGRESS NOTES
[facility] - Telemetry  Cardiology  Progress Note    Patient Name: Bean Salas  MRN: [id number]  Admission Date: 1/20/2025  Hospital Length of Stay: 5 days  Code Status: DNR   Attending Physician: Terra Centeno MD   Primary Care Physician: Ramirez Levine MD  Expected Discharge Date: 1/27/2025  Principal Problem:Acute on chronic hypoxic respiratory failure    Subjective:       Patient on 3 L nasal cannula this morning.  We discussed medical therapy with the patient.  DNR currently.  Primary team considering hospice consult.  Most recent remote device interrogation for CRT P device showed 66% bi V paced. Normal  Lead parameters    ROS  Objective:     Vital Signs (Most Recent):  Temp: 96.6 °F (35.9 °C) (01/26/25 0830)  Pulse: 100 (01/26/25 1209)  Resp: 20 (01/26/25 1209)  BP: (!) 105/56 (01/26/25 0830)  SpO2: (!) 94 % (01/26/25 1209) Vital Signs (24h Range):  Temp:  [96.6 °F (35.9 °C)-98.6 °F (37 °C)] 96.6 °F (35.9 °C)  Pulse:  [] 100  Resp:  [16-21] 20  SpO2:  [90 %-97 %] 94 %  BP: ()/(46-73) 105/56     Weight: 94 kg (207 lb 3.7 oz)  Body mass index is 28.9 kg/m².    SpO2: (!) 94 %         Intake/Output Summary (Last 24 hours) at 1/26/2025 1334  Last data filed at 1/26/2025 1006  Gross per 24 hour   Intake 480 ml   Output 1300 ml   Net -820 ml       Lines/Drains/Airways       Drain  Duration                  Urethral Catheter 01/20/25 1200 6 days              Peripheral Intravenous Line  Duration                  Midline Catheter - Single Lumen 01/25/25 1320 Right basilic vein (medial side of arm) other (see comments) 1 day                    Physical Exam    Significant Labs: All pertinent lab results from the last 24 hours have been reviewed. and   Recent Lab Results         01/26/25  0549   01/25/25  1555   01/25/25  1507   01/25/25  1350        Genevieve krusei     Not Detected         Salmonella sp     Not Detected         A2C EF       30       A4C EF       42       Acinetobacter  calcoaceticus/baumannii complex     Not Detected         Albumin 2.3             Anion Gap 11             Bacteroides fragilis     Not Detected         Blood Culture, Routine   No Growth to date  [P]           BSA       2.18       BUN 15             Calcium 8.6             Candida albicans     Not Detected         Candida auris     Not Detected         Candida glabrata     Not Detected         Candida parapsilosis     Not Detected         Candida tropicalis     Not Detected         Chloride 93             CO2 36             Creatinine 0.9             Cryptococcus neoformans/gattii     Not Detected         CTX-M (ESBL )     Test Not Applicable         Left Ventricle Relative Wall Thickness       0.44       eGFR >60             Enterobacter cloacae complex     Not Detected         Enterobacterales     Not Detected         Enterococcus faecalis     Detected         Enterococcus faecium     Not Detected         Escherichia coli     Not Detected         Ferritin 258             FS       22.0       Glucose 146             Haemophilus influenzae     Not Detected         IMP (Carbapenem resistant)     Test Not Applicable         IVSd       1.1       Klebsiella aerogenes     Not Detected         Klebsiella oxytoca     Not Detected         Klebsiella pneumoniae group     Not Detected         KPC resistance gene (Carbapenem resistant)     Test Not Applicable         LA area A4C       35.61       Listeria monocytogenes     Not Detected         LV EDV BP       116.37       LV Diastolic Volume Index       54.13       Left Ventricular End Diastolic Volume by Teichholz Method       116.37       LV EDV A2C       [id number]       LV EDV A4C       122.01       Left Ventricular End Systolic Volume by Teichholz Method       64.07       LV ESV A4C       135.73       LVIDd       5.0       LVIDs       3.9       LV mass       207.1       LV Mass Index       96.3       LV ESV BP       64.07       LV Systolic Volume Index        29.8       Magnesium  2.2             mcr-1      Test Not Applicable         Mean e'       0.11       mec A/C      Test Not Applicable         mec A/C and MREJ (MRSA) gene     Test Not Applicable         NDM (Carbapenem resistant)     Test Not Applicable         Neisseria meningtidis     Not Detected         OXA-48-like (Carbapenem resistant)     Test Not Applicable         Phosphorus Level 3.0             Potassium 3.6             Proteus     Not Detected         Pseudomonas aeruginosa     Not Detected         PW       1.1       Est. RA pres       3       Serratia marcescens     Not Detected         Sodium 140             Staphylococcus aureus     Not Detected         Staphylococcus epidermidis     Not Detected         Staphylococcus lugdunensis     Not Detected         Staphylococcus spp.     Not Detected         Stenotrophomonas maltophilia     Not Detected         Streptococcus agalactiae     Not Detected         Streptococcus pneumoniae     Not Detected         Streptococcus pyogenes     Not Detected         Streptococcus species     Not Detected         TDI SEPTAL       0.09       TDI LATERAL       0.12       van A/B (VRE gene)     Not Detected         VIM (Carbapenem resistant)     Test Not Applicable         ZLVIDD       -3.33       ZLVIDS       -0.68                [P] - Preliminary Result               Significant Imaging: Echocardiogram: Transthoracic echo (TTE) complete (Cupid Only):   Results for orders placed or performed during the hospital encounter of 01/20/25   Echo   Result Value Ref Range    BSA 2.18 m2    A2C EF 30 %    A4C EF 42 %    LVIDd 5.0 3.5 - 6.0 cm    LV Systolic Volume 64.07 mL    LV Systolic Volume Index 29.8 mL/m2    LVIDs 3.9 2.1 - 4.0 cm    LV Diastolic Volume 116.37 mL    LV ESV A4C 135.73 mL    LV Diastolic Volume Index 54.13 mL/m2    LV EDV A2C [id number] mL    LV EDV A4C 122.01 mL    Left Ventricular End Systolic Volume by Teichholz Method 64.07 mL    Left Ventricular  End Diastolic Volume by Teichholz Method 116.37 mL    IVS 1.1 0.6 - 1.1 cm    FS 22.0 (A) 28 - 44 %    Left Ventricle Relative Wall Thickness 0.44 cm    PW 1.1 0.6 - 1.1 cm    LV mass 207.1 g    LV Mass Index 96.3 g/m2    TDI LATERAL 0.12 m/s    TDI SEPTAL 0.09 m/s    Mean e' 0.11 m/s    ZLVIDS -0.68     ZLVIDD -3.33     LA area A4C 35.61 cm2    Est. RA pres 3 mmHg    Narrative      Left Ventricle: The left ventricle is normal in size. There is   concentric remodeling. There is mildly reduced systolic function with a   visually estimated ejection fraction of 40 - 45%.    IVC/SVC: Normal venous pressure at 3 mmHg.    No definitive evidence of valvular vegetation       Assessment and Plan:     Brief HPI:     80-year-old elderly male with a past medical history of CAD, heart failure with mildly reduced ejection fraction, CAD, A-fib on eliquis,COPD, chronic respiratory failure on 3L NC, and hypothyroidism who presents to the ED for worsening SOB and back pain. Patient was evaluated emergency department and was found to be in respiratory distress and satting 86-88% on 5 L.  Patient also had a fall several days ago and had a T8 vertebral fracture.  Now found to have Enterococcus faecalis bacteremia for which ID has been consulted.  Cardiology consulted for volume overload, frequent PVCs.  Patient has a history of heart failure with reduced ejection fraction status post CRT pacemaker placement.  Telemetry shows frequent PVCs.           - most recent device interrogation from December 24 showed 66% bi V paced rhythm.  Recommend escalation of beta blocker therapy so the patient is paced 100% of the time.  Continue  metoprolol tartrate 100 mg twice a day.  Frequent PVCs noted on telemetry today.  Recommend using levalbuterol versus ipratropium instead of albuterol  - multiple ongoing medical issues.  Limited echocardiogram repeated today shows mildly reduced left ventricular ejection fraction (stable in comparison to previous  echocardiogram) .  I will escalate beta blocker therapy today.  Metoprolol 100 mg twice a day.    - no significant valvular vegetations noted.  If concern for significant/persistent bacteremia, may consider DEMETRICE.  -previous echocardiogram had trace tricuspid regurgitation.     -blood workup this morning shows significant hypernatremia and alkalosis.  Recommend holding diuretic therapy for now.  -  Patient's primary team considering hospice  consultation.  From cardiac standpoint patient is at intermediate risk of major adverse cardiovascular events in the perioperative setting for thoracic spine fracture repair.  Continue high dose beta blocker therapy to suppress the PVCs.  Continue on telemetry.  -   We will continue to follow the patient    Active Diagnoses:    Diagnosis Date Noted POA    PRINCIPAL PROBLEM:  Acute on chronic hypoxic respiratory failure [J96.21] 09/16/2024 Yes    Bacteremia [R78.81] 01/25/2025 Yes    Closed fracture of T8 vertebra [S22.069A] 01/21/2025 Yes    UTI (urinary tract infection) [N39.0] 01/21/2025 Yes    A-fib [I48.91] 10/28/2024 Yes    COPD exacerbation [J44.1] 09/06/2023 Yes    Acute on chronic diastolic heart failure [I50.33]  Yes    Essential hypertension [I10] 06/25/2017 Yes     Chronic      Problems Resolved During this Admission:       VTE Risk Mitigation (From admission, onward)      None            Charanjit Dior MD  Cardiology  [place] - Telemetry

## 2025-01-26 NOTE — ASSESSMENT & PLAN NOTE
Admit to medical floor with telemetry  Patient with Hypercapnic and Hypoxic Respiratory failure which is Acute on chronic.  he is on home oxygen at 3 LPM. Supplemental oxygen was provided and noted- Oxygen Concentration (%):  [40-45] 40    .   Signs/symptoms of respiratory failure include- increased work of breathing, respiratory distress, and use of accessory muscles. Contributing diagnoses includes - CHF and COPD Labs and images were reviewed. Patient Has recent ABG, which has been reviewed. Will treat underlying causes and adjust management of respiratory failure as follows- continue with BiPAP at night

## 2025-01-26 NOTE — PROGRESS NOTES
[facility]  Progress Note    Patient Name: Bean Salas  MRN: [id number]  Patient Class: IP- Inpatient   Admission Date: 1/20/2025  Length of Stay: 5 days  Attending Physician: Terra Centeno MD  Primary Care Provider: Ramirez Levine MD        Subjective     Principal Problem:Acute on chronic hypoxic respiratory failure  Acute Condition:         HPI:  Bean Salas is 80-year-old elderly male with a past medical history of CAD, combined CHF (recovered EF), CAD, A-fib on eliquis,COPD, chronic respiratory failure on 3L NC, and hypothyroidism who presents to the ED for worsening SOB and back pain.  Patient was evaluated emergency department and was found to be in respiratory distress and satting 86-88% on 5 L. Patient was placed on BiPAP and seemed to improve.  Patient also had a fall several days ago and had a T8 vertebral fracture and was complaining of moderate pain.  Due to patient's presenting symptoms he will require admission for further evaluation and management.  Patient is a poor historian however at time my examination he denied any headache, fever, chills, chest pain but complained of shortness of breath and back pain.    Overview/Hospital Course:  1/22/25 SOB is improved.  1/23/25 Improving  1/24/25 NSGY consult for T8 fx, pending CT and MRI given PPM  1/25/25 Somewhat confused today, ID, Nephrology and Pulm consulted  1/26/25 Still mildly confused    Interval History: Reports back pain    Review of Systems   Constitutional:  Positive for activity change.   HENT: Negative.     Respiratory:  Negative for shortness of breath.    Cardiovascular: Negative.    Gastrointestinal: Negative.    Genitourinary: Negative.    Musculoskeletal:  Positive for back pain.   Neurological: Negative.    All other systems reviewed and are negative.    Objective:     Vital Signs (Most Recent):  Temp: 96.6 °F (35.9 °C) (01/26/25 0830)  Pulse: 100 (01/26/25 1209)  Resp: 20 (01/26/25  "1209)  BP: (!) 105/56 (01/26/25 0830)  SpO2: (!) 94 % (01/26/25 1209) Vital Signs (24h Range):  Temp:  [96.6 °F (35.9 °C)-98.6 °F (37 °C)] 96.6 °F (35.9 °C)  Pulse:  [] 100  Resp:  [16-21] 20  SpO2:  [90 %-97 %] 94 %  BP: ()/(46-73) 105/56     Weight: 94 kg (207 lb 3.7 oz)  Body mass index is 28.9 kg/m².    Intake/Output Summary (Last 24 hours) at 1/26/2025 1503  Last data filed at 1/26/2025 1006  Gross per 24 hour   Intake 480 ml   Output 1300 ml   Net -820 ml         Physical Exam  Constitutional:       General: He is not in acute distress.  HENT:      Head: Normocephalic and atraumatic.      Nose: Nose normal.      Mouth/Throat:      Mouth: Mucous membranes are moist.   Eyes:      Extraocular Movements: Extraocular movements intact.      Pupils: Pupils are equal, round, and reactive to light.   Cardiovascular:      Rate and Rhythm: Regular rhythm.   Pulmonary:      Breath sounds: Rhonchi present.   Abdominal:      Palpations: Abdomen is soft.   Musculoskeletal:         General: Normal range of motion.      Cervical back: Neck supple.   Skin:     General: Skin is warm.   Neurological:      Mental Status: He is alert. Mental status is at baseline.   Psychiatric:         Mood and Affect: Mood normal.             Significant Labs: All pertinent labs within the past 24 hours have been reviewed.  Blood Culture:   Recent Labs   Lab 01/24/25  1732 01/25/25  1555   LABBLOO Gram stain aer bottle: Gram positive cocci in chains resembling Strep  Results called to and read back by: Linda Dominguez RN,01/25/2025, 15:04  Gram stain alden bottle: Gram positive cocci in chains resembling Strep  Positive results previously called 01/25/2025  19:27  ENTEROCOCCUS FAECALIS  Susceptibility pending  * No Growth to date     CBC: No results for input(s): "WBC", "HGB", "HCT", "PLT" in the last 48 hours.  CMP:   Recent Labs   Lab 01/25/25  0556 01/26/25  0549    140   K 4.0 3.6   CL 93* 93*   CO2 37* 36*   * 146* "   BUN 21 15   CREATININE 0.9 0.9   CALCIUM 8.6* 8.6*   ALBUMIN  --  2.3*   ANIONGAP 8 11       Significant Imaging: I have reviewed all pertinent imaging results/findings within the past 24 hours.    Assessment and Plan     * Acute on chronic hypoxic respiratory failure  Admit to medical floor with telemetry  Patient with Hypercapnic and Hypoxic Respiratory failure which is Acute on chronic.  he is on home oxygen at 3 LPM. Supplemental oxygen was provided and noted-      .   Signs/symptoms of respiratory failure include- increased work of breathing, respiratory distress, and use of accessory muscles. Contributing diagnoses includes - CHF and COPD Labs and images were reviewed. Patient Has recent ABG, which has been reviewed. Will treat underlying causes and adjust management of respiratory failure as follows- continue with BiPAP at night    Bacteremia  BCx 1/21 +Enterococcus, repeat BCx 1/24 still positive  ID consulted  Cardiology reviewed echo, did not see vegetations      UTI (urinary tract infection)  Urine culture.  IV antibiotics.      Closed fracture of T8 vertebra  Pain control.  Back brace which is to be delivered to patient's home.    PLAN:  Discussed with hospital medicine.  The patient's pacemaker is MRI conditional.  A thoracic spine MRI is indicated to rule out epidural hematoma and to better assess the fracture and to rule out ligamentous injuries.  In the meantime we will proceed with the CT of the thoracic spine, 1 mm cuts to better assess the actual fracture at T8  Recommending not to mobilize the patient out of bed until MRI is completed.  TLSO brace ordered to wear once clear to mobilize out of bed.      A-fib  Patient has long standing persistent (>12 months) atrial fibrillation. Patient is currently in sinus rhythm. [id number] Score: 3. The patients heart rate in the last 24 hours is as follows:  Pulse  Min: 42  Max: 137     Antiarrhythmics  metoprolol tartrate (LOPRESSOR) tablet 100 mg, 2  times daily, Oral    Anticoagulants       Plan  - Replete lytes with a goal of K>4, Mg >2  - Patient is anticoagulated, see medications listed above.  - Patient's afib is currently controlled  - monitor for any arrhythmia        COPD exacerbation  Patient's COPD is with exacerbation noted by continued dyspnea and use of accessory muscles for breathing currently.  Patient is currently off COPD Pathway. Continue scheduled inhalers Antibiotics and Supplemental oxygen and monitor respiratory status closely.     Acute on chronic diastolic heart failure    Strict I's and O's.    Diuresis with Lasix.  Inpatient Cardiology consultation.    Essential hypertension  Patient's blood pressure range in the last 24 hours was: BP  Min: 134/86  Max: 158/69.The patient's inpatient anti-hypertensive regimen is listed below:  Current Antihypertensives  metoprolol tartrate (LOPRESSOR) tablet 50 mg, 2 times daily, Oral  furosemide injection 40 mg, Every 12 hours, Intravenous    Plan  - BP is controlled, no changes needed to their regimen  - monitor blood pressure closely      VTE Risk Mitigation (From admission, onward)      None            Discharge Planning   LATOSHA: 1/27/2025     Code Status: DNR   Medical Readiness for Discharge Date:   Discharge Plan A: Home Health ([facility])                Please place Justification for DME        Terra Centeno MD  Department of Hospital Medicine   [place] - Telemetry

## 2025-01-26 NOTE — PROGRESS NOTES
[place] [facility]  Infectious Disease  Progress Note    Patient Name: Bean Salas  MRN: [id number]  Admission Date: 1/20/2025  Length of Stay: 5 days  Attending Physician: eTrra Centeno MD  Primary Care Provider: Ramirez Levine MD    Isolation Status: No active isolations  Assessment/Plan:      ID  Bacteremia  80-year-old elderly male with a past medical history of CAD, combined CHF (recovered EF), CAD, A-fib on eliquis,COPD, chronic respiratory failure on 3L NC, and hypothyroidism who presents to the ED for worsening SOB and back pain. Patient was evaluated emergency department and was found to be in respiratory distress and satting 86-88% on 5 L. Patient was placed on BiPAP and seemed to improve. Patient also had a fall several days ago and had a T8 vertebral fracture and was complaining of moderate pain. ID is consulted due to E faecalis bacteremia. Also with GNR in urine culture    -discontinue vanco given that the enterococcus is amp sensitive  -continue zosyn   -repeat blood cx daily until clear x 48 hrs              Thank you for your consult. I will follow-up with patient. Please contact us if you have any additional questions.    Duncan Fernandez MD  Infectious Disease  [place] [facility]    Subjective:     Principal Problem:Acute on chronic hypoxic respiratory failure    HPI: 80-year-old elderly male with a past medical history of CAD, combined CHF (recovered EF), CAD, A-fib on eliquis,COPD, chronic respiratory failure on 3L NC, and hypothyroidism who presents to the ED for worsening SOB and back pain. Patient was evaluated emergency department and was found to be in respiratory distress and satting 86-88% on 5 L. Patient was placed on BiPAP and seemed to improve. Patient also had a fall several days ago and had a T8 vertebral fracture and was complaining of moderate pain. ID is consulted due to E faecalis bacteremia. Also with GNR in urine culture      Interval History: No acute  events    Review of Systems   Constitutional:  Positive for activity change.   HENT: Negative.     Respiratory:  Negative for shortness of breath.    Cardiovascular: Negative.    Gastrointestinal: Negative.    Genitourinary: Negative.    Musculoskeletal:  Positive for back pain.   Neurological: Negative.    All other systems reviewed and are negative.    Objective:     Vital Signs (Most Recent):  Temp: 96.6 °F (35.9 °C) (01/26/25 0830)  Pulse: 64 (01/26/25 0830)  Resp: 18 (01/26/25 0830)  BP: (!) 105/56 (01/26/25 0830)  SpO2: (!) 90 % (01/26/25 0830) Vital Signs (24h Range):  Temp:  [96.6 °F (35.9 °C)-98.6 °F (37 °C)] 96.6 °F (35.9 °C)  Pulse:  [42-95] 64  Resp:  [16-21] 18  SpO2:  [90 %-100 %] 90 %  BP: ()/(46-73) 105/56     Weight: 94 kg (207 lb 3.7 oz)  Body mass index is 28.9 kg/m².    Estimated Creatinine Clearance: 75.4 mL/min (based on SCr of 0.9 mg/dL).     Physical Exam  Constitutional:       General: He is not in acute distress.  HENT:      Head: Normocephalic and atraumatic.      Nose: Nose normal.      Mouth/Throat:      Mouth: Mucous membranes are moist.   Eyes:      Extraocular Movements: Extraocular movements intact.      Pupils: Pupils are equal, round, and reactive to light.   Cardiovascular:      Rate and Rhythm: Regular rhythm.   Pulmonary:      Breath sounds: Rhonchi present.   Abdominal:      Palpations: Abdomen is soft.   Musculoskeletal:         General: Normal range of motion.      Cervical back: Neck supple.   Skin:     General: Skin is warm.   Neurological:      Mental Status: He is alert. Mental status is at baseline.   Psychiatric:         Mood and Affect: Mood normal.          Significant Labs: All pertinent labs within the past 24 hours have been reviewed.    Significant Imaging: I have reviewed all pertinent imaging results/findings within the past 24 hours.

## 2025-01-26 NOTE — PROGRESS NOTES
Nephrology Progress Note       Consult Requested By: Terra Centeno MD  Reason for Consult: Alkalosis      SUBJECTIVE:        ?    Review of Systems   Unable to perform ROS: Acuity of condition       Past Medical History:   Diagnosis Date    Atrial fibrillation, unspecified type 09/06/2023    COPD exacerbation 09/06/2023    Thyroid disease     hypo      OBJECTIVE:     Vital Signs (Most Recent)  Vitals:    01/26/25 0830 01/26/25 1101 01/26/25 1116 01/26/25 1209   BP: (!) 105/56      BP Location: Left arm      Patient Position: Lying      Pulse: 64  100 100   Resp: 18 18  20   Temp: 96.6 °F (35.9 °C)      TempSrc: Axillary      SpO2: (!) 90%   (!) 94%   Weight:       Height:             Date 01/26/25 0700 - 01/27/25 0659   Shift 4166-4804 1567-2456 9366-3435 24 Hour Total   INTAKE   P.O. 240   240   Shift Total(mL/kg) 240(2.6)   240(2.6)   OUTPUT   Shift Total(mL/kg)       Weight (kg) 94 94 94 94           Medications:   acetylcysteine 200 mg/ml (20%)  2 mL Nebulization Q4H WAKE    atorvastatin  10 mg Oral Daily    empagliflozin  10 mg Oral Daily    guaiFENesin  600 mg Oral BID    levothyroxine  200 mcg Oral Before breakfast    methocarbamoL  500 mg Oral TID    metoprolol tartrate  100 mg Oral BID    mirtazapine  7.5 mg Oral Nightly    mupirocin   Nasal BID    piperacillin-tazobactam (Zosyn) IV (PEDS and ADULTS) (extended infusion is not appropriate)  4.5 g Intravenous Q8H    polyethylene glycol  17 g Oral Daily    tamsulosin  0.4 mg Oral Daily           Physical Exam  Vitals and nursing note reviewed.   Constitutional:       General: He is not in acute distress.     Appearance: He is obese. He is ill-appearing. He is not diaphoretic.   HENT:      Head: Normocephalic and atraumatic.      Mouth/Throat:      Pharynx: No oropharyngeal exudate.   Eyes:      General: No scleral icterus.     Conjunctiva/sclera: Conjunctivae normal.      Pupils: Pupils are equal, round, and reactive to light.   Cardiovascular:      Rate  and Rhythm: Normal rate and regular rhythm.      Heart sounds: Normal heart sounds. No murmur heard.  Pulmonary:      Effort: No respiratory distress.      Breath sounds: Normal breath sounds.   Abdominal:      General: Bowel sounds are normal. There is no distension.      Palpations: Abdomen is soft.      Tenderness: There is no abdominal tenderness.   Musculoskeletal:         General: Normal range of motion.      Cervical back: Normal range of motion and neck supple.   Skin:     General: Skin is warm and dry.      Findings: No erythema.   Neurological:      Mental Status: He is alert and oriented to person, place, and time.      Cranial Nerves: No cranial nerve deficit.   Psychiatric:         Attention and Perception: He is inattentive.         Thought Content: Thought content is delusional.         Cognition and Memory: Memory is impaired. He exhibits impaired recent memory.         Laboratory:  Recent Labs   Lab 01/20/25  2227   WBC 10.69   HGB 12.2*   HCT 39.1*      MONO 8.0  0.9   EOSINOPHIL 0.5     Recent Labs   Lab 01/24/25  0533 01/25/25  0556 01/26/25  0549    138 140   K 3.8 4.0 3.6   CL 91* 93* 93*   CO2 41* 37* 36*   BUN 26* 21 15   CREATININE 1.0 0.9 0.9   CALCIUM 8.5* 8.6* 8.6*   PHOS  --   --  3.0       Diagnostic Results:  X-Ray: Reviewed  US: Reviewed  Echo: Reviewed  ASSESSMENT/PLAN:       Alkalosis -  Respiratory acidosis with  Metabolic alkalosis most likely due to diuretics use and   respiratory know to have COPD   -- ph 7.355 now   Now diuretics on hold per chart review on Home O2 3L seems to be at baseline   If need diuretics would use Acetazolamide   -- Use BiPAP  -- Will sign off       Latest Reference Range & Units 01/25/25 11:52   POC PH 7.350 - 7.450  7.355   POC PCO2 35.0 - 45.0 mmHg 86.7 (H)   POC PO2 40.0 - 60.0 mmHg 20.0 (LL)   POC HCO3 24.0 - 28.0 mmol/l 48.4 (H)   POC SATURATED O2 95.0 - 100.0 % 25.7 (LL)   Specimen source  Venous   Base Deficit -2.0 - 2.0 mmol/l 18.2  (H)   FiO2 % 21.0   (LL): Data is critically low  (H): Data is abnormally high       Lab Results   Component Value Date    CO2 36 (H) 01/26/2025         HTN -  controlled     Anemia    Recent Labs   Lab 01/20/25 2227   HGB 12.2*   HCT 39.1*          Iron check levels   Lab Results   Component Value Date    FERRITIN 258 01/26/2025               Nutrition/Hypoalbuminemia    Recent Labs   Lab 01/20/25 2227 01/26/25  0549   ALBUMIN 2.5* 2.3*     Nepro with meals TID.       Thank you for allowing me to participate in care of your patient  With any question please call   Toma Brown MD     [facility]  JENNY Hare MD,   MD MAGDY Nathan MD [address], ACAC[facility]  [id number]   [place], [address]  [phone]  After hours answering service: [phone]

## 2025-01-27 LAB
ALBUMIN SERPL BCP-MCNC: 2.4 G/DL (ref 3.5–5.2)
ANION GAP SERPL CALC-SCNC: 6 MMOL/L (ref 8–16)
BACTERIA UR CULT: ABNORMAL
BACTERIA UR CULT: ABNORMAL
BUN SERPL-MCNC: 13 MG/DL (ref 8–23)
CALCIUM SERPL-MCNC: 8.8 MG/DL (ref 8.7–10.5)
CHLORIDE SERPL-SCNC: 91 MMOL/L (ref 95–110)
CO2 SERPL-SCNC: 39 MMOL/L (ref 23–29)
CREAT SERPL-MCNC: 0.9 MG/DL (ref 0.5–1.4)
EST. GFR  (NO RACE VARIABLE): >60 ML/MIN/1.73 M^2
GLUCOSE SERPL-MCNC: 126 MG/DL (ref 70–110)
PHOSPHATE SERPL-MCNC: 3.1 MG/DL (ref 2.7–4.5)
POTASSIUM SERPL-SCNC: 4.2 MMOL/L (ref 3.5–5.1)
SODIUM SERPL-SCNC: 136 MMOL/L (ref 136–145)

## 2025-01-27 PROCEDURE — 99233 SBSQ HOSP IP/OBS HIGH 50: CPT | Mod: HCNC,,, | Performed by: NURSE PRACTITIONER

## 2025-01-27 PROCEDURE — 27000221 HC OXYGEN, UP TO 24 HOURS: Mod: HCNC

## 2025-01-27 PROCEDURE — 36415 COLL VENOUS BLD VENIPUNCTURE: CPT | Mod: HCNC | Performed by: STUDENT IN AN ORGANIZED HEALTH CARE EDUCATION/TRAINING PROGRAM

## 2025-01-27 PROCEDURE — 25000003 PHARM REV CODE 250: Mod: HCNC | Performed by: FAMILY MEDICINE

## 2025-01-27 PROCEDURE — 87077 CULTURE AEROBIC IDENTIFY: CPT | Mod: HCNC | Performed by: INTERNAL MEDICINE

## 2025-01-27 PROCEDURE — 21400001 HC TELEMETRY ROOM: Mod: HCNC

## 2025-01-27 PROCEDURE — 94664 DEMO&/EVAL PT USE INHALER: CPT | Mod: HCNC

## 2025-01-27 PROCEDURE — 99232 SBSQ HOSP IP/OBS MODERATE 35: CPT | Mod: HCNC,,, | Performed by: PHYSICIAN ASSISTANT

## 2025-01-27 PROCEDURE — 99900035 HC TECH TIME PER 15 MIN (STAT): Mod: HCNC

## 2025-01-27 PROCEDURE — 94640 AIRWAY INHALATION TREATMENT: CPT | Mod: HCNC

## 2025-01-27 PROCEDURE — 80069 RENAL FUNCTION PANEL: CPT | Mod: HCNC | Performed by: STUDENT IN AN ORGANIZED HEALTH CARE EDUCATION/TRAINING PROGRAM

## 2025-01-27 PROCEDURE — 87040 BLOOD CULTURE FOR BACTERIA: CPT | Mod: HCNC | Performed by: INTERNAL MEDICINE

## 2025-01-27 PROCEDURE — 25000242 PHARM REV CODE 250 ALT 637 W/ HCPCS: Mod: HCNC | Performed by: INTERNAL MEDICINE

## 2025-01-27 PROCEDURE — 27000646 HC AEROBIKA DEVICE: Mod: HCNC

## 2025-01-27 PROCEDURE — 25000003 PHARM REV CODE 250: Mod: HCNC | Performed by: INTERNAL MEDICINE

## 2025-01-27 PROCEDURE — 94761 N-INVAS EAR/PLS OXIMETRY MLT: CPT | Mod: HCNC

## 2025-01-27 PROCEDURE — 25000003 PHARM REV CODE 250: Mod: HCNC | Performed by: STUDENT IN AN ORGANIZED HEALTH CARE EDUCATION/TRAINING PROGRAM

## 2025-01-27 PROCEDURE — 25000242 PHARM REV CODE 250 ALT 637 W/ HCPCS: Mod: HCNC

## 2025-01-27 PROCEDURE — 63600175 PHARM REV CODE 636 W HCPCS: Mod: HCNC | Performed by: INTERNAL MEDICINE

## 2025-01-27 PROCEDURE — 87186 SC STD MICRODIL/AGAR DIL: CPT | Mod: HCNC | Performed by: INTERNAL MEDICINE

## 2025-01-27 PROCEDURE — 36415 COLL VENOUS BLD VENIPUNCTURE: CPT | Mod: HCNC | Performed by: INTERNAL MEDICINE

## 2025-01-27 RX ORDER — MEROPENEM 1 G/1
1 INJECTION, POWDER, FOR SOLUTION INTRAVENOUS
Status: DISCONTINUED | OUTPATIENT
Start: 2025-01-27 | End: 2025-01-28

## 2025-01-27 RX ADMIN — OXYCODONE 10 MG: 5 TABLET ORAL at 01:01

## 2025-01-27 RX ADMIN — LEVALBUTEROL 0.25 MG: 1.25 SOLUTION, CONCENTRATE RESPIRATORY (INHALATION) at 12:01

## 2025-01-27 RX ADMIN — GUAIFENESIN 600 MG: 600 TABLET, EXTENDED RELEASE ORAL at 08:01

## 2025-01-27 RX ADMIN — MEROPENEM 1 G: 1 INJECTION, POWDER, FOR SOLUTION INTRAVENOUS at 10:01

## 2025-01-27 RX ADMIN — LEVALBUTEROL 0.25 MG: 1.25 SOLUTION, CONCENTRATE RESPIRATORY (INHALATION) at 03:01

## 2025-01-27 RX ADMIN — ACETYLCYSTEINE 2 ML: 200 INHALANT RESPIRATORY (INHALATION) at 12:01

## 2025-01-27 RX ADMIN — IPRATROPIUM BROMIDE 0.5 MG: 0.5 SOLUTION RESPIRATORY (INHALATION) at 03:01

## 2025-01-27 RX ADMIN — METOPROLOL TARTRATE 100 MG: 50 TABLET, FILM COATED ORAL at 08:01

## 2025-01-27 RX ADMIN — LEVOTHYROXINE SODIUM 200 MCG: 100 TABLET ORAL at 05:01

## 2025-01-27 RX ADMIN — MUPIROCIN: 20 OINTMENT TOPICAL at 08:01

## 2025-01-27 RX ADMIN — PIPERACILLIN SODIUM AND TAZOBACTAM SODIUM 4.5 G: 4; .5 INJECTION, POWDER, LYOPHILIZED, FOR SOLUTION INTRAVENOUS at 05:01

## 2025-01-27 RX ADMIN — METHOCARBAMOL 500 MG: 500 TABLET ORAL at 08:01

## 2025-01-27 RX ADMIN — ACETYLCYSTEINE 2 ML: 200 INHALANT RESPIRATORY (INHALATION) at 08:01

## 2025-01-27 RX ADMIN — EMPAGLIFLOZIN 10 MG: 10 TABLET, FILM COATED ORAL at 08:01

## 2025-01-27 RX ADMIN — ACETYLCYSTEINE 2 ML: 200 INHALANT RESPIRATORY (INHALATION) at 03:01

## 2025-01-27 RX ADMIN — HYDROXYZINE HYDROCHLORIDE 50 MG: 25 TABLET, FILM COATED ORAL at 05:01

## 2025-01-27 RX ADMIN — LEVALBUTEROL 0.25 MG: 1.25 SOLUTION, CONCENTRATE RESPIRATORY (INHALATION) at 08:01

## 2025-01-27 RX ADMIN — MIRTAZAPINE 7.5 MG: 7.5 TABLET, FILM COATED ORAL at 08:01

## 2025-01-27 RX ADMIN — METHOCARBAMOL TABLETS 750 MG: 750 TABLET, COATED ORAL at 03:01

## 2025-01-27 RX ADMIN — MELATONIN TAB 3 MG 6 MG: 3 TAB at 08:01

## 2025-01-27 RX ADMIN — TAMSULOSIN HYDROCHLORIDE 0.4 MG: 0.4 CAPSULE ORAL at 08:01

## 2025-01-27 RX ADMIN — IPRATROPIUM BROMIDE 0.5 MG: 0.5 SOLUTION RESPIRATORY (INHALATION) at 08:01

## 2025-01-27 RX ADMIN — IPRATROPIUM BROMIDE 0.5 MG: 0.5 SOLUTION RESPIRATORY (INHALATION) at 12:01

## 2025-01-27 RX ADMIN — MEROPENEM 1 G: 1 INJECTION, POWDER, FOR SOLUTION INTRAVENOUS at 03:01

## 2025-01-27 RX ADMIN — ATORVASTATIN CALCIUM 10 MG: 10 TABLET, FILM COATED ORAL at 08:01

## 2025-01-27 NOTE — ASSESSMENT & PLAN NOTE
Patient's blood pressure range in the last 24 hours was: BP  Min: 96/53  Max: 120/72.The patient's inpatient anti-hypertensive regimen is listed below:  Current Antihypertensives  metoprolol tartrate (LOPRESSOR) tablet 100 mg, 2 times daily, Oral    Plan  - BP is controlled, no changes needed to their regimen  - Consider further up titration of BB to suppress PVCs

## 2025-01-27 NOTE — PROGRESS NOTES
[facility] Infectious Disease Progress Note     Consultant Attending: Dr. Fernandez  Date of Admit: 2025    Reason for Consult     Bacteremia      Assessment/Plan     Bean Salas is a 80 y.o. male with:     UTI (Citrobacter, Pseudomonas)   E. Faecalis bacteremia   - Urine cultures w/ Citrobacter freundii & Pseudomonas aerugenosa; sensitivities resulted   - Blood cultures w/ E. Faecalis, sensitivities pending   - Repeat blood cultures NGTD   - Continue zosyn for now, will follow cultures/sensitivities to narrow antibiotic therapy    Thank you for this consult. Case to be discussed with the attending physician - attestation to follow.     Gary Dangelo MD   [facility] Internal Medicine, PGY-2  [facility] Infectious Disease Consults     History of Present Illness     Bean Salas is a 80 y.o. male with a PMH of CAD, combined CHF (recovered EF), CAD, A-fib on eliquis,COPD, chronic respiratory failure on 3L NC, and hypothyroidism who presents to the ED for worsening SOB and back pain. Patient was evaluated emergency department and was found to be in respiratory distress and satting 86-88% on 5 L. Patient was placed on BiPAP and seemed to improve. Patient also had a fall several days ago and had a T8 vertebral fracture and was complaining of moderate pain. ID is consulted due to E faecalis bacteremia. Also with GNR in urine culture     Interval History     Afebrile, intermittent hypoxia. Mckeon in place with clear urine output. Patient has not had a bowel movement.     Objective:   BP  Min: 96/53  Max: 120/72  Temp  Av.6 °F (36.4 °C)  Min: 97.4 °F (36.3 °C)  Max: 97.9 °F (36.6 °C)  Pulse  Av.2  Min: 46  Max: 108  Resp  Av.7  Min: 16  Max: 20  SpO2  Av.6 %  Min: 92 %  Max: 96 %    Physical Examination   Physical Exam  Vitals reviewed.   Constitutional:       General: He is not in acute distress.     Appearance: He is ill-appearing.   HENT:      Head: Normocephalic and atraumatic.   Cardiovascular:      Rate and  Rhythm: Normal rate and regular rhythm.      Heart sounds: No murmur heard.  Pulmonary:      Effort: Pulmonary effort is normal.      Breath sounds: No wheezing or rhonchi.   Abdominal:      Palpations: Abdomen is soft.      Tenderness: There is no abdominal tenderness. There is no guarding.   Musculoskeletal:      Right lower leg: No edema.      Left lower leg: No edema.   Skin:     General: Skin is warm.      Comments: Erythema and thickening over the bilateral lower extremities    Neurological:      Mental Status: He is alert and oriented to person, place, and time.   Psychiatric:         Mood and Affect: Mood normal.         Behavior: Behavior normal.       Laboratory Data Reviewed:  Recent Labs   Lab 01/20/25  2227 01/22/25  0710 01/25/25  0556 01/26/25  0549 01/27/25  0627   WBC 10.69  --   --   --   --    HGB 12.2*  --   --   --   --    HCT 39.1*  --   --   --   --      --   --   --   --       < > 138 140 136   K 4.2   < > 4.0 3.6 4.2   CL 96   < > 93* 93* 91*   CREATININE 1.1   < > 0.9 0.9 0.9   BUN 26*   < > 21 15 13   CO2 28   < > 37* 36* 39*   ALT 22  --   --   --   --    AST 24  --   --   --   --     < > = values in this interval not displayed.     Microbiology Data Reviewed:  Microbiology Results (last 7 days)       Procedure Component Value Units Date/Time    Blood culture [[id number]] Collected: 01/25/25 1555    Order Status: Completed Specimen: Blood from Peripheral, Hand, Right Updated: 01/27/25 0612     Blood Culture, Routine No Growth to date      No Growth to date    Narrative:      20992  Collection has been rescheduled by [facility] at 01/25/2025 16:04 Reason:   Labs done  pda out. UAD  Collection has been rescheduled by [facility]AD at 01/25/2025 16:04 Reason:   Labs done  pda out. UAD    Blood culture [[id number]]  (Abnormal) Collected: 01/24/25 1732    Order Status: Completed Specimen: Blood from Peripheral, Forearm, Right Updated: 01/26/25 1351     Blood Culture, Routine Gram stain aer bottle:  Gram positive cocci in chains resembling Strep      Results called to and read back by: Linda Dominguez RN,01/25/2025, 15:04      Gram stain alden bottle: Gram positive cocci in chains resembling Strep      Positive results previously called 01/25/2025  19:27      ENTEROCOCCUS FAECALIS  Susceptibility pending      Urine culture [[id number]]  (Abnormal)  (Susceptibility) Collected: 01/21/25 0017    Order Status: Completed Specimen: Urine Updated: 01/26/25 1341     Urine Culture, Routine CITROBACTER FREUNDII  50,000 - 99,999 cfu/ml        PSEUDOMONAS AERUGINOSA  50,000 - 99,999 cfu/ml  Susceptibility pending      Narrative:      Specimen Source->Urine    Blood culture x two cultures. Draw prior to antibiotics. [[id number]]  (Abnormal)  (Susceptibility) Collected: 01/20/25 2156    Order Status: Completed Specimen: Blood Updated: 01/26/25 0954     Blood Culture, Routine Gram stain aer bottle: Gram positive cocci      Gram stain alden bottle: Gram positive cocci.      Results called to and read back by:Hilary Yo RN, 01/24/2025,17:01.      ENTEROCOCCUS FAECALIS    Narrative:      Aerobic and anaerobic    Rapid Organism ID by PCR (from Blood culture) [[id number]]  (Abnormal) Collected: 01/25/25 1507    Order Status: Completed Updated: 01/25/25 1746     Enterococcus faecalis Detected     Enterococcus faecium Not Detected     Listeria monocytogenes Not Detected     Staphylococcus spp. Not Detected     Staphylococcus aureus Not Detected     Staphylococcus epidermidis Not Detected     Staphylococcus lugdunensis Not Detected     Streptococcus species Not Detected     Streptococcus agalactiae Not Detected     Streptococcus pneumoniae Not Detected     Streptococcus pyogenes Not Detected     Acinetobacter calcoaceticus/baumannii complex Not Detected     Bacteroides fragilis Not Detected     Enterobacterales Not Detected     Enterobacter cloacae complex Not Detected     Escherichia coli Not Detected     Klebsiella aerogenes Not  Detected     Klebsiella oxytoca Not Detected     Klebsiella pneumoniae group Not Detected     Proteus Not Detected     Salmonella sp Not Detected     Serratia marcescens Not Detected     Haemophilus influenzae Not Detected     Neisseria meningtidis Not Detected     Pseudomonas aeruginosa Not Detected     Stenotrophomonas maltophilia Not Detected     Candida albicans Not Detected     Candida auris Not Detected     Candida glabrata Not Detected     Candida krusei Not Detected     Candida parapsilosis Not Detected     Candida tropicalis Not Detected     Cryptococcus neoformans/gattii Not Detected     CTX-M (ESBL ) Test Not Applicable     IMP (Carbapenem resistant) Test Not Applicable     KPC resistance gene (Carbapenem resistant) Test Not Applicable     mcr-1  Test Not Applicable     mec A/C  Test Not Applicable     mec A/C and MREJ (MRSA) gene Test Not Applicable     NDM (Carbapenem resistant) Test Not Applicable     OXA-48-like (Carbapenem resistant) Test Not Applicable     van A/B (VRE gene) Not Detected     VIM (Carbapenem resistant) Test Not Applicable    Blood culture x two cultures. Draw prior to antibiotics. [[id number]]  (Abnormal) Collected: 01/20/25 2156    Order Status: Completed Specimen: Blood Updated: 01/25/25 0846     Blood Culture, Routine Gram stain aer bottle: Gram positive cocci      Gram stain alden bottle: Gram positive cocci      Results called to and read back by:Hilary Yo RN, 01/24/2025, 17:03      ENTEROCOCCUS FAECALIS  For susceptibility see order #[id number]      Narrative:      Aerobic and anaerobic    Influenza A & B by Molecular [[id number]] Collected: 01/20/25 2228    Order Status: Completed Specimen: Nasopharyngeal Swab Updated: 01/20/25 2301     Influenza A, Molecular Negative     Influenza B, Molecular Negative     Flu A & B Source NP          Current Medications   acetylcysteine 200 mg/ml (20%)  2 mL Nebulization Q4H WAKE    atorvastatin  10 mg Oral Daily    empagliflozin   10 mg Oral Daily    guaiFENesin  600 mg Oral BID    ipratropium  0.5 mg Nebulization Q4H WAKE    levalbuterol  0.25 mg Nebulization QID WAKE    levothyroxine  200 mcg Oral Before breakfast    methocarbamoL  500 mg Oral TID    metoprolol tartrate  100 mg Oral BID    mirtazapine  7.5 mg Oral Nightly    piperacillin-tazobactam (Zosyn) IV (PEDS and ADULTS) (extended infusion is not appropriate)  4.5 g Intravenous Q8H    polyethylene glycol  17 g Oral Daily    tamsulosin  0.4 mg Oral Daily

## 2025-01-27 NOTE — NURSING
Rapid Response Nurse Follow-up Note     Followed up with patient for proactive rounding.   No acute issues at this time. Reviewed plan of care with Bedside RNMarilu .       Temp 97.6 (36.4)  /68 MAP 81  HR 94  RR 16  SpO2 96% on 3L NC     Team will continue to follow.  Please call Rapid Response RNMagali RN with any questions or concerns at [phone].

## 2025-01-27 NOTE — ASSESSMENT & PLAN NOTE
Pain control.  Back brace which is to be delivered to patient's home.    Unstable T8 fracture  -Neurosurgically stable   -will need surgical stabilization with percutaneous instrumentation  -Bedrest  -Will need medical clearance for surgery  -Surgery Tuesday with Dr. Elizabeth if cleared  -Please message us today about clearance for surgery      Due to persistently positive blood cultures will need to hold off on surgery  ID following

## 2025-01-27 NOTE — CARE UPDATE
01/27/25 1548   Patient Assessment/Suction   Level of Consciousness (AVPU) alert   Respiratory Effort Normal;Unlabored   Expansion/Accessory Muscles/Retractions no retractions   All Lung Fields Breath Sounds Anterior:;Lateral:;crackles, coarse   FRANCISCO Breath Sounds crackles, fine;crackles, coarse   LLL Breath Sounds crackles, coarse   RUL Breath Sounds crackles, coarse   RML Breath Sounds crackles, coarse   RLL Breath Sounds crackles, coarse   Rhythm/Pattern, Respiratory pattern regular;unlabored   Skin Integrity   $ Wound Care Tech Time 15 min   Area Observed Left;Right;Behind ear   Skin Appearance without discoloration   PRE-TX-O2   Device (Oxygen Therapy) nasal cannula   Flow (L/min) (Oxygen Therapy) 3   SpO2 95 %   Pulse Oximetry Type Intermittent   Pulse 62   Resp 18   Positioning HOB elevated 45 degrees   Aerosol Therapy   $ Aerosol Therapy Charges Aerosol Treatment   Respiratory Treatment Status (SVN) given   Treatment Route (SVN) mask;oxygen   Patient Position HOB elevated   Post Treatment Assessment (SVN) breath sounds unchanged   Signs of Intolerance (SVN) none   Breath Sounds Post-Respiratory Treatment   Throughout All Fields Post-Treatment All Fields   Throughout All Fields Post-Treatment crackles, coarse   Post-treatment Heart Rate (beats/min) 58   Post-treatment Resp Rate (breaths/min) 19   Vibratory PEP Therapy   $ Vibratory PEP Charges Aerobika Therapy   $ Vibratory PEP Tech Time Charges 15 min   Type (PEP Therapy) vibratory/oscillatory   Device (PEP Therapy) flutter   Route (PEP Therapy) mouthpiece   Breaths per Cycle (PEP Therapy) 10   Cycles (PEP Therapy) 1   Settings (PEP Therapy) PEP 4   Patient Position (PEP Therapy) HOB elevated   Post Treatment Assessment (PEP) breath sounds unchanged   Signs of Intolerance (PEP Therapy) none   General Safety Checklist   Safety Promotion/Fall Prevention side rails raised

## 2025-01-27 NOTE — PLAN OF CARE
"   01/27/25 1000   Rounds   Attendance Provider;Nurse    Discharge Plan A Skilled Nursing Facility   Why the patient remains in the hospital Requires continued medical care   Transition of Care Barriers Transportation     1000  CM was informed by Dr Collins that the pt is not medically stable to discharge  & is scheduled to have surgical stabilization with percutaneous instrumentation done by Dr Elizabeth on 1/28/2025 due to an unstable T8 fracture.     1020  Updated notes sent to [facility] via [employer]. Awaiting response.     1505  CM questioned Ruthie ([phone]) with [facility] regarding referral status. Ruthie stated that the referral is "under review".       Will continue to follow.   "

## 2025-01-27 NOTE — PROGRESS NOTES
[facility]  Neurosurgery  Progress Note    Subjective:     Interval History: Back pain controlled.    History of Present Illness:   This is a very pleasant 80 y.o. male, CAD, pacemaker, lives at home, fell about a week ago and started to have severe midthoracic back pain 4 days ago.  The pain is severe in intensity and worse with standing up, twisting in bed.  He has been unable to walk since he has been feeling the pain.  Has not noticed any new onset of motor weakness.  He has chronic right hip flexion weakness due to a prior femur and pelvic fracture.     During my visit he is comfortable in bed.  Denies having any upper extremity or lower extremity weakness, numbness or sphincter dysfunction symptoms.  Was seen by Physical therapy today.          Post-Op Info:  Procedure(s) (LRB):  FUSION,SPINE,THORACIC,POSTERIOR APPROACH,USING COMPUTER-ASSISTED NAVIGATION (N/A)          Medications:  Continuous Infusions:  Scheduled Meds:   acetylcysteine 200 mg/ml (20%)  2 mL Nebulization Q4H WAKE    atorvastatin  10 mg Oral Daily    empagliflozin  10 mg Oral Daily    guaiFENesin  600 mg Oral BID    ipratropium  0.5 mg Nebulization Q4H WAKE    levalbuterol  0.25 mg Nebulization QID WAKE    levothyroxine  200 mcg Oral Before breakfast    methocarbamoL  500 mg Oral TID    metoprolol tartrate  100 mg Oral BID    mirtazapine  7.5 mg Oral Nightly    piperacillin-tazobactam (Zosyn) IV (PEDS and ADULTS) (extended infusion is not appropriate)  4.5 g Intravenous Q8H    polyethylene glycol  17 g Oral Daily    tamsulosin  0.4 mg Oral Daily     PRN Meds:  Current Facility-Administered Medications:     acetaminophen, 650 mg, Oral, Q8H PRN    hydrOXYzine HCL, 50 mg, Oral, TID PRN    LORazepam, 0.5 mg, Oral, Daily PRN    melatonin, 6 mg, Oral, Nightly PRN    methocarbamoL, 750 mg, Oral, QID PRN    ondansetron, 4 mg, Intravenous, Q12H PRN    oxyCODONE, 10 mg, Oral, Q6H PRN    sodium chloride 0.9%, 10 mL, Intravenous, PRN    Flushing  "PICC/Midline Protocol, , , Until Discontinued **AND** sodium chloride 0.9%, 10 mL, Intravenous, Q12H PRN     Review of Systems  Objective:     Weight: 94 kg (207 lb 3.7 oz)  Body mass index is 28.9 kg/m².  Vital Signs (Most Recent):  Temp: 97.5 °F (36.4 °C) (01/27/25 0740)  Pulse: (!) 48 (01/27/25 0806)  Resp: 18 (01/27/25 0806)  BP: (!) 96/53 (01/27/25 0740)  SpO2: (!) 92 % (01/27/25 0806) Vital Signs (24h Range):  Temp:  [97.4 °F (36.3 °C)-97.9 °F (36.6 °C)] 97.5 °F (36.4 °C)  Pulse:  [] 48  Resp:  [16-20] 18  SpO2:  [92 %-96 %] 92 %  BP: ()/(53-73) 96/53                                Urethral Catheter 01/20/25 1200 (Active)   Site Assessment Clean;Intact;Dry 01/24/25 1940   Collection Container Urimeter 01/24/25 1940   Securement Method secured to top of thigh w/ adhesive device 01/24/25 1940   Catheter Care Performed yes 01/24/25 0730   Reason for Continuing Urinary Catheterization Chronic Indwelling Urinary Catheter on Admission 01/24/25 1940   CAUTI Prevention Bundle Securement Device in place with 1" slack;Intact seal between catheter & drainage tubing;Drainage bag/urimeter off the floor;Sheeting clip in use;No dependent loops or kinks;Drainage bag/urimeter not overfilled (<2/3 full);Drainage bag/urimeter below bladder 01/24/25 1940   Output (mL) 800 mL 01/25/25 0614       Neurosurgery Physical Exam    General: well developed, well nourished, no distress  Mental Status: Awake, Alert, Oriented X3.Thought content appropriate  GCS: Motor: 6/Verbal: 5/Eyes: 4 GCS Total: 15    Motor Strength:  Strength  Deltoids Triceps Biceps Wrist Extension Wrist Flexion Hand                          HF KF KE DF PF EHL   Lower: R 5/5 5/5 5/5 5/5 5/5 4/5    L 5/5 5/5 5/5 5/5 5/5 5/5         Clonus: absent B/L          Significant Labs:  Recent Labs   Lab 01/26/25  0549 01/27/25  0627   * 126*    136   K 3.6 4.2   CL 93* 91*   CO2 36* 39*   BUN 15 13   CREATININE 0.9 0.9   CALCIUM 8.6* 8.8   MG 2.2  " "--      No results for input(s): "WBC", "HGB", "HCT", "PLT" in the last 48 hours.  No results for input(s): "LABPT", "INR", "APTT" in the last 48 hours.  Microbiology Results (last 7 days)       Procedure Component Value Units Date/Time    Blood culture [[id number]] Collected: 01/25/25 1555    Order Status: Completed Specimen: Blood from Peripheral, Hand, Right Updated: 01/27/25 0612     Blood Culture, Routine No Growth to date      No Growth to date    Narrative:      [id number]  Collection has been rescheduled by UAD at 01/25/2025 16:04 Reason:   Labs done  pda out. UAD  Collection has been rescheduled by UAD at 01/25/2025 16:04 Reason:   Labs done  pda out. UAD    Blood culture [[id number]]  (Abnormal) Collected: 01/24/25 1732    Order Status: Completed Specimen: Blood from Peripheral, Forearm, Right Updated: 01/26/25 1351     Blood Culture, Routine Gram stain aer bottle: Gram positive cocci in chains resembling Strep      Results called to and read back by: Linda Dominguez RN,01/25/2025, 15:04      Gram stain alden bottle: Gram positive cocci in chains resembling Strep      Positive results previously called 01/25/2025  19:27      ENTEROCOCCUS FAECALIS  Susceptibility pending      Urine culture [[id number]]  (Abnormal)  (Susceptibility) Collected: 01/21/25 0017    Order Status: Completed Specimen: Urine Updated: 01/26/25 1341     Urine Culture, Routine CITROBACTER FREUNDII  50,000 - 99,999 cfu/ml        PSEUDOMONAS AERUGINOSA  50,000 - 99,999 cfu/ml  Susceptibility pending      Narrative:      Specimen Source->Urine    Blood culture x two cultures. Draw prior to antibiotics. [[id number]]  (Abnormal)  (Susceptibility) Collected: 01/20/25 2156    Order Status: Completed Specimen: Blood Updated: 01/26/25 0954     Blood Culture, Routine Gram stain aer bottle: Gram positive cocci      Gram stain alden bottle: Gram positive cocci.      Results called to and read back by:Hilary Yo RN, 01/24/2025,17:01.      ENTEROCOCCUS " FAECALIS    Narrative:      Aerobic and anaerobic    Rapid Organism ID by PCR (from Blood culture) [[id number]]  (Abnormal) Collected: 01/25/25 1507    Order Status: Completed Updated: 01/25/25 5236     Enterococcus faecalis Detected     Enterococcus faecium Not Detected     Listeria monocytogenes Not Detected     Staphylococcus spp. Not Detected     Staphylococcus aureus Not Detected     Staphylococcus epidermidis Not Detected     Staphylococcus lugdunensis Not Detected     Streptococcus species Not Detected     Streptococcus agalactiae Not Detected     Streptococcus pneumoniae Not Detected     Streptococcus pyogenes Not Detected     Acinetobacter calcoaceticus/baumannii complex Not Detected     Bacteroides fragilis Not Detected     Enterobacterales Not Detected     Enterobacter cloacae complex Not Detected     Escherichia coli Not Detected     Klebsiella aerogenes Not Detected     Klebsiella oxytoca Not Detected     Klebsiella pneumoniae group Not Detected     Proteus Not Detected     Salmonella sp Not Detected     Serratia marcescens Not Detected     Haemophilus influenzae Not Detected     Neisseria meningtidis Not Detected     Pseudomonas aeruginosa Not Detected     Stenotrophomonas maltophilia Not Detected     Candida albicans Not Detected     Candida auris Not Detected     Candida glabrata Not Detected     Candida krusei Not Detected     Candida parapsilosis Not Detected     Candida tropicalis Not Detected     Cryptococcus neoformans/gattii Not Detected     CTX-M (ESBL ) Test Not Applicable     IMP (Carbapenem resistant) Test Not Applicable     KPC resistance gene (Carbapenem resistant) Test Not Applicable     mcr-1  Test Not Applicable     mec A/C  Test Not Applicable     mec A/C and MREJ (MRSA) gene Test Not Applicable     NDM (Carbapenem resistant) Test Not Applicable     OXA-48-like (Carbapenem resistant) Test Not Applicable     van A/B (VRE gene) Not Detected     VIM (Carbapenem resistant) Test  Not Applicable    Blood culture x two cultures. Draw prior to antibiotics. [[id number]]  (Abnormal) Collected: 01/20/25 2156    Order Status: Completed Specimen: Blood Updated: 01/25/25 0846     Blood Culture, Routine Gram stain aer bottle: Gram positive cocci      Gram stain alden bottle: Gram positive cocci      Results called to and read back by:Hilary Yo RN, 01/24/2025, 17:03      ENTEROCOCCUS FAECALIS  For susceptibility see order #[id number]      Narrative:      Aerobic and anaerobic    Influenza A & B by Molecular [[id number]] Collected: 01/20/25 2228    Order Status: Completed Specimen: Nasopharyngeal Swab Updated: 01/20/25 2301     Influenza A, Molecular Negative     Influenza B, Molecular Negative     Flu A & B Source NP            Significant Diagnostics:    Impression:     1. Obliquely oriented fracture through the T8 vertebral body.  2. Partially healed fractures of 11th and 12th ribs near the costovertebral junction.  3. Right anterolateral bulky paravertebral ossification in the mid to lower thoracic spine, without significant bony ankylosis and with relatively few syndesmophytes.  Imaging findings are more characteristic of DISH.  4. Volume overload and CHF with pulmonary edema and bilateral pleural effusions.        Electronically signed by:Manuel Vásquez Jr  Date:                                            01/24/2025  Time:                                           16:40  Assessment/Plan:     Active Diagnoses:    Diagnosis Date Noted POA    PRINCIPAL PROBLEM:  Acute on chronic hypoxic respiratory failure [J96.21] 09/16/2024 Yes    Bacteremia [R78.81] 01/25/2025 Yes    Closed fracture of T8 vertebra [S22.069A] 01/21/2025 Yes    UTI (urinary tract infection) [N39.0] 01/21/2025 Yes    A-fib [I48.91] 10/28/2024 Yes    COPD exacerbation [J44.1] 09/06/2023 Yes    Acute on chronic diastolic heart failure [I50.33]  Yes    Essential hypertension [I10] 06/25/2017 Yes     Chronic      Problems Resolved  During this Admission:     Unstable T8 fracture    -Neurosurgically stable   -will need surgical stabilization with percutaneous instrumentation  -Bedrest  -Will need medical clearance for surgery  -Surgery Tuesday with Dr. Elizabeth if cleared  -Please message us today about clearance for surgery    All of the above discussed and reviewed with Dr. Elizabeth.      Yodit Foster PA-C  Neurosurgery  [place] - Telemetry

## 2025-01-27 NOTE — SUBJECTIVE & OBJECTIVE
Interval History: Alert, pleasant but confused today. 3rd set of BCx are positive    Review of Systems   Constitutional:  Positive for activity change.   HENT: Negative.     Respiratory:  Negative for shortness of breath.    Cardiovascular: Negative.    Gastrointestinal: Negative.    Genitourinary: Negative.    Musculoskeletal:  Positive for back pain.   Neurological: Negative.    Psychiatric/Behavioral:  Positive for confusion and decreased concentration.    All other systems reviewed and are negative.    Objective:     Vital Signs (Most Recent):  Temp: 97.8 °F (36.6 °C) (01/27/25 1125)  Pulse: 88 (01/27/25 1201)  Resp: 16 (01/27/25 1352)  BP: (!) 118/59 (01/27/25 1125)  SpO2: (!) 93 % (01/27/25 1201) Vital Signs (24h Range):  Temp:  [97.4 °F (36.3 °C)-97.9 °F (36.6 °C)] 97.8 °F (36.6 °C)  Pulse:  [] 88  Resp:  [16-20] 16  SpO2:  [92 %-96 %] 93 %  BP: ()/(53-73) 118/59     Weight: 94 kg (207 lb 3.7 oz)  Body mass index is 28.9 kg/m².    Intake/Output Summary (Last 24 hours) at 1/27/2025 1425  Last data filed at 1/27/2025 0600  Gross per 24 hour   Intake 370 ml   Output 1400 ml   Net -1030 ml         Physical Exam  Constitutional:       General: He is not in acute distress.  HENT:      Head: Normocephalic and atraumatic.      Nose: Nose normal.      Mouth/Throat:      Mouth: Mucous membranes are moist.   Eyes:      Extraocular Movements: Extraocular movements intact.      Pupils: Pupils are equal, round, and reactive to light.   Cardiovascular:      Rate and Rhythm: Regular rhythm.   Pulmonary:      Breath sounds: Rhonchi present.   Abdominal:      Palpations: Abdomen is soft.   Musculoskeletal:         General: Normal range of motion.      Cervical back: Neck supple.   Skin:     General: Skin is warm.   Neurological:      Mental Status: He is alert. Mental status is at baseline.   Psychiatric:         Mood and Affect: Mood normal.             Significant Labs: All pertinent labs within the past 24 hours  "have been reviewed.  CBC: No results for input(s): "WBC", "HGB", "HCT", "PLT" in the last 48 hours.  CMP:   Recent Labs   Lab 01/26/25  0549 01/27/25  0627    136   K 3.6 4.2   CL 93* 91*   CO2 36* 39*   * 126*   BUN 15 13   CREATININE 0.9 0.9   CALCIUM 8.6* 8.8   ALBUMIN 2.3* 2.4*   ANIONGAP 11 6*       Significant Imaging: I have reviewed all pertinent imaging results/findings within the past 24 hours.  "

## 2025-01-27 NOTE — ASSESSMENT & PLAN NOTE
BCx 1/21 +Enterococcus, repeat BCx 1/24, 1/25 still positive  ID consulted  Cardiology reviewed echo, did not see vegetations

## 2025-01-27 NOTE — NURSING
Rapid Response Nurse Follow-up Note     Followed up with patient for proactive rounding.   No acute issues at this time. Reviewed plan of care with Charge RNRia. . Pt resting in bed eating breakfast. VSS, on 3L NC. Endorses SOB with exertion. Denies CP. If cleared, will have procedure tomorrow (1/28).  Team will continue to follow.  Please call Rapid Response RN, Sergey Strauss RN with any questions or concerns at [phone].

## 2025-01-27 NOTE — SUBJECTIVE & OBJECTIVE
Review of Systems   Constitutional: Negative.   Cardiovascular:  Positive for dyspnea on exertion. Negative for chest pain, near-syncope, orthopnea, palpitations, paroxysmal nocturnal dyspnea and syncope.   Respiratory:  Negative for cough.    Musculoskeletal:  Positive for back pain and myalgias.     Objective:     Vital Signs (Most Recent):  Temp: 97.5 °F (36.4 °C) (01/27/25 0740)  Pulse: 96 (01/27/25 1018)  Resp: 18 (01/27/25 0806)  BP: (!) 96/53 (01/27/25 0740)  SpO2: (!) 92 % (01/27/25 0806) Vital Signs (24h Range):  Temp:  [97.4 °F (36.3 °C)-97.9 °F (36.6 °C)] 97.5 °F (36.4 °C)  Pulse:  [] 96  Resp:  [16-20] 18  SpO2:  [92 %-96 %] 92 %  BP: ()/(53-73) 96/53     Weight: 94 kg (207 lb 3.7 oz)  Body mass index is 28.9 kg/m².     SpO2: (!) 92 %         Intake/Output Summary (Last 24 hours) at 1/27/2025 1052  Last data filed at 1/27/2025 0600  Gross per 24 hour   Intake 370 ml   Output 1400 ml   Net -1030 ml       Lines/Drains/Airways       Drain  Duration                  Urethral Catheter 01/20/25 1200 6 days              Peripheral Intravenous Line  Duration                  Midline Catheter - Single Lumen 01/25/25 1320 Right basilic vein (medial side of arm) other (see comments) 1 day                       Physical Exam  Constitutional:       General: He is not in acute distress.     Appearance: He is not diaphoretic.   HENT:      Head: Atraumatic.   Eyes:      General:         Right eye: No discharge.         Left eye: No discharge.   Cardiovascular:      Rate and Rhythm: Normal rate and regular rhythm. Frequent Extrasystoles are present.  Pulmonary:      Effort: Pulmonary effort is normal.      Breath sounds: Rhonchi present.   Abdominal:      General: Bowel sounds are normal.      Palpations: Abdomen is soft.   Skin:     General: Skin is warm and dry.   Neurological:      Mental Status: He is alert. Mental status is at baseline.            Significant Labs: Blood Culture:   Recent Labs   Lab  "01/25/25  1555   LABBLOO No Growth to date  No Growth to date   , BMP:   Recent Labs   Lab 01/26/25  0549 01/27/25  0627   * 126*    136   K 3.6 4.2   CL 93* 91*   CO2 36* 39*   BUN 15 13   CREATININE 0.9 0.9   CALCIUM 8.6* 8.8   MG 2.2  --    , CMP   Recent Labs   Lab 01/26/25  0549 01/27/25  0627    136   K 3.6 4.2   CL 93* 91*   CO2 36* 39*   * 126*   BUN 15 13   CREATININE 0.9 0.9   CALCIUM 8.6* 8.8   ALBUMIN 2.3* 2.4*   ANIONGAP 11 6*   , CBC No results for input(s): "WBC", "HGB", "HCT", "PLT" in the last 48 hours., INR No results for input(s): "INR", "PROTIME" in the last 48 hours., Lipid Panel No results for input(s): "CHOL", "HDL", "LDLCALC", "TRIG", "CHOLHDL" in the last 48 hours., Troponin No results for input(s): "TROPONINIHS" in the last 48 hours., and All pertinent lab results from the last 24 hours have been reviewed.    Significant Imaging: Echocardiogram: Transthoracic echo (TTE) complete (Cupid Only):   Results for orders placed or performed during the hospital encounter of 01/20/25   Echo   Result Value Ref Range    BSA 2.18 m2    A2C EF 30 %    A4C EF 42 %    LVIDd 5.0 3.5 - 6.0 cm    LV Systolic Volume 64.07 mL    LV Systolic Volume Index 29.8 mL/m2    LVIDs 3.9 2.1 - 4.0 cm    LV Diastolic Volume 116.37 mL    LV ESV A4C 135.73 mL    LV Diastolic Volume Index 54.13 mL/m2    LV EDV A2C [id number] mL    LV EDV A4C 122.01 mL    Left Ventricular End Systolic Volume by Teichholz Method 64.07 mL    Left Ventricular End Diastolic Volume by Teichholz Method 116.37 mL    IVS 1.1 0.6 - 1.1 cm    FS 22.0 (A) 28 - 44 %    Left Ventricle Relative Wall Thickness 0.44 cm    PW 1.1 0.6 - 1.1 cm    LV mass 207.1 g    LV Mass Index 96.3 g/m2    TDI LATERAL 0.12 m/s    TDI SEPTAL 0.09 m/s    Mean e' 0.11 m/s    ZLVIDS -0.68     ZLVIDD -3.33     LA area A4C 35.61 cm2    Est. RA pres 3 mmHg    Narrative      Left Ventricle: The left ventricle is normal in size. There is   concentric " remodeling. There is mildly reduced systolic function with a   visually estimated ejection fraction of 40 - 45%.    IVC/SVC: Normal venous pressure at 3 mmHg.    No definitive evidence of valvular vegetation

## 2025-01-27 NOTE — HOSPITAL COURSE
80-year-old elderly male with a past medical history of CAD, heart failure with mildly reduced ejection fraction, CAD, A-fib on eliquis,COPD, chronic respiratory failure on 3L NC, and hypothyroidism who presents to the ED for worsening SOB and back pain. Patient was evaluated emergency department and was found to be in respiratory distress and satting 86-88% on 5 L.  Patient also had a fall several days ago and had a T8 vertebral fracture.  Now found to have Enterococcus faecalis bacteremia for which ID has been consulted.  Cardiology consulted for volume overload, frequent PVCs.  Patient has a history of heart failure with reduced ejection fraction status post CRT pacemaker placement.  Telemetry shows frequent PVCs.           - most recent device interrogation from December 24 showed 66% bi V paced rhythm.  Recommend escalation of beta blocker therapy so the patient is paced 100% of the time.  Continue  metoprolol tartrate 100 mg twice a day.  Frequent PVCs noted on telemetry today.  Recommend using levalbuterol versus ipratropium instead of albuterol  - multiple ongoing medical issues.  Limited echocardiogram repeated today shows mildly reduced left ventricular ejection fraction (stable in comparison to previous echocardiogram) .  I will escalate beta blocker therapy today.  Metoprolol 100 mg twice a day.    - no significant valvular vegetations noted.  If concern for significant/persistent bacteremia, may consider DEMETRICE.  -previous echocardiogram had trace tricuspid regurgitation.     -blood workup this morning shows significant hypernatremia and alkalosis.  Recommend holding diuretic therapy for now.  -  Patient's primary team considering hospice  consultation.  From cardiac standpoint patient is at intermediate risk of major adverse cardiovascular events in the perioperative setting for thoracic spine fracture repair.  Continue high dose beta blocker therapy to suppress the PVCs.  Continue on telemetry.  -   We  will continue to follow the patient    01/27/2025 PVCs noted on tele, No VT noted. No cardiac complaints. LVEF stable at 40-45%.

## 2025-01-27 NOTE — PROGRESS NOTES
[facility] Medicine  Progress Note    Patient Name: Bean Salas  MRN: [id number]  Patient Class: IP- Inpatient   Admission Date: 1/20/2025  Length of Stay: 6 days  Attending Physician: Terra Centeno MD  Primary Care Provider: Ramirez Levine MD        Subjective     Principal Problem:Acute on chronic hypoxic respiratory failure  Acute Condition:         HPI:  Bean Salas is 80-year-old elderly male with a past medical history of CAD, combined CHF (recovered EF), CAD, A-fib on eliquis,COPD, chronic respiratory failure on 3L NC, and hypothyroidism who presents to the ED for worsening SOB and back pain.  Patient was evaluated emergency department and was found to be in respiratory distress and satting 86-88% on 5 L. Patient was placed on BiPAP and seemed to improve.  Patient also had a fall several days ago and had a T8 vertebral fracture and was complaining of moderate pain.  Due to patient's presenting symptoms he will require admission for further evaluation and management.  Patient is a poor historian however at time my examination he denied any headache, fever, chills, chest pain but complained of shortness of breath and back pain.    Overview/Hospital Course:  1/22/25 SOB is improved.  1/23/25 Improving  1/24/25 NSGY consult for T8 fx, pending CT and MRI given PPM  1/25/25 Somewhat confused today, ID, Nephrology and Pulm consulted  1/26/25 Still mildly confused  1/27/25 3rd set of BCx still positive for enterococcus, procedure canceled for tomorrow    Interval History: Alert, pleasant but confused today. 3rd set of BCx are positive    Review of Systems   Constitutional:  Positive for activity change.   HENT: Negative.     Respiratory:  Negative for shortness of breath.    Cardiovascular: Negative.    Gastrointestinal: Negative.    Genitourinary: Negative.    Musculoskeletal:  Positive for back pain.   Neurological: Negative.    Psychiatric/Behavioral:  Positive for confusion and  "decreased concentration.    All other systems reviewed and are negative.    Objective:     Vital Signs (Most Recent):  Temp: 97.8 °F (36.6 °C) (01/27/25 1125)  Pulse: 88 (01/27/25 1201)  Resp: 16 (01/27/25 1352)  BP: (!) 118/59 (01/27/25 1125)  SpO2: (!) 93 % (01/27/25 1201) Vital Signs (24h Range):  Temp:  [97.4 °F (36.3 °C)-97.9 °F (36.6 °C)] 97.8 °F (36.6 °C)  Pulse:  [] 88  Resp:  [16-20] 16  SpO2:  [92 %-96 %] 93 %  BP: ()/(53-73) 118/59     Weight: 94 kg (207 lb 3.7 oz)  Body mass index is 28.9 kg/m².    Intake/Output Summary (Last 24 hours) at 1/27/2025 1425  Last data filed at 1/27/2025 0600  Gross per 24 hour   Intake 370 ml   Output 1400 ml   Net -1030 ml         Physical Exam  Constitutional:       General: He is not in acute distress.  HENT:      Head: Normocephalic and atraumatic.      Nose: Nose normal.      Mouth/Throat:      Mouth: Mucous membranes are moist.   Eyes:      Extraocular Movements: Extraocular movements intact.      Pupils: Pupils are equal, round, and reactive to light.   Cardiovascular:      Rate and Rhythm: Regular rhythm.   Pulmonary:      Breath sounds: Rhonchi present.   Abdominal:      Palpations: Abdomen is soft.   Musculoskeletal:         General: Normal range of motion.      Cervical back: Neck supple.   Skin:     General: Skin is warm.   Neurological:      Mental Status: He is alert. Mental status is at baseline.   Psychiatric:         Mood and Affect: Mood normal.             Significant Labs: All pertinent labs within the past 24 hours have been reviewed.  CBC: No results for input(s): "WBC", "HGB", "HCT", "PLT" in the last 48 hours.  CMP:   Recent Labs   Lab 01/26/25  0549 01/27/25  0627    136   K 3.6 4.2   CL 93* 91*   CO2 36* 39*   * 126*   BUN 15 13   CREATININE 0.9 0.9   CALCIUM 8.6* 8.8   ALBUMIN 2.3* 2.4*   ANIONGAP 11 6*       Significant Imaging: I have reviewed all pertinent imaging results/findings within the past 24 hours.    Assessment " and Plan     * Acute on chronic hypoxic respiratory failure  Admit to medical floor with telemetry  Patient with Hypercapnic and Hypoxic Respiratory failure which is Acute on chronic.  he is on home oxygen at 3 LPM. Supplemental oxygen was provided and noted-      .   Signs/symptoms of respiratory failure include- increased work of breathing, respiratory distress, and use of accessory muscles. Contributing diagnoses includes - CHF and COPD Labs and images were reviewed. Patient Has recent ABG, which has been reviewed. Will treat underlying causes and adjust management of respiratory failure as follows- continue with BiPAP at night    Bacteremia  BCx 1/21 +Enterococcus, repeat BCx 1/24, 1/25 still positive  ID consulted  Cardiology reviewed echo, did not see vegetations      UTI (urinary tract infection)  Urine culture +Pseudomonas and Citrobacter  IV antibiotics - Meropenem      Closed fracture of T8 vertebra  Pain control.  Back brace which is to be delivered to patient's home.    Unstable T8 fracture  -Neurosurgically stable   -will need surgical stabilization with percutaneous instrumentation  -Bedrest  -Will need medical clearance for surgery  -Surgery Tuesday with Dr. Elizabeth if cleared  -Please message us today about clearance for surgery      Due to persistently positive blood cultures will need to hold off on surgery  ID following    A-fib  Patient has long standing persistent (>12 months) atrial fibrillation. Patient is currently in sinus rhythm. [id number] Score: 3. The patients heart rate in the last 24 hours is as follows:  Pulse  Min: 46  Max: 108     Antiarrhythmics  metoprolol tartrate (LOPRESSOR) tablet 100 mg, 2 times daily, Oral    Anticoagulants       Plan  - Replete lytes with a goal of K>4, Mg >2  - Patient is anticoagulated, see medications listed above.  - Patient's afib is currently controlled  - monitor for any arrhythmia        COPD exacerbation  Patient's COPD is with exacerbation noted by  continued dyspnea and use of accessory muscles for breathing currently.  Patient is currently off COPD Pathway. Continue scheduled inhalers Antibiotics and Supplemental oxygen and monitor respiratory status closely.     Acute on chronic diastolic heart failure  Strict I's and O's.    Diuresis with Lasix.  Inpatient Cardiology consultation.    Essential hypertension  Patient's blood pressure range in the last 24 hours was: BP  Min: 134/86  Max: 158/69.The patient's inpatient anti-hypertensive regimen is listed below:  Current Antihypertensives  metoprolol tartrate (LOPRESSOR) tablet 50 mg, 2 times daily, Oral  furosemide injection 40 mg, Every 12 hours, Intravenous    Plan  - BP is controlled, no changes needed to their regimen  - monitor blood pressure closely      VTE Risk Mitigation (From admission, onward)      None            Discharge Planning   LATOSHA: 1/30/2025     Code Status: DNR   Medical Readiness for Discharge Date:   Discharge Plan A: Home Health ([facility])                Please place Justification for DME        Terra Centeno MD  Department of Hospital Medicine   [facility] [facility]

## 2025-01-27 NOTE — PLAN OF CARE
Pt received on 2lpm NC with SpO2 93%. No respiratory distress noted. Will continue to monitor. The proper method of use, as well as anticipated side effects, of this aerosol treatment are discussed and demonstrated to the patient.

## 2025-01-27 NOTE — NURSING
RAPID RESPONSE NURSE PROACTIVE ROUNDING NOTE       Time of Visit:     Admit Date: 2025  LOS: 5  Code Status: DNR   Date of Visit: 2025  : 1945  Age: 80 y.o.  Sex: male  Race: White  Bed: K485/K485 A:   MRN: [id number]  Was the patient discharged from an ICU this admission? No   Was the patient discharged from a PACU within last 24 hours? No   Did the patient receive conscious sedation/general anesthesia in last 24 hours? No   Was the patient in the ED within the past 24 hours? No   Was the patient on NIPPV within the past 24 hours? No   Attending Physician: Terra Centeno MD  Primary Service: Internal Medicine,Hospitalist   Time spent at the bedside: < 15 min    SITUATION    Notified by previous RRN during handoff  Reason for alert: SOB    Diagnosis: Acute on chronic hypoxic respiratory failure   has a past medical history of Atrial fibrillation, unspecified type, COPD exacerbation, and Thyroid disease.    Last Vitals:  Temp: 97.9 °F (36.6 °C) (2049)  Pulse: 82 (2049)  Resp: 16 (2049)  BP: 120/72 (2049)  SpO2: 96 % (2049)    24 Hour Vitals Range:  Temp:  [96.6 °F (35.9 °C)-98.6 °F (37 °C)]   Pulse:  []   Resp:  [16-21]   BP: (105-121)/(56-73)   SpO2:  [90 %-97 %]     Clinical Issues: Respiratory    ASSESSMENT/INTERVENTIONS    Followed up with patient for proactive rounding. Chart and labs reviewed. VSS.  No acute issues at this time.     Temp 97.9 (36.6)  /72 MAP 88  HR 82  RR 16  SpO2 96% on 3L NC    RECOMMENDATIONS  Continue with plan of care.  Notify provider and IA nurse of any acute changes    Discussed plan of care with Charge Jayne YIN    PROVIDER ESCALATION    Physician escalation: No    Orders received and case discussed with NA.    Disposition:Remain in room 485    FOLLOW UP    Call back the Rapid Response NurseMagali at [phone] for additional questions or concerns.

## 2025-01-27 NOTE — ASSESSMENT & PLAN NOTE
Patient has long standing persistent (>12 months) atrial fibrillation. Patient is currently in sinus rhythm. [id number] Score: 3. The patients heart rate in the last 24 hours is as follows:  Pulse  Min: 46  Max: 108     Antiarrhythmics  metoprolol tartrate (LOPRESSOR) tablet 100 mg, 2 times daily, Oral    Anticoagulants       Plan  - Replete lytes with a goal of K>4, Mg >2  - Patient is anticoagulated, see medications listed above.  - Patient's afib is currently controlled  - monitor for any arrhythmia

## 2025-01-28 PROBLEM — Z51.5 COMFORT MEASURES ONLY STATUS: Status: ACTIVE | Noted: 2025-01-28

## 2025-01-28 LAB
ALBUMIN SERPL BCP-MCNC: 2.4 G/DL (ref 3.5–5.2)
ANION GAP SERPL CALC-SCNC: 7 MMOL/L (ref 8–16)
BACTERIA BLD CULT: ABNORMAL
BASOPHILS # BLD AUTO: 0.02 K/UL (ref 0–0.2)
BASOPHILS NFR BLD: 0.2 % (ref 0–1.9)
BUN SERPL-MCNC: 12 MG/DL (ref 8–23)
CALCIUM SERPL-MCNC: 8.7 MG/DL (ref 8.7–10.5)
CHLORIDE SERPL-SCNC: 91 MMOL/L (ref 95–110)
CO2 SERPL-SCNC: 35 MMOL/L (ref 23–29)
CREAT SERPL-MCNC: 0.9 MG/DL (ref 0.5–1.4)
DIFFERENTIAL METHOD BLD: ABNORMAL
EOSINOPHIL # BLD AUTO: 0 K/UL (ref 0–0.5)
EOSINOPHIL NFR BLD: 0.4 % (ref 0–8)
ERYTHROCYTE [DISTWIDTH] IN BLOOD BY AUTOMATED COUNT: 16.5 % (ref 11.5–14.5)
EST. GFR  (NO RACE VARIABLE): >60 ML/MIN/1.73 M^2
GLUCOSE SERPL-MCNC: 114 MG/DL (ref 70–110)
HCT VFR BLD AUTO: 37.6 % (ref 40–54)
HGB BLD-MCNC: 11.6 G/DL (ref 14–18)
IMM GRANULOCYTES # BLD AUTO: 0.05 K/UL (ref 0–0.04)
IMM GRANULOCYTES NFR BLD AUTO: 0.6 % (ref 0–0.5)
LYMPHOCYTES # BLD AUTO: 0.5 K/UL (ref 1–4.8)
LYMPHOCYTES NFR BLD: 5.5 % (ref 18–48)
MCH RBC QN AUTO: 29.7 PG (ref 27–31)
MCHC RBC AUTO-ENTMCNC: 30.9 G/DL (ref 32–36)
MCV RBC AUTO: 96 FL (ref 82–98)
MONOCYTES # BLD AUTO: 0.9 K/UL (ref 0.3–1)
MONOCYTES NFR BLD: 10.1 % (ref 4–15)
NEUTROPHILS # BLD AUTO: 7.1 K/UL (ref 1.8–7.7)
NEUTROPHILS NFR BLD: 83.2 % (ref 38–73)
NRBC BLD-RTO: 0 /100 WBC
PHOSPHATE SERPL-MCNC: 3.1 MG/DL (ref 2.7–4.5)
PLATELET # BLD AUTO: 268 K/UL (ref 150–450)
PMV BLD AUTO: 11.1 FL (ref 9.2–12.9)
POTASSIUM SERPL-SCNC: 4.2 MMOL/L (ref 3.5–5.1)
RBC # BLD AUTO: 3.91 M/UL (ref 4.6–6.2)
SODIUM SERPL-SCNC: 133 MMOL/L (ref 136–145)
WBC # BLD AUTO: 8.52 K/UL (ref 3.9–12.7)

## 2025-01-28 PROCEDURE — 25000003 PHARM REV CODE 250: Mod: HCNC | Performed by: FAMILY MEDICINE

## 2025-01-28 PROCEDURE — 27000190 HC CPAP FULL FACE MASK W/VALVE: Mod: HCNC

## 2025-01-28 PROCEDURE — 25000242 PHARM REV CODE 250 ALT 637 W/ HCPCS: Mod: HCNC

## 2025-01-28 PROCEDURE — 99498 ADVNCD CARE PLAN ADDL 30 MIN: CPT | Mod: HCNC,,, | Performed by: STUDENT IN AN ORGANIZED HEALTH CARE EDUCATION/TRAINING PROGRAM

## 2025-01-28 PROCEDURE — 25000003 PHARM REV CODE 250: Mod: HCNC | Performed by: INTERNAL MEDICINE

## 2025-01-28 PROCEDURE — 27100171 HC OXYGEN HIGH FLOW UP TO 24 HOURS: Mod: HCNC

## 2025-01-28 PROCEDURE — 80069 RENAL FUNCTION PANEL: CPT | Mod: HCNC | Performed by: STUDENT IN AN ORGANIZED HEALTH CARE EDUCATION/TRAINING PROGRAM

## 2025-01-28 PROCEDURE — 25000003 PHARM REV CODE 250: Mod: HCNC | Performed by: STUDENT IN AN ORGANIZED HEALTH CARE EDUCATION/TRAINING PROGRAM

## 2025-01-28 PROCEDURE — 21400001 HC TELEMETRY ROOM: Mod: HCNC

## 2025-01-28 PROCEDURE — 94660 CPAP INITIATION&MGMT: CPT | Mod: HCNC

## 2025-01-28 PROCEDURE — 36415 COLL VENOUS BLD VENIPUNCTURE: CPT | Mod: HCNC | Performed by: STUDENT IN AN ORGANIZED HEALTH CARE EDUCATION/TRAINING PROGRAM

## 2025-01-28 PROCEDURE — 99223 1ST HOSP IP/OBS HIGH 75: CPT | Mod: HCNC,,, | Performed by: STUDENT IN AN ORGANIZED HEALTH CARE EDUCATION/TRAINING PROGRAM

## 2025-01-28 PROCEDURE — 99900035 HC TECH TIME PER 15 MIN (STAT): Mod: HCNC

## 2025-01-28 PROCEDURE — 25000242 PHARM REV CODE 250 ALT 637 W/ HCPCS: Mod: HCNC | Performed by: STUDENT IN AN ORGANIZED HEALTH CARE EDUCATION/TRAINING PROGRAM

## 2025-01-28 PROCEDURE — 99497 ADVNCD CARE PLAN 30 MIN: CPT | Mod: HCNC,25,, | Performed by: STUDENT IN AN ORGANIZED HEALTH CARE EDUCATION/TRAINING PROGRAM

## 2025-01-28 PROCEDURE — 94640 AIRWAY INHALATION TREATMENT: CPT | Mod: HCNC

## 2025-01-28 PROCEDURE — 85025 COMPLETE CBC W/AUTO DIFF WBC: CPT | Mod: HCNC | Performed by: REGISTERED NURSE

## 2025-01-28 PROCEDURE — 99232 SBSQ HOSP IP/OBS MODERATE 35: CPT | Mod: HCNC,,, | Performed by: INTERNAL MEDICINE

## 2025-01-28 PROCEDURE — 5A0935A ASSISTANCE WITH RESPIRATORY VENTILATION, LESS THAN 24 CONSECUTIVE HOURS, HIGH NASAL FLOW/VELOCITY: ICD-10-PCS | Performed by: STUDENT IN AN ORGANIZED HEALTH CARE EDUCATION/TRAINING PROGRAM

## 2025-01-28 PROCEDURE — 94761 N-INVAS EAR/PLS OXIMETRY MLT: CPT | Mod: HCNC

## 2025-01-28 PROCEDURE — 63600175 PHARM REV CODE 636 W HCPCS: Mod: HCNC | Performed by: INTERNAL MEDICINE

## 2025-01-28 PROCEDURE — 27000221 HC OXYGEN, UP TO 24 HOURS: Mod: HCNC

## 2025-01-28 PROCEDURE — 25000242 PHARM REV CODE 250 ALT 637 W/ HCPCS: Mod: HCNC | Performed by: INTERNAL MEDICINE

## 2025-01-28 PROCEDURE — 99232 SBSQ HOSP IP/OBS MODERATE 35: CPT | Mod: HCNC,,, | Performed by: PHYSICIAN ASSISTANT

## 2025-01-28 PROCEDURE — 63600175 PHARM REV CODE 636 W HCPCS: Mod: HCNC | Performed by: STUDENT IN AN ORGANIZED HEALTH CARE EDUCATION/TRAINING PROGRAM

## 2025-01-28 RX ORDER — OXYCODONE HYDROCHLORIDE 5 MG/1
10 TABLET ORAL EVERY 6 HOURS PRN
Status: DISCONTINUED | OUTPATIENT
Start: 2025-01-28 | End: 2025-01-29 | Stop reason: HOSPADM

## 2025-01-28 RX ORDER — FUROSEMIDE 10 MG/ML
40 INJECTION INTRAMUSCULAR; INTRAVENOUS EVERY 12 HOURS
Status: DISCONTINUED | OUTPATIENT
Start: 2025-01-28 | End: 2025-01-29 | Stop reason: HOSPADM

## 2025-01-28 RX ORDER — METOCLOPRAMIDE HYDROCHLORIDE 5 MG/ML
5 INJECTION INTRAMUSCULAR; INTRAVENOUS EVERY 6 HOURS PRN
Status: DISCONTINUED | OUTPATIENT
Start: 2025-01-28 | End: 2025-01-29 | Stop reason: HOSPADM

## 2025-01-28 RX ORDER — LEVALBUTEROL INHALATION SOLUTION 0.63 MG/3ML
0.63 SOLUTION RESPIRATORY (INHALATION)
Status: DISCONTINUED | OUTPATIENT
Start: 2025-01-28 | End: 2025-01-29 | Stop reason: HOSPADM

## 2025-01-28 RX ORDER — MORPHINE SULFATE 4 MG/ML
4 INJECTION, SOLUTION INTRAMUSCULAR; INTRAVENOUS EVERY 4 HOURS PRN
Status: DISCONTINUED | OUTPATIENT
Start: 2025-01-28 | End: 2025-01-29 | Stop reason: HOSPADM

## 2025-01-28 RX ORDER — LORAZEPAM 2 MG/ML
2 INJECTION INTRAMUSCULAR EVERY 4 HOURS PRN
Status: DISCONTINUED | OUTPATIENT
Start: 2025-01-28 | End: 2025-01-29 | Stop reason: HOSPADM

## 2025-01-28 RX ORDER — FUROSEMIDE 20 MG/1
40 TABLET ORAL 2 TIMES DAILY PRN
Start: 2025-01-28 | End: 2025-02-04

## 2025-01-28 RX ORDER — ONDANSETRON HYDROCHLORIDE 2 MG/ML
8 INJECTION, SOLUTION INTRAVENOUS EVERY 8 HOURS PRN
Status: DISCONTINUED | OUTPATIENT
Start: 2025-01-28 | End: 2025-01-29 | Stop reason: HOSPADM

## 2025-01-28 RX ADMIN — OXYCODONE 10 MG: 5 TABLET ORAL at 08:01

## 2025-01-28 RX ADMIN — ACETYLCYSTEINE 2 ML: 200 INHALANT RESPIRATORY (INHALATION) at 03:01

## 2025-01-28 RX ADMIN — FUROSEMIDE 40 MG: 10 INJECTION, SOLUTION INTRAMUSCULAR; INTRAVENOUS at 09:01

## 2025-01-28 RX ADMIN — PIPERACILLIN SODIUM AND TAZOBACTAM SODIUM 4.5 G: 4; .5 INJECTION, POWDER, LYOPHILIZED, FOR SOLUTION INTRAVENOUS at 09:01

## 2025-01-28 RX ADMIN — MEROPENEM 1 G: 1 INJECTION, POWDER, FOR SOLUTION INTRAVENOUS at 08:01

## 2025-01-28 RX ADMIN — ACETYLCYSTEINE 2 ML: 200 INHALANT RESPIRATORY (INHALATION) at 11:01

## 2025-01-28 RX ADMIN — LEVALBUTEROL 0.25 MG: 1.25 SOLUTION, CONCENTRATE RESPIRATORY (INHALATION) at 11:01

## 2025-01-28 RX ADMIN — LEVALBUTEROL 0.25 MG: 1.25 SOLUTION, CONCENTRATE RESPIRATORY (INHALATION) at 08:01

## 2025-01-28 RX ADMIN — OXYCODONE 10 MG: 5 TABLET ORAL at 04:01

## 2025-01-28 RX ADMIN — IPRATROPIUM BROMIDE 0.5 MG: 0.5 SOLUTION RESPIRATORY (INHALATION) at 03:01

## 2025-01-28 RX ADMIN — IPRATROPIUM BROMIDE 0.5 MG: 0.5 SOLUTION RESPIRATORY (INHALATION) at 11:01

## 2025-01-28 RX ADMIN — IPRATROPIUM BROMIDE 0.5 MG: 0.5 SOLUTION RESPIRATORY (INHALATION) at 08:01

## 2025-01-28 RX ADMIN — ACETYLCYSTEINE 2 ML: 200 INHALANT RESPIRATORY (INHALATION) at 08:01

## 2025-01-28 RX ADMIN — LORAZEPAM 0.5 MG: 0.5 TABLET ORAL at 12:01

## 2025-01-28 RX ADMIN — LEVALBUTEROL 0.25 MG: 1.25 SOLUTION, CONCENTRATE RESPIRATORY (INHALATION) at 03:01

## 2025-01-28 RX ADMIN — LEVOTHYROXINE SODIUM 200 MCG: 100 TABLET ORAL at 05:01

## 2025-01-28 NOTE — PLAN OF CARE
[facility]  Department of Hospital Medicine  [address] [place] [address]  [phone] after hours  [phone] fax    HOSPICE  ORDERS    01/28/2025    Admit to Hospice:  [facility] inpatient hospice at [facility]    Diagnoses:   Active Hospital Problems    Diagnosis  POA    *Acute on chronic hypoxic respiratory failure [J96.21]  Yes    Comfort measures only status [Z51.5]  Not Applicable    Bacteremia [R78.81]  Yes    Closed fracture of T8 vertebra [S22.069A]  Yes    UTI (urinary tract infection) [N39.0]  Yes    A-fib [I48.91]  Yes     S/p successful DCCV 9/2024      COPD exacerbation [J44.1]  Yes    Acute on chronic diastolic heart failure [I50.33]  Yes    Essential hypertension [I10]  Yes     Chronic      Resolved Hospital Problems   No resolved problems to display.       Hospice Qualifying Diagnoses:   Heart failure         Patient has a life expectancy < 6 months due to:  Primary Hospice Diagnosis:  heart failure  Comorbid Conditions Contributing to Decline:    CAD, combined CHF (recovered EF), CAD, A-fib on eliquis,COPD, chronic respiratory failure on 3L NC, and hypothyroidism   Vital Signs: Routine per Hospice Protocol.    Code Status: DNR    Allergies: Review of patient's allergies indicates:  No Known Allergies    Diet: regular as tolerated  Activities: As tolerated    Goals of Care Treatment Preferences:  Code Status: DNR    Health care agent: jose carlos Schwartz [facility] agent number: [phone]       LaPOST: Yes  What is most important right now is to focus on symptom/pain control, comfort and QOL .  Accordingly, we have decided that the best plan to meet the patient's goals includes enrolling in hospice care.      Nursing: Per Hospice Routine.  Mckeon Care: Empty Mckeon bag Q shift and PRN.  Change Mckeon every month.    Routine Skin for Bedridden Patients: Apply moisture barrier cream to all skin folds and   wet areas in perineal  area daily and after baths and all bowel movements.        Oxygen: nasal canula oxygen  I    Oth     Medication List        STOP taking these medications      acetaminophen 500 MG tablet  Commonly known as: TYLENOL     ammonium lactate 12 % lotion  Commonly known as: LAC-HYDRIN     apixaban 5 mg Tab  Commonly known as: ELIQUIS     aspirin 81 MG EC tablet  Commonly known as: ECOTRIN     atorvastatin 10 MG tablet  Commonly known as: LIPITOR     CENTRUM SILVER 0.4 mg-300 mcg- 250 mcg Tab  Generic drug: multivit-min-FA-lycopen-lutein     cephALEXin 500 MG capsule  Commonly known as: KEFLEX     empagliflozin 10 mg tablet  Commonly known as: Jardiance     furosemide 40 MG tablet  Commonly known as: LASIX     INV metoprolol tartrate 50 MG Tab  Commonly known as: LOPRESSOR     lactulose 10 gram/15 mL solution  Commonly known as: CHRONULAC     levothyroxine 200 MCG tablet  Commonly known as: SYNTHROID     melatonin 3 mg tablet  Commonly known as: MELATIN     methocarbamoL 750 MG Tab  Commonly known as: ROBAXIN     mirtazapine 7.5 MG Tab  Commonly known as: REMERON     naproxen 500 MG tablet  Commonly known as: NAPROSYN     oxyCODONE-acetaminophen 5-325 mg per tablet  Commonly known as: PERCOCET     sacubitriL-valsartan 24-26 mg per tablet  Commonly known as: ENTRESTO     senna-docusate 8.6-50 mg 8.6-50 mg per tablet  Commonly known as: PERICOLACE     tamsulosin 0.4 mg Cap  Commonly known as: FLOMAX     traMADoL 50 mg tablet  Commonly known as: ULTRAM              Future Orders:  Hospice Medical Director may dictate new orders for comfortable care measures & sign death certificate.      _________________________________  Ashlee Arreola MD  01/28/2025

## 2025-01-28 NOTE — NURSING
"RAPID RESPONSE NURSE PROACTIVE ROUNDING NOTE     Time of Visit: 2330    Admit Date: 2025  LOS: 7  Code Status: DNR   Date of Visit: 2025  : 1945  Age: 80 y.o.  Sex: male  Race: White  Bed: K485/K485 A:   MRN: [id number]  Was the patient discharged from an ICU this admission? No   Was the patient discharged from a PACU within last 24 hours?  No  Did the patient receive conscious sedation/general anesthesia in last 24 hours?  No  Was the patient in the ED within the past 24 hours?  No  Was the patient started on NIPPV within the past 24 hours?  No  Attending Physician: Terra Centeno MD  Primary Service: Networked reference to record PCT     ASSESSMENT     Notified by charge RN via phone call.  Reason for alert: Resp Distress     Diagnosis: Acute on chronic hypoxic respiratory failure    Abnormal Vital Signs: /75 (BP Location: Left arm, Patient Position: Lying)   Pulse 76   Temp 98.1 °F (36.7 °C) (Oral)   Resp 19   Ht 5' 11" (1.803 m)   Wt 94 kg (207 lb 3.7 oz)   SpO2 (!) 94%   BMI 28.90 kg/m²      Clinical Issues: Respiratory    Patient  has a past medical history of Atrial fibrillation, unspecified type, COPD exacerbation, and Thyroid disease.      Called to bedside by Porter Bermudez RN. Per RN pt has increased work of breathing, is confused, and is refusing to wear BiPAP. This RN to bedside. At bedside pt O2 sat 92% on 4L NC, abdominal muscle use noted when breathing. Asked pt why he does not want to wear BiPAP, pt replied "I want to go home, this breathing is normal for me you cannot keep me here." Informed pt of risk of leaving AMA and attempted to once again convince pt to wear BiPAP. Pt still refusing. Notified ACACIA Self. NP to bedside, pt still wanting to leave, but due to mental status NP wants pt to stay. Called pt's son Duncan and updated him on his father's current condition and desire to leave. Pt and son had a conversation and ultimately pt is wiling to wear BiPAP for " now. Ativan given and BiPAP mask applied. NP to order CXR once pt is settled.       PHYSICIAN ESCALATION     Yes/No  Yes    Orders received and case discussed with  ACACIA Self.    Disposition: Remain in room 485.    FOLLOW-UP     Call back the Rapid Response Nurse, Soto Zhou RN at [phone] for additional questions or concerns.

## 2025-01-28 NOTE — PLAN OF CARE
Medicare Message     Important Message from Medicare regarding Discharge Appeal Rights Explained to patient/caregiver; Signed/date by patient/caregiver Other (comments)Important Message from Medicare regarding Discharge Appeal Rights. Other (comments). The comment is Patient unable to sign due to medical condition. Taken on 1/28/25 1050   Date IMM was signed 1/24/2025 1/27/2025   Time IMM was signed 1300 [id number]

## 2025-01-28 NOTE — PROGRESS NOTES
[facility] - [facility]  Neurosurgery  Progress Note    Subjective:     Interval History: Patient on Bipap.  Limited history today.    History of Present Illness:   This is a very pleasant 80 y.o. male, CAD, pacemaker, lives at home, fell about a week ago and started to have severe midthoracic back pain 4 days ago.  The pain is severe in intensity and worse with standing up, twisting in bed.  He has been unable to walk since he has been feeling the pain.  Has not noticed any new onset of motor weakness.  He has chronic right hip flexion weakness due to a prior femur and pelvic fracture.     During my visit he is comfortable in bed.  Denies having any upper extremity or lower extremity weakness, numbness or sphincter dysfunction symptoms.  Was seen by Physical therapy today.          Post-Op Info:  Procedure(s) (LRB):  FUSION,SPINE,THORACIC,POSTERIOR APPROACH,USING COMPUTER-ASSISTED NAVIGATION (N/A)          Medications:  Continuous Infusions:  Scheduled Meds:   acetylcysteine 200 mg/ml (20%)  2 mL Nebulization Q4H WAKE    atorvastatin  10 mg Oral Daily    empagliflozin  10 mg Oral Daily    furosemide (LASIX) injection  40 mg Intravenous Q12H    guaiFENesin  600 mg Oral BID    ipratropium  0.5 mg Nebulization Q4H WAKE    levalbuterol  0.25 mg Nebulization QID WAKE    levothyroxine  200 mcg Oral Before breakfast    methocarbamoL  500 mg Oral TID    metoprolol tartrate  100 mg Oral BID    mirtazapine  7.5 mg Oral Nightly    piperacillin-tazobactam (Zosyn) IV (PEDS and ADULTS) (extended infusion is not appropriate)  4.5 g Intravenous Q8H    polyethylene glycol  17 g Oral Daily    tamsulosin  0.4 mg Oral Daily     PRN Meds:  Current Facility-Administered Medications:     acetaminophen, 650 mg, Oral, Q8H PRN    hydrOXYzine HCL, 50 mg, Oral, TID PRN    LORazepam, 0.5 mg, Oral, Daily PRN    melatonin, 6 mg, Oral, Nightly PRN    methocarbamoL, 750 mg, Oral, QID PRN    ondansetron, 4 mg, Intravenous, Q12H PRN    oxyCODONE, 10 mg,  "Oral, Q6H PRN    sodium chloride 0.9%, 10 mL, Intravenous, PRN    Flushing PICC/Midline Protocol, , , Until Discontinued **AND** sodium chloride 0.9%, 10 mL, Intravenous, Q12H PRN     Review of Systems  Objective:     Weight: 94 kg (207 lb 3.7 oz)  Body mass index is 28.9 kg/m².  Vital Signs (Most Recent):  Temp: 98.3 °F (36.8 °C) (01/28/25 0743)  Pulse: (!) 113 (01/28/25 0808)  Resp: (!) 23 (01/28/25 0808)  BP: 109/72 (01/28/25 0743)  SpO2: (!) 92 % (01/28/25 0808) Vital Signs (24h Range):  Temp:  [97 °F (36.1 °C)-98.3 °F (36.8 °C)] 98.3 °F (36.8 °C)  Pulse:  [] 113  Resp:  [16-36] 23  SpO2:  [71 %-95 %] 92 %  BP: (109-132)/(57-75) 109/72                  Oxygen Concentration (%):  [] 100             Urethral Catheter 01/20/25 1200 (Active)   Site Assessment Clean;Intact;Dry 01/24/25 1940   Collection Container Urimeter 01/24/25 1940   Securement Method secured to top of thigh w/ adhesive device 01/24/25 1940   Catheter Care Performed yes 01/24/25 0730   Reason for Continuing Urinary Catheterization Chronic Indwelling Urinary Catheter on Admission 01/24/25 1940   CAUTI Prevention Bundle Securement Device in place with 1" slack;Intact seal between catheter & drainage tubing;Drainage bag/urimeter off the floor;Sheeting clip in use;No dependent loops or kinks;Drainage bag/urimeter not overfilled (<2/3 full);Drainage bag/urimeter below bladder 01/24/25 1940   Output (mL) 800 mL 01/25/25 0614       Neurosurgery Physical Exam    General: well developed, well nourished, no distress  Mental Status: Awake, Alert, Oriented X3.Thought content appropriate  GCS: Motor: 6/Verbal: 5/Eyes: 4 GCS Total: 15    Motor Strength:  Strength  Deltoids Triceps Biceps Wrist Extension Wrist Flexion Hand                          HF KF KE DF PF EHL   Lower: R 5/5 5/5 5/5 5/5 5/5 4/5    L 5/5 5/5 5/5 5/5 5/5 5/5         Clonus: absent B/L          Significant Labs:  Recent Labs   Lab 01/27/25  0627 01/28/25  0721   * 114* " "   133*   K 4.2 4.2   CL 91* 91*   CO2 39* 35*   BUN 13 12   CREATININE 0.9 0.9   CALCIUM 8.8 8.7     Recent Labs   Lab 01/28/25  0721   WBC 8.52   HGB 11.6*   HCT 37.6*        No results for input(s): "LABPT", "INR", "APTT" in the last 48 hours.  Microbiology Results (last 7 days)       Procedure Component Value Units Date/Time    Blood culture [[id number]]  (Abnormal) Collected: 01/25/25 1555    Order Status: Completed Specimen: Blood from Peripheral, Hand, Right Updated: 01/28/25 0812     Blood Culture, Routine Gram stain peds bottle: Gram positive cocci      Results called to and read back by:Linh Rinaldi RN 01/27/2025  13:11      ENTEROCOCCUS SPECIES  Identification pending  For susceptibility see order # [id number]      Narrative:      20992  Collection has been rescheduled by UAD at 01/25/2025 16:04 Reason:   Labs done  pda out. UAD  Collection has been rescheduled by UAD at 01/25/2025 16:04 Reason:   Labs done  pda out. UAD    Blood culture [[id number]] Collected: 01/27/25 1409    Order Status: Completed Specimen: Blood Updated: 01/28/25 0315     Blood Culture, Routine No Growth to date    Urine culture [[id number]]  (Abnormal)  (Susceptibility) Collected: 01/21/25 0017    Order Status: Completed Specimen: Urine Updated: 01/27/25 1323     Urine Culture, Routine CITROBACTER FREUNDII  50,000 - 99,999 cfu/ml        PSEUDOMONAS AERUGINOSA  50,000 - 99,999 cfu/ml      Narrative:      Specimen Source->Urine    Blood culture [[id number]]  (Abnormal) Collected: 01/24/25 1732    Order Status: Completed Specimen: Blood from Peripheral, Forearm, Right Updated: 01/27/25 1152     Blood Culture, Routine Gram stain aer bottle: Gram positive cocci in chains resembling Strep      Results called to and read back by: Linda Dominguez RN,01/25/2025, 15:04      Gram stain alden bottle: Gram positive cocci in chains resembling Strep      Positive results previously called 01/25/2025  19:27      ENTEROCOCCUS " FAECALIS  Susceptibility pending      Blood culture x two cultures. Draw prior to antibiotics. [[id number]]  (Abnormal)  (Susceptibility) Collected: 01/20/25 2156    Order Status: Completed Specimen: Blood Updated: 01/26/25 0954     Blood Culture, Routine Gram stain aer bottle: Gram positive cocci      Gram stain aldne bottle: Gram positive cocci.      Results called to and read back by:Hilary Yo RN, 01/24/2025,17:01.      ENTEROCOCCUS FAECALIS    Narrative:      Aerobic and anaerobic    Rapid Organism ID by PCR (from Blood culture) [[id number]]  (Abnormal) Collected: 01/25/25 1507    Order Status: Completed Updated: 01/25/25 1746     Enterococcus faecalis Detected     Enterococcus faecium Not Detected     Listeria monocytogenes Not Detected     Staphylococcus spp. Not Detected     Staphylococcus aureus Not Detected     Staphylococcus epidermidis Not Detected     Staphylococcus lugdunensis Not Detected     Streptococcus species Not Detected     Streptococcus agalactiae Not Detected     Streptococcus pneumoniae Not Detected     Streptococcus pyogenes Not Detected     Acinetobacter calcoaceticus/baumannii complex Not Detected     Bacteroides fragilis Not Detected     Enterobacterales Not Detected     Enterobacter cloacae complex Not Detected     Escherichia coli Not Detected     Klebsiella aerogenes Not Detected     Klebsiella oxytoca Not Detected     Klebsiella pneumoniae group Not Detected     Proteus Not Detected     Salmonella sp Not Detected     Serratia marcescens Not Detected     Haemophilus influenzae Not Detected     Neisseria meningtidis Not Detected     Pseudomonas aeruginosa Not Detected     Stenotrophomonas maltophilia Not Detected     Candida albicans Not Detected     Candida auris Not Detected     Candida glabrata Not Detected     Candida krusei Not Detected     Candida parapsilosis Not Detected     Candida tropicalis Not Detected     Cryptococcus neoformans/gattii Not Detected     CTX-M (ESBL  ) Test Not Applicable     IMP (Carbapenem resistant) Test Not Applicable     KPC resistance gene (Carbapenem resistant) Test Not Applicable     mcr-1  Test Not Applicable     mec A/C  Test Not Applicable     mec A/C and MREJ (MRSA) gene Test Not Applicable     NDM (Carbapenem resistant) Test Not Applicable     OXA-48-like (Carbapenem resistant) Test Not Applicable     van A/B (VRE gene) Not Detected     VIM (Carbapenem resistant) Test Not Applicable    Blood culture x two cultures. Draw prior to antibiotics. [[id number]]  (Abnormal) Collected: 01/20/25 2156    Order Status: Completed Specimen: Blood Updated: 01/25/25 0846     Blood Culture, Routine Gram stain aer bottle: Gram positive cocci      Gram stain alden bottle: Gram positive cocci      Results called to and read back by:Hilary Yo RN, 01/24/2025, 17:03      ENTEROCOCCUS FAECALIS  For susceptibility see order #[id number]      Narrative:      Aerobic and anaerobic            Significant Diagnostics:    Impression:     1. Obliquely oriented fracture through the T8 vertebral body.  2. Partially healed fractures of 11th and 12th ribs near the costovertebral junction.  3. Right anterolateral bulky paravertebral ossification in the mid to lower thoracic spine, without significant bony ankylosis and with relatively few syndesmophytes.  Imaging findings are more characteristic of DISH.  4. Volume overload and CHF with pulmonary edema and bilateral pleural effusions.        Electronically signed by:Manuel Vásquez Jr  Date:                                            01/24/2025  Time:                                           16:40  Assessment/Plan:     Active Diagnoses:    Diagnosis Date Noted POA    PRINCIPAL PROBLEM:  Acute on chronic hypoxic respiratory failure [J96.21] 09/16/2024 Yes    Bacteremia [R78.81] 01/25/2025 Yes    Closed fracture of T8 vertebra [S22.069A] 01/21/2025 Yes    UTI (urinary tract infection) [N39.0] 01/21/2025 Yes    A-fib  [I48.91] 10/28/2024 Yes    COPD exacerbation [J44.1] 09/06/2023 Yes    Acute on chronic diastolic heart failure [I50.33]  Yes    Essential hypertension [I10] 06/25/2017 Yes     Chronic      Problems Resolved During this Admission:     Unstable T8 fracture    -Neurosurgically stable   -will need surgical stabilization with percutaneous instrumentation if medically cleared  -Bedrest        All of the above discussed and reviewed with Dr. Elizabeth.      Yodit Foster PA-C  Neurosurgery  [facility] - Telemetry

## 2025-01-28 NOTE — PLAN OF CARE
"0940  CM was informed by [facility] that the pt will be accepted but that the pt is in his co-pay days with are $214 per day.     0955  Pox 71% on 8L HF this AM. Pt placed on BIPAP. CM was informed by Dr Ba that she spoke with the pt's son, Efren Salas, this AM & that the son would like the pt to transition to in-pt hospice. DNR order previously entered. Per Dr Ba, "If he starts looking more imminent here, we can do a flip".    In-pt hospice referrals sent to [facility] via [facility]. Awaiting response.        01/28/25 1020   Rounds   Attendance Provider;Nurse    Discharge Plan A Inpatient Hospice   Why the patient remains in the hospital Requires continued medical care   Transition of Care Barriers Transportation     1020  Pt resting quietly with continuous BIPAP when CM participated in SIBR with Dr Arreola & nurse Melton.     1215  CM was informed by Rachel ([phone]) w/[facility] that the pt does not meet in-pt hospice criteria at this time but that if the pt's status changes they do have beds available.     1410  CM met with the pt's son, Efren Salas ([phone]), & granddaughter, Jamir Salas ([phone]), at the pt's bedside. Pt resting quietly in bed with continuous BIPAP in use.     1425  CM questioned [facility] ([phone]) [facility] regarding referral status. Dominique stated the referral is "under review".     1550  CM was informed by Dominique that [facility] will be able to accept the pt & that Adriana will meet with the pt's son in the AM. Message sent to Dr Arreola & Dr Ba ([facility]) informing of above.     1600  CM informed the pt's son, Efren Salas ([phone]), & nurse Melton of above.     1610  CM was informed by Dominique that it is too late in the day to obtain a BIPAP for the pt to be admitted today.     1615  CM was informed by Dr Ba that if the pt can be switched off the bipap to nasal cannula in the next couple hours that " [facility] can accept the pt tonight. Rep to meet with the pt's son at the bedside to complete admission paperwork. DUNG Melchor added to the chart by  supervisor Dustin.     1640  In-pt hospice orders sent to [facility] via [facility].       Will continue to follow.

## 2025-01-28 NOTE — CONSULTS
Palliative Medicine  Consult Note    Patient Name: Bean Salas   MRN: [id number]   Admission Date: 1/20/2025   Hospital Length of Stay: 7   Attending Provider: Ashlee Arreola MD   Consulting Provider: Shereen Ba MD  Primary Care Physician: Ramirez Levine MD   Principal Problem: Acute on chronic hypoxic respiratory failure     Patient information was obtained from relative(s), past medical records, and ER records.      Inpatient consult to Palliative Care  Consult performed by: Shereen Ba MD  Consult ordered by: Terra Centeno MD  Reason for consult: goals of care including hospice discussion  Assessment/Recommendations:     - met with Mr. Salas's son Efren at bedside, introduced palliative team and purpose of consult  - details of discussion below, in short, Efren describes several months of stepwise decline from multiple falls and is concerned that with how severely ill his dad currently is, there is not a path to recovery to a meaningful quality of life  - Efren wishes for inpatient hospice consult and to transition to comfort focused care here, including stopping antibiotics and trying to wean off Bipap with the use of symptom medications today after he returns this afternoon from speaking with family.  - communicated with case management to send referrals to [facility], which would be the ideal plan  - if after transition to comfort management here, patient appears to be imminently dying and would not tolerate transfer, will consider for inpatient flip to our team        Assessment/Plan:      Palliative Care Encounter:  Impression:  Mr. 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 He is  admitted to hospital medicine since 1/20 for fall resulting in T8 vertebral fracture resulting in lower extremity paralysis. His hospital course has been complicated by worsening volume overload resulting in acute on chronic hypoxic respiratory failure requiring Bipap use (patient is DNI), as well as E. Faecalis bacteremia which on three subsequent blood cultures has not been able to clear despite appropriate antibiotics, which is concerning for possible seeding of hardware or other location. Unfortunately, these positive blood cultures and respiratory distress have been barriers to pursuing neurosurgical intervention on his unstable vertebral fracture causing neurologic deficit.    Palliative care consulted for assistance with goals of care including hospice discussion.     Advance Care Planning   Advance Directives:   Living Will: No    [place]POST: Yes    Do Not Resuscitate Status: Yes    Medical Power of : Yes    Agent's Name:  Ernie Salas   Agent's Contact Number:  [phone]    Decision Making:  Family answered questions and Patient unable to communicate due to disease severity/cognitive impairment  Goals of Care: The family endorses that what is most important right now is to focus on symptom/pain control and comfort and QOL. Sara Schwartz states that he is the only child and his mother has since passed.     He states his dad is a very independent man and has always wanted to do for himself, even as his health has been declining in recent few years. He states that the last several months have been very difficult with multiple stays at SNF after admissions for falls, having his pacemaker placed, and fracturing his hip. He is concerned that his dad is never going to return to a level of function and independence that would be acceptable quality of life for him. He is aware of the severity of his current illness in the hospital. I shared with him that I think prior to this admission, his dad would  have been very appropriate for home hospice, but now that he is this sick, he would be more appropriate for inpatient hospice as his time is likely measured in days if his illness were to proceed naturally. Efren was not surprised by this and seemed sad but relieved that this could be an option for them. His biggest priority is his dad's comfort and dignity as opposed to pursuing increasingly aggressive measures to treat the complications of his falls.    Accordingly, we have decided that the best plan to meet the patient's goals includes enrolling in inpatient hospice care and transitioning to comfort care while in the hospital awaiting transfer. This includes not pursuing neurosurgical intervention, stopping antibiotics and using symptom control meds for dyspnea to wean from Bipap to nasal cannula oxygen. His cardiac device is only a pacemaker, does not have defibrillator function, does not require deactivation.      - Prior experience with serious illness: yes  -The patient has previously engaged in advance care planning or GOC discussions  - Insight/Understanding of illness: Unable to discuss with patient due to mental status change. Son Efren has very good insight into his father's condition and knows that he will likely not return to quality of life that would be acceptable (independently living at home).    After discussion with family, the decision was made to pursue comfort focused care at end of life. This patient qualifies for inpatient hospice services due to severity of symptoms requiring IV medications.    #Pain  #Dyspnea/Breathlessness  - morphine IV 4mg q2h PRN  - wean from Bipap to nasal cannula oxygen utilizing IV morphine to control symptoms    #Anxiety/Terminal Agitation  - lorazepam IV 2mg q2h PRN    #Nausea/Vomiting  - ondansetron IV 8mg q6h PRN    #Terminal Secretions  - glycopyrrholate IV 0.2mg TID PRN    #Dry mouth  - frequent oral care with sponge dipped in water to prevent dry mouth and  exudate    #Medical Technology  - continue Mckeon catheter  - pacemaker does not require any deactivation, no defibrillator function  - transition from Bipap to nasal cannula as above  - stop antibiotics and all medications not contributing to comfort/symptom control  - Pleasure feed diet and bereavement tray    Life Limiting Diagnosis:  E. Faecalis bacteremia - persistence despite antibiotics is concerning for possible seeding of device or other location  Vertebral fracture causing lower extremity paralysis  Acute hypoxic respiratory failure requiring Bipap mask usage  Delirium  -Prognosis-Time and potential for recovery: Patient is eligible for inpatient hospice with comfort care approach.   -Functional status: poor.  Pt was able to manage ADLs in a limited capacity (often had falls with transfers at home, but could feed, dress, bathe self once in place).  -Dementia diagnosis no    Symptom Management: Unable to discuss with patient  -Pain: yes  -Dyspnea yes  -Anxiety/Depression unable to assess  -Constipation: no  -Anorexia:yes    Summary of recommendations and follow up plan:  -Most important goals at this time: Comfort and dignity. Transfer to inpatient hospice   -Code status: DNR/DNI - in accordance with [facility] on file.  -Disposition: Transfer to inpatient hospice.    The above recommendations communicated directly to primary team on 1/28/2025.    Thank you for your consult. I will follow-up with patient. Please contact us if you have any additional questions.    Subjective:     Chief Complaint:   Chief Complaint   Patient presents with    Shortness of Breath     Pt c/o Shortness of breath, has T8 fracture x2weeks ago, and a hip fracture x1 month. Received toradol en route,      HPI:   See above    Review of Symptoms      Symptom Assessment (ESAS 0-10 Scale)  Unable to complete assessment due to Patient unresponsive     CAM / Delirium:  Positive  Constipation:  Negative  Diarrhea:  Negative      Pain Assessment  in Advanced Demential Scale (PAINAD)   Breathing - Independent of vocalization:  0  Negative vocalization:  0  Facial expression:  0  Body language:  0  Consolability:  0  Total:  0    Living Arrangements:  Lives alone    Psychosocial/Cultural:   See Palliative Psychosocial Note: No  Social Issues Identified: Coping deficit pt/family and New Diagnosis/Trauma  Bereavement Risk: No  Caregiver Needs Discussed. Caregiver Distress: No: Caregiver support and community resources discussed.    Cultural: no needs identified today  **Primary  to Follow**  Palliative Care  Consult: No     Time-Based Charting:  Yes  Chart Review: 25 minutes  Face to Face: 30 minutes  Advance Care Plannin minutes    Total Time Spent: 105 minutes      ROS:  Review of Systems   Unable to perform ROS    Past Medical History:   Diagnosis Date    Atrial fibrillation, unspecified type 2023    COPD exacerbation 2023    Thyroid disease     hypo     Past Surgical History:   Procedure Laterality Date    COLONOSCOPY  2011    CORONARY ANGIOGRAPHY N/A 2021    Procedure: ANGIOGRAM, CORONARY ARTERY;  Surgeon: Hank Barry MD;  Location: [facility] CATH LAB/EP;  Service: Cardiology;  Laterality: N/A;    EYE SURGERY Bilateral     cataracts extraction    FRACTURE SURGERY Right 2021    femur    HERNIA REPAIR      HIP SURGERY Right 2021    IMPLANTATION OF BIVENTRICULAR HEART PACEMAKER N/A 2024    Procedure: INSERTION, PACEMAKER, BIVENTRICULAR;  Surgeon: Mac Alejandro MD;  Location: [facility] EP LAB;  Service: Cardiology;  Laterality: N/A;  CHB, CRT-P, SJM, Anes, DM, CICU 3081    INTRAMEDULLARY RODDING OF TROCHANTER OF FEMUR Right 2021    Procedure: INSERTION, INTRAMEDULLARY RACQUEL, FEMUR, TROCHANTER;  Surgeon: Darryn Hall MD;  Location: [facility] OR;  Service: Orthopedics;  Laterality: Right;    JOINT REPLACEMENT Bilateral     knee    LEFT HEART CATHETERIZATION Right 2021    Procedure: Left  heart cath;  Surgeon: Hank Barry MD;  Location: [facility] CATH LAB/EP;  Service: Cardiology;  Laterality: Right;    OPEN REDUCTION AND INTERNAL FIXATION (ORIF) OF INJURY OF HIP Right 9/20/2024    Procedure: ORIF,PELVIS;  Surgeon: Negrito Stapleton MD;  [facility];  Service: Orthopedics;  Laterality: Right;  +local    TRANSESOPHAGEAL ECHOCARDIOGRAPHY N/A 9/19/2024    Procedure: ECHOCARDIOGRAM, TRANSESOPHAGEAL;  Surgeon: Leonora Butt MD;  Location: [facility] EP LAB;  Service: Cardiology;  Laterality: N/A;    TREATMENT OF CARDIAC ARRHYTHMIA N/A 9/19/2024    Procedure: Cardioversion or Defibrillation;  Surgeon: ANA Steiner MD;  Location: [facility] EP LAB;  Service: Cardiology;  Laterality: N/A;  AF, DEMETRICE/DCCV, ANES, GP, 524    VASECTOMY       Family History   Problem Relation Name Age of Onset    Crohn's disease Mother      Dementia Mother      Heart attack Father      No Known Problems Sister      No Known Problems Brother      No Known Problems Son       Review of patient's allergies indicates:  No Known Allergies    Medications:    Current Facility-Administered Medications:     acetaminophen tablet 650 mg, 650 mg, Oral, Q8H PRN, Ashkan Nicole MD    acetylcysteine 200 mg/ml (20%) solution 2 mL, 2 mL, Nebulization, Q4H WAKE, Lavelle Guzman MD, 2 mL at 01/28/25 0808    atorvastatin tablet 10 mg, 10 mg, Oral, Daily, Ashkan Nicole MD, 10 mg at 01/27/25 0838    empagliflozin (Jardiance) tablet 10 mg, 10 mg, Oral, Daily, Ashkan Nicole MD, 10 mg at 01/27/25 0838    furosemide injection 40 mg, 40 mg, Intravenous, Q12H, Ashlee Arreola MD, 40 mg at 01/28/25 0902    guaiFENesin 12 hr tablet 600 mg, 600 mg, Oral, BID, Terra Centeno MD, 600 mg at 01/27/25 2030    hydrOXYzine HCL tablet 50 mg, 50 mg, Oral, TID PRN, Aleks Luis MD, 50 mg at 01/27/25 0558    ipratropium 0.02 % nebulizer solution 0.5 mg, 0.5 mg, Nebulization, Q4H Jacobo PARDO Mayuri, MD, 0.5 mg at 01/28/25 0808     levalbuterol nebulizer solution 0.25 mg, 0.25 mg, Nebulization, QID WAKE, Terra Centeno MD, 0.25 mg at 01/28/25 0808    levothyroxine tablet 200 mcg, 200 mcg, Oral, Before breakfast, Ashkan Nicole MD, 200 mcg at 01/28/25 0500    LORazepam tablet 0.5 mg, 0.5 mg, Oral, Daily PRN, Aleks Luis MD, 0.5 mg at 01/28/25 0002    melatonin tablet 6 mg, 6 mg, Oral, Nightly PRN, Ashkan Nicole MD, 6 mg at 01/27/25 2031    methocarbamoL tablet 500 mg, 500 mg, Oral, TID, Terra Centeno MD, 500 mg at 01/27/25 2030    methocarbamoL tablet 750 mg, 750 mg, Oral, QID PRN, Ashkan Nicole MD, 750 mg at 01/27/25 0301    metoprolol tartrate (LOPRESSOR) tablet 100 mg, 100 mg, Oral, BID, Charanjit Dior MD, 100 mg at 01/27/25 2030    mirtazapine tablet 7.5 mg, 7.5 mg, Oral, Nightly, Ashkan Nicole MD, 7.5 mg at 01/27/25 2030    ondansetron injection 4 mg, 4 mg, Intravenous, Q12H PRN, Ashkan Nicole MD    oxyCODONE immediate release tablet 10 mg, 10 mg, Oral, Q6H PRN, Ashkan Nicole MD, 10 mg at 01/28/25 0451    piperacillin-tazobactam (ZOSYN) 4.5 g in D5W 100 mL IVPB (MB+), 4.5 g, Intravenous, Q8H, Terra Centeno MD, Last Rate: 25 mL/hr at 01/28/25 0923, 4.5 g at 01/28/25 0923    polyethylene glycol packet 17 g, 17 g, Oral, Daily, Terra Centeno MD, 17 g at 01/24/25 0907    sodium chloride 0.9% flush 10 mL, 10 mL, Intravenous, PRN, Ashkan Nicole MD    Flushing PICC/Midline Protocol, , , Until Discontinued **AND** sodium chloride 0.9% flush 10 mL, 10 mL, Intravenous, Q12H PRN, Terra Centeno MD    tamsulosin 24 hr capsule 0.4 mg, 0.4 mg, Oral, Daily, Ashkan Nicole MD, 0.4 mg at 01/27/25 0838    Objective:      Physical Exam:  Vitals: Temp: 98.3 °F (36.8 °C) (01/28/25 0743)  Pulse: (!) 113 (01/28/25 0808)  Resp: (!) 23 (01/28/25 0808)  BP: 109/72 (01/28/25 0743)  SpO2: (!) 92 % (01/28/25 0808)    Physical Exam  Constitutional:       Appearance: He is ill-appearing.   HENT:      Mouth/Throat:      Comments: Bipap  mask in place.  Pulmonary:      Comments: Currently tolerating Bipap  Skin:     General: Skin is warm and dry.   Neurological:      Comments: Minimally responisive   Psychiatric:      Comments: Unable to assess.         Labs:   Creatinine   Date Value Ref Range Status   01/28/2025 0.9 0.5 - 1.4 mg/dL Final     POC Creatinine   Date Value Ref Range Status   09/28/2024 3.1 (H) 0.5 - 1.4 mg/dL Final      Hemoglobin   Date Value Ref Range Status   01/28/2025 11.6 (L) 14.0 - 18.0 g/dL Final      Albumin   Date Value Ref Range Status   01/28/2025 2.4 (L) 3.5 - 5.2 g/dL Final   01/27/2025 2.4 (L) 3.5 - 5.2 g/dL Final   01/26/2025 2.3 (L) 3.5 - 5.2 g/dL Final      Imaging: Evidence of volume overload with pulmonary edema and pleural effusions, T8 fracture, partially healed 11th-12th rib fractures near CV jxn,     Thank you for the opportunity to care for this patient and family.       Shereen Ba MD

## 2025-01-28 NOTE — CARE UPDATE
Check pt after seeing sats reported 78, found pt eating big cup of ice, sats 83% on 3L NC. Sats 88% on 6L. Pt places HFNC. Pt noncompliance staying on Bipap.

## 2025-01-28 NOTE — PLAN OF CARE
Medicare Message     Important Message from Medicare regarding Discharge Appeal Rights Explained to patient/caregiver; Signed/date by patient/caregiver   Date IMM was signed 1/24/2025   Time IMM was signed 1300

## 2025-01-28 NOTE — NURSING
Rapid Response Nurse Follow-up Note     Followed up with patient for proactive rounding. Patient remains on Bipap, appears comfortable. Palliative Cx completed. Decision made to proceed with hospice. Plan to start comfort meds and wean off of Bipap.   No acute issues at this time.     Please call Rapid Response RN, Gerard Garcia, HUMPHREY with any questions or concerns at [phone].

## 2025-01-28 NOTE — NURSING
Rapid Response Nurse Follow-up Note     Followed up with patient for proactive rounding. Anshul RT at bedside, per RT patient found with O2 off, sats 70% on 8L NC replaced but patient not fully recovering. Patient placed on Bipap 100%. Patient has been having episodes of respiratory distress and needing Bipap. Has been off and on Bipap all weekend, refusing to wear it overnight. Sx currently on hold due to positive blood cultures. ID following. Possible palliative care cx? Pulm CC made aware of patient.   No acute issues at this time. Reviewed plan of care with Bedside RNLinh .   Team will continue to follow.  Please call Rapid Response RNGerard RN with any questions or concerns at [phone].

## 2025-01-28 NOTE — PLAN OF CARE
Pt on documented O2 vis Bipap, no respiratory distress noted now. Will continue to monitor. Pt. Very short of breath before bipap on 8L/M high flow nasal cannula.

## 2025-01-28 NOTE — NURSING
[employer] Pending    Insurance response  Prescription Drug Insurance: [employer]  Notes: Prior authorization submitted - will update provider when decision has been made by insurance.            Pt placed on comfort measures. Pt transitioned from bipap to 10L high flow nasal cannula by resp therapist. Pt alert and asking about surgery. Pt states he is cold and not in pain. MD notified of patients status.

## 2025-01-28 NOTE — PROGRESS NOTES
[facility] Infectious Disease Progress Note     Consultant Attending: Dr. Fernandez  Date of Admit: 2025    Reason for Consult     Bacteremia      Assessment/Plan     Bean Salas is a 80 y.o. male with:     UTI (Citrobacter, Pseudomonas)   E. Faecalis Bacteremia   - Urine cultures w/ Citrobacter freundii & Pseudomonas aerugenosa; sensitivities resulted   - Blood cultures w/ E. Faecalis, sensitivities resulted    - Blood cultures positive x3; 4th set collected  NGTD  - Continue zosyn for now to complete 7 days of therapy for UTI, then switch to unasyn     Thank you for this consult. Case to be discussed with the attending physician - attestation to follow.     Gary Dangelo MD   [facility] Internal Medicine, PGY-2  [facility] Infectious Disease Consults     History of Present Illness     Bean Salas is a 80 y.o. male with a PMH of CAD, combined CHF (recovered EF), CAD, A-fib on eliquis,COPD, chronic respiratory failure on 3L NC, and hypothyroidism who presents to the ED for worsening SOB and back pain. Patient was evaluated emergency department and was found to be in respiratory distress and satting 86-88% on 5 L. Patient was placed on BiPAP and seemed to improve. Patient also had a fall several days ago and had a T8 vertebral fracture and was complaining of moderate pain. ID is consulted due to E faecalis bacteremia. Also with GNR in urine culture     Interval History     Afebrile, intermittent hypoxia. Patient on BiPAP this morning.     Objective:   BP  Min: 109/72  Max: 132/62  Temp  Av.9 °F (36.6 °C)  Min: 97 °F (36.1 °C)  Max: 98.3 °F (36.8 °C)  Pulse  Av.7  Min: 47  Max: 113  Resp  Av.9  Min: 16  Max: 36  SpO2  Av.7 %  Min: 71 %  Max: 95 %    Physical Examination   Physical Exam  Vitals reviewed.   Constitutional:       General: He is not in acute distress.     Appearance: He is ill-appearing.   HENT:      Head: Normocephalic and atraumatic.   Cardiovascular:      Rate and Rhythm: Normal rate and  regular rhythm.      Heart sounds: No murmur heard.  Pulmonary:      Effort: Pulmonary effort is normal.      Breath sounds: No wheezing or rhonchi.   Abdominal:      Palpations: Abdomen is soft.      Tenderness: There is no abdominal tenderness. There is no guarding.   Musculoskeletal:      Right lower leg: No edema.      Left lower leg: No edema.   Skin:     General: Skin is warm.      Comments: Erythema and thickening over the bilateral lower extremities    Neurological:      Mental Status: He is alert and oriented to person, place, and time.   Psychiatric:         Mood and Affect: Mood normal.         Behavior: Behavior normal.       Laboratory Data Reviewed:  Recent Labs   Lab 01/26/25  0549 01/27/25  0627 01/28/25  0721   WBC  --   --  8.52   HGB  --   --  11.6*   HCT  --   --  37.6*   PLT  --   --  268    136 133*   K 3.6 4.2 4.2   CL 93* 91* 91*   CREATININE 0.9 0.9 0.9   BUN 15 13 12   CO2 36* 39* 35*     Microbiology Data Reviewed:  Microbiology Results (last 7 days)       Procedure Component Value Units Date/Time    Blood culture [[id number]]  (Abnormal) Collected: 01/25/25 1555    Order Status: Completed Specimen: Blood from Peripheral, Hand, Right Updated: 01/28/25 0812     Blood Culture, Routine Gram stain peds bottle: Gram positive cocci      Results called to and read back by:Linh Rinaldi RN 01/27/2025  13:11      ENTEROCOCCUS SPECIES  Identification pending  For susceptibility see order # [id number]      Narrative:      [id number]  Collection has been rescheduled by [facility] at 01/25/2025 16:04 Reason:   Labs done  pda out. [facility]  Collection has been rescheduled by [facility] at 01/25/2025 16:04 Reason:   Labs done  pda out. [facility]    Blood culture [[id number]] Collected: 01/27/25 1409    Order Status: Completed Specimen: Blood Updated: 01/28/25 0315     Blood Culture, Routine No Growth to date    Urine culture [[id number]]  (Abnormal)  (Susceptibility) Collected: 01/21/25 0017    Order Status: Completed Specimen:  Urine Updated: 01/27/25 1323     Urine Culture, Routine CITROBACTER FREUNDII  50,000 - 99,999 cfu/ml        PSEUDOMONAS AERUGINOSA  50,000 - 99,999 cfu/ml      Narrative:      Specimen Source->Urine    Blood culture [[id number]]  (Abnormal) Collected: 01/24/25 1732    Order Status: Completed Specimen: Blood from Peripheral, Forearm, Right Updated: 01/27/25 1152     Blood Culture, Routine Gram stain aer bottle: Gram positive cocci in chains resembling Strep      Results called to and read back by: Linda Dominguez RN,01/25/2025, 15:04      Gram stain alden bottle: Gram positive cocci in chains resembling Strep      Positive results previously called 01/25/2025  19:27      ENTEROCOCCUS FAECALIS  Susceptibility pending      Blood culture x two cultures. Draw prior to antibiotics. [[id number]]  (Abnormal)  (Susceptibility) Collected: 01/20/25 2156    Order Status: Completed Specimen: Blood Updated: 01/26/25 0954     Blood Culture, Routine Gram stain aer bottle: Gram positive cocci      Gram stain alden bottle: Gram positive cocci.      Results called to and read back by:Hilary Yo RN, 01/24/2025,17:01.      ENTEROCOCCUS FAECALIS    Narrative:      Aerobic and anaerobic    Rapid Organism ID by PCR (from Blood culture) [[id number]]  (Abnormal) Collected: 01/25/25 1507    Order Status: Completed Updated: 01/25/25 1746     Enterococcus faecalis Detected     Enterococcus faecium Not Detected     Listeria monocytogenes Not Detected     Staphylococcus spp. Not Detected     Staphylococcus aureus Not Detected     Staphylococcus epidermidis Not Detected     Staphylococcus lugdunensis Not Detected     Streptococcus species Not Detected     Streptococcus agalactiae Not Detected     Streptococcus pneumoniae Not Detected     Streptococcus pyogenes Not Detected     Acinetobacter calcoaceticus/baumannii complex Not Detected     Bacteroides fragilis Not Detected     Enterobacterales Not Detected     Enterobacter cloacae complex Not  Detected     Escherichia coli Not Detected     Klebsiella aerogenes Not Detected     Klebsiella oxytoca Not Detected     Klebsiella pneumoniae group Not Detected     Proteus Not Detected     Salmonella sp Not Detected     Serratia marcescens Not Detected     Haemophilus influenzae Not Detected     Neisseria meningtidis Not Detected     Pseudomonas aeruginosa Not Detected     Stenotrophomonas maltophilia Not Detected     Candida albicans Not Detected     Candida auris Not Detected     Candida glabrata Not Detected     Candida krusei Not Detected     Candida parapsilosis Not Detected     Candida tropicalis Not Detected     Cryptococcus neoformans/gattii Not Detected     CTX-M (ESBL ) Test Not Applicable     IMP (Carbapenem resistant) Test Not Applicable     KPC resistance gene (Carbapenem resistant) Test Not Applicable     mcr-1  Test Not Applicable     mec A/C  Test Not Applicable     mec A/C and MREJ (MRSA) gene Test Not Applicable     NDM (Carbapenem resistant) Test Not Applicable     OXA-48-like (Carbapenem resistant) Test Not Applicable     van A/B (VRE gene) Not Detected     VIM (Carbapenem resistant) Test Not Applicable    Blood culture x two cultures. Draw prior to antibiotics. [[id number]]  (Abnormal) Collected: 01/20/25 2156    Order Status: Completed Specimen: Blood Updated: 01/25/25 0846     Blood Culture, Routine Gram stain aer bottle: Gram positive cocci      Gram stain alden bottle: Gram positive cocci      Results called to and read back by:Hilary Yo RN, 01/24/2025, 17:03      ENTEROCOCCUS FAECALIS  For susceptibility see order #[id number]      Narrative:      Aerobic and anaerobic          Current Medications   acetylcysteine 200 mg/ml (20%)  2 mL Nebulization Q4H WAKE    atorvastatin  10 mg Oral Daily    empagliflozin  10 mg Oral Daily    furosemide (LASIX) injection  40 mg Intravenous Q12H    guaiFENesin  600 mg Oral BID    ipratropium  0.5 mg Nebulization Q4H WAKE    levalbuterol  0.25  mg Nebulization QID WAKE    levothyroxine  200 mcg Oral Before breakfast    methocarbamoL  500 mg Oral TID    metoprolol tartrate  100 mg Oral BID    mirtazapine  7.5 mg Oral Nightly    piperacillin-tazobactam (Zosyn) IV (PEDS and ADULTS) (extended infusion is not appropriate)  4.5 g Intravenous Q8H    polyethylene glycol  17 g Oral Daily    tamsulosin  0.4 mg Oral Daily

## 2025-01-28 NOTE — NURSING
"Around 0000 pt agreed while on the phone with his son to wear BiPAP at this time pt given Ativan 0.5 mg PO for anxiety at 0050 pt self removed BiPAP and would not allow staff to replace it he states, "I'm leaving; I don't need this."  Despite numerous attempts for re-education pt continues to refuse BiPAP pr replaced via NC   "

## 2025-01-29 VITALS
HEIGHT: 71 IN | OXYGEN SATURATION: 96 % | WEIGHT: 207.25 LBS | DIASTOLIC BLOOD PRESSURE: 59 MMHG | HEART RATE: 95 BPM | SYSTOLIC BLOOD PRESSURE: 114 MMHG | TEMPERATURE: 100 F | BODY MASS INDEX: 29.02 KG/M2 | RESPIRATION RATE: 18 BRPM

## 2025-01-29 LAB
BACTERIA BLD CULT: ABNORMAL

## 2025-01-29 PROCEDURE — 99900035 HC TECH TIME PER 15 MIN (STAT): Mod: HCNC

## 2025-01-29 PROCEDURE — 27100171 HC OXYGEN HIGH FLOW UP TO 24 HOURS: Mod: HCNC

## 2025-01-29 PROCEDURE — 94761 N-INVAS EAR/PLS OXIMETRY MLT: CPT | Mod: HCNC

## 2025-01-29 PROCEDURE — 63600175 PHARM REV CODE 636 W HCPCS: Mod: HCNC | Performed by: STUDENT IN AN ORGANIZED HEALTH CARE EDUCATION/TRAINING PROGRAM

## 2025-01-29 RX ADMIN — MORPHINE SULFATE 4 MG: 4 INJECTION INTRAVENOUS at 01:01

## 2025-01-29 NOTE — NURSING
Called transfer center to inquire about ETA of [facility], states they will call [facility] and update chart with new ETA.

## 2025-01-29 NOTE — ASSESSMENT & PLAN NOTE
Patient's blood pressure range in the last 24 hours was: BP  Min: 83/60  Max: 130/57.The patient's inpatient anti-hypertensive regimen is listed below:  Current Antihypertensives  furosemide injection 40 mg, Every 12 hours, Intravenous    Plan  - BP is controlled, no changes needed to their regimen

## 2025-01-29 NOTE — DISCHARGE SUMMARY
[facility] Medicine  Discharge Summary      Patient Name: Bean Salas  MRN: [id number]  LIVAN: [id number]  Patient Class: IP- Inpatient  Admission Date: 1/20/2025  Hospital Length of Stay: 7 days  Discharge Date and Time:  01/28/2025 7:49 PM  Attending Physician: Ashlee Arreola MD   Discharging Provider: Ashlee Arreola MD  Primary Care Provider: Ramirez Levine MD    Primary Care Team: Networked reference to record PCT     HPI:   Bean Salas is 80-year-old elderly male with a past medical history of CAD, combined CHF (recovered EF), CAD, A-fib on eliquis,COPD, chronic respiratory failure on 3L NC, and hypothyroidism who presents to the ED for worsening SOB and back pain.  Patient was evaluated emergency department and was found to be in respiratory distress and satting 86-88% on 5 L. Patient was placed on BiPAP and seemed to improve.  Patient also had a fall several days ago and had a T8 vertebral fracture and was complaining of moderate pain.  Due to patient's presenting symptoms he will require admission for further evaluation and management.  Patient is a poor historian however at time my examination he denied any headache, fever, chills, chest pain but complained of shortness of breath and back pain.    Procedure(s) (LRB):  FUSION,SPINE,THORACIC,POSTERIOR APPROACH,USING COMPUTER-ASSISTED NAVIGATION (N/A)      Hospital Course:   Patient was admitted for worsening shortness of breath and back pain, he was treated for acute CHF exacerbation with IV Lasix, seen by Cardiology.  Patient was found to have T8 fracture, Neurosurgery evaluated the patient and was planning for surgery but patient also had persistent Enterococcus so procedure was canceled , ID was consulted as well, patient has remained persistently bacteremic    This morning Patient was not responding well to treatment and this morning he was very altered and not arousable, he was in acute respiratory distress, was  placed on BiPAP and given IV Lasix, palliative care discussed with the patient's son who agreed on inpatient hospice, later in the evening, when we took the BiPAP off and placement on 10 L high-flow nasal cannula, patient was more awake and conversant, I and Dr. Rivers from palliative Care discussed with him and he wanted to be on comfort care and agreed on admission to inpatient hospice.   Can continues p.o. Lasix p.r.n. if blood pressure tolerates for symptomatic relief of fluid overload   patient was accepted to [facility] for inpatient hospice admission         Goals of Care Treatment Preferences:  Code Status: DNR    Health care agent: jose carlos Schwartz Josue  [facility] agent number: [phone]       La[place]: Yes  What is most important right now is to focus on symptom/pain control, comfort and QOL .  Accordingly, we have decided that the best plan to meet the patient's goals includes enrolling in hospice care.      SDOH Screening:  The patient was screened for utility difficulties, food insecurity, transport difficulties, housing insecurity, and interpersonal safety and there were no concerns identified this admission.     Consults:   Consults (From admission, onward)          Status Ordering Provider     Inpatient consult to Palliative Care  Once        Provider:  (Not yet assigned)    Completed ELLIE JAY     Inpatient consult to Pulmonology  Once        Provider:  (Not yet assigned)    Completed ELLIE JAY     Inpatient consult to Infectious Diseases  Once        Provider:  (Not yet assigned)    Completed ELLIE JAY     Inpatient consult to [facility] ([facility] Nimkevych)  Once        Provider:  (Not yet assigned)    Completed ELLIE JAY     Inpatient consult to Neurosurgery  Once        Provider:  (Not yet assigned)    Completed ELLIE JAY     Inpatient consult to Pulmonology  Once        Provider:  Manuel Johnson MD    Completed LAURA MCKEON      Inpatient consult to Cardiology  Once        Provider:  (Not yet assigned)    Completed LAURA MCKEON     Inpatient consult to Social Work/Case Management  Once        Provider:  (Not yet assigned)    Completed LAURA MCKEON     Inpatient consult to Registered Dietitian/Nutritionist  Once        Provider:  (Not yet assigned)    Completed LAURA MCKEON            * Acute on chronic hypoxic respiratory failure  Patient was treated with IV Lasix and BiPAP    Comfort measures only status  Patient will be discharged to [facility] for inpatient was      Bacteremia  BCx 1/21 +Enterococcus, repeat BCx 1/24, still positive  ID consulted  Cardiology reviewed echo, did not see vegetations      UTI (urinary tract infection)  Urine culture +Pseudomonas and Citrobacter  IV antibiotics - Zosyn ID      Closed fracture of T8 vertebra  Pain control.  Back brace which is to be delivered to patient's home.    Due to persistently positive blood cultures will need to hold off on surgery    Patient was on IV antibiotics    patient is going to inpatient hospice, patient and son has agreed to be on comfort care    A-fib  Patient has long standing persistent (>12 months) atrial fibrillation. Patient is currently in sinus rhythm. [id number] Score: 3. The patients heart rate in the last 24 hours is as follows:  Pulse  Min: 47  Max: 113           COPD exacerbation  Patient's COPD is with exacerbation noted by continued dyspnea and use of accessory muscles for breathing currently.  Patient is currently off COPD Pathway. Continue scheduled inhalers Antibiotics and Supplemental oxygen and monitor respiratory status closely.     Acute on chronic diastolic heart failure  Strict I's and O's.    Diuresis with Lasix.  Inpatient Cardiology evaluated the patient    Essential hypertension  Patient's blood pressure range in the last 24 hours was: BP  Min: 83/60  Max: 130/57.The patient's inpatient anti-hypertensive regimen is listed below:  Current  Antihypertensives  furosemide injection 40 mg, Every 12 hours, Intravenous    Plan  - BP is controlled, no changes needed to their regimen        Final Active Diagnoses:    Diagnosis Date Noted POA    PRINCIPAL PROBLEM:  Acute on chronic hypoxic respiratory failure [J96.21] 09/16/2024 Yes    Comfort measures only status [Z51.5] 01/28/2025 Not Applicable    Bacteremia [R78.81] 01/25/2025 Yes    Closed fracture of T8 vertebra [S22.069A] 01/21/2025 Yes    UTI (urinary tract infection) [N39.0] 01/21/2025 Yes    A-fib [I48.91] 10/28/2024 Yes    COPD exacerbation [J44.1] 09/06/2023 Yes    Acute on chronic diastolic heart failure [I50.33]  Yes    Essential hypertension [I10] 06/25/2017 Yes     Chronic      Problems Resolved During this Admission:       Discharged Condition: stable    Disposition: Hospice/Home    Follow Up:   Follow-up Information       Ramirez Levine MD Follow up on 1/27/2025.    Specialty: Family Medicine  Why: at 10:40 AM; previously scheduled PCP appointment  Contact information:  [address] [place] [address] [phone]               [facility] - Follow up.    Specialties: Home Health Services, Physical Therapy, Occupational Therapy  Why: will resume home health services  Contact information:  [address]  [address]  [facility] [id number] [phone]                           Patient Instructions:      ACCEPT - Ambulatory referral/consult to Cardiology   Standing Status: Future   Referral Priority: Routine Referral Type: Consultation   Referral Reason: Specialty Services Required   Requested Specialty: Cardiology   Number of Visits Requested: 1       Significant Diagnostic Studies: Labs: CMP   Recent Labs   Lab 01/27/25  0627 01/28/25  0721    133*   K 4.2 4.2   CL 91* 91*   CO2 39* 35*   * 114*   BUN 13 12   CREATININE 0.9 0.9   CALCIUM 8.8 8.7   ALBUMIN 2.4* 2.4*   ANIONGAP 6* 7*    and CBC   Recent Labs   Lab 01/28/25  0721   WBC 8.52   HGB 11.6*    HCT 37.6*          Pending Diagnostic Studies:       None           Medications:  Reconciled Home Medications:      Medication List        CHANGE how you take these medications      furosemide 20 MG tablet  Commonly known as: LASIX  Take 2 tablets (40 mg total) by mouth 2 (two) times daily as needed (If blood pressure tolerates, for symptomatic relief).  What changed:   medication strength  when to take this  reasons to take this  Another medication with the same name was removed. Continue taking this medication, and follow the directions you see here.            STOP taking these medications      acetaminophen 500 MG tablet  Commonly known as: TYLENOL     ammonium lactate 12 % lotion  Commonly known as: LAC-HYDRIN     apixaban 5 mg Tab  Commonly known as: ELIQUIS     aspirin 81 MG EC tablet  Commonly known as: ECOTRIN     atorvastatin 10 MG tablet  Commonly known as: LIPITOR     CENTRUM SILVER 0.4 mg-300 mcg- 250 mcg Tab  Generic drug: multivit-min-FA-lycopen-lutein     cephALEXin 500 MG capsule  Commonly known as: KEFLEX     empagliflozin 10 mg tablet  Commonly known as: Jardiance     INV metoprolol tartrate 50 MG Tab  Commonly known as: LOPRESSOR     lactulose 10 gram/15 mL solution  Commonly known as: CHRONULAC     levothyroxine 200 MCG tablet  Commonly known as: SYNTHROID     melatonin 3 mg tablet  Commonly known as: MELATIN     methocarbamoL 750 MG Tab  Commonly known as: ROBAXIN     mirtazapine 7.5 MG Tab  Commonly known as: REMERON     naproxen 500 MG tablet  Commonly known as: NAPROSYN     oxyCODONE-acetaminophen 5-325 mg per tablet  Commonly known as: PERCOCET     sacubitriL-valsartan 24-26 mg per tablet  Commonly known as: ENTRESTO     senna-docusate 8.6-50 mg 8.6-50 mg per tablet  Commonly known as: PERICOLACE     tamsulosin 0.4 mg Cap  Commonly known as: FLOMAX     traMADoL 50 mg tablet  Commonly known as: ULTRAM              Indwelling Lines/Drains at time of discharge:   Lines/Drains/Airways        Drain  Duration                  Urethral Catheter 01/20/25 1200 8 days                    Time spent on the discharge of patient: 45 minutes         Ashlee Arreola MD  Department of Hospital Medicine  [place] [facility]

## 2025-01-29 NOTE — ASSESSMENT & PLAN NOTE
Pain control.  Back brace which is to be delivered to patient's home.    Due to persistently positive blood cultures will need to hold off on surgery    Patient was on IV antibiotics    patient is going to inpatient hospice, patient and son has agreed to be on comfort care

## 2025-01-29 NOTE — NURSING
Report called and given to nurse at [facility]. Pt will discharge with harrison and midline.

## 2025-01-29 NOTE — PLAN OF CARE
DUNG spoke with Jennifer, on-call nurse and [phone]  , [facility] admissions and liaison staff. They coordinated paperwork with pt's son, signed in agreement. Pt weaned off BIPAP to NC and comfort measures ordered.     DUNG ordered [facility] ambulance with O2 for to [address] [place] [id number]  for  at 8pm per [facility] preference and Plan of Care per Dr. Ba.     Secure chat to medical team, facility transfer orders from Dr. Arreola and d/c orders placed by Dr. Hargrove    CM clearance complete at this time.     01/28/25 1829   Post-Acute Status   Post-Acute Authorization Hospice   Hospice Status Set-up Complete/Auth obtained   Hospital Resources/Appts/Education Provided Post-Acute resouces added to AVS   Discharge Delays (!) [facility] Arranged Transportation   Discharge Plan   Discharge Plan A Inpatient Hospice   Discharge Plan B Inpatient Hospice     Jill Ureña LCSW  [phone]  Emergency Room

## 2025-01-29 NOTE — ASSESSMENT & PLAN NOTE
BCx 1/21 +Enterococcus, repeat BCx 1/24, still positive  ID consulted  Cardiology reviewed echo, did not see vegetations

## 2025-01-29 NOTE — NURSING
Nurse called to give report to [facility]. Nurse states they do not have medication for patient tonight. Team notified.

## 2025-01-29 NOTE — ASSESSMENT & PLAN NOTE
Patient has long standing persistent (>12 months) atrial fibrillation. Patient is currently in sinus rhythm. [id number] Score: 3. The patients heart rate in the last 24 hours is as follows:  Pulse  Min: 47  Max: 113

## 2025-01-30 DIAGNOSIS — Z00.00 ENCOUNTER FOR MEDICARE ANNUAL WELLNESS EXAM: ICD-10-CM

## 2025-01-30 LAB
BACTERIA BLD CULT: ABNORMAL

## 2025-01-31 DIAGNOSIS — Z95.0 CARDIAC PACEMAKER IN SITU: Primary | ICD-10-CM

## 2025-02-01 NOTE — PLAN OF CARE
[facility] - Telemetry  Discharge Final Note    Primary Care Provider: Ramirez Levine MD    Expected Discharge Date: 1/28/2025    Final Discharge Note (most recent)       Final Note - 02/01/25 0721          Final Note    Assessment Type Final Discharge Note (P)      Anticipated Discharge Disposition Hospice/Medical Facility (P)    [facility]       Post-Acute Status    Post-Acute Authorization Hospice (P)      Hospice Status Set-up Complete/Auth obtained (P)    [facility]    Discharge Delays [facility] Arranged Transportation (P)                      Important Message from Medicare  Important Message from Medicare regarding Discharge Appeal Rights: Other (comments) (Patient unable to sign due to medical condition)     Date IMM was signed: 01/27/25  Time IMM was signed: 1159    Contact Info       Ramirez Levine MD   Specialty: Family Medicine   Relationship: PCP - General    [address] [address]   Phone: [phone]       Next Steps: Follow up on 1/27/2025    Instructions: at 10:40 AM; previously scheduled PCP appointment    [facility]   Specialty: Home Health Services, Physical Therapy, Occupational Therapy    [facility] [address] [address]   Phone: [phone]       Next Steps: Follow up    Instructions: will resume home health services

## 2025-02-13 ENCOUNTER — PATIENT MESSAGE (OUTPATIENT)
Dept: ADMINISTRATIVE | Facility: CLINIC | Age: 80
End: 2025-02-13
Payer: MEDICARE

## 2025-02-20 ENCOUNTER — TELEPHONE (OUTPATIENT)
Dept: ELECTROPHYSIOLOGY | Facility: CLINIC | Age: 80
End: 2025-02-20
Payer: MEDICARE

## 2025-03-11 ENCOUNTER — DOCUMENT SCAN (OUTPATIENT)
Dept: HOME HEALTH SERVICES | Facility: HOSPITAL | Age: 80
End: 2025-03-11

## (undated) DEVICE — TUBING HPCIL ROT M/F ADPT 48IN

## (undated) DEVICE — DRESSING TRANS 4X4 TEGADERM

## (undated) DEVICE — KIT STYLET 52CM

## (undated) DEVICE — CONTRAST VISIPAQUE 150ML

## (undated) DEVICE — SHEATH SAFESHEATH II ULTRA 6FR

## (undated) DEVICE — SUT MONOCRYL 3-0 PS-1

## (undated) DEVICE — CATH BALLOON

## (undated) DEVICE — PAD DEFIB CADENCE ADULT R2

## (undated) DEVICE — SUT VICRYL PLUS 0 CT1 18IN

## (undated) DEVICE — STYLET 65CM

## (undated) DEVICE — PACK ECLIPSE UNIVERSAL STERILE

## (undated) DEVICE — DRAPE INCISE IOBAN 2 23X17IN

## (undated) DEVICE — GUIDEWIRE EMERALD .035IN 260CM

## (undated) DEVICE — APPLICATOR CHLORAPREP ORN 26ML

## (undated) DEVICE — ADHESIVE DERMABOND ADVANCED

## (undated) DEVICE — ELECTRODE REM PLYHSV RETURN 9

## (undated) DEVICE — SUT VICRYL PLUS 3-0 SH 18IN

## (undated) DEVICE — PACK PACER PERMANENT OMC

## (undated) DEVICE — Device

## (undated) DEVICE — COVER INSTR ELASTIC BAND 40X20

## (undated) DEVICE — KIT LEFT HEART MANIFOLD CUSTOM

## (undated) DEVICE — SEE MEDLINE ITEM 157187

## (undated) DEVICE — SHEATH PRELUDE 9X13CM SNAP

## (undated) DEVICE — HEMOSTAT VASC BAND LONG 27CM

## (undated) DEVICE — SPONGE COTTON TRAY 4X4IN

## (undated) DEVICE — KIT GLIDESHEATH SLEND 6FR 10CM

## (undated) DEVICE — PENCIL SMK EVAC CONNECTOR 10FT

## (undated) DEVICE — SLING SWATHE UNIVERSAL FOAM

## (undated) DEVICE — HEMOSTAT VASC BAND REG 24CM

## (undated) DEVICE — TAPE SURG DURAPORE 2 X10YD

## (undated) DEVICE — CATH ELECTRD DECAPOLAR 6F

## (undated) DEVICE — GUIDE CATH CPS DIRECT 54CM 135

## (undated) DEVICE — DRAPE C-ARM ELAS CLIP 42X120IN

## (undated) DEVICE — DRESSING AQUACEL AG RBBN 2X45

## (undated) DEVICE — DRAPE C-ARMOR EQUIPMENT COVER

## (undated) DEVICE — DRAPE STERI-DRAPE 1000 17X11IN

## (undated) DEVICE — TRAY MINOR ORTHO OMC

## (undated) DEVICE — CATH IMPULSE 5FR PIGTAIL 125CM

## (undated) DEVICE — WIRE GUIDE WHOLEY MOD J .035

## (undated) DEVICE — WIRE WHISPER MS 014 X 190

## (undated) DEVICE — DRAPE THREE-QTR REINF 53X77IN

## (undated) DEVICE — DRESSING AQUACEL AG 3.5X10IN

## (undated) DEVICE — CATH JACKY RADIAL 3.5 110CM

## (undated) DEVICE — SYS LABEL CORRECT MED

## (undated) DEVICE — R CATH ELECTRD DECAPOLAR 6F

## (undated) DEVICE — COVER PROBE US 5.5X58L NON LTX

## (undated) DEVICE — TOWEL OR DISP STRL BLUE 4/PK